# Patient Record
Sex: FEMALE | Race: BLACK OR AFRICAN AMERICAN | NOT HISPANIC OR LATINO | URBAN - METROPOLITAN AREA
[De-identification: names, ages, dates, MRNs, and addresses within clinical notes are randomized per-mention and may not be internally consistent; named-entity substitution may affect disease eponyms.]

---

## 2018-11-09 ENCOUNTER — INPATIENT (INPATIENT)
Facility: HOSPITAL | Age: 58
LOS: 3 days | Discharge: HOME | End: 2018-11-13
Attending: INTERNAL MEDICINE | Admitting: INTERNAL MEDICINE
Payer: COMMERCIAL

## 2018-11-09 VITALS
RESPIRATION RATE: 28 BRPM | DIASTOLIC BLOOD PRESSURE: 76 MMHG | OXYGEN SATURATION: 67 % | SYSTOLIC BLOOD PRESSURE: 191 MMHG | HEART RATE: 82 BPM

## 2018-11-09 DIAGNOSIS — E78.5 HYPERLIPIDEMIA, UNSPECIFIED: ICD-10-CM

## 2018-11-09 DIAGNOSIS — R06.00 DYSPNEA, UNSPECIFIED: ICD-10-CM

## 2018-11-09 DIAGNOSIS — I16.1 HYPERTENSIVE EMERGENCY: ICD-10-CM

## 2018-11-09 DIAGNOSIS — D64.9 ANEMIA, UNSPECIFIED: ICD-10-CM

## 2018-11-09 DIAGNOSIS — M86.171 OTHER ACUTE OSTEOMYELITIS, RIGHT ANKLE AND FOOT: Chronic | ICD-10-CM

## 2018-11-09 DIAGNOSIS — R65.10 SYSTEMIC INFLAMMATORY RESPONSE SYNDROME (SIRS) OF NON-INFECTIOUS ORIGIN WITHOUT ACUTE ORGAN DYSFUNCTION: ICD-10-CM

## 2018-11-09 DIAGNOSIS — I50.9 HEART FAILURE, UNSPECIFIED: ICD-10-CM

## 2018-11-09 DIAGNOSIS — J81.0 ACUTE PULMONARY EDEMA: ICD-10-CM

## 2018-11-09 DIAGNOSIS — Z29.9 ENCOUNTER FOR PROPHYLACTIC MEASURES, UNSPECIFIED: ICD-10-CM

## 2018-11-09 DIAGNOSIS — E11.9 TYPE 2 DIABETES MELLITUS WITHOUT COMPLICATIONS: ICD-10-CM

## 2018-11-09 DIAGNOSIS — H40.9 UNSPECIFIED GLAUCOMA: ICD-10-CM

## 2018-11-09 DIAGNOSIS — N18.4 CHRONIC KIDNEY DISEASE, STAGE 4 (SEVERE): ICD-10-CM

## 2018-11-09 LAB
ALBUMIN SERPL ELPH-MCNC: 3.5 G/DL — SIGNIFICANT CHANGE UP (ref 3.3–5)
ALP SERPL-CCNC: 66 U/L — SIGNIFICANT CHANGE UP (ref 40–120)
ALT FLD-CCNC: 62 U/L — HIGH (ref 4–33)
ANISOCYTOSIS BLD QL: SLIGHT — SIGNIFICANT CHANGE UP
APPEARANCE UR: CLEAR — SIGNIFICANT CHANGE UP
APTT BLD: 31.4 SEC — SIGNIFICANT CHANGE UP (ref 27.5–36.3)
AST SERPL-CCNC: 26 U/L — SIGNIFICANT CHANGE UP (ref 4–32)
BACTERIA # UR AUTO: SIGNIFICANT CHANGE UP
BASE EXCESS BLDV CALC-SCNC: -2.8 MMOL/L — SIGNIFICANT CHANGE UP
BASOPHILS # BLD AUTO: 0.06 K/UL — SIGNIFICANT CHANGE UP (ref 0–0.2)
BASOPHILS NFR BLD AUTO: 0.4 % — SIGNIFICANT CHANGE UP (ref 0–2)
BASOPHILS NFR SPEC: 0.9 % — SIGNIFICANT CHANGE UP (ref 0–2)
BILIRUB SERPL-MCNC: 0.3 MG/DL — SIGNIFICANT CHANGE UP (ref 0.2–1.2)
BILIRUB UR-MCNC: NEGATIVE — SIGNIFICANT CHANGE UP
BLASTS # FLD: 0 % — SIGNIFICANT CHANGE UP (ref 0–0)
BLOOD GAS VENOUS - CREATININE: 2.7 MG/DL — HIGH (ref 0.5–1.3)
BLOOD UR QL VISUAL: SIGNIFICANT CHANGE UP
BUN SERPL-MCNC: 58 MG/DL — HIGH (ref 7–23)
BUN SERPL-MCNC: 58 MG/DL — HIGH (ref 7–23)
CALCIUM SERPL-MCNC: 8.6 MG/DL — SIGNIFICANT CHANGE UP (ref 8.4–10.5)
CALCIUM SERPL-MCNC: 8.9 MG/DL — SIGNIFICANT CHANGE UP (ref 8.4–10.5)
CHLORIDE BLDV-SCNC: 113 MMOL/L — HIGH (ref 96–108)
CHLORIDE SERPL-SCNC: 106 MMOL/L — SIGNIFICANT CHANGE UP (ref 98–107)
CHLORIDE SERPL-SCNC: 108 MMOL/L — HIGH (ref 98–107)
CK MB BLD-MCNC: 3.67 NG/ML — SIGNIFICANT CHANGE UP (ref 1–4.7)
CK MB BLD-MCNC: 4.29 NG/ML — SIGNIFICANT CHANGE UP (ref 1–4.7)
CK MB BLD-MCNC: SIGNIFICANT CHANGE UP (ref 0–2.5)
CK SERPL-CCNC: 125 U/L — SIGNIFICANT CHANGE UP (ref 25–170)
CK SERPL-CCNC: 128 U/L — SIGNIFICANT CHANGE UP (ref 25–170)
CO2 SERPL-SCNC: 21 MMOL/L — LOW (ref 22–31)
CO2 SERPL-SCNC: 22 MMOL/L — SIGNIFICANT CHANGE UP (ref 22–31)
COLOR SPEC: YELLOW — SIGNIFICANT CHANGE UP
CREAT SERPL-MCNC: 2.69 MG/DL — HIGH (ref 0.5–1.3)
CREAT SERPL-MCNC: 2.76 MG/DL — HIGH (ref 0.5–1.3)
EOSINOPHIL # BLD AUTO: 0.16 K/UL — SIGNIFICANT CHANGE UP (ref 0–0.5)
EOSINOPHIL NFR BLD AUTO: 1.1 % — SIGNIFICANT CHANGE UP (ref 0–6)
EOSINOPHIL NFR FLD: 0 % — SIGNIFICANT CHANGE UP (ref 0–6)
EPI CELLS # UR: SIGNIFICANT CHANGE UP
GAS PNL BLDV: 141 MMOL/L — SIGNIFICANT CHANGE UP (ref 136–146)
GIANT PLATELETS BLD QL SMEAR: PRESENT — SIGNIFICANT CHANGE UP
GLUCOSE BLDC GLUCOMTR-MCNC: 172 MG/DL — HIGH (ref 70–99)
GLUCOSE BLDV-MCNC: 218 — HIGH (ref 70–99)
GLUCOSE SERPL-MCNC: 153 MG/DL — HIGH (ref 70–99)
GLUCOSE SERPL-MCNC: 223 MG/DL — HIGH (ref 70–99)
GLUCOSE UR-MCNC: NEGATIVE — SIGNIFICANT CHANGE UP
HCO3 BLDV-SCNC: 21 MMOL/L — SIGNIFICANT CHANGE UP (ref 20–27)
HCT VFR BLD CALC: 30.1 % — LOW (ref 34.5–45)
HCT VFR BLDV CALC: 29.1 % — LOW (ref 34.5–45)
HGB BLD-MCNC: 9.1 G/DL — LOW (ref 11.5–15.5)
HGB BLDV-MCNC: 9.4 G/DL — LOW (ref 11.5–15.5)
HYPOCHROMIA BLD QL: SLIGHT — SIGNIFICANT CHANGE UP
IMM GRANULOCYTES # BLD AUTO: 0.6 # — SIGNIFICANT CHANGE UP
IMM GRANULOCYTES NFR BLD AUTO: 4.3 % — HIGH (ref 0–1.5)
INR BLD: 1.06 — SIGNIFICANT CHANGE UP (ref 0.88–1.17)
KETONES UR-MCNC: NEGATIVE — SIGNIFICANT CHANGE UP
LACTATE BLDV-MCNC: 0.9 MMOL/L — SIGNIFICANT CHANGE UP (ref 0.5–2)
LEUKOCYTE ESTERASE UR-ACNC: NEGATIVE — SIGNIFICANT CHANGE UP
LYMPHOCYTES # BLD AUTO: 1.09 K/UL — SIGNIFICANT CHANGE UP (ref 1–3.3)
LYMPHOCYTES # BLD AUTO: 7.8 % — LOW (ref 13–44)
LYMPHOCYTES NFR SPEC AUTO: 5.2 % — LOW (ref 13–44)
MAGNESIUM SERPL-MCNC: 2.7 MG/DL — HIGH (ref 1.6–2.6)
MCHC RBC-ENTMCNC: 24.7 PG — LOW (ref 27–34)
MCHC RBC-ENTMCNC: 30.2 % — LOW (ref 32–36)
MCV RBC AUTO: 81.8 FL — SIGNIFICANT CHANGE UP (ref 80–100)
METAMYELOCYTES # FLD: 0.9 % — SIGNIFICANT CHANGE UP (ref 0–1)
MICROCYTES BLD QL: SLIGHT — SIGNIFICANT CHANGE UP
MONOCYTES # BLD AUTO: 0.8 K/UL — SIGNIFICANT CHANGE UP (ref 0–0.9)
MONOCYTES NFR BLD AUTO: 5.7 % — SIGNIFICANT CHANGE UP (ref 2–14)
MONOCYTES NFR BLD: 4.3 % — SIGNIFICANT CHANGE UP (ref 2–9)
MYELOCYTES NFR BLD: 0.9 % — HIGH (ref 0–0)
NEUTROPHIL AB SER-ACNC: 86.1 % — HIGH (ref 43–77)
NEUTROPHILS # BLD AUTO: 11.28 K/UL — HIGH (ref 1.8–7.4)
NEUTROPHILS NFR BLD AUTO: 80.7 % — HIGH (ref 43–77)
NEUTS BAND # BLD: 1.7 % — SIGNIFICANT CHANGE UP (ref 0–6)
NITRITE UR-MCNC: NEGATIVE — SIGNIFICANT CHANGE UP
NRBC # FLD: 0 — SIGNIFICANT CHANGE UP
NT-PROBNP SERPL-SCNC: SIGNIFICANT CHANGE UP PG/ML
OTHER - HEMATOLOGY %: 0 — SIGNIFICANT CHANGE UP
PCO2 BLDV: 46 MMHG — SIGNIFICANT CHANGE UP (ref 41–51)
PH BLDV: 7.31 PH — LOW (ref 7.32–7.43)
PH UR: 5.5 — SIGNIFICANT CHANGE UP (ref 5–8)
PHOSPHATE SERPL-MCNC: 5.1 MG/DL — HIGH (ref 2.5–4.5)
PLATELET # BLD AUTO: 390 K/UL — SIGNIFICANT CHANGE UP (ref 150–400)
PLATELET COUNT - ESTIMATE: NORMAL — SIGNIFICANT CHANGE UP
PMV BLD: 10.2 FL — SIGNIFICANT CHANGE UP (ref 7–13)
PO2 BLDV: 30 MMHG — LOW (ref 35–40)
POIKILOCYTOSIS BLD QL AUTO: SLIGHT — SIGNIFICANT CHANGE UP
POLYCHROMASIA BLD QL SMEAR: SLIGHT — SIGNIFICANT CHANGE UP
POTASSIUM BLDV-SCNC: 4.1 MMOL/L — SIGNIFICANT CHANGE UP (ref 3.4–4.5)
POTASSIUM SERPL-MCNC: 4.3 MMOL/L — SIGNIFICANT CHANGE UP (ref 3.5–5.3)
POTASSIUM SERPL-MCNC: 4.4 MMOL/L — SIGNIFICANT CHANGE UP (ref 3.5–5.3)
POTASSIUM SERPL-SCNC: 4.3 MMOL/L — SIGNIFICANT CHANGE UP (ref 3.5–5.3)
POTASSIUM SERPL-SCNC: 4.4 MMOL/L — SIGNIFICANT CHANGE UP (ref 3.5–5.3)
PROMYELOCYTES # FLD: 0 % — SIGNIFICANT CHANGE UP (ref 0–0)
PROT SERPL-MCNC: 7.3 G/DL — SIGNIFICANT CHANGE UP (ref 6–8.3)
PROT UR-MCNC: 100 — HIGH
PROTHROM AB SERPL-ACNC: 11.8 SEC — SIGNIFICANT CHANGE UP (ref 9.8–13.1)
RBC # BLD: 3.68 M/UL — LOW (ref 3.8–5.2)
RBC # FLD: 15.5 % — HIGH (ref 10.3–14.5)
RBC CASTS # UR COMP ASSIST: >50 — HIGH (ref 0–?)
REVIEW TO FOLLOW: YES — SIGNIFICANT CHANGE UP
SAO2 % BLDV: 44 % — LOW (ref 60–85)
SODIUM SERPL-SCNC: 142 MMOL/L — SIGNIFICANT CHANGE UP (ref 135–145)
SODIUM SERPL-SCNC: 143 MMOL/L — SIGNIFICANT CHANGE UP (ref 135–145)
SP GR SPEC: 1.02 — SIGNIFICANT CHANGE UP (ref 1–1.04)
TROPONIN T, HIGH SENSITIVITY: 142 NG/L — CRITICAL HIGH (ref ?–14)
TROPONIN T, HIGH SENSITIVITY: 153 NG/L — CRITICAL HIGH (ref ?–14)
TROPONIN T, HIGH SENSITIVITY: 94 NG/L — CRITICAL HIGH (ref ?–14)
UROBILINOGEN FLD QL: 0.2 — SIGNIFICANT CHANGE UP
VARIANT LYMPHS # BLD: 0 % — SIGNIFICANT CHANGE UP
WBC # BLD: 13.99 K/UL — HIGH (ref 3.8–10.5)
WBC # FLD AUTO: 13.99 K/UL — HIGH (ref 3.8–10.5)
WBC UR QL: SIGNIFICANT CHANGE UP (ref 0–?)

## 2018-11-09 PROCEDURE — 99223 1ST HOSP IP/OBS HIGH 75: CPT | Mod: GC

## 2018-11-09 PROCEDURE — 71045 X-RAY EXAM CHEST 1 VIEW: CPT | Mod: 26

## 2018-11-09 RX ORDER — HYDRALAZINE HCL 50 MG
50 TABLET ORAL EVERY 8 HOURS
Qty: 0 | Refills: 0 | Status: DISCONTINUED | OUTPATIENT
Start: 2018-11-09 | End: 2018-11-13

## 2018-11-09 RX ORDER — DEXTROSE 50 % IN WATER 50 %
15 SYRINGE (ML) INTRAVENOUS ONCE
Qty: 0 | Refills: 0 | Status: DISCONTINUED | OUTPATIENT
Start: 2018-11-09 | End: 2018-11-13

## 2018-11-09 RX ORDER — DEXTROSE 50 % IN WATER 50 %
25 SYRINGE (ML) INTRAVENOUS ONCE
Qty: 0 | Refills: 0 | Status: DISCONTINUED | OUTPATIENT
Start: 2018-11-09 | End: 2018-11-13

## 2018-11-09 RX ORDER — AMLODIPINE BESYLATE 2.5 MG/1
10 TABLET ORAL DAILY
Qty: 0 | Refills: 0 | Status: DISCONTINUED | OUTPATIENT
Start: 2018-11-10 | End: 2018-11-13

## 2018-11-09 RX ORDER — GLUCAGON INJECTION, SOLUTION 0.5 MG/.1ML
1 INJECTION, SOLUTION SUBCUTANEOUS ONCE
Qty: 0 | Refills: 0 | Status: DISCONTINUED | OUTPATIENT
Start: 2018-11-09 | End: 2018-11-13

## 2018-11-09 RX ORDER — NITROGLYCERIN 6.5 MG
100 CAPSULE, EXTENDED RELEASE ORAL
Qty: 50 | Refills: 0 | Status: DISCONTINUED | OUTPATIENT
Start: 2018-11-09 | End: 2018-11-09

## 2018-11-09 RX ORDER — NITROGLYCERIN 6.5 MG
0.4 CAPSULE, EXTENDED RELEASE ORAL ONCE
Qty: 0 | Refills: 0 | Status: COMPLETED | OUTPATIENT
Start: 2018-11-09 | End: 2018-11-09

## 2018-11-09 RX ORDER — INSULIN LISPRO 100/ML
VIAL (ML) SUBCUTANEOUS
Qty: 0 | Refills: 0 | Status: DISCONTINUED | OUTPATIENT
Start: 2018-11-09 | End: 2018-11-13

## 2018-11-09 RX ORDER — FUROSEMIDE 40 MG
40 TABLET ORAL ONCE
Qty: 0 | Refills: 0 | Status: COMPLETED | OUTPATIENT
Start: 2018-11-09 | End: 2018-11-09

## 2018-11-09 RX ORDER — FUROSEMIDE 40 MG
60 TABLET ORAL EVERY 12 HOURS
Qty: 0 | Refills: 0 | Status: DISCONTINUED | OUTPATIENT
Start: 2018-11-09 | End: 2018-11-11

## 2018-11-09 RX ORDER — FERROUS SULFATE 325(65) MG
325 TABLET ORAL DAILY
Qty: 0 | Refills: 0 | Status: DISCONTINUED | OUTPATIENT
Start: 2018-11-09 | End: 2018-11-13

## 2018-11-09 RX ORDER — FUROSEMIDE 40 MG
60 TABLET ORAL EVERY 12 HOURS
Qty: 0 | Refills: 0 | Status: DISCONTINUED | OUTPATIENT
Start: 2018-11-09 | End: 2018-11-09

## 2018-11-09 RX ORDER — AMLODIPINE BESYLATE 2.5 MG/1
10 TABLET ORAL ONCE
Qty: 0 | Refills: 0 | Status: COMPLETED | OUTPATIENT
Start: 2018-11-09 | End: 2018-11-09

## 2018-11-09 RX ORDER — ISOSORBIDE DINITRATE 5 MG/1
5 TABLET ORAL THREE TIMES A DAY
Qty: 0 | Refills: 0 | Status: DISCONTINUED | OUTPATIENT
Start: 2018-11-09 | End: 2018-11-13

## 2018-11-09 RX ORDER — HYDRALAZINE HCL 50 MG
50 TABLET ORAL EVERY 8 HOURS
Qty: 0 | Refills: 0 | Status: DISCONTINUED | OUTPATIENT
Start: 2018-11-09 | End: 2018-11-09

## 2018-11-09 RX ORDER — CARVEDILOL PHOSPHATE 80 MG/1
25 CAPSULE, EXTENDED RELEASE ORAL EVERY 12 HOURS
Qty: 0 | Refills: 0 | Status: DISCONTINUED | OUTPATIENT
Start: 2018-11-09 | End: 2018-11-13

## 2018-11-09 RX ORDER — ISOSORBIDE DINITRATE 5 MG/1
5 TABLET ORAL THREE TIMES A DAY
Qty: 0 | Refills: 0 | Status: DISCONTINUED | OUTPATIENT
Start: 2018-11-09 | End: 2018-11-09

## 2018-11-09 RX ORDER — NITROGLYCERIN 6.5 MG
5 CAPSULE, EXTENDED RELEASE ORAL
Qty: 50 | Refills: 0 | Status: DISCONTINUED | OUTPATIENT
Start: 2018-11-09 | End: 2018-11-10

## 2018-11-09 RX ORDER — DEXTROSE 50 % IN WATER 50 %
12.5 SYRINGE (ML) INTRAVENOUS ONCE
Qty: 0 | Refills: 0 | Status: DISCONTINUED | OUTPATIENT
Start: 2018-11-09 | End: 2018-11-13

## 2018-11-09 RX ORDER — INSULIN LISPRO 100/ML
VIAL (ML) SUBCUTANEOUS AT BEDTIME
Qty: 0 | Refills: 0 | Status: DISCONTINUED | OUTPATIENT
Start: 2018-11-09 | End: 2018-11-13

## 2018-11-09 RX ORDER — HEPARIN SODIUM 5000 [USP'U]/ML
5000 INJECTION INTRAVENOUS; SUBCUTANEOUS EVERY 8 HOURS
Qty: 0 | Refills: 0 | Status: DISCONTINUED | OUTPATIENT
Start: 2018-11-09 | End: 2018-11-13

## 2018-11-09 RX ORDER — SODIUM CHLORIDE 9 MG/ML
1000 INJECTION, SOLUTION INTRAVENOUS
Qty: 0 | Refills: 0 | Status: DISCONTINUED | OUTPATIENT
Start: 2018-11-09 | End: 2018-11-13

## 2018-11-09 RX ORDER — ATORVASTATIN CALCIUM 80 MG/1
20 TABLET, FILM COATED ORAL AT BEDTIME
Qty: 0 | Refills: 0 | Status: DISCONTINUED | OUTPATIENT
Start: 2018-11-09 | End: 2018-11-13

## 2018-11-09 RX ADMIN — HEPARIN SODIUM 5000 UNIT(S): 5000 INJECTION INTRAVENOUS; SUBCUTANEOUS at 23:03

## 2018-11-09 RX ADMIN — Medication 60 MILLIGRAM(S): at 18:04

## 2018-11-09 RX ADMIN — Medication 40 MILLIGRAM(S): at 14:11

## 2018-11-09 RX ADMIN — ISOSORBIDE DINITRATE 5 MILLIGRAM(S): 5 TABLET ORAL at 18:05

## 2018-11-09 RX ADMIN — ISOSORBIDE DINITRATE 5 MILLIGRAM(S): 5 TABLET ORAL at 23:02

## 2018-11-09 RX ADMIN — Medication 50 MILLIGRAM(S): at 23:02

## 2018-11-09 RX ADMIN — Medication 50 MILLIGRAM(S): at 18:03

## 2018-11-09 RX ADMIN — Medication 100 MICROGRAM(S)/MIN: at 12:45

## 2018-11-09 RX ADMIN — Medication 0.4 MILLIGRAM(S): at 13:31

## 2018-11-09 RX ADMIN — CARVEDILOL PHOSPHATE 25 MILLIGRAM(S): 80 CAPSULE, EXTENDED RELEASE ORAL at 18:04

## 2018-11-09 RX ADMIN — ATORVASTATIN CALCIUM 20 MILLIGRAM(S): 80 TABLET, FILM COATED ORAL at 23:02

## 2018-11-09 RX ADMIN — Medication 30 MICROGRAM(S)/MIN: at 10:20

## 2018-11-09 RX ADMIN — AMLODIPINE BESYLATE 10 MILLIGRAM(S): 2.5 TABLET ORAL at 13:30

## 2018-11-09 RX ADMIN — Medication 325 MILLIGRAM(S): at 23:02

## 2018-11-09 RX ADMIN — Medication 30 MICROGRAM(S)/MIN: at 19:00

## 2018-11-09 NOTE — H&P ADULT - PROBLEM SELECTOR PLAN 1
In setting of hypertensive emergency, will manage BP as below as well as heart failure  -c/w bilevel ventilation for now, breaks for meals   -IV Lasix 40mg IV BID  -supplemental O2 when off bilevel, titrate to maintain SPO2 >92% In setting of hypertensive emergency, will manage BP as below as well as heart failure  -c/w bilevel ventilation for now, breaks for meals   -IV Lasix 60mg IV BID  -supplemental O2 when off bilevel, titrate to maintain SPO2 >92% In setting of hypertensive emergency, will manage BP as below as well as heart failure  -c/w bilevel ventilation for now, breaks for meals   -IV Lasix 60mg IV BID  -supplemental O2 when off bilevel, titrate to maintain SPO2 >92%  -Strict i/os

## 2018-11-09 NOTE — H&P ADULT - PROBLEM SELECTOR PLAN 5
GFR indication stable stage IV dz, likely in setting of HTN, DM, Heart failure   -check FeUREA   -monitor Cr daily  avoid nephrotoxins, renally dose all medications GFR indication stable stage IV dz, likely in setting of HTN, DM, Heart failure   -check FeUREA   -monitor Cr daily  avoid nephrotoxins, renally dose all medications  -recheck BMP and lytes now, replete as needed GFR indication stable stage IV dz, likely in setting of HTN, DM, Heart failure   -Baseline unknown. Call PCP in AM to obtain baseline renal function.   -monitor Cr daily  -avoid nephrotoxins, renally dose all medications  -recheck BMP and lytes now, replete as needed

## 2018-11-09 NOTE — ED PROVIDER NOTE - NS ED ROS FT
GENERAL: No fever or chills, EYES: no change in vision, HEENT: no trouble swallowing or speaking, CARDIAC: no chest pain, PULMONARY: no cough or SOB, GI: no abdominal pain, no nausea, no vomiting, no diarrhea or constipation, : No changes in urination, SKIN: no rashes, NEURO: no headache,  MSK: No joint pain ~Papito Bustillos M.D. Resident GENERAL: No fever or chills, EYES: no change in vision, HEENT: no trouble swallowing or speaking, CARDIAC: no chest pain, +palpitations, PULMONARY: no cough, +SOB, GI: no abdominal pain, no nausea, no vomiting, no diarrhea or constipation, : No changes in urination, SKIN: no rashes, NEURO: no headache,  MSK: No joint pain ~Papito Bustillos M.D. Resident

## 2018-11-09 NOTE — H&P ADULT - PROBLEM SELECTOR PLAN 3
tacycardic + WBC+ tachypenia  likely response to hypertensive urgency, patient w/ recent completion of abx at previous hospital, will continue to monitor off abx at this time.  -f/u blood cultures, check UA, urine culture -Likely diastolic decompensated heart failure due to hypertensive urgency  - Previous hospitalization discharge summary w/ Grade 2 diastolic dysfunction, dilated RV cavity size w/ reduced RV function, mitral annular calcification and small pericardial effusion w/ preserved LVEF.   -c/w hydralazine 50mg q8hrs, isosorbide dinitrate 5mg q8, carvedilol 25mg q12   -will start IV Lasix 60mg q12 hrs for now, reassess volume status in AM and transition to PO   -daily weights, strict i/o's   -obtain TTE  -CCU consulted

## 2018-11-09 NOTE — ED PROVIDER NOTE - ATTENDING CONTRIBUTION TO CARE
Seen and examined, mod distress, tachypneic 30s and hypoxic RA at triage, pt. much worse than yest., was feeling better at time of DC 2d PTA. No further fever, devendra. po abx but had hx of fluid shifts, stopping and starting Lasix and bicarb in MICU. This a.m. called EMS, rec'd NRB facemask with some improvement, O2 sat 96% on 100% NRB, still labored. MMM, crackles bibasilar, rapid and shallow resp., heart reg, abd soft, NT to palp, no CVAT, ext. NT calves, moves all.

## 2018-11-09 NOTE — ED PROVIDER NOTE - CARE PLAN
Principal Discharge DX:	Dyspnea  Secondary Diagnosis:	Acute pulmonary edema  Secondary Diagnosis:	Pneumonia

## 2018-11-09 NOTE — H&P ADULT - PROBLEM SELECTOR PLAN 10
IMPROVE score: 0   DVT ppx: Heparin sq given patient likely to be in bed   Diet: DASH consistent carb w/ evening snack   PT consult placed   Dr.Benziger Alcala   Internal Medicine   PGY-2   [phone] (long range pager)  [phone] (short range)

## 2018-11-09 NOTE — H&P ADULT - NSHPPHYSICALEXAM_GEN_ALL_CORE
T(C): 36.7 (11-09-18 @ 13:45), Max: 36.7 (11-09-18 @ 09:58)  HR: 86 (11-09-18 @ 16:00) (75 - 86)  BP: 186/89 (11-09-18 @ 16:00) (186/78 - 244/103)  RR: 23 (11-09-18 @ 16:00) (22 - 30)  SpO2: 93% (11-09-18 @ 16:00) (67% - 100%)  Wt(kg): --    Gen: awake, alert, obese   HENT: neck soft / supple  Lymph: no LAD noted in neck  Eye: PERRL, sclerae anicteric  CV: normal rate, regular rhythm, +JVD  Pulm: crackles b/l to mid lung  Abd: +BS, soft, NT, ND  Skin: warm, dry  Ext: right leg pitting edema +2  Neuro: answering questions appropriately, following commands appropriately, recent and remote memory intact  Psych: normal mood / affect

## 2018-11-09 NOTE — H&P ADULT - PROBLEM SELECTOR PLAN 7
As per patient not on oral medications as A1c <7%   -check A1c in AM   -start HISS moderate, FSG premeal and bedtime

## 2018-11-09 NOTE — ED PROVIDER NOTE - PHYSICAL EXAMINATION
Gen: AAOx3, non-toxic  Head: NCAT  HEENT: EOMI, oral mucosa moist, normal conjunctiva  Lung: scattered crackles, +respiratory distress, increased work of breathing  CV: RRR, no murmurs, rubs or gallops, trace pedal edema  Abd: soft, NTND, no guarding  MSK: no visible deformities  Neuro: No focal sensory or motor deficits  Skin: Warm, well perfused, no rash  Psych: normal affect.   ~Papito Bustillos M.D. Resident

## 2018-11-09 NOTE — H&P ADULT - PMH
Blind  Right Eye  Cataract    CKD (chronic kidney disease), stage IV    DM (diabetes mellitus)    Glaucoma    Heart failure    HLD (hyperlipidemia)    HTN (hypertension)

## 2018-11-09 NOTE — H&P ADULT - ASSESSMENT
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 Presenting to the ER w/ acute SOB and hypertensive emergency c/b flash pulmonary edema. 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 Presenting to the ER w/ acute SOB and hypertensive emergency c/b flash pulmonary edema.       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 Presenting to the ER w/ acute SOB and hypertensive emergency c/b flash pulmonary edema.       #elevated troponin   -EKG: NSR 83, , QTc 477 V2 t-wave inversion  -up trending troponin, likely in setting of CKD and demand ischemia   -repeat troponin now   -patient will need ischemia work up, likely out patient   -check TTE  -Cardiology consulted

## 2018-11-09 NOTE — ED ADULT NURSE NOTE - OBJECTIVE STATEMENT
vick rn: pt received to room #17 with c/o SOB. pt placed on 100% NRB. pt aox4, ambulatory, skin w/d/i. EKG obtained. IV placed, labs drawn and sent. cultures obtained x 2 and sent to lab. rectal noted. on HM, Sinus tach on HM. respiratory called for Bipap, report given to primary Rn.

## 2018-11-09 NOTE — H&P ADULT - PROBLEM SELECTOR PLAN 4
Previous hospitalization discharge summary w/ Grade 2 diastolic dysfunction, dilated RV cavity size w/ reduced RV function, mitral annular calcification and small pericardial effusion w/ preserved LVEF.   c/w hydralazine 50mg q8hrs, isosorbide dinitrate 5mg q8, carvedilol 25mg q12   -will start IV Lasix 40mg q12 hrs for now, reassess volume status in AM and transition to PO   -daily weights, strict i/o's   -obtain TTE Previous hospitalization discharge summary w/ Grade 2 diastolic dysfunction, dilated RV cavity size w/ reduced RV function, mitral annular calcification and small pericardial effusion w/ preserved LVEF.   c/w hydralazine 50mg q8hrs, isosorbide dinitrate 5mg q8, carvedilol 25mg q12   -will start IV Lasix 60mg q12 hrs for now, reassess volume status in AM and transition to PO   -daily weights, strict i/o's   -obtain TTE  -CCU consulted tacycardic + WBC+ tachypenia  likely response to hypertensive urgency, patient w/ recent completion of abx at previous hospital, will continue to monitor off abx at this time.  -f/u blood cultures, check UA, urine culture

## 2018-11-09 NOTE — ED ADULT NURSE NOTE - NSIMPLEMENTINTERV_GEN_ALL_ED
Implemented All Fall with Harm Risk Interventions:  [place] to call system. Call bell, personal items and telephone within reach. Instruct patient to call for assistance. Room bathroom lighting operational. Non-slip footwear when patient is off stretcher. Physically safe environment: no spills, clutter or unnecessary equipment. Stretcher in lowest position, wheels locked, appropriate side rails in place. Provide visual cue, wrist band, yellow gown, etc. Monitor gait and stability. Monitor for mental status changes and reorient to person, place, and time. Review medications for side effects contributing to fall risk. Reinforce activity limits and safety measures with patient and family. Provide visual clues: red socks.

## 2018-11-09 NOTE — H&P ADULT - NSHPSOCIALHISTORY_GEN_ALL_CORE
, lives w/ , no kids, works as airline , denies tobacco/etoh/recreational drug use, not sexually active.

## 2018-11-09 NOTE — H&P ADULT - PROBLEM SELECTOR PLAN 6
Normocytic anemia, likely in setting of CKD and chronic disease, no signs of acute blood loss  -check iron studies  -c/w home iron supplementation   monitor hgb daily Normocytic anemia, likely in setting of CKD and chronic disease, no signs of acute blood loss  -check iron studies  -c/w home iron supplementation   -monitor hgb daily

## 2018-11-09 NOTE — ED PROVIDER NOTE - OBJECTIVE STATEMENT
58 yo F PMHx DM2, HTN, CHF, discharged yesterday from [facility]. Pt was in MICU at [facility], intubated for acute respiratory failure 2/2 enterovirus PNA. She was discharged yesterday on PO antibiotics (Levofloxacin). She became SOB again this morning. She denies chest pain but feels palpitations. Denies fevers/chills, N/V, abd pain.

## 2018-11-09 NOTE — ED PROVIDER NOTE - MEDICAL DECISION MAKING DETAILS
56 yo F PMHx DM2, HTN, CHF, discharged yesterday from [facility] treated for PNA, p/w SOB, elevated BPs, scattered B lines on POCUS, likely APE, other DDx: ACS, CHF exacerbation, will obtain basic labs + trop, BNP, EKG, CXR, nitro gtt, admit

## 2018-11-09 NOTE — H&P ADULT - PROBLEM SELECTOR PLAN 2
s/p nitro ggt, lasix 40mg IV, no chest or back pain low suspicion for dissection, tinnitus now resolved   -will resume home medications; amlodipine 10mg, hydralazine 50mg q8hrs, isosorbide dinitrate 5mg q8 s/p nitro ggt, lasix 40mg IV, no chest or back pain low suspicion for dissection, tinnitus now resolved   -will resume home medications; amlodipine 10mg, hydralazine 50mg q8hrs, isosorbide dinitrate 5mg q8  -vitals q4 s/p nitro ggt, lasix 40mg IV, no chest or back pain low suspicion for dissection, tinnitus now resolved   -will resume home medications; amlodipine 10mg, hydralazine 50mg q8hrs, isosorbide dinitrate 5mg q8  -vitals q4  -Uptitrate BP meds as needed. May need gtt for better BP control. CCU evaluation called. s/p nitro ggt, lasix 40mg IV, no chest or back pain low suspicion for dissection, tinnitus now resolved   -will resume home medications; amlodipine 10mg, hydralazine 50mg q8hrs, isosorbide dinitrate 5mg q8  -vitals q4  -Uptitrate BP meds as needed. May need gtt for better BP control. CCU evaluation called.  -Elevated troponins likely due to demand ischemia due to hypertension  -EKG without any acute st or t wave changes , no active chest pain, low suspicion for ACS at this time.

## 2018-11-09 NOTE — H&P ADULT - HISTORY OF PRESENT ILLNESS
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 Presenting to the ER w/ acute SOB and hypertensive emergency.    Patient was at home when she developed SOB on ambulation w/ palpations and tinnitus. She denies any chest pain, nausea/vomiting, fevers, chills or night sweats. She was feeling well prior to discharge from the hospital. Patient picked up her Rx'd medications and only took her hydralazine and isosorbide dinitrate today. She states her systolic blood pressures usually run less than 150s and had been stable prior recent discharge from [facility]   Patient was recent discharged from [facility] for enterovirus infection w/ possible bacterial pneumonia requiring intubation and MICU stay presenting as AMS. She was treated w/ azithromycin, ceftriaxone and vancomycin subsequently transition to levofloxacin as there were b/l opacities seen on CXR. TTE was preformed and she was found to have Grade 2 diastolic dysfunction, dilated RV cavity size w/ reduced RV function, mitral annular calcification and small pericardial effusion w/ preserved LVEF. Dopplers of the LE were also preformed and reported as normal. Troponin negative, EKG "neg for acute findings", Her platelets dropped >50%, HIT panel was sent however negative and Heparin Sq was restarted for dvt ppx. MARLEN (-), CCP <8, RF 10,

## 2018-11-09 NOTE — H&P ADULT - NSHPLABSRESULTS_GEN_ALL_CORE
.  Labs reviewed personally.                          9.1    13.99 )-----------( 390      ( 09 Nov 2018 09:45 )             30.1     Hgb Trend: 9.1<--  11-09    142  |  106  |  58<H>  ----------------------------<  223<H>  4.3   |  21<L>  |  2.76<H>    Ca    8.6      09 Nov 2018 09:45    TPro  7.3  /  Alb  3.5  /  TBili  0.3  /  DBili  x   /  AST  26  /  ALT  62<H>  /  AlkPhos  66  11-09    Creatinine Trend: 2.76<--  PT/INR - ( 09 Nov 2018 09:45 )   PT: 11.8 SEC;   INR: 1.06       PTT - ( 09 Nov 2018 09:45 )  PTT:31.4 SEC    Troponin 94 -->153; BNP [id number]   Imaging reviewed personally.  < from: Xray Chest 1 View- PORTABLE-Urgent (11.09.18 @ 12:07) >  Small bilateral pleural effusions and moderate pulmonary edema. Dense left basilar/retrocardiac opacification could be due to combination of pleural effusion and underlying airspace consolidation including possible infiltrate/pneumonia in the proper clinical context. No pneumothorax. Heart size and mediastinal width inaccurately assessed on this projection. Trachea midline. Generalized osteopenia and spine and bilateral shoulder degenerative changes.  < end of copied text > EKG: NSR 83, , QTc 477 V2 t-wave inversion    Labs reviewed personally.                        9.1    13.99 )-----------( 390      ( 09 Nov 2018 09:45 )             30.1     Hgb Trend: 9.1<--  11-09    142  |  106  |  58<H>  ----------------------------<  223<H>  4.3   |  21<L>  |  2.76<H>    Ca    8.6      09 Nov 2018 09:45    TPro  7.3  /  Alb  3.5  /  TBili  0.3  /  DBili  x   /  AST  26  /  ALT  62<H>  /  AlkPhos  66  11-09    Creatinine Trend: 2.76<--  PT/INR - ( 09 Nov 2018 09:45 )   PT: 11.8 SEC;   INR: 1.06       PTT - ( 09 Nov 2018 09:45 )  PTT:31.4 SEC    Troponin 94 -->153; BNP [id number]   Imaging reviewed personally.  < from: Xray Chest 1 View- PORTABLE-Urgent (11.09.18 @ 12:07) >  Small bilateral pleural effusions and moderate pulmonary edema. Dense left basilar/retrocardiac opacification could be due to combination of pleural effusion and underlying airspace consolidation including possible infiltrate/pneumonia in the proper clinical context. No pneumothorax. Heart size and mediastinal width inaccurately assessed on this projection. Trachea midline. Generalized osteopenia and spine and bilateral shoulder degenerative changes.  < end of copied text >

## 2018-11-09 NOTE — ED ADULT TRIAGE NOTE - CHIEF COMPLAINT QUOTE
Pt c/o difficulty breathing. Pt unable to complete sentences. Pt o2 sat on RA 67%. Non rebreather placed. Pt was discharged from [facility] yesterday where she was treated for CHF and PNA in ICU. Pt was also intubated. Pt has paper work.

## 2018-11-09 NOTE — CONSULT NOTE ADULT - SUBJECTIVE AND OBJECTIVE BOX
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 Patient was at home when she developed SOB on ambulation w/ palpations and tinnitus. She denies any chest pain, nausea/vomiting, fevers, chills or night sweats.     She was recently hospitalized at [facility] for enterovirus infection w/ possible bacterial pneumonia requiring intubation and MICU stay presenting as AMS. She was treated w/ azithromycin, ceftriaxone and vancomycin subsequently transition to levofloxacin as there were b/l opacities seen on CXR. TTE was preformed and she was found to have Grade 2 diastolic dysfunction, dilated RV cavity size w/ reduced RV function, mitral annular calcification and small pericardial effusion w/ preserved LVEF. Dopplers of the LE were also preformed and reported as normal. Troponin negative, EKG "neg for acute findings", Her platelets dropped >50%, HIT panel was sent however negative and Heparin Sq was restarted for dvt ppx. MARLEN (-), CCP <8, RF 10, (09 Nov 2018 16:08). Patient picked up her Rx'd medications and only took her hydralazine and isosorbide dinitrate today. She states her systolic blood pressures usually run less than 150s and had been stable prior recent discharge from [facility]     In the ED, T 36.7, HR 75-86, -244/, RR 23, Spo2 93% on NC.  EKG w/o acute ST-T wave changes.. CXR with small bilateral pleural effusions and moderate pulmonary edema, dense left basilar/retrocardiac opacification could be due to combination   of pleural effusion and underlying airspace consolidation including possible infiltrate/pneumonia in the proper clinical context. Labs significant fo WBC 13.99 (80% neutrophils), Hgb 9.1, Plt 390 , HsTrop 94->153. . CKMB 3.67, BUN/Cr 58/2.76, BNP 21488. VBG 7.31/46/30/21. Lactate 0.9.     PMH:   CKD (chronic kidney disease), stage IV  Cataract  Glaucoma  HLD (hyperlipidemia)  Heart failure    PSH:   Acute osteomyelitis of toe of right foot    Medications:   atorvastatin 20 milliGRAM(s) Oral at bedtime  carvedilol 25 milliGRAM(s) Oral every 12 hours  ferrous    sulfate 325 milliGRAM(s) Oral daily  furosemide   Injectable 60 milliGRAM(s) IV Push every 12 hours  heparin  Injectable 5000 Unit(s) SubCutaneous every 8 hours  hydrALAZINE 50 milliGRAM(s) Oral every 8 hours  isosorbide   dinitrate Tablet (ISORDIL) 5 milliGRAM(s) Oral three times a day      Allergies:  No Known Allergies      FAMILY HISTORY:  No pertinent family history in first degree relatives      Social History:  Smoking History:  Alcohol Use:  Drug Use:    Review of Systems:  REVIEW OF SYSTEMS:  CONSTITUTIONAL: No weakness, fevers or chills  EYES/ENT: No visual changes;  No dysphagia  NECK: No pain or stiffness  RESPIRATORY: No cough, wheezing, hemoptysis; No shortness of breath  CARDIOVASCULAR: No chest pain or palpitations; No lower extremity edema  GASTROINTESTINAL: No abdominal or epigastric pain. No nausea, vomiting, or hematemesis; No diarrhea or constipation. No melena or hematochezia.  BACK: No back pain  GENITOURINARY: No dysuria, frequency or hematuria  NEUROLOGICAL: No numbness or weakness  SKIN: No itching, burning, rashes, or lesions   All other review of systems is negative unless indicated above.    Physical Exam:  T(F): 98 (11-09), Max: 98 (11-09)  HR: 84 (11-09) (75 - 86)  BP: 186/89 (11-09) (186/78 - 244/103)  RR: 23 (11-09)  SpO2: 93% (11-09)  Gen: awake, alert, obese   HEENT: neck soft / supple  Lymph: no LAD noted in neck  Eye: PERRL, sclerae anicteric  CV: normal rate, regular rhythm, +JVD  Pulm: crackles b/l to mid lung  Abd: +BS, soft, NT, ND  Skin: warm, dry  Ext: right leg pitting edema +2  Neuro: answering questions appropriately, following  commands appropriately, recent and remote memory  intact  Psych: normal mood / affect  Cardiovascular Diagnostic Testing:    ECG: Personally reviewed    Echo:    Stress Testing:    Cath:    Interpretation of Telemetry:    Imaging:    Labs: Personally reviewed                        9.1    13.99 )-----------( 390      ( 09 Nov 2018 09:45 )             30.1     11-09    142  |  106  |  58<H>  ----------------------------<  223<H>  4.3   |  21<L>  |  2.76<H>    Ca    8.6      09 Nov 2018 09:45    TPro  7.3  /  Alb  3.5  /  TBili  0.3  /  DBili  x   /  AST  26  /  ALT  62<H>  /  AlkPhos  66  11-09    PT/INR - ( 09 Nov 2018 09:45 )   PT: 11.8 SEC;   INR: 1.06          PTT - ( 09 Nov 2018 09:45 )  PTT:31.4 SEC  CARDIAC MARKERS ( 09 Nov 2018 12:30 )  x     / x     / 125 u/L / 3.67 ng/mL / x          Serum Pro-Brain Natriuretic Peptide: 53036 pg/mL (11-09 @ 09:45) Patient seen and evaluated at bedside    Chief Complaint: SOB    HPI: 57F hx DM c/b osteo right 5th toe amputation, HTN, HLD, HFpEF (diastolic grade 2 dysfunction), pericardial effusion, RICHARD, cataract w/ right eye blindness presenting to the ER w/ acute SOB. Patient was at home when she developed SOB on ambulation w/ palpations and tinnitus. She denies any chest pain, nausea/vomiting, fevers, chills or night sweats.     She was recently hospitalized at [facility] for enterovirus infection w/ possible bacterial pneumonia requiring intubation and MICU stay presenting as AMS. She was treated w/ azithromycin, ceftriaxone and vancomycin subsequently transition to levofloxacin as there were b/l opacities seen on CXR. TTE was preformed and she was found to have Grade 2 diastolic dysfunction, dilated RV cavity size w/ reduced RV function, mitral annular calcification and small pericardial effusion w/ preserved LVEF. Dopplers of the LE were also preformed and reported as normal. Troponin negative, EKG "neg for acute findings", Her platelets dropped >50%, HIT panel was sent however negative and Heparin Sq was restarted for dvt ppx. MARLEN (-), CCP <8, RF 10, (09 Nov 2018 16:08). Patient picked up her Rx'd medications and only took her hydralazine and isosorbide dinitrate today. She states her systolic blood pressures usually run less than 150s and had been stable prior recent discharge from [facility]     In the ED, T 36.7, HR 75-86, -244/, RR 23, Spo2 93% on NC.  EKG w/o acute ST-T wave changes.. CXR with small bilateral pleural effusions and moderate pulmonary edema, dense left basilar/retrocardiac opacification could be due to combination of pleural effusion and underlying airspace consolidation including possible infiltrate/pneumonia in the proper clinical context. Labs significant fo WBC 13.99 (80% neutrophils), Hgb 9.1, Plt 390 , HsTrop 94->153. . CKMB 3.67, BUN/Cr 58/2.76, BNP 65519. VBG 7.31/46/30/21. Lactate 0.9.     PMH:   CKD (chronic kidney disease), stage IV  Cataract  Glaucoma  HLD (hyperlipidemia)  Heart failure    PSH:   Acute osteomyelitis of toe of right foot    Medications:   atorvastatin 20 milliGRAM(s) Oral at bedtime  carvedilol 25 milliGRAM(s) Oral every 12 hours  ferrous    sulfate 325 milliGRAM(s) Oral daily  furosemide   Injectable 60 milliGRAM(s) IV Push every 12 hours  heparin  Injectable 5000 Unit(s) SubCutaneous every 8 hours  hydrALAZINE 50 milliGRAM(s) Oral every 8 hours  isosorbide   dinitrate Tablet (ISORDIL) 5 milliGRAM(s) Oral three times a day      Allergies:  No Known Allergies      FAMILY HISTORY:  No pertinent family history in first degree relatives      Social History:  Smoking History:  Alcohol Use:  Drug Use:    Review of Systems:  REVIEW OF SYSTEMS:  CONSTITUTIONAL: No weakness, fevers or chills  EYES/ENT: No visual changes;  No dysphagia  NECK: No pain or stiffness  RESPIRATORY: No cough, wheezing, hemoptysis; No shortness of breath  CARDIOVASCULAR: No chest pain or palpitations; No lower extremity edema  GASTROINTESTINAL: No abdominal or epigastric pain. No nausea, vomiting, or hematemesis; No diarrhea or constipation. No melena or hematochezia.  BACK: No back pain  GENITOURINARY: No dysuria, frequency or hematuria  NEUROLOGICAL: No numbness or weakness  SKIN: No itching, burning, rashes, or lesions   All other review of systems is negative unless indicated above.    Physical Exam:  T(F): 98 (11-09), Max: 98 (11-09)  HR: 84 (11-09) (75 - 86)  BP: 186/89 (11-09) (186/78 - 244/103)  RR: 23 (11-09)  SpO2: 93% (11-09)  Gen: awake, alert, obese   HEENT: neck soft / supple  Lymph: no LAD noted in neck  Eye: PERRL, sclerae anicteric  CV: normal rate, regular rhythm, +JVD  Pulm: crackles b/l to mid lung  Abd: +BS, soft, NT, ND  Skin: warm, dry  Ext: right leg pitting edema +2  Neuro: answering questions appropriately, following  commands appropriately, recent and remote memory  intact  Psych: normal mood / affect  Cardiovascular Diagnostic Testing:    ECG: Personally reviewed    Echo:    Stress Testing:    Cath:    Interpretation of Telemetry:    Imaging:    Labs: Personally reviewed                        9.1    13.99 )-----------( 390      ( 09 Nov 2018 09:45 )             30.1     11-09    142  |  106  |  58<H>  ----------------------------<  223<H>  4.3   |  21<L>  |  2.76<H>    Ca    8.6      09 Nov 2018 09:45    TPro  7.3  /  Alb  3.5  /  TBili  0.3  /  DBili  x   /  AST  26  /  ALT  62<H>  /  AlkPhos  66  11-09    PT/INR - ( 09 Nov 2018 09:45 )   PT: 11.8 SEC;   INR: 1.06          PTT - ( 09 Nov 2018 09:45 )  PTT:31.4 SEC  CARDIAC MARKERS ( 09 Nov 2018 12:30 )  x     / x     / 125 u/L / 3.67 ng/mL / x          Serum Pro-Brain Natriuretic Peptide: 15912 pg/mL (11-09 @ 09:45)

## 2018-11-09 NOTE — CONSULT NOTE ADULT - ASSESSMENT
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 Presenting to the ER w/ acute SOB and hypertensive emergency c/b flash pulmonary edema.     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 Presenting to the ER w/ acute SOB and hypertensive emergency c/b flash pulmonary edema.     #HTN emergency/flash pulmonary edema/acute decompensated HFrEF. Trop likely elevated in the setting of demand ischemia. No chest pain or ischemic EKG changes.   -Resume home BP meds   -Will need CCU admission for nitro gtt  -Lasix 60 IV BID for now, daily weights, I/Os  -BiPAP as needed- downtitrate as tolerated  -TTE in the AM  -Trend troponin, EKG     #Elevated Cr  -Monitor lytes, cr   -Avoid nephrotoxins, renally dose all med

## 2018-11-09 NOTE — ED PROVIDER NOTE - PROGRESS NOTE DETAILS
Papito Bustillos M.D. Resident: Pt feeling better, WOB improved, /72, tolerating NC at 3L - SpO2 95%, will admit to medicine on tele

## 2018-11-10 ENCOUNTER — TRANSCRIPTION ENCOUNTER (OUTPATIENT)
Age: 58
End: 2018-11-10

## 2018-11-10 DIAGNOSIS — D72.829 ELEVATED WHITE BLOOD CELL COUNT, UNSPECIFIED: ICD-10-CM

## 2018-11-10 LAB
ALBUMIN SERPL ELPH-MCNC: 3.1 G/DL — LOW (ref 3.3–5)
ALBUMIN SERPL ELPH-MCNC: 3.1 G/DL — LOW (ref 3.3–5)
ALP SERPL-CCNC: 55 U/L — SIGNIFICANT CHANGE UP (ref 40–120)
ALP SERPL-CCNC: 55 U/L — SIGNIFICANT CHANGE UP (ref 40–120)
ALT FLD-CCNC: 44 U/L — HIGH (ref 4–33)
ALT FLD-CCNC: 44 U/L — HIGH (ref 4–33)
AST SERPL-CCNC: 23 U/L — SIGNIFICANT CHANGE UP (ref 4–32)
AST SERPL-CCNC: 23 U/L — SIGNIFICANT CHANGE UP (ref 4–32)
BASOPHILS # BLD AUTO: 0.03 K/UL — SIGNIFICANT CHANGE UP (ref 0–0.2)
BASOPHILS NFR BLD AUTO: 0.2 % — SIGNIFICANT CHANGE UP (ref 0–2)
BILIRUB SERPL-MCNC: 0.3 MG/DL — SIGNIFICANT CHANGE UP (ref 0.2–1.2)
BILIRUB SERPL-MCNC: 0.3 MG/DL — SIGNIFICANT CHANGE UP (ref 0.2–1.2)
BUN SERPL-MCNC: 58 MG/DL — HIGH (ref 7–23)
BUN SERPL-MCNC: 58 MG/DL — HIGH (ref 7–23)
CALCIUM SERPL-MCNC: 8.4 MG/DL — SIGNIFICANT CHANGE UP (ref 8.4–10.5)
CALCIUM SERPL-MCNC: 8.4 MG/DL — SIGNIFICANT CHANGE UP (ref 8.4–10.5)
CHLORIDE SERPL-SCNC: 108 MMOL/L — HIGH (ref 98–107)
CHLORIDE SERPL-SCNC: 108 MMOL/L — HIGH (ref 98–107)
CHLORIDE UR-SCNC: 45 MMOL/L — SIGNIFICANT CHANGE UP
CK MB BLD-MCNC: 3.04 NG/ML — SIGNIFICANT CHANGE UP (ref 1–4.7)
CK SERPL-CCNC: 95 U/L — SIGNIFICANT CHANGE UP (ref 25–170)
CO2 SERPL-SCNC: 20 MMOL/L — LOW (ref 22–31)
CO2 SERPL-SCNC: 20 MMOL/L — LOW (ref 22–31)
CREAT ?TM UR-MCNC: 80.5 MG/DL — SIGNIFICANT CHANGE UP
CREAT SERPL-MCNC: 2.62 MG/DL — HIGH (ref 0.5–1.3)
CREAT SERPL-MCNC: 2.62 MG/DL — HIGH (ref 0.5–1.3)
EOSINOPHIL # BLD AUTO: 0.06 K/UL — SIGNIFICANT CHANGE UP (ref 0–0.5)
EOSINOPHIL NFR BLD AUTO: 0.5 % — SIGNIFICANT CHANGE UP (ref 0–6)
FERRITIN SERPL-MCNC: 610.1 NG/ML — HIGH (ref 15–150)
GLUCOSE BLDC GLUCOMTR-MCNC: 132 MG/DL — HIGH (ref 70–99)
GLUCOSE BLDC GLUCOMTR-MCNC: 136 MG/DL — HIGH (ref 70–99)
GLUCOSE BLDC GLUCOMTR-MCNC: 144 MG/DL — HIGH (ref 70–99)
GLUCOSE BLDC GLUCOMTR-MCNC: 165 MG/DL — HIGH (ref 70–99)
GLUCOSE BLDC GLUCOMTR-MCNC: 238 MG/DL — HIGH (ref 70–99)
GLUCOSE SERPL-MCNC: 132 MG/DL — HIGH (ref 70–99)
GLUCOSE SERPL-MCNC: 132 MG/DL — HIGH (ref 70–99)
HBA1C BLD-MCNC: 6.7 % — HIGH (ref 4–5.6)
HCT VFR BLD CALC: 24.6 % — LOW (ref 34.5–45)
HCT VFR BLD CALC: 24.6 % — LOW (ref 34.5–45)
HGB BLD-MCNC: 7.5 G/DL — LOW (ref 11.5–15.5)
HGB BLD-MCNC: 7.5 G/DL — LOW (ref 11.5–15.5)
IMM GRANULOCYTES # BLD AUTO: 0.18 # — SIGNIFICANT CHANGE UP
IMM GRANULOCYTES NFR BLD AUTO: 1.4 % — SIGNIFICANT CHANGE UP (ref 0–1.5)
IRON SATN MFR SERPL: 171 UG/DL — SIGNIFICANT CHANGE UP (ref 140–530)
IRON SATN MFR SERPL: 44 UG/DL — SIGNIFICANT CHANGE UP (ref 30–160)
LYMPHOCYTES # BLD AUTO: 0.96 K/UL — LOW (ref 1–3.3)
LYMPHOCYTES # BLD AUTO: 7.3 % — LOW (ref 13–44)
MAGNESIUM SERPL-MCNC: 2.5 MG/DL — SIGNIFICANT CHANGE UP (ref 1.6–2.6)
MCHC RBC-ENTMCNC: 25.2 PG — LOW (ref 27–34)
MCHC RBC-ENTMCNC: 25.2 PG — LOW (ref 27–34)
MCHC RBC-ENTMCNC: 30.5 % — LOW (ref 32–36)
MCHC RBC-ENTMCNC: 30.5 % — LOW (ref 32–36)
MCV RBC AUTO: 82.6 FL — SIGNIFICANT CHANGE UP (ref 80–100)
MCV RBC AUTO: 82.6 FL — SIGNIFICANT CHANGE UP (ref 80–100)
MONOCYTES # BLD AUTO: 0.79 K/UL — SIGNIFICANT CHANGE UP (ref 0–0.9)
MONOCYTES NFR BLD AUTO: 6 % — SIGNIFICANT CHANGE UP (ref 2–14)
NEUTROPHILS # BLD AUTO: 11.21 K/UL — HIGH (ref 1.8–7.4)
NEUTROPHILS NFR BLD AUTO: 84.6 % — HIGH (ref 43–77)
NRBC # FLD: 0 — SIGNIFICANT CHANGE UP
NRBC # FLD: 0 — SIGNIFICANT CHANGE UP
OSMOLALITY UR: 409 MOSMO/KG — SIGNIFICANT CHANGE UP (ref 50–1200)
PHOSPHATE SERPL-MCNC: 5 MG/DL — HIGH (ref 2.5–4.5)
PLATELET # BLD AUTO: 353 K/UL — SIGNIFICANT CHANGE UP (ref 150–400)
PLATELET # BLD AUTO: 353 K/UL — SIGNIFICANT CHANGE UP (ref 150–400)
PMV BLD: 10.2 FL — SIGNIFICANT CHANGE UP (ref 7–13)
PMV BLD: 10.2 FL — SIGNIFICANT CHANGE UP (ref 7–13)
POTASSIUM SERPL-MCNC: 4.3 MMOL/L — SIGNIFICANT CHANGE UP (ref 3.5–5.3)
POTASSIUM SERPL-MCNC: 4.3 MMOL/L — SIGNIFICANT CHANGE UP (ref 3.5–5.3)
POTASSIUM SERPL-SCNC: 4.3 MMOL/L — SIGNIFICANT CHANGE UP (ref 3.5–5.3)
POTASSIUM SERPL-SCNC: 4.3 MMOL/L — SIGNIFICANT CHANGE UP (ref 3.5–5.3)
POTASSIUM UR-SCNC: 20.9 MMOL/L — SIGNIFICANT CHANGE UP
PROT SERPL-MCNC: 6.1 G/DL — SIGNIFICANT CHANGE UP (ref 6–8.3)
PROT SERPL-MCNC: 6.1 G/DL — SIGNIFICANT CHANGE UP (ref 6–8.3)
PROT UR-MCNC: 46.3 MG/DL — SIGNIFICANT CHANGE UP
RBC # BLD: 2.98 M/UL — LOW (ref 3.8–5.2)
RBC # BLD: 2.98 M/UL — LOW (ref 3.8–5.2)
RBC # FLD: 15.3 % — HIGH (ref 10.3–14.5)
RBC # FLD: 15.3 % — HIGH (ref 10.3–14.5)
SODIUM SERPL-SCNC: 144 MMOL/L — SIGNIFICANT CHANGE UP (ref 135–145)
SODIUM SERPL-SCNC: 144 MMOL/L — SIGNIFICANT CHANGE UP (ref 135–145)
SODIUM UR-SCNC: 56 MMOL/L — SIGNIFICANT CHANGE UP
SPECIMEN SOURCE: SIGNIFICANT CHANGE UP
SPECIMEN SOURCE: SIGNIFICANT CHANGE UP
TROPONIN T, HIGH SENSITIVITY: 116 NG/L — CRITICAL HIGH (ref ?–14)
UIBC SERPL-MCNC: 126.8 UG/DL — SIGNIFICANT CHANGE UP (ref 110–370)
WBC # BLD: 13.23 K/UL — HIGH (ref 3.8–10.5)
WBC # BLD: 13.23 K/UL — HIGH (ref 3.8–10.5)
WBC # FLD AUTO: 13.23 K/UL — HIGH (ref 3.8–10.5)
WBC # FLD AUTO: 13.23 K/UL — HIGH (ref 3.8–10.5)

## 2018-11-10 PROCEDURE — 93306 TTE W/DOPPLER COMPLETE: CPT | Mod: 26

## 2018-11-10 PROCEDURE — 99233 SBSQ HOSP IP/OBS HIGH 50: CPT | Mod: GC

## 2018-11-10 RX ORDER — NITROGLYCERIN 6.5 MG
5 CAPSULE, EXTENDED RELEASE ORAL
Qty: 50 | Refills: 0 | Status: DISCONTINUED | OUTPATIENT
Start: 2018-11-10 | End: 2018-11-10

## 2018-11-10 RX ORDER — CHLORHEXIDINE GLUCONATE 213 G/1000ML
1 SOLUTION TOPICAL
Qty: 0 | Refills: 0 | Status: DISCONTINUED | OUTPATIENT
Start: 2018-11-10 | End: 2018-11-13

## 2018-11-10 RX ORDER — INFLUENZA VIRUS VACCINE 15; 15; 15; 15 UG/.5ML; UG/.5ML; UG/.5ML; UG/.5ML
0.5 SUSPENSION INTRAMUSCULAR ONCE
Qty: 0 | Refills: 0 | Status: DISCONTINUED | OUTPATIENT
Start: 2018-11-10 | End: 2018-11-13

## 2018-11-10 RX ADMIN — Medication 50 MILLIGRAM(S): at 22:25

## 2018-11-10 RX ADMIN — Medication 2: at 12:35

## 2018-11-10 RX ADMIN — Medication 50 MILLIGRAM(S): at 05:32

## 2018-11-10 RX ADMIN — HEPARIN SODIUM 5000 UNIT(S): 5000 INJECTION INTRAVENOUS; SUBCUTANEOUS at 14:44

## 2018-11-10 RX ADMIN — Medication 60 MILLIGRAM(S): at 18:29

## 2018-11-10 RX ADMIN — CARVEDILOL PHOSPHATE 25 MILLIGRAM(S): 80 CAPSULE, EXTENDED RELEASE ORAL at 05:32

## 2018-11-10 RX ADMIN — ISOSORBIDE DINITRATE 5 MILLIGRAM(S): 5 TABLET ORAL at 05:33

## 2018-11-10 RX ADMIN — Medication 325 MILLIGRAM(S): at 12:32

## 2018-11-10 RX ADMIN — Medication 50 MILLIGRAM(S): at 14:44

## 2018-11-10 RX ADMIN — ISOSORBIDE DINITRATE 5 MILLIGRAM(S): 5 TABLET ORAL at 22:25

## 2018-11-10 RX ADMIN — ATORVASTATIN CALCIUM 20 MILLIGRAM(S): 80 TABLET, FILM COATED ORAL at 22:25

## 2018-11-10 RX ADMIN — HEPARIN SODIUM 5000 UNIT(S): 5000 INJECTION INTRAVENOUS; SUBCUTANEOUS at 05:31

## 2018-11-10 RX ADMIN — Medication 60 MILLIGRAM(S): at 05:31

## 2018-11-10 RX ADMIN — HEPARIN SODIUM 5000 UNIT(S): 5000 INJECTION INTRAVENOUS; SUBCUTANEOUS at 22:25

## 2018-11-10 RX ADMIN — AMLODIPINE BESYLATE 10 MILLIGRAM(S): 2.5 TABLET ORAL at 05:32

## 2018-11-10 RX ADMIN — CARVEDILOL PHOSPHATE 25 MILLIGRAM(S): 80 CAPSULE, EXTENDED RELEASE ORAL at 18:29

## 2018-11-10 RX ADMIN — CHLORHEXIDINE GLUCONATE 1 APPLICATION(S): 213 SOLUTION TOPICAL at 12:38

## 2018-11-10 NOTE — PROGRESS NOTE ADULT - ATTENDING COMMENTS
Madelaine Boogie is a 57 year old woman with history of DM, HTN, HFrEF, RICHARD, and CKD IV. She presented with acute SOB and flash pulmonary edema in setting of HTN emergency, with SBP ~220 mm Hg. She clinically improved with BP control and diuresis. BiPap was discontinued. Labs noted elevation of serum creatinine, 2.62 mg/dL  on 11/10/18. Given above findings, would assess for renal artery stenosis with renal Doppler ultrasound. Echocardiogram done 11/10/18 showed mitral annular calcification and calcified mitral leaflets with normal diastolic opening and mild-moderate mitral regurgitation. The aortic root was normal. The aortic valve was calcified trileaflet, with normal opening. The left atrium was normal in size, with LA volume index = 39 cc/m2. The left ventricle was normal in size and wall thickness. LV systolic function was normal, with EF = 67%. There were no segmental wall motion abnormalities. The right atrium was normal.  The right ventricle was grossly normal in systolic function. The tricuspid valve appeared normal, with mild-moderate regurgitation. The pulmonic valve appeared normal with mild regurgitation. The pericardium appeared normal, with small pericardial effusion posterior to the left ventricle. There was a left pleural effusion. Estimated right ventricular systolic pressure was 49 mm Hg, consistent with mild pulmonary hypertension. Standing regimen includes: amlodipine (Norvasc) 10 mg daily, atorvastatin (Lipitor) 20 mg daily at bedtime, carvedilol 25 mg every 12 hours, ferrous sulfate 325 mg daily, furosemide  (Lasix) 60 mg  IV push every 12 hours, heparin 5000 Units subcutaneously every 8 hours, hydralazine 50 mg by mouth every 8 hours, insulin sliding scale and isosorbide  dinitrate (Isordil) 5 mg by mouth three times daily.

## 2018-11-10 NOTE — DISCHARGE NOTE ADULT - OTHER SIGNIFICANT FINDINGS
< from: Transthoracic Echocardiogram (11.10.18 @ 11:21) >  CONCLUSIONS:  1. Mitral annular calcification and calcified mitral  leaflets with normal diastolic opening. Mild-moderate  mitral regurgitation.  2. Mildly dilated left atrium.  LA volume index = 39 cc/m2.  3. Normal left ventricular internal dimensions and wall  thicknesses.  4. Normal left ventricular systolic function. No segmental  wall motion abnormalities.  5. The right ventricle is not well visualized; grossly  normal right ventricular systolic function.  6. Estimated right ventricular systolic pressure equals 49  mm Hg, assuming right atrial pressure equals 10 mm Hg,  consistent with mild pulmonary hypertension.  7. Small pericardial effusion posterior to the left  ventricle.  8. Left pleural effusion.    < end of copied text >

## 2018-11-10 NOTE — PHYSICAL THERAPY INITIAL EVALUATION ADULT - PERTINENT HX OF CURRENT PROBLEM, REHAB EVAL
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 Presenting to the ER w/ acute SOB and hypertensive emergency.  Patient recent discharged from [facility] for enterovirus infection w/ possible bacterial pneumonia requiring intubation and MICU stay presenting as AMS.

## 2018-11-10 NOTE — DISCHARGE NOTE ADULT - CARE PLAN
Principal Discharge DX:	Acute pulmonary edema  Goal:	Continued management of hypertension.  Assessment and plan of treatment:	You were admitted for fluid in your lungs which happened because your blood pressures were too high. When blood pressures are too high, the heart cannot pump against the pressure and fluid from inside your heart can back up into your lungs, causing it very difficult for you to breathe and oxygenate your blood.  Secondary Diagnosis:	Hypertensive emergency  Assessment and plan of treatment:	You were found to have very high blood pressures when you came into the hospital. One of the reasons this happened maybe because you were not taking your medications as per schedule. If you need help taking your medications, you can get a tablet organizer and set alarms on your phone to take them on time.   Goal:	Control your blood pressure levels  - If you experience headache, blurry vision, chest pain, numbness/tingling in your extremities, call your primary care physician or go to the ED immediately  - Check your blood pressure at home and keep a log  - Low sodium diet  - Take medication as prescribed  - Follow up with primary care physician Principal Discharge DX:	Acute pulmonary edema  Goal:	Continued management of hypertension.  Assessment and plan of treatment:	You were admitted for fluid in your lungs which happened because your blood pressures were too high. When blood pressures are too high, the heart cannot pump against the pressure and fluid from inside your heart can back up into your lungs, causing it very difficult for you to breathe and oxygenate your blood.  Secondary Diagnosis:	Hypertensive emergency  Assessment and plan of treatment:	You were found to have very high blood pressures when you came into the hospital. One of the reasons this happened maybe because you were off of your blood pressure medications recently.  Goal:	Control your blood pressure levels  - If you experience headache, blurry vision, chest pain, numbness/tingling in your extremities, call your primary care physician or go to the ED immediately  - Check your blood pressure at home and keep a log  - Low sodium diet  - Take medication as prescribed  - Follow up with primary care physician Principal Discharge DX:	Acute pulmonary edema  Goal:	Continued management of hypertension.  Assessment and plan of treatment:	You were admitted for fluid in your lungs which happened because your blood pressures were too high. When blood pressures are too high, the heart cannot pump against the pressure and fluid from inside your heart can back up into your lungs, causing it very difficult for you to breathe and oxygenate your blood.  Secondary Diagnosis:	Hypertensive emergency  Goal:	Followup with PMD and take all medications prescribed.  Assessment and plan of treatment:	You were found to have very high blood pressures when you came into the hospital. One of the reasons this happened maybe because you were off of your blood pressure medications recently.  Goal:	Control your blood pressure levels  - If you experience headache, blurry vision, chest pain, numbness/tingling in your extremities, call your primary care physician or go to the ED immediately  - Check your blood pressure at home and keep a log  - Low sodium diet  - Take medication as prescribed  - Follow up with primary care physician

## 2018-11-10 NOTE — DISCHARGE NOTE ADULT - HOSPITAL COURSE
57F with DM, osteomyelitis, HTN, HLD, CKD, and HFpEF (diastolic) presents with acute SOB with flash pulmonary edema in setting of HTN emergency, possibly R/T medication noncompliance. Patient was recently discharged from OSH where she was admitted with with entero/rhino virus with possible PNA requiring intubation. At discharge her meds were to be restarted gradually but she did not adnere to that plan. In [facility] ER patient was treated with BiPaP, IV lasix, and IV NTG gtt with good. effect. BP is now better controlled. Also with MARY JO on CKD, Scr is trending down. + anemia, trending H & H at present. 57F with DM, osteomyelitis, HTN, HLD, CKD, and HFpEF (diastolic) presents with acute SOB with flash pulmonary edema in setting of HTN emergency, possibly R/T medication noncompliance. Patient was recently discharged from OSH where she was admitted with entero/rhino virus with possible PNA requiring intubation. At discharge her meds were to be restarted gradually but she did not adhere to that plan. In [facility] ER patient was treated with BiPaP, IV lasix, and IV NTG gtt with good. effect. BP is now better controlled. Also with MARY JO on CKD, Scr is trending down. + anemia, trending H & H at present. 57F with DM, osteomyelitis, HTN, HLD, CKD, and HFpEF (diastolic) presents with acute SOB with flash pulmonary edema in setting of HTN emergency, possibly R/T medication noncompliance. Patient was recently discharged from OSH where she was admitted with entero/rhino virus with possible PNA requiring intubation. At discharge her meds were to be restarted gradually but she did not adhere to that plan. In [facility] ER patient was treated with BiPaP, IV lasix, and IV NTG gtt with good. effect. BP is now better controlled. Also with MARY JO on CKD, Scr is trending down. + anemia, trending H & H which were stable. Blood pressure was controlled and stable on Isodril, hydralazine, amlodipine and carvedilol. Patient was counseled on medication compliance, given return precautions and discharged home. 57F with DM, osteomyelitis, HTN, HLD, CKD, and HFpEF (diastolic) presents with acute SOB with flash pulmonary edema in setting of HTN emergency, possibly R/T medication noncompliance. Patient was recently discharged from OSH where she was admitted with entero/rhino virus with possible PNA requiring intubation. At discharge her meds were to be restarted gradually but she did not adhere to that plan. In [facility] ER patient was treated with BiPaP, IV lasix, and IV NTG gtt with good. effect. BP is now better controlled. Also with MARY JO on CKD, Scr is trending down. + anemia, trending H & H which were stable.   Blood pressure was controlled and stable on Isodril, hydralazine, amlodipine and carvedilol.   A renal ultrasound was done to rule out obstructive causes of MARY JO which showed...  Patient's home benazepril was restarted....?  Patient was counseled on medication compliance, given return precautions and discharged home. 57F with DM, osteomyelitis, HTN, HLD, CKD, and HFpEF (diastolic) presents with acute SOB with flash pulmonary edema in setting of HTN emergency, possibly R/T medication noncompliance. Patient was recently discharged from OSH where she was admitted with entero/rhino virus with possible PNA requiring intubation. At discharge her meds were to be restarted gradually but she did not adhere to that plan. In [facility] ER patient was treated with BiPaP, IV lasix, and IV NTG gtt with good. effect. BP is now better controlled. Also with MARY JO on CKD, Scr is trending down. + anemia, trending H & H which were stable. Blood pressure was controlled and stable on Isodril, hydralazine, amlodipine and carvedilol.   A renal ultrasound was done to rule out obstructive causes of MARY JO which showed.  Mild right hydronephrosis.  Bilateral pleural effusion.ACE in hold in the setting of MARY JO   Patient was counseled on medication compliance, given return precautions and discharged home. 57F with DM, osteomyelitis, HTN, HLD, CKD, and HFpEF (diastolic) presents with acute SOB with flash pulmonary edema in setting of HTN emergency, possibly R/T medication noncompliance. Patient was recently discharged from OSH where she was admitted with entero/rhino virus with possible PNA requiring intubation. At discharge her meds were to be restarted gradually but she did not adhere to that plan. In [facility] ER patient was treated with BiPaP, IV lasix, and IV NTG gtt with good. effect. BP is now better controlled. Also with MARY JO on CKD, Scr is trending down. + anemia, trending H & H which were stable. Blood pressure was controlled and stable on Isodril, hydralazine, amlodipine and carvedilol.   A renal ultrasound was done to rule out obstructive causes of MARY JO which showed.  Mild right hydronephrosis.  Bilateral pleural effusion.ACE in hold in the setting of MARY JO   Patient was counseled on medication compliance, given return precautions and discharged home.   Hypertensive emergency.  Plan: BP remains controlled on present regimen. Continue amlodipine, Coreg, hydralazine, and isordil.    Acute pulmonary edema.  Plan: Appears euvolemic today. ECHO with nl LV function. Continue lasix every other day.   CKD (chronic kidney disease), stage IV.  Plan: -Creatinine is 2.92 today, slightly uptrending from 2.8  -ultrasound of kidneys revealed a mild hydronephrosis of the right kidney. Patient is voiding and net negative 1.5 liters in the last 2 hours  -would monitor intake and output  -urine lytes, osmolality, urea, nitrogen, sent.   Anemia.  Plan: Hemoglobin is 7.6 today, stable throughout this admission, liekly.   Leukocytosis, unspecified type.  Plan: Resolved, remains afebrile.   Discharge planning issues. Plan: OOB as tolerated. PT recs: home, no skilled PT needs.   -d/c planning.

## 2018-11-10 NOTE — PHYSICAL THERAPY INITIAL EVALUATION ADULT - PLANNED THERAPY INTERVENTIONS, PT EVAL
stair negotiation; patient left sitting in chair in NAD; call desai in reach; LADONNA Quesada aware; all lines intact/transfer training/gait training/strengthening

## 2018-11-10 NOTE — PROGRESS NOTE ADULT - ASSESSMENT
57F with DM, HTN, HFrEF, RICHARD, and CKD IV presents with acute SOB with flash pulmonary edema in setting of HTN emergency now improved with BP control and diuresis.

## 2018-11-10 NOTE — PROGRESS NOTE ADULT - SUBJECTIVE AND OBJECTIVE BOX
Subjective/Objective: Patient resting comfortably at present.    MEDICATIONS  (STANDING):  amLODIPine   Tablet 10 milliGRAM(s) Oral daily  atorvastatin 20 milliGRAM(s) Oral at bedtime  carvedilol 25 milliGRAM(s) Oral every 12 hours  chlorhexidine 4% Liquid 1 Application(s) Topical <User Schedule>  dextrose 5%. 1000 milliLiter(s) (50 mL/Hr) IV Continuous <Continuous>  dextrose 50% Injectable 12.5 Gram(s) IV Push once  dextrose 50% Injectable 25 Gram(s) IV Push once  dextrose 50% Injectable 25 Gram(s) IV Push once  ferrous    sulfate 325 milliGRAM(s) Oral daily  furosemide   Injectable 60 milliGRAM(s) IV Push every 12 hours  heparin  Injectable 5000 Unit(s) SubCutaneous every 8 hours  hydrALAZINE 50 milliGRAM(s) Oral every 8 hours  influenza   Vaccine 0.5 milliLiter(s) IntraMuscular once  insulin lispro (HumaLOG) corrective regimen sliding scale   SubCutaneous three times a day before meals  insulin lispro (HumaLOG) corrective regimen sliding scale   SubCutaneous at bedtime  isosorbide   dinitrate Tablet (ISORDIL) 5 milliGRAM(s) Oral three times a day    MEDICATIONS  (PRN):  dextrose 40% Gel 15 Gram(s) Oral once PRN Blood Glucose LESS THAN 70 milliGRAM(s)/deciliter  glucagon  Injectable 1 milliGRAM(s) IntraMuscular once PRN Glucose LESS THAN 70 milligrams/deciliter          Vital Signs Last 24 Hrs  T(C): 36.8 (10 Nov 2018 04:00), Max: 36.8 (10 Nov 2018 04:00)  T(F): 98.2 (10 Nov 2018 04:00), Max: 98.2 (10 Nov 2018 04:00)  HR: 73 (10 Nov 2018 07:00) (72 - 86)  BP: 123/53 (10 Nov 2018 07:00) (123/44 - 244/103)  BP(mean): 71 (10 Nov 2018 07:00) (60 - 120)  RR: 25 (10 Nov 2018 07:00) (13 - 30)  SpO2: 97% (10 Nov 2018 07:00) (67% - 100%)  I&O's Detail    09 Nov 2018 07:01  -  10 Nov 2018 07:00  --------------------------------------------------------  IN:  Total IN: 0 mL    OUT:    Voided: 1050 mL  Total OUT: 1050 mL    Total NET: -1050 mL    PHYSICAL EXAM  GEN:  RESP:  CV:  GI:  EXT:  NEURO:  PSYCH:        EKG/ TELEM:  NSR    LABS:                          7.5    13.23 )-----------( 353      ( 10 Nov 2018 06:10 )             24.6     PT/INR - ( 09 Nov 2018 09:45 )   PT: 11.8 SEC;   INR: 1.06          PTT - ( 09 Nov 2018 09:45 )  PTT:31.4 SEC  10 Nov 2018 06:10    144    |  108<H>  |  58<H>  ----------------------------<  132<H>  4.3     |  20<L>  |  2.62<H>    09 Nov 2018 18:15    143    |  108<H>  |  58<H>  ----------------------------<  153<H>  4.4     |  22     |  2.69<H>    Ca    8.4        10 Nov 2018 06:10  Ca    8.9        09 Nov 2018 18:15  Phos  5.0<H>     10 Nov 2018 06:10  Phos  5.1<H>     09 Nov 2018 18:15  Mg     2.5       10 Nov 2018 06:10  Mg     2.7<H>     09 Nov 2018 18:15    TPro  6.1    /  Alb  3.1<L>  /  TBili  0.3    /  DBili  x      /  AST  23     /  ALT  44<H>  /  AlkPhos  55     10 Nov 2018 06:10  TPro  7.3    /  Alb  3.5    /  TBili  0.3    /  DBili  x      /  AST  26     /  ALT  62<H>  /  AlkPhos  66     09 Nov 2018 09:45    CARDIAC MARKERS ( 10 Nov 2018 06:10 )  x     / x     / 95 u/L / 3.04 ng/mL / x      CARDIAC MARKERS ( 09 Nov 2018 18:15 )  x     / x     / 128 u/L / 4.29 ng/mL / x      CARDIAC MARKERS ( 09 Nov 2018 12:30 )  x     / x     / 125 u/L / 3.67 ng/mL / x          Troponin T, High Sensitivity: 116 ng/L (11-10-18 @ 06:10)  Troponin T, High Sensitivity: 142 ng/L (11-09-18 @ 18:15)  Troponin T, High Sensitivity: 153 ng/L (11-09-18 @ 12:30)    Creatine Kinase, Serum: 95 u/L (11-10-18 @ 06:10)  Creatine Kinase, Serum: 128 u/L (11-09-18 @ 18:15)  Creatine Kinase, Serum: 125 u/L (11-09-18 @ 12:30)    CKMB: 3.04 ng/mL (11-10-18 @ 06:10)  CKMB: 4.29 ng/mL (11-09-18 @ 18:15)  CKMB: 3.67 ng/mL (11-09-18 @ 12:30)      Hemoglobin A1C, Whole Blood: 6.7 % (11-10-18 @ 06:10) Subjective/Objective: Patient resting comfortably at present.    MEDICATIONS  (STANDING):  amLODIPine   Tablet 10 milliGRAM(s) Oral daily  atorvastatin 20 milliGRAM(s) Oral at bedtime  carvedilol 25 milliGRAM(s) Oral every 12 hours  chlorhexidine 4% Liquid 1 Application(s) Topical <User Schedule>  dextrose 5%. 1000 milliLiter(s) (50 mL/Hr) IV Continuous <Continuous>  dextrose 50% Injectable 12.5 Gram(s) IV Push once  dextrose 50% Injectable 25 Gram(s) IV Push once  dextrose 50% Injectable 25 Gram(s) IV Push once  ferrous    sulfate 325 milliGRAM(s) Oral daily  furosemide   Injectable 60 milliGRAM(s) IV Push every 12 hours  heparin  Injectable 5000 Unit(s) SubCutaneous every 8 hours  hydrALAZINE 50 milliGRAM(s) Oral every 8 hours  influenza   Vaccine 0.5 milliLiter(s) IntraMuscular once  insulin lispro (HumaLOG) corrective regimen sliding scale   SubCutaneous three times a day before meals  insulin lispro (HumaLOG) corrective regimen sliding scale   SubCutaneous at bedtime  isosorbide   dinitrate Tablet (ISORDIL) 5 milliGRAM(s) Oral three times a day    MEDICATIONS  (PRN):  dextrose 40% Gel 15 Gram(s) Oral once PRN Blood Glucose LESS THAN 70 milliGRAM(s)/deciliter  glucagon  Injectable 1 milliGRAM(s) IntraMuscular once PRN Glucose LESS THAN 70 milligrams/deciliter          Vital Signs Last 24 Hrs  T(C): 36.8 (10 Nov 2018 04:00), Max: 36.8 (10 Nov 2018 04:00)  T(F): 98.2 (10 Nov 2018 04:00), Max: 98.2 (10 Nov 2018 04:00)  HR: 73 (10 Nov 2018 07:00) (72 - 86)  BP: 123/53 (10 Nov 2018 07:00) (123/44 - 244/103)  BP(mean): 71 (10 Nov 2018 07:00) (60 - 120)  RR: 25 (10 Nov 2018 07:00) (13 - 30)  SpO2: 97% (10 Nov 2018 07:00) (67% - 100%)  I&O's Detail    09 Nov 2018 07:01  -  10 Nov 2018 07:00  --------------------------------------------------------  IN:  Total IN: 0 mL    OUT:    Voided: 1050 mL  Total OUT: 1050 mL    Total NET: -1050 mL    PHYSICAL EXAM  GEN: NAD, skin W & D  RESP: minimal scattered crackles  CV: nl S1S2  GI: soft, NT/ND  EXT: no C/C/E  NEURO: A & O X 3      EKG/ TELEM:  NSR    LABS:                          7.5    13.23 )-----------( 353      ( 10 Nov 2018 06:10 )             24.6     PT/INR - ( 09 Nov 2018 09:45 )   PT: 11.8 SEC;   INR: 1.06          PTT - ( 09 Nov 2018 09:45 )  PTT:31.4 SEC  10 Nov 2018 06:10    144    |  108<H>  |  58<H>  ----------------------------<  132<H>  4.3     |  20<L>  |  2.62<H>    09 Nov 2018 18:15    143    |  108<H>  |  58<H>  ----------------------------<  153<H>  4.4     |  22     |  2.69<H>    Ca    8.4        10 Nov 2018 06:10  Ca    8.9        09 Nov 2018 18:15  Phos  5.0<H>     10 Nov 2018 06:10  Phos  5.1<H>     09 Nov 2018 18:15  Mg     2.5       10 Nov 2018 06:10  Mg     2.7<H>     09 Nov 2018 18:15    TPro  6.1    /  Alb  3.1<L>  /  TBili  0.3    /  DBili  x      /  AST  23     /  ALT  44<H>  /  AlkPhos  55     10 Nov 2018 06:10  TPro  7.3    /  Alb  3.5    /  TBili  0.3    /  DBili  x      /  AST  26     /  ALT  62<H>  /  AlkPhos  66     09 Nov 2018 09:45    CARDIAC MARKERS ( 10 Nov 2018 06:10 )  x     / x     / 95 u/L / 3.04 ng/mL / x      CARDIAC MARKERS ( 09 Nov 2018 18:15 )  x     / x     / 128 u/L / 4.29 ng/mL / x      CARDIAC MARKERS ( 09 Nov 2018 12:30 )  x     / x     / 125 u/L / 3.67 ng/mL / x          Troponin T, High Sensitivity: 116 ng/L (11-10-18 @ 06:10)  Troponin T, High Sensitivity: 142 ng/L (11-09-18 @ 18:15)  Troponin T, High Sensitivity: 153 ng/L (11-09-18 @ 12:30)    Creatine Kinase, Serum: 95 u/L (11-10-18 @ 06:10)  Creatine Kinase, Serum: 128 u/L (11-09-18 @ 18:15)  Creatine Kinase, Serum: 125 u/L (11-09-18 @ 12:30)    CKMB: 3.04 ng/mL (11-10-18 @ 06:10)  CKMB: 4.29 ng/mL (11-09-18 @ 18:15)  CKMB: 3.67 ng/mL (11-09-18 @ 12:30)      Hemoglobin A1C, Whole Blood: 6.7 % (11-10-18 @ 06:10)

## 2018-11-10 NOTE — PROGRESS NOTE ADULT - PROBLEM SELECTOR PLAN 6
WBC mildly elevated but no fever or other signs of infection. Patient is s/p recent hospitalization with intubation for enterovirus and possible PNA. Will monitor at present.

## 2018-11-10 NOTE — DISCHARGE NOTE ADULT - MEDICATION SUMMARY - MEDICATIONS TO TAKE
I will START or STAY ON the medications listed below when I get home from the hospital:    isosorbide dinitrate 5 mg oral tablet  -- 1 tab(s) by mouth every 8 hours  -- Indication: For Chest pain    atorvastatin 20 mg oral tablet  -- 1 tab(s) by mouth once a day  -- Indication: For HLD (hyperlipidemia)    carvedilol 25 mg oral tablet  -- 1 tab(s) by mouth 2 times a day  -- Indication: For HTN    amLODIPine 10 mg oral tablet  -- 1 tab(s) by mouth once a day  -- Indication: For HTN    furosemide 40 mg oral tablet  -- 1 tab(s) by mouth   -- Indication: For HTN    ferrous sulfate 325 mg (65 mg elemental iron) oral tablet  -- 1 tab(s) by mouth once a day  -- Indication: For Iron supplement    hydrALAZINE 50 mg oral tablet  -- 1 tab(s) by mouth every 8 hours  -- Indication: For HTN

## 2018-11-10 NOTE — DISCHARGE NOTE ADULT - PLAN OF CARE
Continued management of hypertension. You were admitted for fluid in your lungs which happened because your blood pressures were too high. When blood pressures are too high, the heart cannot pump against the pressure and fluid from inside your heart can back up into your lungs, causing it very difficult for you to breathe and oxygenate your blood. You were found to have very high blood pressures when you came into the hospital. One of the reasons this happened maybe because you were not taking your medications as per schedule. If you need help taking your medications, you can get a tablet organizer and set alarms on your phone to take them on time.   Goal:	Control your blood pressure levels  - If you experience headache, blurry vision, chest pain, numbness/tingling in your extremities, call your primary care physician or go to the ED immediately  - Check your blood pressure at home and keep a log  - Low sodium diet  - Take medication as prescribed  - Follow up with primary care physician You were found to have very high blood pressures when you came into the hospital. One of the reasons this happened maybe because you were off of your blood pressure medications recently.  Goal:	Control your blood pressure levels  - If you experience headache, blurry vision, chest pain, numbness/tingling in your extremities, call your primary care physician or go to the ED immediately  - Check your blood pressure at home and keep a log  - Low sodium diet  - Take medication as prescribed  - Follow up with primary care physician Followup with PMD and take all medications prescribed.

## 2018-11-10 NOTE — DISCHARGE NOTE ADULT - MEDICATION SUMMARY - MEDICATIONS TO STOP TAKING
I will STOP taking the medications listed below when I get home from the hospital:    benazepril 5 mg oral tablet  -- 1 tab(s) by mouth once a day    levoFLOXacin 250 mg oral tablet  -- 1 tab(s) by mouth every 24 hours for 1 dose  (Prescribed, but patient hasn't taken this yet)

## 2018-11-10 NOTE — DISCHARGE NOTE ADULT - ADDITIONAL INSTRUCTIONS
FOLLOW UP WITH YOUR PRIMARY CARE DOCTOR WITH 7-10 DAYS OF DISCHARGE Follow up with Cardiologist and PMD within one week of discharge. Call for appointment. Return to ED for any concerning symptoms. Continue medications as prescribed. Low salt, low fat, low cholesterol diet.   FOLLOW UP WITH YOUR PRIMARY CARE DOCTOR WITH 7-10 DAYS OF DISCHARGE Follow up with Cardiologist and PMD within one week of discharge. Call for appointment. Return to ED for any concerning symptoms. Continue medications as prescribed. Low salt, low fat, low cholesterol diet.   FOLLOW UP WITH YOUR PRIMARY CARE DOCTOR WITH 7-10 DAYS OF DISCHARGE  Followup with Nephrologist as outpatient.

## 2018-11-10 NOTE — PROGRESS NOTE ADULT - PROBLEM SELECTOR PLAN 1
BP is better controlled, off NTG gtt at present. Continue coreg, hydralazine, isordil, and amlodipine.

## 2018-11-11 DIAGNOSIS — Z02.9 ENCOUNTER FOR ADMINISTRATIVE EXAMINATIONS, UNSPECIFIED: ICD-10-CM

## 2018-11-11 LAB
BUN SERPL-MCNC: 63 MG/DL — HIGH (ref 7–23)
CALCIUM SERPL-MCNC: 8.2 MG/DL — LOW (ref 8.4–10.5)
CHLORIDE SERPL-SCNC: 106 MMOL/L — SIGNIFICANT CHANGE UP (ref 98–107)
CO2 SERPL-SCNC: 22 MMOL/L — SIGNIFICANT CHANGE UP (ref 22–31)
CREAT SERPL-MCNC: 2.92 MG/DL — HIGH (ref 0.5–1.3)
GLUCOSE BLDC GLUCOMTR-MCNC: 116 MG/DL — HIGH (ref 70–99)
GLUCOSE BLDC GLUCOMTR-MCNC: 170 MG/DL — HIGH (ref 70–99)
GLUCOSE BLDC GLUCOMTR-MCNC: 177 MG/DL — HIGH (ref 70–99)
GLUCOSE BLDC GLUCOMTR-MCNC: 185 MG/DL — HIGH (ref 70–99)
GLUCOSE BLDC GLUCOMTR-MCNC: 244 MG/DL — HIGH (ref 70–99)
GLUCOSE SERPL-MCNC: 108 MG/DL — HIGH (ref 70–99)
HCT VFR BLD CALC: 24.4 % — LOW (ref 34.5–45)
HGB BLD-MCNC: 7.6 G/DL — LOW (ref 11.5–15.5)
MAGNESIUM SERPL-MCNC: 2.4 MG/DL — SIGNIFICANT CHANGE UP (ref 1.6–2.6)
MCHC RBC-ENTMCNC: 25.9 PG — LOW (ref 27–34)
MCHC RBC-ENTMCNC: 31.1 % — LOW (ref 32–36)
MCV RBC AUTO: 83 FL — SIGNIFICANT CHANGE UP (ref 80–100)
NRBC # FLD: 0 — SIGNIFICANT CHANGE UP
PHOSPHATE SERPL-MCNC: 5 MG/DL — HIGH (ref 2.5–4.5)
PLATELET # BLD AUTO: 331 K/UL — SIGNIFICANT CHANGE UP (ref 150–400)
PMV BLD: 10.4 FL — SIGNIFICANT CHANGE UP (ref 7–13)
POTASSIUM SERPL-MCNC: 4.3 MMOL/L — SIGNIFICANT CHANGE UP (ref 3.5–5.3)
POTASSIUM SERPL-SCNC: 4.3 MMOL/L — SIGNIFICANT CHANGE UP (ref 3.5–5.3)
RBC # BLD: 2.94 M/UL — LOW (ref 3.8–5.2)
RBC # FLD: 15.6 % — HIGH (ref 10.3–14.5)
SODIUM SERPL-SCNC: 143 MMOL/L — SIGNIFICANT CHANGE UP (ref 135–145)
WBC # BLD: 9.4 K/UL — SIGNIFICANT CHANGE UP (ref 3.8–10.5)
WBC # FLD AUTO: 9.4 K/UL — SIGNIFICANT CHANGE UP (ref 3.8–10.5)

## 2018-11-11 PROCEDURE — 99233 SBSQ HOSP IP/OBS HIGH 50: CPT | Mod: GC

## 2018-11-11 RX ADMIN — Medication 50 MILLIGRAM(S): at 06:29

## 2018-11-11 RX ADMIN — CHLORHEXIDINE GLUCONATE 1 APPLICATION(S): 213 SOLUTION TOPICAL at 13:10

## 2018-11-11 RX ADMIN — CARVEDILOL PHOSPHATE 25 MILLIGRAM(S): 80 CAPSULE, EXTENDED RELEASE ORAL at 17:25

## 2018-11-11 RX ADMIN — ISOSORBIDE DINITRATE 5 MILLIGRAM(S): 5 TABLET ORAL at 13:09

## 2018-11-11 RX ADMIN — Medication 1: at 17:25

## 2018-11-11 RX ADMIN — CARVEDILOL PHOSPHATE 25 MILLIGRAM(S): 80 CAPSULE, EXTENDED RELEASE ORAL at 06:35

## 2018-11-11 RX ADMIN — ISOSORBIDE DINITRATE 5 MILLIGRAM(S): 5 TABLET ORAL at 06:28

## 2018-11-11 RX ADMIN — Medication 50 MILLIGRAM(S): at 22:13

## 2018-11-11 RX ADMIN — Medication 325 MILLIGRAM(S): at 12:34

## 2018-11-11 RX ADMIN — HEPARIN SODIUM 5000 UNIT(S): 5000 INJECTION INTRAVENOUS; SUBCUTANEOUS at 22:13

## 2018-11-11 RX ADMIN — HEPARIN SODIUM 5000 UNIT(S): 5000 INJECTION INTRAVENOUS; SUBCUTANEOUS at 13:09

## 2018-11-11 RX ADMIN — AMLODIPINE BESYLATE 10 MILLIGRAM(S): 2.5 TABLET ORAL at 06:35

## 2018-11-11 RX ADMIN — Medication 1: at 12:34

## 2018-11-11 RX ADMIN — ATORVASTATIN CALCIUM 20 MILLIGRAM(S): 80 TABLET, FILM COATED ORAL at 22:12

## 2018-11-11 RX ADMIN — HEPARIN SODIUM 5000 UNIT(S): 5000 INJECTION INTRAVENOUS; SUBCUTANEOUS at 06:35

## 2018-11-11 RX ADMIN — Medication 60 MILLIGRAM(S): at 06:28

## 2018-11-11 RX ADMIN — Medication 50 MILLIGRAM(S): at 13:09

## 2018-11-11 NOTE — PROGRESS NOTE ADULT - ATTENDING COMMENTS
Madelaine Boogie is a 57 year old woman with history of DM, HTN, HFrEF, RICHARD, and CKD IV. She presented with acute SOB and flash pulmonary edema in setting of HTN emergency, with SBP ~220 mm Hg. She clinically improved with BP control and diuresis. BiPap was discontinued. Labs noted elevation of serum creatinine, 2.92 mg/dL on 11/11/18; 2.62 mg/dL  on 11/10/18. Given above findings, would assess for renal artery stenosis with renal Doppler ultrasound. Echocardiogram done 11/10/18 showed mitral annular calcification and calcified mitral leaflets with normal diastolic opening and mild-moderate mitral regurgitation. The aortic root was normal. The aortic valve was calcified trileaflet, with normal opening. The left atrium was normal in size, with LA volume index = 39 cc/m2. The left ventricle was normal in size and wall thickness. LV systolic function was normal, with EF = 67%. There were no segmental wall motion abnormalities. The right atrium was normal.  The right ventricle was grossly normal in systolic function. The tricuspid valve appeared normal, with mild-moderate regurgitation. The pulmonic valve appeared normal with mild regurgitation. The pericardium appeared normal, with small pericardial effusion posterior to the left ventricle. There was a left pleural effusion. Estimated right ventricular systolic pressure was 49 mm Hg, consistent with mild pulmonary hypertension. Standing regimen includes: amlodipine (Norvasc) 10 mg daily, atorvastatin (Lipitor) 20 mg daily at bedtime, carvedilol 25 mg every 12 hours, ferrous sulfate 325 mg daily,  heparin 5000 Units subcutaneously every 8 hours, hydralazine 50 mg by mouth every 8 hours, insulin sliding scale and isosorbide  dinitrate (Isordil) 5 mg by mouth three times daily. Furosemide  (Lasix) 60 mg  IV push every 12 hours was stopped on 11/11/18 due to elevation in serum creatinine

## 2018-11-11 NOTE — PROGRESS NOTE ADULT - PROBLEM SELECTOR PLAN 2
Appears euvolemic today. ECHO with nl LV function. Scr slightly up today will hold lasix and re-evaluate in am (today's dose already given).

## 2018-11-11 NOTE — PROGRESS NOTE ADULT - PROBLEM SELECTOR PLAN 3
Scr slightly elevated today though stable. As patient appears euvolemic today will hold lasix and observe.

## 2018-11-11 NOTE — PROGRESS NOTE ADULT - PROBLEM SELECTOR PLAN 6
OOB as tolerated. PT recs: home, no skilled PT needs. If creatinine stable and po diuretic regimen determined possible D/C planning Monday or Tuesday.

## 2018-11-11 NOTE — PROGRESS NOTE ADULT - SUBJECTIVE AND OBJECTIVE BOX
Subjective/Objective: Patient feels well, denies SOB.     MEDICATIONS  (STANDING):  amLODIPine   Tablet 10 milliGRAM(s) Oral daily  atorvastatin 20 milliGRAM(s) Oral at bedtime  carvedilol 25 milliGRAM(s) Oral every 12 hours  chlorhexidine 4% Liquid 1 Application(s) Topical <User Schedule>  dextrose 5%. 1000 milliLiter(s) (50 mL/Hr) IV Continuous <Continuous>  dextrose 50% Injectable 12.5 Gram(s) IV Push once  dextrose 50% Injectable 25 Gram(s) IV Push once  dextrose 50% Injectable 25 Gram(s) IV Push once  ferrous    sulfate 325 milliGRAM(s) Oral daily  furosemide   Injectable 60 milliGRAM(s) IV Push every 12 hours  heparin  Injectable 5000 Unit(s) SubCutaneous every 8 hours  hydrALAZINE 50 milliGRAM(s) Oral every 8 hours  influenza   Vaccine 0.5 milliLiter(s) IntraMuscular once  insulin lispro (HumaLOG) corrective regimen sliding scale   SubCutaneous three times a day before meals  insulin lispro (HumaLOG) corrective regimen sliding scale   SubCutaneous at bedtime  isosorbide   dinitrate Tablet (ISORDIL) 5 milliGRAM(s) Oral three times a day    MEDICATIONS  (PRN):  dextrose 40% Gel 15 Gram(s) Oral once PRN Blood Glucose LESS THAN 70 milliGRAM(s)/deciliter  glucagon  Injectable 1 milliGRAM(s) IntraMuscular once PRN Glucose LESS THAN 70 milligrams/deciliter          Vital Signs Last 24 Hrs  T(C): 37.1 (11 Nov 2018 04:00), Max: 37.1 (11 Nov 2018 04:00)  T(F): 98.7 (11 Nov 2018 04:00), Max: 98.7 (11 Nov 2018 04:00)  HR: 72 (11 Nov 2018 06:00) (67 - 78)  BP: 132/50 (11 Nov 2018 06:00) (109/44 - 155/136)  BP(mean): 70 (11 Nov 2018 06:00) (55 - 140)  RR: 20 (11 Nov 2018 06:00) (19 - 28)  SpO2: 98% (11 Nov 2018 06:00) (92% - 100%)  I&O's Detail    10 Nov 2018 07:01  -  11 Nov 2018 07:00  --------------------------------------------------------  IN:    Oral Fluid: 950 mL  Total IN: 950 mL    OUT:    Voided: 1250 mL  Total OUT: 1250 mL    Total NET: -300 mL      PHYSICAL EXAM  GEN: NAD, skin W & D  RESP: CTA ant  CV: nl S1S2  GI: soft, NT/ND, BS +  EXT: no C/C/E  NEURO: A & O X 3      EKG/ TELEM: NSR    LABS:                          7.6    9.40  )-----------( 331      ( 11 Nov 2018 04:15 )             24.4     PT/INR - ( 09 Nov 2018 09:45 )   PT: 11.8 SEC;   INR: 1.06          PTT - ( 09 Nov 2018 09:45 )  PTT:31.4 SEC  11 Nov 2018 04:15    143    |  106    |  63<H>  ----------------------------<  108<H>  4.3     |  22     |  2.92<H>    10 Nov 2018 06:10    144    |  108<H>  |  58<H>  ----------------------------<  132<H>  4.3     |  20<L>  |  2.62<H>    Ca    8.2<L>      11 Nov 2018 04:15  Ca    8.4        10 Nov 2018 06:10  Phos  5.0<H>     11 Nov 2018 04:15  Phos  5.0<H>     10 Nov 2018 06:10  Mg     2.4       11 Nov 2018 04:15  Mg     2.5       10 Nov 2018 06:10    TPro  6.1    /  Alb  3.1<L>  /  TBili  0.3    /  DBili  x      /  AST  23     /  ALT  44<H>  /  AlkPhos  55     10 Nov 2018 06:10  TPro  7.3    /  Alb  3.5    /  TBili  0.3    /  DBili  x      /  AST  26     /  ALT  62<H>  /  AlkPhos  66     09 Nov 2018 09:45    CARDIAC MARKERS ( 10 Nov 2018 06:10 )  x     / x     / 95 u/L / 3.04 ng/mL / x      CARDIAC MARKERS ( 09 Nov 2018 18:15 )  x     / x     / 128 u/L / 4.29 ng/mL / x      CARDIAC MARKERS ( 09 Nov 2018 12:30 )  x     / x     / 125 u/L / 3.67 ng/mL / x          Troponin T, High Sensitivity: 116 ng/L (11-10-18 @ 06:10)  Troponin T, High Sensitivity: 142 ng/L (11-09-18 @ 18:15)  Troponin T, High Sensitivity: 153 ng/L (11-09-18 @ 12:30)    Creatine Kinase, Serum: 95 u/L (11-10-18 @ 06:10)  Creatine Kinase, Serum: 128 u/L (11-09-18 @ 18:15)  Creatine Kinase, Serum: 125 u/L (11-09-18 @ 12:30)    CKMB: 3.04 ng/mL (11-10-18 @ 06:10)  CKMB: 4.29 ng/mL (11-09-18 @ 18:15)  CKMB: 3.67 ng/mL (11-09-18 @ 12:30)      Hemoglobin A1C, Whole Blood: 6.7 % (11-10-18 @ 06:10)        Diagnostic testing:    Patient name: JAYCEE WALTERS  YOB: 1960   Age: 57 (F)   MR#: [id number]  Study Date: 11/10/2018  [facility] Quinn [address] Sonographer: Tamela Marcus  Study quality: Technically Difficult  Referring Physician: Jacki Darden MD  Blood Pressure: 155/36 mmHg  Height: 155 cm  Weight: 72 kg  BSA: 1.7 m2  ------------------------------------------------------------------------  PROCEDURE: Transthoracic echocardiogram with 2-D, M-Mode  and complete spectral and color flow Doppler.  INDICATION: Essential (primary) hypertension (I10)  ------------------------------------------------------------------------  DIMENSIONS:  Dimensions:     Normal Values:  LA:     3.8 cm    2.0 - 4.0 cm  Ao:     3.1 cm2.0 - 3.8 cm  SEPTUM: 0.7 cm    0.6 - 1.2 cm  PWT:    0.7 cm    0.6 - 1.1 cm  LVIDd:  5.1 cm    3.0 - 5.6 cm  LVIDs:  3.2 cm    1.8 - 4.0 cm  Derived Variables:  LVMI: 69 g/m2  RWT: 0.27  Fractional short: 37 %  Ejection Fraction (Teicholtz): 67 %  ------------------------------------------------------------------------  OBSERVATIONS:  Mitral Valve: Mitral annular calcification and calcified  mitral leaflets with normal diastolic opening.  Mild-moderate mitral regurgitation.  Aortic Root: Normal aortic root.  Aortic Valve: Calcified trileaflet aortic valve with normal  opening.  Left Atrium: Mildly dilated left atrium.  LA volume index =  39 cc/m2.  Left Ventricle: Normal left ventricular systolic function.  No segmental wall motion abnormalities. Normal left  ventricular internal dimensions and wall thicknesses.  Right Heart: Normal right atrium. The right ventricle is  not well visualized; grossly normal right ventricular  systolic function. Normal tricuspid valve.  Mild-moderate  tricuspid regurgitation. Normal pulmonic valve. Mild  pulmonic regurgitation.  Pericardium/PleuraSmall pericardial effusion posterior to  the left ventricle. Left pleural effusion.  Hemodynamic: Estimated right ventricular systolic pressure  equals 49 mm Hg, assuming right atrial pressure equals 10  mm Hg, consistent with mild pulmonary hypertension.  ------------------------------------------------------------------------  CONCLUSIONS:  1. Mitral annular calcification and calcified mitral  leaflets with normal diastolic opening. Mild-moderate  mitral regurgitation.  2. Mildly dilated left atrium.  LA volume index = 39 cc/m2.  3. Normal left ventricular internal dimensions and wall  thicknesses.  4. Normal left ventricular systolic function. No segmental  wall motion abnormalities.  5. The right ventricle is not well visualized; grossly  normal right ventricular systolic function.  6. Estimated right ventricular systolic pressure equals 49  mm Hg, assuming right atrial pressure equals 10 mm Hg,  consistent with mild pulmonary hypertension.  7. Small pericardial effusion posterior to the left  ventricle.  8. Left pleural effusion.  ------------------------------------------------------------------------  Confirmed on  11/10/2018 - 12:25:40 by Rodo Montoya M.D.  ------------------------------------------------------------------------

## 2018-11-12 LAB
BUN SERPL-MCNC: 67 MG/DL — HIGH (ref 7–23)
CALCIUM SERPL-MCNC: 8.2 MG/DL — LOW (ref 8.4–10.5)
CHLORIDE SERPL-SCNC: 108 MMOL/L — HIGH (ref 98–107)
CO2 SERPL-SCNC: 21 MMOL/L — LOW (ref 22–31)
CREAT SERPL-MCNC: 2.82 MG/DL — HIGH (ref 0.5–1.3)
GLUCOSE BLDC GLUCOMTR-MCNC: 106 MG/DL — HIGH (ref 70–99)
GLUCOSE BLDC GLUCOMTR-MCNC: 135 MG/DL — HIGH (ref 70–99)
GLUCOSE BLDC GLUCOMTR-MCNC: 154 MG/DL — HIGH (ref 70–99)
GLUCOSE BLDC GLUCOMTR-MCNC: 267 MG/DL — HIGH (ref 70–99)
GLUCOSE SERPL-MCNC: 177 MG/DL — HIGH (ref 70–99)
HCT VFR BLD CALC: 24.8 % — LOW (ref 34.5–45)
HGB BLD-MCNC: 7.6 G/DL — LOW (ref 11.5–15.5)
MAGNESIUM SERPL-MCNC: 2.3 MG/DL — SIGNIFICANT CHANGE UP (ref 1.6–2.6)
MCHC RBC-ENTMCNC: 25.4 PG — LOW (ref 27–34)
MCHC RBC-ENTMCNC: 30.6 % — LOW (ref 32–36)
MCV RBC AUTO: 82.9 FL — SIGNIFICANT CHANGE UP (ref 80–100)
NRBC # FLD: 0 — SIGNIFICANT CHANGE UP
PHOSPHATE SERPL-MCNC: 5 MG/DL — HIGH (ref 2.5–4.5)
PLATELET # BLD AUTO: 318 K/UL — SIGNIFICANT CHANGE UP (ref 150–400)
PMV BLD: 10.7 FL — SIGNIFICANT CHANGE UP (ref 7–13)
POTASSIUM SERPL-MCNC: 4.7 MMOL/L — SIGNIFICANT CHANGE UP (ref 3.5–5.3)
POTASSIUM SERPL-SCNC: 4.7 MMOL/L — SIGNIFICANT CHANGE UP (ref 3.5–5.3)
RBC # BLD: 2.99 M/UL — LOW (ref 3.8–5.2)
RBC # FLD: 15.5 % — HIGH (ref 10.3–14.5)
SODIUM SERPL-SCNC: 143 MMOL/L — SIGNIFICANT CHANGE UP (ref 135–145)
WBC # BLD: 8.53 K/UL — SIGNIFICANT CHANGE UP (ref 3.8–10.5)
WBC # FLD AUTO: 8.53 K/UL — SIGNIFICANT CHANGE UP (ref 3.8–10.5)

## 2018-11-12 PROCEDURE — 76770 US EXAM ABDO BACK WALL COMP: CPT | Mod: 26

## 2018-11-12 PROCEDURE — 99233 SBSQ HOSP IP/OBS HIGH 50: CPT

## 2018-11-12 RX ORDER — FUROSEMIDE 40 MG
40 TABLET ORAL
Qty: 0 | Refills: 0 | Status: DISCONTINUED | OUTPATIENT
Start: 2018-11-12 | End: 2018-11-13

## 2018-11-12 RX ADMIN — ATORVASTATIN CALCIUM 20 MILLIGRAM(S): 80 TABLET, FILM COATED ORAL at 21:59

## 2018-11-12 RX ADMIN — Medication 50 MILLIGRAM(S): at 15:29

## 2018-11-12 RX ADMIN — AMLODIPINE BESYLATE 10 MILLIGRAM(S): 2.5 TABLET ORAL at 06:26

## 2018-11-12 RX ADMIN — ISOSORBIDE DINITRATE 5 MILLIGRAM(S): 5 TABLET ORAL at 21:59

## 2018-11-12 RX ADMIN — Medication 50 MILLIGRAM(S): at 06:26

## 2018-11-12 RX ADMIN — CARVEDILOL PHOSPHATE 25 MILLIGRAM(S): 80 CAPSULE, EXTENDED RELEASE ORAL at 06:26

## 2018-11-12 RX ADMIN — CHLORHEXIDINE GLUCONATE 1 APPLICATION(S): 213 SOLUTION TOPICAL at 12:58

## 2018-11-12 RX ADMIN — Medication 50 MILLIGRAM(S): at 21:59

## 2018-11-12 RX ADMIN — Medication 325 MILLIGRAM(S): at 12:57

## 2018-11-12 RX ADMIN — ISOSORBIDE DINITRATE 5 MILLIGRAM(S): 5 TABLET ORAL at 15:29

## 2018-11-12 RX ADMIN — HEPARIN SODIUM 5000 UNIT(S): 5000 INJECTION INTRAVENOUS; SUBCUTANEOUS at 15:28

## 2018-11-12 RX ADMIN — HEPARIN SODIUM 5000 UNIT(S): 5000 INJECTION INTRAVENOUS; SUBCUTANEOUS at 21:59

## 2018-11-12 RX ADMIN — Medication 40 MILLIGRAM(S): at 12:57

## 2018-11-12 RX ADMIN — HEPARIN SODIUM 5000 UNIT(S): 5000 INJECTION INTRAVENOUS; SUBCUTANEOUS at 06:26

## 2018-11-12 RX ADMIN — Medication 3: at 12:57

## 2018-11-12 RX ADMIN — CARVEDILOL PHOSPHATE 25 MILLIGRAM(S): 80 CAPSULE, EXTENDED RELEASE ORAL at 18:07

## 2018-11-12 NOTE — PROGRESS NOTE ADULT - ATTENDING COMMENTS
Patient seen and examined  Agree with above assessment and plan  Patient with HTN emergency requiring tridil gtt  Cont current plan

## 2018-11-12 NOTE — PROGRESS NOTE ADULT - SUBJECTIVE AND OBJECTIVE BOX
INTERVAL HPI/OVERNIGHT EVENTS:    SUBJECTIVE: Patient seen and examined at bedside.     CONSTITUTIONAL: No weakness, fevers or chills  EYES/ENT: No visual changes;  No vertigo or throat pain   NECK: No pain or stiffness  RESPIRATORY: No cough, wheezing, hemoptysis; No shortness of breath  CARDIOVASCULAR: No chest pain or palpitations  GASTROINTESTINAL: No abdominal or epigastric pain. No nausea, vomiting, or hematemesis; No diarrhea or constipation. No melena or hematochezia.  GENITOURINARY: No dysuria, frequency or hematuria  NEUROLOGICAL: No numbness or weakness  SKIN: No itching, rashes    OBJECTIVE:    VITAL SIGNS:  ICU Vital Signs Last 24 Hrs  T(C): 36.8 (12 Nov 2018 04:00), Max: 37.1 (11 Nov 2018 12:00)  T(F): 98.3 (12 Nov 2018 04:00), Max: 98.7 (11 Nov 2018 12:00)  HR: 69 (12 Nov 2018 07:00) (65 - 81)  BP: 131/48 (12 Nov 2018 07:00) (105/72 - 141/53)  BP(mean): 68 (12 Nov 2018 07:00) (57 - 95)  ABP: --  ABP(mean): --  RR: 22 (12 Nov 2018 07:00) (16 - 22)  SpO2: 94% (12 Nov 2018 07:00) (91% - 100%)        11-11 @ 07:01  -  11-12 @ 07:00  --------------------------------------------------------  IN: 680 mL / OUT: 600 mL / NET: 80 mL      CAPILLARY BLOOD GLUCOSE      POCT Blood Glucose.: 244 mg/dL (11 Nov 2018 22:11)      PHYSICAL EXAM:    General: NAD  HEENT: NC/AT; PERRL, clear conjunctiva  Neck: supple  Respiratory: CTA b/l  Cardiovascular: +S1/S2; RRR  Abdomen: soft, NT/ND; +BS x4  Extremities: WWP, 2+ peripheral pulses b/l; no LE edema  Skin: normal color and turgor; no rash  Neurological:    MEDICATIONS:  MEDICATIONS  (STANDING):  amLODIPine   Tablet 10 milliGRAM(s) Oral daily  atorvastatin 20 milliGRAM(s) Oral at bedtime  carvedilol 25 milliGRAM(s) Oral every 12 hours  chlorhexidine 4% Liquid 1 Application(s) Topical <User Schedule>  dextrose 5%. 1000 milliLiter(s) (50 mL/Hr) IV Continuous <Continuous>  dextrose 50% Injectable 12.5 Gram(s) IV Push once  dextrose 50% Injectable 25 Gram(s) IV Push once  dextrose 50% Injectable 25 Gram(s) IV Push once  ferrous    sulfate 325 milliGRAM(s) Oral daily  heparin  Injectable 5000 Unit(s) SubCutaneous every 8 hours  hydrALAZINE 50 milliGRAM(s) Oral every 8 hours  influenza   Vaccine 0.5 milliLiter(s) IntraMuscular once  insulin lispro (HumaLOG) corrective regimen sliding scale   SubCutaneous three times a day before meals  insulin lispro (HumaLOG) corrective regimen sliding scale   SubCutaneous at bedtime  isosorbide   dinitrate Tablet (ISORDIL) 5 milliGRAM(s) Oral three times a day    MEDICATIONS  (PRN):  dextrose 40% Gel 15 Gram(s) Oral once PRN Blood Glucose LESS THAN 70 milliGRAM(s)/deciliter  glucagon  Injectable 1 milliGRAM(s) IntraMuscular once PRN Glucose LESS THAN 70 milligrams/deciliter      ALLERGIES:  Allergies    No Known Allergies    Intolerances        LABS:                        7.6    8.53  )-----------( 318      ( 12 Nov 2018 02:35 )             24.8     11-12    143  |  108<H>  |  67<H>  ----------------------------<  177<H>  4.7   |  21<L>  |  2.82<H>    Ca    8.2<L>      12 Nov 2018 02:35  Phos  5.0     11-12  Mg     2.3     11-12            RADIOLOGY & ADDITIONAL TESTS: Reviewed. INTERVAL HPI/OVERNIGHT EVENTS: No events overnight, patient was stable on monitor and asymptomatic    SUBJECTIVE: Patient seen and examined at bedside.     CONSTITUTIONAL: No weakness, fevers or chills  EYES/ENT: No visual changes;  No vertigo or throat pain   NECK: No pain or stiffness  RESPIRATORY: No cough, wheezing, hemoptysis; No shortness of breath  CARDIOVASCULAR: No chest pain or palpitations  GASTROINTESTINAL: No abdominal or epigastric pain. No nausea, vomiting, or hematemesis; No diarrhea or constipation. No melena or hematochezia.  GENITOURINARY: No dysuria, frequency or hematuria  NEUROLOGICAL: No numbness or weakness  SKIN: No itching, rashes    OBJECTIVE:    VITAL SIGNS:  ICU Vital Signs Last 24 Hrs  T(C): 36.8 (12 Nov 2018 04:00), Max: 37.1 (11 Nov 2018 12:00)  T(F): 98.3 (12 Nov 2018 04:00), Max: 98.7 (11 Nov 2018 12:00)  HR: 69 (12 Nov 2018 07:00) (65 - 81)  BP: 131/48 (12 Nov 2018 07:00) (105/72 - 141/53)  BP(mean): 68 (12 Nov 2018 07:00) (57 - 95)  ABP: --  ABP(mean): --  RR: 22 (12 Nov 2018 07:00) (16 - 22)  SpO2: 94% (12 Nov 2018 07:00) (91% - 100%)        11-11 @ 07:01  -  11-12 @ 07:00  --------------------------------------------------------  IN: 680 mL / OUT: 600 mL / NET: 80 mL      CAPILLARY BLOOD GLUCOSE      POCT Blood Glucose.: 244 mg/dL (11 Nov 2018 22:11)      PHYSICAL EXAM:    General: NAD  HEENT: NC/AT; PERRL, clear conjunctiva  Neck: supple  Respiratory: CTA b/l  Cardiovascular: +S1/S2; RRR, systolic murmur unchanged from prior  Abdomen: soft, NT/ND; +BS x4  Extremities: WWP, 2+ peripheral pulses b/l; no LE edema  Skin: normal color and turgor; no rash  Neurological: Non focal, AAO x3    MEDICATIONS:  MEDICATIONS  (STANDING):  amLODIPine   Tablet 10 milliGRAM(s) Oral daily  atorvastatin 20 milliGRAM(s) Oral at bedtime  carvedilol 25 milliGRAM(s) Oral every 12 hours  chlorhexidine 4% Liquid 1 Application(s) Topical <User Schedule>  dextrose 5%. 1000 milliLiter(s) (50 mL/Hr) IV Continuous <Continuous>  dextrose 50% Injectable 12.5 Gram(s) IV Push once  dextrose 50% Injectable 25 Gram(s) IV Push once  dextrose 50% Injectable 25 Gram(s) IV Push once  ferrous    sulfate 325 milliGRAM(s) Oral daily  heparin  Injectable 5000 Unit(s) SubCutaneous every 8 hours  hydrALAZINE 50 milliGRAM(s) Oral every 8 hours  influenza   Vaccine 0.5 milliLiter(s) IntraMuscular once  insulin lispro (HumaLOG) corrective regimen sliding scale   SubCutaneous three times a day before meals  insulin lispro (HumaLOG) corrective regimen sliding scale   SubCutaneous at bedtime  isosorbide   dinitrate Tablet (ISORDIL) 5 milliGRAM(s) Oral three times a day    MEDICATIONS  (PRN):  dextrose 40% Gel 15 Gram(s) Oral once PRN Blood Glucose LESS THAN 70 milliGRAM(s)/deciliter  glucagon  Injectable 1 milliGRAM(s) IntraMuscular once PRN Glucose LESS THAN 70 milligrams/deciliter      ALLERGIES:  Allergies    No Known Allergies    Intolerances        LABS:                        7.6    8.53  )-----------( 318      ( 12 Nov 2018 02:35 )             24.8     11-12    143  |  108<H>  |  67<H>  ----------------------------<  177<H>  4.7   |  21<L>  |  2.82<H>    Ca    8.2<L>      12 Nov 2018 02:35  Phos  5.0     11-12  Mg     2.3     11-12            RADIOLOGY & ADDITIONAL TESTS: Reviewed.

## 2018-11-12 NOTE — PROGRESS NOTE ADULT - PROBLEM SELECTOR PLAN 3
Scr downtrending after slight increase on 11/11.   As patient appears euvolemic today will hold lasix and observe Scr downtrending after slight increase on 11/11  - f/u Kidney ultrasound  - Urine lytes from earlier non-conclusive as patient was on diuretic  - As patient appears euvolemic will hold lasix and observe Scr downtrending after slight increase on 11/11  - f/u Kidney ultrasound  - Urine lytes from earlier non-conclusive as patient was on diuretic  - As patient appears euvolemic, will continue home regimen of lasix 40 once every two days.

## 2018-11-13 VITALS — WEIGHT: 161.6 LBS

## 2018-11-13 LAB
ALBUMIN SERPL ELPH-MCNC: 3.3 G/DL — SIGNIFICANT CHANGE UP (ref 3.3–5)
ALP SERPL-CCNC: 54 U/L — SIGNIFICANT CHANGE UP (ref 40–120)
ALT FLD-CCNC: 30 U/L — SIGNIFICANT CHANGE UP (ref 4–33)
APPEARANCE UR: CLEAR — SIGNIFICANT CHANGE UP
AST SERPL-CCNC: 19 U/L — SIGNIFICANT CHANGE UP (ref 4–32)
BACTERIA # UR AUTO: NEGATIVE — SIGNIFICANT CHANGE UP
BILIRUB SERPL-MCNC: 0.2 MG/DL — SIGNIFICANT CHANGE UP (ref 0.2–1.2)
BILIRUB UR-MCNC: NEGATIVE — SIGNIFICANT CHANGE UP
BLOOD UR QL VISUAL: NEGATIVE — SIGNIFICANT CHANGE UP
BUN SERPL-MCNC: 64 MG/DL — HIGH (ref 7–23)
CALCIUM SERPL-MCNC: 8.8 MG/DL — SIGNIFICANT CHANGE UP (ref 8.4–10.5)
CHLORIDE SERPL-SCNC: 109 MMOL/L — HIGH (ref 98–107)
CHLORIDE UR-SCNC: SIGNIFICANT CHANGE UP MMOL/L
CO2 SERPL-SCNC: 20 MMOL/L — LOW (ref 22–31)
COLOR SPEC: SIGNIFICANT CHANGE UP
CREAT ?TM UR-MCNC: 89.6 MG/DL — SIGNIFICANT CHANGE UP
CREAT SERPL-MCNC: 2.92 MG/DL — HIGH (ref 0.5–1.3)
GLUCOSE BLDC GLUCOMTR-MCNC: 147 MG/DL — HIGH (ref 70–99)
GLUCOSE BLDC GLUCOMTR-MCNC: 167 MG/DL — HIGH (ref 70–99)
GLUCOSE BLDC GLUCOMTR-MCNC: 178 MG/DL — HIGH (ref 70–99)
GLUCOSE SERPL-MCNC: 109 MG/DL — HIGH (ref 70–99)
GLUCOSE UR-MCNC: NEGATIVE — SIGNIFICANT CHANGE UP
HCT VFR BLD CALC: 25.2 % — LOW (ref 34.5–45)
HGB BLD-MCNC: 7.6 G/DL — LOW (ref 11.5–15.5)
HYALINE CASTS # UR AUTO: NEGATIVE — SIGNIFICANT CHANGE UP
KETONES UR-MCNC: NEGATIVE — SIGNIFICANT CHANGE UP
LEUKOCYTE ESTERASE UR-ACNC: NEGATIVE — SIGNIFICANT CHANGE UP
MAGNESIUM SERPL-MCNC: 2.4 MG/DL — SIGNIFICANT CHANGE UP (ref 1.6–2.6)
MCHC RBC-ENTMCNC: 25.3 PG — LOW (ref 27–34)
MCHC RBC-ENTMCNC: 30.2 % — LOW (ref 32–36)
MCV RBC AUTO: 84 FL — SIGNIFICANT CHANGE UP (ref 80–100)
NITRITE UR-MCNC: NEGATIVE — SIGNIFICANT CHANGE UP
NRBC # FLD: 0 — SIGNIFICANT CHANGE UP
OSMOLALITY UR: 438 MOSMO/KG — SIGNIFICANT CHANGE UP (ref 50–1200)
PH UR: 5.5 — SIGNIFICANT CHANGE UP (ref 5–8)
PHOSPHATE SERPL-MCNC: 4.5 MG/DL — SIGNIFICANT CHANGE UP (ref 2.5–4.5)
PLATELET # BLD AUTO: 329 K/UL — SIGNIFICANT CHANGE UP (ref 150–400)
PMV BLD: 10.8 FL — SIGNIFICANT CHANGE UP (ref 7–13)
POTASSIUM SERPL-MCNC: 4.6 MMOL/L — SIGNIFICANT CHANGE UP (ref 3.5–5.3)
POTASSIUM SERPL-SCNC: 4.6 MMOL/L — SIGNIFICANT CHANGE UP (ref 3.5–5.3)
POTASSIUM UR-SCNC: 24.9 MMOL/L — SIGNIFICANT CHANGE UP
PROT SERPL-MCNC: 6.5 G/DL — SIGNIFICANT CHANGE UP (ref 6–8.3)
PROT UR-MCNC: 43.5 MG/DL — SIGNIFICANT CHANGE UP
PROT UR-MCNC: 50 — SIGNIFICANT CHANGE UP
RBC # BLD: 3 M/UL — LOW (ref 3.8–5.2)
RBC # FLD: 15.5 % — HIGH (ref 10.3–14.5)
RBC CASTS # UR COMP ASSIST: HIGH (ref 0–?)
SODIUM SERPL-SCNC: 142 MMOL/L — SIGNIFICANT CHANGE UP (ref 135–145)
SODIUM UR-SCNC: 39 MMOL/L — SIGNIFICANT CHANGE UP
SP GR SPEC: 1.02 — SIGNIFICANT CHANGE UP (ref 1–1.04)
SQUAMOUS # UR AUTO: SIGNIFICANT CHANGE UP
UROBILINOGEN FLD QL: NORMAL — SIGNIFICANT CHANGE UP
UUN UR-MCNC: 785.3 MG/DL — SIGNIFICANT CHANGE UP
WBC # BLD: 8.38 K/UL — SIGNIFICANT CHANGE UP (ref 3.8–10.5)
WBC # FLD AUTO: 8.38 K/UL — SIGNIFICANT CHANGE UP (ref 3.8–10.5)
WBC UR QL: SIGNIFICANT CHANGE UP (ref 0–?)

## 2018-11-13 PROCEDURE — 99233 SBSQ HOSP IP/OBS HIGH 50: CPT

## 2018-11-13 RX ORDER — FUROSEMIDE 40 MG
1 TABLET ORAL
Qty: 0 | Refills: 0 | COMMUNITY
Start: 2018-11-13

## 2018-11-13 RX ORDER — BENAZEPRIL HYDROCHLORIDE 40 MG/1
1 TABLET ORAL
Qty: 0 | Refills: 0 | COMMUNITY

## 2018-11-13 RX ORDER — FUROSEMIDE 40 MG
1 TABLET ORAL
Qty: 0 | Refills: 0 | DISCHARGE
Start: 2018-11-13

## 2018-11-13 RX ORDER — FUROSEMIDE 40 MG
1 TABLET ORAL
Qty: 0 | Refills: 0 | COMMUNITY

## 2018-11-13 RX ADMIN — AMLODIPINE BESYLATE 10 MILLIGRAM(S): 2.5 TABLET ORAL at 05:59

## 2018-11-13 RX ADMIN — Medication 50 MILLIGRAM(S): at 05:59

## 2018-11-13 RX ADMIN — Medication 325 MILLIGRAM(S): at 14:20

## 2018-11-13 RX ADMIN — Medication 50 MILLIGRAM(S): at 14:20

## 2018-11-13 RX ADMIN — HEPARIN SODIUM 5000 UNIT(S): 5000 INJECTION INTRAVENOUS; SUBCUTANEOUS at 06:00

## 2018-11-13 RX ADMIN — CARVEDILOL PHOSPHATE 25 MILLIGRAM(S): 80 CAPSULE, EXTENDED RELEASE ORAL at 18:16

## 2018-11-13 RX ADMIN — ISOSORBIDE DINITRATE 5 MILLIGRAM(S): 5 TABLET ORAL at 05:59

## 2018-11-13 RX ADMIN — Medication 1: at 13:30

## 2018-11-13 RX ADMIN — CHLORHEXIDINE GLUCONATE 1 APPLICATION(S): 213 SOLUTION TOPICAL at 14:21

## 2018-11-13 RX ADMIN — CARVEDILOL PHOSPHATE 25 MILLIGRAM(S): 80 CAPSULE, EXTENDED RELEASE ORAL at 05:59

## 2018-11-13 NOTE — PROGRESS NOTE ADULT - SUBJECTIVE AND OBJECTIVE BOX
Date of Admission:  11/9/18  24 hour events:  No overnight events. S/p renal ultrasound  Vital Signs Last 24 Hrs  T(C): 36.6 (13 Nov 2018 05:52), Max: 37.1 (12 Nov 2018 19:40)  T(F): 97.9 (13 Nov 2018 05:52), Max: 98.7 (12 Nov 2018 19:40)  HR: 70 (13 Nov 2018 05:52) (66 - 76)  BP: 139/60 (13 Nov 2018 05:52) (96/10 - 169/57)  BP(mean): 73 (13 Nov 2018 03:00) (30 - 96)  RR: 15 (13 Nov 2018 05:52) (7 - 25)  SpO2: 96% (13 Nov 2018 05:52) (95% - 98%)  I&O's Summary    12 Nov 2018 07:01  -  13 Nov 2018 07:00  --------------------------------------------------------  IN: 700 mL / OUT: 900 mL / NET: -200 mL    MEDICATIONS:  amLODIPine   Tablet 10 milliGRAM(s) Oral daily  carvedilol 25 milliGRAM(s) Oral every 12 hours  furosemide    Tablet 40 milliGRAM(s) Oral <User Schedule>  heparin  Injectable 5000 Unit(s) SubCutaneous every 8 hours  hydrALAZINE 50 milliGRAM(s) Oral every 8 hours  isosorbide   dinitrate Tablet (ISORDIL) 5 milliGRAM(s) Oral three times a day  atorvastatin 20 milliGRAM(s) Oral at bedtime  dextrose 40% Gel 15 Gram(s) Oral once PRN  dextrose 50% Injectable 12.5 Gram(s) IV Push once  dextrose 50% Injectable 25 Gram(s) IV Push once  dextrose 50% Injectable 25 Gram(s) IV Push once  glucagon  Injectable 1 milliGRAM(s) IntraMuscular once PRN  insulin lispro (HumaLOG) corrective regimen sliding scale   SubCutaneous three times a day before meals  insulin lispro (HumaLOG) corrective regimen sliding scale   SubCutaneous at bedtime    chlorhexidine 4% Liquid 1 Application(s) Topical <User Schedule>  dextrose 5%. 1000 milliLiter(s) IV Continuous <Continuous>  ferrous    sulfate 325 milliGRAM(s) Oral daily  influenza   Vaccine 0.5 milliLiter(s) IntraMuscular once      REVIEW OF SYSTEMS:  Complete 10point ROS negative.    PHYSICAL EXAM:  General: NAD  Cardiovascular: Normal S1 S2, No JVD, No murmurs, No edema  Respiratory: Lungs clear to auscultation	  Gastrointestinal:  Soft, Non-tender, + BS	  Skin: warm and dry, No rashes, No ecchymoses, No cyanosis	  Extremities: Normal range of motion, No clubbing, cyanosis or edema  Vascular: Peripheral pulses palpable 2+ bilaterally    LABS:	                         7.6    8.38  )-----------( 329      ( 13 Nov 2018 05:30 )             25.2   13 Nov 2018 05:30    142    |  109<H>  |  64<H>  ----------------------------<  109<H>  4.6     |  20<L>  |  2.92<H>    Ca    8.8        13 Nov 2018 05:30  Phos  4.5       13 Nov 2018 05:30  Mg     2.4       13 Nov 2018 05:30    TPro  6.5    /  Alb  3.3    /  TBili  0.2    /  DBili  x      /  AST  19     /  ALT  30     /  AlkPhos  54     13 Nov 2018 05:30      TELEMETRY: 	    ECG:  	  RADIOLOGY:  ECHO:  OTHER: 	    PREVIOUS DIAGNOSTIC TESTING:    [ ] Echocardiogram:< from: Transthoracic Echocardiogram (11.10.18 @ 11:21) >  Ejection Fraction (Teicholtz): 67 %  ------------------------------------------------------------------------  OBSERVATIONS:  Mitral Valve: Mitral annular calcification and calcified  mitral leaflets with normal diastolic opening.  Mild-moderate mitral regurgitation.  Aortic Root: Normal aortic root.  Aortic Valve: Calcified trileaflet aortic valve with normal  opening.  Left Atrium: Mildly dilated left atrium.  LA volume index =  39 cc/m2.  Left Ventricle: Normal left ventricular systolic function.  No segmental wall motion abnormalities. Normal left  ventricular internal dimensions and wall thicknesses.  Right Heart: Normal right atrium. The right ventricle is  not well visualized; grossly normal right ventricular  systolic function. Normal tricuspid valve.  Mild-moderate  tricuspid regurgitation. Normal pulmonic valve. Mild  pulmonic regurgitation.  Pericardium/PleuraSmall pericardial effusion posterior to  the left ventricle. Left pleural effusion.  Hemodynamic: Estimated right ventricular systolic pressure  equals 49 mm Hg, assuming right atrial pressure equals 10  mm Hg, consistent with mild pulmonary hypertension.  ------------------------------------------------------------------------  CONCLUSIONS:  1. Mitral annular calcification and calcified mitral  leaflets with normal diastolic opening. Mild-moderate  mitral regurgitation.  2. Mildly dilated left atrium.  LA volume index = 39 cc/m2.  3. Normal left ventricular internal dimensions and wall  thicknesses.  4. Normal left ventricular systolic function. No segmental  wall motion abnormalities.  5. The right ventricle is not well visualized; grossly  normal right ventricular systolic function.  6. Estimated right ventricular systolic pressure equals 49  mm Hg, assuming right atrial pressure equals 10 mm Hg,  consistent with mild pulmonary hypertension.  7. Small pericardial effusion posterior to the left  ventricle.  8. Left pleural effusion.  ------------------------------------------------------------------------  Confirmed on  11/10/2018 - 12:25:40 by Rodo Montoya M.D.  ------------------------------------------------------------------------    < end of copied text >  	  < from: US Kidney and Bladder (11.12.18 @ 15:09) >  EXAM:  US KIDNEYS AND BLADDER        PROCEDURE DATE:  Nov 12 2018         INTERPRETATION:  CLINICAL INFORMATION: 57-year-old female with renal   failure. Hypertensive emergency.    COMPARISON: None available.    TECHNIQUE: Sonography of the kidneysand bladder.     FINDINGS:    Right kidney:  11.0 cm. mild hydronephrosis. No renal mass or calculus.    Left kidney:  11.9 cm. No renal mass, hydronephrosis or calculi.    Urinary bladder: Within normal limits.    Bilateral pleural effusion is present. The uterus is enlarged and   myomatous, measuring 15.4 x 10.4 x 13.7 cm    IMPRESSION:     *  Mild right hydronephrosis.  *  Bilateral pleural effusion.          < end of copied text >

## 2018-11-13 NOTE — PROGRESS NOTE ADULT - ASSESSMENT
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 Presenting to the ER w/ acute SOB and hypertensive emergency c/b flash pulmonary edema. Patient was optimized in the CCU and is now euvolemic with BP that is well-controlled with PO medication.

## 2018-11-13 NOTE — PROGRESS NOTE ADULT - PROBLEM SELECTOR PLAN 3
-Creatinine is 2.92 today, slightly uptrending from 2.8  -ultrasound of kidneys revealed a mild hydronephrosis of the right kidney. Patient is voiding and net negative 1.5 liters in the last 2 hours  -would monitor intake and output  -urine lytes, osmolality, urea, nitrogen, sent

## 2018-11-13 NOTE — PROGRESS NOTE ADULT - ATTENDING COMMENTS
Patient seen and examined  Doing well  BP stable Cr stable  Can dc home with outpatient followup Patient seen and examined  Agree with above assessment and plan  BP better  Cr stable  Can dc home with outpatient followup

## 2018-11-14 LAB
BACTERIA BLD CULT: SIGNIFICANT CHANGE UP
BACTERIA BLD CULT: SIGNIFICANT CHANGE UP

## 2018-11-16 PROBLEM — E78.5 HYPERLIPIDEMIA, UNSPECIFIED: Chronic | Status: ACTIVE | Noted: 2018-11-09

## 2018-11-16 PROBLEM — H40.9 UNSPECIFIED GLAUCOMA: Chronic | Status: ACTIVE | Noted: 2018-11-09

## 2018-11-16 PROBLEM — I50.9 HEART FAILURE, UNSPECIFIED: Chronic | Status: ACTIVE | Noted: 2018-11-09

## 2018-11-16 PROBLEM — Z00.00 ENCOUNTER FOR PREVENTIVE HEALTH EXAMINATION: Status: ACTIVE | Noted: 2018-11-16

## 2018-11-16 PROBLEM — H26.9 UNSPECIFIED CATARACT: Chronic | Status: ACTIVE | Noted: 2018-11-09

## 2018-11-26 ENCOUNTER — APPOINTMENT (OUTPATIENT)
Dept: CARDIOLOGY | Facility: CLINIC | Age: 58
End: 2018-11-26
Payer: COMMERCIAL

## 2018-11-26 ENCOUNTER — RECORD ABSTRACTING (OUTPATIENT)
Age: 58
End: 2018-11-26

## 2018-11-26 VITALS
SYSTOLIC BLOOD PRESSURE: 177 MMHG | OXYGEN SATURATION: 100 % | HEIGHT: 61 IN | HEART RATE: 73 BPM | WEIGHT: 152 LBS | BODY MASS INDEX: 28.7 KG/M2 | DIASTOLIC BLOOD PRESSURE: 69 MMHG

## 2018-11-26 DIAGNOSIS — I16.1 HYPERTENSIVE EMERGENCY: ICD-10-CM

## 2018-11-26 DIAGNOSIS — Z87.09 PERSONAL HISTORY OF OTHER DISEASES OF THE RESPIRATORY SYSTEM: ICD-10-CM

## 2018-11-26 DIAGNOSIS — N18.9 CHRONIC KIDNEY DISEASE, UNSPECIFIED: ICD-10-CM

## 2018-11-26 PROCEDURE — 99215 OFFICE O/P EST HI 40 MIN: CPT

## 2018-11-26 PROCEDURE — 93000 ELECTROCARDIOGRAM COMPLETE: CPT

## 2018-11-26 NOTE — REASON FOR VISIT
[Follow-Up - From Hospitalization] : follow-up of a recent hospitalization for [Discharge Date: ___] : Discharge Date: [unfilled] [FreeTextEntry2] : HTN, HFpEF

## 2018-11-26 NOTE — DISCUSSION/SUMMARY
[FreeTextEntry1] : 57 year old woman with HTN with acute flash pulm edema in setting of hypertensive emergency due to med noncompliace.\par \par 1. HFpEF- Echo done in the hospital showed normal EF and mild to moderate MR, on Coreg 25 mg BID, Isosorbide 5 mg TID, Norvasc 10 mg daily, hydralazine 50 mg TID and Lasix 40 mg daily. Does not appear fluid overloaded\par Discussed limiting salt and taking meds daily\par 2. HTN- BP is elevated but taking meds and states she was stressed, will continue meds for now\par 3. HLD- Condition is stable. Continue statin therapy\par 4. FU in 2 months

## 2018-11-26 NOTE — HISTORY OF PRESENT ILLNESS
[FreeTextEntry1] : 57 year old woman with history of HTN who was admitted to [facility] for flash pulmonary edema in setting of acute diastolic heart failure.\par 1. HFpEF- Echo done in the hospital showed normal EF and mild to moderate MR, on Coreg 25 mg BID, Isosorbide 5 mg TID, Norvasc 10 mg daily, hydralazine 50 mg TID and Lasix 40 mg daily. Does not appear fluid overloaded\par Discussed limiting salt and taking meds daily\par 2. HTN- BP is elevated but taking meds and states she was stressed, will continue meds for now\par 3. HLD- Condition is stable. Continue statin therapy

## 2018-11-26 NOTE — PHYSICAL EXAM
[General Appearance - Well Developed] : well developed [Normal Appearance] : normal appearance [Well Groomed] : well groomed [General Appearance - Well Nourished] : well nourished [No Deformities] : no deformities [General Appearance - In No Acute Distress] : no acute distress [Normal Conjunctiva] : the conjunctiva exhibited no abnormalities [Eyelids - No Xanthelasma] : the eyelids demonstrated no xanthelasmas [Normal Oral Mucosa] : normal oral mucosa [No Oral Pallor] : no oral pallor [No Oral Cyanosis] : no oral cyanosis [Normal Jugular Venous A Waves Present] : normal jugular venous A waves present [Normal Jugular Venous V Waves Present] : normal jugular venous V waves present [No Jugular Venous Rodriguez A Waves] : no jugular venous rodriguez A waves [Respiration, Rhythm And Depth] : normal respiratory rhythm and effort [Exaggerated Use Of Accessory Muscles For Inspiration] : no accessory muscle use [Auscultation Breath Sounds / Voice Sounds] : lungs were clear to auscultation bilaterally [Heart Rate And Rhythm] : heart rate and rhythm were normal [Heart Sounds] : normal S1 and S2 [Murmurs] : no murmurs present [Abdomen Soft] : soft [Abdomen Tenderness] : non-tender [Abdomen Mass (___ Cm)] : no abdominal mass palpated [Abnormal Walk] : normal gait [Gait - Sufficient For Exercise Testing] : the gait was sufficient for exercise testing [Skin Color & Pigmentation] : normal skin color and pigmentation [] : no rash [No Venous Stasis] : no venous stasis [Skin Lesions] : no skin lesions [No Skin Ulcers] : no skin ulcer [No Xanthoma] : no  xanthoma was observed [Oriented To Time, Place, And Person] : oriented to person, place, and time [Affect] : the affect was normal [Mood] : the mood was normal [No Anxiety] : not feeling anxious

## 2019-01-08 ENCOUNTER — APPOINTMENT (OUTPATIENT)
Dept: CARDIOLOGY | Facility: CLINIC | Age: 59
End: 2019-01-08
Payer: COMMERCIAL

## 2019-01-08 ENCOUNTER — NON-APPOINTMENT (OUTPATIENT)
Age: 59
End: 2019-01-08

## 2019-01-08 VITALS
DIASTOLIC BLOOD PRESSURE: 68 MMHG | WEIGHT: 158 LBS | BODY MASS INDEX: 29.83 KG/M2 | OXYGEN SATURATION: 99 % | SYSTOLIC BLOOD PRESSURE: 152 MMHG | HEART RATE: 65 BPM | HEIGHT: 61 IN

## 2019-01-08 DIAGNOSIS — I10 ESSENTIAL (PRIMARY) HYPERTENSION: ICD-10-CM

## 2019-01-08 DIAGNOSIS — E78.5 HYPERLIPIDEMIA, UNSPECIFIED: ICD-10-CM

## 2019-01-08 DIAGNOSIS — I50.30 UNSPECIFIED DIASTOLIC (CONGESTIVE) HEART FAILURE: ICD-10-CM

## 2019-01-08 PROCEDURE — 93000 ELECTROCARDIOGRAM COMPLETE: CPT

## 2019-01-08 PROCEDURE — 99214 OFFICE O/P EST MOD 30 MIN: CPT

## 2019-01-08 NOTE — DISCUSSION/SUMMARY
[FreeTextEntry1] : 58 year old woman with HTN and HFpEF HLD here for followup\par \par 1. HFpEF- TTE in November showed normal EF with mild to moderate MR. On Coreg 25 mg BID, Isosorbide 5 mg TID, Norvasc 10 mg daily, Hydralazine 50 mg TID and Lasix 40 mg daily\par Condition is stable. Continue present meds\par 2. HTN- BP better, condition is stable. Continue present meds\par Will increase hydralazine to 75 mg TID\par 3. HLD- Condition is stable. Continue present meds\par 4. FU in 2 months

## 2019-01-08 NOTE — HISTORY OF PRESENT ILLNESS
[FreeTextEntry1] : Here for followup. Doing well. No complaints of chest pain, dyspnea or edema. Tolerating medications\par 1. HFpEF- TTE in November showed normal EF with mild to moderate MR. On Coreg 25 mg BID, Isosorbide 5 mg TID, Norvasc 10 mg daily, Hydralazine 50 mg TID and Lasix 40 mg daily\par Condition is stable. Continue present meds\par 2. HTN- BP better, condition is stable. Continue present meds\par 3. HLD- Condition is stable. Continue present meds

## 2019-01-17 ENCOUNTER — INPATIENT (INPATIENT)
Facility: HOSPITAL | Age: 59
LOS: 0 days | Discharge: HOME | End: 2019-01-18
Attending: HOSPITALIST | Admitting: HOSPITALIST
Payer: COMMERCIAL

## 2019-01-17 VITALS
SYSTOLIC BLOOD PRESSURE: 154 MMHG | HEART RATE: 71 BPM | TEMPERATURE: 99 F | DIASTOLIC BLOOD PRESSURE: 72 MMHG | RESPIRATION RATE: 20 BRPM | OXYGEN SATURATION: 99 %

## 2019-01-17 DIAGNOSIS — N17.9 ACUTE KIDNEY FAILURE, UNSPECIFIED: ICD-10-CM

## 2019-01-17 DIAGNOSIS — M86.171 OTHER ACUTE OSTEOMYELITIS, RIGHT ANKLE AND FOOT: Chronic | ICD-10-CM

## 2019-01-17 LAB
ALBUMIN SERPL ELPH-MCNC: 4.8 G/DL — SIGNIFICANT CHANGE UP (ref 3.3–5)
ALP SERPL-CCNC: 55 U/L — SIGNIFICANT CHANGE UP (ref 40–120)
ALT FLD-CCNC: 13 U/L — SIGNIFICANT CHANGE UP (ref 4–33)
ANION GAP SERPL CALC-SCNC: 12 MMO/L — SIGNIFICANT CHANGE UP (ref 7–14)
APTT BLD: 38.9 SEC — HIGH (ref 27.5–36.3)
AST SERPL-CCNC: 18 U/L — SIGNIFICANT CHANGE UP (ref 4–32)
BASOPHILS # BLD AUTO: 0.02 K/UL — SIGNIFICANT CHANGE UP (ref 0–0.2)
BASOPHILS NFR BLD AUTO: 0.3 % — SIGNIFICANT CHANGE UP (ref 0–2)
BILIRUB SERPL-MCNC: 0.3 MG/DL — SIGNIFICANT CHANGE UP (ref 0.2–1.2)
BLD GP AB SCN SERPL QL: NEGATIVE — SIGNIFICANT CHANGE UP
BUN SERPL-MCNC: 68 MG/DL — HIGH (ref 7–23)
CALCIUM SERPL-MCNC: 9.3 MG/DL — SIGNIFICANT CHANGE UP (ref 8.4–10.5)
CHLORIDE SERPL-SCNC: 111 MMOL/L — HIGH (ref 98–107)
CO2 SERPL-SCNC: 23 MMOL/L — SIGNIFICANT CHANGE UP (ref 22–31)
CREAT SERPL-MCNC: 3.41 MG/DL — HIGH (ref 0.5–1.3)
EOSINOPHIL # BLD AUTO: 0.16 K/UL — SIGNIFICANT CHANGE UP (ref 0–0.5)
EOSINOPHIL NFR BLD AUTO: 2.5 % — SIGNIFICANT CHANGE UP (ref 0–6)
GLUCOSE SERPL-MCNC: 134 MG/DL — HIGH (ref 70–99)
HCT VFR BLD CALC: 27 % — LOW (ref 34.5–45)
HGB BLD-MCNC: 8.1 G/DL — LOW (ref 11.5–15.5)
IMM GRANULOCYTES NFR BLD AUTO: 0.3 % — SIGNIFICANT CHANGE UP (ref 0–1.5)
INR BLD: 1.14 — SIGNIFICANT CHANGE UP (ref 0.88–1.17)
LYMPHOCYTES # BLD AUTO: 1.27 K/UL — SIGNIFICANT CHANGE UP (ref 1–3.3)
LYMPHOCYTES # BLD AUTO: 20 % — SIGNIFICANT CHANGE UP (ref 13–44)
MCHC RBC-ENTMCNC: 25.2 PG — LOW (ref 27–34)
MCHC RBC-ENTMCNC: 30 % — LOW (ref 32–36)
MCV RBC AUTO: 84.1 FL — SIGNIFICANT CHANGE UP (ref 80–100)
MONOCYTES # BLD AUTO: 0.47 K/UL — SIGNIFICANT CHANGE UP (ref 0–0.9)
MONOCYTES NFR BLD AUTO: 7.4 % — SIGNIFICANT CHANGE UP (ref 2–14)
NEUTROPHILS # BLD AUTO: 4.4 K/UL — SIGNIFICANT CHANGE UP (ref 1.8–7.4)
NEUTROPHILS NFR BLD AUTO: 69.5 % — SIGNIFICANT CHANGE UP (ref 43–77)
NRBC # FLD: 0 K/UL — LOW (ref 25–125)
PLATELET # BLD AUTO: 170 K/UL — SIGNIFICANT CHANGE UP (ref 150–400)
PMV BLD: 10.7 FL — SIGNIFICANT CHANGE UP (ref 7–13)
POTASSIUM SERPL-MCNC: 5.4 MMOL/L — HIGH (ref 3.5–5.3)
POTASSIUM SERPL-SCNC: 5.4 MMOL/L — HIGH (ref 3.5–5.3)
PROT SERPL-MCNC: 7.6 G/DL — SIGNIFICANT CHANGE UP (ref 6–8.3)
PROTHROM AB SERPL-ACNC: 12.7 SEC — SIGNIFICANT CHANGE UP (ref 9.8–13.1)
RBC # BLD: 3.21 M/UL — LOW (ref 3.8–5.2)
RBC # FLD: 15.8 % — HIGH (ref 10.3–14.5)
RH IG SCN BLD-IMP: POSITIVE — SIGNIFICANT CHANGE UP
SODIUM SERPL-SCNC: 146 MMOL/L — HIGH (ref 135–145)
WBC # BLD: 6.34 K/UL — SIGNIFICANT CHANGE UP (ref 3.8–10.5)
WBC # FLD AUTO: 6.34 K/UL — SIGNIFICANT CHANGE UP (ref 3.8–10.5)

## 2019-01-17 RX ORDER — ISOSORBIDE DINITRATE 5 MG/1
1 TABLET ORAL
Qty: 0 | Refills: 0 | COMMUNITY

## 2019-01-17 RX ORDER — HYDRALAZINE HCL 50 MG
1 TABLET ORAL
Qty: 0 | Refills: 0 | COMMUNITY

## 2019-01-17 NOTE — ED ADULT NURSE NOTE - CAPILLARY REFILL
sent from the doctor office for low hemoglobin and possible need blood transfusion, denies dizziness or blurry vision

## 2019-01-17 NOTE — ED ADULT NURSE NOTE - NSIMPLEMENTINTERV_GEN_ALL_ED
Implemented All Fall with Harm Risk Interventions:  [place] to call system. Call bell, personal items and telephone within reach. Instruct patient to call for assistance. Room bathroom lighting operational. Non-slip footwear when patient is off stretcher. Physically safe environment: no spills, clutter or unnecessary equipment. Stretcher in lowest position, wheels locked, appropriate side rails in place. Provide visual cue, wrist band, yellow gown, etc. Monitor gait and stability. Monitor for mental status changes and reorient to person, place, and time. Review medications for side effects contributing to fall risk. Reinforce activity limits and safety measures with patient and family. Provide visual clues: red socks.

## 2019-01-17 NOTE — PATIENT PROFILE ADULT - VISION (WITH CORRECTIVE LENSES IF THE PATIENT USUALLY WEARS THEM):
Normal vision: sees adequately in most situations; can see medication labels, newsprint/blind in left eye

## 2019-01-17 NOTE — ED ADULT NURSE REASSESSMENT NOTE - NS ED NURSE REASSESS COMMENT FT1
Pt resting comfortably in bed, denies any pain/ symptoms at this time, pt stable, will continue to monitor.

## 2019-01-17 NOTE — ED PROVIDER NOTE - OBJECTIVE STATEMENT
59 yo F w/ pmhx of HFpEF, TIIDM, CKD IV, HTN presenting for anemia.   Patient states that she went to hematologist 2 days ago and had routine labs drawn. She was called by her hematologist today Dr. Maynard for anemia w/ Hb of 7.9 and told to come to ED for transfusion. Patient states that she has been in her usual health just has felt fatigued over the past to weeks, other wise denies fever, chills, chest pain, palpitations, abdominal pain, N/V, diarrhea, dysuria. There has been no change in BM, slightly darker since starting PO iron. She was prescribed an iron supplement but was unable to afford it due to cost by nephrologist. 59 yo F w/ pmhx of HFpEF, TIIDM, CKD IV, HTN presenting for anemia.   Patient states that she went to hematologist 2 days ago and had routine labs drawn. She was called by her hematologist today Dr. Maynard for anemia w/ Hb of 7.9 and told to come to ED for transfusion. Patient states that she has been in her usual health just has felt fatigued over the past two weeks, other wise denies fever, chills, chest pain, palpitations, abdominal pain, N/V, diarrhea, dysuria. There has been no change in BM, slightly darker since starting PO iron. She was prescribed procrit but was unable to afford it due to cost by nephrologist.

## 2019-01-17 NOTE — ED PROVIDER NOTE - ATTENDING CONTRIBUTION TO CARE
Shira: 57 yo female with a h/o CKD, anemia, DM, CHF sent in by heme/onc for low H/H. Pt endorsing some mild generalized fatigue but otherwise denies chest pain, SOB, and dizziness. Pt endorses chronically dark stools since starting iron supplementation. No abdominal pain, bloody BM or hematuria/vaginal bleeding. No fevers or chills. Pt has never required transfusions before. Exam: GENERAL: well appearing, NAD, HEENT: MMM, pink conjunctivitis, CARDIO: +S1/S2, no murmurs, rubs or gallops, LUNGS: CTA B/L, no wheezing, rales or rhonchi, ABD: soft, nontender, BSx4 quadrants, no guarding or rigidity.  NEURO: AxOx3,  SKIN: no rashes or lesions, well perfused A/P- 57 yo female with known anemia snet in for transfusion. will obtain labs and transfuse as necessary. will discuss with heme/onc.

## 2019-01-17 NOTE — ED PROVIDER NOTE - MEDICAL DECISION MAKING DETAILS
59 yo F w/ pmhx of HFpEF, TIIDM, CKD IV, HTN presenting for anemia found to have stable anemia likely due to CKD. Patint with worsening MARY JO likely in setting of overdiuresis w/ furosemide with mild hyperkalemia, no EKG changes.   -Will admit to medicine for monitoring of renal function with holding furosemide.  -no transfusion give Hb 8.1  -urine studies

## 2019-01-18 ENCOUNTER — TRANSCRIPTION ENCOUNTER (OUTPATIENT)
Age: 59
End: 2019-01-18

## 2019-01-18 VITALS
OXYGEN SATURATION: 100 % | SYSTOLIC BLOOD PRESSURE: 132 MMHG | HEART RATE: 68 BPM | RESPIRATION RATE: 16 BRPM | DIASTOLIC BLOOD PRESSURE: 54 MMHG

## 2019-01-18 DIAGNOSIS — Z29.9 ENCOUNTER FOR PROPHYLACTIC MEASURES, UNSPECIFIED: ICD-10-CM

## 2019-01-18 DIAGNOSIS — E87.5 HYPERKALEMIA: ICD-10-CM

## 2019-01-18 DIAGNOSIS — D64.9 ANEMIA, UNSPECIFIED: ICD-10-CM

## 2019-01-18 DIAGNOSIS — I10 ESSENTIAL (PRIMARY) HYPERTENSION: ICD-10-CM

## 2019-01-18 DIAGNOSIS — N18.4 CHRONIC KIDNEY DISEASE, STAGE 4 (SEVERE): ICD-10-CM

## 2019-01-18 DIAGNOSIS — N17.9 ACUTE KIDNEY FAILURE, UNSPECIFIED: ICD-10-CM

## 2019-01-18 DIAGNOSIS — E78.5 HYPERLIPIDEMIA, UNSPECIFIED: ICD-10-CM

## 2019-01-18 DIAGNOSIS — I50.32 CHRONIC DIASTOLIC (CONGESTIVE) HEART FAILURE: ICD-10-CM

## 2019-01-18 DIAGNOSIS — E11.22 TYPE 2 DIABETES MELLITUS WITH DIABETIC CHRONIC KIDNEY DISEASE: ICD-10-CM

## 2019-01-18 LAB
ANION GAP SERPL CALC-SCNC: 15 MMO/L — HIGH (ref 7–14)
APPEARANCE UR: CLEAR — SIGNIFICANT CHANGE UP
BACTERIA # UR AUTO: NEGATIVE — SIGNIFICANT CHANGE UP
BILIRUB UR-MCNC: NEGATIVE — SIGNIFICANT CHANGE UP
BLOOD UR QL VISUAL: NEGATIVE — SIGNIFICANT CHANGE UP
BUN SERPL-MCNC: 64 MG/DL — HIGH (ref 7–23)
CALCIUM SERPL-MCNC: 9.5 MG/DL — SIGNIFICANT CHANGE UP (ref 8.4–10.5)
CHLORIDE SERPL-SCNC: 112 MMOL/L — HIGH (ref 98–107)
CHOLEST SERPL-MCNC: 132 MG/DL — SIGNIFICANT CHANGE UP (ref 120–199)
CO2 SERPL-SCNC: 20 MMOL/L — LOW (ref 22–31)
COLOR SPEC: COLORLESS — SIGNIFICANT CHANGE UP
CREAT ?TM UR-MCNC: 48.9 MG/DL — SIGNIFICANT CHANGE UP
CREAT SERPL-MCNC: 2.99 MG/DL — HIGH (ref 0.5–1.3)
FERRITIN SERPL-MCNC: 379.1 NG/ML — HIGH (ref 15–150)
FOLATE SERPL-MCNC: 12 NG/ML — SIGNIFICANT CHANGE UP (ref 4.7–20)
GLUCOSE SERPL-MCNC: 80 MG/DL — SIGNIFICANT CHANGE UP (ref 70–99)
GLUCOSE UR-MCNC: NEGATIVE — SIGNIFICANT CHANGE UP
HBA1C BLD-MCNC: 6.1 % — HIGH (ref 4–5.6)
HCT VFR BLD CALC: 26.6 % — LOW (ref 34.5–45)
HDLC SERPL-MCNC: 45 MG/DL — SIGNIFICANT CHANGE UP (ref 45–65)
HGB BLD-MCNC: 8.1 G/DL — LOW (ref 11.5–15.5)
HYALINE CASTS # UR AUTO: NEGATIVE — SIGNIFICANT CHANGE UP
IRON SATN MFR SERPL: 194 UG/DL — SIGNIFICANT CHANGE UP (ref 140–530)
IRON SATN MFR SERPL: 35 UG/DL — SIGNIFICANT CHANGE UP (ref 30–160)
KETONES UR-MCNC: NEGATIVE — SIGNIFICANT CHANGE UP
LEUKOCYTE ESTERASE UR-ACNC: NEGATIVE — SIGNIFICANT CHANGE UP
LIPID PNL WITH DIRECT LDL SERPL: 74 MG/DL — SIGNIFICANT CHANGE UP
MAGNESIUM SERPL-MCNC: 2.2 MG/DL — SIGNIFICANT CHANGE UP (ref 1.6–2.6)
MCHC RBC-ENTMCNC: 25.6 PG — LOW (ref 27–34)
MCHC RBC-ENTMCNC: 30.5 % — LOW (ref 32–36)
MCV RBC AUTO: 83.9 FL — SIGNIFICANT CHANGE UP (ref 80–100)
NITRITE UR-MCNC: NEGATIVE — SIGNIFICANT CHANGE UP
NRBC # FLD: 0 K/UL — LOW (ref 25–125)
OSMOLALITY UR: 422 MOSMO/KG — SIGNIFICANT CHANGE UP (ref 50–1200)
PH UR: 6 — SIGNIFICANT CHANGE UP (ref 5–8)
PHOSPHATE SERPL-MCNC: 4.2 MG/DL — SIGNIFICANT CHANGE UP (ref 2.5–4.5)
PLATELET # BLD AUTO: 168 K/UL — SIGNIFICANT CHANGE UP (ref 150–400)
PMV BLD: 11.3 FL — SIGNIFICANT CHANGE UP (ref 7–13)
POTASSIUM SERPL-MCNC: 4.3 MMOL/L — SIGNIFICANT CHANGE UP (ref 3.5–5.3)
POTASSIUM SERPL-SCNC: 4.3 MMOL/L — SIGNIFICANT CHANGE UP (ref 3.5–5.3)
PROT UR-MCNC: 30 — SIGNIFICANT CHANGE UP
RBC # BLD: 3.17 M/UL — LOW (ref 3.8–5.2)
RBC # FLD: 15.9 % — HIGH (ref 10.3–14.5)
RBC CASTS # UR COMP ASSIST: SIGNIFICANT CHANGE UP (ref 0–?)
RETICS #: 37 K/UL — SIGNIFICANT CHANGE UP (ref 25–125)
RETICS/RBC NFR: 1.2 % — SIGNIFICANT CHANGE UP (ref 0.5–2.5)
SODIUM SERPL-SCNC: 147 MMOL/L — HIGH (ref 135–145)
SODIUM UR-SCNC: 120 MMOL/L — SIGNIFICANT CHANGE UP
SP GR SPEC: 1.01 — SIGNIFICANT CHANGE UP (ref 1–1.04)
SQUAMOUS # UR AUTO: SIGNIFICANT CHANGE UP
TRIGL SERPL-MCNC: 112 MG/DL — SIGNIFICANT CHANGE UP (ref 10–149)
TSH SERPL-MCNC: 1.47 UIU/ML — SIGNIFICANT CHANGE UP (ref 0.27–4.2)
UIBC SERPL-MCNC: 159.3 UG/DL — SIGNIFICANT CHANGE UP (ref 110–370)
UROBILINOGEN FLD QL: NORMAL — SIGNIFICANT CHANGE UP
UUN UR-MCNC: 436.4 MG/DL — SIGNIFICANT CHANGE UP
VIT B12 SERPL-MCNC: 478 PG/ML — SIGNIFICANT CHANGE UP (ref 200–900)
WBC # BLD: 6.1 K/UL — SIGNIFICANT CHANGE UP (ref 3.8–10.5)
WBC # FLD AUTO: 6.1 K/UL — SIGNIFICANT CHANGE UP (ref 3.8–10.5)
WBC UR QL: SIGNIFICANT CHANGE UP (ref 0–?)

## 2019-01-18 PROCEDURE — 76770 US EXAM ABDO BACK WALL COMP: CPT | Mod: 26

## 2019-01-18 PROCEDURE — 12345: CPT | Mod: NC,GC

## 2019-01-18 PROCEDURE — 93010 ELECTROCARDIOGRAM REPORT: CPT

## 2019-01-18 PROCEDURE — 99223 1ST HOSP IP/OBS HIGH 75: CPT | Mod: GC

## 2019-01-18 PROCEDURE — 71046 X-RAY EXAM CHEST 2 VIEWS: CPT | Mod: 26

## 2019-01-18 RX ORDER — HYDRALAZINE HCL 50 MG
25 TABLET ORAL THREE TIMES A DAY
Qty: 0 | Refills: 0 | Status: DISCONTINUED | OUTPATIENT
Start: 2019-01-18 | End: 2019-01-18

## 2019-01-18 RX ORDER — ASCORBIC ACID 60 MG
1 TABLET,CHEWABLE ORAL
Qty: 30 | Refills: 0
Start: 2019-01-18 | End: 2019-02-16

## 2019-01-18 RX ORDER — AMLODIPINE BESYLATE 2.5 MG/1
10 TABLET ORAL DAILY
Qty: 0 | Refills: 0 | Status: DISCONTINUED | OUTPATIENT
Start: 2019-01-18 | End: 2019-01-18

## 2019-01-18 RX ORDER — CARVEDILOL PHOSPHATE 80 MG/1
25 CAPSULE, EXTENDED RELEASE ORAL EVERY 12 HOURS
Qty: 0 | Refills: 0 | Status: DISCONTINUED | OUTPATIENT
Start: 2019-01-18 | End: 2019-01-18

## 2019-01-18 RX ORDER — FERROUS SULFATE 325(65) MG
325 TABLET ORAL DAILY
Qty: 0 | Refills: 0 | Status: DISCONTINUED | OUTPATIENT
Start: 2019-01-18 | End: 2019-01-18

## 2019-01-18 RX ORDER — ERYTHROPOIETIN 10000 [IU]/ML
20000 INJECTION, SOLUTION INTRAVENOUS; SUBCUTANEOUS ONCE
Qty: 0 | Refills: 0 | Status: COMPLETED | OUTPATIENT
Start: 2019-01-18 | End: 2019-01-18

## 2019-01-18 RX ORDER — FERROUS SULFATE 325(65) MG
1 TABLET ORAL
Qty: 0 | Refills: 0 | COMMUNITY

## 2019-01-18 RX ORDER — HEPARIN SODIUM 5000 [USP'U]/ML
5000 INJECTION INTRAVENOUS; SUBCUTANEOUS EVERY 8 HOURS
Qty: 0 | Refills: 0 | Status: DISCONTINUED | OUTPATIENT
Start: 2019-01-18 | End: 2019-01-18

## 2019-01-18 RX ORDER — ASCORBIC ACID 60 MG
500 TABLET,CHEWABLE ORAL DAILY
Qty: 0 | Refills: 0 | Status: DISCONTINUED | OUTPATIENT
Start: 2019-01-18 | End: 2019-01-18

## 2019-01-18 RX ORDER — IRON SUCROSE 20 MG/ML
200 INJECTION, SOLUTION INTRAVENOUS ONCE
Qty: 0 | Refills: 0 | Status: COMPLETED | OUTPATIENT
Start: 2019-01-18 | End: 2019-01-18

## 2019-01-18 RX ORDER — ATORVASTATIN CALCIUM 80 MG/1
20 TABLET, FILM COATED ORAL AT BEDTIME
Qty: 0 | Refills: 0 | Status: DISCONTINUED | OUTPATIENT
Start: 2019-01-18 | End: 2019-01-18

## 2019-01-18 RX ORDER — ISOSORBIDE DINITRATE 5 MG/1
10 TABLET ORAL THREE TIMES A DAY
Qty: 0 | Refills: 0 | Status: DISCONTINUED | OUTPATIENT
Start: 2019-01-18 | End: 2019-01-18

## 2019-01-18 RX ORDER — FERROUS SULFATE 325(65) MG
1 TABLET ORAL
Qty: 30 | Refills: 0
Start: 2019-01-18 | End: 2019-02-16

## 2019-01-18 RX ADMIN — HEPARIN SODIUM 5000 UNIT(S): 5000 INJECTION INTRAVENOUS; SUBCUTANEOUS at 05:46

## 2019-01-18 RX ADMIN — Medication 25 MILLIGRAM(S): at 05:45

## 2019-01-18 RX ADMIN — IRON SUCROSE 110 MILLIGRAM(S): 20 INJECTION, SOLUTION INTRAVENOUS at 14:22

## 2019-01-18 RX ADMIN — Medication 500 MILLIGRAM(S): at 13:29

## 2019-01-18 RX ADMIN — CARVEDILOL PHOSPHATE 25 MILLIGRAM(S): 80 CAPSULE, EXTENDED RELEASE ORAL at 05:45

## 2019-01-18 RX ADMIN — ERYTHROPOIETIN 20000 UNIT(S): 10000 INJECTION, SOLUTION INTRAVENOUS; SUBCUTANEOUS at 13:30

## 2019-01-18 RX ADMIN — HEPARIN SODIUM 5000 UNIT(S): 5000 INJECTION INTRAVENOUS; SUBCUTANEOUS at 13:29

## 2019-01-18 RX ADMIN — AMLODIPINE BESYLATE 10 MILLIGRAM(S): 2.5 TABLET ORAL at 05:45

## 2019-01-18 RX ADMIN — Medication 25 MILLIGRAM(S): at 14:22

## 2019-01-18 RX ADMIN — Medication 325 MILLIGRAM(S): at 13:29

## 2019-01-18 RX ADMIN — ISOSORBIDE DINITRATE 10 MILLIGRAM(S): 5 TABLET ORAL at 06:23

## 2019-01-18 RX ADMIN — ISOSORBIDE DINITRATE 10 MILLIGRAM(S): 5 TABLET ORAL at 14:22

## 2019-01-18 NOTE — H&P ADULT - PROBLEM SELECTOR PLAN 4
- follows with Dr. Salazar for cardiology- last seen 1/8, BP in office 152/68  - all meds continued, hydralazine increased for uncontrolled HTN  - c/w meds here - coreg, hydral, isosorbide, norvasc  - of note, admitted in 11/2018 for HTN emergency with BP to 244/103 causing flash pulmonary edema - last TTE in November with EF 67%, mildly dilated LA  - follows with Dr. Salazar for dHF - last seen 1/8  - all meds continued, hydralazine increased for uncontrolled HTN  - c/w meds here - coreg, hydral, isosorbide - last TTE in November with EF 67%, mildly dilated LA  - follows with Dr. Salazar for dHF - last seen 1/8  - all meds continued, hydralazine increased for uncontrolled HTN  - c/w meds here - coreg, hydral, isosorbide  - not on ACEI due to CKD

## 2019-01-18 NOTE — H&P ADULT - NSHPLABSRESULTS_GEN_ALL_CORE
Comprehensive Metabolic Panel (01.17.19 @ 16:29)    Sodium, Serum: 146 mmol/L    Potassium, Serum: 5.4: SPECIMEN NOT HEMOLYZED mmol/L    Chloride, Serum: 111 mmol/L    Carbon Dioxide, Serum: 23 mmol/L    Anion Gap, Serum: 12 mmo/L    Blood Urea Nitrogen, Serum: 68 mg/dL    Creatinine, Serum: 3.41 mg/dL    Glucose, Serum: 134 mg/dL    Calcium, Total Serum: 9.3 mg/dL    Protein Total, Serum: 7.6 g/dL    Albumin, Serum: 4.8 g/dL    Bilirubin Total, Serum: 0.3 mg/dL    Alkaline Phosphatase, Serum: 55 u/L    Aspartate Aminotransferase (AST/SGOT): 18 u/L    Alanine Aminotransferase (ALT/SGPT): 13 u/L    eGFR if Non : 14    eGFR if : 16 mL/min    Complete Blood Count + Automated Diff (01.17.19 @ 16:29)    Nucleated RBC #: 0 K/uL    WBC Count: 6.34 K/uL    RBC Count: 3.21 M/uL    Hemoglobin: 8.1 g/dL    Hematocrit: 27.0 %    Mean Cell Volume: 84.1 fL    Mean Cell Hemoglobin: 25.2 pg    Mean Cell Hemoglobin Conc: 30.0 %    Red Cell Distrib Width: 15.8 %    Platelet Count - Automated: 170 K/uL    MPV: 10.7 fl    Auto Neutrophil #: 4.40 K/uL    Auto Lymphocyte #: 1.27 K/uL    Auto Monocyte #: 0.47 K/uL    Auto Eosinophil #: 0.16 K/uL    Auto Basophil #: 0.02 K/uL    Auto Neutrophil %: 69.5 %    Auto Lymphocyte %: 20.0 %    Auto Monocyte %: 7.4 %    Auto Eosinophil %: 2.5 %    Auto Basophil %: 0.3 %    Auto Immature Granulocyte %: 0.3: (Includes meta, myelo and promyelocytes) % Comprehensive Metabolic Panel (01.17.19 @ 16:29)    Sodium, Serum: 146 mmol/L    Potassium, Serum: 5.4: SPECIMEN NOT HEMOLYZED mmol/L    Chloride, Serum: 111 mmol/L    Carbon Dioxide, Serum: 23 mmol/L    Anion Gap, Serum: 12 mmo/L    Blood Urea Nitrogen, Serum: 68 mg/dL    Creatinine, Serum: 3.41 mg/dL    Glucose, Serum: 134 mg/dL    Calcium, Total Serum: 9.3 mg/dL    Protein Total, Serum: 7.6 g/dL    Albumin, Serum: 4.8 g/dL    Bilirubin Total, Serum: 0.3 mg/dL    Alkaline Phosphatase, Serum: 55 u/L    Aspartate Aminotransferase (AST/SGOT): 18 u/L    Alanine Aminotransferase (ALT/SGPT): 13 u/L    eGFR if Non : 14    eGFR if : 16 mL/min    Complete Blood Count + Automated Diff (01.17.19 @ 16:29)    Nucleated RBC #: 0 K/uL    WBC Count: 6.34 K/uL    RBC Count: 3.21 M/uL    Hemoglobin: 8.1 g/dL    Hematocrit: 27.0 %    Mean Cell Volume: 84.1 fL    Mean Cell Hemoglobin: 25.2 pg    Mean Cell Hemoglobin Conc: 30.0 %    Red Cell Distrib Width: 15.8 %    Platelet Count - Automated: 170 K/uL    MPV: 10.7 fl    Auto Neutrophil #: 4.40 K/uL    Auto Lymphocyte #: 1.27 K/uL    Auto Monocyte #: 0.47 K/uL    Auto Eosinophil #: 0.16 K/uL    Auto Basophil #: 0.02 K/uL    Auto Neutrophil %: 69.5 %    Auto Lymphocyte %: 20.0 %    Auto Monocyte %: 7.4 %    Auto Eosinophil %: 2.5 %    Auto Basophil %: 0.3 %    Auto Immature Granulocyte %: 0.3: (Includes meta, myelo and promyelocytes) %    TTE 11/2018  OBSERVATIONS:  Mitral Valve: Mitral annular calcification and calcified  mitral leaflets with normal diastolic opening.  Mild-moderate mitral regurgitation.  Aortic Root: Normal aortic root.  Aortic Valve: Calcified trileaflet aortic valve with normal  opening.  Left Atrium: Mildly dilated left atrium.  LA volume index =  39 cc/m2.  Left Ventricle: Normal left ventricular systolic function.  No segmental wall motion abnormalities. Normal left  ventricular internal dimensions and wall thicknesses.  Right Heart: Normal right atrium. The right ventricle is  not well visualized; grossly normal right ventricular  systolic function. Normal tricuspid valve.  Mild-moderate  tricuspid regurgitation. Normal pulmonic valve. Mild  pulmonic regurgitation.  Pericardium/PleuraSmall pericardial effusion posterior to  the left ventricle. Left pleural effusion.  Hemodynamic: Estimated right ventricular systolic pressure  equals 49 mm Hg, assuming right atrial pressure equals 10  mm Hg, consistent with mild pulmonary hypertension.

## 2019-01-18 NOTE — PROGRESS NOTE ADULT - PROBLEM SELECTOR PLAN 1
- 2/2 overdiuresis vs progression of CKD given uncontrolled HTN  - f/u urine lytes for etiology  - discuss with Nephrologist in AM regarding any other recent blood work since November to differentiate acute vs progression  - patient continues to have increased UO on lasix outpatient  - will hold lasix for now as patient has zero edema and crackles and somewhat dry mucous membranes CKD given uncontrolled HTN vs 2/2 overdiuresis  - FEurea 41.7% suggestive of intrinsic  - sCr around baseline  - nephrology recs appreciated

## 2019-01-18 NOTE — PROGRESS NOTE ADULT - PROBLEM SELECTOR PLAN 4
- last TTE in November with EF 67%, mildly dilated LA  - follows with Dr. Salazar for dHF - last seen 1/8  - all meds continued, hydralazine increased for uncontrolled HTN  - c/w meds here - coreg, hydral, isosorbide  - not on ACEI due to CKD - follows with Dr. Salazar for cardiology- last seen 1/8, BP in office 152/68  - all meds continued, hydralazine increased for uncontrolled HTN  - c/w meds here - coreg, hydral, isosorbide, norvasc  - of note, admitted in 11/2018 for HTN emergency with BP to 244/103 causing flash pulmonary edema

## 2019-01-18 NOTE — H&P ADULT - PROBLEM SELECTOR PROBLEM 6
Type 2 diabetes mellitus with stage 4 chronic kidney disease, with long-term current use of insulin HLD (hyperlipidemia)

## 2019-01-18 NOTE — H&P ADULT - NSHPRISKHIVSCREEN_GEN_ALL_CORE
"Subjective   Trent Lemus is a 47 y.o. male.     History of Present Illness   The patient describes a couple of days of runny nose and congestion.  It seems to be not improving with home care.  The patient reports associated sinus drainage and scratchy throat.  His wife was sick with \"allergies\" last week.  There is no fever, chills or acute dyspnea.    Review of Systems   Constitutional: Negative for chills and fever.   HENT: Positive for congestion, postnasal drip and sore throat. Negative for ear pain and facial swelling.    Eyes: Negative for discharge.   Gastrointestinal: Negative for diarrhea, nausea and vomiting.   Musculoskeletal: Negative for myalgias.   Skin: Negative for rash.   Neurological: Negative for dizziness.     Objective   Physical Exam   Constitutional: He is oriented to person, place, and time. He appears well-developed and well-nourished.   HENT:   Head: Normocephalic and atraumatic.   Right Ear: Hearing, tympanic membrane, external ear and ear canal normal.   Left Ear: Hearing, tympanic membrane, external ear and ear canal normal.   Nose: Mucosal edema present.   Mouth/Throat: Uvula is midline. Posterior oropharyngeal erythema present.   Eyes: Conjunctivae are normal. Right eye exhibits no discharge. Left eye exhibits no discharge.   Neck: Neck supple.   Cardiovascular: Normal rate, regular rhythm and normal heart sounds.    Pulmonary/Chest: Effort normal and breath sounds normal. He has no wheezes.   Musculoskeletal: Normal range of motion.   Lymphadenopathy:     He has no cervical adenopathy.   Neurological: He is alert and oriented to person, place, and time.   Skin: Skin is warm. No rash noted.   Psychiatric: He has a normal mood and affect.   Vitals reviewed.      Assessment/Plan   Diagnoses and all orders for this visit:    Acute URI  -     azelastine (ASTELIN) 0.1 % nasal spray; Use two sprays per nostril once daily prn congestion/runny nose  -     Chlorcyclizine-Pseudoephed (STAHIST " AD) 25-60 MG tablet; Take 1/2 to 1 tab po every 12 hours PRN congestion  -     POC Rapid Strep A is NEGATIVE today  - Rest, fluids, avoid sick contacts and RTC if not improved.                  Offered and patient declined

## 2019-01-18 NOTE — DISCHARGE NOTE ADULT - ADDITIONAL INSTRUCTIONS
Please follow up with your nephrologist Dr. Jonathon Perkins for your follow up appointment on 2/7/19 for continued management of your CKD and related anemia. Follow up with your hematologist within 2 weeks of discharge to discuss your anemia and iron levels. Please continue regular follow up with your PCP and cardiologist.

## 2019-01-18 NOTE — CONSULT NOTE ADULT - PROBLEM SELECTOR RECOMMENDATION 2
Stable hg since admission, without evidence of GIB.   Anemia most likely related to CKD.   Can give epo 26659 units subq x1 dose.   Low iron stores noted (TSat and ferrtin), can also give venofer 200mg IVP x1 dose as well.   f/u heme outpt for repeat IV iron supplementation.

## 2019-01-18 NOTE — H&P ADULT - HISTORY OF PRESENT ILLNESS
58F w/ HFpEF (TTE 11/2018 with EF 67%), T2DM, CKD IV, HTN, HLD presenting from outpatient hematologist office after getting routine blood work and told to come to ED for hemoglobin of 7.9. 58F w/ HFpEF (TTE 11/2018 with EF 67%), T2DM, CKD IV, HTN, HLD presenting from outpatient hematologist office after getting routine blood work and told to come to ED for hemoglobin of 7.9. Patient states she went to a hematologist after being referred by PCP for anemia. She had blood work done on 1/15 and was called on 1/16 and told she had a hemoglobin of 7.9 and needed a blood transfusion. She was told to go to the ED. Patient reports SOB with 1 flight of stairs that has been chronic since her last admission in November - unchanged. She reports overall fatigue and palpitations with exertion which have also been chronic since last admission - unchanged. She reports she is always cold which has also been chronic. She denies HA, dizziness, lightheadedness, CP, chest pressure, abdominal pain, N/V, constipation, diarrhea, melena, hematochezia, hematuria. She reports since being on lasix she pees 10x per day and has lost ~3 pounds. She denies fevers, chills, orthopnea, cough.

## 2019-01-18 NOTE — DISCHARGE NOTE ADULT - PATIENT PORTAL LINK FT
You can access the [facility], offered by [facility], by registering with the following website: [email or web address]

## 2019-01-18 NOTE — DISCHARGE NOTE ADULT - CONDITIONS AT DISCHARGE
Mrs. Boogie is with vital signs stable and afebrile. Discharge instructions reviewed and copies provided.

## 2019-01-18 NOTE — PROGRESS NOTE ADULT - SUBJECTIVE AND OBJECTIVE BOX
Patrick Yang, PGY1  Pager [phone]    Patient is a 58y old  Female who presents with a chief complaint of sent in by hematologist for low hemoglobin (2019 12:45)      SUBJECTIVE/INTERVAL EVENTS: Patient seen and examined at bedside. LORY o/n. States feels pretty good. Denies any symptoms.    MEDICATIONS  (STANDING):  amLODIPine   Tablet 10 milliGRAM(s) Oral daily  ascorbic acid 500 milliGRAM(s) Oral daily  atorvastatin 20 milliGRAM(s) Oral at bedtime  carvedilol 25 milliGRAM(s) Oral every 12 hours  epoetin niesha Injectable 15270 Unit(s) SubCutaneous once  ferrous    sulfate 325 milliGRAM(s) Oral daily  heparin  Injectable 5000 Unit(s) SubCutaneous every 8 hours  hydrALAZINE 25 milliGRAM(s) Oral three times a day  isosorbide   dinitrate Tablet (ISORDIL) 10 milliGRAM(s) Oral three times a day    MEDICATIONS  (PRN):      VITAL SIGNS:  T(F): 98.2 (19 @ 05:36), Max: 99.1 (19 @ 22:39)  HR: 72 (19 @ 05:36) (70 - 75)  BP: 146/62 (19 @ 05:36) (142/72 - 154/72)  RR: 17 (19 @ 05:36) (16 - 20)  SpO2: 96% (19 @ 05:36) (96% - 100%)    I&O's Summary    2019 07:  -  2019 07:00  --------------------------------------------------------  IN: 0 mL / OUT: 400 mL / NET: -400 mL    2019 07:  -  2019 13:12  --------------------------------------------------------  IN: 0 mL / OUT: 200 mL / NET: -200 mL      Daily Height in cm: 154.94 (2019 22:39)    Daily Weight in k.7 (2019 12:45)    PHYSICAL EXAM:  Gen: Alert, well-developed, NAD  HEENT: NCAT, conjunctiva clear, sclera anicteric  Neck: Supple, no JVD  CV: RRR, S1S2, no m/r/g  Resp: CTAB, normal respiratory effort  Abd: Soft, nontender, nondistended, normal bowel sounds  Ext: + peripheral pulses, no edema, clubbing, or cyanosis  Neuro: AOx3, no focal deficits  SKIN: No rashes or lesions    LABS:                        8.1    6.10  )-----------( 168      ( 2019 05:35 )             26.6     01-18    147<H>  |  112<H>  |  64<H>  ----------------------------<  80  4.3   |  20<L>  |  2.99<H>    Ca    9.5      2019 05:35  Phos  4.2     01-18  Mg     2.2     -18    TPro  7.6  /  Alb  4.8  /  TBili  0.3  /  DBili  x   /  AST  18  /  ALT  13  /  AlkPhos  55  01-17    LIVER FUNCTIONS - ( 2019 16:29 )  Alb: 4.8 g/dL / Pro: 7.6 g/dL / ALK PHOS: 55 u/L / ALT: 13 u/L / AST: 18 u/L / GGT: x           PT/INR - ( 2019 16:29 )   PT: 12.7 SEC;   INR: 1.14          PTT - ( 2019 16:29 )  PTT:38.9 SEC      Urinalysis Basic - ( 2019 02:35 )    Color: COLORLESS / Appearance: CLEAR / S.012 / pH: 6.0  Gluc: NEGATIVE / Ketone: NEGATIVE  / Bili: NEGATIVE / Urobili: NORMAL   Blood: NEGATIVE / Protein: 30 / Nitrite: NEGATIVE   Leuk Esterase: NEGATIVE / RBC: 0-2 / WBC 0-2   Sq Epi: OCC / Non Sq Epi: x / Bacteria: NEGATIVE        CAPILLARY BLOOD GLUCOSE      POCT Blood Glucose.: 83 mg/dL (2019 22:50)      RADIOLOGY & ADDITIONAL TESTS: Reviewed    Imaging Personally Reviewed:    Consultant(s) Notes Reviewed:      Care Discussed with Consultants/Other Providers:

## 2019-01-18 NOTE — H&P ADULT - NSHPSOCIALHISTORY_GEN_ALL_CORE
denies ever smoking, drinking, other drug use. works as . ambulates without assistance devices. no recent travel.

## 2019-01-18 NOTE — CONSULT NOTE ADULT - PROBLEM SELECTOR RECOMMENDATION 9
Downtrended cr today.  Resolved MARY JO, likely ATN. Electrolytes and volume status acceptable.   [place] UA noted.  No need for further MARY JO w/u.   Pt has f/u appt with Dr Perkins in 2 weeks already.  Dispo planning as per primary team

## 2019-01-18 NOTE — DISCHARGE NOTE ADULT - CARE PLAN
Principal Discharge DX:	Anemia due to stage 4 chronic kidney disease  Goal:	To improve your hemoglobin count  Assessment and plan of treatment:	You were sent in for concern for anemia. Your hemoglobin was found to be at baseline. Per Nephrology you were able to get a dose of epogen to help increase your blood count. Continue to take your iron supplement with vitamin C to help increase your iron stores.     Pleas follow up with your Nephrologist Dr Adams  on February 7th, 2019 to continue Epogen.    Please follow up with your hematologist in 2 weeks for a post discharge follow up as well. Principal Discharge DX:	Anemia due to stage 4 chronic kidney disease  Goal:	To improve your hemoglobin count  Assessment and plan of treatment:	You were sent in for concern for anemia. Your hemoglobin was found to be at baseline. Per Nephrology you were able to get a dose of epogen to help increase your blood count. Continue to take your iron supplement with vitamin C to help increase your iron stores.     Pleas follow up with your Nephrologist Dr. Perkins  on February 7th, 2019 to continue Epogen.    Please follow up with your hematologist in 2 weeks for a post discharge follow up as well.

## 2019-01-18 NOTE — H&P ADULT - PROBLEM SELECTOR PLAN 2
- known anemia with H/H stable from admission in November, all of patients symptoms have been chronic and stable since November as well  - w/u done previously with normal Fe, TIBC and high ferritin consistent with AOCD  - likely 2/2 CKD, no s/s of bleeding  - c/w iron supp  - discuss with outpatient hematologist regarding recommendation for blood transfusion  - type and screen active - known anemia with H/H stable from admission in November, all of patients symptoms have been chronic and stable since November as well  - w/u done previously with normal Fe, TIBC and high ferritin consistent with AOCD  - will resend labs to ensure no new cause of anemia  - likely 2/2 CKD, no s/s of bleeding  - c/w iron supp  - discuss with outpatient hematologist regarding recommendation for blood transfusion  - type and screen active

## 2019-01-18 NOTE — PROGRESS NOTE ADULT - PROBLEM SELECTOR PLAN 7
- per patient, A1C has been around 7 for past year and has not been on any medications  - previously on insulin  - likely now controlled given progression of CKD  - will check A1C in AM - glucose on , FS 83  - will hold on ISS for now and continue with CC diet DVT ppx - HSQ  Diet DASH/TLC CC

## 2019-01-18 NOTE — H&P ADULT - PROBLEM SELECTOR PLAN 5
- c/w home dose atorvastatin - follows with Dr. Salazar for cardiology- last seen 1/8, BP in office 152/68  - all meds continued, hydralazine increased for uncontrolled HTN  - c/w meds here - coreg, hydral, isosorbide, norvasc  - of note, admitted in 11/2018 for HTN emergency with BP to 244/103 causing flash pulmonary edema

## 2019-01-18 NOTE — DISCHARGE NOTE ADULT - MEDICATION SUMMARY - MEDICATIONS TO TAKE
I will START or STAY ON the medications listed below when I get home from the hospital:    isosorbide dinitrate 10 mg oral tablet  -- 1 tab(s) by mouth every 8 hours  -- Indication: For Need for prophylactic measure    atorvastatin 20 mg oral tablet  -- 1 tab(s) by mouth once a day  -- Indication: For HLD (hyperlipidemia)    carvedilol 25 mg oral tablet  -- 1 tab(s) by mouth 2 times a day  -- Indication: For Chronic diastolic heart failure    amLODIPine 10 mg oral tablet  -- 1 tab(s) by mouth once a day  -- Indication: For Hypertension    furosemide 40 mg oral tablet  -- 1 tab(s) by mouth once a day  -- Indication: For Chronic diastolic heart failure    ferrous sulfate 325 mg (65 mg elemental iron) oral tablet  -- 1 tab(s) by mouth once a day   -- Indication: For Anemia    hydrALAZINE 25 mg oral tablet  -- 1 tab(s) by mouth 3 times a day  -- Indication: For Hypertension    ascorbic acid 500 mg oral tablet  -- 1 tab(s) by mouth once a day  -- Indication: For To be taken with iron

## 2019-01-18 NOTE — H&P ADULT - PROBLEM SELECTOR PLAN 6
- per patient, A1C has been around 7 for past year and has not been on any medications  - previously on insulin  - likely now controlled given progression of CKD  - will check A1C in AM - glucose on , FS 83  - will hold on ISS for now and continue with CC diet - c/w home dose atorvastatin

## 2019-01-18 NOTE — DISCHARGE NOTE ADULT - CARE PROVIDERS DIRECT ADDRESSES
[email or web address] CenterBlue [facility] Surgical Center [email or web address]

## 2019-01-18 NOTE — DISCHARGE NOTE ADULT - PROVIDER TOKENS
TOKEN:'[id number]' TOKEN:'[id number]:'[id number]:'[id number]',TOKEN:'[id number]'

## 2019-01-18 NOTE — H&P ADULT - PROBLEM SELECTOR PLAN 1
- 2/2 overdiuresis vs progression of CKD given uncontrolled HTN  - f/u urine lytes for etiology  - discuss with Nephrologist in AM regarding any other recent blood work since November to differentiate acute vs progression  - patient continues to have increased UO on lasix outpatient  - will hold lasix for now as patient has zero edema and crackles and somewhat dry mucous membranes

## 2019-01-18 NOTE — DISCHARGE NOTE ADULT - CARE PROVIDER_API CALL
Andrés Perkins), Internal Medicine  [address] [place] [address]  Phone: [phone]  Fax: [phone] Andrés Perkins), Internal Medicine  [address] [place] [address]  Phone: [phone]  Fax: [phone]    Rodo Maynard (), Internal Medicine; Oncology  [address] [place] [address]  Phone: [phone]  Fax: [phone]    Quiana Slaazar (MD), Cardiovascular Disease  [facility]  [address] [place] [address] [address] [place] [address]  Phone: [phone]  Fax: [phone]    Su Rodriguez), Internal Medicine  [address] [address] [place] [address]  Phone: [phone]  Fax: [phone]

## 2019-01-18 NOTE — PROGRESS NOTE ADULT - PROBLEM SELECTOR PLAN 5
- follows with Dr. Salazar for cardiology- last seen 1/8, BP in office 152/68  - all meds continued, hydralazine increased for uncontrolled HTN  - c/w meds here - coreg, hydral, isosorbide, norvasc  - of note, admitted in 11/2018 for HTN emergency with BP to 244/103 causing flash pulmonary edema - c/w home dose atorvastatin

## 2019-01-18 NOTE — H&P ADULT - PROBLEM SELECTOR PROBLEM 7
Need for prophylactic measure Type 2 diabetes mellitus with stage 4 chronic kidney disease, with long-term current use of insulin

## 2019-01-18 NOTE — H&P ADULT - NSHPPHYSICALEXAM_GEN_ALL_CORE
Vital Signs Last 24 Hrs  T(C): 37.3 (17 Jan 2019 22:39), Max: 37.3 (17 Jan 2019 22:39)  T(F): 99.1 (17 Jan 2019 22:39), Max: 99.1 (17 Jan 2019 22:39)  HR: 75 (17 Jan 2019 22:39) (70 - 75)  BP: 146/67 (17 Jan 2019 22:39) (142/72 - 154/72)  BP(mean): --  RR: 18 (17 Jan 2019 22:39) (16 - 20)  SpO2: 97% (17 Jan 2019 22:39) (97% - 100%) Vital Signs Last 24 Hrs  T(C): 37.3 (17 Jan 2019 22:39), Max: 37.3 (17 Jan 2019 22:39)  T(F): 99.1 (17 Jan 2019 22:39), Max: 99.1 (17 Jan 2019 22:39)  HR: 75 (17 Jan 2019 22:39) (70 - 75)  BP: 146/67 (17 Jan 2019 22:39) (142/72 - 154/72)  BP(mean): --  RR: 18 (17 Jan 2019 22:39) (16 - 20)  SpO2: 97% (17 Jan 2019 22:39) (97% - 100%)    GENERAL: NAD, well-developed  HEAD:  Atraumatic, Normocephalic  EYES: EOMI, PERRLA, conjunctiva and sclera clear  NECK: Supple  CHEST/LUNG: Clear to auscultation bilaterally; No wheeze  HEART: Regular rate and rhythm; No murmurs, rubs, or gallops  ABDOMEN: Soft, Nontender, Nondistended; Bowel sounds present  EXTREMITIES:  2+ Peripheral Pulses, No clubbing, cyanosis, or edema  PSYCH: AAOx3  NEUROLOGY: non-focal  SKIN: No rashes or lesions Vital Signs Last 24 Hrs  T(C): 37.3 (17 Jan 2019 22:39), Max: 37.3 (17 Jan 2019 22:39)  T(F): 99.1 (17 Jan 2019 22:39), Max: 99.1 (17 Jan 2019 22:39)  HR: 75 (17 Jan 2019 22:39) (70 - 75)  BP: 146/67 (17 Jan 2019 22:39) (142/72 - 154/72)  BP(mean): --  RR: 18 (17 Jan 2019 22:39) (16 - 20)  SpO2: 97% (17 Jan 2019 22:39) (97% - 100%)    GENERAL: NAD, well-developed  HEAD:  Atraumatic, Normocephalic  EYES: Rt eye Blind,   NECK: Supple  CHEST/LUNG: Clear to auscultation bilaterally; No wheeze  HEART: Regular rate and rhythm; No murmurs, rubs, or gallops  ABDOMEN: Soft, Nontender, Nondistended; Bowel sounds present  EXTREMITIES:  2+ Peripheral Pulses, No clubbing, cyanosis, or edema  PSYCH: AAOx3  NEUROLOGY: non-focal  SKIN: No rashes or lesions

## 2019-01-18 NOTE — DISCHARGE NOTE ADULT - PLAN OF CARE
To improve your hemoglobin count You were sent in for concern for anemia. Your hemoglobin was found to be at baseline. Per Nephrology you were able to get a dose of epogen to help increase your blood count. Continue to take your iron supplement with vitamin C to help increase your iron stores.     Pleas follow up with your Nephrologist Dr Adams  on February 7th, 2019 to continue Epogen.    Please follow up with your hematologist in 2 weeks for a post discharge follow up as well. You were sent in for concern for anemia. Your hemoglobin was found to be at baseline. Per Nephrology you were able to get a dose of epogen to help increase your blood count. Continue to take your iron supplement with vitamin C to help increase your iron stores.     Pleas follow up with your Nephrologist Dr. Perkins  on February 7th, 2019 to continue Epogen.    Please follow up with your hematologist in 2 weeks for a post discharge follow up as well.

## 2019-01-18 NOTE — H&P ADULT - ASSESSMENT
58F w/ HFpEF (TTE 11/2018 with EF 67%), T2DM, CKD IV, HTN, HLD presenting from outpatient hematologist office after getting routine blood work and told to come to ED for hemoglobin of 7.9 found to have MARY JO on CKD. 58F w/ HFpEF (TTE 11/2018 with EF 67%), T2DM, CKD IV, HTN, HLD presenting from outpatient hematologist office after getting routine blood work and told to come to ED for hemoglobin of 7.9 found to have MARY JO on CKD 2/2 overdiuresis vs progression of CKD 2/2 uncontrolled HTN.

## 2019-01-18 NOTE — DISCHARGE NOTE ADULT - HOSPITAL COURSE
HPI:  58F w/ HFpEF (TTE 11/2018 with EF 67%), T2DM, CKD IV, HTN, HLD presenting from outpatient hematologist office after getting routine blood work and told to come to ED for hemoglobin of 7.9. Patient states she went to a hematologist after being referred by PCP for anemia. She had blood work done on 1/15 and was called on 1/16 and told she had a hemoglobin of 7.9 and needed a blood transfusion. She was told to go to the ED. Patient reports SOB with 1 flight of stairs that has been chronic since her last admission in November - unchanged. She reports overall fatigue and palpitations with exertion which have also been chronic since last admission - unchanged. She reports she is always cold which has also been chronic. She denies HA, dizziness, lightheadedness, CP, chest pressure, abdominal pain, N/V, constipation, diarrhea, melena, hematochezia, hematuria. She reports since being on lasix she pees 10x per day and has lost ~3 pounds. She denies fevers, chills, orthopnea, cough. (18 Jan 2019 03:20)    Hospital Course:    The patient was admitted for chronic anemia. No blood transfusion was given while in the hospital given patient was at her baseline hemoglobin. Nephrology was consulted and recommend to give a one time dose of Epogen 20,000U. The patient was deemed stable for discharge with appropriate follow up. HPI:  58F w/ HFpEF (TTE 11/2018 with EF 67%), T2DM, CKD IV, HTN, HLD presenting from outpatient hematologist office after getting routine blood work and told to come to ED for hemoglobin of 7.9. Patient states she went to a hematologist after being referred by PCP for anemia. She had blood work done on 1/15 and was called on 1/16 and told she had a hemoglobin of 7.9 and needed a blood transfusion. She was told to go to the ED. Patient reports SOB with 1 flight of stairs that has been chronic since her last admission in November - unchanged. She reports overall fatigue and palpitations with exertion which have also been chronic since last admission - unchanged. She reports she is always cold which has also been chronic. She denies HA, dizziness, lightheadedness, CP, chest pressure, abdominal pain, N/V, constipation, diarrhea, melena, hematochezia, hematuria. She reports since being on lasix she pees 10x per day and has lost ~3 pounds. She denies fevers, chills, orthopnea, cough. (18 Jan 2019 03:20)    Hospital Course:    The patient was admitted for chronic anemia. No blood transfusion was given while in the hospital given patient was at her baseline hemoglobin and denying symptoms. Nephrology was consulted and recommended to give a one time dose of Epogen 20,000U SC which patient was given on 1/18/19. The patient was deemed stable for discharge with appropriate follow up. HPI:  58F w/ HFpEF (TTE 11/2018 with EF 67%), T2DM, CKD IV, HTN, HLD presenting from outpatient hematologist office after getting routine blood work and told to come to ED for hemoglobin of 7.9. Patient states she went to a hematologist after being referred by PCP for anemia. She had blood work done on 1/15 and was called on 1/16 and told she had a hemoglobin of 7.9 and needed a blood transfusion. She was told to go to the ED. Patient reports SOB with 1 flight of stairs that has been chronic since her last admission in November - unchanged. She reports overall fatigue and palpitations with exertion which have also been chronic since last admission - unchanged. She reports she is always cold which has also been chronic. She denies HA, dizziness, lightheadedness, CP, chest pressure, abdominal pain, N/V, constipation, diarrhea, melena, hematochezia, hematuria. She reports since being on lasix she pees 10x per day and has lost ~3 pounds. She denies fevers, chills, orthopnea, cough. (18 Jan 2019 03:20)    Hospital Course:    The patient was admitted for chronic anemia. No blood transfusion was given while in the hospital given patient was at her baseline hemoglobin and denying symptoms. Nephrology was consulted and recommended to give a one time dose of Epogen 20,000U SC and IV iron 200mg, both of which were administered to the patient on 1/18/19. The patient was deemed stable for discharge with appropriate follow up.

## 2019-01-18 NOTE — H&P ADULT - NSHPREVIEWOFSYSTEMS_GEN_ALL_CORE
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  SKIN:  No rash or itching.  CARDIOVASCULAR:  No chest pain, chest pressure or chest discomfort. No edema.  RESPIRATORY:  No cough or sputum.  GASTROINTESTINAL:  No anorexia, nausea, vomiting or diarrhea. No abdominal pain or blood.  GENITOURINARY:  No hematuria, dysuria.   NEUROLOGICAL:  No headache, dizziness, syncope, numbness or tingling in the extremities.   MUSCULOSKELETAL:  No muscle, back pain, joint pain or stiffness.  HEMATOLOGIC:  No bleeding or bruising.

## 2019-01-18 NOTE — H&P ADULT - ATTENDING COMMENTS
59 y/o female HX of Diastolic CHF, EF 67% in Nov. 2018, rt eye Blind, DM, CKD stage 4, not on HD, HTN, High Cholesterol, Anemia, pt was sent to ER for Hgb 7.9 ( Chronic ), Pt c/o chronic SOB, Chronic Fatigue, chronic palpitation, never had Colonoscopy/EGD, No fever, no cough, No N/V, No dysuria, no abdominal pain,     Vitals: Tem 98.2, HR 72, /62, RR 17, 96% RA,    Na 147, BUN 64, creatinine 2.99, K+ 4.3, Hgb 8.1, HgbA1c 6.1%,     Renal Consult, renal Sonogram, HX of Mild Rt Hydronephrosis, DVT prophylaxis: SQ Heparin, Chest X Ray, F.U CBC, CMP,  SPEP, UPEP, serum & Urine Immunofixation, Hep A,B,C  profile,     On Norvasc, Lipitor, hydralazine, HOLD Lasix, + Na 147, + Dehydration as per Lab noted, ECG, Ferrous Sulfate, Isordil,  Fall/aspiration precaution,     Case D/W pt, HS,    Pt was seen by me, Dr. BENJAMIN Palumbo on 1/18/19.

## 2019-01-18 NOTE — PROGRESS NOTE ADULT - PROBLEM SELECTOR PLAN 3
- 2/2 acute on chronic kidney failure  - no EKG changes, asymptomatic  - continue to monitor, if increases will give calcium and treat Last TTE in November with EF 67%, mildly dilated LA  - follows with Dr. Salazar for dHF - last seen 1/8  - all meds continued, hydralazine increased for uncontrolled HTN  - c/w meds here - coreg, hydral, isosorbide  - not on ACEI due to CKD

## 2019-01-18 NOTE — PROGRESS NOTE ADULT - PROBLEM SELECTOR PLAN 2
- known anemia with H/H stable from admission in November, all of patients symptoms have been chronic and stable since November as well  - w/u done previously with normal Fe, TIBC and high ferritin consistent with AOCD  - will resend labs to ensure no new cause of anemia  - likely 2/2 CKD, no s/s of bleeding  - c/w iron supp  - discuss with outpatient hematologist regarding recommendation for blood transfusion  - type and screen active Likely 2/2 CKD also AOCD. Was on epogen in the past.  - Hgb around baseline and pt asymptomatic, no signs of bleeding  - Epogen today  - Continue outpt f/u with nephrologist

## 2019-01-18 NOTE — PROGRESS NOTE ADULT - PROBLEM SELECTOR PROBLEM 6
HLD (hyperlipidemia) Type 2 diabetes mellitus with stage 4 chronic kidney disease, with long-term current use of insulin

## 2019-01-18 NOTE — H&P ADULT - PROBLEM SELECTOR PLAN 7
- DVT - hep sq  - diet - dash/tlc, cc - per patient, A1C has been around 7 for past year and has not been on any medications  - previously on insulin  - likely now controlled given progression of CKD  - will check A1C in AM - glucose on , FS 83  - will hold on ISS for now and continue with CC diet

## 2019-01-18 NOTE — PROGRESS NOTE ADULT - ASSESSMENT
58F w/ HFpEF (TTE 11/2018 with EF 67%), T2DM, CKD IV, HTN, HLD presenting from outpatient hematologist office after getting routine blood work and told to come to ED for hemoglobin of 7.9 found to have MARY JO on CKD 2/2 overdiuresis vs progression of CKD 2/2 uncontrolled HTN.

## 2019-01-18 NOTE — PROGRESS NOTE ADULT - PROBLEM SELECTOR PROBLEM 7
Type 2 diabetes mellitus with stage 4 chronic kidney disease, with long-term current use of insulin Need for prophylactic measure

## 2019-01-18 NOTE — H&P ADULT - NSHPOUTPATIENTPROVIDERS_GEN_ALL_CORE
Cardiologist - Dr Quiana Salazar Cardiologist - Dr Quiana Salazar  Hematologist - Dr Maynard  Nephrologist - Dr Jonathon Sequeira Cardiologist - Dr Quiana Salazar  Hematologist - Dr Maynard  Nephrologist - Dr Jonathon Perkins

## 2019-01-18 NOTE — PROGRESS NOTE ADULT - ATTENDING COMMENTS
Pt asymptomatic. Anemia at baseline. Will dose epo x 1 and DC with renal f/u. Time planning discharge 35 minutes.

## 2019-01-18 NOTE — H&P ADULT - PROBLEM SELECTOR PLAN 3
- last TTE in November with EF 67%, mildly dilated LA  - follows with Dr. Salazar for dHF - last seen 1/8  - all meds continued, hydralazine increased for uncontrolled HTN  - c/w meds here - coreg, hydral, isosorbide - 2/2 acute on chronic kidney failure  - no EKG changes, asymptomatic  - continue to monitor, if increases will give calcium and treat

## 2019-03-11 ENCOUNTER — APPOINTMENT (OUTPATIENT)
Dept: CARDIOLOGY | Facility: CLINIC | Age: 59
End: 2019-03-11

## 2019-03-25 ENCOUNTER — APPOINTMENT (OUTPATIENT)
Dept: SURGERY | Facility: CLINIC | Age: 59
End: 2019-03-25
Payer: COMMERCIAL

## 2019-03-25 VITALS
DIASTOLIC BLOOD PRESSURE: 67 MMHG | SYSTOLIC BLOOD PRESSURE: 124 MMHG | WEIGHT: 150 LBS | HEIGHT: 61 IN | BODY MASS INDEX: 28.32 KG/M2

## 2019-03-25 PROCEDURE — 99203 OFFICE O/P NEW LOW 30 MIN: CPT

## 2019-03-25 NOTE — DATA REVIEWED
[FreeTextEntry1] : \par Date of Exam: 03-\par  \par \par  \par  \par   \par   \par   \par \par \par \par  \par EXAM:  DIGITAL BILATERAL DIAGNOSTIC MAMMOGRAM WITH CAD AND BREAST ULTRASOUND\par \par HISTORY:  The patient is 58 years old and is seen for six-month follow-up. There is no personal history of breast cancer. Family history of breast cancer: None. The patient states that right breast lump on and off for multiple years.\par \par CLINICAL BREAST EXAMINATION:  The patient reports that her last clinical breast exam was within the past year.\par \par COMPARISON:  The present examination has been compared to prior breast imaging studies dating back to 3/6/2017\par \par MAMMOGRAM:\par \par TECHNIQUE:  Full-field digital mammography of the bilateral breasts was obtained. CC, mediolateral oblique views of bilateral breasts was obtained. Computer-assisted detection (CAD) was utilized. \par \par FINDINGS:\par BREAST COMPOSITION:  The breasts are heterogeneously dense, which may obscure small masses.\par \par Global asymmetry in the upper-outer right breast with a biopsy marker is grossly stable.    \par Microcalcifications in the left breast are grossly stable. There is mild skin thickening in bilateral breasts.\par \par ULTRASOUND:  \par \par TECHNIQUE:  A bilateral breast ultrasound was performed with complete evaluation of the four quadrants/retroareolar region and axilla.\par \par FINDINGS:  On the right, a hypoechoic ill-defined mass with posterior acoustic shadowing is seen at 10:00, 8 cm from the nipple measuring approximately 4 cm. The lesion is palpable. The lesion was biopsied on 5/1/2017. A similar-appearing nodule is seen at 9:00, 3 cm from the nipple measuring 1.2 x 0.4 x 1.4 cm which was seen on the prior study. An irregular shaped hypoechoic nodule is seen at 12:00, 8 cm from the nipple measuring approximately 3.5 x 0.7 cm. The lesion is palpable.\par \par On the left, a hypoechoic nodule is seen at 1:00, 6 cm from the nipple measuring 3 x 0.9 cm.\par \par IMPRESSION: Indeterminate palpable nodules in bilateral breasts on ultrasound exam. No corresponding mass identified on the mammogram. Breast MRI is recommended for further evaluation. Any palpable abnormality should be managed on a clinical basis.\par \par FOLLOW-UP:  Breast MRI.\par \par \par ASSESSMENT:  BI-RADS Category 0:  Incomplete. Need additional imaging evaluation.\par \par \par  \par  \par  \par

## 2019-03-25 NOTE — PHYSICAL EXAM
[Normal Breath Sounds] : Normal breath sounds [Normal Rate and Rhythm] : normal rate and rhythm [Alert] : alert [Oriented to Person] : oriented to person [Oriented to Place] : oriented to place [Oriented to Time] : oriented to time [Calm] : calm [de-identified] : A/Ox3; NAD. appears comfortable [de-identified] : supple; no JVD [de-identified] : soft, NT/ND [de-identified] : no joint swelling/deformity  [de-identified] : has a firm /large palpable mass to the R. outer breast; had R. breast biopsy done in 2017; no adenopathy

## 2019-03-25 NOTE — HISTORY OF PRESENT ILLNESS
[de-identified] : 58 y.o F presents for a consultation visit, she had a recent breast US and mammo 03/18/19; \par results showed R. breast, a hypoechoic ill-defined mass with posterior acoustic shadowing is seen at 10:00, 8 cm from the nipple measuring approximately 4 cm. The lesion is palpable. The lesion was biopsied on 5/1/2017.\par  A similar-appearing nodule is seen at 9:00, 3 cm from the nipple measuring 1.2 x 0.4 x 1.4 cm which was seen on the prior study. An irregular shaped hypoechoic nodule is seen at 12:00, 8 cm from the nipple measuring approximately 3.5 x 0.7 cm. The lesion is palpable.\par On the left breast , a hypoechoic nodule is seen at 1:00, 6 cm from the nipple measuring 3 x 0.9 cm; \par Indeterminate palpable nodules in bilateral breasts on ultrasound exam.\par \par She reports having a biopsy to the lesion on R. breast in 05/2017. She denies any personal or family hx of breast ca. \par Patient is scheduled for breast MRI, tomorrow, 03/26.

## 2019-03-25 NOTE — PLAN
[FreeTextEntry1] : Patient is scheduled for breast MRI, 03/26; she will follow up after the test results; \par Recommended FNA/biopsy of the R. breast mass next f/u visit

## 2019-03-25 NOTE — CONSULT LETTER
[Dear  ___] : Dear  [unfilled], [Consult Letter:] : I had the pleasure of evaluating your patient, [unfilled]. [Consult Closing:] : Thank you very much for allowing me to participate in the care of this patient.  If you have any questions, please do not hesitate to contact me. [Sincerely,] : Sincerely, [FreeTextEntry3] : Art Nguyen MD\par

## 2019-03-29 PROBLEM — N63.10 BREAST MASS, RIGHT: Status: ACTIVE | Noted: 2019-03-29

## 2019-04-01 ENCOUNTER — LABORATORY RESULT (OUTPATIENT)
Age: 59
End: 2019-04-01

## 2019-04-01 ENCOUNTER — APPOINTMENT (OUTPATIENT)
Dept: SURGERY | Facility: CLINIC | Age: 59
End: 2019-04-01
Payer: COMMERCIAL

## 2019-04-01 VITALS
BODY MASS INDEX: 28.32 KG/M2 | TEMPERATURE: 98 F | HEIGHT: 61 IN | DIASTOLIC BLOOD PRESSURE: 78 MMHG | SYSTOLIC BLOOD PRESSURE: 117 MMHG | WEIGHT: 150 LBS | HEART RATE: 67 BPM

## 2019-04-01 DIAGNOSIS — N63.10 UNSPECIFIED LUMP IN THE RIGHT BREAST, UNSPECIFIED QUADRANT: ICD-10-CM

## 2019-04-01 PROCEDURE — 99214 OFFICE O/P EST MOD 30 MIN: CPT | Mod: 25

## 2019-04-01 PROCEDURE — 19100 BX BREAST PERCUT W/O IMAGE: CPT

## 2019-04-01 NOTE — PLAN
[FreeTextEntry1] : Discussed the options with the pt and core biopsy done for the right upper  outer breast mass and also needle biopsy were done. Tolerated the procedure well. \par F/u next Thursday. Will f/u on the biopsy and the breast MRI\par

## 2019-04-01 NOTE — REVIEW OF SYSTEMS
[Arthralgias] : arthralgias [Fever] : no fever [Chills] : no chills [Chest Pain] : no chest pain [Shortness Of Breath] : no shortness of breath [Abdominal Pain] : no abdominal pain [Pelvic Pain] : no pelvic pain [Dizziness] : no dizziness [Fainting] : no fainting [Anxiety] : no anxiety [Muscle Weakness] : no muscle weakness [Swollen Glands] : no swollen glands [de-identified] : has a breast mass to the R. breast,  upper outer breast ; non tender, patient states she has had it there for a long time

## 2019-04-01 NOTE — HISTORY OF PRESENT ILLNESS
[de-identified] : 57 y/o F presents for follow up visit, she had a recent MRI of the breast 03/26; results are pending. She feels a right breast mass

## 2019-04-01 NOTE — CONSULT LETTER
[Dear  ___] : Dear  [unfilled], [Consult Closing:] : Thank you very much for allowing me to participate in the care of this patient.  If you have any questions, please do not hesitate to contact me. [Sincerely,] : Sincerely, [FreeTextEntry3] : Art Nguyen MD\par

## 2019-04-11 ENCOUNTER — APPOINTMENT (OUTPATIENT)
Dept: SURGERY | Facility: CLINIC | Age: 59
End: 2019-04-11
Payer: COMMERCIAL

## 2019-04-11 PROCEDURE — 99212 OFFICE O/P EST SF 10 MIN: CPT

## 2019-04-11 NOTE — DATA REVIEWED
[FreeTextEntry1] :  \par    \par Patient: JAYCEE WALTERS\par YOB: 1960\par Phone: [phone]\par MRN: [id number] Acc: [id number]\par Date of Exam: 03-\par  \par \par EXAM:  MRI BILATERAL BREASTS WITHOUT AND WITH CONTRAST\par \par HISTORY: Follow-up to prior imaging. Patient reports: No significant additional history at the current time. Date of last clinical breast exam: Not reported. Family history of breast cancer: None reported.\par \par COMPARISON: Breast imaging dating back to 2015. May 2017 right breast 10 o'clock ultrasound core biopsy showed benign results.\par \par TECHNIQUE: Prone position breast MRI was performed with T1-weighted, T2 and/or STIR imaging in axial and sagittal planes with and without intravenous contrast, and with axial dynamic subtraction imaging and CAD imaging. CAD imaging was reviewed on a dedicated workstation. Standard imaging protocol was utilized for comparison purposes if only a unilateral study was ordered. 14 ml from a 20 ml vial of Dotarem was injected.\par \par BACKGROUND BREAST APPEARANCE:  There is extremely dense fibroglandular tissue with mild background parenchymal enhancement.\par \par FINDINGS:  \par Lymph nodes: Unremarkable axillary, interpectoral and internal mammary lymph nodes bilaterally.\par \par Breast findings: No suspicious findings. Subcentimeter blood vessel associated enhancement right breast upper outer middle depth.\par \par Incidental findings: Bilateral mild to moderate diffuse, symmetric breast skin thickening. Small to moderate right pleural effusion. Borderline cardiac enlargement. Findings are suggestive of congestive heart failure.\par \par IMPRESSION:\par \par Congestive heart failure is suggested as described above. No evidence of malignancy.    \par \par FOLLOW-UP:  Annual screening.\par ASSESSMENT:  BI-RADS Category 2:  Benign.\par \par \par \par  \par  \par  \par

## 2019-04-11 NOTE — HISTORY OF PRESENT ILLNESS
[de-identified] : 58 y.o F presents for a follow up visit, she has a palpable R. breast mass. Patient s/p breast biopsy of R. upper outer breast in the office and FNA; results c/w \par mostly adipose tissue mixed with scant benign small epithelial tissue. FNA results were negative for malignant cells. \par Patient had an MRI of the breast 03/26/19; imaging results showed no evidence of malignancy, BI RADS cat 2.

## 2019-04-11 NOTE — PLAN
[FreeTextEntry1] : Had core biopsy which was negative for malignancy and also negative MRI\par The mass in the UQ of the right breast is very hard.\par Spoke to Dr. Maynard also\par Given the hard mass best management would be to remove it\par Spoke to the pt and recommended excision.\par She wants to d/w her  and would get back to us

## 2019-06-10 ENCOUNTER — RX CHANGE (OUTPATIENT)
Age: 59
End: 2019-06-10

## 2019-06-11 ENCOUNTER — RX CHANGE (OUTPATIENT)
Age: 59
End: 2019-06-11

## 2019-06-11 RX ORDER — ISOSORBIDE DINITRATE 10 MG/1
10 TABLET ORAL EVERY 8 HOURS
Qty: 90 | Refills: 3 | Status: DISCONTINUED | COMMUNITY
Start: 2019-06-11 | End: 2019-06-11

## 2019-11-29 ENCOUNTER — RX RENEWAL (OUTPATIENT)
Age: 59
End: 2019-11-29

## 2019-12-08 NOTE — PROGRESS NOTE ADULT - PROBLEM SELECTOR PLAN 4
H & H decreased, ferritin elevated. No evidence of bleeding at this time.
Hemoglobin is 7.6 today, stable throughout this admission, liekly
No evidence of bleeding, H & H stable at present.
No evidence of bleeding, H & H stable at present.
English

## 2020-02-12 NOTE — DISCHARGE NOTE ADULT - NS DC ANGIO PCI YN
Patient had an implant placed. The bandages are irratating him can he take them off. Please call Janel to discuss.   no

## 2020-04-01 NOTE — PHYSICAL THERAPY INITIAL EVALUATION ADULT - PREDICTED DURATION OF THERAPY (DAYS/WKS), PT EVAL
Airway  Performed by: Jose Carlos Stacy CRNA  Authorized by: Jose Carlos Stacy CRNA            during this hospital stay to attain all PT goals

## 2020-11-30 ENCOUNTER — INPATIENT (INPATIENT)
Facility: HOSPITAL | Age: 60
LOS: 2 days | Discharge: ANOTHER HOSPITAL | DRG: 305 | End: 2020-12-03
Attending: STUDENT IN AN ORGANIZED HEALTH CARE EDUCATION/TRAINING PROGRAM | Admitting: STUDENT IN AN ORGANIZED HEALTH CARE EDUCATION/TRAINING PROGRAM
Payer: MEDICARE

## 2020-11-30 VITALS
WEIGHT: 132.06 LBS | OXYGEN SATURATION: 99 % | SYSTOLIC BLOOD PRESSURE: 164 MMHG | HEIGHT: 61 IN | RESPIRATION RATE: 18 BRPM | HEART RATE: 89 BPM | TEMPERATURE: 97 F | DIASTOLIC BLOOD PRESSURE: 73 MMHG

## 2020-11-30 DIAGNOSIS — M86.171 OTHER ACUTE OSTEOMYELITIS, RIGHT ANKLE AND FOOT: Chronic | ICD-10-CM

## 2020-11-30 DIAGNOSIS — M79.675 PAIN IN LEFT TOE(S): ICD-10-CM

## 2020-11-30 LAB
ALBUMIN SERPL ELPH-MCNC: 4.3 G/DL — SIGNIFICANT CHANGE UP (ref 3.5–5)
ALP SERPL-CCNC: 79 U/L — SIGNIFICANT CHANGE UP (ref 40–120)
ALT FLD-CCNC: 14 U/L DA — SIGNIFICANT CHANGE UP (ref 10–60)
ANION GAP SERPL CALC-SCNC: 7 MMOL/L — SIGNIFICANT CHANGE UP (ref 5–17)
AST SERPL-CCNC: 12 U/L — SIGNIFICANT CHANGE UP (ref 10–40)
BASOPHILS # BLD AUTO: 0.03 K/UL — SIGNIFICANT CHANGE UP (ref 0–0.2)
BASOPHILS NFR BLD AUTO: 0.4 % — SIGNIFICANT CHANGE UP (ref 0–2)
BILIRUB SERPL-MCNC: 0.4 MG/DL — SIGNIFICANT CHANGE UP (ref 0.2–1.2)
BUN SERPL-MCNC: 37 MG/DL — HIGH (ref 7–18)
CALCIUM SERPL-MCNC: 10.2 MG/DL — SIGNIFICANT CHANGE UP (ref 8.4–10.5)
CHLORIDE SERPL-SCNC: 97 MMOL/L — SIGNIFICANT CHANGE UP (ref 96–108)
CO2 SERPL-SCNC: 31 MMOL/L — SIGNIFICANT CHANGE UP (ref 22–31)
CREAT SERPL-MCNC: 6.44 MG/DL — HIGH (ref 0.5–1.3)
EOSINOPHIL # BLD AUTO: 0.09 K/UL — SIGNIFICANT CHANGE UP (ref 0–0.5)
EOSINOPHIL NFR BLD AUTO: 1.2 % — SIGNIFICANT CHANGE UP (ref 0–6)
ERYTHROCYTE [SEDIMENTATION RATE] IN BLOOD: 59 MM/HR — HIGH (ref 0–20)
GLUCOSE SERPL-MCNC: 95 MG/DL — SIGNIFICANT CHANGE UP (ref 70–99)
HCT VFR BLD CALC: 40.6 % — SIGNIFICANT CHANGE UP (ref 34.5–45)
HGB BLD-MCNC: 12.6 G/DL — SIGNIFICANT CHANGE UP (ref 11.5–15.5)
IMM GRANULOCYTES NFR BLD AUTO: 0.3 % — SIGNIFICANT CHANGE UP (ref 0–1.5)
LYMPHOCYTES # BLD AUTO: 1.01 K/UL — SIGNIFICANT CHANGE UP (ref 1–3.3)
LYMPHOCYTES # BLD AUTO: 14 % — SIGNIFICANT CHANGE UP (ref 13–44)
MAGNESIUM SERPL-MCNC: 2.7 MG/DL — HIGH (ref 1.6–2.6)
MCHC RBC-ENTMCNC: 27.8 PG — SIGNIFICANT CHANGE UP (ref 27–34)
MCHC RBC-ENTMCNC: 31 GM/DL — LOW (ref 32–36)
MCV RBC AUTO: 89.6 FL — SIGNIFICANT CHANGE UP (ref 80–100)
MONOCYTES # BLD AUTO: 0.73 K/UL — SIGNIFICANT CHANGE UP (ref 0–0.9)
MONOCYTES NFR BLD AUTO: 10.1 % — SIGNIFICANT CHANGE UP (ref 2–14)
NEUTROPHILS # BLD AUTO: 5.36 K/UL — SIGNIFICANT CHANGE UP (ref 1.8–7.4)
NEUTROPHILS NFR BLD AUTO: 74 % — SIGNIFICANT CHANGE UP (ref 43–77)
NRBC # BLD: 0 /100 WBCS — SIGNIFICANT CHANGE UP (ref 0–0)
PLATELET # BLD AUTO: 177 K/UL — SIGNIFICANT CHANGE UP (ref 150–400)
POTASSIUM SERPL-MCNC: 4.7 MMOL/L — SIGNIFICANT CHANGE UP (ref 3.5–5.3)
POTASSIUM SERPL-SCNC: 4.7 MMOL/L — SIGNIFICANT CHANGE UP (ref 3.5–5.3)
PROT SERPL-MCNC: 9.1 G/DL — HIGH (ref 6–8.3)
RBC # BLD: 4.53 M/UL — SIGNIFICANT CHANGE UP (ref 3.8–5.2)
RBC # FLD: 14 % — SIGNIFICANT CHANGE UP (ref 10.3–14.5)
SODIUM SERPL-SCNC: 135 MMOL/L — SIGNIFICANT CHANGE UP (ref 135–145)
WBC # BLD: 7.24 K/UL — SIGNIFICANT CHANGE UP (ref 3.8–10.5)
WBC # FLD AUTO: 7.24 K/UL — SIGNIFICANT CHANGE UP (ref 3.8–10.5)

## 2020-11-30 PROCEDURE — 99285 EMERGENCY DEPT VISIT HI MDM: CPT

## 2020-11-30 PROCEDURE — 99222 1ST HOSP IP/OBS MODERATE 55: CPT

## 2020-11-30 PROCEDURE — 73620 X-RAY EXAM OF FOOT: CPT | Mod: 26,LT

## 2020-11-30 RX ORDER — CEFEPIME 1 G/1
1000 INJECTION, POWDER, FOR SOLUTION INTRAMUSCULAR; INTRAVENOUS ONCE
Refills: 0 | Status: COMPLETED | OUTPATIENT
Start: 2020-11-30 | End: 2020-11-30

## 2020-11-30 RX ORDER — ACETAMINOPHEN 500 MG
975 TABLET ORAL ONCE
Refills: 0 | Status: COMPLETED | OUTPATIENT
Start: 2020-11-30 | End: 2020-11-30

## 2020-11-30 RX ADMIN — Medication 975 MILLIGRAM(S): at 20:08

## 2020-11-30 NOTE — ED PROVIDER NOTE - PMH
Blind  Right Eye  Cataract    CKD (chronic kidney disease), stage IV    DM (diabetes mellitus)    ESRD (end stage renal disease) on dialysis    Glaucoma    Heart failure    HLD (hyperlipidemia)    HTN (hypertension)

## 2020-11-30 NOTE — ED PROVIDER NOTE - PROGRESS NOTE DETAILS
patient seen by podiatry. has pus coming from toe so MRI ordered and patient to be admitted. Yamil Lau

## 2020-11-30 NOTE — ED ADULT NURSE NOTE - ED STAT RN HANDOFF DETAILS
Pt is alert and oriented x3. No sign of acute distress noted. Safety precaution maintained. Pt is transferred to [facility] in stable condition.

## 2020-11-30 NOTE — ED ADULT NURSE NOTE - OBJECTIVE STATEMENT
Pt presents to ED with c/o left fifth toe pain since  Friday. Pt presents to ED with c/o left fifth toe pain since  Friday. Pt endorsed left fifth toe discoloration and swelling.

## 2020-11-30 NOTE — H&P ADULT - PROBLEM SELECTOR PLAN 1
Patient came in for Left 5th to pain,   purulent discharge noted on .  X ray of foot showed soft tissue swelling Digit with a small periarticular erosion involving distal end of fifth proximal phalanx.. No fracture.  Follow up MR of left foot.  Patient has elevated ESR.  Follow MARTA/ PVR.  got 1 dose of cefepime in ED. will give one dose of 1 gm Vanc.  Vasular and ID consulted Patient came in for Left 5th to pain,   purulent discharge noted on .  X ray of foot showed soft tissue swelling Digit with a small periarticular erosion involving distal end of fifth proximal phalanx.. No fracture.  Follow up MR of left foot.  Patient has elevated ESR.  Follow MARTA/ PVR.  got 1 dose of cefepime in ED. will give one dose of 1 gm Vanc.  cw with Zosyn q12  Vasular and ID consulted

## 2020-11-30 NOTE — CONSULT NOTE ADULT - ASSESSMENT
A:   Left foot 5th digit r/o OM    P:  Patient evaluated, chart reviewed  Reviewed left foot X rays, results as noted above.   Aseptic debridement down to the level of the skin using sterile #15 blade of the left 5th digit hyperkeratotic lesion  Ordered MR Left foot  Ordered ESR, CRP  Ordered culture swab of L foot purulent drainage  Ordered MARTA/PVR   Recommend vascular, ID consult  Answered questions to patient satisfaction  Dressed patient's left foot with betadine soaked gauze, yosef, ACE bandages  Discussed patient with attending Dr. Daily  Podiatry to follow while in house A:   Left foot 5th digit r/o OM    P:  Patient evaluated, chart reviewed  Reviewed left foot X rays, results as noted above.   Aseptic debridement down to the level of the skin using sterile #15 blade of the left 5th digit hyperkeratotic lesion  Ordered MR Left foot  Ordered ESR, CRP  Ordered culture swab of L foot purulent drainage  Ordered MARTA/PVR   Recommend vascular, ID consult  Recommend IV antibiotics per ID recommendations  Answered questions to patient satisfaction  Dressed patient's left foot with betadine soaked gauze, yosef, ACE bandages  Discussed patient with attending Dr. Daily  Podiatry to follow while in house

## 2020-11-30 NOTE — H&P ADULT - NSHPPHYSICALEXAM_GEN_ALL_CORE
Vital Signs Last 24 Hrs  T(C): 36.6 (01 Dec 2020 00:04), Max: 36.6 (01 Dec 2020 00:04)  T(F): 97.8 (01 Dec 2020 00:04), Max: 97.8 (01 Dec 2020 00:04)  HR: 77 (01 Dec 2020 00:04) (77 - 89)  BP: 145/70 (01 Dec 2020 00:04) (145/70 - 164/73)  BP(mean): --  RR: 16 (01 Dec 2020 00:04) (16 - 18)  SpO2: 100% (01 Dec 2020 00:04) (99% - 100%)    GENERAL: NAD, speaks in full sentences, no signs of respiratory distress  HEAD:  Atraumatic, Normocephalic  EYES: EOMI, PERRLA, conjunctiva and sclera clear  NECK: Supple, No JVD  CHEST/LUNG: Clear to auscultation bilaterally; No wheeze; No crackles; No accessory muscles used  HEART: Regular rate and rhythm; No murmurs;   ABDOMEN: Soft, Nontender, Nondistended; Bowel sounds present; No guarding  EXTREMITIES:  Right 5th digit s/p phalangeal amputation.Left 5th toe and foot wrapped in sterile dressing. malodorous.   PSYCH:  Normal Affect  NEUROLOGY: non-focal, AAO X 3. Strength is 5/5. no sensory loss.  SKIN: No rashes or lesions

## 2020-11-30 NOTE — ED PROVIDER NOTE - PHYSICAL EXAMINATION
General: well appearing female, no acute distress   HEENT: normocephalic, atraumatic   Respiratory: normal work of breathing, lungs clear to auscultation bilaterally   Cardiac: regular rate and rhythm   Abdomen: soft, non-tender, no guarding or rebound   MSK: LLE 5th digit dusky, tenderness to mild palpation, no open wound visualized    Skin: warm, dry   Neuro: A&Ox3  Psych: appropriate affect

## 2020-11-30 NOTE — ED PROVIDER NOTE - CLINICAL SUMMARY MEDICAL DECISION MAKING FREE TEXT BOX
60F presenting with left toe pain. atraumatic. tenderness to mild palpation, has h/o osteomyelitis. will get labs, xray, podiatry consult. will reassess.

## 2020-11-30 NOTE — CONSULT NOTE ADULT - SUBJECTIVE AND OBJECTIVE BOX
Podiatry HPI: 60 year old female patient with PMHx DM, HLD, ESRD On HD (MWF) presents to emergency room with complaints of left 5th digit pain that has been present for the last 5 days. Patient states that she does not recall any trauma to the area, and denies any wounds to the area. She notes that there is a callus overlying, which has been there for over a year. She typically is seen by a podiatrist to have it shaved down, but the last time it was done was 5 months ago. Patient expresses concern as she has had osteomyelitis of the right 5th digit in the past, and she states that the pain in her left foot is similar to the pain she has had in the past. Currently, denies any fevers, chills, nausea, or vomiting. 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  	SKIN:  No rash or itching.  	CARDIOVASCULAR:  No chest pain, chest pressure or chest discomfort. No edema.  	RESPIRATORY:  No cough or sputum.  	GASTROINTESTINAL:  No anorexia, nausea, vomiting or diarrhea. No abdominal pain or blood.  	GENITOURINARY:  No hematuria, dysuria.   	NEUROLOGICAL:  No headache, dizziness, syncope, numbness or tingling in the extremities.   	MUSCULOSKELETAL:  No muscle, back pain, joint pain or stiffness.  HEMATOLOGIC:  No bleeding or bruising.      Medications   FHNo pertinent family history in first degree relatives    ,   PMHCKD (chronic kidney disease), stage IV    Cataract    Glaucoma    HLD (hyperlipidemia)    Heart failure       PSHAcute osteomyelitis of toe of right foot        Labs                          12.6   7.24  )-----------( 177      ( 30 Nov 2020 20:32 )             40.6      11-30    135  |  97  |  37<H>  ----------------------------<  95  4.7   |  31  |  6.44<H>    Ca    10.2      30 Nov 2020 20:32  Mg     2.7     11-30    TPro  9.1<H>  /  Alb  4.3  /  TBili  0.4  /  DBili  x   /  AST  12  /  ALT  14  /  AlkPhos  79  11-30     Vital Signs Last 24 Hrs  T(C): 36.3 (30 Nov 2020 18:33), Max: 36.3 (30 Nov 2020 18:33)  T(F): 97.4 (30 Nov 2020 18:33), Max: 97.4 (30 Nov 2020 18:33)  HR: 89 (30 Nov 2020 18:33) (89 - 89)  BP: 164/73 (30 Nov 2020 18:33) (164/73 - 164/73)  BP(mean): --  RR: 18 (30 Nov 2020 18:33) (18 - 18)  SpO2: 99% (30 Nov 2020 18:33) (99% - 99%)  Sedimentation Rate, Erythrocyte: 59 mm/Hr (11-30-20 @ 20:32)          WBC Count: 7.24 K/uL (11-30-20 @ 20:32)      Physical exam:  Vascular: DP pulses weakly palpable bilaterally. PT pulses non palpable. Temperature gradient warm to cool bilaterally. CFT's intact x10  Derm: Left foot 5th digit with overlying callus on the dorsolateral aspect of the PIPJ. After debridement, small, pinpoint opening present, with seropurulent drainage. Malodor present.   Neuro: Protective sensation intact bilaterally  MSK: Able to move bilateral lower extremities. 5/5 strength. Right 5th digit s/p phalangeal amputation.     ad< from: Xray Foot AP + Lateral, Left (11.30.20 @ 19:56) >  EXAM:  FOOT 2VIEWS LT                            PROCEDURE DATE:  11/30/2020          INTERPRETATION:  LEFT foot    CLINICAL INFORMATION: LEFT fifth toe pain. Assess osteomyelitis.  Pain.    TECHNIQUE: AP,lateral and oblique views.    FINDINGS: There is a cortical lucency in the distal end of the LEFT fifth proximal phalanx, concerning for osteomyelitis.  There are diffuse vascular arterial calcifications.  The remaining bones and joint spaces are preserved.  There are no fractures or dislocations.  /  IMPRESSION:    Vascular calcifications. Soft tissue swelling. Digit with a small periarticular erosion involving distal end of fifth proximal phalanx.. No fracture        If osteomyelitis is considered, despite conservative therapy, and soft tissue / bone infection requires further assessment, follow-up MRI recommended.    < end of copied text >

## 2020-11-30 NOTE — H&P ADULT - ASSESSMENT
60 year old female patient with medical hx of ESRD On HD (MWF) presented to emergency room with complaints of left 5th digit pain. Patient is admitted for Management suspected Left foot osteomyelitis.          Primary team to confirm patient medications from pharmacy. [facility], [phone]

## 2020-11-30 NOTE — H&P ADULT - NSICDXPASTMEDICALHX_GEN_ALL_CORE_FT
PAST MEDICAL HISTORY:  Cataract     CKD (chronic kidney disease), stage IV     ESRD (end stage renal disease) on dialysis     Glaucoma     Heart failure     HLD (hyperlipidemia)

## 2020-11-30 NOTE — ED PROVIDER NOTE - OBJECTIVE STATEMENT
60F, pmh of chf, T2DM, ESRD (MWF), HTN, HLD, presenting with toe pain. patient reports three days of increasing left 5th toe pain. no known trauma. reports symptoms feel similar to previous episode of osteomyelitis of her right foot. no fever, chest pain, shortness of breath, abdominal pain, nausea, vomiting. completed dialysis today.

## 2020-11-30 NOTE — ED PROVIDER NOTE - CARE PLAN
Principal Discharge DX:	Toe pain, left  Secondary Diagnosis:	Toe infection  
Labs/Medications/Imaging Studies/EKG

## 2020-11-30 NOTE — H&P ADULT - HISTORY OF PRESENT ILLNESS
60 year old female patient with medical hx of ESRD On HD (MWF) presented to emergency room with complaints of left 5th digit pain that has been present since Friday . Patient states that she has pain since Friday , she does not recall any trauma. Patient is still able to walk. Had her dialysis . and patient came to ED for further reevaluation of her Left foot. Patient denies any bleeding from Left foot but complains of some purulent discharge when Podiatry saw the patient. Patient has history of R foot Osteomyelitis in past. Patient denies fever, difficulty breathing, chest pain, nausea, vomiting, bowel or urinary problems.  Patient also added that she used to take medications for hypertension but doesnot take it any more after dialysis her blood pressure is controlled.

## 2020-11-30 NOTE — H&P ADULT - ATTENDING COMMENTS
Pt seen and examined.  Case discussed with MAR.  60 year old woman with PMH of DM2, HLD, ESRD on HD presents to the ED with left 5th toe pain X 1 week.  There is no fever, no chills, no redness, no discharge. Pt seen by podiatry and on bedside debridement, there was some seropurulent discharge.    Vital Signs Last 24 Hrs  T(C): 36.3 (30 Nov 2020 18:33), Max: 36.3 (30 Nov 2020 18:33)  T(F): 97.4 (30 Nov 2020 18:33), Max: 97.4 (30 Nov 2020 18:33)  HR: 89 (30 Nov 2020 18:33) (89 - 89)  BP: 164/73 (30 Nov 2020 18:33) (164/73 - 164/73)  RR: 18 (30 Nov 2020 18:33) (18 - 18)  SpO2: 99% (30 Nov 2020 18:33) (99% - 99%)    Exams      Labs                        12.6   7.24  )-----------( 177      ( 30 Nov 2020 20:32 )             40.6     11-30    135  |  97  |  37<H>  ----------------------------<  95  4.7   |  31  |  6.44<H>    Ca    10.2      30 Nov 2020 20:32  Mg     2.7     11-30  TPro  9.1<H>  /  Alb  4.3  /  TBili  0.4  /  DBili  x   /  AST  12  /  ALT  14  /  AlkPhos  79  11-30    X ray left foot  Vascular calcifications. Soft tissue swelling. Digit with a small periarticular erosion involving distal end of fifth proximal phalanx.. No fracture    Impression  - Left 5th toe infection -Diabetic foot infection;  soft tissue +/- osteomyelitis  - DM 2 with hyperglycemia  - ESRD on HD  - HLD     Plan  - Admit to Medicine  - s/p bedside debridement by podiatrist  - Empiric antibiotics with vancomycin and zosyn renally dosed  -  MRI left foot  - Glucose control Pt seen and examined.  Case discussed with MAR.  60 year old woman with PMH of HLD, ESRD on HD presents to the ED with left 5th toe pain X 1 week.  There is no fever, no chills, no redness, no discharge. Pt seen by podiatry and on bedside debridement, there was some seropurulent discharge.    Vital Signs Last 24 Hrs  T(C): 36.3 (30 Nov 2020 18:33), Max: 36.3 (30 Nov 2020 18:33)  T(F): 97.4 (30 Nov 2020 18:33), Max: 97.4 (30 Nov 2020 18:33)  HR: 89 (30 Nov 2020 18:33) (89 - 89)  BP: 164/73 (30 Nov 2020 18:33) (164/73 - 164/73)  RR: 18 (30 Nov 2020 18:33) (18 - 18)  SpO2: 99% (30 Nov 2020 18:33) (99% - 99%)    Exams  Elderly woman, NAD AAO X 3  CTA B/L RRR S1S2 only  Soft NT ND BS +  Left foot wrapped in clean, dry and intact ace bandage post debridement by podiatry   Defer exam for now  Right foot - distal pulses weak; somewhat cool lower extremity  No focal deficits      Labs                        12.6   7.24  )-----------( 177      ( 30 Nov 2020 20:32 )             40.6     11-30    135  |  97  |  37<H>  ----------------------------<  95  4.7   |  31  |  6.44<H>    Ca    10.2      30 Nov 2020 20:32  Mg     2.7     11-30  TPro  9.1<H>  /  Alb  4.3  /  TBili  0.4  /  DBili  x   /  AST  12  /  ALT  14  /  AlkPhos  79  11-30    X ray left foot  Vascular calcifications. Soft tissue swelling. Digit with a small periarticular erosion involving distal end of fifth proximal phalanx.. No fracture    Impression  - Left 5th toe infection -Diabetic foot infection;  soft tissue +/- osteomyelitis  - ESRD on HD  - HLD     Plan  - Admit to Medicine  - s/p bedside debridement by podiatrist  - Empiric antibiotics with vancomycin and zosyn renally dosed  -  MRI left foot  -  check A1c  - Nephrology consult for HD

## 2020-11-30 NOTE — H&P ADULT - PROBLEM SELECTOR PLAN 3
RISK                                                          Points  [] Previous VTE                                           3  [] Thrombophilia                                        2  [] Lower limb paralysis                              2   [] Current Cancer                                       2   [x] Immobilization > 24 hrs                        1  [] ICU/CCU stay > 24 hours                       1  [x] Age > 60                                                   1

## 2020-12-01 DIAGNOSIS — E83.39 OTHER DISORDERS OF PHOSPHORUS METABOLISM: ICD-10-CM

## 2020-12-01 DIAGNOSIS — E11.9 TYPE 2 DIABETES MELLITUS WITHOUT COMPLICATIONS: ICD-10-CM

## 2020-12-01 DIAGNOSIS — Z29.9 ENCOUNTER FOR PROPHYLACTIC MEASURES, UNSPECIFIED: ICD-10-CM

## 2020-12-01 DIAGNOSIS — M86.9 OSTEOMYELITIS, UNSPECIFIED: ICD-10-CM

## 2020-12-01 DIAGNOSIS — N18.6 END STAGE RENAL DISEASE: ICD-10-CM

## 2020-12-01 DIAGNOSIS — I10 ESSENTIAL (PRIMARY) HYPERTENSION: ICD-10-CM

## 2020-12-01 DIAGNOSIS — E78.5 HYPERLIPIDEMIA, UNSPECIFIED: ICD-10-CM

## 2020-12-01 DIAGNOSIS — L08.9 LOCAL INFECTION OF THE SKIN AND SUBCUTANEOUS TISSUE, UNSPECIFIED: ICD-10-CM

## 2020-12-01 LAB
A1C WITH ESTIMATED AVERAGE GLUCOSE RESULT: 5.9 % — HIGH (ref 4–5.6)
ALBUMIN SERPL ELPH-MCNC: 3.8 G/DL — SIGNIFICANT CHANGE UP (ref 3.5–5)
ALP SERPL-CCNC: 69 U/L — SIGNIFICANT CHANGE UP (ref 40–120)
ALT FLD-CCNC: 11 U/L DA — SIGNIFICANT CHANGE UP (ref 10–60)
ANION GAP SERPL CALC-SCNC: 10 MMOL/L — SIGNIFICANT CHANGE UP (ref 5–17)
AST SERPL-CCNC: 8 U/L — LOW (ref 10–40)
BASOPHILS # BLD AUTO: 0.02 K/UL — SIGNIFICANT CHANGE UP (ref 0–0.2)
BASOPHILS NFR BLD AUTO: 0.3 % — SIGNIFICANT CHANGE UP (ref 0–2)
BILIRUB SERPL-MCNC: 0.5 MG/DL — SIGNIFICANT CHANGE UP (ref 0.2–1.2)
BUN SERPL-MCNC: 46 MG/DL — HIGH (ref 7–18)
CALCIUM SERPL-MCNC: 9.5 MG/DL — SIGNIFICANT CHANGE UP (ref 8.4–10.5)
CHLORIDE SERPL-SCNC: 96 MMOL/L — SIGNIFICANT CHANGE UP (ref 96–108)
CHOLEST SERPL-MCNC: 173 MG/DL — SIGNIFICANT CHANGE UP
CO2 SERPL-SCNC: 28 MMOL/L — SIGNIFICANT CHANGE UP (ref 22–31)
CREAT SERPL-MCNC: 7.62 MG/DL — HIGH (ref 0.5–1.3)
CRP SERPL-MCNC: 0.51 MG/DL — HIGH (ref 0–0.4)
EOSINOPHIL # BLD AUTO: 0.15 K/UL — SIGNIFICANT CHANGE UP (ref 0–0.5)
EOSINOPHIL NFR BLD AUTO: 2.2 % — SIGNIFICANT CHANGE UP (ref 0–6)
ESTIMATED AVERAGE GLUCOSE: 123 MG/DL — HIGH (ref 68–114)
GLUCOSE BLDC GLUCOMTR-MCNC: 158 MG/DL — HIGH (ref 70–99)
GLUCOSE BLDC GLUCOMTR-MCNC: 86 MG/DL — SIGNIFICANT CHANGE UP (ref 70–99)
GLUCOSE SERPL-MCNC: 86 MG/DL — SIGNIFICANT CHANGE UP (ref 70–99)
HCT VFR BLD CALC: 38.2 % — SIGNIFICANT CHANGE UP (ref 34.5–45)
HDLC SERPL-MCNC: 58 MG/DL — SIGNIFICANT CHANGE UP
HGB BLD-MCNC: 11.9 G/DL — SIGNIFICANT CHANGE UP (ref 11.5–15.5)
IMM GRANULOCYTES NFR BLD AUTO: 0.3 % — SIGNIFICANT CHANGE UP (ref 0–1.5)
LIPID PNL WITH DIRECT LDL SERPL: 96 MG/DL — SIGNIFICANT CHANGE UP
LYMPHOCYTES # BLD AUTO: 1.41 K/UL — SIGNIFICANT CHANGE UP (ref 1–3.3)
LYMPHOCYTES # BLD AUTO: 21.1 % — SIGNIFICANT CHANGE UP (ref 13–44)
MAGNESIUM SERPL-MCNC: 2.8 MG/DL — HIGH (ref 1.6–2.6)
MCHC RBC-ENTMCNC: 27.9 PG — SIGNIFICANT CHANGE UP (ref 27–34)
MCHC RBC-ENTMCNC: 31.2 GM/DL — LOW (ref 32–36)
MCV RBC AUTO: 89.5 FL — SIGNIFICANT CHANGE UP (ref 80–100)
MONOCYTES # BLD AUTO: 0.76 K/UL — SIGNIFICANT CHANGE UP (ref 0–0.9)
MONOCYTES NFR BLD AUTO: 11.4 % — SIGNIFICANT CHANGE UP (ref 2–14)
NEUTROPHILS # BLD AUTO: 4.32 K/UL — SIGNIFICANT CHANGE UP (ref 1.8–7.4)
NEUTROPHILS NFR BLD AUTO: 64.7 % — SIGNIFICANT CHANGE UP (ref 43–77)
NON HDL CHOLESTEROL: 115 MG/DL — SIGNIFICANT CHANGE UP
NRBC # BLD: 0 /100 WBCS — SIGNIFICANT CHANGE UP (ref 0–0)
PHOSPHATE SERPL-MCNC: 5 MG/DL — HIGH (ref 2.5–4.5)
PLATELET # BLD AUTO: 175 K/UL — SIGNIFICANT CHANGE UP (ref 150–400)
POTASSIUM SERPL-MCNC: 4.7 MMOL/L — SIGNIFICANT CHANGE UP (ref 3.5–5.3)
POTASSIUM SERPL-SCNC: 4.7 MMOL/L — SIGNIFICANT CHANGE UP (ref 3.5–5.3)
PROT SERPL-MCNC: 8.2 G/DL — SIGNIFICANT CHANGE UP (ref 6–8.3)
RBC # BLD: 4.27 M/UL — SIGNIFICANT CHANGE UP (ref 3.8–5.2)
RBC # FLD: 14 % — SIGNIFICANT CHANGE UP (ref 10.3–14.5)
SARS-COV-2 RNA SPEC QL NAA+PROBE: SIGNIFICANT CHANGE UP
SODIUM SERPL-SCNC: 134 MMOL/L — LOW (ref 135–145)
TRIGL SERPL-MCNC: 94 MG/DL — SIGNIFICANT CHANGE UP
TSH SERPL-MCNC: 1.35 UU/ML — SIGNIFICANT CHANGE UP (ref 0.34–4.82)
WBC # BLD: 6.68 K/UL — SIGNIFICANT CHANGE UP (ref 3.8–10.5)
WBC # FLD AUTO: 6.68 K/UL — SIGNIFICANT CHANGE UP (ref 3.8–10.5)

## 2020-12-01 PROCEDURE — 99223 1ST HOSP IP/OBS HIGH 75: CPT

## 2020-12-01 PROCEDURE — 99232 SBSQ HOSP IP/OBS MODERATE 35: CPT | Mod: GC

## 2020-12-01 PROCEDURE — 73718 MRI LOWER EXTREMITY W/O DYE: CPT | Mod: 26,LT

## 2020-12-01 PROCEDURE — 93923 UPR/LXTR ART STDY 3+ LVLS: CPT | Mod: 26

## 2020-12-01 RX ORDER — ACETAMINOPHEN 500 MG
650 TABLET ORAL ONCE
Refills: 0 | Status: DISCONTINUED | OUTPATIENT
Start: 2020-12-01 | End: 2020-12-03

## 2020-12-01 RX ORDER — AMLODIPINE BESYLATE 2.5 MG/1
1 TABLET ORAL
Qty: 0 | Refills: 0 | DISCHARGE

## 2020-12-01 RX ORDER — CARVEDILOL PHOSPHATE 80 MG/1
1 CAPSULE, EXTENDED RELEASE ORAL
Qty: 0 | Refills: 0 | DISCHARGE

## 2020-12-01 RX ORDER — PIPERACILLIN AND TAZOBACTAM 4; .5 G/20ML; G/20ML
3.38 INJECTION, POWDER, LYOPHILIZED, FOR SOLUTION INTRAVENOUS EVERY 12 HOURS
Refills: 0 | Status: DISCONTINUED | OUTPATIENT
Start: 2020-12-01 | End: 2020-12-03

## 2020-12-01 RX ORDER — ATORVASTATIN CALCIUM 80 MG/1
20 TABLET, FILM COATED ORAL AT BEDTIME
Refills: 0 | Status: DISCONTINUED | OUTPATIENT
Start: 2020-12-01 | End: 2020-12-03

## 2020-12-01 RX ORDER — ISOSORBIDE DINITRATE 5 MG/1
1 TABLET ORAL
Qty: 0 | Refills: 0 | DISCHARGE

## 2020-12-01 RX ORDER — HEPARIN SODIUM 5000 [USP'U]/ML
5000 INJECTION INTRAVENOUS; SUBCUTANEOUS EVERY 12 HOURS
Refills: 0 | Status: DISCONTINUED | OUTPATIENT
Start: 2020-12-01 | End: 2020-12-03

## 2020-12-01 RX ORDER — SEVELAMER CARBONATE 2400 MG/1
800 POWDER, FOR SUSPENSION ORAL
Refills: 0 | Status: DISCONTINUED | OUTPATIENT
Start: 2020-12-01 | End: 2020-12-03

## 2020-12-01 RX ORDER — HYDRALAZINE HCL 50 MG
1 TABLET ORAL
Qty: 0 | Refills: 0 | DISCHARGE

## 2020-12-01 RX ORDER — VANCOMYCIN HCL 1 G
1000 VIAL (EA) INTRAVENOUS ONCE
Refills: 0 | Status: COMPLETED | OUTPATIENT
Start: 2020-12-01 | End: 2020-12-01

## 2020-12-01 RX ADMIN — PIPERACILLIN AND TAZOBACTAM 25 GRAM(S): 4; .5 INJECTION, POWDER, LYOPHILIZED, FOR SOLUTION INTRAVENOUS at 18:16

## 2020-12-01 RX ADMIN — HEPARIN SODIUM 5000 UNIT(S): 5000 INJECTION INTRAVENOUS; SUBCUTANEOUS at 18:16

## 2020-12-01 RX ADMIN — CEFEPIME 100 MILLIGRAM(S): 1 INJECTION, POWDER, FOR SOLUTION INTRAMUSCULAR; INTRAVENOUS at 01:26

## 2020-12-01 RX ADMIN — HEPARIN SODIUM 5000 UNIT(S): 5000 INJECTION INTRAVENOUS; SUBCUTANEOUS at 07:05

## 2020-12-01 RX ADMIN — Medication 975 MILLIGRAM(S): at 04:20

## 2020-12-01 RX ADMIN — PIPERACILLIN AND TAZOBACTAM 25 GRAM(S): 4; .5 INJECTION, POWDER, LYOPHILIZED, FOR SOLUTION INTRAVENOUS at 07:05

## 2020-12-01 RX ADMIN — ATORVASTATIN CALCIUM 20 MILLIGRAM(S): 80 TABLET, FILM COATED ORAL at 22:42

## 2020-12-01 RX ADMIN — SEVELAMER CARBONATE 800 MILLIGRAM(S): 2400 POWDER, FOR SUSPENSION ORAL at 18:16

## 2020-12-01 RX ADMIN — Medication 250 MILLIGRAM(S): at 01:26

## 2020-12-01 NOTE — CONSULT NOTE ADULT - GASTROINTESTINAL DETAILS
no organomegaly/no guarding/bowel sounds normal/no distention/no masses palpable/nontender/soft/no rigidity

## 2020-12-01 NOTE — PROGRESS NOTE ADULT - SUBJECTIVE AND OBJECTIVE BOX
Podiatry HPI: 60 year old female patient with PMHx DM, HLD, ESRD On HD (MWF) presents to emergency room with complaints of left 5th digit pain that has been present for the last 5 days. Patient states that she does not recall any trauma to the area, and denies any wounds to the area. She notes that there is a callus overlying, which has been there for over a year. She typically is seen by a podiatrist to have it shaved down, but the last time it was done was 5 months ago. Patient expresses concern as she has had osteomyelitis of the right 5th digit in the past, and she states that the pain in her left foot is similar to the pain she has had in the past. Currently, denies any fevers, chills, nausea, or vomiting. Denies any pain anywhere other than her foot    Podiatry progress: Patient is seen AAOx3 resting in bed. She states she has pain to left 5th digit, does not like tight bandages and took hers off. Patient states no acute changes over night.    Patient admits to  (-) Fevers, (-) Chills, (-) Nausea, (-) Vomiting, (-) Shortness of Breath (-) calf pain (-) chest pain     Medications heparin   Injectable 5000 Unit(s) SubCutaneous every 12 hours  piperacillin/tazobactam IVPB.. 3.375 Gram(s) IV Intermittent every 12 hours    FHNo pertinent family history in first degree relatives    ,   PMHESRD (end stage renal disease) on dialysis    CKD (chronic kidney disease), stage IV    Cataract    Glaucoma    HLD (hyperlipidemia)    Heart failure       PSHAcute osteomyelitis of toe of right foot        Labs                          11.9   6.68  )-----------( 175      ( 01 Dec 2020 08:22 )             38.2      12-01    134<L>  |  96  |  46<H>  ----------------------------<  86  4.7   |  28  |  7.62<H>    Ca    9.5      01 Dec 2020 08:22  Phos  5.0     12-01  Mg     2.8     12-01    TPro  8.2  /  Alb  3.8  /  TBili  0.5  /  DBili  x   /  AST  8<L>  /  ALT  11  /  AlkPhos  69  12-01     Vital Signs Last 24 Hrs  T(C): 36.8 (01 Dec 2020 07:31), Max: 36.8 (01 Dec 2020 07:31)  T(F): 98.2 (01 Dec 2020 07:31), Max: 98.2 (01 Dec 2020 07:31)  HR: 71 (01 Dec 2020 07:31) (71 - 89)  BP: 116/48 (01 Dec 2020 07:31) (116/48 - 164/73)  BP(mean): --  RR: 16 (01 Dec 2020 07:31) (16 - 18)  SpO2: 100% (01 Dec 2020 07:31) (99% - 100%)  Sedimentation Rate, Erythrocyte: 59 mm/Hr (11-30-20 @ 20:32)         C-Reactive Protein, Serum: 0.51 mg/dL (12-01-20 @ 03:23)   WBC Count: 6.68 K/uL (12-01-20 @ 08:22)  WBC Count: 7.24 K/uL (11-30-20 @ 20:32)          Review of Systems:  Review of Systems: CONSTITUTIONAL:  see HPI  	HEENT:  Eyes:  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  	SKIN:  No rash or itching.  	CARDIOVASCULAR:  No chest pain, chest pressure or chest discomfort. No edema.  	RESPIRATORY:  No cough or sputum.  	GASTROINTESTINAL:  No anorexia, nausea, vomiting or diarrhea. No abdominal pain or blood.  	GENITOURINARY:  No hematuria, dysuria.   	NEUROLOGICAL:  No headache, dizziness, syncope, numbness or tingling in the extremities.   	MUSCULOSKELETAL:  No muscle, back pain, joint pain or stiffness.  HEMATOLOGIC:  No bleeding or bruising.        Physical exam:  Vascular: DP pulses weakly palpable bilaterally. PT pulses non palpable. Temperature gradient warm to cool bilaterally. CFT's intact x10  Derm: Left foot 5th digit with overlying callus on the dorsolateral aspect of the PIPJ. sanguinous drainage noted from pin point of yesterdays callous debridement on left 5th digit. No purulence noted.  Malodor present.   Neuro: Protective sensation intact bilaterally  MSK: Able to move bilateral lower extremities. 5/5 strength. Right 5th digit s/p phalangeal amputation.     ad< from: Xray Foot AP + Lateral, Left (11.30.20 @ 19:56) >  EXAM:  FOOT 2VIEWS LT                            PROCEDURE DATE:  11/30/2020          INTERPRETATION:  LEFT foot    CLINICAL INFORMATION: LEFT fifth toe pain. Assess osteomyelitis.  Pain.    TECHNIQUE: AP,lateral and oblique views.    FINDINGS: There is a cortical lucency in the distal end of the LEFT fifth proximal phalanx, concerning for osteomyelitis.  There are diffuse vascular arterial calcifications.  The remaining bones and joint spaces are preserved.  There are no fractures or dislocations.  /  IMPRESSION:    Vascular calcifications. Soft tissue swelling. Digit with a small periarticular erosion involving distal end of fifth proximal phalanx.. No fracture        If osteomyelitis is considered, despite conservative therapy, and soft tissue / bone infection requires further assessment, follow-up MRI recommended.    < end of copied text >

## 2020-12-01 NOTE — PROGRESS NOTE ADULT - PROBLEM SELECTOR PLAN 6
RISK                                                          Points  [] Previous VTE                                           3  [] Thrombophilia                                        2  [] Lower limb paralysis                              2   [] Current Cancer                                       2   [x] Immobilization > 24 hrs                        1  [] ICU/CCU stay > 24 hours                       1  [x] Age > 60                                                   1 - has h/o DM, but presently not on any medications   - FOllow A1c

## 2020-12-01 NOTE — CONSULT NOTE ADULT - SUBJECTIVE AND OBJECTIVE BOX
[facility] : Progress Note :: [phone], (office [phone]),   Dr Potts / Dr Perkins / Dr Madden / Dr Shea / Dr Denys POLANCO / Dr Vanegas / Dr Bazzi / Dr Quentin patricia  _____________________________________________________________________________________________  Patient is a 60y Female whom presented to the hospital with LT 5th toe pain for 5 days. An MRI jaramillo revealed LT 5th distal phallanx osteomyelitis and scheduled for OR tommorrow.  Has ESRD on HD MWF at [facility] dialysis [place] via a RT thigh AVG.  renal consulted for ESRD.    PAST MEDICAL & SURGICAL HISTORY:  ESRD (end stage renal disease) on dialysis    CKD (chronic kidney disease), stage IV    Cataract    Glaucoma    HLD (hyperlipidemia)    Heart failure    Acute osteomyelitis of toe of right foot  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      No Known Allergies    Home Medications Reviewed  Hospital Medications:   MEDICATIONS  (STANDING):  atorvastatin 20 milliGRAM(s) Oral at bedtime  heparin   Injectable 5000 Unit(s) SubCutaneous every 12 hours  piperacillin/tazobactam IVPB.. 3.375 Gram(s) IV Intermittent every 12 hours  sevelamer carbonate 800 milliGRAM(s) Oral three times a day with meals    SOCIAL HISTORY:  Denies ETOh,Smoking,   FAMILY HISTORY:        VITALS:  T(F): 97.4 (12-01-20 @ 15:58), Max: 98.2 (12-01-20 @ 07:31)  HR: 70 (12-01-20 @ 15:58)  BP: 108/47 (12-01-20 @ 15:58)  RR: 18 (12-01-20 @ 15:58)  SpO2: 100% (12-01-20 @ 15:58)  Wt(kg): --    Height (cm): 154.9 (12-01 @ 05:43)  Weight (kg): 65.9 (12-01 @ 05:43)  BMI (kg/m2): 27.5 (12-01 @ 05:43)  BSA (m2): 1.65 (12-01 @ 05:43)  PHYSICAL EXAM:  Constitutional: NAD  HEENT: anicteric sclera, oropharynx clear.  Neck: No JVD  Respiratory: CTAB, no wheezes, rales or rhonchi  Cardiovascular: S1, S2, RRR  Gastrointestinal: BS+, soft, NT/ND  Extremities: LT foot dressed. No peripheral edema  Neurological: A/O x 3, no focal deficits  Vascular Access: RT thigh AVG with thrill and bruit    LABS:  12-01    134<L>  |  96  |  46<H>  ----------------------------<  86  4.7   |  28  |  7.62<H>    Ca    9.5      01 Dec 2020 08:22  Phos  5.0     12-01  Mg     2.8     12-01    TPro  8.2  /  Alb  3.8  /  TBili  0.5  /  DBili      /  AST  8<L>  /  ALT  11  /  AlkPhos  69  12-01    Creatinine Trend: 7.62 <--, 6.44 <--                        11.9   6.68  )-----------( 175      ( 01 Dec 2020 08:22 )             38.2     Urine Studies:      RADIOLOGY & ADDITIONAL STUDIES:

## 2020-12-01 NOTE — CONSULT NOTE ADULT - ASSESSMENT
Left 5th toe Osteomyelitis with Denia erosion    Plan - Cont Zosyn 3.375 gms iv q12 hrs  going for amputation of left 5th toe tomorrow  will likely send home within 1-2 days of amputation on po abxs.

## 2020-12-01 NOTE — CONSULT NOTE ADULT - ASSESSMENT
61 y/o Female w/ LLE fifth digit ulceration w/ OM of the 5th proximal phalanx    -MARTA/PVRs results noted   -Recommend CTA Aorta w/ b/l LE runoff  -Pt may require Angio +/- intervention, inpatient vs outpatient setting   -Recommend asa 81 mg    -Debridement in OR as per Podiatry  -D/w Dr Gilliland and agrees

## 2020-12-01 NOTE — PROGRESS NOTE ADULT - PROBLEM SELECTOR PLAN 3
Patient ahs ESRD.   dialysis days ( M/W/F)  last dialysis monday.  Follows dr Perkins. - due to ESRD   - home med: Sevelamer : continued.   - monitor Phosphate

## 2020-12-01 NOTE — PROGRESS NOTE ADULT - ATTENDING COMMENTS
I have personally seen, examined, and participated in the care of this patient. I have reviewed all pertinent clinical information, including history, physical exam, plan and medical student/resident/PA/NP note, and agree, with my independent findings and conclusions as noted:     60 year old female patient with medical hx of ESRD On HD (MWF), DM not on medications, HTN not on medications, HLD presents to emergency room with complaints of left 5th digit pain, found to have osteomyelitis on MRI and xray.    Labs reviewed, imaging reviewed - mild hyponatremia, high creatinine, ESR 59, A1C 5.9    #L phalanx osteomyelitis  -s/p debridement from podiatry - to OR in AM for amputation  -NPO after midnight  -vascular also on board - requesting CTA aorta w/runoff - to be coordinated with HD tomorrow  -will start ASA81 per vascular recs after surgery tomorrow  -c/w zosyn for now - ID recs appreciated  -pain control    #DM: controlled, 5.9  -HISS for now - FSBG well-controlled    #HTN: not on meds  -monitor    #ESRD  -HD tomorrow  -c/w sevelamer for hyperphosphatemia  -avoid nephrotoxins    #DVT PPX  -SQH    Isadora Kwan MD  Division of Hospital Medicine    Plan d/w resident Dr. Rodriges I have personally seen, examined, and participated in the care of this patient. I have reviewed all pertinent clinical information, including history, physical exam, plan and medical student/resident/PA/NP note, and agree, with my independent findings and conclusions as noted:     60 year old female patient with medical hx of ESRD On HD (MWF), DM not on medications, HTN not on medications, HLD presents to emergency room with complaints of left 5th digit pain, found to have osteomyelitis on MRI and xray.    Labs reviewed, imaging reviewed - mild hyponatremia, high creatinine, ESR 59, A1C 5.9    #L phalanx osteomyelitis  -s/p debridement from podiatry - to OR in AM for amputation  -NPO after midnight  -vascular also on board - requesting CTA aorta w/runoff - to be coordinated with HD tomorrow  -will start ASA81 per vascular recs after surgery tomorrow  -c/w zosyn for now - ID recs appreciated  -pain control    #DM: controlled, 5.9  -HISS for now - FSBG well-controlled    #HTN: not on meds  -monitor    #ESRD  -HD tomorrow  -c/w sevelamer for hyperphosphatemia  -avoid nephrotoxins    #DVT PPX  -SQH    Isadora Kwan MD  Division of Hospital Medicine    Plan d/w resident Dr. Ferguson I have personally seen, examined, and participated in the care of this patient. I have reviewed all pertinent clinical information, including history, physical exam, plan and medical student/resident/PA/NP note, and agree, with my independent findings and conclusions as noted:     60 year old female patient with medical hx of ESRD On HD (MWF), DM not on medications, HTN not on medications, HLD presents to emergency room with complaints of left 5th digit pain, found to have osteomyelitis on MRI and xray.    Labs reviewed, imaging reviewed - mild hyponatremia, high creatinine, ESR 59, A1C 5.9  Consults notes vascular, podiatry, nephro reviewed    #L phalanx osteomyelitis  -s/p debridement from podiatry - to OR in AM for amputation  -NPO after midnight  -vascular also on board - requesting CTA aorta w/runoff - to be coordinated with HD tomorrow  -will start ASA81 per vascular recs after surgery tomorrow  -c/w zosyn for now - ID recs appreciated  -pain control    #DM: controlled, 5.9  -HISS for now - FSBG well-controlled    #HTN: not on meds  -monitor    #ESRD  -HD tomorrow  -c/w sevelamer for hyperphosphatemia  -avoid nephrotoxins    #DVT PPX  -SQH    Isadora Kwan MD  Division of Hospital Medicine    Plan d/w resident Dr. Ferguson

## 2020-12-01 NOTE — CONSULT NOTE ADULT - SUBJECTIVE AND OBJECTIVE BOX
HPI:   60 year old female patient with medical hx of ESRD On HD (MWF) presented to emergency room with complaints of left 5th digit pain that has been present since Friday . Patient states that she has pain since Friday , she does not recall any trauma. Patient is still able to walk. Had her dialysis . and patient came to ED for further reevaluation of her Left foot. Patient denies any bleeding from Left foot but complains of some purulent discharge when Podiatry saw the patient. Patient has history of R foot Osteomyelitis in past. Patient denies fever, difficulty breathing, chest pain, nausea, vomiting, bowel or urinary problems.  Patient also added that she used to take medications for hypertension but doesnot take it any more after dialysis her blood pressure is controlled. (30 Nov 2020 22:30)      History as above, Pt who presented from home with left foot pain and purulent drainage, she has been following with her podiatrist on a regular basis, she has no fevers , chills or other symptoms. She was found to have left 5th toe Osteomyelitis on both X- ray and MRI of foot. She is going to go to the OR for an amputation tomorrow.    PAST MEDICAL & SURGICAL HISTORY:  ESRD (end stage renal disease) on dialysis    CKD (chronic kidney disease), stage IV    Cataract    Glaucoma    HLD (hyperlipidemia)    Heart failure    Acute osteomyelitis of toe of right foot  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        No Known Allergies      Meds:  acetaminophen   Tablet .. 650 milliGRAM(s) Oral once PRN  atorvastatin 20 milliGRAM(s) Oral at bedtime  heparin   Injectable 5000 Unit(s) SubCutaneous every 12 hours  piperacillin/tazobactam IVPB.. 3.375 Gram(s) IV Intermittent every 12 hours  sevelamer carbonate 800 milliGRAM(s) Oral three times a day with meals      SOCIAL HISTORY:  Smoker:  no  ETOH use:  no      FAMILY HISTORY: not contributory      VITALS:  Vital Signs Last 24 Hrs  T(C): 36.3 (01 Dec 2020 15:58), Max: 36.8 (01 Dec 2020 07:31)  T(F): 97.4 (01 Dec 2020 15:58), Max: 98.2 (01 Dec 2020 07:31)  HR: 70 (01 Dec 2020 15:58) (70 - 89)  BP: 108/47 (01 Dec 2020 15:58) (108/47 - 164/73)  BP(mean): --  RR: 18 (01 Dec 2020 15:58) (16 - 18)  SpO2: 100% (01 Dec 2020 15:58) (99% - 100%)    LABS/DIAGNOSTIC TESTS:                          11.9   6.68  )-----------( 175      ( 01 Dec 2020 08:22 )             38.2     WBC Count: 6.68 K/uL (12-01 @ 08:22)  WBC Count: 7.24 K/uL (11-30 @ 20:32)      12-01    134<L>  |  96  |  46<H>  ----------------------------<  86  4.7   |  28  |  7.62<H>    Ca    9.5      01 Dec 2020 08:22  Phos  5.0     12-01  Mg     2.8     12-01    TPro  8.2  /  Alb  3.8  /  TBili  0.5  /  DBili  x   /  AST  8<L>  /  ALT  11  /  AlkPhos  69  12-01          LIVER FUNCTIONS - ( 01 Dec 2020 08:22 )  Alb: 3.8 g/dL / Pro: 8.2 g/dL / ALK PHOS: 69 U/L / ALT: 11 U/L DA / AST: 8 U/L / GGT: x                 LACTATE:    ABG -     CULTURES:       RADIOLOGY:< from: MR Foot No Cont, Left (12.01.20 @ 12:17) >  EXAM:  MR FOOT LT                            PROCEDURE DATE:  12/01/2020          INTERPRETATION:  Clinical Information: Left foot infection rule out osteomyelitis.    Comparison: Left foot radiographs from 11/30/2020.    Technique:  MRI of the left foot.  Intravenous Contrast: None.    Findings:    There is a skin ulceration with a lateral sinus tract at the level of the distal fifth proximal phalanx (8, 18). There is and hyperintense T2 marrow signal throughout the fifth proximal phalanx withhypointense T1 marrow signal at the distal aspect of the fifth proximal phalanx consistent with osteomyelitis.    There is mild first metatarsophalangeal and moderate first metatarsal sesamoid osteoarthrosis. The visualized flexor and extensor tendons are intact.    Evaluation for soft tissue infection is limited without intravenous contrast.    Impression:  Osteomyelitis at the distal aspect of the fifth proximal phalanx with an adjacent sinus tract.            CELESTE LOVE MD; Attending Radiologist  This document has been electronically signed. Dec  1 2020 12:47PM    -----------------------------------------------------------------------------------------------------------------------------------------------------------------  EXAM:  FOOT 2VIEWS                             PROCEDURE DATE:  11/30/2020          INTERPRETATION:  LEFT foot    CLINICAL INFORMATION: LEFT fifth toe pain. Assess osteomyelitis.  Pain.    TECHNIQUE: AP,lateral and oblique views.    FINDINGS: There is a cortical lucency in the distal end of the LEFT fifth proximal phalanx, concerning for osteomyelitis.  There are diffuse vascular arterial calcifications.  The remaining bones and joint spaces are preserved.  There are no fractures or dislocations.  /  IMPRESSION:    Vascular calcifications. Soft tissue swelling. Digit with a small periarticular erosion involving distal end of fifth proximal phalanx.. No fracture        If osteomyelitis is considered, despite conservative therapy, and soft tissue / bone infection requires further assessment, follow-up MRI recommended.              DAMI LANDIS MD; Attending Radiologist  This document has been electronically signed. Nov 30 2020  8:14PM    < end of copied text >        ROS  [  ] UNABLE TO ELICIT

## 2020-12-01 NOTE — CONSULT NOTE ADULT - RS GEN PE MLT RESP DETAILS PC
clear to auscultation bilaterally/no rhonchi/no wheezes/no rales/good air movement/breath sounds equal
respirations non-labored
breath sounds equal/good air movement/no rhonchi/no wheezes/clear to auscultation bilaterally

## 2020-12-01 NOTE — PROGRESS NOTE ADULT - ASSESSMENT
60 year old female patient with medical hx of ESRD On HD (MWF) presented to emergency room with complaints of left 5th digit pain. Patient is admitted for Management suspected Left foot osteomyelitis.         60 year old female patient with medical hx of ESRD On HD (MWF) presented to emergency room with complaints of left 5th digit pain. Patient is admitted for  osteomyelitis Osteomyelitis at the distal aspect of the left fifth proximal phalanx      *home Medications reconciled.

## 2020-12-01 NOTE — PROGRESS NOTE ADULT - SUBJECTIVE AND OBJECTIVE BOX
PGY 3 Note discussed with primary attending    Patient is a 60y old  Female who presents with a chief complaint of R (01 Dec 2020 12:10)      INTERVAL HPI/OVERNIGHT EVENTS: offers no new complaints; current symptoms resolving    MEDICATIONS  (STANDING):  atorvastatin 20 milliGRAM(s) Oral at bedtime  heparin   Injectable 5000 Unit(s) SubCutaneous every 12 hours  piperacillin/tazobactam IVPB.. 3.375 Gram(s) IV Intermittent every 12 hours    MEDICATIONS  (PRN):      __________________________________________________  REVIEW OF SYSTEMS:    CONSTITUTIONAL: No fever,   EYES: no acute visual disturbances  NECK: No pain or stiffness  RESPIRATORY: No cough; No shortness of breath  CARDIOVASCULAR: No chest pain, no palpitations  GASTROINTESTINAL: No pain. No nausea or vomiting; No diarrhea   NEUROLOGICAL: No headache or numbness, no tremors  MUSCULOSKELETAL: No joint pain, no muscle pain  GENITOURINARY: no dysuria, no frequency, no hesitancy  PSYCHIATRY: no depression , no anxiety  ALL OTHER  ROS negative        Vital Signs Last 24 Hrs  T(C): 36.8 (01 Dec 2020 07:31), Max: 36.8 (01 Dec 2020 07:31)  T(F): 98.2 (01 Dec 2020 07:31), Max: 98.2 (01 Dec 2020 07:31)  HR: 71 (01 Dec 2020 07:31) (71 - 89)  BP: 116/48 (01 Dec 2020 07:31) (116/48 - 164/73)  BP(mean): --  RR: 16 (01 Dec 2020 07:31) (16 - 18)  SpO2: 100% (01 Dec 2020 07:31) (99% - 100%)    ________________________________________________  PHYSICAL EXAM:  GENERAL: NAD  HEENT: Normocephalic;  conjunctivae and sclerae clear; moist mucous membranes;   NECK : supple  CHEST/LUNG: Clear to auscultation bilaterally with good air entry   HEART: S1 S2  regular; no murmurs, gallops or rubs  ABDOMEN: Soft, Nontender, Nondistended; Bowel sounds present  EXTREMITIES: no cyanosis; no edema; no calf tenderness  SKIN: warm and dry; no rash  NERVOUS SYSTEM:  Awake and alert; Oriented  to place, person and time ; no new deficits    _________________________________________________  LABS:                        11.9   6.68  )-----------( 175      ( 01 Dec 2020 08:22 )             38.2     12-01    134<L>  |  96  |  46<H>  ----------------------------<  86  4.7   |  28  |  7.62<H>    Ca    9.5      01 Dec 2020 08:22  Phos  5.0     12-01  Mg     2.8     12-01    TPro  8.2  /  Alb  3.8  /  TBili  0.5  /  DBili  x   /  AST  8<L>  /  ALT  11  /  AlkPhos  69  12-01        CAPILLARY BLOOD GLUCOSE      POCT Blood Glucose.: 158 mg/dL (01 Dec 2020 11:47)  POCT Blood Glucose.: 86 mg/dL (01 Dec 2020 07:14)        RADIOLOGY & ADDITIONAL TESTS:    Imaging Personally Reviewed:  YES/NO    Consultant(s) Notes Reviewed:   YES/ No    Care Discussed with Consultants :     Plan of care was discussed with patient and /or primary care giver; all questions and concerns were addressed and care was aligned with patient's wishes.     PGY 3 Note discussed with primary attending    Patient is a 60y old  Female who presents with a chief complaint of R (01 Dec 2020 12:10)      INTERVAL HPI/OVERNIGHT EVENTS:   Pt seen and examined at the bedside.   Pt reports that she has h/o DM and HTN in the past, and was on medications in the past for same, but presently is off any medication as her DM and HTN is controlled.   She reports taking Phosphate binders and Lipitor for ESRD and HLD respectively.   pt is s/p bedside debridement in ED. and plan is for amputation of fifth toe, left side on Thursday.   Reports no new complains.     MEDICATIONS  (STANDING):  atorvastatin 20 milliGRAM(s) Oral at bedtime  heparin   Injectable 5000 Unit(s) SubCutaneous every 12 hours  piperacillin/tazobactam IVPB.. 3.375 Gram(s) IV Intermittent every 12 hours    MEDICATIONS  (PRN):      __________________________________________________  REVIEW OF SYSTEMS:    CONSTITUTIONAL: No fever,   EYES: no acute visual disturbances  NECK: No pain or stiffness  RESPIRATORY: No cough; No shortness of breath  CARDIOVASCULAR: No chest pain, no palpitations  GASTROINTESTINAL: No pain. No nausea or vomiting; No diarrhea   NEUROLOGICAL: No headache or numbness, no tremors  MUSCULOSKELETAL: No joint pain, no muscle pain  minimal left toe pain   GENITOURINARY: no dysuria, no frequency, no hesitancy  PSYCHIATRY: no depression , no anxiety  ALL OTHER  ROS negative        Vital Signs Last 24 Hrs  T(C): 36.8 (01 Dec 2020 07:31), Max: 36.8 (01 Dec 2020 07:31)  T(F): 98.2 (01 Dec 2020 07:31), Max: 98.2 (01 Dec 2020 07:31)  HR: 71 (01 Dec 2020 07:31) (71 - 89)  BP: 116/48 (01 Dec 2020 07:31) (116/48 - 164/73)  BP(mean): --  RR: 16 (01 Dec 2020 07:31) (16 - 18)  SpO2: 100% (01 Dec 2020 07:31) (99% - 100%)    ________________________________________________  PHYSICAL EXAM:  GENERAL: female in bed, in no acute distress   HEENT: Normocephalic;  conjunctivae and sclerae clear; moist mucous membranes;   NECK : supple  CHEST/LUNG: Clear to auscultation bilaterally with good air entry   HEART: S1 S2  regular; no murmurs, gallops or rubs  ABDOMEN: Soft, Nontender, Nondistended; Bowel sounds present  EXTREMITIES: no cyanosis; no edema; no calf tenderness  Left foot: dressing present: clean and dry   Right thigh AV fistula present   SKIN: warm and dry; no rash  NERVOUS SYSTEM:  Awake and alert; Oriented  to place, person and time ; no new deficits    _________________________________________________  LABS:                        11.9   6.68  )-----------( 175      ( 01 Dec 2020 08:22 )             38.2     12-01    134<L>  |  96  |  46<H>  ----------------------------<  86  4.7   |  28  |  7.62<H>    Ca    9.5      01 Dec 2020 08:22  Phos  5.0     12-01  Mg     2.8     12-01    TPro  8.2  /  Alb  3.8  /  TBili  0.5  /  DBili  x   /  AST  8<L>  /  ALT  11  /  AlkPhos  69  12-01        CAPILLARY BLOOD GLUCOSE      POCT Blood Glucose.: 158 mg/dL (01 Dec 2020 11:47)  POCT Blood Glucose.: 86 mg/dL (01 Dec 2020 07:14)        RADIOLOGY & ADDITIONAL TESTS:    Imaging Personally Reviewed:  YES/    < from: MR Foot No Cont, Left (12.01.20 @ 12:17) >    EXAM:  MR FOOT LT                            PROCEDURE DATE:  12/01/2020          INTERPRETATION:  Clinical Information: Left foot infection rule out osteomyelitis.    Comparison: Left foot radiographs from 11/30/2020.    Technique:  MRI of the left foot.  Intravenous Contrast: None.    Findings:    There is a skin ulceration with a lateral sinus tract at the level of the distal fifth proximal phalanx (8, 18). There is and hyperintense T2 marrow signal throughout the fifth proximal phalanx withhypointense T1 marrow signal at the distal aspect of the fifth proximal phalanx consistent with osteomyelitis.    There is mild first metatarsophalangeal and moderate first metatarsal sesamoid osteoarthrosis. The visualized flexor and extensor tendons are intact.    Evaluation for soft tissue infection is limited without intravenous contrast.    Impression:  Osteomyelitis at the distal aspect of the fifth proximal phalanx with an adjacent sinus tract.            CELESTE LOVE MD; Attending Radiologist  This document has been electronically signed. Dec  1 2020 12:47PM    < end of copied text >    Consultant(s) Notes Reviewed:   YES    Care Discussed with Consultants :     Plan of care was discussed with patient and /or primary care giver; all questions and concerns were addressed and care was aligned with patient's wishes.

## 2020-12-01 NOTE — CHART NOTE - NSCHARTNOTEFT_GEN_A_CORE
Patient left 5th digit amputation rescheduled to Thursday December 3rd 12pm with Dr. Hayes *pending vascular consult*  NPO after midnight tonight no longer warranted, will order again tomorrow   [facility] PVR reviewed and Request vascular consult prior to surgery   Request ID consult   MRI reviewed

## 2020-12-01 NOTE — PATIENT PROFILE ADULT - VISION (WITH CORRECTIVE LENSES IF THE PATIENT USUALLY WEARS THEM):
blind in right eye/Partially impaired: cannot see medication labels or newsprint, but can see obstacles in path, and the surrounding layout; can count fingers at arm's length

## 2020-12-01 NOTE — CONSULT NOTE ADULT - GASTROINTESTINAL DETAILS
nontender/soft/no distention/bowel sounds normal no guarding/no rigidity/nontender/no distention/no organomegaly/soft/bowel sounds normal

## 2020-12-01 NOTE — CONSULT NOTE ADULT - CONSTITUTIONAL DETAILS
well-groomed/no distress/well-developed/well-nourished
no distress/well-developed/well-nourished/well-groomed
well-developed/no distress

## 2020-12-01 NOTE — CONSULT NOTE ADULT - SUBJECTIVE AND OBJECTIVE BOX
HPI:   60 year old female patient with medical hx of ESRD On HD (MWF) presented to emergency room with complaints of left 5th digit pain that has been present since Friday . Patient states that she has pain since Friday , she does not recall any trauma. Patient is still able to walk. Had her dialysis . and patient came to ED for further reevaluation of her Left foot. Patient denies any bleeding from Left foot but complains of some purulent discharge when Podiatry saw the patient. Patient has history of R foot Osteomyelitis in past. Patient denies fever, difficulty breathing, chest pain, nausea, vomiting, bowel or urinary problems.  Patient also added that she used to take medications for hypertension but doesnot take it any more after dialysis her blood pressure is controlled. (30 Nov 2020 22:30)    ID h/o:  Patient states she is usually seen by podiatrist regularly. She developed pain since friday. Had some purulent discharge from wound.       PAST MEDICAL & SURGICAL HISTORY:  ESRD (end stage renal disease) on dialysis    CKD (chronic kidney disease), stage IV    Cataract    Glaucoma    HLD (hyperlipidemia)    Heart failure    Acute osteomyelitis of toe of right foot  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        No Known Allergies      Meds:  heparin   Injectable 5000 Unit(s) SubCutaneous every 12 hours  piperacillin/tazobactam IVPB.. 3.375 Gram(s) IV Intermittent every 12 hours      SOCIAL HISTORY:   Patient lives with , denies use of tobacco ,alcohol or illict drug use.      VITALS:  Vital Signs Last 24 Hrs  T(C): 36.8 (01 Dec 2020 07:31), Max: 36.8 (01 Dec 2020 07:31)  T(F): 98.2 (01 Dec 2020 07:31), Max: 98.2 (01 Dec 2020 07:31)  HR: 71 (01 Dec 2020 07:31) (71 - 89)  BP: 116/48 (01 Dec 2020 07:31) (116/48 - 164/73)  RR: 16 (01 Dec 2020 07:31) (16 - 18)  SpO2: 100% (01 Dec 2020 07:31) (99% - 100%)    LABS/DIAGNOSTIC TESTS:                          11.9   6.68  )-----------( 175      ( 01 Dec 2020 08:22 )             38.2     WBC Count: 6.68 K/uL (12-01 @ 08:22)  WBC Count: 7.24 K/uL (11-30 @ 20:32)      12-01    134<L>  |  96  |  46<H>  ----------------------------<  86  4.7   |  28  |  7.62<H>    Ca    9.5      01 Dec 2020 08:22  Phos  5.0     12-01  Mg     2.8     12-01    TPro  8.2  /  Alb  3.8  /  TBili  0.5  /  DBili  x   /  AST  8<L>  /  ALT  11  /  AlkPhos  69  12-01          LIVER FUNCTIONS - ( 01 Dec 2020 08:22 )  Alb: 3.8 g/dL / Pro: 8.2 g/dL / ALK PHOS: 69 U/L / ALT: 11 U/L DA / AST: 8 U/L / GGT: x             CULTURES: pending      RADIOLOGY:< from: Xray Foot AP + Lateral, Left (11.30.20 @ 19:56) >  INTERPRETATION:  LEFT foot    CLINICAL INFORMATION: LEFT fifth toe pain. Assess osteomyelitis.  Pain.    TECHNIQUE: AP,lateral and oblique views.    FINDINGS: There is a cortical lucency in the distal end of the LEFT fifth proximal phalanx, concerning for osteomyelitis.  There are diffuse vascular arterial calcifications.  The remaining bones and joint spaces are preserved.  There are no fractures or dislocations.  /  IMPRESSION:    Vascular calcifications. Soft tissue swelling. Digit with a small periarticular erosion involving distal end of fifth proximal phalanx.. No fracture        If osteomyelitis is considered, despite conservative therapy, and soft tissue / bone infection requires further assessment, follow-up MRI recommended.        < from: Xray Chest 2 Views PA/Lat (01.18.19 @ 10:47) >  NTERPRETATION:  CLINICAL INFORMATION: Hypertension, anemia.    EXAM: PA and lateral radiographs of the chest.    COMPARISON: Chest radiograph from 1/9/2018    FINDINGS:  The lungs are clear. There are no pleural effusions or pneumothorax. The   cardiomediastinal silhouette is within normal limits. No acute osseous   findings.    IMPRESSION:   Clear lungs.  Since 11/9/2018 there has been resolution of the patient's pulmonary   edema.        < from: US Kidney and Bladder (01.18.19 @ 10:28) >  INTERPRETATION:  CLINICAL INFORMATION: Acute on chronic kidney disease.    COMPARISON: November 12, 2018.    TECHNIQUE: Sonography of the kidneys and bladder.     FINDINGS:    Right kidney:  10.1 x 3.5 x 5.1 cm. Mild hydronephrosis. Increased   cortical echogenicity.    Left kidney 10.6 x 4.9 x 4.8 cm. Increased cortical echogenicity. No   renal mass, hydronephrosis or calculi.    Urinary bladder: Within normal limits.    Other: Uterus is enlarged, measuring 15.9 x 7.9 x 12.8 cm. Uterine   leiomyoma, measuring 7.7 cm.    IMPRESSION:     Mild hydronephrosis of the right kidney. Bilateral increased renal   cortical echogenicity, consistent with renal parenchymal disease.                 HPI:   60 year old female patient with medical hx of ESRD On HD (MWF) presented to emergency room with complaints of left 5th digit pain that has been present since Friday . Patient states that she has pain since Friday , she does not recall any trauma. Patient is still able to walk. Had her dialysis . and patient came to ED for further reevaluation of her Left foot. Patient denies any bleeding from Left foot but complains of some purulent discharge when Podiatry saw the patient. Patient has history of R foot Osteomyelitis in past. Patient denies fever, difficulty breathing, chest pain, nausea, vomiting, bowel or urinary problems.  Patient also added that she used to take medications for hypertension but doesnot take it any more after dialysis her blood pressure is controlled. (30 Nov 2020 22:30)    ID h/o:  History as above, Patient states she is usually seen by podiatrist regularly. She developed pain since friday. Had some purulent discharge from wound. She was found to have sinus track with osteomyelitis of left fifth tow which was confirmed on MRI. Patient is planned for amputation in OR tomorrow.       PAST MEDICAL & SURGICAL HISTORY:  ESRD (end stage renal disease) on dialysis    CKD (chronic kidney disease), stage IV    Cataract    Glaucoma    HLD (hyperlipidemia)    Heart failure    Acute osteomyelitis of toe of right foot  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        No Known Allergies      Meds:  heparin   Injectable 5000 Unit(s) SubCutaneous every 12 hours  piperacillin/tazobactam IVPB.. 3.375 Gram(s) IV Intermittent every 12 hours      SOCIAL HISTORY:   Patient lives with , denies use of tobacco ,alcohol or illict drug use.      VITALS:  Vital Signs Last 24 Hrs  T(C): 36.8 (01 Dec 2020 07:31), Max: 36.8 (01 Dec 2020 07:31)  T(F): 98.2 (01 Dec 2020 07:31), Max: 98.2 (01 Dec 2020 07:31)  HR: 71 (01 Dec 2020 07:31) (71 - 89)  BP: 116/48 (01 Dec 2020 07:31) (116/48 - 164/73)  RR: 16 (01 Dec 2020 07:31) (16 - 18)  SpO2: 100% (01 Dec 2020 07:31) (99% - 100%)    LABS/DIAGNOSTIC TESTS:                          11.9   6.68  )-----------( 175      ( 01 Dec 2020 08:22 )             38.2     WBC Count: 6.68 K/uL (12-01 @ 08:22)  WBC Count: 7.24 K/uL (11-30 @ 20:32)      12-01    134<L>  |  96  |  46<H>  ----------------------------<  86  4.7   |  28  |  7.62<H>    Ca    9.5      01 Dec 2020 08:22  Phos  5.0     12-01  Mg     2.8     12-01    TPro  8.2  /  Alb  3.8  /  TBili  0.5  /  DBili  x   /  AST  8<L>  /  ALT  11  /  AlkPhos  69  12-01          LIVER FUNCTIONS - ( 01 Dec 2020 08:22 )  Alb: 3.8 g/dL / Pro: 8.2 g/dL / ALK PHOS: 69 U/L / ALT: 11 U/L DA / AST: 8 U/L / GGT: x             CULTURES: pending      RADIOLOGY:  < from: MR Foot No Cont, Left (12.01.20 @ 12:17) >  INTERPRETATION:  Clinical Information: Left foot infection rule out osteomyelitis.    Comparison: Left foot radiographs from 11/30/2020.    Technique:  MRI of the left foot.  Intravenous Contrast: None.    Findings:    There is a skin ulceration with a lateral sinus tract at the level of the distal fifth proximal phalanx (8, 18). There is and hyperintense T2 marrow signal throughout the fifth proximal phalanx withhypointense T1 marrow signal at the distal aspect of the fifth proximal phalanx consistent with osteomyelitis.    There is mild first metatarsophalangeal and moderate first metatarsal sesamoid osteoarthrosis. The visualized flexor and extensor tendons are intact.    Evaluation for soft tissue infection is limited without intravenous contrast.    Impression:  Osteomyelitis at the distal aspect of the fifth proximal phalanx with an adjacent sinus tract.        < from: Xray Foot AP + Lateral, Left (11.30.20 @ 19:56) >  INTERPRETATION:  LEFT foot    CLINICAL INFORMATION: LEFT fifth toe pain. Assess osteomyelitis.  Pain.    TECHNIQUE: AP,lateral and oblique views.    FINDINGS: There is a cortical lucency in the distal end of the LEFT fifth proximal phalanx, concerning for osteomyelitis.  There are diffuse vascular arterial calcifications.  The remaining bones and joint spaces are preserved.  There are no fractures or dislocations.  /  IMPRESSION:    Vascular calcifications. Soft tissue swelling. Digit with a small periarticular erosion involving distal end of fifth proximal phalanx.. No fracture        If osteomyelitis is considered, despite conservative therapy, and soft tissue / bone infection requires further assessment, follow-up MRI recommended.        < from: Xray Chest 2 Views PA/Lat (01.18.19 @ 10:47) >  NTERPRETATION:  CLINICAL INFORMATION: Hypertension, anemia.    EXAM: PA and lateral radiographs of the chest.    COMPARISON: Chest radiograph from 1/9/2018    FINDINGS:  The lungs are clear. There are no pleural effusions or pneumothorax. The   cardiomediastinal silhouette is within normal limits. No acute osseous   findings.    IMPRESSION:   Clear lungs.  Since 11/9/2018 there has been resolution of the patient's pulmonary   edema.        < from: US Kidney and Bladder (01.18.19 @ 10:28) >  INTERPRETATION:  CLINICAL INFORMATION: Acute on chronic kidney disease.    COMPARISON: November 12, 2018.    TECHNIQUE: Sonography of the kidneys and bladder.     FINDINGS:    Right kidney:  10.1 x 3.5 x 5.1 cm. Mild hydronephrosis. Increased   cortical echogenicity.    Left kidney 10.6 x 4.9 x 4.8 cm. Increased cortical echogenicity. No   renal mass, hydronephrosis or calculi.    Urinary bladder: Within normal limits.    Other: Uterus is enlarged, measuring 15.9 x 7.9 x 12.8 cm. Uterine   leiomyoma, measuring 7.7 cm.    IMPRESSION:     Mild hydronephrosis of the right kidney. Bilateral increased renal   cortical echogenicity, consistent with renal parenchymal disease.

## 2020-12-01 NOTE — CONSULT NOTE ADULT - ATTENDING COMMENTS
Patient seen/examined. P/w Lt 5th toe wound/OM in setting of PAD. Planned OR with podiatry on 12/2.  Recommend CTA aorta->runoff both lower extremities. Antibiotics. Will likely require transfer to [facility] for complex infra-geniculate intervention.

## 2020-12-01 NOTE — ED ADULT NURSE REASSESSMENT NOTE - NS ED NURSE REASSESS COMMENT FT1
received pt.from ever aguiar at 0230 pt.is alert and oriented x3.denies pain.  .shiley cath on rt.groin area intact. dialysis M-W-F. no active bleeding noted. no acute distress noted

## 2020-12-01 NOTE — PROGRESS NOTE ADULT - PROBLEM SELECTOR PLAN 2
Patient came in for Left 5th to pain,   purulent discharge noted on .  X ray of foot showed soft tissue swelling Digit with a small periarticular erosion involving distal end of fifth proximal phalanx.. No fracture.  Follow up MR of left foot.  Patient has elevated ESR.  Follow MARTA/ PVR.  got 1 dose of cefepime in ED. will give one dose of 1 gm Vanc.  cw with Zosyn q12  Vasular and ID consulted ESRD: due to DM as per Pt   dialysis days ( M/W/F) via right leg AV fistula   last dialysis Monday.  Dr Potts consulted

## 2020-12-01 NOTE — CONSULT NOTE ADULT - ASSESSMENT
# end stage renal disease . hemodialysis   in AM. timing for HD will be dependent on OR schedule  # LT 5th toe osteomyelitis. for amputation. cont ABX  #anemia of CKD at goal  # renal osteodystrophy. phos at goal    Many Thanks for the consult

## 2020-12-01 NOTE — CONSULT NOTE ADULT - SUBJECTIVE AND OBJECTIVE BOX
Attending: Dr Gilliland     HPI:  60 year old female patient with PMHx of ESRD On HD (MWF) admitted to medical services w/ left foot 5th digit ulceration. Vascular surgery called to evaluate pt, pt seen and examined at bedside, admits to left foot 5th toe ulceration, pt states she noticed ulceration last Friday, admits to pain, denies trauma; No fever, chills; Pt admits to hx of rt LE 5th digit OM; Pt states she ambulates independently.   Pt seen by Podiatry s/p  bedside debridement down to the level of the skin.         PAST MEDICAL & SURGICAL HISTORY:  ESRD (end stage renal disease) on dialysis    CKD (chronic kidney disease), stage IV    Cataract    Glaucoma    HLD (hyperlipidemia)    Heart failure    Acute osteomyelitis of toe of right foot  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        MEDICATIONS  (STANDING):  atorvastatin 20 milliGRAM(s) Oral at bedtime  heparin   Injectable 5000 Unit(s) SubCutaneous every 12 hours  piperacillin/tazobactam IVPB.. 3.375 Gram(s) IV Intermittent every 12 hours  sevelamer carbonate 800 milliGRAM(s) Oral three times a day with meals    MEDICATIONS  (PRN):  acetaminophen   Tablet .. 650 milliGRAM(s) Oral once PRN Mild Pain (1 - 3)      Allergies    No Known Allergies    Intolerances        SOCIAL HISTORY:  Unknown   FAMILY HISTORY:  Unknown     Vital Signs Last 24 Hrs  T(C): 36.3 (01 Dec 2020 15:58), Max: 36.8 (01 Dec 2020 07:31)  T(F): 97.4 (01 Dec 2020 15:58), Max: 98.2 (01 Dec 2020 07:31)  HR: 70 (01 Dec 2020 15:58) (70 - 89)  BP: 108/47 (01 Dec 2020 15:58) (108/47 - 164/73)  BP(mean): --  RR: 18 (01 Dec 2020 15:58) (16 - 18)  SpO2: 100% (01 Dec 2020 15:58) (99% - 100%)    I&O's Summary      LABS:                        11.9   6.68  )-----------( 175      ( 01 Dec 2020 08:22 )             38.2     12-01    134<L>  |  96  |  46<H>  ----------------------------<  86  4.7   |  28  |  7.62<H>    Ca    9.5      01 Dec 2020 08:22  Phos  5.0     12-01  Mg     2.8     12-01    TPro  8.2  /  Alb  3.8  /  TBili  0.5  /  DBili  x   /  AST  8<L>  /  ALT  11  /  AlkPhos  69  12-01        CAPILLARY BLOOD GLUCOSE      POCT Blood Glucose.: 158 mg/dL (01 Dec 2020 11:47)  POCT Blood Glucose.: 86 mg/dL (01 Dec 2020 07:14)    LIVER FUNCTIONS - ( 01 Dec 2020 08:22 )  Alb: 3.8 g/dL / Pro: 8.2 g/dL / ALK PHOS: 69 U/L / ALT: 11 U/L DA / AST: 8 U/L / GGT: x             RADIOLOGY & ADDITIONAL STUDIES:  < from: MR Foot No Cont, Left (12.01.20 @ 12:17) >  Findings:    There is a skin ulceration with a lateral sinus tract at the level of the distal fifth proximal phalanx (8, 18). There is and hyperintense T2 marrow signal throughout the fifth proximal phalanx withhypointense T1 marrow signal at the distal aspect of the fifth proximal phalanx consistent with osteomyelitis.    There is mild first metatarsophalangeal and moderate first metatarsal sesamoid osteoarthrosis. The visualized flexor and extensor tendons are intact.    Evaluation for soft tissue infection is limited without intravenous contrast.    Impression:  Osteomyelitis at the distal aspect of the fifth proximal phalanx with an adjacent sinus tract.    < end of copied text >      < from: Xray Foot AP + Lateral, Left (11.30.20 @ 19:56) >  IMPRESSION:    Vascular calcifications. Soft tissue swelling. Digit with a small periarticular erosion involving distal end of fifth proximal phalanx.. No fracture    If osteomyelitis is considered, despite conservative therapy, and soft tissue / bone infection requires further assessment, follow-up MRI recommended.    < end of copied text >

## 2020-12-01 NOTE — PROGRESS NOTE ADULT - ASSESSMENT
A:   Left foot 5th digit r/o OM    P:  Patient evaluated, chart reviewed  MRI Left foot pending   ESR reviewed  CRP Pending   culture swab of L foot pending  MARTA/PVR pending   Recommend ID consult  Answered questions to patient satisfaction  Patient to be taken to OR tomorrow for left 5th digit amputation.   NPO from midnight   Recommend vascular prior to OR   written consent will be obtained tomorrow morning   Request medical clearance prior to OR tomorrow   Dressed patient's left foot with betadine soaked gauze, yosef, ACE bandages  Discussed patient with attending Dr. Daily  Podiatry to follow while in house

## 2020-12-02 DIAGNOSIS — E87.5 HYPERKALEMIA: ICD-10-CM

## 2020-12-02 LAB
ALBUMIN SERPL ELPH-MCNC: 3.5 G/DL — SIGNIFICANT CHANGE UP (ref 3.5–5)
ALP SERPL-CCNC: 66 U/L — SIGNIFICANT CHANGE UP (ref 40–120)
ALT FLD-CCNC: 14 U/L DA — SIGNIFICANT CHANGE UP (ref 10–60)
ANION GAP SERPL CALC-SCNC: 11 MMOL/L — SIGNIFICANT CHANGE UP (ref 5–17)
ANION GAP SERPL CALC-SCNC: 7 MMOL/L — SIGNIFICANT CHANGE UP (ref 5–17)
APTT BLD: 34.2 SEC — SIGNIFICANT CHANGE UP (ref 27.5–35.5)
AST SERPL-CCNC: 10 U/L — SIGNIFICANT CHANGE UP (ref 10–40)
BASOPHILS # BLD AUTO: 0.04 K/UL — SIGNIFICANT CHANGE UP (ref 0–0.2)
BASOPHILS NFR BLD AUTO: 0.7 % — SIGNIFICANT CHANGE UP (ref 0–2)
BILIRUB SERPL-MCNC: 0.4 MG/DL — SIGNIFICANT CHANGE UP (ref 0.2–1.2)
BUN SERPL-MCNC: 31 MG/DL — HIGH (ref 7–18)
BUN SERPL-MCNC: 67 MG/DL — HIGH (ref 7–18)
CALCIUM SERPL-MCNC: 9.1 MG/DL — SIGNIFICANT CHANGE UP (ref 8.4–10.5)
CALCIUM SERPL-MCNC: 9.3 MG/DL — SIGNIFICANT CHANGE UP (ref 8.4–10.5)
CHLORIDE SERPL-SCNC: 95 MMOL/L — LOW (ref 96–108)
CHLORIDE SERPL-SCNC: 97 MMOL/L — SIGNIFICANT CHANGE UP (ref 96–108)
CO2 SERPL-SCNC: 28 MMOL/L — SIGNIFICANT CHANGE UP (ref 22–31)
CO2 SERPL-SCNC: 31 MMOL/L — SIGNIFICANT CHANGE UP (ref 22–31)
CREAT SERPL-MCNC: 10.5 MG/DL — HIGH (ref 0.5–1.3)
CREAT SERPL-MCNC: 5.7 MG/DL — HIGH (ref 0.5–1.3)
EOSINOPHIL # BLD AUTO: 0.19 K/UL — SIGNIFICANT CHANGE UP (ref 0–0.5)
EOSINOPHIL NFR BLD AUTO: 3.5 % — SIGNIFICANT CHANGE UP (ref 0–6)
GLUCOSE BLDC GLUCOMTR-MCNC: 96 MG/DL — SIGNIFICANT CHANGE UP (ref 70–99)
GLUCOSE SERPL-MCNC: 102 MG/DL — HIGH (ref 70–99)
GLUCOSE SERPL-MCNC: 142 MG/DL — HIGH (ref 70–99)
HBV SURFACE AB SER-ACNC: REACTIVE
HBV SURFACE AG SER-ACNC: SIGNIFICANT CHANGE UP
HCT VFR BLD CALC: 38.4 % — SIGNIFICANT CHANGE UP (ref 34.5–45)
HGB BLD-MCNC: 12 G/DL — SIGNIFICANT CHANGE UP (ref 11.5–15.5)
IMM GRANULOCYTES NFR BLD AUTO: 0.4 % — SIGNIFICANT CHANGE UP (ref 0–1.5)
INR BLD: 0.96 RATIO — SIGNIFICANT CHANGE UP (ref 0.88–1.16)
LYMPHOCYTES # BLD AUTO: 1.51 K/UL — SIGNIFICANT CHANGE UP (ref 1–3.3)
LYMPHOCYTES # BLD AUTO: 27.7 % — SIGNIFICANT CHANGE UP (ref 13–44)
MAGNESIUM SERPL-MCNC: 3.1 MG/DL — HIGH (ref 1.6–2.6)
MCHC RBC-ENTMCNC: 28.3 PG — SIGNIFICANT CHANGE UP (ref 27–34)
MCHC RBC-ENTMCNC: 31.3 GM/DL — LOW (ref 32–36)
MCV RBC AUTO: 90.6 FL — SIGNIFICANT CHANGE UP (ref 80–100)
MONOCYTES # BLD AUTO: 0.65 K/UL — SIGNIFICANT CHANGE UP (ref 0–0.9)
MONOCYTES NFR BLD AUTO: 11.9 % — SIGNIFICANT CHANGE UP (ref 2–14)
NEUTROPHILS # BLD AUTO: 3.04 K/UL — SIGNIFICANT CHANGE UP (ref 1.8–7.4)
NEUTROPHILS NFR BLD AUTO: 55.8 % — SIGNIFICANT CHANGE UP (ref 43–77)
NRBC # BLD: 0 /100 WBCS — SIGNIFICANT CHANGE UP (ref 0–0)
PHOSPHATE SERPL-MCNC: 7.3 MG/DL — HIGH (ref 2.5–4.5)
PLATELET # BLD AUTO: 172 K/UL — SIGNIFICANT CHANGE UP (ref 150–400)
POTASSIUM SERPL-MCNC: 4 MMOL/L — SIGNIFICANT CHANGE UP (ref 3.5–5.3)
POTASSIUM SERPL-MCNC: 5.7 MMOL/L — HIGH (ref 3.5–5.3)
POTASSIUM SERPL-SCNC: 4 MMOL/L — SIGNIFICANT CHANGE UP (ref 3.5–5.3)
POTASSIUM SERPL-SCNC: 5.7 MMOL/L — HIGH (ref 3.5–5.3)
PROT SERPL-MCNC: 8.2 G/DL — SIGNIFICANT CHANGE UP (ref 6–8.3)
PROTHROM AB SERPL-ACNC: 11.5 SEC — SIGNIFICANT CHANGE UP (ref 10.6–13.6)
RBC # BLD: 4.24 M/UL — SIGNIFICANT CHANGE UP (ref 3.8–5.2)
RBC # FLD: 13.9 % — SIGNIFICANT CHANGE UP (ref 10.3–14.5)
SODIUM SERPL-SCNC: 133 MMOL/L — LOW (ref 135–145)
SODIUM SERPL-SCNC: 136 MMOL/L — SIGNIFICANT CHANGE UP (ref 135–145)
WBC # BLD: 5.45 K/UL — SIGNIFICANT CHANGE UP (ref 3.8–10.5)
WBC # FLD AUTO: 5.45 K/UL — SIGNIFICANT CHANGE UP (ref 3.8–10.5)

## 2020-12-02 PROCEDURE — 99232 SBSQ HOSP IP/OBS MODERATE 35: CPT

## 2020-12-02 PROCEDURE — 75635 CT ANGIO ABDOMINAL ARTERIES: CPT | Mod: 26

## 2020-12-02 PROCEDURE — 99238 HOSP IP/OBS DSCHRG MGMT 30/<: CPT

## 2020-12-02 RX ORDER — ASPIRIN/CALCIUM CARB/MAGNESIUM 324 MG
1 TABLET ORAL
Qty: 0 | Refills: 0 | DISCHARGE
Start: 2020-12-02

## 2020-12-02 RX ORDER — SEVELAMER CARBONATE 2400 MG/1
2 POWDER, FOR SUSPENSION ORAL
Qty: 0 | Refills: 0 | DISCHARGE

## 2020-12-02 RX ORDER — SEVELAMER CARBONATE 2400 MG/1
1 POWDER, FOR SUSPENSION ORAL
Qty: 0 | Refills: 0 | DISCHARGE
Start: 2020-12-02

## 2020-12-02 RX ORDER — ASPIRIN/CALCIUM CARB/MAGNESIUM 324 MG
81 TABLET ORAL DAILY
Refills: 0 | Status: DISCONTINUED | OUTPATIENT
Start: 2020-12-02 | End: 2020-12-03

## 2020-12-02 RX ORDER — PIPERACILLIN AND TAZOBACTAM 4; .5 G/20ML; G/20ML
0 INJECTION, POWDER, LYOPHILIZED, FOR SOLUTION INTRAVENOUS
Qty: 0 | Refills: 0 | DISCHARGE
Start: 2020-12-02

## 2020-12-02 RX ORDER — ACETAMINOPHEN 500 MG
2 TABLET ORAL
Qty: 0 | Refills: 0 | DISCHARGE
Start: 2020-12-02

## 2020-12-02 RX ADMIN — PIPERACILLIN AND TAZOBACTAM 25 GRAM(S): 4; .5 INJECTION, POWDER, LYOPHILIZED, FOR SOLUTION INTRAVENOUS at 18:04

## 2020-12-02 RX ADMIN — SEVELAMER CARBONATE 800 MILLIGRAM(S): 2400 POWDER, FOR SUSPENSION ORAL at 08:03

## 2020-12-02 RX ADMIN — HEPARIN SODIUM 5000 UNIT(S): 5000 INJECTION INTRAVENOUS; SUBCUTANEOUS at 18:03

## 2020-12-02 RX ADMIN — ATORVASTATIN CALCIUM 20 MILLIGRAM(S): 80 TABLET, FILM COATED ORAL at 21:32

## 2020-12-02 RX ADMIN — PIPERACILLIN AND TAZOBACTAM 25 GRAM(S): 4; .5 INJECTION, POWDER, LYOPHILIZED, FOR SOLUTION INTRAVENOUS at 05:50

## 2020-12-02 RX ADMIN — HEPARIN SODIUM 5000 UNIT(S): 5000 INJECTION INTRAVENOUS; SUBCUTANEOUS at 05:49

## 2020-12-02 RX ADMIN — SEVELAMER CARBONATE 800 MILLIGRAM(S): 2400 POWDER, FOR SUSPENSION ORAL at 11:58

## 2020-12-02 RX ADMIN — SEVELAMER CARBONATE 800 MILLIGRAM(S): 2400 POWDER, FOR SUSPENSION ORAL at 18:03

## 2020-12-02 NOTE — PROGRESS NOTE ADULT - SUBJECTIVE AND OBJECTIVE BOX
Podiatry HPI: 60 year old female patient with PMHx DM, HLD, ESRD On HD (MWF) presents to emergency room with complaints of left 5th digit pain that has been present for the last 5 days. Patient states that she does not recall any trauma to the area, and denies any wounds to the area. She notes that there is a callus overlying, which has been there for over a year. She typically is seen by a podiatrist to have it shaved down, but the last time it was done was 5 months ago. Patient expresses concern as she has had osteomyelitis of the right 5th digit in the past, and she states that the pain in her left foot is similar to the pain she has had in the past. Currently, denies any fevers, chills, nausea, or vomiting. Denies any pain anywhere other than her foot    Podiatry progress: Patient is seen AAOx3 resting in bed. She states she has minimal pain to left 5th digit, Patient states no acute changes over night.    Patient admits to  (-) Fevers, (-) Chills, (-) Nausea, (-) Vomiting, (-) Shortness of Breath (-) calf pain (-) chest pain     Medications heparin   Injectable 5000 Unit(s) SubCutaneous every 12 hours  piperacillin/tazobactam IVPB.. 3.375 Gram(s) IV Intermittent every 12 hours    FHNo pertinent family history in first degree relatives    ,   PMHESRD (end stage renal disease) on dialysis    CKD (chronic kidney disease), stage IV    Cataract    Glaucoma    HLD (hyperlipidemia)    Heart failure       PSHAcute osteomyelitis of toe of right foot        Labs                          11.9   6.68  )-----------( 175      ( 01 Dec 2020 08:22 )             38.2      12-01    134<L>  |  96  |  46<H>  ----------------------------<  86  4.7   |  28  |  7.62<H>    Ca    9.5      01 Dec 2020 08:22  Phos  5.0     12-01  Mg     2.8     12-01    TPro  8.2  /  Alb  3.8  /  TBili  0.5  /  DBili  x   /  AST  8<L>  /  ALT  11  /  AlkPhos  69  12-01     ICU Vital Signs Last 24 Hrs  T(C): 36.6 (02 Dec 2020 00:32), Max: 36.8 (01 Dec 2020 07:31)  T(F): 97.8 (02 Dec 2020 00:32), Max: 98.2 (01 Dec 2020 07:31)  HR: 76 (02 Dec 2020 00:32) (70 - 76)  BP: 110/50 (02 Dec 2020 00:32) (108/47 - 116/48)  BP(mean): --  ABP: --  ABP(mean): --  RR: 18 (02 Dec 2020 00:32) (16 - 18)  SpO2: 100% (02 Dec 2020 00:32) (100% - 100%)             C-Reactive Protein, Serum: 0.51 mg/dL (12-01-20 @ 03:23)   WBC Count: 6.68 K/uL (12-01-20 @ 08:22)  WBC Count: 7.24 K/uL (11-30-20 @ 20:32)          Review of Systems:  Review of Systems: CONSTITUTIONAL:  see HPI  	HEENT:  Eyes:  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  	SKIN:  No rash or itching.  	CARDIOVASCULAR:  No chest pain, chest pressure or chest discomfort. No edema.  	RESPIRATORY:  No cough or sputum.  	GASTROINTESTINAL:  No anorexia, nausea, vomiting or diarrhea. No abdominal pain or blood.  	GENITOURINARY:  No hematuria, dysuria.   	NEUROLOGICAL:  No headache, dizziness, syncope, numbness or tingling in the extremities.   	MUSCULOSKELETAL:  No muscle, back pain, joint pain or stiffness.  HEMATOLOGIC:  No bleeding or bruising.        Physical exam:  Vascular: DP pulses weakly palpable bilaterally. PT pulses non palpable. Temperature gradient warm to cool bilaterally. CFT's intact x10  Derm: Left foot 5th digit with overlying callus on the dorsolateral aspect of the PIPJ. sanguinous drainage noted from pin point of yesterdays callous debridement on left 5th digit. No purulence noted.  Malodor present.   Neuro: Protective sensation intact bilaterally  MSK: Able to move bilateral lower extremities. 5/5 strength. Right 5th digit s/p phalangeal amputation.     ad< from: Xray Foot AP + Lateral, Left (11.30.20 @ 19:56) >  EXAM:  FOOT 2VIEWS LT                            PROCEDURE DATE:  11/30/2020          INTERPRETATION:  LEFT foot    CLINICAL INFORMATION: LEFT fifth toe pain. Assess osteomyelitis.  Pain.    TECHNIQUE: AP,lateral and oblique views.    FINDINGS: There is a cortical lucency in the distal end of the LEFT fifth proximal phalanx, concerning for osteomyelitis.  There are diffuse vascular arterial calcifications.  The remaining bones and joint spaces are preserved.  There are no fractures or dislocations.  /  IMPRESSION:    Vascular calcifications. Soft tissue swelling. Digit with a small periarticular erosion involving distal end of fifth proximal phalanx.. No fracture        If osteomyelitis is considered, despite conservative therapy, and soft tissue / bone infection requires further assessment, follow-up MRI recommended.    --------------------------------------------------------------    < from: MR Foot No Cont, Left (12.01.20 @ 12:17) >    EXAM:  MR FOOT LT                            PROCEDURE DATE:  12/01/2020          INTERPRETATION:  Clinical Information: Left foot infection rule out osteomyelitis.    Comparison: Left foot radiographs from 11/30/2020.    Technique:  MRI of the left foot.  Intravenous Contrast: None.    Findings:    There is a skin ulceration with a lateral sinus tract at the level of the distal fifth proximal phalanx (8, 18). There is and hyperintense T2 marrow signal throughout the fifth proximal phalanx withhypointense T1 marrow signal at the distal aspect of the fifth proximal phalanx consistent with osteomyelitis.    There is mild first metatarsophalangeal and moderate first metatarsal sesamoid osteoarthrosis. The visualized flexor and extensor tendons are intact.    Evaluation for soft tissue infection is limited without intravenous contrast.    Impression:  Osteomyelitis at the distal aspect of the fifth proximal phalanx with an adjacent sinus tract.            CELESTE LOVE MD; Attending Radiologist  This document has been electronically signed. Dec  1 2020 12:47PM    -----------------------------

## 2020-12-02 NOTE — PROGRESS NOTE ADULT - ATTENDING COMMENTS
Patient seen at bedside.  Discussed current condition.  Awaiting further vasc testing to determine healing potential of planned amputation.  Toe is stable at this time, cellulitis resolving. MRI with underlying OM.  Will plan for OR once cleared from vascular

## 2020-12-02 NOTE — PROGRESS NOTE ADULT - SUBJECTIVE AND OBJECTIVE BOX
[facility] : Progress Note :: [phone], (office [phone]),   Dr Potts / Dr Perkins / Dr Madden / Dr Shea / Dr Denys POLANCO / Dr Vanegas / Dr Bazzi / Dr Quentin patricia  _____________________________________________________________________________________________    Noted plans for transfer to [facility] in  for complex infra-geniculate intervention.    No Known Allergies    Hospital Medications:   MEDICATIONS  (STANDING):  aspirin  chewable 81 milliGRAM(s) Oral daily  atorvastatin 20 milliGRAM(s) Oral at bedtime  heparin   Injectable 5000 Unit(s) SubCutaneous every 12 hours  piperacillin/tazobactam IVPB.. 3.375 Gram(s) IV Intermittent every 12 hours  sevelamer carbonate 800 milliGRAM(s) Oral three times a day with meals        VITALS:  T(F): 98 (12-02-20 @ 17:24), Max: 98.4 (12-02-20 @ 13:29)  HR: 77 (12-02-20 @ 17:24)  BP: 126/50 (12-02-20 @ 17:24)  RR: 18 (12-02-20 @ 17:24)  SpO2: 100% (12-02-20 @ 17:24)  Wt(kg): --    12-01 @ 07:01  -  12-02 @ 07:00  --------------------------------------------------------  IN: 0 mL / OUT: 0 mL / NET: 0 mL        PHYSICAL EXAM:  Constitutional: NAD  HEENT: anicteric sclera, oropharynx clear.  Neck: No JVD  Respiratory: CTAB, no wheezes, rales or rhonchi  Cardiovascular: S1, S2, RRR  Gastrointestinal: BS+, soft, NT/ND  Extremities: . No peripheral edema  Neurological: A/O x 3, no focal deficits  Vascular Access: RT thigh AVG    LABS:  12-02    133<L>  |  95<L>  |  x   ----------------------------<  x   4.0   |  x   |  5.70<H>    Ca    9.3      02 Dec 2020 07:49  Phos  7.3     12-02  Mg     3.1     12-02    TPro  8.2  /  Alb  3.5  /  TBili  0.4  /  DBili      /  AST  10  /  ALT  14  /  AlkPhos  66  12-02    Creatinine Trend: 5.70 <--, 10.50 <--, 7.62 <--, 6.44 <--                        12.0   5.45  )-----------( 172      ( 02 Dec 2020 07:49 )             38.4     Urine Studies:      RADIOLOGY & ADDITIONAL STUDIES:

## 2020-12-02 NOTE — ACUTE INTERFACILITY TRANSFER NOTE - HOSPITAL COURSE
60 year old female patient with medical hx of ESRD On HD (MWF) presented to emergency room with complaints of left 5th digit pain. Patient is admitted for  osteomyelitis at the distal aspect of the left fifth proximal phalanx.   Xray of the left foot showed soft tissue swelling Digit with a small periarticular erosion involving distal end of fifth proximal phalanx. MR foot showed Osteomyelitis at the distal aspect of the fifth proximal phalanx with an adjacent sinus tract. On admission, in ED, pt got bedside debridement by Podiatry with one dose of cefepime and one dose of 1 gm Vanc in ED. Pt was started on Zosyn q12, and blood and wound cultures were sent(testing in progress) and pt was followed by LINDA Lubin. Pt was also seen vascular for concern of PAD, MARTA study showed----- and was recommended to start on baby aspirin and CT Angio with ruff off was recommended, which showed -------. Podiatry recommended vascular  testing and management to determine healing potential of planned amputation of fifth toe, left side. Vascular recommended transfer to [facility] for complex infra-geniculate intervention.     Pt also has ESRD on M/W/F schedule via right thigh AV fistula. Pt follows with Dr grey and was seen by Dr Potts while inpatient. Plan to dialyse the pt after CT angio today. Pt also on Sevelamer for hyperphosp, which was continued. Pt also takes Lipitor for HLD. Pt also has h/o DM and HTN for which she was on medications in the past, presently off any meds. A1c is 5.9 and BP was monitored and was normal.   This morning Pt K: 5.7, plan to dialyse today. Will repeat K post HD.     Pt is being transferred to [facility] for further vascular procedure and management and Podiatry to follow for amputation of fifth toe.     Case is discussed with the attending. Pt is medically stable for transfer.

## 2020-12-02 NOTE — PROGRESS NOTE ADULT - ASSESSMENT
# end stage renal disease . hemodialysis today.   # LT 5th toe osteomyelitis. for transfer to [facility] for complex infra-geniculate intervention. cont ABX  #anemia of CKD at goal  # renal osteodystrophy. phos at goal

## 2020-12-02 NOTE — PROGRESS NOTE ADULT - ASSESSMENT
60 year old female patient with medical hx of ESRD On HD (MWF) presented to emergency room with complaints of left 5th digit pain. Patient is admitted for  osteomyelitis Osteomyelitis at the distal aspect of the left fifth proximal phalanx      *home Medications reconciled.

## 2020-12-02 NOTE — PROGRESS NOTE ADULT - PROBLEM SELECTOR PLAN 3
K: 5.7, due to ESRD.   - Plan for HD today. Will hold off on medical management for elevated k for now.

## 2020-12-02 NOTE — PROGRESS NOTE ADULT - ATTENDING COMMENTS
I have personally seen, examined, and participated in the care of this patient. I have reviewed all pertinent clinical information, including history, physical exam, plan and medical student/resident/PA/NP note, and agree, with my independent findings and conclusions as noted:     60 year old female patient with medical hx of ESRD On HD (MWF), DM not on medications, HTN not on medications, HLD presents to emergency room with complaints of left 5th digit pain, found to have osteomyelitis on MRI and xray. Pt seen and examined. Feels well, pain improved. Denies F/C, CP, N/V.    Exam as above.    Labs reviewed, imaging reviewed: K+, creatinine, phos elevated  Consults notes vascular, podiatry, nephro reviewed    #L phalanx osteomyelitis  -s/p debridement from podiatry - to OR in AM for amputation  -on ASA81  -received CTA w/runoff  -c/w zosyn for now - ID recs appreciated  -pain control  -given complicated infra-geniculate intervention, vascular requesting to transfer pt to [facility] - will do that today 12/4 surgery    #DM: controlled, 5.9  -HISS for now - FSBG well-controlled    #HTN: not on meds  -monitor    #ESRD  -HD today  -c/w sevelamer for hyperphosphatemia  -avoid nephrotoxins    #DVT PPX  -SQH    Isadora Kwan MD  Division of Hospital Medicine    Plan d/w resident Dr. Ferguson, patient

## 2020-12-02 NOTE — CHART NOTE - NSCHARTNOTEFT_GEN_A_CORE
60F with pmhx PAD with LLE fifth digit ulceration w/ OM of the 5th proximal phalanx  As per podiatry, not intervening until vascular intervention completed.  CTA done, pending written report; Patient to be dialyzed post-CTA.  Patient to be transferred to [facility] under the vascular surgery service under care of Dr. Nina Gilliland for complex infra-geniculate intervention: booked for Friday 10/4/20  Plan discussed with medical resident Dr. Ferguson and vascular attending Dr. Gilliland 60F with pmhx PAD with LLE fifth digit ulceration w/ OM of the 5th proximal phalanx  As per podiatry, not intervening until vascular intervention completed.  CTA done, pending written report; Patient to be dialyzed post-CTA.  Patient to be transferred to [facility] under the vascular surgery service under care of Dr. Nina Gilliland for complex infra-geniculate intervention: booked for Friday 12/4/20  Plan discussed with medical resident Dr. Ferguson and vascular attending Dr. Gilliland

## 2020-12-02 NOTE — PROGRESS NOTE ADULT - SUBJECTIVE AND OBJECTIVE BOX
PGY 3 Note discussed with primary attending    Patient is a 60y old  Female who presents with a chief complaint of OM of fifth toe (02 Dec 2020 09:36)      INTERVAL HPI/OVERNIGHT EVENTS:   Female in bed, in no acute distress. No acute over night events. Denies any new complains.   Vascular recommended Ct angio with run off ordered, pending result.   MARTA result : pending   plan to transfer to [facility] for further vascular procedure before amputation of fifth toe.   K : 5.7 noted on Am labs, plan to continue with HD and follow post HD k       MEDICATIONS  (STANDING):  aspirin  chewable 81 milliGRAM(s) Oral daily  atorvastatin 20 milliGRAM(s) Oral at bedtime  heparin   Injectable 5000 Unit(s) SubCutaneous every 12 hours  piperacillin/tazobactam IVPB.. 3.375 Gram(s) IV Intermittent every 12 hours  sevelamer carbonate 800 milliGRAM(s) Oral three times a day with meals    MEDICATIONS  (PRN):  acetaminophen   Tablet .. 650 milliGRAM(s) Oral once PRN Mild Pain (1 - 3)      __________________________________________________  REVIEW OF SYSTEMS:    CONSTITUTIONAL: No fever,   EYES: no acute visual disturbances  NECK: No pain or stiffness  RESPIRATORY: No cough; No shortness of breath  CARDIOVASCULAR: No chest pain, no palpitations  GASTROINTESTINAL: No pain. No nausea or vomiting; No diarrhea   NEUROLOGICAL: No headache or numbness, no tremors  MUSCULOSKELETAL: No joint pain, no muscle pain  GENITOURINARY: no dysuria, no frequency, no hesitancy  PSYCHIATRY: no depression , no anxiety  ALL OTHER  ROS negative        Vital Signs Last 24 Hrs  T(C): 36.7 (02 Dec 2020 07:29), Max: 36.7 (02 Dec 2020 07:29)  T(F): 98.1 (02 Dec 2020 07:29), Max: 98.1 (02 Dec 2020 07:29)  HR: 70 (02 Dec 2020 07:29) (70 - 76)  BP: 118/51 (02 Dec 2020 07:29) (108/47 - 118/51)  BP(mean): --  RR: 18 (02 Dec 2020 07:29) (18 - 18)  SpO2: 99% (02 Dec 2020 07:29) (99% - 100%)    ________________________________________________  PHYSICAL EXAM:  GENERAL: female in bed in no acute distress   HEENT: Normocephalic;  conjunctivae and sclerae clear; moist mucous membranes;   NECK : supple  CHEST/LUNG: Clear to auscultation bilaterally with good air entry   HEART: S1 S2  regular; no murmurs, gallops or rubs  ABDOMEN: Soft, Nontender, Nondistended; Bowel sounds present  EXTREMITIES: no cyanosis; no edema; no calf tenderness  Left foot : dressing present; clean and dry   SKIN: warm and dry; no rash  NERVOUS SYSTEM:  Awake and alert; Oriented  to place, person and time ; no new deficits    _________________________________________________  LABS:                        12.0   5.45  )-----------( 172      ( 02 Dec 2020 07:49 )             38.4     12-02    136  |  97  |  67<H>  ----------------------------<  102<H>  5.7<H>   |  28  |  10.50<H>    Ca    9.3      02 Dec 2020 07:49  Phos  7.3     12-02  Mg     3.1     12-02    TPro  8.2  /  Alb  3.5  /  TBili  0.4  /  DBili  x   /  AST  10  /  ALT  14  /  AlkPhos  66  12-02    PT/INR - ( 02 Dec 2020 07:49 )   PT: 11.5 sec;   INR: 0.96 ratio         PTT - ( 02 Dec 2020 07:49 )  PTT:34.2 sec    CAPILLARY BLOOD GLUCOSE      POCT Blood Glucose.: 96 mg/dL (02 Dec 2020 07:11)  POCT Blood Glucose.: 158 mg/dL (01 Dec 2020 11:47)        RADIOLOGY & ADDITIONAL TESTS:    Imaging Personally Reviewed:  YES/    Consultant(s) Notes Reviewed:   YES/     Care Discussed with Consultants :     Plan of care was discussed with patient and /or primary care giver; all questions and concerns were addressed and care was aligned with patient's wishes.

## 2020-12-02 NOTE — PROGRESS NOTE ADULT - ASSESSMENT
A:   Left foot 5th digit r/o OM    P:  Patient evaluated, chart reviewed  MRI Left foot results as noted above  ESR reviewed  culture swab of L foot pending  MARTA/PVR results reviewed   Recommend ID consult  Answered questions to patient satisfaction  Discussed case with vascular - patient pending further vascular intervention prior to podiatry OR  No podiatric intervention until further vascular work up   Pt no longer to go to OR tomorrow 12/3   Dressed patient's left foot with betadine soaked gauze, yosef, ACE bandages  Patient seen with attending Dr. Daily  Podiatry to follow while in house

## 2020-12-02 NOTE — PROGRESS NOTE ADULT - PROBLEM SELECTOR PLAN 2
ESRD: due to DM as per Pt   dialysis days ( M/W/F) via right leg AV fistula   last dialysis Monday.  - Plan for HD today  Dr Potts consulted

## 2020-12-02 NOTE — ACUTE INTERFACILITY TRANSFER NOTE - PLAN OF CARE
vascular procedure and amputation Patient is admitted for  osteomyelitis at the distal aspect of the left fifth proximal phalanx.   Xray of the left foot showed soft tissue swelling Digit with a small periarticular erosion involving distal end of fifth proximal phalanx. MR foot showed Osteomyelitis at the distal aspect of the fifth proximal phalanx with an adjacent sinus tract. On admission, in ED, pt got bedside debridement by Podiatry with one dose of cefepime and one dose of 1 gm Vanc in ED. Pt was started on Zosyn q12, and blood and wound cultures were sent(testing in progress) and pt was followed by Dr Newby, ID. Pt was also seen vascular for concern of PAD, MARTA result showed ----- and was recommended to start on baby aspirin and CT Angio with ruff off was recommended, which showed -------. Podiatry recommended vascular  testing and management to determine healing potential of planned amputation of fifth toe, left side. Vascular recommended transfer to [facility] for complex infra-geniculate intervention. Pt also has ESRD on M/W/F schedule via right thigh AV fistula. Pt follows with Dr grey and was seen by Dr Potts while inpatient. Plan to dialyse the pt after CT angio today. Pt also on Sevelamer for hyperphosp. Pt takes Lipitor for HLD.

## 2020-12-03 ENCOUNTER — INPATIENT (INPATIENT)
Facility: HOSPITAL | Age: 60
LOS: 4 days | Discharge: HOME | End: 2020-12-08
Attending: SURGERY | Admitting: SURGERY
Payer: MEDICARE

## 2020-12-03 ENCOUNTER — TRANSCRIPTION ENCOUNTER (OUTPATIENT)
Age: 60
End: 2020-12-03

## 2020-12-03 VITALS
SYSTOLIC BLOOD PRESSURE: 108 MMHG | TEMPERATURE: 97 F | OXYGEN SATURATION: 100 % | RESPIRATION RATE: 16 BRPM | DIASTOLIC BLOOD PRESSURE: 62 MMHG | HEART RATE: 71 BPM

## 2020-12-03 VITALS
SYSTOLIC BLOOD PRESSURE: 130 MMHG | HEART RATE: 78 BPM | RESPIRATION RATE: 15 BRPM | WEIGHT: 151.68 LBS | HEIGHT: 61 IN | OXYGEN SATURATION: 100 % | DIASTOLIC BLOOD PRESSURE: 67 MMHG | TEMPERATURE: 99 F

## 2020-12-03 DIAGNOSIS — I70.209 UNSPECIFIED ATHEROSCLEROSIS OF NATIVE ARTERIES OF EXTREMITIES, UNSPECIFIED EXTREMITY: ICD-10-CM

## 2020-12-03 DIAGNOSIS — M86.171 OTHER ACUTE OSTEOMYELITIS, RIGHT ANKLE AND FOOT: Chronic | ICD-10-CM

## 2020-12-03 PROBLEM — N18.6 END STAGE RENAL DISEASE: Chronic | Status: ACTIVE | Noted: 2020-11-30

## 2020-12-03 LAB
-  AMIKACIN: SIGNIFICANT CHANGE UP
-  AMIKACIN: SIGNIFICANT CHANGE UP
-  AMOXICILLIN/CLAVULANIC ACID: SIGNIFICANT CHANGE UP
-  AMPICILLIN/SULBACTAM: SIGNIFICANT CHANGE UP
-  AMPICILLIN: SIGNIFICANT CHANGE UP
-  AZTREONAM: SIGNIFICANT CHANGE UP
-  AZTREONAM: SIGNIFICANT CHANGE UP
-  CEFAZOLIN: SIGNIFICANT CHANGE UP
-  CEFEPIME: SIGNIFICANT CHANGE UP
-  CEFEPIME: SIGNIFICANT CHANGE UP
-  CEFOXITIN: SIGNIFICANT CHANGE UP
-  CEFTAZIDIME: SIGNIFICANT CHANGE UP
-  CEFTRIAXONE: SIGNIFICANT CHANGE UP
-  CIPROFLOXACIN: SIGNIFICANT CHANGE UP
-  CIPROFLOXACIN: SIGNIFICANT CHANGE UP
-  ERTAPENEM: SIGNIFICANT CHANGE UP
-  GENTAMICIN: SIGNIFICANT CHANGE UP
-  GENTAMICIN: SIGNIFICANT CHANGE UP
-  IMIPENEM: SIGNIFICANT CHANGE UP
-  LEVOFLOXACIN: SIGNIFICANT CHANGE UP
-  LEVOFLOXACIN: SIGNIFICANT CHANGE UP
-  MEROPENEM: SIGNIFICANT CHANGE UP
-  MEROPENEM: SIGNIFICANT CHANGE UP
-  PIPERACILLIN/TAZOBACTAM: SIGNIFICANT CHANGE UP
-  PIPERACILLIN/TAZOBACTAM: SIGNIFICANT CHANGE UP
-  TOBRAMYCIN: SIGNIFICANT CHANGE UP
-  TOBRAMYCIN: SIGNIFICANT CHANGE UP
-  TRIMETHOPRIM/SULFAMETHOXAZOLE: SIGNIFICANT CHANGE UP
METHOD TYPE: SIGNIFICANT CHANGE UP
METHOD TYPE: SIGNIFICANT CHANGE UP

## 2020-12-03 PROCEDURE — 86140 C-REACTIVE PROTEIN: CPT

## 2020-12-03 PROCEDURE — 93923 UPR/LXTR ART STDY 3+ LVLS: CPT

## 2020-12-03 PROCEDURE — 73620 X-RAY EXAM OF FOOT: CPT

## 2020-12-03 PROCEDURE — 84100 ASSAY OF PHOSPHORUS: CPT

## 2020-12-03 PROCEDURE — 86706 HEP B SURFACE ANTIBODY: CPT

## 2020-12-03 PROCEDURE — 85652 RBC SED RATE AUTOMATED: CPT

## 2020-12-03 PROCEDURE — 85610 PROTHROMBIN TIME: CPT

## 2020-12-03 PROCEDURE — 99261: CPT

## 2020-12-03 PROCEDURE — 87340 HEPATITIS B SURFACE AG IA: CPT

## 2020-12-03 PROCEDURE — 80061 LIPID PANEL: CPT

## 2020-12-03 PROCEDURE — 85730 THROMBOPLASTIN TIME PARTIAL: CPT

## 2020-12-03 PROCEDURE — 85025 COMPLETE CBC W/AUTO DIFF WBC: CPT

## 2020-12-03 PROCEDURE — 36415 COLL VENOUS BLD VENIPUNCTURE: CPT

## 2020-12-03 PROCEDURE — 83036 HEMOGLOBIN GLYCOSYLATED A1C: CPT

## 2020-12-03 PROCEDURE — 82962 GLUCOSE BLOOD TEST: CPT

## 2020-12-03 PROCEDURE — 83735 ASSAY OF MAGNESIUM: CPT

## 2020-12-03 PROCEDURE — 93005 ELECTROCARDIOGRAM TRACING: CPT

## 2020-12-03 PROCEDURE — 87040 BLOOD CULTURE FOR BACTERIA: CPT

## 2020-12-03 PROCEDURE — 87070 CULTURE OTHR SPECIMN AEROBIC: CPT

## 2020-12-03 PROCEDURE — 73718 MRI LOWER EXTREMITY W/O DYE: CPT

## 2020-12-03 PROCEDURE — 80053 COMPREHEN METABOLIC PANEL: CPT

## 2020-12-03 PROCEDURE — 87186 SC STD MICRODIL/AGAR DIL: CPT

## 2020-12-03 PROCEDURE — 75635 CT ANGIO ABDOMINAL ARTERIES: CPT

## 2020-12-03 PROCEDURE — 99232 SBSQ HOSP IP/OBS MODERATE 35: CPT

## 2020-12-03 PROCEDURE — 99285 EMERGENCY DEPT VISIT HI MDM: CPT | Mod: 25

## 2020-12-03 PROCEDURE — 80048 BASIC METABOLIC PNL TOTAL CA: CPT

## 2020-12-03 PROCEDURE — 87635 SARS-COV-2 COVID-19 AMP PRB: CPT

## 2020-12-03 PROCEDURE — 84443 ASSAY THYROID STIM HORMONE: CPT

## 2020-12-03 RX ORDER — CHLORHEXIDINE GLUCONATE 213 G/1000ML
1 SOLUTION TOPICAL
Refills: 0 | Status: DISCONTINUED | OUTPATIENT
Start: 2020-12-03 | End: 2020-12-03

## 2020-12-03 RX ORDER — SEVELAMER CARBONATE 2400 MG/1
1600 POWDER, FOR SUSPENSION ORAL THREE TIMES A DAY
Refills: 0 | Status: DISCONTINUED | OUTPATIENT
Start: 2020-12-03 | End: 2020-12-03

## 2020-12-03 RX ORDER — SEVELAMER CARBONATE 2400 MG/1
2 POWDER, FOR SUSPENSION ORAL
Qty: 0 | Refills: 0 | DISCHARGE
Start: 2020-12-03

## 2020-12-03 RX ORDER — CHLORHEXIDINE GLUCONATE 213 G/1000ML
1 SOLUTION TOPICAL
Qty: 0 | Refills: 0 | DISCHARGE
Start: 2020-12-03

## 2020-12-03 RX ADMIN — HEPARIN SODIUM 5000 UNIT(S): 5000 INJECTION INTRAVENOUS; SUBCUTANEOUS at 18:30

## 2020-12-03 RX ADMIN — SEVELAMER CARBONATE 800 MILLIGRAM(S): 2400 POWDER, FOR SUSPENSION ORAL at 12:07

## 2020-12-03 RX ADMIN — HEPARIN SODIUM 5000 UNIT(S): 5000 INJECTION INTRAVENOUS; SUBCUTANEOUS at 05:02

## 2020-12-03 RX ADMIN — PIPERACILLIN AND TAZOBACTAM 25 GRAM(S): 4; .5 INJECTION, POWDER, LYOPHILIZED, FOR SOLUTION INTRAVENOUS at 05:02

## 2020-12-03 RX ADMIN — PIPERACILLIN AND TAZOBACTAM 25 GRAM(S): 4; .5 INJECTION, POWDER, LYOPHILIZED, FOR SOLUTION INTRAVENOUS at 18:30

## 2020-12-03 RX ADMIN — Medication 81 MILLIGRAM(S): at 12:07

## 2020-12-03 RX ADMIN — SEVELAMER CARBONATE 1600 MILLIGRAM(S): 2400 POWDER, FOR SUSPENSION ORAL at 18:32

## 2020-12-03 RX ADMIN — SEVELAMER CARBONATE 800 MILLIGRAM(S): 2400 POWDER, FOR SUSPENSION ORAL at 12:05

## 2020-12-03 NOTE — PROGRESS NOTE ADULT - SUBJECTIVE AND OBJECTIVE BOX
NP Note discussed with  Primary Attending    Patient is a 60y old  Female who presents with a chief complaint of R (03 Dec 2020 13:17)    HPI-  60 year old female patient with medical hx of ESRD On HD (MWF) presented to emergency room with complaints of left 5th digit pain. Patient is admitted for  osteomyelitis Osteomyelitis at the distal aspect of the left fifth proximal phalanx.   Plan to transfer to [facility] for vascular surgery. ID, nephrology, Vascular, podiatry following.           INTERVAL HPI/OVERNIGHT EVENTS: seen at bedside. left foot pain controlled with current meds. Pt aware of being transferred to [facility] for vascular surgery.     MEDICATIONS  (STANDING):  aspirin  chewable 81 milliGRAM(s) Oral daily  atorvastatin 20 milliGRAM(s) Oral at bedtime  chlorhexidine 2% Cloths 1 Application(s) Topical <User Schedule>  heparin   Injectable 5000 Unit(s) SubCutaneous every 12 hours  piperacillin/tazobactam IVPB.. 3.375 Gram(s) IV Intermittent every 12 hours  sevelamer carbonate 1600 milliGRAM(s) Oral three times a day    MEDICATIONS  (PRN):  acetaminophen   Tablet .. 650 milliGRAM(s) Oral once PRN Mild Pain (1 - 3)      __________________________________________________  REVIEW OF SYSTEMS:    CONSTITUTIONAL: No fever,   EYES: no acute visual disturbances  NECK: No pain or stiffness  RESPIRATORY: No cough; No shortness of breath  CARDIOVASCULAR: No chest pain, no palpitations  GASTROINTESTINAL: No pain. No nausea or vomiting; No diarrhea   NEUROLOGICAL: No headache or numbness, no tremors  MUSCULOSKELETAL: No joint pain, no muscle pain  GENITOURINARY: no dysuria, no frequency, no hesitancy  PSYCHIATRY: no depression , no anxiety  ALL OTHER  ROS negative        Vital Signs Last 24 Hrs  T(C): 36.2 (03 Dec 2020 16:12), Max: 36.7 (02 Dec 2020 23:24)  T(F): 97.1 (03 Dec 2020 16:12), Max: 98.1 (02 Dec 2020 23:24)  HR: 71 (03 Dec 2020 16:12) (71 - 74)  BP: 108/62 (03 Dec 2020 16:12) (105/53 - 109/51)  BP(mean): --  RR: 16 (03 Dec 2020 16:12) (16 - 17)  SpO2: 100% (03 Dec 2020 16:12) (100% - 100%)    ________________________________________________  PHYSICAL EXAM:  GENERAL: NAD, conversant, comprehensive  HEENT: Normocephalic;  conjunctivae and sclerae clear; moist mucous membranes;   NECK : supple  CHEST/LUNG: Clear to auscultation bilaterally with good air entry   HEART: S1 S2  regular; no murmurs, gallops or rubs  ABDOMEN: Soft, Nontender, Nondistended; Bowel sounds present  EXTREMITIES: no cyanosis; no edema; no calf tenderness  SKIN: warm and dry; no rash, Left foot dressing intact.   NERVOUS SYSTEM:  Awake and alert; Oriented  to place, person and time ; no new deficits    _________________________________________________  LABS:                        12.0   5.45  )-----------( 172      ( 02 Dec 2020 07:49 )             38.4     12-02    133<L>  |  95<L>  |  31<H>  ----------------------------<  142<H>  4.0   |  31  |  5.70<H>    Ca    9.1      02 Dec 2020 18:45  Phos  7.3     12-02  Mg     3.1     12-02    TPro  8.2  /  Alb  3.5  /  TBili  0.4  /  DBili  x   /  AST  10  /  ALT  14  /  AlkPhos  66  12-02    PT/INR - ( 02 Dec 2020 07:49 )   PT: 11.5 sec;   INR: 0.96 ratio         PTT - ( 02 Dec 2020 07:49 )  PTT:34.2 sec    CAPILLARY BLOOD GLUCOSE            RADIOLOGY & ADDITIONAL TESTS:    Imaging  Reviewed:  YES   < from: CT Angio Abd Aorta w/run-off w/ IV Cont (12.02.20 @ 09:22) >    EXAM:  CT ANGIO ABD AOR W RUN(W)AW IC                            PROCEDURE DATE:  12/02/2020          INTERPRETATION:  CT ANGIO ABDOMINAL AORTA RUNOFF WITHOUT AND OR WITH IV CONTRAST    CLINICAL INFORMATION:  5th digit osteomyelitis in for amputation    eval for vascular insufficiency    TECHNIQUE: CT angiography of the abdominal aorta and arterial runoff of the lower extremities is performed. The study is performed utilizing a rapid bolus of 90 cc Omnipaque 350 IV contrast. Volume rendered andMIP images for CT angiographic display are created from source data on an independent 3-D workstation and archived into PACS. This study was performed using automatic exposure control (radiation dose reduction software) to obtain a diagnostic image quality scan with patient dose as low as reasonably achievable.    COMPARISON: None    FINDINGS:    ABDOMINAL AORTA: Normal without aneurysm, dissection, or atherosclerosis.    Celiac: Normal  SMA: Normal.  CARRIE: Normal.  Renal Arteries: Single bilateral. Normal caliber patent.    RIGHT LEG:    Common Iliac artery: Normal.  External Iliac: Normal.  Internal Iliac: Normal.    Common femoral: Normal.  SFA: Diffuse multifocal disease, otherwise normal caliber patent. Right femoral AV fistula is patent.  Profunda: Diffusely diseased, but patent.    Popliteal: Normal caliber patent.  Anterior Tibial (AT): Diffusely calcified limiting evaluation.  Tibioperoneal trunk (TPT): Diffusely calcified limiting evaluation.  Posterior Tibial (PT): Diffusely calcified limiting evaluation.  Peroneal: Diffusely calcified limiting evaluation.  Dorsalis Pedis (DP): Diffusely calcified limiting evaluation.  Plantar: Diffusely calcified limiting evaluation.    LEFT LEG:    Common Iliac artery: Normal.  External Iliac: Normal.  Internal Iliac: Normal.    Common femoral: Normal.  SFA: Diffuse multifocal plaque with an area of focal stenosis in the adductor canal.  Profunda: Patent.    Popliteal: Normal caliber patent.  AT: Diffusely calcified limiting evaluation.  TPT: Diffusely calcified limiting evaluation.  PT: Diffusely calcified limiting evaluation.  Peroneal: Diffusely calcified limiting evaluation.  DP: Diffusely calcified limiting evaluation.  Plantar: Diffusely calcified limiting evaluation.      ADDITIONAL FINDINGS: The lung bases are clear. The liver, spleen, pancreas, gallbladder, and biliary tree are normal. The kidneys and remainder of the retroperitoneum are normal aside from bilateral renal atrophy. Large, fibroid uterus in the pelvis. Nobowel-related abnormality. Normal appendix. No free air or ascites.      3D image post processing was helpful in evaluating the vascular anatomy, supporting the findings stated above.      IMPRESSION:    AORTOILIAC INFLOW: No significant inflow disease.    RIGHT LEG: Multifocal femoral-popliteal disease without focal stenosis or occlusion. Right femoral AV fistula is patent. Limited evaluation of the infrapopliteal vessels secondary to diffuse calcification and small caliber.    LEFT LEG: Focal stenosis of the SFA suggesting the adductor canal. No evidence of femoral-popliteal occlusion. Infrapopliteal vessel evaluation is limited secondary to diffuse calcification and small caliber.            RADHA STAUFFER MD; Attending Radiologist  This document has been electronically signed. Dec  2 2020  5:36PM    < end of copied text >      < from: MR Foot No Cont, Left (12.01.20 @ 12:17) >    EXAM:  MR FOOT LT                            PROCEDURE DATE:  12/01/2020          INTERPRETATION:  Clinical Information: Left foot infection rule out osteomyelitis.    Comparison: Left foot radiographs from 11/30/2020.    Technique:  MRI of the left foot.  Intravenous Contrast: None.    Findings:    There is a skin ulceration with a lateral sinus tract at the level of the distal fifth proximal phalanx (8, 18). There is and hyperintense T2 marrow signal throughout the fifth proximal phalanx withhypointense T1 marrow signal at the distal aspect of the fifth proximal phalanx consistent with osteomyelitis.    There is mild first metatarsophalangeal and moderate first metatarsal sesamoid osteoarthrosis. The visualized flexor and extensor tendons are intact.    Evaluation for soft tissue infection is limited without intravenous contrast.    Impression:  Osteomyelitis at the distal aspect of the fifth proximal phalanx with an adjacent sinus tract.            CELESTE LOVE MD; Attending Radiologist  This document has been electronically signed. Dec  1 2020 12:47PM    < end of copied text >    < from: Xray Foot AP + Lateral, Left (11.30.20 @ 19:56) >    EXAM:  FOOT 2VIEWS LT                            PROCEDURE DATE:  11/30/2020          INTERPRETATION:  LEFT foot    CLINICAL INFORMATION: LEFT fifth toe pain. Assess osteomyelitis.  Pain.    TECHNIQUE: AP,lateral and oblique views.    FINDINGS: There is a cortical lucency in the distal end of the LEFT fifth proximal phalanx, concerning for osteomyelitis.  There are diffuse vascular arterial calcifications.  The remaining bones and joint spaces are preserved.  There are no fractures or dislocations.  /  IMPRESSION:    Vascular calcifications. Soft tissue swelling. Digit with a small periarticular erosion involving distal end of fifth proximal phalanx.. No fracture        If osteomyelitis is considered, despite conservative therapy, and soft tissue / bone infection requires further assessment, follow-up MRI recommended.              DAMI LANDIS MD; Attending Radiologist  This document has been electronically signed. Nov 30 2020  8:14PM    < end of copied text >    Consultant(s) Notes Reviewed:   YES  Podiatry and Vascular, Nephrology    Plan of care was discussed with patient and /or primary care giver; all questions and concerns were addressed

## 2020-12-03 NOTE — PATIENT PROFILE ADULT - VISION (WITH CORRECTIVE LENSES IF THE PATIENT USUALLY WEARS THEM):
Partially impaired: cannot see medication labels or newsprint, but can see obstacles in path, and the surrounding layout; can count fingers at arm's length/blind in right eye

## 2020-12-03 NOTE — PROGRESS NOTE ADULT - ATTENDING COMMENTS
I have personally seen, examined, and participated in the care of this patient this AM. I have reviewed all pertinent clinical information, including history, physical exam, plan and medical student/resident/PA/NP note, and agree, with my independent findings and conclusions as noted:     60 year old female patient with medical hx of ESRD On HD (MWF), DM not on medications, HTN not on medications, HLD presents to emergency room with complaints of left 5th digit pain, found to have osteomyelitis on MRI and xray. Pt seen and examined. Feels well, pain improved. Denies F/C, CP, N/V.    Exam as above.    Labs reviewed, imaging reviewed: creatinine elevated mild hyponatremia  Consults notes podiatry, nephro reviewed    #L phalanx osteomyelitis  -s/p debridement from podiatry - to OR Friday for infrageniculate procedure  -on ASA81  -received CTA w/runoff  -c/w zosyn for now - ID recs appreciated  -pain control    #DM: controlled, 5.9  -HISS for now - FSBG well-controlled    #HTN: not on meds  -monitor  -c/w ASA    #ESRD  -HD tomorrow  -c/w sevelamer for hyperphosphatemia  -avoid nephrotoxins    #DVT PPX  -SQH    Isadora Kwan MD  Division of Hospital Medicine    Plan d/w MELLISSA Gallagher

## 2020-12-03 NOTE — PROGRESS NOTE ADULT - PROBLEM SELECTOR PLAN 7
- has h/o DM, but presently not on any medications   - A1c; 5.9
- has h/o DM, but presently not on any medications   - A1c; 5.9
RISK                                                          Points  [] Previous VTE                                           3  [] Thrombophilia                                        2  [] Lower limb paralysis                              2   [] Current Cancer                                       2   [x] Immobilization > 24 hrs                        1  [] ICU/CCU stay > 24 hours                       1  [x] Age > 60                                                   1

## 2020-12-03 NOTE — PROGRESS NOTE ADULT - PROBLEM SELECTOR PLAN 8
RISK                                                          Points  [] Previous VTE                                           3  [] Thrombophilia                                        2  [] Lower limb paralysis                              2   [] Current Cancer                                       2   [x] Immobilization > 24 hrs                        1  [] ICU/CCU stay > 24 hours                       1  [x] Age > 60                                                   1
RISK                                                          Points  [] Previous VTE                                           3  [] Thrombophilia                                        2  [] Lower limb paralysis                              2   [] Current Cancer                                       2   [x] Immobilization > 24 hrs                        1  [] ICU/CCU stay > 24 hours                       1  [x] Age > 60                                                   1

## 2020-12-03 NOTE — PROGRESS NOTE ADULT - PROBLEM SELECTOR PLAN 9
-pt will  transfer to [facility] for complex infra-geniculate intervention.  Transfer center called, awaiting for bed availability.   Attending MD at [facility] dr. Rae Gilliland

## 2020-12-03 NOTE — PROGRESS NOTE ADULT - PROBLEM SELECTOR PLAN 2
-ESRD: due to DM as per Pt   -dialysis days ( M/W/F) via right leg AV fistula   -last dialysis 12/2  - Plan for HD tomorrow at [facility] if pt is transferred today.  -Dr Katlyn fish  -increased Revela today. 1600mg TID

## 2020-12-03 NOTE — PROGRESS NOTE ADULT - SUBJECTIVE AND OBJECTIVE BOX
[facility] : Progress Note :: [phone], (office [phone]),   Dr Potts / Dr Perkins / Dr Maddne / Dr Shea / Dr Denys POLANCO / Dr Vanegas / Dr Bazzi / Dr Quentin patricia  _____________________________________________________________________________________________    s/p HD yesterday.  Awaits transfer to [facility]     No Known Allergies    Hospital Medications:   MEDICATIONS  (STANDING):  aspirin  chewable 81 milliGRAM(s) Oral daily  atorvastatin 20 milliGRAM(s) Oral at bedtime  chlorhexidine 2% Cloths 1 Application(s) Topical <User Schedule>  heparin   Injectable 5000 Unit(s) SubCutaneous every 12 hours  piperacillin/tazobactam IVPB.. 3.375 Gram(s) IV Intermittent every 12 hours  sevelamer carbonate 800 milliGRAM(s) Oral three times a day with meals        VITALS:  T(F): 98.1 (12-03-20 @ 07:51), Max: 98.5 (12-02-20 @ 16:29)  HR: 72 (12-03-20 @ 07:51)  BP: 105/53 (12-03-20 @ 07:51)  RR: 16 (12-03-20 @ 07:51)  SpO2: 100% (12-03-20 @ 07:51)  Wt(kg): --      PHYSICAL EXAM:  Constitutional: NAD  HEENT: anicteric sclera, oropharynx clear.  Neck: No JVD  Respiratory: CTAB, no wheezes, rales or rhonchi  Cardiovascular: S1, S2, RRR  Gastrointestinal: BS+, soft, NT/ND  Extremities:  No peripheral edema  Neurological: A/O x 3, no focal deficits  : No CVA tenderness. No salcido.   Skin: No rashes  Vascular Access: RT AVG with thrill and bruit. LT foot dressed    LABS:  12-02    133<L>  |  95<L>  |  31<H>  ----------------------------<  142<H>  4.0   |  31  |  5.70<H>    Ca    9.1      02 Dec 2020 18:45  Phos  7.3     12-02  Mg     3.1     12-02    TPro  8.2  /  Alb  3.5  /  TBili  0.4  /  DBili      /  AST  10  /  ALT  14  /  AlkPhos  66  12-02    Creatinine Trend: 5.70 <--, 10.50 <--, 7.62 <--, 6.44 <--                        12.0   5.45  )-----------( 172      ( 02 Dec 2020 07:49 )             38.4     Urine Studies:      RADIOLOGY & ADDITIONAL STUDIES:

## 2020-12-03 NOTE — PROGRESS NOTE ADULT - SUBJECTIVE AND OBJECTIVE BOX
Podiatry HPI: 60 year old female patient with PMHx DM, HLD, ESRD On HD (MWF) presents to emergency room with complaints of left 5th digit pain that has been present for the last 5 days. Patient states that she does not recall any trauma to the area, and denies any wounds to the area. She notes that there is a callus overlying, which has been there for over a year. She typically is seen by a podiatrist to have it shaved down, but the last time it was done was 5 months ago. Patient expresses concern as she has had osteomyelitis of the right 5th digit in the past, and she states that the pain in her left foot is similar to the pain she has had in the past. Currently, denies any fevers, chills, nausea, or vomiting. Denies any pain anywhere other than her foot    Podiatry progress: Patient is seen AAOx3 resting in bed. She states she has minimal pain to left 5th digit, Patient states no acute changes over night. She asks about when she is being transferred.     Patient admits to  (-) Fevers, (-) Chills, (-) Nausea, (-) Vomiting, (-) Shortness of Breath (-) calf pain (-) chest pain     Medications acetaminophen   Tablet .. 650 milliGRAM(s) Oral once PRN  aspirin  chewable 81 milliGRAM(s) Oral daily  atorvastatin 20 milliGRAM(s) Oral at bedtime  heparin   Injectable 5000 Unit(s) SubCutaneous every 12 hours  piperacillin/tazobactam IVPB.. 3.375 Gram(s) IV Intermittent every 12 hours  sevelamer carbonate 800 milliGRAM(s) Oral three times a day with meals    FHNo pertinent family history in first degree relatives    ,   PMHESRD (end stage renal disease) on dialysis    CKD (chronic kidney disease), stage IV    Cataract    Glaucoma    HLD (hyperlipidemia)    Heart failure       PSHAcute osteomyelitis of toe of right foot        Labs                          12.0   5.45  )-----------( 172      ( 02 Dec 2020 07:49 )             38.4      12-02    133<L>  |  95<L>  |  31<H>  ----------------------------<  142<H>  4.0   |  31  |  5.70<H>    Ca    9.1      02 Dec 2020 18:45  Phos  7.3     12-02  Mg     3.1     12-02    TPro  8.2  /  Alb  3.5  /  TBili  0.4  /  DBili  x   /  AST  10  /  ALT  14  /  AlkPhos  66  12-02     Vital Signs Last 24 Hrs  T(C): 36.7 (03 Dec 2020 07:51), Max: 36.9 (02 Dec 2020 13:29)  T(F): 98.1 (03 Dec 2020 07:51), Max: 98.5 (02 Dec 2020 16:29)  HR: 72 (03 Dec 2020 07:51) (72 - 77)  BP: 105/53 (03 Dec 2020 07:51) (105/53 - 164/66)  BP(mean): --  RR: 16 (03 Dec 2020 07:51) (16 - 18)  SpO2: 100% (03 Dec 2020 07:51) (99% - 100%)  Sedimentation Rate, Erythrocyte: 59 mm/Hr (11-30-20 @ 20:32)         C-Reactive Protein, Serum: 0.51 mg/dL (12-01-20 @ 03:23)         Review of Systems:  Review of Systems: CONSTITUTIONAL:  see HPI  	HEENT:  Eyes:  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  	SKIN:  No rash or itching.  	CARDIOVASCULAR:  No chest pain, chest pressure or chest discomfort. No edema.  	RESPIRATORY:  No cough or sputum.  	GASTROINTESTINAL:  No anorexia, nausea, vomiting or diarrhea. No abdominal pain or blood.  	GENITOURINARY:  No hematuria, dysuria.   	NEUROLOGICAL:  No headache, dizziness, syncope, numbness or tingling in the extremities.   	MUSCULOSKELETAL:  No muscle, back pain, joint pain or stiffness.  HEMATOLOGIC:  No bleeding or bruising.        Physical exam:  Vascular: DP pulses weakly palpable bilaterally. PT pulses non palpable. Temperature gradient warm to cool bilaterally. CFT's intact x10  Derm: Left foot 5th digit with overlying callus on the dorsolateral aspect of the PIPJ. sanguinous and scant purulent drainage noted from pin point of callous debridement on left 5th digit. No purulence noted.  Malodor present.   Neuro: Protective sensation intact bilaterally  MSK: Able to move bilateral lower extremities. 5/5 strength. Right 5th digit s/p phalangeal amputation.     ad< from: Xray Foot AP + Lateral, Left (11.30.20 @ 19:56) >  EXAM:  FOOT 2VIEWS LT                            PROCEDURE DATE:  11/30/2020          INTERPRETATION:  LEFT foot    CLINICAL INFORMATION: LEFT fifth toe pain. Assess osteomyelitis.  Pain.    TECHNIQUE: AP,lateral and oblique views.    FINDINGS: There is a cortical lucency in the distal end of the LEFT fifth proximal phalanx, concerning for osteomyelitis.  There are diffuse vascular arterial calcifications.  The remaining bones and joint spaces are preserved.  There are no fractures or dislocations.  /  IMPRESSION:    Vascular calcifications. Soft tissue swelling. Digit with a small periarticular erosion involving distal end of fifth proximal phalanx.. No fracture        If osteomyelitis is considered, despite conservative therapy, and soft tissue / bone infection requires further assessment, follow-up MRI recommended.    --------------------------------------------------------------    < from: MR Foot No Cont, Left (12.01.20 @ 12:17) >    EXAM:  MR FOOT LT                            PROCEDURE DATE:  12/01/2020          INTERPRETATION:  Clinical Information: Left foot infection rule out osteomyelitis.    Comparison: Left foot radiographs from 11/30/2020.    Technique:  MRI of the left foot.  Intravenous Contrast: None.    Findings:    There is a skin ulceration with a lateral sinus tract at the level of the distal fifth proximal phalanx (8, 18). There is and hyperintense T2 marrow signal throughout the fifth proximal phalanx withhypointense T1 marrow signal at the distal aspect of the fifth proximal phalanx consistent with osteomyelitis.    There is mild first metatarsophalangeal and moderate first metatarsal sesamoid osteoarthrosis. The visualized flexor and extensor tendons are intact.    Evaluation for soft tissue infection is limited without intravenous contrast.    Impression:  Osteomyelitis at the distal aspect of the fifth proximal phalanx with an adjacent sinus tract.            CELESTE LOVE MD; Attending Radiologist  This document has been electronically signed. Dec  1 2020 12:47PM    -----------------------------

## 2020-12-03 NOTE — PROGRESS NOTE ADULT - ASSESSMENT
# end stage renal disease . s/p hemodialysis   # LT 5th toe osteomyelitis. for transfer to [facility] for complex infra-geniculate intervention. cont ABX  HD in [facility] tommorrow if transferred today  #anemia of CKD at goal  # renal osteodystrophy. increase renvela 800>>1600 three times per day AC

## 2020-12-03 NOTE — PROGRESS NOTE ADULT - ASSESSMENT
A:   Left foot 5th digit r/o OM    P:  Patient evaluated, chart reviewed  Recommend ID consult  Answered questions to patient satisfaction  Discussed case with vascular - patient pending further vascular intervention prior to podiatry OR  No podiatric intervention until further vascular work up   Patient to be transferred to [facility]   aseptic debridement of left 5th digit callous using 15 blade with patient verbal consent  Dressed patient's left foot with betadine soaked gauze, yosef, ACE bandages  Patient seen with attending Dr. Hayes  Discussed with attending Dr. Daily   Podiatry to follow while in house

## 2020-12-03 NOTE — PROGRESS NOTE ADULT - PROBLEM SELECTOR PLAN 4
- due to ESRD   - home med: Sevelamer : continued.   - monitor Phosphate
- due to ESRD   - home med: Sevelamer : continued.   - monitor Phosphate
c/w Lipitor 20 mg once a day

## 2020-12-03 NOTE — PROGRESS NOTE ADULT - PROBLEM SELECTOR PLAN 5
-c/w Lipitor 20 mg once a day
c/w Lipitor 20 mg once a day
has h/o HTN. presently off any medication  Monitor BP

## 2020-12-04 DIAGNOSIS — Z98.890 OTHER SPECIFIED POSTPROCEDURAL STATES: Chronic | ICD-10-CM

## 2020-12-04 DIAGNOSIS — Z99.2 DEPENDENCE ON RENAL DIALYSIS: Chronic | ICD-10-CM

## 2020-12-04 LAB
ANION GAP SERPL CALC-SCNC: 20 MMO/L — HIGH (ref 7–14)
APTT BLD: 31.4 SEC — SIGNIFICANT CHANGE UP (ref 27–36.3)
BLD GP AB SCN SERPL QL: NEGATIVE — SIGNIFICANT CHANGE UP
BUN SERPL-MCNC: 63 MG/DL — HIGH (ref 7–23)
CALCIUM SERPL-MCNC: 9.2 MG/DL — SIGNIFICANT CHANGE UP (ref 8.4–10.5)
CHLORIDE SERPL-SCNC: 91 MMOL/L — LOW (ref 98–107)
CO2 SERPL-SCNC: 26 MMOL/L — SIGNIFICANT CHANGE UP (ref 22–31)
CREAT SERPL-MCNC: 9.23 MG/DL — HIGH (ref 0.5–1.3)
GLUCOSE SERPL-MCNC: 104 MG/DL — HIGH (ref 70–99)
HCT VFR BLD CALC: 37.2 % — SIGNIFICANT CHANGE UP (ref 34.5–45)
HGB BLD-MCNC: 11.4 G/DL — LOW (ref 11.5–15.5)
INR BLD: 1.01 — SIGNIFICANT CHANGE UP (ref 0.88–1.16)
MAGNESIUM SERPL-MCNC: 2.7 MG/DL — HIGH (ref 1.6–2.6)
MCHC RBC-ENTMCNC: 27.5 PG — SIGNIFICANT CHANGE UP (ref 27–34)
MCHC RBC-ENTMCNC: 30.6 % — LOW (ref 32–36)
MCV RBC AUTO: 89.9 FL — SIGNIFICANT CHANGE UP (ref 80–100)
NRBC # FLD: 0 K/UL — SIGNIFICANT CHANGE UP (ref 0–0)
PHOSPHATE SERPL-MCNC: 8.2 MG/DL — HIGH (ref 2.5–4.5)
PLATELET # BLD AUTO: 181 K/UL — SIGNIFICANT CHANGE UP (ref 150–400)
PMV BLD: 11 FL — SIGNIFICANT CHANGE UP (ref 7–13)
POTASSIUM SERPL-MCNC: 5 MMOL/L — SIGNIFICANT CHANGE UP (ref 3.5–5.3)
POTASSIUM SERPL-SCNC: 5 MMOL/L — SIGNIFICANT CHANGE UP (ref 3.5–5.3)
PROTHROM AB SERPL-ACNC: 11.5 SEC — SIGNIFICANT CHANGE UP (ref 10.6–13.6)
RBC # BLD: 4.14 M/UL — SIGNIFICANT CHANGE UP (ref 3.8–5.2)
RBC # FLD: 13.5 % — SIGNIFICANT CHANGE UP (ref 10.3–14.5)
RH IG SCN BLD-IMP: POSITIVE — SIGNIFICANT CHANGE UP
RH IG SCN BLD-IMP: POSITIVE — SIGNIFICANT CHANGE UP
SODIUM SERPL-SCNC: 137 MMOL/L — SIGNIFICANT CHANGE UP (ref 135–145)
WBC # BLD: 6.26 K/UL — SIGNIFICANT CHANGE UP (ref 3.8–10.5)
WBC # FLD AUTO: 6.26 K/UL — SIGNIFICANT CHANGE UP (ref 3.8–10.5)

## 2020-12-04 PROCEDURE — 37224: CPT | Mod: LT

## 2020-12-04 PROCEDURE — 37228: CPT | Mod: LT

## 2020-12-04 PROCEDURE — 37232: CPT | Mod: LT

## 2020-12-04 PROCEDURE — 76937 US GUIDE VASCULAR ACCESS: CPT | Mod: 26

## 2020-12-04 PROCEDURE — 75710 ARTERY X-RAYS ARM/LEG: CPT | Mod: 26,59

## 2020-12-04 RX ORDER — SEVELAMER CARBONATE 2400 MG/1
2400 POWDER, FOR SUSPENSION ORAL
Refills: 0 | Status: DISCONTINUED | OUTPATIENT
Start: 2020-12-04 | End: 2020-12-08

## 2020-12-04 RX ORDER — PIPERACILLIN AND TAZOBACTAM 4; .5 G/20ML; G/20ML
3.38 INJECTION, POWDER, LYOPHILIZED, FOR SOLUTION INTRAVENOUS EVERY 12 HOURS
Refills: 0 | Status: DISCONTINUED | OUTPATIENT
Start: 2020-12-04 | End: 2020-12-08

## 2020-12-04 RX ORDER — HEPARIN SODIUM 5000 [USP'U]/ML
5000 INJECTION INTRAVENOUS; SUBCUTANEOUS EVERY 12 HOURS
Refills: 0 | Status: DISCONTINUED | OUTPATIENT
Start: 2020-12-04 | End: 2020-12-08

## 2020-12-04 RX ORDER — ATORVASTATIN CALCIUM 80 MG/1
20 TABLET, FILM COATED ORAL AT BEDTIME
Refills: 0 | Status: DISCONTINUED | OUTPATIENT
Start: 2020-12-04 | End: 2020-12-08

## 2020-12-04 RX ORDER — CHLORHEXIDINE GLUCONATE 213 G/1000ML
1 SOLUTION TOPICAL ONCE
Refills: 0 | Status: COMPLETED | OUTPATIENT
Start: 2020-12-04 | End: 2020-12-08

## 2020-12-04 RX ORDER — SODIUM CHLORIDE 9 MG/ML
1000 INJECTION, SOLUTION INTRAVENOUS
Refills: 0 | Status: DISCONTINUED | OUTPATIENT
Start: 2020-12-04 | End: 2020-12-08

## 2020-12-04 RX ORDER — ONDANSETRON 8 MG/1
4 TABLET, FILM COATED ORAL ONCE
Refills: 0 | Status: DISCONTINUED | OUTPATIENT
Start: 2020-12-04 | End: 2020-12-04

## 2020-12-04 RX ORDER — INSULIN LISPRO 100/ML
VIAL (ML) SUBCUTANEOUS EVERY 6 HOURS
Refills: 0 | Status: DISCONTINUED | OUTPATIENT
Start: 2020-12-04 | End: 2020-12-05

## 2020-12-04 RX ORDER — FENTANYL CITRATE 50 UG/ML
25 INJECTION INTRAVENOUS
Refills: 0 | Status: DISCONTINUED | OUTPATIENT
Start: 2020-12-04 | End: 2020-12-04

## 2020-12-04 RX ORDER — GLUCAGON INJECTION, SOLUTION 0.5 MG/.1ML
1 INJECTION, SOLUTION SUBCUTANEOUS ONCE
Refills: 0 | Status: DISCONTINUED | OUTPATIENT
Start: 2020-12-04 | End: 2020-12-08

## 2020-12-04 RX ORDER — ACETAMINOPHEN 500 MG
1000 TABLET ORAL ONCE
Refills: 0 | Status: DISCONTINUED | OUTPATIENT
Start: 2020-12-04 | End: 2020-12-04

## 2020-12-04 RX ORDER — CLOPIDOGREL BISULFATE 75 MG/1
300 TABLET, FILM COATED ORAL ONCE
Refills: 0 | Status: COMPLETED | OUTPATIENT
Start: 2020-12-04 | End: 2020-12-04

## 2020-12-04 RX ORDER — DEXTROSE 50 % IN WATER 50 %
15 SYRINGE (ML) INTRAVENOUS ONCE
Refills: 0 | Status: DISCONTINUED | OUTPATIENT
Start: 2020-12-04 | End: 2020-12-08

## 2020-12-04 RX ORDER — DEXTROSE 50 % IN WATER 50 %
25 SYRINGE (ML) INTRAVENOUS ONCE
Refills: 0 | Status: DISCONTINUED | OUTPATIENT
Start: 2020-12-04 | End: 2020-12-08

## 2020-12-04 RX ORDER — ASPIRIN/CALCIUM CARB/MAGNESIUM 324 MG
81 TABLET ORAL DAILY
Refills: 0 | Status: DISCONTINUED | OUTPATIENT
Start: 2020-12-04 | End: 2020-12-04

## 2020-12-04 RX ORDER — PIPERACILLIN AND TAZOBACTAM 4; .5 G/20ML; G/20ML
3.38 INJECTION, POWDER, LYOPHILIZED, FOR SOLUTION INTRAVENOUS ONCE
Refills: 0 | Status: COMPLETED | OUTPATIENT
Start: 2020-12-04 | End: 2020-12-04

## 2020-12-04 RX ORDER — CLOPIDOGREL BISULFATE 75 MG/1
75 TABLET, FILM COATED ORAL DAILY
Refills: 0 | Status: DISCONTINUED | OUTPATIENT
Start: 2020-12-05 | End: 2020-12-08

## 2020-12-04 RX ORDER — SEVELAMER CARBONATE 2400 MG/1
800 POWDER, FOR SUSPENSION ORAL
Refills: 0 | Status: DISCONTINUED | OUTPATIENT
Start: 2020-12-04 | End: 2020-12-04

## 2020-12-04 RX ORDER — DEXTROSE 50 % IN WATER 50 %
12.5 SYRINGE (ML) INTRAVENOUS ONCE
Refills: 0 | Status: DISCONTINUED | OUTPATIENT
Start: 2020-12-04 | End: 2020-12-08

## 2020-12-04 RX ORDER — ASPIRIN/CALCIUM CARB/MAGNESIUM 324 MG
81 TABLET ORAL DAILY
Refills: 0 | Status: DISCONTINUED | OUTPATIENT
Start: 2020-12-04 | End: 2020-12-08

## 2020-12-04 RX ADMIN — ATORVASTATIN CALCIUM 20 MILLIGRAM(S): 80 TABLET, FILM COATED ORAL at 21:40

## 2020-12-04 RX ADMIN — PIPERACILLIN AND TAZOBACTAM 200 GRAM(S): 4; .5 INJECTION, POWDER, LYOPHILIZED, FOR SOLUTION INTRAVENOUS at 04:50

## 2020-12-04 RX ADMIN — Medication 1 TABLET(S): at 21:40

## 2020-12-04 RX ADMIN — HEPARIN SODIUM 5000 UNIT(S): 5000 INJECTION INTRAVENOUS; SUBCUTANEOUS at 05:31

## 2020-12-04 RX ADMIN — PIPERACILLIN AND TAZOBACTAM 25 GRAM(S): 4; .5 INJECTION, POWDER, LYOPHILIZED, FOR SOLUTION INTRAVENOUS at 16:40

## 2020-12-04 RX ADMIN — CLOPIDOGREL BISULFATE 300 MILLIGRAM(S): 75 TABLET, FILM COATED ORAL at 18:08

## 2020-12-04 RX ADMIN — Medication 81 MILLIGRAM(S): at 21:40

## 2020-12-04 RX ADMIN — SEVELAMER CARBONATE 800 MILLIGRAM(S): 2400 POWDER, FOR SUSPENSION ORAL at 07:45

## 2020-12-04 RX ADMIN — SEVELAMER CARBONATE 2400 MILLIGRAM(S): 2400 POWDER, FOR SUSPENSION ORAL at 21:40

## 2020-12-04 NOTE — H&P ADULT - HISTORY OF PRESENT ILLNESS
60 F w/ DM2, HTN, hypercholesterolemia, , CKD-4 presents as transfer from [facility]; patient states that 1 weeks ago, she began to experience left 5th toe pain after HD, worsening over the course of several days. 4 days go, she had the L 5th toe debrided by podiatry at [place]. She was admitted at that time. 2 days ago, MRI of the foot revealed OM of the toe with sinus formation. There was a discussion of possible amputation at that time. CTA A/P with runoff demonstrated focal stenosis of the L SFA and diffuse fem-pop infrainguinal occlusive disease on the right. MARTA/PVR demonstrated the following: Ankle brachial index measured 1.71 on the right and 1.65 on the left; PVR waveforms suggest flow-limiting disease in the infrapopliteal circulation bilaterally. Today the patient was transferred to [facility]. The patient denies any symptoms of claudication; however, she states that she has pain in her legs at rest only alleviated by dangling her legs over the edge of the bed. She denies fever, chills, CP, SOB, N, or V. She has some mild pain, but she is able to ambulate. She is dialyzed M/W/F through her RLE fem-fem AVG.

## 2020-12-04 NOTE — CONSULT NOTE ADULT - ATTENDING COMMENTS
[facility]  Office [phone]  Ans Serv [phone]  [facility] - [phone]

## 2020-12-04 NOTE — BRIEF OPERATIVE NOTE - NSICDXBRIEFPROCEDURE_GEN_ALL_CORE_FT
PROCEDURES:  Angioplasty, artery, posterior tibial 04-Dec-2020 15:51:02  Cortes Florian  Angioplasty, artery, anterior tibial 04-Dec-2020 15:50:51  Cortes Florian  Angioplasty, artery, femoral 04-Dec-2020 15:50:38  Cortes Florian  Angiography, extremity, left 04-Dec-2020 15:50:21  Cortes Florian

## 2020-12-04 NOTE — CONSULT NOTE ADULT - SUBJECTIVE AND OBJECTIVE BOX
Podiatry pager #: [phone] ([place])/ [phone] ([facility])    Patient is a 60y old  Female who presents with a chief complaint of Peripheral arterial disease (04 Dec 2020 02:51)      HPI:  60 F w/ DM2, HTN, hypercholesterolemia, , CKD-4 presents as transfer from [facility]; patient states that 1 weeks ago, she began to experience left 5th toe pain after HD, worsening over the course of several days. 4 days go, she had the L 5th toe debrided by podiatry at [place]. She was admitted at that time. 2 days ago, MRI of the foot revealed OM of the toe with sinus formation. There was a discussion of possible amputation at that time. CTA A/P with runoff demonstrated focal stenosis of the L SFA and diffuse fem-pop infrainguinal occlusive disease on the right. MARTA/PVR demonstrated the following: Ankle brachial index measured 1.71 on the right and 1.65 on the left; PVR waveforms suggest flow-limiting disease in the infrapopliteal circulation bilaterally. Today the patient was transferred to [facility]. The patient denies any symptoms of claudication; however, she states that she has pain in her legs at rest only alleviated by dangling her legs over the edge of the bed. She denies fever, chills, CP, SOB, N, or V. She has some mild pain, but she is able to ambulate. She is dialyzed M/W/F through her RLE fem-fem AVG.  (04 Dec 2020 02:51)      PAST MEDICAL & SURGICAL HISTORY:  ESRD (end stage renal disease) on dialysis    CKD (chronic kidney disease), stage IV    Cataract    Glaucoma    HLD (hyperlipidemia)    Heart failure    Hemodialysis access, AV graft    S/P arteriovenous (AV) fistula creation    Acute osteomyelitis of toe of right foot  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        MEDICATIONS  (STANDING):  aspirin  chewable 81 milliGRAM(s) Oral daily  atorvastatin 20 milliGRAM(s) Oral at bedtime  heparin   Injectable 5000 Unit(s) SubCutaneous every 12 hours  multivitamin 1 Tablet(s) Oral daily  piperacillin/tazobactam IVPB.. 3.375 Gram(s) IV Intermittent every 12 hours  sevelamer carbonate 800 milliGRAM(s) Oral three times a day with meals    MEDICATIONS  (PRN):      Allergies    No Known Allergies    Intolerances        VITALS:    Vital Signs Last 24 Hrs  T(C): 36.8 (04 Dec 2020 05:00), Max: 37.1 (03 Dec 2020 20:07)  T(F): 98.3 (04 Dec 2020 05:00), Max: 98.7 (03 Dec 2020 20:07)  HR: 71 (04 Dec 2020 05:00) (71 - 78)  BP: 101/71 (04 Dec 2020 05:00) (101/71 - 130/67)  BP(mean): --  RR: 16 (04 Dec 2020 05:00) (15 - 16)  SpO2: 97% (04 Dec 2020 05:00) (96% - 100%)    LABS:                          11.4   6.26  )-----------( 181      ( 04 Dec 2020 05:35 )             37.2       12-04    137  |  91<L>  |  63<H>  ----------------------------<  104<H>  5.0   |  26  |  9.23<H>    Ca    9.2      04 Dec 2020 05:35  Phos  8.2     12-04  Mg     2.7     12-04        CAPILLARY BLOOD GLUCOSE          PT/INR - ( 04 Dec 2020 05:35 )   PT: 11.5 SEC;   INR: 1.01          PTT - ( 04 Dec 2020 05:35 )  PTT:31.4 SEC    LOWER EXTREMITY PHYSICAL EXAM:    Vascular: DP/PT 0/4 B/L, CFT >3seconds B/L, Temperature gradient cool to cool B/L  Neuro: Epicritic sensation diminished to the level of digits B/L  Musculoskeletal/Ortho: unremarkable  Skin: Left foot 5th PIPJ wound to bone, no purulence, no fluctuance, no crepitus, no drainage, no acute signs of infection.     RADIOLOGY & ADDITIONAL STUDIES:  < from: Xray Foot AP + Lateral, Left (11.30.20 @ 19:56) >      < end of copied text >    < from: MR Foot No Cont, Left (12.01.20 @ 12:17) >    EXAM:  MR FOOT LT                            PROCEDURE DATE:  12/01/2020          INTERPRETATION:  Clinical Information: Left foot infection rule out osteomyelitis.    Comparison: Left foot radiographs from 11/30/2020.    Technique:  MRI of the left foot.  Intravenous Contrast: None.    Findings:    There is a skin ulceration with a lateral sinus tract at the level of the distal fifth proximal phalanx (8, 18). There is and hyperintense T2 marrow signal throughout the fifth proximal phalanx withhypointense T1 marrow signal at the distal aspect of the fifth proximal phalanx consistent with osteomyelitis.    There is mild first metatarsophalangeal and moderate first metatarsal sesamoid osteoarthrosis. The visualized flexor and extensor tendons are intact.    Evaluation for soft tissue infection is limited without intravenous contrast.    Impression:  Osteomyelitis at the distal aspect of the fifth proximal phalanx with an adjacent sinus tract.            CELESTE LOVE MD; Attending Radiologist  This document has been electronically signed. Dec  1 2020 12:47PM    < end of copied text >

## 2020-12-04 NOTE — H&P ADULT - NSICDXPASTSURGICALHX_GEN_ALL_CORE_FT
PAST SURGICAL HISTORY:  Acute osteomyelitis of toe of right foot 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    Hemodialysis access, AV graft     S/P arteriovenous (AV) fistula creation

## 2020-12-04 NOTE — CONSULT NOTE ADULT - SUBJECTIVE AND OBJECTIVE BOX
[facility] - S Jluis / Shabbir S /D Romina/ SHERRI Mcfarlane/ SHERRI Madden/ Andrés Perkins / SUNI Potts/ O Gladis  service [phone], office [phone]  ---------------------------------------------------------------------------------------------------------------  Patient seen and examined in PACU        PAST MEDICAL & SURGICAL HISTORY:  ESRD (end stage renal disease) on dialysis    CKD (chronic kidney disease), stage IV    Cataract    Glaucoma    HLD (hyperlipidemia)    Heart failure    Hemodialysis access, AV graft    S/P arteriovenous (AV) fistula creation    Acute osteomyelitis of toe of right foot  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        Allergies: No Known Allergies    Home Medications Reviewed  Hospital Medications:   MEDICATIONS  (STANDING):  aspirin  chewable 81 milliGRAM(s) Oral daily  atorvastatin 20 milliGRAM(s) Oral at bedtime  clopidogrel Tablet 300 milliGRAM(s) Oral once  dextrose 40% Gel 15 Gram(s) Oral once  dextrose 5%. 1000 milliLiter(s) (50 mL/Hr) IV Continuous <Continuous>  dextrose 5%. 1000 milliLiter(s) (100 mL/Hr) IV Continuous <Continuous>  dextrose 50% Injectable 25 Gram(s) IV Push once  dextrose 50% Injectable 12.5 Gram(s) IV Push once  dextrose 50% Injectable 25 Gram(s) IV Push once  glucagon  Injectable 1 milliGRAM(s) IntraMuscular once  heparin   Injectable 5000 Unit(s) SubCutaneous every 12 hours  insulin lispro (ADMELOG) corrective regimen sliding scale   SubCutaneous every 6 hours  multivitamin 1 Tablet(s) Oral daily  piperacillin/tazobactam IVPB.. 3.375 Gram(s) IV Intermittent every 12 hours  sevelamer carbonate 800 milliGRAM(s) Oral three times a day with meals    SOCIAL HISTORY:  Denies ETOh,Smoking, illicit drug use  FAMILY HISTORY:      REVIEW OF SYSTEMS:  CONSTITUTIONAL: No weakness, fevers or chills  EYES/ENT: No visual changes  NECK: No pain or stiffness  RESPIRATORY: No cough, wheezing, hemoptysis; No shortness of breath  CARDIOVASCULAR: No chest pain or palpitations.  GASTROINTESTINAL: No abdominal or epigastric pain. No nausea, vomiting, or hematemesis; No diarrhea or constipation. No melena or hematochezia.  NEUROLOGICAL: No numbness or weakness  SKIN: No itching, burning, rashes, or lesions   VASCULAR: No bilateral lower extremity edema.   All other review of systems is negative unless indicated above.    VITALS:  T(F): 98.1 (12-04-20 @ 15:35), Max: 98.7 (12-03-20 @ 20:07)  HR: 77 (12-04-20 @ 16:15)  BP: 127/53 (12-04-20 @ 16:15)  RR: 13 (12-04-20 @ 16:15)  SpO2: 100% (12-04-20 @ 16:15)  Wt(kg): --    12-03 @ 07:01  -  12-04 @ 07:00  --------------------------------------------------------  IN: 100 mL / OUT: 0 mL / NET: 100 mL      Height (cm): 154.9 (12-04 @ 10:08)  Weight (kg): 68.8 (12-04 @ 10:08)  BMI (kg/m2): 28.7 (12-04 @ 10:08)  BSA (m2): 1.68 (12-04 @ 10:08)    PHYSICAL EXAM:  Constitutional: NAD  HEENT: anicteric sclera  Neck: No JVD  Respiratory: CTAB, no wheezes, rales or rhonchi  Cardiovascular: S1, S2, RRR  Gastrointestinal: BS+, soft, NT/ND  Extremities: No peripheral edema  Neurological: A/O x 3  Psychiatric: Normal mood, normal affect  : No salcido.   Vascular Access: rt thigh AVG +    LABS:  12-04    137  |  91<L>  |  63<H>  ----------------------------<  104<H>  5.0   |  26  |  9.23<H>    Ca    9.2      04 Dec 2020 05:35  Phos  8.2     12-04  Mg     2.7     12-04      Creatinine Trend: 9.23 <--, 5.70 <--, 10.50 <--, 7.62 <--, 6.44 <--                        11.4   6.26  )-----------( 181      ( 04 Dec 2020 05:35 )             37.2     Urine Studies:        RADIOLOGY & ADDITIONAL STUDIES:                 [facility] - S Jluis / Shabbir S /D Romina/ S Denys/ S Chano/ Andrés Perkins / M Katlyn/ O Gladis  service [phone], office [phone]  ---------------------------------------------------------------------------------------------------------------  Patient seen and examined in PACU    60 F w/End Stage Renal Disease on Dialysis on HD MWF, well known to me from [facility], DM2, HTN, hypercholesterolemia, presented as transfer from [facility]; patient states that 1 weeks ago, she began to experience left 5th toe pain after HD, worsening over the course of several days. 4 days go, she had the L 5th toe debrided by podiatry at [place]. She was admitted at that time. 2 days ago, MRI of the foot revealed OM of the toe with sinus formation. There was a discussion of possible amputation at that time. CTA A/P with runoff demonstrated focal stenosis of the L SFA and diffuse fem-pop infrainguinal occlusive disease on the right.  Renal consulted for ESRD Mx. pt had last HD 12/2 at [facility].     PAST MEDICAL & SURGICAL HISTORY:  ESRD (end stage renal disease) on dialysis    CKD (chronic kidney disease), stage IV    Cataract    Glaucoma    HLD (hyperlipidemia)    Heart failure    Hemodialysis access, AV graft    S/P arteriovenous (AV) fistula creation    Acute osteomyelitis of toe of right foot  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        Allergies: No Known Allergies    Home Medications Reviewed  Hospital Medications:   MEDICATIONS  (STANDING):  aspirin  chewable 81 milliGRAM(s) Oral daily  atorvastatin 20 milliGRAM(s) Oral at bedtime  clopidogrel Tablet 300 milliGRAM(s) Oral once  dextrose 40% Gel 15 Gram(s) Oral once  dextrose 5%. 1000 milliLiter(s) (50 mL/Hr) IV Continuous <Continuous>  dextrose 5%. 1000 milliLiter(s) (100 mL/Hr) IV Continuous <Continuous>  dextrose 50% Injectable 25 Gram(s) IV Push once  dextrose 50% Injectable 12.5 Gram(s) IV Push once  dextrose 50% Injectable 25 Gram(s) IV Push once  glucagon  Injectable 1 milliGRAM(s) IntraMuscular once  heparin   Injectable 5000 Unit(s) SubCutaneous every 12 hours  insulin lispro (ADMELOG) corrective regimen sliding scale   SubCutaneous every 6 hours  multivitamin 1 Tablet(s) Oral daily  piperacillin/tazobactam IVPB.. 3.375 Gram(s) IV Intermittent every 12 hours  sevelamer carbonate 800 milliGRAM(s) Oral three times a day with meals    SOCIAL HISTORY:  Denies ETOh,Smoking, illicit drug use  FAMILY HISTORY:      REVIEW OF SYSTEMS:  CONSTITUTIONAL: No weakness, fevers or chills  EYES/ENT: No visual changes  NECK: No pain or stiffness  RESPIRATORY: No cough, wheezing, hemoptysis; No shortness of breath  CARDIOVASCULAR: No chest pain or palpitations.  GASTROINTESTINAL: No abdominal or epigastric pain. No nausea, vomiting, or hematemesis; No diarrhea or constipation. No melena or hematochezia.  NEUROLOGICAL: No numbness or weakness  VASCULAR: No bilateral lower extremity edema.   All other review of systems is negative unless indicated above.    VITALS:  T(F): 98.1 (12-04-20 @ 15:35), Max: 98.7 (12-03-20 @ 20:07)  HR: 77 (12-04-20 @ 16:15)  BP: 127/53 (12-04-20 @ 16:15)  RR: 13 (12-04-20 @ 16:15)  SpO2: 100% (12-04-20 @ 16:15)  Wt(kg): --    12-03 @ 07:01  -  12-04 @ 07:00  --------------------------------------------------------  IN: 100 mL / OUT: 0 mL / NET: 100 mL      Height (cm): 154.9 (12-04 @ 10:08)  Weight (kg): 68.8 (12-04 @ 10:08)  BMI (kg/m2): 28.7 (12-04 @ 10:08)  BSA (m2): 1.68 (12-04 @ 10:08)    PHYSICAL EXAM:  Constitutional: NAD  HEENT: anicteric sclera  Neck: No JVD  Respiratory: CTAB, no wheezes, rales or rhonchi  Cardiovascular: S1, S2, RRR  Gastrointestinal: BS+, soft, NT/ND  Extremities: No peripheral edema  Neurological: A/O x 3  Psychiatric: Normal mood, normal affect  : No salcido.   Vascular Access: rt thigh AVG +    LABS:  12-04    137  |  91<L>  |  63<H>  ----------------------------<  104<H>  5.0   |  26  |  9.23<H>    Ca    9.2      04 Dec 2020 05:35  Phos  8.2     12-04  Mg     2.7     12-04      Creatinine Trend: 9.23 <--, 5.70 <--, 10.50 <--, 7.62 <--, 6.44 <--                        11.4   6.26  )-----------( 181      ( 04 Dec 2020 05:35 )             37.2     Urine Studies:        RADIOLOGY & ADDITIONAL STUDIES:

## 2020-12-04 NOTE — CHART NOTE - NSCHARTNOTEFT_GEN_A_CORE
SURGERY PROGRESS NOTE    SUBJECTIVE / 24H EVENTS:  Patient seen and examined 4 hours s/p angioplasty left SFA. LORY since surgery, has remained laying flat in PACU bed.  Tolerating sips of water, denies any surgical site pain, no chest pain, SOB.      OBJECTIVE:  VITAL SIGNS:  T(C): 36.6 (12-04-20 @ 19:00), Max: 37.1 (12-03-20 @ 20:07)  HR: 79 (12-04-20 @ 19:30) (71 - 80)  BP: 113/51 (12-04-20 @ 19:30) (101/71 - 131/52)  RR: 13 (12-04-20 @ 19:30) (10 - 24)  SpO2: 99% (12-04-20 @ 19:30) (96% - 100%)  Daily Height in cm: 154.94 (04 Dec 2020 10:08)    Daily   POCT Blood Glucose.: 77 mg/dL (12-04-20 @ 17:57)  POCT Blood Glucose.: 91 mg/dL (12-04-20 @ 10:07)      PHYSICAL EXAM:  Gen: NAD  LS: Respirations unlabored   GI: Soft. Nontender. Nondistended.  Ext: Warm, well perfused, left LLE, light touch sensation intact, AT pulse (+)doppler, (5+) strength toes,  groin site dressing intact, no strike through, no edema.    12-03-20 @ 07:01  -  12-04-20 @ 07:00  --------------------------------------------------------  IN:    IV PiggyBack: 100 mL  Total IN: 100 mL    OUT:    Oral Fluid: 0 mL    Voided (mL): 0 mL  Total OUT: 0 mL    Total NET: 100 mL          LAB VALUES:  12-04    137  |  91<L>  |  63<H>  ----------------------------<  104<H>  5.0   |  26  |  9.23<H>    Ca    9.2      04 Dec 2020 05:35  Phos  8.2     12-04  Mg     2.7     12-04 11.4   6.26  )-----------( 181      ( 04 Dec 2020 05:35 )             37.2       PT/INR - ( 04 Dec 2020 05:35 )   PT: 11.5 SEC;   INR: 1.01          PTT - ( 04 Dec 2020 05:35 )  PTT:31.4 SEC          MEDICATIONS  (STANDING):  aspirin  chewable 81 milliGRAM(s) Oral daily  atorvastatin 20 milliGRAM(s) Oral at bedtime  dextrose 40% Gel 15 Gram(s) Oral once  dextrose 5%. 1000 milliLiter(s) (50 mL/Hr) IV Continuous <Continuous>  dextrose 5%. 1000 milliLiter(s) (100 mL/Hr) IV Continuous <Continuous>  dextrose 50% Injectable 25 Gram(s) IV Push once  dextrose 50% Injectable 12.5 Gram(s) IV Push once  dextrose 50% Injectable 25 Gram(s) IV Push once  glucagon  Injectable 1 milliGRAM(s) IntraMuscular once  heparin   Injectable 5000 Unit(s) SubCutaneous every 12 hours  insulin lispro (ADMELOG) corrective regimen sliding scale   SubCutaneous every 6 hours  multivitamin 1 Tablet(s) Oral daily  piperacillin/tazobactam IVPB.. 3.375 Gram(s) IV Intermittent every 12 hours  sevelamer carbonate 2400 milliGRAM(s) Oral three times a day with meals    MEDICATIONS  (PRN):  acetaminophen  IVPB .. 1000 milliGRAM(s) IV Intermittent once PRN Mild Pain (1 - 3)  fentaNYL    Injectable 25 MICROGram(s) IV Push every 5 minutes PRN Moderate Pain (4 - 6)  ondansetron Injectable 4 milliGRAM(s) IV Push once PRN Nausea and/or Vomiting       60 F w/ CKD4 and diffuse infrainguinal arterial occlusive disease now with critical limb ischemia of the LLE, 4 hours s/p angioplasty left SFA, recovering well in PACU  - Is/ Os  - pain control  - Diet: renal restriction  - am labs  - dialysis tonight  - Plan for debridement with podiatry on Monday    C Surgery team

## 2020-12-04 NOTE — H&P ADULT - NSHPPHYSICALEXAM_GEN_ALL_CORE
Vital Signs Last 24 Hrs  T(C): 36.7 (04 Dec 2020 02:00), Max: 37.1 (03 Dec 2020 20:07)  T(F): 98.1 (04 Dec 2020 02:00), Max: 98.7 (03 Dec 2020 20:07)  HR: 74 (04 Dec 2020 02:00) (71 - 78)  BP: 108/63 (04 Dec 2020 02:00) (105/53 - 130/67)  BP(mean): --  RR: 16 (04 Dec 2020 02:00) (15 - 16)  SpO2: 96% (04 Dec 2020 02:00) (96% - 100%)    General: NAD, laying in bed  Neuro: AOx3, no focal deficits  Chest: rrr, nonlabored breathing  Abdomen: s/nt/nd  Ext: NANCY has several scars from previous attempted AVF HD access surgeries; RLE palpable thrill over anterior thigh; DP/PT signals present in BLE. There is tissue loss over the lateral aspect of the left 5th toe.

## 2020-12-04 NOTE — H&P ADULT - ASSESSMENT
A/P: 60 F w/ CKD4 and diffuse infrainguinal arterial occlusive disease now with critical limb ischemia of the LLE. Stable.    - Preop and consent for angio of BLE  - NPO  - SQH, ASA, statin  - f/u AM labs  - Podiatry consult for foot wound  - Nephro consult for HD M/W/F    Vascular Surgery [phone]

## 2020-12-04 NOTE — CHART NOTE - NSCHARTNOTEFT_GEN_A_CORE
Vascular Surgery Preop Note - Angiogram    Patient is a 60y old  Female who presents with a chief complaint of Peripheral arterial disease (04 Dec 2020 02:51)    Diagnosis: Critical limb ischemia  Procedure: BLE Angiogram  Surgeon: Talat Rosado4   6.26  )-----------( 181      ( 04 Dec 2020 05:35 )             37.2     12-04    137  |  91<L>  |  63<H>  ----------------------------<  104<H>  5.0   |  26  |  9.23<H>    Ca    9.2      04 Dec 2020 05:35  Phos  8.2     12-04  Mg     2.7     12-04      PT/INR - ( 04 Dec 2020 05:35 )   PT: 11.5 SEC;   INR: 1.01          PTT - ( 04 Dec 2020 05:35 )  PTT:31.4 SEC  ABO Interpretation: O (12-04 @ 02:50)      [x] NPO after midnight  [x] T&S -1 in system, 1 ordered  [x] Pulse Exam - RLE palpable thrill over anterior thigh; DP/PT signals present in BLE  [x] COVID negative  [x] Consent        MEDICATIONS  (STANDING):  aspirin  chewable 81 milliGRAM(s) Oral daily  atorvastatin 20 milliGRAM(s) Oral at bedtime  heparin   Injectable 5000 Unit(s) SubCutaneous every 12 hours  multivitamin 1 Tablet(s) Oral daily  piperacillin/tazobactam IVPB.. 3.375 Gram(s) IV Intermittent every 12 hours  sevelamer carbonate 800 milliGRAM(s) Oral three times a day with meals    MEDICATIONS  (PRN):    Assessment & Plan:  60y Female with critical limb ischemia, angiogram today   - NPO after midnight  - IV Abx  - Pain Control  - DVT prophylaxis    Vascular Surgery  [id number]

## 2020-12-04 NOTE — H&P ADULT - NSHPLABSRESULTS_GEN_ALL_CORE
I&O's Detail  MEDICATIONS  (STANDING):  aspirin  chewable 81 milliGRAM(s) Oral daily  atorvastatin 20 milliGRAM(s) Oral at bedtime  heparin   Injectable 5000 Unit(s) SubCutaneous every 12 hours  multivitamin 1 Tablet(s) Oral daily  piperacillin/tazobactam IVPB. 3.375 Gram(s) IV Intermittent once  piperacillin/tazobactam IVPB.. 3.375 Gram(s) IV Intermittent every 12 hours  sevelamer carbonate 800 milliGRAM(s) Oral three times a day with meals    MEDICATIONS  (PRN):      LABS:                        12.0   5.45  )-----------( 172      ( 02 Dec 2020 07:49 )             38.4     12-02    133<L>  |  95<L>  |  31<H>  ----------------------------<  142<H>  4.0   |  31  |  5.70<H>    Ca    9.1      02 Dec 2020 18:45  Phos  7.3     12-02  Mg     3.1     12-02    TPro  8.2  /  Alb  3.5  /  TBili  0.4  /  DBili  x   /  AST  10  /  ALT  14  /  AlkPhos  66  12-02    PT/INR - ( 02 Dec 2020 07:49 )   PT: 11.5 sec;   INR: 0.96 ratio         PTT - ( 02 Dec 2020 07:49 )  PTT:34.2 sec  LIVER FUNCTIONS - ( 02 Dec 2020 07:49 )  Alb: 3.5 g/dL / Pro: 8.2 g/dL / ALK PHOS: 66 U/L / ALT: 14 U/L DA / AST: 10 U/L / GGT: x    CTA Abdominal Aorta w/ Runoff:  IMPRESSION:  AORTOILIAC INFLOW: No significant inflow disease.  RIGHT LEG: Multifocal femoral-popliteal disease without focal stenosis or occlusion. Right femoral AV fistula is patent. Limited evaluation of the infrapopliteal vessels secondary to diffuse calcification and small caliber.  LEFT LEG: Focal stenosis of the SFA suggesting the adductor canal. No evidence of femoral-popliteal occlusion. Infrapopliteal vessel evaluation is limited secondary to diffuse calcification and small caliber.    MRI foot:   Impression:  Osteomyelitis at the distal aspect of the fifth proximal phalanx with an adjacent sinus tract.    MARTA/PVR  IMPRESSION: Limited study.  PVR waveforms suggest flow-limiting disease in the infrapopliteal circulation bilaterally.

## 2020-12-04 NOTE — CHART NOTE - NSCHARTNOTEFT_GEN_A_CORE
CAPRINI SCORE    AGE RELATED RISK FACTORS                                                             [ ] Age 41-60 years                                            (1 Point)  [x] Age: 61-74 years                                           (2 Points)                 [ ] Age= 75 years                                                (3 Points)             DISEASE RELATED RISK FACTORS                                                       [ ] Edema in the lower extremities                 (1 Point)                     [ ] Varicose veins                                               (1 Point)                                 [x] BMI > 25 Kg/m2                                            (1 Point)                                  [ ] Serious infection (ie PNA)                            (1 Point)                     [ ] Lung disease ( COPD, Emphysema)            (1 Point)                                                                          [ ] Acute myocardial infarction                         (1 Point)                  [ ] Congestive heart failure (in the previous month)  (1 Point)         [ ] Inflammatory bowel disease                            (1 Point)                  [ ] Central venous access, PICC or Port               (2 points)       (within the last month)                                                                [ ] Stroke (in the previous month)                        (5 Points)    [ ] Previous or present malignancy                       (2 points)                                                                                                                                                         HEMATOLOGY RELATED FACTORS                                                         [ ] Prior episodes of VTE                                     (3 Points)                     [ ] Positive family history for VTE                      (3 Points)                  [ ] Prothrombin 03860 A                                     (3 Points)                     [ ] Factor V Leiden                                                (3 Points)                        [ ] Lupus anticoagulants                                      (3 Points)                                                           [ ] Anticardiolipin antibodies                              (3 Points)                                                       [ ] High homocysteine in the blood                   (3 Points)                                             [ ] Other congenital or acquired thrombophilia      (3 Points)                                                [ ] Heparin induced thrombocytopenia                  (3 Points)                                        MOBILITY RELATED FACTORS  [ ] Bed rest                                                         (1 Point)  [ ] Plaster cast                                                    (2 points)  [ ] Bed bound for more than 72 hours           (2 Points)    GENDER SPECIFIC FACTORS  [ ] Pregnancy or had a baby within the last month   (1 Point)  [ ] Post-partum < 6 weeks                                   (1 Point)  [ ] Hormonal therapy  or oral contraception   (1 Point)  [ ] History of pregnancy complications              (1 point)  [ ] Unexplained or recurrent              (1 Point)    OTHER RISK FACTORS                                           (1 Point)  BMI >40, smoking, diabetes requiring insulin, chemotherapy  blood transfusions and length of surgery over 2 hours    SURGERY RELATED RISK FACTORS  [ ]  Section within the last month     (1 Point)  [ ] Minor surgery                                                  (1 Point)  [ ] Arthroscopic surgery                                       (2 Points)  [x] Planned major surgery lasting more            (2 Points)      than 45 minutes     [ ] Elective hip or knee joint replacement       (5 points)       surgery                                                TRAUMA RELATED RISK FACTORS  [ ] Fracture of the hip, pelvis, or leg                       (5 Points)  [ ] Spinal cord injury resulting in paralysis             (5 points)       (in the previous month)    [ ] Paralysis  (less than 1 month)                             (5 Points)  [ ] Multiple Trauma within 1 month                        (5 Points)    Total Score [   5  ]    Caprini Score 0-2: Low Risk, NO VTE prophylaxis required for most patients, encourage ambulation  Caprini Score 3-6: Moderate Risk , pharmacologic VTE prophylaxis is indicated for most patients (in the absence of contraindications)  Caprini Score Greater than or =7: High risk, pharmacologic VTE prophylaxis indicated for most patients (in the absence of contraindications)

## 2020-12-05 DIAGNOSIS — E11.9 TYPE 2 DIABETES MELLITUS WITHOUT COMPLICATIONS: ICD-10-CM

## 2020-12-05 DIAGNOSIS — N18.6 END STAGE RENAL DISEASE: ICD-10-CM

## 2020-12-05 DIAGNOSIS — E78.5 HYPERLIPIDEMIA, UNSPECIFIED: ICD-10-CM

## 2020-12-05 DIAGNOSIS — M86.9 OSTEOMYELITIS, UNSPECIFIED: ICD-10-CM

## 2020-12-05 LAB
HCT VFR BLD CALC: 36.1 % — SIGNIFICANT CHANGE UP (ref 34.5–45)
HGB BLD-MCNC: 10.9 G/DL — LOW (ref 11.5–15.5)
MCHC RBC-ENTMCNC: 27.7 PG — SIGNIFICANT CHANGE UP (ref 27–34)
MCHC RBC-ENTMCNC: 30.2 GM/DL — LOW (ref 32–36)
MCV RBC AUTO: 91.6 FL — SIGNIFICANT CHANGE UP (ref 80–100)
MRSA PCR RESULT.: SIGNIFICANT CHANGE UP
NRBC # BLD: 0 /100 WBCS — SIGNIFICANT CHANGE UP
NRBC # FLD: 0 K/UL — SIGNIFICANT CHANGE UP
PLATELET # BLD AUTO: 174 K/UL — SIGNIFICANT CHANGE UP (ref 150–400)
RBC # BLD: 3.94 M/UL — SIGNIFICANT CHANGE UP (ref 3.8–5.2)
RBC # FLD: 13.3 % — SIGNIFICANT CHANGE UP (ref 10.3–14.5)
S AUREUS DNA NOSE QL NAA+PROBE: NOT DETECTED — SIGNIFICANT CHANGE UP
WBC # BLD: 5.65 K/UL — SIGNIFICANT CHANGE UP (ref 3.8–10.5)
WBC # FLD AUTO: 5.65 K/UL — SIGNIFICANT CHANGE UP (ref 3.8–10.5)

## 2020-12-05 PROCEDURE — 99223 1ST HOSP IP/OBS HIGH 75: CPT

## 2020-12-05 RX ORDER — INSULIN LISPRO 100/ML
VIAL (ML) SUBCUTANEOUS
Refills: 0 | Status: DISCONTINUED | OUTPATIENT
Start: 2020-12-05 | End: 2020-12-06

## 2020-12-05 RX ORDER — INSULIN LISPRO 100/ML
VIAL (ML) SUBCUTANEOUS AT BEDTIME
Refills: 0 | Status: DISCONTINUED | OUTPATIENT
Start: 2020-12-05 | End: 2020-12-06

## 2020-12-05 RX ORDER — SODIUM CHLORIDE 9 MG/ML
1000 INJECTION, SOLUTION INTRAVENOUS
Refills: 0 | Status: DISCONTINUED | OUTPATIENT
Start: 2020-12-05 | End: 2020-12-08

## 2020-12-05 RX ORDER — GLUCAGON INJECTION, SOLUTION 0.5 MG/.1ML
1 INJECTION, SOLUTION SUBCUTANEOUS ONCE
Refills: 0 | Status: DISCONTINUED | OUTPATIENT
Start: 2020-12-05 | End: 2020-12-08

## 2020-12-05 RX ADMIN — Medication 81 MILLIGRAM(S): at 11:21

## 2020-12-05 RX ADMIN — CLOPIDOGREL BISULFATE 75 MILLIGRAM(S): 75 TABLET, FILM COATED ORAL at 11:21

## 2020-12-05 RX ADMIN — HEPARIN SODIUM 5000 UNIT(S): 5000 INJECTION INTRAVENOUS; SUBCUTANEOUS at 23:04

## 2020-12-05 RX ADMIN — SEVELAMER CARBONATE 2400 MILLIGRAM(S): 2400 POWDER, FOR SUSPENSION ORAL at 13:11

## 2020-12-05 RX ADMIN — HEPARIN SODIUM 5000 UNIT(S): 5000 INJECTION INTRAVENOUS; SUBCUTANEOUS at 11:21

## 2020-12-05 RX ADMIN — SEVELAMER CARBONATE 2400 MILLIGRAM(S): 2400 POWDER, FOR SUSPENSION ORAL at 08:59

## 2020-12-05 RX ADMIN — Medication 1 TABLET(S): at 13:11

## 2020-12-05 RX ADMIN — PIPERACILLIN AND TAZOBACTAM 25 GRAM(S): 4; .5 INJECTION, POWDER, LYOPHILIZED, FOR SOLUTION INTRAVENOUS at 03:58

## 2020-12-05 RX ADMIN — ATORVASTATIN CALCIUM 20 MILLIGRAM(S): 80 TABLET, FILM COATED ORAL at 21:38

## 2020-12-05 RX ADMIN — SEVELAMER CARBONATE 2400 MILLIGRAM(S): 2400 POWDER, FOR SUSPENSION ORAL at 17:22

## 2020-12-05 RX ADMIN — PIPERACILLIN AND TAZOBACTAM 25 GRAM(S): 4; .5 INJECTION, POWDER, LYOPHILIZED, FOR SOLUTION INTRAVENOUS at 17:15

## 2020-12-05 NOTE — PROGRESS NOTE ADULT - SUBJECTIVE AND OBJECTIVE BOX
Podiatry pager #: [phone] ([place])/ [phone] ([facility])    Patient is a 60y old  Female who presents with a chief complaint of Peripheral arterial disease (05 Dec 2020 10:41)       INTERVAL HPI/OVERNIGHT EVENTS:  Patient seen and evaluated at bedside.  Pt is resting comfortable in NAD. Denies N/V/F/C.     Allergies    No Known Allergies    Intolerances        Vital Signs Last 24 Hrs  T(C): 36.6 (05 Dec 2020 14:22), Max: 36.9 (05 Dec 2020 09:20)  T(F): 97.8 (05 Dec 2020 14:22), Max: 98.5 (05 Dec 2020 09:20)  HR: 80 (05 Dec 2020 14:22) (64 - 80)  BP: 108/61 (05 Dec 2020 14:22) (100/57 - 132/59)  BP(mean): 67 (04 Dec 2020 19:30) (67 - 71)  RR: 18 (05 Dec 2020 14:22) (12 - 23)  SpO2: 99% (05 Dec 2020 14:22) (95% - 100%)    LABS:                        11.4   6.26  )-----------( 181      ( 04 Dec 2020 05:35 )             37.2     12-04    137  |  91<L>  |  63<H>  ----------------------------<  104<H>  5.0   |  26  |  9.23<H>    Ca    9.2      04 Dec 2020 05:35  Phos  8.2     12-04  Mg     2.7     12-04      PT/INR - ( 04 Dec 2020 05:35 )   PT: 11.5 SEC;   INR: 1.01          PTT - ( 04 Dec 2020 05:35 )  PTT:31.4 SEC    CAPILLARY BLOOD GLUCOSE      POCT Blood Glucose.: 101 mg/dL (05 Dec 2020 12:57)  POCT Blood Glucose.: 87 mg/dL (05 Dec 2020 08:43)  POCT Blood Glucose.: 107 mg/dL (05 Dec 2020 00:47)  POCT Blood Glucose.: 77 mg/dL (04 Dec 2020 17:57)      Vascular: DP/PT 0/4 B/L, CFT >3seconds B/L, Temperature gradient cool to cool B/L  Neuro: Epicritic sensation diminished to the level of digits B/L  Musculoskeletal/Ortho: unremarkable  Skin: Left foot 5th PIPJ wound to bone, no purulence, no fluctuance, no crepitus, no drainage, no acute signs of infection.

## 2020-12-05 NOTE — PROGRESS NOTE ADULT - ASSESSMENT
60 F w/ ESRD on HD, w/ diffuse infrainguinal arterial occlusive disease now with critical limb ischemia of the LLE. Renal following for ESRD Mx.     End Stage Renal Disease on Dialysis   otpt HD MWF.   K, vol acceptable. no s/o CHF  HTN, controlled  Anemia in CKD- Hg at goal, no indication for DION at this time  Hyperphosphatemia 2/2 ESRD  PAD s/p LLE angio 12/4 followed by HD  OM of the toe    s/p HD yesterday, Rx sheet reviewed, net UF 1kg, tolerated well. uneventful.  plan for next routine HD 12/7 Monday  renal restrictions in diet  renvela was increased 1>3tidac 12/4  dose all meds for ESRD    no new labs,   chart reviewed

## 2020-12-05 NOTE — CONSULT NOTE ADULT - PROBLEM SELECTOR RECOMMENDATION 9
planned for OR on Monday  pt with hx of PVD and pre-dm, off meds  she has good functional capacity and exercise tolerance  no hx of CAD/MI/CHF/arrythmia or significant valvular dz  planned for light sedation with local anesthesia   RCRI score 0, class 1 risk, 3.9 % 30-day risk of death, MI, or cardiac arrest  Prior TTE with mild-mod MR, normal cardiac fx, no AS  medically optimized for planned procedure wo further w/u  HD per renal  pt on Plavix for PAD - defer to vascular, podiatry

## 2020-12-05 NOTE — PROGRESS NOTE ADULT - ASSESSMENT
59yo F with left foot 5th digit wound to bone   -pt seen and evaluated   -Afebrile, no leukocytosis  -Left foot 5th digit wound to bone, no purulence, no fluctuance, no tracking, no erythema, no acute signs of infection   -DSD applied to left foot   - MRI 12/1 shows OM of left foot 5th digit proximal phalanx   -Surgical plan discussed w/ patient, she is agreeable  -Vasc resc apprecited  - Pod Plan for left foot partial 5th ray resection booked for Mon 12/7 at 4:30pm w/ Dr. Daily  -Please document medical/cardiac optimization for light sedation w/ local anesthesia  - Seen w/ attending

## 2020-12-05 NOTE — CONSULT NOTE ADULT - ASSESSMENT
61yo F with left foot 5th digit wound to bone   -pt seen and evaluated   -Afebrile, no leukocytosis  -Left foot 5th digit wound to bone, no purulence, no fluctuance, no tracking, no erythema, no acute signs of infection   -DSD applied to left foot   - MRI 12/1 shows OM of left foot 5th digit proximal phalanx   -Surgical plan discussed w/ patient, she is agreeable  -Vasc angio today, recommendations appreciated   - Pod Plan for left foot partial 5th ray resection tentatively booked for Mon 12/7 at 4:30pm w/ Dr. Daily  -Please document medical/cardiac optimization for light sedation w/ local anesthesia  - Discussed w/ attending   
60 F w/ ESRD on HD, w/ diffuse infrainguinal arterial occlusive disease now with critical limb ischemia of the LLE. Renal consulted for ESRD Mx.     End Stage Renal Disease on Dialysis   otpt HD MWF.   s/p last HD 12/2  K, vol acceptable. no s/o CHF  HTN, controlled  Anemia in CKD- Hg at goal, no indication for DION at this time  Hyperphosphatemia 2/2 ESRD  PAD s/p LLE angio today  OM of the toe    Informed consent for hd obtained from pt  Plan for routine HD today, arranged, w/2k bath, UF goal 1-1.5kg as tolearted  renal restrictions in diet  inc renvela 1>3tidac- changed  dose all meds for ESRD    labs, chart reviewed    
60 F w/ DM2 off meds, HTN, hypercholesterolemia, , ESRD found with L foot OM, planned for L foot partial 5th ray resection

## 2020-12-05 NOTE — PROGRESS NOTE ADULT - SUBJECTIVE AND OBJECTIVE BOX
VASCULAR SURGERY PROGRESS NOTE    SUBJECTIVE / 24H EVENTS:  Patient seen and examined on morning rounds. S/p LLE angiogram yesterday with SFA, AT, and PT angioplasty and received HD post-op. No acute events overnight. Reports feeling well this AM, pain well controlled.       OBJECTIVE:  VITAL SIGNS:  T(C): 36.8 (20 @ 05:18), Max: 36.8 (20 @ 22:40)  HR: 80 (20 @ 05:18) (64 - 80)  BP: 112/55 (20 @ 05:18) (100/57 - 132/59)  RR: 18 (20 05:18) (10 - 24)  SpO2: 100% (20 @ 05:18) (95% - 100%)  Daily Height in cm: 154.94 (04 Dec 2020 10:08)    Daily Weight in k (05 Dec 2020 02:30)  POCT Blood Glucose.: 87 mg/dL (20 @ 08:43)  POCT Blood Glucose.: 107 mg/dL (20 @ 00:47)  POCT Blood Glucose.: 77 mg/dL (20 @ 17:57)      PHYSICAL EXAM:  Gen: NAD  LS: Respirations unlabored   GI: Soft. Nontender. Nondistended  Ext: RLE with dopplerable DP and PT signals. L foot dressing taken down at bedside and replaced. Left foot 5th PIPJ wound to bone, no purulence, no fluctuance, no crepitus, no drainage, no acute signs of infection      20 @ 07:01  -  20 @ 07:00  --------------------------------------------------------  IN:    Oral Fluid: 120 mL    Other (mL): 400 mL  Total IN: 520 mL    OUT:    IV PiggyBack: 0 mL    Other (mL): 1400 mL    Voided (mL): 0 mL  Total OUT: 1400 mL    Total NET: -880 mL          LAB VALUES:      137  |  91<L>  |  63<H>  ----------------------------<  104<H>  5.0   |  26  |  9.23<H>    Ca    9.2      04 Dec 2020 05:35  Phos  8.2     12-04  Mg     2.7     12-04                                 11.4   6.26  )-----------( 181      ( 04 Dec 2020 05:35 )             37.2       PT/INR - ( 04 Dec 2020 05:35 )   PT: 11.5 SEC;   INR: 1.01          PTT - ( 04 Dec 2020 05:35 )  PTT:31.4 SEC            MICROBIOLOGY:      RADIOLOGY:  < from: MR Foot No Cont, Left (20 @ 12:17) >    Impression:  Osteomyelitis at the distal aspect of the fifth proximal phalanx with an adjacent sinus tract.    < end of copied text >        MEDICATIONS  (STANDING):  aspirin  chewable 81 milliGRAM(s) Oral daily  atorvastatin 20 milliGRAM(s) Oral at bedtime  chlorhexidine 4% Liquid 1 Application(s) Topical once  clopidogrel Tablet 75 milliGRAM(s) Oral daily  dextrose 40% Gel 15 Gram(s) Oral once  dextrose 5%. 1000 milliLiter(s) (50 mL/Hr) IV Continuous <Continuous>  dextrose 5%. 1000 milliLiter(s) (100 mL/Hr) IV Continuous <Continuous>  dextrose 5%. 1000 milliLiter(s) (100 mL/Hr) IV Continuous <Continuous>  dextrose 50% Injectable 25 Gram(s) IV Push once  dextrose 50% Injectable 12.5 Gram(s) IV Push once  dextrose 50% Injectable 25 Gram(s) IV Push once  glucagon  Injectable 1 milliGRAM(s) IntraMuscular once  glucagon  Injectable 1 milliGRAM(s) IntraMuscular once  heparin   Injectable 5000 Unit(s) SubCutaneous every 12 hours  insulin lispro (ADMELOG) corrective regimen sliding scale   SubCutaneous three times a day before meals  insulin lispro (ADMELOG) corrective regimen sliding scale   SubCutaneous at bedtime  multivitamin 1 Tablet(s) Oral daily  piperacillin/tazobactam IVPB.. 3.375 Gram(s) IV Intermittent every 12 hours  sevelamer carbonate 2400 milliGRAM(s) Oral three times a day with meals    MEDICATIONS  (PRN):

## 2020-12-05 NOTE — PROGRESS NOTE ADULT - SUBJECTIVE AND OBJECTIVE BOX
[facility] - S Jluis / Shabbir S /D Romina/ S Denys/ SHERRI Madden/ Andrés Perkins / SUNI Lubinu/ O Gladis  service -[phone], office [phone]  ---------------------------------------------------------------------------------------------------------------    Patient seen and examined bedside    Subjective and Objective: No overnight events, denied sob. No complaints today.  tolerated hd well last night    Allergies: No Known Allergies      Hospital Medications:   MEDICATIONS  (STANDING):  aspirin  chewable 81 milliGRAM(s) Oral daily  atorvastatin 20 milliGRAM(s) Oral at bedtime  chlorhexidine 4% Liquid 1 Application(s) Topical once  clopidogrel Tablet 75 milliGRAM(s) Oral daily  dextrose 40% Gel 15 Gram(s) Oral once  dextrose 5%. 1000 milliLiter(s) (50 mL/Hr) IV Continuous <Continuous>  dextrose 5%. 1000 milliLiter(s) (100 mL/Hr) IV Continuous <Continuous>  dextrose 5%. 1000 milliLiter(s) (100 mL/Hr) IV Continuous <Continuous>  dextrose 50% Injectable 25 Gram(s) IV Push once  dextrose 50% Injectable 12.5 Gram(s) IV Push once  dextrose 50% Injectable 25 Gram(s) IV Push once  glucagon  Injectable 1 milliGRAM(s) IntraMuscular once  glucagon  Injectable 1 milliGRAM(s) IntraMuscular once  heparin   Injectable 5000 Unit(s) SubCutaneous every 12 hours  insulin lispro (ADMELOG) corrective regimen sliding scale   SubCutaneous three times a day before meals  insulin lispro (ADMELOG) corrective regimen sliding scale   SubCutaneous at bedtime  multivitamin 1 Tablet(s) Oral daily  piperacillin/tazobactam IVPB.. 3.375 Gram(s) IV Intermittent every 12 hours  sevelamer carbonate 2400 milliGRAM(s) Oral three times a day with meals      VITALS:  T(F): 98.1 (12-05-20 @ 17:48), Max: 98.5 (12-05-20 @ 09:20)  HR: 76 (12-05-20 @ 17:48)  BP: 111/54 (12-05-20 @ 17:48)  RR: 16 (12-05-20 @ 17:48)  SpO2: 100% (12-05-20 @ 17:48)  Wt(kg): --    12-04 @ 07:01  -  12-05 @ 07:00  --------------------------------------------------------  IN: 520 mL / OUT: 1400 mL / NET: -880 mL    PHYSICAL EXAM:  Constitutional: NAD  HEENT: anicteric sclera  Neck: No JVD  Respiratory: CTAB, no wheezes, rales or rhonchi  Cardiovascular: S1, S2, RRR  Gastrointestinal: BS+, soft, NT/ND  Extremities: No peripheral edema  Neurological: A/O x 3  Psychiatric: Normal mood, normal affect  : No salcido.   Vascular Access: rt thigh AVG +    LABS:  12-04    137  |  91<L>  |  63<H>  ----------------------------<  104<H>  5.0   |  26  |  9.23<H>    Ca    9.2      04 Dec 2020 05:35  Phos  8.2     12-04  Mg     2.7     12-04      Creatinine Trend: 9.23 <--, 5.70 <--, 10.50 <--, 7.62 <--, 6.44 <--                        10.9   5.65  )-----------( 174      ( 05 Dec 2020 17:23 )             36.1     Urine Studies:        RADIOLOGY & ADDITIONAL STUDIES:

## 2020-12-05 NOTE — PROGRESS NOTE ADULT - ASSESSMENT
A/P: 60 F w/ CKD4 and diffuse infrainguinal arterial occlusive disease now with critical limb ischemia of the LLE s/p LLE angiogram with SFA, AT, and PT angioplasty on 12/4    - Regular diet  - SQH, ASA, statin  - Medicine consult and Cardiac consult  - Appreciate Podiatry recs:  Pod Plan for left foot partial 5th ray resection tentatively booked for Mon 12/7 at 4:30pm w/ Dr. Daily,. Please document medical/cardiac optimization for light sedation w/ local anesthesia      Vascular Surgery   [id number]

## 2020-12-05 NOTE — CONSULT NOTE ADULT - SUBJECTIVE AND OBJECTIVE BOX
60 F w/ DM2 off meds, HTN, hypercholesterolemia, , ESRD, presents as transfer from [facility]; patient states that about 1 weeks ago, she began to experience left 5th toe pain after HD, worsening over the course of several days. Pt was admitted to [facility] and she had the L 5th toe debrided by podiatry, MRI of the foot revealed OM of the toe with sinus formation. There was a discussion of possible amputation at that time. CTA with runoff demonstrated focal stenosis of the L SFA and diffuse fem-pop infrainguinal occlusive disease on the right. MARTA/PVR demonstrated the following: Ankle brachial index measured 1.71 on the right and 1.65 on the left; PVR waveforms suggest flow-limiting disease in the infrapopliteal circulation bilaterally. Pt was subsequently transferred to [facility] and is now planned for L foot partial 5th ray resection  She denies fever, chills, CP, SOB, Palpitations Syncope, Edema.. She has some mild pain, but she is able to ambulate. She is dialyzed M/W/F through her RLE fem-fem AVG. prior hospitalization records reviewed    Allergies: NKDA    PMHX: DM2, HTN, ESRD, HLD, PVD  Social hx: denies  FHX: NC        MEDICATIONS  (STANDING):  aspirin  chewable 81 milliGRAM(s) Oral daily  atorvastatin 20 milliGRAM(s) Oral at bedtime  chlorhexidine 4% Liquid 1 Application(s) Topical once  clopidogrel Tablet 75 milliGRAM(s) Oral daily  dextrose 40% Gel 15 Gram(s) Oral once  dextrose 5%. 1000 milliLiter(s) (50 mL/Hr) IV Continuous <Continuous>  dextrose 5%. 1000 milliLiter(s) (100 mL/Hr) IV Continuous <Continuous>  dextrose 5%. 1000 milliLiter(s) (100 mL/Hr) IV Continuous <Continuous>  dextrose 50% Injectable 25 Gram(s) IV Push once  dextrose 50% Injectable 12.5 Gram(s) IV Push once  dextrose 50% Injectable 25 Gram(s) IV Push once  glucagon  Injectable 1 milliGRAM(s) IntraMuscular once  glucagon  Injectable 1 milliGRAM(s) IntraMuscular once  heparin   Injectable 5000 Unit(s) SubCutaneous every 12 hours  insulin lispro (ADMELOG) corrective regimen sliding scale   SubCutaneous three times a day before meals  insulin lispro (ADMELOG) corrective regimen sliding scale   SubCutaneous at bedtime  multivitamin 1 Tablet(s) Oral daily  piperacillin/tazobactam IVPB.. 3.375 Gram(s) IV Intermittent every 12 hours  sevelamer carbonate 2400 milliGRAM(s) Oral three times a day with meals    MEDICATIONS  (PRN):      T(C): 36.6 (12-05-20 @ 14:22)  HR: 80 (12-05-20 @ 14:22)  BP: 108/61 (12-05-20 @ 14:22)  RR: 18 (12-05-20 @ 14:22)  SpO2: 99% (12-05-20 @ 14:22)  CAPILLARY BLOOD GLUCOSE      POCT Blood Glucose.: 101 mg/dL (05 Dec 2020 12:57)  POCT Blood Glucose.: 87 mg/dL (05 Dec 2020 08:43)  POCT Blood Glucose.: 107 mg/dL (05 Dec 2020 00:47)  POCT Blood Glucose.: 77 mg/dL (04 Dec 2020 17:57)    I&O's Summary    04 Dec 2020 07:01  -  05 Dec 2020 07:00  --------------------------------------------------------  IN: 520 mL / OUT: 1400 mL / NET: -880 mL            LABS:                        11.4   6.26  )-----------( 181      ( 04 Dec 2020 05:35 )             37.2     12-04    137  |  91<L>  |  63<H>  ----------------------------<  104<H>  5.0   |  26  |  9.23<H>    Ca    9.2      04 Dec 2020 05:35  Phos  8.2     12-04  Mg     2.7     12-04      PT/INR - ( 04 Dec 2020 05:35 )   PT: 11.5 SEC;   INR: 1.01          PTT - ( 04 Dec 2020 05:35 )  PTT:31.4 SEC              RADIOLOGY & ADDITIONAL TESTS: EKG personally reviewed - NSR, no acute ischemic ST-T segment changes      Imaging Personally Reviewed:    Consultant(s) Notes Reviewed:      Care Discussed with Consultants/Other Providers:

## 2020-12-05 NOTE — PROGRESS NOTE ADULT - SUBJECTIVE AND OBJECTIVE BOX
ANESTHESIA POSTOP CHECK    60y Female POSTOP DAY 1     No COMPLAINTS    NO APPARENT ANESTHESIA COMPLICATIONS

## 2020-12-06 ENCOUNTER — TRANSCRIPTION ENCOUNTER (OUTPATIENT)
Age: 60
End: 2020-12-06

## 2020-12-06 DIAGNOSIS — M86.072 ACUTE HEMATOGENOUS OSTEOMYELITIS, LEFT ANKLE AND FOOT: ICD-10-CM

## 2020-12-06 DIAGNOSIS — E78.5 HYPERLIPIDEMIA, UNSPECIFIED: ICD-10-CM

## 2020-12-06 LAB
ANION GAP SERPL CALC-SCNC: 15 MMOL/L — HIGH (ref 7–14)
BUN SERPL-MCNC: 33 MG/DL — HIGH (ref 7–23)
CALCIUM SERPL-MCNC: 9.2 MG/DL — SIGNIFICANT CHANGE UP (ref 8.4–10.5)
CHLORIDE SERPL-SCNC: 95 MMOL/L — LOW (ref 98–107)
CO2 SERPL-SCNC: 28 MMOL/L — SIGNIFICANT CHANGE UP (ref 22–31)
CREAT SERPL-MCNC: 7.27 MG/DL — HIGH (ref 0.5–1.3)
CULTURE RESULTS: SIGNIFICANT CHANGE UP
GLUCOSE SERPL-MCNC: 79 MG/DL — SIGNIFICANT CHANGE UP (ref 70–99)
HCT VFR BLD CALC: 33.1 % — LOW (ref 34.5–45)
HCV AB S/CO SERPL IA: 0.1 S/CO — SIGNIFICANT CHANGE UP (ref 0–0.99)
HCV AB SERPL-IMP: SIGNIFICANT CHANGE UP
HGB BLD-MCNC: 10.2 G/DL — LOW (ref 11.5–15.5)
MAGNESIUM SERPL-MCNC: 2.7 MG/DL — HIGH (ref 1.6–2.6)
MCHC RBC-ENTMCNC: 28 PG — SIGNIFICANT CHANGE UP (ref 27–34)
MCHC RBC-ENTMCNC: 30.8 GM/DL — LOW (ref 32–36)
MCV RBC AUTO: 90.9 FL — SIGNIFICANT CHANGE UP (ref 80–100)
NRBC # BLD: 0 /100 WBCS — SIGNIFICANT CHANGE UP
NRBC # FLD: 0 K/UL — SIGNIFICANT CHANGE UP
ORGANISM # SPEC MICROSCOPIC CNT: SIGNIFICANT CHANGE UP
PHOSPHATE SERPL-MCNC: 5 MG/DL — HIGH (ref 2.5–4.5)
PLATELET # BLD AUTO: 166 K/UL — SIGNIFICANT CHANGE UP (ref 150–400)
POTASSIUM SERPL-MCNC: 4.6 MMOL/L — SIGNIFICANT CHANGE UP (ref 3.5–5.3)
POTASSIUM SERPL-SCNC: 4.6 MMOL/L — SIGNIFICANT CHANGE UP (ref 3.5–5.3)
RBC # BLD: 3.64 M/UL — LOW (ref 3.8–5.2)
RBC # FLD: 13.4 % — SIGNIFICANT CHANGE UP (ref 10.3–14.5)
SODIUM SERPL-SCNC: 138 MMOL/L — SIGNIFICANT CHANGE UP (ref 135–145)
SPECIMEN SOURCE: SIGNIFICANT CHANGE UP
WBC # BLD: 5.55 K/UL — SIGNIFICANT CHANGE UP (ref 3.8–10.5)
WBC # FLD AUTO: 5.55 K/UL — SIGNIFICANT CHANGE UP (ref 3.8–10.5)

## 2020-12-06 PROCEDURE — 71045 X-RAY EXAM CHEST 1 VIEW: CPT | Mod: 26

## 2020-12-06 PROCEDURE — 99233 SBSQ HOSP IP/OBS HIGH 50: CPT

## 2020-12-06 RX ORDER — ERYTHROPOIETIN 10000 [IU]/ML
8000 INJECTION, SOLUTION INTRAVENOUS; SUBCUTANEOUS
Refills: 0 | Status: DISCONTINUED | OUTPATIENT
Start: 2020-12-06 | End: 2020-12-08

## 2020-12-06 RX ORDER — INSULIN LISPRO 100/ML
VIAL (ML) SUBCUTANEOUS EVERY 6 HOURS
Refills: 0 | Status: DISCONTINUED | OUTPATIENT
Start: 2020-12-06 | End: 2020-12-08

## 2020-12-06 RX ADMIN — PIPERACILLIN AND TAZOBACTAM 25 GRAM(S): 4; .5 INJECTION, POWDER, LYOPHILIZED, FOR SOLUTION INTRAVENOUS at 17:16

## 2020-12-06 RX ADMIN — HEPARIN SODIUM 5000 UNIT(S): 5000 INJECTION INTRAVENOUS; SUBCUTANEOUS at 23:16

## 2020-12-06 RX ADMIN — SEVELAMER CARBONATE 2400 MILLIGRAM(S): 2400 POWDER, FOR SUSPENSION ORAL at 11:52

## 2020-12-06 RX ADMIN — ATORVASTATIN CALCIUM 20 MILLIGRAM(S): 80 TABLET, FILM COATED ORAL at 21:29

## 2020-12-06 RX ADMIN — PIPERACILLIN AND TAZOBACTAM 25 GRAM(S): 4; .5 INJECTION, POWDER, LYOPHILIZED, FOR SOLUTION INTRAVENOUS at 03:48

## 2020-12-06 RX ADMIN — HEPARIN SODIUM 5000 UNIT(S): 5000 INJECTION INTRAVENOUS; SUBCUTANEOUS at 11:52

## 2020-12-06 RX ADMIN — SEVELAMER CARBONATE 2400 MILLIGRAM(S): 2400 POWDER, FOR SUSPENSION ORAL at 09:13

## 2020-12-06 RX ADMIN — Medication 1 TABLET(S): at 11:52

## 2020-12-06 RX ADMIN — Medication 81 MILLIGRAM(S): at 11:52

## 2020-12-06 RX ADMIN — SEVELAMER CARBONATE 2400 MILLIGRAM(S): 2400 POWDER, FOR SUSPENSION ORAL at 17:16

## 2020-12-06 RX ADMIN — CLOPIDOGREL BISULFATE 75 MILLIGRAM(S): 75 TABLET, FILM COATED ORAL at 11:52

## 2020-12-06 NOTE — PROGRESS NOTE ADULT - SUBJECTIVE AND OBJECTIVE BOX
Patrick Tejeda MD  [employer] Hospitalist  Pager [phone]  Email: [email or web address]          PROGRESS NOTE:     Patient is a 60y old  Female who presents with a chief complaint of Peripheral arterial disease (05 Dec 2020 18:17)      SUBJECTIVE / OVERNIGHT EVENTS:  Patient seen and examined this morning. No overnight events. Denies any chest pain, shortness of breath, fevers, chills, nausea or vomiting.    MEDICATIONS  (STANDING):  aspirin  chewable 81 milliGRAM(s) Oral daily  atorvastatin 20 milliGRAM(s) Oral at bedtime  chlorhexidine 4% Liquid 1 Application(s) Topical once  clopidogrel Tablet 75 milliGRAM(s) Oral daily  dextrose 40% Gel 15 Gram(s) Oral once  dextrose 5%. 1000 milliLiter(s) (50 mL/Hr) IV Continuous <Continuous>  dextrose 5%. 1000 milliLiter(s) (100 mL/Hr) IV Continuous <Continuous>  dextrose 5%. 1000 milliLiter(s) (100 mL/Hr) IV Continuous <Continuous>  dextrose 50% Injectable 25 Gram(s) IV Push once  dextrose 50% Injectable 12.5 Gram(s) IV Push once  dextrose 50% Injectable 25 Gram(s) IV Push once  glucagon  Injectable 1 milliGRAM(s) IntraMuscular once  glucagon  Injectable 1 milliGRAM(s) IntraMuscular once  heparin   Injectable 5000 Unit(s) SubCutaneous every 12 hours  insulin lispro (ADMELOG) corrective regimen sliding scale   SubCutaneous three times a day before meals  insulin lispro (ADMELOG) corrective regimen sliding scale   SubCutaneous at bedtime  multivitamin 1 Tablet(s) Oral daily  piperacillin/tazobactam IVPB.. 3.375 Gram(s) IV Intermittent every 12 hours  sevelamer carbonate 2400 milliGRAM(s) Oral three times a day with meals    MEDICATIONS  (PRN):      CAPILLARY BLOOD GLUCOSE      POCT Blood Glucose.: 84 mg/dL (06 Dec 2020 08:28)  POCT Blood Glucose.: 116 mg/dL (05 Dec 2020 22:06)  POCT Blood Glucose.: 85 mg/dL (05 Dec 2020 17:30)  POCT Blood Glucose.: 101 mg/dL (05 Dec 2020 12:57)    I&O's Summary    05 Dec 2020 07:01  -  06 Dec 2020 07:00  --------------------------------------------------------  IN: 340 mL / OUT: 0 mL / NET: 340 mL        PHYSICAL EXAM:  Vital Signs Last 24 Hrs  T(C): 36.9 (06 Dec 2020 09:44), Max: 37.4 (06 Dec 2020 05:08)  T(F): 98.4 (06 Dec 2020 09:44), Max: 99.3 (06 Dec 2020 05:08)  HR: 78 (06 Dec 2020 09:44) (76 - 80)  BP: 99/55 (06 Dec 2020 09:44) (98/45 - 117/59)  BP(mean): --  RR: 18 (06 Dec 2020 09:44) (16 - 18)  SpO2: 100% (06 Dec 2020 09:44) (99% - 100%)    CONSTITUTIONAL: NAD, well-developed  RESPIRATORY: Normal respiratory effort; lungs are clear to auscultation bilaterally  CARDIOVASCULAR: Regular rate and rhythm, normal S1 and S2, +murmur, no rub/gallop; No lower extremity edema; Peripheral pulses are 2+ bilaterally  ABDOMEN: Nontender to palpation, normoactive bowel sounds, no rebound/guarding; No hepatosplenomegaly  MUSCLOSKELETAL: Right thigh avg, no peripheral edema, left foot wound wrapped  PSYCH: A+O to person, place, and time; affect appropriate    LABS:                        10.2   5.55  )-----------( 166      ( 06 Dec 2020 08:42 )             33.1     12-06    138  |  95<L>  |  33<H>  ----------------------------<  79  4.6   |  28  |  7.27<H>    Ca    9.2      06 Dec 2020 08:42  Phos  5.0     12-06  Mg     2.7     12-06                  RADIOLOGY & ADDITIONAL TESTS:  Results Reviewed:   Imaging Personally Reviewed:  Electrocardiogram Personally Reviewed:    COORDINATION OF CARE:  Care Discussed with Consultants/Other Providers [Y/N]:  Prior or Outpatient Records Reviewed [Y/N]:

## 2020-12-06 NOTE — PRE-OP CHECKLIST - SELECT TESTS ORDERED
Type and Screen/COVID/PT/PTT/BMP/INR PT/PTT/INR/CXR/COVID/Type and Screen/BMP COVID/INR/Type and Screen/PT/PTT/CXR/BMP/EKG

## 2020-12-06 NOTE — PROGRESS NOTE ADULT - SUBJECTIVE AND OBJECTIVE BOX
[facility] - S Jluis / Shabbir S /D Romina/ S Denys/ S Chano/ Andrés Perkins / SUNI Potts/ O Gladis  service -[phone], office [phone]  ---------------------------------------------------------------------------------------------------------------    Patient seen and examined bedside    Subjective and Objective: No overnight events, denied sob. No complaints today.    Allergies: No Known Allergies      Hospital Medications:   MEDICATIONS  (STANDING):  aspirin  chewable 81 milliGRAM(s) Oral daily  atorvastatin 20 milliGRAM(s) Oral at bedtime  chlorhexidine 4% Liquid 1 Application(s) Topical once  clopidogrel Tablet 75 milliGRAM(s) Oral daily  dextrose 40% Gel 15 Gram(s) Oral once  dextrose 5%. 1000 milliLiter(s) (50 mL/Hr) IV Continuous <Continuous>  dextrose 5%. 1000 milliLiter(s) (100 mL/Hr) IV Continuous <Continuous>  dextrose 5%. 1000 milliLiter(s) (100 mL/Hr) IV Continuous <Continuous>  dextrose 50% Injectable 25 Gram(s) IV Push once  dextrose 50% Injectable 12.5 Gram(s) IV Push once  dextrose 50% Injectable 25 Gram(s) IV Push once  glucagon  Injectable 1 milliGRAM(s) IntraMuscular once  glucagon  Injectable 1 milliGRAM(s) IntraMuscular once  heparin   Injectable 5000 Unit(s) SubCutaneous every 12 hours  insulin lispro (ADMELOG) corrective regimen sliding scale   SubCutaneous three times a day before meals  insulin lispro (ADMELOG) corrective regimen sliding scale   SubCutaneous at bedtime  multivitamin 1 Tablet(s) Oral daily  piperacillin/tazobactam IVPB.. 3.375 Gram(s) IV Intermittent every 12 hours  sevelamer carbonate 2400 milliGRAM(s) Oral three times a day with meals      VITALS:  T(F): 98.2 (12-06-20 @ 14:03), Max: 99.3 (12-06-20 @ 05:08)  HR: 77 (12-06-20 @ 14:03)  BP: 103/60 (12-06-20 @ 14:03)  RR: 16 (12-06-20 @ 14:03)  SpO2: 100% (12-06-20 @ 14:03)  Wt(kg): --    12-05 @ 07:01  -  12-06 @ 07:00  --------------------------------------------------------  IN: 340 mL / OUT: 0 mL / NET: 340 mL      Height (cm): 154.9 (12-06 @ 14:09)  Weight (kg): 68.8 (12-06 @ 14:09)  BMI (kg/m2): 28.7 (12-06 @ 14:09)  BSA (m2): 1.68 (12-06 @ 14:09)    PHYSICAL EXAM:  Constitutional: NAD  HEENT: anicteric sclera  Neck: No JVD  Respiratory: CTAB, no wheezes, rales or rhonchi  Cardiovascular: S1, S2, RRR  Gastrointestinal: BS+, soft, NT/ND  Extremities: No peripheral edema  Neurological: A/O x 3  Psychiatric: Normal mood, normal affect  : No salcido.   Vascular Access: rt thigh AVG +    LABS:  12-06    138  |  95<L>  |  33<H>  ----------------------------<  79  4.6   |  28  |  7.27<H>    Ca    9.2      06 Dec 2020 08:42  Phos  5.0     12-06  Mg     2.7     12-06      Creatinine Trend: 7.27 <--, 9.23 <--, 5.70 <--, 10.50 <--, 7.62 <--, 6.44 <--                        10.2   5.55  )-----------( 166      ( 06 Dec 2020 08:42 )             33.1     Urine Studies:        RADIOLOGY & ADDITIONAL STUDIES:

## 2020-12-06 NOTE — PROGRESS NOTE ADULT - ASSESSMENT
60 F w/ DM2 off meds, HTN, hypercholesterolemia, , ESRD found with L foot OM, planned for L foot partial 5th ray resection

## 2020-12-06 NOTE — PROGRESS NOTE ADULT - SUBJECTIVE AND OBJECTIVE BOX
SUBJECTIVE / 24H EVENTS:  NAEON. Reports feeling well this AM, pain well controlled. No CP/SOB      OBJECTIVE:  PHYSICAL EXAM:  Gen: NAD  LS: Respirations unlabored   GI: Soft. Nontender. Nondistended  Ext: RLE with dopplerable DP and PT signals.     Vital Signs Last 24 Hrs  T(C): 36.9 (06 Dec 2020 09:44), Max: 37.4 (06 Dec 2020 05:08)  T(F): 98.4 (06 Dec 2020 09:44), Max: 99.3 (06 Dec 2020 05:08)  HR: 78 (06 Dec 2020 09:44) (76 - 80)  BP: 99/55 (06 Dec 2020 09:44) (98/45 - 117/59)  BP(mean): --  RR: 18 (06 Dec 2020 09:44) (16 - 18)  SpO2: 100% (06 Dec 2020 09:44) (99% - 100%)    I&O's Detail    05 Dec 2020 07:01  -  06 Dec 2020 07:00  --------------------------------------------------------  IN:    IV PiggyBack: 100 mL    Oral Fluid: 240 mL  Total IN: 340 mL    OUT:    Voided (mL): 0 mL  Total OUT: 0 mL    Total NET: 340 mL      MEDICATIONS  (STANDING):  aspirin  chewable 81 milliGRAM(s) Oral daily  atorvastatin 20 milliGRAM(s) Oral at bedtime  chlorhexidine 4% Liquid 1 Application(s) Topical once  clopidogrel Tablet 75 milliGRAM(s) Oral daily  dextrose 40% Gel 15 Gram(s) Oral once  dextrose 5%. 1000 milliLiter(s) (50 mL/Hr) IV Continuous <Continuous>  dextrose 5%. 1000 milliLiter(s) (100 mL/Hr) IV Continuous <Continuous>  dextrose 5%. 1000 milliLiter(s) (100 mL/Hr) IV Continuous <Continuous>  dextrose 50% Injectable 25 Gram(s) IV Push once  dextrose 50% Injectable 12.5 Gram(s) IV Push once  dextrose 50% Injectable 25 Gram(s) IV Push once  glucagon  Injectable 1 milliGRAM(s) IntraMuscular once  glucagon  Injectable 1 milliGRAM(s) IntraMuscular once  heparin   Injectable 5000 Unit(s) SubCutaneous every 12 hours  insulin lispro (ADMELOG) corrective regimen sliding scale   SubCutaneous three times a day before meals  insulin lispro (ADMELOG) corrective regimen sliding scale   SubCutaneous at bedtime  multivitamin 1 Tablet(s) Oral daily  piperacillin/tazobactam IVPB.. 3.375 Gram(s) IV Intermittent every 12 hours  sevelamer carbonate 2400 milliGRAM(s) Oral three times a day with meals    MEDICATIONS  (PRN):      LABS:                        10.2   5.55  )-----------( 166      ( 06 Dec 2020 08:42 )             33.1     12-06    138  |  95<L>  |  33<H>  ----------------------------<  79  4.6   |  28  |  7.27<H>    Ca    9.2      06 Dec 2020 08:42  Phos  5.0     12-06  Mg     2.7     12-06

## 2020-12-06 NOTE — PROGRESS NOTE ADULT - PROBLEM SELECTOR PLAN 1
Planned for OR on Monday  pt with hx of PVD and pre-dm, off meds  she has good functional capacity and exercise tolerance  no hx of CAD/MI/CHF/arrythmia or significant valvular dz  planned for light sedation with local anesthesia   RCRI score 0, class 1 risk, 3.9 % 30-day risk of death, MI, or cardiac arrest  Prior TTE with mild-mod MR, normal cardiac fx, no AS  medically optimized for planned procedure wo need for further w/u  HD per renal  pt on Plavix for PAD - defer to vascular, podiatry.

## 2020-12-06 NOTE — PROVIDER CONTACT NOTE (OTHER) - BACKGROUND
Transfer from  [place] s/p debridement of left 5th toe (diabetic ulcer)   S/P angioplasty and angiogram 12/4. Dialysis pt

## 2020-12-06 NOTE — PROGRESS NOTE ADULT - ASSESSMENT
60 F w/ ESRD on HD, w/ diffuse infrainguinal arterial occlusive disease now with critical limb ischemia of the LLE. Renal following for ESRD Mx.     End Stage Renal Disease on Dialysis   otpt HD MWF.   K, vol acceptable. no s/o CHF  s/p HD 12/4   HTN, controlled  Anemia in CKD- Hg at goal, but trending down, will add DION w/hd  Hyperphosphatemia 2/2 ESRD- phos better, at goal now  PAD s/p LLE angio 12/4 followed by HD  OM of the toe    plan for next routine HD 12/7 Monday AM w/2k bath, uf 1.5kg as tolerated  renal restrictions in diet  c/w renvela 3tidac   dose all meds for ESRD  plan for toe amputation tomorrow after HD    labs, chart reviewed

## 2020-12-06 NOTE — PROGRESS NOTE ADULT - ASSESSMENT
A/P: 60 F w/ CKD4 and diffuse infrainguinal arterial occlusive disease now with critical limb ischemia of the LLE s/p LLE angiogram with SFA, AT, and PT angioplasty on 12/4    - Regular diet  - SQH, ASA, statin  - Medicine consult and Cardiac consult: need clearance for Pods OR tomorrow  - Appreciate Podiatry recs:  Pod Plan for left foot partial 5th ray resection tentatively booked for Mon 12/7 at 2pm w/ Dr. Daily,. Please document medical/cardiac optimization for light sedation w/ local anesthesia  - HD tomorrow after OR    Vascular Surgery   [id number]

## 2020-12-07 ENCOUNTER — RESULT REVIEW (OUTPATIENT)
Age: 60
End: 2020-12-07

## 2020-12-07 ENCOUNTER — TRANSCRIPTION ENCOUNTER (OUTPATIENT)
Age: 60
End: 2020-12-07

## 2020-12-07 LAB
ANION GAP SERPL CALC-SCNC: 12 MMOL/L — SIGNIFICANT CHANGE UP (ref 7–14)
ANION GAP SERPL CALC-SCNC: 13 MMOL/L — SIGNIFICANT CHANGE UP (ref 7–14)
ANION GAP SERPL CALC-SCNC: 17 MMOL/L — HIGH (ref 7–14)
APTT BLD: 35.8 SEC — SIGNIFICANT CHANGE UP (ref 27–36.3)
BLD GP AB SCN SERPL QL: NEGATIVE — SIGNIFICANT CHANGE UP
BUN SERPL-MCNC: 14 MG/DL — SIGNIFICANT CHANGE UP (ref 7–23)
BUN SERPL-MCNC: 16 MG/DL — SIGNIFICANT CHANGE UP (ref 7–23)
BUN SERPL-MCNC: 47 MG/DL — HIGH (ref 7–23)
CALCIUM SERPL-MCNC: 9 MG/DL — SIGNIFICANT CHANGE UP (ref 8.4–10.5)
CALCIUM SERPL-MCNC: 9.3 MG/DL — SIGNIFICANT CHANGE UP (ref 8.4–10.5)
CALCIUM SERPL-MCNC: 9.8 MG/DL — SIGNIFICANT CHANGE UP (ref 8.4–10.5)
CHLORIDE SERPL-SCNC: 92 MMOL/L — LOW (ref 98–107)
CHLORIDE SERPL-SCNC: 94 MMOL/L — LOW (ref 98–107)
CHLORIDE SERPL-SCNC: 95 MMOL/L — LOW (ref 98–107)
CO2 SERPL-SCNC: 27 MMOL/L — SIGNIFICANT CHANGE UP (ref 22–31)
CO2 SERPL-SCNC: 29 MMOL/L — SIGNIFICANT CHANGE UP (ref 22–31)
CO2 SERPL-SCNC: 32 MMOL/L — HIGH (ref 22–31)
CREAT SERPL-MCNC: 3.42 MG/DL — HIGH (ref 0.5–1.3)
CREAT SERPL-MCNC: 4.43 MG/DL — HIGH (ref 0.5–1.3)
CREAT SERPL-MCNC: 9.02 MG/DL — HIGH (ref 0.5–1.3)
GLUCOSE SERPL-MCNC: 119 MG/DL — HIGH (ref 70–99)
GLUCOSE SERPL-MCNC: 66 MG/DL — LOW (ref 70–99)
GLUCOSE SERPL-MCNC: 76 MG/DL — SIGNIFICANT CHANGE UP (ref 70–99)
HCT VFR BLD CALC: 31.6 % — LOW (ref 34.5–45)
HCT VFR BLD CALC: 33.1 % — LOW (ref 34.5–45)
HCT VFR BLD CALC: 37.1 % — SIGNIFICANT CHANGE UP (ref 34.5–45)
HGB BLD-MCNC: 10 G/DL — LOW (ref 11.5–15.5)
HGB BLD-MCNC: 10.3 G/DL — LOW (ref 11.5–15.5)
HGB BLD-MCNC: 11.5 G/DL — SIGNIFICANT CHANGE UP (ref 11.5–15.5)
INR BLD: 1 RATIO — SIGNIFICANT CHANGE UP (ref 0.88–1.17)
MAGNESIUM SERPL-MCNC: 2.3 MG/DL — SIGNIFICANT CHANGE UP (ref 1.6–2.6)
MCHC RBC-ENTMCNC: 27.5 PG — SIGNIFICANT CHANGE UP (ref 27–34)
MCHC RBC-ENTMCNC: 27.9 PG — SIGNIFICANT CHANGE UP (ref 27–34)
MCHC RBC-ENTMCNC: 28.3 PG — SIGNIFICANT CHANGE UP (ref 27–34)
MCHC RBC-ENTMCNC: 30.2 GM/DL — LOW (ref 32–36)
MCHC RBC-ENTMCNC: 31 GM/DL — LOW (ref 32–36)
MCHC RBC-ENTMCNC: 32.6 GM/DL — SIGNIFICANT CHANGE UP (ref 32–36)
MCV RBC AUTO: 86.8 FL — SIGNIFICANT CHANGE UP (ref 80–100)
MCV RBC AUTO: 88.8 FL — SIGNIFICANT CHANGE UP (ref 80–100)
MCV RBC AUTO: 92.5 FL — SIGNIFICANT CHANGE UP (ref 80–100)
NRBC # BLD: 0 /100 WBCS — SIGNIFICANT CHANGE UP
NRBC # FLD: 0 K/UL — SIGNIFICANT CHANGE UP
PHOSPHATE SERPL-MCNC: 2.8 MG/DL — SIGNIFICANT CHANGE UP (ref 2.5–4.5)
PLATELET # BLD AUTO: 166 K/UL — SIGNIFICANT CHANGE UP (ref 150–400)
PLATELET # BLD AUTO: 181 K/UL — SIGNIFICANT CHANGE UP (ref 150–400)
PLATELET # BLD AUTO: 213 K/UL — SIGNIFICANT CHANGE UP (ref 150–400)
POTASSIUM SERPL-MCNC: 3.3 MMOL/L — LOW (ref 3.5–5.3)
POTASSIUM SERPL-MCNC: 3.7 MMOL/L — SIGNIFICANT CHANGE UP (ref 3.5–5.3)
POTASSIUM SERPL-MCNC: 5.1 MMOL/L — SIGNIFICANT CHANGE UP (ref 3.5–5.3)
POTASSIUM SERPL-SCNC: 3.3 MMOL/L — LOW (ref 3.5–5.3)
POTASSIUM SERPL-SCNC: 3.7 MMOL/L — SIGNIFICANT CHANGE UP (ref 3.5–5.3)
POTASSIUM SERPL-SCNC: 5.1 MMOL/L — SIGNIFICANT CHANGE UP (ref 3.5–5.3)
PROTHROM AB SERPL-ACNC: 11.4 SEC — SIGNIFICANT CHANGE UP (ref 9.8–13.1)
RBC # BLD: 3.58 M/UL — LOW (ref 3.8–5.2)
RBC # BLD: 3.64 M/UL — LOW (ref 3.8–5.2)
RBC # BLD: 4.18 M/UL — SIGNIFICANT CHANGE UP (ref 3.8–5.2)
RBC # FLD: 13.1 % — SIGNIFICANT CHANGE UP (ref 10.3–14.5)
RBC # FLD: 13.2 % — SIGNIFICANT CHANGE UP (ref 10.3–14.5)
RBC # FLD: 13.2 % — SIGNIFICANT CHANGE UP (ref 10.3–14.5)
RH IG SCN BLD-IMP: POSITIVE — SIGNIFICANT CHANGE UP
SODIUM SERPL-SCNC: 136 MMOL/L — SIGNIFICANT CHANGE UP (ref 135–145)
SODIUM SERPL-SCNC: 137 MMOL/L — SIGNIFICANT CHANGE UP (ref 135–145)
SODIUM SERPL-SCNC: 138 MMOL/L — SIGNIFICANT CHANGE UP (ref 135–145)
WBC # BLD: 4.71 K/UL — SIGNIFICANT CHANGE UP (ref 3.8–10.5)
WBC # BLD: 5.56 K/UL — SIGNIFICANT CHANGE UP (ref 3.8–10.5)
WBC # BLD: 5.99 K/UL — SIGNIFICANT CHANGE UP (ref 3.8–10.5)
WBC # FLD AUTO: 4.71 K/UL — SIGNIFICANT CHANGE UP (ref 3.8–10.5)
WBC # FLD AUTO: 5.56 K/UL — SIGNIFICANT CHANGE UP (ref 3.8–10.5)
WBC # FLD AUTO: 5.99 K/UL — SIGNIFICANT CHANGE UP (ref 3.8–10.5)

## 2020-12-07 PROCEDURE — 99233 SBSQ HOSP IP/OBS HIGH 50: CPT

## 2020-12-07 PROCEDURE — 88311 DECALCIFY TISSUE: CPT | Mod: 26

## 2020-12-07 PROCEDURE — 88305 TISSUE EXAM BY PATHOLOGIST: CPT | Mod: 26

## 2020-12-07 PROCEDURE — 93010 ELECTROCARDIOGRAM REPORT: CPT

## 2020-12-07 PROCEDURE — 73630 X-RAY EXAM OF FOOT: CPT | Mod: 26,LT

## 2020-12-07 RX ORDER — DEXTROSE 50 % IN WATER 50 %
50 SYRINGE (ML) INTRAVENOUS ONCE
Refills: 0 | Status: COMPLETED | OUTPATIENT
Start: 2020-12-07 | End: 2020-12-07

## 2020-12-07 RX ORDER — HYDROMORPHONE HYDROCHLORIDE 2 MG/ML
0.5 INJECTION INTRAMUSCULAR; INTRAVENOUS; SUBCUTANEOUS
Refills: 0 | Status: DISCONTINUED | OUTPATIENT
Start: 2020-12-07 | End: 2020-12-08

## 2020-12-07 RX ORDER — OXYCODONE AND ACETAMINOPHEN 5; 325 MG/1; MG/1
1 TABLET ORAL EVERY 4 HOURS
Refills: 0 | Status: DISCONTINUED | OUTPATIENT
Start: 2020-12-07 | End: 2020-12-08

## 2020-12-07 RX ORDER — ACETAMINOPHEN 500 MG
650 TABLET ORAL EVERY 6 HOURS
Refills: 0 | Status: DISCONTINUED | OUTPATIENT
Start: 2020-12-07 | End: 2020-12-08

## 2020-12-07 RX ADMIN — ATORVASTATIN CALCIUM 20 MILLIGRAM(S): 80 TABLET, FILM COATED ORAL at 23:47

## 2020-12-07 RX ADMIN — Medication 50 MILLILITER(S): at 14:44

## 2020-12-07 RX ADMIN — PIPERACILLIN AND TAZOBACTAM 25 GRAM(S): 4; .5 INJECTION, POWDER, LYOPHILIZED, FOR SOLUTION INTRAVENOUS at 17:44

## 2020-12-07 RX ADMIN — PIPERACILLIN AND TAZOBACTAM 25 GRAM(S): 4; .5 INJECTION, POWDER, LYOPHILIZED, FOR SOLUTION INTRAVENOUS at 03:10

## 2020-12-07 RX ADMIN — HEPARIN SODIUM 5000 UNIT(S): 5000 INJECTION INTRAVENOUS; SUBCUTANEOUS at 23:47

## 2020-12-07 RX ADMIN — ERYTHROPOIETIN 8000 UNIT(S): 10000 INJECTION, SOLUTION INTRAVENOUS; SUBCUTANEOUS at 12:47

## 2020-12-07 NOTE — DISCHARGE NOTE PROVIDER - NSDCMRMEDTOKEN_GEN_ALL_CORE_FT
acetaminophen 325 mg oral tablet: 2 tab(s) orally once, As needed, Mild Pain (1 - 3)  aspirin 81 mg oral tablet, chewable: 1 tab(s) orally once a day  atorvastatin 20 mg oral tablet: 1 tab(s) orally once a day  chlorhexidine 2% topical pad: 1 application topically   piperacillin-tazobactam:   Denice-Nia oral tablet: 1 tab(s) orally once a day  sevelamer carbonate 800 mg oral tablet: 1 tab(s) orally 3 times a day (with meals)  sevelamer carbonate 800 mg oral tablet: 2 tab(s) orally 3 times a day  Vitamin D3 50,000 intl units (1250 mcg) oral capsule: 1 tab(s) orally once a week   acetaminophen 325 mg oral tablet: 2 tab(s) orally once, As needed, Mild Pain (1 - 3)  aspirin 81 mg oral tablet, chewable: 1 tab(s) orally once a day  atorvastatin 20 mg oral tablet: 1 tab(s) orally once a day  ciprofloxacin 500 mg oral tablet: 1 tab(s) orally once a day   clopidogrel 75 mg oral tablet: 1 tab(s) orally once a day  oxycodone-acetaminophen 5 mg-325 mg oral tablet: 1 tab(s) orally every 6 hours, As Needed -Moderate Pain (4 - 6) MDD:6   Denice-Nia oral tablet: 1 tab(s) orally once a day  sevelamer carbonate 800 mg oral tablet: 1 tab(s) orally 3 times a day (with meals)  sevelamer carbonate 800 mg oral tablet: 2 tab(s) orally 3 times a day  Vitamin D3 50,000 intl units (1250 mcg) oral capsule: 1 tab(s) orally once a week   acetaminophen 325 mg oral tablet: 2 tab(s) orally once, As needed, Mild Pain (1 - 3)  aspirin 81 mg oral tablet, chewable: 1 tab(s) orally once a day  atorvastatin 20 mg oral tablet: 1 tab(s) orally once a day  ciprofloxacin 500 mg oral tablet: 1 tab(s) orally once a day   clopidogrel 75 mg oral tablet: 1 tab(s) orally once a day  oxycodone-acetaminophen 5 mg-325 mg oral tablet: 1 tab(s) orally every 6 hours, As Needed -Moderate Pain (4 - 6) MDD:6   Denice-Nia oral tablet: 1 tab(s) orally once a day  rolling walker: rolling walker  sevelamer carbonate 800 mg oral tablet: 1 tab(s) orally 3 times a day (with meals)  sevelamer carbonate 800 mg oral tablet: 2 tab(s) orally 3 times a day  Vitamin D3 50,000 intl units (1250 mcg) oral capsule: 1 tab(s) orally once a week

## 2020-12-07 NOTE — DISCHARGE NOTE PROVIDER - NSDCFUADDINST_GEN_ALL_CORE_FT
Podiatry Discharge Instructions:  Follow up: Please follow up with Dr. Daily within 1 week of discharge from the hospital, please call [phone] for appointment and discuss that you recently were seen in the hospital.  Wound Care: Please leave your dressing clean dry intact until your follow up appointment   Weight bearing: Please weight bear as tolerated in a surgical shoe.  Antibiotics: Please continue as instructed. Podiatry Discharge Instructions:  Follow up: Please follow up with Dr. Daily within 1 week of discharge from the hospital, please call [phone] for appointment and discuss that you recently were seen in the hospital.  Wound Care: Please leave your dressing clean dry intact until your follow up appointment   Weight bearing: Please weight bear as tolerated to the heel in a surgical shoe.  Antibiotics: Please continue as instructed.

## 2020-12-07 NOTE — BRIEF OPERATIVE NOTE - SPECIMENS
Left fifth toe to pathology, left foot deep wound culture, Clean bone margin from 5th MT to microbiology and pathology
NA

## 2020-12-07 NOTE — DISCHARGE NOTE PROVIDER - PROVIDER TOKENS
PROVIDER:[TOKEN:[[id number]],FOLLOWUP:[1 week]],PROVIDER:[TOKEN:[[id number]],FOLLOWUP:[1 week]]

## 2020-12-07 NOTE — PROGRESS NOTE ADULT - ASSESSMENT
A/P: 60 F w/ CKD4 and diffuse infrainguinal arterial occlusive disease now with critical limb ischemia of the LLE s/p LLE angiogram with SFA, AT, and PT angioplasty on 12/4    Plan:  - NPO for OR    - SQH, ASA, statin  - monitor vitals/I&Os/electrolytes  - HD today, plan for OR in afternoon    Vascular Surgery   [id number]

## 2020-12-07 NOTE — PROGRESS NOTE ADULT - SUBJECTIVE AND OBJECTIVE BOX
Patient is a 60y old  Female who presents with a chief complaint of Peripheral arterial disease (06 Dec 2020 17:00)      INTERVAL HPI/OVERNIGHT EVENTS:   Pt is scheduled for Left foot partial 5th ray resection with Dr. Mayo Daily at 2PM. Patient is aware of procedure and is NPO since midnight.    MEDICATIONS  (STANDING):  aspirin  chewable 81 milliGRAM(s) Oral daily  atorvastatin 20 milliGRAM(s) Oral at bedtime  chlorhexidine 4% Liquid 1 Application(s) Topical once  clopidogrel Tablet 75 milliGRAM(s) Oral daily  dextrose 40% Gel 15 Gram(s) Oral once  dextrose 5%. 1000 milliLiter(s) (50 mL/Hr) IV Continuous <Continuous>  dextrose 5%. 1000 milliLiter(s) (100 mL/Hr) IV Continuous <Continuous>  dextrose 5%. 1000 milliLiter(s) (100 mL/Hr) IV Continuous <Continuous>  dextrose 50% Injectable 25 Gram(s) IV Push once  dextrose 50% Injectable 12.5 Gram(s) IV Push once  dextrose 50% Injectable 25 Gram(s) IV Push once  epoetin niesha-epbx (RETACRIT) Injectable 8000 Unit(s) IV Push <User Schedule>  glucagon  Injectable 1 milliGRAM(s) IntraMuscular once  glucagon  Injectable 1 milliGRAM(s) IntraMuscular once  heparin   Injectable 5000 Unit(s) SubCutaneous every 12 hours  insulin lispro (ADMELOG) corrective regimen sliding scale   SubCutaneous every 6 hours  multivitamin 1 Tablet(s) Oral daily  piperacillin/tazobactam IVPB.. 3.375 Gram(s) IV Intermittent every 12 hours  sevelamer carbonate 2400 milliGRAM(s) Oral three times a day with meals    MEDICATIONS  (PRN):      Allergies    No Known Allergies    Intolerances        Vital Signs Last 24 Hrs  T(C): 36.8 (07 Dec 2020 05:39), Max: 36.9 (06 Dec 2020 09:44)  T(F): 98.2 (07 Dec 2020 05:39), Max: 98.4 (06 Dec 2020 09:44)  HR: 78 (07 Dec 2020 05:39) (73 - 78)  BP: 116/61 (07 Dec 2020 05:39) (99/55 - 123/66)  BP(mean): --  RR: 15 (07 Dec 2020 05:39) (15 - 18)  SpO2: 100% (07 Dec 2020 05:39) (98% - 100%)    LABS:                        10.2   5.55  )-----------( 166      ( 06 Dec 2020 08:42 )             33.1     12-06    138  |  95<L>  |  33<H>  ----------------------------<  79  4.6   |  28  |  7.27<H>    Ca    9.2      06 Dec 2020 08:42  Phos  5.0     12-06  Mg     2.7     12-06          CAPILLARY BLOOD GLUCOSE      POCT Blood Glucose.: 85 mg/dL (07 Dec 2020 05:48)  POCT Blood Glucose.: 84 mg/dL (07 Dec 2020 00:28)  POCT Blood Glucose.: 106 mg/dL (06 Dec 2020 22:36)  POCT Blood Glucose.: 112 mg/dL (06 Dec 2020 17:38)  POCT Blood Glucose.: 101 mg/dL (06 Dec 2020 12:19)  POCT Blood Glucose.: 84 mg/dL (06 Dec 2020 08:28)      RADIOLOGY & ADDITIONAL TESTS:    Plan:   Pt is scheduled for Left foot partial 5th ray resection with Dr. Mayo Daily at 2PM. Patient is aware of procedure and is NPO since midnight.  CXR on sunrise.  EKG on sunrise.  Medical/Cardiac clearance since 12/5 and documented in chart.  Consent signed and in chart.  Procedure was explained to patient in detail. All alternatives, risks and complications were discussed. All questions answered.

## 2020-12-07 NOTE — BRIEF OPERATIVE NOTE - COMMENTS
Low concern for residual bone infection, low concern for viability. Recommend d/c on PO abx if clean bone margin is no growth for two days.
AT, PT signals

## 2020-12-07 NOTE — BRIEF OPERATIVE NOTE - OPERATION/FINDINGS
there was no abscess noted intraoperatively, tissue was healthy and granular at level of resection. Bone was hard and bleeding at level of resection. Low concern for residual soft tissue infection, low concern for viability.
Severe long segment stenosis of AT and moderate stenosis of PT

## 2020-12-07 NOTE — DISCHARGE NOTE PROVIDER - NSDCCPTREATMENT_GEN_ALL_CORE_FT
PRINCIPAL PROCEDURE  Procedure: Angioplasty, artery, posterior tibial  Findings and Treatment:       SECONDARY PROCEDURE  Procedure: Detachment at left foot, partial 5th ray, open approach  Findings and Treatment:

## 2020-12-07 NOTE — DISCHARGE NOTE PROVIDER - HOSPITAL COURSE
60 F w/ DM2, HTN, hypercholesterolemia, , CKD-4 presents as transfer from [facility]; patient states that 1 weeks ago, she began to experience left 5th toe pain after HD, worsening over the course of several days. 4 days go, she had the L 5th toe debrided by podiatry at [facility]. She was admitted at that time. 2 days ago, MRI of the foot revealed OM of the toe with sinus formation. There was a discussion of possible amputation at that time. CTA A/P with runoff demonstrated focal stenosis of the L SFA and diffuse fem-pop infrainguinal occlusive disease on the right. MARTA/PVR demonstrated the following: Ankle brachial index measured 1.71 on the right and 1.65 on the left; PVR waveforms suggest flow-limiting disease in the infrapopliteal circulation bilaterally. Today the patient was transferred to [facility] [place]. The patient denies any symptoms of claudication; however, she states that she has pain in her legs at rest only alleviated by dangling her legs over the edge of the bed. She denies fever, chills, CP, SOB, N, or V. She has some mild pain, but she is able to ambulate. She is dialyzed M/W/F through her RLE fem-fem AVG.   Patient went to the OR: Severe long segment stenosis of AT and moderate stenosis of PT s/p angioplasty  Renal following patient for his HD during hospitalization. Medicine following patient to medically optimize for podiatry to take patient to the OR.  Podiatry took patient to the OR for a 5th left partial ray resection.  Post operatively patient hemodynamically stable, tolerating a diet, and transferred to the floor. Pain well controlled and patient stable for discharge home to follow up as an outpatient, patient to continue 5 days of renally dosed ciprofloxacin.

## 2020-12-07 NOTE — DISCHARGE NOTE PROVIDER - CARE PROVIDER_API CALL
Nina Gilliland  SURGERY  [address], [address]  [place] [address]  Phone: [phone]  Fax: [phone]  Follow Up Time: 1 week    Mayo Daily  SURGERY  [address] [place] [address]  Phone: [phone]  Fax: [phone]  Follow Up Time: 1 week

## 2020-12-07 NOTE — DISCHARGE NOTE PROVIDER - NSDCCPCAREPLAN_GEN_ALL_CORE_FT
PRINCIPAL DISCHARGE DIAGNOSIS  Diagnosis: Acute hematogenous osteomyelitis of left foot  Assessment and Plan of Treatment: Acute hematogenous osteomyelitis of left foot

## 2020-12-07 NOTE — PROGRESS NOTE ADULT - SUBJECTIVE AND OBJECTIVE BOX
[facility] Division of Hospital Medicine  Jaclyn Carlton MD  Pager [phone]    Patient is a 60y old  Female who presents with a chief complaint of Peripheral arterial disease     SUBJECTIVE / OVERNIGHT EVENTS: no complaints; planned for OR this afternoon for partial amp of L 5th ray      MEDICATIONS  (STANDING):  aspirin  chewable 81 milliGRAM(s) Oral daily  atorvastatin 20 milliGRAM(s) Oral at bedtime  chlorhexidine 4% Liquid 1 Application(s) Topical once  clopidogrel Tablet 75 milliGRAM(s) Oral daily  dextrose 40% Gel 15 Gram(s) Oral once  dextrose 5%. 1000 milliLiter(s) (50 mL/Hr) IV Continuous <Continuous>  dextrose 5%. 1000 milliLiter(s) (100 mL/Hr) IV Continuous <Continuous>  dextrose 5%. 1000 milliLiter(s) (100 mL/Hr) IV Continuous <Continuous>  dextrose 50% Injectable 25 Gram(s) IV Push once  dextrose 50% Injectable 12.5 Gram(s) IV Push once  dextrose 50% Injectable 25 Gram(s) IV Push once  epoetin niesha-epbx (RETACRIT) Injectable 8000 Unit(s) IV Push <User Schedule>  glucagon  Injectable 1 milliGRAM(s) IntraMuscular once  glucagon  Injectable 1 milliGRAM(s) IntraMuscular once  heparin   Injectable 5000 Unit(s) SubCutaneous every 12 hours  insulin lispro (ADMELOG) corrective regimen sliding scale   SubCutaneous every 6 hours  multivitamin 1 Tablet(s) Oral daily  piperacillin/tazobactam IVPB.. 3.375 Gram(s) IV Intermittent every 12 hours  sevelamer carbonate 2400 milliGRAM(s) Oral three times a day with meals    MEDICATIONS  (PRN):      CAPILLARY BLOOD GLUCOSE  POCT Blood Glucose.: 85 mg/dL (07 Dec 2020 05:48)  POCT Blood Glucose.: 84 mg/dL (07 Dec 2020 00:28)  POCT Blood Glucose.: 106 mg/dL (06 Dec 2020 22:36)  POCT Blood Glucose.: 112 mg/dL (06 Dec 2020 17:38)  POCT Blood Glucose.: 101 mg/dL (06 Dec 2020 12:19)          PHYSICAL EXAM:  Vital Signs Last 24 Hrs  T(F): 98.2 (07 Dec 2020 05:39), Max: 98.4 (06 Dec 2020 17:29)  HR: 78 (07 Dec 2020 05:39) (73 - 78)  BP: 116/61 (07 Dec 2020 05:39) (103/60 - 123/66)  RR: 15 (07 Dec 2020 05:39) (15 - 18)  SpO2: 100% (07 Dec 2020 05:39) (98% - 100%)    CONSTITUTIONAL: NAD  RESPIRATORY: Normal respiratory effort; lungs are clear to auscultation ant/lat  CARDIOVASCULAR: Regular rate and rhythm,No lower extremity edema;   ABDOMEN: Nontender to palpation, normoactive bowel sounds  MUSCULOSKELETAL:   no joint swelling or tenderness to palpation  PSYCH: affect appropriate  NEUROLOGY: no gross sensory deficits   SKIN: L foot with dressing c/d/i    LABS:                        10.0   5.99  )-----------( 166      ( 07 Dec 2020 07:24 )             33.1     12-07    138  |  94<L>  |  47<H>  ----------------------------<  76  5.1   |  27  |  9.02<H>    Ca    9.3      07 Dec 2020 07:24    PT/INR - ( 07 Dec 2020 07:24 )   PT: 11.4 sec;   INR: 1.00 ratio         PTT - ( 07 Dec 2020 07:24 )  PTT:35.8 sec

## 2020-12-07 NOTE — CHART NOTE - NSCHARTNOTEFT_GEN_A_CORE
POST-OPERATIVE NOTE    Subjective:  Patient is s/p partial 5th ray resection. Denies n/v/f/c. Denies any chest pain, shortness of breath or difficulty breathing. Recovering appropriately.     Vital Signs Last 24 Hrs  T(C): 36.6 (07 Dec 2020 21:55), Max: 37 (07 Dec 2020 19:30)  T(F): 97.8 (07 Dec 2020 21:55), Max: 98.6 (07 Dec 2020 19:30)  HR: 74 (07 Dec 2020 23:00) (69 - 78)  BP: 104/50 (07 Dec 2020 23:00) (104/50 - 128/43)  BP(mean): 63 (07 Dec 2020 23:00) (62 - 65)  RR: 13 (07 Dec 2020 23:00) (1 - 17)  SpO2: 100% (07 Dec 2020 23:15) (95% - 100%)  I&O's Detail    06 Dec 2020 07:01  -  07 Dec 2020 07:00  --------------------------------------------------------  IN:    IV PiggyBack: 100 mL    Oral Fluid: 50 mL  Total IN: 150 mL    OUT:    Voided (mL): 0 mL  Total OUT: 0 mL    Total NET: 150 mL      07 Dec 2020 07:01  -  08 Dec 2020 00:20  --------------------------------------------------------  IN:    IV PiggyBack: 100 mL    Other (mL): 400 mL  Total IN: 500 mL    OUT:    Other (mL): 1400 mL    Voided (mL): 0 mL  Total OUT: 1400 mL    Total NET: -900 mL        piperacillin/tazobactam IVPB.. 3.375  aspirin  chewable 81  clopidogrel Tablet 75  heparin   Injectable 5000  piperacillin/tazobactam IVPB.. 3.375    PAST MEDICAL & SURGICAL HISTORY:  ESRD (end stage renal disease) on dialysis    CKD (chronic kidney disease), stage IV    Cataract    Glaucoma    HLD (hyperlipidemia)    Heart failure    Hemodialysis access, AV graft    S/P arteriovenous (AV) fistula creation    Acute osteomyelitis of toe of right foot  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          Physical Exam:  General: NAD, resting comfortably in bed  Pulmonary: Nonlabored breathing, no respiratory distress  Cardiovascular: NSR  Abdominal: soft, NT/ND  Extremities: L foot wrapped with no strikethrough bleeding, b/l LE strength intact and full ROM      LABS:                        11.5   5.56  )-----------( 213      ( 07 Dec 2020 18:29 )             37.1     12-07    137  |  92<L>  |  16  ----------------------------<  119<H>  3.7   |  32<H>  |  4.43<H>    Ca    9.8      07 Dec 2020 17:11  Phos  2.8     12-07  Mg     2.3     12-07      PT/INR - ( 07 Dec 2020 07:24 )   PT: 11.4 sec;   INR: 1.00 ratio         PTT - ( 07 Dec 2020 07:24 )  PTT:35.8 sec  CAPILLARY BLOOD GLUCOSE      POCT Blood Glucose.: 71 mg/dL (07 Dec 2020 23:42)  POCT Blood Glucose.: 93 mg/dL (07 Dec 2020 17:48)  POCT Blood Glucose.: 139 mg/dL (07 Dec 2020 15:45)  POCT Blood Glucose.: 78 mg/dL (07 Dec 2020 12:43)  POCT Blood Glucose.: 85 mg/dL (07 Dec 2020 05:48)  POCT Blood Glucose.: 84 mg/dL (07 Dec 2020 00:28)      Radiology and Additional Studies:    Assessment:  The patient is a 60y Female who is now several hours post-op from a partial 5th ray resection. Recovering appropriately.     Plan:  - Pain control as needed  - DVT ppx  - OOB and ambulating as tolerated  - F/u AM labs  - Appreciate Podiatry recs    [id number] C Team Surgery

## 2020-12-07 NOTE — PROGRESS NOTE ADULT - SUBJECTIVE AND OBJECTIVE BOX
[facility] - S Jluis / Shabbir S /D Romina/ S Denys/ SHERRI Madden/ Andrés Perkins / SUNI Lubinu/ O Gladis  service -[phone], office [phone]  ---------------------------------------------------------------------------------------------------------------    Patient seen and examined bedside    Subjective and Objective: No overnight events, denied sob. No complaints today.  tolerated hd well    Allergies: No Known Allergies      Hospital Medications:   MEDICATIONS  (STANDING):  aspirin  chewable 81 milliGRAM(s) Oral daily  atorvastatin 20 milliGRAM(s) Oral at bedtime  chlorhexidine 4% Liquid 1 Application(s) Topical once  clopidogrel Tablet 75 milliGRAM(s) Oral daily  dextrose 40% Gel 15 Gram(s) Oral once  dextrose 5%. 1000 milliLiter(s) (50 mL/Hr) IV Continuous <Continuous>  dextrose 5%. 1000 milliLiter(s) (100 mL/Hr) IV Continuous <Continuous>  dextrose 5%. 1000 milliLiter(s) (100 mL/Hr) IV Continuous <Continuous>  dextrose 50% Injectable 25 Gram(s) IV Push once  dextrose 50% Injectable 12.5 Gram(s) IV Push once  dextrose 50% Injectable 25 Gram(s) IV Push once  epoetin niesha-epbx (RETACRIT) Injectable 8000 Unit(s) IV Push <User Schedule>  glucagon  Injectable 1 milliGRAM(s) IntraMuscular once  glucagon  Injectable 1 milliGRAM(s) IntraMuscular once  heparin   Injectable 5000 Unit(s) SubCutaneous every 12 hours  insulin lispro (ADMELOG) corrective regimen sliding scale   SubCutaneous every 6 hours  multivitamin 1 Tablet(s) Oral daily  piperacillin/tazobactam IVPB.. 3.375 Gram(s) IV Intermittent every 12 hours  sevelamer carbonate 2400 milliGRAM(s) Oral three times a day with meals    VITALS:  T(F): 97.9 (12-07-20 @ 14:39), Max: 98.4 (12-06-20 @ 17:29)  HR: 76 (12-07-20 @ 14:39)  BP: 120/62 (12-07-20 @ 14:39)  RR: 16 (12-07-20 @ 14:39)  SpO2: 100% (12-07-20 @ 14:39)  Wt(kg): --    12-06 @ 07:01  -  12-07 @ 07:00  --------------------------------------------------------  IN: 150 mL / OUT: 0 mL / NET: 150 mL    12-07 @ 07:01  -  12-07 @ 15:17  --------------------------------------------------------  IN: 400 mL / OUT: 1400 mL / NET: -1000 mL      PHYSICAL EXAM:  Constitutional: NAD  HEENT: anicteric sclera  Neck: No JVD  Respiratory: CTAB, no wheezes, rales or rhonchi  Cardiovascular: S1, S2, RRR  Gastrointestinal: BS+, soft, NT/ND  Extremities: No peripheral edema  Neurological: A/O x 3  Psychiatric: Normal mood, normal affect  : No salcido.   Vascular Access: rt thigh AVG +    LABS:  12-07    138  |  94<L>  |  47<H>  ----------------------------<  76  5.1   |  27  |  9.02<H>    Ca    9.3      07 Dec 2020 07:24  Phos  5.0     12-06  Mg     2.7     12-06      Creatinine Trend: 9.02 <--, 7.27 <--, 9.23 <--, 5.70 <--, 10.50 <--, 7.62 <--, 6.44 <--                        10.0   5.99  )-----------( 166      ( 07 Dec 2020 07:24 )             33.1     Urine Studies:        RADIOLOGY & ADDITIONAL STUDIES:

## 2020-12-07 NOTE — BRIEF OPERATIVE NOTE - NSICDXBRIEFPREOP_GEN_ALL_CORE_FT
PRE-OP DIAGNOSIS:  Foot osteomyelitis, left 07-Dec-2020 21:14:41  Adriano Cassidy  
PRE-OP DIAGNOSIS:  Ischemic ulcer diabetic foot 04-Dec-2020 15:51:34  Cortes Florian

## 2020-12-07 NOTE — PROGRESS NOTE ADULT - ASSESSMENT
60 F w/ ESRD on HD, w/ diffuse infrainguinal arterial occlusive disease now with critical limb ischemia of the LLE. Renal following for ESRD Mx.     End Stage Renal Disease on Dialysis   otpt HD MWF.   K, vol acceptable. no s/o CHF  HTN, controlled  Anemia in CKD- Hg at goal, but trending down, added Epo 8k w/hd tiw  Hyperphosphatemia 2/2 ESRD- phos better, at goal now  PAD s/p LLE angio 12/4 followed by HD  OM of the toe    s/p HD earlier today, Rx sheet reviewed, net UF 1kg, tolerated well. uneventful.  plan for next routine HD 12/7 Monday AM w/2k bath, uf 1.5kg as tolerated  renal restrictions in diet  c/w renvela 3tidac   dose all meds for ESRD  plan for toe amputation today. optimized renal stand point for OR.     labs, chart reviewed

## 2020-12-07 NOTE — PROGRESS NOTE ADULT - SUBJECTIVE AND OBJECTIVE BOX
Subjective:  No acute overnight events. Patient for OR today with podiatry for left foot partial 5th ray amputation. Patient offers no new complaints.     PHYSICAL EXAM:  Gen: NAD  LS: Respirations unlabored   GI: Soft. Nontender. Nondistended  Ext: RLE with dopplerable DP and PT signals.     T(C): 36.8 (12-07-20 @ 05:39), Max: 36.9 (12-06-20 @ 09:44)  HR: 78 (12-07-20 @ 05:39) (73 - 78)  BP: 116/61 (12-07-20 @ 05:39) (99/55 - 123/66)  RR: 15 (12-07-20 @ 05:39) (15 - 18)  SpO2: 100% (12-07-20 @ 05:39) (98% - 100%)      12-06-20 @ 07:01  -  12-07-20 @ 07:00  --------------------------------------------------------  IN: 150 mL / OUT: 0 mL / NET: 150 mL        LABS:  cret                        10.0   5.99  )-----------( 166      ( 07 Dec 2020 07:24 )             33.1     12-07    138  |  94<L>  |  47<H>  ----------------------------<  76  5.1   |  27  |  9.02<H>    Ca    9.3      07 Dec 2020 07:24  Phos  5.0     12-06  Mg     2.7     12-06      PT/INR - ( 07 Dec 2020 07:24 )   PT: 11.4 sec;   INR: 1.00 ratio         PTT - ( 07 Dec 2020 07:24 )  PTT:35.8 sec    aspirin  chewable 81 milliGRAM(s) Oral daily  atorvastatin 20 milliGRAM(s) Oral at bedtime  chlorhexidine 4% Liquid 1 Application(s) Topical once  clopidogrel Tablet 75 milliGRAM(s) Oral daily  dextrose 40% Gel 15 Gram(s) Oral once  dextrose 5%. 1000 milliLiter(s) IV Continuous <Continuous>  dextrose 5%. 1000 milliLiter(s) IV Continuous <Continuous>  dextrose 5%. 1000 milliLiter(s) IV Continuous <Continuous>  dextrose 50% Injectable 25 Gram(s) IV Push once  dextrose 50% Injectable 12.5 Gram(s) IV Push once  dextrose 50% Injectable 25 Gram(s) IV Push once  epoetin niesha-epbx (RETACRIT) Injectable 8000 Unit(s) IV Push <User Schedule>  glucagon  Injectable 1 milliGRAM(s) IntraMuscular once  glucagon  Injectable 1 milliGRAM(s) IntraMuscular once  heparin   Injectable 5000 Unit(s) SubCutaneous every 12 hours  insulin lispro (ADMELOG) corrective regimen sliding scale   SubCutaneous every 6 hours  multivitamin 1 Tablet(s) Oral daily  piperacillin/tazobactam IVPB.. 3.375 Gram(s) IV Intermittent every 12 hours  sevelamer carbonate 2400 milliGRAM(s) Oral three times a day with meals      Imaging:

## 2020-12-07 NOTE — DISCHARGE NOTE PROVIDER - NSDCFUADDAPPT_GEN_ALL_CORE_FT
Follow up with your vascular surgeon, Dr. taylor, call for a follow up appointment. Follow up with Podiatry Dr. Daily.

## 2020-12-07 NOTE — BRIEF OPERATIVE NOTE - NSICDXBRIEFPROCEDURE_GEN_ALL_CORE_FT
PROCEDURES:  Detachment at left foot, partial 5th ray, open approach 07-Dec-2020 21:14:23  Adriano Cassidy

## 2020-12-08 ENCOUNTER — TRANSCRIPTION ENCOUNTER (OUTPATIENT)
Age: 60
End: 2020-12-08

## 2020-12-08 VITALS
SYSTOLIC BLOOD PRESSURE: 130 MMHG | HEART RATE: 81 BPM | OXYGEN SATURATION: 100 % | RESPIRATION RATE: 17 BRPM | TEMPERATURE: 98 F | DIASTOLIC BLOOD PRESSURE: 59 MMHG

## 2020-12-08 LAB
ANION GAP SERPL CALC-SCNC: 11 MMOL/L — SIGNIFICANT CHANGE UP (ref 7–14)
BUN SERPL-MCNC: 21 MG/DL — SIGNIFICANT CHANGE UP (ref 7–23)
CALCIUM SERPL-MCNC: 9.3 MG/DL — SIGNIFICANT CHANGE UP (ref 8.4–10.5)
CHLORIDE SERPL-SCNC: 93 MMOL/L — LOW (ref 98–107)
CO2 SERPL-SCNC: 31 MMOL/L — SIGNIFICANT CHANGE UP (ref 22–31)
CREAT SERPL-MCNC: 5.74 MG/DL — HIGH (ref 0.5–1.3)
GLUCOSE SERPL-MCNC: 75 MG/DL — SIGNIFICANT CHANGE UP (ref 70–99)
HBV SURFACE AB SER-ACNC: >1000 MIU/ML — SIGNIFICANT CHANGE UP
HCT VFR BLD CALC: 31.8 % — LOW (ref 34.5–45)
HGB BLD-MCNC: 9.7 G/DL — LOW (ref 11.5–15.5)
MAGNESIUM SERPL-MCNC: 2.2 MG/DL — SIGNIFICANT CHANGE UP (ref 1.6–2.6)
MCHC RBC-ENTMCNC: 27.6 PG — SIGNIFICANT CHANGE UP (ref 27–34)
MCHC RBC-ENTMCNC: 30.5 GM/DL — LOW (ref 32–36)
MCV RBC AUTO: 90.3 FL — SIGNIFICANT CHANGE UP (ref 80–100)
NRBC # BLD: 0 /100 WBCS — SIGNIFICANT CHANGE UP
NRBC # FLD: 0 K/UL — SIGNIFICANT CHANGE UP
PHOSPHATE SERPL-MCNC: 4.6 MG/DL — HIGH (ref 2.5–4.5)
PLATELET # BLD AUTO: 182 K/UL — SIGNIFICANT CHANGE UP (ref 150–400)
POTASSIUM SERPL-MCNC: 4.3 MMOL/L — SIGNIFICANT CHANGE UP (ref 3.5–5.3)
POTASSIUM SERPL-SCNC: 4.3 MMOL/L — SIGNIFICANT CHANGE UP (ref 3.5–5.3)
RBC # BLD: 3.52 M/UL — LOW (ref 3.8–5.2)
RBC # FLD: 13.2 % — SIGNIFICANT CHANGE UP (ref 10.3–14.5)
SARS-COV-2 RNA SPEC QL NAA+PROBE: SIGNIFICANT CHANGE UP
SODIUM SERPL-SCNC: 135 MMOL/L — SIGNIFICANT CHANGE UP (ref 135–145)
WBC # BLD: 5.01 K/UL — SIGNIFICANT CHANGE UP (ref 3.8–10.5)
WBC # FLD AUTO: 5.01 K/UL — SIGNIFICANT CHANGE UP (ref 3.8–10.5)

## 2020-12-08 PROCEDURE — 99232 SBSQ HOSP IP/OBS MODERATE 35: CPT

## 2020-12-08 RX ORDER — INSULIN LISPRO 100/ML
VIAL (ML) SUBCUTANEOUS AT BEDTIME
Refills: 0 | Status: DISCONTINUED | OUTPATIENT
Start: 2020-12-08 | End: 2020-12-08

## 2020-12-08 RX ORDER — INSULIN LISPRO 100/ML
VIAL (ML) SUBCUTANEOUS
Refills: 0 | Status: DISCONTINUED | OUTPATIENT
Start: 2020-12-08 | End: 2020-12-08

## 2020-12-08 RX ORDER — CIPROFLOXACIN LACTATE 400MG/40ML
1 VIAL (ML) INTRAVENOUS
Qty: 5 | Refills: 0
Start: 2020-12-08 | End: 2020-12-12

## 2020-12-08 RX ORDER — CLOPIDOGREL BISULFATE 75 MG/1
1 TABLET, FILM COATED ORAL
Qty: 30 | Refills: 2
Start: 2020-12-08 | End: 2021-03-07

## 2020-12-08 RX ADMIN — Medication 650 MILLIGRAM(S): at 12:37

## 2020-12-08 RX ADMIN — HYDROMORPHONE HYDROCHLORIDE 0.5 MILLIGRAM(S): 2 INJECTION INTRAMUSCULAR; INTRAVENOUS; SUBCUTANEOUS at 14:14

## 2020-12-08 RX ADMIN — HEPARIN SODIUM 5000 UNIT(S): 5000 INJECTION INTRAVENOUS; SUBCUTANEOUS at 12:06

## 2020-12-08 RX ADMIN — SEVELAMER CARBONATE 2400 MILLIGRAM(S): 2400 POWDER, FOR SUSPENSION ORAL at 08:45

## 2020-12-08 RX ADMIN — HYDROMORPHONE HYDROCHLORIDE 0.5 MILLIGRAM(S): 2 INJECTION INTRAMUSCULAR; INTRAVENOUS; SUBCUTANEOUS at 14:04

## 2020-12-08 RX ADMIN — Medication 650 MILLIGRAM(S): at 12:07

## 2020-12-08 RX ADMIN — Medication 81 MILLIGRAM(S): at 12:06

## 2020-12-08 RX ADMIN — PIPERACILLIN AND TAZOBACTAM 25 GRAM(S): 4; .5 INJECTION, POWDER, LYOPHILIZED, FOR SOLUTION INTRAVENOUS at 05:05

## 2020-12-08 RX ADMIN — CHLORHEXIDINE GLUCONATE 1 APPLICATION(S): 213 SOLUTION TOPICAL at 18:05

## 2020-12-08 RX ADMIN — CLOPIDOGREL BISULFATE 75 MILLIGRAM(S): 75 TABLET, FILM COATED ORAL at 12:06

## 2020-12-08 RX ADMIN — SEVELAMER CARBONATE 2400 MILLIGRAM(S): 2400 POWDER, FOR SUSPENSION ORAL at 12:06

## 2020-12-08 RX ADMIN — Medication 1 TABLET(S): at 12:06

## 2020-12-08 NOTE — PROGRESS NOTE ADULT - SUBJECTIVE AND OBJECTIVE BOX
Podiatry pager #: [phone] ([place])/ [phone] ([facility])    Patient is a 60y old  Female who presents with a chief complaint of Peripheral arterial disease (07 Dec 2020 21:58)       INTERVAL HPI/OVERNIGHT EVENTS:  Patient seen and evaluated at bedside.  Pt is resting comfortable in NAD. Denies N/V/F/C.     Allergies    No Known Allergies    Intolerances        Vital Signs Last 24 Hrs  T(C): 36.7 (08 Dec 2020 05:30), Max: 37 (07 Dec 2020 19:30)  T(F): 98.1 (08 Dec 2020 05:30), Max: 98.6 (07 Dec 2020 19:30)  HR: 75 (08 Dec 2020 05:30) (69 - 78)  BP: 106/63 (08 Dec 2020 05:30) (97/53 - 128/43)  BP(mean): 63 (07 Dec 2020 23:00) (62 - 65)  RR: 16 (08 Dec 2020 05:30) (1 - 17)  SpO2: 100% (08 Dec 2020 05:30) (95% - 100%)    LABS:                        9.7    5.01  )-----------( 182      ( 08 Dec 2020 07:30 )             31.8     12-07    137  |  92<L>  |  16  ----------------------------<  119<H>  3.7   |  32<H>  |  4.43<H>    Ca    9.8      07 Dec 2020 17:11  Phos  2.8     12-07  Mg     2.3     12-07      PT/INR - ( 07 Dec 2020 07:24 )   PT: 11.4 sec;   INR: 1.00 ratio         PTT - ( 07 Dec 2020 07:24 )  PTT:35.8 sec    CAPILLARY BLOOD GLUCOSE      POCT Blood Glucose.: 71 mg/dL (07 Dec 2020 23:42)  POCT Blood Glucose.: 93 mg/dL (07 Dec 2020 17:48)  POCT Blood Glucose.: 139 mg/dL (07 Dec 2020 15:45)  POCT Blood Glucose.: 78 mg/dL (07 Dec 2020 12:43)      Lower Extremity Physical Exam:  Vascular: DP/PT 0/4 B/L, CFT >3seconds B/L, Temperature gradient cool to cool B/L  Neuro: Epicritic sensation diminished to the level of digits B/L  Musculoskeletal/Ortho: unremarkable  Skin: s/p left foot partial 5th ray resection (DOS 12/7/20), surgical site well coapted, sutures intact, viable flaps, no dehiscence, no hematoma, no signs of infection ( Left foot 5th PIPJ wound to bone, no purulence, no fluctuance, no crepitus, no drainage, no acute signs of infection).

## 2020-12-08 NOTE — DISCHARGE NOTE NURSING/CASE MANAGEMENT/SOCIAL WORK - PATIENT PORTAL LINK FT
You can access the FollowMyHealth Patient Portal offered by [facility] by registering at the following website: [email or web address]. By joining [facility]’s FollowMyHealth portal, you will also be able to view your health information using other applications (apps) compatible with our system.

## 2020-12-08 NOTE — PROGRESS NOTE ADULT - SUBJECTIVE AND OBJECTIVE BOX
[facility] - S Jluis / Shabbir S /D Romina/ S Denys/ SHERRI Madden/ Andrés Perkins / SUNI Lubinu/ O Gladis  service -[phone], office [phone]  ---------------------------------------------------------------------------------------------------------------    Patient seen and examined bedside    Subjective and Objective: No overnight events, sob resolved. No complaints today. feeling better    Allergies: No Known Allergies      Hospital Medications:   MEDICATIONS  (STANDING):  aspirin  chewable 81 milliGRAM(s) Oral daily  atorvastatin 20 milliGRAM(s) Oral at bedtime  chlorhexidine 4% Liquid 1 Application(s) Topical once  clopidogrel Tablet 75 milliGRAM(s) Oral daily  dextrose 40% Gel 15 Gram(s) Oral once  dextrose 5%. 1000 milliLiter(s) (50 mL/Hr) IV Continuous <Continuous>  dextrose 5%. 1000 milliLiter(s) (100 mL/Hr) IV Continuous <Continuous>  dextrose 5%. 1000 milliLiter(s) (100 mL/Hr) IV Continuous <Continuous>  dextrose 50% Injectable 25 Gram(s) IV Push once  dextrose 50% Injectable 12.5 Gram(s) IV Push once  dextrose 50% Injectable 25 Gram(s) IV Push once  epoetin niesha-epbx (RETACRIT) Injectable 8000 Unit(s) IV Push <User Schedule>  glucagon  Injectable 1 milliGRAM(s) IntraMuscular once  glucagon  Injectable 1 milliGRAM(s) IntraMuscular once  heparin   Injectable 5000 Unit(s) SubCutaneous every 12 hours  insulin lispro (ADMELOG) corrective regimen sliding scale   SubCutaneous three times a day before meals  insulin lispro (ADMELOG) corrective regimen sliding scale   SubCutaneous at bedtime  multivitamin 1 Tablet(s) Oral daily  piperacillin/tazobactam IVPB.. 3.375 Gram(s) IV Intermittent every 12 hours  sevelamer carbonate 2400 milliGRAM(s) Oral three times a day with meals      VITALS:  T(F): 98.2 (12-08-20 @ 17:10), Max: 98.6 (12-07-20 @ 19:30)  HR: 81 (12-08-20 @ 17:10)  BP: 130/59 (12-08-20 @ 17:10)  RR: 17 (12-08-20 @ 17:10)  SpO2: 100% (12-08-20 @ 17:10)  Wt(kg): --    12-07 @ 07:01  -  12-08 @ 07:00  --------------------------------------------------------  IN: 900 mL / OUT: 1400 mL / NET: -500 mL    12-08 @ 07:01  -  12-08 @ 17:28  --------------------------------------------------------  IN: 200 mL / OUT: 0 mL / NET: 200 mL      PHYSICAL EXAM:  Constitutional: NAD  HEENT: anicteric sclera, oropharynx clear  Neck: No JVD  Respiratory: CTAB, no wheezes, rales or rhonchi  Cardiovascular: S1, S2, RRR  Gastrointestinal: BS+, soft, NT/ND  Extremities: No cyanosis or clubbing. No peripheral edema  Neurological: A/O x 3, no focal deficits  Psychiatric: Normal mood, normal affect  : No CVA tenderness. No salcido.   Skin: No rashes  Vascular Access:    LABS:  12-08    135  |  93<L>  |  21  ----------------------------<  75  4.3   |  31  |  5.74<H>    Ca    9.3      08 Dec 2020 07:30  Phos  4.6     12-08  Mg     2.2     12-08      Creatinine Trend: 5.74 <--, 4.43 <--, 3.42 <--, 9.02 <--, 7.27 <--, 9.23 <--, 5.70 <--, 10.50 <--                        9.7    5.01  )-----------( 182      ( 08 Dec 2020 07:30 )             31.8     Urine Studies:        RADIOLOGY & ADDITIONAL STUDIES:   [facility] - S Jluis / Shabbir S /D Romina/ S Denys/ S Chano/ Andrés Perkins / SUNI Lubinu/ O Gladis  service -[phone], office [phone]  ---------------------------------------------------------------------------------------------------------------    Patient seen and examined bedside    Subjective and Objective: No overnight events, s/p sob. No complaints today.   s/p left 5th toe amp. pain controlled    Allergies: No Known Allergies      Hospital Medications:   MEDICATIONS  (STANDING):  aspirin  chewable 81 milliGRAM(s) Oral daily  atorvastatin 20 milliGRAM(s) Oral at bedtime  chlorhexidine 4% Liquid 1 Application(s) Topical once  clopidogrel Tablet 75 milliGRAM(s) Oral daily  dextrose 40% Gel 15 Gram(s) Oral once  dextrose 5%. 1000 milliLiter(s) (50 mL/Hr) IV Continuous <Continuous>  dextrose 5%. 1000 milliLiter(s) (100 mL/Hr) IV Continuous <Continuous>  dextrose 5%. 1000 milliLiter(s) (100 mL/Hr) IV Continuous <Continuous>  dextrose 50% Injectable 25 Gram(s) IV Push once  dextrose 50% Injectable 12.5 Gram(s) IV Push once  dextrose 50% Injectable 25 Gram(s) IV Push once  epoetin niesha-epbx (RETACRIT) Injectable 8000 Unit(s) IV Push <User Schedule>  glucagon  Injectable 1 milliGRAM(s) IntraMuscular once  glucagon  Injectable 1 milliGRAM(s) IntraMuscular once  heparin   Injectable 5000 Unit(s) SubCutaneous every 12 hours  insulin lispro (ADMELOG) corrective regimen sliding scale   SubCutaneous three times a day before meals  insulin lispro (ADMELOG) corrective regimen sliding scale   SubCutaneous at bedtime  multivitamin 1 Tablet(s) Oral daily  piperacillin/tazobactam IVPB.. 3.375 Gram(s) IV Intermittent every 12 hours  sevelamer carbonate 2400 milliGRAM(s) Oral three times a day with meals      VITALS:  T(F): 98.2 (12-08-20 @ 17:10), Max: 98.6 (12-07-20 @ 19:30)  HR: 81 (12-08-20 @ 17:10)  BP: 130/59 (12-08-20 @ 17:10)  RR: 17 (12-08-20 @ 17:10)  SpO2: 100% (12-08-20 @ 17:10)  Wt(kg): --    12-07 @ 07:01  -  12-08 @ 07:00  --------------------------------------------------------  IN: 900 mL / OUT: 1400 mL / NET: -500 mL    12-08 @ 07:01  -  12-08 @ 17:28  --------------------------------------------------------  IN: 200 mL / OUT: 0 mL / NET: 200 mL      PHYSICAL EXAM:  Constitutional: NAD  HEENT: anicteric sclera  Neck: No JVD  Respiratory: CTAB, no wheezes, rales or rhonchi  Cardiovascular: S1, S2, RRR  Gastrointestinal: BS+, soft, NT/ND  Extremities: No peripheral edema  Neurological: A/O x 3  Psychiatric: Normal mood, normal affect  : No salcido.   Vascular Access: rt thigh AVG +    LABS:  12-08    135  |  93<L>  |  21  ----------------------------<  75  4.3   |  31  |  5.74<H>    Ca    9.3      08 Dec 2020 07:30  Phos  4.6     12-08  Mg     2.2     12-08      Creatinine Trend: 5.74 <--, 4.43 <--, 3.42 <--, 9.02 <--, 7.27 <--, 9.23 <--, 5.70 <--, 10.50 <--                        9.7    5.01  )-----------( 182      ( 08 Dec 2020 07:30 )             31.8     Urine Studies:        RADIOLOGY & ADDITIONAL STUDIES:

## 2020-12-08 NOTE — PHYSICAL THERAPY INITIAL EVALUATION ADULT - PERTINENT HX OF CURRENT PROBLEM, REHAB EVAL
Patient is a 60 year old female admitted for PAD, now s/p Left 5th ray partial resection. PMh listed below

## 2020-12-08 NOTE — PROGRESS NOTE ADULT - ASSESSMENT
60 F w/ ESRD on HD, w/ diffuse infrainguinal arterial occlusive disease now with critical limb ischemia of the LLE. Renal following for ESRD Mx.     End Stage Renal Disease on Dialysis   otpt HD MWF.   K, vol acceptable. no s/o CHF  HTN, controlled  Anemia in CKD- Hg at goal, c/w Epo 8k w/hd tiw  Hyperphosphatemia 2/2 ESRD- phos better, at goal now  PAD s/p LLE angio   OM of the toe    s/p HD 12/7, Rx sheet reviewed, net UF 1kg, tolerated well. uneventful.  plan for next routine HD 12/7 Monday AM w/2k bath, uf 1.5kg as tolerated  renal restrictions in diet  c/w renvela 3tidac   dose all meds for ESRD  s/p toe amputation today, uneventful    labs, chart reviewed  stable for d/c renal stand point

## 2020-12-08 NOTE — PROGRESS NOTE ADULT - SUBJECTIVE AND OBJECTIVE BOX
[facility] Division of Hospital Medicine  Jaclyn Carlton MD  Pager [phone]    Patient is a 60y old  Female who presents with a chief complaint of Peripheral arterial disease       SUBJECTIVE / OVERNIGHT EVENTS: pt without complaints; surg went well      MEDICATIONS  (STANDING):  aspirin  chewable 81 milliGRAM(s) Oral daily  atorvastatin 20 milliGRAM(s) Oral at bedtime  chlorhexidine 4% Liquid 1 Application(s) Topical once  clopidogrel Tablet 75 milliGRAM(s) Oral daily  dextrose 40% Gel 15 Gram(s) Oral once  dextrose 5%. 1000 milliLiter(s) (50 mL/Hr) IV Continuous <Continuous>  dextrose 5%. 1000 milliLiter(s) (100 mL/Hr) IV Continuous <Continuous>  dextrose 5%. 1000 milliLiter(s) (100 mL/Hr) IV Continuous <Continuous>  dextrose 50% Injectable 25 Gram(s) IV Push once  dextrose 50% Injectable 12.5 Gram(s) IV Push once  dextrose 50% Injectable 25 Gram(s) IV Push once  epoetin niesha-epbx (RETACRIT) Injectable 8000 Unit(s) IV Push <User Schedule>  glucagon  Injectable 1 milliGRAM(s) IntraMuscular once  glucagon  Injectable 1 milliGRAM(s) IntraMuscular once  heparin   Injectable 5000 Unit(s) SubCutaneous every 12 hours  insulin lispro (ADMELOG) corrective regimen sliding scale   SubCutaneous three times a day before meals  insulin lispro (ADMELOG) corrective regimen sliding scale   SubCutaneous at bedtime  multivitamin 1 Tablet(s) Oral daily  piperacillin/tazobactam IVPB.. 3.375 Gram(s) IV Intermittent every 12 hours  sevelamer carbonate 2400 milliGRAM(s) Oral three times a day with meals    MEDICATIONS  (PRN):  acetaminophen   Tablet .. 650 milliGRAM(s) Oral every 6 hours PRN Mild Pain (1 - 3)  HYDROmorphone  Injectable 0.5 milliGRAM(s) IV Push every 2 hours PRN Severe Pain (7 - 10)  oxycodone    5 mG/acetaminophen 325 mG 1 Tablet(s) Oral every 4 hours PRN Moderate Pain (4 - 6)      CAPILLARY BLOOD GLUCOSE  POCT Blood Glucose.: 95 mg/dL (08 Dec 2020 08:39)  POCT Blood Glucose.: 71 mg/dL (07 Dec 2020 23:42)  POCT Blood Glucose.: 93 mg/dL (07 Dec 2020 17:48)  POCT Blood Glucose.: 139 mg/dL (07 Dec 2020 15:45)  POCT Blood Glucose.: 78 mg/dL (07 Dec 2020 12:43)        PHYSICAL EXAM:  Vital Signs Last 24 Hrs  T(F): 98.1 (08 Dec 2020 05:30), Max: 98.6 (07 Dec 2020 19:30)  HR: 75 (08 Dec 2020 05:30) (69 - 78)  BP: 106/63 (08 Dec 2020 05:30) (97/53 - 128/43)  RR: 16 (08 Dec 2020 05:30) (1 - 17)  SpO2: 100% (08 Dec 2020 05:30) (95% - 100%)    CONSTITUTIONAL: NAD  RESPIRATORY: Normal respiratory effort; lungs are clear to auscultation bilaterally  CARDIOVASCULAR: Regular rate and rhythm; No lower extremity edema;   ABDOMEN: Nontender to palpation, normoactive bowel sounds  MUSCULOSKELETAL:  no joint swelling or tenderness to palpation  PSYCH: affect appropriate  NEUROLOGY: no gross sensory deficits   SKIN: L foot with dressing c/d/i    LABS:                        9.7    5.01  )-----------( 182      ( 08 Dec 2020 07:30 )             31.8     12-08    135  |  93<L>  |  21  ----------------------------<  75  4.3   |  31  |  5.74<H>    Ca    9.3      08 Dec 2020 07:30  Phos  4.6     12-08  Mg     2.2     12-08      PT/INR - ( 07 Dec 2020 07:24 )   PT: 11.4 sec;   INR: 1.00 ratio         PTT - ( 07 Dec 2020 07:24 )  PTT:35.8 sec

## 2020-12-08 NOTE — PROGRESS NOTE ADULT - PROBLEM SELECTOR PLAN 1
s/p partial L 5th ray resection   surg notes report healthy tissue and bone prox to resection  HD per renal  pt on Plavix for PAD - defer to vascular, podiatry.

## 2020-12-08 NOTE — DISCHARGE NOTE NURSING/CASE MANAGEMENT/SOCIAL WORK - NSDCPNINST_GEN_ALL_CORE
Watch for signs of infection; redness, swelling, fever, chills or heat, report such symptoms to the MD. No driving while taking pain medication, it causes drowsiness & constipation. Drink 6-8 glasses of fluids daily to promote hydration. No heavy lifting, pulling or pushing heavy objects. Follow up with the MD as per discharge orders

## 2020-12-08 NOTE — PROGRESS NOTE ADULT - REASON FOR ADMISSION
Peripheral arterial disease

## 2020-12-08 NOTE — PROGRESS NOTE ADULT - ASSESSMENT
A/P: 60 F w/ CKD4 and diffuse infrainguinal arterial occlusive disease now with critical limb ischemia of the LLE s/p LLE angiogram with SFA, AT, and PT angioplasty on 12/4. Now S/P partial 5th ray resection.    Plan:  - regular diet  - SQH, ASA, statin  - monitor vitals/I&Os/electrolytes  - follow up podiatry plan     Vascular Surgery   [id number]

## 2020-12-08 NOTE — PROGRESS NOTE ADULT - ASSESSMENT
59yo F with left foot 5th digit wound to bone, s/p left foot partial 5th ray resection     - pt seen and evaluated   - Afebrile, no leukocytosis  - s/p left foot partial 5th ray resection (DOS 12/7/20), surgical site well coapted, sutures intact, viable flaps, no dehiscence, no hematoma, no signs of infection   - low concern for residual infection/viability.  - Pod stable for discharge on PO cipro x 5 days, pt to follow up within 1 week w/ Dr. Daily info in discharge paperwork  - discussed w/ attending 59yo F with left foot 5th digit wound to bone, s/p left foot partial 5th ray resection     - pt seen and evaluated   - Afebrile, no leukocytosis  - s/p left foot partial 5th ray resection (DOS 12/7/20), surgical site well coapted, sutures intact, viable flaps, no dehiscence, no hematoma, no signs of infection   - low concern for residual infection/viability.  - pt to keep dressing clean, dry and intact till follow up, pt to heel WB in a surgical shoe   - Pod stable for discharge on PO cipro x 5 days, pt to follow up within 1 week w/ Dr. Daily info in discharge paperwork  - discussed w/ attending

## 2020-12-08 NOTE — PROGRESS NOTE ADULT - ATTENDING COMMENTS
[facility]  Office [phone]  Ans Serv [phone]  [facility] - [phone]
[facility]  Office [phone]  Ans Serv [phone]  [facility] - [phone]
[facility]  Office [phone]  Ans Serv [phone]  [facility] - [phone] .
[facility]  Office [phone]  Ans Serv [phone]  [facility] - [phone]
dc planning per vasc/podiatry  if plan to dc tomorrow would try to get HD first shift prior to dc
remains optimized for OR

## 2020-12-08 NOTE — PROGRESS NOTE ADULT - SUBJECTIVE AND OBJECTIVE BOX
Subjective:  No acute overnight events. Patient for OR today with podiatry for left foot partial 5th ray amputation. Patient offers no new complaints.     Physical Exam:  General: NAD, resting comfortably in bed  Pulmonary: Nonlabored breathing, no respiratory distress  Abdominal: soft, NT/ND  Extremities: L foot wrapped with no strikethrough bleeding, b/l LE strength intact and full ROM. dopplerable DP and PT signals.     T(C): 36.7 (12-08-20 @ 05:30), Max: 37 (12-07-20 @ 19:30)  HR: 75 (12-08-20 @ 05:30) (69 - 78)  BP: 106/63 (12-08-20 @ 05:30) (97/53 - 128/43)  RR: 16 (12-08-20 @ 05:30) (1 - 17)  SpO2: 100% (12-08-20 @ 05:30) (95% - 100%)      12-07-20 @ 07:01  -  12-08-20 @ 07:00  --------------------------------------------------------  IN: 900 mL / OUT: 1400 mL / NET: -500 mL        LABS:  cret                        9.7    5.01  )-----------( 182      ( 08 Dec 2020 07:30 )             31.8     12-07    137  |  92<L>  |  16  ----------------------------<  119<H>  3.7   |  32<H>  |  4.43<H>    Ca    9.8      07 Dec 2020 17:11  Phos  2.8     12-07  Mg     2.3     12-07      PT/INR - ( 07 Dec 2020 07:24 )   PT: 11.4 sec;   INR: 1.00 ratio         PTT - ( 07 Dec 2020 07:24 )  PTT:35.8 sec      acetaminophen   Tablet .. 650 milliGRAM(s) Oral every 6 hours PRN  aspirin  chewable 81 milliGRAM(s) Oral daily  atorvastatin 20 milliGRAM(s) Oral at bedtime  chlorhexidine 4% Liquid 1 Application(s) Topical once  clopidogrel Tablet 75 milliGRAM(s) Oral daily  dextrose 40% Gel 15 Gram(s) Oral once  dextrose 5%. 1000 milliLiter(s) IV Continuous <Continuous>  dextrose 5%. 1000 milliLiter(s) IV Continuous <Continuous>  dextrose 5%. 1000 milliLiter(s) IV Continuous <Continuous>  dextrose 50% Injectable 25 Gram(s) IV Push once  dextrose 50% Injectable 12.5 Gram(s) IV Push once  dextrose 50% Injectable 25 Gram(s) IV Push once  epoetin niesha-epbx (RETACRIT) Injectable 8000 Unit(s) IV Push <User Schedule>  glucagon  Injectable 1 milliGRAM(s) IntraMuscular once  glucagon  Injectable 1 milliGRAM(s) IntraMuscular once  heparin   Injectable 5000 Unit(s) SubCutaneous every 12 hours  HYDROmorphone  Injectable 0.5 milliGRAM(s) IV Push every 2 hours PRN  insulin lispro (ADMELOG) corrective regimen sliding scale   SubCutaneous three times a day before meals  insulin lispro (ADMELOG) corrective regimen sliding scale   SubCutaneous at bedtime  multivitamin 1 Tablet(s) Oral daily  oxycodone    5 mG/acetaminophen 325 mG 1 Tablet(s) Oral every 4 hours PRN  piperacillin/tazobactam IVPB.. 3.375 Gram(s) IV Intermittent every 12 hours  sevelamer carbonate 2400 milliGRAM(s) Oral three times a day with meals      Imaging:

## 2020-12-08 NOTE — PHYSICAL THERAPY INITIAL EVALUATION ADULT - CRITERIA FOR SKILLED THERAPEUTIC INTERVENTIONS
rehab potential/functional limitations in following categories/anticipated equipment needs at discharge/anticipated discharge recommendation/risk reduction/prevention/predicted duration of therapy intervention/impairments found/therapy frequency

## 2020-12-10 DIAGNOSIS — Z02.9 ENCOUNTER FOR ADMINISTRATIVE EXAMINATIONS, UNSPECIFIED: ICD-10-CM

## 2020-12-21 ENCOUNTER — APPOINTMENT (OUTPATIENT)
Dept: VASCULAR SURGERY | Facility: CLINIC | Age: 60
End: 2020-12-21
Payer: MEDICARE

## 2020-12-21 VITALS
BODY MASS INDEX: 25.68 KG/M2 | WEIGHT: 136 LBS | HEART RATE: 75 BPM | HEIGHT: 61 IN | SYSTOLIC BLOOD PRESSURE: 152 MMHG | DIASTOLIC BLOOD PRESSURE: 66 MMHG | TEMPERATURE: 97.3 F

## 2020-12-21 PROCEDURE — 93922 UPR/L XTREMITY ART 2 LEVELS: CPT

## 2020-12-21 PROCEDURE — 99213 OFFICE O/P EST LOW 20 MIN: CPT

## 2020-12-21 NOTE — PHYSICAL EXAM
[Normal Breath Sounds] : Normal breath sounds [Normal Heart Sounds] : normal heart sounds [2+] : left 2+ [de-identified] : NAD [de-identified] : WNL [FreeTextEntry1] : Left groin access site- no hematoma. Puncture site well healed.\par Right groin- AVF\par \par Left 5th toe amputation site with Nylon sutures in place. No signs of infection.

## 2020-12-21 NOTE — ASSESSMENT
[FreeTextEntry1] : Plavix and Lipitor were renewed with Pharmacy. She should continue Aspirin as well.\par \par Follow up with podiatry for wound care.\par \par Follow up in six months with repeat MARTA/PVR.

## 2020-12-21 NOTE — HISTORY OF PRESENT ILLNESS
[FreeTextEntry1] : Mrs. Boogie presents in follow up s/p left lower extremity angiogram, DCB PTA of the SFA at the adductor canal, and PTA of the AT and PT for PAD with tissue loss. Surgery was performed on 12/4/2020. She subsequently underwent left 5th toe amputation by podiatry. Her wound is healing well. She is scheduled to follow up with podiatry next week for suture removal. She is taking Aspirin and Plavix, but has stopped her Atorvastatin. She is ambulating with a can and orthotic shoe.

## 2021-06-15 NOTE — PHYSICAL THERAPY INITIAL EVALUATION ADULT - BALANCE DISTURBANCE, IDENTIFIED IMPAIRMENT CONTRIBUTE, REHAB EVAL
[facility]  [address] [place] [address]  Phone: [phone]  Fax: [phone]  Primary Provider: Simran Barreto  Pre-op Performing Provider: JORGE L CALLAHAN      PREOPERATIVE EVALUATION:  Today's date: 6/15/2021    Magnolia Gonzáles is a 48 year old female who presents for a preoperative evaluation.    Surgical Information:  Surgery/Procedure: Left hand 3rd digit  Surgery Location: [facility]    Surgeon:   Surgery Date: ?  Time of Surgery: unknown  Where patient plans to recover: At home with family  Fax number for surgical facility: [phone]  Karyna: [phone] for questions    Type of Anesthesia Anticipated: to be determined    Assessment & Plan     The proposed surgical procedure is considered INTERMEDIATE risk.    Preop general physical exam    - EKG 12-lead complete w/read - Clinics    Subacute osteomyelitis of left hand (H)  Failed therapy     Acquired hypothyroidism  Adjust meds as indicated by above labs.   - TSH with free T4 reflex             Risks and Recommendations:  The patient has the following additional risks and recommendations for perioperative complications:   - No identified additional risk factors other than previously addressed    Medication Instructions:  Patient is to take all scheduled medications on the day of surgery EXCEPT for modifications listed below:  Naprosyn she last dose was today     RECOMMENDATION:  APPROVAL GIVEN to proceed with proposed procedure, without further diagnostic evaluation.    Review of external notes as documented above                   Subjective     HPI related to upcoming procedure: finger injury with exacto knife that is not healing chronic infection joint and bone      Preop Questions 6/15/2021   1. Have you ever had a heart attack or stroke? No   2. Have you ever had surgery on your heart or blood vessels, such as a stent placement, a coronary artery bypass, or surgery on an  artery in your head, neck, heart, or legs? No   3. Do you have chest pain with activity? No   4. Do you have a history of  heart failure? No   5. Do you currently have a cold, bronchitis or symptoms of other infection? No   6. Do you have a cough, shortness of breath, or wheezing? No   7. Do you or anyone in your family have previous history of blood clots? No   8. Do you or does anyone in your family have a serious bleeding problem such as prolonged bleeding following surgeries or cuts? No   9. Have you ever had problems with anemia or been told to take iron pills? No   10. Have you had any abnormal blood loss such as black, tarry or bloody stools, or abnormal vaginal bleeding? No   11. Have you ever had a blood transfusion? No   12. Are you willing to have a blood transfusion if it is medically needed before, during, or after your surgery? Yes   13. Have you or any of your relatives ever had problems with anesthesia? No   14. Do you have sleep apnea, excessive snoring or daytime drowsiness? YES -    14a. Do you have a CPAP machine? No   15. Do you have any artifical heart valves or other implanted medical devices like a pacemaker, defibrillator, or continuous glucose monitor? No   16. Do you have artificial joints? No   17. Are you allergic to latex? No   18. Is there any chance that you may be pregnant? No     Health Care Directive:  Patient does not have a Health Care Directive or Living Will: None    Preoperative Review of :   reviewed - no record of controlled substances prescribed.      Status of Chronic Conditions:  HYPOTHYROIDISM - Patient has a longstanding history of chronic Hypothyroidism. Patient has been doing well, noting no tremor, insomnia, hair loss or changes in skin texture. Continues to take medications as directed, without adverse reactions or side effects. Last TSH   Lab Results   Component Value Date    TSH 2.73 11/13/2019   .      Anxiety   On multiple medications    Review of  Systems  CONSTITUTIONAL: NEGATIVE for fever, chills, change in weight  INTEGUMENTARY/SKIN: NEGATIVE for worrisome rashes, moles or lesions  EYES: NEGATIVE for vision changes or irritation  ENT/MOUTH: NEGATIVE for ear, mouth and throat problems  RESP: NEGATIVE for significant cough or SOB  BREAST: NEGATIVE for masses, tenderness or discharge  CV: NEGATIVE for chest pain, palpitations or peripheral edema  GI: NEGATIVE for nausea, abdominal pain, heartburn, or change in bowel habits  : NEGATIVE for frequency, dysuria, or hematuria  MUSCULOSKELETAL: NEGATIVE for significant arthralgias or myalgia  NEURO: NEGATIVE for weakness, dizziness or paresthesias  ENDOCRINE: NEGATIVE for temperature intolerance, skin/hair changes  HEME: NEGATIVE for bleeding problems  PSYCHIATRIC: NEGATIVE for changes in mood or affect    Patient Active Problem List    Diagnosis Date Noted     Generalized anxiety disorder 07/29/2020     Priority: Medium     Major depressive disorder, recurrent episode, mild (H) 07/19/2017     Priority: Medium     PTSD (post-traumatic stress disorder) 07/19/2017     Priority: Medium     Bipolar affective disorder, manic, moderate (H) 09/23/2016     Priority: Medium     Patient refutes this. 10/5/2020       Overdose of Valium, intentional self-harm, subsequent encounter 05/29/2016     Priority: Medium     Carpal tunnel syndrome of left wrist 01/13/2016     Priority: Medium     Adjustment disorder with mixed anxiety and depressed mood 01/06/2016     Priority: Medium     Acquired hypothyroidism 01/06/2016     Priority: Medium     Bilateral carpal tunnel syndrome 01/06/2016     Priority: Medium     Social phobia 12/26/2014     Priority: Medium     Panic disorder without agoraphobia 12/26/2014     Priority: Medium     History of alcohol dependence (H) 07/22/2014     Priority: Medium     Overview:   Binge drinking disorder. Sober since 5/2016       Nondependent alcohol abuse, episodic drinking behavior 11/17/2005      "Priority: Medium      Past Medical History:   Diagnosis Date     Anxiety      Depressive disorder      Hypothyroid      Suicidal ideation 7/19/2015     Past Surgical History:   Procedure Laterality Date     ORTHOPEDIC SURGERY       Current Outpatient Medications   Medication Sig Dispense Refill     buPROPion (WELLBUTRIN XL) 150 MG 24 hr tablet Take 1 tablet (150 mg) by mouth every morning 90 tablet 1     clonazePAM (KLONOPIN) 0.5 MG tablet Take 1 tablet (0.5 mg) by mouth 2 times daily as needed for anxiety 6 tablet 0     gabapentin (NEURONTIN) 400 MG capsule TAKE 3 CAPSULES IN MORNING, TAKE 2 CAPSULES IN THE AFTERNOON, AND TAKE 3 CAPSULES IN THE EVENING 240 capsule 0     lamoTRIgine (LAMICTAL) 200 MG tablet Take 1 tablet (200 mg) by mouth daily 90 tablet 0     levothyroxine (SYNTHROID/LEVOTHROID) 100 MCG tablet TAKE ONE TABLET BY MOUTH ONCE DAILY 30 tablet 0     methocarbamol (ROBAXIN) 500 MG tablet Take 1 tablet (500 mg) by mouth 3 times daily 90 tablet 1     multivitamin (CENTRUM SILVER) tablet Take 1 tablet by mouth daily 90 tablet 1     naproxen (NAPROSYN) 500 MG tablet TAKE ONE TABLET BY MOUTH TWO TIMES A DAY WITH MEALS 180 tablet 2     ondansetron (ZOFRAN) 4 MG tablet Take 1 tablet (4 mg) by mouth every 8 hours as needed for nausea 30 tablet 0     triamcinolone (KENALOG) 0.1 % external ointment Apply topically 2 times daily 15 g 0     betamethasone valerate (VALISONE) 0.1 % external ointment Apply topically 2 times daily 30 g 0     hydrocortisone (CORTAID) 1 % external ointment Apply topically 2 times daily Use on face and around the eyes - DO NOT get in the eye 30 g 0       Allergies   Allergen Reactions     Zoloft [Sertraline] Rash        Social History     Tobacco Use     Smoking status: Never Smoker     Smokeless tobacco: Never Used   Substance Use Topics     Alcohol use: Yes     Alcohol/week: 0.0 standard drinks     Comment: \"once every three months\"       History   Drug Use No         Objective     BP " "110/70   Pulse 96   Temp 98.9  F (37.2  C) (Tympanic)   Resp 16   Ht 1.626 m (5' 4\")   Wt 69.3 kg (152 lb 12.8 oz)   LMP 02/15/2021   BMI 26.23 kg/m      Physical Exam    GENERAL APPEARANCE: healthy, alert and no distress     EYES: EOMI, PERRL     HENT: ear canals and TM's normal and nose and mouth without ulcers or lesions     NECK: no adenopathy, no asymmetry, masses, or scars and thyroid normal to palpation     RESP: lungs clear to auscultation - no rales, rhonchi or wheezes     CV: regular rates and rhythm, normal S1 S2, no S3 or S4 and no murmur, click or rub     ABDOMEN:  soft, nontender, no HSM or masses and bowel sounds normal     MS: extremities normal- no gross deformities noted, no evidence of inflammation in joints, FROM in all extremities.     SKIN: no suspicious lesions or rashes     NEURO: Normal strength and tone, sensory exam grossly normal, mentation intact and speech normal     PSYCH: mentation appears normal. and affect normal/bright     LYMPHATICS: No cervical adenopathy      Diagnostics:  Labs pending at this time.  Results will be reviewed when available.   EKG: appears normal, NSR, normal axis, normal intervals, no acute ST/T changes c/w ischemia, no LVH by voltage criteria, unchanged from previous tracings    Revised Cardiac Risk Index (RCRI):  The patient has the following serious cardiovascular risks for perioperative complications:   - No serious cardiac risks = 0 points     RCRI Interpretation: 0 points: Class I (very low risk - 0.4% complication rate)           Signed Electronically by: Jeniffer Carroll MD  Copy of this evaluation report is provided to requesting physician.      " impaired motor control/pain/decreased strength/decreased ROM

## 2021-06-28 ENCOUNTER — APPOINTMENT (OUTPATIENT)
Dept: VASCULAR SURGERY | Facility: CLINIC | Age: 61
End: 2021-06-28
Payer: MEDICARE

## 2021-06-28 VITALS
BODY MASS INDEX: 24.92 KG/M2 | TEMPERATURE: 97.3 F | WEIGHT: 132 LBS | DIASTOLIC BLOOD PRESSURE: 72 MMHG | HEART RATE: 76 BPM | HEIGHT: 61 IN | SYSTOLIC BLOOD PRESSURE: 175 MMHG

## 2021-06-28 PROCEDURE — 99213 OFFICE O/P EST LOW 20 MIN: CPT

## 2021-06-28 PROCEDURE — 93923 UPR/LXTR ART STDY 3+ LVLS: CPT

## 2021-06-28 NOTE — ASSESSMENT
[FreeTextEntry1] : In summary, Mrs. Boogie p/w bilateral lower extremity atherosclerosis s/p LLE endovascular revascularization.\par -MARTA today were left 0.92, right 0.83\par \par -She should continue DAPT and Lipitor\par -If right 1st toe fails to heal, I have instructed her to contact me, otherwise she will follow up in six months with repeat PVR.

## 2021-06-28 NOTE — PHYSICAL EXAM
[Normal Breath Sounds] : Normal breath sounds [Normal Heart Sounds] : normal heart sounds [2+] : left 2+ [de-identified] : NAD [de-identified] : WNL [FreeTextEntry1] : Right groin- AVF\par \par Left 5th toe amputation site completely healed.\par \par Right 1st toe distal small ulcer.

## 2021-10-06 PROBLEM — I10 ESSENTIAL HYPERTENSION: Status: ACTIVE | Noted: 2018-11-26

## 2021-11-01 ENCOUNTER — APPOINTMENT (OUTPATIENT)
Dept: VASCULAR SURGERY | Facility: CLINIC | Age: 61
End: 2021-11-01
Payer: MEDICARE

## 2021-11-01 VITALS
HEART RATE: 74 BPM | DIASTOLIC BLOOD PRESSURE: 66 MMHG | SYSTOLIC BLOOD PRESSURE: 159 MMHG | HEIGHT: 61 IN | BODY MASS INDEX: 25.11 KG/M2 | TEMPERATURE: 97.8 F | WEIGHT: 133 LBS

## 2021-11-01 DIAGNOSIS — Z01.818 ENCOUNTER FOR OTHER PREPROCEDURAL EXAMINATION: ICD-10-CM

## 2021-11-01 PROCEDURE — 93923 UPR/LXTR ART STDY 3+ LVLS: CPT

## 2021-11-01 PROCEDURE — 99213 OFFICE O/P EST LOW 20 MIN: CPT

## 2021-11-01 NOTE — PHYSICAL EXAM
[Normal Breath Sounds] : Normal breath sounds [Normal Heart Sounds] : normal heart sounds [2+] : left 2+ [de-identified] : NAD [de-identified] : WNL [FreeTextEntry1] : Right groin- AVF. Rist 1st toe 1.5cm distal ulcer.\par \par Left 5th toe amputation site completely healed.\par \par Right 1st toe distal small ulcer.

## 2021-11-01 NOTE — ASSESSMENT
[FreeTextEntry1] : In summary, Mrs. Boogie p/w RLE PAD with tissue loss. MARTA showed non-compressible vessels with blunting of waveforms at ankle level. She should continue DAPT and statin therapy. We will schedule her for RLE angiography. Pending results, the right groin AVF may need to be sacrificed.

## 2021-11-01 NOTE — HISTORY OF PRESENT ILLNESS
[FreeTextEntry1] : Mrs. Boogie presents in follow up s/p left lower extremity angiogram, DCB PTA of the SFA at the adductor canal, and PTA of the AT and PT for PAD with tissue loss. Surgery was performed on 12/4/2020. She subsequently underwent left 5th toe amputation by podiatry. The amputation site has healed completely.\par \par Since her last visit, Mrs. Boogie continues to take Lipitor, Aspirin and Plavix. She had an ingrown toe nail which was removed by Dr. Daily and there was some debridement performed on the distal aspect of the right 1st toe. This has failed to heal and the wound is growing in size. Of note, she is being dialyzed via right groin AVF.

## 2021-11-02 ENCOUNTER — APPOINTMENT (OUTPATIENT)
Dept: DISASTER EMERGENCY | Facility: CLINIC | Age: 61
End: 2021-11-02

## 2021-11-03 LAB — SARS-COV-2 N GENE NPH QL NAA+PROBE: NOT DETECTED

## 2021-11-05 ENCOUNTER — APPOINTMENT (OUTPATIENT)
Dept: VASCULAR SURGERY | Facility: HOSPITAL | Age: 61
End: 2021-11-05

## 2021-11-05 ENCOUNTER — OUTPATIENT (OUTPATIENT)
Dept: OUTPATIENT SERVICES | Facility: HOSPITAL | Age: 61
LOS: 1 days | Discharge: HOME | End: 2021-11-05
Payer: MEDICARE

## 2021-11-05 DIAGNOSIS — Z99.2 DEPENDENCE ON RENAL DIALYSIS: Chronic | ICD-10-CM

## 2021-11-05 DIAGNOSIS — Z98.890 OTHER SPECIFIED POSTPROCEDURAL STATES: Chronic | ICD-10-CM

## 2021-11-05 DIAGNOSIS — I70.209 UNSPECIFIED ATHEROSCLEROSIS OF NATIVE ARTERIES OF EXTREMITIES, UNSPECIFIED EXTREMITY: ICD-10-CM

## 2021-11-05 DIAGNOSIS — M86.171 OTHER ACUTE OSTEOMYELITIS, RIGHT ANKLE AND FOOT: Chronic | ICD-10-CM

## 2021-11-05 LAB
ANION GAP SERPL CALC-SCNC: 16 MMOL/L — HIGH (ref 7–14)
BUN SERPL-MCNC: 47 MG/DL — HIGH (ref 7–23)
CALCIUM SERPL-MCNC: 9.7 MG/DL — SIGNIFICANT CHANGE UP (ref 8.4–10.5)
CHLORIDE SERPL-SCNC: 101 MMOL/L — SIGNIFICANT CHANGE UP (ref 98–107)
CO2 SERPL-SCNC: 27 MMOL/L — SIGNIFICANT CHANGE UP (ref 22–31)
CREAT SERPL-MCNC: 8.93 MG/DL — HIGH (ref 0.5–1.3)
GLUCOSE SERPL-MCNC: 111 MG/DL — HIGH (ref 70–99)
HCT VFR BLD CALC: 34.6 % — SIGNIFICANT CHANGE UP (ref 34.5–45)
HGB BLD-MCNC: 10.4 G/DL — LOW (ref 11.5–15.5)
MCHC RBC-ENTMCNC: 29.1 PG — SIGNIFICANT CHANGE UP (ref 27–34)
MCHC RBC-ENTMCNC: 30.1 GM/DL — LOW (ref 32–36)
MCV RBC AUTO: 96.9 FL — SIGNIFICANT CHANGE UP (ref 80–100)
NRBC # BLD: 0 /100 WBCS — SIGNIFICANT CHANGE UP
NRBC # FLD: 0.02 K/UL — HIGH
PLATELET # BLD AUTO: 173 K/UL — SIGNIFICANT CHANGE UP (ref 150–400)
POTASSIUM SERPL-MCNC: 4.8 MMOL/L — SIGNIFICANT CHANGE UP (ref 3.5–5.3)
POTASSIUM SERPL-SCNC: 4.8 MMOL/L — SIGNIFICANT CHANGE UP (ref 3.5–5.3)
RBC # BLD: 3.57 M/UL — LOW (ref 3.8–5.2)
RBC # FLD: 14 % — SIGNIFICANT CHANGE UP (ref 10.3–14.5)
SODIUM SERPL-SCNC: 144 MMOL/L — SIGNIFICANT CHANGE UP (ref 135–145)
WBC # BLD: 6.27 K/UL — SIGNIFICANT CHANGE UP (ref 3.8–10.5)
WBC # FLD AUTO: 6.27 K/UL — SIGNIFICANT CHANGE UP (ref 3.8–10.5)

## 2021-11-05 PROCEDURE — 75710 ARTERY X-RAYS ARM/LEG: CPT | Mod: 26,59

## 2021-11-05 PROCEDURE — 37228: CPT | Mod: RT

## 2021-11-05 PROCEDURE — 99152 MOD SED SAME PHYS/QHP 5/>YRS: CPT

## 2021-11-05 PROCEDURE — 75716 ARTERY X-RAYS ARMS/LEGS: CPT | Mod: 26

## 2021-11-05 PROCEDURE — 76937 US GUIDE VASCULAR ACCESS: CPT | Mod: 26

## 2021-11-05 RX ORDER — CHOLECALCIFEROL (VITAMIN D3) 125 MCG
1 CAPSULE ORAL
Qty: 0 | Refills: 0 | DISCHARGE

## 2021-11-05 RX ORDER — ATORVASTATIN CALCIUM 80 MG/1
1 TABLET, FILM COATED ORAL
Qty: 0 | Refills: 0 | DISCHARGE

## 2021-11-05 RX ORDER — SODIUM CHLORIDE 9 MG/ML
3 INJECTION INTRAMUSCULAR; INTRAVENOUS; SUBCUTANEOUS EVERY 8 HOURS
Refills: 0 | Status: DISCONTINUED | OUTPATIENT
Start: 2021-11-05 | End: 2021-11-19

## 2021-11-05 NOTE — H&P CARDIOLOGY - HISTORY OF PRESENT ILLNESS
60 year old female with ESRD on HD who presented to his doctor complaining of   In light of patients risk factors, symptoms and abnormal noninvasive test findings there is high suspicion for CAD. Patient is now referred to [facility] for a peripheral angiogram with possible PTA/stent.     Denies fever, cough, chills, headache, flu like symptoms, sick contact or recent travel  COVID PCR not detected on 11/3/21 60 year old female with ESRD on HD (Tues/Thurs/Fri) via right groin AVF (last session Thurs 11/4) who presented to her doctor with right great toe non-healing ulcer s/p removal of ingrown toenail. Abnormal MARTA's  In light of patients risk factors, symptoms and abnormal noninvasive test findings there is high suspicion for PAD. Patient is now referred to [facility] for a peripheral angiogram with possible PTA/stent.     Denies fever, cough, chills, headache, flu like symptoms, sick contact or recent travel  COVID PCR not detected on 11/3/21    please see hard copy H&P in paper chart 11/1/21  PATIENT SEEN AND EXAMINED AND NO NEW CLINICAL CHANGES SINCE office visit   60 year old female with ESRD on HD (Mon/Wed/Fri) via right groin AVF (last session Wed 11/3) who presented to her doctor with right great toe non-healing ulcer s/p removal of ingrown toenail. Abnormal MARTA's  In light of patients risk factors, symptoms and abnormal noninvasive test findings there is high suspicion for PAD. Patient is now referred to [facility] for a peripheral angiogram with possible PTA/stent.   *OF NOTE: patients outpatient HD center has her scheduled to return to dialysis Saturday morning 11/6 due to procedure so she does not miss a session    Denies fever, cough, chills, headache, flu like symptoms, sick contact or recent travel  COVID PCR not detected on 11/3/21    please see hard copy H&P in paper chart 11/1/21  PATIENT SEEN AND EXAMINED AND NO NEW CLINICAL CHANGES SINCE office visit

## 2021-11-05 NOTE — BRIEF OPERATIVE NOTE - OPERATION/FINDINGS
PTA Tibioperoneal Artery and Branches:  Intervention on right posterior tibial: percutaneous intervention  Intervention on right tibio-peroneal: balloon angioplasty  Mynx closure over L groin access, no hematoma at end of case

## 2021-11-22 ENCOUNTER — APPOINTMENT (OUTPATIENT)
Dept: VASCULAR SURGERY | Facility: CLINIC | Age: 61
End: 2021-11-22

## 2021-11-29 ENCOUNTER — APPOINTMENT (OUTPATIENT)
Dept: VASCULAR SURGERY | Facility: CLINIC | Age: 61
End: 2021-11-29
Payer: MEDICARE

## 2021-11-29 VITALS
BODY MASS INDEX: 25.68 KG/M2 | WEIGHT: 136 LBS | HEART RATE: 76 BPM | TEMPERATURE: 97.7 F | SYSTOLIC BLOOD PRESSURE: 146 MMHG | DIASTOLIC BLOOD PRESSURE: 73 MMHG | HEIGHT: 61 IN

## 2021-11-29 PROCEDURE — 99213 OFFICE O/P EST LOW 20 MIN: CPT

## 2021-11-29 PROCEDURE — 93922 UPR/L XTREMITY ART 2 LEVELS: CPT

## 2021-11-29 NOTE — HISTORY OF PRESENT ILLNESS
[FreeTextEntry1] : Mrs. Boogie presents in follow up s/p right lower extremity angiography and angioplasty of TPT and single vessel PT runoff, performed on 11/52/21 for tissue loss. The AT and peroneal are occluded and there is small vessel disease in the foot. She now has dry gangrene of the distal aspect of the right 1st toe. She also previously underwent left lower extremity angiography and DCB PTA of the SFA at the adductor canal, and PTA of the AT and PT for PAD with tissue loss. Surgery was performed on 12/4/2020. She subsequently underwent left 5th toe amputation by podiatry. The amputation site has healed completely. She now presents with a two week history of ulceration of the left 3rd toe after she travelled wearing uncomfortable shoes. She is to see podiatry tomorrow.\par \par Mrs. Boogie continues to take Lipitor, Aspirin and Plavix. She is being dialyzed via right groin AVF without issues.

## 2021-11-29 NOTE — PHYSICAL EXAM
[Normal Breath Sounds] : Normal breath sounds [Normal Heart Sounds] : normal heart sounds [2+] : left 2+ [de-identified] : NAD [de-identified] : WNL [FreeTextEntry1] : Right groin- AVF. Rist 1st toe 1.5cm distal dry gangrene\par \par Left 5th toe amputation site completely healed. New left 3rd toe dorsal ulcer 2x3mm.\par \par Right 1st toe distal small ulcer.

## 2021-11-29 NOTE — ASSESSMENT
[FreeTextEntry1] : In summary, Mrs. Boogie presents with BLE PAD with tissue loss. Toe pressures on the right were increased today to 79mmHg following intervention (previously 41). Left toe pressure is 38 (decreased from 55) and she has a new left 3rd toe wound. If wound fails to heal within 6 weeks, we will repeat LLE angiography. She will follow up at the end of January for reassessment. She should continue DAPT and statin therapy.

## 2022-01-10 ENCOUNTER — APPOINTMENT (OUTPATIENT)
Dept: VASCULAR SURGERY | Facility: CLINIC | Age: 62
End: 2022-01-10
Payer: MEDICARE

## 2022-01-10 ENCOUNTER — APPOINTMENT (OUTPATIENT)
Dept: VASCULAR SURGERY | Facility: CLINIC | Age: 62
End: 2022-01-10

## 2022-01-10 VITALS
DIASTOLIC BLOOD PRESSURE: 75 MMHG | HEIGHT: 61 IN | SYSTOLIC BLOOD PRESSURE: 144 MMHG | HEART RATE: 80 BPM | TEMPERATURE: 97.6 F | WEIGHT: 136 LBS | BODY MASS INDEX: 25.68 KG/M2

## 2022-01-10 PROCEDURE — 99213 OFFICE O/P EST LOW 20 MIN: CPT

## 2022-01-10 NOTE — ASSESSMENT
[FreeTextEntry1] : In summary, Mrs. Boogie presents with left third toe gangrene. We will schedule her for angiography on 1/13/22. She should continue DAPT and statin.

## 2022-01-10 NOTE — PHYSICAL EXAM
[Normal Breath Sounds] : Normal breath sounds [Normal Heart Sounds] : normal heart sounds [2+] : left 2+ [de-identified] : NAD [de-identified] : WNL [FreeTextEntry1] : Right groin- AVF. Rist 1st toe 1.5cm distal dry gangrene\par \par Left 5th toe amputation site completely healed. Dry gangrene of left 3rd toe.\par \par Right 1st toe distal small ulcer.

## 2022-01-10 NOTE — HISTORY OF PRESENT ILLNESS
[FreeTextEntry1] : Mrs. Boogie presents in follow up s/p right lower extremity angiography and angioplasty of TPT and single vessel PT runoff, performed on 11/5/21 for tissue loss. The AT and peroneal are occluded and there is small vessel disease in the foot. She has dry gangrene of the distal aspect of the right 1st toe, which is stable. She also previously underwent left lower extremity angiography and DCB PTA of the SFA at the adductor canal, and PTA of the AT and PT for PAD with tissue loss. Surgery was performed on 12/4/2020. She subsequently underwent left 5th toe amputation by podiatry. The amputation site has healed completely. During her last visit, she presented with a two week history of ulceration of the left 3rd toe after she travelled wearing uncomfortable shoes. This has progressed to dry gangrene and has failed to heal. She presents to schedule angiography as discussed at her last visit.\par \par Mrs. Boogie continues to take Lipitor, Aspirin and Plavix. She is being dialyzed (M/W/F) via right groin AVF without issues.

## 2022-01-11 LAB — SARS-COV-2 N GENE NPH QL NAA+PROBE: NOT DETECTED

## 2022-01-13 ENCOUNTER — OUTPATIENT (OUTPATIENT)
Dept: OUTPATIENT SERVICES | Facility: HOSPITAL | Age: 62
LOS: 1 days | Discharge: HOME | End: 2022-01-13
Payer: MEDICARE

## 2022-01-13 ENCOUNTER — APPOINTMENT (OUTPATIENT)
Dept: VASCULAR SURGERY | Facility: HOSPITAL | Age: 62
End: 2022-01-13

## 2022-01-13 DIAGNOSIS — I70.209 UNSPECIFIED ATHEROSCLEROSIS OF NATIVE ARTERIES OF EXTREMITIES, UNSPECIFIED EXTREMITY: ICD-10-CM

## 2022-01-13 DIAGNOSIS — Z98.890 OTHER SPECIFIED POSTPROCEDURAL STATES: Chronic | ICD-10-CM

## 2022-01-13 DIAGNOSIS — M86.171 OTHER ACUTE OSTEOMYELITIS, RIGHT ANKLE AND FOOT: Chronic | ICD-10-CM

## 2022-01-13 DIAGNOSIS — Z99.2 DEPENDENCE ON RENAL DIALYSIS: Chronic | ICD-10-CM

## 2022-01-13 LAB
ANION GAP SERPL CALC-SCNC: 18 MMOL/L — HIGH (ref 7–14)
BUN SERPL-MCNC: 24 MG/DL — HIGH (ref 7–23)
CALCIUM SERPL-MCNC: 9.9 MG/DL — SIGNIFICANT CHANGE UP (ref 8.4–10.5)
CHLORIDE SERPL-SCNC: 100 MMOL/L — SIGNIFICANT CHANGE UP (ref 98–107)
CO2 SERPL-SCNC: 28 MMOL/L — SIGNIFICANT CHANGE UP (ref 22–31)
CREAT SERPL-MCNC: 5.88 MG/DL — HIGH (ref 0.5–1.3)
GLUCOSE SERPL-MCNC: 113 MG/DL — HIGH (ref 70–99)
HCT VFR BLD CALC: 38.7 % — SIGNIFICANT CHANGE UP (ref 34.5–45)
HGB BLD-MCNC: 11.6 G/DL — SIGNIFICANT CHANGE UP (ref 11.5–15.5)
MAGNESIUM SERPL-MCNC: 2.2 MG/DL — SIGNIFICANT CHANGE UP (ref 1.6–2.6)
MCHC RBC-ENTMCNC: 26.6 PG — LOW (ref 27–34)
MCHC RBC-ENTMCNC: 30 GM/DL — LOW (ref 32–36)
MCV RBC AUTO: 88.8 FL — SIGNIFICANT CHANGE UP (ref 80–100)
NRBC # BLD: 0 /100 WBCS — SIGNIFICANT CHANGE UP
NRBC # FLD: 0 K/UL — SIGNIFICANT CHANGE UP
PHOSPHATE SERPL-MCNC: 4.7 MG/DL — HIGH (ref 2.5–4.5)
PLATELET # BLD AUTO: 233 K/UL — SIGNIFICANT CHANGE UP (ref 150–400)
POTASSIUM SERPL-MCNC: 3.8 MMOL/L — SIGNIFICANT CHANGE UP (ref 3.5–5.3)
POTASSIUM SERPL-SCNC: 3.8 MMOL/L — SIGNIFICANT CHANGE UP (ref 3.5–5.3)
RBC # BLD: 4.36 M/UL — SIGNIFICANT CHANGE UP (ref 3.8–5.2)
RBC # FLD: 16.1 % — HIGH (ref 10.3–14.5)
SODIUM SERPL-SCNC: 146 MMOL/L — HIGH (ref 135–145)
WBC # BLD: 7.98 K/UL — SIGNIFICANT CHANGE UP (ref 3.8–10.5)
WBC # FLD AUTO: 7.98 K/UL — SIGNIFICANT CHANGE UP (ref 3.8–10.5)

## 2022-01-13 PROCEDURE — 93922 UPR/L XTREMITY ART 2 LEVELS: CPT | Mod: 26,RT

## 2022-01-13 PROCEDURE — 75710 ARTERY X-RAYS ARM/LEG: CPT | Mod: 26,59

## 2022-01-13 PROCEDURE — 36247 INS CATH ABD/L-EXT ART 3RD: CPT | Mod: 59

## 2022-01-13 PROCEDURE — 76937 US GUIDE VASCULAR ACCESS: CPT | Mod: 26

## 2022-01-13 PROCEDURE — 37224: CPT

## 2022-01-13 RX ORDER — ASPIRIN/CALCIUM CARB/MAGNESIUM 324 MG
81 TABLET ORAL ONCE
Refills: 0 | Status: COMPLETED | OUTPATIENT
Start: 2022-01-13 | End: 2022-01-13

## 2022-01-13 RX ORDER — SODIUM CHLORIDE 9 MG/ML
3 INJECTION INTRAMUSCULAR; INTRAVENOUS; SUBCUTANEOUS EVERY 8 HOURS
Refills: 0 | Status: DISCONTINUED | OUTPATIENT
Start: 2022-01-13 | End: 2022-01-27

## 2022-01-13 RX ORDER — CLOPIDOGREL BISULFATE 75 MG/1
75 TABLET, FILM COATED ORAL ONCE
Refills: 0 | Status: COMPLETED | OUTPATIENT
Start: 2022-01-13 | End: 2022-01-13

## 2022-01-13 RX ADMIN — CLOPIDOGREL BISULFATE 75 MILLIGRAM(S): 75 TABLET, FILM COATED ORAL at 11:01

## 2022-01-13 RX ADMIN — Medication 81 MILLIGRAM(S): at 11:00

## 2022-01-13 NOTE — H&P CARDIOLOGY - NSICDXPASTMEDICALHX_GEN_ALL_CORE_FT
PAST MEDICAL HISTORY:  Cataract     CKD (chronic kidney disease), stage IV     ESRD (end stage renal disease) on dialysis     Glaucoma     Heart failure     HLD (hyperlipidemia)     PAD (peripheral artery disease)

## 2022-01-14 PROBLEM — I73.9 PERIPHERAL VASCULAR DISEASE, UNSPECIFIED: Chronic | Status: ACTIVE | Noted: 2022-01-13

## 2022-01-20 NOTE — PRE-OP CHECKLIST - LATEX ALLERGY
I have personally seen and examined this patient on 1/20/ I have fully participated in the care of this patient.  I have reviewed all pertinent clinical information, including history, physical exam, plan and note. Recommend CSF study under general anesthesia due to frequent movements and positioning.  I have reviewed all pertinent clinical information and reviewed all relevant imaging and diagnostic studies personally.  Recommendations as above.  Agree with above assessment except as noted.
no
no

## 2022-01-31 ENCOUNTER — APPOINTMENT (OUTPATIENT)
Dept: VASCULAR SURGERY | Facility: CLINIC | Age: 62
End: 2022-01-31
Payer: MEDICARE

## 2022-01-31 VITALS
TEMPERATURE: 96.26 F | HEART RATE: 79 BPM | SYSTOLIC BLOOD PRESSURE: 170 MMHG | BODY MASS INDEX: 25.68 KG/M2 | HEIGHT: 61 IN | DIASTOLIC BLOOD PRESSURE: 70 MMHG | WEIGHT: 136 LBS

## 2022-01-31 PROCEDURE — 93923 UPR/LXTR ART STDY 3+ LVLS: CPT

## 2022-01-31 PROCEDURE — 99213 OFFICE O/P EST LOW 20 MIN: CPT

## 2022-01-31 RX ORDER — CLOPIDOGREL BISULFATE 75 MG/1
75 TABLET, FILM COATED ORAL
Qty: 90 | Refills: 3 | Status: ACTIVE | COMMUNITY
Start: 2022-01-31 | End: 1900-01-01

## 2022-01-31 NOTE — ASSESSMENT
[FreeTextEntry1] : In summary, Mrs. Boogie p/w BLE PAD with small vessel disease in the foot and dry gangrene of right 1st toe and left 3rd toe. MARTA/PVR showed some improvement in the metatarsal waveforms on the right. Toe pressures are still low 27 (right) and 47 (left). She is to follow up with podiatry (Dr. Daliy) to initiate hyperbaric oxygen therapy. She should continue DAPT (rx renewed) and statin therapy. She will follow up with her PCP about starting Lyrica or Neurontin for neuropathy. She will return for bilateral upper extremity vein mapping to see if RLE fistula can be sacrificed to improve distal flow.

## 2022-01-31 NOTE — PHYSICAL EXAM
[Normal Breath Sounds] : Normal breath sounds [Normal Heart Sounds] : normal heart sounds [2+] : left 2+ [de-identified] : NAD [de-identified] : WNL [FreeTextEntry1] : Right groin- AVF. Right 1st toe distal dry gangrene\par \par Left 5th toe amputation site completely healed. Dry gangrene of left 3rd toe. \par

## 2022-01-31 NOTE — HISTORY OF PRESENT ILLNESS
[FreeTextEntry1] : Mrs. Boogie presents in follow up s/p LLE angiography via antegrade access with DCB PTA of SFA. She has AT runoff with PT and peroneal occlusions and small vessel disease in the foot. Surgery was performed on 1/13/22 for dry gangrene of the left 3rd toe. She also has dry gangrene of the distal aspect of the right 1st toe, which is stable. \par \par Mrs. Boogie continues to take Lipitor, Aspirin and Plavix (ran out one week ago). She is being dialyzed (M/W/F) via right groin AVF without issues.\par \par PMH:  \par 1) Right lower extremity angiography and angioplasty of TPT and single vessel PT runoff, performed on 11/5/21 for tissue loss (right 1st toe gangrene) (the AT and peroneal are occluded and there is small vessel disease in the foot)\par 2) Left lower extremity angiography and DCB PTA of the SFA at the adductor canal, and PTA of the AT and PT for PAD with tissue loss, performed on 12/4/2020\par 3) Left 5th toe amputation by podiatry

## 2022-02-11 ENCOUNTER — APPOINTMENT (OUTPATIENT)
Dept: WOUND CARE | Facility: CLINIC | Age: 62
End: 2022-02-11
Payer: MEDICARE

## 2022-02-11 VITALS — HEIGHT: 61 IN | WEIGHT: 136 LBS | TEMPERATURE: 97.2 F | BODY MASS INDEX: 25.68 KG/M2

## 2022-02-11 PROCEDURE — 99204 OFFICE O/P NEW MOD 45 MIN: CPT

## 2022-02-11 PROCEDURE — 99203 OFFICE O/P NEW LOW 30 MIN: CPT

## 2022-02-11 NOTE — HISTORY OF PRESENT ILLNESS
[FreeTextEntry1] : 61 years old dm on dialysis preents to wound center refrred by Dr. King, podiatrist\par patietn sees Dr. Mann for vascular disease\par Dr. king informed me that patient is not able to be revascularized since she has no blood flow detected on angiogram\par previous 5th toe amputation left foot\par Patient got a blister last month and toe started to get worse\par necrotic tissue with osteomyelitis 3rd toe left foot\par necrotic tissue noted distal right hallux with hammered hallux right foot\par DP and and PT non-palpable both feet\par Decreased epicritic sensation noted both feet\par

## 2022-02-11 NOTE — PLAN
[FreeTextEntry1] : Patient was told that 4th toe left foot is sead and gangrene will most likely progress resulting in need for BK amputaiton left leg\par necrotic tissue distal right hallux-no debridement recommended since it will lead to increased risk of progression of gangrene\par Dr. king relates patient can not have podiatric surgery due to severe PVD\par Patient going for Dialysis\par patient wearing surgical shoes both feet\par Rx santyl collagenase for right hallux\par Rx betadine solution for gangrenous 3rd toe left foot with daily dressing changes\par patient told she is very high risk for BK amp left foot\par Rx HBO treatment in an attempt to slow the progression of gangrene left foot\par Recommend patient follow up with Dr. Mann for vascular\par Dr. King is also following patient

## 2022-02-14 ENCOUNTER — APPOINTMENT (OUTPATIENT)
Dept: VASCULAR SURGERY | Facility: CLINIC | Age: 62
End: 2022-02-14
Payer: MEDICARE

## 2022-02-14 PROCEDURE — 93985 DUP-SCAN HEMO COMPL BI STD: CPT

## 2022-02-21 NOTE — PHYSICAL EXAM
[Normal Thyroid] : the thyroid was normal [Normal Breath Sounds] : Normal breath sounds [Normal Heart Sounds] : normal heart sounds [de-identified] : NAD [de-identified] : WNL [de-identified] : DONTEL

## 2022-02-21 NOTE — HISTORY OF PRESENT ILLNESS
[FreeTextEntry1] : 61 years old dm on dialysis presents to wound center refrred by Dr. King, podiatrist\par patietn sees Dr. Mann for vascular disease\par Dr. king informed me that patient is not able to be revascularized since she has no blood flow detected on angiogram\par previous 5th toe amputation left foot\par Patient got a blister last month and toe started to get worse\par necrotic tissue with osteomyelitis 3rd toe left foot\par necrotic tissue noted distal right hallux with hammered hallux right foot\par DP and and PT non-palpable both feet\par Decreased epicritic sensation noted both feet\par

## 2022-02-21 NOTE — ASSESSMENT
[FreeTextEntry1] : Patient was told that 4th toe left foot is sead and gangrene will most likely progress resulting in need for BK amputaiton left leg\par necrotic tissue distal right hallux-no debridement recommended since it will lead to increased risk of progression of gangrene\par Dr. medina relates patient can not have podiatric surgery due to severe PVD\par Patient going for Dialysis\par patient wearing surgical shoes both feet\par Rx santyl collagenase for right hallux\par Rx betadine solution for gangrenous 3rd toe left foot with daily dressing changes\par patient told she is very high risk for BK amp left foot\par \par Rx HBO treatment in an attempt to slow the progression of gangrene left foot\par \par Patient was given thorough education and orientation on HBO therapy\par Patient in agreement, signed all the consents\par Chest xray script given to the patient\par Await for auth

## 2022-02-25 ENCOUNTER — APPOINTMENT (OUTPATIENT)
Dept: WOUND CARE | Facility: CLINIC | Age: 62
End: 2022-02-25
Payer: MEDICARE

## 2022-02-25 DIAGNOSIS — I96 GANGRENE, NOT ELSEWHERE CLASSIFIED: ICD-10-CM

## 2022-02-25 DIAGNOSIS — M86.9 OSTEOMYELITIS, UNSPECIFIED: ICD-10-CM

## 2022-02-25 DIAGNOSIS — E11.9 TYPE 2 DIABETES MELLITUS W/OUT COMPLICATIONS: ICD-10-CM

## 2022-02-25 DIAGNOSIS — I73.9 PERIPHERAL VASCULAR DISEASE, UNSPECIFIED: ICD-10-CM

## 2022-02-25 PROCEDURE — 99213 OFFICE O/P EST LOW 20 MIN: CPT

## 2022-02-25 NOTE — PLAN
[FreeTextEntry1] : Patient was told that 4th toe left foot is dead and gangrene will most likely progress resulting in need for BK amputation left leg\par necrotic tissue distal right hallux-no debridement recommended since it will lead to increased risk of progression of gangrene\par minimal debridement of exfoliating gangrenous/hyperkeratotic tissue 3rd toe left foot\par Dr. king relates patient can not have podiatric surgery due to severe PVD\par Patient going for Dialysis\par patient wearing surgical shoes both feet\par Rx santyl collagenase for right hallux\par Rx betadine solution for gangrenous 3rd toe left foot with daily dressing changes\par patient told she is very high risk for BK amp left foot\par Rx HBO treatment in an attempt to slow the progression of gangrene left foot\par Recommend patient follow up with Dr. Mann for vascular\par patient going for a second opinion for vascular since she was told she doesn't have any other treatment options\par Dr. King is also following patient

## 2022-02-25 NOTE — HISTORY OF PRESENT ILLNESS
[FreeTextEntry1] : 61 years old dm on dialysis preents to wound center refrred by Dr. King, podiatrist\par patietn sees Dr. Mann for vascular disease\par Dr. king informed me that patient is not able to be revascularized since she has no blood flow detected on angiogram\par previous 5th toe amputation left foot\par Patient got a blister last month and toe started to get worse\par necrotic tissue with osteomyelitis 3rd toe left foot\par necrotic tissue noted distal right hallux with hammered hallux right foot\par DP and and PT non-palpable both feet\par Decreased epicritic sensation noted both feet\par 1+ edema left foot with progression of gangrene 3rd toe left foot\par stable necrotic tissue distal right hallux\par

## 2022-02-28 ENCOUNTER — INPATIENT (INPATIENT)
Facility: HOSPITAL | Age: 62
LOS: 13 days | Discharge: NURSING, REHAB OR LONG-TERM CARE | End: 2022-03-14
Attending: INTERNAL MEDICINE | Admitting: INTERNAL MEDICINE
Payer: MEDICARE

## 2022-02-28 VITALS
RESPIRATION RATE: 16 BRPM | DIASTOLIC BLOOD PRESSURE: 69 MMHG | HEIGHT: 61 IN | HEART RATE: 94 BPM | TEMPERATURE: 100 F | SYSTOLIC BLOOD PRESSURE: 154 MMHG | OXYGEN SATURATION: 100 %

## 2022-02-28 DIAGNOSIS — M86.171 OTHER ACUTE OSTEOMYELITIS, RIGHT ANKLE AND FOOT: Chronic | ICD-10-CM

## 2022-02-28 DIAGNOSIS — L08.9 LOCAL INFECTION OF THE SKIN AND SUBCUTANEOUS TISSUE, UNSPECIFIED: ICD-10-CM

## 2022-02-28 DIAGNOSIS — Z99.2 DEPENDENCE ON RENAL DIALYSIS: Chronic | ICD-10-CM

## 2022-02-28 DIAGNOSIS — Z98.890 OTHER SPECIFIED POSTPROCEDURAL STATES: Chronic | ICD-10-CM

## 2022-02-28 LAB
ALBUMIN SERPL ELPH-MCNC: 4.4 G/DL — SIGNIFICANT CHANGE UP (ref 3.3–5)
ALP SERPL-CCNC: 66 U/L — SIGNIFICANT CHANGE UP (ref 40–120)
ALT FLD-CCNC: <5 U/L — LOW (ref 4–33)
ANION GAP SERPL CALC-SCNC: 22 MMOL/L — HIGH (ref 7–14)
APTT BLD: 31 SEC — SIGNIFICANT CHANGE UP (ref 27–36.3)
AST SERPL-CCNC: 7 U/L — SIGNIFICANT CHANGE UP (ref 4–32)
BASE EXCESS BLDV CALC-SCNC: 0.5 MMOL/L — SIGNIFICANT CHANGE UP (ref -2–3)
BASOPHILS # BLD AUTO: 0.04 K/UL — SIGNIFICANT CHANGE UP (ref 0–0.2)
BASOPHILS NFR BLD AUTO: 0.3 % — SIGNIFICANT CHANGE UP (ref 0–2)
BILIRUB SERPL-MCNC: 0.2 MG/DL — SIGNIFICANT CHANGE UP (ref 0.2–1.2)
BLOOD GAS VENOUS COMPREHENSIVE RESULT: SIGNIFICANT CHANGE UP
BUN SERPL-MCNC: 54 MG/DL — HIGH (ref 7–23)
CALCIUM SERPL-MCNC: 10 MG/DL — SIGNIFICANT CHANGE UP (ref 8.4–10.5)
CHLORIDE BLDV-SCNC: 99 MMOL/L — SIGNIFICANT CHANGE UP (ref 96–108)
CHLORIDE SERPL-SCNC: 97 MMOL/L — LOW (ref 98–107)
CO2 BLDV-SCNC: 28 MMOL/L — HIGH (ref 22–26)
CO2 SERPL-SCNC: 25 MMOL/L — SIGNIFICANT CHANGE UP (ref 22–31)
CREAT SERPL-MCNC: 9.43 MG/DL — HIGH (ref 0.5–1.3)
EGFR: 4 ML/MIN/1.73M2 — LOW
EOSINOPHIL # BLD AUTO: 0.03 K/UL — SIGNIFICANT CHANGE UP (ref 0–0.5)
EOSINOPHIL NFR BLD AUTO: 0.3 % — SIGNIFICANT CHANGE UP (ref 0–6)
GAS PNL BLDV: 139 MMOL/L — SIGNIFICANT CHANGE UP (ref 136–145)
GLUCOSE BLDV-MCNC: 82 MG/DL — SIGNIFICANT CHANGE UP (ref 70–99)
GLUCOSE SERPL-MCNC: 87 MG/DL — SIGNIFICANT CHANGE UP (ref 70–99)
HCO3 BLDV-SCNC: 26 MMOL/L — SIGNIFICANT CHANGE UP (ref 22–29)
HCT VFR BLD CALC: 33.4 % — LOW (ref 34.5–45)
HCT VFR BLDA CALC: 30 % — LOW (ref 34.5–46.5)
HGB BLD CALC-MCNC: 10.1 G/DL — LOW (ref 11.5–15.5)
HGB BLD-MCNC: 10.2 G/DL — LOW (ref 11.5–15.5)
IANC: 9.55 K/UL — HIGH (ref 1.5–8.5)
IMM GRANULOCYTES NFR BLD AUTO: 0.6 % — SIGNIFICANT CHANGE UP (ref 0–1.5)
INR BLD: 1.03 RATIO — SIGNIFICANT CHANGE UP (ref 0.88–1.16)
LACTATE BLDV-MCNC: 1.9 MMOL/L — SIGNIFICANT CHANGE UP (ref 0.5–2)
LYMPHOCYTES # BLD AUTO: 1.1 K/UL — SIGNIFICANT CHANGE UP (ref 1–3.3)
LYMPHOCYTES # BLD AUTO: 9.4 % — LOW (ref 13–44)
MAGNESIUM SERPL-MCNC: 2.6 MG/DL — SIGNIFICANT CHANGE UP (ref 1.6–2.6)
MCHC RBC-ENTMCNC: 26.8 PG — LOW (ref 27–34)
MCHC RBC-ENTMCNC: 30.5 GM/DL — LOW (ref 32–36)
MCV RBC AUTO: 87.7 FL — SIGNIFICANT CHANGE UP (ref 80–100)
MONOCYTES # BLD AUTO: 0.93 K/UL — HIGH (ref 0–0.9)
MONOCYTES NFR BLD AUTO: 7.9 % — SIGNIFICANT CHANGE UP (ref 2–14)
NEUTROPHILS # BLD AUTO: 9.55 K/UL — HIGH (ref 1.8–7.4)
NEUTROPHILS NFR BLD AUTO: 81.5 % — HIGH (ref 43–77)
NRBC # BLD: 0 /100 WBCS — SIGNIFICANT CHANGE UP
NRBC # FLD: 0 K/UL — SIGNIFICANT CHANGE UP
PCO2 BLDV: 48 MMHG — HIGH (ref 39–42)
PH BLDV: 7.35 — SIGNIFICANT CHANGE UP (ref 7.32–7.43)
PLATELET # BLD AUTO: 258 K/UL — SIGNIFICANT CHANGE UP (ref 150–400)
PO2 BLDV: 25 MMHG — SIGNIFICANT CHANGE UP
POTASSIUM BLDV-SCNC: 5 MMOL/L — SIGNIFICANT CHANGE UP (ref 3.5–5.1)
POTASSIUM SERPL-MCNC: 4.9 MMOL/L — SIGNIFICANT CHANGE UP (ref 3.5–5.3)
POTASSIUM SERPL-SCNC: 4.9 MMOL/L — SIGNIFICANT CHANGE UP (ref 3.5–5.3)
PROT SERPL-MCNC: 8.2 G/DL — SIGNIFICANT CHANGE UP (ref 6–8.3)
PROTHROM AB SERPL-ACNC: 12 SEC — SIGNIFICANT CHANGE UP (ref 10.5–13.4)
RBC # BLD: 3.81 M/UL — SIGNIFICANT CHANGE UP (ref 3.8–5.2)
RBC # FLD: 17 % — HIGH (ref 10.3–14.5)
SAO2 % BLDV: 40.4 % — SIGNIFICANT CHANGE UP
SARS-COV-2 RNA SPEC QL NAA+PROBE: SIGNIFICANT CHANGE UP
SODIUM SERPL-SCNC: 144 MMOL/L — SIGNIFICANT CHANGE UP (ref 135–145)
WBC # BLD: 11.72 K/UL — HIGH (ref 3.8–10.5)
WBC # FLD AUTO: 11.72 K/UL — HIGH (ref 3.8–10.5)

## 2022-02-28 PROCEDURE — 71045 X-RAY EXAM CHEST 1 VIEW: CPT | Mod: 26

## 2022-02-28 PROCEDURE — 73630 X-RAY EXAM OF FOOT: CPT | Mod: 26,LT

## 2022-02-28 PROCEDURE — 99285 EMERGENCY DEPT VISIT HI MDM: CPT

## 2022-02-28 RX ORDER — PIPERACILLIN AND TAZOBACTAM 4; .5 G/20ML; G/20ML
3.38 INJECTION, POWDER, LYOPHILIZED, FOR SOLUTION INTRAVENOUS ONCE
Refills: 0 | Status: COMPLETED | OUTPATIENT
Start: 2022-02-28 | End: 2022-02-28

## 2022-02-28 RX ORDER — CHLORHEXIDINE GLUCONATE 213 G/1000ML
1 SOLUTION TOPICAL DAILY
Refills: 0 | Status: DISCONTINUED | OUTPATIENT
Start: 2022-02-28 | End: 2022-03-14

## 2022-02-28 RX ORDER — VANCOMYCIN HCL 1 G
1000 VIAL (EA) INTRAVENOUS ONCE
Refills: 0 | Status: COMPLETED | OUTPATIENT
Start: 2022-02-28 | End: 2022-02-28

## 2022-02-28 RX ORDER — MORPHINE SULFATE 50 MG/1
2 CAPSULE, EXTENDED RELEASE ORAL ONCE
Refills: 0 | Status: DISCONTINUED | OUTPATIENT
Start: 2022-02-28 | End: 2022-02-28

## 2022-02-28 RX ORDER — ERYTHROPOIETIN 10000 [IU]/ML
8000 INJECTION, SOLUTION INTRAVENOUS; SUBCUTANEOUS
Refills: 0 | Status: DISCONTINUED | OUTPATIENT
Start: 2022-02-28 | End: 2022-03-10

## 2022-02-28 RX ORDER — ACETAMINOPHEN 500 MG
650 TABLET ORAL EVERY 6 HOURS
Refills: 0 | Status: DISCONTINUED | OUTPATIENT
Start: 2022-02-28 | End: 2022-03-14

## 2022-02-28 RX ORDER — ACETAMINOPHEN 500 MG
650 TABLET ORAL ONCE
Refills: 0 | Status: COMPLETED | OUTPATIENT
Start: 2022-02-28 | End: 2022-02-28

## 2022-02-28 RX ADMIN — MORPHINE SULFATE 2 MILLIGRAM(S): 50 CAPSULE, EXTENDED RELEASE ORAL at 16:05

## 2022-02-28 RX ADMIN — Medication 650 MILLIGRAM(S): at 17:35

## 2022-02-28 RX ADMIN — Medication 650 MILLIGRAM(S): at 23:21

## 2022-02-28 RX ADMIN — ERYTHROPOIETIN 8000 UNIT(S): 10000 INJECTION, SOLUTION INTRAVENOUS; SUBCUTANEOUS at 22:25

## 2022-02-28 RX ADMIN — PIPERACILLIN AND TAZOBACTAM 200 GRAM(S): 4; .5 INJECTION, POWDER, LYOPHILIZED, FOR SOLUTION INTRAVENOUS at 17:35

## 2022-02-28 RX ADMIN — Medication 250 MILLIGRAM(S): at 19:04

## 2022-02-28 RX ADMIN — MORPHINE SULFATE 2 MILLIGRAM(S): 50 CAPSULE, EXTENDED RELEASE ORAL at 19:02

## 2022-02-28 NOTE — CONSULT NOTE ADULT - SUBJECTIVE AND OBJECTIVE BOX
Podiatry pager #: [phone] ([place])/ [phone] ([facility])    Patient is a 61y old  Female who presents with a chief complaint of left foot wound    HPI:61 Yr old female DM ESRD (Mn/Wed/Fr.) - Right groin AVF, DM, PVD, now with severe left sided foot pain.   	Per detail, patient having severe left foot pain since Nov 2021, F/U with Dr. Mayo Daily is due for F/U in coming days.  	Pain has become very severe in past 2 weeks, also noticed black discoloration involving 2/3/4 digit of left foot.   Denies fever, SOB, CP, Palpitations, Abdominal pain.      PAST MEDICAL & SURGICAL HISTORY:  Heart failure    HLD (hyperlipidemia)    Glaucoma    Cataract    CKD (chronic kidney disease), stage IV    ESRD (end stage renal disease) on dialysis    PAD (peripheral artery disease)    Acute osteomyelitis of toe of right foot  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    S/P arteriovenous (AV) fistula creation    Hemodialysis access, AV graft        MEDICATIONS  (STANDING):  acetaminophen     Tablet .. 650 milliGRAM(s) Oral once  piperacillin/tazobactam IVPB... 3.375 Gram(s) IV Intermittent once  vancomycin  IVPB. 1000 milliGRAM(s) IV Intermittent once    MEDICATIONS  (PRN):      Allergies    No Known Allergies    Intolerances        VITALS:    Vital Signs Last 24 Hrs  T(C): 37.8 (28 Feb 2022 15:34), Max: 37.8 (28 Feb 2022 15:34)  T(F): 100.1 (28 Feb 2022 15:34), Max: 100.1 (28 Feb 2022 15:34)  HR: 85 (28 Feb 2022 16:19) (85 - 94)  BP: 148/56 (28 Feb 2022 16:19) (148/56 - 177/69)  BP(mean): --  RR: 18 (28 Feb 2022 16:19) (16 - 18)  SpO2: 100% (28 Feb 2022 16:19) (100% - 100%)    LABS:                CAPILLARY BLOOD GLUCOSE      POCT Blood Glucose.: 91 mg/dL (28 Feb 2022 13:13)          LOWER EXTREMITY PHYSICAL EXAM:    Vascular: DP/PT 1/4 R,  DP/PT 0/4 L, CFT <3 seconds B/L except left 3rd digit absent CFT, Temperature gradient warm to warm L, warm to cool R, L foot edema  Neuro: Epicritic sensation diminished to the level of forefoot B/L  Musculoskeletal/Ortho: left partial 5th ray resection healed, tenderness to palpation to left 3rd digit  Skin: right distal hallux dry eschar, left foot 3rd digit gangrene to PIPJ, erythema to midfoot, fluctuance, 5 cc sanguinous draiange, mild malodor, etiology 2/2 ischemia       RADIOLOGY & ADDITIONAL STUDIES:

## 2022-02-28 NOTE — ED PROVIDER NOTE - OBJECTIVE STATEMENT
61 Yr old female DM ESRD (Mn/Wed/Fr.) - Right groin AVF, DM, PVD, now with severe left sided foot pain.   Per detail, patient having severe left foot pain since Nov 2021, F/U with Dr. Mayo Daily is due for F/U in coming days.  Pain has become very severe in past 2 weeks, also noticed black discoloration involving 2/3/4 digit of left foot.   Denies fever, SOB, CP, Palpitations, Abdominal pain.

## 2022-02-28 NOTE — ED PROVIDER NOTE - ATTENDING CONTRIBUTION TO CARE
Attending note:   After face to face evaluation of this patient, I concur with above noted hx, pe, and care plan for this patient.  Esteves: 61 yof with ESRD on HD M/W/F complaining of worsening left foot pain unbearable today. Pt had hx of left 5th toe amputation and follows with podiatry. Pt is in no distress, clear lungs, RRR, abd soft, LE exam shows necrosis of right distal 1st toe, left leg with edema and erythema from toes to mid calf with some tn to palpation. +warmth. +dry gangrene of 3rd toe and edema of 2-4th toes with missing 5th toe.   Cellulitis imposed on gangrene - IV antibxs, pain meds, xrays, podiatry, admit.

## 2022-02-28 NOTE — ED PROVIDER NOTE - NS ED ROS FT
CONSTITUTIONAL: No weakness, fevers or chills  EYES/ENT: No visual changes;  No vertigo or throat pain   NECK: No pain   RESPIRATORY: No cough, wheezing, shortness of breath  CARDIOVASCULAR: No chest pain or palpitations  GASTROINTESTINAL: No abdominal pain, nausea, vomiting, diarrhea or constipation.   GENITOURINARY: Is oliguric.   NEUROLOGICAL: No numbness or weakness  SKIN: No rashes, or lesions     All other review of systems is negative unless indicated above.

## 2022-02-28 NOTE — CONSULT NOTE ADULT - ASSESSMENT
61 Yr old female ESRD (Mn/Wed/Fr.) well known to me from [facility], Right groin AVF, DM, PVD, now with severe left foot pain. Renal consulted for ESRD Mx    End Stage Renal Disease on Dialysis MWF  HD unit [facility]  K ok  not in chf  Plan     Informed consent for HD obtained from pt  routine HD arranged for tonight w/2k bath, uf 1.5kg as tolerated  renal restrictions in diet.   dose all meds for ESRD  monitor BMP  Anemia of CKD- Hb at goal. added epo 8k tiw w/hd  HTN,, controlled  Left foot infection, gangrene- Mx per Podiatry. s/p vanco and zosyn. dose abxs for ESRD. monitor vanco level    poc d/w pt, ER RN, HD RN, ER Attending  Thanks for consulting.  will closely follow up.   labs, rad, chart reviewed  For any question, pl call:  Nephrology  Cell -[phone]  Office [phone]  Ans Serv [phone]  [email or web address] ( [facility] coverage for [facility])

## 2022-02-28 NOTE — ED ADULT NURSE REASSESSMENT NOTE - NS ED NURSE REASSESS COMMENT FT1
Report received from Day RN: Pt A&Ox4, No NAD or complaints, Vanco being infused at this moment, safety precautions implemented as per protocol, awaiting for further Md orders, will continue to monitor.

## 2022-02-28 NOTE — ED PROVIDER NOTE - PHYSICAL EXAMINATION
PHYSICAL EXAM:    GENERAL: Lying in bed comfortably  HEAD:  Normocephalic  EYES: EOMI, PERRLA, conjunctiva and sclera clear  ENT: No erythema/pallor/petechiae/lesions  NECK: Supple   LUNG: CTA b/l; no r/r/w  HEART: RRR, +S1/S2; No m/r/g  ABDOMEN: soft, NT/ND; BS audible   EXTREMITIES:  Left foot swelling to dorsal/ventral surface. Has black discolored 3rd digit, surface blackish discoloration invovling the 2nd and 4 th digit as well. Pulses are difficult to palpate manually. There is no discharging/bleeding wound.   NERVOUS SYSTEM:  AAOx3, speech clear. No sensory/motor deficits   SKIN: No rashes or lesions

## 2022-02-28 NOTE — CONSULT NOTE ADULT - SUBJECTIVE AND OBJECTIVE BOX
[facility] - S Jluis / Shabbir S /D Romina/ SHERRI Mcfarlane/ SHERRI Madden/ Andrés Perkins / M Tristianu/ O Gladis  service -[phone], office [phone]  ---------------------------------------------------------------------------------------------------------------    Patient is a 61y Female whom presented to the hospital with   Denied recent NSAID use/Abx use/iv contrast studies.    Patient seen and examined    PAST MEDICAL & SURGICAL HISTORY:  Heart failure    HLD (hyperlipidemia)    Glaucoma    Cataract    CKD (chronic kidney disease), stage IV    ESRD (end stage renal disease) on dialysis    PAD (peripheral artery disease)    Acute osteomyelitis of toe of right foot  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    S/P arteriovenous (AV) fistula creation    Hemodialysis access, AV graft        Allergies: No Known Allergies    Home Medications Reviewed  Hospital Medications:   MEDICATIONS  (STANDING):  vancomycin  IVPB. 1000 milliGRAM(s) IV Intermittent once    SOCIAL HISTORY:  Denies ETOh,Smoking, illicit drug use  FAMILY HISTORY:      REVIEW OF SYSTEMS:  CONSTITUTIONAL: No weakness, fevers or chills  EYES/ENT: No visual changes;  No vertigo or throat pain   NECK: No pain or stiffness  RESPIRATORY: No cough, wheezing, hemoptysis; No shortness of breath  CARDIOVASCULAR: No chest pain or palpitations.  GASTROINTESTINAL: No abdominal or epigastric pain. No nausea, vomiting, or hematemesis; No diarrhea or constipation. No melena or hematochezia.  GENITOURINARY: No dysuria, frequency, foamy urine, urinary urgency, incontinence or hematuria  NEUROLOGICAL: No numbness or weakness  SKIN: No itching, burning, rashes, or lesions   VASCULAR: No bilateral lower extremity edema.   All other review of systems is negative unless indicated above.    VITALS:  T(F): 100.1 (02-28-22 @ 15:34), Max: 100.1 (02-28-22 @ 15:34)  HR: 85 (02-28-22 @ 16:19)  BP: 148/56 (02-28-22 @ 16:19)  RR: 18 (02-28-22 @ 16:19)  SpO2: 100% (02-28-22 @ 16:19)  Wt(kg): --    Height (cm): 154.9 (02-28 @ 13:10)    PHYSICAL EXAM:  Constitutional: NAD  HEENT: anicteric sclera, oropharynx clear, MMM  Neck: No JVD  Respiratory: CTAB, no wheezes, rales or rhonchi  Cardiovascular: S1, S2, RRR  Gastrointestinal: BS+, soft, NT/ND  Extremities: No cyanosis or clubbing. No peripheral edema  Neurological: A/O x 3, no focal deficits  Psychiatric: Normal mood, normal affect  : No CVA tenderness. No salcido.   Skin: No rashes  Vascular Access:    LABS:  02-28    144  |  97<L>  |  54<H>  ----------------------------<  87  4.9   |  25  |  9.43<H>    Ca    10.0      28 Feb 2022 16:55  Mg     2.60     02-28    TPro  8.2  /  Alb  4.4  /  TBili  0.2  /  DBili      /  AST  7   /  ALT  <5<L>  /  AlkPhos  66  02-28    Creatinine Trend: 9.43 <--                        10.2   11.72 )-----------( 258      ( 28 Feb 2022 16:55 )             33.4     Urine Studies:        RADIOLOGY & ADDITIONAL STUDIES:                 [facility] - S Jluis / Shabbir S /D Romina/ S Denys/ SHERRI Madden/ Andrés Perkins / M Tristianu/ O Gladis  service -[phone], office [phone]  ---------------------------------------------------------------------------------------------------------------  Patient seen and examined bedside     61 Yr old female ESRD (Mn/Wed/Fr.) well known to me from [place], Right groin AVF, DM, PVD, now with severe left foot pain.   patient having severe left foot pain since Nov 2021, F/U with Dr. Mayo Daily. Pain has become very severe in past 2 weeks, also noticed black discoloration involving 2/3/4 digit of left foot.   Renal consulted for ESRD Mx. Pt was seen by Podiatrist in ER, had debridement done w/dressing, now bleeding at time of exam -ER staff informed  pt denied fevers, SOB, CP, Palpitations. + chills today. last hd was Friday 2/25    PAST MEDICAL & SURGICAL HISTORY:  Heart failure    HLD (hyperlipidemia)    Glaucoma    Cataract    CKD (chronic kidney disease), stage IV    ESRD (end stage renal disease) on dialysis    PAD (peripheral artery disease)    Acute osteomyelitis of toe of right foot  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    S/P arteriovenous (AV) fistula creation    Hemodialysis access, AV graft    Allergies: No Known Allergies    Home Medications Reviewed  Hospital Medications:   MEDICATIONS  (STANDING):  vancomycin  IVPB. 1000 milliGRAM(s) IV Intermittent once    SOCIAL HISTORY:  Denies ETOh,Smoking, illicit drug use  FAMILY HISTORY:      REVIEW OF SYSTEMS:  CONSTITUTIONAL: No weakness, fevers or chills  EYES/ENT: No visual changes;  No vertigo or throat pain   NECK: No pain or stiffness  RESPIRATORY: No cough, wheezing, hemoptysis; No shortness of breath  CARDIOVASCULAR: No chest pain or palpitations.  GASTROINTESTINAL: No abdominal or epigastric pain. No nausea, vomiting, or hematemesis; No diarrhea or constipation. No melena or hematochezia.  NEUROLOGICAL: No numbness or weakness  SKIN: as above  VASCULAR: No bilateral lower extremity edema.   All other review of systems is negative unless indicated above.    VITALS:  T(F): 100.1 (02-28-22 @ 15:34), Max: 100.1 (02-28-22 @ 15:34)  HR: 85 (02-28-22 @ 16:19)  BP: 148/56 (02-28-22 @ 16:19)  RR: 18 (02-28-22 @ 16:19)  SpO2: 100% (02-28-22 @ 16:19)  Wt(kg): --    Height (cm): 154.9 (02-28 @ 13:10)    PHYSICAL EXAM:  Constitutional: NAD  HEENT: anicteric sclera  Neck: No JVD  Respiratory: CTAB, no wheezes, rales or rhonchi  Cardiovascular: S1, S2, RRR  Gastrointestinal: BS+, soft, NT/ND  Extremities: + peripheral edema, b/l feet dressing. left foot dressing w/bleeding  Neurological: A/O x 3  Psychiatric: Normal mood, normal affect  : no salcido.   Vascular Access: right thigh AVG    LABS:  02-28    144  |  97<L>  |  54<H>  ----------------------------<  87  4.9   |  25  |  9.43<H>    Ca    10.0      28 Feb 2022 16:55  Mg     2.60     02-28    TPro  8.2  /  Alb  4.4  /  TBili  0.2  /  DBili      /  AST  7   /  ALT  <5<L>  /  AlkPhos  66  02-28    Creatinine Trend: 9.43 <--                        10.2   11.72 )-----------( 258      ( 28 Feb 2022 16:55 )             33.4     Urine Studies:        RADIOLOGY & ADDITIONAL STUDIES:    < from: Xray Chest 1 View AP/PA (02.28.22 @ 16:39) >    Impression:  No focal consolidation.    Right lower lung linear atelectasis.    < end of copied text >

## 2022-02-28 NOTE — ED ADULT NURSE NOTE - OBJECTIVE STATEMENT
pt coming with left foot 3rd toe necrotic /pain, started in Nov. now pain increase and was not able to tolerated, Podiatry I&D drained and dressing applied, labs sent IV to right Ac20g angio cath place, COVID 19 swab done, pt has right thigh Av shunt for dialysis on M/W/F.   medicated for pain, pending dispo.  Judith Chappell RN

## 2022-02-28 NOTE — CONSULT NOTE ADULT - ASSESSMENT
60 yo f with left foot cellulitis  -pt seen and evaluated  -T 100.1, WBC/ESR/CRP   -LFXR pending  -left partial 5th ray resection healed, tenderness to palpation to left 3rd digit, right distal hallux dry eschar, left foot 3rd digit gangrene to PIPJ, erythema to midfoot, fluctuance, 5 cc sanguinous drainage, mild malodor, etiology 2/2 ischemia   -sharp incision and drainage to the level of and not beyond subQ using #15 blade and suture removal kit, 5 cc sanguinous drainage  -Took left foot wound culture  -Rec admit to medicine  -Rec vanco, zosyn  -Rec vasc consult  -ordered MARTA/PVR  -pod plan left foot partial 3rd ray resection pending MARTA/vasc recs  -please document medical optimization for surgery under sedation with local anaesthesia  -discussed with attending 60 yo f with left foot cellulitis  -pt seen and evaluated  -T 100.1, WBC 11.7, ESR/CRP pending  -LFXR pending  -left partial 5th ray resection healed, tenderness to palpation to left 3rd digit, right distal hallux dry eschar, left foot 3rd digit gangrene to PIPJ, erythema to midfoot, fluctuance, 5 cc sanguinous drainage, mild malodor, etiology 2/2 ischemia   -sharp incision and drainage to the level of and not beyond subQ using #15 blade and suture removal kit, 5 cc sanguinous drainage  -Took left foot wound culture  -Rec admit to medicine  -Rec vanco, zosyn  -Rec vasc consult  -ordered MARTA/PVR  -pod plan left foot partial 3rd ray resection pending MARTA/vasc recs  -please document medical optimization for surgery under sedation with local anaesthesia  -discussed with attending

## 2022-02-28 NOTE — ED ADULT NURSE NOTE - NSIMPLEMENTINTERV_GEN_ALL_ED
Implemented All Universal Safety Interventions:  [place] to call system. Call bell, personal items and telephone within reach. Instruct patient to call for assistance. Room bathroom lighting operational. Non-slip footwear when patient is off stretcher. Physically safe environment: no spills, clutter or unnecessary equipment. Stretcher in lowest position, wheels locked, appropriate side rails in place.

## 2022-02-28 NOTE — ED PROVIDER NOTE - CLINICAL SUMMARY MEDICAL DECISION MAKING FREE TEXT BOX
61 Yr old female DM ESRD (Mn/Wed/Fr.) - Right groin AVF, DM, PVD, now with severe left sided foot pain. Vs wnl. PE Left foot swelling to dorsal/ventral surface with black discolored 2/3/4 digits. Plan for labs, X-rays, podiatry, Observe.

## 2022-02-28 NOTE — ED ADULT NURSE NOTE - NS PRO AD NO ADVANCE DIRECTIVE
No Benign Hypertension Herniated cervical intervertebral disc    HTN (hypertension)    Obesity (BMI 35.0-39.9 without comorbidity)    TRACI on CPAP  diagnosed 2015

## 2022-02-28 NOTE — ED ADULT TRIAGE NOTE - CHIEF COMPLAINT QUOTE
Pt. c/o necrosis to 1st, 2nd and 3rd digit of left foot x months. States pain is unbearable today and unable to place any weight on it. pmxh: DM

## 2022-03-01 DIAGNOSIS — E11.9 TYPE 2 DIABETES MELLITUS WITHOUT COMPLICATIONS: ICD-10-CM

## 2022-03-01 DIAGNOSIS — R60.0 LOCALIZED EDEMA: ICD-10-CM

## 2022-03-01 DIAGNOSIS — R73.03 PREDIABETES: ICD-10-CM

## 2022-03-01 DIAGNOSIS — Z29.9 ENCOUNTER FOR PROPHYLACTIC MEASURES, UNSPECIFIED: ICD-10-CM

## 2022-03-01 DIAGNOSIS — M86.9 OSTEOMYELITIS, UNSPECIFIED: ICD-10-CM

## 2022-03-01 DIAGNOSIS — I73.9 PERIPHERAL VASCULAR DISEASE, UNSPECIFIED: ICD-10-CM

## 2022-03-01 DIAGNOSIS — N18.6 END STAGE RENAL DISEASE: ICD-10-CM

## 2022-03-01 LAB
A1C WITH ESTIMATED AVERAGE GLUCOSE RESULT: 5.6 % — SIGNIFICANT CHANGE UP (ref 4–5.6)
ALBUMIN SERPL ELPH-MCNC: 3.8 G/DL — SIGNIFICANT CHANGE UP (ref 3.3–5)
ALP SERPL-CCNC: 60 U/L — SIGNIFICANT CHANGE UP (ref 40–120)
ALT FLD-CCNC: 5 U/L — SIGNIFICANT CHANGE UP (ref 4–33)
ANION GAP SERPL CALC-SCNC: 17 MMOL/L — HIGH (ref 7–14)
ANION GAP SERPL CALC-SCNC: 19 MMOL/L — HIGH (ref 7–14)
AST SERPL-CCNC: 9 U/L — SIGNIFICANT CHANGE UP (ref 4–32)
BILIRUB SERPL-MCNC: 0.2 MG/DL — SIGNIFICANT CHANGE UP (ref 0.2–1.2)
BLD GP AB SCN SERPL QL: NEGATIVE — SIGNIFICANT CHANGE UP
BLOOD GAS VENOUS COMPREHENSIVE RESULT: SIGNIFICANT CHANGE UP
BUN SERPL-MCNC: 26 MG/DL — HIGH (ref 7–23)
BUN SERPL-MCNC: 27 MG/DL — HIGH (ref 7–23)
CALCIUM SERPL-MCNC: 9.2 MG/DL — SIGNIFICANT CHANGE UP (ref 8.4–10.5)
CALCIUM SERPL-MCNC: 9.3 MG/DL — SIGNIFICANT CHANGE UP (ref 8.4–10.5)
CHLORIDE SERPL-SCNC: 96 MMOL/L — LOW (ref 98–107)
CHLORIDE SERPL-SCNC: 96 MMOL/L — LOW (ref 98–107)
CK SERPL-CCNC: 52 U/L — SIGNIFICANT CHANGE UP (ref 25–170)
CO2 SERPL-SCNC: 25 MMOL/L — SIGNIFICANT CHANGE UP (ref 22–31)
CO2 SERPL-SCNC: 26 MMOL/L — SIGNIFICANT CHANGE UP (ref 22–31)
CREAT SERPL-MCNC: 5.89 MG/DL — HIGH (ref 0.5–1.3)
CREAT SERPL-MCNC: 5.91 MG/DL — HIGH (ref 0.5–1.3)
CRP SERPL-MCNC: 95.1 MG/L — HIGH
EGFR: 8 ML/MIN/1.73M2 — LOW
EGFR: 8 ML/MIN/1.73M2 — LOW
ERYTHROCYTE [SEDIMENTATION RATE] IN BLOOD: 90 MM/HR — HIGH (ref 4–25)
ESTIMATED AVERAGE GLUCOSE: 114 — SIGNIFICANT CHANGE UP
GLUCOSE BLDC GLUCOMTR-MCNC: 87 MG/DL — SIGNIFICANT CHANGE UP (ref 70–99)
GLUCOSE BLDC GLUCOMTR-MCNC: 94 MG/DL — SIGNIFICANT CHANGE UP (ref 70–99)
GLUCOSE SERPL-MCNC: 73 MG/DL — SIGNIFICANT CHANGE UP (ref 70–99)
GLUCOSE SERPL-MCNC: 73 MG/DL — SIGNIFICANT CHANGE UP (ref 70–99)
HBV CORE AB SER-ACNC: SIGNIFICANT CHANGE UP
HBV SURFACE AB SER-ACNC: 451.3 MIU/ML — SIGNIFICANT CHANGE UP
HBV SURFACE AG SER-ACNC: SIGNIFICANT CHANGE UP
HCT VFR BLD CALC: 31.2 % — LOW (ref 34.5–45)
HCV AB S/CO SERPL IA: 0.1 S/CO — SIGNIFICANT CHANGE UP (ref 0–0.99)
HCV AB SERPL-IMP: SIGNIFICANT CHANGE UP
HGB BLD-MCNC: 9.5 G/DL — LOW (ref 11.5–15.5)
MAGNESIUM SERPL-MCNC: 2.1 MG/DL — SIGNIFICANT CHANGE UP (ref 1.6–2.6)
MAGNESIUM SERPL-MCNC: 2.2 MG/DL — SIGNIFICANT CHANGE UP (ref 1.6–2.6)
MCHC RBC-ENTMCNC: 26.4 PG — LOW (ref 27–34)
MCHC RBC-ENTMCNC: 30.4 GM/DL — LOW (ref 32–36)
MCV RBC AUTO: 86.7 FL — SIGNIFICANT CHANGE UP (ref 80–100)
MRSA PCR RESULT.: SIGNIFICANT CHANGE UP
NRBC # BLD: 0 /100 WBCS — SIGNIFICANT CHANGE UP
NRBC # FLD: 0 K/UL — SIGNIFICANT CHANGE UP
PHOSPHATE SERPL-MCNC: 3.2 MG/DL — SIGNIFICANT CHANGE UP (ref 2.5–4.5)
PHOSPHATE SERPL-MCNC: 3.3 MG/DL — SIGNIFICANT CHANGE UP (ref 2.5–4.5)
PLATELET # BLD AUTO: 237 K/UL — SIGNIFICANT CHANGE UP (ref 150–400)
POTASSIUM SERPL-MCNC: 4.1 MMOL/L — SIGNIFICANT CHANGE UP (ref 3.5–5.3)
POTASSIUM SERPL-MCNC: 4.2 MMOL/L — SIGNIFICANT CHANGE UP (ref 3.5–5.3)
POTASSIUM SERPL-SCNC: 4.1 MMOL/L — SIGNIFICANT CHANGE UP (ref 3.5–5.3)
POTASSIUM SERPL-SCNC: 4.2 MMOL/L — SIGNIFICANT CHANGE UP (ref 3.5–5.3)
PROT SERPL-MCNC: 7 G/DL — SIGNIFICANT CHANGE UP (ref 6–8.3)
RBC # BLD: 3.6 M/UL — LOW (ref 3.8–5.2)
RBC # FLD: 17.1 % — HIGH (ref 10.3–14.5)
RH IG SCN BLD-IMP: POSITIVE — SIGNIFICANT CHANGE UP
S AUREUS DNA NOSE QL NAA+PROBE: SIGNIFICANT CHANGE UP
SODIUM SERPL-SCNC: 138 MMOL/L — SIGNIFICANT CHANGE UP (ref 135–145)
SODIUM SERPL-SCNC: 141 MMOL/L — SIGNIFICANT CHANGE UP (ref 135–145)
VANCOMYCIN FLD-MCNC: 12.5 UG/ML — SIGNIFICANT CHANGE UP
WBC # BLD: 11.01 K/UL — HIGH (ref 3.8–10.5)
WBC # FLD AUTO: 11.01 K/UL — HIGH (ref 3.8–10.5)

## 2022-03-01 PROCEDURE — 93970 EXTREMITY STUDY: CPT | Mod: 26

## 2022-03-01 PROCEDURE — 99223 1ST HOSP IP/OBS HIGH 75: CPT

## 2022-03-01 PROCEDURE — 93923 UPR/LXTR ART STDY 3+ LVLS: CPT | Mod: 26

## 2022-03-01 RX ORDER — ATORVASTATIN CALCIUM 80 MG/1
40 TABLET, FILM COATED ORAL AT BEDTIME
Refills: 0 | Status: DISCONTINUED | OUTPATIENT
Start: 2022-03-01 | End: 2022-03-14

## 2022-03-01 RX ORDER — ASPIRIN/CALCIUM CARB/MAGNESIUM 324 MG
81 TABLET ORAL DAILY
Refills: 0 | Status: DISCONTINUED | OUTPATIENT
Start: 2022-03-01 | End: 2022-03-14

## 2022-03-01 RX ORDER — CLOPIDOGREL BISULFATE 75 MG/1
75 TABLET, FILM COATED ORAL DAILY
Refills: 0 | Status: DISCONTINUED | OUTPATIENT
Start: 2022-03-01 | End: 2022-03-14

## 2022-03-01 RX ORDER — HYDROMORPHONE HYDROCHLORIDE 2 MG/ML
0.25 INJECTION INTRAMUSCULAR; INTRAVENOUS; SUBCUTANEOUS EVERY 6 HOURS
Refills: 0 | Status: DISCONTINUED | OUTPATIENT
Start: 2022-03-01 | End: 2022-03-06

## 2022-03-01 RX ORDER — CADEXOMER IODINE 0.9 %
1 PADS, MEDICATED (EA) TOPICAL DAILY
Refills: 0 | Status: DISCONTINUED | OUTPATIENT
Start: 2022-03-01 | End: 2022-03-14

## 2022-03-01 RX ORDER — PIPERACILLIN AND TAZOBACTAM 4; .5 G/20ML; G/20ML
3.38 INJECTION, POWDER, LYOPHILIZED, FOR SOLUTION INTRAVENOUS EVERY 12 HOURS
Refills: 0 | Status: DISCONTINUED | OUTPATIENT
Start: 2022-03-01 | End: 2022-03-07

## 2022-03-01 RX ADMIN — Medication 81 MILLIGRAM(S): at 11:38

## 2022-03-01 RX ADMIN — Medication 650 MILLIGRAM(S): at 00:02

## 2022-03-01 RX ADMIN — PIPERACILLIN AND TAZOBACTAM 25 GRAM(S): 4; .5 INJECTION, POWDER, LYOPHILIZED, FOR SOLUTION INTRAVENOUS at 05:01

## 2022-03-01 RX ADMIN — Medication 1 APPLICATION(S): at 21:21

## 2022-03-01 RX ADMIN — Medication 1 TABLET(S): at 17:48

## 2022-03-01 RX ADMIN — ATORVASTATIN CALCIUM 40 MILLIGRAM(S): 80 TABLET, FILM COATED ORAL at 21:23

## 2022-03-01 RX ADMIN — Medication 650 MILLIGRAM(S): at 09:22

## 2022-03-01 RX ADMIN — HYDROMORPHONE HYDROCHLORIDE 0.25 MILLIGRAM(S): 2 INJECTION INTRAMUSCULAR; INTRAVENOUS; SUBCUTANEOUS at 11:38

## 2022-03-01 RX ADMIN — CHLORHEXIDINE GLUCONATE 1 APPLICATION(S): 213 SOLUTION TOPICAL at 11:42

## 2022-03-01 RX ADMIN — Medication 650 MILLIGRAM(S): at 10:11

## 2022-03-01 RX ADMIN — HYDROMORPHONE HYDROCHLORIDE 0.25 MILLIGRAM(S): 2 INJECTION INTRAMUSCULAR; INTRAVENOUS; SUBCUTANEOUS at 12:01

## 2022-03-01 RX ADMIN — CLOPIDOGREL BISULFATE 75 MILLIGRAM(S): 75 TABLET, FILM COATED ORAL at 11:38

## 2022-03-01 RX ADMIN — PIPERACILLIN AND TAZOBACTAM 25 GRAM(S): 4; .5 INJECTION, POWDER, LYOPHILIZED, FOR SOLUTION INTRAVENOUS at 17:48

## 2022-03-01 NOTE — H&P ADULT - NSHPPHYSICALEXAM_GEN_ALL_CORE
Vital Signs Last 24 Hrs  T(C): 37.1 (01 Mar 2022 01:02), Max: 37.8 (28 Feb 2022 15:34)  T(F): 98.8 (01 Mar 2022 01:02), Max: 100.1 (28 Feb 2022 15:34)  HR: 78 (01 Mar 2022 01:02) (78 - 94)  BP: 127/49 (01 Mar 2022 01:02) (127/49 - 177/69)  BP(mean): --  RR: 17 (01 Mar 2022 01:02) (16 - 18)  SpO2: 100% (28 Feb 2022 16:19) (100% - 100%)    PHYSICAL EXAM:  GENERAL: NAD, well-developed, well-nourished  HEAD:  Atraumatic, Normocephalic  EYES: R pupil irregularly shaped, non-reactive (blind in R eye); L pupil RRL, conjunctiva and sclera clear  NECK: Supple, No LAD  CHEST/LUNG: Clear to auscultation bilaterally; No wheezes, rales or rhonchi; normal work of breathing, speaking in full sentences  HEART: Regular rate and rhythm; No murmurs, rubs, or gallops, (+)S1, S2; RLE AVF (+)bruit  ABDOMEN: Soft, Nontender, Nondistended; Normal Bowel sounds   EXTREMITIES:  2+ Peripheral Pulses, No clubbing, cyanosis; b/l LE pitting edema L>R  PSYCH: normal mood and affect, A&Ox3  NEUROLOGY: no focal neuro deficits  SKIN: R foot distal hallux dry eschar; L foot exam deferred as in HD with no supplies for dressing change and with serosanguinous drainage on dressing Vital Signs Last 24 Hrs  T(C): 37.1 (01 Mar 2022 01:02), Max: 37.8 (28 Feb 2022 15:34)  T(F): 98.8 (01 Mar 2022 01:02), Max: 100.1 (28 Feb 2022 15:34)  HR: 78 (01 Mar 2022 01:02) (78 - 94)  BP: 127/49 (01 Mar 2022 01:02) (127/49 - 177/69)  BP(mean): --  RR: 17 (01 Mar 2022 01:02) (16 - 18)  SpO2: 100% (28 Feb 2022 16:19) (100% - 100%)    PHYSICAL EXAM:  GENERAL: NAD, well-developed, well-nourished  HEAD:  Atraumatic, Normocephalic  EYES: R pupil irregularly shaped, non-reactive (blind in R eye); L pupil RRL, conjunctiva and sclera clear  NECK: Supple, No LAD  CHEST/LUNG: Clear to auscultation bilaterally; No wheezes, rales or rhonchi; normal work of breathing, speaking in full sentences  HEART: Regular rate and rhythm; No murmurs, rubs, or gallops, (+)S1, S2; RLE AVF (+)bruit  ABDOMEN: Soft, Nontender, Nondistended; Normal Bowel sounds   EXTREMITIES:  (+)doppler-able pulses DP/PT b/l; No clubbing, cyanosis; b/l LE pitting edema L>R  PSYCH: normal mood and affect, A&Ox3  NEUROLOGY: no focal neuro deficits  SKIN: R foot distal hallux dry eschar; L foot exam deferred as in HD with no supplies for dressing change and with serosanguinous drainage on dressing  Addendum - L foot examined in ED, serosanguinous dried discharge on 4x4 changed and foot rewrapped, s/p I&D over 4th metacarpal with wick in place, (missing 5th metacarpal s/p amputation), with ?bisected dorsal 4th phalange; unable to palpate b/l pulses (+)PT/DP dopplers b/l

## 2022-03-01 NOTE — ED ADULT NURSE REASSESSMENT NOTE - NS ED NURSE REASSESS COMMENT FT1
Pt is resting comfortably, No NAD or complaints, Report was given to Floor RN, safety precautions implemented as per protocol, will continue to monitor.

## 2022-03-01 NOTE — CONSULT NOTE ADULT - ASSESSMENT
ASSESSMENT:  61 Yr old female with history of DM, ESRD (Mn/Wed/Fr.) Right groin AVF (Dr. Gilliland , DM, PVD, now with severe left sided foot pain. Vascular surgery consulted for PVD.    PLAN:    - MARTA/PVR from 1/13/2021, would repeat  - Appreciate podiatry recs  - Care per medicine  - Will follow  - Discussed with Vascular Fellow, Dr. Ortiz    [id number] ASSESSMENT:  61 Yr old female with history of DM, ESRD (Mn/Wed/Fr.) Right groin AVF (Dr. Gilliland , DM, PVD, now with severe left sided foot pain. Vascular surgery consulted for PVD.    PLAN:    - MARTA/PVR from 1/13/2021, would repeat  - Appreciate podiatry recs  - Care per medicine  - Will follow  - To be discussed with Vascular Fellow, Dr. Ortiz    [id number]

## 2022-03-01 NOTE — H&P ADULT - PROBLEM SELECTOR PLAN 3
per history however not on medications   glucose well controlled this admission  check A1c with AM labs  cc diet/FS/ISS pending A1c c/w DAPT and statin  f/u VSx recs  MARTA/PVR pending

## 2022-03-01 NOTE — PROGRESS NOTE ADULT - SUBJECTIVE AND OBJECTIVE BOX
[facility] - S Jluis / Shabbir S /D Romina/ S Denys/ S Chano/ Andrés Perkins / M Tristianu/ O Gladis  service -[phone], office [phone]  ---------------------------------------------------------------------------------------------------------------    Patient seen and examined bedside    Subjective and Objective: No overnight events, denied sob. No complaints today. feeling better. foot pain controlled    Allergies: No Known Allergies      Hospital Medications:   MEDICATIONS  (STANDING):  aspirin  chewable 81 milliGRAM(s) Oral daily  atorvastatin 40 milliGRAM(s) Oral at bedtime  cadexomer iodine 0.9% Gel 1 Application(s) Topical daily  chlorhexidine 2% Cloths 1 Application(s) Topical daily  clopidogrel Tablet 75 milliGRAM(s) Oral daily  epoetin niesha-epbx (RETACRIT) Injectable 8000 Unit(s) IV Push <User Schedule>  Nephro-george 1 Tablet(s) Oral daily  piperacillin/tazobactam IVPB.. 3.375 Gram(s) IV Intermittent every 12 hours  Velphoro (sucroferric oxyhydroxide) 3 Tablet(s)   Oral three times a day    VITALS:  T(F): 98.9 (03-01-22 @ 11:45), Max: 100.1 (02-28-22 @ 21:52)  HR: 79 (03-01-22 @ 11:45)  BP: 131/62 (03-01-22 @ 11:45)  RR: 16 (03-01-22 @ 11:45)  SpO2: 98% (03-01-22 @ 11:45)  Wt(kg): --    02-28 @ 07:01  -  03-01 @ 07:00  --------------------------------------------------------  IN: 400 mL / OUT: 1311 mL / NET: -911 mL        Weight (kg): 60.4 (03-01 @ 04:41)    PHYSICAL EXAM:  Constitutional: NAD  HEENT: anicteric sclera  Neck: No JVD  Respiratory: CTAB, no wheezes, rales or rhonchi  Cardiovascular: S1, S2, RRR  Gastrointestinal: BS+, soft, NT/ND  Extremities: + peripheral edema, b/l feet dressing. left foot dressing w/bleeding  Neurological: A/O x 3  Psychiatric: Normal mood, normal affect  : no salcido.   Vascular Access: right thigh AVG    LABS:  03-01    138  |  96<L>  |  27<H>  ----------------------------<  73  4.1   |  25  |  5.91<H>    Ca    9.2      01 Mar 2022 07:59  Phos  3.3     03-01  Mg     2.20     03-01    TPro  7.0  /  Alb  3.8  /  TBili  0.2  /  DBili      /  AST  9   /  ALT  5   /  AlkPhos  60  03-01    Creatinine Trend: 5.91 <--, 9.43 <--                        9.5    11.01 )-----------( 237      ( 01 Mar 2022 07:59 )             31.2     Urine Studies:        RADIOLOGY & ADDITIONAL STUDIES:

## 2022-03-01 NOTE — PROGRESS NOTE ADULT - SUBJECTIVE AND OBJECTIVE BOX
Date of Service  : 03-01-22 @ 12:10    INTERVAL HPI/OVERNIGHT EVENTS: Pain in foot so took medicine 20 mts ago.   Vital Signs Last 24 Hrs  T(C): 37.2 (01 Mar 2022 11:45), Max: 37.8 (28 Feb 2022 15:34)  T(F): 98.9 (01 Mar 2022 11:45), Max: 100.1 (28 Feb 2022 15:34)  HR: 79 (01 Mar 2022 11:45) (78 - 94)  BP: 131/62 (01 Mar 2022 11:45) (127/49 - 177/69)  BP(mean): --  RR: 16 (01 Mar 2022 11:45) (16 - 18)  SpO2: 98% (01 Mar 2022 11:45) (98% - 100%)  I&O's Summary    28 Feb 2022 07:01  -  01 Mar 2022 07:00  --------------------------------------------------------  IN: 400 mL / OUT: 1311 mL / NET: -911 mL      MEDICATIONS  (STANDING):  aspirin  chewable 81 milliGRAM(s) Oral daily  atorvastatin 40 milliGRAM(s) Oral at bedtime  chlorhexidine 2% Cloths 1 Application(s) Topical daily  clopidogrel Tablet 75 milliGRAM(s) Oral daily  epoetin niesha-epbx (RETACRIT) Injectable 8000 Unit(s) IV Push <User Schedule>  Nephro-george 1 Tablet(s) Oral daily  piperacillin/tazobactam IVPB.. 3.375 Gram(s) IV Intermittent every 12 hours  Velphoro (sucroferric oxyhydroxide) 3 Tablet(s)   Oral three times a day    MEDICATIONS  (PRN):  acetaminophen     Tablet .. 650 milliGRAM(s) Oral every 6 hours PRN Mild Pain (1 - 3), Moderate Pain (4 - 6)  HYDROmorphone  Injectable 0.25 milliGRAM(s) IV Push every 6 hours PRN Severe Pain (7 - 10)    LABS:                        9.5    11.01 )-----------( 237      ( 01 Mar 2022 07:59 )             31.2     03-01    138  |  96<L>  |  27<H>  ----------------------------<  73  4.1   |  25  |  5.91<H>    Ca    9.2      01 Mar 2022 07:59  Phos  3.3     03-01  Mg     2.20     03-01    TPro  7.0  /  Alb  3.8  /  TBili  0.2  /  DBili  x   /  AST  9   /  ALT  5   /  AlkPhos  60  03-01    PT/INR - ( 28 Feb 2022 16:55 )   PT: 12.0 sec;   INR: 1.03 ratio         PTT - ( 28 Feb 2022 16:55 )  PTT:31.0 sec    CAPILLARY BLOOD GLUCOSE      POCT Blood Glucose.: 94 mg/dL (01 Mar 2022 00:54)  POCT Blood Glucose.: 87 mg/dL (01 Mar 2022 00:41)  POCT Blood Glucose.: 91 mg/dL (28 Feb 2022 13:13)          REVIEW OF SYSTEMS:  CONSTITUTIONAL: No fever, weight loss, or fatigue  EYES: No eye pain, visual disturbances, or discharge  ENMT:  No difficulty hearing, tinnitus, vertigo; No sinus or throat pain  NECK: No pain or stiffness  RESPIRATORY: No cough, wheezing, chills or hemoptysis; No shortness of breath  CARDIOVASCULAR: No chest pain, palpitations, dizziness, or leg swelling  GASTROINTESTINAL: No abdominal or epigastric pain. No nausea, vomiting, or hematemesis; No diarrhea or constipation. No melena or hematochezia.  GENITOURINARY: No dysuria, frequency, hematuria, or incontinence  NEUROLOGICAL: No headaches, memory loss, loss of strength, numbness, or tremors      Consultant(s) Notes Reviewed:  [x ] YES  [ ] NO    PHYSICAL EXAM:  GENERAL: NAD, well-groomed, well-developed,not in any distress ,  HEAD:  Atraumatic, Normocephalic  NECK: Supple, No JVD, Normal thyroid  NERVOUS SYSTEM:  Alert & Oriented X3, No focal deficit   CHEST/LUNG: Good air entry bilateral with no  rales, rhonchi, wheezing, or rubs  HEART: Regular rate and rhythm; No murmurs, rubs, or gallops  ABDOMEN: Soft, Nontender, Nondistended; Bowel sounds present  EXTREMITIES:  feet dressed       Care Discussed with Consultants/Other Providers [ x] YES  [ ] NO

## 2022-03-01 NOTE — H&P ADULT - NSICDXPASTMEDICALHX_GEN_ALL_CORE_FT
PAST MEDICAL HISTORY:  Cataract     CKD (chronic kidney disease), stage IV     ESRD (end stage renal disease) on dialysis     Glaucoma     Heart failure     HLD (hyperlipidemia)     PAD (peripheral artery disease)      PAST MEDICAL HISTORY:  Blind right eye     Cataract     CKD (chronic kidney disease), stage IV     ESRD (end stage renal disease) on dialysis     Glaucoma     Heart failure     HLD (hyperlipidemia)     PAD (peripheral artery disease)

## 2022-03-01 NOTE — PROGRESS NOTE ADULT - ASSESSMENT
62 yo f with left foot cellulitis  -pt seen and evaluated  -T 99.3, WBC down to 11, ESR pending, CRP 9.5  -LFXR pending  -right distal hallux dry eschar, s/p bedside L foot I&D 2/28/22: left foot 3rd digit gangrene to PIPJ, resolving erythema to midfoot, no fluctuance, no drainage, no malodor, etiology 2/2 ischemia   -left foot wound culture pending   -continue asia carrasco  -MARTA/PVR pending  -pod plan left foot partial 3rd ray resection pending MARTA/vasc recs  -please document medical optimization for surgery under sedation with local anaesthesia  -seen with attending 62 yo f with left foot cellulitis  -pt seen and evaluated  -T 99.3, WBC down to 11, ESR pending, CRP 9.5  -LFXR: Soft tissue edema throughout the plantar and dorsal left foot with lucencies within the subcutaneous dorsal left foot superficial to the MTP joints suspicious for soft tissue gas, pt is /p L foot I&D  -right distal hallux dry eschar, s/p bedside L foot I&D 2/28/22: left foot 3rd digit gangrene to PIPJ, resolving erythema to midfoot, no fluctuance, no drainage, no malodor, etiology 2/2 ischemia   -left foot wound culture pending   -continue jodie carrascosyn  -MARTA/PVR pending  -pod plan left foot partial 3rd ray resection pending MARTA/vasc recs  -please document medical optimization for surgery under sedation with local anaesthesia  -seen with attending

## 2022-03-01 NOTE — PROGRESS NOTE ADULT - SUBJECTIVE AND OBJECTIVE BOX
Podiatry pager #: [phone] ([place])/ [phone] ([facility])    Patient is a 61y old  Female who presents with a chief complaint of L foot pain/discoloration (01 Mar 2022 07:51)       INTERVAL HPI/OVERNIGHT EVENTS:  Patient seen and evaluated at bedside.  Pt is resting comfortable in NAD. Denies N/V/F/C.     Allergies    No Known Allergies    Intolerances        Vital Signs Last 24 Hrs  T(C): 37.4 (01 Mar 2022 04:38), Max: 37.8 (28 Feb 2022 15:34)  T(F): 99.3 (01 Mar 2022 04:38), Max: 100.1 (28 Feb 2022 15:34)  HR: 80 (01 Mar 2022 04:38) (78 - 94)  BP: 139/56 (01 Mar 2022 04:38) (127/49 - 177/69)  BP(mean): --  RR: 18 (01 Mar 2022 04:38) (16 - 18)  SpO2: 99% (01 Mar 2022 04:38) (99% - 100%)    LABS:                        9.5    11.01 )-----------( 237      ( 01 Mar 2022 07:59 )             31.2     02-28    144  |  97<L>  |  54<H>  ----------------------------<  87  4.9   |  25  |  9.43<H>    Ca    10.0      28 Feb 2022 16:55  Mg     2.60     02-28    TPro  8.2  /  Alb  4.4  /  TBili  0.2  /  DBili  x   /  AST  7   /  ALT  <5<L>  /  AlkPhos  66  02-28    PT/INR - ( 28 Feb 2022 16:55 )   PT: 12.0 sec;   INR: 1.03 ratio         PTT - ( 28 Feb 2022 16:55 )  PTT:31.0 sec    CAPILLARY BLOOD GLUCOSE      POCT Blood Glucose.: 94 mg/dL (01 Mar 2022 00:54)  POCT Blood Glucose.: 87 mg/dL (01 Mar 2022 00:41)  POCT Blood Glucose.: 91 mg/dL (28 Feb 2022 13:13)      Lower Extremity Physical Exam:  Vascular: DP/PT 1/4 R,  DP/PT 0/4 L, CFT <3 seconds B/L except left 3rd digit absent CFT, Temperature gradient warm to warm L, warm to cool R, resolving L foot edema  Neuro: Epicritic sensation diminished to the level of forefoot B/L  Musculoskeletal/Ortho: left partial 5th ray resection healed, tenderness to palpation to left 3rd   Skin: right distal hallux dry eschar, s/p bedside L foot I&D 2/28/22: left foot 3rd digit gangrene to PIPJ, resolving erythema to midfoot, no fluctuance, no drainage, no malodor, etiology 2/2 ischemia     RADIOLOGY & ADDITIONAL TESTS:  < from: Xray Foot AP + Lateral + Oblique, Left (02.28.22 @ 16:39) >    ******PRELIMINARY REPORT******      ******PRELIMINARY REPORT******       ACC: [id number] EXAM:  XR FOOT COMP MIN 3 VIEWS LT                          PROCEDURE DATE:  02/28/2022    ******PRELIMINARY REPORT******      ******PRELIMINARY REPORT******           INTERPRETATION:  Soft tissue edema throughout the plantar and dorsal left   foot with lucencies within the subcutaneous dorsal left foot superficial   to the MTP joints suspicious for soft tissue gas. Lucency within the   distal aspect of the left 4th metatarsal possibly secondary to   osteomyelitis. Further evaluation can be obtained with MR with IV   contrast if clinically indicated.    Status post left 5th digit ray resection at the distal metatarsal.    Extensive vascular calcifications.        ******PRELIMINARY REPORT******      ******PRELIMINARY REPORT******        IRMA YOST MD; Resident Radiology    < end of copied text >

## 2022-03-01 NOTE — H&P ADULT - NSHPREVIEWOFSYSTEMS_GEN_ALL_CORE
REVIEW OF SYSTEMS:    CONSTITUTIONAL: No weakness, fevers or chills  EYES/ENT: No visual changes, blind in R eye; No dysphagia; No sore throat; No rhinorrhea; No sinus pain/pressure  NECK: No pain or stiffness  RESPIRATORY: No cough, wheezing, hemoptysis; No shortness of breath  CARDIOVASCULAR: No chest pain or palpitations; No change in chronic lower extremity edema  GASTROINTESTINAL: No abdominal or epigastric pain. No nausea, vomiting, or hematemesis; No diarrhea or constipation. No melena or hematochezia.  GENITOURINARY: No dysuria, frequency or hematuria  NEUROLOGICAL: No numbness or weakness  MSK: ambulates without aid, no falls or trauma  SKIN: No itching, burning, rashes; L foot discoloration   All other review of systems is negative unless indicated above.

## 2022-03-01 NOTE — H&P ADULT - PROBLEM SELECTOR PLAN 5
pharmacologic DVT ppx pending VSx recs/AM podiatry recs (?plan for OR) b/l LE edema  check LE dopplers to r/o VTE pharmacologic DVT ppx pending VSx recs/AM podiatry recs (?plan for OR)    #?hx DM2   check A1c with AM labs  cc diet/FS/ISS pending A1c pharmacologic DVT ppx pending VSx recs/AM podiatry recs (?plan for OR)    #?hx DM2   check A1c with AM labs  cc diet/FS/ISS pending A1c    #dysphagia  -to pills per patient  -dysphagia diet, aspiration precautions   -S&S eval

## 2022-03-01 NOTE — SWALLOW BEDSIDE ASSESSMENT ADULT - SWALLOW EVAL: DIAGNOSIS
1. Functional oral phase for thin liquids, pureed, minced and moist and regular solids marked by adequate oral aperture and bolus formation with timely A-P transport. 2. Functional pharyngeal phase for thin liquids and pureed marked by initiation of the pharyngeal swallow with hyolaryngeal elevation and excursion upon digital palpation without evidence of airway invasion, denied globus sensation for thin and puree trials. 4. Suspected pharyngeal dysphagia for minced and moist and hard solids marked by initiation of the pharyngeal swallow with hyolaryngeal elevation and excursion upon digital palpation without evidence of laryngeal penetration/aspiration. Patient complained of globus sensation during solid trials, multiple swallows observed which may be indicative of pharyngeal stasis.

## 2022-03-01 NOTE — H&P ADULT - PROBLEM SELECTOR PLAN 1
c/w broad spectrum abx, monitor vanc level  appreciate podiatry recs (spoke with on call podiatry resident re xray findings, NTD at present  MARTA/PVR pending  vascular surgery consulted  s/p I&D by podiatry in ED, plan for left foot partial 3rd ray resection pending MARTA/vasc recs   rectal temp pending  elevated temp earlier, s/p acetaminophen, check rectal temp  ESR/CK as above  trend lactate c/w broad spectrum abx, random vanc level with AM labs given vanc given pre-HD, weight still pending  appreciate podiatry recs (spoke with on call podiatry resident re xray findings, NTD at present  MARTA/PVR pending  vascular surgery consulted  s/p I&D by podiatry in ED, plan for left foot partial 3rd ray resection pending MARTA/vasc recs   rectal temp pending  elevated temp earlier, s/p acetaminophen, check rectal temp  ESR/CK as above  trend lactate c/w broad spectrum abx, random vanc level with AM labs given vanc given pre-HD, weight still pending  appreciate podiatry recs (spoke with on call podiatry resident re xray findings, NTD at present  MARTA/PVR pending  vascular surgery consulted  s/p I&D by podiatry in ED, plan for left foot partial 3rd ray resection pending MARTA/vasc recs   rectal temp pending  elevated temp earlier, s/p acetaminophen, check rectal temp  ESR/CK as above  trend lactate  EKG pending, f/u for pre-op eval

## 2022-03-01 NOTE — H&P ADULT - ASSESSMENT
61-year-old female with ESRD on HD MWF via right groin AVF, HLD and PAD presenting with L sided foot pain, found to have likely OM

## 2022-03-01 NOTE — SWALLOW BEDSIDE ASSESSMENT ADULT - COMMENTS
As per H&P 61-year-old female with ESRD on HD MWF via right groin AVF, HLD and PAD presenting with L sided foot pain, ongoing since Nov 2021, becoming more severe in the past 2 weeks. Pain is stabbing in quality, 10/10 and waxing/waning in severity, non-radiating, worse with touch/ambulation and laying down. No associated erythema, numbness, paresthesias, fevers or chills. Patient had some black discoloration over the foot."     CXR 3/1/22 "No focal consolidation. Right lower lung linear atelectasis."     orders received. chart contents noted. Patient attempted to be seen for bedside swallow assessment, however patient off unit. Will re-attempt as schedule permits.
As per H&P 61-year-old female with ESRD on HD MWF via right groin AVF, HLD and PAD presenting with L sided foot pain, ongoing since Nov 2021, becoming more severe in the past 2 weeks. Pain is stabbing in quality, 10/10 and waxing/waning in severity, non-radiating, worse with touch/ambulation and laying down. No associated erythema, numbness, paresthesias, fevers or chills. Patient had some black discoloration over the foot."     CXR 3/1/22 "No focal consolidation. Right lower lung linear atelectasis."    Patient received upright seated at edge of bed, reported pain in foot, RN administered pain medication prior to session. Patient agreeable to proceed with evaluation. Patient reported globus sensation to solids and when taking pills at home, reports weight loss however not able to specify amount.

## 2022-03-01 NOTE — H&P ADULT - PROBLEM SELECTOR PLAN 2
appreciate renal recs  dose meds for intermittent HD  renal restricted diet  c/w phosphate binders    #AOCKD  -epo per renal  -monitor H/H

## 2022-03-01 NOTE — H&P ADULT - HISTORY OF PRESENT ILLNESS
61-year-old female with ESRD on HD MWF via right groin AVF, HLD and PAD presenting with L sided foot pain, ongoing since Nov 2021, becoming more severe in the past 2 weeks.    61-year-old female with ESRD on HD MWF via right groin AVF, HLD and PAD presenting with L sided foot pain, ongoing since Nov 2021, becoming more severe in the past 2 weeks. Pain is stabbing in quality, 10/10 and waxing/waning in severity, worse with touch/ambulation and laying down. No associated erythema, numbness, paresthesias, fevers or chills. Patient had some black discoloration over the foot.       61-year-old female with ESRD on HD MWF via right groin AVF, HLD and PAD presenting with L sided foot pain, ongoing since Nov 2021, becoming more severe in the past 2 weeks. Pain is stabbing in quality, 10/10 and waxing/waning in severity, non-radiating, worse with touch/ambulation and laying down. No associated erythema, numbness, paresthesias, fevers or chills. Patient had some black discoloration over the foot.

## 2022-03-01 NOTE — H&P ADULT - NSHPLABSRESULTS_GEN_ALL_CORE
10.2   11.72 )-----------( 258      ( 28 Feb 2022 16:55 )             33.4     02-28    144  |  97<L>  |  54<H>  ----------------------------<  87  4.9   |  25  |  9.43<H>    Ca    10.0      28 Feb 2022 16:55  Mg     2.60     02-28    TPro  8.2  /  Alb  4.4  /  TBili  0.2  /  DBili  x   /  AST  7   /  ALT  <5<L>  /  AlkPhos  66  02-28    PT/INR - ( 28 Feb 2022 16:55 )   PT: 12.0 sec;   INR: 1.03 ratio    PTT - ( 28 Feb 2022 16:55 )  PTT:31.0 sec    16:55 - VBG - pH: 7.35  | pCO2: 48    | pO2: 25    | Lactate: 1.9      COVID-19 PCR: NotDetec (02.28.22 @ 16:57)    < from: Xray Chest 1 View AP/PA (02.28.22 @ 16:39) >  Heart/Vascular: Heart size is normal.  Pulmonary: No focal consolidation. Right lower lung linear atelectasis.   No pneumothorax or pleural effusion.  Bones: No acute bony pathology.  Impression: No focal consolidation. Right lower lung linear atelectasis.  < end of copied text >    < from: Xray Foot AP + Lateral + Oblique, Left (02.28.22 @ 16:39) >  ******PRELIMINARY REPORT******   INTERPRETATION:  Soft tissue edema throughout the plantar and dorsal left foot with lucencies within the subcutaneous dorsal left foot superficial   to the MTP joints suspicious for soft tissue gas. Lucency within the distal aspect of the left 4th metatarsal possibly secondary to osteomyelitis. Further evaluation can be obtained with MR with IV contrast if clinically indicated. Status post left 5th digit ray resection at the distal metatarsal. Extensive vascular calcifications.  < end of copied text > 10.2   11.72 )-----------( 258      ( 28 Feb 2022 16:55 )             33.4     02-28    144  |  97<L>  |  54<H>  ----------------------------<  87  4.9   |  25  |  9.43<H>    Ca    10.0      28 Feb 2022 16:55  Mg     2.60     02-28    TPro  8.2  /  Alb  4.4  /  TBili  0.2  /  DBili  x   /  AST  7   /  ALT  <5<L>  /  AlkPhos  66  02-28    PT/INR - ( 28 Feb 2022 16:55 )   PT: 12.0 sec;   INR: 1.03 ratio    PTT - ( 28 Feb 2022 16:55 )  PTT:31.0 sec    16:55 - VBG - pH: 7.35  | pCO2: 48    | pO2: 25    | Lactate: 1.9      C-Reactive Protein, Serum: 95.1 mg/L (02.28.22 @ 16:56)    Creatine Kinase, Serum: 52 U/L (02.28.22 @ 16:56)    COVID-19 PCR: NotDetec (02.28.22 @ 16:57)    < from: Xray Chest 1 View AP/PA (02.28.22 @ 16:39) >  Heart/Vascular: Heart size is normal.  Pulmonary: No focal consolidation. Right lower lung linear atelectasis.   No pneumothorax or pleural effusion.  Bones: No acute bony pathology.  Impression: No focal consolidation. Right lower lung linear atelectasis.  < end of copied text >    < from: Xray Foot AP + Lateral + Oblique, Left (02.28.22 @ 16:39) >  ******PRELIMINARY REPORT******   INTERPRETATION:  Soft tissue edema throughout the plantar and dorsal left foot with lucencies within the subcutaneous dorsal left foot superficial   to the MTP joints suspicious for soft tissue gas. Lucency within the distal aspect of the left 4th metatarsal possibly secondary to osteomyelitis. Further evaluation can be obtained with MR with IV contrast if clinically indicated. Status post left 5th digit ray resection at the distal metatarsal. Extensive vascular calcifications.  < end of copied text >    No EKG in chart, ordered/pending

## 2022-03-01 NOTE — PROGRESS NOTE ADULT - ASSESSMENT
61-year-old female with ESRD on HD MWF via right groin AVF, HLD and PAD presenting with L sided foot pain, found to have likely OM     Problem/Plan - 1:  ·  Problem: Foot osteomyelitis.   ·  Plan: c/w broad spectrum abx, random vanc level .  appreciate podiatry recs (spoke with on call podiatry resident re xray findings, NTD at present  MARTA/PVR noted.   vascular surgery help appreciated.   s/p I&D by podiatry in ED, plan for left foot partial 3rd ray resection pending MARTA/vasc recs   rectal temp pending  elevated temp earlier, s/p acetaminophen, check rectal temp  ESR/CK as above  trend lactate  EKG pending, f/u for pre-op eval.     Problem/Plan - 2:  ·  Problem: ESRD on dialysis.   ·  Plan: appreciate renal recs  dose meds for intermittent HD  renal restricted diet  c/w phosphate binders     Problem/Plan - 3:  ·  Problem: PAD (peripheral artery disease).   ·  Plan: c/w DAPT and statin  f/u VSx recs  MARTA/PVR noted.      Problem/Plan - 4:  ·  Problem: Lower extremity edema.   ·  Plan: b/l LE edema   LE dopplers negative for  VTE.     Problem/Plan - 5:  ·  Problem: Need for prophylactic measure.   ·  Plan: pharmacologic DVT ppx pending VSx recs/AM podiatry recs (?plan for OR)

## 2022-03-01 NOTE — PROGRESS NOTE ADULT - ASSESSMENT
Ms. Boogie is a 61 year old woman with history of DM, ESRD (Mn/Wed/Fr.) Right groin AVF (Dr. Gilliland , DM, PVD, now with severe left sided foot pain. Vascular surgery consulted for PVD.    PLAN:  - MARTA/PVR from 1/13/2021, would repeat  - Appreciate podiatry recs  - Care per medicine  - Will follow    C Team Surgery  [id number]

## 2022-03-01 NOTE — PATIENT PROFILE ADULT - FALL HARM RISK - HARM RISK INTERVENTIONS

## 2022-03-01 NOTE — PROGRESS NOTE ADULT - SUBJECTIVE AND OBJECTIVE BOX
Surgery Progress Note    SUBJECTIVE: Pt seen and examined at bedside. Patient comfortable and in no-apparent distress. S/p bedside debridement of LLE by podiatry. Patient feels better this AM. Denies pain in proximal foot, leg of LLE. Afebrile overnight.    Vital Signs Last 24 Hrs  T(C): 37.4 (01 Mar 2022 04:38), Max: 37.8 (28 Feb 2022 15:34)  T(F): 99.3 (01 Mar 2022 04:38), Max: 100.1 (28 Feb 2022 15:34)  HR: 80 (01 Mar 2022 04:38) (78 - 94)  BP: 139/56 (01 Mar 2022 04:38) (127/49 - 177/69)  BP(mean): --  RR: 18 (01 Mar 2022 04:38) (16 - 18)  SpO2: 99% (01 Mar 2022 04:38) (99% - 100%)    Physical Exam:  General Appearance: Appears well, NAD  Respiratory: No labored breathing  CV: Pulse regularly present  LLE dressing in place, c/d/i    LABS:                        10.2   11.72 )-----------( 258      ( 28 Feb 2022 16:55 )             33.4     02-28    144  |  97<L>  |  54<H>  ----------------------------<  87  4.9   |  25  |  9.43<H>    Ca    10.0      28 Feb 2022 16:55  Mg     2.60     02-28    TPro  8.2  /  Alb  4.4  /  TBili  0.2  /  DBili  x   /  AST  7   /  ALT  <5<L>  /  AlkPhos  66  02-28    PT/INR - ( 28 Feb 2022 16:55 )   PT: 12.0 sec;   INR: 1.03 ratio         PTT - ( 28 Feb 2022 16:55 )  PTT:31.0 sec      INs and OUTs:    02-28-22 @ 07:01  -  03-01-22 @ 07:00  --------------------------------------------------------  IN: 400 mL / OUT: 1311 mL / NET: -911 mL

## 2022-03-01 NOTE — CONSULT NOTE ADULT - SUBJECTIVE AND OBJECTIVE BOX
General Surgery Consult Note  Attending: Dr. Gilliland  Service: C Team Surgery    HPI:  61 Yr old female with history of DM, ESRD (Mn/Wed/Fr.) Right groin AVF (Dr. Gilliland , DM, PVD, now with severe left sided foot pain. She has been having severe left foot pain since Nov 2021, and she was supposed to follow up with Dr. Mayo Daily this week. The pain has become very severe in past 2 weeks and she also noticed black discoloration involving 2/3/4 digit of left foot. She denies fever, SOB, CP, Palpitations, Abdominal pain. Vascular surgery consulted for PVD.      PAST MEDICAL & SURGICAL HISTORY:  Heart failure  HLD (hyperlipidemia)  Glaucoma  Cataract  ESRD (end stage renal disease) on dialysis  PAD (peripheral artery disease)  Acute osteomyelitis of toe of right foot 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  S/P arteriovenous (AV) fistula creation  Hemodialysis access, AV graft      ALLERGIES:  Allergies    No Known Allergies    Intolerances        SOCIAL HISTORY:      FAMILY HISTORY:      PHYSICAL EXAM:  General: NAD, resting comfortably  HEENT: NC/AT, normal hearing  Pulmonary: normal resp effort  Cardiovascular: RRR  Extremities: WWP, normal strength, tenderness to palpation to left 3rd digit, right distal hallux dry eschar, left foot 3rd digit gangrene, erythema to midfoot  Neuro: A/O x 3, normal sensation  Pulses:   LLE:  RLE:     VITAL SIGNS:  Vital Signs Last 24 Hrs  T(C): 37.1 (01 Mar 2022 01:02), Max: 37.8 (28 Feb 2022 15:34)  T(F): 98.8 (01 Mar 2022 01:02), Max: 100.1 (28 Feb 2022 15:34)  HR: 78 (01 Mar 2022 01:02) (78 - 94)  BP: 127/49 (01 Mar 2022 01:02) (127/49 - 177/69)  BP(mean): --  RR: 17 (01 Mar 2022 01:02) (16 - 18)  SpO2: 100% (28 Feb 2022 16:19) (100% - 100%)    I&O's Summary    28 Feb 2022 07:01  -  01 Mar 2022 01:48  --------------------------------------------------------  IN: 400 mL / OUT: 1311 mL / NET: -911 mL        LABS:                        10.2   11.72 )-----------( 258      ( 28 Feb 2022 16:55 )             33.4     02-28    144  |  97<L>  |  54<H>  ----------------------------<  87  4.9   |  25  |  9.43<H>    Ca    10.0      28 Feb 2022 16:55  Mg     2.60     02-28    TPro  8.2  /  Alb  4.4  /  TBili  0.2  /  DBili  x   /  AST  7   /  ALT  <5<L>  /  AlkPhos  66  02-28    PT/INR - ( 28 Feb 2022 16:55 )   PT: 12.0 sec;   INR: 1.03 ratio         PTT - ( 28 Feb 2022 16:55 )  PTT:31.0 sec    CAPILLARY BLOOD GLUCOSE      POCT Blood Glucose.: 94 mg/dL (01 Mar 2022 00:54)  POCT Blood Glucose.: 87 mg/dL (01 Mar 2022 00:41)  POCT Blood Glucose.: 91 mg/dL (28 Feb 2022 13:13)    LIVER FUNCTIONS - ( 28 Feb 2022 16:55 )  Alb: 4.4 g/dL / Pro: 8.2 g/dL / ALK PHOS: 66 U/L / ALT: <5 U/L / AST: 7 U/L / GGT: x             CULTURES:      RADIOLOGY & ADDITIONAL STUDIES:      < from: VA Duplex Physiol Ext Low 3+ Level, RT. (01.13.22 @ 14:58) >  Right Findings: The ankle-brachial index on the right is  moderately decreased (0.77) at rest.  The toe-brachial index (TBI) on the right is moderately  reduced (0.46) at rest. The toe pressure is 63 mmHg.  Pulse volume recording (PVR) waveforms appear diminished  in amplitude at the right metatarsal and digit segments at  rest. Diminished PPG waveforms noted in all five right toe  digits at rest.  The ankle-brachial index (MARTA) on the right cannot be  accurately assessed because of non-compressible  (calcified) vessels with compression of the arteriovenous  fistula.  The toe-brachial index (TBI) on the right is mildly  reduced (0.46) with compression of thearteriovenous  fistula. The toe pressure is 74 mmHg.  Pulse volume recording (PVR) waveforms appear diminished  in amplitude at the right metatarsal and digit segments  with compression of the arteriovenous fistula.  Mildly improved PPG waveforms noted in all five right toe  digits with compression of the arteriovenous fistula.  ------------------------------------------------------------------------    < end of copied text >      < from: Xray Foot AP + Lateral + Oblique, Left (02.28.22 @ 16:39) >  INTERPRETATION:  Soft tissue edema throughout the plantar and dorsal left   foot with lucencies within the subcutaneous dorsal left foot superficial   to the MTP joints suspicious for soft tissue gas. Lucency within the   distal aspect of the left 4th metatarsal possibly secondary to   osteomyelitis. Further evaluation can be obtained with MR with IV   contrast if clinically indicated.    Status post left 5th digit ray resection at the distal metatarsal.    Extensive vascular calcifications.    < end of copied text >     General Surgery Consult Note  Attending: Dr. Gilliland  Service: C Team Surgery    HPI:  61 Yr old female with history of DM, ESRD (Mn/Wed/Fr.) Right groin AVF (Dr. Gilliland , DM, PVD, now with severe left sided foot pain. She has been having severe left foot pain since Nov 2021, and she was supposed to follow up with Dr. Mayo Daily this week. The pain has become very severe in past 2 weeks and she also noticed black discoloration involving 2/3/4 digit of left foot. She denies fever, SOB, CP, Palpitations, Abdominal pain. Vascular surgery consulted for PVD.      PAST MEDICAL & SURGICAL HISTORY:  Heart failure  HLD (hyperlipidemia)  Glaucoma  Cataract  ESRD (end stage renal disease) on dialysis  PAD (peripheral artery disease)  Acute osteomyelitis of toe of right foot 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  S/P arteriovenous (AV) fistula creation  Hemodialysis access, AV graft      ALLERGIES:  Allergies    No Known Allergies    Intolerances        SOCIAL HISTORY:      FAMILY HISTORY:      PHYSICAL EXAM:  General: NAD, resting comfortably  HEENT: NC/AT, normal hearing  Pulmonary: normal resp effort  Cardiovascular: RRR  Extremities: WWP, normal strength, tenderness to palpation to left 3rd digit, right distal hallux dry eschar, left foot 3rd digit gangrene, erythema to midfoot  Neuro: A/O x 3, normal sensation  Pulses: Palpable femoral bilaterally, dopplerable pop/PT/DP bilaterally      VITAL SIGNS:  Vital Signs Last 24 Hrs  T(C): 37.1 (01 Mar 2022 01:02), Max: 37.8 (28 Feb 2022 15:34)  T(F): 98.8 (01 Mar 2022 01:02), Max: 100.1 (28 Feb 2022 15:34)  HR: 78 (01 Mar 2022 01:02) (78 - 94)  BP: 127/49 (01 Mar 2022 01:02) (127/49 - 177/69)  BP(mean): --  RR: 17 (01 Mar 2022 01:02) (16 - 18)  SpO2: 100% (28 Feb 2022 16:19) (100% - 100%)    I&O's Summary    28 Feb 2022 07:01  -  01 Mar 2022 01:48  --------------------------------------------------------  IN: 400 mL / OUT: 1311 mL / NET: -911 mL        LABS:                        10.2   11.72 )-----------( 258      ( 28 Feb 2022 16:55 )             33.4     02-28    144  |  97<L>  |  54<H>  ----------------------------<  87  4.9   |  25  |  9.43<H>    Ca    10.0      28 Feb 2022 16:55  Mg     2.60     02-28    TPro  8.2  /  Alb  4.4  /  TBili  0.2  /  DBili  x   /  AST  7   /  ALT  <5<L>  /  AlkPhos  66  02-28    PT/INR - ( 28 Feb 2022 16:55 )   PT: 12.0 sec;   INR: 1.03 ratio         PTT - ( 28 Feb 2022 16:55 )  PTT:31.0 sec    CAPILLARY BLOOD GLUCOSE      POCT Blood Glucose.: 94 mg/dL (01 Mar 2022 00:54)  POCT Blood Glucose.: 87 mg/dL (01 Mar 2022 00:41)  POCT Blood Glucose.: 91 mg/dL (28 Feb 2022 13:13)    LIVER FUNCTIONS - ( 28 Feb 2022 16:55 )  Alb: 4.4 g/dL / Pro: 8.2 g/dL / ALK PHOS: 66 U/L / ALT: <5 U/L / AST: 7 U/L / GGT: x             CULTURES:      RADIOLOGY & ADDITIONAL STUDIES:      < from: VA Duplex Physiol Ext Low 3+ Level, RT. (01.13.22 @ 14:58) >  Right Findings: The ankle-brachial index on the right is  moderately decreased (0.77) at rest.  The toe-brachial index (TBI) on the right is moderately  reduced (0.46) at rest. The toe pressure is 63 mmHg.  Pulse volume recording (PVR) waveforms appear diminished  in amplitude at the right metatarsal and digit segments at  rest. Diminished PPG waveforms noted in all five right toe  digits at rest.  The ankle-brachial index (MARTA) on the right cannot be  accurately assessed because of non-compressible  (calcified) vessels with compression of the arteriovenous  fistula.  The toe-brachial index (TBI) on the right is mildly  reduced (0.46) with compression of thearteriovenous  fistula. The toe pressure is 74 mmHg.  Pulse volume recording (PVR) waveforms appear diminished  in amplitude at the right metatarsal and digit segments  with compression of the arteriovenous fistula.  Mildly improved PPG waveforms noted in all five right toe  digits with compression of the arteriovenous fistula.  ------------------------------------------------------------------------    < end of copied text >      < from: Xray Foot AP + Lateral + Oblique, Left (02.28.22 @ 16:39) >  INTERPRETATION:  Soft tissue edema throughout the plantar and dorsal left   foot with lucencies within the subcutaneous dorsal left foot superficial   to the MTP joints suspicious for soft tissue gas. Lucency within the   distal aspect of the left 4th metatarsal possibly secondary to   osteomyelitis. Further evaluation can be obtained with MR with IV   contrast if clinically indicated.    Status post left 5th digit ray resection at the distal metatarsal.    Extensive vascular calcifications.    < end of copied text >

## 2022-03-01 NOTE — PROGRESS NOTE ADULT - ASSESSMENT
61 Yr old female ESRD (Mn/Wed/Fr.) well known to me from [facility], Right groin AVF, DM, PVD, now with severe left foot pain. Renal following for ESRD Mx    End Stage Renal Disease on Dialysis MWF  HD unit [facility]  K, vol- ok  not in chf    s/p HD last night, Rx sheet reviewed, net UF 4.1kg, tolerated well. uneventful.  next routine HD thursday 3/3 for preOR renal optimization   renal restrictions in diet.   dose all meds for ESRD  monitor BMP  Anemia of CKD- Hb < goal. c/w epo 8k tiw w/hd  HTN,, controlled  Left foot infection, gangrene- Mx per Podiatry. s/p vanco , on zosyn. dose abxs for ESRD. monitor vanco level   s/p bedside L foot I&D 2/28/22  -left foot wound culture pending   -MARTA/PVR pending  -pod plan left foot partial 3rd ray resection pending MARTA/vasc recs -as per Podiatry  plan for OR Friday    poc d/w pt  will closely follow up.   labs, chart reviewed  For any question, pl call:  Nephrology  Cell -[phone]  Office [phone]  Ans Serv [phone]  [email or web address] ( wkend coverage for Hospital)

## 2022-03-02 LAB
-  AMIKACIN: SIGNIFICANT CHANGE UP
-  AMOXICILLIN/CLAVULANIC ACID: SIGNIFICANT CHANGE UP
-  AMPICILLIN/SULBACTAM: SIGNIFICANT CHANGE UP
-  AMPICILLIN/SULBACTAM: SIGNIFICANT CHANGE UP
-  AMPICILLIN: SIGNIFICANT CHANGE UP
-  AZTREONAM: SIGNIFICANT CHANGE UP
-  CEFAZOLIN: SIGNIFICANT CHANGE UP
-  CEFAZOLIN: SIGNIFICANT CHANGE UP
-  CEFEPIME: SIGNIFICANT CHANGE UP
-  CEFOXITIN: SIGNIFICANT CHANGE UP
-  CEFTRIAXONE: SIGNIFICANT CHANGE UP
-  CIPROFLOXACIN: SIGNIFICANT CHANGE UP
-  CLINDAMYCIN: SIGNIFICANT CHANGE UP
-  ERTAPENEM: SIGNIFICANT CHANGE UP
-  ERYTHROMYCIN: SIGNIFICANT CHANGE UP
-  GENTAMICIN: SIGNIFICANT CHANGE UP
-  GENTAMICIN: SIGNIFICANT CHANGE UP
-  IMIPENEM: SIGNIFICANT CHANGE UP
-  LEVOFLOXACIN: SIGNIFICANT CHANGE UP
-  MEROPENEM: SIGNIFICANT CHANGE UP
-  OXACILLIN: SIGNIFICANT CHANGE UP
-  PENICILLIN: SIGNIFICANT CHANGE UP
-  PIPERACILLIN/TAZOBACTAM: SIGNIFICANT CHANGE UP
-  RIFAMPIN: SIGNIFICANT CHANGE UP
-  TETRACYCLINE: SIGNIFICANT CHANGE UP
-  TOBRAMYCIN: SIGNIFICANT CHANGE UP
-  TRIMETHOPRIM/SULFAMETHOXAZOLE: SIGNIFICANT CHANGE UP
-  TRIMETHOPRIM/SULFAMETHOXAZOLE: SIGNIFICANT CHANGE UP
-  VANCOMYCIN: SIGNIFICANT CHANGE UP
ALBUMIN SERPL ELPH-MCNC: 3.7 G/DL — SIGNIFICANT CHANGE UP (ref 3.3–5)
ALP SERPL-CCNC: 55 U/L — SIGNIFICANT CHANGE UP (ref 40–120)
ALT FLD-CCNC: 5 U/L — SIGNIFICANT CHANGE UP (ref 4–33)
ANION GAP SERPL CALC-SCNC: 17 MMOL/L — HIGH (ref 7–14)
AST SERPL-CCNC: 13 U/L — SIGNIFICANT CHANGE UP (ref 4–32)
BILIRUB SERPL-MCNC: 0.2 MG/DL — SIGNIFICANT CHANGE UP (ref 0.2–1.2)
BUN SERPL-MCNC: 38 MG/DL — HIGH (ref 7–23)
CALCIUM SERPL-MCNC: 9.3 MG/DL — SIGNIFICANT CHANGE UP (ref 8.4–10.5)
CHLORIDE SERPL-SCNC: 95 MMOL/L — LOW (ref 98–107)
CO2 SERPL-SCNC: 26 MMOL/L — SIGNIFICANT CHANGE UP (ref 22–31)
CREAT SERPL-MCNC: 7.97 MG/DL — HIGH (ref 0.5–1.3)
EGFR: 5 ML/MIN/1.73M2 — LOW
GLUCOSE SERPL-MCNC: 86 MG/DL — SIGNIFICANT CHANGE UP (ref 70–99)
HCT VFR BLD CALC: 33.2 % — LOW (ref 34.5–45)
HGB BLD-MCNC: 10.4 G/DL — LOW (ref 11.5–15.5)
MAGNESIUM SERPL-MCNC: 2.3 MG/DL — SIGNIFICANT CHANGE UP (ref 1.6–2.6)
MCHC RBC-ENTMCNC: 27.2 PG — SIGNIFICANT CHANGE UP (ref 27–34)
MCHC RBC-ENTMCNC: 31.3 GM/DL — LOW (ref 32–36)
MCV RBC AUTO: 86.7 FL — SIGNIFICANT CHANGE UP (ref 80–100)
METHOD TYPE: SIGNIFICANT CHANGE UP
METHOD TYPE: SIGNIFICANT CHANGE UP
NRBC # BLD: 0 /100 WBCS — SIGNIFICANT CHANGE UP
NRBC # FLD: 0 K/UL — SIGNIFICANT CHANGE UP
PHOSPHATE SERPL-MCNC: 4.1 MG/DL — SIGNIFICANT CHANGE UP (ref 2.5–4.5)
PLATELET # BLD AUTO: 229 K/UL — SIGNIFICANT CHANGE UP (ref 150–400)
POTASSIUM SERPL-MCNC: 4.6 MMOL/L — SIGNIFICANT CHANGE UP (ref 3.5–5.3)
POTASSIUM SERPL-SCNC: 4.6 MMOL/L — SIGNIFICANT CHANGE UP (ref 3.5–5.3)
PROT SERPL-MCNC: 7.3 G/DL — SIGNIFICANT CHANGE UP (ref 6–8.3)
RBC # BLD: 3.83 M/UL — SIGNIFICANT CHANGE UP (ref 3.8–5.2)
RBC # FLD: 17 % — HIGH (ref 10.3–14.5)
SODIUM SERPL-SCNC: 138 MMOL/L — SIGNIFICANT CHANGE UP (ref 135–145)
WBC # BLD: 11.94 K/UL — HIGH (ref 3.8–10.5)
WBC # FLD AUTO: 11.94 K/UL — HIGH (ref 3.8–10.5)

## 2022-03-02 PROCEDURE — 99222 1ST HOSP IP/OBS MODERATE 55: CPT

## 2022-03-02 PROCEDURE — 74230 X-RAY XM SWLNG FUNCJ C+: CPT | Mod: 26

## 2022-03-02 RX ORDER — VANCOMYCIN HCL 1 G
1000 VIAL (EA) INTRAVENOUS ONCE
Refills: 0 | Status: DISCONTINUED | OUTPATIENT
Start: 2022-03-02 | End: 2022-03-02

## 2022-03-02 RX ORDER — VANCOMYCIN HCL 1 G
500 VIAL (EA) INTRAVENOUS
Refills: 0 | Status: DISCONTINUED | OUTPATIENT
Start: 2022-03-02 | End: 2022-03-02

## 2022-03-02 RX ADMIN — CHLORHEXIDINE GLUCONATE 1 APPLICATION(S): 213 SOLUTION TOPICAL at 12:46

## 2022-03-02 RX ADMIN — HYDROMORPHONE HYDROCHLORIDE 0.25 MILLIGRAM(S): 2 INJECTION INTRAMUSCULAR; INTRAVENOUS; SUBCUTANEOUS at 23:30

## 2022-03-02 RX ADMIN — Medication 1 TABLET(S): at 12:17

## 2022-03-02 RX ADMIN — HYDROMORPHONE HYDROCHLORIDE 0.25 MILLIGRAM(S): 2 INJECTION INTRAMUSCULAR; INTRAVENOUS; SUBCUTANEOUS at 10:53

## 2022-03-02 RX ADMIN — ATORVASTATIN CALCIUM 40 MILLIGRAM(S): 80 TABLET, FILM COATED ORAL at 21:18

## 2022-03-02 RX ADMIN — HYDROMORPHONE HYDROCHLORIDE 0.25 MILLIGRAM(S): 2 INJECTION INTRAMUSCULAR; INTRAVENOUS; SUBCUTANEOUS at 23:08

## 2022-03-02 RX ADMIN — Medication 1 APPLICATION(S): at 12:17

## 2022-03-02 RX ADMIN — Medication 81 MILLIGRAM(S): at 12:17

## 2022-03-02 RX ADMIN — HYDROMORPHONE HYDROCHLORIDE 0.25 MILLIGRAM(S): 2 INJECTION INTRAMUSCULAR; INTRAVENOUS; SUBCUTANEOUS at 11:08

## 2022-03-02 RX ADMIN — HYDROMORPHONE HYDROCHLORIDE 0.25 MILLIGRAM(S): 2 INJECTION INTRAMUSCULAR; INTRAVENOUS; SUBCUTANEOUS at 01:00

## 2022-03-02 RX ADMIN — CLOPIDOGREL BISULFATE 75 MILLIGRAM(S): 75 TABLET, FILM COATED ORAL at 12:16

## 2022-03-02 RX ADMIN — PIPERACILLIN AND TAZOBACTAM 25 GRAM(S): 4; .5 INJECTION, POWDER, LYOPHILIZED, FOR SOLUTION INTRAVENOUS at 05:39

## 2022-03-02 RX ADMIN — HYDROMORPHONE HYDROCHLORIDE 0.25 MILLIGRAM(S): 2 INJECTION INTRAMUSCULAR; INTRAVENOUS; SUBCUTANEOUS at 00:27

## 2022-03-02 RX ADMIN — PIPERACILLIN AND TAZOBACTAM 25 GRAM(S): 4; .5 INJECTION, POWDER, LYOPHILIZED, FOR SOLUTION INTRAVENOUS at 17:59

## 2022-03-02 NOTE — PROGRESS NOTE ADULT - SUBJECTIVE AND OBJECTIVE BOX
Patient is a 61y old  Female who presents with a chief complaint of L foot pain/discoloration (02 Mar 2022 10:26)       INTERVAL HPI/OVERNIGHT EVENTS:  Patient seen and evaluated at bedside.  Pt is resting comfortable in NAD. Denies N/V/F/C.  Pain rated at 5/10    Allergies    No Known Allergies    Intolerances        Vital Signs Last 24 Hrs  T(C): 37.3 (02 Mar 2022 05:30), Max: 37.3 (02 Mar 2022 05:30)  T(F): 99.2 (02 Mar 2022 05:30), Max: 99.2 (02 Mar 2022 05:30)  HR: 70 (02 Mar 2022 05:30) (70 - 82)  BP: 133/63 (02 Mar 2022 05:30) (131/62 - 134/62)  BP(mean): --  RR: 15 (02 Mar 2022 05:30) (15 - 18)  SpO2: 100% (02 Mar 2022 05:30) (98% - 100%)    LABS:                        10.4   11.94 )-----------( 229      ( 02 Mar 2022 07:11 )             33.2     03-02    138  |  95<L>  |  38<H>  ----------------------------<  86  4.6   |  26  |  7.97<H>    Ca    9.3      02 Mar 2022 07:11  Phos  4.1     03-02  Mg     2.30     03-02    TPro  7.3  /  Alb  3.7  /  TBili  0.2  /  DBili  x   /  AST  13  /  ALT  5   /  AlkPhos  55  03-02    PT/INR - ( 28 Feb 2022 16:55 )   PT: 12.0 sec;   INR: 1.03 ratio         PTT - ( 28 Feb 2022 16:55 )  PTT:31.0 sec    CAPILLARY BLOOD GLUCOSE          Lower Extremity Physical Exam:    Vascular: DP/PT 1/4 R,  DP/PT 0/4 L, CFT <3 seconds B/L except left 3rd digit absent CFT, Temperature gradient warm to warm L, warm to cool R, resolving L foot edema  Neuro: Epicritic sensation diminished to the level of forefoot B/L  Musculoskeletal/Ortho: left partial 5th ray resection healed, tenderness to palpation to left 3rd   Skin: right distal hallux eschar, today with boggyness to the medial aspect, and scant purulence, no malodor, no erythema, no tracking  s/p bedside L foot I&D 2/28/22: left foot 3rd digit gangrene to PIPJ, resolvingerythema to midfoot, persistent edema to forefoot, no fluctuance, no drainage, no malodor, etiology 2/2 ischemia         RADIOLOGY & ADDITIONAL TESTS:      Patient name: Madelaine Boogie  Date of test: 3/1/2022  MR#: [id number]  [facility] #: [id number]    Location: [facility] Physician(s): , Sid Cutler MD  Interpreted by: Reji Zelaya MD, [facility], [facility], [facility]  Tech: Stephanie Galeana ESEQUIEL  Type of Test: LE Arterial:MARTA/Segm.  ------------------------------------------------------------------------  Procedure: Segmental Pressure/Waveform - complete  noninvasive physiologic studies of the bilateral lower  extremity arteries.  Indications: Atherosclerosis of native arteries of  extremities with intermittent claudication, bilateral legs  (I70.213)  ------------------------------------------------------------------------  PRESSURE (mm Hg):  ------------------------------------------------------------------------  RIGHT (BP):  Brachial: 139  High Thigh:  Low Thigh:  Calf:  Ankle-PT:  Ankle-DP:  Greate Toe:  MARTA:  ------------------------------------------------------------------------  LEFT (BP):  Brachial: 129  High Thigh:  Low Thigh:  Calf:  Ankle-PT:  Ankle-DP:  Greate Toe:  MARTA:  ------------------------------------------------------------------------  WAVEFORM:  ------------------------------------------------------------------------  RIGHT:  CFA:  Popliteal:  Ankle-PT:  Ankle-DP:  ------------------------------------------------------------------------  LEFT:  CFA:  Popliteal:  Ankle-PT:  Ankle-DP:  ------------------------------------------------------------------------  PSA/AVF:  ------------------------------------------------------------------------  RIGHT:  Pseudoaneurysm:  A-V fistula:  TBI:  ------------------------------------------------------------------------  LEFT:  Pseudoaneurysm:  A-V fistula:  TBI:  ------------------------------------------------------------------------  Right Findings: The ankle-brachial index (MARTA) on the  right was not accurately assessed because of  non-compressible (calcific) vessels.  Pulse volume recording (PVR) waveforms are triphasic with  normal amplitudes at the right thigh, calf, and ankle  segments.  Waveforms are severely diminished in amplitude  at the metatarsal segment, and flat at the right digits.  Segmental pressures were not accurately assessed  throughout the right lower extremity due to  non-compressible (calcific) vessels.  Left Findings: The ankle-brachial index (MARTA) on the left  was not accurately assessed because of non-compressible  (calcific) vessels.  Pulse volume recording (PVR) waveforms are triphasic with  normal amplitudes at the left thigh, calf, and ankle  segments.  Waveforms are moderately diminished in  amplitude at the metatarsal segment, and flat at the left  digits.  Segmental pressures were not accurately assessed  throughout the left lower extremity due to  non-compressible (calcific) vessels.  ------------------------------------------------------------------------  Summary/Impressions:  1.  Right AMRTA was not accurately assessed because of  non-compressible (calcific) vessels.  Nearly flat  metatarsal and flat right digit waveforms suggest the  presence of severe small vessel arterial disease in the  right foot.  No change in baseline waveforms in the right lower  extremity when compared to the prior study performed on  01/13/22.  2.  Left MARTA was not accurately assessed because of  non-compressible (calcific) vessels.  Flat left digit  waveforms suggest the presence of severe small vessel  arterial dsiease in the left foot.  ------------------------------------------------------------------------  Confirmed on  3/1/2022 - 11:56 AM by Reji Zelaya MD, [facility],  [facility], [facility]  By signing this report, the attending physician certifies  that he or she has personally supervised and interpreted  the vascular study and has reviewed and or edited and  agrees with the written comments contained within the  report.

## 2022-03-02 NOTE — CONSULT NOTE ADULT - ASSESSMENT
61-year-old female with ESRD on HD MWF via right groin AVF, HLD and PAD presenting with L sided foot pain, ongoing since Nov 2021, becoming more severe in the past 2 weeks.     Overall diabetic foot ulcer, gangrene, cellulitis and abscess, leucocytosis, positive cx finding, concern for underlying OM. Elevated sed rate.     PLAN:   c/w zosyn  MRSA PCR negative, stopped vanco  follow up final cx and sensitivity from abscess  trend cbc   vascular eval pending  c/w wound care   podiatry on board, plan for amputation based on vascular input.     Plan discussed with Medicine Attending and podiatry     Devin Alex  Please contact through MS Teams   If no response or past 5 pm/weekend call [phone].

## 2022-03-02 NOTE — PROGRESS NOTE ADULT - SUBJECTIVE AND OBJECTIVE BOX
Date of Service  : 03-02-22     INTERVAL HPI/OVERNIGHT EVENTS: I feel fine.   Vital Signs Last 24 Hrs  T(C): 36.9 (02 Mar 2022 23:05), Max: 37.3 (02 Mar 2022 05:30)  T(F): 98.4 (02 Mar 2022 23:05), Max: 99.2 (02 Mar 2022 05:30)  HR: 76 (02 Mar 2022 23:05) (68 - 79)  BP: 132/52 (02 Mar 2022 23:05) (126/53 - 140/54)  BP(mean): --  RR: 18 (02 Mar 2022 23:05) (15 - 18)  SpO2: 100% (02 Mar 2022 23:05) (99% - 100%)  I&O's Summary    MEDICATIONS  (STANDING):  aspirin  chewable 81 milliGRAM(s) Oral daily  atorvastatin 40 milliGRAM(s) Oral at bedtime  cadexomer iodine 0.9% Gel 1 Application(s) Topical daily  chlorhexidine 2% Cloths 1 Application(s) Topical daily  clopidogrel Tablet 75 milliGRAM(s) Oral daily  epoetin niesha-epbx (RETACRIT) Injectable 8000 Unit(s) IV Push <User Schedule>  Nephro-george 1 Tablet(s) Oral daily  piperacillin/tazobactam IVPB.. 3.375 Gram(s) IV Intermittent every 12 hours  Velphoro (sucroferric oxyhydroxide) 3 Tablet(s)   Oral three times a day    MEDICATIONS  (PRN):  acetaminophen     Tablet .. 650 milliGRAM(s) Oral every 6 hours PRN Mild Pain (1 - 3), Moderate Pain (4 - 6)  HYDROmorphone  Injectable 0.25 milliGRAM(s) IV Push every 6 hours PRN Severe Pain (7 - 10)    LABS:                        10.4   11.94 )-----------( 229      ( 02 Mar 2022 07:11 )             33.2     03-02    138  |  95<L>  |  38<H>  ----------------------------<  86  4.6   |  26  |  7.97<H>    Ca    9.3      02 Mar 2022 07:11  Phos  4.1     03-02  Mg     2.30     03-02    TPro  7.3  /  Alb  3.7  /  TBili  0.2  /  DBili  x   /  AST  13  /  ALT  5   /  AlkPhos  55  03-02        CAPILLARY BLOOD GLUCOSE              REVIEW OF SYSTEMS:  CONSTITUTIONAL: No fever, weight loss, or fatigue  EYES: No eye pain, visual disturbances, or discharge  ENMT:  No difficulty hearing, tinnitus, vertigo; No sinus or throat pain  NECK: No pain or stiffness  RESPIRATORY: No cough, wheezing, chills or hemoptysis; No shortness of breath  CARDIOVASCULAR: No chest pain, palpitations, dizziness, or leg swelling  GASTROINTESTINAL: No abdominal or epigastric pain. No nausea, vomiting, or hematemesis; No diarrhea or constipation. No melena or hematochezia.  GENITOURINARY: No dysuria, frequency, hematuria, or incontinence  NEUROLOGICAL: No headaches, memory loss, loss of strength, numbness, or tremors    Consultant(s) Notes Reviewed:  [x ] YES  [ ] NO    PHYSICAL EXAM:  GENERAL: NAD, well-groomed, well-developed, not in any distress ,  HEAD:  Atraumatic, Normocephalic  EYES: EOMI, PERRLA, conjunctiva and sclera clear  ENMT: No tonsillar erythema, exudates, or enlargement; Moist mucous membranes, Good dentition, No lesions  NECK: Supple, No JVD, Normal thyroid  NERVOUS SYSTEM:  Alert & Oriented X3, No focal deficit   CHEST/LUNG: Good air entry bilateral with no  rales, rhonchi, wheezing, or rubs  HEART: Regular rate and rhythm; No murmurs, rubs, or gallops  ABDOMEN: Soft, Nontender, Nondistended; Bowel sounds present  EXTREMITIES:  Feet dressed .    Care Discussed with Consultants/Other Providers [ x] YES  [ ] NO

## 2022-03-02 NOTE — SWALLOW VFSS/MBS ASSESSMENT ADULT - DIAGNOSTIC IMPRESSIONS
1) Functional oral stage of swallow for regular solids, puree, moderately thick liquids, mildly thick liquids, and thin liquids marked by adequate oral containment, adequate mastication and manipulation, adequate bolus control, adequate tongue to palate seal, and timely posterior transit/transfer. Adequate oral clearance noted.   2) Functional pharyngeal phase of swallow for regular solids, puree, moderately thick liquids, and mildly thick liquids marked by a timely pharyngeal swallow trigger, adequate tongue base propulsion, hyolaryngeal excursion, epiglottic deflection, and pharyngeal constriction. Patient with adequate pharyngeal clearance following trials. No penetration or aspiration viewed for these consistencies.   3) Moderate pharyngeal dysphagia for thin liquids marked by delayed initiation of the pharyngeal swallow, reduced hyolaryngeal elevation, reduced tongue base propulsion, reduced epiglottic deflection, and adequate pharyngeal constriction. Patient with incomplete laryngeal vestibular closure resulting in trace laryngeal penetration during the swallow for thin liquids via large cup sips (self-administered), with full retrieval. When presented with single cup sip, trace penetration with full retrieval continues to be noted with adequate airway protection maintained. No aspiration noted across all consistencies.   4) Patient presented with barium tablet at which time the tablet was noted to course through the hyolaryngeal segment without hold up. Due to patient positioning, unable to present esophageal phase. Please note, this not a formal assessment of the esophagus.

## 2022-03-02 NOTE — PROGRESS NOTE ADULT - ASSESSMENT
61 Yr old female ESRD (Mn/Wed/Fr.) well known to me from [facility], Right groin AVF, DM, PVD, now with severe left foot pain. Renal following for ESRD Mx    End Stage Renal Disease on Dialysis MWF  HD unit [facility]  K, vol- ok  not in chf    Due for HD tomorrow for preOR (for 3/4/22) renal optimization   renal restrictions in diet.   dose all meds for ESRD  monitor BMP  Anemia of CKD- Hb < goal. c/w epo 8k tiw w/hd  HTN,, controlled  Left foot infection, gangrene- Mx per Podiatry. s/p vanco , on zosyn. dose abxs for ESRD. monitor vanco level   s/p bedside L foot I&D 2/28/22  -left foot wound culture pending   -MARTA/PVR pending  -pod plan left foot partial 3rd ray resection pending MARTA/vasc recs -as per Podiatry  plan for OR Friday      For any question, call:  Cell # [phone]  Pager # [phone]  Callback # [phone]

## 2022-03-02 NOTE — CONSULT NOTE ADULT - SUBJECTIVE AND OBJECTIVE BOX
Patient is a 61y old  Female who presents with a chief complaint of L foot pain/discoloration (02 Mar 2022 11:04)      HPI:  61-year-old female with ESRD on HD MWF via right groin AVF, HLD and PAD presenting with L sided foot pain, ongoing since Nov 2021, becoming more severe in the past 2 weeks. Pain is stabbing in quality, 10/10 and waxing/waning in severity, non-radiating, worse with touch/ambulation and laying down. No associated erythema, numbness, paresthesias, fevers or chills. Patient had some black discoloration over the foot.  Above reviewed:   pt denies chills, vomited once on arrival but besides the pain in the foot denies any additional symptoms.       PAST MEDICAL & SURGICAL HISTORY:  Heart failure    HLD (hyperlipidemia)    Glaucoma    Cataract    CKD (chronic kidney disease), stage IV    ESRD (end stage renal disease) on dialysis    PAD (peripheral artery disease)    Blind right eye    Acute osteomyelitis of toe of right foot  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    S/P arteriovenous (AV) fistula creation    Hemodialysis access, AV graft        REVIEW OF SYSTEMS    General: Denies any chills.     Skin: No rash  	  Ophthalmologic: Denies any discharge, redness.  	  ENT: No nasal congestion or throat pain.     Respiratory and Thorax: No cough, sputum. Denies shortness of breath.  	  Cardiovascular: No chest pain,    Gastrointestinal: No nausea, abdominal pain or diarrhea.    Genitourinary: No flank pain.    Musculoskeletal: No joint swelling    Neurological: No extremity weakness.    Psychiatric: No hallucinations	    Extremity: per hpi     Endocrine: No polyuria or polydipsia     Allergic/Immunologic:	No hives      Social history:  lives with spouse, no smoking.       FAMILY HISTORY:  No pertinent family history of DM in first degree relatives        Allergies  No Known Allergies      Antimicrobials:  piperacillin/tazobactam IVPB.. 3.375 Gram(s) IV Intermittent every 12 hours      Vital Signs Last 24 Hrs  T(C): 37 (02 Mar 2022 10:38), Max: 37.3 (02 Mar 2022 05:30)  T(F): 98.6 (02 Mar 2022 10:38), Max: 99.2 (02 Mar 2022 05:30)  HR: 78 (02 Mar 2022 10:38) (70 - 78)  BP: 126/53 (02 Mar 2022 10:38) (126/53 - 134/62)  BP(mean): --  RR: 18 (02 Mar 2022 10:38) (15 - 18)  SpO2: 100% (02 Mar 2022 10:38) (100% - 100%)      PHYSICAL EXAM: Patient in no acute distress.    Constitutional: Comfortable. Awake and alert    Eyes: No discharge or conjunctival injection    ENT: No thrush. No pharyngeal erythema.    Neck: Supple,     Respiratory: + air entry bilaterally.    Cardiovascular: S1 S2 wnl,     Gastrointestinal: Soft BS(+) no tenderness, non distended.    Genitourinary: No salcido     Extremities: No edema.    Vascular: rt groin av graft     Neurological: No grossly focal deficits.    Skin: No rash, right distal toe eschar, small area of open wound, left foot 3rd digit gangrene with incision in the center, + erythema to midfoot, + edema.    Musculoskeletal: No joint swelling.    Psychiatric: Affect normal.                              10.4   11.94 )-----------( 229      ( 02 Mar 2022 07:11 )             33.2       03-02    138  |  95<L>  |  38<H>  ----------------------------<  86  4.6   |  26  |  7.97<H>    Ca    9.3      02 Mar 2022 07:11  Phos  4.1     03-02  Mg     2.30     03-02    TPro  7.3  /  Alb  3.7  /  TBili  0.2  /  DBili  x   /  AST  13  /  ALT  5   /  AlkPhos  55  03-02          Culture - Abscess with Gram Stain (collected 28 Feb 2022 18:42)  Source: .Abscess left foot wound culture  Preliminary Report (01 Mar 2022 18:21):    Moderate Escherichia coli    Few Staphylococcus aureus        Radiology: Imaging reviewed and visualized personally except doppler. [ x]    < from: Xray Foot AP + Lateral + Oblique, Left (02.28.22 @ 16:39) >  IMPRESSION:  Soft tissue edema throughout the plantar and dorsal left foot with   lucencies within the subcutaneous dorsal left foot superficial to the MTP   joints suspicious for soft tissue gas, limited assessment due to   overlying bandage material.    Lucency within the distal aspect of the left fourth metatarsal possibly   secondary to osteomyelitis. Further evaluation can be obtained with MR   with IV contrast if clinically indicated.      < from: Xray Chest 1 View AP/PA (02.28.22 @ 16:39) >  Impression:  No focal consolidation.    Right lower lung linear atelectasis.      < from: US Duplex Venous Lower Ext Complete, Bilateral (03.01.22 @ 08:57) >  IMPRESSION:  No evidence of deep venous thrombosis in either lower extremity.

## 2022-03-02 NOTE — PROGRESS NOTE ADULT - ASSESSMENT
61-year-old female with ESRD on HD MWF via right groin AVF, HLD and PAD presenting with L sided foot pain, found to have likely OM     Problem/Plan - 1:  ·  Problem: Foot osteomyelitis.   ·  Plan: ID help appreciated and now on Zosyn.   appreciate podiatry recs and planning surgery pending vascular input.   MARTA/PVR noted. Severe Small vessel disease.   vascular surgery help appreciated.  s/p I&D by podiatry in ED, plan for left foot partial 3rd ray resection pending MARTA/vasc recs   rectal temp pending  elevated temp earlier, s/p acetaminophen, check rectal temp  ESR/CK as above     Problem/Plan - 2:  ·  Problem: ESRD on dialysis.   ·  Plan: appreciate renal recs  dose meds for intermittent HD  renal restricted diet  c/w phosphate binders     Problem/Plan - 3:  ·  Problem: PAD (peripheral artery disease).   ·  Plan: c/w DAPT and statin  f/u VSx recs  MARTA/PVR noted.      Problem/Plan - 4:  ·  Problem: Lower extremity edema.   ·  Plan: b/l LE edema   LE dopplers negative for  VTE.     Problem/Plan - 5:  ·  Problem: Need for prophylactic measure.   ·  Plan: pharmacologic DVT ppx pending VSx recs/AM podiatry recs (?plan for OR)      Medically optimized for surgery .

## 2022-03-02 NOTE — PROGRESS NOTE ADULT - ASSESSMENT
62 yo f with left foot cellulitis  -pt seen and evaluated  -T 99.2, WBC 11.94, ESR 90, CRP 9.5  -LFXR: Soft tissue edema throughout the plantar and dorsal left foot with lucencies within the subcutaneous dorsal left foot superficial to the MTP joints suspicious for soft tissue gas, pt is /p L foot I&D  - s/p bedside L foot I&D 2/28/22: left foot 3rd digit gangrene to PIPJ, resolving erythema to midfoot, persistent edema to forefoot, no fluctuance, no drainage, no malodor, etiology 2/2 ischemia   - right distal hallux eschar, today with boggyness to the medial aspect, and scant purulence, no malodor, no erythema, no tracking  - Verbal consent obtained for superficial wound debridement   - Using sterile suture removal kit medial aspect of R foot hallux eschar removed to the level of subq and not beyond, no further purulence expressed, no probe to bone, no malodor. Lateral aspect of eschar is dry and well adhered.   - Pt tolerated procedure well   -left foot wound culture growing E coli and few Staph Aureus prelim  -continue vanco, zosyn  -MARTA/PVR non compressible vessels with flat waveforms bilateral  -pod plan left foot partial 3rd ray resection pending vasc recs  - Pt tentatively booked for OR on Friday 3/4 @1PM  -please document medical optimization for surgery under sedation with local anaesthesia  -discussed with attending   60 yo f with left foot cellulitis  -pt seen and evaluated  -T 99.2, WBC 11.94, ESR 90, CRP 9.5  -LFXR: Soft tissue edema throughout the plantar and dorsal left foot with lucencies within the subcutaneous dorsal left foot superficial to the MTP joints suspicious for soft tissue gas, pt is /p L foot I&D  - s/p bedside L foot I&D 2/28/22: left foot 3rd digit gangrene to PIPJ, resolving erythema to midfoot, persistent edema to forefoot, no fluctuance, no drainage, no malodor, etiology 2/2 ischemia   - right distal hallux eschar, today with boggyness to the medial aspect, and scant purulence, no malodor, no erythema, no tracking  - Verbal consent obtained for superficial wound debridement   - Using sterile suture removal kit medial aspect of R foot hallux eschar removed to the level of subq and not beyond, no further purulence expressed, no probe to bone, no malodor. Lateral aspect of eschar is dry and well adhered.   - Pt tolerated procedure well   -left foot wound culture growing E coli and few Staph Aureus prelim  -continue vanco, zosyn  -MARTA/PVR non compressible vessels with flat waveforms bilateral  - R foot MRI w/o contrast ordered to r/o abscess   -pod plan left foot partial [facility]  - Pt tentatively booked for OR on Friday 3/4 @1PM  -please document medical optimization for surgery under sedation with local anaesthesia  -discussed with attending   62 yo f with left foot cellulitis  -pt seen and evaluated  -T 99.2, WBC 11.94, ESR 90, CRP 9.5  -LFXR: Soft tissue edema throughout the plantar and dorsal left foot with lucencies within the subcutaneous dorsal left foot superficial to the MTP joints suspicious for soft tissue gas, pt is /p L foot I&D  - s/p bedside L foot I&D 2/28/22: left foot 3rd digit gangrene to PIPJ, resolving erythema to midfoot, persistent edema to forefoot, no fluctuance, no drainage, no malodor, etiology 2/2 ischemia   - right distal hallux eschar, today with boggyness to the medial aspect, and scant purulence, no malodor, no erythema, no tracking  - Verbal consent obtained for superficial wound debridement   - Using sterile suture removal kit medial aspect of R foot hallux eschar removed to the level of subq and not beyond, no further purulence expressed, no probe to bone, no malodor. Lateral aspect of eschar is dry and well adhered.   - Pt tolerated procedure well   -left foot wound culture growing E coli and few Staph Aureus prelim  -continue vanco, zosyn  -MARTA/PVR non compressible vessels with flat waveforms bilateral  - L foot MRI w/o contrast ordered to r/o abscess   -pod plan left foot partial 3rd ray resection pending [facility] recs + MRI  - Pt tentatively booked for OR on Friday 3/4 @1PM  -please document medical optimization for surgery under sedation with local anaesthesia  -discussed with attending   60 yo f with left foot cellulitis  -pt seen and evaluated  -T 99.2, WBC 11.94, ESR 90, CRP 9.5  -LFXR: Soft tissue edema throughout the plantar and dorsal left foot with lucencies within the subcutaneous dorsal left foot superficial to the MTP joints suspicious for soft tissue gas, pt is /p L foot I&D  - s/p bedside L foot I&D 2/28/22: left foot 3rd digit gangrene to PIPJ, resolving erythema to midfoot, persistent edema to forefoot, no fluctuance, no drainage, no malodor, etiology 2/2 ischemia   - right distal hallux eschar, today with boggyness to the medial aspect, and scant purulence, no malodor, no erythema, no tracking  - Verbal consent obtained for superficial wound debridement   - Using sterile suture removal kit medial aspect of R foot hallux eschar removed to the level of subq and not beyond, no further purulence expressed, no probe to bone, no malodor. Lateral aspect of eschar is dry and well adhered.   - Pt tolerated procedure well   -left foot wound culture growing E coli and few Staph Aureus prelim  -continue vanco, zosyn  -MARTA/PVR non compressible vessels with flat waveforms bilateral  - L foot MRI w/o contrast ordered to r/o abscess   -pod plan left foot partial 3rd ray resection pending [facility] recs + MRI  - Pt tentatively booked for OR on Friday 3/4 @1PM  -please document medical/cards optimization for surgery under sedation with local anaesthesia  -discussed with attending   60 yo f with left foot cellulitis  -pt seen and evaluated  -T 99.2, WBC 11.94, ESR 90, CRP 9.5  -LFXR: Soft tissue edema throughout the plantar and dorsal left foot with lucencies within the subcutaneous dorsal left foot superficial to the MTP joints suspicious for soft tissue gas, pt is /p L foot I&D  - s/p bedside L foot I&D 2/28/22: left foot 3rd digit gangrene to PIPJ, resolving erythema to midfoot, persistent edema to forefoot, no fluctuance, no drainage, no malodor, etiology 2/2 ischemia   - right distal hallux eschar, today with boggyness to the medial aspect, and scant purulence, no malodor, no erythema, no tracking  - Verbal consent obtained for superficial wound debridement   - Using sterile suture removal kit medial aspect of R foot hallux eschar removed to the level of subq and not beyond, no further purulence expressed, no probe to bone, no malodor. Lateral aspect of eschar is dry and well adhered.   - Pt tolerated procedure well   -left foot wound culture growing E coli and few Staph Aureus prelim  -continue vanco, zosyn  -MARTA/PVR non compressible vessels with flat waveforms bilateral  - L foot MRI w/o contrast ordered to r/o abscess   -pod plan left foot partial 3rd ray resection  and R foot [facility]  - Pt tentatively booked for OR on Friday 3/4 @1PM  -please document medical/cards optimization for surgery under sedation with local anaesthesia  -discussed with attending

## 2022-03-02 NOTE — SWALLOW VFSS/MBS ASSESSMENT ADULT - ADDITIONAL RECOMMENDATIONS
Patient will benefit from a course of swallow therapy pending discharge plans (i.e., homecare vs rehab vs Outpatient at [facility] [phone]).

## 2022-03-02 NOTE — SWALLOW VFSS/MBS ASSESSMENT ADULT - COMMENTS
As per HPI, pt is a "61-year-old female with ESRD on HD MWF via right groin AVF, HLD and PAD presenting with L sided foot pain, ongoing since Nov 2021, becoming more severe in the past 2 weeks. Pain is stabbing in quality, 10/10 and waxing/waning in severity, non-radiating, worse with touch/ambulation and laying down. No associated erythema, numbness, paresthesias, fevers or chills. Patient had some black discoloration over the foot." Per Nephrology note, pt with Left foot infection, gangrene, End Stage Renal Disease on Dialysis, and HTN".     WBC is elevated. CXR 2/28/22 revealed "No focal consolidation. Right lower lung linear atelectasis."    Patient seen seated upright at [facility] Radiology suite for Cinesophagram this AM. Patient able to follow directions and answer questions appropriately. Patient was cooperative and accepted all PO trials. Patient reported globus sensation when taking pills and eating solids. Patient known to this department as patient was seen on 3/1/22 for bedside swallow assessment, at which time puree with thin liquids was recommended with Cinesophagram (See report for details).

## 2022-03-02 NOTE — PROGRESS NOTE ADULT - SUBJECTIVE AND OBJECTIVE BOX
[facility] - TIFFANY Bazzi / TIFFANY Shea / APARNA Vanegas/ TIFFANY Mcfarlane/ TIFFANY Madden/ MASSIEL Perkins / RAINER Potts / ESTEPHANIE Griffith  ---------------------------------------------------------------------------------------------------------------  seen and examined today for ESRD  Interval : NAD  VITALS:  T(F): 99.2 (03-02-22 @ 05:30), Max: 99.2 (03-02-22 @ 05:30)  HR: 70 (03-02-22 @ 05:30)  BP: 133/63 (03-02-22 @ 05:30)  RR: 15 (03-02-22 @ 05:30)  SpO2: 100% (03-02-22 @ 05:30)  Wt(kg): --    Physical Exam :-  Constitutional: NAD  Neck: Supple.  Respiratory: Bilateral equal breath sounds,  Cardiovascular: S1, S2 normal,  Gastrointestinal: Bowel Sounds present, soft, non tender.  Extremities: Clean dressings B/L feet  Neurological: Alert and Oriented x 3, no focal deficits  Psychiatric: Normal mood, normal affect  Data:-  Allergies :   No Known Allergies    Hospital Medications:   MEDICATIONS  (STANDING):  aspirin  chewable 81 milliGRAM(s) Oral daily  atorvastatin 40 milliGRAM(s) Oral at bedtime  cadexomer iodine 0.9% Gel 1 Application(s) Topical daily  chlorhexidine 2% Cloths 1 Application(s) Topical daily  clopidogrel Tablet 75 milliGRAM(s) Oral daily  epoetin niesha-epbx (RETACRIT) Injectable 8000 Unit(s) IV Push <User Schedule>  Nephro-george 1 Tablet(s) Oral daily  piperacillin/tazobactam IVPB.. 3.375 Gram(s) IV Intermittent every 12 hours  Velphoro (sucroferric oxyhydroxide) 3 Tablet(s)   Oral three times a day    03-02    138  |  95<L>  |  38<H>  ----------------------------<  86  4.6   |  26  |  7.97<H>    Ca    9.3      02 Mar 2022 07:11  Phos  4.1     03-02  Mg     2.30     03-02    TPro  7.3  /  Alb  3.7  /  TBili  0.2  /  DBili      /  AST  13  /  ALT  5   /  AlkPhos  55  03-02    Creatinine Trend: 7.97 <--, 5.91 <--, 9.43 <--                        10.4   11.94 )-----------( 229      ( 02 Mar 2022 07:11 )             33.2

## 2022-03-02 NOTE — CONSULT NOTE ADULT - SUBJECTIVE AND OBJECTIVE BOX
Cardiovascular Disease Initial Evaluation    CHIEF COMPLAINT: L foot pain    HISTORY OF PRESENT ILLNESS: Ms. Boogie is a 61-year-old female with ESRD on HD MWF via right groin AVF, HLD and PAD with previous intervention presenting with L sided foot pain, ongoing since Nov 2021, becoming more severe in the past 2 weeks. Pain is stabbing in quality, 10/10 and waxing/waning in severity, non-radiating, worse with touch/ambulation and laying down. No associated erythema, numbness, paresthesias, fevers or chills. Patient had some black discoloration over the foot. Pt evaluated by podiatry and vascular who recommend surgical intervention. Cardio consulted for risk stratification. Ms. Boogie has no known history of CAD. Her activity is limited by her foot pain. She denies TEMPLETON, chest pain, palpitations, syncope or presyncope.       Allergies    No Known Allergies    Intolerances    	    MEDICATIONS:  aspirin  chewable 81 milliGRAM(s) Oral daily  clopidogrel Tablet 75 milliGRAM(s) Oral daily    piperacillin/tazobactam IVPB.. 3.375 Gram(s) IV Intermittent every 12 hours      acetaminophen     Tablet .. 650 milliGRAM(s) Oral every 6 hours PRN  HYDROmorphone  Injectable 0.25 milliGRAM(s) IV Push every 6 hours PRN      atorvastatin 40 milliGRAM(s) Oral at bedtime    cadexomer iodine 0.9% Gel 1 Application(s) Topical daily  chlorhexidine 2% Cloths 1 Application(s) Topical daily  epoetin niesha-epbx (RETACRIT) Injectable 8000 Unit(s) IV Push <User Schedule>  Nephro-george 1 Tablet(s) Oral daily      PAST MEDICAL & SURGICAL HISTORY:  Heart failure    HLD (hyperlipidemia)    Glaucoma    Cataract    CKD (chronic kidney disease), stage IV    ESRD (end stage renal disease) on dialysis    PAD (peripheral artery disease)    Blind right eye    Acute osteomyelitis of toe of right foot  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    S/P arteriovenous (AV) fistula creation    Hemodialysis access, AV graft        FAMILY HISTORY:  No pertinent family history in first degree relatives        SOCIAL HISTORY:    The patient is a nonsmoker       REVIEW OF SYSTEMS:  See HPI, otherwise complete 14 point review of systems negative    [x ] All others negative	  [ ] Unable to obtain    PHYSICAL EXAM:  T(C): 37 (03-02-22 @ 10:38), Max: 37.3 (03-02-22 @ 05:30)  HR: 78 (03-02-22 @ 10:38) (70 - 82)  BP: 126/53 (03-02-22 @ 10:38) (126/53 - 134/62)  RR: 18 (03-02-22 @ 10:38) (15 - 18)  SpO2: 100% (03-02-22 @ 10:38) (98% - 100%)  Wt(kg): --  I&O's Summary      Appearance: No Acute Distress; resting comfortably  HEENT:  Normal oral mucosa, PERRL, EOMI	  Cardiovascular: Normal S1 S2, No JVD, No murmurs/rubs/gallops  Respiratory: Normal respiratory effort; Lungs clear to auscultation bilaterally  Gastrointestinal:  Soft, Non-tender, + BS	  Skin: No rashes, No ecchymoses, No cyanosis	  Neurologic: Non-focal; no weakness  Extremities: No clubbing, cyanosis or edema  Vascular: Peripheral pulses palpable 2+ bilaterally  Psychiatry: A & O x 3, Mood & affect appropriate    Laboratory Data:	 	    CBC Full  -  ( 02 Mar 2022 07:11 )  WBC Count : 11.94 K/uL  Hemoglobin : 10.4 g/dL  Hematocrit : 33.2 %  Platelet Count - Automated : 229 K/uL  Mean Cell Volume : 86.7 fL  Mean Cell Hemoglobin : 27.2 pg  Mean Cell Hemoglobin Concentration : 31.3 gm/dL  Auto Neutrophil # : x  Auto Lymphocyte # : x  Auto Monocyte # : x  Auto Eosinophil # : x  Auto Basophil # : x  Auto Neutrophil % : x  Auto Lymphocyte % : x  Auto Monocyte % : x  Auto Eosinophil % : x  Auto Basophil % : x    03-02    138  |  95<L>  |  38<H>  ----------------------------<  86  4.6   |  26  |  7.97<H>  03-01    138  |  96<L>  |  27<H>  ----------------------------<  73  4.1   |  25  |  5.91<H>    Ca    9.3      02 Mar 2022 07:11  Ca    9.2      01 Mar 2022 07:59  Phos  4.1     03-02  Phos  3.3     03-01  Mg     2.30     03-02  Mg     2.20     03-01    TPro  7.3  /  Alb  3.7  /  TBili  0.2  /  DBili  x   /  AST  13  /  ALT  5   /  AlkPhos  55  03-02  TPro  7.0  /  Alb  3.8  /  TBili  0.2  /  DBili  x   /  AST  9   /  ALT  5   /  AlkPhos  60  03-01      proBNP:   Lipid Profile:   HgA1c:   TSH:       CARDIAC MARKERS:            Interpretation of Telemetry: 	    ECG:  	  RADIOLOGY:  OTHER: 	    PREVIOUS DIAGNOSTIC TESTING:    [x ] Echocardiogram: 2018: LVEF 67% with mild to moderate MR and MAC, dilated LA and mild pulm htn  [ ] Catheterization:  [ ] Stress Test:  	    Assessment: 61-year-old female with ESRD on HD MWF via right groin AVF, HLD and PAD with previous intervention presenting with L sided foot pain, ongoing since Nov 2021, becoming more severe in the past 2 weeks.    Plan of Care:    # L foot gangrene  - MARTA non-diagnostic secondary to heavy calcification  - Pods and vascular following  - Tentative plan for 3rd ray resection with podiatry  - Pt displays no signs of pre-operative coronary ischemia  - EKG shows sinus rhythm with no acute ischemic changes  - RCRI 1: Ms. Boogie is relatively low risk for moderate risk procedure  - Will further assess with TTE    #ESRD  - HD as per nephro    #PAD  - Continue ASA and plavix    #HLD  - Statin as ordered    #ACP (advance care planning)-  Advanced care planning was addressed. At this time will further assess patient with TTE prior to surgery. Risks, benefits and alternatives of medical treatment and procedures were discussed in detail and all questions were answered. 30 minutes spent addressing advance care plans.        72 minutes spent on total encounter; more than 50% of the visit was spent counseling and/or coordinating care by the attending physician.   	  Wisam Adams D.O.   Cardiovascular Diseases  [phone]     Cardiovascular Disease Initial Evaluation    CHIEF COMPLAINT: L foot pain    HISTORY OF PRESENT ILLNESS: Ms. Boogie is a 61-year-old female with ESRD on HD MWF via right groin AVF, HLD and PAD with previous intervention presenting with L sided foot pain, ongoing since Nov 2021, becoming more severe in the past 2 weeks. Pain is stabbing in quality, 10/10 and waxing/waning in severity, non-radiating, worse with touch/ambulation and laying down. No associated erythema, numbness, paresthesias, fevers or chills. Patient had some black discoloration over the foot. Pt evaluated by podiatry and vascular who recommend surgical intervention. Cardio consulted for risk stratification. Ms. Boogie has no known history of CAD. Her activity is limited by her foot pain. She denies TEMPLETON, chest pain, palpitations, syncope or presyncope.       Allergies    No Known Allergies    Intolerances    	    MEDICATIONS:  aspirin  chewable 81 milliGRAM(s) Oral daily  clopidogrel Tablet 75 milliGRAM(s) Oral daily    piperacillin/tazobactam IVPB.. 3.375 Gram(s) IV Intermittent every 12 hours      acetaminophen     Tablet .. 650 milliGRAM(s) Oral every 6 hours PRN  HYDROmorphone  Injectable 0.25 milliGRAM(s) IV Push every 6 hours PRN      atorvastatin 40 milliGRAM(s) Oral at bedtime    cadexomer iodine 0.9% Gel 1 Application(s) Topical daily  chlorhexidine 2% Cloths 1 Application(s) Topical daily  epoetin niesha-epbx (RETACRIT) Injectable 8000 Unit(s) IV Push <User Schedule>  Nephro-george 1 Tablet(s) Oral daily      PAST MEDICAL & SURGICAL HISTORY:  Heart failure    HLD (hyperlipidemia)    Glaucoma    Cataract    CKD (chronic kidney disease), stage IV    ESRD (end stage renal disease) on dialysis    PAD (peripheral artery disease)    Blind right eye    Acute osteomyelitis of toe of right foot  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    S/P arteriovenous (AV) fistula creation    Hemodialysis access, AV graft        FAMILY HISTORY:  No pertinent family history in first degree relatives        SOCIAL HISTORY:    The patient is a nonsmoker       REVIEW OF SYSTEMS:  See HPI, otherwise complete 14 point review of systems negative    [x ] All others negative	  [ ] Unable to obtain    PHYSICAL EXAM:  T(C): 37 (03-02-22 @ 10:38), Max: 37.3 (03-02-22 @ 05:30)  HR: 78 (03-02-22 @ 10:38) (70 - 82)  BP: 126/53 (03-02-22 @ 10:38) (126/53 - 134/62)  RR: 18 (03-02-22 @ 10:38) (15 - 18)  SpO2: 100% (03-02-22 @ 10:38) (98% - 100%)  Wt(kg): --  I&O's Summary      Appearance: No Acute Distress; resting comfortably  HEENT:  Normal oral mucosa, PERRL, EOMI	  Cardiovascular: Normal S1 S2, No JVD, No murmurs/rubs/gallops  Respiratory: Normal respiratory effort; Lungs clear to auscultation bilaterally  Gastrointestinal:  Soft, Non-tender, + BS	  Skin: No rashes, No ecchymoses, No cyanosis	  Neurologic: Non-focal; no weakness  Extremities: No clubbing, cyanosis or edema  Vascular: Peripheral pulses palpable 2+ bilaterally  Psychiatry: A & O x 3, Mood & affect appropriate    Laboratory Data:	 	    CBC Full  -  ( 02 Mar 2022 07:11 )  WBC Count : 11.94 K/uL  Hemoglobin : 10.4 g/dL  Hematocrit : 33.2 %  Platelet Count - Automated : 229 K/uL  Mean Cell Volume : 86.7 fL  Mean Cell Hemoglobin : 27.2 pg  Mean Cell Hemoglobin Concentration : 31.3 gm/dL  Auto Neutrophil # : x  Auto Lymphocyte # : x  Auto Monocyte # : x  Auto Eosinophil # : x  Auto Basophil # : x  Auto Neutrophil % : x  Auto Lymphocyte % : x  Auto Monocyte % : x  Auto Eosinophil % : x  Auto Basophil % : x    03-02    138  |  95<L>  |  38<H>  ----------------------------<  86  4.6   |  26  |  7.97<H>  03-01    138  |  96<L>  |  27<H>  ----------------------------<  73  4.1   |  25  |  5.91<H>    Ca    9.3      02 Mar 2022 07:11  Ca    9.2      01 Mar 2022 07:59  Phos  4.1     03-02  Phos  3.3     03-01  Mg     2.30     03-02  Mg     2.20     03-01    TPro  7.3  /  Alb  3.7  /  TBili  0.2  /  DBili  x   /  AST  13  /  ALT  5   /  AlkPhos  55  03-02  TPro  7.0  /  Alb  3.8  /  TBili  0.2  /  DBili  x   /  AST  9   /  ALT  5   /  AlkPhos  60  03-01      proBNP:   Lipid Profile:   HgA1c:   TSH:       CARDIAC MARKERS:            Interpretation of Telemetry: N/a    ECG:  	N/a  RADIOLOGY:  OTHER: 	    PREVIOUS DIAGNOSTIC TESTING:    [x ] Echocardiogram: 2018: LVEF 67% with mild to moderate MR and MAC, dilated LA and mild pulm htn  [ ] Catheterization:  [ ] Stress Test:  	    Assessment: 61-year-old female with ESRD on HD MWF via right groin AVF, HLD and PAD with previous intervention presenting with L sided foot pain, ongoing since Nov 2021, becoming more severe in the past 2 weeks.    Plan of Care:    # L foot gangrene  - MARTA non-diagnostic secondary to heavy calcification  - Pods and vascular following  - Tentative plan for 3rd ray resection with podiatry  - Pt displays no signs of pre-operative coronary ischemia  - Please obtain EKG  - RCRI 1: Ms. Boogie is relatively low risk for moderate risk procedure  - Will further assess with TTE    #ESRD  - HD as per nephro    #PAD  - Continue ASA and plavix    #HLD  - Statin as ordered    #ACP (advance care planning)-  Advanced care planning was addressed. At this time will further assess patient with TTE prior to surgery. Risks, benefits and alternatives of medical treatment and procedures were discussed in detail and all questions were answered. 30 minutes spent addressing advance care plans.        72 minutes spent on total encounter; more than 50% of the visit was spent counseling and/or coordinating care by the attending physician.   	  Wisam Adams D.O.   Cardiovascular Diseases  [phone]

## 2022-03-03 ENCOUNTER — TRANSCRIPTION ENCOUNTER (OUTPATIENT)
Age: 62
End: 2022-03-03

## 2022-03-03 LAB
ALBUMIN SERPL ELPH-MCNC: 3.8 G/DL — SIGNIFICANT CHANGE UP (ref 3.3–5)
ALP SERPL-CCNC: 58 U/L — SIGNIFICANT CHANGE UP (ref 40–120)
ALT FLD-CCNC: 13 U/L — SIGNIFICANT CHANGE UP (ref 4–33)
ANION GAP SERPL CALC-SCNC: 19 MMOL/L — HIGH (ref 7–14)
AST SERPL-CCNC: 17 U/L — SIGNIFICANT CHANGE UP (ref 4–32)
BILIRUB SERPL-MCNC: 0.3 MG/DL — SIGNIFICANT CHANGE UP (ref 0.2–1.2)
BUN SERPL-MCNC: 48 MG/DL — HIGH (ref 7–23)
CALCIUM SERPL-MCNC: 9.1 MG/DL — SIGNIFICANT CHANGE UP (ref 8.4–10.5)
CHLORIDE SERPL-SCNC: 92 MMOL/L — LOW (ref 98–107)
CO2 SERPL-SCNC: 24 MMOL/L — SIGNIFICANT CHANGE UP (ref 22–31)
CREAT SERPL-MCNC: 9.57 MG/DL — HIGH (ref 0.5–1.3)
EGFR: 4 ML/MIN/1.73M2 — LOW
GLUCOSE SERPL-MCNC: 85 MG/DL — SIGNIFICANT CHANGE UP (ref 70–99)
HCT VFR BLD CALC: 31.1 % — LOW (ref 34.5–45)
HGB BLD-MCNC: 9.4 G/DL — LOW (ref 11.5–15.5)
MAGNESIUM SERPL-MCNC: 2.5 MG/DL — SIGNIFICANT CHANGE UP (ref 1.6–2.6)
MCHC RBC-ENTMCNC: 26.3 PG — LOW (ref 27–34)
MCHC RBC-ENTMCNC: 30.2 GM/DL — LOW (ref 32–36)
MCV RBC AUTO: 86.9 FL — SIGNIFICANT CHANGE UP (ref 80–100)
NRBC # BLD: 0 /100 WBCS — SIGNIFICANT CHANGE UP
NRBC # FLD: 0 K/UL — SIGNIFICANT CHANGE UP
PHOSPHATE SERPL-MCNC: 4.9 MG/DL — HIGH (ref 2.5–4.5)
PLATELET # BLD AUTO: 234 K/UL — SIGNIFICANT CHANGE UP (ref 150–400)
POTASSIUM SERPL-MCNC: 4.6 MMOL/L — SIGNIFICANT CHANGE UP (ref 3.5–5.3)
POTASSIUM SERPL-SCNC: 4.6 MMOL/L — SIGNIFICANT CHANGE UP (ref 3.5–5.3)
PROT SERPL-MCNC: 7.4 G/DL — SIGNIFICANT CHANGE UP (ref 6–8.3)
RBC # BLD: 3.58 M/UL — LOW (ref 3.8–5.2)
RBC # FLD: 16.9 % — HIGH (ref 10.3–14.5)
SODIUM SERPL-SCNC: 135 MMOL/L — SIGNIFICANT CHANGE UP (ref 135–145)
VANCOMYCIN FLD-MCNC: 11.3 UG/ML — SIGNIFICANT CHANGE UP
WBC # BLD: 12.48 K/UL — HIGH (ref 3.8–10.5)
WBC # FLD AUTO: 12.48 K/UL — HIGH (ref 3.8–10.5)

## 2022-03-03 PROCEDURE — 73720 MRI LWR EXTREMITY W/O&W/DYE: CPT | Mod: 26,LT

## 2022-03-03 PROCEDURE — 93306 TTE W/DOPPLER COMPLETE: CPT | Mod: 26

## 2022-03-03 PROCEDURE — 93010 ELECTROCARDIOGRAM REPORT: CPT

## 2022-03-03 RX ORDER — LIDOCAINE HCL 20 MG/ML
20 VIAL (ML) INJECTION ONCE
Refills: 0 | Status: COMPLETED | OUTPATIENT
Start: 2022-03-03 | End: 2022-03-03

## 2022-03-03 RX ORDER — HYDROMORPHONE HYDROCHLORIDE 2 MG/ML
0.25 INJECTION INTRAMUSCULAR; INTRAVENOUS; SUBCUTANEOUS ONCE
Refills: 0 | Status: DISCONTINUED | OUTPATIENT
Start: 2022-03-03 | End: 2022-03-03

## 2022-03-03 RX ADMIN — CLOPIDOGREL BISULFATE 75 MILLIGRAM(S): 75 TABLET, FILM COATED ORAL at 12:33

## 2022-03-03 RX ADMIN — HYDROMORPHONE HYDROCHLORIDE 0.25 MILLIGRAM(S): 2 INJECTION INTRAMUSCULAR; INTRAVENOUS; SUBCUTANEOUS at 19:16

## 2022-03-03 RX ADMIN — PIPERACILLIN AND TAZOBACTAM 25 GRAM(S): 4; .5 INJECTION, POWDER, LYOPHILIZED, FOR SOLUTION INTRAVENOUS at 18:06

## 2022-03-03 RX ADMIN — Medication 1 TABLET(S): at 12:33

## 2022-03-03 RX ADMIN — CHLORHEXIDINE GLUCONATE 1 APPLICATION(S): 213 SOLUTION TOPICAL at 12:34

## 2022-03-03 RX ADMIN — Medication 81 MILLIGRAM(S): at 12:33

## 2022-03-03 RX ADMIN — Medication 1 APPLICATION(S): at 12:34

## 2022-03-03 RX ADMIN — ATORVASTATIN CALCIUM 40 MILLIGRAM(S): 80 TABLET, FILM COATED ORAL at 23:55

## 2022-03-03 RX ADMIN — PIPERACILLIN AND TAZOBACTAM 25 GRAM(S): 4; .5 INJECTION, POWDER, LYOPHILIZED, FOR SOLUTION INTRAVENOUS at 05:37

## 2022-03-03 RX ADMIN — Medication 20 MILLILITER(S): at 10:26

## 2022-03-03 RX ADMIN — Medication 650 MILLIGRAM(S): at 23:54

## 2022-03-03 RX ADMIN — ERYTHROPOIETIN 8000 UNIT(S): 10000 INJECTION, SOLUTION INTRAVENOUS; SUBCUTANEOUS at 22:28

## 2022-03-03 RX ADMIN — HYDROMORPHONE HYDROCHLORIDE 0.25 MILLIGRAM(S): 2 INJECTION INTRAMUSCULAR; INTRAVENOUS; SUBCUTANEOUS at 10:24

## 2022-03-03 RX ADMIN — HYDROMORPHONE HYDROCHLORIDE 0.25 MILLIGRAM(S): 2 INJECTION INTRAMUSCULAR; INTRAVENOUS; SUBCUTANEOUS at 13:45

## 2022-03-03 RX ADMIN — HYDROMORPHONE HYDROCHLORIDE 0.25 MILLIGRAM(S): 2 INJECTION INTRAMUSCULAR; INTRAVENOUS; SUBCUTANEOUS at 10:09

## 2022-03-03 RX ADMIN — HYDROMORPHONE HYDROCHLORIDE 0.25 MILLIGRAM(S): 2 INJECTION INTRAMUSCULAR; INTRAVENOUS; SUBCUTANEOUS at 14:00

## 2022-03-03 RX ADMIN — HYDROMORPHONE HYDROCHLORIDE 0.25 MILLIGRAM(S): 2 INJECTION INTRAMUSCULAR; INTRAVENOUS; SUBCUTANEOUS at 19:30

## 2022-03-03 NOTE — DIETITIAN INITIAL EVALUATION ADULT. - PERTINENT MEDS FT
MEDICATIONS  (STANDING):  aspirin  chewable 81 milliGRAM(s) Oral daily  atorvastatin 40 milliGRAM(s) Oral at bedtime  cadexomer iodine 0.9% Gel 1 Application(s) Topical daily  chlorhexidine 2% Cloths 1 Application(s) Topical daily  clopidogrel Tablet 75 milliGRAM(s) Oral daily  epoetin niesha-epbx (RETACRIT) Injectable 8000 Unit(s) IV Push <User Schedule>  lidocaine 1% Injectable 20 milliLiter(s) Local Injection once  Nephro-george 1 Tablet(s) Oral daily  piperacillin/tazobactam IVPB.. 3.375 Gram(s) IV Intermittent every 12 hours  Velphoro (sucroferric oxyhydroxide) 3 Tablet(s) 3 Tablet(s) Oral three times a day    MEDICATIONS  (PRN):  acetaminophen     Tablet .. 650 milliGRAM(s) Oral every 6 hours PRN Mild Pain (1 - 3), Moderate Pain (4 - 6)  HYDROmorphone  Injectable 0.25 milliGRAM(s) IV Push every 6 hours PRN Severe Pain (7 - 10)

## 2022-03-03 NOTE — DIETITIAN INITIAL EVALUATION ADULT. - PERTINENT LABORATORY DATA
03-03 Na135 mmol/L Glu 85 mg/dL K+ 4.6 mmol/L Cr  9.57 mg/dL<H> BUN 48 mg/dL<H> 03-03 Phos 4.9 mg/dL<H> 03-03 Alb 3.8 g/dL

## 2022-03-03 NOTE — PROGRESS NOTE ADULT - ASSESSMENT
60 yo f with left foot cellulitis  -pt seen and evaluated  -T 98, WBC 12.48, ESR 90, CRP 9.5  -LFXR: Soft tissue edema throughout the plantar and dorsal left foot with lucencies within the subcutaneous dorsal left foot superficial to the MTP joints suspicious for soft tissue gas, pt is /p L foot I&D  -s/p bedside L foot I&D 2/28/22: left foot 3rd digit gangrene to PIPJ, resolvingerythema to midfoot, increased edema and pain to forefoot, no fluctuance, no drainage, no malodor, etiology 2/2 ischemia   - right distal hallux eschar, today with boggyness to the medial aspect, and scant purulence, no malodor, no erythema, no tracking  - Consented for bedside L foot incision and drainage   - L foot prepped in sterile manner, then using 1% lidocaine plain, 20cc's used to administer ankle block to L   - Using sterile #15 blade incision made to plantar aspect of 2nd metatarsal and extended proximally about 5cm, expressed 4cc of malodorous purulence, wound did not track further  - Wound flushed with copious amounts of NS+betadine, pt tolerated procedure well at this time   -left foot wound culture growing E coli prelim  -continue vanco, zosyn  -MARTA/PVR non compressible vessels with flat waveforms bilateral  - L foot MRI w contrast ordered to r/o abscess (Nephrologist Dr. Dr Griffith aware), MRI expedited  - L foot surgery is being performed for source control, patient is aware    -pod plan left foot partial 3rd ray resection  and R foot partial hallux resection pending vasc recs + MRI  - Pt tentatively booked for OR on Friday 3/4 @1PM  -please document medical/cards optimization for surgery under sedation with local anaesthesia  -discussed with attending   60 yo f with left foot cellulitis  -pt seen and evaluated  -T 98, WBC 12.48, ESR 90, CRP 9.5  -LFXR: Soft tissue edema throughout the plantar and dorsal left foot with lucencies within the subcutaneous dorsal left foot superficial to the MTP joints suspicious for soft tissue gas, pt is /p L foot I&D  -s/p bedside L foot I&D 2/28/22: left foot 3rd digit gangrene to PIPJ, resolving erythema to midfoot, increased edema and pain to forefoot, no fluctuance, no drainage, no malodor, etiology 2/2 ischemia   - right distal hallux eschar, no purulence no malodor, no erythema, no tracking  - Consented for bedside L foot incision and drainage   - L foot prepped in sterile manner, then using 1% lidocaine plain, 20cc's used to administer ankle block to L   - Using sterile #15 blade incision made to plantar aspect of 2nd metatarsal and extended proximally about 5cm, expressed 4cc of malodorous purulence, wound did not track further  - Wound flushed with copious amounts of NS+betadine, pt tolerated procedure well at this time   -left foot wound culture growing E coli prelim  -continue vanco, zosyn  -MARTA/PVR non compressible vessels with flat waveforms bilateral  - L foot MRI w contrast ordered to r/o abscess (Nephrologist Dr. Dr Griffith aware), MRI expedited  - L foot surgery is being performed for source control, patient is aware    -pod plan left foot partial 3rd ray resection  and R foot partial hallux resection pending vasc recs + MRI  - Pt tentatively booked for OR on Friday 3/4 @1PM  -please document medical/cards optimization for surgery under sedation with local anaesthesia  -discussed with attending   62 yo f with left foot cellulitis  -pt seen and evaluated  -T 98, WBC 12.48, ESR 90, CRP 9.5  -LFXR: Soft tissue edema throughout the plantar and dorsal left foot with lucencies within the subcutaneous dorsal left foot superficial to the MTP joints suspicious for soft tissue gas, pt is /p L foot I&D  -s/p bedside L foot I&D 2/28/22: left foot 3rd digit gangrene to PIPJ, resolving erythema to midfoot, increased edema and pain to forefoot, no fluctuance, no drainage, no malodor, etiology 2/2 ischemia   - right distal hallux eschar, no purulence no malodor, no erythema, no tracking  - Consented for bedside L foot incision and drainage   - L foot prepped in sterile manner, then using 1% lidocaine plain, 20cc's used to administer ankle block to L   - Using sterile #15 blade incision made to plantar aspect of 2nd metatarsal and extended proximally about 5cm, expressed 4cc of malodorous purulence, wound did not track further  - Wound flushed with copious amounts of NS+betadine, pt tolerated procedure well at this time   -left foot wound culture growing E coli prelim  -continue vanco, zosyn  -MARTA/PVR non compressible vessels with flat waveforms bilateral  - L foot MRI w contrast ordered to r/o abscess (Nephrologist Dr. Dr Griffith aware), MRI expedited  - L foot surgery is being performed for source control, patient is aware that this is for source control and that due to her vascular status healing is compromised   -pod plan left foot partial 3rd ray resection  and R foot partial hallux resection pending vasc recs + MRI  - Pt tentatively booked for OR on Friday 3/4 @1PM  -please document medical/cards optimization for surgery under sedation with local anaesthesia  -discussed with attending   60 yo f with left foot cellulitis  -pt seen and evaluated  -T 98, WBC 12.48, ESR 90, CRP 9.5  -LFXR: Soft tissue edema throughout the plantar and dorsal left foot with lucencies within the subcutaneous dorsal left foot superficial to the MTP joints suspicious for soft tissue gas, pt is /p L foot I&D  -s/p bedside L foot I&D 2/28/22: left foot 3rd digit gangrene to PIPJ, resolving erythema to midfoot, increased edema and pain to forefoot, no fluctuance, no drainage, no malodor, etiology 2/2 ischemia   - right distal hallux eschar, no purulence no malodor, no erythema, no tracking  - Consented for bedside L foot incision and drainage   - L foot prepped in sterile manner, then using 1% lidocaine plain, 20cc's used to administer ankle block to L   - Using sterile #15 blade incision made to plantar aspect of 2nd metatarsal and extended proximally about 5cm, expressed 4cc of malodorous purulence, wound did not track further  - Wound flushed with copious amounts of NS+betadine, pt tolerated procedure well at this time   -left foot wound culture growing E coli prelim  -continue vanco, zosyn  -MARTA/PVR non compressible vessels with flat waveforms bilateral  - L foot MRI w contrast ordered to r/o abscess (Nephrologist Dr. Dr Griffith aware), MRI expedited  - L foot surgery is being performed for source control, patient is aware that this is for source control and that due to her vascular status healing is compromised   -pod plan left foot partial 3rd ray resection and R foot partial hallux resection pending MRI  - Pt tentatively booked for OR on Friday 3/4 @1PM  -please document medical/cards optimization for surgery under sedation with local anaesthesia  -discussed with attending   62 yo f with left foot cellulitis  -pt seen and evaluated  -T 98, WBC 12.48, ESR 90, CRP 9.5  -LFXR: Soft tissue edema throughout the plantar and dorsal left foot with lucencies within the subcutaneous dorsal left foot superficial to the MTP joints suspicious for soft tissue gas, pt is /p L foot I&D  -s/p bedside L foot I&D 2/28/22: left foot 3rd digit gangrene to PIPJ, resolving erythema to midfoot, increased edema and pain to forefoot, no fluctuance, no drainage, no malodor, etiology 2/2 ischemia   - right distal hallux eschar, no purulence no malodor, no erythema, no tracking  - Consented for bedside L foot incision and drainage   - L foot prepped in sterile manner, then using 1% lidocaine plain, 20cc's used to administer ankle block to L   - Using sterile #15 blade incision made to plantar aspect of 2nd metatarsal and extended proximally about 5cm, expressed 4cc of malodorous purulence, wound did not track further  - Wound flushed with copious amounts of NS+betadine, pt tolerated procedure well at this time   -left foot wound culture growing E coli prelim  -continue vanco, zosyn  -MARTA/PVR non compressible vessels with flat waveforms bilateral  - L foot MRI w contrast ordered to r/o abscess (Nephrologist Dr. Dr Griffith aware), MRI expedited  - L foot surgery is being performed for source control, patient is aware that this is for source control and that due to her vascular status healing is compromised (okay to proceed from Dr. Gilliland- appreciated)   -pod plan left foot partial 3rd ray resection and R foot partial hallux resection pending MRI  - Pt tentatively booked for OR on Friday 3/4 @1PM  -please document medical/cards optimization for surgery under sedation with local anaesthesia  -discussed with attending

## 2022-03-03 NOTE — PROGRESS NOTE ADULT - SUBJECTIVE AND OBJECTIVE BOX
Patient is a 61y old  Female who presents with a chief complaint of L foot pain/discoloration (03 Mar 2022 10:26)       INTERVAL HPI/OVERNIGHT EVENTS:  Patient seen and evaluated at bedside.  Pt is resting comfortable in NAD. Denies N/V/F/C.  Pain rated at 6/10    Allergies    No Known Allergies    Intolerances        Vital Signs Last 24 Hrs  T(C): 36.7 (03 Mar 2022 09:50), Max: 36.9 (02 Mar 2022 23:05)  T(F): 98 (03 Mar 2022 09:50), Max: 98.4 (02 Mar 2022 23:05)  HR: 79 (03 Mar 2022 09:50) (68 - 79)  BP: 142/60 (03 Mar 2022 09:50) (124/48 - 142/60)  BP(mean): --  RR: 19 (03 Mar 2022 09:50) (18 - 19)  SpO2: 100% (03 Mar 2022 09:50) (98% - 100%)    LABS:                        9.4    12.48 )-----------( 234      ( 03 Mar 2022 06:35 )             31.1     03-03    135  |  92<L>  |  48<H>  ----------------------------<  85  4.6   |  24  |  9.57<H>    Ca    9.1      03 Mar 2022 06:35  Phos  4.9     03-03  Mg     2.50     03-03    TPro  7.4  /  Alb  3.8  /  TBili  0.3  /  DBili  x   /  AST  17  /  ALT  13  /  AlkPhos  58  03-03        CAPILLARY BLOOD GLUCOSE          Lower Extremity Physical Exam:      Vascular: DP/PT 1/4 R,  DP/PT 0/4 L, CFT <3 seconds B/L except left 3rd digit absent CFT, Temperature gradient warm to warm L, warm to cool R, resolving L foot edema  Neuro: Epicritic sensation diminished to the level of forefoot B/L  Musculoskeletal/Ortho: left partial 5th ray resection healed, tenderness to palpation to left 3rd   Skin: right distal hallux eschar, today with boggyness to the medial aspect, and scant purulence, no malodor, no erythema, no tracking  s/p bedside L foot I&D 2/28/22: left foot 3rd digit gangrene to PIPJ, resolvingerythema to midfoot, increased edema and pain to forefoot, no fluctuance, no drainage, no malodor, etiology 2/2 ischemia       RADIOLOGY & ADDITIONAL TESTS:   Patient is a 61y old  Female who presents with a chief complaint of L foot pain/discoloration (03 Mar 2022 10:26)       INTERVAL HPI/OVERNIGHT EVENTS:  Patient seen and evaluated at bedside.  Pt is resting comfortable in NAD. Denies N/V/F/C.  Pain rated at 6/10    Allergies    No Known Allergies    Intolerances        Vital Signs Last 24 Hrs  T(C): 36.7 (03 Mar 2022 09:50), Max: 36.9 (02 Mar 2022 23:05)  T(F): 98 (03 Mar 2022 09:50), Max: 98.4 (02 Mar 2022 23:05)  HR: 79 (03 Mar 2022 09:50) (68 - 79)  BP: 142/60 (03 Mar 2022 09:50) (124/48 - 142/60)  BP(mean): --  RR: 19 (03 Mar 2022 09:50) (18 - 19)  SpO2: 100% (03 Mar 2022 09:50) (98% - 100%)    LABS:                        9.4    12.48 )-----------( 234      ( 03 Mar 2022 06:35 )             31.1     03-03    135  |  92<L>  |  48<H>  ----------------------------<  85  4.6   |  24  |  9.57<H>    Ca    9.1      03 Mar 2022 06:35  Phos  4.9     03-03  Mg     2.50     03-03    TPro  7.4  /  Alb  3.8  /  TBili  0.3  /  DBili  x   /  AST  17  /  ALT  13  /  AlkPhos  58  03-03        CAPILLARY BLOOD GLUCOSE          Lower Extremity Physical Exam:      Vascular: DP/PT 1/4 R,  DP/PT 0/4 L, CFT <3 seconds B/L except left 3rd digit absent CFT, Temperature gradient warm to warm L, warm to cool R, resolving L foot edema  Neuro: Epicritic sensation diminished to the level of forefoot B/L  Musculoskeletal/Ortho: left partial 5th ray resection healed, tenderness to palpation to left 3rd   Skin: right distal hallux eschar, no purulence no malodor, no erythema, no tracking  s/p bedside L foot I&D 2/28/22: left foot 3rd digit gangrene to PIPJ, resolving erythema to midfoot, increased edema and pain to forefoot, no fluctuance, no drainage, no malodor, etiology 2/2 ischemia       RADIOLOGY & ADDITIONAL TESTS:

## 2022-03-03 NOTE — PROGRESS NOTE ADULT - SUBJECTIVE AND OBJECTIVE BOX
Date of Service  : 03-03-22     INTERVAL HPI/OVERNIGHT EVENTS: Seen and examined earlier today . Had MRI and spoke to  in room.   Vital Signs Last 24 Hrs  T(C): 37 (03 Mar 2022 20:04), Max: 37 (03 Mar 2022 20:04)  T(F): 98.6 (03 Mar 2022 20:04), Max: 98.6 (03 Mar 2022 20:04)  HR: 84 (03 Mar 2022 20:04) (74 - 88)  BP: 155/64 (03 Mar 2022 20:04) (124/48 - 155/64)  BP(mean): --  RR: 18 (03 Mar 2022 20:04) (18 - 19)  SpO2: 100% (03 Mar 2022 17:45) (98% - 100%)  I&O's Summary    MEDICATIONS  (STANDING):  aspirin  chewable 81 milliGRAM(s) Oral daily  atorvastatin 40 milliGRAM(s) Oral at bedtime  cadexomer iodine 0.9% Gel 1 Application(s) Topical daily  chlorhexidine 2% Cloths 1 Application(s) Topical daily  clopidogrel Tablet 75 milliGRAM(s) Oral daily  epoetin niesha-epbx (RETACRIT) Injectable 8000 Unit(s) IV Push <User Schedule>  Nephro-george 1 Tablet(s) Oral daily  piperacillin/tazobactam IVPB.. 3.375 Gram(s) IV Intermittent every 12 hours  Velphoro (sucroferric oxyhydroxide) 3 Tablet(s) 3 Tablet(s) Oral three times a day    MEDICATIONS  (PRN):  acetaminophen     Tablet .. 650 milliGRAM(s) Oral every 6 hours PRN Mild Pain (1 - 3), Moderate Pain (4 - 6)  HYDROmorphone  Injectable 0.25 milliGRAM(s) IV Push every 6 hours PRN Severe Pain (7 - 10)    LABS:                        9.4    12.48 )-----------( 234      ( 03 Mar 2022 06:35 )             31.1     03-03    135  |  92<L>  |  48<H>  ----------------------------<  85  4.6   |  24  |  9.57<H>    Ca    9.1      03 Mar 2022 06:35  Phos  4.9     03-03  Mg     2.50     03-03    TPro  7.4  /  Alb  3.8  /  TBili  0.3  /  DBili  x   /  AST  17  /  ALT  13  /  AlkPhos  58  03-03        CAPILLARY BLOOD GLUCOSE              REVIEW OF SYSTEMS:  CONSTITUTIONAL: No fever, weight loss, or fatigue  EYES: No eye pain, visual disturbances, or discharge  ENMT:  No difficulty hearing, tinnitus, vertigo; No sinus or throat pain  NECK: No pain or stiffness  RESPIRATORY: No cough, wheezing, chills or hemoptysis; No shortness of breath  CARDIOVASCULAR: No chest pain, palpitations, dizziness, or leg swelling  GASTROINTESTINAL: No abdominal or epigastric pain. No nausea, vomiting, or hematemesis; No diarrhea or constipation. No melena or hematochezia.  GENITOURINARY: No dysuria, frequency, hematuria, or incontinence  NEUROLOGICAL: No headaches, memory loss, loss of strength, numbness, or tremors      Consultant(s) Notes Reviewed:  [x ] YES  [ ] NO    PHYSICAL EXAM:  GENERAL: NAD, well-groomed, well-developed,not in any distress ,  HEAD:  Atraumatic, Normocephalic  NECK: Supple, No JVD, Normal thyroid  NERVOUS SYSTEM:  Alert & Oriented X3, No focal deficit   CHEST/LUNG: Good air entry bilateral with no  rales, rhonchi, wheezing, or rubs  HEART: Regular rate and rhythm; No murmurs, rubs, or gallops  ABDOMEN: Soft, Nontender, Nondistended; Bowel sounds present  EXTREMITIES:  Feet dressed     Care Discussed with Consultants/Other Providers [ x] YES  [ ] NO

## 2022-03-03 NOTE — DIETITIAN INITIAL EVALUATION ADULT. - PROBLEM SELECTOR PLAN 5
pharmacologic DVT ppx pending VSx recs/AM podiatry recs (?plan for OR)    #?hx DM2   check A1c with AM labs  cc diet/FS/ISS pending A1c    #dysphagia  -to pills per patient  -dysphagia diet, aspiration precautions   -S&S eval

## 2022-03-03 NOTE — DIETITIAN INITIAL EVALUATION ADULT. - OTHER INFO
Nutrition consult placed for pressure injury stage II or greater.  Patient noted with unstageable wound to Rt great toe per nursing flowsheets.      Patient assessed by SLP and a regular consistency diet with regular liquids was recommended.      No GI distress reported i.e. nausea, vomiting, diarrhea.  No food allergies noted or reported. Nutrition consult placed for pressure injury stage II or greater.  Patient noted with unstageable wound to Rt great toe per nursing flowsheets.      Patient assessed by SLP and a regular consistency diet with regular liquids was recommended.      No GI distress reported i.e. nausea, vomiting, diarrhea.  No food allergies noted or reported.    Patient not in room at time of visit (@MRI) but  was present. Per , patient eating very well, completing meals. Reported that patient follows with outpatient dietitian at dialysis regularly and knows her diet restrictions.

## 2022-03-03 NOTE — PROGRESS NOTE ADULT - SUBJECTIVE AND OBJECTIVE BOX
Cardiovascular Disease Progress Note    Overnight events: No acute events overnight.  Pt denies chest pain or SOB.   Otherwise review of systems negative    Objective Findings:  T(C): 36.8 (03-03-22 @ 05:33), Max: 37 (03-02-22 @ 10:38)  HR: 74 (03-03-22 @ 05:33) (68 - 79)  BP: 124/48 (03-03-22 @ 05:33) (124/48 - 140/54)  RR: 18 (03-03-22 @ 05:33) (18 - 18)  SpO2: 98% (03-03-22 @ 05:33) (98% - 100%)  Wt(kg): --  Daily     Daily       Physical Exam:  Gen: NAD; Patient resting comfortably  HEENT: EOMI, Normocephalic/ atraumatic  CV: RRR, normal S1 + S2, no m/r/g  Lungs:  Normal respiratory effort; clear to auscultation bilaterally  Abd: soft, non-tender; bowel sounds present  Ext: LE wrapped and bandaged. C/D/I.     Telemetry: N/a    Laboratory Data:                        9.4    12.48 )-----------( 234      ( 03 Mar 2022 06:35 )             31.1     03-03    135  |  92<L>  |  48<H>  ----------------------------<  85  4.6   |  24  |  9.57<H>    Ca    9.1      03 Mar 2022 06:35  Phos  4.9     03-03  Mg     2.50     03-03    TPro  7.4  /  Alb  3.8  /  TBili  0.3  /  DBili  x   /  AST  17  /  ALT  13  /  AlkPhos  58  03-03              Inpatient Medications:  MEDICATIONS  (STANDING):  aspirin  chewable 81 milliGRAM(s) Oral daily  atorvastatin 40 milliGRAM(s) Oral at bedtime  cadexomer iodine 0.9% Gel 1 Application(s) Topical daily  chlorhexidine 2% Cloths 1 Application(s) Topical daily  clopidogrel Tablet 75 milliGRAM(s) Oral daily  epoetin niesha-epbx (RETACRIT) Injectable 8000 Unit(s) IV Push <User Schedule>  Nephro-george 1 Tablet(s) Oral daily  piperacillin/tazobactam IVPB.. 3.375 Gram(s) IV Intermittent every 12 hours  Velphoro (sucroferric oxyhydroxide) 3 Tablet(s) 3 Tablet(s) Oral three times a day      Assessment:  61-year-old female with ESRD on HD MWF via right groin AVF, HLD and PAD with previous intervention presenting with L sided foot pain, ongoing since Nov 2021, becoming more severe in the past 2 weeks.    Plan of Care:    # L foot gangrene  - MARTA non-diagnostic secondary to heavy calcification  - Pods and vascular following  - Tentative plan for 3rd ray resection with podiatry  - Pt displays no signs of pre-operative coronary ischemia  - Please obtain EKG  - RCRI 1: Ms. Boogie is relatively low risk for moderate risk procedure  - Will further assess with TTE    #ESRD  - HD as per nephro    #PAD  - Continue ASA and plavix    #HLD  - Statin as ordered          Over 25 minutes spent on total encounter; more than 50% of the visit was spent counseling and/or coordinating care by the attending physician.      Wisam Adams D.O.   Cardiovascular Disease  [phone]

## 2022-03-03 NOTE — DIETITIAN INITIAL EVALUATION ADULT. - CHIEF COMPLAINT
61-year-old female with ESRD on HD MWF via right groin AVF, HLD and PAD presenting with L sided foot pain, found to have likely OM, pending lt foot partial 3rd ray resection.

## 2022-03-03 NOTE — PROGRESS NOTE ADULT - ASSESSMENT
61-year-old female with ESRD on HD MWF via right groin AVF, HLD and PAD presenting with L sided foot pain, found to have likely OM     Problem/Plan - 1:  ·  Problem: Foot osteomyelitis.   ·  Plan: ID help appreciated and now on Zosyn.   appreciate podiatry recs and planning surgery pending vascular input.   MARTA/PVR noted. Severe Small vessel disease.   vascular surgery help appreciated.  s/p I&D by podiatry in ED, plan for left foot partial 3rd ray resection pending MARTA/vasc recs     < from: MR Foot w/wo IV Cont, Left (03.03.22 @ 15:01) >  IMPRESSION:    Faint edema within the head of the third metatarsal and third phalanges   with mild periosteal enhancement of the third metatarsal head. Findings   may represent sequela of early osteomyelitis. Questionable soft tissue   defect of the third digit.  Lack of enhancement of the soft tissues plantarly and about the third and   fourth digits, favored to represent sequela of ischemia.    < end of copied text >     Problem/Plan - 2:  ·  Problem: ESRD on dialysis.   ·  Plan: appreciate renal recs  dose meds for intermittent HD  renal restricted diet  c/w phosphate binders     Problem/Plan - 3:  ·  Problem: PAD (peripheral artery disease).   ·  Plan: c/w DAPT and statin  f/u VSx recs  MARTA/PVR noted.      Problem/Plan - 4:  ·  Problem: Lower extremity edema.   ·  Plan: b/l LE edema   LE dopplers negative for  VTE.     Problem/Plan - 5:  ·  Problem: Need for prophylactic measure.   ·  Plan: pharmacologic DVT ppx pending VSx recs/AM podiatry recs (?plan for OR)      Medically optimized for surgery .

## 2022-03-03 NOTE — PROGRESS NOTE ADULT - SUBJECTIVE AND OBJECTIVE BOX
[facility] - TIFFANY Bazzi / TIFFANY Shea / APARNA Vanegas/ TIFFANY Mcfarlane/ TIFFANY Madden/ MASSIEL Perkins / RAINER Potts / ESTEPHANIE Griffith  ---------------------------------------------------------------------------------------------------------------  seen and examined today for ESRD  Interval : NAD  VITALS:  T(F): 98.3 (03-03-22 @ 05:33), Max: 98.6 (03-02-22 @ 10:38)  HR: 74 (03-03-22 @ 05:33)  BP: 124/48 (03-03-22 @ 05:33)  RR: 18 (03-03-22 @ 05:33)  SpO2: 98% (03-03-22 @ 05:33)  Wt(kg): --    Physical Exam :-  Constitutional: NAD  Neck: Supple.  Respiratory: Bilateral equal breath sounds,  Cardiovascular: S1, S2 normal,  Gastrointestinal: Bowel Sounds present, soft, non tender.  Extremities: swelling of left foot and lesion under 3rd toe, dry gangrene over right big toe  Neurological: Alert and Oriented x 3, no focal deficits  Psychiatric: Normal mood, normal affect  Data:-  Allergies :   No Known Allergies    Hospital Medications:   MEDICATIONS  (STANDING):  aspirin  chewable 81 milliGRAM(s) Oral daily  atorvastatin 40 milliGRAM(s) Oral at bedtime  cadexomer iodine 0.9% Gel 1 Application(s) Topical daily  chlorhexidine 2% Cloths 1 Application(s) Topical daily  clopidogrel Tablet 75 milliGRAM(s) Oral daily  epoetin niesha-epbx (RETACRIT) Injectable 8000 Unit(s) IV Push <User Schedule>  lidocaine 1% Injectable 20 milliLiter(s) Local Injection once  Nephro-george 1 Tablet(s) Oral daily  piperacillin/tazobactam IVPB.. 3.375 Gram(s) IV Intermittent every 12 hours  Velphoro (sucroferric oxyhydroxide) 3 Tablet(s) 3 Tablet(s) Oral three times a day    03-03    135  |  92<L>  |  48<H>  ----------------------------<  85  4.6   |  24  |  9.57<H>    Ca    9.1      03 Mar 2022 06:35  Phos  4.9     03-03  Mg     2.50     03-03    TPro  7.4  /  Alb  3.8  /  TBili  0.3  /  DBili      /  AST  17  /  ALT  13  /  AlkPhos  58  03-03    Creatinine Trend: 9.57 <--, 7.97 <--, 5.91 <--, 9.43 <--                        9.4    12.48 )-----------( 234      ( 03 Mar 2022 06:35 )             31.1

## 2022-03-03 NOTE — PROGRESS NOTE ADULT - ASSESSMENT
61 Yr old female ESRD (Mn/Wed/Fr.) well known to me from [facility] [place], Right groin AVF, DM, PVD, now with severe left foot pain. Renal following for ESRD Mx    End Stage Renal Disease on Dialysis MWF  HD unit [facility] [place]  K, vol- ok  not in chf    Due for HD today for preOR (for 3/4/22) renal optimization   renal restrictions in diet.   dose all meds for ESRD  monitor BMP  Anemia of CKD- Hb < goal. c/w epo 8k tiw w/hd  HTN,, controlled  Left foot infection, gangrene- Mx per Podiatry. s/p vanco , on zosyn. dose abxs for ESRD. monitor vanco level   s/p bedside L foot I&D 2/28/22  -left foot wound culture pending   -MARTA/PVR pending  -pod plan left foot partial 3rd ray resection pending MARTA/vasc recs -as per Podiatry  plan for OR Friday      For any question, call:  Cell # [phone]  Pager # [phone]  Callback # [phone]

## 2022-03-03 NOTE — DIETITIAN INITIAL EVALUATION ADULT. - PROBLEM SELECTOR PLAN 1
c/w broad spectrum abx, random vanc level with AM labs given vanc given pre-HD, weight still pending  appreciate podiatry recs (spoke with on call podiatry resident re xray findings, NTD at present  MARTA/PVR pending  vascular surgery consulted  s/p I&D by podiatry in ED, plan for left foot partial 3rd ray resection pending MARTA/vasc recs   rectal temp pending  elevated temp earlier, s/p acetaminophen, check rectal temp  ESR/CK as above  trend lactate  EKG pending, f/u for pre-op eval

## 2022-03-04 ENCOUNTER — RESULT REVIEW (OUTPATIENT)
Age: 62
End: 2022-03-04

## 2022-03-04 ENCOUNTER — TRANSCRIPTION ENCOUNTER (OUTPATIENT)
Age: 62
End: 2022-03-04

## 2022-03-04 LAB
ALBUMIN SERPL ELPH-MCNC: 3.7 G/DL — SIGNIFICANT CHANGE UP (ref 3.3–5)
ALP SERPL-CCNC: 55 U/L — SIGNIFICANT CHANGE UP (ref 40–120)
ALT FLD-CCNC: 15 U/L — SIGNIFICANT CHANGE UP (ref 4–33)
ANION GAP SERPL CALC-SCNC: 16 MMOL/L — HIGH (ref 7–14)
APTT BLD: 27.4 SEC — SIGNIFICANT CHANGE UP (ref 27–36.3)
AST SERPL-CCNC: 17 U/L — SIGNIFICANT CHANGE UP (ref 4–32)
BILIRUB SERPL-MCNC: 0.3 MG/DL — SIGNIFICANT CHANGE UP (ref 0.2–1.2)
BLD GP AB SCN SERPL QL: NEGATIVE — SIGNIFICANT CHANGE UP
BUN SERPL-MCNC: 23 MG/DL — SIGNIFICANT CHANGE UP (ref 7–23)
CALCIUM SERPL-MCNC: 8.8 MG/DL — SIGNIFICANT CHANGE UP (ref 8.4–10.5)
CHLORIDE SERPL-SCNC: 93 MMOL/L — LOW (ref 98–107)
CO2 SERPL-SCNC: 27 MMOL/L — SIGNIFICANT CHANGE UP (ref 22–31)
CREAT SERPL-MCNC: 5.8 MG/DL — HIGH (ref 0.5–1.3)
EGFR: 8 ML/MIN/1.73M2 — LOW
GLUCOSE SERPL-MCNC: 91 MG/DL — SIGNIFICANT CHANGE UP (ref 70–99)
GRAM STN FLD: SIGNIFICANT CHANGE UP
GRAM STN FLD: SIGNIFICANT CHANGE UP
HCG SERPL-ACNC: <5 MIU/ML — SIGNIFICANT CHANGE UP
HCT VFR BLD CALC: 31 % — LOW (ref 34.5–45)
HGB BLD-MCNC: 9.8 G/DL — LOW (ref 11.5–15.5)
INR BLD: 1.2 RATIO — HIGH (ref 0.88–1.16)
MAGNESIUM SERPL-MCNC: 2.1 MG/DL — SIGNIFICANT CHANGE UP (ref 1.6–2.6)
MCHC RBC-ENTMCNC: 27.1 PG — SIGNIFICANT CHANGE UP (ref 27–34)
MCHC RBC-ENTMCNC: 31.6 GM/DL — LOW (ref 32–36)
MCV RBC AUTO: 85.6 FL — SIGNIFICANT CHANGE UP (ref 80–100)
NRBC # BLD: 0 /100 WBCS — SIGNIFICANT CHANGE UP
NRBC # FLD: 0.02 K/UL — HIGH
PHOSPHATE SERPL-MCNC: 3.6 MG/DL — SIGNIFICANT CHANGE UP (ref 2.5–4.5)
PLATELET # BLD AUTO: 258 K/UL — SIGNIFICANT CHANGE UP (ref 150–400)
POTASSIUM SERPL-MCNC: 3.7 MMOL/L — SIGNIFICANT CHANGE UP (ref 3.5–5.3)
POTASSIUM SERPL-SCNC: 3.7 MMOL/L — SIGNIFICANT CHANGE UP (ref 3.5–5.3)
PROT SERPL-MCNC: 7.5 G/DL — SIGNIFICANT CHANGE UP (ref 6–8.3)
PROTHROM AB SERPL-ACNC: 14 SEC — HIGH (ref 10.5–13.4)
RBC # BLD: 3.62 M/UL — LOW (ref 3.8–5.2)
RBC # FLD: 16.7 % — HIGH (ref 10.3–14.5)
RH IG SCN BLD-IMP: POSITIVE — SIGNIFICANT CHANGE UP
SARS-COV-2 RNA SPEC QL NAA+PROBE: SIGNIFICANT CHANGE UP
SODIUM SERPL-SCNC: 136 MMOL/L — SIGNIFICANT CHANGE UP (ref 135–145)
SPECIMEN SOURCE: SIGNIFICANT CHANGE UP
SPECIMEN SOURCE: SIGNIFICANT CHANGE UP
WBC # BLD: 16.01 K/UL — HIGH (ref 3.8–10.5)
WBC # FLD AUTO: 16.01 K/UL — HIGH (ref 3.8–10.5)

## 2022-03-04 PROCEDURE — 88311 DECALCIFY TISSUE: CPT | Mod: 26

## 2022-03-04 PROCEDURE — 73630 X-RAY EXAM OF FOOT: CPT | Mod: 26,50

## 2022-03-04 PROCEDURE — 88305 TISSUE EXAM BY PATHOLOGIST: CPT | Mod: 26

## 2022-03-04 RX ADMIN — HYDROMORPHONE HYDROCHLORIDE 0.25 MILLIGRAM(S): 2 INJECTION INTRAMUSCULAR; INTRAVENOUS; SUBCUTANEOUS at 04:15

## 2022-03-04 RX ADMIN — CHLORHEXIDINE GLUCONATE 1 APPLICATION(S): 213 SOLUTION TOPICAL at 11:37

## 2022-03-04 RX ADMIN — HYDROMORPHONE HYDROCHLORIDE 0.25 MILLIGRAM(S): 2 INJECTION INTRAMUSCULAR; INTRAVENOUS; SUBCUTANEOUS at 03:59

## 2022-03-04 RX ADMIN — PIPERACILLIN AND TAZOBACTAM 25 GRAM(S): 4; .5 INJECTION, POWDER, LYOPHILIZED, FOR SOLUTION INTRAVENOUS at 21:25

## 2022-03-04 RX ADMIN — ATORVASTATIN CALCIUM 40 MILLIGRAM(S): 80 TABLET, FILM COATED ORAL at 21:26

## 2022-03-04 RX ADMIN — Medication 650 MILLIGRAM(S): at 00:24

## 2022-03-04 RX ADMIN — PIPERACILLIN AND TAZOBACTAM 25 GRAM(S): 4; .5 INJECTION, POWDER, LYOPHILIZED, FOR SOLUTION INTRAVENOUS at 05:28

## 2022-03-04 NOTE — DISCHARGE NOTE PROVIDER - PROVIDER TOKENS
PROVIDER:[TOKEN:[[id number]],SCHEDULEDAPPT:[03/25/2022],SCHEDULEDAPPTTIME:[09:15 PM]],PROVIDER:[TOKEN:[[id number]],SCHEDULEDAPPT:[04/18/2022],SCHEDULEDAPPTTIME:[09:30 AM]],PROVIDER:[TOKEN:[[id number]],FOLLOWUP:[1 week]]

## 2022-03-04 NOTE — PROGRESS NOTE ADULT - ASSESSMENT
61-year-old female with ESRD on HD MWF via right groin AVF, HLD and PAD presenting with L sided foot pain, found to have likely OM     Problem/Plan - 1:  ·  Problem: Foot osteomyelitis.   ·  Plan: ID help appreciated and now on Zosyn.   appreciate podiatry recs and planning surgery today   MARTA/PVR noted. Severe Small vessel disease.   vascular surgery help appreciated.  s/p I&D by podiatry in ED, plan for left foot partial 3rd ray resection pending MARTA/vasc recs     < from: MR Foot w/wo IV Cont, Left (03.03.22 @ 15:01) >  IMPRESSION:    Faint edema within the head of the third metatarsal and third phalanges   with mild periosteal enhancement of the third metatarsal head. Findings   may represent sequela of early osteomyelitis. Questionable soft tissue   defect of the third digit.  Lack of enhancement of the soft tissues plantarly and about the third and   fourth digits, favored to represent sequela of ischemia.    < end of copied text >     Problem/Plan - 2:  ·  Problem: ESRD on dialysis.   ·  Plan: appreciate renal recs  dose meds for intermittent HD  renal restricted diet  c/w phosphate binders     Problem/Plan - 3:  ·  Problem: PAD (peripheral artery disease).   ·  Plan: c/w DAPT and statin  f/u VSx recs  MARTA/PVR noted.      Problem/Plan - 4:  ·  Problem: Lower extremity edema.   ·  Plan: b/l LE edema   LE dopplers negative for  VTE.     Problem/Plan - 5:  ·  Problem: Need for prophylactic measure.   ·  Plan: pharmacologic DVT ppx pending VSx recs/AM podiatry recs (?plan for OR)      Medically optimized for surgery .

## 2022-03-04 NOTE — BRIEF OPERATIVE NOTE - OPERATION/FINDINGS
s/p left foot partial 3rd ray amputation- open and right foot partial hallux amputation- partially open, poor intraop bleeding, good bone quality at the level of resection, low concern for bone infection, high concern for viability/soft tissue infection

## 2022-03-04 NOTE — DISCHARGE NOTE PROVIDER - HOSPITAL COURSE
61y F PMHx ESRD on HD MWF via right groin AVF, HLD and PAD p/w L sided foot pain, found to have likely OM.    L Foot osteomyelitis/ gangrene  S/p I&D by podiatry. Wound culture - E.coli / s. aureus   MRI L foot- Faint edema within the head of the third metatarsal and third phalanges with mild periosteal enhancement of the third metatarsal head. Findings may represent sequela of early osteomyelitis. Questionable soft tissue defect of the third digit. Lack of enhancement of the soft tissues plantarly and about the third and fourth digits, favored to represent sequela of ischemia.  ID Following- s/p Zosyn, plan for Cefepime 2g TIW (post-HD) though 4/15/22  Pod- s/p left foot partial 3rd ray amputation, open and right foot partial hallux amputation w/ podiatry 3/4- low concern for bone infection, high concern for viability/soft tissue infection  Wound vac placement 3/7    PAD  MARTA/PVR- severe arterial disease b/l  Vascular Sx follow up outpatient in 1 month, Dr. Gilliland    b/l LE edema  LE dopplers - no evidence of DVT   61y F PMHx ESRD on HD MWF via right groin AVF, HLD and PAD p/w L sided foot pain, found to have likely OM.    L Foot osteomyelitis/ gangrene  S/p I&D by podiatry. Wound culture - E.coli / s. aureus   MRI L foot- Faint edema within the head of the third metatarsal and third phalanges with mild periosteal enhancement of the third metatarsal head. Findings may represent sequela of early osteomyelitis. Questionable soft tissue defect of the third digit. Lack of enhancement of the soft tissues plantarly and about the third and fourth digits, favored to represent sequela of ischemia.  ID Following- s/p Zosyn, plan for Cefepime 2g TIW (post-HD) though 4/15/22 [facility] culture showed Ecoli sens to cefipime   Pod- s/p left foot partial 3rd ray amputation, open and right foot partial hallux amputation w/ podiatry 3/4- low concern for bone infection, high concern for viability/soft tissue infection  Wound vac placement 3/7    PAD  MARTA/PVR- severe arterial disease b/l  Vascular Sx follow up outpatient in 1 month, Dr. Gilliland    b/l LE edema  LE dopplers - no evidence of DVT    Pt discussed with Dr. Cutler on March 14, 2022 and cleared for discharge \  Dispo: [facility] with HD    61y F PMHx ESRD on HD MWF via right groin AVF, HLD and PAD p/w L sided foot pain, found to have likely OM.    L Foot osteomyelitis/ gangrene  S/p I&D by podiatry. Wound culture - E.coli / s. aureus   MRI L foot- Faint edema within the head of the third metatarsal and third phalanges with mild periosteal enhancement of the third metatarsal head. Findings may represent sequela of early osteomyelitis. Questionable soft tissue defect of the third digit. Lack of enhancement of the soft tissues plantarly and about the third and fourth digits, favored to represent sequela of ischemia.  ID Following- s/p Zosyn, plan for Cefepime 2g TIW (post-HD) though 4/15/22 [facility] culture showed Ecoli sens to cefipime   Pod- s/p left foot partial 3rd ray amputation, open and right foot partial hallux amputation w/ podiatry 3/4- low concern for bone infection, high concern for viability/soft tissue infection  Wound vac placement 3/7    PAD  MARTA/PVR- severe arterial disease b/l  Vascular Sx follow up outpatient in 1 month, Dr. Gilliland    b/l LE edema  LE dopplers - no evidence of DVT    Pt discussed with Dr. Cutler on March 14, 2022 and cleared for discharge \  Dispo: [facility] with HD .

## 2022-03-04 NOTE — PROGRESS NOTE ADULT - SUBJECTIVE AND OBJECTIVE BOX
Date of Service  : 03-04-22     INTERVAL HPI/OVERNIGHT EVENTS: Going to OR soon.   Vital Signs Last 24 Hrs  T(C): 36.3 (04 Mar 2022 13:02), Max: 37 (03 Mar 2022 20:04)  T(F): 97.4 (04 Mar 2022 13:02), Max: 98.6 (03 Mar 2022 20:04)  HR: 85 (04 Mar 2022 13:02) (63 - 88)  BP: 157/55 (04 Mar 2022 13:02) (121/50 - 157/55)  BP(mean): --  RR: 17 (04 Mar 2022 13:02) (17 - 18)  SpO2: 100% (04 Mar 2022 13:02) (98% - 100%)  I&O's Summary    03 Mar 2022 07:01  -  04 Mar 2022 07:00  --------------------------------------------------------  IN: 400 mL / OUT: 1900 mL / NET: -1500 mL      MEDICATIONS  (STANDING):  aspirin  chewable 81 milliGRAM(s) Oral daily  atorvastatin 40 milliGRAM(s) Oral at bedtime  cadexomer iodine 0.9% Gel 1 Application(s) Topical daily  chlorhexidine 2% Cloths 1 Application(s) Topical daily  clopidogrel Tablet 75 milliGRAM(s) Oral daily  epoetin niesha-epbx (RETACRIT) Injectable 8000 Unit(s) IV Push <User Schedule>  Nephro-george 1 Tablet(s) Oral daily  piperacillin/tazobactam IVPB.. 3.375 Gram(s) IV Intermittent every 12 hours  Velphoro (sucroferric oxyhydroxide) 3 Tablet(s) 3 Tablet(s) Oral three times a day    MEDICATIONS  (PRN):  acetaminophen     Tablet .. 650 milliGRAM(s) Oral every 6 hours PRN Mild Pain (1 - 3), Moderate Pain (4 - 6)  HYDROmorphone  Injectable 0.25 milliGRAM(s) IV Push every 6 hours PRN Severe Pain (7 - 10)    LABS:                        9.8    16.01 )-----------( 258      ( 04 Mar 2022 03:55 )             31.0     03-04    136  |  93<L>  |  23  ----------------------------<  91  3.7   |  27  |  5.80<H>    Ca    8.8      04 Mar 2022 04:04  Phos  3.6     03-04  Mg     2.10     03-04    TPro  7.5  /  Alb  3.7  /  TBili  0.3  /  DBili  x   /  AST  17  /  ALT  15  /  AlkPhos  55  03-04    PT/INR - ( 04 Mar 2022 03:55 )   PT: 14.0 sec;   INR: 1.20 ratio         PTT - ( 04 Mar 2022 03:55 )  PTT:27.4 sec    CAPILLARY BLOOD GLUCOSE              REVIEW OF SYSTEMS:  CONSTITUTIONAL: No fever, weight loss, or fatigue  EYES: No eye pain, visual disturbances, or discharge  ENMT:  No difficulty hearing, tinnitus, vertigo; No sinus or throat pain  NECK: No pain or stiffness  RESPIRATORY: No cough, wheezing, chills or hemoptysis; No shortness of breath  CARDIOVASCULAR: No chest pain, palpitations, dizziness, or leg swelling  GASTROINTESTINAL: No abdominal or epigastric pain. No nausea, vomiting, or hematemesis; No diarrhea or constipation. No melena or hematochezia.  GENITOURINARY: No dysuria, frequency, hematuria, or incontinence  NEUROLOGICAL: No headaches, memory loss, loss of strength, numbness, or tremors      Consultant(s) Notes Reviewed:  [x ] YES  [ ] NO    PHYSICAL EXAM:  GENERAL: NAD, well-groomed, well-developed,not in any distress ,  HEAD:  Atraumatic, Normocephalic  EYES: EOMI, PERRLA, conjunctiva and sclera clear  ENMT: No tonsillar erythema, exudates, or enlargement; Moist mucous membranes, Good dentition, No lesions  NECK: Supple, No JVD, Normal thyroid  NERVOUS SYSTEM:  Alert & Oriented X3, No focal deficit   CHEST/LUNG: Good air entry bilateral with no  rales, rhonchi, wheezing, or rubs  HEART: Regular rate and rhythm; No murmurs, rubs, or gallops  ABDOMEN: Soft, Nontender, Nondistended; Bowel sounds present  EXTREMITIES:  Fewet dressed     Care Discussed with Consultants/Other Providers [ x] YES  [ ] NO

## 2022-03-04 NOTE — DISCHARGE NOTE PROVIDER - NSDCFUADDINST_GEN_ALL_CORE_FT
Podiatry Discharge Instructions:  Follow up: Please follow up with Dr. Daily within 1 week of discharge from the hospital, please call [phone] for appointment and discuss that you recently were seen in the hospital.  Wound Care: Please leave your dressing clean dry intact until your follow up appointment.  Weight bearing: Please weight bear as tolerated to bilateral heel in a surgical shoe.  Antibiotics: Please continue as instructed. Podiatry Discharge Instructions:  Follow up: Please follow up with Dr. Daily within 1 week of discharge from the hospital, please call [phone] for appointment and discuss that you recently were seen in the hospital.  Wound Care: Please manage wound VAC to L foot wound with black granufoam at 75mmhg continuous, three times per week, R foot great toe surgical site: please apply dry sterile 4x4 gauze, and kerlex wrap, every other day  Weight bearing: Please weight bear as tolerated to bilateral heel in a surgical shoe.  Antibiotics: Please continue as instructed. Podiatry Discharge Instructions:  Follow up: Please follow up with Dr. Daily within 1 week of discharge from the hospital, please call [phone] for appointment and discuss that you recently were seen in the hospital.  Wound Care: Please manage wound VAC to L foot wound with Santyl followed by black granufoam at 75mmhg continuous, three times per week, R foot great toe surgical site: please apply dry sterile 4x4 gauze, and kerlex wrap, every other day  Weight bearing: Please weight bear as tolerated to bilateral heel in a surgical shoe.  Antibiotics: Please continue as instructed.

## 2022-03-04 NOTE — BRIEF OPERATIVE NOTE - SPECIMENS
Micro 1 - left foot deep wound culture, Micro 2- left foot 3rd met clean bone margin, Micro 3- right foot deep wound culture, Path 1- left foot dirty bone soft tissue, Path 2- left foot 3rd met clean bone margin, Path 3- right foot dirty bone and soft tissue hallux

## 2022-03-04 NOTE — PROGRESS NOTE ADULT - ASSESSMENT
Plan:   To OR today at 1:30 with Dr. pineda for left foot P3RR & R foot P1RR.   CXR on sunrise.  EKG on sunrise.  Medical/Cardiac clearance since 3/3 and documented in chart.  Consent signed and in chart.  Procedure was explained to patient in detail. All alternatives, risks and complications were discussed. All questions answered.

## 2022-03-04 NOTE — PROGRESS NOTE ADULT - SUBJECTIVE AND OBJECTIVE BOX
[facility] - TIFFANY Bazzi / TIFFANY Shea / APARNA Vanegas/ TIFFANY Mcfarlane/ TIFFANY Madden/ MASSIEL Perkins / RAINER Potts / ESTEPHANIE Griffith  ---------------------------------------------------------------------------------------------------------------  seen and examined today for ESRD  Interval : NAD  VITALS:  T(F): 98.1 (03-04-22 @ 03:59), Max: 98.6 (03-03-22 @ 20:04)  HR: 79 (03-04-22 @ 03:59)  BP: 123/55 (03-04-22 @ 03:59)  RR: 17 (03-04-22 @ 03:59)  SpO2: 98% (03-04-22 @ 03:59)  Wt(kg): --    03-03 @ 07:01  -  03-04 @ 07:00  --------------------------------------------------------  IN: 400 mL / OUT: 1900 mL / NET: -1500 mL      Physical Exam :-  Constitutional: NAD  Neck: Supple.  Respiratory: Bilateral equal breath sounds,  Cardiovascular: S1, S2 normal,  Gastrointestinal: Bowel Sounds present, soft, non tender.  Extremities: clean dressing on B/L LE  Neurological: Alert and Oriented x 3, no focal deficits  Psychiatric: Normal mood, normal affect  Data:-  Allergies :   No Known Allergies    Hospital Medications:   MEDICATIONS  (STANDING):  aspirin  chewable 81 milliGRAM(s) Oral daily  atorvastatin 40 milliGRAM(s) Oral at bedtime  cadexomer iodine 0.9% Gel 1 Application(s) Topical daily  chlorhexidine 2% Cloths 1 Application(s) Topical daily  clopidogrel Tablet 75 milliGRAM(s) Oral daily  epoetin niesha-epbx (RETACRIT) Injectable 8000 Unit(s) IV Push <User Schedule>  Nephro-george 1 Tablet(s) Oral daily  piperacillin/tazobactam IVPB.. 3.375 Gram(s) IV Intermittent every 12 hours  Velphoro (sucroferric oxyhydroxide) 3 Tablet(s) 3 Tablet(s) Oral three times a day    03-04    136  |  93<L>  |  23  ----------------------------<  91  3.7   |  27  |  5.80<H>    Ca    8.8      04 Mar 2022 04:04  Phos  3.6     03-04  Mg     2.10     03-04    TPro  7.5  /  Alb  3.7  /  TBili  0.3  /  DBili      /  AST  17  /  ALT  15  /  AlkPhos  55  03-04    Creatinine Trend: 5.80 <--, 9.57 <--, 7.97 <--, 5.91 <--, 9.43 <--                        9.8    16.01 )-----------( 258      ( 04 Mar 2022 03:55 )             31.0

## 2022-03-04 NOTE — DISCHARGE NOTE PROVIDER - NSDCMRMEDTOKEN_GEN_ALL_CORE_FT
aspirin 81 mg oral tablet, chewable: 1 tab(s) orally once a day  atorvastatin 40 mg oral tablet: 1 tab(s) orally once a day  Plavix 75 mg oral tablet: 1 tab(s) orally once a day  Denice-Nia oral tablet: 1 tab(s) orally once a day  Velphoro 500 mg oral tablet, chewable: 3 tab(s) orally 3 times a day (with meals)   acetaminophen 325 mg oral tablet: 2 tab(s) orally every 6 hours, As needed, Mild Pain (1 - 3), Moderate Pain (4 - 6)  aspirin 81 mg oral tablet, chewable: 1 tab(s) orally once a day  atorvastatin 40 mg oral tablet: 1 tab(s) orally once a day  heparin: 5000 unit(s) subcutaneous every 12 hours  Plavix 75 mg oral tablet: 1 tab(s) orally once a day  Denice-Nia oral tablet: 1 tab(s) orally once a day  Velphoro 500 mg oral tablet, chewable: 3 tab(s) orally 3 times a day (with meals)   acetaminophen 325 mg oral tablet: 2 tab(s) orally every 6 hours, As needed, Mild Pain (1 - 3), Moderate Pain (4 - 6)  aspirin 81 mg oral tablet, chewable: 1 tab(s) orally once a day  atorvastatin 40 mg oral tablet: 1 tab(s) orally once a day  cadexomer iodine 0.9% topical gel: 1 application topically once a day  cefepime 1 g intravenous injection: 1 gram(s) intravenous every 24 hours  collagenase 250 units/g topical ointment: 1 application topically once a day  epoetin niesha: 69156 unit(s) intravenously 3 times a week with HD   heparin: 5000 unit(s) subcutaneous every 12 hours  Plavix 75 mg oral tablet: 1 tab(s) orally once a day  Denice-Nia oral tablet: 1 tab(s) orally once a day  Velphoro 500 mg oral tablet, chewable: 3 tab(s) orally 3 times a day (with meals)   acetaminophen 325 mg oral tablet: 2 tab(s) orally every 6 hours, As needed, Mild Pain (1 - 3), Moderate Pain (4 - 6)  aspirin 81 mg oral tablet, chewable: 1 tab(s) orally once a day  atorvastatin 40 mg oral tablet: 1 tab(s) orally once a day  cadexomer iodine 0.9% topical gel: 1 application topically once a day  cefepime 1 g intravenous injection: 2 gram(s) intravenous Monday, Wednesday, and Friday AFTER hd UNTIL 4/14/22  collagenase 250 units/g topical ointment: 1 application topically once a day  epoetin niesha: 19078 unit(s) intravenously 3 times a week with HD   heparin: 5000 unit(s) subcutaneous every 12 hours  Plavix 75 mg oral tablet: 1 tab(s) orally once a day  Denice-Nia oral tablet: 1 tab(s) orally once a day  Velphoro 500 mg oral tablet, chewable: 3 tab(s) orally 3 times a day (with meals)

## 2022-03-04 NOTE — PROGRESS NOTE ADULT - ATTENDING COMMENTS
.
Patient to OR today for source control of active infection left foot.  Discussed planned procedure patient. understand risk of poor vascular status.  Patient remains at risk for transmetatarsal amputation or below-knee amputation possibly. plan on for today's procedure is to rid the patient of any active source of infection left and right hallux.  Please continue to medically optimize.
Seen at bedside.  Discussed current condition and treatment options.  Plan of care discussed with vascular.  Patient needs at minimum right partial hallux and left partial ray resection.  patient with known PVD with small vessel disease.  At high risk for BKA.  Patient does not want more proximal amputation at this time.  understands risks.  tentatively booked for friday at 1 as more of a source control procedure.  please continue to medically optimize.

## 2022-03-04 NOTE — PROGRESS NOTE ADULT - ASSESSMENT
61 Yr old female ESRD (Mn/Wed/Fr.) well known to me from [facility], Right groin AVF, DM, PVD, now with severe left foot pain. Renal following for ESRD Mx    End Stage Renal Disease on Dialysis MWF  HD unit [facility]  K, vol- ok  not in chf    Due for OR for Left  foot TMA today,   HD scheduled for tomorrow -> Monday  renal restrictions in diet.   dose all meds for ESRD  monitor BMP  Anemia of CKD- Hb < goal. c/w epo 8k tiw w/hd  HTN,, controlled  Left foot infection, gangrene- Mx per Podiatry. s/p vanco , on zosyn. dose abxs for ESRD. monitor vanco level      For any question, call:  Cell # [phone]  Pager # [phone]  Callback # [phone]

## 2022-03-04 NOTE — PRE-OP CHECKLIST - BLOOD AVAILABLE
Patient: Grace Dominguez    Procedure(s):  Cystoscopy, Right Ureteroscopy, Laser Lithotripsy, Stent Placement - Wound Class: II-Clean Contaminated    Diagnosis:Renal Stone  Diagnosis Additional Information: No value filed.    Anesthesia Type:  General, LMA    Note:  Anesthesia Post Evaluation    Patient location during evaluation: PACU  Patient participation: Able to fully participate in evaluation  Level of consciousness: awake  Pain management: adequate  Airway patency: patent  Cardiovascular status: acceptable  Respiratory status: acceptable  Hydration status: acceptable  PONV: none             Last vitals:  Vitals:    02/01/18 1200 02/01/18 1210 02/01/18 1245   BP: 102/80 110/75 115/80   Pulse:  76 61   Resp: 12 12 16   Temp: 36.4  C (97.6  F) 36.4  C (97.6  F) 36.4  C (97.6  F)   SpO2: 100% 100% 100%         Electronically Signed By: Atul Dan MD  February 1, 2018  3:06 PM   n/a

## 2022-03-04 NOTE — PROGRESS NOTE ADULT - SUBJECTIVE AND OBJECTIVE BOX
Patient is a 61y old  Female who presents with a chief complaint of L foot pain/discoloration (04 Mar 2022 09:34)      INTERVAL HPI/OVERNIGHT EVENTS:   Pt is scheduled for Left foot P3RR & R foot P1RR with Dr. Daily at 1:30. Patient is aware of procedure and is NPO since midnight.    MEDICATIONS  (STANDING):  aspirin  chewable 81 milliGRAM(s) Oral daily  atorvastatin 40 milliGRAM(s) Oral at bedtime  cadexomer iodine 0.9% Gel 1 Application(s) Topical daily  chlorhexidine 2% Cloths 1 Application(s) Topical daily  clopidogrel Tablet 75 milliGRAM(s) Oral daily  epoetin niesha-epbx (RETACRIT) Injectable 8000 Unit(s) IV Push <User Schedule>  Nephro-george 1 Tablet(s) Oral daily  piperacillin/tazobactam IVPB.. 3.375 Gram(s) IV Intermittent every 12 hours  Velphoro (sucroferric oxyhydroxide) 3 Tablet(s) 3 Tablet(s) Oral three times a day    MEDICATIONS  (PRN):  acetaminophen     Tablet .. 650 milliGRAM(s) Oral every 6 hours PRN Mild Pain (1 - 3), Moderate Pain (4 - 6)  HYDROmorphone  Injectable 0.25 milliGRAM(s) IV Push every 6 hours PRN Severe Pain (7 - 10)      Allergies    No Known Allergies    Intolerances        Vital Signs Last 24 Hrs  T(C): 36.7 (04 Mar 2022 03:59), Max: 37 (03 Mar 2022 20:04)  T(F): 98.1 (04 Mar 2022 03:59), Max: 98.6 (03 Mar 2022 20:04)  HR: 79 (04 Mar 2022 03:59) (63 - 88)  BP: 123/55 (04 Mar 2022 03:59) (121/50 - 155/64)  BP(mean): --  RR: 17 (04 Mar 2022 03:59) (17 - 18)  SpO2: 98% (04 Mar 2022 03:59) (98% - 100%)    LABS:                        9.8    16.01 )-----------( 258      ( 04 Mar 2022 03:55 )             31.0     03-04    136  |  93<L>  |  23  ----------------------------<  91  3.7   |  27  |  5.80<H>    Ca    8.8      04 Mar 2022 04:04  Phos  3.6     03-04  Mg     2.10     03-04    TPro  7.5  /  Alb  3.7  /  TBili  0.3  /  DBili  x   /  AST  17  /  ALT  15  /  AlkPhos  55  03-04    PT/INR - ( 04 Mar 2022 03:55 )   PT: 14.0 sec;   INR: 1.20 ratio         PTT - ( 04 Mar 2022 03:55 )  PTT:27.4 sec    CAPILLARY BLOOD GLUCOSE          RADIOLOGY & ADDITIONAL TESTS:    Plan:   To OR today at 1:30 with Dr. pineda for left foot P3RR & R foot P1RR.   CXR on sunrise.  EKG on sunrise.  Medical/Cardiac clearance since 3/3 and documented in chart.  Consent signed and in chart.  Procedure was explained to patient in detail. All alternatives, risks and complications were discussed. All questions answered. Patient is a 61y old  Female who presents with a chief complaint of L foot pain/discoloration (04 Mar 2022 09:34)      INTERVAL HPI/OVERNIGHT EVENTS:   Pt is scheduled for Left foot P3RR & R foot P1RR with Dr. Daily at 1:30. Patient is aware of procedure and is NPO since midnight.    MEDICATIONS  (STANDING):  aspirin  chewable 81 milliGRAM(s) Oral daily  atorvastatin 40 milliGRAM(s) Oral at bedtime  cadexomer iodine 0.9% Gel 1 Application(s) Topical daily  chlorhexidine 2% Cloths 1 Application(s) Topical daily  clopidogrel Tablet 75 milliGRAM(s) Oral daily  epoetin niesha-epbx (RETACRIT) Injectable 8000 Unit(s) IV Push <User Schedule>  Nephro-george 1 Tablet(s) Oral daily  piperacillin/tazobactam IVPB.. 3.375 Gram(s) IV Intermittent every 12 hours  Velphoro (sucroferric oxyhydroxide) 3 Tablet(s) 3 Tablet(s) Oral three times a day    MEDICATIONS  (PRN):  acetaminophen     Tablet .. 650 milliGRAM(s) Oral every 6 hours PRN Mild Pain (1 - 3), Moderate Pain (4 - 6)  HYDROmorphone  Injectable 0.25 milliGRAM(s) IV Push every 6 hours PRN Severe Pain (7 - 10)      Allergies    No Known Allergies    Intolerances        Vital Signs Last 24 Hrs  T(C): 36.7 (04 Mar 2022 03:59), Max: 37 (03 Mar 2022 20:04)  T(F): 98.1 (04 Mar 2022 03:59), Max: 98.6 (03 Mar 2022 20:04)  HR: 79 (04 Mar 2022 03:59) (63 - 88)  BP: 123/55 (04 Mar 2022 03:59) (121/50 - 155/64)  BP(mean): --  RR: 17 (04 Mar 2022 03:59) (17 - 18)  SpO2: 98% (04 Mar 2022 03:59) (98% - 100%)    LABS:                        9.8    16.01 )-----------( 258      ( 04 Mar 2022 03:55 )             31.0     03-04    136  |  93<L>  |  23  ----------------------------<  91  3.7   |  27  |  5.80<H>    Ca    8.8      04 Mar 2022 04:04  Phos  3.6     03-04  Mg     2.10     03-04    TPro  7.5  /  Alb  3.7  /  TBili  0.3  /  DBili  x   /  AST  17  /  ALT  15  /  AlkPhos  55  03-04    PT/INR - ( 04 Mar 2022 03:55 )   PT: 14.0 sec;   INR: 1.20 ratio         PTT - ( 04 Mar 2022 03:55 )  PTT:27.4 sec    CAPILLARY BLOOD GLUCOSE          RADIOLOGY & ADDITIONAL TESTS:

## 2022-03-04 NOTE — BRIEF OPERATIVE NOTE - NSICDXBRIEFPROCEDURE_GEN_ALL_CORE_FT
PROCEDURES:  Partial amputation of third ray of left foot by open approach 04-Mar-2022 16:13:17  Hawa Rocha  Partial amputation of toe of right foot 04-Mar-2022 16:14:01  Hawa Rocha

## 2022-03-04 NOTE — DISCHARGE NOTE PROVIDER - CARE PROVIDER_API CALL
Zach Vasques (FRANCISCO)  Podiatric Medicine and Surgery  [address], [place] [address]  Phone: [phone]  Fax: [phone]  Scheduled Appointment: 03/25/2022 09:15 PM    Nina Gilliland)  Surgery  [address] [place] [address], [address]  [place] [address]  Phone: [phone]  Fax: [phone]  Scheduled Appointment: 04/18/2022 09:30 AM    Mayo Daily)  Surgery  [facility] - Dept of Surgery, [address] [place] [address]  Phone: [phone]  Fax: [phone]  Follow Up Time: 1 week

## 2022-03-04 NOTE — PROGRESS NOTE ADULT - SUBJECTIVE AND OBJECTIVE BOX
Cardiovascular Disease Progress Note    Overnight events: No acute events overnight.  Ms. Boogie denies chest pain or SOB.   Otherwise review of systems negative    Objective Findings:  T(C): 36.7 (03-04-22 @ 03:59), Max: 37 (03-03-22 @ 20:04)  HR: 79 (03-04-22 @ 03:59) (63 - 88)  BP: 123/55 (03-04-22 @ 03:59) (121/50 - 155/64)  RR: 17 (03-04-22 @ 03:59) (17 - 19)  SpO2: 98% (03-04-22 @ 03:59) (98% - 100%)  Wt(kg): --  Daily     Daily       Physical Exam:  Gen: NAD; Patient resting comfortably  HEENT: EOMI, Normocephalic/ atraumatic  CV: RRR, normal S1 + S2, no m/r/g  Lungs:  Normal respiratory effort; clear to auscultation bilaterally  Abd: soft, non-tender; bowel sounds present  Ext: B/l LE bandaged/ c/d/i. Weak pulses bilaterally    Telemetry: N/a    Laboratory Data:                        9.8    16.01 )-----------( 258      ( 04 Mar 2022 03:55 )             31.0     03-04    136  |  93<L>  |  23  ----------------------------<  91  3.7   |  27  |  5.80<H>    Ca    8.8      04 Mar 2022 04:04  Phos  3.6     03-04  Mg     2.10     03-04    TPro  7.5  /  Alb  3.7  /  TBili  0.3  /  DBili  x   /  AST  17  /  ALT  15  /  AlkPhos  55  03-04    PT/INR - ( 04 Mar 2022 03:55 )   PT: 14.0 sec;   INR: 1.20 ratio         PTT - ( 04 Mar 2022 03:55 )  PTT:27.4 sec          Inpatient Medications:  MEDICATIONS  (STANDING):  aspirin  chewable 81 milliGRAM(s) Oral daily  atorvastatin 40 milliGRAM(s) Oral at bedtime  cadexomer iodine 0.9% Gel 1 Application(s) Topical daily  chlorhexidine 2% Cloths 1 Application(s) Topical daily  clopidogrel Tablet 75 milliGRAM(s) Oral daily  epoetin niesha-epbx (RETACRIT) Injectable 8000 Unit(s) IV Push <User Schedule>  Nephro-george 1 Tablet(s) Oral daily  piperacillin/tazobactam IVPB.. 3.375 Gram(s) IV Intermittent every 12 hours  Velphoro (sucroferric oxyhydroxide) 3 Tablet(s) 3 Tablet(s) Oral three times a day      Assessment: 61-year-old female with ESRD on HD MWF via right groin AVF, HLD and PAD with previous intervention presenting with L sided foot pain, ongoing since Nov 2021, becoming more severe in the past 2 weeks.    Plan of Care:    # L foot gangrene  - MARTA non-diagnostic secondary to heavy calcification  - Pods and vascular following  - Plan for 3rd ray resection with podiatry  - Pt displays no signs of pre-operative coronary ischemia  - RCRI 1: Ms. Boogie is relatively low risk for moderate risk procedure  - TTE shows preserved LVEF with mild AI and no significant structural disease.   - From a cardiac standpoint, Ms. Boogie is optimized to undergo podiatric procedure at the discretion of the surgical team.     #ESRD  - HD as per nephro    #PAD  - Continue ASA and plavix    #HLD  - Statin as ordered      Plan of Care:          Over 25 minutes spent on total encounter; more than 50% of the visit was spent counseling and/or coordinating care by the attending physician.      Wisam Adams D.O.   Cardiovascular Disease  [phone]

## 2022-03-04 NOTE — BRIEF OPERATIVE NOTE - COMMENTS
s/p left foot partial 3rd ray amputation- open and right foot partial hallux amputation- partially open, poor intraop bleeding, good bone quality at the level of resection, low concern for bone infection, high concern for viability/soft tissue infection, discharge pending possible VAC application/ Clean bone no growth x 2 days/viability Infection control procedures. s/p left foot partial 3rd ray amputation- open and right foot partial hallux amputation- partially open, poor intraop bleeding, good bone quality at the level of resection, low concern for bone infection, high concern for viability/soft tissue infection, discharge pending possible VAC application/ Clean bone no growth x 2 days/viability

## 2022-03-04 NOTE — DISCHARGE NOTE PROVIDER - CARE PROVIDERS DIRECT ADDRESSES
[email or web address]

## 2022-03-04 NOTE — DISCHARGE NOTE PROVIDER - NSDCCPCAREPLAN_GEN_ALL_CORE_FT
PRINCIPAL DISCHARGE DIAGNOSIS  Diagnosis: Left foot infection  Assessment and Plan of Treatment: Wound vac to L foot as directed - black foam Pressure 75  Please santyl to the left foot wound followed by VAC   Please manage wound VAC to L foot wound with Santyl followed by black granufoam at 75mmhg continuous, three times per week, R foot great toe surgical site: please apply dry sterile 4x4 gauze, and kerlex wrap, every other day  Weight bearing: Please weight bear as tolerated to bilateral heel in a surgical shoe.        SECONDARY DISCHARGE DIAGNOSES  Diagnosis: ESRD on dialysis  Assessment and Plan of Treatment: HD on M-W-F   continue meds as directed   Epogen as needed per nephrology    Diagnosis: PAD (peripheral artery disease)  Assessment and Plan of Treatment: continue aspirin /plavix     PRINCIPAL DISCHARGE DIAGNOSIS  Diagnosis: Left foot infection  Assessment and Plan of Treatment: Wound vac to L foot as directed - black foam Pressure 75  Please santyl to the left foot wound followed by VAC   Please manage wound VAC to L foot wound with Santyl followed by black granufoam at 75mmhg continuous, three times per week, R foot great toe surgical site: please apply dry sterile 4x4 gauze, and kerlex wrap, every other day  Weight bearing: Please weight bear as tolerated to bilateral heel in a surgical shoe.  Continue iv abx 3x week 2mg post HD until 4/14         SECONDARY DISCHARGE DIAGNOSES  Diagnosis: ESRD on dialysis  Assessment and Plan of Treatment: HD on M-W-F   continue meds as directed   Epogen as needed per nephrology    Diagnosis: PAD (peripheral artery disease)  Assessment and Plan of Treatment: continue aspirin /plavix

## 2022-03-04 NOTE — DISCHARGE NOTE PROVIDER - NSDCFUSCHEDAPPT_GEN_ALL_CORE_FT
JAYCEE WALTERS ; 03/25/2022 ; [facility] Surg Wound [address] ; 04/18/2022 ; [facility] Surg Vasc [address] ; 04/18/2022 ; NPP Surg Vasc [address]

## 2022-03-05 LAB
ALBUMIN SERPL ELPH-MCNC: 3.5 G/DL — SIGNIFICANT CHANGE UP (ref 3.3–5)
ALP SERPL-CCNC: 58 U/L — SIGNIFICANT CHANGE UP (ref 40–120)
ALT FLD-CCNC: 13 U/L — SIGNIFICANT CHANGE UP (ref 4–33)
ANION GAP SERPL CALC-SCNC: 21 MMOL/L — HIGH (ref 7–14)
AST SERPL-CCNC: 14 U/L — SIGNIFICANT CHANGE UP (ref 4–32)
BILIRUB SERPL-MCNC: 0.2 MG/DL — SIGNIFICANT CHANGE UP (ref 0.2–1.2)
BUN SERPL-MCNC: 45 MG/DL — HIGH (ref 7–23)
CALCIUM SERPL-MCNC: 8.6 MG/DL — SIGNIFICANT CHANGE UP (ref 8.4–10.5)
CHLORIDE SERPL-SCNC: 93 MMOL/L — LOW (ref 98–107)
CO2 SERPL-SCNC: 24 MMOL/L — SIGNIFICANT CHANGE UP (ref 22–31)
CREAT SERPL-MCNC: 8.04 MG/DL — HIGH (ref 0.5–1.3)
CULTURE RESULTS: SIGNIFICANT CHANGE UP
EGFR: 5 ML/MIN/1.73M2 — LOW
GLUCOSE BLDC GLUCOMTR-MCNC: 95 MG/DL — SIGNIFICANT CHANGE UP (ref 70–99)
GLUCOSE SERPL-MCNC: 102 MG/DL — HIGH (ref 70–99)
HCT VFR BLD CALC: 30.3 % — LOW (ref 34.5–45)
HGB BLD-MCNC: 9.3 G/DL — LOW (ref 11.5–15.5)
MAGNESIUM SERPL-MCNC: 2.5 MG/DL — SIGNIFICANT CHANGE UP (ref 1.6–2.6)
MCHC RBC-ENTMCNC: 26.6 PG — LOW (ref 27–34)
MCHC RBC-ENTMCNC: 30.7 GM/DL — LOW (ref 32–36)
MCV RBC AUTO: 86.6 FL — SIGNIFICANT CHANGE UP (ref 80–100)
NRBC # BLD: 0 /100 WBCS — SIGNIFICANT CHANGE UP
NRBC # FLD: 0 K/UL — SIGNIFICANT CHANGE UP
ORGANISM # SPEC MICROSCOPIC CNT: SIGNIFICANT CHANGE UP
PHOSPHATE SERPL-MCNC: 7.3 MG/DL — HIGH (ref 2.5–4.5)
PLATELET # BLD AUTO: 262 K/UL — SIGNIFICANT CHANGE UP (ref 150–400)
POTASSIUM SERPL-MCNC: 4.3 MMOL/L — SIGNIFICANT CHANGE UP (ref 3.5–5.3)
POTASSIUM SERPL-SCNC: 4.3 MMOL/L — SIGNIFICANT CHANGE UP (ref 3.5–5.3)
PROT SERPL-MCNC: 7.1 G/DL — SIGNIFICANT CHANGE UP (ref 6–8.3)
RBC # BLD: 3.5 M/UL — LOW (ref 3.8–5.2)
RBC # FLD: 16.8 % — HIGH (ref 10.3–14.5)
SODIUM SERPL-SCNC: 138 MMOL/L — SIGNIFICANT CHANGE UP (ref 135–145)
SPECIMEN SOURCE: SIGNIFICANT CHANGE UP
WBC # BLD: 17.51 K/UL — HIGH (ref 3.8–10.5)
WBC # FLD AUTO: 17.51 K/UL — HIGH (ref 3.8–10.5)

## 2022-03-05 RX ADMIN — Medication 1 TABLET(S): at 11:02

## 2022-03-05 RX ADMIN — Medication 81 MILLIGRAM(S): at 11:02

## 2022-03-05 RX ADMIN — PIPERACILLIN AND TAZOBACTAM 25 GRAM(S): 4; .5 INJECTION, POWDER, LYOPHILIZED, FOR SOLUTION INTRAVENOUS at 10:49

## 2022-03-05 RX ADMIN — ATORVASTATIN CALCIUM 40 MILLIGRAM(S): 80 TABLET, FILM COATED ORAL at 21:37

## 2022-03-05 RX ADMIN — CLOPIDOGREL BISULFATE 75 MILLIGRAM(S): 75 TABLET, FILM COATED ORAL at 11:02

## 2022-03-05 RX ADMIN — ERYTHROPOIETIN 8000 UNIT(S): 10000 INJECTION, SOLUTION INTRAVENOUS; SUBCUTANEOUS at 08:27

## 2022-03-05 RX ADMIN — PIPERACILLIN AND TAZOBACTAM 25 GRAM(S): 4; .5 INJECTION, POWDER, LYOPHILIZED, FOR SOLUTION INTRAVENOUS at 21:36

## 2022-03-05 RX ADMIN — CHLORHEXIDINE GLUCONATE 1 APPLICATION(S): 213 SOLUTION TOPICAL at 11:03

## 2022-03-05 RX ADMIN — Medication 650 MILLIGRAM(S): at 08:17

## 2022-03-05 RX ADMIN — Medication 1 APPLICATION(S): at 11:03

## 2022-03-05 RX ADMIN — Medication 650 MILLIGRAM(S): at 08:55

## 2022-03-05 NOTE — PHYSICAL THERAPY INITIAL EVALUATION ADULT - PERTINENT HX OF CURRENT PROBLEM, REHAB EVAL
Pt is a 61-year-old female with ESRD on HD MWF via right groin AVF, presenting to [facility] with L sided foot pain, ongoing since Nov 2021, becoming more severe in the past 2 weeks. PMHx: HLD and PAD. X-ray bilateral foot: Currently status post partial left 3rd ray amputation through distal metatarsal shaft and partial right hallux amputation through proximal phalanx shaft with sharpen and smooth osseous stump margins and with post surgical changes in the overlying soft tissues. Pt is a 61-year-old female with Left sided foot pain, ongoing since Nov 2021, becoming more severe in the past 2 weeks. PMHx: HLD and PAD, ESRD on HD MWF via right groin AVF. X-ray bilateral foot: Currently status post partial left 3rd ray amputation through distal metatarsal shaft and partial right hallux amputation through proximal phalanx shaft with sharpen and smooth osseous stump margins and with post surgical changes in the overlying soft tissues.

## 2022-03-05 NOTE — PHYSICAL THERAPY INITIAL EVALUATION ADULT - MANUAL MUSCLE TESTING RESULTS, REHAB EVAL
Bilateral UEs and LEs grossly 5/5. Billateral ankles grossly 3/5, resistance not applied secondary to dressings. Bilateral UEs and LEs grossly 5/5. Billateral ankles grossly 3/5, resistance not applied secondary to recent surgery.

## 2022-03-05 NOTE — PROGRESS NOTE ADULT - SUBJECTIVE AND OBJECTIVE BOX
Date of Service  : 03-05-22     INTERVAL HPI/OVERNIGHT EVENTS: S/P Surgery .   Vital Signs Last 24 Hrs  T(C): 36.8 (05 Mar 2022 13:45), Max: 36.9 (05 Mar 2022 06:40)  T(F): 98.3 (05 Mar 2022 13:45), Max: 98.5 (05 Mar 2022 10:00)  HR: 69 (05 Mar 2022 13:45) (68 - 89)  BP: 120/86 (05 Mar 2022 13:45) (117/57 - 129/75)  BP(mean): 65 (04 Mar 2022 17:30) (65 - 65)  RR: 18 (05 Mar 2022 13:45) (17 - 20)  SpO2: 100% (05 Mar 2022 13:45) (97% - 100%)  I&O's Summary    04 Mar 2022 07:01  -  05 Mar 2022 07:00  --------------------------------------------------------  IN: 420 mL / OUT: 0 mL / NET: 420 mL    05 Mar 2022 07:01  -  05 Mar 2022 17:24  --------------------------------------------------------  IN: 1200 mL / OUT: 1900 mL / NET: -700 mL      MEDICATIONS  (STANDING):  aspirin  chewable 81 milliGRAM(s) Oral daily  atorvastatin 40 milliGRAM(s) Oral at bedtime  cadexomer iodine 0.9% Gel 1 Application(s) Topical daily  chlorhexidine 2% Cloths 1 Application(s) Topical daily  clopidogrel Tablet 75 milliGRAM(s) Oral daily  epoetin niesha-epbx (RETACRIT) Injectable 8000 Unit(s) IV Push <User Schedule>  Nephro-george 1 Tablet(s) Oral daily  piperacillin/tazobactam IVPB.. 3.375 Gram(s) IV Intermittent every 12 hours  Velphoro (sucroferric oxyhydroxide) 3 Tablet(s) 3 Tablet(s) Oral three times a day    MEDICATIONS  (PRN):  acetaminophen     Tablet .. 650 milliGRAM(s) Oral every 6 hours PRN Mild Pain (1 - 3), Moderate Pain (4 - 6)  HYDROmorphone  Injectable 0.25 milliGRAM(s) IV Push every 6 hours PRN Severe Pain (7 - 10)    LABS:                        9.3    17.51 )-----------( 262      ( 05 Mar 2022 07:43 )             30.3     03-05    138  |  93<L>  |  45<H>  ----------------------------<  102<H>  4.3   |  24  |  8.04<H>    Ca    8.6      05 Mar 2022 07:43  Phos  7.3     03-05  Mg     2.50     03-05    TPro  7.1  /  Alb  3.5  /  TBili  0.2  /  DBili  x   /  AST  14  /  ALT  13  /  AlkPhos  58  03-05    PT/INR - ( 04 Mar 2022 03:55 )   PT: 14.0 sec;   INR: 1.20 ratio         PTT - ( 04 Mar 2022 03:55 )  PTT:27.4 sec    CAPILLARY BLOOD GLUCOSE      POCT Blood Glucose.: 95 mg/dL (05 Mar 2022 05:46)          REVIEW OF SYSTEMS:  CONSTITUTIONAL: No fever, weight loss, or fatigue  EYES: No eye pain, visual disturbances, or discharge  ENMT:  No difficulty hearing, tinnitus, vertigo; No sinus or throat pain  NECK: No pain or stiffness  RESPIRATORY: No cough, wheezing, chills or hemoptysis; No shortness of breath  CARDIOVASCULAR: No chest pain, palpitations, dizziness, or leg swelling  GASTROINTESTINAL: No abdominal or epigastric pain. No nausea, vomiting, or hematemesis; No diarrhea or constipation. No melena or hematochezia.  GENITOURINARY: No dysuria, frequency, hematuria, or incontinence  NEUROLOGICAL: No headaches, memory loss, loss of strength, numbness, or tremors      Consultant(s) Notes Reviewed:  [x ] YES  [ ] NO    PHYSICAL EXAM:  GENERAL: NAD, well-groomed, well-developed,not in any distress ,  HEAD:  Atraumatic, Normocephalic  NECK: Supple, No JVD, Normal thyroid  NERVOUS SYSTEM:  Alert & Oriented X3, No focal deficit   CHEST/LUNG: Good air entry bilateral with no  rales, rhonchi, wheezing, or rubs  HEART: Regular rate and rhythm; No murmurs, rubs, or gallops  ABDOMEN: Soft, Nontender, Nondistended; Bowel sounds present  EXTREMITIES: Feet dressed .     Care Discussed with Consultants/Other Providers [ x] YES  [ ] NO

## 2022-03-05 NOTE — PROGRESS NOTE ADULT - ASSESSMENT
60 yo f with left foot cellulitis  -pt seen and evaluated  -T 98.4, WBC 17.51, ESR 90, CRP 9.5, VSS  - Xrays consistent with surgery   - 3/4 s/p L foot partial 3rd ray resection open: fibrous wound bed, no malodor, serous drainage, no purulence, flaps cool to touch, no additional tracking   - 3/4 s/p R foot partial hallux partially open: fibrogranular wound bed with serousanguinous drainage, no tracking, no purulence, periwound maceration, flaps cool to touch   - B/L surgical sites appear viable at this time, leukocytosis is most likely post surgical   - Podiatry plan for L foot wound vac application pending drainage and appearance and R foot delayed primary closure pending drainage   - discussed with attending   62 yo f with left foot cellulitis  -pt seen and evaluated  -T 98.4, WBC 17.51, ESR 90, CRP 9.5, VSS  - Xrays consistent with surgery   - 3/4 s/p L foot partial 3rd ray resection open: fibrous wound bed, no malodor, serous drainage, no purulence, flaps cool to touch, no additional tracking   - 3/4 s/p R foot partial hallux partially open: fibrogranular wound bed with serousanguinous drainage, no tracking, no purulence, periwound maceration, flaps cool to touch   - B/L surgical sites with high concern for viability and residual soft tissue infection per intraoperative findings   - Pending OR data   - Podiatry plan for L foot wound vac application pending drainage and appearance and R foot delayed primary closure pending drainage   - discussed with attending   62 yo f with left foot cellulitis  -pt seen and evaluated  -T 98.4, WBC 17.51, ESR 90, CRP 9.5, VSS  - Xrays consistent with surgery   - 3/4 s/p L foot partial 3rd ray resection open: fibrous wound bed, no malodor, serous drainage, no purulence, flaps cool to touch, no additional tracking   - 3/4 s/p R foot partial hallux partially open: fibrogranular wound bed with serousanguinous drainage, no tracking, no purulence, periwound maceration, flaps cool to touch   - B/L surgical sites with high concern for viability and residual soft tissue infection per intraoperative findings   - Pending OR data   - Podiatry plan for L foot wound vac application pending drainage and appearance and R foot delayed primary closure pending drainage   - Will be stable to d/c after two days of no growth from OR data   - discussed with attending

## 2022-03-05 NOTE — PHYSICAL THERAPY INITIAL EVALUATION ADULT - DIAGNOSIS, PT EVAL
Presenting with impaired balance, impaired postural control resulting in difficulty with functional mobility.

## 2022-03-05 NOTE — PROGRESS NOTE ADULT - SUBJECTIVE AND OBJECTIVE BOX
Patient is a 61y old  Female who presents with a chief complaint of L foot pain/discoloration (05 Mar 2022 09:33)       INTERVAL HPI/OVERNIGHT EVENTS:  Patient seen and evaluated at bedside.  Pt is resting comfortable in NAD. Denies N/V/F/C.  Pain rated at 2/10    Allergies    No Known Allergies    Intolerances        Vital Signs Last 24 Hrs  T(C): 36.9 (05 Mar 2022 06:40), Max: 36.9 (05 Mar 2022 06:40)  T(F): 98.4 (05 Mar 2022 06:40), Max: 98.4 (05 Mar 2022 06:40)  HR: 80 (05 Mar 2022 06:40) (67 - 89)  BP: 124/54 (05 Mar 2022 06:40) (110/38 - 157/55)  BP(mean): 65 (04 Mar 2022 17:30) (57 - 69)  RR: 18 (05 Mar 2022 06:40) (11 - 26)  SpO2: 100% (05 Mar 2022 05:20) (95% - 100%)    LABS:                        9.3    17.51 )-----------( 262      ( 05 Mar 2022 07:43 )             30.3     03-05    138  |  93<L>  |  45<H>  ----------------------------<  102<H>  4.3   |  24  |  8.04<H>    Ca    8.6      05 Mar 2022 07:43  Phos  7.3     03-05  Mg     2.50     03-05    TPro  7.1  /  Alb  3.5  /  TBili  0.2  /  DBili  x   /  AST  14  /  ALT  13  /  AlkPhos  58  03-05    PT/INR - ( 04 Mar 2022 03:55 )   PT: 14.0 sec;   INR: 1.20 ratio         PTT - ( 04 Mar 2022 03:55 )  PTT:27.4 sec    CAPILLARY BLOOD GLUCOSE      POCT Blood Glucose.: 95 mg/dL (05 Mar 2022 05:46)      Lower Extremity Physical Exam:    Vascular: DP/PT 1/4 R,  DP/PT 0/4 L, CFT <3 seconds B/L except left 3rd digit absent CFT, Temperature gradient warm to warm L, warm to cool R, resolving L foot edema  Neuro: Epicritic sensation diminished to the level of forefoot B/L  Musculoskeletal/Ortho: left partial 5th ray resection healed, tenderness to palpation to left 3rd   Skin: right distal hallux eschar, no purulence no malodor, no erythema, no tracking  s/p bedside L foot I&D 2/28/22: left foot 3rd digit gangrene to PIPJ, resolving erythema to midfoot, increased edema and pain to forefoot, no fluctuance, no drainage, no malodor, etiology 2/2 ischemia     3/4 s/p L foot partial 3rd ray resection open: fibrous wound bed, no malodor, serous drainage, no purulence, flaps cool to touch, no additional tracking   3/4 s/p R foot partial hallux partially open: fibrogranular wound bed with serousanguinous drainage, no tracking, no purulence, periwound maceration, flaps cool to touch     RADIOLOGY & ADDITIONAL TESTS:      ACC: [id number] EXAM:  MR FOOT WAW IC LT                          PROCEDURE DATE:  03/03/2022          INTERPRETATION:  LEFT FOOT MRI    CLINICAL INFORMATION: Left foot abscess. Evaluate for osteomyelitis  TECHNIQUE: Multiplanar, multisequence MRI was obtained of the left foot   with and without the administration of intravenous contrast. 6 cc of   Gadavist were administered intravenously. 1.5 cc were discarded.      FINDINGS:    There is preservation of the T1 marrow signal. There is faint edema  within the third phalanges and faint periosteal edema and enhancement   within the third metatarsal head. Remaining marrow signal is preserved.   Status post partial indentation of the fifth ray at the level of the mid   shaft of the metatarsal. Soft tissue defect is present along the dorsal   aspect of the third digit along the plantar aspect of the foot lateral to   the third metatarsal head and phalanx. There is lack of postcontrast   enhancement within the plantar subcutaneous tissues and about the third   and fourth phalanges, favored to represent ischemia. There is question of   soft tissue defect over the third distal phalanx. There is diffuse   increased muscle signal, most consistent with denervation. No   well-circumscribed fluid collection is present.    IMPRESSION:    Faint edema within the head of the third metatarsal and third phalanges   with mild periosteal enhancement of the third metatarsal head. Findings   may represent sequela of early osteomyelitis. Questionable soft tissue   defect of the third digit.  Lack of enhancement of the soft tissues plantarly and about the third and   fourth digits, favored to represent sequela of ischemia.    --- End of Report ---            JAYCEE JOHNSON MD; Attending Radiologist  This document has been electronically signed. Mar  3 2022  3:14PM

## 2022-03-05 NOTE — PROGRESS NOTE ADULT - SUBJECTIVE AND OBJECTIVE BOX
Patient seen and examined  no complaints    No Known Allergies    Hospital Medications:   MEDICATIONS  (STANDING):  aspirin  chewable 81 milliGRAM(s) Oral daily  atorvastatin 40 milliGRAM(s) Oral at bedtime  cadexomer iodine 0.9% Gel 1 Application(s) Topical daily  chlorhexidine 2% Cloths 1 Application(s) Topical daily  clopidogrel Tablet 75 milliGRAM(s) Oral daily  epoetin niesha-epbx (RETACRIT) Injectable 8000 Unit(s) IV Push <User Schedule>  Nephro-george 1 Tablet(s) Oral daily  piperacillin/tazobactam IVPB.. 3.375 Gram(s) IV Intermittent every 12 hours  Velphoro (sucroferric oxyhydroxide) 3 Tablet(s) 3 Tablet(s) Oral three times a day        VITALS:  T(F): 97.8 (03-05-22 @ 10:45), Max: 98.4 (03-05-22 @ 06:40)  HR: 85 (03-05-22 @ 10:45)  BP: 119/61 (03-05-22 @ 10:45)  RR: 18 (03-05-22 @ 10:45)  SpO2: 100% (03-05-22 @ 10:45)  Wt(kg): --    03-04 @ 07:01  -  03-05 @ 07:00  --------------------------------------------------------  IN: 420 mL / OUT: 0 mL / NET: 420 mL          Physical Exam :-  Constitutional: NAD  Neck: Supple.  Respiratory: Bilateral equal breath sounds,  Cardiovascular: S1, S2 normal,  Gastrointestinal: Bowel Sounds present, soft, non tender.  Extremities: clean dressing on B/L LE  Neurological: Alert and Oriented x 3, no focal deficits  Psychiatric: Normal mood, normal affect  Vascular Access: thigh avg    LABS:  03-05    138  |  93<L>  |  45<H>  ----------------------------<  102<H>  4.3   |  24  |  8.04<H>    Ca    8.6      05 Mar 2022 07:43  Phos  7.3     03-05  Mg     2.50     03-05    TPro  7.1  /  Alb  3.5  /  TBili  0.2  /  DBili      /  AST  14  /  ALT  13  /  AlkPhos  58  03-05    Creatinine Trend: 8.04 <--, 5.80 <--, 9.57 <--, 7.97 <--, 5.91 <--, 9.43 <--                        9.3    17.51 )-----------( 262      ( 05 Mar 2022 07:43 )             30.3     Urine Studies:      RADIOLOGY & ADDITIONAL STUDIES:

## 2022-03-05 NOTE — PHYSICAL THERAPY INITIAL EVALUATION ADULT - WEIGHT-BEARING RESTRICTIONS: STAND/SIT, REHAB EVAL
right and left LE WBAT, bilateral heel walking/weight-bearing as tolerated right and left LE WBAT, bilateral heel walking in surgical shoes/weight-bearing as tolerated

## 2022-03-05 NOTE — PHYSICAL THERAPY INITIAL EVALUATION ADULT - ADDITIONAL COMMENTS
Pt lives in house with 4 steps to enter and one flight within living with her . Independent with ADLs and ambulation, no use of assistive device.     Pt left sitting in chair, in NAD, lines and tubes intact, call bell within reach, RN aware. Pt lives in house with 4 steps to enter and one flight within living with her . Independent with ADLs and ambulation, no use of assistive device.     Pt left sitting in chair, PCA at bedside, lines and tubes intact, call bell within reach, RN aware.

## 2022-03-05 NOTE — PROGRESS NOTE ADULT - SUBJECTIVE AND OBJECTIVE BOX
Cardiovascular Disease Progress Note    Overnight events: No acute events overnight.  Pt denies chest pain or SOB.   Otherwise review of systems negative    Objective Findings:  T(C): 36.9 (22 @ 06:40), Max: 36.9 (22 @ 06:40)  HR: 80 (22 @ 06:40) (67 - 89)  BP: 124/54 (22 @ 06:40) (110/38 - 157/55)  RR: 18 (22 @ 06:40) (11 - 26)  SpO2: 100% (22 @ 05:20) (95% - 100%)  Wt(kg): --  Daily     Daily Weight in k.1 (05 Mar 2022 06:40)      Physical Exam:  Gen: NAD; Patient resting comfortably  HEENT: EOMI, Normocephalic/ atraumatic  CV: RRR, normal S1 + S2, no m/r/g  Lungs:  Normal respiratory effort; clear to auscultation bilaterally  Abd: soft, non-tender; bowel sounds present  Ext: No edema; warm and well perfused. LE bandaged. C/D/I    Telemetry: N/a    Laboratory Data:                        9.3    17.51 )-----------( 262      ( 05 Mar 2022 07:43 )             30.3     03-05    138  |  93<L>  |  45<H>  ----------------------------<  102<H>  4.3   |  24  |  8.04<H>    Ca    8.6      05 Mar 2022 07:43  Phos  7.3     03-05  Mg     2.50     03-05    TPro  7.1  /  Alb  3.5  /  TBili  0.2  /  DBili  x   /  AST  14  /  ALT  13  /  AlkPhos  58  03-05    PT/INR - ( 04 Mar 2022 03:55 )   PT: 14.0 sec;   INR: 1.20 ratio         PTT - ( 04 Mar 2022 03:55 )  PTT:27.4 sec          Inpatient Medications:  MEDICATIONS  (STANDING):  aspirin  chewable 81 milliGRAM(s) Oral daily  atorvastatin 40 milliGRAM(s) Oral at bedtime  cadexomer iodine 0.9% Gel 1 Application(s) Topical daily  chlorhexidine 2% Cloths 1 Application(s) Topical daily  clopidogrel Tablet 75 milliGRAM(s) Oral daily  epoetin niesha-epbx (RETACRIT) Injectable 8000 Unit(s) IV Push <User Schedule>  Nephro-george 1 Tablet(s) Oral daily  piperacillin/tazobactam IVPB.. 3.375 Gram(s) IV Intermittent every 12 hours  Velphoro (sucroferric oxyhydroxide) 3 Tablet(s) 3 Tablet(s) Oral three times a day      Assessment:  61-year-old female with ESRD on HD MWF via right groin AVF, HLD and PAD with previous intervention presenting with L sided foot pain, ongoing since 2021, becoming more severe in the past 2 weeks.    Plan of Care:    # L foot gangrene  - S/p L foot partial 3rd ray amputation and R hallux amputation 3/4.   - Tolerated procedure well.   - No evidence of post-operative coronary ischemia.   - Pods and vascular following  - TTE shows preserved LVEF with mild AI and no significant structural disease.       #ESRD  - HD as per nephro    #PAD  - Continue ASA and plavix    #HLD  - Statin as ordered    #ACP (advance care planning)-  Advanced care planning was discussed with the patient.  At this time, Ms. Boogie is doing well from a cardiac standpoint. Pt may be discharged and is instructed to follow up outpatient for further cardiac management.   Risks, benefits and alternatives of medical treatment and procedures were discussed in detail and all questions were answered. 30 minutes spent addressing advance care plans.          Plan of Care:          Over 25 minutes spent on total encounter; more than 50% of the visit was spent counseling and/or coordinating care by the attending physician.      Wisam Adams D.O.   Cardiovascular Disease  [phone]

## 2022-03-05 NOTE — PHYSICAL THERAPY INITIAL EVALUATION ADULT - DID THE PATIENT HAVE SURGERY?
Partial amputation of third ray of left foot by open approach. Partial amputation of hallux of right foot./yes

## 2022-03-05 NOTE — PROGRESS NOTE ADULT - ASSESSMENT
61 Yr old female ESRD (Mn/Wed/Fr.) well known to me from [facility], Right groin AVF, DM, PVD, now with severe left foot pain. Renal following for ESRD Mx    End Stage Renal Disease on Dialysis MWF  HD unit [facility]  K, vol- ok  not in chf  s/p HD today- tolerated well-  Plan for next HD Monday  renal restrictions in diet.   dose all meds for ESRD  monitor BMP    Anemia of CKD- Hb < goal. c/w epo 8k tiw w/hd    HTN,, controlled; monitor BP    Left foot infection, gangrene- Mx per Podiatry. s/p vanco , on zosyn. dose abxs for ESRD. monitor vanco level      For any question, call:  Cell # [phone]  Dr Mcfarlane

## 2022-03-06 LAB
-  AMIKACIN: SIGNIFICANT CHANGE UP
-  AMOXICILLIN/CLAVULANIC ACID: SIGNIFICANT CHANGE UP
-  AMPICILLIN/SULBACTAM: SIGNIFICANT CHANGE UP
-  AMPICILLIN: SIGNIFICANT CHANGE UP
-  AZTREONAM: SIGNIFICANT CHANGE UP
-  CEFAZOLIN: SIGNIFICANT CHANGE UP
-  CEFEPIME: SIGNIFICANT CHANGE UP
-  CEFOXITIN: SIGNIFICANT CHANGE UP
-  CEFTRIAXONE: SIGNIFICANT CHANGE UP
-  CIPROFLOXACIN: SIGNIFICANT CHANGE UP
-  CLINDAMYCIN: SIGNIFICANT CHANGE UP
-  ERTAPENEM: SIGNIFICANT CHANGE UP
-  ERYTHROMYCIN: SIGNIFICANT CHANGE UP
-  GENTAMICIN: SIGNIFICANT CHANGE UP
-  IMIPENEM: SIGNIFICANT CHANGE UP
-  LEVOFLOXACIN: SIGNIFICANT CHANGE UP
-  MEROPENEM: SIGNIFICANT CHANGE UP
-  OXACILLIN: SIGNIFICANT CHANGE UP
-  PENICILLIN: SIGNIFICANT CHANGE UP
-  PIPERACILLIN/TAZOBACTAM: SIGNIFICANT CHANGE UP
-  RIFAMPIN: SIGNIFICANT CHANGE UP
-  TETRACYCLINE: SIGNIFICANT CHANGE UP
-  TOBRAMYCIN: SIGNIFICANT CHANGE UP
-  TRIMETHOPRIM/SULFAMETHOXAZOLE: SIGNIFICANT CHANGE UP
-  VANCOMYCIN: SIGNIFICANT CHANGE UP
ALBUMIN SERPL ELPH-MCNC: 3.6 G/DL — SIGNIFICANT CHANGE UP (ref 3.3–5)
ALP SERPL-CCNC: 64 U/L — SIGNIFICANT CHANGE UP (ref 40–120)
ALT FLD-CCNC: 25 U/L — SIGNIFICANT CHANGE UP (ref 4–33)
ANION GAP SERPL CALC-SCNC: 14 MMOL/L — SIGNIFICANT CHANGE UP (ref 7–14)
AST SERPL-CCNC: 26 U/L — SIGNIFICANT CHANGE UP (ref 4–32)
BILIRUB SERPL-MCNC: <0.2 MG/DL — SIGNIFICANT CHANGE UP (ref 0.2–1.2)
BUN SERPL-MCNC: 38 MG/DL — HIGH (ref 7–23)
CALCIUM SERPL-MCNC: 8.7 MG/DL — SIGNIFICANT CHANGE UP (ref 8.4–10.5)
CHLORIDE SERPL-SCNC: 93 MMOL/L — LOW (ref 98–107)
CO2 SERPL-SCNC: 30 MMOL/L — SIGNIFICANT CHANGE UP (ref 22–31)
CREAT SERPL-MCNC: 6.43 MG/DL — HIGH (ref 0.5–1.3)
EGFR: 7 ML/MIN/1.73M2 — LOW
GLUCOSE BLDC GLUCOMTR-MCNC: 126 MG/DL — HIGH (ref 70–99)
GLUCOSE BLDC GLUCOMTR-MCNC: 147 MG/DL — HIGH (ref 70–99)
GLUCOSE BLDC GLUCOMTR-MCNC: 170 MG/DL — HIGH (ref 70–99)
GLUCOSE BLDC GLUCOMTR-MCNC: 208 MG/DL — HIGH (ref 70–99)
GLUCOSE SERPL-MCNC: 160 MG/DL — HIGH (ref 70–99)
HCT VFR BLD CALC: 29.6 % — LOW (ref 34.5–45)
HGB BLD-MCNC: 8.8 G/DL — LOW (ref 11.5–15.5)
MAGNESIUM SERPL-MCNC: 2.3 MG/DL — SIGNIFICANT CHANGE UP (ref 1.6–2.6)
MCHC RBC-ENTMCNC: 26.8 PG — LOW (ref 27–34)
MCHC RBC-ENTMCNC: 29.7 GM/DL — LOW (ref 32–36)
MCV RBC AUTO: 90.2 FL — SIGNIFICANT CHANGE UP (ref 80–100)
METHOD TYPE: SIGNIFICANT CHANGE UP
NRBC # BLD: 0 /100 WBCS — SIGNIFICANT CHANGE UP
NRBC # FLD: 0.09 K/UL — HIGH
PHOSPHATE SERPL-MCNC: 2.2 MG/DL — LOW (ref 2.5–4.5)
PLATELET # BLD AUTO: 265 K/UL — SIGNIFICANT CHANGE UP (ref 150–400)
POTASSIUM SERPL-MCNC: 4.2 MMOL/L — SIGNIFICANT CHANGE UP (ref 3.5–5.3)
POTASSIUM SERPL-SCNC: 4.2 MMOL/L — SIGNIFICANT CHANGE UP (ref 3.5–5.3)
PROT SERPL-MCNC: 7.1 G/DL — SIGNIFICANT CHANGE UP (ref 6–8.3)
RBC # BLD: 3.28 M/UL — LOW (ref 3.8–5.2)
RBC # FLD: 16.6 % — HIGH (ref 10.3–14.5)
SODIUM SERPL-SCNC: 137 MMOL/L — SIGNIFICANT CHANGE UP (ref 135–145)
WBC # BLD: 14.54 K/UL — HIGH (ref 3.8–10.5)
WBC # FLD AUTO: 14.54 K/UL — HIGH (ref 3.8–10.5)

## 2022-03-06 RX ORDER — HEPARIN SODIUM 5000 [USP'U]/ML
5000 INJECTION INTRAVENOUS; SUBCUTANEOUS EVERY 12 HOURS
Refills: 0 | Status: DISCONTINUED | OUTPATIENT
Start: 2022-03-06 | End: 2022-03-14

## 2022-03-06 RX ORDER — HYDROMORPHONE HYDROCHLORIDE 2 MG/ML
0.25 INJECTION INTRAMUSCULAR; INTRAVENOUS; SUBCUTANEOUS EVERY 6 HOURS
Refills: 0 | Status: DISCONTINUED | OUTPATIENT
Start: 2022-03-06 | End: 2022-03-13

## 2022-03-06 RX ADMIN — CHLORHEXIDINE GLUCONATE 1 APPLICATION(S): 213 SOLUTION TOPICAL at 12:25

## 2022-03-06 RX ADMIN — PIPERACILLIN AND TAZOBACTAM 25 GRAM(S): 4; .5 INJECTION, POWDER, LYOPHILIZED, FOR SOLUTION INTRAVENOUS at 09:04

## 2022-03-06 RX ADMIN — Medication 650 MILLIGRAM(S): at 03:58

## 2022-03-06 RX ADMIN — Medication 1 TABLET(S): at 12:45

## 2022-03-06 RX ADMIN — Medication 81 MILLIGRAM(S): at 12:45

## 2022-03-06 RX ADMIN — CLOPIDOGREL BISULFATE 75 MILLIGRAM(S): 75 TABLET, FILM COATED ORAL at 12:45

## 2022-03-06 RX ADMIN — ATORVASTATIN CALCIUM 40 MILLIGRAM(S): 80 TABLET, FILM COATED ORAL at 21:51

## 2022-03-06 RX ADMIN — Medication 650 MILLIGRAM(S): at 02:58

## 2022-03-06 RX ADMIN — HEPARIN SODIUM 5000 UNIT(S): 5000 INJECTION INTRAVENOUS; SUBCUTANEOUS at 17:21

## 2022-03-06 RX ADMIN — HYDROMORPHONE HYDROCHLORIDE 0.25 MILLIGRAM(S): 2 INJECTION INTRAMUSCULAR; INTRAVENOUS; SUBCUTANEOUS at 22:15

## 2022-03-06 RX ADMIN — HYDROMORPHONE HYDROCHLORIDE 0.25 MILLIGRAM(S): 2 INJECTION INTRAMUSCULAR; INTRAVENOUS; SUBCUTANEOUS at 22:02

## 2022-03-06 RX ADMIN — PIPERACILLIN AND TAZOBACTAM 25 GRAM(S): 4; .5 INJECTION, POWDER, LYOPHILIZED, FOR SOLUTION INTRAVENOUS at 21:49

## 2022-03-06 RX ADMIN — Medication 650 MILLIGRAM(S): at 17:21

## 2022-03-06 NOTE — PROGRESS NOTE ADULT - ASSESSMENT
62 yo f with left foot cellulitis  -pt seen and evaluated  - Afebrile, WBC 14  - 3/4 s/p L foot partial 3rd ray resection open: fibrous wound bed, no malodor, serous drainage, no purulence, flaps cool, no additional tracking   - 3/4 s/p R foot partial hallux partially open: fibrogranular wound bed with serosanguinous drainage, no tracking, no purulence, periwound maceration improving, flaps cool  - B/L surgical sites with high concern for viability and residual soft tissue infection per intraoperative findings   - B/L  OR Swab & L foot clean bone culture: growing E coli prelim  - Recommend ID reevaluation, pt will likely need long term IV abx upon d/c given preliminary culture results  - Pod plan for wound VAC to left foot beginning tomorrow 3/7 & local wound care to R foot surgical site  - Stability for d/c from pod pending viability of surgical sites & final ID recs  - discussed with attending

## 2022-03-06 NOTE — PROGRESS NOTE ADULT - ASSESSMENT
61-year-old female with ESRD on HD MWF via right groin AVF, HLD and PAD presenting with L sided foot pain, found to have likely OM     Problem/Plan - 1:  ·  Problem: Foot osteomyelitis.   ·  Plan: S/P  left foot partial 3rd ray amputation- open and right foot partial hallux amputation- partially open, poor intraop bleeding, good bone quality at the level of resection, low concern for bone infection, high concern for viability/soft tissue infection   ID help appreciated and now on Zosyn.   appreciate podiatry recs .  MARTA/PVR noted. Severe Small vessel disease.   vascular surgery help appreciated.  s/p I&D by podiatry in ED, plan for left foot partial 3rd ray resection pending MARTA/vasc recs     < from: MR Foot w/wo IV Cont, Left (03.03.22 @ 15:01) >  IMPRESSION:    Faint edema within the head of the third metatarsal and third phalanges   with mild periosteal enhancement of the third metatarsal head. Findings   may represent sequela of early osteomyelitis. Questionable soft tissue   defect of the third digit.  Lack of enhancement of the soft tissues plantarly and about the third and   fourth digits, favored to represent sequela of ischemia.    < end of copied text >     Problem/Plan - 2:  ·  Problem: ESRD on dialysis.   ·  Plan: appreciate renal recs  dose meds for intermittent HD  renal restricted diet  c/w phosphate binders     Problem/Plan - 3:  ·  Problem: PAD (peripheral artery disease).   ·  Plan: c/w DAPT and statin  f/u VSx recs  MARTA/PVR noted.      Problem/Plan - 4:  ·  Problem: Lower extremity edema.   ·  Plan: b/l LE edema   LE dopplers negative for  VTE.     Problem/Plan - 5:  ·  Problem: Need for prophylactic measure.   ·  Plan: pharmacologic DVT ppx pending VSx recs/AM podiatry recs (?plan for OR)      Dispo : DC planning to [facility] once stable.

## 2022-03-06 NOTE — PROGRESS NOTE ADULT - SUBJECTIVE AND OBJECTIVE BOX
Date of Service  : 03-06-22 @ 15:10    INTERVAL HPI/OVERNIGHT EVENTS: I feel fine.   Vital Signs Last 24 Hrs  T(C): 36.6 (06 Mar 2022 12:46), Max: 36.7 (05 Mar 2022 21:35)  T(F): 97.8 (06 Mar 2022 12:46), Max: 98 (05 Mar 2022 21:35)  HR: 70 (06 Mar 2022 12:46) (66 - 70)  BP: 113/41 (06 Mar 2022 12:46) (100/54 - 122/67)  BP(mean): --  RR: 18 (06 Mar 2022 12:46) (18 - 18)  SpO2: 97% (06 Mar 2022 12:46) (97% - 100%)  I&O's Summary    05 Mar 2022 07:01  -  06 Mar 2022 07:00  --------------------------------------------------------  IN: 2000 mL / OUT: 1900 mL / NET: 100 mL      MEDICATIONS  (STANDING):  aspirin  chewable 81 milliGRAM(s) Oral daily  atorvastatin 40 milliGRAM(s) Oral at bedtime  cadexomer iodine 0.9% Gel 1 Application(s) Topical daily  chlorhexidine 2% Cloths 1 Application(s) Topical daily  clopidogrel Tablet 75 milliGRAM(s) Oral daily  epoetin niesha-epbx (RETACRIT) Injectable 8000 Unit(s) IV Push <User Schedule>  Nephro-george 1 Tablet(s) Oral daily  piperacillin/tazobactam IVPB.. 3.375 Gram(s) IV Intermittent every 12 hours  Velphoro (sucroferric oxyhydroxide) 3 Tablet(s) 3 Tablet(s) Oral three times a day    MEDICATIONS  (PRN):  acetaminophen     Tablet .. 650 milliGRAM(s) Oral every 6 hours PRN Mild Pain (1 - 3), Moderate Pain (4 - 6)  HYDROmorphone  Injectable 0.25 milliGRAM(s) IV Push every 6 hours PRN Severe Pain (7 - 10)    LABS:                        8.8    14.54 )-----------( 265      ( 06 Mar 2022 07:20 )             29.6     03-06    137  |  93<L>  |  38<H>  ----------------------------<  160<H>  4.2   |  30  |  6.43<H>    Ca    8.7      06 Mar 2022 07:20  Phos  2.2     03-06  Mg     2.30     03-06    TPro  7.1  /  Alb  3.6  /  TBili  <0.2  /  DBili  x   /  AST  26  /  ALT  25  /  AlkPhos  64  03-06        CAPILLARY BLOOD GLUCOSE      POCT Blood Glucose.: 147 mg/dL (06 Mar 2022 13:32)  POCT Blood Glucose.: 126 mg/dL (06 Mar 2022 09:17)          REVIEW OF SYSTEMS:  CONSTITUTIONAL: No fever, weight loss, or fatigue  EYES: No eye pain, visual disturbances, or discharge  ENMT:  No difficulty hearing, tinnitus, vertigo; No sinus or throat pain  NECK: No pain or stiffness  RESPIRATORY: No cough, wheezing, chills or hemoptysis; No shortness of breath  CARDIOVASCULAR: No chest pain, palpitations, dizziness, or leg swelling  GASTROINTESTINAL: No abdominal or epigastric pain. No nausea, vomiting, or hematemesis; No diarrhea or constipation. No melena or hematochezia.  GENITOURINARY: No dysuria, frequency, hematuria, or incontinence  NEUROLOGICAL: No headaches, memory loss, loss of strength, numbness, or tremors      Consultant(s) Notes Reviewed:  [x ] YES  [ ] NO    PHYSICAL EXAM:  GENERAL: NAD, well-groomed, well-developed,not in any distress ,  HEAD:  Atraumatic, Normocephalic  NECK: Supple, No JVD, Normal thyroid  NERVOUS SYSTEM:  Alert & Oriented X3, No focal deficit   CHEST/LUNG: Good air entry bilateral with no  rales, rhonchi, wheezing, or rubs  HEART: Regular rate and rhythm; No murmurs, rubs, or gallops  ABDOMEN: Soft, Nontender, Nondistended; Bowel sounds present  EXTREMITIES:  Feet dressed     Care Discussed with Consultants/Other Providers [ x] YES  [ ] NO

## 2022-03-06 NOTE — PROGRESS NOTE ADULT - SUBJECTIVE AND OBJECTIVE BOX
Patient is a 61y old  Female who presents with a chief complaint of L foot pain/discoloration (05 Mar 2022 17:24)       INTERVAL HPI/OVERNIGHT EVENTS:  Patient seen and evaluated at bedside.  Pt is resting comfortable in NAD. Denies N/V/F/C.     Allergies    No Known Allergies    Intolerances        Vital Signs Last 24 Hrs  T(C): 36.6 (06 Mar 2022 05:00), Max: 36.9 (05 Mar 2022 10:00)  T(F): 97.8 (06 Mar 2022 05:00), Max: 98.5 (05 Mar 2022 10:00)  HR: 67 (06 Mar 2022 05:00) (66 - 85)  BP: 100/54 (06 Mar 2022 05:00) (100/54 - 128/56)  BP(mean): --  RR: 18 (06 Mar 2022 05:00) (18 - 18)  SpO2: 98% (06 Mar 2022 05:00) (98% - 100%)    LABS:                        8.8    14.54 )-----------( 265      ( 06 Mar 2022 07:20 )             29.6     03-06    137  |  93<L>  |  38<H>  ----------------------------<  160<H>  4.2   |  30  |  6.43<H>    Ca    8.7      06 Mar 2022 07:20  Phos  2.2     03-06  Mg     2.30     03-06    TPro  7.1  /  Alb  3.6  /  TBili  <0.2  /  DBili  x   /  AST  26  /  ALT  25  /  AlkPhos  64  03-06        CAPILLARY BLOOD GLUCOSE      POCT Blood Glucose.: 126 mg/dL (06 Mar 2022 09:17)      Lower Extremity Physical Exam:  Vascular: DP/PT 1/4 R,  DP/PT 0/4 L, CFT <3 seconds B/L except left 3rd digit absent CFT, Temperature gradient warm to warm L, warm to cool R, resolving L foot edema  Neuro: Epicritic sensation diminished to the level of forefoot B/L  Musculoskeletal/Ortho: left partial 5th ray resection healed, tenderness to palpation to left 3rd   Skin:   3/4 s/p L foot partial 3rd ray resection open: fibrous wound bed, no malodor, serous drainage, no purulence, flaps cool, no additional tracking   3/4 s/p R foot partial hallux partially open: fibrogranular wound bed with serousanguinous drainage, no tracking, no purulence, periwound maceration improving, flaps cool    RADIOLOGY & ADDITIONAL TESTS:

## 2022-03-06 NOTE — PROGRESS NOTE ADULT - SUBJECTIVE AND OBJECTIVE BOX
Cardiovascular Disease Progress Note    Overnight events: No acute events overnight.  Ms. Boogie denies chest pain or SOB.   Otherwise review of systems negative    Objective Findings:  T(C): 36.6 (22 @ 05:00), Max: 36.9 (22 @ 10:00)  HR: 67 (22 @ 05:00) (66 - 85)  BP: 100/54 (22 @ 05:00) (100/54 - 128/56)  RR: 18 (22 @ 05:00) (18 - 18)  SpO2: 98% (22 @ 05:00) (98% - 100%)  Wt(kg): --  Daily     Daily Weight in k.6 (05 Mar 2022 10:00)      Physical Exam:  Gen: NAD; Patient resting comfortably  HEENT: EOMI, Normocephalic/ atraumatic  CV: RRR, normal S1 + S2, no m/r/g  Lungs:  Normal respiratory effort; clear to auscultation bilaterally  Abd: soft, non-tender; bowel sounds present  Ext: Bilateral LE bandaged warm to touch C/D/I    Telemetry: N/a    Laboratory Data:                        8.8    14.54 )-----------( 265      ( 06 Mar 2022 07:20 )             29.6     -    137  |  93<L>  |  38<H>  ----------------------------<  160<H>  4.2   |  30  |  6.43<H>    Ca    8.7      06 Mar 2022 07:20  Phos  2.2     03-  Mg     2.30     -    TPro  7.1  /  Alb  3.6  /  TBili  <0.2  /  DBili  x   /  AST  26  /  ALT  25  /  AlkPhos  64  -              Inpatient Medications:  MEDICATIONS  (STANDING):  aspirin  chewable 81 milliGRAM(s) Oral daily  atorvastatin 40 milliGRAM(s) Oral at bedtime  cadexomer iodine 0.9% Gel 1 Application(s) Topical daily  chlorhexidine 2% Cloths 1 Application(s) Topical daily  clopidogrel Tablet 75 milliGRAM(s) Oral daily  epoetin niesha-epbx (RETACRIT) Injectable 8000 Unit(s) IV Push <User Schedule>  Nephro-george 1 Tablet(s) Oral daily  piperacillin/tazobactam IVPB.. 3.375 Gram(s) IV Intermittent every 12 hours  Velphoro (sucroferric oxyhydroxide) 3 Tablet(s) 3 Tablet(s) Oral three times a day      Assessment: 61-year-old female with ESRD on HD MWF via right groin AVF, HLD and PAD with previous intervention presenting with L sided foot pain, ongoing since 2021, becoming more severe in the past 2 weeks.    Plan of Care:    # L foot gangrene  - S/p L foot partial 3rd ray amputation and R hallux amputation 3/4.   - Tolerated procedure well.   - No evidence of post-operative coronary ischemia.   - Pods and vascular following  - Concern for poor healing given history of PAD and poor perfusion.  - TTE shows preserved LVEF with mild AI and no significant structural disease.       #ESRD  - HD as per nephro    #PAD  - Continue ASA and plavix    #HLD  - Statin as ordered          Over 25 minutes spent on total encounter; more than 50% of the visit was spent counseling and/or coordinating care by the attending physician.      Wisam Adams D.O.   Cardiovascular Disease  [phone] Cardiovascular Disease Progress Note    Overnight events: No acute events overnight.  Ms. Boogie denies chest pain or SOB.   Otherwise review of systems negative    Objective Findings:  T(C): 36.6 (22 @ 05:00), Max: 36.9 (22 @ 10:00)  HR: 67 (22 @ 05:00) (66 - 85)  BP: 100/54 (22 @ 05:00) (100/54 - 128/56)  RR: 18 (22 @ 05:00) (18 - 18)  SpO2: 98% (22 @ 05:00) (98% - 100%)  Wt(kg): --  Daily     Daily Weight in k.6 (05 Mar 2022 10:00)      Physical Exam:  Gen: NAD; Patient resting comfortably  HEENT: EOMI, Normocephalic/ atraumatic  CV: RRR, normal S1 + S2, no m/r/g  Lungs:  Normal respiratory effort; clear to auscultation bilaterally  Abd: soft, non-tender; bowel sounds present  Ext: Bilateral LE bandaged warm to touch C/D/I    Telemetry: N/a    Laboratory Data:                        8.8    14.54 )-----------( 265      ( 06 Mar 2022 07:20 )             29.6     -    137  |  93<L>  |  38<H>  ----------------------------<  160<H>  4.2   |  30  |  6.43<H>    Ca    8.7      06 Mar 2022 07:20  Phos  2.2     03-  Mg     2.30     -    TPro  7.1  /  Alb  3.6  /  TBili  <0.2  /  DBili  x   /  AST  26  /  ALT  25  /  AlkPhos  64  -              Inpatient Medications:  MEDICATIONS  (STANDING):  aspirin  chewable 81 milliGRAM(s) Oral daily  atorvastatin 40 milliGRAM(s) Oral at bedtime  cadexomer iodine 0.9% Gel 1 Application(s) Topical daily  chlorhexidine 2% Cloths 1 Application(s) Topical daily  clopidogrel Tablet 75 milliGRAM(s) Oral daily  epoetin niesha-epbx (RETACRIT) Injectable 8000 Unit(s) IV Push <User Schedule>  Nephro-george 1 Tablet(s) Oral daily  piperacillin/tazobactam IVPB.. 3.375 Gram(s) IV Intermittent every 12 hours  Velphoro (sucroferric oxyhydroxide) 3 Tablet(s) 3 Tablet(s) Oral three times a day      Assessment: 61-year-old female with ESRD on HD MWF via right groin AVF, HLD and PAD with previous intervention presenting with L sided foot pain, ongoing since 2021, becoming more severe in the past 2 weeks.    Plan of Care:    # L foot gangrene  - S/p L foot partial 3rd ray amputation and R hallux amputation 3/4.   - Tolerated procedure well.   - No evidence of post-operative coronary ischemia.   - Pods and vascular following  - Concern for poor healing given history of PAD and poor perfusion.  - TTE shows preserved LVEF with mild AI and no significant structural disease.   - Pt is optimized from a cardiac perspective.   - Ms. Boogie is scheduled for outpatient follow up with Dr. Wisam Adams on 3/24 at 12:30pm      #ESRD  - HD as per nephro    #PAD  - Continue ASA and plavix    #HLD  - Statin as ordered          Over 25 minutes spent on total encounter; more than 50% of the visit was spent counseling and/or coordinating care by the attending physician.      Wisam Adams D.O.   Cardiovascular Disease  [phone]

## 2022-03-07 LAB
ALBUMIN SERPL ELPH-MCNC: 3.4 G/DL — SIGNIFICANT CHANGE UP (ref 3.3–5)
ALP SERPL-CCNC: 76 U/L — SIGNIFICANT CHANGE UP (ref 40–120)
ALT FLD-CCNC: 28 U/L — SIGNIFICANT CHANGE UP (ref 4–33)
ANION GAP SERPL CALC-SCNC: 18 MMOL/L — HIGH (ref 7–14)
AST SERPL-CCNC: 28 U/L — SIGNIFICANT CHANGE UP (ref 4–32)
BILIRUB SERPL-MCNC: <0.2 MG/DL — SIGNIFICANT CHANGE UP (ref 0.2–1.2)
BUN SERPL-MCNC: 57 MG/DL — HIGH (ref 7–23)
CALCIUM SERPL-MCNC: 8.5 MG/DL — SIGNIFICANT CHANGE UP (ref 8.4–10.5)
CHLORIDE SERPL-SCNC: 93 MMOL/L — LOW (ref 98–107)
CO2 SERPL-SCNC: 25 MMOL/L — SIGNIFICANT CHANGE UP (ref 22–31)
CREAT SERPL-MCNC: 8.2 MG/DL — HIGH (ref 0.5–1.3)
EGFR: 5 ML/MIN/1.73M2 — LOW
GLUCOSE SERPL-MCNC: 140 MG/DL — HIGH (ref 70–99)
HCT VFR BLD CALC: 28.2 % — LOW (ref 34.5–45)
HGB BLD-MCNC: 8.6 G/DL — LOW (ref 11.5–15.5)
MAGNESIUM SERPL-MCNC: 2.3 MG/DL — SIGNIFICANT CHANGE UP (ref 1.6–2.6)
MCHC RBC-ENTMCNC: 27 PG — SIGNIFICANT CHANGE UP (ref 27–34)
MCHC RBC-ENTMCNC: 30.5 GM/DL — LOW (ref 32–36)
MCV RBC AUTO: 88.4 FL — SIGNIFICANT CHANGE UP (ref 80–100)
NRBC # BLD: 0 /100 WBCS — SIGNIFICANT CHANGE UP
NRBC # FLD: 0.07 K/UL — HIGH
PHOSPHATE SERPL-MCNC: 2.7 MG/DL — SIGNIFICANT CHANGE UP (ref 2.5–4.5)
PLATELET # BLD AUTO: 291 K/UL — SIGNIFICANT CHANGE UP (ref 150–400)
POTASSIUM SERPL-MCNC: 4.2 MMOL/L — SIGNIFICANT CHANGE UP (ref 3.5–5.3)
POTASSIUM SERPL-SCNC: 4.2 MMOL/L — SIGNIFICANT CHANGE UP (ref 3.5–5.3)
PROT SERPL-MCNC: 6.6 G/DL — SIGNIFICANT CHANGE UP (ref 6–8.3)
RBC # BLD: 3.19 M/UL — LOW (ref 3.8–5.2)
RBC # FLD: 16.6 % — HIGH (ref 10.3–14.5)
SARS-COV-2 RNA SPEC QL NAA+PROBE: SIGNIFICANT CHANGE UP
SODIUM SERPL-SCNC: 136 MMOL/L — SIGNIFICANT CHANGE UP (ref 135–145)
WBC # BLD: 15.47 K/UL — HIGH (ref 3.8–10.5)
WBC # FLD AUTO: 15.47 K/UL — HIGH (ref 3.8–10.5)

## 2022-03-07 PROCEDURE — 99232 SBSQ HOSP IP/OBS MODERATE 35: CPT

## 2022-03-07 RX ORDER — HYDROMORPHONE HYDROCHLORIDE 2 MG/ML
0.5 INJECTION INTRAMUSCULAR; INTRAVENOUS; SUBCUTANEOUS ONCE
Refills: 0 | Status: DISCONTINUED | OUTPATIENT
Start: 2022-03-07 | End: 2022-03-07

## 2022-03-07 RX ORDER — CEFEPIME 1 G/1
1000 INJECTION, POWDER, FOR SOLUTION INTRAMUSCULAR; INTRAVENOUS EVERY 24 HOURS
Refills: 0 | Status: DISCONTINUED | OUTPATIENT
Start: 2022-03-07 | End: 2022-03-14

## 2022-03-07 RX ADMIN — HYDROMORPHONE HYDROCHLORIDE 0.25 MILLIGRAM(S): 2 INJECTION INTRAMUSCULAR; INTRAVENOUS; SUBCUTANEOUS at 16:38

## 2022-03-07 RX ADMIN — CLOPIDOGREL BISULFATE 75 MILLIGRAM(S): 75 TABLET, FILM COATED ORAL at 12:30

## 2022-03-07 RX ADMIN — HYDROMORPHONE HYDROCHLORIDE 0.25 MILLIGRAM(S): 2 INJECTION INTRAMUSCULAR; INTRAVENOUS; SUBCUTANEOUS at 06:47

## 2022-03-07 RX ADMIN — CHLORHEXIDINE GLUCONATE 1 APPLICATION(S): 213 SOLUTION TOPICAL at 12:29

## 2022-03-07 RX ADMIN — HEPARIN SODIUM 5000 UNIT(S): 5000 INJECTION INTRAVENOUS; SUBCUTANEOUS at 17:17

## 2022-03-07 RX ADMIN — HEPARIN SODIUM 5000 UNIT(S): 5000 INJECTION INTRAVENOUS; SUBCUTANEOUS at 05:51

## 2022-03-07 RX ADMIN — Medication 1 TABLET(S): at 12:29

## 2022-03-07 RX ADMIN — HYDROMORPHONE HYDROCHLORIDE 0.5 MILLIGRAM(S): 2 INJECTION INTRAMUSCULAR; INTRAVENOUS; SUBCUTANEOUS at 08:40

## 2022-03-07 RX ADMIN — HYDROMORPHONE HYDROCHLORIDE 0.25 MILLIGRAM(S): 2 INJECTION INTRAMUSCULAR; INTRAVENOUS; SUBCUTANEOUS at 23:46

## 2022-03-07 RX ADMIN — ERYTHROPOIETIN 8000 UNIT(S): 10000 INJECTION, SOLUTION INTRAVENOUS; SUBCUTANEOUS at 08:44

## 2022-03-07 RX ADMIN — Medication 650 MILLIGRAM(S): at 07:44

## 2022-03-07 RX ADMIN — ATORVASTATIN CALCIUM 40 MILLIGRAM(S): 80 TABLET, FILM COATED ORAL at 21:03

## 2022-03-07 RX ADMIN — Medication 81 MILLIGRAM(S): at 12:30

## 2022-03-07 RX ADMIN — HYDROMORPHONE HYDROCHLORIDE 0.25 MILLIGRAM(S): 2 INJECTION INTRAMUSCULAR; INTRAVENOUS; SUBCUTANEOUS at 16:23

## 2022-03-07 RX ADMIN — Medication 650 MILLIGRAM(S): at 21:03

## 2022-03-07 RX ADMIN — Medication 650 MILLIGRAM(S): at 22:03

## 2022-03-07 RX ADMIN — CEFEPIME 100 MILLIGRAM(S): 1 INJECTION, POWDER, FOR SOLUTION INTRAMUSCULAR; INTRAVENOUS at 17:15

## 2022-03-07 RX ADMIN — Medication 650 MILLIGRAM(S): at 08:20

## 2022-03-07 RX ADMIN — HYDROMORPHONE HYDROCHLORIDE 0.25 MILLIGRAM(S): 2 INJECTION INTRAMUSCULAR; INTRAVENOUS; SUBCUTANEOUS at 07:10

## 2022-03-07 NOTE — PROGRESS NOTE ADULT - SUBJECTIVE AND OBJECTIVE BOX
[facility] - TIFFANY Bazzi / TIFFANY Shea / APARNA Vanegas/ TIFFANY Mcfarlane/ TIFFANY Madden/ MASSIEL Perkins / RAINER Potts / ESTEPHANIE Griffith  ---------------------------------------------------------------------------------------------------------------  seen and examined today for ESRD  Interval : NAD  VITALS:  T(F): 98.3 (03-07-22 @ 09:39), Max: 98.5 (03-07-22 @ 06:22)  HR: 70 (03-07-22 @ 09:39)  BP: 136/67 (03-07-22 @ 09:39)  RR: 18 (03-07-22 @ 09:39)  SpO2: 100% (03-07-22 @ 05:30)  Wt(kg): --    03-07 @ 07:01  -  03-07 @ 10:13  --------------------------------------------------------  IN: 400 mL / OUT: 2000 mL / NET: -1600 mL      Physical Exam :-  Constitutional: NAD  Neck: Supple.  Respiratory: Bilateral equal breath sounds,  Cardiovascular: S1, S2 normal,  Gastrointestinal: Bowel Sounds present, soft, non tender.  Extremities: S/P LLE TMA and right toe partial amputation  Neurological: Alert and Oriented x 3, no focal deficits  Psychiatric: Normal mood, normal affect  Data:-  Allergies :   No Known Allergies    Hospital Medications:   MEDICATIONS  (STANDING):  aspirin  chewable 81 milliGRAM(s) Oral daily  atorvastatin 40 milliGRAM(s) Oral at bedtime  cadexomer iodine 0.9% Gel 1 Application(s) Topical daily  cefepime   IVPB 1000 milliGRAM(s) IV Intermittent every 24 hours  chlorhexidine 2% Cloths 1 Application(s) Topical daily  clopidogrel Tablet 75 milliGRAM(s) Oral daily  epoetin niesha-epbx (RETACRIT) Injectable 8000 Unit(s) IV Push <User Schedule>  heparin   Injectable 5000 Unit(s) SubCutaneous every 12 hours  Nephro-george 1 Tablet(s) Oral daily  Velphoro (sucroferric oxyhydroxide) 3 Tablet(s) 3 Tablet(s) Oral three times a day    03-07    136  |  93<L>  |  57<H>  ----------------------------<  140<H>  4.2   |  25  |  8.20<H>    Ca    8.5      07 Mar 2022 07:07  Phos  2.7     03-07  Mg     2.30     03-07    TPro  6.6  /  Alb  3.4  /  TBili  <0.2  /  DBili      /  AST  28  /  ALT  28  /  AlkPhos  76  03-07    Creatinine Trend: 8.20 <--, 6.43 <--, 8.04 <--, 5.80 <--, 9.57 <--, 7.97 <--, 5.91 <--, 9.43 <--                        8.6    15.47 )-----------( 291      ( 07 Mar 2022 07:07 )             28.2

## 2022-03-07 NOTE — PROGRESS NOTE ADULT - SUBJECTIVE AND OBJECTIVE BOX
Patient is a 61y old  Female who presents with a chief complaint of L foot pain/discoloration (07 Mar 2022 09:17)       INTERVAL HPI/OVERNIGHT EVENTS:  Patient seen and evaluated at bedside.  Pt is resting comfortable in NAD. Denies N/V/F/C.  Pain rated at X/10    Allergies    No Known Allergies    Intolerances        Vital Signs Last 24 Hrs  T(C): 36.8 (07 Mar 2022 09:39), Max: 36.9 (07 Mar 2022 06:22)  T(F): 98.3 (07 Mar 2022 09:39), Max: 98.5 (07 Mar 2022 06:22)  HR: 70 (07 Mar 2022 09:39) (70 - 73)  BP: 136/67 (07 Mar 2022 09:39) (110/56 - 136/67)  BP(mean): --  RR: 18 (07 Mar 2022 09:39) (16 - 18)  SpO2: 100% (07 Mar 2022 05:30) (97% - 100%)    LABS:                        8.6    15.47 )-----------( 291      ( 07 Mar 2022 07:07 )             28.2     03-07    136  |  93<L>  |  57<H>  ----------------------------<  140<H>  4.2   |  25  |  8.20<H>    Ca    8.5      07 Mar 2022 07:07  Phos  2.7     03-07  Mg     2.30     03-07    TPro  6.6  /  Alb  3.4  /  TBili  <0.2  /  DBili  x   /  AST  28  /  ALT  28  /  AlkPhos  76  03-07        CAPILLARY BLOOD GLUCOSE      POCT Blood Glucose.: 208 mg/dL (06 Mar 2022 21:31)  POCT Blood Glucose.: 170 mg/dL (06 Mar 2022 17:49)  POCT Blood Glucose.: 147 mg/dL (06 Mar 2022 13:32)      Lower Extremity Physical Exam:  Vascular: DP/PT 1/4 R,  DP/PT 0/4 L, CFT <3 seconds B/L except left 3rd digit absent CFT, Temperature gradient warm to warm L, warm to cool R, resolving L foot edema  Neuro: Epicritic sensation diminished to the level of forefoot B/L  Musculoskeletal/Ortho: left partial 5th ray resection healed, tenderness to palpation to left 3rd   Skin:   3/4 s/p L foot partial 3rd ray resection open: fibrous wound bed, no malodor, no drainage, no purulence, flaps cool, no additional tracking   3/4 s/p R foot partial hallux partially open: fibrogranular wound bed with no drainage, no tracking, no purulence, periwound maceration resolved, flaps cool    RADIOLOGY & ADDITIONAL TESTS:

## 2022-03-07 NOTE — PROGRESS NOTE ADULT - ASSESSMENT
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 (DOS 3/4/2022)  -pt seen and evaluated  - Afebrile, WBC 15.47  - 3/4 s/p L foot partial 3rd ray resection open: fibrous wound bed, no malodor, no drainage, no purulence, flaps cool, no additional tracking   - 3/4 s/p R foot partial hallux partially open: fibrogranular wound bed with no drainage, no tracking, no purulence, periwound maceration resolved, flaps cool  - B/L surgical sites with high concern for viability and residual soft tissue infection per intraoperative findings   - B/L  OR Swab & L foot clean bone culture: growing E coli prelim  - Recommend ID reevaluation, pt will likely need long term IV abx upon d/c given preliminary culture results  - Wound VAC to be applied today   - Pod plan for wound VAC to left foot & local wound care to R foot surgical site pending viability and final ID recs  - discussed with attending

## 2022-03-07 NOTE — PROGRESS NOTE ADULT - SUBJECTIVE AND OBJECTIVE BOX
Date of Service  : 03-07-22 @ 09:50    INTERVAL HPI/OVERNIGHT EVENTS: I have lot of pain dari Right foot.   Vital Signs Last 24 Hrs  T(C): 36.8 (07 Mar 2022 09:39), Max: 36.9 (07 Mar 2022 06:22)  T(F): 98.3 (07 Mar 2022 09:39), Max: 98.5 (07 Mar 2022 06:22)  HR: 70 (07 Mar 2022 09:39) (70 - 73)  BP: 136/67 (07 Mar 2022 09:39) (110/56 - 136/67)  BP(mean): --  RR: 18 (07 Mar 2022 09:39) (16 - 18)  SpO2: 100% (07 Mar 2022 05:30) (97% - 100%)  I&O's Summary    07 Mar 2022 07:01  -  07 Mar 2022 09:50  --------------------------------------------------------  IN: 400 mL / OUT: 2000 mL / NET: -1600 mL      MEDICATIONS  (STANDING):  aspirin  chewable 81 milliGRAM(s) Oral daily  atorvastatin 40 milliGRAM(s) Oral at bedtime  cadexomer iodine 0.9% Gel 1 Application(s) Topical daily  chlorhexidine 2% Cloths 1 Application(s) Topical daily  clopidogrel Tablet 75 milliGRAM(s) Oral daily  epoetin niesha-epbx (RETACRIT) Injectable 8000 Unit(s) IV Push <User Schedule>  heparin   Injectable 5000 Unit(s) SubCutaneous every 12 hours  Nephro-george 1 Tablet(s) Oral daily  piperacillin/tazobactam IVPB.. 3.375 Gram(s) IV Intermittent every 12 hours  Velphoro (sucroferric oxyhydroxide) 3 Tablet(s) 3 Tablet(s) Oral three times a day    MEDICATIONS  (PRN):  acetaminophen     Tablet .. 650 milliGRAM(s) Oral every 6 hours PRN Mild Pain (1 - 3), Moderate Pain (4 - 6)  HYDROmorphone  Injectable 0.25 milliGRAM(s) IV Push every 6 hours PRN Severe Pain (7 - 10)    LABS:                        8.6    15.47 )-----------( 291      ( 07 Mar 2022 07:07 )             28.2     03-07    136  |  93<L>  |  57<H>  ----------------------------<  140<H>  4.2   |  25  |  8.20<H>    Ca    8.5      07 Mar 2022 07:07  Phos  2.7     03-07  Mg     2.30     03-07    TPro  6.6  /  Alb  3.4  /  TBili  <0.2  /  DBili  x   /  AST  28  /  ALT  28  /  AlkPhos  76  03-07        CAPILLARY BLOOD GLUCOSE      POCT Blood Glucose.: 208 mg/dL (06 Mar 2022 21:31)  POCT Blood Glucose.: 170 mg/dL (06 Mar 2022 17:49)  POCT Blood Glucose.: 147 mg/dL (06 Mar 2022 13:32)          REVIEW OF SYSTEMS:  CONSTITUTIONAL: No fever, weight loss, or fatigue  EYES: No eye pain, visual disturbances, or discharge  ENMT:  No difficulty hearing, tinnitus, vertigo; No sinus or throat pain  NECK: No pain or stiffness  RESPIRATORY: No cough, wheezing, chills or hemoptysis; No shortness of breath  CARDIOVASCULAR: No chest pain, palpitations, dizziness, or leg swelling  GASTROINTESTINAL: No abdominal or epigastric pain. No nausea, vomiting, or hematemesis; No diarrhea or constipation. No melena or hematochezia.  GENITOURINARY: No dysuria, frequency, hematuria, or incontinence  NEUROLOGICAL: No headaches, memory loss, loss of strength, numbness, or tremors      Consultant(s) Notes Reviewed:  [x ] YES  [ ] NO    PHYSICAL EXAM:  GENERAL: NAD, well-groomed, well-developed ,not in any distress ,  HEAD:  Atraumatic, Normocephalic  NECK: Supple, No JVD, Normal thyroid  NERVOUS SYSTEM:  Alert & Oriented X3, No focal deficit   CHEST/LUNG: Good air entry bilateral with no  rales, rhonchi, wheezing, or rubs  HEART: Regular rate and rhythm; No murmurs, rubs, or gallops  ABDOMEN: Soft, Nontender, Nondistended; Bowel sounds present  EXTREMITIES:  2+ Peripheral Pulses, No clubbing, cyanosis, or edema      Care Discussed with Consultants/Other Providers [ x] YES  [ ] NO Date of Service  : 03-07-22 @ 09:50    INTERVAL HPI/OVERNIGHT EVENTS: I have lot of pain dari Right foot.   Vital Signs Last 24 Hrs  T(C): 36.8 (07 Mar 2022 09:39), Max: 36.9 (07 Mar 2022 06:22)  T(F): 98.3 (07 Mar 2022 09:39), Max: 98.5 (07 Mar 2022 06:22)  HR: 70 (07 Mar 2022 09:39) (70 - 73)  BP: 136/67 (07 Mar 2022 09:39) (110/56 - 136/67)  BP(mean): --  RR: 18 (07 Mar 2022 09:39) (16 - 18)  SpO2: 100% (07 Mar 2022 05:30) (97% - 100%)  I&O's Summary    07 Mar 2022 07:01  -  07 Mar 2022 09:50  --------------------------------------------------------  IN: 400 mL / OUT: 2000 mL / NET: -1600 mL      MEDICATIONS  (STANDING):  aspirin  chewable 81 milliGRAM(s) Oral daily  atorvastatin 40 milliGRAM(s) Oral at bedtime  cadexomer iodine 0.9% Gel 1 Application(s) Topical daily  chlorhexidine 2% Cloths 1 Application(s) Topical daily  clopidogrel Tablet 75 milliGRAM(s) Oral daily  epoetin niesha-epbx (RETACRIT) Injectable 8000 Unit(s) IV Push <User Schedule>  heparin   Injectable 5000 Unit(s) SubCutaneous every 12 hours  Nephro-george 1 Tablet(s) Oral daily  piperacillin/tazobactam IVPB.. 3.375 Gram(s) IV Intermittent every 12 hours  Velphoro (sucroferric oxyhydroxide) 3 Tablet(s) 3 Tablet(s) Oral three times a day    MEDICATIONS  (PRN):  acetaminophen     Tablet .. 650 milliGRAM(s) Oral every 6 hours PRN Mild Pain (1 - 3), Moderate Pain (4 - 6)  HYDROmorphone  Injectable 0.25 milliGRAM(s) IV Push every 6 hours PRN Severe Pain (7 - 10)    LABS:                        8.6    15.47 )-----------( 291      ( 07 Mar 2022 07:07 )             28.2     03-07    136  |  93<L>  |  57<H>  ----------------------------<  140<H>  4.2   |  25  |  8.20<H>    Ca    8.5      07 Mar 2022 07:07  Phos  2.7     03-07  Mg     2.30     03-07    TPro  6.6  /  Alb  3.4  /  TBili  <0.2  /  DBili  x   /  AST  28  /  ALT  28  /  AlkPhos  76  03-07        CAPILLARY BLOOD GLUCOSE      POCT Blood Glucose.: 208 mg/dL (06 Mar 2022 21:31)  POCT Blood Glucose.: 170 mg/dL (06 Mar 2022 17:49)  POCT Blood Glucose.: 147 mg/dL (06 Mar 2022 13:32)          REVIEW OF SYSTEMS:  CONSTITUTIONAL: No fever, weight loss, or fatigue  EYES: No eye pain, visual disturbances, or discharge  ENMT:  No difficulty hearing, tinnitus, vertigo; No sinus or throat pain  NECK: No pain or stiffness  RESPIRATORY: No cough, wheezing, chills or hemoptysis; No shortness of breath  CARDIOVASCULAR: No chest pain, palpitations, dizziness, or leg swelling  GASTROINTESTINAL: No abdominal or epigastric pain. No nausea, vomiting, or hematemesis; No diarrhea or constipation. No melena or hematochezia.  GENITOURINARY: No dysuria, frequency, hematuria, or incontinence  NEUROLOGICAL: No headaches, memory loss, loss of strength, numbness, or tremors      Consultant(s) Notes Reviewed:  [x ] YES  [ ] NO    PHYSICAL EXAM:  GENERAL: NAD, well-groomed, well-developed ,not in any distress ,  HEAD:  Atraumatic, Normocephalic  NECK: Supple, No JVD, Normal thyroid  NERVOUS SYSTEM:  Alert & Oriented X3, No focal deficit   CHEST/LUNG: Good air entry bilateral with no  rales, rhonchi, wheezing, or rubs  HEART: Regular rate and rhythm; No murmurs, rubs, or gallops  ABDOMEN: Soft, Nontender, Nondistended; Bowel sounds present  EXTREMITIES:  Feet dressed.       Care Discussed with Consultants/Other Providers [ x] YES  [ ] NO

## 2022-03-07 NOTE — PROGRESS NOTE ADULT - SUBJECTIVE AND OBJECTIVE BOX
Cardiovascular Disease Progress Note    Overnight events: No acute events overnight.  Ms. Boogie denies chest pain or SOB.   Otherwise review of systems negative    Objective Findings:  T(C): 36.9 (22 @ 06:22), Max: 36.9 (22 @ 06:22)  HR: 73 (22 @ 06:22) (70 - 73)  BP: 120/54 (22 @ 06:22) (110/56 - 124/55)  RR: 16 (22 @ 06:22) (16 - 18)  SpO2: 100% (22 @ 05:30) (97% - 100%)  Wt(kg): --  Daily     Daily Weight in k (07 Mar 2022 06:22)      Physical Exam:  Gen: NAD; Patient resting comfortably  HEENT: EOMI, Normocephalic/ atraumatic  CV: RRR, normal S1 + S2, no m/r/g  Lungs:  Normal respiratory effort; clear to auscultation bilaterally  Abd: soft, non-tender; bowel sounds present  Ext: No edema;Bandages c/d/i    Telemetry: n/a    Laboratory Data:                        8.6    15.47 )-----------( 291      ( 07 Mar 2022 07:07 )             28.2     03-07    136  |  93<L>  |  57<H>  ----------------------------<  140<H>  4.2   |  25  |  8.20<H>    Ca    8.5      07 Mar 2022 07:07  Phos  2.7     03-07  Mg     2.30     03-07    TPro  6.6  /  Alb  3.4  /  TBili  <0.2  /  DBili  x   /  AST  28  /  ALT  28  /  AlkPhos  76  03-07              Inpatient Medications:  MEDICATIONS  (STANDING):  aspirin  chewable 81 milliGRAM(s) Oral daily  atorvastatin 40 milliGRAM(s) Oral at bedtime  cadexomer iodine 0.9% Gel 1 Application(s) Topical daily  chlorhexidine 2% Cloths 1 Application(s) Topical daily  clopidogrel Tablet 75 milliGRAM(s) Oral daily  epoetin niesha-epbx (RETACRIT) Injectable 8000 Unit(s) IV Push <User Schedule>  heparin   Injectable 5000 Unit(s) SubCutaneous every 12 hours  Nephro-george 1 Tablet(s) Oral daily  piperacillin/tazobactam IVPB.. 3.375 Gram(s) IV Intermittent every 12 hours  Velphoro (sucroferric oxyhydroxide) 3 Tablet(s) 3 Tablet(s) Oral three times a day      Assessment: 61-year-old female with ESRD on HD MWF via right groin AVF, HLD and PAD with previous intervention presenting with L sided foot pain, ongoing since 2021, becoming more severe in the past 2 weeks.    Plan of Care:    # L foot gangrene  - S/p L foot partial 3rd ray amputation and R hallux amputation 3/4.   - Tolerated procedure well.   - No evidence of post-operative coronary ischemia.   - Pods and vascular following  - Concern for poor healing given history of PAD and poor perfusion.  - TTE shows preserved LVEF with mild AI and no significant structural disease.   - Pt is optimized from a cardiac perspective.   - Ms. Boogie is scheduled for outpatient follow up with Dr. Wisam Adams on 3/24 at 12:30pm      #ESRD  - HD as per nephro    #PAD  - Continue ASA and plavix    #HLD  - Statin as ordered      Plan of Care:          Over 25 minutes spent on total encounter; more than 50% of the visit was spent counseling and/or coordinating care by the attending physician.      Wisam Adams D.O.   Cardiovascular Disease  [phone]

## 2022-03-07 NOTE — PROGRESS NOTE ADULT - SUBJECTIVE AND OBJECTIVE BOX
61y old  Female who presents with a chief complaint of L foot pain/discoloration (07 Mar 2022 10:13)      Interval history:  Afebrile, s/p sx.       Allergies:   No Known Allergies      Antimicrobials:  cefepime   IVPB 1000 milliGRAM(s) IV Intermittent every 24 hours      REVIEW OF SYSTEMS:  No chest pain   No SOB  No abdominal pain  No rash.       Vital Signs Last 24 Hrs  T(C): 36.7 (03-07-22 @ 12:30), Max: 36.9 (03-07-22 @ 06:22)  T(F): 98 (03-07-22 @ 12:30), Max: 98.5 (03-07-22 @ 06:22)  HR: 80 (03-07-22 @ 12:30) (70 - 80)  BP: 127/59 (03-07-22 @ 12:30) (110/56 - 136/67)  BP(mean): --  RR: 18 (03-07-22 @ 12:30) (16 - 18)  SpO2: 99% (03-07-22 @ 12:30) (99% - 100%)      PHYSICAL EXAM:  Patient in no acute distress. AAOX3.  Cardiovascular: S1S2 normal.  Lungs: + air entry B/L lung fields.  Gastrointestinal: soft, nontender, nondistended.  Extremities: b/l feet dressing.   IV sites not inflamed.   rt groin av graft                           8.6    15.47 )-----------( 291      ( 07 Mar 2022 07:07 )             28.2   03-07    136  |  93<L>  |  57<H>  ----------------------------<  140<H>  4.2   |  25  |  8.20<H>    Ca    8.5      07 Mar 2022 07:07  Phos  2.7     03-07  Mg     2.30     03-07    TPro  6.6  /  Alb  3.4  /  TBili  <0.2  /  DBili  x   /  AST  28  /  ALT  28  /  AlkPhos  76  03-07      LIVER FUNCTIONS - ( 07 Mar 2022 07:07 )  Alb: 3.4 g/dL / Pro: 6.6 g/dL / ALK PHOS: 76 U/L / ALT: 28 U/L / AST: 28 U/L / GGT: x               Culture - Surgical Swab (collected 04 Mar 2022 18:25)  Source: .Surgical Swab DEEP WOUND CULTURE LEFT FOOT  Preliminary Report (05 Mar 2022 15:45):    Moderate Escherichia coli  Organism: Escherichia coli (06 Mar 2022 17:28)  Organism: Escherichia coli (06 Mar 2022 17:28)    Culture - Surgical Swab (collected 04 Mar 2022 18:23)  Source: .Surgical Swab deep wound culture right foot  Preliminary Report (06 Mar 2022 17:26):    Few Escherichia coli    Rare Staphylococcus aureus  Organism: Escherichia coli  Staphylococcus aureus (06 Mar 2022 17:25)  Organism: Staphylococcus aureus (06 Mar 2022 17:25)  Organism: Escherichia coli (06 Mar 2022 17:25)    Culture - Tissue with Gram Stain (collected 04 Mar 2022 18:12)  Source: .Tissue CLEAR MARGIN 3RD METATARSAL LEFT FOOT  Gram Stain (04 Mar 2022 22:10):    No polymorphonuclear cells seen per low power field    No organisms seen per oil power field  Preliminary Report (05 Mar 2022 16:19):    Few Escherichia coli  Organism: Escherichia coli (06 Mar 2022 17:12)  Organism: Escherichia coli (06 Mar 2022 17:12)        Radiology:  < from: Xray Foot AP + Lateral + Oblique, Bilat (03.04.22 @ 17:32) >  IMPRESSION:  Currently status post partial left 3rd ray amputation through distal   metatarsal shaft and partial right hallux amputation through proximal   phalanx shaft with sharpen and smooth osseous stump margins and with post   surgical changes in the overlying soft tissues.    Stable previously seen partial 5th ray and right 5th proximal phalanx   amputation defects.    No proximally tracking gas collections beyond the amputation margins and   no focal areas of osteomyelitis.    Generalized osteopenia, bilateral plantar calcaneal enthesophytes, and   vascular calcifications noted.

## 2022-03-07 NOTE — PROGRESS NOTE ADULT - ASSESSMENT
61-year-old female with ESRD on HD MWF via right groin AVF, HLD and PAD presenting with L sided foot pain, ongoing since Nov 2021, becoming more severe in the past 2 weeks.     Overall diabetic foot ulcer, gangrene, cellulitis and abscess, leucocytosis, positive cx finding, concern for underlying OM. Elevated sed rate.   pt s/p sx, high concern for residual infection, positive OR cx   persistent leucocytosis     PLAN:   switched zosyn to cefepime  can switch to post HD cefepime on discharge, no need for PICC. 2gm/2gm/2gm on HD days,   trend cbc for leucocytosis   c/w wound care   podiatry on board,     Plan discussed with Medicine MARY Alex  Please contact through MS Teams   If no response or past 5 pm/weekend call [phone].

## 2022-03-07 NOTE — PROGRESS NOTE ADULT - ASSESSMENT
61 Yr old female ESRD (Mn/Wed/Fr.) well known to me from [facility] [place], Right groin AVF, DM, PVD, now with severe left foot pain. Renal following for ESRD Mx    End Stage Renal Disease on Dialysis MWF  HD unit [facility]  K, vol- ok  not in congestive heart failure  continue with HD MWF  tolerated HD well today  renal restrictions in diet.   dose all meds for ESRD  monitor BMP    Anemia of CKD- Hb < goal. c/w epo 8k tiw w/hd    HTN,, controlled; monitor BP    Left foot infection, gangrene- Mx per Podiatry. s/p vanco , on zosyn. dose abxs for ESRD. monitor vanco level      For any question, call:  Cell # [phone]  Pager # [phone]  Callback # [phone]

## 2022-03-07 NOTE — PROGRESS NOTE ADULT - ASSESSMENT
61-year-old female with ESRD on HD MWF via right groin AVF, HLD and PAD presenting with L sided foot pain, found to have likely OM     Problem/Plan - 1:  ·  Problem: Foot osteomyelitis.   ·  Plan: S/P  left foot partial 3rd ray amputation- open and right foot partial hallux amputation- partially open, poor intraop bleeding, good bone quality at the level of resection, low concern for bone infection, high concern for viability/soft tissue infection   ID help appreciated and now on Zosyn.   appreciate podiatry recs .  MARTA/PVR noted. Severe Small vessel disease.   vascular surgery help appreciated.  s/p I&D by podiatry in ED, plan for left foot partial 3rd ray resection pending MARTA/vasc recs     < from: MR Foot w/wo IV Cont, Left (03.03.22 @ 15:01) >  IMPRESSION:    Faint edema within the head of the third metatarsal and third phalanges   with mild periosteal enhancement of the third metatarsal head. Findings   may represent sequela of early osteomyelitis. Questionable soft tissue   defect of the third digit.  Lack of enhancement of the soft tissues plantarly and about the third and   fourth digits, favored to represent sequela of ischemia.    < end of copied text >  Pain control .    Problem/Plan - 2:  ·  Problem: ESRD on dialysis.   ·  Plan: appreciate renal recs  dose meds for intermittent HD  renal restricted diet  c/w phosphate binders     Problem/Plan - 3:  ·  Problem: PAD (peripheral artery disease).   ·  Plan: c/w DAPT and statin  f/u VSx recs  MARTA/PVR noted.      Problem/Plan - 4:  ·  Problem: Lower extremity edema.   ·  Plan: b/l LE edema   LE dopplers negative for  VTE.     Problem/Plan - 5:  ·  Problem: Need for prophylactic measure.   ·  Plan: pharmacologic DVT ppx pending VSx recs/AM podiatry recs (?plan for OR)      Dispo : DC planning to [facility] once stable.

## 2022-03-08 LAB
ANION GAP SERPL CALC-SCNC: 18 MMOL/L — HIGH (ref 7–14)
BUN SERPL-MCNC: 34 MG/DL — HIGH (ref 7–23)
CALCIUM SERPL-MCNC: 9.1 MG/DL — SIGNIFICANT CHANGE UP (ref 8.4–10.5)
CHLORIDE SERPL-SCNC: 91 MMOL/L — LOW (ref 98–107)
CO2 SERPL-SCNC: 27 MMOL/L — SIGNIFICANT CHANGE UP (ref 22–31)
CREAT SERPL-MCNC: 5.96 MG/DL — HIGH (ref 0.5–1.3)
EGFR: 7 ML/MIN/1.73M2 — LOW
GLUCOSE SERPL-MCNC: 109 MG/DL — HIGH (ref 70–99)
HBV SURFACE AB SER-ACNC: 549.5 MIU/ML — SIGNIFICANT CHANGE UP
HBV SURFACE AB SER-ACNC: REACTIVE
HCT VFR BLD CALC: 31.3 % — LOW (ref 34.5–45)
HGB BLD-MCNC: 9.5 G/DL — LOW (ref 11.5–15.5)
MAGNESIUM SERPL-MCNC: 2.2 MG/DL — SIGNIFICANT CHANGE UP (ref 1.6–2.6)
MCHC RBC-ENTMCNC: 26.9 PG — LOW (ref 27–34)
MCHC RBC-ENTMCNC: 30.4 GM/DL — LOW (ref 32–36)
MCV RBC AUTO: 88.7 FL — SIGNIFICANT CHANGE UP (ref 80–100)
NRBC # BLD: 0 /100 WBCS — SIGNIFICANT CHANGE UP
NRBC # FLD: 0.04 K/UL — HIGH
PHOSPHATE SERPL-MCNC: 3.1 MG/DL — SIGNIFICANT CHANGE UP (ref 2.5–4.5)
PLATELET # BLD AUTO: 346 K/UL — SIGNIFICANT CHANGE UP (ref 150–400)
POTASSIUM SERPL-MCNC: 3.9 MMOL/L — SIGNIFICANT CHANGE UP (ref 3.5–5.3)
POTASSIUM SERPL-SCNC: 3.9 MMOL/L — SIGNIFICANT CHANGE UP (ref 3.5–5.3)
RBC # BLD: 3.53 M/UL — LOW (ref 3.8–5.2)
RBC # FLD: 16.7 % — HIGH (ref 10.3–14.5)
SODIUM SERPL-SCNC: 136 MMOL/L — SIGNIFICANT CHANGE UP (ref 135–145)
WBC # BLD: 14.75 K/UL — HIGH (ref 3.8–10.5)
WBC # FLD AUTO: 14.75 K/UL — HIGH (ref 3.8–10.5)

## 2022-03-08 RX ADMIN — HEPARIN SODIUM 5000 UNIT(S): 5000 INJECTION INTRAVENOUS; SUBCUTANEOUS at 17:49

## 2022-03-08 RX ADMIN — HEPARIN SODIUM 5000 UNIT(S): 5000 INJECTION INTRAVENOUS; SUBCUTANEOUS at 05:13

## 2022-03-08 RX ADMIN — Medication 81 MILLIGRAM(S): at 11:29

## 2022-03-08 RX ADMIN — CEFEPIME 100 MILLIGRAM(S): 1 INJECTION, POWDER, FOR SOLUTION INTRAMUSCULAR; INTRAVENOUS at 17:49

## 2022-03-08 RX ADMIN — HYDROMORPHONE HYDROCHLORIDE 0.25 MILLIGRAM(S): 2 INJECTION INTRAMUSCULAR; INTRAVENOUS; SUBCUTANEOUS at 00:05

## 2022-03-08 RX ADMIN — ATORVASTATIN CALCIUM 40 MILLIGRAM(S): 80 TABLET, FILM COATED ORAL at 21:36

## 2022-03-08 RX ADMIN — CHLORHEXIDINE GLUCONATE 1 APPLICATION(S): 213 SOLUTION TOPICAL at 11:27

## 2022-03-08 RX ADMIN — CLOPIDOGREL BISULFATE 75 MILLIGRAM(S): 75 TABLET, FILM COATED ORAL at 11:30

## 2022-03-08 RX ADMIN — Medication 1 APPLICATION(S): at 11:26

## 2022-03-08 RX ADMIN — Medication 1 TABLET(S): at 11:27

## 2022-03-08 NOTE — PROGRESS NOTE ADULT - SUBJECTIVE AND OBJECTIVE BOX
Date of Service  : 03-08-22     INTERVAL HPI/OVERNIGHT EVENTS: I feel much better.   Vital Signs Last 24 Hrs  T(C): 37 (08 Mar 2022 21:33), Max: 37 (08 Mar 2022 21:33)  T(F): 98.6 (08 Mar 2022 21:33), Max: 98.6 (08 Mar 2022 21:33)  HR: 79 (08 Mar 2022 21:33) (71 - 79)  BP: 142/56 (08 Mar 2022 21:33) (110/58 - 142/56)  BP(mean): --  RR: 18 (08 Mar 2022 21:33) (16 - 18)  SpO2: 100% (08 Mar 2022 21:33) (98% - 100%)  I&O's Summary    07 Mar 2022 07:01  -  08 Mar 2022 07:00  --------------------------------------------------------  IN: 400 mL / OUT: 2000 mL / NET: -1600 mL      MEDICATIONS  (STANDING):  aspirin  chewable 81 milliGRAM(s) Oral daily  atorvastatin 40 milliGRAM(s) Oral at bedtime  cadexomer iodine 0.9% Gel 1 Application(s) Topical daily  cefepime   IVPB 1000 milliGRAM(s) IV Intermittent every 24 hours  chlorhexidine 2% Cloths 1 Application(s) Topical daily  clopidogrel Tablet 75 milliGRAM(s) Oral daily  epoetin niesha-epbx (RETACRIT) Injectable 8000 Unit(s) IV Push <User Schedule>  heparin   Injectable 5000 Unit(s) SubCutaneous every 12 hours  Nephro-george 1 Tablet(s) Oral daily  Velphoro (sucroferric oxyhydroxide) 3 Tablet(s) 3 Tablet(s) Oral three times a day    MEDICATIONS  (PRN):  acetaminophen     Tablet .. 650 milliGRAM(s) Oral every 6 hours PRN Mild Pain (1 - 3), Moderate Pain (4 - 6)  HYDROmorphone  Injectable 0.25 milliGRAM(s) IV Push every 6 hours PRN Severe Pain (7 - 10)    LABS:                        9.5    14.75 )-----------( 346      ( 08 Mar 2022 07:18 )             31.3     03-08    136  |  91<L>  |  34<H>  ----------------------------<  109<H>  3.9   |  27  |  5.96<H>    Ca    9.1      08 Mar 2022 07:18  Phos  3.1     03-08  Mg     2.20     03-08    TPro  6.6  /  Alb  3.4  /  TBili  <0.2  /  DBili  x   /  AST  28  /  ALT  28  /  AlkPhos  76  03-07        CAPILLARY BLOOD GLUCOSE              REVIEW OF SYSTEMS:  CONSTITUTIONAL: No fever, weight loss, or fatigue  EYES: No eye pain, visual disturbances, or discharge  ENMT:  No difficulty hearing, tinnitus, vertigo; No sinus or throat pain  NECK: No pain or stiffness  RESPIRATORY: No cough, wheezing, chills or hemoptysis; No shortness of breath  CARDIOVASCULAR: No chest pain, palpitations, dizziness, or leg swelling  GASTROINTESTINAL: No abdominal or epigastric pain. No nausea, vomiting, or hematemesis; No diarrhea or constipation. No melena or hematochezia.  GENITOURINARY: No dysuria, frequency, hematuria, or incontinence  NEUROLOGICAL: No headaches, memory loss, loss of strength, numbness, or tremors      Consultant(s) Notes Reviewed:  [x ] YES  [ ] NO    PHYSICAL EXAM:  GENERAL: NAD, well-groomed, well-developed ,not in any distress ,  HEAD:  Atraumatic, Normocephalic  NECK: Supple, No JVD, Normal thyroid  NERVOUS SYSTEM:  Alert & Oriented X3, No focal deficit   CHEST/LUNG: Good air entry bilateral with no  rales, rhonchi, wheezing, or rubs  HEART: Regular rate and rhythm; No murmurs, rubs, or gallops  ABDOMEN: Soft, Nontender, Nondistended; Bowel sounds present  EXTREMITIES:  2+ Peripheral Pulses, No clubbing, cyanosis, or edema    Care Discussed with Consultants/Other Providers [ x] YES  [ ] NO

## 2022-03-08 NOTE — PROGRESS NOTE ADULT - ASSESSMENT
61-year-old female with ESRD on HD MWF via right groin AVF, HLD and PAD presenting with L sided foot pain, found to have likely OM     Problem/Plan - 1:  ·  Problem: Foot osteomyelitis.   ·  Plan: S/P  left foot partial 3rd ray amputation- open and right foot partial hallux amputation- partially open, poor intraop bleeding, good bone quality at the level of resection, low concern for bone infection, high concern for viability/soft tissue infection   ID help appreciated and now on Cefepime with HD now.    appreciate podiatry recs .  MARTA/PVR noted. Severe Small vessel disease.   vascular surgery help appreciated.  s/p I&D by podiatry in ED, plan for left foot partial 3rd ray resection pending MARTA/vasc recs     < from: MR Foot w/wo IV Cont, Left (03.03.22 @ 15:01) >  IMPRESSION:    Faint edema within the head of the third metatarsal and third phalanges   with mild periosteal enhancement of the third metatarsal head. Findings   may represent sequela of early osteomyelitis. Questionable soft tissue   defect of the third digit.  Lack of enhancement of the soft tissues plantarly and about the third and   fourth digits, favored to represent sequela of ischemia.    < end of copied text >  Pain control .    Problem/Plan - 2:  ·  Problem: ESRD on dialysis.   ·  Plan: appreciate renal recs  dose meds for intermittent HD  renal restricted diet  c/w phosphate binders     Problem/Plan - 3:  ·  Problem: PAD (peripheral artery disease).   ·  Plan: c/w DAPT and statin  f/u VSx recs  MARTA/PVR noted.      Problem/Plan - 4:  ·  Problem: Lower extremity edema.   ·  Plan: b/l LE edema   LE dopplers negative for  VTE.     Problem/Plan - 5:  ·  Problem: Need for prophylactic measure.   ·  Plan: pharmacologic DVT ppx pending VSx recs/AM podiatry recs (?plan for OR)      Dispo : DC planning to [facility] pending placement .

## 2022-03-08 NOTE — PROGRESS NOTE ADULT - SUBJECTIVE AND OBJECTIVE BOX
[facility] - S Jluis / Shabbir S /D Romina/ SHERRI Mcfarlane/ SHERRI Madden/ Andrés Perkins / M Tristianu/ O Gladis  service -[phone], office [phone]  ---------------------------------------------------------------------------------------------------------------    Patient seen and examined bedside    Subjective and Objective: No overnight events, sob resolved. No complaints today. feeling better    Allergies: No Known Allergies      Hospital Medications:   MEDICATIONS  (STANDING):  aspirin  chewable 81 milliGRAM(s) Oral daily  atorvastatin 40 milliGRAM(s) Oral at bedtime  cadexomer iodine 0.9% Gel 1 Application(s) Topical daily  cefepime   IVPB 1000 milliGRAM(s) IV Intermittent every 24 hours  chlorhexidine 2% Cloths 1 Application(s) Topical daily  clopidogrel Tablet 75 milliGRAM(s) Oral daily  epoetin niesha-epbx (RETACRIT) Injectable 8000 Unit(s) IV Push <User Schedule>  heparin   Injectable 5000 Unit(s) SubCutaneous every 12 hours  Nephro-george 1 Tablet(s) Oral daily  Velphoro (sucroferric oxyhydroxide) 3 Tablet(s) 3 Tablet(s) Oral three times a day      REVIEW OF SYSTEMS:  CONSTITUTIONAL: No weakness, fevers or chills  EYES/ENT: No visual changes;  No vertigo or throat pain   NECK: No pain or stiffness  RESPIRATORY: No cough, wheezing, hemoptysis; No shortness of breath  CARDIOVASCULAR: No chest pain or palpitations.  GASTROINTESTINAL: No abdominal or epigastric pain. No nausea, vomiting, or hematemesis; No diarrhea or constipation. No melena or hematochezia.  GENITOURINARY: No dysuria, frequency, foamy urine, urinary urgency, incontinence or hematuria  NEUROLOGICAL: No numbness or weakness  SKIN: No itching, burning, rashes, or lesions   VASCULAR: No bilateral lower extremity edema.   All other review of systems is negative unless indicated above.    VITALS:  T(F): 98.2 (03-08-22 @ 05:00), Max: 98.4 (03-07-22 @ 21:00)  HR: 72 (03-08-22 @ 05:00)  BP: 110/58 (03-08-22 @ 05:00)  RR: 18 (03-08-22 @ 05:00)  SpO2: 100% (03-08-22 @ 05:00)  Wt(kg): --    03-07 @ 07:01  -  03-08 @ 07:00  --------------------------------------------------------  IN: 400 mL / OUT: 2000 mL / NET: -1600 mL          PHYSICAL EXAM:  Constitutional: NAD  HEENT: anicteric sclera, oropharynx clear  Neck: No JVD  Respiratory: CTAB, no wheezes, rales or rhonchi  Cardiovascular: S1, S2, RRR  Gastrointestinal: BS+, soft, NT/ND  Extremities: No cyanosis or clubbing. No peripheral edema  Neurological: A/O x 3, no focal deficits  Psychiatric: Normal mood, normal affect  : No CVA tenderness. No salcido.   Skin: No rashes  Vascular Access:    LABS:  03-08    136  |  91<L>  |  34<H>  ----------------------------<  109<H>  3.9   |  27  |  5.96<H>    Ca    9.1      08 Mar 2022 07:18  Phos  3.1     03-08  Mg     2.20     03-08    TPro  6.6  /  Alb  3.4  /  TBili  <0.2  /  DBili      /  AST  28  /  ALT  28  /  AlkPhos  76  03-07    Creatinine Trend: 5.96 <--, 8.20 <--, 6.43 <--, 8.04 <--, 5.80 <--, 9.57 <--, 7.97 <--                        9.5    14.75 )-----------( 346      ( 08 Mar 2022 07:18 )             31.3     Urine Studies:        RADIOLOGY & ADDITIONAL STUDIES:   [facility] - S Jluis / Shabbir S /D Romina/ S Denys/ S Chano/ Andrés Perkins / M Katlyn/ O Gladis  service [phone], office [phone]  ---------------------------------------------------------------------------------------------------------------    Patient seen and examined bedside    Subjective and Objective: No overnight events, denied sob. No complaints today. pain controlled    Allergies: No Known Allergies      Hospital Medications:   MEDICATIONS  (STANDING):  aspirin  chewable 81 milliGRAM(s) Oral daily  atorvastatin 40 milliGRAM(s) Oral at bedtime  cadexomer iodine 0.9% Gel 1 Application(s) Topical daily  cefepime   IVPB 1000 milliGRAM(s) IV Intermittent every 24 hours  chlorhexidine 2% Cloths 1 Application(s) Topical daily  clopidogrel Tablet 75 milliGRAM(s) Oral daily  epoetin niesha-epbx (RETACRIT) Injectable 8000 Unit(s) IV Push <User Schedule>  heparin   Injectable 5000 Unit(s) SubCutaneous every 12 hours  Nephro-george 1 Tablet(s) Oral daily  Velphoro (sucroferric oxyhydroxide) 3 Tablet(s) 3 Tablet(s) Oral three times a day      VITALS:  T(F): 98.2 (03-08-22 @ 05:00), Max: 98.4 (03-07-22 @ 21:00)  HR: 72 (03-08-22 @ 05:00)  BP: 110/58 (03-08-22 @ 05:00)  RR: 18 (03-08-22 @ 05:00)  SpO2: 100% (03-08-22 @ 05:00)  Wt(kg): --    03-07 @ 07:01  -  03-08 @ 07:00  --------------------------------------------------------  IN: 400 mL / OUT: 2000 mL / NET: -1600 mL      PHYSICAL EXAM:    Constitutional: NAD  HEENT: anicteric sclera  Neck: No JVD  Respiratory: CTAB, no wheezes, rales or rhonchi  Cardiovascular: S1, S2, RRR  Gastrointestinal: BS+, soft, NT/ND  Extremities: No peripheral edema  Neurological: A/O x 3  Psychiatric: Normal mood, normal affect  : No salcido.   Vascular Access: femoral avg+     LABS:  03-08    136  |  91<L>  |  34<H>  ----------------------------<  109<H>  3.9   |  27  |  5.96<H>    Ca    9.1      08 Mar 2022 07:18  Phos  3.1     03-08  Mg     2.20     03-08    TPro  6.6  /  Alb  3.4  /  TBili  <0.2  /  DBili      /  AST  28  /  ALT  28  /  AlkPhos  76  03-07    Creatinine Trend: 5.96 <--, 8.20 <--, 6.43 <--, 8.04 <--, 5.80 <--, 9.57 <--, 7.97 <--                        9.5    14.75 )-----------( 346      ( 08 Mar 2022 07:18 )             31.3     Urine Studies:        RADIOLOGY & ADDITIONAL STUDIES:

## 2022-03-08 NOTE — PROGRESS NOTE ADULT - SUBJECTIVE AND OBJECTIVE BOX
Cardiovascular Disease Progress Note    Overnight events: No acute events overnight. Ms. Boogie denies chest pain or SOB.    Otherwise review of systems negative    Objective Findings:  T(C): 36.8 (22 @ 05:00), Max: 36.9 (22 @ 21:00)  HR: 72 (22 @ 05:00) (70 - 80)  BP: 110/58 (22 @ 05:00) (110/58 - 139/59)  RR: 18 (22 @ 05:00) (17 - 18)  SpO2: 100% (22 @ 05:00) (98% - 100%)  Wt(kg): --  Daily     Daily Weight in k.5 (07 Mar 2022 09:39)      Physical Exam:  Gen: NAD; Patient resting comfortably  HEENT: EOMI, Normocephalic/ atraumatic  CV: RRR, normal S1 + S2, no m/r/g  Lungs:  Normal respiratory effort; clear to auscultation bilaterally  Abd: soft, non-tender; bowel sounds present  Ext: No edema; warm and well perfused    Telemetry: Sinus    Laboratory Data:                        8.6    15.47 )-----------( 291      ( 07 Mar 2022 07:07 )             28.2     03-07    136  |  93<L>  |  57<H>  ----------------------------<  140<H>  4.2   |  25  |  8.20<H>    Ca    8.5      07 Mar 2022 07:07  Phos  2.7     03-07  Mg     2.30     03-07    TPro  6.6  /  Alb  3.4  /  TBili  <0.2  /  DBili  x   /  AST  28  /  ALT  28  /  AlkPhos  76  03-07              Inpatient Medications:  MEDICATIONS  (STANDING):  aspirin  chewable 81 milliGRAM(s) Oral daily  atorvastatin 40 milliGRAM(s) Oral at bedtime  cadexomer iodine 0.9% Gel 1 Application(s) Topical daily  cefepime   IVPB 1000 milliGRAM(s) IV Intermittent every 24 hours  chlorhexidine 2% Cloths 1 Application(s) Topical daily  clopidogrel Tablet 75 milliGRAM(s) Oral daily  epoetin niesha-epbx (RETACRIT) Injectable 8000 Unit(s) IV Push <User Schedule>  heparin   Injectable 5000 Unit(s) SubCutaneous every 12 hours  Nephro-george 1 Tablet(s) Oral daily  Velphoro (sucroferric oxyhydroxide) 3 Tablet(s) 3 Tablet(s) Oral three times a day      Assessment: 61-year-old female with ESRD on HD MWF via right groin AVF, HLD and PAD with previous intervention presenting with L sided foot pain, ongoing since 2021, becoming more severe in the past 2 weeks.    Plan of Care:    # L foot gangrene  - S/p L foot partial 3rd ray amputation and R hallux amputation 3/4.   - Tolerated procedure well.   - No evidence of post-operative coronary ischemia.   - TTE shows preserved LVEF with mild AI and no significant structural disease.   -Continue current cardiac management.       #ESRD-  - HD as per nephro    #PAD  - Continue ASA and plavix    #HLD  - Statin as ordered    Outpatient cardiac f/u with Dr. Wisam Adams on 3/24/2022 at 12:30 PM.     Over 25 minutes spent on total encounter; more than 50% of the visit was spent counseling and/or coordinating care by the attending physician.      Cristino Adams MD [facility]  Cardiovascular Disease  [phone]

## 2022-03-08 NOTE — PROGRESS NOTE ADULT - ASSESSMENT
61 Yr old female ESRD (Mn/Wed/Fr.) well known to me from [facility] [place], Right groin AVF, DM, PVD, now with severe left foot pain. Renal following for ESRD Mx    End Stage Renal Disease on Dialysis MWF  HD unit [facility] [place]  K, vol- ok    continue with HD MWF  s/p HD yesterday, Rx sheet reviewed, net UF 1.6kg, tolerated well. uneventful.   plan to rpt HD tomorrow  renal restrictions in diet.   dose all meds for ESRD  monitor BMP    Anemia of CKD- Hb < goal. c/w epo 8k tiw w/hd  HTN,, controlled; monitor BP  Left foot infection, gangrene- s/p OR. Mx per Podiatry. dose abxs for ESRD. on Cefepime 1gm qd- need to change to 2/2/3gm post hd for d/c    awaiting rehab placement  labs, chart reviewed  poc d/w pt  For any question, call:  Cell # [phone]  Pager # [phone]  office # [phone]

## 2022-03-09 LAB
ALBUMIN SERPL ELPH-MCNC: 3.6 G/DL — SIGNIFICANT CHANGE UP (ref 3.3–5)
ALP SERPL-CCNC: 62 U/L — SIGNIFICANT CHANGE UP (ref 40–120)
ALT FLD-CCNC: 25 U/L — SIGNIFICANT CHANGE UP (ref 4–33)
ANION GAP SERPL CALC-SCNC: 17 MMOL/L — HIGH (ref 7–14)
AST SERPL-CCNC: 22 U/L — SIGNIFICANT CHANGE UP (ref 4–32)
BASOPHILS # BLD AUTO: 0 K/UL — SIGNIFICANT CHANGE UP (ref 0–0.2)
BASOPHILS NFR BLD AUTO: 0 % — SIGNIFICANT CHANGE UP (ref 0–2)
BILIRUB SERPL-MCNC: <0.2 MG/DL — SIGNIFICANT CHANGE UP (ref 0.2–1.2)
BUN SERPL-MCNC: 53 MG/DL — HIGH (ref 7–23)
CALCIUM SERPL-MCNC: 9.2 MG/DL — SIGNIFICANT CHANGE UP (ref 8.4–10.5)
CHLORIDE SERPL-SCNC: 92 MMOL/L — LOW (ref 98–107)
CO2 SERPL-SCNC: 26 MMOL/L — SIGNIFICANT CHANGE UP (ref 22–31)
CREAT SERPL-MCNC: 8.25 MG/DL — HIGH (ref 0.5–1.3)
CULTURE RESULTS: SIGNIFICANT CHANGE UP
CULTURE RESULTS: SIGNIFICANT CHANGE UP
EGFR: 5 ML/MIN/1.73M2 — LOW
EOSINOPHIL # BLD AUTO: 0.44 K/UL — SIGNIFICANT CHANGE UP (ref 0–0.5)
EOSINOPHIL NFR BLD AUTO: 3.3 % — SIGNIFICANT CHANGE UP (ref 0–6)
GLUCOSE SERPL-MCNC: 111 MG/DL — HIGH (ref 70–99)
HCT VFR BLD CALC: 28.5 % — LOW (ref 34.5–45)
HGB BLD-MCNC: 8.6 G/DL — LOW (ref 11.5–15.5)
IANC: 9.34 K/UL — HIGH (ref 1.5–8.5)
LYMPHOCYTES # BLD AUTO: 1.56 K/UL — SIGNIFICANT CHANGE UP (ref 1–3.3)
LYMPHOCYTES # BLD AUTO: 11.6 % — LOW (ref 13–44)
MAGNESIUM SERPL-MCNC: 2.4 MG/DL — SIGNIFICANT CHANGE UP (ref 1.6–2.6)
MCHC RBC-ENTMCNC: 26.7 PG — LOW (ref 27–34)
MCHC RBC-ENTMCNC: 30.2 GM/DL — LOW (ref 32–36)
MCV RBC AUTO: 88.5 FL — SIGNIFICANT CHANGE UP (ref 80–100)
MONOCYTES # BLD AUTO: 1.9 K/UL — HIGH (ref 0–0.9)
MONOCYTES NFR BLD AUTO: 14.1 % — HIGH (ref 2–14)
NEUTROPHILS # BLD AUTO: 9.11 K/UL — HIGH (ref 1.8–7.4)
NEUTROPHILS NFR BLD AUTO: 66.9 % — SIGNIFICANT CHANGE UP (ref 43–77)
ORGANISM # SPEC MICROSCOPIC CNT: SIGNIFICANT CHANGE UP
PHOSPHATE SERPL-MCNC: 4 MG/DL — SIGNIFICANT CHANGE UP (ref 2.5–4.5)
PLATELET # BLD AUTO: 319 K/UL — SIGNIFICANT CHANGE UP (ref 150–400)
POTASSIUM SERPL-MCNC: 3.8 MMOL/L — SIGNIFICANT CHANGE UP (ref 3.5–5.3)
POTASSIUM SERPL-SCNC: 3.8 MMOL/L — SIGNIFICANT CHANGE UP (ref 3.5–5.3)
PROT SERPL-MCNC: 7.2 G/DL — SIGNIFICANT CHANGE UP (ref 6–8.3)
RBC # BLD: 3.22 M/UL — LOW (ref 3.8–5.2)
RBC # FLD: 16.8 % — HIGH (ref 10.3–14.5)
SODIUM SERPL-SCNC: 135 MMOL/L — SIGNIFICANT CHANGE UP (ref 135–145)
SPECIMEN SOURCE: SIGNIFICANT CHANGE UP
SPECIMEN SOURCE: SIGNIFICANT CHANGE UP
WBC # BLD: 13.46 K/UL — HIGH (ref 3.8–10.5)
WBC # FLD AUTO: 13.46 K/UL — HIGH (ref 3.8–10.5)

## 2022-03-09 PROCEDURE — 99232 SBSQ HOSP IP/OBS MODERATE 35: CPT

## 2022-03-09 RX ADMIN — ATORVASTATIN CALCIUM 40 MILLIGRAM(S): 80 TABLET, FILM COATED ORAL at 21:51

## 2022-03-09 RX ADMIN — CEFEPIME 100 MILLIGRAM(S): 1 INJECTION, POWDER, FOR SOLUTION INTRAMUSCULAR; INTRAVENOUS at 17:29

## 2022-03-09 RX ADMIN — HYDROMORPHONE HYDROCHLORIDE 0.25 MILLIGRAM(S): 2 INJECTION INTRAMUSCULAR; INTRAVENOUS; SUBCUTANEOUS at 08:01

## 2022-03-09 RX ADMIN — HYDROMORPHONE HYDROCHLORIDE 0.25 MILLIGRAM(S): 2 INJECTION INTRAMUSCULAR; INTRAVENOUS; SUBCUTANEOUS at 17:08

## 2022-03-09 RX ADMIN — HYDROMORPHONE HYDROCHLORIDE 0.25 MILLIGRAM(S): 2 INJECTION INTRAMUSCULAR; INTRAVENOUS; SUBCUTANEOUS at 16:38

## 2022-03-09 RX ADMIN — CLOPIDOGREL BISULFATE 75 MILLIGRAM(S): 75 TABLET, FILM COATED ORAL at 12:54

## 2022-03-09 RX ADMIN — CHLORHEXIDINE GLUCONATE 1 APPLICATION(S): 213 SOLUTION TOPICAL at 12:59

## 2022-03-09 RX ADMIN — HEPARIN SODIUM 5000 UNIT(S): 5000 INJECTION INTRAVENOUS; SUBCUTANEOUS at 05:40

## 2022-03-09 RX ADMIN — Medication 650 MILLIGRAM(S): at 05:40

## 2022-03-09 RX ADMIN — Medication 650 MILLIGRAM(S): at 06:35

## 2022-03-09 RX ADMIN — HEPARIN SODIUM 5000 UNIT(S): 5000 INJECTION INTRAVENOUS; SUBCUTANEOUS at 17:29

## 2022-03-09 RX ADMIN — ERYTHROPOIETIN 8000 UNIT(S): 10000 INJECTION, SOLUTION INTRAVENOUS; SUBCUTANEOUS at 08:49

## 2022-03-09 RX ADMIN — Medication 1 TABLET(S): at 12:54

## 2022-03-09 RX ADMIN — HYDROMORPHONE HYDROCHLORIDE 0.25 MILLIGRAM(S): 2 INJECTION INTRAMUSCULAR; INTRAVENOUS; SUBCUTANEOUS at 07:25

## 2022-03-09 RX ADMIN — Medication 81 MILLIGRAM(S): at 12:54

## 2022-03-09 NOTE — PROGRESS NOTE ADULT - SUBJECTIVE AND OBJECTIVE BOX
[facility] - TIFFANY Bazzi / TIFFANY Shea / APARNA Vanegas/ TIFFANY cMfarlane/ TIFFANY Madden/ MASSIEL Perkins / RAINER Potts / ESTEPHANIE Griffith  ---------------------------------------------------------------------------------------------------------------  seen and examined today for ESRD  Interval : tolerating HD well today with lower UF  VITALS:  T(F): 97.9 (03-09-22 @ 06:35), Max: 98.8 (03-09-22 @ 05:35)  HR: 74 (03-09-22 @ 06:35)  BP: 138/61 (03-09-22 @ 06:35)  RR: 16 (03-09-22 @ 06:35)  SpO2: 100% (03-09-22 @ 05:35)  Wt(kg): --    Physical Exam :-  Constitutional: NAD  Neck: Supple.  Respiratory: Bilateral equal breath sounds,  Cardiovascular: S1, S2 normal,  Gastrointestinal: Bowel Sounds present, soft, non tender.  Extremities: clean dressing on both feet  Neurological: Alert and Oriented x 3, no focal deficits  Psychiatric: Normal mood, normal affect  Data:-  Allergies :   No Known Allergies    Hospital Medications:   MEDICATIONS  (STANDING):  aspirin  chewable 81 milliGRAM(s) Oral daily  atorvastatin 40 milliGRAM(s) Oral at bedtime  cadexomer iodine 0.9% Gel 1 Application(s) Topical daily  cefepime   IVPB 1000 milliGRAM(s) IV Intermittent every 24 hours  chlorhexidine 2% Cloths 1 Application(s) Topical daily  clopidogrel Tablet 75 milliGRAM(s) Oral daily  epoetin niesha-epbx (RETACRIT) Injectable 8000 Unit(s) IV Push <User Schedule>  heparin   Injectable 5000 Unit(s) SubCutaneous every 12 hours  Nephro-george 1 Tablet(s) Oral daily  Velphoro (sucroferric oxyhydroxide) 3 Tablet(s) 3 Tablet(s) Oral three times a day    03-09    135  |  92<L>  |  53<H>  ----------------------------<  111<H>  3.8   |  26  |  8.25<H>    Ca    9.2      09 Mar 2022 07:24  Phos  4.0     03-09  Mg     2.40     03-09    TPro  7.2  /  Alb  3.6  /  TBili  <0.2  /  DBili      /  AST  22  /  ALT  25  /  AlkPhos  62  03-09    Creatinine Trend: 8.25 <--, 5.96 <--, 8.20 <--, 6.43 <--, 8.04 <--, 5.80 <--, 9.57 <--                        8.6    13.46 )-----------( 319      ( 09 Mar 2022 07:24 )             28.5

## 2022-03-09 NOTE — PROGRESS NOTE ADULT - ASSESSMENT
60 yo f s/p right foot partial hallux amputation and left foot partial 4rd ray amputation (DOS 3/4/2022)  -pt seen and evaluated  - Afebrile, WBC 14.5  - 3/4 s/p L foot partial 3rd ray resection open: fibrous wound bed, no malodor, no drainage, no purulence, flaps cool, distal skin necrosis no additional tracking   - 3/4 s/p R foot partial hallux partially open: fibrogranular wound bed with no drainage, no tracking, no purulence, periwound maceration resolved, flaps cool  - B/L surgical sites with high concern for viability and residual soft tissue infection per intraoperative findings   - B/L  OR Swab & L foot clean bone culture: growing E coli prelim  - ID recs cefepime w/ HD upon d/c, appreciated  - Continue wound vac  - Pt is stable for d/c from podiatry, follow up and wound care instructions listed in discharge note provider  - discussed with attending

## 2022-03-09 NOTE — PROGRESS NOTE ADULT - SUBJECTIVE AND OBJECTIVE BOX
Patient is a 61y old  Female who presents with a chief complaint of L foot pain/discoloration (08 Mar 2022 14:27)       INTERVAL HPI/OVERNIGHT EVENTS:  Patient seen and evaluated at bedside.  Pt is resting comfortable in NAD. Denies N/V/F/C.      Allergies    No Known Allergies    Intolerances        Vital Signs Last 24 Hrs  T(C): 36.6 (09 Mar 2022 06:35), Max: 37.1 (09 Mar 2022 05:35)  T(F): 97.9 (09 Mar 2022 06:35), Max: 98.8 (09 Mar 2022 05:35)  HR: 74 (09 Mar 2022 06:35) (71 - 79)  BP: 138/61 (09 Mar 2022 06:35) (117/60 - 142/56)  BP(mean): --  RR: 16 (09 Mar 2022 06:35) (16 - 18)  SpO2: 100% (09 Mar 2022 05:35) (100% - 100%)    LABS:                        8.6    13.46 )-----------( 319      ( 09 Mar 2022 07:24 )             28.5     03-09    135  |  92<L>  |  53<H>  ----------------------------<  111<H>  3.8   |  26  |  8.25<H>    Ca    9.2      09 Mar 2022 07:24  Phos  4.0     03-09  Mg     2.40     03-09    TPro  7.2  /  Alb  3.6  /  TBili  <0.2  /  DBili  x   /  AST  22  /  ALT  25  /  AlkPhos  62  03-09        CAPILLARY BLOOD GLUCOSE          Lower Extremity Physical Exam:  Vascular: DP/PT 1/4 R,  DP/PT 0/4 L, CFT <3 seconds B/L except left 3rd digit absent CFT, Temperature gradient warm to warm L, warm to cool R, resolving L foot edema  Neuro: Epicritic sensation diminished to the level of forefoot B/L  Musculoskeletal/Ortho: left partial 5th ray resection healed, tenderness to palpation to left 3rd   Skin:   3/4 s/p L foot partial 3rd ray resection open: fibrous wound bed, no malodor, no drainage, no purulence, flaps cool, distal skin necrosis, no additional tracking   3/4 s/p R foot partial hallux partially open: fibrogranular wound bed with no drainage, no tracking, no purulence, periwound maceration resolved, flaps cool    RADIOLOGY & ADDITIONAL TESTS:

## 2022-03-09 NOTE — PROGRESS NOTE ADULT - SUBJECTIVE AND OBJECTIVE BOX
61y old  Female who presents with a chief complaint of L foot pain/discoloration (09 Mar 2022 10:55)      Interval history:  Afebrile, wondering when her wound is going to be closed.       Allergies:   No Known Allergies      Antimicrobials:  cefepime   IVPB 1000 milliGRAM(s) IV Intermittent every 24 hours      REVIEW OF SYSTEMS:  No chest pain  No SOB  No abdominal pain      Vital Signs Last 24 Hrs  T(C): 36.6 (03-09-22 @ 16:38), Max: 37.1 (03-09-22 @ 05:35)  T(F): 97.8 (03-09-22 @ 16:38), Max: 98.8 (03-09-22 @ 05:35)  HR: 70 (03-09-22 @ 16:38) (70 - 80)  BP: 111/74 (03-09-22 @ 16:38) (106/66 - 142/56)  BP(mean): --  RR: 18 (03-09-22 @ 16:38) (16 - 18)  SpO2: 100% (03-09-22 @ 16:38) (98% - 100%)      PHYSICAL EXAM:  Patient in no acute distress. Alert, awake.   Cardiovascular: S1S2 normal.  Lungs: + air entry B/L lung fields.  Gastrointestinal: soft, nontender, nondistended.  Extremities: b/l feet dressing.   IV sites not inflamed.   rt groin av graft                             8.6    13.46 )-----------( 319      ( 09 Mar 2022 07:24 )             28.5   03-09    135  |  92<L>  |  53<H>  ----------------------------<  111<H>  3.8   |  26  |  8.25<H>    Ca    9.2      09 Mar 2022 07:24  Phos  4.0     03-09  Mg     2.40     03-09    TPro  7.2  /  Alb  3.6  /  TBili  <0.2  /  DBili  x   /  AST  22  /  ALT  25  /  AlkPhos  62  03-09      LIVER FUNCTIONS - ( 09 Mar 2022 07:24 )  Alb: 3.6 g/dL / Pro: 7.2 g/dL / ALK PHOS: 62 U/L / ALT: 25 U/L / AST: 22 U/L / GGT: x

## 2022-03-09 NOTE — PROGRESS NOTE ADULT - SUBJECTIVE AND OBJECTIVE BOX
Date of Service  : 03-09-22     INTERVAL HPI/OVERNIGHT EVENTS: I feel fine.   Vital Signs Last 24 Hrs  T(C): 36.6 (09 Mar 2022 16:38), Max: 37.1 (09 Mar 2022 05:35)  T(F): 97.8 (09 Mar 2022 16:38), Max: 98.8 (09 Mar 2022 05:35)  HR: 70 (09 Mar 2022 16:38) (70 - 80)  BP: 111/74 (09 Mar 2022 16:38) (106/66 - 138/61)  BP(mean): --  RR: 18 (09 Mar 2022 16:38) (16 - 18)  SpO2: 100% (09 Mar 2022 16:38) (98% - 100%)  I&O's Summary    09 Mar 2022 07:01  -  09 Mar 2022 22:49  --------------------------------------------------------  IN: 800 mL / OUT: 1600 mL / NET: -800 mL      MEDICATIONS  (STANDING):  aspirin  chewable 81 milliGRAM(s) Oral daily  atorvastatin 40 milliGRAM(s) Oral at bedtime  cadexomer iodine 0.9% Gel 1 Application(s) Topical daily  cefepime   IVPB 1000 milliGRAM(s) IV Intermittent every 24 hours  chlorhexidine 2% Cloths 1 Application(s) Topical daily  clopidogrel Tablet 75 milliGRAM(s) Oral daily  epoetin niesha-epbx (RETACRIT) Injectable 8000 Unit(s) IV Push <User Schedule>  heparin   Injectable 5000 Unit(s) SubCutaneous every 12 hours  Nephro-george 1 Tablet(s) Oral daily  Velphoro (sucroferric oxyhydroxide) 3 Tablet(s) 3 Tablet(s) Oral three times a day    MEDICATIONS  (PRN):  acetaminophen     Tablet .. 650 milliGRAM(s) Oral every 6 hours PRN Mild Pain (1 - 3), Moderate Pain (4 - 6)  HYDROmorphone  Injectable 0.25 milliGRAM(s) IV Push every 6 hours PRN Severe Pain (7 - 10)    LABS:                        8.6    13.46 )-----------( 319      ( 09 Mar 2022 07:24 )             28.5     03-09    135  |  92<L>  |  53<H>  ----------------------------<  111<H>  3.8   |  26  |  8.25<H>    Ca    9.2      09 Mar 2022 07:24  Phos  4.0     03-09  Mg     2.40     03-09    TPro  7.2  /  Alb  3.6  /  TBili  <0.2  /  DBili  x   /  AST  22  /  ALT  25  /  AlkPhos  62  03-09        CAPILLARY BLOOD GLUCOSE              REVIEW OF SYSTEMS:  CONSTITUTIONAL: No fever, weight loss, or fatigue  EYES: No eye pain, visual disturbances, or discharge  ENMT:  No difficulty hearing, tinnitus, vertigo; No sinus or throat pain  NECK: No pain or stiffness  RESPIRATORY: No cough, wheezing, chills or hemoptysis; No shortness of breath  CARDIOVASCULAR: No chest pain, palpitations, dizziness, or leg swelling  GASTROINTESTINAL: No abdominal or epigastric pain. No nausea, vomiting, or hematemesis; No diarrhea or constipation. No melena or hematochezia.  GENITOURINARY: No dysuria, frequency, hematuria, or incontinence  NEUROLOGICAL: No headaches, memory loss, loss of strength, numbness, or tremors      Consultant(s) Notes Reviewed:  [x ] YES  [ ] NO    PHYSICAL EXAM:  GENERAL: NAD, well-groomed, well-developed,not in any distress ,  HEAD:  Atraumatic, Normocephalic  EYES: EOMI, PERRLA, conjunctiva and sclera clear  ENMT: No tonsillar erythema, exudates, or enlargement; Moist mucous membranes, Good dentition, No lesions  NECK: Supple, No JVD, Normal thyroid  NERVOUS SYSTEM:  Alert & Oriented X3, No focal deficit   CHEST/LUNG: Good air entry bilateral with no  rales, rhonchi, wheezing, or rubs  HEART: Regular rate and rhythm; No murmurs, rubs, or gallops  ABDOMEN: Soft, Nontender, Nondistended; Bowel sounds present  EXTREMITIES:  Feet dressed     Care Discussed with Consultants/Other Providers [ x] YES  [ ] NO

## 2022-03-09 NOTE — PROGRESS NOTE ADULT - ASSESSMENT
61-year-old female with ESRD on HD MWF via right groin AVF, HLD and PAD presenting with L sided foot pain, found to have likely OM     Problem/Plan - 1:  ·  Problem: Foot osteomyelitis.   ·  Plan: S/P  left foot partial 3rd ray amputation- open and right foot partial hallux amputation- partially open, poor intraop bleeding, good bone quality at the level of resection, low concern for bone infection, high concern for viability/soft tissue infection   ID help appreciated and now on Cefepime with HD for 6 weeks from day of surgery .   appreciate podiatry recs .  MARTA/PVR noted. Severe Small vessel disease.   vascular surgery help appreciated.  s/p I&D by podiatry in ED, plan for left foot partial 3rd ray resection pending MARTA/vasc recs     < from: MR Foot w/wo IV Cont, Left (03.03.22 @ 15:01) >  IMPRESSION:    Faint edema within the head of the third metatarsal and third phalanges   with mild periosteal enhancement of the third metatarsal head. Findings   may represent sequela of early osteomyelitis. Questionable soft tissue   defect of the third digit.  Lack of enhancement of the soft tissues plantarly and about the third and   fourth digits, favored to represent sequela of ischemia.    < end of copied text >  Pain control .      Problem/Plan - 2:  ·  Problem: ESRD on dialysis.   ·  Plan: appreciate renal recs  dose meds for intermittent HD  renal restricted diet  c/w phosphate binders     Problem/Plan - 3:  ·  Problem: PAD (peripheral artery disease).   ·  Plan: c/w DAPT and statin  f/u VSx recs  MARTA/PVR noted.      Problem/Plan - 4:  ·  Problem: Lower extremity edema.   ·  Plan: b/l LE edema   LE dopplers negative for  VTE.     Problem/Plan - 5:  ·  Problem: Need for prophylactic measure.   ·  Plan: pharmacologic DVT ppx       Dispo : DC planning to [facility] pending placement .

## 2022-03-09 NOTE — PROGRESS NOTE ADULT - SUBJECTIVE AND OBJECTIVE BOX
Cardiovascular Disease Progress Note    Overnight events: No acute events overnight.  Ms. Boogie denies chest pain or SOB.   Otherwise review of systems negative    Objective Findings:  T(C): 36.6 (22 @ 06:35), Max: 37.1 (22 @ 05:35)  HR: 74 (22 @ 06:35) (71 - 79)  BP: 138/61 (22 @ 06:35) (117/60 - 142/56)  RR: 16 (22 @ 06:35) (16 - 18)  SpO2: 100% (22 @ 05:35) (100% - 100%)  Wt(kg): --  Daily     Daily Weight in k.5 (09 Mar 2022 06:35)      Physical Exam:  Gen: NAD; Patient resting comfortably  HEENT: EOMI, Normocephalic/ atraumatic  CV: RRR, normal S1 + S2, no m/r/g  Lungs:  Normal respiratory effort; clear to auscultation bilaterally  Abd: soft, non-tender; bowel sounds present  Ext: LE bandage c/d/i    Telemetry: n/a    Laboratory Data:                        8.6    13.46 )-----------( 319      ( 09 Mar 2022 07:24 )             28.5         135  |  92<L>  |  53<H>  ----------------------------<  111<H>  3.8   |  26  |  8.25<H>    Ca    9.2      09 Mar 2022 07:24  Phos  4.0       Mg     2.40         TPro  7.2  /  Alb  3.6  /  TBili  <0.2  /  DBili  x   /  AST  22  /  ALT  25  /  AlkPhos  62  -              Inpatient Medications:  MEDICATIONS  (STANDING):  aspirin  chewable 81 milliGRAM(s) Oral daily  atorvastatin 40 milliGRAM(s) Oral at bedtime  cadexomer iodine 0.9% Gel 1 Application(s) Topical daily  cefepime   IVPB 1000 milliGRAM(s) IV Intermittent every 24 hours  chlorhexidine 2% Cloths 1 Application(s) Topical daily  clopidogrel Tablet 75 milliGRAM(s) Oral daily  epoetin niesha-epbx (RETACRIT) Injectable 8000 Unit(s) IV Push <User Schedule>  heparin   Injectable 5000 Unit(s) SubCutaneous every 12 hours  Nephro-george 1 Tablet(s) Oral daily  Velphoro (sucroferric oxyhydroxide) 3 Tablet(s) 3 Tablet(s) Oral three times a day      Assessment:  61-year-old female with ESRD on HD MWF via right groin AVF, HLD and PAD with previous intervention presenting with L sided foot pain, ongoing since 2021, becoming more severe in the past 2 weeks.    Plan of Care:    # L foot gangrene  - S/p L foot partial 3rd ray amputation and R hallux amputation 3/4.   - Tolerated procedure well.   - No evidence of post-operative coronary ischemia.   - TTE shows preserved LVEF with mild AI and no significant structural disease.   - Continue current cardiac management.       #ESRD-  - HD as per nephro    #PAD  - Continue ASA and plavix    #HLD  - Statin as ordered    Outpatient cardiac f/u with Dr. Wisam Adams on 3/24/2022 at 12:30 PM.       #ACP (advance care planning)-  Advanced care planning was addressed. Will continue current cardiac management.  Risks, benefits and alternatives of medical treatment and procedures were discussed in detail and all questions were answered. 30 minutes spent addressing advance care plans.          Over 25 minutes spent on total encounter; more than 50% of the visit was spent counseling and/or coordinating care by the attending physician.      Wisam Adams D.O.   Cardiovascular Disease  [phone]

## 2022-03-09 NOTE — PROGRESS NOTE ADULT - ASSESSMENT
61-year-old female with ESRD on HD MWF via right groin AVF, HLD and PAD presenting with L sided foot pain, ongoing since Nov 2021, becoming more severe in the past 2 weeks.     Overall diabetic foot ulcer, gangrene, cellulitis and abscess, leucocytosis, positive cx finding, concern for underlying OM. Elevated sed rate.   pt s/p sx, high concern for residual infection, positive OR cx   persistent leucocytosis     PLAN:   c/w cefepime  can switch to post HD cefepime on discharge, no need for PICC. 2gm/2gm/2gm on HD days,   plan for 6 weeks of abx until 4/14/22  cbc, cmp once a week while on abx, labs to be performed by HD and followed by physician at HD center.   trend cbc for leucocytosis   c/w wound care   podiatry on board,       Plan discussed with Medicine Attending.     Devin Alex  Please contact through MS Teams   If no response or past 5 pm/weekend call [phone].     Will sign off, please call with questions.

## 2022-03-09 NOTE — PROGRESS NOTE ADULT - ASSESSMENT
61 Yr old female [place] (Mn/Wed/Fr.) [place] [facility] [place], DM, PVD, now with severe left foot pain. Renal following for ESRD Mx    End Stage Renal Disease on Dialysis MWF  [place] [facility] [place] vol- ok    continue with HD MWF  tolerated HD today with UF of 500cc only after fluid bolus due to hypotension  renal restrictions in diet.   dose all meds for ESRD  monitor BMP    Anemia of CKD- Hb < goal. c/w epo 8k tiw w/hd  HTN,, controlled; monitor BP  Left foot infection, gangrene- s/p OR. Mx per Podiatry. dose abxs for ESRD. on Cefepime 1gm qd- need to change to 2/2/3gm post hd for d/c      For any question, call:  Cell # [phone]  Pager # [phone]  Callback # [phone]

## 2022-03-10 LAB
ANION GAP SERPL CALC-SCNC: 15 MMOL/L — HIGH (ref 7–14)
BUN SERPL-MCNC: 33 MG/DL — HIGH (ref 7–23)
CALCIUM SERPL-MCNC: 9.5 MG/DL — SIGNIFICANT CHANGE UP (ref 8.4–10.5)
CHLORIDE SERPL-SCNC: 91 MMOL/L — LOW (ref 98–107)
CO2 SERPL-SCNC: 26 MMOL/L — SIGNIFICANT CHANGE UP (ref 22–31)
CREAT SERPL-MCNC: 6.06 MG/DL — HIGH (ref 0.5–1.3)
EGFR: 7 ML/MIN/1.73M2 — LOW
GLUCOSE BLDC GLUCOMTR-MCNC: 171 MG/DL — HIGH (ref 70–99)
GLUCOSE BLDC GLUCOMTR-MCNC: 177 MG/DL — HIGH (ref 70–99)
GLUCOSE SERPL-MCNC: 107 MG/DL — HIGH (ref 70–99)
HCT VFR BLD CALC: 29.4 % — LOW (ref 34.5–45)
HGB BLD-MCNC: 8.8 G/DL — LOW (ref 11.5–15.5)
MAGNESIUM SERPL-MCNC: 2.2 MG/DL — SIGNIFICANT CHANGE UP (ref 1.6–2.6)
MCHC RBC-ENTMCNC: 27.1 PG — SIGNIFICANT CHANGE UP (ref 27–34)
MCHC RBC-ENTMCNC: 29.9 GM/DL — LOW (ref 32–36)
MCV RBC AUTO: 90.5 FL — SIGNIFICANT CHANGE UP (ref 80–100)
NRBC # BLD: 0 /100 WBCS — SIGNIFICANT CHANGE UP
NRBC # FLD: 0.1 K/UL — HIGH
PHOSPHATE SERPL-MCNC: 3.7 MG/DL — SIGNIFICANT CHANGE UP (ref 2.5–4.5)
PLATELET # BLD AUTO: 316 K/UL — SIGNIFICANT CHANGE UP (ref 150–400)
POTASSIUM SERPL-MCNC: 3.7 MMOL/L — SIGNIFICANT CHANGE UP (ref 3.5–5.3)
POTASSIUM SERPL-SCNC: 3.7 MMOL/L — SIGNIFICANT CHANGE UP (ref 3.5–5.3)
RBC # BLD: 3.25 M/UL — LOW (ref 3.8–5.2)
RBC # FLD: 17.3 % — HIGH (ref 10.3–14.5)
SODIUM SERPL-SCNC: 132 MMOL/L — LOW (ref 135–145)
WBC # BLD: 15.43 K/UL — HIGH (ref 3.8–10.5)
WBC # FLD AUTO: 15.43 K/UL — HIGH (ref 3.8–10.5)

## 2022-03-10 RX ORDER — LANOLIN ALCOHOL/MO/W.PET/CERES
3 CREAM (GRAM) TOPICAL ONCE
Refills: 0 | Status: COMPLETED | OUTPATIENT
Start: 2022-03-10 | End: 2022-03-10

## 2022-03-10 RX ORDER — LANOLIN ALCOHOL/MO/W.PET/CERES
5 CREAM (GRAM) TOPICAL ONCE
Refills: 0 | Status: DISCONTINUED | OUTPATIENT
Start: 2022-03-10 | End: 2022-03-10

## 2022-03-10 RX ORDER — ERYTHROPOIETIN 10000 [IU]/ML
12000 INJECTION, SOLUTION INTRAVENOUS; SUBCUTANEOUS
Refills: 0 | Status: DISCONTINUED | OUTPATIENT
Start: 2022-03-10 | End: 2022-03-14

## 2022-03-10 RX ADMIN — HYDROMORPHONE HYDROCHLORIDE 0.25 MILLIGRAM(S): 2 INJECTION INTRAMUSCULAR; INTRAVENOUS; SUBCUTANEOUS at 04:52

## 2022-03-10 RX ADMIN — CEFEPIME 100 MILLIGRAM(S): 1 INJECTION, POWDER, FOR SOLUTION INTRAMUSCULAR; INTRAVENOUS at 17:01

## 2022-03-10 RX ADMIN — Medication 81 MILLIGRAM(S): at 11:52

## 2022-03-10 RX ADMIN — ATORVASTATIN CALCIUM 40 MILLIGRAM(S): 80 TABLET, FILM COATED ORAL at 22:01

## 2022-03-10 RX ADMIN — CLOPIDOGREL BISULFATE 75 MILLIGRAM(S): 75 TABLET, FILM COATED ORAL at 11:52

## 2022-03-10 RX ADMIN — HYDROMORPHONE HYDROCHLORIDE 0.25 MILLIGRAM(S): 2 INJECTION INTRAMUSCULAR; INTRAVENOUS; SUBCUTANEOUS at 04:37

## 2022-03-10 RX ADMIN — HEPARIN SODIUM 5000 UNIT(S): 5000 INJECTION INTRAVENOUS; SUBCUTANEOUS at 05:14

## 2022-03-10 RX ADMIN — CHLORHEXIDINE GLUCONATE 1 APPLICATION(S): 213 SOLUTION TOPICAL at 11:56

## 2022-03-10 RX ADMIN — Medication 1 TABLET(S): at 11:52

## 2022-03-10 RX ADMIN — HEPARIN SODIUM 5000 UNIT(S): 5000 INJECTION INTRAVENOUS; SUBCUTANEOUS at 17:01

## 2022-03-10 RX ADMIN — Medication 3 MILLIGRAM(S): at 00:42

## 2022-03-10 NOTE — PROGRESS NOTE ADULT - SUBJECTIVE AND OBJECTIVE BOX
Cardiovascular Disease Progress Note    Overnight events: No acute events overnight.  Pt denies chest pain or SOB.   Otherwise review of systems negative    Objective Findings:  T(C): 36.6 (03-10-22 @ 04:42), Max: 37 (03-09-22 @ 21:51)  HR: 72 (03-10-22 @ 04:42) (70 - 80)  BP: 109/59 (03-10-22 @ 04:42) (106/66 - 117/88)  RR: 16 (03-10-22 @ 04:42) (16 - 18)  SpO2: 100% (03-10-22 @ 04:42) (98% - 100%)  Wt(kg): --  Daily     Daily       Physical Exam:  Gen: NAD; Patient resting comfortably  HEENT: EOMI, Normocephalic/ atraumatic  CV: RRR, normal S1 + S2, no m/r/g  Lungs:  Normal respiratory effort; clear to auscultation bilaterally  Abd: soft, non-tender; bowel sounds present  Ext: No edema; warm and well perfused. Bandage C/D/I    Telemetry: N/a    Laboratory Data:                        8.8    15.43 )-----------( 316      ( 10 Mar 2022 07:24 )             29.4     03-10    132<L>  |  91<L>  |  33<H>  ----------------------------<  107<H>  3.7   |  26  |  6.06<H>    Ca    9.5      10 Mar 2022 07:24  Phos  3.7     03-10  Mg     2.20     03-10    TPro  7.2  /  Alb  3.6  /  TBili  <0.2  /  DBili  x   /  AST  22  /  ALT  25  /  AlkPhos  62  03-09              Inpatient Medications:  MEDICATIONS  (STANDING):  aspirin  chewable 81 milliGRAM(s) Oral daily  atorvastatin 40 milliGRAM(s) Oral at bedtime  cadexomer iodine 0.9% Gel 1 Application(s) Topical daily  cefepime   IVPB 1000 milliGRAM(s) IV Intermittent every 24 hours  chlorhexidine 2% Cloths 1 Application(s) Topical daily  clopidogrel Tablet 75 milliGRAM(s) Oral daily  epoetin niesha-epbx (RETACRIT) Injectable 8000 Unit(s) IV Push <User Schedule>  heparin   Injectable 5000 Unit(s) SubCutaneous every 12 hours  Nephro-george 1 Tablet(s) Oral daily  Velphoro (sucroferric oxyhydroxide) 3 Tablet(s) 3 Tablet(s) Oral three times a day      Assessment: 61-year-old female with ESRD on HD MWF via right groin AVF, HLD and PAD with previous intervention presenting with L sided foot pain, ongoing since Nov 2021, becoming more severe in the past 2 weeks.    Plan of Care:    # L foot gangrene  - S/p L foot partial 3rd ray amputation and R hallux amputation 3/4.   - Tolerated procedure well.   - No evidence of post-operative coronary ischemia.   - TTE shows preserved LVEF with mild AI and no significant structural disease.   - Continue current cardiac management.       #ESRD-  - HD as per nephro    #PAD  - Continue ASA and plavix    #HLD  - Statin as ordered    Outpatient cardiac f/u with Dr. Wisam Adams on 3/24/2022 at 12:30 PM.         Plan of Care:          Over 25 minutes spent on total encounter; more than 50% of the visit was spent counseling and/or coordinating care by the attending physician.      Wisam Adams D.O.   Cardiovascular Disease  [phone]

## 2022-03-10 NOTE — PROGRESS NOTE ADULT - ASSESSMENT
61 Yr old female ESRD (Mn/Wed/Fr.) well known to [place] [facility] [place]in AVF, DM, PVD, now with severe left foot pain. Renal following for ESRD Mx    End Stage Renal Disease on Dialysis MWF  [place] unit [facility] [place]  K, vol- ok    continue with HD MWF  s/p HD yesterday, Rx sheet reviewed, net UF 0.8kg, tolerated well. uneventful.   plan to rpt HD tomorrow w/2k bath, net uf 0.5kg   renal restrictions in diet.   dose all meds for ESRD  monitor BMP    Anemia of CKD- Hb < goal. inc epo 8>12k tiw w/hd  HTN,, controlled; monitor BP. not on meds  Left foot infection, gangrene- s/p OR. Mx per Podiatry. dose abxs for ESRD. on Cefepime 1gm qd- need to change to 2/2/3gm post hd for d/c    awaiting rehab placement  labs, chart reviewed  poc d/w pt  For any question, call:  Cell # [phone]  Pager # [phone]  office # [phone]

## 2022-03-10 NOTE — PROGRESS NOTE ADULT - SUBJECTIVE AND OBJECTIVE BOX
Date of Service  : 03-10-22     INTERVAL HPI/OVERNIGHT EVENTS: No new concerns.   Vital Signs Last 24 Hrs  T(C): 36.6 (10 Mar 2022 12:20), Max: 37 (09 Mar 2022 21:51)  T(F): 97.8 (10 Mar 2022 12:20), Max: 98.6 (09 Mar 2022 21:51)  HR: 70 (10 Mar 2022 12:20) (70 - 79)  BP: 106/60 (10 Mar 2022 12:20) (106/60 - 117/88)  BP(mean): --  RR: 18 (10 Mar 2022 12:20) (16 - 18)  SpO2: 100% (10 Mar 2022 12:20) (100% - 100%)  I&O's Summary    09 Mar 2022 07:01  -  10 Mar 2022 07:00  --------------------------------------------------------  IN: 800 mL / OUT: 1600 mL / NET: -800 mL      MEDICATIONS  (STANDING):  aspirin  chewable 81 milliGRAM(s) Oral daily  atorvastatin 40 milliGRAM(s) Oral at bedtime  cadexomer iodine 0.9% Gel 1 Application(s) Topical daily  cefepime   IVPB 1000 milliGRAM(s) IV Intermittent every 24 hours  chlorhexidine 2% Cloths 1 Application(s) Topical daily  clopidogrel Tablet 75 milliGRAM(s) Oral daily  epoetin niesha-epbx (RETACRIT) Injectable 8000 Unit(s) IV Push <User Schedule>  heparin   Injectable 5000 Unit(s) SubCutaneous every 12 hours  Nephro-george 1 Tablet(s) Oral daily  Velphoro (sucroferric oxyhydroxide) 3 Tablet(s) 3 Tablet(s) Oral three times a day    MEDICATIONS  (PRN):  acetaminophen     Tablet .. 650 milliGRAM(s) Oral every 6 hours PRN Mild Pain (1 - 3), Moderate Pain (4 - 6)  HYDROmorphone  Injectable 0.25 milliGRAM(s) IV Push every 6 hours PRN Severe Pain (7 - 10)    LABS:                        8.8    15.43 )-----------( 316      ( 10 Mar 2022 07:24 )             29.4     03-10    132<L>  |  91<L>  |  33<H>  ----------------------------<  107<H>  3.7   |  26  |  6.06<H>    Ca    9.5      10 Mar 2022 07:24  Phos  3.7     03-10  Mg     2.20     03-10    TPro  7.2  /  Alb  3.6  /  TBili  <0.2  /  DBili  x   /  AST  22  /  ALT  25  /  AlkPhos  62  03-09        CAPILLARY BLOOD GLUCOSE              REVIEW OF SYSTEMS:  CONSTITUTIONAL: No fever, weight loss, or fatigue  EYES: No eye pain, visual disturbances, or discharge  ENMT:  No difficulty hearing, tinnitus, vertigo; No sinus or throat pain  NECK: No pain or stiffness  RESPIRATORY: No cough, wheezing, chills or hemoptysis; No shortness of breath  CARDIOVASCULAR: No chest pain, palpitations, dizziness, or leg swelling  GASTROINTESTINAL: No abdominal or epigastric pain. No nausea, vomiting, or hematemesis; No diarrhea or constipation. No melena or hematochezia.  GENITOURINARY: No dysuria, frequency, hematuria, or incontinence  NEUROLOGICAL: No headaches, memory loss, loss of strength, numbness, or tremors      Consultant(s) Notes Reviewed:  [x ] YES  [ ] NO    PHYSICAL EXAM:  GENERAL: NAD, well-groomed, well-developed,not in any distress ,  HEAD:  Atraumatic, Normocephalic  EYES: EOMI, PERRLA, conjunctiva and sclera clear  ENMT: No tonsillar erythema, exudates, or enlargement; Moist mucous membranes, Good dentition, No lesions  NECK: Supple, No JVD, Normal thyroid  NERVOUS SYSTEM:  Alert & Oriented X3, No focal deficit   CHEST/LUNG: Good air entry bilateral with no  rales, rhonchi, wheezing, or rubs  HEART: Regular rate and rhythm; No murmurs, rubs, or gallops  ABDOMEN: Soft, Nontender, Nondistended; Bowel sounds present  EXTREMITIES:  feet dressed     Care Discussed with Consultants/Other Providers [ x] YES  [ ] NO

## 2022-03-10 NOTE — PROGRESS NOTE ADULT - ASSESSMENT
61-year-old female with ESRD on HD MWF via right groin AVF, HLD and PAD presenting with L sided foot pain, found to have likely OM     Problem/Plan - 1:  ·  Problem: Foot osteomyelitis.   ·  Plan: S/P  left foot partial 3rd ray amputation- open and right foot partial hallux amputation- partially open, poor intraop bleeding, good bone quality at the level of resection, low concern for bone infection, high concern for viability/soft tissue infection   ID help appreciated and now on Cefepime with HD for 6 weeks from day of surgery .   appreciate podiatry recs .  MARTA/PVR noted. Severe Small vessel disease.   vascular surgery help appreciated.  s/p I&D by podiatry in ED, plan for left foot partial 3rd ray resection pending MARTA/vasc recs     < from: MR Foot w/wo IV Cont, Left (03.03.22 @ 15:01) >  IMPRESSION:    Faint edema within the head of the third metatarsal and third phalanges   with mild periosteal enhancement of the third metatarsal head. Findings   may represent sequela of early osteomyelitis. Questionable soft tissue   defect of the third digit.  Lack of enhancement of the soft tissues plantarly and about the third and   fourth digits, favored to represent sequela of ischemia.    < end of copied text >  Pain control .      Problem/Plan - 2:  ·  Problem: ESRD on dialysis.   ·  Plan: appreciate renal recs  dose meds for intermittent HD  renal restricted diet  c/w phosphate binders     Problem/Plan - 3:  ·  Problem: PAD (peripheral artery disease).   ·  Plan: c/w DAPT and statin  f/u VSx recs  MARTA/PVR noted.      Problem/Plan - 4:  ·  Problem: Lower extremity edema.   ·  Plan: b/l LE edema   LE dopplers negative for  VTE.     Problem/Plan - 5:  ·  Problem: Need for prophylactic measure.   ·  Plan: pharmacologic DVT ppx       Dispo : DC planning to [facility] pending placement .

## 2022-03-10 NOTE — PROGRESS NOTE ADULT - SUBJECTIVE AND OBJECTIVE BOX
[facility] - S Jluis / Shabbir S /D Romina/ SHERRI Mcfarlane/ SHERRI Madden/ Andrés Perkins / M Tristianu/ O Gladis  service -[phone], office [phone]  ---------------------------------------------------------------------------------------------------------------    Patient seen and examined bedside    Subjective and Objective: No overnight events, sob resolved. No complaints today. feeling better    Allergies: No Known Allergies      Hospital Medications:   MEDICATIONS  (STANDING):  aspirin  chewable 81 milliGRAM(s) Oral daily  atorvastatin 40 milliGRAM(s) Oral at bedtime  cadexomer iodine 0.9% Gel 1 Application(s) Topical daily  cefepime   IVPB 1000 milliGRAM(s) IV Intermittent every 24 hours  chlorhexidine 2% Cloths 1 Application(s) Topical daily  clopidogrel Tablet 75 milliGRAM(s) Oral daily  epoetin niesha-epbx (RETACRIT) Injectable 8000 Unit(s) IV Push <User Schedule>  heparin   Injectable 5000 Unit(s) SubCutaneous every 12 hours  Nephro-george 1 Tablet(s) Oral daily  Velphoro (sucroferric oxyhydroxide) 3 Tablet(s) 3 Tablet(s) Oral three times a day      REVIEW OF SYSTEMS:  CONSTITUTIONAL: No weakness, fevers or chills  EYES/ENT: No visual changes;  No vertigo or throat pain   NECK: No pain or stiffness  RESPIRATORY: No cough, wheezing, hemoptysis; No shortness of breath  CARDIOVASCULAR: No chest pain or palpitations.  GASTROINTESTINAL: No abdominal or epigastric pain. No nausea, vomiting, or hematemesis; No diarrhea or constipation. No melena or hematochezia.  GENITOURINARY: No dysuria, frequency, foamy urine, urinary urgency, incontinence or hematuria  NEUROLOGICAL: No numbness or weakness  SKIN: No itching, burning, rashes, or lesions   VASCULAR: No bilateral lower extremity edema.   All other review of systems is negative unless indicated above.    VITALS:  T(F): 97.8 (03-10-22 @ 12:20), Max: 98.6 (03-09-22 @ 21:51)  HR: 70 (03-10-22 @ 12:20)  BP: 106/60 (03-10-22 @ 12:20)  RR: 18 (03-10-22 @ 12:20)  SpO2: 100% (03-10-22 @ 12:20)  Wt(kg): --    03-09 @ 07:01  -  03-10 @ 07:00  --------------------------------------------------------  IN: 800 mL / OUT: 1600 mL / NET: -800 mL          PHYSICAL EXAM:  Constitutional: NAD  HEENT: anicteric sclera, oropharynx clear  Neck: No JVD  Respiratory: CTAB, no wheezes, rales or rhonchi  Cardiovascular: S1, S2, RRR  Gastrointestinal: BS+, soft, NT/ND  Extremities: No cyanosis or clubbing. No peripheral edema  Neurological: A/O x 3, no focal deficits  Psychiatric: Normal mood, normal affect  : No CVA tenderness. No salcido.   Skin: No rashes  Vascular Access:    LABS:  03-10    132<L>  |  91<L>  |  33<H>  ----------------------------<  107<H>  3.7   |  26  |  6.06<H>    Ca    9.5      10 Mar 2022 07:24  Phos  3.7     03-10  Mg     2.20     03-10    TPro  7.2  /  Alb  3.6  /  TBili  <0.2  /  DBili      /  AST  22  /  ALT  25  /  AlkPhos  62  03-09    Creatinine Trend: 6.06 <--, 8.25 <--, 5.96 <--, 8.20 <--, 6.43 <--, 8.04 <--, 5.80 <--                        8.8    15.43 )-----------( 316      ( 10 Mar 2022 07:24 )             29.4     Urine Studies:        RADIOLOGY & ADDITIONAL STUDIES:   [facility] - S Jluis / Shabbir S /D Romina/ S Denys/ S Chano/ Andrés Perkisn / M Katlyn/ O Gladis  service [phone], office [phone]  ---------------------------------------------------------------------------------------------------------------    Patient seen and examined bedside    Subjective and Objective: No overnight events, denied sob. No complaints today.     Allergies: No Known Allergies      Hospital Medications:   MEDICATIONS  (STANDING):  aspirin  chewable 81 milliGRAM(s) Oral daily  atorvastatin 40 milliGRAM(s) Oral at bedtime  cadexomer iodine 0.9% Gel 1 Application(s) Topical daily  cefepime   IVPB 1000 milliGRAM(s) IV Intermittent every 24 hours  chlorhexidine 2% Cloths 1 Application(s) Topical daily  clopidogrel Tablet 75 milliGRAM(s) Oral daily  epoetin niesha-epbx (RETACRIT) Injectable 8000 Unit(s) IV Push <User Schedule>  heparin   Injectable 5000 Unit(s) SubCutaneous every 12 hours  Nephro-george 1 Tablet(s) Oral daily  Velphoro (sucroferric oxyhydroxide) 3 Tablet(s) 3 Tablet(s) Oral three times a day    VITALS:  T(F): 97.8 (03-10-22 @ 12:20), Max: 98.6 (03-09-22 @ 21:51)  HR: 70 (03-10-22 @ 12:20)  BP: 106/60 (03-10-22 @ 12:20)  RR: 18 (03-10-22 @ 12:20)  SpO2: 100% (03-10-22 @ 12:20)  Wt(kg): --    03-09 @ 07:01  -  03-10 @ 07:00  --------------------------------------------------------  IN: 800 mL / OUT: 1600 mL / NET: -800 mL      PHYSICAL EXAM:  HEENT: anicteric sclera  Neck: No JVD  Respiratory: CTAB, no wheezes, rales or rhonchi  Cardiovascular: S1, S2, RRR  Gastrointestinal: BS+, soft, NT/ND  Extremities: No peripheral edema  Neurological: A/O x 3  Psychiatric: Normal mood, normal affect  : No salcido.   Vascular Access: femoral avg+     LABS:  03-10    132<L>  |  91<L>  |  33<H>  ----------------------------<  107<H>  3.7   |  26  |  6.06<H>    Ca    9.5      10 Mar 2022 07:24  Phos  3.7     03-10  Mg     2.20     03-10    TPro  7.2  /  Alb  3.6  /  TBili  <0.2  /  DBili      /  AST  22  /  ALT  25  /  AlkPhos  62  03-09    Creatinine Trend: 6.06 <--, 8.25 <--, 5.96 <--, 8.20 <--, 6.43 <--, 8.04 <--, 5.80 <--                        8.8    15.43 )-----------( 316      ( 10 Mar 2022 07:24 )             29.4     Urine Studies:        RADIOLOGY & ADDITIONAL STUDIES:

## 2022-03-11 LAB
ANION GAP SERPL CALC-SCNC: 16 MMOL/L — HIGH (ref 7–14)
BUN SERPL-MCNC: 50 MG/DL — HIGH (ref 7–23)
CALCIUM SERPL-MCNC: 9.7 MG/DL — SIGNIFICANT CHANGE UP (ref 8.4–10.5)
CHLORIDE SERPL-SCNC: 90 MMOL/L — LOW (ref 98–107)
CO2 SERPL-SCNC: 25 MMOL/L — SIGNIFICANT CHANGE UP (ref 22–31)
CREAT SERPL-MCNC: 8.27 MG/DL — HIGH (ref 0.5–1.3)
CULTURE RESULTS: SIGNIFICANT CHANGE UP
EGFR: 5 ML/MIN/1.73M2 — LOW
GLUCOSE SERPL-MCNC: 100 MG/DL — HIGH (ref 70–99)
HCT VFR BLD CALC: 31 % — LOW (ref 34.5–45)
HGB BLD-MCNC: 9.3 G/DL — LOW (ref 11.5–15.5)
MAGNESIUM SERPL-MCNC: 2.3 MG/DL — SIGNIFICANT CHANGE UP (ref 1.6–2.6)
MCHC RBC-ENTMCNC: 27 PG — SIGNIFICANT CHANGE UP (ref 27–34)
MCHC RBC-ENTMCNC: 30 GM/DL — LOW (ref 32–36)
MCV RBC AUTO: 90.1 FL — SIGNIFICANT CHANGE UP (ref 80–100)
NRBC # BLD: 0 /100 WBCS — SIGNIFICANT CHANGE UP
NRBC # FLD: 0.04 K/UL — HIGH
ORGANISM # SPEC MICROSCOPIC CNT: SIGNIFICANT CHANGE UP
ORGANISM # SPEC MICROSCOPIC CNT: SIGNIFICANT CHANGE UP
PHOSPHATE SERPL-MCNC: 4.7 MG/DL — HIGH (ref 2.5–4.5)
PLATELET # BLD AUTO: 341 K/UL — SIGNIFICANT CHANGE UP (ref 150–400)
POTASSIUM SERPL-MCNC: 4.4 MMOL/L — SIGNIFICANT CHANGE UP (ref 3.5–5.3)
POTASSIUM SERPL-SCNC: 4.4 MMOL/L — SIGNIFICANT CHANGE UP (ref 3.5–5.3)
RBC # BLD: 3.44 M/UL — LOW (ref 3.8–5.2)
RBC # FLD: 17.7 % — HIGH (ref 10.3–14.5)
SARS-COV-2 RNA SPEC QL NAA+PROBE: SIGNIFICANT CHANGE UP
SODIUM SERPL-SCNC: 131 MMOL/L — LOW (ref 135–145)
SPECIMEN SOURCE: SIGNIFICANT CHANGE UP
WBC # BLD: 14.77 K/UL — HIGH (ref 3.8–10.5)
WBC # FLD AUTO: 14.77 K/UL — HIGH (ref 3.8–10.5)

## 2022-03-11 RX ADMIN — CHLORHEXIDINE GLUCONATE 1 APPLICATION(S): 213 SOLUTION TOPICAL at 11:24

## 2022-03-11 RX ADMIN — HYDROMORPHONE HYDROCHLORIDE 0.25 MILLIGRAM(S): 2 INJECTION INTRAMUSCULAR; INTRAVENOUS; SUBCUTANEOUS at 10:37

## 2022-03-11 RX ADMIN — CLOPIDOGREL BISULFATE 75 MILLIGRAM(S): 75 TABLET, FILM COATED ORAL at 11:20

## 2022-03-11 RX ADMIN — CEFEPIME 100 MILLIGRAM(S): 1 INJECTION, POWDER, FOR SOLUTION INTRAMUSCULAR; INTRAVENOUS at 22:40

## 2022-03-11 RX ADMIN — HEPARIN SODIUM 5000 UNIT(S): 5000 INJECTION INTRAVENOUS; SUBCUTANEOUS at 05:33

## 2022-03-11 RX ADMIN — ATORVASTATIN CALCIUM 40 MILLIGRAM(S): 80 TABLET, FILM COATED ORAL at 21:40

## 2022-03-11 RX ADMIN — Medication 1 TABLET(S): at 11:21

## 2022-03-11 RX ADMIN — Medication 650 MILLIGRAM(S): at 06:30

## 2022-03-11 RX ADMIN — Medication 81 MILLIGRAM(S): at 11:20

## 2022-03-11 RX ADMIN — HEPARIN SODIUM 5000 UNIT(S): 5000 INJECTION INTRAVENOUS; SUBCUTANEOUS at 21:40

## 2022-03-11 RX ADMIN — ERYTHROPOIETIN 12000 UNIT(S): 10000 INJECTION, SOLUTION INTRAVENOUS; SUBCUTANEOUS at 17:05

## 2022-03-11 RX ADMIN — Medication 1 APPLICATION(S): at 11:21

## 2022-03-11 RX ADMIN — Medication 650 MILLIGRAM(S): at 17:05

## 2022-03-11 RX ADMIN — HYDROMORPHONE HYDROCHLORIDE 0.25 MILLIGRAM(S): 2 INJECTION INTRAMUSCULAR; INTRAVENOUS; SUBCUTANEOUS at 21:41

## 2022-03-11 RX ADMIN — HYDROMORPHONE HYDROCHLORIDE 0.25 MILLIGRAM(S): 2 INJECTION INTRAMUSCULAR; INTRAVENOUS; SUBCUTANEOUS at 22:41

## 2022-03-11 RX ADMIN — Medication 650 MILLIGRAM(S): at 05:30

## 2022-03-11 NOTE — PROGRESS NOTE ADULT - SUBJECTIVE AND OBJECTIVE BOX
Cardiovascular Disease Progress Note    Overnight events: No acute events overnight.  Ms. Boogie denies chest pain or SOB.   Otherwise review of systems negative    Objective Findings:  T(C): 36.7 (03-10-22 @ 22:00), Max: 36.7 (03-10-22 @ 22:00)  HR: 73 (03-10-22 @ 22:00) (70 - 73)  BP: 118/59 (03-10-22 @ 22:00) (106/60 - 118/59)  RR: 18 (03-10-22 @ 22:00) (18 - 18)  SpO2: 100% (03-10-22 @ 22:00) (100% - 100%)  Wt(kg): --  Daily     Daily       Physical Exam:  Gen: NAD; Patient resting comfortably  HEENT: EOMI, Normocephalic/ atraumatic  CV: RRR, normal S1 + S2, no m/r/g  Lungs:  Normal respiratory effort; clear to auscultation bilaterally  Abd: soft, non-tender; bowel sounds present  Ext: No edema; Bandage c/d/i    Telemetry: N/a    Laboratory Data:                        9.3    14.77 )-----------( 341      ( 11 Mar 2022 07:25 )             31.0     03-10    132<L>  |  91<L>  |  33<H>  ----------------------------<  107<H>  3.7   |  26  |  6.06<H>    Ca    9.5      10 Mar 2022 07:24  Phos  3.7     03-10  Mg     2.20     03-10                Inpatient Medications:  MEDICATIONS  (STANDING):  aspirin  chewable 81 milliGRAM(s) Oral daily  atorvastatin 40 milliGRAM(s) Oral at bedtime  cadexomer iodine 0.9% Gel 1 Application(s) Topical daily  cefepime   IVPB 1000 milliGRAM(s) IV Intermittent every 24 hours  chlorhexidine 2% Cloths 1 Application(s) Topical daily  clopidogrel Tablet 75 milliGRAM(s) Oral daily  epoetin niesha-epbx (RETACRIT) Injectable 59409 Unit(s) IV Push <User Schedule>  heparin   Injectable 5000 Unit(s) SubCutaneous every 12 hours  Nephro-george 1 Tablet(s) Oral daily  Velphoro (sucroferric oxyhydroxide) 3 Tablet(s) 3 Tablet(s) Oral three times a day      Assessment: 61-year-old female with ESRD on HD MWF via right groin AVF, HLD and PAD with previous intervention presenting with L sided foot pain, ongoing since Nov 2021, becoming more severe in the past 2 weeks.    Plan of Care:    # L foot gangrene  - S/p L foot partial 3rd ray amputation and R hallux amputation 3/4.   - Tolerated procedure well.   - No evidence of post-operative coronary ischemia.   - TTE shows preserved LVEF with mild AI and no significant structural disease.   - Continue current cardiac management.   Outpatient cardiac f/u with Dr. Wisam Adams on 3/24/2022 at 12:30 PM.       #ESRD-  - HD as per nephro    #PAD  - Continue ASA and plavix    #HLD  - Statin as ordered    Plan of Care:          Over 25 minutes spent on total encounter; more than 50% of the visit was spent counseling and/or coordinating care by the attending physician.      Wisam Adams D.O.   Cardiovascular Disease  [phone]

## 2022-03-11 NOTE — PROGRESS NOTE ADULT - SUBJECTIVE AND OBJECTIVE BOX
Date of Service  : 03-11-22     INTERVAL HPI/OVERNIGHT EVENTS: no new concerns   Vital Signs Last 24 Hrs  T(C): 36.7 (11 Mar 2022 15:44), Max: 36.7 (10 Mar 2022 22:00)  T(F): 98.1 (11 Mar 2022 15:44), Max: 98.1 (11 Mar 2022 15:44)  HR: 73 (11 Mar 2022 15:44) (72 - 74)  BP: 135/59 (11 Mar 2022 15:44) (112/63 - 135/59)  BP(mean): --  RR: 18 (11 Mar 2022 15:44) (18 - 18)  SpO2: 99% (11 Mar 2022 10:50) (98% - 100%)  I&O's Summary    MEDICATIONS  (STANDING):  aspirin  chewable 81 milliGRAM(s) Oral daily  atorvastatin 40 milliGRAM(s) Oral at bedtime  cadexomer iodine 0.9% Gel 1 Application(s) Topical daily  cefepime   IVPB 1000 milliGRAM(s) IV Intermittent every 24 hours  chlorhexidine 2% Cloths 1 Application(s) Topical daily  clopidogrel Tablet 75 milliGRAM(s) Oral daily  epoetin niesha-epbx (RETACRIT) Injectable 34054 Unit(s) IV Push <User Schedule>  heparin   Injectable 5000 Unit(s) SubCutaneous every 12 hours  Nephro-george 1 Tablet(s) Oral daily  Velphoro (sucroferric oxyhydroxide) 3 Tablet(s) 3 Tablet(s) Oral three times a day    MEDICATIONS  (PRN):  acetaminophen     Tablet .. 650 milliGRAM(s) Oral every 6 hours PRN Mild Pain (1 - 3), Moderate Pain (4 - 6)  HYDROmorphone  Injectable 0.25 milliGRAM(s) IV Push every 6 hours PRN Severe Pain (7 - 10)    LABS:                        9.3    14.77 )-----------( 341      ( 11 Mar 2022 07:25 )             31.0     03-11    131<L>  |  90<L>  |  50<H>  ----------------------------<  100<H>  4.4   |  25  |  8.27<H>    Ca    9.7      11 Mar 2022 07:25  Phos  4.7     03-11  Mg     2.30     03-11          CAPILLARY BLOOD GLUCOSE      POCT Blood Glucose.: 171 mg/dL (10 Mar 2022 21:07)          REVIEW OF SYSTEMS:  CONSTITUTIONAL: No fever, weight loss, or fatigue  EYES: No eye pain, visual disturbances, or discharge  ENMT:  No difficulty hearing, tinnitus, vertigo; No sinus or throat pain  NECK: No pain or stiffness  RESPIRATORY: No cough, wheezing, chills or hemoptysis; No shortness of breath  CARDIOVASCULAR: No chest pain, palpitations, dizziness, or leg swelling  GASTROINTESTINAL: No abdominal or epigastric pain. No nausea, vomiting, or hematemesis; No diarrhea or constipation. No melena or hematochezia.  GENITOURINARY: No dysuria, frequency, hematuria, or incontinence  NEUROLOGICAL: No headaches, memory loss, loss of strength, numbness, or tremors      Consultant(s) Notes Reviewed:  [x ] YES  [ ] NO    PHYSICAL EXAM:  GENERAL: NAD, well-groomed, well-developed, not in any distress ,  HEAD:  Atraumatic, Normocephalic  NECK: Supple, No JVD, Normal thyroid  NERVOUS SYSTEM:  Alert & Oriented X3, No focal deficit   CHEST/LUNG: Good air entry bilateral with no  rales, rhonchi, wheezing, or rubs  HEART: Regular rate and rhythm; No murmurs, rubs, or gallops  ABDOMEN: Soft, Nontender, Nondistended; Bowel sounds present  EXTREMITIES:  Feet dressed     Care Discussed with Consultants/Other Providers [ x] YES  [ ] NO

## 2022-03-11 NOTE — PROGRESS NOTE ADULT - SUBJECTIVE AND OBJECTIVE BOX
Patient is a 61y old  Female who presents with a chief complaint of L foot pain/discoloration (11 Mar 2022 07:47)       INTERVAL HPI/OVERNIGHT EVENTS:  Patient seen and evaluated at bedside.  Pt is resting comfortable in NAD. Denies N/V/F/C.  Pain rated at X/10    Allergies    No Known Allergies    Intolerances        Vital Signs Last 24 Hrs  T(C): 36.6 (11 Mar 2022 05:30), Max: 36.7 (10 Mar 2022 22:00)  T(F): 97.8 (11 Mar 2022 05:30), Max: 98 (10 Mar 2022 22:00)  HR: 73 (11 Mar 2022 05:30) (70 - 73)  BP: 112/63 (11 Mar 2022 05:30) (106/60 - 118/59)  BP(mean): --  RR: 18 (11 Mar 2022 05:30) (18 - 18)  SpO2: 99% (11 Mar 2022 05:30) (99% - 100%)    LABS:                        9.3    14.77 )-----------( 341      ( 11 Mar 2022 07:25 )             31.0     03-11    131<L>  |  90<L>  |  50<H>  ----------------------------<  100<H>  4.4   |  25  |  8.27<H>    Ca    9.7      11 Mar 2022 07:25  Phos  4.7     03-11  Mg     2.30     03-11          CAPILLARY BLOOD GLUCOSE      POCT Blood Glucose.: 171 mg/dL (10 Mar 2022 21:07)  POCT Blood Glucose.: 177 mg/dL (10 Mar 2022 17:34)      Lower Extremity Physical Exam:  Vascular: DP/PT 1/4 R,  DP/PT 0/4 L, CFT <3 seconds B/L except left 3rd digit absent CFT, Temperature gradient warm to warm L, warm to cool R, resolving L foot edema  Neuro: Epicritic sensation diminished to the level of forefoot B/L  Musculoskeletal/Ortho: left partial 5th ray resection healed  Skin:   3/4 s/p L foot partial 3rd ray resection open: fibrous wound bed, no malodor, no drainage, no purulence, flaps cool, distal skin necrosis, no additional tracking   3/4 s/p R foot partial hallux: sutures intact, no drainage, no tracking, no purulence, periwound maceration resolved, flaps cool, no dehiscence     RADIOLOGY & ADDITIONAL TESTS:

## 2022-03-11 NOTE — PROGRESS NOTE ADULT - ASSESSMENT
61 Yr old female ESRD (Mn/Wed/Fr.) well known to me from [facility], Right groin AVF, DM, PVD, now with severe left foot pain. Renal following for ESRD Mx    End Stage Renal Disease on Dialysis MWF  HD unit [facility]  K, vol- ok    plan for HD today  renal restrictions in diet.   dose all meds for ESRD  monitor BMP    Anemia of CKD- Hb < goal. inc epo 8>12k tiw w/hd  HTN,, controlled; monitor BP. not on meds  Left foot infection, gangrene- s/p OR. Mx per Podiatry. dose abxs for ESRD. on Cefepime 1gm qd- need to change to 2/2/3gm post hd for d/c      awaiting rehab placement  For any question, call:  Cell # [phone]  Pager # [phone]  Callback # [phone]

## 2022-03-11 NOTE — PROGRESS NOTE ADULT - ASSESSMENT
60 yo f s/p right foot partial hallux amputation and left foot partial 4rd ray amputation (DOS 3/4/2022)  -pt seen and evaluated  - Afebrile, WBC 14.77  - 3/4 s/p L foot partial 3rd ray resection open: fibrous wound bed, no malodor, no drainage, no purulence, flaps cool, distal skin necrosis, no additional tracking   - 3/4 s/p R foot partial hallux: sutures intact, no drainage, no tracking, no purulence, periwound maceration resolved, flaps cool, no dehiscence   - B/L surgical sites with high concern for viability and residual soft tissue infection per intraoperative findings   - B/L  OR Swab & L foot clean bone culture: growing E coli prelim  - ID recs cefepime w/ HD upon d/c, appreciated  - Continue wound vac  - Pt is stable for d/c from podiatry, follow up and wound care instructions listed in discharge note provider  - discussed with attending

## 2022-03-11 NOTE — PROGRESS NOTE ADULT - ASSESSMENT
61-year-old female with ESRD on HD MWF via right groin AVF, HLD and PAD presenting with L sided foot pain, found to have likely OM     Problem/Plan - 1:  ·  Problem: Foot osteomyelitis.   ·  Plan: S/P  left foot partial 3rd ray amputation- open and right foot partial hallux amputation- partially open, poor intraop bleeding, good bone quality at the level of resection, low concern for bone infection, high concern for viability/soft tissue infection   ID help appreciated and now on Cefepime with HD for 6 weeks from day of surgery .   appreciate podiatry recs .  MARTA/PVR noted. Severe Small vessel disease.   vascular surgery help appreciated.  s/p I&D by podiatry in ED, plan for left foot partial 3rd ray resection pending MARTA/vasc recs     < from: MR Foot w/wo IV Cont, Left (03.03.22 @ 15:01) >  IMPRESSION:    Faint edema within the head of the third metatarsal and third phalanges   with mild periosteal enhancement of the third metatarsal head. Findings   may represent sequela of early osteomyelitis. Questionable soft tissue   defect of the third digit.  Lack of enhancement of the soft tissues plantarly and about the third and   fourth digits, favored to represent sequela of ischemia.    < end of copied text >  Pain under control .      Problem/Plan - 2:  ·  Problem: ESRD on dialysis.   ·  Plan: appreciate renal recs  dose meds for intermittent HD  renal restricted diet  c/w phosphate binders     Problem/Plan - 3:  ·  Problem: PAD (peripheral artery disease).   ·  Plan: c/w DAPT and statin  f/u VSx recs  MARTA/PVR noted.      Problem/Plan - 4:  ·  Problem: Lower extremity edema.   ·  Plan: b/l LE edema   LE dopplers negative for  VTE.     Problem/Plan - 5:  ·  Problem: Need for prophylactic measure.   ·  Plan: pharmacologic DVT ppx       Dispo : DC planning to [facility] pending placement .

## 2022-03-11 NOTE — PROGRESS NOTE ADULT - SUBJECTIVE AND OBJECTIVE BOX
[facility] - TIFFANY Bazzi / TIFFANY Shea / APARNA Vanegas/ TIFFANY Mcfarlane/ TIFFANY Madden/ MASSIEL Perkins / RAINER Potts / ESTEPHANIE Griffith  ---------------------------------------------------------------------------------------------------------------  seen and examined today for ESRD  Interval : NAD  VITALS:  T(F): 97.8 (03-11-22 @ 05:30), Max: 98 (03-10-22 @ 22:00)  HR: 73 (03-11-22 @ 05:30)  BP: 112/63 (03-11-22 @ 05:30)  RR: 18 (03-11-22 @ 05:30)  SpO2: 99% (03-11-22 @ 05:30)  Wt(kg): --    Physical Exam :-  Constitutional: NAD  Neck: Supple.  Respiratory: Bilateral equal breath sounds,  Cardiovascular: S1, S2 normal,  Gastrointestinal: Bowel Sounds present, soft, non tender.  Extremities: S/L TMA  Neurological: Alert and Oriented x 3, no focal deficits  Psychiatric: Normal mood, normal affect  Data:-  Allergies :   No Known Allergies    Hospital Medications:   MEDICATIONS  (STANDING):  aspirin  chewable 81 milliGRAM(s) Oral daily  atorvastatin 40 milliGRAM(s) Oral at bedtime  cadexomer iodine 0.9% Gel 1 Application(s) Topical daily  cefepime   IVPB 1000 milliGRAM(s) IV Intermittent every 24 hours  chlorhexidine 2% Cloths 1 Application(s) Topical daily  clopidogrel Tablet 75 milliGRAM(s) Oral daily  epoetin niesha-epbx (RETACRIT) Injectable 34409 Unit(s) IV Push <User Schedule>  heparin   Injectable 5000 Unit(s) SubCutaneous every 12 hours  Nephro-george 1 Tablet(s) Oral daily  Velphoro (sucroferric oxyhydroxide) 3 Tablet(s) 3 Tablet(s) Oral three times a day    03-11    131<L>  |  90<L>  |  50<H>  ----------------------------<  100<H>  4.4   |  25  |  8.27<H>    Ca    9.7      11 Mar 2022 07:25  Phos  4.7     03-11  Mg     2.30     03-11      Creatinine Trend: 8.27 <--, 6.06 <--, 8.25 <--, 5.96 <--, 8.20 <--, 6.43 <--, 8.04 <--                        9.3    14.77 )-----------( 341      ( 11 Mar 2022 07:25 )             31.0

## 2022-03-12 LAB
ANION GAP SERPL CALC-SCNC: 17 MMOL/L — HIGH (ref 7–14)
BUN SERPL-MCNC: 29 MG/DL — HIGH (ref 7–23)
CALCIUM SERPL-MCNC: 9.5 MG/DL — SIGNIFICANT CHANGE UP (ref 8.4–10.5)
CHLORIDE SERPL-SCNC: 96 MMOL/L — LOW (ref 98–107)
CO2 SERPL-SCNC: 27 MMOL/L — SIGNIFICANT CHANGE UP (ref 22–31)
CREAT SERPL-MCNC: 5.43 MG/DL — HIGH (ref 0.5–1.3)
EGFR: 8 ML/MIN/1.73M2 — LOW
GLUCOSE SERPL-MCNC: 225 MG/DL — HIGH (ref 70–99)
HCT VFR BLD CALC: 30.2 % — LOW (ref 34.5–45)
HGB BLD-MCNC: 9 G/DL — LOW (ref 11.5–15.5)
MAGNESIUM SERPL-MCNC: 2.2 MG/DL — SIGNIFICANT CHANGE UP (ref 1.6–2.6)
MCHC RBC-ENTMCNC: 26.8 PG — LOW (ref 27–34)
MCHC RBC-ENTMCNC: 29.8 GM/DL — LOW (ref 32–36)
MCV RBC AUTO: 89.9 FL — SIGNIFICANT CHANGE UP (ref 80–100)
NRBC # BLD: 0 /100 WBCS — SIGNIFICANT CHANGE UP
NRBC # FLD: 0.06 K/UL — HIGH
PHOSPHATE SERPL-MCNC: 3.1 MG/DL — SIGNIFICANT CHANGE UP (ref 2.5–4.5)
PLATELET # BLD AUTO: 321 K/UL — SIGNIFICANT CHANGE UP (ref 150–400)
POTASSIUM SERPL-MCNC: 4.1 MMOL/L — SIGNIFICANT CHANGE UP (ref 3.5–5.3)
POTASSIUM SERPL-SCNC: 4.1 MMOL/L — SIGNIFICANT CHANGE UP (ref 3.5–5.3)
RBC # BLD: 3.36 M/UL — LOW (ref 3.8–5.2)
RBC # FLD: 18.3 % — HIGH (ref 10.3–14.5)
SODIUM SERPL-SCNC: 140 MMOL/L — SIGNIFICANT CHANGE UP (ref 135–145)
WBC # BLD: 12.59 K/UL — HIGH (ref 3.8–10.5)
WBC # FLD AUTO: 12.59 K/UL — HIGH (ref 3.8–10.5)

## 2022-03-12 RX ADMIN — HEPARIN SODIUM 5000 UNIT(S): 5000 INJECTION INTRAVENOUS; SUBCUTANEOUS at 17:06

## 2022-03-12 RX ADMIN — ATORVASTATIN CALCIUM 40 MILLIGRAM(S): 80 TABLET, FILM COATED ORAL at 21:55

## 2022-03-12 RX ADMIN — CHLORHEXIDINE GLUCONATE 1 APPLICATION(S): 213 SOLUTION TOPICAL at 12:20

## 2022-03-12 RX ADMIN — Medication 81 MILLIGRAM(S): at 12:19

## 2022-03-12 RX ADMIN — Medication 1 TABLET(S): at 12:20

## 2022-03-12 RX ADMIN — CEFEPIME 100 MILLIGRAM(S): 1 INJECTION, POWDER, FOR SOLUTION INTRAMUSCULAR; INTRAVENOUS at 17:06

## 2022-03-12 RX ADMIN — HEPARIN SODIUM 5000 UNIT(S): 5000 INJECTION INTRAVENOUS; SUBCUTANEOUS at 05:37

## 2022-03-12 RX ADMIN — CLOPIDOGREL BISULFATE 75 MILLIGRAM(S): 75 TABLET, FILM COATED ORAL at 12:20

## 2022-03-12 RX ADMIN — Medication 1 APPLICATION(S): at 12:20

## 2022-03-12 RX ADMIN — Medication 650 MILLIGRAM(S): at 17:11

## 2022-03-12 NOTE — PROGRESS NOTE ADULT - SUBJECTIVE AND OBJECTIVE BOX
[facility] - S Jluis / Shabbir S /D Romina/ S Denys/ S Chano/ Andrés Perkins / M Katlyn/ O Gladis  service -[phone], office [phone]  ---------------------------------------------------------------------------------------------------------------    Patient seen and examined bedside    Subjective and Objective: No overnight events, denied sob. No complaints today. feeling better. foot pain controlled    Allergies: No Known Allergies      Hospital Medications:   MEDICATIONS  (STANDING):  aspirin  chewable 81 milliGRAM(s) Oral daily  atorvastatin 40 milliGRAM(s) Oral at bedtime  cadexomer iodine 0.9% Gel 1 Application(s) Topical daily  cefepime   IVPB 1000 milliGRAM(s) IV Intermittent every 24 hours  chlorhexidine 2% Cloths 1 Application(s) Topical daily  clopidogrel Tablet 75 milliGRAM(s) Oral daily  epoetin niesha-epbx (RETACRIT) Injectable 50478 Unit(s) IV Push <User Schedule>  heparin   Injectable 5000 Unit(s) SubCutaneous every 12 hours  Nephro-george 1 Tablet(s) Oral daily  Velphoro (sucroferric oxyhydroxide) 3 Tablet(s) 3 Tablet(s) Oral three times a day    VITALS:  T(F): 98.4 (03-12-22 @ 05:35), Max: 98.4 (03-12-22 @ 05:35)  HR: 78 (03-12-22 @ 05:35)  BP: 109/58 (03-12-22 @ 05:35)  RR: 18 (03-12-22 @ 05:35)  SpO2: 97% (03-12-22 @ 05:35)  Wt(kg): --    03-11 @ 07:01  -  03-12 @ 07:00  --------------------------------------------------------  IN: 550 mL / OUT: 900 mL / NET: -350 mL      PHYSICAL EXAM:  HEENT: anicteric sclera  Neck: No JVD  Respiratory: CTAB, no wheezes, rales or rhonchi  Cardiovascular: S1, S2, RRR  Gastrointestinal: BS+, soft, NT/ND  Extremities: No peripheral edema  Neurological: A/O x 3  Psychiatric: Normal mood, normal affect  : No salcido.   Vascular Access: femoral avg+     LABS:  03-12    140  |  96<L>  |  29<H>  ----------------------------<  225<H>  4.1   |  27  |  5.43<H>    Ca    9.5      12 Mar 2022 06:33  Phos  3.1     03-12  Mg     2.20     03-12      Creatinine Trend: 5.43 <--, 8.27 <--, 6.06 <--, 8.25 <--, 5.96 <--, 8.20 <--, 6.43 <--                        9.0    12.59 )-----------( 321      ( 12 Mar 2022 06:33 )             30.2     Urine Studies:        RADIOLOGY & ADDITIONAL STUDIES:

## 2022-03-12 NOTE — PROGRESS NOTE ADULT - ASSESSMENT
61 Yr old female ESRD (Mn/Wed/Fr.) well known to me from [facility], Right groin AVF, DM, PVD, now with severe left foot pain. Renal following for ESRD Mx    End Stage Renal Disease on Dialysis MWF  HD unit [facility]  K, vol- ok    continue with HD MWF  s/p HD yesterday, Rx sheet reviewed, net UF 0.5kg, tolerated well. uneventful.   plan to rpt HD Monday w/2k bath, net uf 1kg as tolerated   renal restrictions in diet.   dose all meds for ESRD  monitor BMP    Anemia of CKD- Hb < goal. inc epo 8>12k tiw w/hd  HTN,, controlled; monitor BP. not on meds  Left foot infection, gangrene- s/p OR. Mx per Podiatry. dose abxs for ESRD. on Cefepime 1gm qd- need to change to 2/2/3gm post hd for d/c plan -rec to give 2gm monday after HD. f/u w/ID    awaiting rehab placement at [facility], chart reviewed  poc d/w pt, pts RN  For any question, call:  Cell # [phone]  Pager # [phone]  office # [phone]

## 2022-03-12 NOTE — PROGRESS NOTE ADULT - SUBJECTIVE AND OBJECTIVE BOX
Date of Service  : 03-12-22 @ 13:41    INTERVAL HPI/OVERNIGHT EVENTS: I feel fine.   Vital Signs Last 24 Hrs  T(C): 36.9 (12 Mar 2022 05:35), Max: 36.9 (12 Mar 2022 05:35)  T(F): 98.4 (12 Mar 2022 05:35), Max: 98.4 (12 Mar 2022 05:35)  HR: 78 (12 Mar 2022 05:35) (73 - 82)  BP: 109/58 (12 Mar 2022 05:35) (109/58 - 139/86)  BP(mean): --  RR: 18 (12 Mar 2022 05:35) (17 - 18)  SpO2: 97% (12 Mar 2022 05:35) (97% - 100%)  I&O's Summary    11 Mar 2022 07:01  -  12 Mar 2022 07:00  --------------------------------------------------------  IN: 550 mL / OUT: 900 mL / NET: -350 mL      MEDICATIONS  (STANDING):  aspirin  chewable 81 milliGRAM(s) Oral daily  atorvastatin 40 milliGRAM(s) Oral at bedtime  cadexomer iodine 0.9% Gel 1 Application(s) Topical daily  cefepime   IVPB 1000 milliGRAM(s) IV Intermittent every 24 hours  chlorhexidine 2% Cloths 1 Application(s) Topical daily  clopidogrel Tablet 75 milliGRAM(s) Oral daily  epoetin niesha-epbx (RETACRIT) Injectable 77469 Unit(s) IV Push <User Schedule>  heparin   Injectable 5000 Unit(s) SubCutaneous every 12 hours  Nephro-george 1 Tablet(s) Oral daily  Velphoro (sucroferric oxyhydroxide) 3 Tablet(s) 3 Tablet(s) Oral three times a day    MEDICATIONS  (PRN):  acetaminophen     Tablet .. 650 milliGRAM(s) Oral every 6 hours PRN Mild Pain (1 - 3), Moderate Pain (4 - 6)  HYDROmorphone  Injectable 0.25 milliGRAM(s) IV Push every 6 hours PRN Severe Pain (7 - 10)    LABS:                        9.0    12.59 )-----------( 321      ( 12 Mar 2022 06:33 )             30.2     03-12    140  |  96<L>  |  29<H>  ----------------------------<  225<H>  4.1   |  27  |  5.43<H>    Ca    9.5      12 Mar 2022 06:33  Phos  3.1     03-12  Mg     2.20     03-12          CAPILLARY BLOOD GLUCOSE              REVIEW OF SYSTEMS:  CONSTITUTIONAL: No fever, weight loss, or fatigue  EYES: No eye pain, visual disturbances, or discharge  ENMT:  No difficulty hearing, tinnitus, vertigo; No sinus or throat pain  RESPIRATORY: No cough, wheezing, chills or hemoptysis; No shortness of breath  CARDIOVASCULAR: No chest pain, palpitations, dizziness, or leg swelling  GASTROINTESTINAL: No abdominal or epigastric pain. No nausea, vomiting, or hematemesis; No diarrhea or constipation. No melena or hematochezia.  GENITOURINARY: No dysuria, frequency, hematuria, or incontinence  NEUROLOGICAL: No headaches, memory loss, loss of strength, numbness, or tremors      Consultant(s) Notes Reviewed:  [x ] YES  [ ] NO    PHYSICAL EXAM:  GENERAL: NAD, well-groomed, well-developed, not in any distress ,  HEAD:  Atraumatic, Normocephalic  EYES: EOMI, PERRLA, conjunctiva and sclera clear  ENMT: No tonsillar erythema, exudates, or enlargement; Moist mucous membranes, Good dentition, No lesions  NECK: Supple, No JVD, Normal thyroid  NERVOUS SYSTEM:  Alert & Oriented X3, No focal deficit   CHEST/LUNG: Good air entry bilateral with no  rales, rhonchi, wheezing, or rubs  HEART: Regular rate and rhythm; No murmurs, rubs, or gallops  ABDOMEN: Soft, Nontender, Nondistended; Bowel sounds present  EXTREMITIES: Feet dressed .     Care Discussed with Consultants/Other Providers [ x] YES  [ ] NO

## 2022-03-12 NOTE — PROGRESS NOTE ADULT - SUBJECTIVE AND OBJECTIVE BOX
Cardiovascular Disease Progress Note    Overnight events: No acute events overnight.  PT denies chest pain or sob.   Otherwise review of systems negative    Objective Findings:  T(C): 36.9 (22 @ 05:35), Max: 36.9 (22 @ 05:35)  HR: 78 (22 @ 05:35) (73 - 82)  BP: 109/58 (22 @ 05:35) (109/58 - 139/86)  RR: 18 (22 @ 05:35) (17 - 18)  SpO2: 97% (22 @ 05:35) (97% - 100%)  Wt(kg): --  Daily     Daily Weight in k (11 Mar 2022 20:05)      Physical Exam:  Gen: NAD; Patient resting comfortably  HEENT: EOMI, Normocephalic/ atraumatic  CV: RRR, normal S1 + S2, no m/r/g  Lungs:  Normal respiratory effort; clear to auscultation bilaterally  Abd: soft, non-tender; bowel sounds present  Ext: No edema; warm and well perfused    Telemetry: N/a    Laboratory Data:                        9.0    12.59 )-----------( 321      ( 12 Mar 2022 06:33 )             30.2     03-12    140  |  96<L>  |  29<H>  ----------------------------<  225<H>  4.1   |  27  |  5.43<H>    Ca    9.5      12 Mar 2022 06:33  Phos  3.1     -12  Mg     2.20     03-12                Inpatient Medications:  MEDICATIONS  (STANDING):  aspirin  chewable 81 milliGRAM(s) Oral daily  atorvastatin 40 milliGRAM(s) Oral at bedtime  cadexomer iodine 0.9% Gel 1 Application(s) Topical daily  cefepime   IVPB 1000 milliGRAM(s) IV Intermittent every 24 hours  chlorhexidine 2% Cloths 1 Application(s) Topical daily  clopidogrel Tablet 75 milliGRAM(s) Oral daily  epoetin niesha-epbx (RETACRIT) Injectable 59223 Unit(s) IV Push <User Schedule>  heparin   Injectable 5000 Unit(s) SubCutaneous every 12 hours  Nephro-george 1 Tablet(s) Oral daily  Velphoro (sucroferric oxyhydroxide) 3 Tablet(s) 3 Tablet(s) Oral three times a day      Assessment:  61-year-old female with ESRD on HD MWF via right groin AVF, HLD and PAD with previous intervention presenting with L sided foot pain, ongoing since 2021, becoming more severe in the past 2 weeks.    Plan of Care:    # L foot gangrene  - S/p L foot partial 3rd ray amputation and R hallux amputation 3/4.   - Tolerated procedure well.   - No evidence of post-operative coronary ischemia.   - TTE shows preserved LVEF with mild AI and no significant structural disease.   - Continue current cardiac management.   Outpatient cardiac f/u with Dr. Wisam Adams on 3/24/2022 at 12:30 PM.       #ESRD-  - HD as per nephro    #PAD  - Continue ASA and plavix    #HLD  - Statin as ordered      Plan of Care:          Over 25 minutes spent on total encounter; more than 50% of the visit was spent counseling and/or coordinating care by the attending physician.      Wisam Adams D.O.   Cardiovascular Disease  [phone]

## 2022-03-12 NOTE — PROGRESS NOTE ADULT - ASSESSMENT
61-year-old female with ESRD on HD MWF via right groin AVF, HLD and PAD presenting with L sided foot pain, found to have likely OM     Problem/Plan - 1:  ·  Problem: Foot osteomyelitis.   ·  Plan: S/P  left foot partial 3rd ray amputation- open and right foot partial hallux amputation- partially open, poor intraop bleeding, good bone quality at the level of resection, low concern for bone infection, high concern for viability/soft tissue infection   ID help appreciated and now on Cefepime with HD for 6 weeks from day of surgery .   appreciate podiatry recs .  MARTA/PVR noted. Severe Small vessel disease.   vascular surgery help appreciated.  s/p I&D by podiatry in ED, plan for left foot partial 3rd ray resection pending MARTA/vasc recs     < from: MR Foot w/wo IV Cont, Left (03.03.22 @ 15:01) >  IMPRESSION:    Faint edema within the head of the third metatarsal and third phalanges   with mild periosteal enhancement of the third metatarsal head. Findings   may represent sequela of early osteomyelitis. Questionable soft tissue   defect of the third digit.  Lack of enhancement of the soft tissues plantarly and about the third and   fourth digits, favored to represent sequela of ischemia.    < end of copied text >  Pain under control .      Problem/Plan - 2:  ·  Problem: ESRD on dialysis.   ·  Plan: appreciate renal recs  dose meds for intermittent HD  renal restricted diet  c/w phosphate binders     Problem/Plan - 3:  ·  Problem: PAD (peripheral artery disease).   ·  Plan: c/w DAPT and statin  f/u VSx recs  MARTA/PVR noted.      Problem/Plan - 4:  ·  Problem: Lower extremity edema.   ·  Plan: b/l LE edema   LE dopplers negative for  VTE.     Problem/Plan - 5:  ·  Problem: Need for prophylactic measure.   ·  Plan: pharmacologic DVT ppx       Dispo : DC planning to [facility] pending placement .

## 2022-03-13 LAB
ANION GAP SERPL CALC-SCNC: 18 MMOL/L — HIGH (ref 7–14)
BUN SERPL-MCNC: 42 MG/DL — HIGH (ref 7–23)
CALCIUM SERPL-MCNC: 9.4 MG/DL — SIGNIFICANT CHANGE UP (ref 8.4–10.5)
CHLORIDE SERPL-SCNC: 95 MMOL/L — LOW (ref 98–107)
CO2 SERPL-SCNC: 25 MMOL/L — SIGNIFICANT CHANGE UP (ref 22–31)
CREAT SERPL-MCNC: 6.97 MG/DL — HIGH (ref 0.5–1.3)
EGFR: 6 ML/MIN/1.73M2 — LOW
GLUCOSE SERPL-MCNC: 105 MG/DL — HIGH (ref 70–99)
HCT VFR BLD CALC: 28.7 % — LOW (ref 34.5–45)
HGB BLD-MCNC: 8.7 G/DL — LOW (ref 11.5–15.5)
MAGNESIUM SERPL-MCNC: 2.3 MG/DL — SIGNIFICANT CHANGE UP (ref 1.6–2.6)
MCHC RBC-ENTMCNC: 27.2 PG — SIGNIFICANT CHANGE UP (ref 27–34)
MCHC RBC-ENTMCNC: 30.3 GM/DL — LOW (ref 32–36)
MCV RBC AUTO: 89.7 FL — SIGNIFICANT CHANGE UP (ref 80–100)
NRBC # BLD: 0 /100 WBCS — SIGNIFICANT CHANGE UP
NRBC # FLD: 0.03 K/UL — HIGH
PHOSPHATE SERPL-MCNC: 4.3 MG/DL — SIGNIFICANT CHANGE UP (ref 2.5–4.5)
PLATELET # BLD AUTO: 330 K/UL — SIGNIFICANT CHANGE UP (ref 150–400)
POTASSIUM SERPL-MCNC: 4.1 MMOL/L — SIGNIFICANT CHANGE UP (ref 3.5–5.3)
POTASSIUM SERPL-SCNC: 4.1 MMOL/L — SIGNIFICANT CHANGE UP (ref 3.5–5.3)
RBC # BLD: 3.2 M/UL — LOW (ref 3.8–5.2)
RBC # FLD: 18.6 % — HIGH (ref 10.3–14.5)
SARS-COV-2 RNA SPEC QL NAA+PROBE: SIGNIFICANT CHANGE UP
SODIUM SERPL-SCNC: 138 MMOL/L — SIGNIFICANT CHANGE UP (ref 135–145)
WBC # BLD: 12.18 K/UL — HIGH (ref 3.8–10.5)
WBC # FLD AUTO: 12.18 K/UL — HIGH (ref 3.8–10.5)

## 2022-03-13 RX ADMIN — Medication 650 MILLIGRAM(S): at 13:00

## 2022-03-13 RX ADMIN — HEPARIN SODIUM 5000 UNIT(S): 5000 INJECTION INTRAVENOUS; SUBCUTANEOUS at 05:19

## 2022-03-13 RX ADMIN — CLOPIDOGREL BISULFATE 75 MILLIGRAM(S): 75 TABLET, FILM COATED ORAL at 12:09

## 2022-03-13 RX ADMIN — HYDROMORPHONE HYDROCHLORIDE 0.25 MILLIGRAM(S): 2 INJECTION INTRAMUSCULAR; INTRAVENOUS; SUBCUTANEOUS at 16:48

## 2022-03-13 RX ADMIN — HYDROMORPHONE HYDROCHLORIDE 0.25 MILLIGRAM(S): 2 INJECTION INTRAMUSCULAR; INTRAVENOUS; SUBCUTANEOUS at 16:06

## 2022-03-13 RX ADMIN — Medication 81 MILLIGRAM(S): at 12:10

## 2022-03-13 RX ADMIN — Medication 650 MILLIGRAM(S): at 12:09

## 2022-03-13 RX ADMIN — ATORVASTATIN CALCIUM 40 MILLIGRAM(S): 80 TABLET, FILM COATED ORAL at 21:59

## 2022-03-13 RX ADMIN — HEPARIN SODIUM 5000 UNIT(S): 5000 INJECTION INTRAVENOUS; SUBCUTANEOUS at 17:34

## 2022-03-13 RX ADMIN — Medication 1 TABLET(S): at 12:09

## 2022-03-13 RX ADMIN — Medication 1 APPLICATION(S): at 12:10

## 2022-03-13 RX ADMIN — CEFEPIME 100 MILLIGRAM(S): 1 INJECTION, POWDER, FOR SOLUTION INTRAMUSCULAR; INTRAVENOUS at 17:34

## 2022-03-13 RX ADMIN — CHLORHEXIDINE GLUCONATE 1 APPLICATION(S): 213 SOLUTION TOPICAL at 12:11

## 2022-03-13 NOTE — PROGRESS NOTE ADULT - SUBJECTIVE AND OBJECTIVE BOX
Cardiovascular Disease Progress Note    Overnight events: No acute events overnight.  Pt denies chest pain or SOB.   Otherwise review of systems negative    Objective Findings:  T(C): 36.7 (03-13-22 @ 05:02), Max: 36.7 (03-13-22 @ 05:02)  HR: 76 (03-13-22 @ 05:02) (76 - 81)  BP: 125/59 (03-13-22 @ 05:02) (119/55 - 142/56)  RR: 18 (03-13-22 @ 05:02) (18 - 18)  SpO2: 100% (03-13-22 @ 05:02) (96% - 100%)  Wt(kg): --  Daily     Daily       Physical Exam:  Gen: NAD; Patient resting comfortably  HEENT: EOMI, Normocephalic/ atraumatic  CV: RRR, normal S1 + S2, no m/r/g  Lungs:  Normal respiratory effort; clear to auscultation bilaterally  Abd: soft, non-tender; bowel sounds present  Ext: No edema; Wound Vac in place    Telemetry: N/a    Laboratory Data:                        8.7    12.18 )-----------( 330      ( 13 Mar 2022 06:24 )             28.7     03-13    138  |  95<L>  |  42<H>  ----------------------------<  105<H>  4.1   |  25  |  6.97<H>    Ca    9.4      13 Mar 2022 06:24  Phos  4.3     03-13  Mg     2.30     03-13                Inpatient Medications:  MEDICATIONS  (STANDING):  aspirin  chewable 81 milliGRAM(s) Oral daily  atorvastatin 40 milliGRAM(s) Oral at bedtime  cadexomer iodine 0.9% Gel 1 Application(s) Topical daily  cefepime   IVPB 1000 milliGRAM(s) IV Intermittent every 24 hours  chlorhexidine 2% Cloths 1 Application(s) Topical daily  clopidogrel Tablet 75 milliGRAM(s) Oral daily  epoetin niesha-epbx (RETACRIT) Injectable 22031 Unit(s) IV Push <User Schedule>  heparin   Injectable 5000 Unit(s) SubCutaneous every 12 hours  Nephro-george 1 Tablet(s) Oral daily  Velphoro (sucroferric oxyhydroxide) 3 Tablet(s) 3 Tablet(s) Oral three times a day      Assessment:  61-year-old female with ESRD on HD MWF via right groin AVF, HLD and PAD with previous intervention presenting with L sided foot pain, ongoing since Nov 2021, becoming more severe in the past 2 weeks.    Plan of Care:    # L foot gangrene  - S/p L foot partial 3rd ray amputation and R hallux amputation 3/4.   - Tolerated procedure well.   - No evidence of post-operative coronary ischemia.   - TTE shows preserved LVEF with mild AI and no significant structural disease.   - Continue current cardiac management.   Outpatient cardiac f/u with Dr. Wisam Adams on 3/24/2022 at 12:30 PM.       #ESRD-  - HD as per nephro    #PAD  - Continue ASA and plavix    #HLD  - Statin as ordered            Over 25 minutes spent on total encounter; more than 50% of the visit was spent counseling and/or coordinating care by the attending physician.      Wisam Adams D.O.   Cardiovascular Disease  [phone]

## 2022-03-13 NOTE — PROGRESS NOTE ADULT - ASSESSMENT
61-year-old female with ESRD on HD MWF via right groin AVF, HLD and PAD presenting with L sided foot pain, found to have likely OM     Problem/Plan - 1:  ·  Problem: Foot osteomyelitis.   ·  Plan: S/P  left foot partial 3rd ray amputation- open and right foot partial hallux amputation- partially open, poor intraop bleeding, good bone quality at the level of resection, low concern for bone infection, high concern for viability/soft tissue infection   ID help appreciated and now on Cefepime with HD for 6 weeks from day of surgery .   appreciate podiatry recs .  MARTA/PVR noted. Severe Small vessel disease.   vascular surgery help appreciated.  s/p I&D by podiatry in ED, plan for left foot partial 3rd ray resection pending MARTA/vasc recs     < from: MR Foot w/wo IV Cont, Left (03.03.22 @ 15:01) >  IMPRESSION:    Faint edema within the head of the third metatarsal and third phalanges   with mild periosteal enhancement of the third metatarsal head. Findings   may represent sequela of early osteomyelitis. Questionable soft tissue   defect of the third digit.  Lack of enhancement of the soft tissues plantarly and about the third and   fourth digits, favored to represent sequela of ischemia.    < end of copied text >  Pain under control .      Problem/Plan - 2:  ·  Problem: ESRD on dialysis.   ·  Plan: appreciate renal recs  dose meds for intermittent HD  renal restricted diet  c/w phosphate binders     Problem/Plan - 3:  ·  Problem: PAD (peripheral artery disease).   ·  Plan: c/w DAPT and statin  f/u VSx recs  MARTA/PVR noted.      Problem/Plan - 4:  ·  Problem: Lower extremity edema.   ·  Plan: b/l LE edema   LE dopplers negative for  VTE.     Problem/Plan - 5:  ·  Problem: Need for prophylactic measure.   ·  Plan: pharmacologic DVT ppx       Dispo : DC planning to [facility] pending placement .

## 2022-03-14 ENCOUNTER — TRANSCRIPTION ENCOUNTER (OUTPATIENT)
Age: 62
End: 2022-03-14

## 2022-03-14 VITALS
TEMPERATURE: 98 F | OXYGEN SATURATION: 100 % | HEART RATE: 80 BPM | SYSTOLIC BLOOD PRESSURE: 116 MMHG | DIASTOLIC BLOOD PRESSURE: 56 MMHG | RESPIRATION RATE: 16 BRPM

## 2022-03-14 LAB
ANION GAP SERPL CALC-SCNC: 20 MMOL/L — HIGH (ref 7–14)
BUN SERPL-MCNC: 58 MG/DL — HIGH (ref 7–23)
CALCIUM SERPL-MCNC: 9.5 MG/DL — SIGNIFICANT CHANGE UP (ref 8.4–10.5)
CHLORIDE SERPL-SCNC: 93 MMOL/L — LOW (ref 98–107)
CO2 SERPL-SCNC: 23 MMOL/L — SIGNIFICANT CHANGE UP (ref 22–31)
CREAT SERPL-MCNC: 8.88 MG/DL — HIGH (ref 0.5–1.3)
EGFR: 5 ML/MIN/1.73M2 — LOW
GLUCOSE SERPL-MCNC: 103 MG/DL — HIGH (ref 70–99)
HCT VFR BLD CALC: 28.5 % — LOW (ref 34.5–45)
HGB BLD-MCNC: 8.4 G/DL — LOW (ref 11.5–15.5)
MAGNESIUM SERPL-MCNC: 2.4 MG/DL — SIGNIFICANT CHANGE UP (ref 1.6–2.6)
MCHC RBC-ENTMCNC: 26.8 PG — LOW (ref 27–34)
MCHC RBC-ENTMCNC: 29.5 GM/DL — LOW (ref 32–36)
MCV RBC AUTO: 91.1 FL — SIGNIFICANT CHANGE UP (ref 80–100)
NRBC # BLD: 0 /100 WBCS — SIGNIFICANT CHANGE UP
NRBC # FLD: 0 K/UL — SIGNIFICANT CHANGE UP
PHOSPHATE SERPL-MCNC: 4.1 MG/DL — SIGNIFICANT CHANGE UP (ref 2.5–4.5)
PLATELET # BLD AUTO: 305 K/UL — SIGNIFICANT CHANGE UP (ref 150–400)
POTASSIUM SERPL-MCNC: 4.5 MMOL/L — SIGNIFICANT CHANGE UP (ref 3.5–5.3)
POTASSIUM SERPL-SCNC: 4.5 MMOL/L — SIGNIFICANT CHANGE UP (ref 3.5–5.3)
RBC # BLD: 3.13 M/UL — LOW (ref 3.8–5.2)
RBC # FLD: 18.7 % — HIGH (ref 10.3–14.5)
SODIUM SERPL-SCNC: 136 MMOL/L — SIGNIFICANT CHANGE UP (ref 135–145)
WBC # BLD: 12.99 K/UL — HIGH (ref 3.8–10.5)
WBC # FLD AUTO: 12.99 K/UL — HIGH (ref 3.8–10.5)

## 2022-03-14 PROCEDURE — 99232 SBSQ HOSP IP/OBS MODERATE 35: CPT

## 2022-03-14 RX ORDER — COLLAGENASE CLOSTRIDIUM HIST. 250 UNIT/G
1 OINTMENT (GRAM) TOPICAL DAILY
Refills: 0 | Status: DISCONTINUED | OUTPATIENT
Start: 2022-03-14 | End: 2022-03-14

## 2022-03-14 RX ORDER — HYDROMORPHONE HYDROCHLORIDE 2 MG/ML
0.25 INJECTION INTRAMUSCULAR; INTRAVENOUS; SUBCUTANEOUS ONCE
Refills: 0 | Status: DISCONTINUED | OUTPATIENT
Start: 2022-03-14 | End: 2022-03-14

## 2022-03-14 RX ORDER — ACETAMINOPHEN 500 MG
2 TABLET ORAL
Qty: 0 | Refills: 0 | DISCHARGE
Start: 2022-03-14

## 2022-03-14 RX ORDER — HYDROMORPHONE HYDROCHLORIDE 2 MG/ML
0.5 INJECTION INTRAMUSCULAR; INTRAVENOUS; SUBCUTANEOUS ONCE
Refills: 0 | Status: DISCONTINUED | OUTPATIENT
Start: 2022-03-14 | End: 2022-03-14

## 2022-03-14 RX ORDER — INFLUENZA VIRUS VACCINE 15; 15; 15; 15 UG/.5ML; UG/.5ML; UG/.5ML; UG/.5ML
0.5 SUSPENSION INTRAMUSCULAR ONCE
Refills: 0 | Status: DISCONTINUED | OUTPATIENT
Start: 2022-03-14 | End: 2022-03-14

## 2022-03-14 RX ORDER — ERYTHROPOIETIN 10000 [IU]/ML
12000 INJECTION, SOLUTION INTRAVENOUS; SUBCUTANEOUS
Qty: 0 | Refills: 0 | DISCHARGE
Start: 2022-03-14

## 2022-03-14 RX ORDER — CEFEPIME 1 G/1
1 INJECTION, POWDER, FOR SOLUTION INTRAMUSCULAR; INTRAVENOUS
Qty: 0 | Refills: 0 | DISCHARGE
Start: 2022-03-14

## 2022-03-14 RX ORDER — COLLAGENASE CLOSTRIDIUM HIST. 250 UNIT/G
1 OINTMENT (GRAM) TOPICAL
Qty: 0 | Refills: 0 | DISCHARGE
Start: 2022-03-14

## 2022-03-14 RX ORDER — HEPARIN SODIUM 5000 [USP'U]/ML
5000 INJECTION INTRAVENOUS; SUBCUTANEOUS
Qty: 0 | Refills: 0 | DISCHARGE
Start: 2022-03-14

## 2022-03-14 RX ORDER — CEFEPIME 1 G/1
2 INJECTION, POWDER, FOR SOLUTION INTRAMUSCULAR; INTRAVENOUS
Qty: 0 | Refills: 0 | DISCHARGE
Start: 2022-03-14

## 2022-03-14 RX ORDER — CADEXOMER IODINE 0.9 %
1 PADS, MEDICATED (EA) TOPICAL
Qty: 0 | Refills: 0 | DISCHARGE
Start: 2022-03-14

## 2022-03-14 RX ADMIN — Medication 81 MILLIGRAM(S): at 12:10

## 2022-03-14 RX ADMIN — Medication 650 MILLIGRAM(S): at 04:23

## 2022-03-14 RX ADMIN — Medication 1 APPLICATION(S): at 12:10

## 2022-03-14 RX ADMIN — ERYTHROPOIETIN 12000 UNIT(S): 10000 INJECTION, SOLUTION INTRAVENOUS; SUBCUTANEOUS at 07:52

## 2022-03-14 RX ADMIN — CHLORHEXIDINE GLUCONATE 1 APPLICATION(S): 213 SOLUTION TOPICAL at 12:09

## 2022-03-14 RX ADMIN — HYDROMORPHONE HYDROCHLORIDE 0.5 MILLIGRAM(S): 2 INJECTION INTRAMUSCULAR; INTRAVENOUS; SUBCUTANEOUS at 08:18

## 2022-03-14 RX ADMIN — HYDROMORPHONE HYDROCHLORIDE 0.5 MILLIGRAM(S): 2 INJECTION INTRAMUSCULAR; INTRAVENOUS; SUBCUTANEOUS at 07:51

## 2022-03-14 RX ADMIN — CLOPIDOGREL BISULFATE 75 MILLIGRAM(S): 75 TABLET, FILM COATED ORAL at 12:10

## 2022-03-14 RX ADMIN — HYDROMORPHONE HYDROCHLORIDE 0.25 MILLIGRAM(S): 2 INJECTION INTRAMUSCULAR; INTRAVENOUS; SUBCUTANEOUS at 14:10

## 2022-03-14 RX ADMIN — HEPARIN SODIUM 5000 UNIT(S): 5000 INJECTION INTRAVENOUS; SUBCUTANEOUS at 06:15

## 2022-03-14 RX ADMIN — Medication 1 TABLET(S): at 12:10

## 2022-03-14 RX ADMIN — Medication 650 MILLIGRAM(S): at 05:23

## 2022-03-14 NOTE — DISCHARGE NOTE NURSING/CASE MANAGEMENT/SOCIAL WORK - PATIENT PORTAL LINK FT
You can access the FollowMyHealth Patient Portal offered by [facility] by registering at the following website: [email or web address]. By joining [facility]’s FollowMyHealth portal, you will also be able to view your health information using other applications (apps) compatible with our system.

## 2022-03-14 NOTE — PROGRESS NOTE ADULT - REASON FOR ADMISSION
L foot pain/discoloration

## 2022-03-14 NOTE — PROGRESS NOTE ADULT - SUBJECTIVE AND OBJECTIVE BOX
Patient is a 61y old  Female who presents with a chief complaint of L foot pain/discoloration (14 Mar 2022 07:24)       INTERVAL HPI/OVERNIGHT EVENTS:  Patient seen and evaluated at bedside.  Pt is resting comfortable in NAD. Denies N/V/F/C.  Pain rated at X/10    Allergies    No Known Allergies    Intolerances        Vital Signs Last 24 Hrs  T(C): 36.6 (14 Mar 2022 06:10), Max: 36.8 (13 Mar 2022 13:46)  T(F): 97.8 (14 Mar 2022 06:10), Max: 98.2 (13 Mar 2022 13:46)  HR: 72 (14 Mar 2022 06:10) (66 - 74)  BP: 119/58 (14 Mar 2022 06:10) (118/60 - 134/55)  BP(mean): --  RR: 18 (14 Mar 2022 06:10) (18 - 18)  SpO2: 100% (14 Mar 2022 06:10) (96% - 100%)    LABS:                        8.4    12.99 )-----------( 305      ( 14 Mar 2022 06:47 )             28.5     03-14    136  |  93<L>  |  58<H>  ----------------------------<  103<H>  4.5   |  23  |  8.88<H>    Ca    9.5      14 Mar 2022 06:47  Phos  4.1     03-14  Mg     2.40     03-14          CAPILLARY BLOOD GLUCOSE          Lower Extremity Physical Exam:  Vascular: DP/PT 1/4 R,  DP/PT 0/4 L, CFT <3 seconds B/L except left 3rd digit absent CFT, Temperature gradient warm to warm L, warm to cool R, resolving L foot edema  Neuro: Epicritic sensation diminished to the level of forefoot B/L  Musculoskeletal/Ortho: left partial 5th ray resection healed  Skin:   3/4 s/p L foot partial 3rd ray resection open: fibrous wound bed, no malodor, no drainage, no purulence, flaps cool, distal skin necrosis, plantar 2nd digit necrosis noted, no additional tracking   3/4 s/p R foot partial hallux: sutures intact, no drainage, no tracking, no purulence, periwound maceration resolved, flaps cool, no dehiscence      RADIOLOGY & ADDITIONAL TESTS:

## 2022-03-14 NOTE — PROGRESS NOTE ADULT - ASSESSMENT
61 Yr old female ESRD (Mn/Wed/Fr.) well known to me from [facility], Right groin AVF, DM, PVD, now with severe left foot pain. Renal following for ESRD Mx    End Stage Renal Disease on Dialysis MWF  HD unit [facility]  K, vol- ok    continue with HD MWF  s/p HD in am, Rx sheet reviewed, net UF 1kg, tolerated well. uneventful.   renal restrictions in diet.   dose all meds for ESRD  monitor BMP    Anemia of CKD- Hb < goal. increased epo 8>12k tiw w/hd  HTN,, controlled; monitor BP. not on meds  Left foot infection, gangrene- s/p OR. Mx per Podiatry. dose abxs for ESRD. on Cefepime 1gm qd- need to change to 2/2/3gm post hd for d/c plan -rec to give 2gm monday after HD. f/u w/ID    stablew for d/c to rehab/[facility]  labs, chart reviewed  poc d/w pt  For any question, call:  Cell # [phone]  Pager # [phone]  office # [phone]

## 2022-03-14 NOTE — PROGRESS NOTE ADULT - ASSESSMENT
61-year-old female with ESRD on HD MWF via right groin AVF, HLD and PAD presenting with L sided foot pain, found to have likely OM     Problem/Plan - 1:  ·  Problem: Foot osteomyelitis.   ·  Plan: S/P  left foot partial 3rd ray amputation- open and right foot partial hallux amputation- partially open, poor intraop bleeding, good bone quality at the level of resection, low concern for bone infection, high concern for viability/soft tissue infection   ID help appreciated and now on Cefepime with HD for 6 weeks from day of surgery .   Has Leucocytosis but no fever etc.   appreciate podiatry recs .  MARTA/PVR noted. Severe Small vessel disease.   vascular surgery help appreciated.  s/p I&D by podiatry in ED, plan for left foot partial 3rd ray resection pending MARTA/vasc recs     < from: MR Foot w/wo IV Cont, Left (03.03.22 @ 15:01) >  IMPRESSION:    Faint edema within the head of the third metatarsal and third phalanges   with mild periosteal enhancement of the third metatarsal head. Findings   may represent sequela of early osteomyelitis. Questionable soft tissue   defect of the third digit.  Lack of enhancement of the soft tissues plantarly and about the third and   fourth digits, favored to represent sequela of ischemia.    < end of copied text >  Pain under control .      Problem/Plan - 2:  ·  Problem: ESRD on dialysis.   ·  Plan: appreciate renal recs  dose meds for intermittent HD  renal restricted diet  c/w phosphate binders     Problem/Plan - 3:  ·  Problem: PAD (peripheral artery disease).   ·  Plan: c/w DAPT and statin  f/u VSx recs  MARTA/PVR noted.      Problem/Plan - 4:  ·  Problem: Lower extremity edema.   ·  Plan: b/l LE edema   LE dopplers negative for  VTE.     Problem/Plan - 5:  ·  Problem: Need for prophylactic measure.   ·  Plan: pharmacologic DVT ppx       Dispo : DC planning to [facility] today .

## 2022-03-14 NOTE — PROGRESS NOTE ADULT - PROVIDER SPECIALTY LIST ADULT
Cardiology
Infectious Disease
Infectious Disease
Internal Medicine
Nephrology
Podiatry
Cardiology
Internal Medicine
Nephrology
Podiatry
Podiatry
Cardiology
Internal Medicine
Nephrology
Podiatry
Infectious Disease
Internal Medicine
Nephrology
Nephrology
Vascular Surgery

## 2022-03-14 NOTE — PROGRESS NOTE ADULT - ASSESSMENT
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 (DOS 3/4/2022)  -pt seen and evaluated  - Afebrile, WBC 12.99  - 3/4 s/p L foot partial 3rd ray resection open: fibrous wound bed, no malodor, no drainage, no purulence, flaps cool, distal skin necrosis, plantar 2nd digit necrosis noted, no additional tracking   - 3/4 s/p R foot partial hallux: sutures intact, no drainage, no tracking, no purulence, periwound maceration resolved, flaps cool, no dehiscence   - B/L surgical sites with high concern for viability and residual soft tissue infection per intraoperative findings   - B/L  OR Swab & L foot clean bone culture: growing E coli prelim  - ID recs cefepime w/ HD upon d/c, appreciated  - Continue wound vac with Santyl   - Pt is stable for d/c from podiatry, follow up and wound care instructions listed in discharge note provider  - discussed with attending

## 2022-03-14 NOTE — PROGRESS NOTE ADULT - SUBJECTIVE AND OBJECTIVE BOX
61y old  Female who presents with a chief complaint of L foot pain/discoloration (14 Mar 2022 15:48)      Interval history:  Afebrile, feeling well, denies no new complains.       Allergies:   No Known Allergies      Antimicrobials:  cefepime   IVPB 1000 milliGRAM(s) IV Intermittent every 24 hours      REVIEW OF SYSTEMS:  No chest pain   No SOB  No abdominal pain  No rash.       Vital Signs Last 24 Hrs  T(C): 36.7 (03-14-22 @ 14:00), Max: 36.8 (03-14-22 @ 09:40)  T(F): 98.1 (03-14-22 @ 14:00), Max: 98.2 (03-14-22 @ 09:40)  HR: 80 (03-14-22 @ 14:00) (70 - 80)  BP: 116/56 (03-14-22 @ 14:00) (116/56 - 122/60)  BP(mean): --  RR: 16 (03-14-22 @ 14:00) (16 - 18)  SpO2: 100% (03-14-22 @ 14:00) (100% - 100%)      PHYSICAL EXAM:  Patient in no acute distress. Alert, awake.   Cardiovascular: S1S2 normal.  Lungs: + air entry B/L lung fields.  Gastrointestinal: soft, nontender, nondistended.  Extremities: b/l feet dressing.   IV sites not inflamed.   rt groin av graft                             8.4    12.99 )-----------( 305      ( 14 Mar 2022 06:47 )             28.5   03-14    136  |  93<L>  |  58<H>  ----------------------------<  103<H>  4.5   |  23  |  8.88<H>    Ca    9.5      14 Mar 2022 06:47  Phos  4.1     03-14  Mg     2.40     03-14

## 2022-03-14 NOTE — PROGRESS NOTE ADULT - SUBJECTIVE AND OBJECTIVE BOX
Date of Service  : 03-14-22 @ 13:51    INTERVAL HPI/OVERNIGHT EVENTS: I feel fine.   Vital Signs Last 24 Hrs  T(C): 36.8 (14 Mar 2022 09:40), Max: 36.8 (14 Mar 2022 09:40)  T(F): 98.2 (14 Mar 2022 09:40), Max: 98.2 (14 Mar 2022 09:40)  HR: 70 (14 Mar 2022 09:40) (66 - 74)  BP: 122/60 (14 Mar 2022 09:40) (118/60 - 134/55)  BP(mean): --  RR: 17 (14 Mar 2022 09:40) (17 - 18)  SpO2: 100% (14 Mar 2022 06:10) (96% - 100%)  I&O's Summary    14 Mar 2022 07:01  -  14 Mar 2022 13:51  --------------------------------------------------------  IN: 400 mL / OUT: 1400 mL / NET: -1000 mL      MEDICATIONS  (STANDING):  aspirin  chewable 81 milliGRAM(s) Oral daily  atorvastatin 40 milliGRAM(s) Oral at bedtime  cadexomer iodine 0.9% Gel 1 Application(s) Topical daily  cefepime   IVPB 1000 milliGRAM(s) IV Intermittent every 24 hours  chlorhexidine 2% Cloths 1 Application(s) Topical daily  clopidogrel Tablet 75 milliGRAM(s) Oral daily  collagenase Ointment 1 Application(s) Topical daily  epoetin niesha-epbx (RETACRIT) Injectable 06897 Unit(s) IV Push <User Schedule>  heparin   Injectable 5000 Unit(s) SubCutaneous every 12 hours  Nephro-george 1 Tablet(s) Oral daily  Velphoro (sucroferric oxyhydroxide) 3 Tablet(s) 3 Tablet(s) Oral three times a day    MEDICATIONS  (PRN):  acetaminophen     Tablet .. 650 milliGRAM(s) Oral every 6 hours PRN Mild Pain (1 - 3), Moderate Pain (4 - 6)    LABS:                        8.4    12.99 )-----------( 305      ( 14 Mar 2022 06:47 )             28.5     03-14    136  |  93<L>  |  58<H>  ----------------------------<  103<H>  4.5   |  23  |  8.88<H>    Ca    9.5      14 Mar 2022 06:47  Phos  4.1     03-14  Mg     2.40     03-14          CAPILLARY BLOOD GLUCOSE              REVIEW OF SYSTEMS:  CONSTITUTIONAL: No fever, weight loss, or fatigue  EYES: No eye pain, visual disturbances, or discharge  ENMT:  No difficulty hearing, tinnitus, vertigo; No sinus or throat pain  NECK: No pain or stiffness  RESPIRATORY: No cough, wheezing, chills or hemoptysis; No shortness of breath  CARDIOVASCULAR: No chest pain, palpitations, dizziness, or leg swelling  GASTROINTESTINAL: No abdominal or epigastric pain. No nausea, vomiting, or hematemesis; No diarrhea or constipation. No melena or hematochezia.  GENITOURINARY: No dysuria, frequency, hematuria, or incontinence  NEUROLOGICAL: No headaches, memory loss, loss of strength, numbness, or tremors      Consultant(s) Notes Reviewed:  [x ] YES  [ ] NO    PHYSICAL EXAM:  GENERAL: NAD, well-groomed, well-developed, not in any distress ,  HEAD:  Atraumatic, Normocephalic  NECK: Supple, No JVD, Normal thyroid  NERVOUS SYSTEM:  Alert & Oriented X3, No focal deficit   CHEST/LUNG: Good air entry bilateral with no  rales, rhonchi, wheezing, or rubs  HEART: Regular rate and rhythm; No murmurs, rubs, or gallops  ABDOMEN: Soft, Nontender, Nondistended; Bowel sounds present  EXTREMITIES:  Feet dressed.     Care Discussed with Consultants/Other Providers [ x] YES  [ ] NO

## 2022-03-14 NOTE — PROGRESS NOTE ADULT - ASSESSMENT
61-year-old female with ESRD on HD MWF via right groin AVF, HLD and PAD presenting with L sided foot pain, ongoing since Nov 2021, becoming more severe in the past 2 weeks.     Overall diabetic foot ulcer, gangrene, cellulitis and abscess, leucocytosis, positive cx finding, concern for underlying OM. Elevated sed rate.   pt s/p sx, high concern for residual infection, positive OR cx   persistent leucocytosis     PLAN:   c/w cefepime  can switch to post HD cefepime on discharge, no need for PICC. 2gm/2gm/2gm on HD days,   plan for 6 weeks of abx until 4/14/22  cbc, cmp once a week while on abx, labs to be performed by HD and followed by physician at HD center.   trend cbc for leucocytosis   c/w wound care   podiatry on board, discussed with podiatry leucocytosis could be due to ischemic limb.       Devin Alex  Please contact through MS Teams   If no response or past 5 pm/weekend call [phone].     Will sign off, please call with questions.

## 2022-03-14 NOTE — PROGRESS NOTE ADULT - SUBJECTIVE AND OBJECTIVE BOX
Cardiovascular Disease Progress Note    Overnight events: No acute events overnight.  Ms. Delong is in no distress. Denies chest pain or SOB.   Otherwise review of systems negative    Objective Findings:  T(C): 36.6 (03-14-22 @ 06:10), Max: 36.8 (03-13-22 @ 13:46)  HR: 72 (03-14-22 @ 06:10) (66 - 74)  BP: 119/58 (03-14-22 @ 06:10) (118/60 - 134/55)  RR: 18 (03-14-22 @ 06:10) (18 - 18)  SpO2: 100% (03-14-22 @ 06:10) (96% - 100%)  Wt(kg): --  Daily     Daily       Physical Exam:  Gen: NAD; Patient resting comfortably  HEENT: EOMI, Normocephalic/ atraumatic  CV: RRR, normal S1 + S2, no m/r/g  Lungs:  Normal respiratory effort; clear to auscultation bilaterally  Abd: soft, non-tender; bowel sounds present  Ext: No edema; warm and well perfused. Bandage c/d/i    Telemetry: N/a    Laboratory Data:                        8.4    12.99 )-----------( 305      ( 14 Mar 2022 06:47 )             28.5     03-13    138  |  95<L>  |  42<H>  ----------------------------<  105<H>  4.1   |  25  |  6.97<H>    Ca    9.4      13 Mar 2022 06:24  Phos  4.3     03-13  Mg     2.30     03-13                Inpatient Medications:  MEDICATIONS  (STANDING):  aspirin  chewable 81 milliGRAM(s) Oral daily  atorvastatin 40 milliGRAM(s) Oral at bedtime  cadexomer iodine 0.9% Gel 1 Application(s) Topical daily  cefepime   IVPB 1000 milliGRAM(s) IV Intermittent every 24 hours  chlorhexidine 2% Cloths 1 Application(s) Topical daily  clopidogrel Tablet 75 milliGRAM(s) Oral daily  epoetin niesha-epbx (RETACRIT) Injectable 41855 Unit(s) IV Push <User Schedule>  heparin   Injectable 5000 Unit(s) SubCutaneous every 12 hours  Nephro-george 1 Tablet(s) Oral daily  Velphoro (sucroferric oxyhydroxide) 3 Tablet(s) 3 Tablet(s) Oral three times a day      Assessment:  61-year-old female with ESRD on HD MWF via right groin AVF, HLD and PAD with previous intervention presenting with L sided foot pain, ongoing since Nov 2021, becoming more severe in the past 2 weeks.    Plan of Care:    # L foot gangrene  - S/p L foot partial 3rd ray amputation and R hallux amputation 3/4.   - Tolerated procedure well.   - No evidence of post-operative coronary ischemia.   - TTE shows preserved LVEF with mild AI and no significant structural disease.   - Continue current cardiac management.     #ESRD-  - HD as per nephro    #PAD  - Continue ASA and plavix    #HLD  - Statin as ordered        Over 25 minutes spent on total encounter; more than 50% of the visit was spent counseling and/or coordinating care by the attending physician.      Wisam Adams D.O.   Cardiovascular Disease  [phone]

## 2022-03-14 NOTE — PROGRESS NOTE ADULT - SUBJECTIVE AND OBJECTIVE BOX
[facility] - S Jluis / Shabbir S /D Romina/ S Denys/ S Chano/ Andrés Perkins / M Tristianu/ O Gladis  service -[phone], office [phone]  ---------------------------------------------------------------------------------------------------------------    Patient seen and examined bedside    Subjective and Objective: No overnight events, sob resolved. No complaints today. feeling better    Allergies: No Known Allergies      Hospital Medications:   MEDICATIONS  (STANDING):  aspirin  chewable 81 milliGRAM(s) Oral daily  atorvastatin 40 milliGRAM(s) Oral at bedtime  cadexomer iodine 0.9% Gel 1 Application(s) Topical daily  cefepime   IVPB 1000 milliGRAM(s) IV Intermittent every 24 hours  chlorhexidine 2% Cloths 1 Application(s) Topical daily  clopidogrel Tablet 75 milliGRAM(s) Oral daily  collagenase Ointment 1 Application(s) Topical daily  epoetin niesha-epbx (RETACRIT) Injectable 04565 Unit(s) IV Push <User Schedule>  heparin   Injectable 5000 Unit(s) SubCutaneous every 12 hours  influenza   Vaccine 0.5 milliLiter(s) IntraMuscular once  Nephro-george 1 Tablet(s) Oral daily  Velphoro (sucroferric oxyhydroxide) 3 Tablet(s) 3 Tablet(s) Oral three times a day      REVIEW OF SYSTEMS:  CONSTITUTIONAL: No weakness, fevers or chills  EYES/ENT: No visual changes;  No vertigo or throat pain   NECK: No pain or stiffness  RESPIRATORY: No cough, wheezing, hemoptysis; No shortness of breath  CARDIOVASCULAR: No chest pain or palpitations.  GASTROINTESTINAL: No abdominal or epigastric pain. No nausea, vomiting, or hematemesis; No diarrhea or constipation. No melena or hematochezia.  GENITOURINARY: No dysuria, frequency, foamy urine, urinary urgency, incontinence or hematuria  NEUROLOGICAL: No numbness or weakness  SKIN: No itching, burning, rashes, or lesions   VASCULAR: No bilateral lower extremity edema.   All other review of systems is negative unless indicated above.    VITALS:  T(F): 98.1 (03-14-22 @ 14:00), Max: 98.2 (03-14-22 @ 09:40)  HR: 80 (03-14-22 @ 14:00)  BP: 116/56 (03-14-22 @ 14:00)  RR: 16 (03-14-22 @ 14:00)  SpO2: 100% (03-14-22 @ 14:00)  Wt(kg): --    03-14 @ 07:01  -  03-14 @ 15:48  --------------------------------------------------------  IN: 400 mL / OUT: 1400 mL / NET: -1000 mL          PHYSICAL EXAM:  Constitutional: NAD  HEENT: anicteric sclera, oropharynx clear  Neck: No JVD  Respiratory: CTAB, no wheezes, rales or rhonchi  Cardiovascular: S1, S2, RRR  Gastrointestinal: BS+, soft, NT/ND  Extremities: No cyanosis or clubbing. No peripheral edema  Neurological: A/O x 3, no focal deficits  Psychiatric: Normal mood, normal affect  : No CVA tenderness. No salcido.   Skin: No rashes  Vascular Access:    LABS:  03-14    136  |  93<L>  |  58<H>  ----------------------------<  103<H>  4.5   |  23  |  8.88<H>    Ca    9.5      14 Mar 2022 06:47  Phos  4.1     03-14  Mg     2.40     03-14      Creatinine Trend: 8.88 <--, 6.97 <--, 5.43 <--, 8.27 <--, 6.06 <--, 8.25 <--, 5.96 <--                        8.4    12.99 )-----------( 305      ( 14 Mar 2022 06:47 )             28.5     Urine Studies:        RADIOLOGY & ADDITIONAL STUDIES:   [facility] - S Jluis / Shabbir S /D Romina/ S Denys/ S Chano/ Andrés Perkins / M Tristianu/ O Gladis  service -[phone], office [phone]  ---------------------------------------------------------------------------------------------------------------    Patient seen and examined bedside    Subjective and Objective: No overnight events, denied sob. No complaints today. feeling better    Allergies: No Known Allergies      Hospital Medications:   MEDICATIONS  (STANDING):  aspirin  chewable 81 milliGRAM(s) Oral daily  atorvastatin 40 milliGRAM(s) Oral at bedtime  cadexomer iodine 0.9% Gel 1 Application(s) Topical daily  cefepime   IVPB 1000 milliGRAM(s) IV Intermittent every 24 hours  chlorhexidine 2% Cloths 1 Application(s) Topical daily  clopidogrel Tablet 75 milliGRAM(s) Oral daily  collagenase Ointment 1 Application(s) Topical daily  epoetin niesha-epbx (RETACRIT) Injectable 46652 Unit(s) IV Push <User Schedule>  heparin   Injectable 5000 Unit(s) SubCutaneous every 12 hours  influenza   Vaccine 0.5 milliLiter(s) IntraMuscular once  Nephro-george 1 Tablet(s) Oral daily  Velphoro (sucroferric oxyhydroxide) 3 Tablet(s) 3 Tablet(s) Oral three times a day      VITALS:  T(F): 98.1 (03-14-22 @ 14:00), Max: 98.2 (03-14-22 @ 09:40)  HR: 80 (03-14-22 @ 14:00)  BP: 116/56 (03-14-22 @ 14:00)  RR: 16 (03-14-22 @ 14:00)  SpO2: 100% (03-14-22 @ 14:00)  Wt(kg): --    03-14 @ 07:01  -  03-14 @ 15:48  --------------------------------------------------------  IN: 400 mL / OUT: 1400 mL / NET: -1000 mL      PHYSICAL EXAM:  HEENT: anicteric sclera  Neck: No JVD  Respiratory: CTAB, no wheezes, rales or rhonchi  Cardiovascular: S1, S2, RRR  Gastrointestinal: BS+, soft, NT/ND  Extremities: No peripheral edema  Neurological: A/O x 3  Psychiatric: Normal mood, normal affect  : No salcido.   Vascular Access: femoral avg+     LABS:  03-14    136  |  93<L>  |  58<H>  ----------------------------<  103<H>  4.5   |  23  |  8.88<H>    Ca    9.5      14 Mar 2022 06:47  Phos  4.1     03-14  Mg     2.40     03-14      Creatinine Trend: 8.88 <--, 6.97 <--, 5.43 <--, 8.27 <--, 6.06 <--, 8.25 <--, 5.96 <--                        8.4    12.99 )-----------( 305      ( 14 Mar 2022 06:47 )             28.5     Urine Studies:        RADIOLOGY & ADDITIONAL STUDIES:

## 2022-03-15 LAB — SURGICAL PATHOLOGY STUDY: SIGNIFICANT CHANGE UP

## 2022-03-25 ENCOUNTER — APPOINTMENT (OUTPATIENT)
Dept: WOUND CARE | Facility: CLINIC | Age: 62
End: 2022-03-25

## 2022-04-18 ENCOUNTER — APPOINTMENT (OUTPATIENT)
Dept: VASCULAR SURGERY | Facility: CLINIC | Age: 62
End: 2022-04-18

## 2022-05-03 ENCOUNTER — INPATIENT (INPATIENT)
Facility: HOSPITAL | Age: 62
LOS: 9 days | Discharge: HOME | End: 2022-05-13
Attending: INTERNAL MEDICINE | Admitting: INTERNAL MEDICINE
Payer: MEDICARE

## 2022-05-03 VITALS
OXYGEN SATURATION: 90 % | HEIGHT: 61 IN | TEMPERATURE: 97 F | RESPIRATION RATE: 24 BRPM | HEART RATE: 112 BPM | DIASTOLIC BLOOD PRESSURE: 94 MMHG | SYSTOLIC BLOOD PRESSURE: 154 MMHG

## 2022-05-03 DIAGNOSIS — Z98.890 OTHER SPECIFIED POSTPROCEDURAL STATES: Chronic | ICD-10-CM

## 2022-05-03 DIAGNOSIS — N18.6 END STAGE RENAL DISEASE: ICD-10-CM

## 2022-05-03 DIAGNOSIS — J81.1 CHRONIC PULMONARY EDEMA: ICD-10-CM

## 2022-05-03 DIAGNOSIS — J96.01 ACUTE RESPIRATORY FAILURE WITH HYPOXIA: ICD-10-CM

## 2022-05-03 DIAGNOSIS — I16.0 HYPERTENSIVE URGENCY: ICD-10-CM

## 2022-05-03 DIAGNOSIS — Z99.2 DEPENDENCE ON RENAL DIALYSIS: Chronic | ICD-10-CM

## 2022-05-03 DIAGNOSIS — M86.171 OTHER ACUTE OSTEOMYELITIS, RIGHT ANKLE AND FOOT: Chronic | ICD-10-CM

## 2022-05-03 DIAGNOSIS — J81.0 ACUTE PULMONARY EDEMA: ICD-10-CM

## 2022-05-03 PROBLEM — H54.40 BLINDNESS, ONE EYE, UNSPECIFIED EYE: Chronic | Status: ACTIVE | Noted: 2022-03-01

## 2022-05-03 LAB
ALBUMIN SERPL ELPH-MCNC: 4.3 G/DL — SIGNIFICANT CHANGE UP (ref 3.3–5)
ALP SERPL-CCNC: 101 U/L — SIGNIFICANT CHANGE UP (ref 40–120)
ALT FLD-CCNC: 8 U/L — SIGNIFICANT CHANGE UP (ref 4–33)
ANION GAP SERPL CALC-SCNC: 23 MMOL/L — HIGH (ref 7–14)
AST SERPL-CCNC: 37 U/L — HIGH (ref 4–32)
B PERT DNA SPEC QL NAA+PROBE: SIGNIFICANT CHANGE UP
B PERT+PARAPERT DNA PNL SPEC NAA+PROBE: SIGNIFICANT CHANGE UP
BASE EXCESS BLDV CALC-SCNC: 1.4 MMOL/L — SIGNIFICANT CHANGE UP (ref -2–3)
BASOPHILS # BLD AUTO: 0.05 K/UL — SIGNIFICANT CHANGE UP (ref 0–0.2)
BASOPHILS NFR BLD AUTO: 0.3 % — SIGNIFICANT CHANGE UP (ref 0–2)
BILIRUB SERPL-MCNC: 0.3 MG/DL — SIGNIFICANT CHANGE UP (ref 0.2–1.2)
BLOOD GAS VENOUS COMPREHENSIVE RESULT: SIGNIFICANT CHANGE UP
BORDETELLA PARAPERTUSSIS (RAPRVP): SIGNIFICANT CHANGE UP
BUN SERPL-MCNC: 55 MG/DL — HIGH (ref 7–23)
C PNEUM DNA SPEC QL NAA+PROBE: SIGNIFICANT CHANGE UP
CALCIUM SERPL-MCNC: 9.9 MG/DL — SIGNIFICANT CHANGE UP (ref 8.4–10.5)
CHLORIDE BLDV-SCNC: 105 MMOL/L — SIGNIFICANT CHANGE UP (ref 96–108)
CHLORIDE SERPL-SCNC: 101 MMOL/L — SIGNIFICANT CHANGE UP (ref 98–107)
CO2 BLDV-SCNC: 30.4 MMOL/L — HIGH (ref 22–26)
CO2 SERPL-SCNC: 21 MMOL/L — LOW (ref 22–31)
CREAT SERPL-MCNC: 7.97 MG/DL — HIGH (ref 0.5–1.3)
DIALYSIS INSTRUMENT RESULT - HEPATITIS B SURFACE ANTIGEN: NEGATIVE — SIGNIFICANT CHANGE UP
EGFR: 5 ML/MIN/1.73M2 — LOW
EOSINOPHIL # BLD AUTO: 0.32 K/UL — SIGNIFICANT CHANGE UP (ref 0–0.5)
EOSINOPHIL NFR BLD AUTO: 2.1 % — SIGNIFICANT CHANGE UP (ref 0–6)
FLUAV SUBTYP SPEC NAA+PROBE: SIGNIFICANT CHANGE UP
FLUBV RNA SPEC QL NAA+PROBE: SIGNIFICANT CHANGE UP
GAS PNL BLDV: 141 MMOL/L — SIGNIFICANT CHANGE UP (ref 136–145)
GLUCOSE BLDC GLUCOMTR-MCNC: 105 MG/DL — HIGH (ref 70–99)
GLUCOSE BLDV-MCNC: 155 MG/DL — HIGH (ref 70–99)
GLUCOSE SERPL-MCNC: 153 MG/DL — HIGH (ref 70–99)
HADV DNA SPEC QL NAA+PROBE: SIGNIFICANT CHANGE UP
HCO3 BLDV-SCNC: 29 MMOL/L — SIGNIFICANT CHANGE UP (ref 22–29)
HCOV 229E RNA SPEC QL NAA+PROBE: SIGNIFICANT CHANGE UP
HCOV HKU1 RNA SPEC QL NAA+PROBE: SIGNIFICANT CHANGE UP
HCOV NL63 RNA SPEC QL NAA+PROBE: SIGNIFICANT CHANGE UP
HCOV OC43 RNA SPEC QL NAA+PROBE: SIGNIFICANT CHANGE UP
HCT VFR BLD CALC: 39.9 % — SIGNIFICANT CHANGE UP (ref 34.5–45)
HCT VFR BLDA CALC: 36 % — SIGNIFICANT CHANGE UP (ref 34.5–46.5)
HGB BLD CALC-MCNC: 11.9 G/DL — SIGNIFICANT CHANGE UP (ref 11.5–15.5)
HGB BLD-MCNC: 11.7 G/DL — SIGNIFICANT CHANGE UP (ref 11.5–15.5)
HMPV RNA SPEC QL NAA+PROBE: SIGNIFICANT CHANGE UP
HPIV1 RNA SPEC QL NAA+PROBE: SIGNIFICANT CHANGE UP
HPIV2 RNA SPEC QL NAA+PROBE: SIGNIFICANT CHANGE UP
HPIV3 RNA SPEC QL NAA+PROBE: SIGNIFICANT CHANGE UP
HPIV4 RNA SPEC QL NAA+PROBE: SIGNIFICANT CHANGE UP
IANC: 11.82 K/UL — HIGH (ref 1.8–7.4)
IMM GRANULOCYTES NFR BLD AUTO: 0.6 % — SIGNIFICANT CHANGE UP (ref 0–1.5)
LACTATE BLDV-MCNC: 2.1 MMOL/L — HIGH (ref 0.5–2)
LYMPHOCYTES # BLD AUTO: 13.8 % — SIGNIFICANT CHANGE UP (ref 13–44)
LYMPHOCYTES # BLD AUTO: 2.1 K/UL — SIGNIFICANT CHANGE UP (ref 1–3.3)
M PNEUMO DNA SPEC QL NAA+PROBE: SIGNIFICANT CHANGE UP
MCHC RBC-ENTMCNC: 27.5 PG — SIGNIFICANT CHANGE UP (ref 27–34)
MCHC RBC-ENTMCNC: 29.3 GM/DL — LOW (ref 32–36)
MCV RBC AUTO: 93.9 FL — SIGNIFICANT CHANGE UP (ref 80–100)
MONOCYTES # BLD AUTO: 0.84 K/UL — SIGNIFICANT CHANGE UP (ref 0–0.9)
MONOCYTES NFR BLD AUTO: 5.5 % — SIGNIFICANT CHANGE UP (ref 2–14)
MRSA PCR RESULT.: SIGNIFICANT CHANGE UP
NEUTROPHILS # BLD AUTO: 11.82 K/UL — HIGH (ref 1.8–7.4)
NEUTROPHILS NFR BLD AUTO: 77.7 % — HIGH (ref 43–77)
NRBC # BLD: 0 /100 WBCS — SIGNIFICANT CHANGE UP
NRBC # FLD: 0 K/UL — SIGNIFICANT CHANGE UP
NT-PROBNP SERPL-SCNC: 3650 PG/ML — HIGH
PCO2 BLDV: 57 MMHG — HIGH (ref 39–42)
PH BLDV: 7.31 — LOW (ref 7.32–7.43)
PLATELET # BLD AUTO: 258 K/UL — SIGNIFICANT CHANGE UP (ref 150–400)
PO2 BLDV: 24 MMHG — SIGNIFICANT CHANGE UP
POTASSIUM BLDV-SCNC: 3.9 MMOL/L — SIGNIFICANT CHANGE UP (ref 3.5–5.1)
POTASSIUM SERPL-MCNC: 4.9 MMOL/L — SIGNIFICANT CHANGE UP (ref 3.5–5.3)
POTASSIUM SERPL-SCNC: 4.9 MMOL/L — SIGNIFICANT CHANGE UP (ref 3.5–5.3)
PROT SERPL-MCNC: 8.4 G/DL — HIGH (ref 6–8.3)
RAPID RVP RESULT: SIGNIFICANT CHANGE UP
RBC # BLD: 4.25 M/UL — SIGNIFICANT CHANGE UP (ref 3.8–5.2)
RBC # FLD: 16.2 % — HIGH (ref 10.3–14.5)
RSV RNA SPEC QL NAA+PROBE: SIGNIFICANT CHANGE UP
RV+EV RNA SPEC QL NAA+PROBE: SIGNIFICANT CHANGE UP
S AUREUS DNA NOSE QL NAA+PROBE: SIGNIFICANT CHANGE UP
SAO2 % BLDV: 31.2 % — SIGNIFICANT CHANGE UP
SARS-COV-2 RNA SPEC QL NAA+PROBE: SIGNIFICANT CHANGE UP
SODIUM SERPL-SCNC: 145 MMOL/L — SIGNIFICANT CHANGE UP (ref 135–145)
TROPONIN T, HIGH SENSITIVITY RESULT: 90 NG/L — CRITICAL HIGH
WBC # BLD: 15.22 K/UL — HIGH (ref 3.8–10.5)
WBC # FLD AUTO: 15.22 K/UL — HIGH (ref 3.8–10.5)

## 2022-05-03 PROCEDURE — 99053 MED SERV 10PM-8AM 24 HR FAC: CPT

## 2022-05-03 PROCEDURE — 71045 X-RAY EXAM CHEST 1 VIEW: CPT | Mod: 26

## 2022-05-03 PROCEDURE — 99291 CRITICAL CARE FIRST HOUR: CPT

## 2022-05-03 RX ORDER — NITROGLYCERIN 6.5 MG
80 CAPSULE, EXTENDED RELEASE ORAL
Qty: 50 | Refills: 0 | Status: DISCONTINUED | OUTPATIENT
Start: 2022-05-03 | End: 2022-05-03

## 2022-05-03 RX ORDER — CLOPIDOGREL BISULFATE 75 MG/1
75 TABLET, FILM COATED ORAL DAILY
Refills: 0 | Status: DISCONTINUED | OUTPATIENT
Start: 2022-05-03 | End: 2022-05-13

## 2022-05-03 RX ORDER — NITROGLYCERIN 6.5 MG
0.4 CAPSULE, EXTENDED RELEASE ORAL
Refills: 0 | Status: DISCONTINUED | OUTPATIENT
Start: 2022-05-03 | End: 2022-05-13

## 2022-05-03 RX ORDER — CEFEPIME 1 G/1
2000 INJECTION, POWDER, FOR SOLUTION INTRAMUSCULAR; INTRAVENOUS ONCE
Refills: 0 | Status: COMPLETED | OUTPATIENT
Start: 2022-05-03 | End: 2022-05-03

## 2022-05-03 RX ORDER — VANCOMYCIN HCL 1 G
1000 VIAL (EA) INTRAVENOUS ONCE
Refills: 0 | Status: COMPLETED | OUTPATIENT
Start: 2022-05-03 | End: 2022-05-03

## 2022-05-03 RX ORDER — CHLORHEXIDINE GLUCONATE 213 G/1000ML
1 SOLUTION TOPICAL DAILY
Refills: 0 | Status: DISCONTINUED | OUTPATIENT
Start: 2022-05-03 | End: 2022-05-13

## 2022-05-03 RX ORDER — SEVELAMER CARBONATE 2400 MG/1
800 POWDER, FOR SUSPENSION ORAL
Refills: 0 | Status: DISCONTINUED | OUTPATIENT
Start: 2022-05-03 | End: 2022-05-13

## 2022-05-03 RX ORDER — ASPIRIN/CALCIUM CARB/MAGNESIUM 324 MG
81 TABLET ORAL DAILY
Refills: 0 | Status: DISCONTINUED | OUTPATIENT
Start: 2022-05-03 | End: 2022-05-13

## 2022-05-03 RX ORDER — ACETAMINOPHEN 500 MG
650 TABLET ORAL EVERY 6 HOURS
Refills: 0 | Status: DISCONTINUED | OUTPATIENT
Start: 2022-05-03 | End: 2022-05-13

## 2022-05-03 RX ORDER — ATORVASTATIN CALCIUM 80 MG/1
40 TABLET, FILM COATED ORAL AT BEDTIME
Refills: 0 | Status: DISCONTINUED | OUTPATIENT
Start: 2022-05-03 | End: 2022-05-13

## 2022-05-03 RX ORDER — ERYTHROPOIETIN 10000 [IU]/ML
12000 INJECTION, SOLUTION INTRAVENOUS; SUBCUTANEOUS
Refills: 0 | Status: DISCONTINUED | OUTPATIENT
Start: 2022-05-03 | End: 2022-05-13

## 2022-05-03 RX ORDER — HEPARIN SODIUM 5000 [USP'U]/ML
5000 INJECTION INTRAVENOUS; SUBCUTANEOUS EVERY 12 HOURS
Refills: 0 | Status: DISCONTINUED | OUTPATIENT
Start: 2022-05-03 | End: 2022-05-13

## 2022-05-03 RX ADMIN — SEVELAMER CARBONATE 800 MILLIGRAM(S): 2400 POWDER, FOR SUSPENSION ORAL at 18:10

## 2022-05-03 RX ADMIN — Medication 250 MILLIGRAM(S): at 08:52

## 2022-05-03 RX ADMIN — Medication 0.4 MILLIGRAM(S): at 06:45

## 2022-05-03 RX ADMIN — HEPARIN SODIUM 5000 UNIT(S): 5000 INJECTION INTRAVENOUS; SUBCUTANEOUS at 18:13

## 2022-05-03 RX ADMIN — CEFEPIME 100 MILLIGRAM(S): 1 INJECTION, POWDER, FOR SOLUTION INTRAMUSCULAR; INTRAVENOUS at 07:38

## 2022-05-03 RX ADMIN — Medication 24 MICROGRAM(S)/MIN: at 07:17

## 2022-05-03 NOTE — CONSULT NOTE ADULT - SUBJECTIVE AND OBJECTIVE BOX
05-03-22 @ 13:18    Patient is a 61y old  Female who presents with a chief complaint of SOB and wheezing (03 May 2022 12:37)      HPI:  60 yo F with pmh of ESRD on dialysis (Tu,Thurs,Sat), s/p L foot partial 3rd ray amputation and R hallux amputation 3/4 presenting from [place] for resp distress. [place] well yesterday however woke up at 5 AM this morning and could not breathe. Associated with subjective fevers and sweats. No chest pain. Is COVID vaccinated. Mild bl LE swelling. Has not missed dialysis. (03 May 2022 11:58)    she says she was wheezing a lot when she came in to hospital  getting HD at this time:  denies any underlying lung disease:  no hx of asthma: does not use any inhalers at home:   currently she is on bipap   she said she ate lot of doritos before and ence became sob:  she never missed her HD           ?FOLLOWING PRESENT  [x ] Hx of PE/DVT, [ x] Hx COPD, [x ] Hx of Asthma, [y ] Hx of Hospitalization, x[ ]  Hx of BiPAP/CPAP use, [x ] Hx of RICHARD    Allergies    No Known Allergies    Intolerances        PAST MEDICAL & SURGICAL HISTORY:  Heart failure    HLD (hyperlipidemia)    Glaucoma    Cataract    CKD (chronic kidney disease), stage IV    ESRD (end stage renal disease) on dialysis    PAD (peripheral artery disease)    Blind right eye    Acute osteomyelitis of toe of right foot  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    S/P arteriovenous (AV) fistula creation    Hemodialysis access, AV graft        FAMILY HISTORY:  No pertinent family history in first degree relatives        Social History: [ x ] TOBACCO                  [ x ] ETOH                                 [ x ] IVDA/DRUGS    REVIEW OF SYSTEMS      General:	x    Skin/Breast:x  	  Ophthalmologic:x  	  ENMT:	x    Respiratory and Thorax: sob, wheezing  	  Cardiovascular:	x    Gastrointestinal:	x    Genitourinary:	x    Musculoskeletal:	x    Neurological:	x    Psychiatric:	x    Hematology/Lymphatics:	x    Endocrine:	x    Allergic/Immunologic:	  x  MEDICATIONS  (STANDING):  aspirin  chewable 81 milliGRAM(s) Oral daily  atorvastatin 40 milliGRAM(s) Oral at bedtime  chlorhexidine 2% Cloths 1 Application(s) Topical daily  clopidogrel Tablet 75 milliGRAM(s) Oral daily  epoetin niesha-epbx (RETACRIT) Injectable 22714 Unit(s) IV Push <User Schedule>  heparin   Injectable 5000 Unit(s) SubCutaneous every 12 hours  multivitamin 1 Tablet(s) Oral daily  sevelamer carbonate 800 milliGRAM(s) Oral three times a day with meals    MEDICATIONS  (PRN):  acetaminophen     Tablet .. 650 milliGRAM(s) Oral every 6 hours PRN Temp greater or equal to 38.5C (101.3F), Moderate Pain (4 - 6)  nitroglycerin     SubLingual 0.4 milliGRAM(s) SubLingual every 5 minutes PRN Pulmonary Edema       Vital Signs Last 24 Hrs  T(C): 36.4 (03 May 2022 11:58), Max: 36.4 (03 May 2022 10:12)  T(F): 97.5 (03 May 2022 11:58), Max: 97.5 (03 May 2022 10:12)  HR: 89 (03 May 2022 11:58) (89 - 117)  BP: 157/77 (03 May 2022 11:58) (135/68 - 192/72)  BP(mean): 103 (03 May 2022 11:58) (88 - 108)  RR: 22 (03 May 2022 10:12) (16 - 32)  SpO2: 100% (03 May 2022 10:12) (90% - 100%)Orthostatic VS          I&O's Summary      Physical Exam:   GENERAL: NAD, well-groomed, well-developed  HEENT: MICAELA/   Atraumatic, Normocephalic  ENMT: No tonsillar erythema, exudates, or enlargement; Moist mucous membranes, Good dentition, No lesions  NECK: Supple, No JVD, Normal thyroid  CHEST/LUNG: Clear to auscultation bilaterally; No rales, rhonchi, wheezing, or rubs  CVS: Regular rate and rhythm; No murmurs, rubs, or gallops  GI: : Soft, Nontender, Nondistended; Bowel sounds present  NERVOUS SYSTEM:  Alert & Oriented X3  EXTREMITIES:  2+ Peripheral Pulses, No clubbing, cyanosis, or edema  LYMPH: No lymphadenopathy noted  SKIN: No rashes or lesions  ENDOCRINOLOGY: No Thyromegaly  PSYCH: Appropriate    Labs:  1.4<57<4>>24<<7.315>>1.4<<3><<4><<5<<249>>SARS-CoV-2: NotDetec (03 May 2022 06:54)  COVID-19 PCR: NotDetec (13 Mar 2022 13:25)  COVID-19 PCR: NotDetec (10 Mar 2022 20:29)  COVID-19 PCR: NotDetec (07 Mar 2022 11:06)  COVID-19 PCR: NotDetec (04 Mar 2022 00:46)  COVID-19 PCR: NotDetec (28 Feb 2022 16:57)                              11.7   15.22 )-----------( 258      ( 03 May 2022 06:52 )             39.9     05-03    145  |  101  |  55<H>  ----------------------------<  153<H>  4.9   |  21<L>  |  7.97<H>    Ca    9.9      03 May 2022 06:52    TPro  8.4<H>  /  Alb  4.3  /  TBili  0.3  /  DBili  x   /  AST  37<H>  /  ALT  8   /  AlkPhos  101  05-03    CAPILLARY BLOOD GLUCOSE      POCT Blood Glucose.: 156 mg/dL (03 May 2022 06:37)    LIVER FUNCTIONS - ( 03 May 2022 06:52 )  Alb: 4.3 g/dL / Pro: 8.4 g/dL / ALK PHOS: 101 U/L / ALT: 8 U/L / AST: 37 U/L / GGT: x               D DImer  Serum Pro-Brain Natriuretic Peptide: 3650 pg/mL (05-03 @ 06:52)      Studies  Chest X-RAY  CT SCAN Chest   CT Abdomen  Venous Dopplers: LE:   Others      rad< from: Xray Chest 1 View-PORTABLE IMMEDIATE (05.03.22 @ 06:52) >    ACC: [id number] EXAM:  XR CHEST PORTABLE IMMED 1V                          PROCEDURE DATE:  05/03/2022          INTERPRETATION:  INDICATION: Shortness of breath    COMPARISON: Chest radiograph 2/28/2022    FINDINGS:  Heart/Vascular: The cardiac silhouette is normal in size.  Pulmonary: Bilateral diffuse airspace opacities greater on the right than   the left side consistent with pulmonary edema.    Bones: Mild osteoarthrosis of the glenohumeral joints and spine.    Impression: Pulmonary edema.        --- End of Report ---          ALYSSA BAEZA DO; Resident Radiologist  This document has been electronically signed.  EDWARD WIND MD; Attending Radiologist  This document has been electronically signed. May  3 2022  7:55AM    < end of copied text >  < from: US Duplex Venous Lower Ext Complete, Bilateral (03.01.22 @ 08:57) >    RIGHT:  Normal compressibility of the RIGHT common femoral, femoral and popliteal   veins.  Turbulent flow is identified in the right commonfemoral vein, secondary   to known AV fistula.  No RIGHT calf vein thrombosis is detected.    LEFT:  Normal compressibility of the LEFT common femoral, femoral and popliteal   veins.  Doppler examination shows normal spontaneous and phasic flow.  No LEFT calf vein thrombosis is detected.    IMPRESSION:  No evidence of deep venous thrombosis in either lower extremity.          --- End of Report ---            LUIS KAY MD; Attending Radiologist  This document has been electronically signed.Mar  1 2022  9:30AM    < end of copied text >

## 2022-05-03 NOTE — ED ADULT NURSE NOTE - OBJECTIVE STATEMENT
61 yof presents A&Ox4 BIBEMS from [facility], pt woke up around 5 a.m c/o sob. PMHx ESRD due for dialysis today, goes to \[facility]""is Tuesday, Thursday and Saturday. Pt in respiratory distress, labored breathing, diaphoretic, sating 90% 15 L o2 via NRB. Pt placed on BIPAP and breathing improved, now sating 100%. Pt denies any chest pain, dizziness, blurry vision N/V/D, recent falls, fever or trauma. 20g IV placed in R AC, 18g IV placed in R wrist. Labs sent per order. Medication given per eMAR. Pt stable and breathing better after placed on BIPAP. NAD noted upon leaving room. bed in lowest position, side rails up, call bell in hand, safety maintained. awaiting further orders. will continue to monitor.

## 2022-05-03 NOTE — ED ADULT NURSE NOTE - CHIEF COMPLAINT QUOTE
Pt  from [facility] for res distress, as per EMS  pt has been feeling SOB since 5am, staff placed pt on NRB. Pt is ERSD due for dialysis today. Denies fever, chills, chest pain. PMHx: DM2, ESRD, HTN. Charge RN notified, pt brought directly to Trauma A.

## 2022-05-03 NOTE — CONSULT NOTE ADULT - ASSESSMENT
urgent HD arranged   End Stage Renal Disease on Dialysis  hemodialysis TTS at [facility]  s/p last HD SAT  K wnl  hypervolemic  hb level at goal    urgent HD arranged w/2k bath, net UF 2.5-3kg as tolerated  Informed consent obtained from pt, in chart  renal diet, fluid restriction  dose all meds for ESRD    poc d/w pt, charge HD RN, ER Attending, admitting NP  will closely follow up.   labs, rad, chart reviewed  Nephrology   [facility]  cell [phone]  office [phone]  ans [facility] [phone]   62 yo F with pmh of ESRD on dialysis (Tu,Thurs,Sat), s/p L foot partial 3rd ray amputation and R hallux amputation 3/4 presenting from [place] for resp distress, admitted for acute resp failure, flash pulm edema, for urgent HD. on BiPAP. Renal consulted for urgent HD.     End Stage Renal Disease on Dialysis  hemodialysis TTS at [place]  s/p last HD SAT  K wnl  hypervolemic/Flash pulmonary edema.   hb level at goal    urgent HD arranged w/2k bath, net UF 2.5-3kg as tolerated  Informed consent obtained from pt, in chart  renal diet, fluid restriction 1L/day when off BiPAP-reinforced w/pt  dose all meds for ESRD  Hb >goal-no DION    Acute respiratory failure with hypoxia.   On BIPAP and likely will be weaned off after HD     Hypertensive urgency.   BP readings better off nitro ggt at time of exam  resume NH bp meds    poc d/w pt, charge HD RN, ER Attending, admitting NP  will closely follow up.   labs, rad, chart reviewed  Nephrology   [facility]  cell [phone]  office [phone]  ans serv- [phone]

## 2022-05-03 NOTE — H&P ADULT - NSICDXPASTMEDICALHX_GEN_ALL_CORE_FT
PAST MEDICAL HISTORY:  Blind right eye     Cataract     CKD (chronic kidney disease), stage IV     ESRD (end stage renal disease) on dialysis     Glaucoma     Heart failure     HLD (hyperlipidemia)     PAD (peripheral artery disease)

## 2022-05-03 NOTE — PATIENT PROFILE ADULT - FALL HARM RISK - HARM RISK INTERVENTIONS

## 2022-05-03 NOTE — H&P ADULT - NECK DETAILS
Pt's family reported to night nurse that she did not have a bath on yesterday. Nurse reported issue to TYREE Bansal. Nurse offered to bathe pt, also nsg staff offered to bathe pt immediately. The pt declined the bath and stated that she wanted her daughter at bedside to do it. The daughter at bedside mentioned to receiving nurse Loyda that the pt is going to need a bath today. Information regarding bathing has been passed to day staff .   normal/supple

## 2022-05-03 NOTE — CONSULT NOTE ADULT - PROBLEM SELECTOR RECOMMENDATION 9
likely secondary to flash pulm edema secondary to dietary indiscretion: mildly hypercarbic acidotic too; on bipap at this time: after HD, can try to take it off and see how she does:

## 2022-05-03 NOTE — ED PROVIDER NOTE - PROGRESS NOTE DETAILS
Joseph Frankel PGY3: Patient on bipap and improved. Spoke with patient's nephrologist SHERRI Bazzi who will expedite dialysis as long as she is clinically stable for dialysis. Will continue to reassess closely. Michael, PGY3: pt feeling better, nitro gtt d/c, bp in low 150s. dialysis confirmed for 10a. cxr showing possible R sided pna, covered for HAP but more likely pulmonary edema. signed out to private hospitalist.

## 2022-05-03 NOTE — ED PROVIDER NOTE - OBJECTIVE STATEMENT
62 yo F with pmh of ESRD on dialysis (Tu,Thurs,Sat), s/p L foot partial 3rd ray amputation and R hallux amputation 3/4 presenting from [place] for resp distress. [place] well yesterday however woke up at 5 AM this morning and could not breathe. Associated with subjective fevers and sweats. No chest pain. Is COVID vaccinated. Mild bl LE swelling. Has not missed dialysis.

## 2022-05-03 NOTE — H&P ADULT - ASSESSMENT
62 yo F with pmh of ESRD on dialysis (Tu,Thurs,Sat), s/p L foot partial 3rd ray amputation and R hallux amputation 3/4 presenting from [place] for resp distress. [place] well yesterday however woke up at 5 AM this morning and could not breathe. Associated with subjective fevers and sweats. No chest pain. Is COVID vaccinated. Mild bl LE swelling. Has not missed dialysis.

## 2022-05-03 NOTE — ED ADULT TRIAGE NOTE - CHIEF COMPLAINT QUOTE
Pt  from [facility] for res distress, as per EMS  pt has been feeling SOB since 5am, staff placed pt on NRB. Pt is ERSD due for dialysis today. Denies fever, chills, chest pain. PMHx: DM2, ESRD, HTN. Charge RN notified, pt brought directly to Trauma A.

## 2022-05-03 NOTE — CONSULT NOTE ADULT - ASSESSMENT
60 yo F with pmh of ESRD on dialysis (Tu,Thurs,Sat), s/p L foot partial 3rd ray amputation and R hallux amputation 3/4 presenting from [place] for resp distress. [place] well yesterday however woke up at 5 AM this morning and could not breathe. Associated with subjective fevers and sweats. No chest pain. Is COVID vaccinated. Mild bl LE swelling. Has not missed dialysis.

## 2022-05-03 NOTE — ED ADULT NURSE NOTE - PAIN: PRESENCE, MLM
S:  Admission  B:  Bertha is a  @ 39w6d gestation, GBS negative, Hep. B negative, pregnancy uncomplicated. EFW 7.5# per MD  A:  Patient admitted to room 333 for induction of labor.  R: Pt having ctns on arrival. Painless, q4-5 minutes.  Category 1 tracing.     denies pain/discomfort

## 2022-05-03 NOTE — H&P ADULT - HISTORY OF PRESENT ILLNESS
60 yo F with pmh of ESRD on dialysis (Tu,Thurs,Sat), s/p L foot partial 3rd ray amputation and R hallux amputation 3/4 presenting from [place] for resp distress. [place] well yesterday however woke up at 5 AM this morning and could not breathe. Associated with subjective fevers and sweats. No chest pain. Is COVID vaccinated. Mild bl LE swelling. Has not missed dialysis.

## 2022-05-03 NOTE — ED ADULT NURSE NOTE - NSIMPLEMENTINTERV_GEN_ALL_ED
Implemented All Fall Risk Interventions:  [place] to call system. Call bell, personal items and telephone within reach. Instruct patient to call for assistance. Room bathroom lighting operational. Non-slip footwear when patient is off stretcher. Physically safe environment: no spills, clutter or unnecessary equipment. Stretcher in lowest position, wheels locked, appropriate side rails in place. Provide visual cue, wrist band, yellow gown, etc. Monitor gait and stability. Monitor for mental status changes and reorient to person, place, and time. Review medications for side effects contributing to fall risk. Reinforce activity limits and safety measures with patient and family.

## 2022-05-03 NOTE — ED PROVIDER NOTE - PHYSICAL EXAMINATION
Gen: Alert and oriented. Appears in resp distress. Answering questions appropriately  HEENT: extra occular movements intact, no nasal discharge, mucous membranes moist  CV: Tachycardic. Regular rhythm, +S1/S2, no murmurs/rubs/gallops,   Resp: Tachypneic, Crackles throughout.   GI: Abdomen soft non-distended, non tender to palpation, no masses  MSK: No open wounds, no bruising, 2+ LE edema bl, Homans sign negative bl  Neuro: A&Ox4, following commands, moving all four extremities spontaneously  Psych: appropriate mood

## 2022-05-03 NOTE — ED PROVIDER NOTE - ATTENDING CONTRIBUTION TO CARE
I performed a face-to-face evaluation of the patient and performed a history and physical examination along with the resident or ACP, and/or medical student above.  I agree with the history and physical examination as documented by the resident or ACP, and/or medical student above.  Aston:   62yo F w/ pmh T2DM (on insulin) and ESRD on dialysis TTS (last dialyzed 3 days ago) bibems from [facility] for acute resp failure after found sob at approx 5am today, w/ unreadable O2 sat until put on 10L NC  - resulting O2 sat was low 80s, switched to NRB by EMS and O2 sat increased to low 90s, arrived tachypneic to 50s, BP 190s/100s, immediately taken to Trauma [facility], SLN given and bipap started followed by nitro gtt, with good response

## 2022-05-03 NOTE — ED PROVIDER NOTE - CLINICAL SUMMARY MEDICAL DECISION MAKING FREE TEXT BOX
Joseph Frankel PGY3: PAtient with ESRD with acute resp failure. Tachcyardic, tachypnic, requiring O2. BP stable. VSS. Patient appears in resp distress. PE as above.  POCUS with blines. Concerning for acute pulm edema. Likely in relation to ESRD. However must also consider acute cardiac etiology vs infections. Will get labs, imaging, place on BIpap, give nitro, and reassess closely.

## 2022-05-03 NOTE — PATIENT PROFILE ADULT - FUNCTIONAL ASSESSMENT - DAILY ACTIVITY 5.
Nsaids Counseling: NSAID Counseling: I discussed with the patient that NSAIDs should be taken with food. Prolonged use of NSAIDs can result in the development of stomach ulcers.  Patient advised to stop taking NSAIDs if abdominal pain occurs.  The patient verbalized understanding of the proper use and possible adverse effects of NSAIDs.  All of the patient's questions and concerns were addressed. 3 = A little assistance Enbrel Counseling:  I discussed with the patient the risks of etanercept including but not limited to myelosuppression, immunosuppression, autoimmune hepatitis, demyelinating diseases, lymphoma, and infections.  The patient understands that monitoring is required including a PPD at baseline and must alert us or the primary physician if symptoms of infection or other concerning signs are noted. Solaraze Counseling:  I discussed with the patient the risks of Solaraze including but not limited to erythema, scaling, itching, weeping, crusting, and pain. Nsaids Pregnancy And Lactation Text: These medications are considered safe up to 30 weeks gestation. It is excreted in breast milk. Prednisone Pregnancy And Lactation Text: This medication is Pregnancy Category C and it isn't know if it is safe during pregnancy. This medication is excreted in breast milk. Oxybutynin Counseling:  I discussed with the patient the risks of oxybutynin including but not limited to skin rash, drowsiness, dry mouth, difficulty urinating, and blurred vision. Cosentyx Counseling:  I discussed with the patient the risks of Cosentyx including but not limited to worsening of Crohn's disease, immunosuppression, allergic reactions and infections.  The patient understands that monitoring is required including a PPD at baseline and must alert us or the primary physician if symptoms of infection or other concerning signs are noted. Topical Sulfur Applications Pregnancy And Lactation Text: This medication is Pregnancy Category C and has an unknown safety profile during pregnancy. It is unknown if this topical medication is excreted in breast milk. Gabapentin Pregnancy And Lactation Text: This medication is Pregnancy Category C and isn't considered safe during pregnancy. It is excreted in breast milk. High Dose Vitamin A Counseling: Side effects reviewed, pt to contact office should one occur. Drysol Pregnancy And Lactation Text: This medication is considered safe during pregnancy and breast feeding. Azathioprine Counseling:  I discussed with the patient the risks of azathioprine including but not limited to myelosuppression, immunosuppression, hepatotoxicity, lymphoma, and infections.  The patient understands that monitoring is required including baseline LFTs, Creatinine, possible TPMP genotyping and weekly CBCs for the first month and then every 2 weeks thereafter.  The patient verbalized understanding of the proper use and possible adverse effects of azathioprine.  All of the patient's questions and concerns were addressed. Simponi Counseling:  I discussed with the patient the risks of golimumab including but not limited to myelosuppression, immunosuppression, autoimmune hepatitis, demyelinating diseases, lymphoma, and serious infections.  The patient understands that monitoring is required including a PPD at baseline and must alert us or the primary physician if symptoms of infection or other concerning signs are noted. Tremfya Counseling: I discussed with the patient the risks of guselkumab including but not limited to immunosuppression, serious infections, worsening of inflammatory bowel disease and drug reactions.  The patient understands that monitoring is required including a PPD at baseline and must alert us or the primary physician if symptoms of infection or other concerning signs are noted. Eucrisa Counseling: Patient may experience a mild burning sensation during topical application. Eucrisa is not approved in children less than 2 years of age. Rituxan Pregnancy And Lactation Text: This medication is Pregnancy Category C and it isn't know if it is safe during pregnancy. It is unknown if this medication is excreted in breast milk but similar antibodies are known to be excreted. Erythromycin Counseling:  I discussed with the patient the risks of erythromycin including but not limited to GI upset, allergic reaction, drug rash, diarrhea, increase in liver enzymes, and yeast infections. Zyclara Pregnancy And Lactation Text: This medication is Pregnancy Category C. It is unknown if this medication is excreted in breast milk. Taltz Pregnancy And Lactation Text: The risk during pregnancy and breastfeeding is uncertain with this medication. Spironolactone Pregnancy And Lactation Text: This medication can cause feminization of the male fetus and should be avoided during pregnancy. The active metabolite is also found in breast milk. Siliq Counseling:  I discussed with the patient the risks of Siliq including but not limited to new or worsening depression, suicidal thoughts and behavior, immunosuppression, malignancy, posterior leukoencephalopathy syndrome, and serious infections.  The patient understands that monitoring is required including a PPD at baseline and must alert us or the primary physician if symptoms of infection or other concerning signs are noted. There is also a special program designed to monitor depression which is required with Siliq. Bactrim Pregnancy And Lactation Text: This medication is Pregnancy Category D and is known to cause fetal risk.  It is also excreted in breast milk. Metronidazole Pregnancy And Lactation Text: This medication is Pregnancy Category B and considered safe during pregnancy.  It is also excreted in breast milk. Clindamycin Counseling: I counseled the patient regarding use of clindamycin as an antibiotic for prophylactic and/or therapeutic purposes. Clindamycin is active against numerous classes of bacteria, including skin bacteria. Side effects may include nausea, diarrhea, gastrointestinal upset, rash, hives, yeast infections, and in rare cases, colitis. Griseofulvin Pregnancy And Lactation Text: This medication is Pregnancy Category X and is known to cause serious birth defects. It is unknown if this medication is excreted in breast milk but breast feeding should be avoided. Albendazole Counseling:  I discussed with the patient the risks of albendazole including but not limited to cytopenia, kidney damage, nausea/vomiting and severe allergy.  The patient understands that this medication is being used in an off-label manner. Thalidomide Counseling: I discussed with the patient the risks of thalidomide including but not limited to birth defects, anxiety, weakness, chest pain, dizziness, cough and severe allergy. Carac Pregnancy And Lactation Text: This medication is Pregnancy Category X and contraindicated in pregnancy and in women who may become pregnant. It is unknown if this medication is excreted in breast milk. Sski Pregnancy And Lactation Text: This medication is Pregnancy Category D and isn't considered safe during pregnancy. It is excreted in breast milk. Erivedge Counseling- I discussed with the patient the risks of Erivedge including but not limited to nausea, vomiting, diarrhea, constipation, weight loss, changes in the sense of taste, decreased appetite, muscle spasms, and hair loss.  The patient verbalized understanding of the proper use and possible adverse effects of Erivedge.  All of the patient's questions and concerns were addressed. Methotrexate Counseling:  Patient counseled regarding adverse effects of methotrexate including but not limited to nausea, vomiting, abnormalities in liver function tests. Patients may develop mouth sores, rash, diarrhea, and abnormalities in blood counts. The patient understands that monitoring is required including LFT's and blood counts.  There is a rare possibility of scarring of the liver and lung problems that can occur when taking methotrexate. Persistent nausea, loss of appetite, pale stools, dark urine, cough, and shortness of breath should be reported immediately. Patient advised to discontinue methotrexate treatment at least three months before attempting to become pregnant.  I discussed the need for folate supplements while taking methotrexate.  These supplements can decrease side effects during methotrexate treatment. The patient verbalized understanding of the proper use and possible adverse effects of methotrexate.  All of the patient's questions and concerns were addressed. Metronidazole Counseling:  I discussed with the patient the risks of metronidazole including but not limited to seizures, nausea/vomiting, a metallic taste in the mouth, nausea/vomiting and severe allergy. Ketoconazole Counseling:   Patient counseled regarding improving absorption with orange juice.  Adverse effects include but are not limited to breast enlargement, headache, diarrhea, nausea, upset stomach, liver function test abnormalities, taste disturbance, and stomach pain.  There is a rare possibility of liver failure that can occur when taking ketoconazole. The patient understands that monitoring of LFTs may be required, especially at baseline. The patient verbalized understanding of the proper use and possible adverse effects of ketoconazole.  All of the patient's questions and concerns were addressed. Arava Counseling:  Patient counseled regarding adverse effects of Arava including but not limited to nausea, vomiting, abnormalities in liver function tests. Patients may develop mouth sores, rash, diarrhea, and abnormalities in blood counts. The patient understands that monitoring is required including LFTs and blood counts.  There is a rare possibility of scarring of the liver and lung problems that can occur when taking methotrexate. Persistent nausea, loss of appetite, pale stools, dark urine, cough, and shortness of breath should be reported immediately. Patient advised to discontinue Arava treatment and consult with a physician prior to attempting conception. The patient will have to undergo a treatment to eliminate Arava from the body prior to conception. Topical Sulfur Applications Counseling: Topical Sulfur Counseling: Patient counseled that this medication may cause skin irritation or allergic reactions.  In the event of skin irritation, the patient was advised to reduce the amount of the drug applied or use it less frequently.   The patient verbalized understanding of the proper use and possible adverse effects of topical sulfur application.  All of the patient's questions and concerns were addressed. Drysol Counseling:  I discussed with the patient the risks of drysol/aluminum chloride including but not limited to skin rash, itching, irritation, burning. Topical Retinoid counseling:  Patient advised to apply a pea-sized amount only at bedtime and wait 30 minutes after washing their face before applying.  If too drying, patient may add a non-comedogenic moisturizer. The patient verbalized understanding of the proper use and possible adverse effects of retinoids.  All of the patient's questions and concerns were addressed. Griseofulvin Counseling:  I discussed with the patient the risks of griseofulvin including but not limited to photosensitivity, cytopenia, liver damage, nausea/vomiting and severe allergy.  The patient understands that this medication is best absorbed when taken with a fatty meal (e.g., ice cream or french fries). Isotretinoin Counseling: Patient should get monthly blood tests, not donate blood, not drive at night if vision affected, not share medication, and not undergo elective surgery for 6 months after tx completed. Side effects reviewed, pt to contact office should one occur. Protopic Pregnancy And Lactation Text: This medication is Pregnancy Category C. It is unknown if this medication is excreted in breast milk when applied topically. Azathioprine Pregnancy And Lactation Text: This medication is Pregnancy Category D and isn't considered safe during pregnancy. It is unknown if this medication is excreted in breast milk. Dapsone Counseling: I discussed with the patient the risks of dapsone including but not limited to hemolytic anemia, agranulocytosis, rashes, methemoglobinemia, kidney failure, peripheral neuropathy, headaches, GI upset, and liver toxicity.  Patients who start dapsone require monitoring including baseline LFTs and weekly CBCs for the first month, then every month thereafter.  The patient verbalized understanding of the proper use and possible adverse effects of dapsone.  All of the patient's questions and concerns were addressed. Minoxidil Counseling: Minoxidil is a topical medication which can increase blood flow where it is applied. It is uncertain how this medication increases hair growth. Side effects are uncommon and include stinging and allergic reactions. Valtrex Pregnancy And Lactation Text: this medication is Pregnancy Category B and is considered safe during pregnancy. This medication is not directly found in breast milk but it's metabolite acyclovir is present. Doxycycline Pregnancy And Lactation Text: This medication is Pregnancy Category D and not consider safe during pregnancy. It is also excreted in breast milk but is considered safe for shorter treatment courses. Itraconazole Counseling:  I discussed with the patient the risks of itraconazole including but not limited to liver damage, nausea/vomiting, neuropathy, and severe allergy.  The patient understands that this medication is best absorbed when taken with acidic beverages such as non-diet cola or ginger ale.  The patient understands that monitoring is required including baseline LFTs and repeat LFTs at intervals.  The patient understands that they are to contact us or the primary physician if concerning signs are noted. Arava Pregnancy And Lactation Text: This medication is Pregnancy Category X and is absolutely contraindicated during pregnancy. It is unknown if it is excreted in breast milk. Hydroquinone Pregnancy And Lactation Text: This medication has not been assigned a Pregnancy Risk Category but animal studies failed to show danger with the topical medication. It is unknown if the medication is excreted in breast milk. Detail Level: Detailed Hydroxychloroquine Counseling:  I discussed with the patient that a baseline ophthalmologic exam is needed at the start of therapy and every year thereafter while on therapy. A CBC may also be warranted for monitoring.  The side effects of this medication were discussed with the patient, including but not limited to agranulocytosis, aplastic anemia, seizures, rashes, retinopathy, and liver toxicity. Patient instructed to call the office should any adverse effect occur.  The patient verbalized understanding of the proper use and possible adverse effects of Plaquenil.  All the patient's questions and concerns were addressed. Cellcept Counseling:  I discussed with the patient the risks of mycophenolate mofetil including but not limited to infection/immunosuppression, GI upset, hypokalemia, hypercholesterolemia, bone marrow suppression, lymphoproliferative disorders, malignancy, GI ulceration/bleed/perforation, colitis, interstitial lung disease, kidney failure, progressive multifocal leukoencephalopathy, and birth defects.  The patient understands that monitoring is required including a baseline creatinine and regular CBC testing. In addition, patient must alert us immediately if symptoms of infection or other concerning signs are noted. Zyclara Counseling:  I discussed with the patient the risks of imiquimod including but not limited to erythema, scaling, itching, weeping, crusting, and pain.  Patient understands that the inflammatory response to imiquimod is variable from person to person and was educated regarded proper titration schedule.  If flu-like symptoms develop, patient knows to discontinue the medication and contact us. Hydroxyzine Counseling: Patient advised that the medication is sedating and not to drive a car after taking this medication.  Patient informed of potential adverse effects including but not limited to dry mouth, urinary retention, and blurry vision.  The patient verbalized understanding of the proper use and possible adverse effects of hydroxyzine.  All of the patient's questions and concerns were addressed. Benzoyl Peroxide Counseling: Patient counseled that medicine may cause skin irritation and bleach clothing.  In the event of skin irritation, the patient was advised to reduce the amount of the drug applied or use it less frequently.   The patient verbalized understanding of the proper use and possible adverse effects of benzoyl peroxide.  All of the patient's questions and concerns were addressed. Benzoyl Peroxide Pregnancy And Lactation Text: This medication is Pregnancy Category C. It is unknown if benzoyl peroxide is excreted in breast milk. Itraconazole Pregnancy And Lactation Text: This medication is Pregnancy Category C and it isn't know if it is safe during pregnancy. It is also excreted in breast milk. Bexarotene Counseling:  I discussed with the patient the risks of bexarotene including but not limited to hair loss, dry lips/skin/eyes, liver abnormalities, hyperlipidemia, pancreatitis, depression/suicidal ideation, photosensitivity, drug rash/allergic reactions, hypothyroidism, anemia, leukopenia, infection, cataracts, and teratogenicity.  Patient understands that they will need regular blood tests to check lipid profile, liver function tests, white blood cell count, thyroid function tests and pregnancy test if applicable. Gabapentin Counseling: I discussed with the patient the risks of gabapentin including but not limited to dizziness, somnolence, fatigue and ataxia. Bactrim Counseling:  I discussed with the patient the risks of sulfa antibiotics including but not limited to GI upset, allergic reaction, drug rash, diarrhea, dizziness, photosensitivity, and yeast infections.  Rarely, more serious reactions can occur including but not limited to aplastic anemia, agranulocytosis, methemoglobinemia, blood dyscrasias, liver or kidney failure, lung infiltrates or desquamative/blistering drug rashes. Hydroxyzine Pregnancy And Lactation Text: This medication is not safe during pregnancy and should not be taken. It is also excreted in breast milk and breast feeding isn't recommended. Carac Counseling:  I discussed with the patient the risks of Carac including but not limited to erythema, scaling, itching, weeping, crusting, and pain. Birth Control Pills Counseling: Birth Control Pill Counseling: I discussed with the patient the potential side effects of OCPs including but not limited to increased risk of stroke, heart attack, thrombophlebitis, deep venous thrombosis, hepatic adenomas, breast changes, GI upset, headaches, and depression.  The patient verbalized understanding of the proper use and possible adverse effects of OCPs. All of the patient's questions and concerns were addressed. Xeljanz Counseling: I discussed with the patient the risks of Xeljanz therapy including increased risk of infection, liver issues, headache, diarrhea, or cold symptoms. Live vaccines should be avoided. They were instructed to call if they have any problems. Infliximab Counseling:  I discussed with the patient the risks of infliximab including but not limited to myelosuppression, immunosuppression, autoimmune hepatitis, demyelinating diseases, lymphoma, and serious infections.  The patient understands that monitoring is required including a PPD at baseline and must alert us or the primary physician if symptoms of infection or other concerning signs are noted. Enbrel Pregnancy And Lactation Text: This medication is Pregnancy Category B and is considered safe during pregnancy. It is unknown if this medication is excreted in breast milk. High Dose Vitamin A Pregnancy And Lactation Text: High dose vitamin A therapy is contraindicated during pregnancy and breast feeding. Dupixent Pregnancy And Lactation Text: This medication likely crosses the placenta but the risk for the fetus is uncertain. This medication is excreted in breast milk. Include Pregnancy/Lactation Warning?: No Minocycline Pregnancy And Lactation Text: This medication is Pregnancy Category D and not consider safe during pregnancy. It is also excreted in breast milk. Colchicine Counseling:  Patient counseled regarding adverse effects including but not limited to stomach upset (nausea, vomiting, stomach pain, or diarrhea).  Patient instructed to limit alcohol consumption while taking this medication.  Colchicine may reduce blood counts especially with prolonged use.  The patient understands that monitoring of kidney function and blood counts may be required, especially at baseline. The patient verbalized understanding of the proper use and possible adverse effects of colchicine.  All of the patient's questions and concerns were addressed. Clofazimine Counseling:  I discussed with the patient the risks of clofazimine including but not limited to skin and eye pigmentation, liver damage, nausea/vomiting, gastrointestinal bleeding and allergy. Xelrubaz Pregnancy And Lactation Text: This medication is Pregnancy Category D and is not considered safe during pregnancy.  The risk during breast feeding is also uncertain. Tetracycline Counseling: Patient counseled regarding possible photosensitivity and increased risk for sunburn.  Patient instructed to avoid sunlight, if possible.  When exposed to sunlight, patients should wear protective clothing, sunglasses, and sunscreen.  The patient was instructed to call the office immediately if the following severe adverse effects occur:  hearing changes, easy bruising/bleeding, severe headache, or vision changes.  The patient verbalized understanding of the proper use and possible adverse effects of tetracycline.  All of the patient's questions and concerns were addressed. Patient understands to avoid pregnancy while on therapy due to potential birth defects. Cimetidine Pregnancy And Lactation Text: This medication is Pregnancy Category B and is considered safe during pregnancy. It is also excreted in breast milk and breast feeding isn't recommended. Rifampin Pregnancy And Lactation Text: This medication is Pregnancy Category C and it isn't know if it is safe during pregnancy. It is also excreted in breast milk and should not be used if you are breast feeding. Topical Clindamycin Counseling: Patient counseled that this medication may cause skin irritation or allergic reactions.  In the event of skin irritation, the patient was advised to reduce the amount of the drug applied or use it less frequently.   The patient verbalized understanding of the proper use and possible adverse effects of clindamycin.  All of the patient's questions and concerns were addressed. Clindamycin Pregnancy And Lactation Text: This medication can be used in pregnancy if certain situations. Clindamycin is also present in breast milk. Doxepin Counseling:  Patient advised that the medication is sedating and not to drive a car after taking this medication. Patient informed of potential adverse effects including but not limited to dry mouth, urinary retention, and blurry vision.  The patient verbalized understanding of the proper use and possible adverse effects of doxepin.  All of the patient's questions and concerns were addressed. 5-Fu Counseling: 5-Fluorouracil Counseling:  I discussed with the patient the risks of 5-fluorouracil including but not limited to erythema, scaling, itching, weeping, crusting, and pain. Dapsone Pregnancy And Lactation Text: This medication is Pregnancy Category C and is not considered safe during pregnancy or breast feeding. Rituxan Counseling:  I discussed with the patient the risks of Rituxan infusions. Side effects can include infusion reactions, severe drug rashes including mucocutaneous reactions, reactivation of latent hepatitis and other infections and rarely progressive multifocal leukoencephalopathy.  All of the patient's questions and concerns were addressed. Acitretin Counseling:  I discussed with the patient the risks of acitretin including but not limited to hair loss, dry lips/skin/eyes, liver damage, hyperlipidemia, depression/suicidal ideation, photosensitivity.  Serious rare side effects can include but are not limited to pancreatitis, pseudotumor cerebri, bony changes, clot formation/stroke/heart attack.  Patient understands that alcohol is contraindicated since it can result in liver toxicity and significantly prolong the elimination of the drug by many years. Tazorac Counseling:  Patient advised that medication is irritating and drying.  Patient may need to apply sparingly and wash off after an hour before eventually leaving it on overnight.  The patient verbalized understanding of the proper use and possible adverse effects of tazorac.  All of the patient's questions and concerns were addressed. Picato Counseling:  I discussed with the patient the risks of Picato including but not limited to erythema, scaling, itching, weeping, crusting, and pain. SSKI Counseling:  I discussed with the patient the risks of SSKI including but not limited to thyroid abnormalities, metallic taste, GI upset, fever, headache, acne, arthralgias, paraesthesias, lymphadenopathy, easy bleeding, arrhythmias, and allergic reaction. Cimzia Counseling:  I discussed with the patient the risks of Cimzia including but not limited to immunosuppression, allergic reactions and infections.  The patient understands that monitoring is required including a PPD at baseline and must alert us or the primary physician if symptoms of infection or other concerning signs are noted. Ivermectin Pregnancy And Lactation Text: This medication is Pregnancy Category C and it isn't known if it is safe during pregnancy. It is also excreted in breast milk. Birth Control Pills Pregnancy And Lactation Text: This medication should be avoided if pregnant and for the first 30 days post-partum. Azithromycin Counseling:  I discussed with the patient the risks of azithromycin including but not limited to GI upset, allergic reaction, drug rash, diarrhea, and yeast infections. Cimetidine Counseling:  I discussed with the patient the risks of Cimetidine including but not limited to gynecomastia, headache, diarrhea, nausea, drowsiness, arrhythmias, pancreatitis, skin rashes, psychosis, bone marrow suppression and kidney toxicity. Cyclophosphamide Pregnancy And Lactation Text: This medication is Pregnancy Category D and it isn't considered safe during pregnancy. This medication is excreted in breast milk. Cyclosporine Counseling:  I discussed with the patient the risks of cyclosporine including but not limited to hypertension, gingival hyperplasia,myelosuppression, immunosuppression, liver damage, kidney damage, neurotoxicity, lymphoma, and serious infections. The patient understands that monitoring is required including baseline blood pressure, CBC, CMP, lipid panel and uric acid, and then 1-2 times monthly CMP and blood pressure. Terbinafine Counseling: Patient counseling regarding adverse effects of terbinafine including but not limited to headache, diarrhea, rash, upset stomach, liver function test abnormalities, itching, taste/smell disturbance, nausea, abdominal pain, and flatulence.  There is a rare possibility of liver failure that can occur when taking terbinafine.  The patient understands that a baseline LFT and kidney function test may be required. The patient verbalized understanding of the proper use and possible adverse effects of terbinafine.  All of the patient's questions and concerns were addressed. Erythromycin Pregnancy And Lactation Text: This medication is Pregnancy Category B and is considered safe during pregnancy. It is also excreted in breast milk. Solaraze Pregnancy And Lactation Text: This medication is Pregnancy Category B and is considered safe. There is some data to suggest avoiding during the third trimester. It is unknown if this medication is excreted in breast milk. Humira Counseling:  I discussed with the patient the risks of adalimumab including but not limited to myelosuppression, immunosuppression, autoimmune hepatitis, demyelinating diseases, lymphoma, and serious infections.  The patient understands that monitoring is required including a PPD at baseline and must alert us or the primary physician if symptoms of infection or other concerning signs are noted. Xolair Counseling:  Patient informed of potential adverse effects including but not limited to fever, muscle aches, rash and allergic reactions.  The patient verbalized understanding of the proper use and possible adverse effects of Xolair.  All of the patient's questions and concerns were addressed. Valtrex Counseling: I discussed with the patient the risks of valacyclovir including but not limited to kidney damage, nausea, vomiting and severe allergy.  The patient understands that if the infection seems to be worsening or is not improving, they are to call. Ivermectin Counseling:  Patient instructed to take medication on an empty stomach with a full glass of water.  Patient informed of potential adverse effects including but not limited to nausea, diarrhea, dizziness, itching, and swelling of the extremities or lymph nodes.  The patient verbalized understanding of the proper use and possible adverse effects of ivermectin.  All of the patient's questions and concerns were addressed. Cephalexin Counseling: I counseled the patient regarding use of cephalexin as an antibiotic for prophylactic and/or therapeutic purposes. Cephalexin (commonly prescribed under brand name Keflex) is a cephalosporin antibiotic which is active against numerous classes of bacteria, including most skin bacteria. Side effects may include nausea, diarrhea, gastrointestinal upset, rash, hives, yeast infections, and in rare cases, hepatitis, kidney disease, seizures, fever, confusion, neurologic symptoms, and others. Patients with severe allergies to penicillin medications are cautioned that there is about a 10% incidence of cross-reactivity with cephalosporins. When possible, patients with penicillin allergies should use alternatives to cephalosporins for antibiotic therapy. Taltz Counseling: I discussed with the patient the risks of ixekizumab including but not limited to immunosuppression, serious infections, worsening of inflammatory bowel disease and drug reactions.  The patient understands that monitoring is required including a PPD at baseline and must alert us or the primary physician if symptoms of infection or other concerning signs are noted. Fluconazole Counseling:  Patient counseled regarding adverse effects of fluconazole including but not limited to headache, diarrhea, nausea, upset stomach, liver function test abnormalities, taste disturbance, and stomach pain.  There is a rare possibility of liver failure that can occur when taking fluconazole.  The patient understands that monitoring of LFTs and kidney function test may be required, especially at baseline. The patient verbalized understanding of the proper use and possible adverse effects of fluconazole.  All of the patient's questions and concerns were addressed. Minocycline Counseling: Patient advised regarding possible photosensitivity and discoloration of the teeth, skin, lips, tongue and gums.  Patient instructed to avoid sunlight, if possible.  When exposed to sunlight, patients should wear protective clothing, sunglasses, and sunscreen.  The patient was instructed to call the office immediately if the following severe adverse effects occur:  hearing changes, easy bruising/bleeding, severe headache, or vision changes.  The patient verbalized understanding of the proper use and possible adverse effects of minocycline.  All of the patient's questions and concerns were addressed. Glycopyrrolate Pregnancy And Lactation Text: This medication is Pregnancy Category B and is considered safe during pregnancy. It is unknown if it is excreted breast milk. Azithromycin Pregnancy And Lactation Text: This medication is considered safe during pregnancy and is also secreted in breast milk. Otezla Counseling: The side effects of Otezla were discussed with the patient, including but not limited to worsening or new depression, weight loss, diarrhea, nausea, upper respiratory tract infection, and headache. Patient instructed to call the office should any adverse effect occur.  The patient verbalized understanding of the proper use and possible adverse effects of Otezla.  All the patient's questions and concerns were addressed. Glycopyrrolate Counseling:  I discussed with the patient the risks of glycopyrrolate including but not limited to skin rash, drowsiness, dry mouth, difficulty urinating, and blurred vision. Quinolones Counseling:  I discussed with the patient the risks of fluoroquinolones including but not limited to GI upset, allergic reaction, drug rash, diarrhea, dizziness, photosensitivity, yeast infections, liver function test abnormalities, tendonitis/tendon rupture. Rifampin Counseling: I discussed with the patient the risks of rifampin including but not limited to liver damage, kidney damage, red-orange body fluids, nausea/vomiting and severe allergy. Xolair Pregnancy And Lactation Text: This medication is Pregnancy Category B and is considered safe during pregnancy. This medication is excreted in breast milk. Prednisone Counseling:  I discussed with the patient the risks of prolonged use of prednisone including but not limited to weight gain, insomnia, osteoporosis, mood changes, diabetes, susceptibility to infection, glaucoma and high blood pressure.  In cases where prednisone use is prolonged, patients should be monitored with blood pressure checks, serum glucose levels and an eye exam.  Additionally, the patient may need to be placed on GI prophylaxis, PCP prophylaxis, and calcium and vitamin D supplementation and/or a bisphosphonate.  The patient verbalized understanding of the proper use and the possible adverse effects of prednisone.  All of the patient's questions and concerns were addressed. Isotretinoin Pregnancy And Lactation Text: This medication is Pregnancy Category X and is considered extremely dangerous during pregnancy. It is unknown if it is excreted in breast milk. Tazorac Pregnancy And Lactation Text: This medication is not safe during pregnancy. It is unknown if this medication is excreted in breast milk. Doxycycline Counseling:  Patient counseled regarding possible photosensitivity and increased risk for sunburn.  Patient instructed to avoid sunlight, if possible.  When exposed to sunlight, patients should wear protective clothing, sunglasses, and sunscreen.  The patient was instructed to call the office immediately if the following severe adverse effects occur:  hearing changes, easy bruising/bleeding, severe headache, or vision changes.  The patient verbalized understanding of the proper use and possible adverse effects of doxycycline.  All of the patient's questions and concerns were addressed. Ketoconazole Pregnancy And Lactation Text: This medication is Pregnancy Category C and it isn't know if it is safe during pregnancy. It is also excreted in breast milk and breast feeding isn't recommended. Spironolactone Counseling: Patient advised regarding risks of diarrhea, abdominal pain, hyperkalemia, birth defects (for female patients), liver toxicity and renal toxicity. The patient may need blood work to monitor liver and kidney function and potassium levels while on therapy. The patient verbalized understanding of the proper use and possible adverse effects of spironolactone.  All of the patient's questions and concerns were addressed. Oxybutynin Pregnancy And Lactation Text: This medication is Pregnancy Category B and is considered safe during pregnancy. It is unknown if it is excreted in breast milk. Stelara Counseling:  I discussed with the patient the risks of ustekinumab including but not limited to immunosuppression, malignancy, posterior leukoencephalopathy syndrome, and serious infections.  The patient understands that monitoring is required including a PPD at baseline and must alert us or the primary physician if symptoms of infection or other concerning signs are noted. Methotrexate Pregnancy And Lactation Text: This medication is Pregnancy Category X and is known to cause fetal harm. This medication is excreted in breast milk. Protopic Counseling: Patient may experience a mild burning sensation during topical application. Protopic is not approved in children less than 2 years of age. There have been case reports of hematologic and skin malignancies in patients using topical calcineurin inhibitors although causality is questionable. Imiquimod Counseling:  I discussed with the patient the risks of imiquimod including but not limited to erythema, scaling, itching, weeping, crusting, and pain.  Patient understands that the inflammatory response to imiquimod is variable from person to person and was educated regarded proper titration schedule.  If flu-like symptoms develop, patient knows to discontinue the medication and contact us. Otezla Pregnancy And Lactation Text: This medication is Pregnancy Category C and it isn't known if it is safe during pregnancy. It is unknown if it is excreted in breast milk. Hydroquinone Counseling:  Patient advised that medication may result in skin irritation, lightening (hypopigmentation), dryness, and burning.  In the event of skin irritation, the patient was advised to reduce the amount of the drug applied or use it less frequently.  Rarely, spots that are treated with hydroquinone can become darker (pseudoochronosis).  Should this occur, patient instructed to stop medication and call the office. The patient verbalized understanding of the proper use and possible adverse effects of hydroquinone.  All of the patient's questions and concerns were addressed. Dupixent Counseling: I discussed with the patient the risks of dupilumab including but not limited to eye infection and irritation, cold sores, injection site reactions, worsening of asthma, allergic reactions and increased risk of parasitic infection.  Live vaccines should be avoided while taking dupilumab. Dupilumab will also interact with certain medications such as warfarin and cyclosporine. The patient understands that monitoring is required and they must alert us or the primary physician if symptoms of infection or other concerning signs are noted. Doxepin Pregnancy And Lactation Text: This medication is Pregnancy Category C and it isn't known if it is safe during pregnancy. It is also excreted in breast milk and breast feeding isn't recommended. Cephalexin Pregnancy And Lactation Text: This medication is Pregnancy Category B and considered safe during pregnancy.  It is also excreted in breast milk but can be used safely for shorter doses. Bexarotene Pregnancy And Lactation Text: This medication is Pregnancy Category X and should not be given to women who are pregnant or may become pregnant. This medication should not be used if you are breast feeding. Elidel Counseling: Patient may experience a mild burning sensation during topical application. Elidel is not approved in children less than 2 years of age. There have been case reports of hematologic and skin malignancies in patients using topical calcineurin inhibitors although causality is questionable. Cyclophosphamide Counseling:  I discussed with the patient the risks of cyclophosphamide including but not limited to hair loss, hormonal abnormalities, decreased fertility, abdominal pain, diarrhea, nausea and vomiting, bone marrow suppression and infection. The patient understands that monitoring is required while taking this medication. Odomzo Counseling- I discussed with the patient the risks of Odomzo including but not limited to nausea, vomiting, diarrhea, constipation, weight loss, changes in the sense of taste, decreased appetite, muscle spasms, and hair loss.  The patient verbalized understanding of the proper use and possible adverse effects of Odomzo.  All of the patient's questions and concerns were addressed. Hydroxychloroquine Pregnancy And Lactation Text: This medication has been shown to cause fetal harm but it isn't assigned a Pregnancy Risk Category. There are small amounts excreted in breast milk. Acitretin Pregnancy And Lactation Text: This medication is Pregnancy Category X and should not be given to women who are pregnant or may become pregnant in the future. This medication is excreted in breast milk. Cimzia Pregnancy And Lactation Text: This medication crosses the placenta but can be considered safe in certain situations. Cimzia may be excreted in breast milk.

## 2022-05-03 NOTE — ED PROVIDER NOTE - NS ED ROS FT
Gen: Endorses subjective fever, chills, generalized weakness  CV: Denies chest pain, palpitations  HEENT: Denies neck pain, headache, vision changes  Skin: Denies rash, erythema, color changes  Resp: Endorses SOB, cough  Endo: Denies sensitivity to heat, cold, increased urination  GI: Denies constipation, nausea, vomiting, diarrhea  Msk: Denies back pain, LE swelling, extremity pain  : Denies dysuria, increased frequency  Neuro: Denies LOC, focal weakness, numbness, tingling  Psych: Denies hx of psych, SI, HI

## 2022-05-03 NOTE — CONSULT NOTE ADULT - SUBJECTIVE AND OBJECTIVE BOX
[facility] - S Jluis / Shabbir S /D Romina/ S Denys/ SHERRI Madden/ Andrés Perkins / SUNI Lubinu/ O Gladis  service -[phone], office [phone]  ---------------------------------------------------------------------------------------------------------------    Patient is a 61y Female whom presented to the hospital with   Denied recent NSAID use/Abx use/iv contrast studies.    Patient seen and examined    PAST MEDICAL & SURGICAL HISTORY:  Heart failure    HLD (hyperlipidemia)    Glaucoma    Cataract    CKD (chronic kidney disease), stage IV    ESRD (end stage renal disease) on dialysis    PAD (peripheral artery disease)    Blind right eye    Acute osteomyelitis of toe of right foot  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    S/P arteriovenous (AV) fistula creation    Hemodialysis access, AV graft        Allergies: No Known Allergies    Home Medications Reviewed  Hospital Medications:   MEDICATIONS  (STANDING):  chlorhexidine 2% Cloths 1 Application(s) Topical daily    SOCIAL HISTORY:  Denies ETOh,Smoking, illicit drug use  FAMILY HISTORY:  No pertinent family history in first degree relatives        REVIEW OF SYSTEMS:  CONSTITUTIONAL: No weakness, fevers or chills  EYES/ENT: No visual changes;  No vertigo or throat pain   NECK: No pain or stiffness  RESPIRATORY: No cough, wheezing, hemoptysis; No shortness of breath  CARDIOVASCULAR: No chest pain or palpitations.  GASTROINTESTINAL: No abdominal or epigastric pain. No nausea, vomiting, or hematemesis; No diarrhea or constipation. No melena or hematochezia.  GENITOURINARY: No dysuria, frequency, foamy urine, urinary urgency, incontinence or hematuria  NEUROLOGICAL: No numbness or weakness  SKIN: No itching, burning, rashes, or lesions   VASCULAR: No bilateral lower extremity edema.   All other review of systems is negative unless indicated above.    VITALS:  T(F): 97 (05-03-22 @ 06:28), Max: 97 (05-03-22 @ 06:28)  HR: 94 (05-03-22 @ 08:20)  BP: 157/70 (05-03-22 @ 08:20)  RR: 19 (05-03-22 @ 08:20)  SpO2: 100% (05-03-22 @ 08:20)  Wt(kg): --    Height (cm): 154.9 (05-03 @ 06:28)    PHYSICAL EXAM:  Constitutional: NAD  HEENT: anicteric sclera, oropharynx clear, MMM  Neck: No JVD  Respiratory: CTAB, no wheezes, rales or rhonchi  Cardiovascular: S1, S2, RRR  Gastrointestinal: BS+, soft, NT/ND  Extremities: No cyanosis or clubbing. No peripheral edema  Neurological: A/O x 3, no focal deficits  Psychiatric: Normal mood, normal affect  : No CVA tenderness. No salcido.   Skin: No rashes  Vascular Access:    LABS:  05-03    145  |  101  |  55<H>  ----------------------------<  153<H>  4.9   |  21<L>  |  7.97<H>    Ca    9.9      03 May 2022 06:52    TPro  8.4<H>  /  Alb  4.3  /  TBili  0.3  /  DBili      /  AST  37<H>  /  ALT  8   /  AlkPhos  101  05-03    Creatinine Trend: 7.97 <--                        11.7   15.22 )-----------( 258      ( 03 May 2022 06:52 )             39.9     Urine Studies:        RADIOLOGY & ADDITIONAL STUDIES:                 [facility] - S Jluis / Shabbir S /D Romina/ SHERRI Mcfarlane/ SHERRI Madden/ Andrés Perkins / M Katlyn/ O Gladis  service -[phone], office [phone]  ---------------------------------------------------------------------------------------------------------------  Patient seen and examined bedside in ER    PAST MEDICAL & SURGICAL HISTORY:  Heart failure    HLD (hyperlipidemia)    Glaucoma    Cataract    CKD (chronic kidney disease), stage IV    ESRD (end stage renal disease) on dialysis    PAD (peripheral artery disease)    Blind right eye    Acute osteomyelitis of toe of right foot  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    S/P arteriovenous (AV) fistula creation    Hemodialysis access, AV graft    Allergies: No Known Allergies    Home Medications Reviewed  Hospital Medications:   MEDICATIONS  (STANDING):  chlorhexidine 2% Cloths 1 Application(s) Topical daily    SOCIAL HISTORY:  Denies ETOh,Smoking, illicit drug use  FAMILY HISTORY:  No pertinent family history in first degree relatives    REVIEW OF SYSTEMS:  CONSTITUTIONAL: No weakness, fevers or chills  EYES/ENT: No visual changes;  No vertigo or throat pain   NECK: No pain or stiffness  RESPIRATORY: No cough, + wheezing + shortness of breath  CARDIOVASCULAR: No chest pain or palpitations.  GASTROINTESTINAL: No abdominal or epigastric pain. No nausea, vomiting, or hematemesis; No diarrhea or constipation. No melena or hematochezia.  GENITOURINARY: anuric  NEUROLOGICAL: No numbness or weakness  SKIN: No itching, burning, rashes, or lesions   VASCULAR: No bilateral lower extremity edema.   All other review of systems is negative unless indicated above.    VITALS:  T(F): 97 (05-03-22 @ 06:28), Max: 97 (05-03-22 @ 06:28)  HR: 94 (05-03-22 @ 08:20)  BP: 157/70 (05-03-22 @ 08:20)  RR: 19 (05-03-22 @ 08:20)  SpO2: 100% (05-03-22 @ 08:20)  Wt(kg): --    Height (cm): 154.9 (05-03 @ 06:28)    PHYSICAL EXAM:  Constitutional: NAD  HEENT: anicteric sclera  Neck: No JVD  Respiratory: no wheezes, b/l rhonchi  Cardiovascular: S1, S2, RRR  Gastrointestinal: BS+, soft, NT/ND  Extremities: 1+ peripheral edema b/l   Neurological: A/O x 3  Psychiatric: Normal mood, normal affect  : No salcido.   Vascular Access: rt femoral AVG+    LABS:  05-03    145  |  101  |  55<H>  ----------------------------<  153<H>  4.9   |  21<L>  |  7.97<H>    Ca    9.9      03 May 2022 06:52    TPro  8.4<H>  /  Alb  4.3  /  TBili  0.3  /  DBili      /  AST  37<H>  /  ALT  8   /  AlkPhos  101  05-03    Creatinine Trend: 7.97 <--                        11.7   15.22 )-----------( 258      ( 03 May 2022 06:52 )             39.9     Urine Studies:        RADIOLOGY & ADDITIONAL STUDIES:                 [facility] - S Jluis / Shabbir S /D Romina/ S Denys/ S Chano/ Andrés Perkins / M Tristianu/ O Gladis  service [phone], office [phone]  ---------------------------------------------------------------------------------------------------------------  Patient seen and examined bedside in ER    62 yo F with pmh of ESRD on dialysis (Tu,Thurs,Sat), s/p L foot partial 3rd ray amputation and R hallux amputation 3/4 presenting from [facility] for resp distress. [place] well yesterday however woke up at 5 AM this morning and could not breathe. Associated with subjective fevers and sweats. No chest pain. Is COVID vaccinated. Mild bl LE swelling.  admitted for acute resp failure, flash pulm edema, for urgent HD. on BiPAP. Renal consulted for urgent HD. Pt has not missed dialysis. last hd was SAT, due for HD today. feeling better on BiPAP.     PAST MEDICAL & SURGICAL HISTORY:  Heart failure    HLD (hyperlipidemia)    Glaucoma    Cataract    CKD (chronic kidney disease), stage IV    ESRD (end stage renal disease) on dialysis    PAD (peripheral artery disease)    Blind right eye    Acute osteomyelitis of toe of right foot  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    S/P arteriovenous (AV) fistula creation    Hemodialysis access, AV graft    Allergies: No Known Allergies    Home Medications Reviewed  Hospital Medications:   MEDICATIONS  (STANDING):  chlorhexidine 2% Cloths 1 Application(s) Topical daily    SOCIAL HISTORY:  Denies ETOh,Smoking, illicit drug use  FAMILY HISTORY:  No pertinent family history in first degree relatives    REVIEW OF SYSTEMS:  CONSTITUTIONAL: No weakness, fevers or chills  EYES/ENT: No visual changes;  No vertigo or throat pain   NECK: No pain or stiffness  RESPIRATORY: No cough, + wheezing + shortness of breath  CARDIOVASCULAR: No chest pain or palpitations.  GASTROINTESTINAL: No abdominal or epigastric pain. No nausea, vomiting, or hematemesis; No diarrhea or constipation. No melena or hematochezia.  GENITOURINARY: anuric  NEUROLOGICAL: No numbness or weakness  SKIN: No itching, burning, rashes, or lesions   VASCULAR: No bilateral lower extremity edema.   All other review of systems is negative unless indicated above.    VITALS:  T(F): 97 (05-03-22 @ 06:28), Max: 97 (05-03-22 @ 06:28)  HR: 94 (05-03-22 @ 08:20)  BP: 157/70 (05-03-22 @ 08:20)  RR: 19 (05-03-22 @ 08:20)  SpO2: 100% (05-03-22 @ 08:20)  Wt(kg): --    Height (cm): 154.9 (05-03 @ 06:28)    PHYSICAL EXAM:  Constitutional: NAD  HEENT: anicteric sclera  Neck: No JVD  Respiratory: no wheezes, b/l rhonchi  Cardiovascular: S1, S2, RRR  Gastrointestinal: BS+, soft, NT/ND  Extremities: 1+ peripheral edema b/l   Neurological: A/O x 3  Psychiatric: Normal mood, normal affect  : No salcido.   Vascular Access: rt femoral AVG+    LABS:  05-03    145  |  101  |  55<H>  ----------------------------<  153<H>  4.9   |  21<L>  |  7.97<H>    Ca    9.9      03 May 2022 06:52    TPro  8.4<H>  /  Alb  4.3  /  TBili  0.3  /  DBili      /  AST  37<H>  /  ALT  8   /  AlkPhos  101  05-03    Creatinine Trend: 7.97 <--                        11.7   15.22 )-----------( 258      ( 03 May 2022 06:52 )             39.9     Urine Studies:    RADIOLOGY & ADDITIONAL STUDIES:    < from: Xray Chest 1 View-PORTABLE IMMEDIATE (05.03.22 @ 06:52) >    Impression: Pulmonary edema.        --- End of Report ---    < end of copied text >

## 2022-05-03 NOTE — CONSULT NOTE ADULT - SUBJECTIVE AND OBJECTIVE BOX
Cardiovascular Disease Initial Evaluation    CHIEF COMPLAINT: SOB    HISTORY OF PRESENT ILLNESS:    This is a 61-year-old female with ESRD on HD, HLD and peripheral vascular disease who presented to [facility] on 5/3/2022 from [facility] with acute onset SOB. Ms. Boogie was doing well yesterday, but awoke this morning with SOB. She did not miss any HD.  In the ED she noted to be in respiratory distress and started on BiPaP.   Of note, she was admitted in 3/2022 for toe resection in the setting of gangrene.   Currently she is breathing comfortably on BiPaP.     Allergies  No Known Allergies    MEDICATIONS:  aspirin  chewable 81 milliGRAM(s) Oral daily  clopidogrel Tablet 75 milliGRAM(s) Oral daily  heparin   Injectable 5000 Unit(s) SubCutaneous every 12 hours  nitroglycerin     SubLingual 0.4 milliGRAM(s) SubLingual every 5 minutes PRN        acetaminophen     Tablet .. 650 milliGRAM(s) Oral every 6 hours PRN      atorvastatin 40 milliGRAM(s) Oral at bedtime    chlorhexidine 2% Cloths 1 Application(s) Topical daily  epoetin niesha-epbx (RETACRIT) Injectable 71805 Unit(s) IV Push <User Schedule>  multivitamin 1 Tablet(s) Oral daily      PAST MEDICAL & SURGICAL HISTORY:  Heart failure    HLD (hyperlipidemia)    Glaucoma    Cataract    CKD (chronic kidney disease), stage IV    ESRD (end stage renal disease) on dialysis    PAD (peripheral artery disease)    Blind right eye    Acute osteomyelitis of toe of right foot  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    S/P arteriovenous (AV) fistula creation    Hemodialysis access, AV graft        FAMILY HISTORY:  No pertinent family history in first degree relatives        SOCIAL HISTORY:    The patient is a nonsmoker       REVIEW OF SYSTEMS:  See HPI, otherwise complete 14 point review of systems negative      PHYSICAL EXAM:  T(C): 36.4 (05-03-22 @ 11:58), Max: 36.4 (05-03-22 @ 10:12)  HR: 89 (05-03-22 @ 11:58) (89 - 117)  BP: 157/77 (05-03-22 @ 11:58) (135/68 - 192/72)  RR: 22 (05-03-22 @ 10:12) (16 - 32)  SpO2: 100% (05-03-22 @ 10:12) (90% - 100%)  Wt(kg): --  I&O's Summary      Appearance: No Acute Distress; resting comfortably  HEENT:  Normal oral mucosa, PERRL, EOMI	  Cardiovascular: Normal S1 S2, No JVD, No murmurs/rubs/gallops  Respiratory: Normal respiratory effort; Lungs with crackles to auscultation bilaterally  Gastrointestinal:  Soft, Non-tender, + BS	  Skin: No rashes, No ecchymoses, No cyanosis	  Neurologic: Non-focal; no weakness  Extremities: 1+ edema  Vascular: Peripheral pulses palpable 2+ bilaterally  Psychiatry: A & O x 3, Mood & affect appropriate    Laboratory Data:	 	    CBC Full  -  ( 03 May 2022 06:52 )  WBC Count : 15.22 K/uL  Hemoglobin : 11.7 g/dL  Hematocrit : 39.9 %  Platelet Count - Automated : 258 K/uL  Mean Cell Volume : 93.9 fL  Mean Cell Hemoglobin : 27.5 pg  Mean Cell Hemoglobin Concentration : 29.3 gm/dL  Auto Neutrophil # : 11.82 K/uL  Auto Lymphocyte # : 2.10 K/uL  Auto Monocyte # : 0.84 K/uL  Auto Eosinophil # : 0.32 K/uL  Auto Basophil # : 0.05 K/uL  Auto Neutrophil % : 77.7 %  Auto Lymphocyte % : 13.8 %  Auto Monocyte % : 5.5 %  Auto Eosinophil % : 2.1 %  Auto Basophil % : 0.3 %    05-03    145  |  101  |  55<H>  ----------------------------<  153<H>  4.9   |  21<L>  |  7.97<H>    Ca    9.9      03 May 2022 06:52    TPro  8.4<H>  /  Alb  4.3  /  TBili  0.3  /  DBili  x   /  AST  37<H>  /  ALT  8   /  AlkPhos  101  05-03      proBNP: Serum Pro-Brain Natriuretic Peptide: 3650 pg/mL (05-03 @ 06:52)    Interpretation of Telemetry: Sinus	    ECG:  	Sinus; low voltage    Assessment: 61-year-old female with ESRD on HD, HLD and peripheral vascular disease s/p lower extremity resection presents with respiratory distress.     Plan of Care:    #Respiratory Distress-  Ms. Boogie is grossly fluid overloaded on exam.  Congestion noted on CXR.  Renal input noted- plan for urgent HD today.  Wean BiPaP post HD.    #Elevated troponins-  Patient denies chest pain and EKG is non ischemic.  Likely due to reduced creatinine clearance.   Recent echo noted- normal LV systolic function.  No indication for cath at this time.  Continue DAPT given PVD.       #Peripheral vascular disease.  - S/p L foot partial 3rd ray amputation and R hallux amputation 3/2022.   - Continue medical therapy with DAPT and statin    #ESRD-  - HD as per renal, as stated above.         52 minutes spent on total encounter; more than 50% of the visit was spent counseling and/or coordinating care by the attending physician.   	  Cristino Adams MD [facility]  Cardiovascular Diseases  [phone]

## 2022-05-04 LAB
A1C WITH ESTIMATED AVERAGE GLUCOSE RESULT: 4.7 % — SIGNIFICANT CHANGE UP (ref 4–5.6)
ANION GAP SERPL CALC-SCNC: 14 MMOL/L — SIGNIFICANT CHANGE UP (ref 7–14)
BUN SERPL-MCNC: 39 MG/DL — HIGH (ref 7–23)
CALCIUM SERPL-MCNC: 9.2 MG/DL — SIGNIFICANT CHANGE UP (ref 8.4–10.5)
CHLORIDE SERPL-SCNC: 97 MMOL/L — LOW (ref 98–107)
CO2 SERPL-SCNC: 27 MMOL/L — SIGNIFICANT CHANGE UP (ref 22–31)
CREAT SERPL-MCNC: 6.08 MG/DL — HIGH (ref 0.5–1.3)
EGFR: 7 ML/MIN/1.73M2 — LOW
ESTIMATED AVERAGE GLUCOSE: 88 — SIGNIFICANT CHANGE UP
GLUCOSE BLDC GLUCOMTR-MCNC: 106 MG/DL — HIGH (ref 70–99)
GLUCOSE BLDC GLUCOMTR-MCNC: 120 MG/DL — HIGH (ref 70–99)
GLUCOSE BLDC GLUCOMTR-MCNC: 93 MG/DL — SIGNIFICANT CHANGE UP (ref 70–99)
GLUCOSE BLDC GLUCOMTR-MCNC: 97 MG/DL — SIGNIFICANT CHANGE UP (ref 70–99)
GLUCOSE SERPL-MCNC: 105 MG/DL — HIGH (ref 70–99)
HCT VFR BLD CALC: 31.1 % — LOW (ref 34.5–45)
HGB BLD-MCNC: 9.5 G/DL — LOW (ref 11.5–15.5)
MAGNESIUM SERPL-MCNC: 2.1 MG/DL — SIGNIFICANT CHANGE UP (ref 1.6–2.6)
MCHC RBC-ENTMCNC: 27.8 PG — SIGNIFICANT CHANGE UP (ref 27–34)
MCHC RBC-ENTMCNC: 30.5 GM/DL — LOW (ref 32–36)
MCV RBC AUTO: 90.9 FL — SIGNIFICANT CHANGE UP (ref 80–100)
NRBC # BLD: 0 /100 WBCS — SIGNIFICANT CHANGE UP
NRBC # FLD: 0 K/UL — SIGNIFICANT CHANGE UP
PHOSPHATE SERPL-MCNC: 4.3 MG/DL — SIGNIFICANT CHANGE UP (ref 2.5–4.5)
PLATELET # BLD AUTO: 211 K/UL — SIGNIFICANT CHANGE UP (ref 150–400)
POTASSIUM SERPL-MCNC: 4.5 MMOL/L — SIGNIFICANT CHANGE UP (ref 3.5–5.3)
POTASSIUM SERPL-SCNC: 4.5 MMOL/L — SIGNIFICANT CHANGE UP (ref 3.5–5.3)
RBC # BLD: 3.42 M/UL — LOW (ref 3.8–5.2)
RBC # FLD: 15.9 % — HIGH (ref 10.3–14.5)
SODIUM SERPL-SCNC: 138 MMOL/L — SIGNIFICANT CHANGE UP (ref 135–145)
WBC # BLD: 10.98 K/UL — HIGH (ref 3.8–10.5)
WBC # FLD AUTO: 10.98 K/UL — HIGH (ref 3.8–10.5)

## 2022-05-04 PROCEDURE — 99222 1ST HOSP IP/OBS MODERATE 55: CPT

## 2022-05-04 PROCEDURE — 73630 X-RAY EXAM OF FOOT: CPT | Mod: 26,LT

## 2022-05-04 RX ORDER — SODIUM HYPOCHLORITE 0.125 %
1 SOLUTION, NON-ORAL MISCELLANEOUS DAILY
Refills: 0 | Status: DISCONTINUED | OUTPATIENT
Start: 2022-05-04 | End: 2022-05-13

## 2022-05-04 RX ORDER — CADEXOMER IODINE 0.9 %
1 PADS, MEDICATED (EA) TOPICAL DAILY
Refills: 0 | Status: DISCONTINUED | OUTPATIENT
Start: 2022-05-04 | End: 2022-05-13

## 2022-05-04 RX ADMIN — ATORVASTATIN CALCIUM 40 MILLIGRAM(S): 80 TABLET, FILM COATED ORAL at 21:53

## 2022-05-04 RX ADMIN — SEVELAMER CARBONATE 800 MILLIGRAM(S): 2400 POWDER, FOR SUSPENSION ORAL at 09:25

## 2022-05-04 RX ADMIN — ERYTHROPOIETIN 12000 UNIT(S): 10000 INJECTION, SOLUTION INTRAVENOUS; SUBCUTANEOUS at 13:56

## 2022-05-04 RX ADMIN — CHLORHEXIDINE GLUCONATE 1 APPLICATION(S): 213 SOLUTION TOPICAL at 15:15

## 2022-05-04 RX ADMIN — Medication 1 APPLICATION(S): at 15:14

## 2022-05-04 RX ADMIN — Medication 650 MILLIGRAM(S): at 21:52

## 2022-05-04 RX ADMIN — CLOPIDOGREL BISULFATE 75 MILLIGRAM(S): 75 TABLET, FILM COATED ORAL at 11:08

## 2022-05-04 RX ADMIN — HEPARIN SODIUM 5000 UNIT(S): 5000 INJECTION INTRAVENOUS; SUBCUTANEOUS at 18:04

## 2022-05-04 RX ADMIN — Medication 650 MILLIGRAM(S): at 22:50

## 2022-05-04 RX ADMIN — SEVELAMER CARBONATE 800 MILLIGRAM(S): 2400 POWDER, FOR SUSPENSION ORAL at 18:04

## 2022-05-04 RX ADMIN — Medication 650 MILLIGRAM(S): at 07:50

## 2022-05-04 RX ADMIN — Medication 81 MILLIGRAM(S): at 11:09

## 2022-05-04 RX ADMIN — Medication 1 TABLET(S): at 11:08

## 2022-05-04 RX ADMIN — Medication 650 MILLIGRAM(S): at 06:50

## 2022-05-04 RX ADMIN — HEPARIN SODIUM 5000 UNIT(S): 5000 INJECTION INTRAVENOUS; SUBCUTANEOUS at 05:15

## 2022-05-04 NOTE — PROGRESS NOTE ADULT - ASSESSMENT
60 yo F with pmh of ESRD on dialysis (Tu,Thurs,Sat), s/p L foot partial 3rd ray amputation and R hallux amputation 3/4 presenting from [place] for resp distress. [place] well yesterday however woke up at 5 AM this morning and could not breathe. Associated with subjective fevers and sweats. No chest pain. Is COVID vaccinated. Mild bl LE swelling. Has not missed dialysis.     Problem/Plan - 1:  ·  Problem: Acute respiratory failure with hypoxia.   ·  Plan: Off BIPAP and NC Oxygen.      Problem/Plan - 2:  ·  Problem: Flash pulmonary edema.   ·  Plan: Sec to Fluid Overload from ESRD .  HD per renal.   Recent TTE noted.     Problem/Plan - 3:  ·  Problem: ESRD on hemodialysis.   ·  Plan: HD per renal.     Problem/Plan - 4:  ·  Problem: Hypertensive urgency.   ·  Plan: BP readings better as off nitro ggt.     Problem/Plan - 5:  ·  Problem: Fever with foot wound. .   ·  Plan: Podiatry consult noted.   ID consulted.

## 2022-05-04 NOTE — PROGRESS NOTE ADULT - SUBJECTIVE AND OBJECTIVE BOX
Date of Service: 05-04-22 @ 11:54    Patient is a 61y old  Female who presents with a chief complaint of SOB and wheezing (04 May 2022 10:18)      Any change in ROS: pt is doing much better:   off bipap: on 6 L       MEDICATIONS  (STANDING):  aspirin  chewable 81 milliGRAM(s) Oral daily  atorvastatin 40 milliGRAM(s) Oral at bedtime  cadexomer iodine 0.9% Gel 1 Application(s) Topical daily  chlorhexidine 2% Cloths 1 Application(s) Topical daily  clopidogrel Tablet 75 milliGRAM(s) Oral daily  Dakins Solution - 1/2 Strength 1 Application(s) Topical daily  epoetin niesha-epbx (RETACRIT) Injectable 64549 Unit(s) IV Push <User Schedule>  heparin   Injectable 5000 Unit(s) SubCutaneous every 12 hours  multivitamin 1 Tablet(s) Oral daily  sevelamer carbonate 800 milliGRAM(s) Oral three times a day with meals    MEDICATIONS  (PRN):  acetaminophen     Tablet .. 650 milliGRAM(s) Oral every 6 hours PRN Temp greater or equal to 38.5C (101.3F), Moderate Pain (4 - 6)  nitroglycerin     SubLingual 0.4 milliGRAM(s) SubLingual every 5 minutes PRN Pulmonary Edema    Vital Signs Last 24 Hrs  T(C): 38.2 (04 May 2022 06:28), Max: 38.2 (04 May 2022 06:28)  T(F): 100.8 (04 May 2022 06:28), Max: 100.8 (04 May 2022 06:28)  HR: 79 (04 May 2022 06:29) (74 - 93)  BP: 113/61 (04 May 2022 05:15) (113/61 - 157/77)  BP(mean): 103 (03 May 2022 11:58) (103 - 103)  RR: 16 (04 May 2022 05:15) (16 - 20)  SpO2: 98% (04 May 2022 06:29) (87% - 100%)    I&O's Summary    03 May 2022 07:01  -  04 May 2022 07:00  --------------------------------------------------------  IN: 500 mL / OUT: 2800 mL / NET: -2300 mL          Physical Exam:   GENERAL: NAD, well-groomed, well-developed  HEENT: MICAELA/   Atraumatic, Normocephalic  ENMT: No tonsillar erythema, exudates, or enlargement; Moist mucous membranes, Good dentition, No lesions  NECK: Supple, No JVD, Normal thyroid  CHEST/LUNG: Scattered crackles  CVS: Regular rate and rhythm; No murmurs, rubs, or gallops  GI: : Soft, Nontender, Nondistended; Bowel sounds present  NERVOUS SYSTEM:  Alert & Oriented X3  EXTREMITIES: mild  edema  LYMPH: No lymphadenopathy noted  SKIN: No rashes or lesions  ENDOCRINOLOGY: No Thyromegaly  PSYCH: Appropriate    Labs:  29                            9.5    10.98 )-----------( 211      ( 04 May 2022 06:42 )             31.1                         11.7   15.22 )-----------( 258      ( 03 May 2022 06:52 )             39.9     05-04    138  |  97<L>  |  39<H>  ----------------------------<  105<H>  4.5   |  27  |  6.08<H>  05-03    145  |  101  |  55<H>  ----------------------------<  153<H>  4.9   |  21<L>  |  7.97<H>    Ca    9.2      04 May 2022 06:42  Ca    9.9      03 May 2022 06:52  Phos  4.3     05-04  Mg     2.10     05-04    TPro  8.4<H>  /  Alb  4.3  /  TBili  0.3  /  DBili  x   /  AST  37<H>  /  ALT  8   /  AlkPhos  101  05-03    CAPILLARY BLOOD GLUCOSE      POCT Blood Glucose.: 97 mg/dL (04 May 2022 08:35)  POCT Blood Glucose.: 105 mg/dL (03 May 2022 18:07)      LIVER FUNCTIONS - ( 03 May 2022 06:52 )  Alb: 4.3 g/dL / Pro: 8.4 g/dL / ALK PHOS: 101 U/L / ALT: 8 U/L / AST: 37 U/L / GGT: x               Serum Pro-Brain Natriuretic Peptide: 3650 pg/mL (05-03 @ 06:52)        RECENT CULTURES:  05-03 @ 10:41 .Blood Blood-Peripheral         rad< from: Xray Chest 1 View-PORTABLE IMMEDIATE (05.03.22 @ 06:52) >  ACC: [id number] EXAM:  XR CHEST PORTABLE IMMED 1V                          PROCEDURE DATE:  05/03/2022          INTERPRETATION:  INDICATION: Shortness of breath    COMPARISON: Chest radiograph 2/28/2022    FINDINGS:  Heart/Vascular: The cardiac silhouette is normal in size.  Pulmonary: Bilateral diffuse airspace opacities greater on the right than   the left side consistent with pulmonary edema.    Bones: Mild osteoarthrosis of the glenohumeral joints and spine.    Impression: Pulmonary edema.        --- End of Report ---          ALYSSA BAEZA DO; Resident Radiologist  This document has been electronically signed.  SAM GLASER MD; Attending Radiologist  This document has been electronically signed. May  3 2022  7:55AM    < end of copied text >         No growth to date.          RESPIRATORY CULTURES:          Studies  Chest X-RAY  CT SCAN Chest   Venous Dopplers: LE:   CT Abdomen  Others

## 2022-05-04 NOTE — CONSULT NOTE ADULT - CONSULT REASON
CHF
fever
b/l pedal surgical sites, r/o infection
ESRD Mx, fluid overload, urgent HD
non-healing foot wounds
sob, wheezing

## 2022-05-04 NOTE — CONSULT NOTE ADULT - SUBJECTIVE AND OBJECTIVE BOX
HPI:  60 yo F with pmh of ESRD on dialysis (Tu,Thurs,Sat), s/p L foot 3rd ray amputation and R hallux partial amputation 3/4/2022 treated with 6 weeks iv antibiotics post HD through 4/14/2022 who presented from [place] for resp distress.   Awoke at 5 AM with SOB- "couldn't breathe"  Feels well now.   No pain feet.   No fever, chills.      PAST MEDICAL & SURGICAL HISTORY:  Heart failure    HLD (hyperlipidemia)    Glaucoma    Cataract    CKD (chronic kidney disease), stage IV    ESRD (end stage renal disease) on dialysis    PAD (peripheral artery disease)    Blind right eye    Acute osteomyelitis of toe of right foot  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    S/P arteriovenous (AV) fistula creation    Hemodialysis access, AV graft        Allergies    No Known Allergies    Intolerances        ANTIMICROBIALS:      OTHER MEDS:  acetaminophen     Tablet .. 650 milliGRAM(s) Oral every 6 hours PRN  aspirin  chewable 81 milliGRAM(s) Oral daily  atorvastatin 40 milliGRAM(s) Oral at bedtime  cadexomer iodine 0.9% Gel 1 Application(s) Topical daily  chlorhexidine 2% Cloths 1 Application(s) Topical daily  clopidogrel Tablet 75 milliGRAM(s) Oral daily  Dakins Solution - 1/2 Strength 1 Application(s) Topical daily  epoetin niesha-epbx (RETACRIT) Injectable 60469 Unit(s) IV Push <User Schedule>  heparin   Injectable 5000 Unit(s) SubCutaneous every 12 hours  multivitamin 1 Tablet(s) Oral daily  nitroglycerin     SubLingual 0.4 milliGRAM(s) SubLingual every 5 minutes PRN  sevelamer carbonate 800 milliGRAM(s) Oral three times a day with meals      SOCIAL HISTORY: [facility]    FAMILY HISTORY:  No pertinent family history in first degree relatives        REVIEW OF SYSTEMS  [  ] ROS unobtainable because:    [x  ] All other systems negative except as noted below:	    Constitutional:  [ ] fever [ ] chills  [ ] weight loss  [ ] weakness  Skin:  [ ] rash [ ] phlebitis	  Eyes: [ ] icterus [ ] pain  [ ] discharge	  ENMT: [ ] sore throat  [ ] thrush [ ] ulcers [ ] exudates  Respiratory: [ ] dyspnea [ ] hemoptysis [ ] cough [ ] sputum	  Cardiovascular:  [ ] chest pain [ ] palpitations [ ] edema	  Gastrointestinal:  [ ] nausea [ ] vomiting [ ] diarrhea [ ] constipation [ ] pain	  Genitourinary:  [ ] dysuria [ ] frequency [ ] hematuria [ ] discharge [ ] flank pain  [ ] incontinence  Musculoskeletal:  [ ] myalgias [ ] arthralgias [ ] arthritis  [ ] back pain  Neurological:  [ ] headache [ ] seizures  [ ] confusion/altered mental status  Psychiatric:  [ ] anxiety [ ] depression	  Hematology/Lymphatics:  [ ] lymphadenopathy  Endocrine:  [ ] adrenal [ ] thyroid  Allergic/Immunologic:	 [ ] transplant [ ] seasonal    PHYSICAL EXAM:  Constitutional: Not in acute distress  Eyes: right eye cloudy sclera  Oral cavity: mask  Neck: Supple  RS: Chest clear   CVS: S1, S2   Abdomen: Soft. No guarding/rigidity/tenderness.  : no salcido  Extremities: left toe #3 amputated, skin maceration, no drainage or increased warmth. right foot hallux partial amputation well healed wound  Skin: few scattered areas of hyperpigmentation  Vascular: right arm iv  Neuro: Alert, oriented to time/place/person  Cranial nerves 2-12 grossly normal. No focal abnormalities    Drug Dosing Weight  Height (cm): 154.9 (03 May 2022 06:28)  Weight (kg): 60.4 (01 Mar 2022 04:41)  BMI (kg/m2): 25.2 (03 May 2022 06:28)  BSA (m2): 1.59 (03 May 2022 06:28)    Vital Signs Last 24 Hrs  T(F): 98.2 (05-04-22 @ 14:55), Max: 100.8 (05-04-22 @ 06:28)    Vital Signs Last 24 Hrs  HR: 76 (05-04-22 @ 14:55) (73 - 91)  BP: 132/63 (05-04-22 @ 14:55) (113/61 - 151/65)  RR: 18 (05-04-22 @ 14:55)  SpO2: 99% (05-04-22 @ 11:08) (87% - 100%)  Wt(kg): --                          9.5    10.98 )-----------( 211      ( 04 May 2022 06:42 )             31.1       05-04    138  |  97<L>  |  39<H>  ----------------------------<  105<H>  4.5   |  27  |  6.08<H>    Ca    9.2      04 May 2022 06:42  Phos  4.3     05-04  Mg     2.10     05-04    TPro  8.4<H>  /  Alb  4.3  /  TBili  0.3  /  DBili  x   /  AST  37<H>  /  ALT  8   /  AlkPhos  101  05-03          MICROBIOLOGY:  .Blood Blood-Peripheral  05-03-22   No growth to date.  --  --        Rapid RVP Result: NotDetec (05-03 @ 06:54)          RADIOLOGY:  rd< from: Xray Chest 1 View-PORTABLE IMMEDIATE (05.03.22 @ 06:52) >    ACC: [id number] EXAM:  XR CHEST PORTABLE IMMED 1V                          PROCEDURE DATE:  05/03/2022          INTERPRETATION:  INDICATION: Shortness of breath    COMPARISON: Chest radiograph 2/28/2022    FINDINGS:  Heart/Vascular: The cardiac silhouette is normal in size.  Pulmonary: Bilateral diffuse airspace opacities greater on the right than   the left side consistent with pulmonary edema.    Bones: Mild osteoarthrosis of the glenohumeral joints and spine.    Impression: Pulmonary edema.        --- End of Report ---    < end of copied text >  < from: Xray Foot AP + Lateral + Oblique, Left (05.04.22 @ 10:38) >    ACC: [id number] EXAM:  XR FOOT COMP MIN 3 VIEWS LT                          PROCEDURE DATE:  05/04/2022          INTERPRETATION:  CLINICAL INDICATION: left foot infection; status post   partial ray resections    EXAM:  Frontal, oblique, and lateral left foot from 5/4/2022 at 1038. Compared   to prior study from 3/4/2022.    IMPRESSION:  Redemonstrated partial 3rd and 5th ray amputation defects with smooth   stump margins.    No proximally tracking gas collections beyond the amputation margins and  no focal areas of osteomyelitis.    Remainder of foot unchanged.    --- End of Report ---      < end of copied text >

## 2022-05-04 NOTE — PROGRESS NOTE ADULT - ASSESSMENT
62 yo F with pmh of ESRD on dialysis (Tu,Thurs,Sat), s/p L foot partial 3rd ray amputation and R hallux amputation 3/4 presenting from [place] for resp distress. [place] well yesterday however woke up at 5 AM this morning and could not breathe. Associated with subjective fevers and sweats. No chest pain. Is COVID vaccinated. Mild bl LE swelling. Has not missed dialysis.

## 2022-05-04 NOTE — PROGRESS NOTE ADULT - SUBJECTIVE AND OBJECTIVE BOX
Cardiovascular Disease Progress Note    Overnight events: No acute events overnight.  Ms. Boogie is in no distress on BiPaP.   Otherwise review of systems negative    Objective Findings:  T(C): 36.9 (05-04-22 @ 05:15), Max: 37.2 (05-03-22 @ 17:46)  HR: 78 (05-04-22 @ 05:15) (74 - 117)  BP: 113/61 (05-04-22 @ 05:15) (113/61 - 192/72)  RR: 16 (05-04-22 @ 05:15) (16 - 32)  SpO2: 100% (05-04-22 @ 05:15) (87% - 100%)  Wt(kg): --  Daily Height in cm: 154.94 (03 May 2022 06:28)    Daily       Physical Exam:  Gen: NAD; Patient resting comfortably  HEENT: EOMI, Normocephalic/ atraumatic  CV: RRR, normal S1 + S2, no m/r/g  Lungs:  Normal respiratory effort; crackles to auscultation bilaterally  Abd: soft, non-tender; bowel sounds present  Ext: 1+ edema; warm and well perfused    Telemetry: Sinus    Laboratory Data:                        11.7   15.22 )-----------( 258      ( 03 May 2022 06:52 )             39.9     05-03    145  |  101  |  55<H>  ----------------------------<  153<H>  4.9   |  21<L>  |  7.97<H>    Ca    9.9      03 May 2022 06:52    TPro  8.4<H>  /  Alb  4.3  /  TBili  0.3  /  DBili  x   /  AST  37<H>  /  ALT  8   /  AlkPhos  101  05-03              Inpatient Medications:  MEDICATIONS  (STANDING):  aspirin  chewable 81 milliGRAM(s) Oral daily  atorvastatin 40 milliGRAM(s) Oral at bedtime  chlorhexidine 2% Cloths 1 Application(s) Topical daily  clopidogrel Tablet 75 milliGRAM(s) Oral daily  epoetin niesha-epbx (RETACRIT) Injectable 73636 Unit(s) IV Push <User Schedule>  heparin   Injectable 5000 Unit(s) SubCutaneous every 12 hours  multivitamin 1 Tablet(s) Oral daily  sevelamer carbonate 800 milliGRAM(s) Oral three times a day with meals      Assessment: 61-year-old female with ESRD on HD, HLD and peripheral vascular disease s/p lower extremity resection presents with respiratory distress.     Plan of Care:    #Respiratory Distress-  Secondary to fluid overloaded in the setting of dietary indiscretion.  Volume status is improved post urgent HD yesterday.  Wean O2 as tolerated.  Fluid removal with HD as per the renal team.    #Elevated troponins-  Patient denies chest pain and EKG is non ischemic.  Likely due to reduced creatinine clearance.   Recent echo noted- normal LV systolic function.  No indication for cath at this time.  Continue DAPT given PVD.       #Peripheral vascular disease.  - S/p L foot partial 3rd ray amputation and R hallux amputation 3/2022.   - Continue medical therapy with DAPT and statin    #ESRD-  - HD as per renal, as stated above.     Over 25 minutes spent on total encounter; more than 50% of the visit was spent counseling and/or coordinating care by the attending physician.      Cristino Adams MD [facility]  Cardiovascular Disease  [phone]

## 2022-05-04 NOTE — PROGRESS NOTE ADULT - ASSESSMENT
60 yo F with pmh of ESRD on dialysis (Tu,Thurs,Sat), s/p L foot partial 3rd ray amputation and R hallux amputation 3/4 presenting from [place] for resp distress, admitted for acute resp failure, flash pulm edema, for urgent HD. on BiPAP. Renal consulted for urgent HD.     End Stage Renal Disease on Dialysis  hemodialysis TTS at [place]  s/p last HD SAT  K wnl  hypervolemic/Flash pulmonary edema.   hb level at goal    Patient appears volume overloaded, plan for UF today and HD tomorrow  Informed consent obtained from pt, in chart  renal diet, fluid restriction 1L/day when off BiPAP-reinforced w/pt  dose all meds for ESRD  Hb >goal-no DION    Acute respiratory failure with hypoxia.   On BIPAP and likely will be weaned off after HD     Hypertensive urgency.   BP readings better off nitro ggt at time of exam  resume NH bp meds      For any question, call:  Cell # [phone]  Pager # [phone]  Callback # [phone]

## 2022-05-04 NOTE — CHART NOTE - NSCHARTNOTEFT_GEN_A_CORE
Notified by primary RN that patient has Left foot third toe wound which is possibly infected. Patient is 61F with pmh of ESRD on dialysis (Tu,Thurs,Sat), s/p L foot partial 3rd ray amputation and R hallux amputation 3/4 . admitted with respiratory distress /fluid overload , pulmonary edema associated with subjective fevers and sweats. Seen and assessed the patient at the bedside. Patient denies any pain, numbness  around the site. The surgical wound on the left foot, post 3rd ray amputation on 3/4, currently appeared  open, non healing, with scant ?purulent drainage from the wound. Patient is currently afebrile .  Recommended to do wet to dry dressing for now and will consult podiatry in the morning.

## 2022-05-04 NOTE — CHART NOTE - NSCHARTNOTEFT_GEN_A_CORE
Notified primary RN that patient is febrile to 100.8. Recommended to give prn tylenol. Blood c/s sent 5/3. Will call podiatry in am to evaluate L foot surgical non healing wound which is looks infected. Notified primary RN that patient is febrile to 100.8. Recommended to give prn tylenol. Blood c/s sent on 5/3. Patient received cefepime and vancomycin x1 doses in ED. Will call podiatry in am to evaluate and for further management of the L foot surgical non healing wound which is appears infected. Notified primary RN that patient is febrile to 100.8. this morning. Recommended to give prn tylenol. Blood c/s sent on 5/3. Patient received cefepime and vancomycin x1 doses in ED. Will call podiatry in am to evaluate and for further management of the L foot surgical non healing wound which is appears infected.

## 2022-05-04 NOTE — CONSULT NOTE ADULT - SUBJECTIVE AND OBJECTIVE BOX
GENERAL SURGERY CONSULT NOTE  Consulting surgical team: C Team Surgery  Consulting attending: Dr. Gilliland     HPI:  62yo F with Hx ESRD (on HD TRS) s/p L foot partial 3rd ray amputation and R hallux amputation on 3/4 who p/w respiratory distress. Vascular Surgery consulted for evaluation of PAD iso non-healing LE foot wounds.     Patient's vascular surgical history as follows:  - 12/2000: LLE angiogram with SFA/PT/AT angioplasty   - 11/2021: RLE angiogram with TP trunk angioplasty       PAST MEDICAL HISTORY:  Heart failure  HLD (hyperlipidemia)  Glaucoma  Cataract  CKD (chronic kidney disease), stage IV  ESRD (end stage renal disease) on dialysis  PAD (peripheral artery disease)  Blind right eye      PAST SURGICAL HISTORY:  Acute osteomyelitis of toe of right foot  S/P arteriovenous (AV) fistula creation  Hemodialysis access, AV graft    MEDICATIONS:  acetaminophen     Tablet .. 650 milliGRAM(s) Oral every 6 hours PRN  aspirin  chewable 81 milliGRAM(s) Oral daily  atorvastatin 40 milliGRAM(s) Oral at bedtime  cadexomer iodine 0.9% Gel 1 Application(s) Topical daily  chlorhexidine 2% Cloths 1 Application(s) Topical daily  clopidogrel Tablet 75 milliGRAM(s) Oral daily  Dakins Solution - 1/2 Strength 1 Application(s) Topical daily  epoetin niesha-epbx (RETACRIT) Injectable 58785 Unit(s) IV Push <User Schedule>  heparin   Injectable 5000 Unit(s) SubCutaneous every 12 hours  multivitamin 1 Tablet(s) Oral daily  nitroglycerin     SubLingual 0.4 milliGRAM(s) SubLingual every 5 minutes PRN  sevelamer carbonate 800 milliGRAM(s) Oral three times a day with meals    ALLERGIES:  No Known Allergies    VITALS & I/Os:  Vital Signs Last 24 Hrs  T(C): 36.9 (04 May 2022 18:04), Max: 38.2 (04 May 2022 06:28)  T(F): 98.5 (04 May 2022 18:04), Max: 100.8 (04 May 2022 06:28)  HR: 81 (04 May 2022 18:04) (73 - 91)  BP: 118/61 (04 May 2022 18:04) (113/61 - 151/65)  RR: 18 (04 May 2022 18:04) (16 - 20)  SpO2: 99% (04 May 2022 18:04) (87% - 100%)    I&O's Summary    03 May 2022 07:01  -  04 May 2022 07:00  --------------------------------------------------------  IN: 500 mL / OUT: 2800 mL / NET: -2300 mL    04 May 2022 07:01  -  04 May 2022 19:09  --------------------------------------------------------  IN: 400 mL / OUT: 2900 mL / NET: -2500 mL        PHYSICAL EXAM:  GEN: resting in bed comfortably in NAD  NEURO: awake, alert  CV: warm, well-perfused  RESP: no increased WOB  ABD: soft, non-distended  EXTR:   - RLE: 1st toe amp site c/d/i, palpable DP pulse, Dopplerable PT signal, sensation + motor intact  - LLE: wound between 2nd and third digits, open, no purulence or malodor, 5th toe amp site c/d/i, palpable DP pulse, Dopplerable PT signal, sensation + motor intact      LABS:             9.5    10.98 )-----------( 211      ( 04 May 2022 06:42 )             31.1     138  |  97<L>  |  39<H>  ----------------------------<  105<H>  4.5   |  27  |  6.08<H>    Ca    9.2      04 May 2022 06:42  Phos  4.3     05-04  Mg     2.10     05-04    TPro  8.4<H>  /  Alb  4.3  /  TBili  0.3  /  DBili  x   /  AST  37<H>  /  ALT  8   /  AlkPhos  101  05-03 GENERAL SURGERY CONSULT NOTE  Consulting surgical team: C Team Surgery  Consulting attending: Dr. Gilliland     HPI:  62yo F with Hx ESRD (on HD TRS) s/p L foot partial 3rd ray amputation and R hallux amputation on 3/4 who p/w respiratory distress. Vascular Surgery consulted for evaluation of PAD iso non-healing LE foot wounds.     Patient's vascular surgical history as follows:  - 12/2020: LLE angiogram with SFA/PT/AT angioplasty   - 11/2021: RLE angiogram with TP trunk angioplasty       PAST MEDICAL HISTORY:  Heart failure  HLD (hyperlipidemia)  Glaucoma  Cataract  CKD (chronic kidney disease), stage IV  ESRD (end stage renal disease) on dialysis  PAD (peripheral artery disease)  Blind right eye      PAST SURGICAL HISTORY:  Acute osteomyelitis of toe of right foot  S/P arteriovenous (AV) fistula creation  Hemodialysis access, AV graft    MEDICATIONS:  acetaminophen     Tablet .. 650 milliGRAM(s) Oral every 6 hours PRN  aspirin  chewable 81 milliGRAM(s) Oral daily  atorvastatin 40 milliGRAM(s) Oral at bedtime  cadexomer iodine 0.9% Gel 1 Application(s) Topical daily  chlorhexidine 2% Cloths 1 Application(s) Topical daily  clopidogrel Tablet 75 milliGRAM(s) Oral daily  Dakins Solution - 1/2 Strength 1 Application(s) Topical daily  epoetin niesha-epbx (RETACRIT) Injectable 15036 Unit(s) IV Push <User Schedule>  heparin   Injectable 5000 Unit(s) SubCutaneous every 12 hours  multivitamin 1 Tablet(s) Oral daily  nitroglycerin     SubLingual 0.4 milliGRAM(s) SubLingual every 5 minutes PRN  sevelamer carbonate 800 milliGRAM(s) Oral three times a day with meals    ALLERGIES:  No Known Allergies    VITALS & I/Os:  Vital Signs Last 24 Hrs  T(C): 36.9 (04 May 2022 18:04), Max: 38.2 (04 May 2022 06:28)  T(F): 98.5 (04 May 2022 18:04), Max: 100.8 (04 May 2022 06:28)  HR: 81 (04 May 2022 18:04) (73 - 91)  BP: 118/61 (04 May 2022 18:04) (113/61 - 151/65)  RR: 18 (04 May 2022 18:04) (16 - 20)  SpO2: 99% (04 May 2022 18:04) (87% - 100%)    I&O's Summary    03 May 2022 07:01  -  04 May 2022 07:00  --------------------------------------------------------  IN: 500 mL / OUT: 2800 mL / NET: -2300 mL    04 May 2022 07:01  -  04 May 2022 19:09  --------------------------------------------------------  IN: 400 mL / OUT: 2900 mL / NET: -2500 mL        PHYSICAL EXAM:  GEN: resting in bed comfortably in NAD  NEURO: awake, alert  CV: warm, well-perfused  RESP: no increased WOB  ABD: soft, non-distended  EXTR:   - RLE: 1st toe amp site c/d/i, palpable DP pulse, Dopplerable PT signal, sensation + motor intact  - LLE: wound between 2nd and third digits, open, no purulence or malodor, 5th toe amp site c/d/i, palpable DP pulse, Dopplerable PT signal, sensation + motor intact      LABS:             9.5    10.98 )-----------( 211      ( 04 May 2022 06:42 )             31.1     138  |  97<L>  |  39<H>  ----------------------------<  105<H>  4.5   |  27  |  6.08<H>    Ca    9.2      04 May 2022 06:42  Phos  4.3     05-04  Mg     2.10     05-04    TPro  8.4<H>  /  Alb  4.3  /  TBili  0.3  /  DBili  x   /  AST  37<H>  /  ALT  8   /  AlkPhos  101  05-03 GENERAL SURGERY CONSULT NOTE  Consulting surgical team: C Team Surgery  Consulting attending: Dr. Gilliland     HPI:  60yo F with Hx ESRD (on HD TRS) s/p L foot partial 3rd ray amputation and R hallux amputation on 3/4 who p/w respiratory distress. Vascular Surgery consulted for evaluation of PAD iso non-healing LE foot wounds.     Patient's vascular surgical history as follows:  - 12/2020: LLE angiogram with SFA/PT/AT angioplasty   - 11/2021: RLE angiogram with TP trunk angioplasty   - 1/2022: LLE angio with SFA angioplasty     Post-procedure MARTA/PVRs in March 2022 demonstrated calcific vessels only.       PAST MEDICAL HISTORY:  Heart failure  HLD (hyperlipidemia)  Glaucoma  Cataract  CKD (chronic kidney disease), stage IV  ESRD (end stage renal disease) on dialysis  PAD (peripheral artery disease)  Blind right eye      PAST SURGICAL HISTORY:  Acute osteomyelitis of toe of right foot  S/P arteriovenous (AV) fistula creation  Hemodialysis access, AV graft    MEDICATIONS:  acetaminophen     Tablet .. 650 milliGRAM(s) Oral every 6 hours PRN  aspirin  chewable 81 milliGRAM(s) Oral daily  atorvastatin 40 milliGRAM(s) Oral at bedtime  cadexomer iodine 0.9% Gel 1 Application(s) Topical daily  chlorhexidine 2% Cloths 1 Application(s) Topical daily  clopidogrel Tablet 75 milliGRAM(s) Oral daily  Dakins Solution - 1/2 Strength 1 Application(s) Topical daily  epoetin niesha-epbx (RETACRIT) Injectable 35250 Unit(s) IV Push <User Schedule>  heparin   Injectable 5000 Unit(s) SubCutaneous every 12 hours  multivitamin 1 Tablet(s) Oral daily  nitroglycerin     SubLingual 0.4 milliGRAM(s) SubLingual every 5 minutes PRN  sevelamer carbonate 800 milliGRAM(s) Oral three times a day with meals    ALLERGIES:  No Known Allergies    VITALS & I/Os:  Vital Signs Last 24 Hrs  T(C): 36.9 (04 May 2022 18:04), Max: 38.2 (04 May 2022 06:28)  T(F): 98.5 (04 May 2022 18:04), Max: 100.8 (04 May 2022 06:28)  HR: 81 (04 May 2022 18:04) (73 - 91)  BP: 118/61 (04 May 2022 18:04) (113/61 - 151/65)  RR: 18 (04 May 2022 18:04) (16 - 20)  SpO2: 99% (04 May 2022 18:04) (87% - 100%)    I&O's Summary    03 May 2022 07:01  -  04 May 2022 07:00  --------------------------------------------------------  IN: 500 mL / OUT: 2800 mL / NET: -2300 mL    04 May 2022 07:01  -  04 May 2022 19:09  --------------------------------------------------------  IN: 400 mL / OUT: 2900 mL / NET: -2500 mL        PHYSICAL EXAM:  GEN: resting in bed comfortably in NAD  NEURO: awake, alert  CV: warm, well-perfused  RESP: no increased WOB  ABD: soft, non-distended  EXTR:   - RLE: 1st toe amp site c/d/i, palpable DP pulse, Dopplerable PT signal, sensation + motor intact  - LLE: wound between 2nd and third digits, open, no purulence or malodor, 5th toe amp site c/d/i, palpable DP pulse, Dopplerable PT signal, sensation + motor intact      LABS:             9.5    10.98 )-----------( 211      ( 04 May 2022 06:42 )             31.1     138  |  97<L>  |  39<H>  ----------------------------<  105<H>  4.5   |  27  |  6.08<H>    Ca    9.2      04 May 2022 06:42  Phos  4.3     05-04  Mg     2.10     05-04    TPro  8.4<H>  /  Alb  4.3  /  TBili  0.3  /  DBili  x   /  AST  37<H>  /  ALT  8   /  AlkPhos  101  05-03

## 2022-05-04 NOTE — PROGRESS NOTE ADULT - SUBJECTIVE AND OBJECTIVE BOX
Date of Service  : 05-04-22     INTERVAL HPI/OVERNIGHT EVENTS: I feel much better.   Vital Signs Last 24 Hrs  T(C): 36.9 (04 May 2022 12:30), Max: 38.2 (04 May 2022 06:28)  T(F): 98.4 (04 May 2022 12:30), Max: 100.8 (04 May 2022 06:28)  HR: 81 (04 May 2022 12:30) (73 - 93)  BP: 140/67 (04 May 2022 12:30) (113/61 - 151/65)  BP(mean): --  RR: 18 (04 May 2022 12:30) (16 - 20)  SpO2: 99% (04 May 2022 11:08) (87% - 100%)  I&O's Summary    03 May 2022 07:01  -  04 May 2022 07:00  --------------------------------------------------------  IN: 500 mL / OUT: 2800 mL / NET: -2300 mL      MEDICATIONS  (STANDING):  aspirin  chewable 81 milliGRAM(s) Oral daily  atorvastatin 40 milliGRAM(s) Oral at bedtime  cadexomer iodine 0.9% Gel 1 Application(s) Topical daily  chlorhexidine 2% Cloths 1 Application(s) Topical daily  clopidogrel Tablet 75 milliGRAM(s) Oral daily  Dakins Solution - 1/2 Strength 1 Application(s) Topical daily  epoetin niesha-epbx (RETACRIT) Injectable 11389 Unit(s) IV Push <User Schedule>  heparin   Injectable 5000 Unit(s) SubCutaneous every 12 hours  multivitamin 1 Tablet(s) Oral daily  sevelamer carbonate 800 milliGRAM(s) Oral three times a day with meals    MEDICATIONS  (PRN):  acetaminophen     Tablet .. 650 milliGRAM(s) Oral every 6 hours PRN Temp greater or equal to 38.5C (101.3F), Moderate Pain (4 - 6)  nitroglycerin     SubLingual 0.4 milliGRAM(s) SubLingual every 5 minutes PRN Pulmonary Edema    LABS:                        9.5    10.98 )-----------( 211      ( 04 May 2022 06:42 )             31.1     05-04    138  |  97<L>  |  39<H>  ----------------------------<  105<H>  4.5   |  27  |  6.08<H>    Ca    9.2      04 May 2022 06:42  Phos  4.3     05-04  Mg     2.10     05-04    TPro  8.4<H>  /  Alb  4.3  /  TBili  0.3  /  DBili  x   /  AST  37<H>  /  ALT  8   /  AlkPhos  101  05-03        CAPILLARY BLOOD GLUCOSE      POCT Blood Glucose.: 106 mg/dL (04 May 2022 13:26)  POCT Blood Glucose.: 97 mg/dL (04 May 2022 08:35)  POCT Blood Glucose.: 105 mg/dL (03 May 2022 18:07)          REVIEW OF SYSTEMS:  CONSTITUTIONAL: No fever, weight loss, or fatigue  EYES: No eye pain, visual disturbances, or discharge  ENMT:  No difficulty hearing, tinnitus, vertigo; No sinus or throat pain  NECK: No pain or stiffness  RESPIRATORY: No cough, wheezing, chills or hemoptysis; No shortness of breath  CARDIOVASCULAR: No chest pain, palpitations, dizziness, or leg swelling  GASTROINTESTINAL: No abdominal or epigastric pain. No nausea, vomiting, or hematemesis; No diarrhea or constipation. No melena or hematochezia.  GENITOURINARY: No dysuria, frequency, hematuria, or incontinence      Consultant(s) Notes Reviewed:  [x ] YES  [ ] NO    PHYSICAL EXAM:  GENERAL: NAD,NC oxygen   HEAD:  Atraumatic, Normocephalic  NECK: Supple, No JVD, Normal thyroid  NERVOUS SYSTEM:  Alert & Oriented X3, No focal deficit   CHEST/LUNG: Good air entry bilateral   HEART: Regular rate and rhythm; No murmurs, rubs, or gallops  ABDOMEN: Soft, Nontender, Nondistended; Bowel sounds present  EXTREMITIES:  Foot dressed       Care Discussed with Consultants/Other Providers [ x] YES  [ ] NO

## 2022-05-04 NOTE — CONSULT NOTE ADULT - ASSESSMENT
62 yo woman with ESRD on HD, blind right eye, h/o left toe #3 gangrene March 2022 and infection right hallux s/p left #3 toe amputation and right partial amputation hallux  treated with IV cefepime x 6 weeks thru 4/4/2022 presenting with pulmonary edema  Feet do not appear infected  Low grade fever 100.8 on admission; afebrile since.  Looks well.  Blood cultures x 2 preliminary negative.  received vanco and cefepime x 1    Suggest;  follow blood cultures  observe off antibiotics    will follow

## 2022-05-04 NOTE — CONSULT NOTE ADULT - ATTENDING COMMENTS
Seen at bedside for initial consult.  Right hallux wound healing with small distal eschar, site stable.  Left amp site with fibrogranular base.  no purulence.  Xrays with sharp margins at metatarsals. Nonhealing wound secondary to poor blood flow and no revasc options.  Site will likely not heal as is and patient is currently against BKA.  Doubtful TMA or more proximal foot amputation would heal either at this time.  Continue local wound care for now.  No acute intervention planned at this time as long as site remains stable.

## 2022-05-04 NOTE — CONSULT NOTE ADULT - ASSESSMENT
60yo F with Hx ESRD (on HD TRS) s/p L foot partial 3rd ray amputation and R hallux amputation on 3/4 who p/w respiratory distress. Vascular Surgery consulted for evaluation of PAD iso non-healing LE foot wounds.     PLAN:   - No acute vascular Sx intervention at this time  - Please obtain MARTA/PVRs (ordered)   - LWC per Podiatry      To be discussed with Vascular Surgery fellow on behalf of Dr. Talat Peace, PGY-2  C Team Surgery  #[id number]  62yo F with Hx ESRD (on HD TRS) s/p L foot partial 3rd ray amputation and R hallux amputation on 3/4 who p/w respiratory distress. Vascular Surgery consulted for evaluation of PAD iso non-healing LE foot wounds.     PLAN:   - No acute vascular Sx intervention at this time  - Please obtain MARTA/PVRs (ordered)   - LWC per Podiatry    Discussed with Vascular Surgery fellow on behalf of Dr. Talat Peace, PGY-2  C Team Surgery  #[id number]

## 2022-05-04 NOTE — PROGRESS NOTE ADULT - SUBJECTIVE AND OBJECTIVE BOX
[facility] - TIFFANY Bazzi / TIFFANY Shea / APARNA Vanegas/ TIFFANY Mcfarlane/ TIFFANY Madden/ MASSIEL Perkins / RAINER Potts / ESTEPHANIE Griffith  ---------------------------------------------------------------------------------------------------------------  seen and examined today for ESRD  Interval : NAD  VITALS:  T(F): 100.8 (05-04-22 @ 06:28), Max: 100.8 (05-04-22 @ 06:28)  HR: 79 (05-04-22 @ 06:29)  BP: 113/61 (05-04-22 @ 05:15)  RR: 16 (05-04-22 @ 05:15)  SpO2: 98% (05-04-22 @ 06:29)  Wt(kg): --    05-03 @ 07:01  -  05-04 @ 07:00  --------------------------------------------------------  IN: 500 mL / OUT: 2800 mL / NET: -2300 mL      Physical Exam :-  Constitutional: NAD  Neck: Supple.  Respiratory: Bilateral equal breath sounds, coarse crackles B/L bases  Cardiovascular: S1, S2 normal,  Gastrointestinal: Bowel Sounds present, soft, non tender.  Extremities: No edema  Neurological: Alert and Oriented x 3, no focal deficits  Psychiatric: Normal mood, normal affect  Data:-  Allergies :   No Known Allergies    Hospital Medications:   MEDICATIONS  (STANDING):  aspirin  chewable 81 milliGRAM(s) Oral daily  atorvastatin 40 milliGRAM(s) Oral at bedtime  cadexomer iodine 0.9% Gel 1 Application(s) Topical daily  chlorhexidine 2% Cloths 1 Application(s) Topical daily  clopidogrel Tablet 75 milliGRAM(s) Oral daily  Dakins Solution - 1/2 Strength 1 Application(s) Topical daily  epoetin niesha-epbx (RETACRIT) Injectable 85405 Unit(s) IV Push <User Schedule>  heparin   Injectable 5000 Unit(s) SubCutaneous every 12 hours  multivitamin 1 Tablet(s) Oral daily  sevelamer carbonate 800 milliGRAM(s) Oral three times a day with meals    05-04    138  |  97<L>  |  39<H>  ----------------------------<  105<H>  4.5   |  27  |  6.08<H>    Ca    9.2      04 May 2022 06:42  Phos  4.3     05-04  Mg     2.10     05-04    TPro  8.4<H>  /  Alb  4.3  /  TBili  0.3  /  DBili      /  AST  37<H>  /  ALT  8   /  AlkPhos  101  05-03    Creatinine Trend: 6.08 <--, 7.97 <--                        9.5    10.98 )-----------( 211      ( 04 May 2022 06:42 )             31.1

## 2022-05-04 NOTE — CONSULT NOTE ADULT - SUBJECTIVE AND OBJECTIVE BOX
Podiatry pager #: [phone]/ [phone]    Patient is a 61y old  Female who presents with a chief complaint of SOB and wheezing (04 May 2022 06:08)      HPI:  60 yo F with pmh of ESRD on dialysis (Tu,Thurs,Sat), s/p L foot partial 3rd ray amputation and R hallux amputation 3/4 presenting from [place] for resp distress. [place] well yesterday however woke up at 5 AM this morning and could not breathe. Associated with subjective fevers and sweats. No chest pain. Is COVID vaccinated. Mild bl LE swelling. Has not missed dialysis. (03 May 2022 11:58)      PAST MEDICAL & SURGICAL HISTORY:  Heart failure    HLD (hyperlipidemia)    Glaucoma    Cataract    CKD (chronic kidney disease), stage IV    ESRD (end stage renal disease) on dialysis    PAD (peripheral artery disease)    Blind right eye    Acute osteomyelitis of toe of right foot  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    S/P arteriovenous (AV) fistula creation    Hemodialysis access, AV graft        MEDICATIONS  (STANDING):  aspirin  chewable 81 milliGRAM(s) Oral daily  atorvastatin 40 milliGRAM(s) Oral at bedtime  chlorhexidine 2% Cloths 1 Application(s) Topical daily  clopidogrel Tablet 75 milliGRAM(s) Oral daily  epoetin niesha-epbx (RETACRIT) Injectable 28635 Unit(s) IV Push <User Schedule>  heparin   Injectable 5000 Unit(s) SubCutaneous every 12 hours  multivitamin 1 Tablet(s) Oral daily  sevelamer carbonate 800 milliGRAM(s) Oral three times a day with meals    MEDICATIONS  (PRN):  acetaminophen     Tablet .. 650 milliGRAM(s) Oral every 6 hours PRN Temp greater or equal to 38.5C (101.3F), Moderate Pain (4 - 6)  nitroglycerin     SubLingual 0.4 milliGRAM(s) SubLingual every 5 minutes PRN Pulmonary Edema      Allergies    No Known Allergies    Intolerances        VITALS:    Vital Signs Last 24 Hrs  T(C): 38.2 (04 May 2022 06:28), Max: 38.2 (04 May 2022 06:28)  T(F): 100.8 (04 May 2022 06:28), Max: 100.8 (04 May 2022 06:28)  HR: 79 (04 May 2022 06:29) (74 - 110)  BP: 113/61 (04 May 2022 05:15) (113/61 - 157/77)  BP(mean): 103 (03 May 2022 11:58) (103 - 103)  RR: 16 (04 May 2022 05:15) (16 - 22)  SpO2: 98% (04 May 2022 06:29) (87% - 100%)    LABS:                          9.5    10.98 )-----------( 211      ( 04 May 2022 06:42 )             31.1       05-04    138  |  97<L>  |  39<H>  ----------------------------<  105<H>  4.5   |  27  |  6.08<H>    Ca    9.2      04 May 2022 06:42  Phos  4.3     05-04  Mg     2.10     05-04    TPro  8.4<H>  /  Alb  4.3  /  TBili  0.3  /  DBili  x   /  AST  37<H>  /  ALT  8   /  AlkPhos  101  05-03      CAPILLARY BLOOD GLUCOSE      POCT Blood Glucose.: 97 mg/dL (04 May 2022 08:35)  POCT Blood Glucose.: 105 mg/dL (03 May 2022 18:07)          LOWER EXTREMITY PHYSICAL EXAM:    Vascular: DP/PT 1/4 R,  DP/PT 0/4 L, CFT <3 seconds, Temperature gradient warm to cooo B/L, TG is warm to cool b/l  Neuro: Epicritic sensation diminished to the level of forefoot B/L  Musculoskeletal/Ortho: left partial 5th ray resection healed, LF partial 4th ray resection open   Skin:   3/4 s/p L foot partial 3rd ray resection open: fibrous wound bed, mild malodor, no drainage, no purulence, flaps cool, distal skin necrosis, no additional tracking, no purulence, no erythema    3/4 s/p R foot partial hallux: No drainage, no tracking, no purulence, no dehiscence, no clinical signs of infection, distal dry stable eschar         RADIOLOGY & ADDITIONAL STUDIES:

## 2022-05-04 NOTE — CONSULT NOTE ADULT - ASSESSMENT
60 yo f s/p right foot partial hallux amputation and left foot partial 4rd ray amputation (DOS 3/4/2022)  -pt seen and evaluated  - Febrile to 100.8, WBC 10.98  - Pending L foot xrays   - 3/4 s/p L foot partial 3rd ray resection open: fibrous wound bed, no malodor, no drainage, no purulence, flaps cool, distal skin necrosis, plantar 2nd digit necrosis noted, no additional tracking   - 3/4 s/p R foot partial hallux: sutures intact, no drainage, no tracking, no purulence, periwound maceration resolved, flaps cool, no dehiscence   - At this time L foot surgical wound does not appear acutely infected   - B/L  OR Swab & L foot clean bone culture: E coli + SMSSA  - Agree with vancomycin and cefepime  - L foot wound culture obtained    - Recommend vascular consult   - Ordered MARTA/PVR's   - Ordered Dakins for L foot wound and ordered iodosorb for R foot partial hallux  - Podiatry plan is local wound care pending cultures + MARTA/PVR's vasc recs   - discussed with attending

## 2022-05-05 LAB
ANION GAP SERPL CALC-SCNC: 20 MMOL/L — HIGH (ref 7–14)
BASOPHILS # BLD AUTO: 0.03 K/UL — SIGNIFICANT CHANGE UP (ref 0–0.2)
BASOPHILS NFR BLD AUTO: 0.4 % — SIGNIFICANT CHANGE UP (ref 0–2)
BUN SERPL-MCNC: 54 MG/DL — HIGH (ref 7–23)
CALCIUM SERPL-MCNC: 9.4 MG/DL — SIGNIFICANT CHANGE UP (ref 8.4–10.5)
CHLORIDE SERPL-SCNC: 95 MMOL/L — LOW (ref 98–107)
CO2 SERPL-SCNC: 24 MMOL/L — SIGNIFICANT CHANGE UP (ref 22–31)
CREAT SERPL-MCNC: 8.08 MG/DL — HIGH (ref 0.5–1.3)
EGFR: 5 ML/MIN/1.73M2 — LOW
EOSINOPHIL # BLD AUTO: 0.26 K/UL — SIGNIFICANT CHANGE UP (ref 0–0.5)
EOSINOPHIL NFR BLD AUTO: 3.7 % — SIGNIFICANT CHANGE UP (ref 0–6)
GLUCOSE SERPL-MCNC: 74 MG/DL — SIGNIFICANT CHANGE UP (ref 70–99)
HCT VFR BLD CALC: 33 % — LOW (ref 34.5–45)
HGB BLD-MCNC: 9.9 G/DL — LOW (ref 11.5–15.5)
IANC: 4.86 K/UL — SIGNIFICANT CHANGE UP (ref 1.8–7.4)
IMM GRANULOCYTES NFR BLD AUTO: 0.3 % — SIGNIFICANT CHANGE UP (ref 0–1.5)
LYMPHOCYTES # BLD AUTO: 1.01 K/UL — SIGNIFICANT CHANGE UP (ref 1–3.3)
LYMPHOCYTES # BLD AUTO: 14.5 % — SIGNIFICANT CHANGE UP (ref 13–44)
MAGNESIUM SERPL-MCNC: 2.4 MG/DL — SIGNIFICANT CHANGE UP (ref 1.6–2.6)
MCHC RBC-ENTMCNC: 27.1 PG — SIGNIFICANT CHANGE UP (ref 27–34)
MCHC RBC-ENTMCNC: 30 GM/DL — LOW (ref 32–36)
MCV RBC AUTO: 90.4 FL — SIGNIFICANT CHANGE UP (ref 80–100)
MONOCYTES # BLD AUTO: 0.8 K/UL — SIGNIFICANT CHANGE UP (ref 0–0.9)
MONOCYTES NFR BLD AUTO: 11.5 % — SIGNIFICANT CHANGE UP (ref 2–14)
NEUTROPHILS # BLD AUTO: 4.86 K/UL — SIGNIFICANT CHANGE UP (ref 1.8–7.4)
NEUTROPHILS NFR BLD AUTO: 69.6 % — SIGNIFICANT CHANGE UP (ref 43–77)
NRBC # BLD: 0 /100 WBCS — SIGNIFICANT CHANGE UP
NRBC # FLD: 0 K/UL — SIGNIFICANT CHANGE UP
PHOSPHATE SERPL-MCNC: 5.6 MG/DL — HIGH (ref 2.5–4.5)
PLATELET # BLD AUTO: 200 K/UL — SIGNIFICANT CHANGE UP (ref 150–400)
POTASSIUM SERPL-MCNC: 4.4 MMOL/L — SIGNIFICANT CHANGE UP (ref 3.5–5.3)
POTASSIUM SERPL-SCNC: 4.4 MMOL/L — SIGNIFICANT CHANGE UP (ref 3.5–5.3)
RBC # BLD: 3.65 M/UL — LOW (ref 3.8–5.2)
RBC # FLD: 15.5 % — HIGH (ref 10.3–14.5)
SODIUM SERPL-SCNC: 139 MMOL/L — SIGNIFICANT CHANGE UP (ref 135–145)
WBC # BLD: 6.98 K/UL — SIGNIFICANT CHANGE UP (ref 3.8–10.5)
WBC # FLD AUTO: 6.98 K/UL — SIGNIFICANT CHANGE UP (ref 3.8–10.5)

## 2022-05-05 PROCEDURE — 71045 X-RAY EXAM CHEST 1 VIEW: CPT | Mod: 26

## 2022-05-05 RX ADMIN — SEVELAMER CARBONATE 800 MILLIGRAM(S): 2400 POWDER, FOR SUSPENSION ORAL at 16:54

## 2022-05-05 RX ADMIN — Medication 81 MILLIGRAM(S): at 14:32

## 2022-05-05 RX ADMIN — HEPARIN SODIUM 5000 UNIT(S): 5000 INJECTION INTRAVENOUS; SUBCUTANEOUS at 17:16

## 2022-05-05 RX ADMIN — HEPARIN SODIUM 5000 UNIT(S): 5000 INJECTION INTRAVENOUS; SUBCUTANEOUS at 06:09

## 2022-05-05 RX ADMIN — Medication 1 APPLICATION(S): at 17:01

## 2022-05-05 RX ADMIN — SEVELAMER CARBONATE 800 MILLIGRAM(S): 2400 POWDER, FOR SUSPENSION ORAL at 09:39

## 2022-05-05 RX ADMIN — ATORVASTATIN CALCIUM 40 MILLIGRAM(S): 80 TABLET, FILM COATED ORAL at 21:49

## 2022-05-05 RX ADMIN — Medication 1 TABLET(S): at 14:48

## 2022-05-05 RX ADMIN — SEVELAMER CARBONATE 800 MILLIGRAM(S): 2400 POWDER, FOR SUSPENSION ORAL at 14:48

## 2022-05-05 RX ADMIN — Medication 1 APPLICATION(S): at 16:39

## 2022-05-05 RX ADMIN — CLOPIDOGREL BISULFATE 75 MILLIGRAM(S): 75 TABLET, FILM COATED ORAL at 14:32

## 2022-05-05 NOTE — PROGRESS NOTE ADULT - ASSESSMENT
60 yo F with pmh of ESRD on dialysis (Tu,Thurs,Sat), s/p L foot partial 3rd ray amputation and R hallux amputation 3/4 presenting from [place] for resp distress. [place] well yesterday however woke up at 5 AM this morning and could not breathe. Associated with subjective fevers and sweats. No chest pain. Is COVID vaccinated. Mild bl LE swelling. Has not missed dialysis.

## 2022-05-05 NOTE — PROGRESS NOTE ADULT - ASSESSMENT
60 yo F with pmh of ESRD on dialysis (Tu,Thurs,Sat), s/p L foot partial 3rd ray amputation and R hallux amputation 3/4 presenting from [place] for resp distress, admitted for acute resp failure, flash pulm edema, for urgent HD. on BiPAP. Renal consulted for urgent HD.     End Stage Renal Disease on Dialysis  hemodialysis TTS at [place]  Tolerated UF yesterday, due for HD today  K wnl  hypervolemic/Flash pulmonary edema.   hb level at goal    HD today, still ahs crackles at bases, reeval for UF tomorrow  Informed consent obtained from pt, in chart  renal diet, fluid restriction 1L/day when off BiPAP-reinforced w/pt  dose all meds for ESRD  Hb >goal-no DION    Acute respiratory failure with hypoxia.   On BIPAP and likely will be weaned off after HD     Hypertensive urgency.   BP readings better off nitro ggt at time of exam  resume NH bp meds      For any question, call:  Cell # [phone]  Pager # [phone]  Callback # [phone]

## 2022-05-05 NOTE — PROGRESS NOTE ADULT - SUBJECTIVE AND OBJECTIVE BOX
Cardiovascular Disease Progress Note    Overnight events: No acute events overnight. Ms. Boogie denies chest pain or SOB.    Otherwise review of systems negative    Objective Findings:  T(C): 36.9 (22 @ 21:45), Max: 36.9 (22 @ 12:30)  HR: 76 (22 @ 04:54) (73 - 84)  BP: 129/66 (22 @ 21:45) (118/61 - 140/67)  RR: 17 (22 @ 21:45) (17 - 18)  SpO2: 99% (22 @ 04:54) (97% - 100%)  Wt(kg): --  Daily     Daily Weight in k (04 May 2022 14:55)      Physical Exam:  Gen: NAD; Patient resting comfortably  HEENT: EOMI, Normocephalic/ atraumatic  CV: RRR, normal S1 + S2, no m/r/g  Lungs:  Normal respiratory effort; clear to auscultation bilaterally  Abd: soft, non-tender; bowel sounds present  Ext: No edema;     Telemetry: Sinus    Laboratory Data:                        9.5    10.98 )-----------( 211      ( 04 May 2022 06:42 )             31.1     -    138  |  97<L>  |  39<H>  ----------------------------<  105<H>  4.5   |  27  |  6.08<H>    Ca    9.2      04 May 2022 06:42  Phos  4.3     -  Mg     2.10         TPro  8.4<H>  /  Alb  4.3  /  TBili  0.3  /  DBili  x   /  AST  37<H>  /  ALT  8   /  AlkPhos  101  05-03              Inpatient Medications:  MEDICATIONS  (STANDING):  aspirin  chewable 81 milliGRAM(s) Oral daily  atorvastatin 40 milliGRAM(s) Oral at bedtime  cadexomer iodine 0.9% Gel 1 Application(s) Topical daily  chlorhexidine 2% Cloths 1 Application(s) Topical daily  clopidogrel Tablet 75 milliGRAM(s) Oral daily  Dakins Solution - 1/2 Strength 1 Application(s) Topical daily  epoetin niesha-epbx (RETACRIT) Injectable 23602 Unit(s) IV Push <User Schedule>  heparin   Injectable 5000 Unit(s) SubCutaneous every 12 hours  multivitamin 1 Tablet(s) Oral daily  sevelamer carbonate 800 milliGRAM(s) Oral three times a day with meals      Assessment: 61-year-old female with ESRD on HD, HLD and peripheral vascular disease s/p lower extremity resection presents with respiratory distress.     Plan of Care:    #Respiratory Distress-  Secondary to fluid overloaded.   Volume status is improved post HD.  Wean O2 as tolerated.  Fluid removal with HD as per the renal team.    #Elevated troponins-  Patient denies chest pain and EKG is non ischemic.  Likely due to reduced creatinine clearance.   Recent echo noted- normal LV systolic function.  No indication for cath at this time.  Continue DAPT given PVD.       #Peripheral vascular disease.  - S/p L foot partial 3rd ray amputation and R hallux amputation 3/2022.   - Vascular input noted.   - Continue medical therapy with DAPT and statin    #ESRD-  - HD as per renal, as stated above.       Over 25 minutes spent on total encounter; more than 50% of the visit was spent counseling and/or coordinating care by the attending physician.      Cristino Adams MD [facility]  Cardiovascular Disease  [phone]

## 2022-05-05 NOTE — PROGRESS NOTE ADULT - ASSESSMENT
62yo F with Hx ESRD (on HD TRS) s/p L foot partial 3rd ray amputation and R hallux amputation on 3/4 who p/w respiratory distress. Vascular Surgery consulted for evaluation of PAD iso non-healing LE foot wounds.     PLAN:   - No acute vascular Sx intervention at this time  - Please obtain MARTA/PVRs (ordered)   - LWC per Podiatry

## 2022-05-05 NOTE — PROGRESS NOTE ADULT - SUBJECTIVE AND OBJECTIVE BOX
Date of Service: 05-05-22 @ 14:29    Patient is a 61y old  Female who presents with a chief complaint of SOB and wheezing (05 May 2022 11:56)      Any change in ROS: seems to be doing ok : no SOB: no cough :   on 2 L of oxgen   off bipap :    MEDICATIONS  (STANDING):  aspirin  chewable 81 milliGRAM(s) Oral daily  atorvastatin 40 milliGRAM(s) Oral at bedtime  cadexomer iodine 0.9% Gel 1 Application(s) Topical daily  chlorhexidine 2% Cloths 1 Application(s) Topical daily  clopidogrel Tablet 75 milliGRAM(s) Oral daily  Dakins Solution - 1/2 Strength 1 Application(s) Topical daily  epoetin niesha-epbx (RETACRIT) Injectable 91699 Unit(s) IV Push <User Schedule>  heparin   Injectable 5000 Unit(s) SubCutaneous every 12 hours  multivitamin 1 Tablet(s) Oral daily  sevelamer carbonate 800 milliGRAM(s) Oral three times a day with meals    MEDICATIONS  (PRN):  acetaminophen     Tablet .. 650 milliGRAM(s) Oral every 6 hours PRN Temp greater or equal to 38.5C (101.3F), Moderate Pain (4 - 6)  nitroglycerin     SubLingual 0.4 milliGRAM(s) SubLingual every 5 minutes PRN Pulmonary Edema    Vital Signs Last 24 Hrs  T(C): 36.6 (05 May 2022 13:45), Max: 36.9 (04 May 2022 18:04)  T(F): 97.9 (05 May 2022 13:45), Max: 98.5 (04 May 2022 18:04)  HR: 70 (05 May 2022 13:45) (70 - 84)  BP: 130/78 (05 May 2022 13:45) (118/61 - 132/63)  BP(mean): --  RR: 16 (05 May 2022 13:45) (16 - 18)  SpO2: 99% (05 May 2022 13:45) (96% - 100%)    I&O's Summary    04 May 2022 07:01  -  05 May 2022 07:00  --------------------------------------------------------  IN: 400 mL / OUT: 2900 mL / NET: -2500 mL    05 May 2022 07:01  -  05 May 2022 14:29  --------------------------------------------------------  IN: 500 mL / OUT: 2300 mL / NET: -1800 mL          Physical Exam:   GENERAL: NAD, well-groomed, well-developed  HEENT: MICAELA/   Atraumatic, Normocephalic  ENMT: No tonsillar erythema, exudates, or enlargement; Moist mucous membranes, Good dentition, No lesions  NECK: Supple, No JVD, Normal thyroid  CHEST/LUNG: Scattered cracekls+  CVS: Regular rate and rhythm; No murmurs, rubs, or gallops  GI: : Soft, Nontender, Nondistended; Bowel sounds present  NERVOUS SYSTEM:  Alert & Oriented X3  EXTREMITIES: - edema  LYMPH: No lymphadenopathy noted  SKIN: No rashes or lesions  ENDOCRINOLOGY: No Thyromegaly  PSYCH: Appropriate    Labs:  29                            9.9    6.98  )-----------( 200      ( 05 May 2022 08:25 )             33.0                         9.5    10.98 )-----------( 211      ( 04 May 2022 06:42 )             31.1                         11.7   15.22 )-----------( 258      ( 03 May 2022 06:52 )             39.9     05-05    139  |  95<L>  |  54<H>  ----------------------------<  74  4.4   |  24  |  8.08<H>  05-04    138  |  97<L>  |  39<H>  ----------------------------<  105<H>  4.5   |  27  |  6.08<H>  05-03    145  |  101  |  55<H>  ----------------------------<  153<H>  4.9   |  21<L>  |  7.97<H>    Ca    9.4      05 May 2022 08:25  Ca    9.2      04 May 2022 06:42  Phos  5.6     05-05  Phos  4.3     05-04  Mg     2.40     05-05  Mg     2.10     05-04    TPro  8.4<H>  /  Alb  4.3  /  TBili  0.3  /  DBili  x   /  AST  37<H>  /  ALT  8   /  AlkPhos  101  05-03    CAPILLARY BLOOD GLUCOSE      POCT Blood Glucose.: 93 mg/dL (04 May 2022 22:07)  POCT Blood Glucose.: 120 mg/dL (04 May 2022 17:50)            Serum Pro-Brain Natriuretic Peptide: 3650 pg/mL (05-03 @ 06:52)        RECENT CULTURES:  05-04 @ 13:27 .Abscess LF wounbd            radr< from: Xray Chest 1 View-PORTABLE IMMEDIATE (05.03.22 @ 06:52) >  E:  05/03/2022          INTERPRETATION:  INDICATION: Shortness of breath    COMPARISON: Chest radiograph 2/28/2022    FINDINGS:  Heart/Vascular: The cardiac silhouette is normal in size.  Pulmonary: Bilateral diffuse airspace opacities greater on the right than   the left side consistent with pulmonary edema.    Bones: Mild osteoarthrosis of the glenohumeral joints and spine.    Impression: Pulmonary edema.        --- End of Report ---          ALYSSA BAEZA DO; Resident Radiologist  This document has been electronically signed.  SAM GLASER MD; Attending Radiologist  This document has been electronically signed. May  3 2022  7:55AM    < end of copied text >      Numerous Gram Negative Rods  Moderate Gram Negative Rods #2    05-03 @ 10:41 .Blood Blood-Peripheral                No growth to date.          RESPIRATORY CULTURES:          Studies  Chest X-RAY  CT SCAN Chest   Venous Dopplers: LE:   CT Abdomen  Others

## 2022-05-05 NOTE — PROGRESS NOTE ADULT - ASSESSMENT
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 (DOS 3/4/2022)  -pt seen and evaluated  - Afebrile, no leukocytosis  - L foot XR negative for OM or tracking soft tissue air  - 3/4 s/p L foot partial 3rd ray resection open: fibrous wound bed, no malodor, no drainage, no purulence, flaps cool, distal skin necrosis, plantar 2nd digit necrosis noted, no additional tracking   - 3/4 s/p R foot partial hallux: no drainage, no tracking, no purulence, periwound maceration resolved, flaps cool, no dehiscence   - L foot wound culture growing gram negative rods prelim  - MARTA/PVR pending, no acute intervention per vasc, appreciated  - ID recs monitor off ABx, appreciated  - Podiatry plan continue LWC, will f/u MARTA and vasc recs  - discussed with attending 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 (DOS 3/4/2022)  -pt seen and evaluated  - Afebrile, no leukocytosis  - L foot XR negative for OM or tracking soft tissue air  - 3/4 s/p L foot partial 3rd ray resection open: fibrous wound bed, no malodor, no drainage, no purulence, flaps cool, distal skin necrosis, plantar 2nd digit necrosis noted, no additional tracking   - 3/4 s/p R foot partial hallux: no drainage, no tracking, no purulence, periwound maceration resolved, flaps cool, no dehiscence   - L foot wound culture growing gram negative rods prelim  - MARTA/PVR pending, no acute intervention per vasc, appreciated  - Explained to patient that she is at high risk for recurrent infection and that her L foot wound has a very low likelihood of healing, pt understands risks but at this time does not want to consider any proximal amputations  - ID recs monitor off ABx, appreciated  - Podiatry plan continue LWC, will f/u MARTA and vasc recs  - discussed with attending

## 2022-05-05 NOTE — PROGRESS NOTE ADULT - SUBJECTIVE AND OBJECTIVE BOX
[facility] - TIFFANY Bazzi / TIFFANY Shea / APARNA Vanegas/ TIFFANY Mcfarlane/ TIFFANY Madden/ MASSIEL Perkins / RAINER Potts / ESTEPHANIE Griffith  ---------------------------------------------------------------------------------------------------------------  seen and examined today for ESRD  Interval : NAD  VITALS:  T(F): 97.8 (05-05-22 @ 05:45), Max: 98.5 (05-04-22 @ 18:04)  HR: 83 (05-05-22 @ 05:45)  BP: 127/64 (05-05-22 @ 05:45)  RR: 16 (05-05-22 @ 05:45)  SpO2: 96% (05-05-22 @ 05:45)  Wt(kg): --    05-04 @ 07:01  -  05-05 @ 07:00  --------------------------------------------------------  IN: 400 mL / OUT: 2900 mL / NET: -2500 mL      Physical Exam :-  Constitutional: NAD  Neck: Supple.  Respiratory: Bilateral equal breath sounds,  Cardiovascular: S1, S2 normal,  Gastrointestinal: Bowel Sounds present, soft, non tender.  Extremities: No edema  Neurological: Alert and Oriented x 3, no focal deficits  Psychiatric: Normal mood, normal affect  Data:-  Allergies :   No Known Allergies    Hospital Medications:   MEDICATIONS  (STANDING):  aspirin  chewable 81 milliGRAM(s) Oral daily  atorvastatin 40 milliGRAM(s) Oral at bedtime  cadexomer iodine 0.9% Gel 1 Application(s) Topical daily  chlorhexidine 2% Cloths 1 Application(s) Topical daily  clopidogrel Tablet 75 milliGRAM(s) Oral daily  Dakins Solution - 1/2 Strength 1 Application(s) Topical daily  epoetin niesha-epbx (RETACRIT) Injectable 22818 Unit(s) IV Push <User Schedule>  heparin   Injectable 5000 Unit(s) SubCutaneous every 12 hours  multivitamin 1 Tablet(s) Oral daily  sevelamer carbonate 800 milliGRAM(s) Oral three times a day with meals    05-05    139  |  95<L>  |  54<H>  ----------------------------<  74  4.4   |  24  |  8.08<H>    Ca    9.4      05 May 2022 08:25  Phos  5.6     05-05  Mg     2.40     05-05      Creatinine Trend: 8.08 <--, 6.08 <--, 7.97 <--                        9.9    6.98  )-----------( 200      ( 05 May 2022 08:25 )             33.0

## 2022-05-05 NOTE — PROGRESS NOTE ADULT - ASSESSMENT
62 yo F with pmh of ESRD on dialysis (Tu,Thurs,Sat), s/p L foot partial 3rd ray amputation and R hallux amputation 3/4 presenting from [place] for resp distress. [place] well yesterday however woke up at 5 AM this morning and could not breathe. Associated with subjective fevers and sweats. No chest pain. Is COVID vaccinated. Mild bl LE swelling. Has not missed dialysis.     Problem/Plan - 1:  ·  Problem: Acute respiratory failure with hypoxia.   ·  Plan: Off BIPAP and NC Oxygen.      Problem/Plan - 2:  ·  Problem: Flash pulmonary edema.   ·  Plan: Sec to Fluid Overload from ESRD .  HD per renal.   Recent TTE noted.     Problem/Plan - 3:  ·  Problem: ESRD on hemodialysis.   ·  Plan: HD per renal.     Problem/Plan - 4:  ·  Problem: Hypertensive urgency.   ·  Plan: BP readings better as off nitro ggt.     Problem/Plan - 5:  ·  Problem: Fever with S/P surgery feet  wound. .   ·  Plan: Podiatry consult noted.   ID consult noted . Observing Off Abxs.  Vascular following and MARTA pending.

## 2022-05-05 NOTE — PROGRESS NOTE ADULT - SUBJECTIVE AND OBJECTIVE BOX
SURGERY PROGRESS NOTE  Hospital Day #05-03-22 (2d)    Overnight, no acute events. Pt seen and examined at bedside.   No complaints.  Pain controlled.  Denies N/V.  Tolerating diet.  Passing flatus and BM.      Vital Signs   T(C): 36.6 (05 May 2022 05:45), Max: 36.9 (04 May 2022 12:30)  T(F): 97.8 (05 May 2022 05:45), Max: 98.5 (04 May 2022 18:04)  HR: 83 (05 May 2022 05:45) (73 - 84)  BP: 127/64 (05 May 2022 05:45) (118/61 - 140/67)  RR: 16 (05 May 2022 05:45) (16 - 18)  SpO2: 96% (05 May 2022 05:45) (96% - 100%)    PHYSICAL EXAM   GEN: resting in bed comfortably in NAD  NEURO: awake, alert  CV: warm, well-perfused  RESP: no increased WOB  ABD: soft, non-distended  EXTR:   - RLE: 1st toe amp site c/d/i, palpable DP pulse, Dopplerable PT signal, sensation + motor intact  - LLE: wound between 2nd and third digits, open, no purulence or malodor, 5th toe amp site c/d/i, palpable DP pulse, Dopplerable PT signal, sensation + motor intact    MEDICATIONS:   DVT PROPHYLAXIS: heparin   Injectable 5000 Unit(s)     LAB/STUDIES:                      9.5    10.98 )-----------( 211      ( 04 May 2022 06:42 )             31.1    05-04    138  |  97<L>  |  39<H>  ----------------------------<  105<H>  4.5   |  27  |  6.08<H>    Ca    9.2      04 May 2022 06:42  Phos  4.3     05-04  Mg     2.10     05-04    Culture - Blood (collected 03 May 2022 10:41)  Source: .Blood Blood-Peripheral  Preliminary Report (04 May 2022 11:02):    No growth to date.    Culture - Blood (collected 03 May 2022 10:41)  Source: .Blood Blood-Peripheral  Preliminary Report (04 May 2022 11:01):    No growth to date.

## 2022-05-05 NOTE — PROGRESS NOTE ADULT - SUBJECTIVE AND OBJECTIVE BOX
Patient is a 61y old  Female who presents with a chief complaint of SOB and wheezing (05 May 2022 11:38)       INTERVAL HPI/OVERNIGHT EVENTS:  Patient seen and evaluated at bedside.  Pt is resting comfortable in NAD. Denies N/V/F/C.     Allergies    No Known Allergies    Intolerances        Vital Signs Last 24 Hrs  T(C): 36.6 (05 May 2022 10:20), Max: 36.9 (04 May 2022 12:30)  T(F): 97.9 (05 May 2022 10:20), Max: 98.5 (04 May 2022 18:04)  HR: 76 (05 May 2022 10:20) (76 - 84)  BP: 129/55 (05 May 2022 10:20) (118/61 - 140/67)  BP(mean): --  RR: 16 (05 May 2022 10:20) (16 - 18)  SpO2: 98% (05 May 2022 10:20) (96% - 100%)    LABS:                        9.9    6.98  )-----------( 200      ( 05 May 2022 08:25 )             33.0     05-05    139  |  95<L>  |  54<H>  ----------------------------<  74  4.4   |  24  |  8.08<H>    Ca    9.4      05 May 2022 08:25  Phos  5.6     05-05  Mg     2.40     05-05          CAPILLARY BLOOD GLUCOSE      POCT Blood Glucose.: 93 mg/dL (04 May 2022 22:07)  POCT Blood Glucose.: 120 mg/dL (04 May 2022 17:50)  POCT Blood Glucose.: 106 mg/dL (04 May 2022 13:26)      Lower Extremity Physical Exam:  Vascular: DP/PT 1/4 R,  DP/PT 0/4 L, CFT <3 seconds, Temperature gradient warm to cooo B/L, TG is warm to cool b/l  Neuro: Epicritic sensation diminished to the level of forefoot B/L  Musculoskeletal/Ortho: left partial 5th ray resection healed, LF partial 4th ray resection open   Skin:   3/4 s/p L foot partial 3rd ray resection open: fibrous wound bed, mild malodor, no drainage, no purulence, flaps cool, distal skin necrosis, no additional tracking, no purulence, no erythema    3/4 s/p R foot partial hallux: No drainage, no tracking, no purulence, no dehiscence, no clinical signs of infection, distal dry stable eschar       RADIOLOGY & ADDITIONAL TESTS:

## 2022-05-05 NOTE — PROGRESS NOTE ADULT - SUBJECTIVE AND OBJECTIVE BOX
Date of Service  : 05-05-22 @ 11:38    INTERVAL HPI/OVERNIGHT EVENTS: Getting HD . I feel better.   Vital Signs Last 24 Hrs  T(C): 36.6 (05 May 2022 10:20), Max: 36.9 (04 May 2022 12:30)  T(F): 97.9 (05 May 2022 10:20), Max: 98.5 (04 May 2022 18:04)  HR: 76 (05 May 2022 10:20) (76 - 84)  BP: 129/55 (05 May 2022 10:20) (118/61 - 140/67)  BP(mean): --  RR: 16 (05 May 2022 10:20) (16 - 18)  SpO2: 98% (05 May 2022 10:20) (96% - 100%)  I&O's Summary    04 May 2022 07:01  -  05 May 2022 07:00  --------------------------------------------------------  IN: 400 mL / OUT: 2900 mL / NET: -2500 mL      MEDICATIONS  (STANDING):  aspirin  chewable 81 milliGRAM(s) Oral daily  atorvastatin 40 milliGRAM(s) Oral at bedtime  cadexomer iodine 0.9% Gel 1 Application(s) Topical daily  chlorhexidine 2% Cloths 1 Application(s) Topical daily  clopidogrel Tablet 75 milliGRAM(s) Oral daily  Dakins Solution - 1/2 Strength 1 Application(s) Topical daily  epoetin niesha-epbx (RETACRIT) Injectable 57878 Unit(s) IV Push <User Schedule>  heparin   Injectable 5000 Unit(s) SubCutaneous every 12 hours  multivitamin 1 Tablet(s) Oral daily  sevelamer carbonate 800 milliGRAM(s) Oral three times a day with meals    MEDICATIONS  (PRN):  acetaminophen     Tablet .. 650 milliGRAM(s) Oral every 6 hours PRN Temp greater or equal to 38.5C (101.3F), Moderate Pain (4 - 6)  nitroglycerin     SubLingual 0.4 milliGRAM(s) SubLingual every 5 minutes PRN Pulmonary Edema    LABS:                        9.9    6.98  )-----------( 200      ( 05 May 2022 08:25 )             33.0     05-05    139  |  95<L>  |  54<H>  ----------------------------<  74  4.4   |  24  |  8.08<H>    Ca    9.4      05 May 2022 08:25  Phos  5.6     05-05  Mg     2.40     05-05          CAPILLARY BLOOD GLUCOSE      POCT Blood Glucose.: 93 mg/dL (04 May 2022 22:07)  POCT Blood Glucose.: 120 mg/dL (04 May 2022 17:50)  POCT Blood Glucose.: 106 mg/dL (04 May 2022 13:26)          REVIEW OF SYSTEMS:  CONSTITUTIONAL: No fever, weight loss, or fatigue  EYES: No eye pain, visual disturbances, or discharge  ENMT:  No difficulty hearing, tinnitus, vertigo; No sinus or throat pain  NECK: No pain or stiffness  RESPIRATORY: No cough, wheezing, chills or hemoptysis; No shortness of breath  CARDIOVASCULAR: No chest pain, palpitations, dizziness, or leg swelling  GASTROINTESTINAL: No abdominal or epigastric pain. No nausea, vomiting, or hematemesis; No diarrhea or constipation. No melena or hematochezia.  GENITOURINARY: No dysuria, frequency, hematuria, or incontinence  NEUROLOGICAL: No headaches, memory loss, loss of strength, numbness, or tremors      Consultant(s) Notes Reviewed:  [x ] YES  [ ] NO    PHYSICAL EXAM:  GENERAL: NAD, well-groomed, well-developed,not in any distress ,  HEAD:  Atraumatic, Normocephalic  NECK: Supple, No JVD, Normal thyroid  NERVOUS SYSTEM:  Alert & Oriented X3, No focal deficit   CHEST/LUNG: Good air entry bilateral with no  rales, rhonchi, wheezing, or rubs  HEART: Regular rate and rhythm; No murmurs, rubs, or gallops  ABDOMEN: Soft, Nontender, Nondistended; Bowel sounds present  EXTREMITIES:  feet dressed.     Care Discussed with Consultants/Other Providers [ x] YES  [ ] NO

## 2022-05-06 ENCOUNTER — TRANSCRIPTION ENCOUNTER (OUTPATIENT)
Age: 62
End: 2022-05-06

## 2022-05-06 LAB
-  AMIKACIN: SIGNIFICANT CHANGE UP
-  AMOXICILLIN/CLAVULANIC ACID: SIGNIFICANT CHANGE UP
-  AMOXICILLIN/CLAVULANIC ACID: SIGNIFICANT CHANGE UP
-  AMPICILLIN/SULBACTAM: SIGNIFICANT CHANGE UP
-  AMPICILLIN/SULBACTAM: SIGNIFICANT CHANGE UP
-  AMPICILLIN: SIGNIFICANT CHANGE UP
-  AMPICILLIN: SIGNIFICANT CHANGE UP
-  AZTREONAM: SIGNIFICANT CHANGE UP
-  CEFAZOLIN: SIGNIFICANT CHANGE UP
-  CEFAZOLIN: SIGNIFICANT CHANGE UP
-  CEFEPIME: SIGNIFICANT CHANGE UP
-  CEFOXITIN: SIGNIFICANT CHANGE UP
-  CEFTAZIDIME: SIGNIFICANT CHANGE UP
-  CEFTRIAXONE: SIGNIFICANT CHANGE UP
-  CEFTRIAXONE: SIGNIFICANT CHANGE UP
-  CIPROFLOXACIN: SIGNIFICANT CHANGE UP
-  ERTAPENEM: SIGNIFICANT CHANGE UP
-  ERTAPENEM: SIGNIFICANT CHANGE UP
-  GENTAMICIN: SIGNIFICANT CHANGE UP
-  IMIPENEM: SIGNIFICANT CHANGE UP
-  LEVOFLOXACIN: SIGNIFICANT CHANGE UP
-  MEROPENEM: SIGNIFICANT CHANGE UP
-  PIPERACILLIN/TAZOBACTAM: SIGNIFICANT CHANGE UP
-  TOBRAMYCIN: SIGNIFICANT CHANGE UP
-  TRIMETHOPRIM/SULFAMETHOXAZOLE: SIGNIFICANT CHANGE UP
-  TRIMETHOPRIM/SULFAMETHOXAZOLE: SIGNIFICANT CHANGE UP
ANION GAP SERPL CALC-SCNC: 18 MMOL/L — HIGH (ref 7–14)
BUN SERPL-MCNC: 39 MG/DL — HIGH (ref 7–23)
CALCIUM SERPL-MCNC: 9.8 MG/DL — SIGNIFICANT CHANGE UP (ref 8.4–10.5)
CHLORIDE SERPL-SCNC: 93 MMOL/L — LOW (ref 98–107)
CO2 SERPL-SCNC: 27 MMOL/L — SIGNIFICANT CHANGE UP (ref 22–31)
CREAT SERPL-MCNC: 6.01 MG/DL — HIGH (ref 0.5–1.3)
EGFR: 7 ML/MIN/1.73M2 — LOW
GLUCOSE SERPL-MCNC: 88 MG/DL — SIGNIFICANT CHANGE UP (ref 70–99)
HCT VFR BLD CALC: 33.1 % — LOW (ref 34.5–45)
HGB BLD-MCNC: 10 G/DL — LOW (ref 11.5–15.5)
MAGNESIUM SERPL-MCNC: 2.4 MG/DL — SIGNIFICANT CHANGE UP (ref 1.6–2.6)
MCHC RBC-ENTMCNC: 27.5 PG — SIGNIFICANT CHANGE UP (ref 27–34)
MCHC RBC-ENTMCNC: 30.2 GM/DL — LOW (ref 32–36)
MCV RBC AUTO: 90.9 FL — SIGNIFICANT CHANGE UP (ref 80–100)
METHOD TYPE: SIGNIFICANT CHANGE UP
NRBC # BLD: 0 /100 WBCS — SIGNIFICANT CHANGE UP
NRBC # FLD: 0 K/UL — SIGNIFICANT CHANGE UP
PHOSPHATE SERPL-MCNC: 4.2 MG/DL — SIGNIFICANT CHANGE UP (ref 2.5–4.5)
PLATELET # BLD AUTO: 252 K/UL — SIGNIFICANT CHANGE UP (ref 150–400)
POTASSIUM SERPL-MCNC: 3.6 MMOL/L — SIGNIFICANT CHANGE UP (ref 3.5–5.3)
POTASSIUM SERPL-SCNC: 3.6 MMOL/L — SIGNIFICANT CHANGE UP (ref 3.5–5.3)
RBC # BLD: 3.64 M/UL — LOW (ref 3.8–5.2)
RBC # FLD: 15.2 % — HIGH (ref 10.3–14.5)
SODIUM SERPL-SCNC: 138 MMOL/L — SIGNIFICANT CHANGE UP (ref 135–145)
WBC # BLD: 7.9 K/UL — SIGNIFICANT CHANGE UP (ref 3.8–10.5)
WBC # FLD AUTO: 7.9 K/UL — SIGNIFICANT CHANGE UP (ref 3.8–10.5)

## 2022-05-06 PROCEDURE — 93924 LWR XTR VASC STDY BILAT: CPT | Mod: 26

## 2022-05-06 PROCEDURE — 99232 SBSQ HOSP IP/OBS MODERATE 35: CPT

## 2022-05-06 RX ADMIN — Medication 1 APPLICATION(S): at 12:10

## 2022-05-06 RX ADMIN — SEVELAMER CARBONATE 800 MILLIGRAM(S): 2400 POWDER, FOR SUSPENSION ORAL at 08:52

## 2022-05-06 RX ADMIN — ATORVASTATIN CALCIUM 40 MILLIGRAM(S): 80 TABLET, FILM COATED ORAL at 21:03

## 2022-05-06 RX ADMIN — CHLORHEXIDINE GLUCONATE 1 APPLICATION(S): 213 SOLUTION TOPICAL at 12:17

## 2022-05-06 RX ADMIN — Medication 1 APPLICATION(S): at 12:16

## 2022-05-06 RX ADMIN — Medication 81 MILLIGRAM(S): at 12:10

## 2022-05-06 RX ADMIN — HEPARIN SODIUM 5000 UNIT(S): 5000 INJECTION INTRAVENOUS; SUBCUTANEOUS at 05:24

## 2022-05-06 RX ADMIN — Medication 1 TABLET(S): at 12:10

## 2022-05-06 RX ADMIN — CLOPIDOGREL BISULFATE 75 MILLIGRAM(S): 75 TABLET, FILM COATED ORAL at 12:10

## 2022-05-06 RX ADMIN — HEPARIN SODIUM 5000 UNIT(S): 5000 INJECTION INTRAVENOUS; SUBCUTANEOUS at 17:44

## 2022-05-06 RX ADMIN — SEVELAMER CARBONATE 800 MILLIGRAM(S): 2400 POWDER, FOR SUSPENSION ORAL at 17:44

## 2022-05-06 RX ADMIN — SEVELAMER CARBONATE 800 MILLIGRAM(S): 2400 POWDER, FOR SUSPENSION ORAL at 12:10

## 2022-05-06 NOTE — PROGRESS NOTE ADULT - SUBJECTIVE AND OBJECTIVE BOX
Date of Service: 05-06-22 @ 15:12    Patient is a 61y old  Female who presents with a chief complaint of SOB and wheezing (06 May 2022 13:31)      Any change in ROS:  doing much better:   on 2 L of oxygen    MEDICATIONS  (STANDING):  aspirin  chewable 81 milliGRAM(s) Oral daily  atorvastatin 40 milliGRAM(s) Oral at bedtime  cadexomer iodine 0.9% Gel 1 Application(s) Topical daily  chlorhexidine 2% Cloths 1 Application(s) Topical daily  clopidogrel Tablet 75 milliGRAM(s) Oral daily  Dakins Solution - 1/2 Strength 1 Application(s) Topical daily  epoetin niesha-epbx (RETACRIT) Injectable 12182 Unit(s) IV Push <User Schedule>  heparin   Injectable 5000 Unit(s) SubCutaneous every 12 hours  multivitamin 1 Tablet(s) Oral daily  sevelamer carbonate 800 milliGRAM(s) Oral three times a day with meals    MEDICATIONS  (PRN):  acetaminophen     Tablet .. 650 milliGRAM(s) Oral every 6 hours PRN Temp greater or equal to 38.5C (101.3F), Moderate Pain (4 - 6)  nitroglycerin     SubLingual 0.4 milliGRAM(s) SubLingual every 5 minutes PRN Pulmonary Edema    Vital Signs Last 24 Hrs  T(C): 36.7 (06 May 2022 13:00), Max: 36.7 (06 May 2022 01:38)  T(F): 98.1 (06 May 2022 13:00), Max: 98.1 (06 May 2022 13:00)  HR: 63 (06 May 2022 13:00) (63 - 76)  BP: 112/62 (06 May 2022 13:00) (112/62 - 114/64)  BP(mean): --  RR: 17 (06 May 2022 14:28) (17 - 18)  SpO2: 99% (06 May 2022 14:28) (96% - 99%)    I&O's Summary    05 May 2022 07:01  -  06 May 2022 07:00  --------------------------------------------------------  IN: 500 mL / OUT: 2300 mL / NET: -1800 mL          Physical Exam:   GENERAL: NAD, well-groomed, well-developed  HEENT: MICAELA/   Atraumatic, Normocephalic  ENMT: No tonsillar erythema, exudates, or enlargement; Moist mucous membranes, Good dentition, No lesions  NECK: Supple, No JVD, Normal thyroid  CHEST/LUNG: Crackles bilateral bases:   CVS: Regular rate and rhythm; No murmurs, rubs, or gallops  GI: : Soft, Nontender, Nondistended; Bowel sounds present  NERVOUS SYSTEM:  Alert & Oriented X3  EXTREMITIES:  2+ Peripheral Pulses, No clubbing, cyanosis, or edema  LYMPH: No lymphadenopathy noted  SKIN: No rashes or lesions  ENDOCRINOLOGY: No Thyromegaly  PSYCH: Appropriate    Labs:  29                            10.0   7.90  )-----------( 252      ( 06 May 2022 07:45 )             33.1                         9.9    6.98  )-----------( 200      ( 05 May 2022 08:25 )             33.0                         9.5    10.98 )-----------( 211      ( 04 May 2022 06:42 )             31.1                         11.7   15.22 )-----------( 258      ( 03 May 2022 06:52 )             39.9     05-06    138  |  93<L>  |  39<H>  ----------------------------<  88  3.6   |  27  |  6.01<H>  05-05    139  |  95<L>  |  54<H>  ----------------------------<  74  4.4   |  24  |  8.08<H>  05-04    138  |  97<L>  |  39<H>  ----------------------------<  105<H>  4.5   |  27  |  6.08<H>  05-03    145  |  101  |  55<H>  ----------------------------<  153<H>  4.9   |  21<L>  |  7.97<H>    Ca    9.8      06 May 2022 07:45  Ca    9.4      05 May 2022 08:25  Phos  4.2     05-06  Phos  5.6     05-05  Mg     2.40     05-06  Mg     2.40     05-05    TPro  8.4<H>  /  Alb  4.3  /  TBili  0.3  /  DBili  x   /  AST  37<H>  /  ALT  8   /  AlkPhos  101  05-03    CAPILLARY BLOOD GLUCOSE            rad< from: Xray Chest 1 View- PORTABLE-Urgent (Xray Chest 1 View- PORTABLE-Urgent .) (05.05.22 @ 14:44) >    ACC: [id number] EXAM:  XR CHEST PORTABLE URGENT 1V                          PROCEDURE DATE:  05/05/2022          INTERPRETATION:  INDICATION: Followup of pulmonary edema.    COMPARISON: 2/28/22    FINDINGS:  Heart/Vascular: Cardiomediastinal silhouette is within normal limits.  Pulmonary: Redemonstration of bilateral diffuse airspace opacities   greater on the right than the left likely due to mild pulmonary edema. No   pleural effusion or pneumothorax.  Bones: Degenerative changes of the bony structures.    Impression:  Stable mild pulmonary edema.        --- End of Report ---            SHARAD TODD MD; Attending Radiologist  This document has been electronically signed. May  6 2022  9:47AM    < end of copied text >            RECENT CULTURES:  05-04 @ 13:27 .Abscess LF wounbd   KINGSLEY      Pseudomonas aeruginosa  Klebsiella pneumoniae ESBL  Escherichia coli  Pseudomonas aeruginosa     Numerous Pseudomonas aeruginosa  Moderate Klebsiella pneumoniae ESBL  Moderate Escherichia coli    05-03 @ 10:41 .Blood Blood-Peripheral                No growth to date.          RESPIRATORY CULTURES:          Studies  Chest X-RAY  CT SCAN Chest   Venous Dopplers: LE:   CT Abdomen  Others

## 2022-05-06 NOTE — DISCHARGE NOTE PROVIDER - CARE PROVIDER_API CALL
Mayo Daily (DPM)  Surgery  [address] [place] [address]  Phone: [phone]  Fax: [phone]  Follow Up Time: 1 week    Quentin Griffith)  Internal Medicine; Nephrology  [phone] [place] [address]  Phone: [phone]  Fax: [phone]  Follow Up Time: 1 week

## 2022-05-06 NOTE — DISCHARGE NOTE PROVIDER - HOSPITAL COURSE
61F with pmh of ESRD on dialysis (Tu,Thurs,Sat), s/p L foot partial 3rd ray amputation and R hallux amputation 3/4 presenting from [place] for resp distress s/p BIPAP and HD on admission. Associated with subjective fevers and sweats.    Acute respiratory failure with hypoxia-  - COVID/RVP negative  - CXR w/ pulmonary edema  - Pulmonology consulted  - s/p BiPAP ATC --> c/w supplemental O2 & BiPAP qHS  - Renal consulted for urgent HD with improvement    ESRD on hemodialysis --> Renal following for HD management  - fistula on R upper thigh    Hypertensive urgency --> s/p Nitro gtt; Cardiology consulted; Improved    PVD --> s/p partial L 3rd ray resection; c/w ASA/Plavix/Statin  5/11 peripheral angiogram: PTA/shockwave to left popliteal, PTA anterior tibial, PTA distal posterior tibial; RFA mynx   ASA/Plavix   62 y/o with pmhx of ESRD on dialysis (MWF), s/p L foot partial 3rd ray amputation and R hallux amputation 3/4 presenting from [place] for resp distress s/p BIPAP and HD on admission. Associated with subjective fevers and sweats.    Acute respiratory failure with hypoxia --> COVID/RVP negative. CXR revealed pulmonary edema. Followed by pulmonology. S/P BIPAP ATC, now on supplemental O2 and BIPAP QHS. Nephrology consulted for urgent hemodialysis with improvement.      ESRD on hemodialysis --> Followed by renal. AV Fistula on R upper thigh. Outpatient dialysis at [facility] scheduled MWF.     Hypertensive urgency --> s/p Nitro gtt. Followed by cardiology. BPs improved.     PVD --> s/p partial L 3rd ray resection. Followed by vascular surgery 5/11 peripheral angiogram: revealed PTA/shockwave to left popliteal, PTA anterior tibial, PTA distal posterior tibial; RFA mynx   ASA/Plavix   60 y/o with pmhx of ESRD on dialysis (T, Thurs, Sat at [facility]), s/p L foot partial 3rd ray amputation and R hallux amputation 3/4 presenting from [place] for resp distress s/p BIPAP and HD on admission. Associated with subjective fevers and sweats.    Acute respiratory failure with hypoxia 2/2 Fluid Overload --> COVID/RVP negative. CXR revealed pulmonary edema. Followed by pulmonology. S/P BIPAP ATC, now on supplemental O2 and BIPAP QHS. Nephrology consulted for urgent hemodialysis with improvement.      Elevated Troponins --> likely due to reduced creatinine clearance. Recent echo revealed normal LV systolic function. No indication for cath.     ESRD on hemodialysis --> Followed by renal. AV Fistula on R upper thigh. Outpatient dialysis at [facility] scheduled MWF.     Hypertensive urgency --> s/p Nitro gtt. Followed by cardiology. BPs improved.     PVD --> s/p partial L 3rd ray resection and R hallux amputation. Followed by vascular surgery for evaluation of non-healing LE wounds. Pt s/p LLE angio with popliteal/AT/PT balloon angioplasty and lithotripsy 5/11. Patient scheduled for RLE angiogram Wednesday 5/18. Insurance approved for outpatient procedure. Patient to be discharged today and return on Wednesday 5/18 as outpatient procedure.     Dispo - home with outpatient PT services. Wound care can be done 3x a week at home as per podiatry. Plan to return 5/18 for outpatient RLE Angio. Outpatient HD MWF with [facility]. Patient is medically optimized for discharge. Case discussed with Dr. Cutler 5/13/22.    62 y/o with pmhx of ESRD on dialysis (T, Thurs, Sat at [place]), s/p L foot partial 3rd ray amputation and R hallux amputation 3/4 presenting from [place] for resp distress s/p BIPAP and HD on admission. Associated with subjective fevers and sweats.    Acute respiratory failure with hypoxia 2/2 Fluid Overload --> COVID/RVP negative. CXR revealed pulmonary edema. Followed by pulmonology. S/P BIPAP ATC, now on supplemental O2 and BIPAP QHS. Nephrology consulted for urgent hemodialysis with improvement.      Elevated Troponins --> likely due to reduced creatinine clearance. Recent echo revealed normal LV systolic function. No indication for cath.     ESRD on hemodialysis --> Followed by renal. AV Fistula on R upper thigh. Outpatient dialysis at [facility] scheduled MWF.     Hypertensive urgency --> s/p Nitro gtt. Followed by cardiology. BPs improved.     PVD --> s/p partial L 3rd ray resection and R hallux amputation. Followed by vascular surgery for evaluation of non-healing LE wounds. Pt s/p LLE angio with popliteal/AT/PT balloon angioplasty and lithotripsy 5/11. Patient scheduled for RLE angiogram Wednesday 5/18. Insurance approved for outpatient procedure. Patient to be discharged today and return on Wednesday 5/18 as outpatient procedure.     Dispo - home with outpatient PT services. Wound care can be done 3x a week at home as per podiatry. Plan to return 5/18 for outpatient RLE Angio. Outpatient HD MWF with [facility]. Patient is medically optimized for discharge. Case discussed with Dr. Cutler 5/13/22.

## 2022-05-06 NOTE — CHART NOTE - NSCHARTNOTEFT_GEN_A_CORE
Pt is stable for discharge from podiatry standpoint  - Continue regular dressing changes to b/l foot wounds  - No surgical intervention planned, to be managed out patient  - ABx per ID  - F/u and wound care instructions listed in discharge note provider Pt is stable for discharge from podiatry standpoint  - Continue regular dressing changes to b/l foot wounds  - No surgical intervention planned, to be managed out patient  - ABx per ID  - Please reconsult as needed  - F/u and wound care instructions listed in discharge note provider

## 2022-05-06 NOTE — PROGRESS NOTE ADULT - SUBJECTIVE AND OBJECTIVE BOX
Date of Service  : 05-06-22     INTERVAL HPI/OVERNIGHT EVENTS: I feel fine.   Vital Signs Last 24 Hrs  T(C): 36.7 (06 May 2022 01:38), Max: 36.7 (06 May 2022 01:38)  T(F): 98 (06 May 2022 01:38), Max: 98 (06 May 2022 01:38)  HR: 76 (06 May 2022 01:38) (70 - 76)  BP: 114/64 (06 May 2022 01:38) (114/64 - 130/78)  BP(mean): --  RR: 18 (06 May 2022 01:38) (16 - 18)  SpO2: 99% (06 May 2022 01:38) (96% - 99%)  I&O's Summary    05 May 2022 07:01  -  06 May 2022 07:00  --------------------------------------------------------  IN: 500 mL / OUT: 2300 mL / NET: -1800 mL      MEDICATIONS  (STANDING):  aspirin  chewable 81 milliGRAM(s) Oral daily  atorvastatin 40 milliGRAM(s) Oral at bedtime  cadexomer iodine 0.9% Gel 1 Application(s) Topical daily  chlorhexidine 2% Cloths 1 Application(s) Topical daily  clopidogrel Tablet 75 milliGRAM(s) Oral daily  Dakins Solution - 1/2 Strength 1 Application(s) Topical daily  epoetin niesha-epbx (RETACRIT) Injectable 03786 Unit(s) IV Push <User Schedule>  heparin   Injectable 5000 Unit(s) SubCutaneous every 12 hours  multivitamin 1 Tablet(s) Oral daily  sevelamer carbonate 800 milliGRAM(s) Oral three times a day with meals    MEDICATIONS  (PRN):  acetaminophen     Tablet .. 650 milliGRAM(s) Oral every 6 hours PRN Temp greater or equal to 38.5C (101.3F), Moderate Pain (4 - 6)  nitroglycerin     SubLingual 0.4 milliGRAM(s) SubLingual every 5 minutes PRN Pulmonary Edema    LABS:                        10.0   7.90  )-----------( 252      ( 06 May 2022 07:45 )             33.1     05-06    138  |  93<L>  |  39<H>  ----------------------------<  88  3.6   |  27  |  6.01<H>    Ca    9.8      06 May 2022 07:45  Phos  4.2     05-06  Mg     2.40     05-06          CAPILLARY BLOOD GLUCOSE              REVIEW OF SYSTEMS:  CONSTITUTIONAL: No fever, weight loss, or fatigue  EYES: No eye pain, visual disturbances, or discharge  ENMT:  No difficulty hearing, tinnitus, vertigo; No sinus or throat pain  NECK: No pain or stiffness  RESPIRATORY: No cough, wheezing, chills or hemoptysis; No shortness of breath  CARDIOVASCULAR: No chest pain, palpitations, dizziness, or leg swelling  GASTROINTESTINAL: No abdominal or epigastric pain. No nausea, vomiting, or hematemesis; No diarrhea or constipation. No melena or hematochezia.  GENITOURINARY: No dysuria, frequency, hematuria, or incontinence      Consultant(s) Notes Reviewed:  [x ] YES  [ ] NO    PHYSICAL EXAM:  GENERAL: NAD, NC Oxygen   HEAD:  Atraumatic, Normocephalic  NECK: Supple, No JVD, Normal thyroid  NERVOUS SYSTEM:  Alert & Oriented X3, No focal deficit   CHEST/LUNG: Good air entry bilateral with no  rales, rhonchi, wheezing, or rubs  HEART: Regular rate and rhythm; No murmurs, rubs, or gallops  ABDOMEN: Soft, Nontender, Nondistended; Bowel sounds present  EXTREMITIES:  feet dressed     Care Discussed with Consultants/Other Providers [ x] YES  [ ] NO

## 2022-05-06 NOTE — PROGRESS NOTE ADULT - SUBJECTIVE AND OBJECTIVE BOX
Follow Up:  pulmonary edema,   foot wound    Interval History/ROS:    Allergies  No Known Allergies        ANTIMICROBIALS:      OTHER MEDS:  acetaminophen     Tablet .. 650 milliGRAM(s) Oral every 6 hours PRN  aspirin  chewable 81 milliGRAM(s) Oral daily  atorvastatin 40 milliGRAM(s) Oral at bedtime  cadexomer iodine 0.9% Gel 1 Application(s) Topical daily  chlorhexidine 2% Cloths 1 Application(s) Topical daily  clopidogrel Tablet 75 milliGRAM(s) Oral daily  Dakins Solution - 1/2 Strength 1 Application(s) Topical daily  epoetin niesha-epbx (RETACRIT) Injectable 12700 Unit(s) IV Push <User Schedule>  heparin   Injectable 5000 Unit(s) SubCutaneous every 12 hours  multivitamin 1 Tablet(s) Oral daily  nitroglycerin     SubLingual 0.4 milliGRAM(s) SubLingual every 5 minutes PRN  sevelamer carbonate 800 milliGRAM(s) Oral three times a day with meals      Vital Signs Last 24 Hrs  T(C): 36.7 (06 May 2022 13:00), Max: 36.7 (06 May 2022 01:38)  T(F): 98.1 (06 May 2022 13:00), Max: 98.1 (06 May 2022 13:00)  HR: 63 (06 May 2022 13:00) (63 - 76)  BP: 112/62 (06 May 2022 13:00) (112/62 - 114/64)  BP(mean): --  RR: 17 (06 May 2022 14:28) (17 - 18)  SpO2: 99% (06 May 2022 14:28) (99% - 99%)    PHYSICAL EXAM:  Constitutional: Not in acute distress  comfortable lying flat  RS: nasal O2  CVS: S1, S2   Abdomen: Soft. No guarding/rigidity/tenderness.  Extremities: left foot toe #3 s/p amp dry wound no drainage, right foot partial amp hallux wound healed  Skin: scattered post inflammatory skin changes  Neuro: Alert, oriented to time/place/person  Cranial nerves 2-12 grossly normal. No focal abnormalities                          10.0   7.90  )-----------( 252      ( 06 May 2022 07:45 )             33.1       05-06    138  |  93<L>  |  39<H>  ----------------------------<  88  3.6   |  27  |  6.01<H>    Ca    9.8      06 May 2022 07:45  Phos  4.2     05-06  Mg     2.40     05-06            MICROBIOLOGY:  v  .Abscess LF wounbd  05-04-22   Numerous Pseudomonas aeruginosa  Moderate Klebsiella pneumoniae ESBL  Moderate Escherichia coli  --  Pseudomonas aeruginosa  Klebsiella pneumoniae ESBL  Escherichia coli      .Blood Blood-Peripheral  05-03-22   No growth to date.  --  --          Rapid RVP Result: NotDetec (05-03 @ 06:54)        RADIOLOGY:    ad< from: Xray Foot AP + Lateral + Oblique, Left (05.04.22 @ 10:38) >    ACC: [id number] EXAM:  XR FOOT COMP MIN 3 VIEWS LT                          PROCEDURE DATE:  05/04/2022          INTERPRETATION:  CLINICAL INDICATION: left foot infection; status post   partial ray resections    EXAM:  Frontal, oblique, and lateral left foot from 5/4/2022 at 1038. Compared   to prior study from 3/4/2022.    IMPRESSION:  Redemonstrated partial 3rd and 5th ray amputation defects with smooth   stump margins.    No proximally tracking gas collections beyond the amputation margins and  no focal areas of osteomyelitis.    Remainder of foot unchanged.    --- End of Report ---    < end of copied text >  < from: Xray Chest 1 View- PORTABLE-Urgent (Xray Chest 1 View- PORTABLE-Urgent .) (05.05.22 @ 14:44) >    ACC: [id number] EXAM:  XR CHEST PORTABLE URGENT 1V                          PROCEDURE DATE:  05/05/2022          INTERPRETATION:  INDICATION: Followup of pulmonary edema.    COMPARISON: 2/28/22    FINDINGS:  Heart/Vascular: Cardiomediastinal silhouette is within normal limits.  Pulmonary: Redemonstration of bilateral diffuse airspace opacities   greater on the right than the left likely due to mild pulmonary edema. No   pleural effusion or pneumothorax.  Bones: Degenerative changes of the bony structures.    Impression:  Stable mild pulmonary edema.    < end of copied text >

## 2022-05-06 NOTE — PROGRESS NOTE ADULT - ASSESSMENT
60 yo F with pmh of ESRD on dialysis (Tu,Thurs,Sat), s/p L foot partial 3rd ray amputation and R hallux amputation 3/4 presenting from [place] for resp distress, admitted for acute resp failure, flash pulm edema, for urgent HD. on BiPAP. Renal consulted for urgent HD.     End Stage Renal Disease on Dialysis  hemodialysis TTS at [place]  K wnl  hypervolemia better. s/p Flash pulmonary edema on admission   hb level at goal    s/p HD yesterday, Rx sheet reviewed, net UF 1.8kg, tolerated well. uneventful.  plan for next HD tomorrow w/2k bath, inc uf 2.5kg as tolerated  no indication for extra hd today  renal diet, fluid restriction 1L/day   dose all meds for ESRD  c/w epo 12k tiw w/hd    Acute respiratory failure with hypoxia.   Off BIPAP     s/p Hypertensive urgency.   BP controlled well. not on bp meds. watch closely    labs, chart reviewed  will closely follow up.   poc d/w pt  For any question, pl call:  [facility]  Cell -[phone]  Pager # [phone]  office # [phone]

## 2022-05-06 NOTE — DISCHARGE NOTE PROVIDER - NSDCMRMEDTOKEN_GEN_ALL_CORE_FT
acetaminophen 325 mg oral tablet: 2 tab(s) orally every 6 hours, As needed, Mild Pain (1 - 3), Moderate Pain (4 - 6)  aspirin 81 mg oral tablet, chewable: 1 tab(s) orally once a day  atorvastatin 40 mg oral tablet: 1 tab(s) orally once a day  cadexomer iodine 0.9% topical gel: 1 application topically once a day  cefepime 1 g intravenous injection: 2 gram(s) intravenous Monday, Wednesday, and Friday AFTER hd UNTIL 4/14/22  collagenase 250 units/g topical ointment: 1 application topically once a day  epoetin niesha: 73718 unit(s) intravenously 3 times a week with HD   heparin: 5000 unit(s) subcutaneous every 12 hours  Plavix 75 mg oral tablet: 1 tab(s) orally once a day  Denice-Nia oral tablet: 1 tab(s) orally once a day  sevelamer hydrochloride 800 mg oral tablet: 1 tab(s) orally 3 times a day  Velphoro 500 mg oral tablet, chewable: 3 tab(s) orally 3 times a day (with meals)   acetaminophen 325 mg oral tablet: 2 tab(s) orally every 6 hours, As needed, Mild Pain (1 - 3), Moderate Pain (4 - 6)  aspirin 81 mg oral tablet, chewable: 1 tab(s) orally once a day  atorvastatin 40 mg oral tablet: 1 tab(s) orally once a day  cadexomer iodine 0.9% topical gel: 1 application topically once a day  collagenase 250 units/g topical ointment: 1 application topically once a day  epoetin niesha: 21982 unit(s) intravenously 3 times a week with HD   Plavix 75 mg oral tablet: 1 tab(s) orally once a day  Denice-Nia oral tablet: 1 tab(s) orally once a day  sevelamer hydrochloride 800 mg oral tablet: 1 tab(s) orally 3 times a day  Velphoro 500 mg oral tablet, chewable: 3 tab(s) orally 3 times a day (with meals)   acetaminophen 325 mg oral tablet: 2 tab(s) orally every 6 hours, As needed, Mild Pain (1 - 3), Moderate Pain (4 - 6)  aspirin 81 mg oral tablet, chewable: 1 tab(s) orally once a day  atorvastatin 40 mg oral tablet: 1 tab(s) orally once a day  cadexomer iodine 0.9% topical gel: 1 application topically once a day  collagenase 250 units/g topical ointment: 1 application topically once a day  epoetin niesha: 15919 unit(s) intravenously 3 times a week with    Outpatient physical therapy 2-3x a week for balance training; strengthening; bed mobility training; gait training; transfer training:   Plavix 75 mg oral tablet: 1 tab(s) orally once a day  Denice-Nia oral tablet: 1 tab(s) orally once a day  sevelamer hydrochloride 800 mg oral tablet: 1 tab(s) orally 3 times a day  Velphoro 500 mg oral tablet, chewable: 3 tab(s) orally 3 times a day (with meals)   acetaminophen 325 mg oral tablet: 2 tab(s) orally every 6 hours, As needed, Mild Pain (1 - 3), Moderate Pain (4 - 6)  aspirin 81 mg oral tablet, chewable: 1 tab(s) orally once a day  atorvastatin 40 mg oral tablet: 1 tab(s) orally once a day  cadexomer iodine 0.9% topical gel: 1 application topically once a day  collagenase 250 units/g topical ointment: 1 application topically once a day  epoetin niesha: 50054 unit(s) intravenously 3 times a week with    Outpatient physical therapy 2-3x a week for balance training; strengthening; bed mobility training; gait training; transfer training:   Plavix 75 mg oral tablet: 1 tab(s) orally once a day  Denice-Nia oral tablet: 1 tab(s) orally once a day  sevelamer hydrochloride 800 mg oral tablet: 1 tab(s) orally 3 times a day  sodium hypochlorite 0.25% topical solution: 1 application topically once a day  Velphoro 500 mg oral tablet, chewable: 3 tab(s) orally 3 times a day (with meals)

## 2022-05-06 NOTE — DISCHARGE NOTE PROVIDER - PROVIDER TOKENS
PROVIDER:[TOKEN:[[id number]],FOLLOWUP:[1 week]],PROVIDER:[TOKEN:[[id number]],FOLLOWUP:[1 week]]

## 2022-05-06 NOTE — DISCHARGE NOTE PROVIDER - NSDCFUADDAPPT_GEN_ALL_CORE_FT
Podiatry Discharge Instructions:  Follow up: Please follow up with Dr. Daily within 1 week of discharge from the hospital, please call [phone] for appointment and discuss that you recently were seen in the hospital.  Wound Care: please apply Iodosorb to right foot big toe wound, dress with 4x4 gauze and kerlix daily.   please clean left foot wound with dakins then apply 4x4 gauze and kelrix daily.  Weight bearing: Please weight bear as tolerated in a surgical shoe.  Antibiotics: Please continue as instructed.

## 2022-05-06 NOTE — PROGRESS NOTE ADULT - ASSESSMENT
60 yo F with pmh of ESRD on dialysis (Tu,Thurs,Sat), s/p L foot partial 3rd ray amputation and R hallux amputation 3/4 presenting from [place] for resp distress. [place] well yesterday however woke up at 5 AM this morning and could not breathe. Associated with subjective fevers and sweats. No chest pain. Is COVID vaccinated. Mild bl LE swelling. Has not missed dialysis.

## 2022-05-06 NOTE — PROGRESS NOTE ADULT - ASSESSMENT
60 yo f s/p right foot partial hallux amputation and left foot partial 4rd ray amputation (DOS 3/4/2022)  -pt seen and evaluated  - Afebrile, no leukocytosis  - L foot XR negative for OM or tracking soft tissue air  - 3/4/22 s/p L foot partial 3rd ray resection open: fibrogranular wound to subQ, mild malodor, no drainage, no purulence, flaps cool, distal skin necrosis, no additional tracking, no purulence, no erythema    3/4/22 s/p R foot partial hallux: distal dry stable eschar, no drainage, no tracking, no purulence, no dehiscence, no clinical signs of infection,   - L foot wound culture growing gram negative rods prelim  - MARTA/PVR pending, no acute intervention per vasc, appreciated  - Explained to patient that she is at high risk for recurrent infection and that her L foot wound has a very low likelihood of healing, pt understands risks but at this time does not want to consider any proximal amputations  - ID recs monitor off Abx, appreciated  - Podiatry plan continue Local Wound Care, will f/u MARTA and vasc recs  - discussed with attending

## 2022-05-06 NOTE — PHYSICAL THERAPY INITIAL EVALUATION ADULT - RANGE OF MOTION EXAMINATION, REHAB EVAL
ankle DF to at least neutral b/l/bilateral upper extremity ROM was WFL (within functional limits)/bilateral lower extremity ROM was WFL (within functional limits)

## 2022-05-06 NOTE — PHYSICAL THERAPY INITIAL EVALUATION ADULT - LIVES WITH, PROFILE
pt admitted from rehab facility ([facility]) has been at [facility] since March.  Pt states prior to this has been living with Brother/Sister in private home with spouse, 3 steps in and bedroom on 2nd level

## 2022-05-06 NOTE — PROGRESS NOTE ADULT - SUBJECTIVE AND OBJECTIVE BOX
Podiatry pager #: [phone] ([facility])/ [phone] ([facility])    Patient is a 61y old  Female who presents with a chief complaint of SOB and wheezing (06 May 2022 07:36)       INTERVAL HPI/OVERNIGHT EVENTS:  Patient seen and evaluated at bedside.  Pt is resting comfortable in NAD. Denies N/V/F/C.     Allergies    No Known Allergies    Intolerances        Vital Signs Last 24 Hrs  T(C): 36.7 (06 May 2022 01:38), Max: 36.7 (06 May 2022 01:38)  T(F): 98 (06 May 2022 01:38), Max: 98 (06 May 2022 01:38)  HR: 76 (06 May 2022 01:38) (70 - 76)  BP: 114/64 (06 May 2022 01:38) (114/64 - 130/78)  BP(mean): --  RR: 18 (06 May 2022 01:38) (16 - 18)  SpO2: 99% (06 May 2022 01:38) (96% - 99%)    LABS:                        10.0   7.90  )-----------( 252      ( 06 May 2022 07:45 )             33.1     05-06    138  |  93<L>  |  39<H>  ----------------------------<  88  3.6   |  27  |  6.01<H>    Ca    9.8      06 May 2022 07:45  Phos  4.2     05-06  Mg     2.40     05-06          CAPILLARY BLOOD GLUCOSE          Lower Extremity Physical Exam:  Vascular: DP/PT 1/4 R,  DP/PT 0/4 L, CFT <3 seconds, Temperature gradient warm to cooo B/L, TG is warm to cool b/l  Neuro: Epicritic sensation diminished to the level of forefoot B/L  Musculoskeletal/Ortho: left partial 5th ray resection healed, LF partial 4th ray resection open   Skin:   3/4/22 s/p L foot partial 3rd ray resection open: fibrogranular wound to subQ, mild malodor, no drainage, no purulence, flaps cool, distal skin necrosis, no additional tracking, no purulence, no erythema    3/4/22 s/p R foot partial hallux: distal dry stable eschar, no drainage, no tracking, no purulence, no dehiscence, no clinical signs of infection,     RADIOLOGY & ADDITIONAL TESTS:

## 2022-05-06 NOTE — PROGRESS NOTE ADULT - SUBJECTIVE AND OBJECTIVE BOX
Cardiovascular Disease Progress Note    Overnight events: No acute events overnight.  Ms. Boogie says her breathing is better.   No chest pain.  Otherwise review of systems negative    Objective Findings:  T(C): 36.7 (22 @ 01:38), Max: 36.7 (22 @ 01:38)  HR: 76 (22 @ 01:38) (70 - 76)  BP: 114/64 (22 @ 01:38) (114/64 - 130/78)  RR: 18 (22 @ 01:38) (16 - 18)  SpO2: 99% (22 @ 01:38) (96% - 99%)  Wt(kg): --  Daily     Daily Weight in k.7 (05 May 2022 13:45)      Physical Exam:  Gen: NAD; Patient resting comfortably  HEENT: EOMI, Normocephalic/ atraumatic  CV: RRR, normal S1 + S2, no m/r/g  Lungs:  Normal respiratory effort; mild crackles to auscultation bilaterally  Abd: soft, non-tender; bowel sounds present  Ext: No edema; warm and well perfused    Telemetry: Sinus    Laboratory Data:                        9.9    6.98  )-----------( 200      ( 05 May 2022 08:25 )             33.0     05-    139  |  95<L>  |  54<H>  ----------------------------<  74  4.4   |  24  |  8.08<H>    Ca    9.4      05 May 2022 08:25  Phos  5.6     05-05  Mg     2.40     05-05                Inpatient Medications:  MEDICATIONS  (STANDING):  aspirin  chewable 81 milliGRAM(s) Oral daily  atorvastatin 40 milliGRAM(s) Oral at bedtime  cadexomer iodine 0.9% Gel 1 Application(s) Topical daily  chlorhexidine 2% Cloths 1 Application(s) Topical daily  clopidogrel Tablet 75 milliGRAM(s) Oral daily  Dakins Solution - 1/2 Strength 1 Application(s) Topical daily  epoetin niesha-epbx (RETACRIT) Injectable 31757 Unit(s) IV Push <User Schedule>  heparin   Injectable 5000 Unit(s) SubCutaneous every 12 hours  multivitamin 1 Tablet(s) Oral daily  sevelamer carbonate 800 milliGRAM(s) Oral three times a day with meals      Assessment: 61-year-old female with ESRD on HD, HLD and peripheral vascular disease s/p lower extremity resection presents with respiratory distress.     Plan of Care:    #Respiratory Distress-  Secondary to fluid overloaded.   Volume status is improved post HD.  O2 requirements are improving.   Fluid removal with HD as per the renal team.    #Elevated troponins-  Patient denies chest pain and EKG is non ischemic.  Likely due to reduced creatinine clearance.   Recent echo noted- normal LV systolic function.  No indication for cath at this time.  Continue DAPT given PVD.       #Peripheral vascular disease.  - S/p L foot partial 3rd ray amputation and R hallux amputation 3/2022.   - Vascular input noted.   - Continue medical therapy with DAPT and statin    #ESRD-  - HD as per renal, as stated above.       Over 25 minutes spent on total encounter; more than 50% of the visit was spent counseling and/or coordinating care by the attending physician.      Cristino Adams MD [facility]  Cardiovascular Disease  [phone]

## 2022-05-06 NOTE — DISCHARGE NOTE PROVIDER - NSDCCPCAREPLAN_GEN_ALL_CORE_FT
PRINCIPAL DISCHARGE DIAGNOSIS  Diagnosis: Acute respiratory failure with hypoxia  Assessment and Plan of Treatment:       SECONDARY DISCHARGE DIAGNOSES  Diagnosis: ESRD on hemodialysis  Assessment and Plan of Treatment:     Diagnosis: Hypertensive urgency  Assessment and Plan of Treatment:     Diagnosis: Flash pulmonary edema  Assessment and Plan of Treatment:     Diagnosis: PAD (peripheral artery disease)  Assessment and Plan of Treatment:     Diagnosis: History of partial ray amputation of third toe of left foot  Assessment and Plan of Treatment:      PRINCIPAL DISCHARGE DIAGNOSIS  Diagnosis: Acute respiratory failure with hypoxia  Assessment and Plan of Treatment: Your shortness of breath upon admission was secondary to pulmonary edema (fluid in your lungs), as shown by your chest x-ray. You were admitted for urgent hemodialysis for fluid removal and your shortness of breath has improved. Please continue to follow with your dialysis schedule.      SECONDARY DISCHARGE DIAGNOSES  Diagnosis: ESRD on hemodialysis  Assessment and Plan of Treatment: Please continue to follow your dialysis schedule and refer to your primary provider/nephrologist for further care and monitoring of kidney function and electrolytes. Continue a renal restricted diet (Avoiding foods high in potassium and phosphorus), your prescribed medications, and supplementations as directed.    Diagnosis: Hypertensive urgency  Assessment and Plan of Treatment: During your admission you had elevated blood pressures, which has now improved. Continue blood pressure medication regimen as directed. Monitor for any visual changes, headaches or dizziness.  Monitor blood pressure regularly.  Follow up with your primary care provider for further management for high blood pressure.    Diagnosis: PAD (peripheral artery disease)  Assessment and Plan of Treatment: You had a left lower extremity angiogram during your admission with vascular surgery. Please return on Wednesday 5/18 for your right lower extremity aniogram. Wound care will be done 3x a week at your house for your lower extremity wounds.

## 2022-05-06 NOTE — PROGRESS NOTE ADULT - ASSESSMENT
60 yo F with pmh of ESRD on dialysis (Tu,Thurs,Sat), s/p L foot partial 3rd ray amputation and R hallux amputation 3/4 presenting from [place] for resp distress. [place] well yesterday however woke up at 5 AM this morning and could not breathe. Associated with subjective fevers and sweats. No chest pain. Is COVID vaccinated. Mild bl LE swelling. Has not missed dialysis.     Problem/Plan - 1:  ·  Problem: Acute respiratory failure with hypoxia.   ·  Plan: Off BIPAP and NC Oxygen.      Problem/Plan - 2:  ·  Problem: Flash pulmonary edema.   ·  Plan: Sec to Fluid Overload from ESRD .  HD per renal.   Recent TTE noted.     Problem/Plan - 3:  ·  Problem: ESRD on hemodialysis.   ·  Plan: HD per renal.     Problem/Plan - 4:  ·  Problem: Hypertensive urgency.   ·  Plan: BP readings better .     Problem/Plan - 5:  ·  Problem: Fever with foot wound. .   ·  Plan: Podiatry consult noted.   ID following . No Abxs for now.

## 2022-05-06 NOTE — PROGRESS NOTE ADULT - SUBJECTIVE AND OBJECTIVE BOX
[facility] - S Jluis / Shabbir S /D Romina/ S Denys/ S Chano/ Andrés Perkins / M Katlyn/ O Gladis  service -[phone], office [phone]  ---------------------------------------------------------------------------------------------------------------    Patient seen and examined bedside    Subjective and Objective: No overnight events, denied sob. No complaints today. feeling better    Allergies: No Known Allergies      Hospital Medications:   MEDICATIONS  (STANDING):  aspirin  chewable 81 milliGRAM(s) Oral daily  atorvastatin 40 milliGRAM(s) Oral at bedtime  cadexomer iodine 0.9% Gel 1 Application(s) Topical daily  chlorhexidine 2% Cloths 1 Application(s) Topical daily  clopidogrel Tablet 75 milliGRAM(s) Oral daily  Dakins Solution - 1/2 Strength 1 Application(s) Topical daily  epoetin niesha-epbx (RETACRIT) Injectable 12810 Unit(s) IV Push <User Schedule>  heparin   Injectable 5000 Unit(s) SubCutaneous every 12 hours  multivitamin 1 Tablet(s) Oral daily  sevelamer carbonate 800 milliGRAM(s) Oral three times a day with meals    VITALS:  T(F): 98.1 (05-06-22 @ 13:00), Max: 98.1 (05-06-22 @ 13:00)  HR: 63 (05-06-22 @ 13:00)  BP: 112/62 (05-06-22 @ 13:00)  RR: 17 (05-06-22 @ 14:28)  SpO2: 99% (05-06-22 @ 14:28)  Wt(kg): --    05-05 @ 07:01  -  05-06 @ 07:00  --------------------------------------------------------  IN: 500 mL / OUT: 2300 mL / NET: -1800 mL      PHYSICAL EXAM:  Constitutional: NAD  HEENT: anicteric sclera  Neck: No JVD  Respiratory: CTA b/l, no wheezes  Cardiovascular: S1, S2, RRR  Gastrointestinal: BS+, soft, NT  Extremities: No peripheral edema  Neurological: A/O x 3  Psychiatric: Normal mood, normal affect  : No salcido.   Vascular Access: thigh AVG+    LABS:  05-06    138  |  93<L>  |  39<H>  ----------------------------<  88  3.6   |  27  |  6.01<H>    Ca    9.8      06 May 2022 07:45  Phos  4.2     05-06  Mg     2.40     05-06      Creatinine Trend: 6.01 <--, 8.08 <--, 6.08 <--, 7.97 <--                        10.0   7.90  )-----------( 252      ( 06 May 2022 07:45 )             33.1     Urine Studies:        RADIOLOGY & ADDITIONAL STUDIES:

## 2022-05-06 NOTE — PHYSICAL THERAPY INITIAL EVALUATION ADULT - GENERAL OBSERVATIONS, REHAB EVAL
Pt seen lying in bed, + monitor, +O2 n/c 1 L - per LADONNA duarte to assess ambulation on room air will monitor O2 Saturation

## 2022-05-06 NOTE — PHYSICAL THERAPY INITIAL EVALUATION ADULT - PERTINENT HX OF CURRENT PROBLEM, REHAB EVAL
61F with pmh of ESRD on dialysis (Tu,Thurs,Sat), s/p L foot partial 3rd ray amputation and R hallux amputation 3/4 presenting from [place] for resp distress s/p BIPAP and HD on admission. Associated with subjective fevers and sweats.

## 2022-05-06 NOTE — PROGRESS NOTE ADULT - ASSESSMENT
60 yo woman with ESRD on HD, blind right eye, h/o left toe #3 gangrene March 2022 and infection right hallux s/p left #3 toe amputation and right partial amputation hallux  treated with IV cefepime x 6 weeks thru 4/4/2022 presenting with pulmonary edema  Feet do not appear acutely infected   Left foot wound colonized with pseud, kleb, e coli.    Low grade fever 100.8 on admission; afebrile since.  Looks well.  Blood cultures x 2 negative.  received vanco and cefepime x 1    Pulmonary edema  Non healing wound left toe s/p amputation.  colonized with multiple organisms.   x ray no om, gas. already treated with 6 weeks of IV antibiotics; unlikely additional antibiotics of benefit.   Local wound care.   If febrile or develops signs of acute infection start meropenem

## 2022-05-07 LAB
ANION GAP SERPL CALC-SCNC: 22 MMOL/L — HIGH (ref 7–14)
BUN SERPL-MCNC: 68 MG/DL — HIGH (ref 7–23)
CALCIUM SERPL-MCNC: 9.6 MG/DL — SIGNIFICANT CHANGE UP (ref 8.4–10.5)
CHLORIDE SERPL-SCNC: 94 MMOL/L — LOW (ref 98–107)
CO2 SERPL-SCNC: 23 MMOL/L — SIGNIFICANT CHANGE UP (ref 22–31)
CREAT SERPL-MCNC: 8.22 MG/DL — HIGH (ref 0.5–1.3)
EGFR: 5 ML/MIN/1.73M2 — LOW
GLUCOSE SERPL-MCNC: 99 MG/DL — SIGNIFICANT CHANGE UP (ref 70–99)
HCT VFR BLD CALC: 32.7 % — LOW (ref 34.5–45)
HGB BLD-MCNC: 10.1 G/DL — LOW (ref 11.5–15.5)
MAGNESIUM SERPL-MCNC: 2.7 MG/DL — HIGH (ref 1.6–2.6)
MCHC RBC-ENTMCNC: 27.8 PG — SIGNIFICANT CHANGE UP (ref 27–34)
MCHC RBC-ENTMCNC: 30.9 GM/DL — LOW (ref 32–36)
MCV RBC AUTO: 90.1 FL — SIGNIFICANT CHANGE UP (ref 80–100)
NRBC # BLD: 0 /100 WBCS — SIGNIFICANT CHANGE UP
NRBC # FLD: 0 K/UL — SIGNIFICANT CHANGE UP
PHOSPHATE SERPL-MCNC: 5.3 MG/DL — HIGH (ref 2.5–4.5)
PLATELET # BLD AUTO: 275 K/UL — SIGNIFICANT CHANGE UP (ref 150–400)
POTASSIUM SERPL-MCNC: 4.1 MMOL/L — SIGNIFICANT CHANGE UP (ref 3.5–5.3)
POTASSIUM SERPL-SCNC: 4.1 MMOL/L — SIGNIFICANT CHANGE UP (ref 3.5–5.3)
RBC # BLD: 3.63 M/UL — LOW (ref 3.8–5.2)
RBC # FLD: 15 % — HIGH (ref 10.3–14.5)
SODIUM SERPL-SCNC: 139 MMOL/L — SIGNIFICANT CHANGE UP (ref 135–145)
WBC # BLD: 7.64 K/UL — SIGNIFICANT CHANGE UP (ref 3.8–10.5)
WBC # FLD AUTO: 7.64 K/UL — SIGNIFICANT CHANGE UP (ref 3.8–10.5)

## 2022-05-07 RX ADMIN — SEVELAMER CARBONATE 800 MILLIGRAM(S): 2400 POWDER, FOR SUSPENSION ORAL at 12:52

## 2022-05-07 RX ADMIN — Medication 1 APPLICATION(S): at 12:08

## 2022-05-07 RX ADMIN — Medication 81 MILLIGRAM(S): at 11:26

## 2022-05-07 RX ADMIN — HEPARIN SODIUM 5000 UNIT(S): 5000 INJECTION INTRAVENOUS; SUBCUTANEOUS at 17:08

## 2022-05-07 RX ADMIN — Medication 1 APPLICATION(S): at 12:52

## 2022-05-07 RX ADMIN — SEVELAMER CARBONATE 800 MILLIGRAM(S): 2400 POWDER, FOR SUSPENSION ORAL at 10:38

## 2022-05-07 RX ADMIN — CLOPIDOGREL BISULFATE 75 MILLIGRAM(S): 75 TABLET, FILM COATED ORAL at 11:26

## 2022-05-07 RX ADMIN — Medication 1 TABLET(S): at 11:26

## 2022-05-07 RX ADMIN — ERYTHROPOIETIN 12000 UNIT(S): 10000 INJECTION, SOLUTION INTRAVENOUS; SUBCUTANEOUS at 09:26

## 2022-05-07 RX ADMIN — HEPARIN SODIUM 5000 UNIT(S): 5000 INJECTION INTRAVENOUS; SUBCUTANEOUS at 05:19

## 2022-05-07 RX ADMIN — ATORVASTATIN CALCIUM 40 MILLIGRAM(S): 80 TABLET, FILM COATED ORAL at 21:23

## 2022-05-07 RX ADMIN — SEVELAMER CARBONATE 800 MILLIGRAM(S): 2400 POWDER, FOR SUSPENSION ORAL at 17:08

## 2022-05-07 RX ADMIN — CHLORHEXIDINE GLUCONATE 1 APPLICATION(S): 213 SOLUTION TOPICAL at 12:52

## 2022-05-07 NOTE — PROGRESS NOTE ADULT - ASSESSMENT
62 yo F with pmh of ESRD on dialysis (Tu,Thurs,Sat), s/p L foot partial 3rd ray amputation and R hallux amputation 3/4 presenting from [place] for resp distress. [place] well yesterday however woke up at 5 AM this morning and could not breathe. Associated with subjective fevers and sweats. No chest pain. Is COVID vaccinated. Mild bl LE swelling. Has not missed dialysis.

## 2022-05-07 NOTE — PROGRESS NOTE ADULT - SUBJECTIVE AND OBJECTIVE BOX
Date of Service: 05-07-22 @ 13:39    Patient is a 61y old  Female who presents with a chief complaint of SOB and wheezing (07 May 2022 10:14)      Any change in ROS:  doing ok: no SOB:       MEDICATIONS  (STANDING):  aspirin  chewable 81 milliGRAM(s) Oral daily  atorvastatin 40 milliGRAM(s) Oral at bedtime  cadexomer iodine 0.9% Gel 1 Application(s) Topical daily  chlorhexidine 2% Cloths 1 Application(s) Topical daily  clopidogrel Tablet 75 milliGRAM(s) Oral daily  Dakins Solution - 1/2 Strength 1 Application(s) Topical daily  epoetin niesha-epbx (RETACRIT) Injectable 44150 Unit(s) IV Push <User Schedule>  heparin   Injectable 5000 Unit(s) SubCutaneous every 12 hours  multivitamin 1 Tablet(s) Oral daily  sevelamer carbonate 800 milliGRAM(s) Oral three times a day with meals    MEDICATIONS  (PRN):  acetaminophen     Tablet .. 650 milliGRAM(s) Oral every 6 hours PRN Temp greater or equal to 38.5C (101.3F), Moderate Pain (4 - 6)  nitroglycerin     SubLingual 0.4 milliGRAM(s) SubLingual every 5 minutes PRN Pulmonary Edema    Vital Signs Last 24 Hrs  T(C): 36.7 (07 May 2022 10:56), Max: 36.9 (06 May 2022 21:00)  T(F): 98 (07 May 2022 10:56), Max: 98.4 (06 May 2022 21:00)  HR: 70 (07 May 2022 10:56) (68 - 77)  BP: 131/65 (07 May 2022 10:56) (122/63 - 149/61)  BP(mean): --  RR: 18 (07 May 2022 10:56) (16 - 18)  SpO2: 99% (07 May 2022 05:00) (98% - 99%)    I&O's Summary    07 May 2022 07:01  -  07 May 2022 13:39  --------------------------------------------------------  IN: 600 mL / OUT: 1800 mL / NET: -1200 mL          Physical Exam:   GENERAL: NAD, well-groomed, well-developed  HEENT: MICAELA/   Atraumatic, Normocephalic  ENMT: No tonsillar erythema, exudates, or enlargement; Moist mucous membranes, Good dentition, No lesions  NECK: Supple, No JVD, Normal thyroid  CHEST/LUNG: bibasilar cracekls+  CVS: Regular rate and rhythm; No murmurs, rubs, or gallops  GI: : Soft, Nontender, Nondistended; Bowel sounds present  NERVOUS SYSTEM:  Alert & Oriented X3  EXTREMITIES: -edema  LYMPH: No lymphadenopathy noted  SKIN: No rashes or lesions  ENDOCRINOLOGY: No Thyromegaly  PSYCH: Appropriate    Labs:  29                            10.1   7.64  )-----------( 275      ( 07 May 2022 09:21 )             32.7                         10.0   7.90  )-----------( 252      ( 06 May 2022 07:45 )             33.1                         9.9    6.98  )-----------( 200      ( 05 May 2022 08:25 )             33.0                         9.5    10.98 )-----------( 211      ( 04 May 2022 06:42 )             31.1     05-07    139  |  94<L>  |  68<H>  ----------------------------<  99  4.1   |  23  |  8.22<H>  05-06    138  |  93<L>  |  39<H>  ----------------------------<  88  3.6   |  27  |  6.01<H>  05-05    139  |  95<L>  |  54<H>  ----------------------------<  74  4.4   |  24  |  8.08<H>  05-04    138  |  97<L>  |  39<H>  ----------------------------<  105<H>  4.5   |  27  |  6.08<H>    Ca    9.6      07 May 2022 07:59  Ca    9.8      06 May 2022 07:45  Phos  5.3     05-07  Phos  4.2     05-06  Mg     2.70     05-07  Mg     2.40     05-06      CAPILLARY BLOOD GLUCOSE            rad< from: Xray Chest 1 View- PORTABLE-Urgent (Xray Chest 1 View- PORTABLE-Urgent .) (05.05.22 @ 14:44) >  ACC: [id number] EXAM:  XR CHEST PORTABLE URGENT 1V                          PROCEDURE DATE:  05/05/2022          INTERPRETATION:  INDICATION: Followup of pulmonary edema.    COMPARISON: 2/28/22    FINDINGS:  Heart/Vascular: Cardiomediastinal silhouette is within normal limits.  Pulmonary: Redemonstration of bilateral diffuse airspace opacities   greater on the right than the left likely due to mild pulmonary edema. No   pleural effusion or pneumothorax.  Bones: Degenerative changes of the bony structures.    Impression:  Stable mild pulmonary edema.        --- End of Report ---            SHARAD TODD MD; Attending Radiologist  This document has been electronically signed. May  6 2022  9:47AM    < end of copied text >            RECENT CULTURES:  05-04 @ 13:27 .Abscess LF wounbd   KINGSLEY      Pseudomonas aeruginosa  Klebsiella pneumoniae ESBL  Escherichia coli  Pseudomonas aeruginosa     Numerous Pseudomonas aeruginosa  Moderate Klebsiella pneumoniae ESBL  Moderate Escherichia coli    05-03 @ 10:41 .Blood Blood-Peripheral                No growth to date.          RESPIRATORY CULTURES:          Studies  Chest X-RAY  CT SCAN Chest   Venous Dopplers: LE:   CT Abdomen  Others

## 2022-05-07 NOTE — PROGRESS NOTE ADULT - SUBJECTIVE AND OBJECTIVE BOX
Date of Service  : 05-07-22 @ 18:22    INTERVAL HPI/OVERNIGHT EVENTS: I feel better so off oxygen   Vital Signs Last 24 Hrs  T(C): 36.8 (07 May 2022 13:00), Max: 36.9 (06 May 2022 21:00)  T(F): 98.2 (07 May 2022 13:00), Max: 98.4 (06 May 2022 21:00)  HR: 72 (07 May 2022 13:00) (68 - 77)  BP: 122/60 (07 May 2022 13:00) (122/60 - 149/61)  BP(mean): --  RR: 18 (07 May 2022 13:00) (16 - 18)  SpO2: 98% (07 May 2022 13:00) (98% - 99%)  I&O's Summary    07 May 2022 07:01  -  07 May 2022 18:22  --------------------------------------------------------  IN: 600 mL / OUT: 1800 mL / NET: -1200 mL      MEDICATIONS  (STANDING):  aspirin  chewable 81 milliGRAM(s) Oral daily  atorvastatin 40 milliGRAM(s) Oral at bedtime  cadexomer iodine 0.9% Gel 1 Application(s) Topical daily  chlorhexidine 2% Cloths 1 Application(s) Topical daily  clopidogrel Tablet 75 milliGRAM(s) Oral daily  Dakins Solution - 1/2 Strength 1 Application(s) Topical daily  epoetin niesha-epbx (RETACRIT) Injectable 38866 Unit(s) IV Push <User Schedule>  heparin   Injectable 5000 Unit(s) SubCutaneous every 12 hours  multivitamin 1 Tablet(s) Oral daily  sevelamer carbonate 800 milliGRAM(s) Oral three times a day with meals    MEDICATIONS  (PRN):  acetaminophen     Tablet .. 650 milliGRAM(s) Oral every 6 hours PRN Temp greater or equal to 38.5C (101.3F), Moderate Pain (4 - 6)  nitroglycerin     SubLingual 0.4 milliGRAM(s) SubLingual every 5 minutes PRN Pulmonary Edema    LABS:                        10.1   7.64  )-----------( 275      ( 07 May 2022 09:21 )             32.7     05-07    139  |  94<L>  |  68<H>  ----------------------------<  99  4.1   |  23  |  8.22<H>    Ca    9.6      07 May 2022 07:59  Phos  5.3     05-07  Mg     2.70     05-07              Consultant(s) Notes Reviewed:  [x ] YES  [ ] NO    PHYSICAL EXAM:  GENERAL: NAD, well-groomed, well-developed ,not in any distress ,  HEAD:  Atraumatic, Normocephalic  NECK: Supple, No JVD, Normal thyroid  NERVOUS SYSTEM:  Alert & Oriented X3, No focal deficit   CHEST/LUNG: Good air entry bilateral with no  rales, rhonchi, wheezing, or rubs  HEART: Regular rate and rhythm; No murmurs, rubs, or gallops  ABDOMEN: Soft, Nontender, Nondistended; Bowel sounds present  EXTREMITIES:  feet dressed     Care Discussed with Consultants/Other Providers [ x] YES  [ ] NO

## 2022-05-07 NOTE — PROGRESS NOTE ADULT - ASSESSMENT
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 [place] well yesterday however woke up at 5 AM this morning and could not breathe. Associated with subjective fevers and sweats. No chest pain. Is COVID vaccinated. Mild bl LE swelling. Has not missed dialysis.     Problem/Plan - 1:  ·  Problem: Acute respiratory failure with hypoxia.   ·  Plan: Off BIPAP and NC Oxygen.      Problem/Plan - 2:  ·  Problem: Flash pulmonary edema.   ·  Plan: Sec to Fluid Overload from ESRD .  HD per renal.   Recent TTE noted.     Problem/Plan - 3:  ·  Problem: ESRD on hemodialysis.   ·  Plan: HD per renal.     Problem/Plan - 4:  ·  Problem: Hypertensive urgency.   ·  Plan: BP readings better .     Problem/Plan - 5:  ·  Problem: Fever with foot wound. .   ·  Plan: Podiatry consult noted.   ID following . No Abxs for now.     Dispo : OPAL planning to [facility].

## 2022-05-07 NOTE — PROGRESS NOTE ADULT - SUBJECTIVE AND OBJECTIVE BOX
[facility] - TIFFANY Bazzi / TIFFANY Shea / APARNA Vanegas/ TIFFANY Mcfarlane/ TIFFANY Madden/ MASSIEL Perkins / RAINER Potts / ESTEPHANIE Griffith  ---------------------------------------------------------------------------------------------------------------  seen and examined today for ESRD  Interval : NAD, tolerated HD  VITALS:  T(F): 98 (05-07-22 @ 06:50), Max: 98.4 (05-06-22 @ 21:00)  HR: 74 (05-07-22 @ 06:50)  BP: 149/61 (05-07-22 @ 06:50)  RR: 17 (05-07-22 @ 06:50)  SpO2: 99% (05-07-22 @ 05:00)  Wt(kg): --    Physical Exam :-  Constitutional: NAD  Neck: Supple.  Respiratory: Bilateral equal breath sounds,  Cardiovascular: S1, S2 normal,  Gastrointestinal: Bowel Sounds present, soft, non tender.  Extremities: No edema  Neurological: Alert and Oriented x 3, no focal deficits  Psychiatric: Normal mood, normal affect  Data:-  Allergies :   No Known Allergies    Hospital Medications:   MEDICATIONS  (STANDING):  aspirin  chewable 81 milliGRAM(s) Oral daily  atorvastatin 40 milliGRAM(s) Oral at bedtime  cadexomer iodine 0.9% Gel 1 Application(s) Topical daily  chlorhexidine 2% Cloths 1 Application(s) Topical daily  clopidogrel Tablet 75 milliGRAM(s) Oral daily  Dakins Solution - 1/2 Strength 1 Application(s) Topical daily  epoetin niesha-epbx (RETACRIT) Injectable 28406 Unit(s) IV Push <User Schedule>  heparin   Injectable 5000 Unit(s) SubCutaneous every 12 hours  multivitamin 1 Tablet(s) Oral daily  sevelamer carbonate 800 milliGRAM(s) Oral three times a day with meals    05-07    139  |  94<L>  |  68<H>  ----------------------------<  99  4.1   |  23  |  8.22<H>    Ca    9.6      07 May 2022 07:59  Phos  5.3     05-07  Mg     2.70     05-07      Creatinine Trend: 8.22 <--, 6.01 <--, 8.08 <--, 6.08 <--, 7.97 <--                        10.1   7.64  )-----------( 275      ( 07 May 2022 09:21 )             32.7

## 2022-05-07 NOTE — PROGRESS NOTE ADULT - ASSESSMENT
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 on BiPAP. Renal consulted for urgent HD.     End Stage Renal Disease on Dialysis  hemodialysis TTS at [place]  K wnl  hypervolemia better. s/p Flash pulmonary edema on admission   hb level at goal    Toelrated HD today with UF of 1.8k only, patient appears euvolemic (lungs clear no edema, OK BP)  no indication for extra hd today  renal diet, fluid restriction 1L/day   dose all meds for ESRD  c/w epo 12k tiw w/hd    Acute respiratory failure with hypoxia.   Off BIPAP     s/p Hypertensive urgency.   BP controlled well. not on bp meds. watch closely      For any question, call:  Cell # [phone]  Pager # [phone]  Callback # [phone]

## 2022-05-08 LAB
ANION GAP SERPL CALC-SCNC: 18 MMOL/L — HIGH (ref 7–14)
BUN SERPL-MCNC: 46 MG/DL — HIGH (ref 7–23)
CALCIUM SERPL-MCNC: 10.1 MG/DL — SIGNIFICANT CHANGE UP (ref 8.4–10.5)
CHLORIDE SERPL-SCNC: 94 MMOL/L — LOW (ref 98–107)
CO2 SERPL-SCNC: 25 MMOL/L — SIGNIFICANT CHANGE UP (ref 22–31)
CREAT SERPL-MCNC: 6.14 MG/DL — HIGH (ref 0.5–1.3)
CULTURE RESULTS: SIGNIFICANT CHANGE UP
CULTURE RESULTS: SIGNIFICANT CHANGE UP
EGFR: 7 ML/MIN/1.73M2 — LOW
GLUCOSE SERPL-MCNC: 117 MG/DL — HIGH (ref 70–99)
HCT VFR BLD CALC: 36.5 % — SIGNIFICANT CHANGE UP (ref 34.5–45)
HGB BLD-MCNC: 11.1 G/DL — LOW (ref 11.5–15.5)
MAGNESIUM SERPL-MCNC: 2.4 MG/DL — SIGNIFICANT CHANGE UP (ref 1.6–2.6)
MCHC RBC-ENTMCNC: 27.5 PG — SIGNIFICANT CHANGE UP (ref 27–34)
MCHC RBC-ENTMCNC: 30.4 GM/DL — LOW (ref 32–36)
MCV RBC AUTO: 90.6 FL — SIGNIFICANT CHANGE UP (ref 80–100)
NRBC # BLD: 0 /100 WBCS — SIGNIFICANT CHANGE UP
NRBC # FLD: 0.03 K/UL — HIGH
PHOSPHATE SERPL-MCNC: 4.5 MG/DL — SIGNIFICANT CHANGE UP (ref 2.5–4.5)
PLATELET # BLD AUTO: 314 K/UL — SIGNIFICANT CHANGE UP (ref 150–400)
POTASSIUM SERPL-MCNC: 4.3 MMOL/L — SIGNIFICANT CHANGE UP (ref 3.5–5.3)
POTASSIUM SERPL-SCNC: 4.3 MMOL/L — SIGNIFICANT CHANGE UP (ref 3.5–5.3)
RBC # BLD: 4.03 M/UL — SIGNIFICANT CHANGE UP (ref 3.8–5.2)
RBC # FLD: 15.3 % — HIGH (ref 10.3–14.5)
SODIUM SERPL-SCNC: 137 MMOL/L — SIGNIFICANT CHANGE UP (ref 135–145)
SPECIMEN SOURCE: SIGNIFICANT CHANGE UP
SPECIMEN SOURCE: SIGNIFICANT CHANGE UP
WBC # BLD: 8.52 K/UL — SIGNIFICANT CHANGE UP (ref 3.8–10.5)
WBC # FLD AUTO: 8.52 K/UL — SIGNIFICANT CHANGE UP (ref 3.8–10.5)

## 2022-05-08 RX ADMIN — Medication 1 APPLICATION(S): at 12:20

## 2022-05-08 RX ADMIN — SEVELAMER CARBONATE 800 MILLIGRAM(S): 2400 POWDER, FOR SUSPENSION ORAL at 17:26

## 2022-05-08 RX ADMIN — SEVELAMER CARBONATE 800 MILLIGRAM(S): 2400 POWDER, FOR SUSPENSION ORAL at 08:47

## 2022-05-08 RX ADMIN — Medication 81 MILLIGRAM(S): at 12:07

## 2022-05-08 RX ADMIN — Medication 1 APPLICATION(S): at 12:08

## 2022-05-08 RX ADMIN — HEPARIN SODIUM 5000 UNIT(S): 5000 INJECTION INTRAVENOUS; SUBCUTANEOUS at 17:27

## 2022-05-08 RX ADMIN — CLOPIDOGREL BISULFATE 75 MILLIGRAM(S): 75 TABLET, FILM COATED ORAL at 12:07

## 2022-05-08 RX ADMIN — Medication 1 TABLET(S): at 12:08

## 2022-05-08 RX ADMIN — HEPARIN SODIUM 5000 UNIT(S): 5000 INJECTION INTRAVENOUS; SUBCUTANEOUS at 05:35

## 2022-05-08 RX ADMIN — CHLORHEXIDINE GLUCONATE 1 APPLICATION(S): 213 SOLUTION TOPICAL at 12:08

## 2022-05-08 RX ADMIN — ATORVASTATIN CALCIUM 40 MILLIGRAM(S): 80 TABLET, FILM COATED ORAL at 21:07

## 2022-05-08 RX ADMIN — SEVELAMER CARBONATE 800 MILLIGRAM(S): 2400 POWDER, FOR SUSPENSION ORAL at 12:07

## 2022-05-08 NOTE — PROGRESS NOTE ADULT - ASSESSMENT
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 [place] well yesterday however woke up at 5 AM this morning and could not breathe. Associated with subjective fevers and sweats. No chest pain. Is COVID vaccinated. Mild bl LE swelling. Has not missed dialysis.     Problem/Plan - 1:  ·  Problem: Acute respiratory failure with hypoxia.   ·  Plan: Off BIPAP and NC Oxygen.      Problem/Plan - 2:  ·  Problem: Flash pulmonary edema.   ·  Plan: Sec to Fluid Overload from ESRD .  HD per renal.   Recent TTE noted.     Problem/Plan - 3:  ·  Problem: ESRD on hemodialysis.   ·  Plan: HD per renal.     Problem/Plan - 4:  ·  Problem: Hypertensive urgency.   ·  Plan: BP readings better .     Problem/Plan - 5:  ·  Problem: Fever with foot wound. .   ·  Plan: Podiatry consult noted.   ID following . No Abxs for now.     Dispo : OPAL planning to [facility].

## 2022-05-08 NOTE — PROGRESS NOTE ADULT - SUBJECTIVE AND OBJECTIVE BOX
Date of Service  : 05-08-22     INTERVAL HPI/OVERNIGHT EVENTS: I feel fine.   Vital Signs Last 24 Hrs  T(C): 36.7 (08 May 2022 13:00), Max: 36.7 (07 May 2022 21:20)  T(F): 98 (08 May 2022 13:00), Max: 98.1 (08 May 2022 05:20)  HR: 70 (08 May 2022 13:00) (69 - 72)  BP: 123/60 (08 May 2022 13:00) (114/62 - 123/60)  BP(mean): --  RR: 18 (08 May 2022 13:00) (16 - 18)  SpO2: 97% (08 May 2022 13:00) (97% - 97%)  I&O's Summary    07 May 2022 07:01  -  08 May 2022 07:00  --------------------------------------------------------  IN: 600 mL / OUT: 1800 mL / NET: -1200 mL      MEDICATIONS  (STANDING):  aspirin  chewable 81 milliGRAM(s) Oral daily  atorvastatin 40 milliGRAM(s) Oral at bedtime  cadexomer iodine 0.9% Gel 1 Application(s) Topical daily  chlorhexidine 2% Cloths 1 Application(s) Topical daily  clopidogrel Tablet 75 milliGRAM(s) Oral daily  Dakins Solution - 1/2 Strength 1 Application(s) Topical daily  epoetin niesha-epbx (RETACRIT) Injectable 15071 Unit(s) IV Push <User Schedule>  heparin   Injectable 5000 Unit(s) SubCutaneous every 12 hours  multivitamin 1 Tablet(s) Oral daily  sevelamer carbonate 800 milliGRAM(s) Oral three times a day with meals    MEDICATIONS  (PRN):  acetaminophen     Tablet .. 650 milliGRAM(s) Oral every 6 hours PRN Temp greater or equal to 38.5C (101.3F), Moderate Pain (4 - 6)  nitroglycerin     SubLingual 0.4 milliGRAM(s) SubLingual every 5 minutes PRN Pulmonary Edema    LABS:                        11.1   8.52  )-----------( 314      ( 08 May 2022 06:25 )             36.5     05-08    137  |  94<L>  |  46<H>  ----------------------------<  117<H>  4.3   |  25  |  6.14<H>    Ca    10.1      08 May 2022 06:25  Phos  4.5     05-08  Mg     2.40     05-08          CAPILLARY BLOOD GLUCOSE              REVIEW OF SYSTEMS:  CONSTITUTIONAL: No fever, weight loss, or fatigue  EYES: No eye pain, visual disturbances, or discharge  ENMT:  No difficulty hearing, tinnitus, vertigo; No sinus or throat pain  NECK: No pain or stiffness  RESPIRATORY: No cough, wheezing, chills or hemoptysis; No shortness of breath  CARDIOVASCULAR: No chest pain, palpitations, dizziness, or leg swelling      Consultant(s) Notes Reviewed:  [x ] YES  [ ] NO    PHYSICAL EXAM:  GENERAL: NAD, well-groomed, well-developed,not in any distress ,  HEAD:  Atraumatic, Normocephalic  NECK: Supple, No JVD, Normal thyroid  NERVOUS SYSTEM:  Alert & Oriented X3, No focal deficit   CHEST/LUNG: Good air entry bilateral with no  rales, rhonchi, wheezing, or rubs  HEART: Regular rate and rhythm; No murmurs, rubs, or gallops  ABDOMEN: Soft, Nontender, Nondistended; Bowel sounds present  EXTREMITIES:  Feet dressed.     Care Discussed with Consultants/Other Providers [ x] YES  [ ] NO

## 2022-05-08 NOTE — PROGRESS NOTE ADULT - SUBJECTIVE AND OBJECTIVE BOX
Date of Service: 05-08-22 @ 14:13    Patient is a 61y old  Female who presents with a chief complaint of SOB and wheezing (08 May 2022 07:56)      Any change in ROS: seen and examined:  doingok: not sob:]on room air:       MEDICATIONS  (STANDING):  aspirin  chewable 81 milliGRAM(s) Oral daily  atorvastatin 40 milliGRAM(s) Oral at bedtime  cadexomer iodine 0.9% Gel 1 Application(s) Topical daily  chlorhexidine 2% Cloths 1 Application(s) Topical daily  clopidogrel Tablet 75 milliGRAM(s) Oral daily  Dakins Solution - 1/2 Strength 1 Application(s) Topical daily  epoetin niesha-epbx (RETACRIT) Injectable 89166 Unit(s) IV Push <User Schedule>  heparin   Injectable 5000 Unit(s) SubCutaneous every 12 hours  multivitamin 1 Tablet(s) Oral daily  sevelamer carbonate 800 milliGRAM(s) Oral three times a day with meals    MEDICATIONS  (PRN):  acetaminophen     Tablet .. 650 milliGRAM(s) Oral every 6 hours PRN Temp greater or equal to 38.5C (101.3F), Moderate Pain (4 - 6)  nitroglycerin     SubLingual 0.4 milliGRAM(s) SubLingual every 5 minutes PRN Pulmonary Edema    Vital Signs Last 24 Hrs  T(C): 36.7 (08 May 2022 05:20), Max: 36.7 (07 May 2022 21:20)  T(F): 98.1 (08 May 2022 05:20), Max: 98.1 (08 May 2022 05:20)  HR: 69 (08 May 2022 05:20) (69 - 72)  BP: 116/60 (08 May 2022 05:20) (114/62 - 116/60)  BP(mean): --  RR: 16 (08 May 2022 05:20) (16 - 17)  SpO2: 97% (08 May 2022 05:20) (97% - 97%)    I&O's Summary    07 May 2022 07:01  -  08 May 2022 07:00  --------------------------------------------------------  IN: 600 mL / OUT: 1800 mL / NET: -1200 mL          Physical Exam:   GENERAL: NAD, well-groomed, well-developed  HEENT: MICAELA/   Atraumatic, Normocephalic  ENMT: No tonsillar erythema, exudates, or enlargement; Moist mucous membranes, Good dentition, No lesions  NECK: Supple, No JVD, Normal thyroid  CHEST/LUNG: Clear to auscultaion  CVS: Regular rate and rhythm; No murmurs, rubs, or gallops  GI: : Soft, Nontender, Nondistended; Bowel sounds present  NERVOUS SYSTEM:  Alert & Oriented X3  EXTREMITIES:  - edema  LYMPH: No lymphadenopathy noted  SKIN: No rashes or lesions  ENDOCRINOLOGY: No Thyromegaly  PSYCH: Appropriate    Labs:  29                            11.1   8.52  )-----------( 314      ( 08 May 2022 06:25 )             36.5                         10.1   7.64  )-----------( 275      ( 07 May 2022 09:21 )             32.7                         10.0   7.90  )-----------( 252      ( 06 May 2022 07:45 )             33.1                         9.9    6.98  )-----------( 200      ( 05 May 2022 08:25 )             33.0     05-08    137  |  94<L>  |  46<H>  ----------------------------<  117<H>  4.3   |  25  |  6.14<H>  05-07    139  |  94<L>  |  68<H>  ----------------------------<  99  4.1   |  23  |  8.22<H>  05-06    138  |  93<L>  |  39<H>  ----------------------------<  88  3.6   |  27  |  6.01<H>  05-05    139  |  95<L>  |  54<H>  ----------------------------<  74  4.4   |  24  |  8.08<H>    Ca    10.1      08 May 2022 06:25  Ca    9.6      07 May 2022 07:59  Phos  4.5     05-08  Phos  5.3     05-07  Mg     2.40     05-08  Mg     2.70     05-07      CAPILLARY BLOOD GLUCOSE            rad< from: Xray Chest 1 View- PORTABLE-Urgent (Xray Chest 1 View- PORTABLE-Urgent .) (05.05.22 @ 14:44) >    PROCEDURE DATE:  05/05/2022          INTERPRETATION:  INDICATION: Followup of pulmonary edema.    COMPARISON: 2/28/22    FINDINGS:  Heart/Vascular: Cardiomediastinal silhouette is within normal limits.  Pulmonary: Redemonstration of bilateral diffuse airspace opacities   greater on the right than the left likely due to mild pulmonary edema. No   pleural effusion or pneumothorax.  Bones: Degenerative changes of the bony structures.    Impression:  Stable mild pulmonary edema.        --- End of Report ---            SHARAD TODD MD; Attending Radiologist  This document has been electronically signed. May  6 2022  9:47AM    < end of copied text >            RECENT CULTURES:  05-04 @ 13:27 .Abscess LF wounbd   KINGSLEY      Pseudomonas aeruginosa  Klebsiella pneumoniae ESBL  Escherichia coli  Pseudomonas aeruginosa     Numerous Pseudomonas aeruginosa  Moderate Klebsiella pneumoniae ESBL  Moderate Escherichia coli    05-03 @ 07:05 .Blood Blood-Peripheral                No Growth Final    05-03 @ 06:49 .Blood Blood-Peripheral                No Growth Final          RESPIRATORY CULTURES:          Studies  Chest X-RAY  CT SCAN Chest   Venous Dopplers: LE:   CT Abdomen  Others

## 2022-05-08 NOTE — PROGRESS NOTE ADULT - SUBJECTIVE AND OBJECTIVE BOX
Cardiovascular Disease Progress Note    Overnight events: No acute events overnight. Ms. Boogie denies chest pain or SOB.     Otherwise review of systems negative    Objective Findings:  T(C): 36.7 (22 @ 05:20), Max: 36.8 (22 @ 13:00)  HR: 69 (22 @ 05:20) (69 - 72)  BP: 116/60 (22 @ 05:20) (114/62 - 131/65)  RR: 16 (22 @ 05:20) (16 - 18)  SpO2: 97% (22 @ 05:20) (97% - 98%)  Wt(kg): --  Daily     Daily Weight in k.5 (07 May 2022 10:56)      Physical Exam:  Gen: NAD; Patient resting comfortably  HEENT: EOMI, Normocephalic/ atraumatic  CV: RRR, normal S1 + S2, no m/r/g  Lungs:  Normal respiratory effort; clear to auscultation bilaterally  Abd: soft, non-tender; bowel sounds present  Ext: No edema; warm and well perfused    Telemetry: Sinus    Laboratory Data:                        11.1   8.52  )-----------( 314      ( 08 May 2022 06:25 )             36.5     05-08    137  |  94<L>  |  46<H>  ----------------------------<  117<H>  4.3   |  25  |  6.14<H>    Ca    10.1      08 May 2022 06:25  Phos  4.5     05-08  Mg     2.40     05-08                Inpatient Medications:  MEDICATIONS  (STANDING):  aspirin  chewable 81 milliGRAM(s) Oral daily  atorvastatin 40 milliGRAM(s) Oral at bedtime  cadexomer iodine 0.9% Gel 1 Application(s) Topical daily  chlorhexidine 2% Cloths 1 Application(s) Topical daily  clopidogrel Tablet 75 milliGRAM(s) Oral daily  Dakins Solution - 1/2 Strength 1 Application(s) Topical daily  epoetin niesha-epbx (RETACRIT) Injectable 75249 Unit(s) IV Push <User Schedule>  heparin   Injectable 5000 Unit(s) SubCutaneous every 12 hours  multivitamin 1 Tablet(s) Oral daily  sevelamer carbonate 800 milliGRAM(s) Oral three times a day with meals      Assessment: 61-year-old female with ESRD on HD, HLD and peripheral vascular disease s/p lower extremity resection presents with respiratory distress.     Plan of Care:    #Respiratory Distress-  Secondary to fluid overloaded.   Volume status is improved post HD.  O2 requirements are improving.   Fluid removal with HD as per the renal team.    #Elevated troponins-  Patient denies chest pain and EKG is non ischemic.  Likely due to reduced creatinine clearance.   Recent echo noted- normal LV systolic function.  No indication for cath at this time.  Continue DAPT given PVD.       #Peripheral vascular disease.  - S/p L foot partial 3rd ray amputation and R hallux amputation 3/2022.   - Vascular input and ABIs noted.   - Continue medical therapy with DAPT and statin    #ESRD-  - HD as per renal, as stated above.     Over 25 minutes spent on total encounter; more than 50% of the visit was spent counseling and/or coordinating care by the attending physician.      Cristino Adams MD [facility]  Cardiovascular Disease  [phone]

## 2022-05-08 NOTE — PROGRESS NOTE ADULT - ASSESSMENT
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 [place] well yesterday however woke up at 5 AM this morning and could not breathe. Associated with subjective fevers and sweats. No chest pain. Is COVID vaccinated. Mild bl LE swelling. Has not missed dialysis.

## 2022-05-09 LAB
ANION GAP SERPL CALC-SCNC: 20 MMOL/L — HIGH (ref 7–14)
BUN SERPL-MCNC: 65 MG/DL — HIGH (ref 7–23)
CALCIUM SERPL-MCNC: 10 MG/DL — SIGNIFICANT CHANGE UP (ref 8.4–10.5)
CHLORIDE SERPL-SCNC: 93 MMOL/L — LOW (ref 98–107)
CO2 SERPL-SCNC: 24 MMOL/L — SIGNIFICANT CHANGE UP (ref 22–31)
CREAT SERPL-MCNC: 8.3 MG/DL — HIGH (ref 0.5–1.3)
CULTURE RESULTS: SIGNIFICANT CHANGE UP
EGFR: 5 ML/MIN/1.73M2 — LOW
GLUCOSE SERPL-MCNC: 95 MG/DL — SIGNIFICANT CHANGE UP (ref 70–99)
HCT VFR BLD CALC: 36.2 % — SIGNIFICANT CHANGE UP (ref 34.5–45)
HGB BLD-MCNC: 11 G/DL — LOW (ref 11.5–15.5)
MAGNESIUM SERPL-MCNC: 2.6 MG/DL — SIGNIFICANT CHANGE UP (ref 1.6–2.6)
MCHC RBC-ENTMCNC: 27.7 PG — SIGNIFICANT CHANGE UP (ref 27–34)
MCHC RBC-ENTMCNC: 30.4 GM/DL — LOW (ref 32–36)
MCV RBC AUTO: 91.2 FL — SIGNIFICANT CHANGE UP (ref 80–100)
NRBC # BLD: 0 /100 WBCS — SIGNIFICANT CHANGE UP
NRBC # FLD: 0.02 K/UL — HIGH
ORGANISM # SPEC MICROSCOPIC CNT: SIGNIFICANT CHANGE UP
PHOSPHATE SERPL-MCNC: 6.3 MG/DL — HIGH (ref 2.5–4.5)
PLATELET # BLD AUTO: 320 K/UL — SIGNIFICANT CHANGE UP (ref 150–400)
POTASSIUM SERPL-MCNC: 4.4 MMOL/L — SIGNIFICANT CHANGE UP (ref 3.5–5.3)
POTASSIUM SERPL-SCNC: 4.4 MMOL/L — SIGNIFICANT CHANGE UP (ref 3.5–5.3)
RBC # BLD: 3.97 M/UL — SIGNIFICANT CHANGE UP (ref 3.8–5.2)
RBC # FLD: 15.5 % — HIGH (ref 10.3–14.5)
SARS-COV-2 RNA SPEC QL NAA+PROBE: SIGNIFICANT CHANGE UP
SODIUM SERPL-SCNC: 137 MMOL/L — SIGNIFICANT CHANGE UP (ref 135–145)
SPECIMEN SOURCE: SIGNIFICANT CHANGE UP
WBC # BLD: 8.26 K/UL — SIGNIFICANT CHANGE UP (ref 3.8–10.5)
WBC # FLD AUTO: 8.26 K/UL — SIGNIFICANT CHANGE UP (ref 3.8–10.5)

## 2022-05-09 RX ADMIN — ATORVASTATIN CALCIUM 40 MILLIGRAM(S): 80 TABLET, FILM COATED ORAL at 21:13

## 2022-05-09 RX ADMIN — Medication 1 APPLICATION(S): at 11:44

## 2022-05-09 RX ADMIN — Medication 81 MILLIGRAM(S): at 11:44

## 2022-05-09 RX ADMIN — HEPARIN SODIUM 5000 UNIT(S): 5000 INJECTION INTRAVENOUS; SUBCUTANEOUS at 17:04

## 2022-05-09 RX ADMIN — CHLORHEXIDINE GLUCONATE 1 APPLICATION(S): 213 SOLUTION TOPICAL at 11:42

## 2022-05-09 RX ADMIN — Medication 1 TABLET(S): at 11:43

## 2022-05-09 RX ADMIN — SEVELAMER CARBONATE 800 MILLIGRAM(S): 2400 POWDER, FOR SUSPENSION ORAL at 17:04

## 2022-05-09 RX ADMIN — HEPARIN SODIUM 5000 UNIT(S): 5000 INJECTION INTRAVENOUS; SUBCUTANEOUS at 05:26

## 2022-05-09 RX ADMIN — SEVELAMER CARBONATE 800 MILLIGRAM(S): 2400 POWDER, FOR SUSPENSION ORAL at 11:44

## 2022-05-09 RX ADMIN — CLOPIDOGREL BISULFATE 75 MILLIGRAM(S): 75 TABLET, FILM COATED ORAL at 11:43

## 2022-05-09 RX ADMIN — SEVELAMER CARBONATE 800 MILLIGRAM(S): 2400 POWDER, FOR SUSPENSION ORAL at 09:01

## 2022-05-09 NOTE — PROGRESS NOTE ADULT - SUBJECTIVE AND OBJECTIVE BOX
Date of Service  : 05-09-22 @ 16:12    INTERVAL HPI/OVERNIGHT EVENTS: I feel fine.   Vital Signs Last 24 Hrs  T(C): 36.9 (09 May 2022 05:03), Max: 36.9 (09 May 2022 05:03)  T(F): 98.4 (09 May 2022 05:03), Max: 98.4 (09 May 2022 05:03)  HR: 69 (09 May 2022 05:03) (69 - 75)  BP: 136/52 (09 May 2022 05:03) (122/60 - 136/52)  BP(mean): --  RR: 17 (09 May 2022 05:03) (17 - 17)  SpO2: 99% (09 May 2022 05:03) (98% - 99%)  I&O's Summary    MEDICATIONS  (STANDING):  aspirin  chewable 81 milliGRAM(s) Oral daily  atorvastatin 40 milliGRAM(s) Oral at bedtime  cadexomer iodine 0.9% Gel 1 Application(s) Topical daily  chlorhexidine 2% Cloths 1 Application(s) Topical daily  clopidogrel Tablet 75 milliGRAM(s) Oral daily  Dakins Solution - 1/2 Strength 1 Application(s) Topical daily  epoetin niesha-epbx (RETACRIT) Injectable 44917 Unit(s) IV Push <User Schedule>  heparin   Injectable 5000 Unit(s) SubCutaneous every 12 hours  multivitamin 1 Tablet(s) Oral daily  sevelamer carbonate 800 milliGRAM(s) Oral three times a day with meals    MEDICATIONS  (PRN):  acetaminophen     Tablet .. 650 milliGRAM(s) Oral every 6 hours PRN Temp greater or equal to 38.5C (101.3F), Moderate Pain (4 - 6)  nitroglycerin     SubLingual 0.4 milliGRAM(s) SubLingual every 5 minutes PRN Pulmonary Edema    LABS:                        11.0   8.26  )-----------( 320      ( 09 May 2022 05:53 )             36.2     05-09    137  |  93<L>  |  65<H>  ----------------------------<  95  4.4   |  24  |  8.30<H>    Ca    10.0      09 May 2022 05:51  Phos  6.3     05-09  Mg     2.60     05-09          CAPILLARY BLOOD GLUCOSE              REVIEW OF SYSTEMS:  CONSTITUTIONAL: No fever, weight loss, or fatigue  EYES: No eye pain, visual disturbances, or discharge  ENMT:  No difficulty hearing, tinnitus, vertigo; No sinus or throat pain  NECK: No pain or stiffness  RESPIRATORY: No cough, wheezing, chills or hemoptysis; No shortness of breath  CARDIOVASCULAR: No chest pain, palpitations, dizziness, or leg swelling  GASTROINTESTINAL: No abdominal or epigastric pain. No nausea, vomiting, or hematemesis; No diarrhea or constipation. No melena or hematochezia.  GENITOURINARY: No dysuria, frequency, hematuria, or incontinence  NEUROLOGICAL: No headaches, memory loss, loss of strength, numbness, or tremors      Consultant(s) Notes Reviewed:  [x ] YES  [ ] NO    PHYSICAL EXAM:  GENERAL: NAD, well-groomed, well-developed,not in any distress ,  HEAD:  Atraumatic, Normocephalic  NECK: Supple, No JVD, Normal thyroid  NERVOUS SYSTEM:  Alert & Oriented X3, No focal deficit   CHEST/LUNG: Good air entry bilateral with no  rales, rhonchi, wheezing, or rubs  HEART: Regular rate and rhythm; No murmurs, rubs, or gallops  ABDOMEN: Soft, Nontender, Nondistended; Bowel sounds present  EXTREMITIES:  foot dressed     Care Discussed with Consultants/Other Providers [ x] YES  [ ] NO

## 2022-05-09 NOTE — PROGRESS NOTE ADULT - SUBJECTIVE AND OBJECTIVE BOX
Cardiovascular Disease Progress Note    Overnight events: No acute events overnight. Ms. Boogie denies chest pain or SOB.     Otherwise review of systems negative    Objective Findings:  T(C): 36.9 (05-09-22 @ 05:03), Max: 36.9 (05-09-22 @ 05:03)  HR: 69 (05-09-22 @ 05:03) (69 - 75)  BP: 136/52 (05-09-22 @ 05:03) (122/60 - 136/52)  RR: 17 (05-09-22 @ 05:03) (17 - 18)  SpO2: 99% (05-09-22 @ 05:03) (97% - 99%)  Wt(kg): --  Daily     Daily       Physical Exam:  Gen: NAD; Patient resting comfortably  HEENT: EOMI, Normocephalic/ atraumatic  CV: RRR, normal S1 + S2, no m/r/g  Lungs:  Normal respiratory effort; decreased air entry to auscultation bilaterally  Abd: soft, non-tender; bowel sounds present  Ext: No edema; warm and well perfused    Telemetry: Sinus; no ectopy    Laboratory Data:                        11.0   8.26  )-----------( 320      ( 09 May 2022 05:53 )             36.2     05-09    137  |  93<L>  |  65<H>  ----------------------------<  95  4.4   |  24  |  8.30<H>    Ca    10.0      09 May 2022 05:51  Phos  6.3     05-09  Mg     2.60     05-09                Inpatient Medications:  MEDICATIONS  (STANDING):  aspirin  chewable 81 milliGRAM(s) Oral daily  atorvastatin 40 milliGRAM(s) Oral at bedtime  cadexomer iodine 0.9% Gel 1 Application(s) Topical daily  chlorhexidine 2% Cloths 1 Application(s) Topical daily  clopidogrel Tablet 75 milliGRAM(s) Oral daily  Dakins Solution - 1/2 Strength 1 Application(s) Topical daily  epoetin niesha-epbx (RETACRIT) Injectable 57063 Unit(s) IV Push <User Schedule>  heparin   Injectable 5000 Unit(s) SubCutaneous every 12 hours  multivitamin 1 Tablet(s) Oral daily  sevelamer carbonate 800 milliGRAM(s) Oral three times a day with meals      Assessment: 61-year-old female with ESRD on HD, HLD and peripheral vascular disease s/p lower extremity resection presents with respiratory distress.     Plan of Care:    #Respiratory Distress-  Secondary to fluid overloaded.   Volume status is improved post HD.  O2 requirements are improving.   Fluid removal with HD as per the renal team.    #Elevated troponins-  Patient denies chest pain and EKG is non ischemic.  Likely due to reduced creatinine clearance.   Recent echo noted- normal LV systolic function.  No indication for cath at this time.  Continue DAPT given PVD.       #Peripheral vascular disease.  - S/p L foot partial 3rd ray amputation and R hallux amputation 3/2022.   - Vascular input and ABIs noted.   - Continue medical therapy with DAPT and statin    #ESRD-  - HD as per renal, as stated above.     #ACP (advance care planning)-  CPT 29981  Advanced care planning was discussed with the patient.   Cardiac findings were discussed in detail and all questions were answered.   30 minutes spent addressing advance care plans.    Over 55 minutes spent on total encounter; more than 50% of the visit was spent counseling and/or coordinating care by the attending physician.      Cristino Adams MD [facility]  Cardiovascular Disease  [phone]

## 2022-05-09 NOTE — PROGRESS NOTE ADULT - SUBJECTIVE AND OBJECTIVE BOX
Date of Service: 05-09-22 @ 13:26    Patient is a 61y old  Female who presents with a chief complaint of SOB and wheezing (09 May 2022 07:30)      Any change in ROS: Doing pretty good;  no sob:  no cough:   no wheezing:       MEDICATIONS  (STANDING):  aspirin  chewable 81 milliGRAM(s) Oral daily  atorvastatin 40 milliGRAM(s) Oral at bedtime  cadexomer iodine 0.9% Gel 1 Application(s) Topical daily  chlorhexidine 2% Cloths 1 Application(s) Topical daily  clopidogrel Tablet 75 milliGRAM(s) Oral daily  Dakins Solution - 1/2 Strength 1 Application(s) Topical daily  epoetin niesha-epbx (RETACRIT) Injectable 34959 Unit(s) IV Push <User Schedule>  heparin   Injectable 5000 Unit(s) SubCutaneous every 12 hours  multivitamin 1 Tablet(s) Oral daily  sevelamer carbonate 800 milliGRAM(s) Oral three times a day with meals    MEDICATIONS  (PRN):  acetaminophen     Tablet .. 650 milliGRAM(s) Oral every 6 hours PRN Temp greater or equal to 38.5C (101.3F), Moderate Pain (4 - 6)  nitroglycerin     SubLingual 0.4 milliGRAM(s) SubLingual every 5 minutes PRN Pulmonary Edema    Vital Signs Last 24 Hrs  T(C): 36.9 (09 May 2022 05:03), Max: 36.9 (09 May 2022 05:03)  T(F): 98.4 (09 May 2022 05:03), Max: 98.4 (09 May 2022 05:03)  HR: 69 (09 May 2022 05:03) (69 - 75)  BP: 136/52 (09 May 2022 05:03) (122/60 - 136/52)  BP(mean): --  RR: 17 (09 May 2022 05:03) (17 - 17)  SpO2: 99% (09 May 2022 05:03) (98% - 99%)    I&O's Summary        Physical Exam:   GENERAL: NAD, well-groomed, well-developed  HEENT: MICAELA/   Atraumatic, Normocephalic  ENMT: No tonsillar erythema, exudates, or enlargement; Moist mucous membranes, Good dentition, No lesions  NECK: Supple, No JVD, Normal thyroid  CHEST/LUNG: Clear to auscultaion  CVS: Regular rate and rhythm; No murmurs, rubs, or gallops  GI: : Soft, Nontender, Nondistended; Bowel sounds present  NERVOUS SYSTEM:  Alert & Oriented X3  EXTREMITIES:  - edema  LYMPH: No lymphadenopathy noted  SKIN: No rashes or lesions  ENDOCRINOLOGY: No Thyromegaly  PSYCH: Appropriate    Labs:  29                            11.0   8.26  )-----------( 320      ( 09 May 2022 05:53 )             36.2                         11.1   8.52  )-----------( 314      ( 08 May 2022 06:25 )             36.5                         10.1   7.64  )-----------( 275      ( 07 May 2022 09:21 )             32.7                         10.0   7.90  )-----------( 252      ( 06 May 2022 07:45 )             33.1     05-09    137  |  93<L>  |  65<H>  ----------------------------<  95  4.4   |  24  |  8.30<H>  05-08    137  |  94<L>  |  46<H>  ----------------------------<  117<H>  4.3   |  25  |  6.14<H>  05-07    139  |  94<L>  |  68<H>  ----------------------------<  99  4.1   |  23  |  8.22<H>  05-06    138  |  93<L>  |  39<H>  ----------------------------<  88  3.6   |  27  |  6.01<H>    Ca    10.0      09 May 2022 05:51  Ca    10.1      08 May 2022 06:25  Phos  6.3     05-09  Phos  4.5     05-08  Mg     2.60     05-09  Mg     2.40     05-08      CAPILLARY BLOOD GLUCOSE                ra< from: Xray Chest 1 View- PORTABLE-Urgent (Xray Chest 1 View- PORTABLE-Urgent .) (05.05.22 @ 14:44) >    ACC: [id number] EXAM:  XR CHEST PORTABLE URGENT 1V                          PROCEDURE DATE:  05/05/2022          INTERPRETATION:  INDICATION: Followup of pulmonary edema.    COMPARISON: 2/28/22    FINDINGS:  Heart/Vascular: Cardiomediastinal silhouette is within normal limits.  Pulmonary: Redemonstration of bilateral diffuse airspace opacities   greater on the right than the left likely due to mild pulmonary edema. No   pleural effusion or pneumothorax.  Bones: Degenerative changes of the bony structures.    Impression:  Stable mild pulmonary edema.        --- End of Report ---            SHARAD TODD MD; Attending Radiologist  This document has been electronically signed. May  6 2022  9:47AM    < end of copied text >        RECENT CULTURES:  05-04 @ 09:53 .Abscess LF wounbd   KINGSLEY      Pseudomonas aeruginosa  Klebsiella pneumoniae ESBL  Escherichia coli  Pseudomonas aeruginosa     Numerous Pseudomonas aeruginosa  Moderate Klebsiella pneumoniae ESBL  Moderate Escherichia coli    05-03 @ 07:05 .Blood Blood-Peripheral                No Growth Final    05-03 @ 06:49 .Blood Blood-Peripheral                No Growth Final          RESPIRATORY CULTURES:          Studies  Chest X-RAY  CT SCAN Chest   Venous Dopplers: LE:   CT Abdomen  Others

## 2022-05-09 NOTE — PROGRESS NOTE ADULT - ASSESSMENT
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 on BiPAP. Renal consulted for urgent HD.     End Stage Renal Disease on Dialysis  hemodialysis TTS at [place]  K wnl  hypervolemia better. s/p Flash pulmonary edema on admission   hb level at goal    s/p HD SAT  plan for next HD tomorrow AM w/2k bath, uf 2.5kg as tolerated  no indication for extra hd today  renal diet, fluid restriction 1L/day   dose all meds for ESRD  hold epo 12k tiw w/hd    s/p Acute respiratory failure with hypoxia.   Off BIPAP     s/p Hypertensive urgency.   BP controlled well. not on bp meds. watch closely    d/c plan per team  labs, chart reviewed  will closely follow up.   poc d/w pt  For any question, pl call:  [facility]  Cell -[phone]  Pager # [phone]  office # [phone]

## 2022-05-09 NOTE — PROGRESS NOTE ADULT - ASSESSMENT
62 yo F with pmh of ESRD on dialysis (Tu,Thurs,Sat), s/p L foot partial 3rd ray amputation and R hallux amputation 3/4 presenting from [place] for resp distress. [place] well yesterday however woke up at 5 AM this morning and could not breathe. Associated with subjective fevers and sweats. No chest pain. Is COVID vaccinated. Mild bl LE swelling. Has not missed dialysis.     Problem/Plan - 1:  ·  Problem: Acute respiratory failure with hypoxia.   ·  Plan: Off BIPAP and off NC Oxygen. Resolved.      Problem/Plan - 2:  ·  Problem: Flash pulmonary edema.   ·  Plan: Sec to Fluid Overload from ESRD .  HD per renal.   Recent TTE noted.     Problem/Plan - 3:  ·  Problem: ESRD on hemodialysis.   ·  Plan: HD per renal.     Problem/Plan - 4:  ·  Problem: Hypertensive urgency.   ·  Plan: BP readings better .     Problem/Plan - 5:  ·  Problem: Fever with foot wound. .   ·  Plan: Podiatry consult noted.   ID following . No Abxs for now.     Dispo : OPAL planning to [facility].

## 2022-05-09 NOTE — PROGRESS NOTE ADULT - ASSESSMENT
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 [place] well yesterday however woke up at 5 AM this morning and could not breathe. Associated with subjective fevers and sweats. No chest pain. Is COVID vaccinated. Mild bl LE swelling. Has not missed dialysis.

## 2022-05-10 LAB
ANION GAP SERPL CALC-SCNC: 23 MMOL/L — HIGH (ref 7–14)
BUN SERPL-MCNC: 86 MG/DL — HIGH (ref 7–23)
CALCIUM SERPL-MCNC: 9.8 MG/DL — SIGNIFICANT CHANGE UP (ref 8.4–10.5)
CHLORIDE SERPL-SCNC: 92 MMOL/L — LOW (ref 98–107)
CO2 SERPL-SCNC: 23 MMOL/L — SIGNIFICANT CHANGE UP (ref 22–31)
CREAT SERPL-MCNC: 10.32 MG/DL — HIGH (ref 0.5–1.3)
EGFR: 4 ML/MIN/1.73M2 — LOW
GLUCOSE BLDC GLUCOMTR-MCNC: 93 MG/DL — SIGNIFICANT CHANGE UP (ref 70–99)
GLUCOSE SERPL-MCNC: 92 MG/DL — SIGNIFICANT CHANGE UP (ref 70–99)
HCT VFR BLD CALC: 33.7 % — LOW (ref 34.5–45)
HGB BLD-MCNC: 10.3 G/DL — LOW (ref 11.5–15.5)
MAGNESIUM SERPL-MCNC: 2.8 MG/DL — HIGH (ref 1.6–2.6)
MCHC RBC-ENTMCNC: 26.9 PG — LOW (ref 27–34)
MCHC RBC-ENTMCNC: 30.6 GM/DL — LOW (ref 32–36)
MCV RBC AUTO: 88 FL — SIGNIFICANT CHANGE UP (ref 80–100)
NRBC # BLD: 0 /100 WBCS — SIGNIFICANT CHANGE UP
NRBC # FLD: 0 K/UL — SIGNIFICANT CHANGE UP
PHOSPHATE SERPL-MCNC: 7.7 MG/DL — HIGH (ref 2.5–4.5)
PLATELET # BLD AUTO: 357 K/UL — SIGNIFICANT CHANGE UP (ref 150–400)
POTASSIUM SERPL-MCNC: 4.7 MMOL/L — SIGNIFICANT CHANGE UP (ref 3.5–5.3)
POTASSIUM SERPL-SCNC: 4.7 MMOL/L — SIGNIFICANT CHANGE UP (ref 3.5–5.3)
RBC # BLD: 3.83 M/UL — SIGNIFICANT CHANGE UP (ref 3.8–5.2)
RBC # FLD: 15.9 % — HIGH (ref 10.3–14.5)
SARS-COV-2 RNA SPEC QL NAA+PROBE: SIGNIFICANT CHANGE UP
SODIUM SERPL-SCNC: 138 MMOL/L — SIGNIFICANT CHANGE UP (ref 135–145)
WBC # BLD: 8.72 K/UL — SIGNIFICANT CHANGE UP (ref 3.8–10.5)
WBC # FLD AUTO: 8.72 K/UL — SIGNIFICANT CHANGE UP (ref 3.8–10.5)

## 2022-05-10 RX ADMIN — ATORVASTATIN CALCIUM 40 MILLIGRAM(S): 80 TABLET, FILM COATED ORAL at 21:25

## 2022-05-10 RX ADMIN — Medication 1 APPLICATION(S): at 12:35

## 2022-05-10 RX ADMIN — Medication 1 TABLET(S): at 12:36

## 2022-05-10 RX ADMIN — CHLORHEXIDINE GLUCONATE 1 APPLICATION(S): 213 SOLUTION TOPICAL at 12:54

## 2022-05-10 RX ADMIN — SEVELAMER CARBONATE 800 MILLIGRAM(S): 2400 POWDER, FOR SUSPENSION ORAL at 10:49

## 2022-05-10 RX ADMIN — Medication 81 MILLIGRAM(S): at 12:53

## 2022-05-10 RX ADMIN — HEPARIN SODIUM 5000 UNIT(S): 5000 INJECTION INTRAVENOUS; SUBCUTANEOUS at 17:55

## 2022-05-10 RX ADMIN — CLOPIDOGREL BISULFATE 75 MILLIGRAM(S): 75 TABLET, FILM COATED ORAL at 12:36

## 2022-05-10 RX ADMIN — Medication 1 APPLICATION(S): at 12:34

## 2022-05-10 RX ADMIN — ERYTHROPOIETIN 12000 UNIT(S): 10000 INJECTION, SOLUTION INTRAVENOUS; SUBCUTANEOUS at 08:34

## 2022-05-10 RX ADMIN — SEVELAMER CARBONATE 800 MILLIGRAM(S): 2400 POWDER, FOR SUSPENSION ORAL at 12:35

## 2022-05-10 RX ADMIN — HEPARIN SODIUM 5000 UNIT(S): 5000 INJECTION INTRAVENOUS; SUBCUTANEOUS at 05:13

## 2022-05-10 RX ADMIN — SEVELAMER CARBONATE 800 MILLIGRAM(S): 2400 POWDER, FOR SUSPENSION ORAL at 17:54

## 2022-05-10 NOTE — PROGRESS NOTE ADULT - ASSESSMENT
60yo F with Hx ESRD (on HD TRS) s/p L foot partial 3rd ray amputation and R hallux amputation on 3/4 who p/w respiratory distress. Vascular Surgery consulted for evaluation of PAD iso non-healing LE foot wounds. OR plan tomorrow for LLE angiogram.     PLAN:   - Plan for OR plan for LLE angiogram  - Please optimize patient from MEDICAL perspective for LLE angiogram and document clearance/optimization when appropriate   - HD today pre-op  - Please pre-op patient as follows:      - NPO past midnight for OR, IVF/DM management per discretion of primary team     - AM labs tomorrow: CBC, BMP, PT/PTT/INR     - Maintain an active T+S     - COVID result within 72h       Rosie Peace, PGY-2  C Team Surgery  #[id number]  60yo F with Hx ESRD (on HD TRS) s/p L foot partial 3rd ray amputation and R hallux amputation on 3/4 who p/w respiratory distress. Vascular Surgery consulted for evaluation of PAD iso non-healing LE foot wounds. OR plan tomorrow for LLE angiogram.     PLAN:   - Plan for OR plan for LLE angiogram  - Please optimize patient from MEDICAL perspective for LLE angiogram and document clearance/optimization when appropriate   - Please optimize patient from CARDIAC perspective for LLE angiogram and document clearance/optimization when appropriate   - HD today pre-op  - Please pre-op patient as follows:      - NPO past midnight for OR, IVF/DM management per discretion of primary team     - AM labs tomorrow: CBC, BMP, PT/PTT/INR     - Maintain an active T+S     - COVID result within 72h       Rosie Peace, PGY-2  C Team Surgery  #[id number]  62yo F with Hx ESRD (on HD TRS) s/p L foot partial 3rd ray amputation and R hallux amputation on 3/4 who p/w respiratory distress. Vascular Surgery consulted for evaluation of PAD iso non-healing LE foot wounds. OR plan tomorrow for LLE angiogram.     PLAN:   - Plan for OR plan for LLE angiogram tomorrow 5/11  - HD today pre-op  - Please pre-op patient as follows:      - NPO past midnight for OR, IVF/DM management per discretion of primary team     - AM labs tomorrow: CBC, BMP, PT/PTT/INR     - Maintain an active T+S     - COVID result within 72h       Rosie Peace, PGY-2  C Team Surgery  #[id number]

## 2022-05-10 NOTE — PROGRESS NOTE ADULT - SUBJECTIVE AND OBJECTIVE BOX
Date of Service: 05-10-22 @ 13:00    Patient is a 61y old  Female who presents with a chief complaint of SOB and wheezing (10 May 2022 07:33)      Any change in ROS: doing ok:  no sob:  ]on room air:       MEDICATIONS  (STANDING):  aspirin  chewable 81 milliGRAM(s) Oral daily  atorvastatin 40 milliGRAM(s) Oral at bedtime  cadexomer iodine 0.9% Gel 1 Application(s) Topical daily  chlorhexidine 2% Cloths 1 Application(s) Topical daily  clopidogrel Tablet 75 milliGRAM(s) Oral daily  Dakins Solution - 1/2 Strength 1 Application(s) Topical daily  epoetin niesha-epbx (RETACRIT) Injectable 75826 Unit(s) IV Push <User Schedule>  heparin   Injectable 5000 Unit(s) SubCutaneous every 12 hours  multivitamin 1 Tablet(s) Oral daily  sevelamer carbonate 800 milliGRAM(s) Oral three times a day with meals    MEDICATIONS  (PRN):  acetaminophen     Tablet .. 650 milliGRAM(s) Oral every 6 hours PRN Temp greater or equal to 38.5C (101.3F), Moderate Pain (4 - 6)  nitroglycerin     SubLingual 0.4 milliGRAM(s) SubLingual every 5 minutes PRN Pulmonary Edema    Vital Signs Last 24 Hrs  T(C): 36.3 (10 May 2022 12:10), Max: 36.9 (09 May 2022 21:10)  T(F): 97.3 (10 May 2022 12:10), Max: 98.4 (09 May 2022 21:10)  HR: 81 (10 May 2022 12:10) (70 - 81)  BP: 120/49 (10 May 2022 12:10) (113/57 - 151/58)  BP(mean): --  RR: 18 (10 May 2022 12:10) (17 - 18)  SpO2: 100% (10 May 2022 12:10) (95% - 100%)    I&O's Summary    10 May 2022 07:01  -  10 May 2022 13:00  --------------------------------------------------------  IN: 700 mL / OUT: 1900 mL / NET: -1200 mL          Physical Exam:   GENERAL: NAD, well-groomed, well-developed  HEENT: MICAELA/   Atraumatic, Normocephalic  ENMT: No tonsillar erythema, exudates, or enlargement; Moist mucous membranes, Good dentition, No lesions  NECK: Supple, No JVD, Normal thyroid  CHEST/LUNG: Scattered cracekls+  CVS: Regular rate and rhythm; No murmurs, rubs, or gallops  GI: : Soft, Nontender, Nondistended; Bowel sounds present  NERVOUS SYSTEM:  Alert & Oriented X3  EXTREMITIES: - edema  LYMPH: No lymphadenopathy noted  SKIN: No rashes or lesions  ENDOCRINOLOGY: No Thyromegaly  PSYCH: Appropriate    Labs:                              10.3   8.72  )-----------( 357      ( 10 May 2022 06:40 )             33.7                         11.0   8.26  )-----------( 320      ( 09 May 2022 05:53 )             36.2                         11.1   8.52  )-----------( 314      ( 08 May 2022 06:25 )             36.5                         10.1   7.64  )-----------( 275      ( 07 May 2022 09:21 )             32.7     05-10    138  |  92<L>  |  86<H>  ----------------------------<  92  4.7   |  23  |  10.32<H>  05-09    137  |  93<L>  |  65<H>  ----------------------------<  95  4.4   |  24  |  8.30<H>  05-08    137  |  94<L>  |  46<H>  ----------------------------<  117<H>  4.3   |  25  |  6.14<H>  05-07    139  |  94<L>  |  68<H>  ----------------------------<  99  4.1   |  23  |  8.22<H>    Ca    9.8      10 May 2022 06:40  Ca    10.0      09 May 2022 05:51  Phos  7.7     05-10  Phos  6.3     05-09  Mg     2.80     05-10  Mg     2.60     05-09      CAPILLARY BLOOD GLUCOSE      POCT Blood Glucose.: 93 mg/dL (10 May 2022 08:39)          rad< from: Xray Chest 1 View- PORTABLE-Urgent (Xray Chest 1 View- PORTABLE-Urgent .) (05.05.22 @ 14:44) >        INTERPRETATION:  INDICATION: Followup of pulmonary edema.    COMPARISON: 2/28/22    FINDINGS:  Heart/Vascular: Cardiomediastinal silhouette is within normal limits.  Pulmonary: Redemonstration of bilateral diffuse airspace opacities   greater on the right than the left likely due to mild pulmonary edema. No   pleural effusion or pneumothorax.  Bones: Degenerative changes of the bony structures.    Impression:  Stable mild pulmonary edema.        --- End of Report ---            SHARAD TODD MD; Attending Radiologist  This document has been electronically signed. May  6 2022  9:47AM    < end of copied text >          RECENT CULTURES:  05-04 @ 13:27 .Abscess LF wounbd   KINGSLEY      Pseudomonas aeruginosa  Klebsiella pneumoniae ESBL  Escherichia coli  Pseudomonas aeruginosa     Numerous Pseudomonas aeruginosa  Moderate Klebsiella pneumoniae ESBL  Moderate Escherichia coli          RESPIRATORY CULTURES:          Studies  Chest X-RAY  CT SCAN Chest   Venous Dopplers: LE:   CT Abdomen  Others

## 2022-05-10 NOTE — DIETITIAN INITIAL EVALUATION ADULT - PERTINENT LABORATORY DATA
05-10    138  |  92<L>  |  86<H>  ----------------------------<  92  4.7   |  23  |  10.32<H>    Ca    9.8      10 May 2022 06:40  Phos  7.7     05-10  Mg     2.80     05-10    POCT Blood Glucose.: 93 mg/dL (05-10-22 @ 08:39)  A1C with Estimated Average Glucose Result: 4.7 % (05-04-22 @ 06:42)  A1C with Estimated Average Glucose Result: 5.6 % (03-01-22 @ 07:59)

## 2022-05-10 NOTE — PROVIDER CONTACT NOTE (OTHER) - RECOMMENDATIONS
PA made aware, pending further orders.
MARY Garcia made aware
As per PA, continue to monitor at this time

## 2022-05-10 NOTE — DIETITIAN INITIAL EVALUATION ADULT - PERTINENT MEDS FT
MEDICATIONS  (STANDING):  aspirin  chewable 81 milliGRAM(s) Oral daily  atorvastatin 40 milliGRAM(s) Oral at bedtime  cadexomer iodine 0.9% Gel 1 Application(s) Topical daily  chlorhexidine 2% Cloths 1 Application(s) Topical daily  clopidogrel Tablet 75 milliGRAM(s) Oral daily  Dakins Solution - 1/2 Strength 1 Application(s) Topical daily  epoetin niesha-epbx (RETACRIT) Injectable 46727 Unit(s) IV Push <User Schedule>  heparin   Injectable 5000 Unit(s) SubCutaneous every 12 hours  multivitamin 1 Tablet(s) Oral daily  sevelamer carbonate 800 milliGRAM(s) Oral three times a day with meals    MEDICATIONS  (PRN):  acetaminophen     Tablet .. 650 milliGRAM(s) Oral every 6 hours PRN Temp greater or equal to 38.5C (101.3F), Moderate Pain (4 - 6)  nitroglycerin     SubLingual 0.4 milliGRAM(s) SubLingual every 5 minutes PRN Pulmonary Edema

## 2022-05-10 NOTE — PROGRESS NOTE ADULT - ASSESSMENT
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 on BiPAP. Renal consulted for urgent HD, now following for ESRD Mx.     End Stage Renal Disease on Dialysis  hemodialysis TTS at [place]  K wnl  hypervolemia resolved. s/p Flash pulmonary edema on admission   hb level at goal    s/p HD am today, Rx sheet reviewed, net UF dec to 1.2kg due to hypotension during hd  renal diet, fluid restriction 1L/day   dose all meds for ESRD  will dec epo 12>8k k tiw w/next hd    s/p Acute respiratory failure with hypoxia.   Off BIPAP     s/p Hypertensive urgency.   BP controlled well. not on bp meds. watch closely    d/c plan per team back to [place]  labs, chart reviewed  will closely follow up.   poc d/w pt  For any question, pl call:  [facility]  Cell -[phone]  Pager # [phone]  office # [phone]

## 2022-05-10 NOTE — PROGRESS NOTE ADULT - SUBJECTIVE AND OBJECTIVE BOX
[facility] - S Jluis / Shabbir S /D Romina/ S Denys/ S Chano/ Andrés Perkins / M Katlyn/ O Gladis  service -[phone], office [phone]  ---------------------------------------------------------------------------------------------------------------    Patient seen and examined bedside    Subjective and Objective: No overnight events, sob resolved. No complaints today. feeling better    Allergies: No Known Allergies      Hospital Medications:   MEDICATIONS  (STANDING):  aspirin  chewable 81 milliGRAM(s) Oral daily  atorvastatin 40 milliGRAM(s) Oral at bedtime  cadexomer iodine 0.9% Gel 1 Application(s) Topical daily  chlorhexidine 2% Cloths 1 Application(s) Topical daily  clopidogrel Tablet 75 milliGRAM(s) Oral daily  Dakins Solution - 1/2 Strength 1 Application(s) Topical daily  epoetin niesha-epbx (RETACRIT) Injectable 59839 Unit(s) IV Push <User Schedule>  heparin   Injectable 5000 Unit(s) SubCutaneous every 12 hours  multivitamin 1 Tablet(s) Oral daily  sevelamer carbonate 800 milliGRAM(s) Oral three times a day with meals    VITALS:  T(F): 97.3 (05-10-22 @ 12:10), Max: 98.4 (05-09-22 @ 21:10)  HR: 81 (05-10-22 @ 12:10)  BP: 120/49 (05-10-22 @ 12:10)  RR: 18 (05-10-22 @ 12:10)  SpO2: 100% (05-10-22 @ 12:10)  Wt(kg): --    05-10 @ 07:01  -  05-10 @ 17:01  --------------------------------------------------------  IN: 700 mL / OUT: 1900 mL / NET: -1200 mL      PHYSICAL EXAM:  Constitutional: NAD  HEENT: anicteric sclera  Neck: No JVD  Respiratory: CTA b/l, no wheezes  Cardiovascular: S1, S2, RRR  Gastrointestinal: BS+, soft, NT  Extremities: No peripheral edema  Neurological: A/O x 3  Psychiatric: Normal mood, normal affect  : No salcido.   Vascular Access: thigh AVG+    LABS:  05-10    138  |  92<L>  |  86<H>  ----------------------------<  92  4.7   |  23  |  10.32<H>    Ca    9.8      10 May 2022 06:40  Phos  7.7     05-10  Mg     2.80     05-10      Creatinine Trend: 10.32 <--, 8.30 <--, 6.14 <--, 8.22 <--, 6.01 <--, 8.08 <--, 6.08 <--                        10.3   8.72  )-----------( 357      ( 10 May 2022 06:40 )             33.7     Urine Studies:        RADIOLOGY & ADDITIONAL STUDIES:

## 2022-05-10 NOTE — PROGRESS NOTE ADULT - SUBJECTIVE AND OBJECTIVE BOX
Subjective:  Patient seen at bedside this AM. Reports feeling well, without complaints. Denies chest pain, SOB.      24h Events:   - Overnight, no acute events    Objective:  Vital Signs  T(C): 36.7 (05-10 @ 06:50), Max: 36.9 (05-09 @ 21:10)  HR: 73 (05-10 @ 06:50) (71 - 73)  BP: 139/61 (05-10 @ 06:50) (113/57 - 151/58)  RR: 17 (05-10 @ 06:50) (17 - 18)  SpO2: 98% (05-10 @ 05:05) (95% - 100%)    Physical Exam:  GEN: resting in bed comfortably in NAD  NEURO: awake, alert  RESP: no increased WOB  EXTR:   - RLE: 1st toe amp site c/d/i, palpable DP pulse, Dopplerable PT signal, sensation + motor intact  - LLE: wound between 2nd and third digits, open, no purulence or malodor, 5th toe amp site c/d/i, palpable DP pulse, Dopplerable PT signal, sensation + motor intact    Labs:             11.0   8.26  )-----------( 320      ( 09 May 2022 05:53 )             36.2     137  |  93<L>  |  65<H>  ----------------------------<  95  4.4   |  24  |  8.30<H>    Ca    10.0      09 May 2022 05:51  Phos  6.3     05-09  Mg     2.60     05-09      Medications:   MEDICATIONS  (STANDING):  aspirin  chewable 81 milliGRAM(s) Oral daily  atorvastatin 40 milliGRAM(s) Oral at bedtime  cadexomer iodine 0.9% Gel 1 Application(s) Topical daily  chlorhexidine 2% Cloths 1 Application(s) Topical daily  clopidogrel Tablet 75 milliGRAM(s) Oral daily  Dakins Solution - 1/2 Strength 1 Application(s) Topical daily  epoetin niesha-epbx (RETACRIT) Injectable 49276 Unit(s) IV Push <User Schedule>  heparin   Injectable 5000 Unit(s) SubCutaneous every 12 hours  multivitamin 1 Tablet(s) Oral daily  sevelamer carbonate 800 milliGRAM(s) Oral three times a day with meals    MEDICATIONS  (PRN):  acetaminophen     Tablet .. 650 milliGRAM(s) Oral every 6 hours PRN Temp greater or equal to 38.5C (101.3F), Moderate Pain (4 - 6)  nitroglycerin     SubLingual 0.4 milliGRAM(s) SubLingual every 5 minutes PRN Pulmonary Edema

## 2022-05-10 NOTE — PROGRESS NOTE ADULT - SUBJECTIVE AND OBJECTIVE BOX
Cardiovascular Disease Progress Note    Overnight events: No acute events overnight. Ms. Boogie denies chest pain or SOB.    Otherwise review of systems negative    Objective Findings:  T(C): 36.7 (05-10-22 @ 06:50), Max: 36.9 (22 @ 21:10)  HR: 73 (05-10-22 @ 06:50) (71 - 73)  BP: 139/61 (05-10-22 @ 06:50) (113/57 - 151/58)  RR: 17 (05-10-22 @ 06:50) (17 - 18)  SpO2: 98% (05-10-22 @ 05:05) (95% - 100%)  Wt(kg): --  Daily     Daily Weight in k.8 (10 May 2022 06:50)      Physical Exam:  Gen: NAD; Patient resting comfortably  HEENT: EOMI, Normocephalic/ atraumatic  CV: RRR, normal S1 + S2, no m/r/g  Lungs:  Normal respiratory effort; clear to auscultation bilaterally  Abd: soft, non-tender; bowel sounds present  Ext: No edema; warm and well perfused    Telemetry: Sinus; no ectopy    Laboratory Data:                        11.0   8.26  )-----------( 320      ( 09 May 2022 05:53 )             36.2         137  |  93<L>  |  65<H>  ----------------------------<  95  4.4   |  24  |  8.30<H>    Ca    10.0      09 May 2022 05:51  Phos  6.3       Mg     2.60                     Inpatient Medications:  MEDICATIONS  (STANDING):  aspirin  chewable 81 milliGRAM(s) Oral daily  atorvastatin 40 milliGRAM(s) Oral at bedtime  cadexomer iodine 0.9% Gel 1 Application(s) Topical daily  chlorhexidine 2% Cloths 1 Application(s) Topical daily  clopidogrel Tablet 75 milliGRAM(s) Oral daily  Dakins Solution - 1/2 Strength 1 Application(s) Topical daily  epoetin niesha-epbx (RETACRIT) Injectable 25631 Unit(s) IV Push <User Schedule>  heparin   Injectable 5000 Unit(s) SubCutaneous every 12 hours  multivitamin 1 Tablet(s) Oral daily  sevelamer carbonate 800 milliGRAM(s) Oral three times a day with meals      Assessment: 61-year-old female with ESRD on HD, HLD and peripheral vascular disease s/p lower extremity resection presents with respiratory distress.     Plan of Care:    #Respiratory Distress-  Secondary to fluid overloaded.   Volume status is improved post HD.  O2 requirements are improving.   Fluid removal with HD as per the renal team.    #Elevated troponins-  Patient denies chest pain and EKG is non ischemic.  Likely due to reduced creatinine clearance.   Recent echo noted- normal LV systolic function.  No indication for cath at this time.  Continue DAPT given PVD.       #Peripheral vascular disease.  - S/p L foot partial 3rd ray amputation and R hallux amputation 3/2022.   - Vascular input and ABIs noted.   - Continue medical therapy with DAPT and statin    #ESRD-  - HD as per renal, as stated above.       Over 25 minutes spent on total encounter; more than 50% of the visit was spent counseling and/or coordinating care by the attending physician.      Cristino Adams MD [facility]  Cardiovascular Disease  [phone] Cardiovascular Disease Progress Note    Overnight events: No acute events overnight. Ms. Boogie denies chest pain or SOB.    Otherwise review of systems negative    Objective Findings:  T(C): 36.7 (05-10-22 @ 06:50), Max: 36.9 (22 @ 21:10)  HR: 73 (05-10-22 @ 06:50) (71 - 73)  BP: 139/61 (05-10-22 @ 06:50) (113/57 - 151/58)  RR: 17 (05-10-22 @ 06:50) (17 - 18)  SpO2: 98% (05-10-22 @ 05:05) (95% - 100%)  Wt(kg): --  Daily     Daily Weight in k.8 (10 May 2022 06:50)      Physical Exam:  Gen: NAD; Patient resting comfortably  HEENT: EOMI, Normocephalic/ atraumatic  CV: RRR, normal S1 + S2, no m/r/g  Lungs:  Normal respiratory effort; clear to auscultation bilaterally  Abd: soft, non-tender; bowel sounds present  Ext: No edema; warm and well perfused    Telemetry: Sinus; no ectopy    Laboratory Data:                        11.0   8.26  )-----------( 320      ( 09 May 2022 05:53 )             36.2         137  |  93<L>  |  65<H>  ----------------------------<  95  4.4   |  24  |  8.30<H>    Ca    10.0      09 May 2022 05:51  Phos  6.3       Mg     2.60                     Inpatient Medications:  MEDICATIONS  (STANDING):  aspirin  chewable 81 milliGRAM(s) Oral daily  atorvastatin 40 milliGRAM(s) Oral at bedtime  cadexomer iodine 0.9% Gel 1 Application(s) Topical daily  chlorhexidine 2% Cloths 1 Application(s) Topical daily  clopidogrel Tablet 75 milliGRAM(s) Oral daily  Dakins Solution - 1/2 Strength 1 Application(s) Topical daily  epoetin niesha-epbx (RETACRIT) Injectable 62629 Unit(s) IV Push <User Schedule>  heparin   Injectable 5000 Unit(s) SubCutaneous every 12 hours  multivitamin 1 Tablet(s) Oral daily  sevelamer carbonate 800 milliGRAM(s) Oral three times a day with meals      Assessment: 61-year-old female with ESRD on HD, HLD and peripheral vascular disease s/p lower extremity resection presents with respiratory distress.     Plan of Care:    #Respiratory Distress-  Secondary to fluid overloaded.   Volume status is improved post HD.  O2 requirements are improving.   Ms. Boogie is optimized from a cardiac standpoint to proceed with low risk lower extremity angiogram.     #Elevated troponins-  Patient denies chest pain and EKG is non ischemic.  Likely due to reduced creatinine clearance.   Recent echo noted- normal LV systolic function.  No indication for cath at this time.  Continue DAPT given PVD.       #Peripheral vascular disease.  - S/p L foot partial 3rd ray amputation and R hallux amputation 3/2022.   - Vascular input and ABIs noted.   - Continue medical therapy with DAPT and statin    #ESRD-  - HD as per renal, as stated above.       Over 25 minutes spent on total encounter; more than 50% of the visit was spent counseling and/or coordinating care by the attending physician.      Cristino Adams MD [facility]  Cardiovascular Disease  [phone]

## 2022-05-10 NOTE — PROGRESS NOTE ADULT - SUBJECTIVE AND OBJECTIVE BOX
Date of Service  : 05-10-22     INTERVAL HPI/OVERNIGHT EVENTS: I am getting angiogram tomorrow.   Vital Signs Last 24 Hrs  T(C): 37.1 (10 May 2022 21:20), Max: 37.1 (10 May 2022 21:20)  T(F): 98.8 (10 May 2022 21:20), Max: 98.8 (10 May 2022 21:20)  HR: 72 (10 May 2022 21:20) (70 - 81)  BP: 106/50 (10 May 2022 21:20) (106/50 - 139/61)  BP(mean): --  RR: 17 (10 May 2022 21:20) (17 - 18)  SpO2: 98% (10 May 2022 21:20) (98% - 100%)  I&O's Summary    10 May 2022 07:01  -  10 May 2022 22:41  --------------------------------------------------------  IN: 700 mL / OUT: 1900 mL / NET: -1200 mL      MEDICATIONS  (STANDING):  aspirin  chewable 81 milliGRAM(s) Oral daily  atorvastatin 40 milliGRAM(s) Oral at bedtime  cadexomer iodine 0.9% Gel 1 Application(s) Topical daily  chlorhexidine 2% Cloths 1 Application(s) Topical daily  clopidogrel Tablet 75 milliGRAM(s) Oral daily  Dakins Solution - 1/2 Strength 1 Application(s) Topical daily  epoetin niesha-epbx (RETACRIT) Injectable 04844 Unit(s) IV Push <User Schedule>  heparin   Injectable 5000 Unit(s) SubCutaneous every 12 hours  multivitamin 1 Tablet(s) Oral daily  sevelamer carbonate 800 milliGRAM(s) Oral three times a day with meals    MEDICATIONS  (PRN):  acetaminophen     Tablet .. 650 milliGRAM(s) Oral every 6 hours PRN Temp greater or equal to 38.5C (101.3F), Moderate Pain (4 - 6)  nitroglycerin     SubLingual 0.4 milliGRAM(s) SubLingual every 5 minutes PRN Pulmonary Edema    LABS:                        10.3   8.72  )-----------( 357      ( 10 May 2022 06:40 )             33.7     05-10    138  |  92<L>  |  86<H>  ----------------------------<  92  4.7   |  23  |  10.32<H>    Ca    9.8      10 May 2022 06:40  Phos  7.7     05-10  Mg     2.80     05-10          CAPILLARY BLOOD GLUCOSE      POCT Blood Glucose.: 93 mg/dL (10 May 2022 08:39)          REVIEW OF SYSTEMS:  CONSTITUTIONAL: No fever, weight loss, or fatigue  EYES: No eye pain, visual disturbances, or discharge  ENMT:  No difficulty hearing, tinnitus, vertigo; No sinus or throat pain  NECK: No pain or stiffness  RESPIRATORY: No cough, wheezing, chills or hemoptysis; No shortness of breath  CARDIOVASCULAR: No chest pain, palpitations, dizziness, or leg swelling  GASTROINTESTINAL: No abdominal or epigastric pain. No nausea, vomiting, or hematemesis; No diarrhea or constipation. No melena or hematochezia.  GENITOURINARY: No dysuria, frequency, hematuria, or incontinence      Consultant(s) Notes Reviewed:  [x ] YES  [ ] NO    PHYSICAL EXAM:  GENERAL: NAD, well-groomed, well-developed,not in any distress ,  HEAD:  Atraumatic, Normocephalic  NECK: Supple, No JVD, Normal thyroid  NERVOUS SYSTEM:  Alert & Oriented X3, No focal deficit   CHEST/LUNG: Good air entry bilateral with no  rales, rhonchi, wheezing, or rubs  HEART: Regular rate and rhythm; No murmurs, rubs, or gallops  ABDOMEN: Soft, Nontender, Nondistended; Bowel sounds present  EXTREMITIES:  Feet dressed     Care Discussed with Consultants/Other Providers [ x] YES  [ ] NO

## 2022-05-10 NOTE — PROGRESS NOTE ADULT - ASSESSMENT
62 yo F with pmh of ESRD on dialysis (Tu,Thurs,Sat), s/p L foot partial 3rd ray amputation and R hallux amputation 3/4 presenting from [place] for resp distress. [place] well yesterday however woke up at 5 AM this morning and could not breathe. Associated with subjective fevers and sweats. No chest pain. Is COVID vaccinated. Mild bl LE swelling. Has not missed dialysis.     Problem/Plan - 1:  ·  Problem: Acute respiratory failure with hypoxia.   ·  Plan: Off BIPAP and off NC Oxygen. Resolved.      Problem/Plan - 2:  ·  Problem: Flash pulmonary edema.   ·  Plan: Sec to Fluid Overload from ESRD .  HD per renal.   Recent TTE noted.     Problem/Plan - 3:  ·  Problem: ESRD on hemodialysis.   ·  Plan: HD per renal.     Problem/Plan - 4:  ·  Problem: Hypertensive urgency.   ·  Plan: BP readings better .     Problem/Plan - 5:  ·  Problem: Fever with foot wound with PVD  .   ·  Plan: Podiatry consult noted.   ID following . No Abxs for now.   Awaiting LLE Angiogram .,     Dispo : OPAL planning to [facility] pending Angio.    Medically optimized for LLE Angiogram. .

## 2022-05-10 NOTE — DIETITIAN INITIAL EVALUATION ADULT - NS FNS DIET ORDER
Diet, NPO after Midnight:      NPO Start Date: 10-May-2022,   NPO Start Time: 23:59  Except Medications (05-10-22 @ 08:40)

## 2022-05-10 NOTE — DIETITIAN INITIAL EVALUATION ADULT - OTHER INFO
RD visited with patient for length of stay nutrition assessment.      62 yo F with pmh of ESRD on dialysis (Tu,Thurs,Sat), s/p L foot partial 3rd ray amputation and R hallux amputation 3/4 presenting from [place] for resp distress.   Patient noted with acute respiratory failure with hypoxia (Resolved), flash pulmonary edema secondary to fluid OL from ESRD; dispo planning back to [facility].    Patient endorses a good appetite; No chewing or swallowing difficulties reported. No GI distress reported i.e. nausea, vomiting, diarrhea. Reviewed renal diet with patient at bedside; was previously educated on renal diet, but accepted diet materials and review of renal diet parameters.  Unsure of dry weight PTA. Stated she has been at [place] for rehab.

## 2022-05-10 NOTE — PROGRESS NOTE ADULT - ASSESSMENT
60 yo F with pmh of ESRD on dialysis (Tu,Thurs,Sat), s/p L foot partial 3rd ray amputation and R hallux amputation 3/4 presenting from [place] for resp distress. [place] well yesterday however woke up at 5 AM this morning and could not breathe. Associated with subjective fevers and sweats. No chest pain. Is COVID vaccinated. Mild bl LE swelling. Has not missed dialysis.

## 2022-05-11 LAB
ANION GAP SERPL CALC-SCNC: 18 MMOL/L — HIGH (ref 7–14)
APTT BLD: 30.7 SEC — SIGNIFICANT CHANGE UP (ref 27–36.3)
BLD GP AB SCN SERPL QL: NEGATIVE — SIGNIFICANT CHANGE UP
BUN SERPL-MCNC: 50 MG/DL — HIGH (ref 7–23)
CALCIUM SERPL-MCNC: 9.7 MG/DL — SIGNIFICANT CHANGE UP (ref 8.4–10.5)
CHLORIDE SERPL-SCNC: 93 MMOL/L — LOW (ref 98–107)
CO2 SERPL-SCNC: 26 MMOL/L — SIGNIFICANT CHANGE UP (ref 22–31)
CREAT SERPL-MCNC: 7.12 MG/DL — HIGH (ref 0.5–1.3)
EGFR: 6 ML/MIN/1.73M2 — LOW
GLUCOSE BLDC GLUCOMTR-MCNC: 104 MG/DL — HIGH (ref 70–99)
GLUCOSE SERPL-MCNC: 112 MG/DL — HIGH (ref 70–99)
HCT VFR BLD CALC: 35.8 % — SIGNIFICANT CHANGE UP (ref 34.5–45)
HGB BLD-MCNC: 11.2 G/DL — LOW (ref 11.5–15.5)
INR BLD: 0.98 RATIO — SIGNIFICANT CHANGE UP (ref 0.88–1.16)
MAGNESIUM SERPL-MCNC: 2.5 MG/DL — SIGNIFICANT CHANGE UP (ref 1.6–2.6)
MCHC RBC-ENTMCNC: 28.1 PG — SIGNIFICANT CHANGE UP (ref 27–34)
MCHC RBC-ENTMCNC: 31.3 GM/DL — LOW (ref 32–36)
MCV RBC AUTO: 89.7 FL — SIGNIFICANT CHANGE UP (ref 80–100)
NRBC # BLD: 0 /100 WBCS — SIGNIFICANT CHANGE UP
NRBC # FLD: 0 K/UL — SIGNIFICANT CHANGE UP
PHOSPHATE SERPL-MCNC: 5.6 MG/DL — HIGH (ref 2.5–4.5)
PLATELET # BLD AUTO: 360 K/UL — SIGNIFICANT CHANGE UP (ref 150–400)
POTASSIUM SERPL-MCNC: 4.1 MMOL/L — SIGNIFICANT CHANGE UP (ref 3.5–5.3)
POTASSIUM SERPL-SCNC: 4.1 MMOL/L — SIGNIFICANT CHANGE UP (ref 3.5–5.3)
PROTHROM AB SERPL-ACNC: 11.4 SEC — SIGNIFICANT CHANGE UP (ref 10.5–13.4)
RBC # BLD: 3.99 M/UL — SIGNIFICANT CHANGE UP (ref 3.8–5.2)
RBC # FLD: 15.9 % — HIGH (ref 10.3–14.5)
RH IG SCN BLD-IMP: POSITIVE — SIGNIFICANT CHANGE UP
SODIUM SERPL-SCNC: 137 MMOL/L — SIGNIFICANT CHANGE UP (ref 135–145)
WBC # BLD: 8.71 K/UL — SIGNIFICANT CHANGE UP (ref 3.8–10.5)
WBC # FLD AUTO: 8.71 K/UL — SIGNIFICANT CHANGE UP (ref 3.8–10.5)

## 2022-05-11 PROCEDURE — 76937 US GUIDE VASCULAR ACCESS: CPT | Mod: 26

## 2022-05-11 PROCEDURE — 37232: CPT

## 2022-05-11 PROCEDURE — 75625 CONTRAST EXAM ABDOMINL AORTA: CPT | Mod: 26

## 2022-05-11 PROCEDURE — 75710 ARTERY X-RAYS ARM/LEG: CPT | Mod: 26,59

## 2022-05-11 PROCEDURE — 37228: CPT | Mod: LT

## 2022-05-11 PROCEDURE — 99152 MOD SED SAME PHYS/QHP 5/>YRS: CPT

## 2022-05-11 RX ADMIN — CHLORHEXIDINE GLUCONATE 1 APPLICATION(S): 213 SOLUTION TOPICAL at 12:50

## 2022-05-11 RX ADMIN — CLOPIDOGREL BISULFATE 75 MILLIGRAM(S): 75 TABLET, FILM COATED ORAL at 10:51

## 2022-05-11 RX ADMIN — Medication 81 MILLIGRAM(S): at 10:50

## 2022-05-11 RX ADMIN — Medication 1 APPLICATION(S): at 14:21

## 2022-05-11 RX ADMIN — HEPARIN SODIUM 5000 UNIT(S): 5000 INJECTION INTRAVENOUS; SUBCUTANEOUS at 17:13

## 2022-05-11 RX ADMIN — SEVELAMER CARBONATE 800 MILLIGRAM(S): 2400 POWDER, FOR SUSPENSION ORAL at 12:52

## 2022-05-11 RX ADMIN — Medication 1 APPLICATION(S): at 12:50

## 2022-05-11 RX ADMIN — SEVELAMER CARBONATE 800 MILLIGRAM(S): 2400 POWDER, FOR SUSPENSION ORAL at 17:13

## 2022-05-11 RX ADMIN — ATORVASTATIN CALCIUM 40 MILLIGRAM(S): 80 TABLET, FILM COATED ORAL at 21:07

## 2022-05-11 RX ADMIN — HEPARIN SODIUM 5000 UNIT(S): 5000 INJECTION INTRAVENOUS; SUBCUTANEOUS at 05:42

## 2022-05-11 RX ADMIN — Medication 1 TABLET(S): at 12:51

## 2022-05-11 NOTE — PHYSICAL THERAPY INITIAL EVALUATION ADULT - LEVEL OF CONSCIOUSNESS, REHAB EVAL
Critical Care Interval Progress Note - Post-Op Check     Rolando Mcduffie 52 y o  male MRN: [id number]  Unit/Bed#: [facility] 518-01 Encounter: [id number]    Subjective and Objective: Intubated and sedated unable to provide information  Physical Exam  Constitutional:       General: He is not in acute distress  Appearance: He is well-developed  HENT:      Head: Normocephalic  Eyes:      Pupils: Pupils are equal, round, and reactive to light  Neck:      Vascular: No JVD  Comments: +CVC  Cardiovascular:      Rate and Rhythm: Normal rate and regular rhythm  Pulmonary:      Effort: No respiratory distress  Breath sounds: No wheezing or rales  Abdominal:      General: There is no distension  Palpations: Abdomen is soft  Musculoskeletal:      Right lower leg: Edema present  Left lower leg: Edema present  Skin:     General: Skin is warm and dry  Neurological:      Comments: Intubated and sedated          Assessment and Plan:  Principal Problem:    Obstructive pyelonephritis  Active Problems:    Lactic acidosis    ADRIANNE (acute kidney injury) ([facility] )    Sepsis (HCC)    Chronic kidney disease, stage 3 (HCC)    HTN (hypertension)    Morbid obesity with BMI of 50 0-59 9, adult ([facility] )  Resolved Problems:    * No resolved hospital problems  *      Patient s/p perc nephrostomy tube drain placement  Kept intubated by anesthesia due to metabolic derangements and multiple attempts required for PCN placement with concern for possible hematoma and additional procedures  Intraop was given 750cc albumin and multiple mery boluses along with 3 amps of bicarb  Patient presents on no infusions with blood tinged urine in bag  Plan to recheck STAT labs and CXR for line placement   Follow UOP and K for the need for possible dialysis   VAP bundle ordered   Andrew Lynn PA-C      Portions of the record may have been created with voice recognition software    Occasional wrong word or "sound a like" substitutions may have occurred due to the inherent limitations of voice recognition software    Read the chart carefully and recognize, using context, where substitutions have occurred alert

## 2022-05-11 NOTE — PROGRESS NOTE ADULT - SUBJECTIVE AND OBJECTIVE BOX
Subjective:  Patient seen at bedside this AM. Reports feeling well, without complaints. Denies chest pain, SOB.      24h Events:   - Overnight, no acute events    Objective:  Vital Signs  T(C): 36.9 (05-11 @ 05:35), Max: 37.1 (05-10 @ 21:20)  HR: 74 (05-11 @ 05:35) (70 - 81)  BP: 124/58 (05-11 @ 05:35) (106/50 - 124/69)  RR: 18 (05-11 @ 05:35) (17 - 18)  SpO2: 98% (05-11 @ 05:35) (98% - 100%)      Physical Exam:  GEN: resting in bed comfortably in NAD  NEURO: awake, alert  RESP: no increased WOB  EXTR:   - RLE: 1st toe amp site c/d/i, palpable DP pulse, Dopplerable PT signal, sensation + motor intact  - LLE: wound between 2nd and third digits, open, no purulence or malodor, 5th toe amp site c/d/i, palpable DP pulse, Dopplerable PT signal, sensation + motor intact      Labs:             11.2   8.71  )-----------( 360      ( 11 May 2022 04:00 )             35.8     137  |  93<L>  |  50<H>  ----------------------------<  112<H>  4.1   |  26  |  7.12<H>    Ca    9.7      11 May 2022 04:00  Phos  5.6     05-11  Mg     2.50     05-11    POCT Blood Glucose.: 104 mg/dL (11 May 2022 07:04)  POCT Blood Glucose.: 93 mg/dL (10 May 2022 08:39)      Medications:   MEDICATIONS  (STANDING):  aspirin  chewable 81 milliGRAM(s) Oral daily  atorvastatin 40 milliGRAM(s) Oral at bedtime  cadexomer iodine 0.9% Gel 1 Application(s) Topical daily  chlorhexidine 2% Cloths 1 Application(s) Topical daily  clopidogrel Tablet 75 milliGRAM(s) Oral daily  Dakins Solution - 1/2 Strength 1 Application(s) Topical daily  epoetin niseha-epbx (RETACRIT) Injectable 51752 Unit(s) IV Push <User Schedule>  heparin   Injectable 5000 Unit(s) SubCutaneous every 12 hours  multivitamin 1 Tablet(s) Oral daily  sevelamer carbonate 800 milliGRAM(s) Oral three times a day with meals    MEDICATIONS  (PRN):  acetaminophen     Tablet .. 650 milliGRAM(s) Oral every 6 hours PRN Temp greater or equal to 38.5C (101.3F), Moderate Pain (4 - 6)  nitroglycerin     SubLingual 0.4 milliGRAM(s) SubLingual every 5 minutes PRN Pulmonary Edema

## 2022-05-11 NOTE — PROGRESS NOTE ADULT - SUBJECTIVE AND OBJECTIVE BOX
Cardiovascular Disease Progress Note    Overnight events: No acute events overnight.  Ms. Boogie denies chest pain or SOB.   Otherwise review of systems negative    Objective Findings:  T(C): 36.9 (22 @ 05:35), Max: 37.1 (05-10-22 @ 21:20)  HR: 74 (22 @ 05:35) (70 - 81)  BP: 124/58 (22 @ 05:35) (106/50 - 124/69)  RR: 18 (22 @ 05:35) (17 - 18)  SpO2: 98% (22 @ 05:35) (98% - 100%)  Wt(kg): --  Daily     Daily Weight in k.4 (10 May 2022 10:20)      Physical Exam:  Gen: NAD; Patient resting comfortably  HEENT: EOMI, Normocephalic/ atraumatic  CV: RRR, normal S1 + S2, no m/r/g  Lungs:  Normal respiratory effort; clear to auscultation bilaterally  Abd: soft, non-tender; bowel sounds present  Ext: No edema; warm and well perfused    Telemetry: Sinus; no ectopy    Laboratory Data:                        11.2   8.71  )-----------( 360      ( 11 May 2022 04:00 )             35.8     05-    137  |  93<L>  |  50<H>  ----------------------------<  112<H>  4.1   |  26  |  7.12<H>    Ca    9.7      11 May 2022 04:00  Phos  5.6       Mg     2.50     05-11      PT/INR - ( 11 May 2022 04:00 )   PT: 11.4 sec;   INR: 0.98 ratio         PTT - ( 11 May 2022 04:00 )  PTT:30.7 sec          Inpatient Medications:  MEDICATIONS  (STANDING):  aspirin  chewable 81 milliGRAM(s) Oral daily  atorvastatin 40 milliGRAM(s) Oral at bedtime  cadexomer iodine 0.9% Gel 1 Application(s) Topical daily  chlorhexidine 2% Cloths 1 Application(s) Topical daily  clopidogrel Tablet 75 milliGRAM(s) Oral daily  Dakins Solution - 1/2 Strength 1 Application(s) Topical daily  epoetin niesha-epbx (RETACRIT) Injectable 01407 Unit(s) IV Push <User Schedule>  heparin   Injectable 5000 Unit(s) SubCutaneous every 12 hours  multivitamin 1 Tablet(s) Oral daily  sevelamer carbonate 800 milliGRAM(s) Oral three times a day with meals      Assessment: 61-year-old female with ESRD on HD, HLD and peripheral vascular disease s/p lower extremity resection presents with respiratory distress.     Plan of Care:    #Respiratory Distress-  Secondary to fluid overloaded.   Volume status is improved post HD.  O2 requirements are improving.   Ms. Boogie is optimized from a cardiac standpoint to proceed with lower extremity angiogram.     #Elevated troponins-  Patient denies chest pain and EKG is non ischemic.  Likely due to reduced creatinine clearance.   Recent echo noted- normal LV systolic function.  No indication for cath at this time.  Continue DAPT given PVD.       #Peripheral vascular disease.  - S/p L foot partial 3rd ray amputation and R hallux amputation 3/2022.   - Vascular input and ABIs noted.   - Continue medical therapy with DAPT and statin    #ESRD-  - HD as per renal.      Over 25 minutes spent on total encounter; more than 50% of the visit was spent counseling and/or coordinating care by the attending physician.      Cristino Adams MD [facility]  Cardiovascular Disease  [phone]

## 2022-05-11 NOTE — PROGRESS NOTE ADULT - ASSESSMENT
60 yo F with pmh of ESRD on dialysis (Tu,Thurs,Sat), s/p L foot partial 3rd ray amputation and R hallux amputation 3/4 presenting from [place] for resp distress. [place] well yesterday however woke up at 5 AM this morning and could not breathe. Associated with subjective fevers and sweats. No chest pain. Is COVID vaccinated. Mild bl LE swelling. Has not missed dialysis.

## 2022-05-11 NOTE — PROGRESS NOTE ADULT - ASSESSMENT
60 yo F with pmh of ESRD on dialysis (Tu,Thurs,Sat), s/p L foot partial 3rd ray amputation and R hallux amputation 3/4 presenting from [place] for resp distress. [place] well yesterday however woke up at 5 AM this morning and could not breathe. Associated with subjective fevers and sweats. No chest pain. Is COVID vaccinated. Mild bl LE swelling. Has not missed dialysis.     Problem/Plan - 1:  ·  Problem: Acute respiratory failure with hypoxia.   ·  Plan: Off BIPAP and off NC Oxygen.   Resolved.      Problem/Plan - 2:  ·  Problem: Flash pulmonary edema.   ·  Plan: Sec to Fluid Overload from ESRD .  HD per renal.   Recent TTE noted.     Problem/Plan - 3:  ·  Problem: ESRD on hemodialysis.   ·  Plan: HD per renal.     Problem/Plan - 4:  ·  Problem: Hypertensive urgency.   ·  Plan: BP readings better .     Problem/Plan - 5:  ·  Problem: Fever with foot wound with PVD  .   ·  Plan: Podiatry consult noted.   ID following . No Abxs for now.   Awaiting LLE Angiogram .,     Dispo : OPAL planning to [facility] once cleared by vascular.     Medically optimized for LLE Angiogram. .

## 2022-05-11 NOTE — CHART NOTE - NSCHARTNOTEFT_GEN_A_CORE
Post Operative Note    Procedure: S/P LLE angiogram through R femoral, balloon dilation and lithotripsy of popliteal, balloon dilation of AT and PT    Subjective:   Patient seen and examined at bedside.   Denies pain or discomfort at groin site. Offers no complaints.    Objective:  Vitals: T(F): 97.8 (05-11-22 @ 13:49), Max: 98.8 (05-10-22 @ 21:20)  HR: 70 (05-11-22 @ 13:49)  BP: 126/60 (05-11-22 @ 13:49) (106/50 - 126/60)  RR: 18 (05-11-22 @ 13:49)  SpO2: 98% (05-11-22 @ 13:49)  Vent Settings:     In:   05-10-22 @ 07:01  -  05-11-22 @ 07:00  --------------------------------------------------------  IN: 700 mL      IV Fluids: multivitamin 1 Tablet(s) Oral daily      Out:   05-10-22 @ 07:01  -  05-11-22 @ 07:00  --------------------------------------------------------  OUT: 1900 mL      EBL:     Voided Urine:   05-10-22 @ 07:01  -  05-11-22 @ 07:00  --------------------------------------------------------  OUT: 1900 mL        Physical Examination:  General Appearance: NAD, alert and cooperative  HEENT: NCAT,    Lungs: nonlabored respirations  MSK/Extremities: R groin incision C/D/I, RLE 1st toe amp site C/D/I, LLE wound between 2nd and third digits, open, no purulence or malodor, 5th toe amp site C/D/I, moving spontaneously without limitations, no edema noted. B/L palpable DP pulses     Medications: [Standing]  acetaminophen     Tablet .. 650 milliGRAM(s) Oral every 6 hours PRN  aspirin  chewable 81 milliGRAM(s) Oral daily  atorvastatin 40 milliGRAM(s) Oral at bedtime  cadexomer iodine 0.9% Gel 1 Application(s) Topical daily  chlorhexidine 2% Cloths 1 Application(s) Topical daily  clopidogrel Tablet 75 milliGRAM(s) Oral daily  Dakins Solution - 1/2 Strength 1 Application(s) Topical daily  epoetin niesha-epbx (RETACRIT) Injectable 28500 Unit(s) IV Push <User Schedule>  heparin   Injectable 5000 Unit(s) SubCutaneous every 12 hours  multivitamin 1 Tablet(s) Oral daily  nitroglycerin     SubLingual 0.4 milliGRAM(s) SubLingual every 5 minutes PRN  sevelamer carbonate 800 milliGRAM(s) Oral three times a day with meals    Medications: [PRN]  acetaminophen     Tablet .. 650 milliGRAM(s) Oral every 6 hours PRN  aspirin  chewable 81 milliGRAM(s) Oral daily  atorvastatin 40 milliGRAM(s) Oral at bedtime  cadexomer iodine 0.9% Gel 1 Application(s) Topical daily  chlorhexidine 2% Cloths 1 Application(s) Topical daily  clopidogrel Tablet 75 milliGRAM(s) Oral daily  Dakins Solution - 1/2 Strength 1 Application(s) Topical daily  epoetin niesha-epbx (RETACRIT) Injectable 35143 Unit(s) IV Push <User Schedule>  heparin   Injectable 5000 Unit(s) SubCutaneous every 12 hours  multivitamin 1 Tablet(s) Oral daily  nitroglycerin     SubLingual 0.4 milliGRAM(s) SubLingual every 5 minutes PRN  sevelamer carbonate 800 milliGRAM(s) Oral three times a day with meals    Labs:                        11.2   8.71  )-----------( 360      ( 11 May 2022 04:00 )             35.8     05-11    137  |  93<L>  |  50<H>  ----------------------------<  112<H>  4.1   |  26  |  7.12<H>    Ca    9.7      11 May 2022 04:00  Phos  5.6     05-11  Mg     2.50     05-11      PT/INR - ( 11 May 2022 04:00 )   PT: 11.4 sec;   INR: 0.98 ratio         PTT - ( 11 May 2022 04:00 )  PTT:30.7 sec        60yo F with Hx ESRD (on HD TRS) s/p L foot partial 3rd ray amputation and R hallux amputation on 3/4 who p/w respiratory distress. Vascular Surgery consulted for evaluation of PAD iso non-healing LE foot wounds. S/P LLE angiogram 5/11, recovering well    PLAN:   - plan per primary team     C Team Surgery  #[id number]     omari:  d/c?: Post Operative Note    Procedure: S/P LLE angiogram through R femoral, balloon dilation and lithotripsy of popliteal, balloon dilation of AT and PT    Subjective:   Patient seen and examined at bedside.   Denies pain or discomfort at groin site. Offers no complaints.    Objective:  Vitals: T(F): 97.8 (05-11-22 @ 13:49), Max: 98.8 (05-10-22 @ 21:20)  HR: 70 (05-11-22 @ 13:49)  BP: 126/60 (05-11-22 @ 13:49) (106/50 - 126/60)  RR: 18 (05-11-22 @ 13:49)  SpO2: 98% (05-11-22 @ 13:49)  Vent Settings:     In:   05-10-22 @ 07:01  -  05-11-22 @ 07:00  --------------------------------------------------------  IN: 700 mL      IV Fluids: multivitamin 1 Tablet(s) Oral daily      Out:   05-10-22 @ 07:01  -  05-11-22 @ 07:00  --------------------------------------------------------  OUT: 1900 mL      EBL:     Voided Urine:   05-10-22 @ 07:01  -  05-11-22 @ 07:00  --------------------------------------------------------  OUT: 1900 mL        Physical Examination:  General Appearance: NAD, alert and cooperative  HEENT: NCAT,    Lungs: nonlabored respirations  MSK/Extremities: R groin incision C/D/I, RLE 1st toe amp site C/D/I, LLE wound between 2nd and third digits, open, no purulence or malodor, 5th toe amp site C/D/I, moving spontaneously without limitations, no edema noted. B/L palpable DP pulses     Medications: [Standing]  acetaminophen     Tablet .. 650 milliGRAM(s) Oral every 6 hours PRN  aspirin  chewable 81 milliGRAM(s) Oral daily  atorvastatin 40 milliGRAM(s) Oral at bedtime  cadexomer iodine 0.9% Gel 1 Application(s) Topical daily  chlorhexidine 2% Cloths 1 Application(s) Topical daily  clopidogrel Tablet 75 milliGRAM(s) Oral daily  Dakins Solution - 1/2 Strength 1 Application(s) Topical daily  epoetin niesha-epbx (RETACRIT) Injectable 14773 Unit(s) IV Push <User Schedule>  heparin   Injectable 5000 Unit(s) SubCutaneous every 12 hours  multivitamin 1 Tablet(s) Oral daily  nitroglycerin     SubLingual 0.4 milliGRAM(s) SubLingual every 5 minutes PRN  sevelamer carbonate 800 milliGRAM(s) Oral three times a day with meals    Medications: [PRN]  acetaminophen     Tablet .. 650 milliGRAM(s) Oral every 6 hours PRN  aspirin  chewable 81 milliGRAM(s) Oral daily  atorvastatin 40 milliGRAM(s) Oral at bedtime  cadexomer iodine 0.9% Gel 1 Application(s) Topical daily  chlorhexidine 2% Cloths 1 Application(s) Topical daily  clopidogrel Tablet 75 milliGRAM(s) Oral daily  Dakins Solution - 1/2 Strength 1 Application(s) Topical daily  epoetin niesha-epbx (RETACRIT) Injectable 59821 Unit(s) IV Push <User Schedule>  heparin   Injectable 5000 Unit(s) SubCutaneous every 12 hours  multivitamin 1 Tablet(s) Oral daily  nitroglycerin     SubLingual 0.4 milliGRAM(s) SubLingual every 5 minutes PRN  sevelamer carbonate 800 milliGRAM(s) Oral three times a day with meals    Labs:                        11.2   8.71  )-----------( 360      ( 11 May 2022 04:00 )             35.8     05-11    137  |  93<L>  |  50<H>  ----------------------------<  112<H>  4.1   |  26  |  7.12<H>    Ca    9.7      11 May 2022 04:00  Phos  5.6     05-11  Mg     2.50     05-11      PT/INR - ( 11 May 2022 04:00 )   PT: 11.4 sec;   INR: 0.98 ratio         PTT - ( 11 May 2022 04:00 )  PTT:30.7 sec        60yo F with Hx ESRD (on HD TRS) s/p L foot partial 3rd ray amputation and R hallux amputation on 3/4 who p/w respiratory distress. Vascular Surgery consulted for evaluation of PAD iso non-healing LE foot wounds. S/P LLE angiogram 5/11, recovering well    PLAN:   - plan per primary team     C Team Surgery  #[id number]

## 2022-05-11 NOTE — PROGRESS NOTE ADULT - ASSESSMENT
62yo F with Hx ESRD (on HD TRS) s/p L foot partial 3rd ray amputation and R hallux amputation on 3/4 who p/w respiratory distress. Vascular Surgery consulted for evaluation of PAD iso non-healing LE foot wounds. OR plan today for LLE angiogram.     PLAN:   - OR today for LLE angiogram  - NPO for OR      Rosie Peace, PGY-2  C Team Surgery  #[id number]

## 2022-05-11 NOTE — PROGRESS NOTE ADULT - SUBJECTIVE AND OBJECTIVE BOX
Date of Service  : 05-11-22     INTERVAL HPI/OVERNIGHT EVENTS: I feel fine.   Vital Signs Last 24 Hrs  T(C): 36.7 (11 May 2022 17:15), Max: 37.1 (10 May 2022 21:20)  T(F): 98 (11 May 2022 17:15), Max: 98.8 (10 May 2022 21:20)  HR: 78 (11 May 2022 17:15) (70 - 78)  BP: 121/63 (11 May 2022 17:15) (106/50 - 126/60)  BP(mean): --  RR: 18 (11 May 2022 17:15) (17 - 18)  SpO2: 100% (11 May 2022 17:15) (98% - 100%)  I&O's Summary    10 May 2022 07:01  -  11 May 2022 07:00  --------------------------------------------------------  IN: 700 mL / OUT: 1900 mL / NET: -1200 mL      MEDICATIONS  (STANDING):  aspirin  chewable 81 milliGRAM(s) Oral daily  atorvastatin 40 milliGRAM(s) Oral at bedtime  cadexomer iodine 0.9% Gel 1 Application(s) Topical daily  chlorhexidine 2% Cloths 1 Application(s) Topical daily  clopidogrel Tablet 75 milliGRAM(s) Oral daily  Dakins Solution - 1/2 Strength 1 Application(s) Topical daily  epoetin niesha-epbx (RETACRIT) Injectable 52585 Unit(s) IV Push <User Schedule>  heparin   Injectable 5000 Unit(s) SubCutaneous every 12 hours  multivitamin 1 Tablet(s) Oral daily  sevelamer carbonate 800 milliGRAM(s) Oral three times a day with meals    MEDICATIONS  (PRN):  acetaminophen     Tablet .. 650 milliGRAM(s) Oral every 6 hours PRN Temp greater or equal to 38.5C (101.3F), Moderate Pain (4 - 6)  nitroglycerin     SubLingual 0.4 milliGRAM(s) SubLingual every 5 minutes PRN Pulmonary Edema    LABS:                        11.2   8.71  )-----------( 360      ( 11 May 2022 04:00 )             35.8     05-11    137  |  93<L>  |  50<H>  ----------------------------<  112<H>  4.1   |  26  |  7.12<H>    Ca    9.7      11 May 2022 04:00  Phos  5.6     05-11  Mg     2.50     05-11      PT/INR - ( 11 May 2022 04:00 )   PT: 11.4 sec;   INR: 0.98 ratio         PTT - ( 11 May 2022 04:00 )  PTT:30.7 sec    CAPILLARY BLOOD GLUCOSE      POCT Blood Glucose.: 104 mg/dL (11 May 2022 07:04)          REVIEW OF SYSTEMS:  CONSTITUTIONAL: No fever, weight loss, or fatigue  EYES: No eye pain, visual disturbances, or discharge  ENMT:  No difficulty hearing, tinnitus, vertigo; No sinus or throat pain  NECK: No pain or stiffness  RESPIRATORY: No cough, wheezing, chills or hemoptysis; No shortness of breath  CARDIOVASCULAR: No chest pain, palpitations, dizziness, or leg swelling  GASTROINTESTINAL: No abdominal or epigastric pain. No nausea, vomiting, or hematemesis; No diarrhea or constipation. No melena or hematochezia.  GENITOURINARY: No dysuria, frequency, hematuria, or incontinence  NEUROLOGICAL: No headaches, memory loss, loss of strength, numbness, or tremors      Consultant(s) Notes Reviewed:  [x ] YES  [ ] NO    PHYSICAL EXAM:  GENERAL: NAD, well-groomed, well-developed,not in any distress ,  HEAD:  Atraumatic, Normocephalic  EYES: EOMI, PERRLA, conjunctiva and sclera clear  ENMT: No tonsillar erythema, exudates, or enlargement; Moist mucous membranes, Good dentition, No lesions  NECK: Supple, No JVD, Normal thyroid  NERVOUS SYSTEM:  Alert & Oriented X3, No focal deficit   CHEST/LUNG: Good air entry bilateral with no  rales, rhonchi, wheezing, or rubs  HEART: Regular rate and rhythm; No murmurs, rubs, or gallops  ABDOMEN: Soft, Nontender, Nondistended; Bowel sounds present  EXTREMITIES: Feet dressed.     Care Discussed with Consultants/Other Providers [ x] YES  [ ] NO

## 2022-05-11 NOTE — PROGRESS NOTE ADULT - SUBJECTIVE AND OBJECTIVE BOX
Date of Service: 05-11-22 @ 12:16    Patient is a 61y old  Female who presents with a chief complaint of SOB and wheezing (11 May 2022 08:12)      Any change in ROS: DOing ok : no SOB: s/p angiopgram    MEDICATIONS  (STANDING):  aspirin  chewable 81 milliGRAM(s) Oral daily  atorvastatin 40 milliGRAM(s) Oral at bedtime  cadexomer iodine 0.9% Gel 1 Application(s) Topical daily  chlorhexidine 2% Cloths 1 Application(s) Topical daily  clopidogrel Tablet 75 milliGRAM(s) Oral daily  Dakins Solution - 1/2 Strength 1 Application(s) Topical daily  epoetin niesha-epbx (RETACRIT) Injectable 63699 Unit(s) IV Push <User Schedule>  heparin   Injectable 5000 Unit(s) SubCutaneous every 12 hours  multivitamin 1 Tablet(s) Oral daily  sevelamer carbonate 800 milliGRAM(s) Oral three times a day with meals    MEDICATIONS  (PRN):  acetaminophen     Tablet .. 650 milliGRAM(s) Oral every 6 hours PRN Temp greater or equal to 38.5C (101.3F), Moderate Pain (4 - 6)  nitroglycerin     SubLingual 0.4 milliGRAM(s) SubLingual every 5 minutes PRN Pulmonary Edema    Vital Signs Last 24 Hrs  T(C): 36.9 (11 May 2022 05:35), Max: 37.1 (10 May 2022 21:20)  T(F): 98.4 (11 May 2022 05:35), Max: 98.8 (10 May 2022 21:20)  HR: 74 (11 May 2022 05:35) (72 - 74)  BP: 124/58 (11 May 2022 05:35) (106/50 - 124/58)  BP(mean): --  RR: 18 (11 May 2022 05:35) (17 - 18)  SpO2: 98% (11 May 2022 05:35) (98% - 98%)    I&O's Summary    10 May 2022 07:01  -  11 May 2022 07:00  --------------------------------------------------------  IN: 700 mL / OUT: 1900 mL / NET: -1200 mL          Physical Exam:   GENERAL: NAD, well-groomed, well-developed  HEENT: MICAELA/   Atraumatic, Normocephalic  ENMT: No tonsillar erythema, exudates, or enlargement; Moist mucous membranes, Good dentition, No lesions  NECK: Supple, No JVD, Normal thyroid  CHEST/LUNG: Clear to auscultaion  CVS: Regular rate and rhythm; No murmurs, rubs, or gallops  GI: : Soft, Nontender, Nondistended; Bowel sounds present  NERVOUS SYSTEM:  Alert & Oriented X3  EXTREMITIES: - edema  LYMPH: No lymphadenopathy noted  SKIN: No rashes or lesions  ENDOCRINOLOGY: No Thyromegaly  PSYCH: Appropriate    Labs:                              11.2   8.71  )-----------( 360      ( 11 May 2022 04:00 )             35.8                         10.3   8.72  )-----------( 357      ( 10 May 2022 06:40 )             33.7                         11.0   8.26  )-----------( 320      ( 09 May 2022 05:53 )             36.2                         11.1   8.52  )-----------( 314      ( 08 May 2022 06:25 )             36.5     05-11    137  |  93<L>  |  50<H>  ----------------------------<  112<H>  4.1   |  26  |  7.12<H>  05-10    138  |  92<L>  |  86<H>  ----------------------------<  92  4.7   |  23  |  10.32<H>  05-09    137  |  93<L>  |  65<H>  ----------------------------<  95  4.4   |  24  |  8.30<H>  05-08    137  |  94<L>  |  46<H>  ----------------------------<  117<H>  4.3   |  25  |  6.14<H>    Ca    9.7      11 May 2022 04:00  Ca    9.8      10 May 2022 06:40  Phos  5.6     05-11  Phos  7.7     05-10  Mg     2.50     05-11  Mg     2.80     05-10      CAPILLARY BLOOD GLUCOSE      POCT Blood Glucose.: 104 mg/dL (11 May 2022 07:04)        PT/INR - ( 11 May 2022 04:00 )   PT: 11.4 sec;   INR: 0.98 ratio         PTT - ( 11 May 2022 04:00 )  PTT:30.7 sec          RECENT CULTURES:  05-04 @ 13:27 .Abscess LF wounbd   KINGSLEY      Pseudomonas aeruginosa  Klebsiella pneumoniae ESBL  Escherichia coli  Pseudomonas aeruginosa     Numerous Pseudomonas aeruginosa  Moderate Klebsiella pneumoniae ESBL  Moderate Escherichia coli          RESPIRATORY CULTURES:        rad< from: Xray Chest 1 View- PORTABLE-Urgent (Xray Chest 1 View- PORTABLE-Urgent .) (05.05.22 @ 14:44) >  ACC: [id number] EXAM:  XR CHEST PORTABLE URGENT 1V                          PROCEDURE DATE:  05/05/2022          INTERPRETATION:  INDICATION: Followup of pulmonary edema.    COMPARISON: 2/28/22    FINDINGS:  Heart/Vascular: Cardiomediastinal silhouette is within normal limits.  Pulmonary: Redemonstration of bilateral diffuse airspace opacities   greater on the right than the left likely due to mild pulmonary edema. No   pleural effusion or pneumothorax.  Bones: Degenerative changes of the bony structures.    Impression:  Stable mild pulmonary edema.        --- End of Report ---            SHARAD TODD MD; Attending Radiologist  This document has been electronically signed. May  6 2022  9:47AM    < end of copied text >    Studies  Chest X-RAY  CT SCAN Chest   Venous Dopplers: LE:   CT Abdomen  Others

## 2022-05-12 ENCOUNTER — TRANSCRIPTION ENCOUNTER (OUTPATIENT)
Age: 62
End: 2022-05-12

## 2022-05-12 LAB
ANION GAP SERPL CALC-SCNC: 21 MMOL/L — HIGH (ref 7–14)
BUN SERPL-MCNC: 70 MG/DL — HIGH (ref 7–23)
CALCIUM SERPL-MCNC: 9.6 MG/DL — SIGNIFICANT CHANGE UP (ref 8.4–10.5)
CHLORIDE SERPL-SCNC: 93 MMOL/L — LOW (ref 98–107)
CO2 SERPL-SCNC: 24 MMOL/L — SIGNIFICANT CHANGE UP (ref 22–31)
CREAT SERPL-MCNC: 9.49 MG/DL — HIGH (ref 0.5–1.3)
EGFR: 4 ML/MIN/1.73M2 — LOW
GLUCOSE SERPL-MCNC: 99 MG/DL — SIGNIFICANT CHANGE UP (ref 70–99)
HAV IGM SER-ACNC: SIGNIFICANT CHANGE UP
HBV CORE IGM SER-ACNC: SIGNIFICANT CHANGE UP
HBV SURFACE AG SER-ACNC: SIGNIFICANT CHANGE UP
HCT VFR BLD CALC: 33.8 % — LOW (ref 34.5–45)
HCV AB S/CO SERPL IA: 0.14 S/CO — SIGNIFICANT CHANGE UP (ref 0–0.99)
HCV AB SERPL-IMP: SIGNIFICANT CHANGE UP
HGB BLD-MCNC: 10.3 G/DL — LOW (ref 11.5–15.5)
MAGNESIUM SERPL-MCNC: 2.5 MG/DL — SIGNIFICANT CHANGE UP (ref 1.6–2.6)
MCHC RBC-ENTMCNC: 27 PG — SIGNIFICANT CHANGE UP (ref 27–34)
MCHC RBC-ENTMCNC: 30.5 GM/DL — LOW (ref 32–36)
MCV RBC AUTO: 88.5 FL — SIGNIFICANT CHANGE UP (ref 80–100)
NRBC # BLD: 0 /100 WBCS — SIGNIFICANT CHANGE UP
NRBC # FLD: 0 K/UL — SIGNIFICANT CHANGE UP
PHOSPHATE SERPL-MCNC: 7.5 MG/DL — HIGH (ref 2.5–4.5)
PLATELET # BLD AUTO: 354 K/UL — SIGNIFICANT CHANGE UP (ref 150–400)
POTASSIUM SERPL-MCNC: 4.5 MMOL/L — SIGNIFICANT CHANGE UP (ref 3.5–5.3)
POTASSIUM SERPL-SCNC: 4.5 MMOL/L — SIGNIFICANT CHANGE UP (ref 3.5–5.3)
RBC # BLD: 3.82 M/UL — SIGNIFICANT CHANGE UP (ref 3.8–5.2)
RBC # FLD: 16.2 % — HIGH (ref 10.3–14.5)
SODIUM SERPL-SCNC: 138 MMOL/L — SIGNIFICANT CHANGE UP (ref 135–145)
WBC # BLD: 9.1 K/UL — SIGNIFICANT CHANGE UP (ref 3.8–10.5)
WBC # FLD AUTO: 9.1 K/UL — SIGNIFICANT CHANGE UP (ref 3.8–10.5)

## 2022-05-12 RX ADMIN — ERYTHROPOIETIN 12000 UNIT(S): 10000 INJECTION, SOLUTION INTRAVENOUS; SUBCUTANEOUS at 09:17

## 2022-05-12 RX ADMIN — Medication 81 MILLIGRAM(S): at 11:51

## 2022-05-12 RX ADMIN — Medication 1 APPLICATION(S): at 11:49

## 2022-05-12 RX ADMIN — SEVELAMER CARBONATE 800 MILLIGRAM(S): 2400 POWDER, FOR SUSPENSION ORAL at 17:00

## 2022-05-12 RX ADMIN — Medication 650 MILLIGRAM(S): at 15:44

## 2022-05-12 RX ADMIN — CLOPIDOGREL BISULFATE 75 MILLIGRAM(S): 75 TABLET, FILM COATED ORAL at 11:52

## 2022-05-12 RX ADMIN — Medication 1 APPLICATION(S): at 11:50

## 2022-05-12 RX ADMIN — Medication 650 MILLIGRAM(S): at 16:12

## 2022-05-12 RX ADMIN — SEVELAMER CARBONATE 800 MILLIGRAM(S): 2400 POWDER, FOR SUSPENSION ORAL at 11:50

## 2022-05-12 RX ADMIN — HEPARIN SODIUM 5000 UNIT(S): 5000 INJECTION INTRAVENOUS; SUBCUTANEOUS at 06:01

## 2022-05-12 RX ADMIN — ATORVASTATIN CALCIUM 40 MILLIGRAM(S): 80 TABLET, FILM COATED ORAL at 21:25

## 2022-05-12 RX ADMIN — CHLORHEXIDINE GLUCONATE 1 APPLICATION(S): 213 SOLUTION TOPICAL at 11:49

## 2022-05-12 RX ADMIN — Medication 1 TABLET(S): at 11:52

## 2022-05-12 RX ADMIN — HEPARIN SODIUM 5000 UNIT(S): 5000 INJECTION INTRAVENOUS; SUBCUTANEOUS at 16:57

## 2022-05-12 NOTE — DISCHARGE NOTE NURSING/CASE MANAGEMENT/SOCIAL WORK - NSDCFUADDAPPT_GEN_ALL_CORE_FT
Podiatry Discharge Instructions:  Follow up: Please follow up with Dr. Daily within 1 week of discharge from the hospital, please call [phone] for appointment and discuss that you recently were seen in the hospital.  Wound Care: please apply Iodosorb to right foot big toe wound, dress with 4x4 gauze and kerlix daily.   please clean left foot wound with dakins then apply 4x4 gauze and kelrix daily.  Weight bearing: Please weight bear as tolerated in a surgical shoe.  Antibiotics: Please continue as instructed.

## 2022-05-12 NOTE — PROGRESS NOTE ADULT - SUBJECTIVE AND OBJECTIVE BOX
Subjective:  Patient seen at bedside this AM. Reports feeling well, without complaints. Denies chest pain, SOB.      24h Events:   - OR yesterday for LLE angiogram with popliteal/AT/PT balloon angioplasty & lithotripsy    Objective:  Vital Signs  T(C): 36.7 (05-12 @ 05:45), Max: 36.7 (05-11 @ 17:15)  HR: 74 (05-12 @ 05:45) (70 - 78)  BP: 115/50 (05-12 @ 05:45) (111/60 - 126/60)  RR: 18 (05-12 @ 05:45) (17 - 18)  SpO2: 100% (05-12 @ 05:45) (98% - 100%)    Physical Exam:  GEN: resting in bed comfortably in NAD  NEURO: awake, alert  RESP: no increased WOB   RLE: 1st toe amp site c/d/i, palpable DP pulse, Dopplerable PT signal, sensation + motor intact  - LLE: wound between 2nd and third digits, open, no purulence or malodor, 5th toe amp site c/d/i, Dopplerable DP/PT signal, sensation + motor intact    Labs:             11.2   8.71  )-----------( 360      ( 11 May 2022 04:00 )             35.8     137  |  93<L>  |  50<H>  ----------------------------<  112<H>  4.1   |  26  |  7.12<H>    Ca    9.7      11 May 2022 04:00  Phos  5.6     05-11  Mg     2.50     05-11    Medications:   MEDICATIONS  (STANDING):  aspirin  chewable 81 milliGRAM(s) Oral daily  atorvastatin 40 milliGRAM(s) Oral at bedtime  cadexomer iodine 0.9% Gel 1 Application(s) Topical daily  chlorhexidine 2% Cloths 1 Application(s) Topical daily  clopidogrel Tablet 75 milliGRAM(s) Oral daily  Dakins Solution - 1/2 Strength 1 Application(s) Topical daily  epoetin niesha-epbx (RETACRIT) Injectable 07829 Unit(s) IV Push <User Schedule>  heparin   Injectable 5000 Unit(s) SubCutaneous every 12 hours  multivitamin 1 Tablet(s) Oral daily  sevelamer carbonate 800 milliGRAM(s) Oral three times a day with meals    MEDICATIONS  (PRN):  acetaminophen     Tablet .. 650 milliGRAM(s) Oral every 6 hours PRN Temp greater or equal to 38.5C (101.3F), Moderate Pain (4 - 6)  nitroglycerin     SubLingual 0.4 milliGRAM(s) SubLingual every 5 minutes PRN Pulmonary Edema

## 2022-05-12 NOTE — PROGRESS NOTE ADULT - ASSESSMENT
60yo F with Hx ESRD (on HD TRS) s/p L foot partial 3rd ray amputation and R hallux amputation on 3/4 who p/w respiratory distress. Vascular Surgery consulted for evaluation of PAD iso non-healing LE foot wounds. Patient is s/p LLE angiogram with popliteal/AT/PT balloon angioplasty & lithotripsy 5/11. OR planning for RLE angiogram on Wednesday 5/18.     PLAN:   - OR planning for RLE angiogram on Wednesday 5/18  - Please pre-op patient as follows:      - NPO past midnight for OR on TUESDAY 5/17, IVF/DM management per discretion of primary team     - AM labs WEDNESDAY 5/18: CBC, BMP, PT/PTT/INR     - Maintain an active T+S     - COVID result within 72h       Rosie Peace, PGY-2  C Team Surgery  #[id number]

## 2022-05-12 NOTE — PROGRESS NOTE ADULT - ASSESSMENT
62 yo F with pmh of ESRD on dialysis (Tu,Thurs,Sat), s/p L foot partial 3rd ray amputation and R hallux amputation 3/4 presenting from [place] for resp distress. [place] well yesterday however woke up at 5 AM this morning and could not breathe. Associated with subjective fevers and sweats. No chest pain. Is COVID vaccinated. Mild bl LE swelling. Has not missed dialysis.     Problem/Plan - 1:  ·  Problem: Acute respiratory failure with hypoxia.   ·  Plan: Off BIPAP and off NC Oxygen.   Resolved.      Problem/Plan - 2:  ·  Problem: Flash pulmonary edema.   ·  Plan: Sec to Fluid Overload from ESRD .  HD per renal.   Recent TTE noted.     Problem/Plan - 3:  ·  Problem: ESRD on hemodialysis.   ·  Plan: HD per renal.     Problem/Plan - 4:  ·  Problem: Hypertensive urgency.   ·  Plan: BP readings better .     Problem/Plan - 5:  ·  Problem: Fever with foot wound with PVD  .   ·  Plan: Podiatry consult noted.   ID following . No Abxs for now.   Awaiting RLE Angiogram . S/P LLE Angiogram .     Dispo : DC planning to [facility] once cleared by vascular.     Medically optimized for RLE Angiogram. .

## 2022-05-12 NOTE — DISCHARGE NOTE NURSING/CASE MANAGEMENT/SOCIAL WORK - NSDCPEFALRISK_GEN_ALL_CORE
For information on Fall & Injury Prevention, visit: [email or web address] OR  [email or web address] OR  https://www.cdc.gov/steadi/patient.html

## 2022-05-12 NOTE — PROGRESS NOTE ADULT - SUBJECTIVE AND OBJECTIVE BOX
Date of Service  : 05-12-22     INTERVAL HPI/OVERNIGHT EVENTS: I feel fine.   Vital Signs Last 24 Hrs  T(C): 36.7 (12 May 2022 13:30), Max: 36.9 (12 May 2022 06:50)  T(F): 98 (12 May 2022 13:30), Max: 98.4 (12 May 2022 06:50)  HR: 72 (12 May 2022 13:30) (71 - 76)  BP: 110/60 (12 May 2022 13:30) (110/60 - 130/56)  BP(mean): --  RR: 18 (12 May 2022 13:30) (16 - 18)  SpO2: 97% (12 May 2022 13:30) (97% - 100%)  I&O's Summary    12 May 2022 07:01  -  12 May 2022 22:47  --------------------------------------------------------  IN: 600 mL / OUT: 1600 mL / NET: -1000 mL      MEDICATIONS  (STANDING):  aspirin  chewable 81 milliGRAM(s) Oral daily  atorvastatin 40 milliGRAM(s) Oral at bedtime  cadexomer iodine 0.9% Gel 1 Application(s) Topical daily  chlorhexidine 2% Cloths 1 Application(s) Topical daily  clopidogrel Tablet 75 milliGRAM(s) Oral daily  Dakins Solution - 1/2 Strength 1 Application(s) Topical daily  epoetin niesha-epbx (RETACRIT) Injectable 94363 Unit(s) IV Push <User Schedule>  heparin   Injectable 5000 Unit(s) SubCutaneous every 12 hours  multivitamin 1 Tablet(s) Oral daily  sevelamer carbonate 800 milliGRAM(s) Oral three times a day with meals    MEDICATIONS  (PRN):  acetaminophen     Tablet .. 650 milliGRAM(s) Oral every 6 hours PRN Temp greater or equal to 38.5C (101.3F), Moderate Pain (4 - 6)  nitroglycerin     SubLingual 0.4 milliGRAM(s) SubLingual every 5 minutes PRN Pulmonary Edema    LABS:                        10.3   9.10  )-----------( 354      ( 12 May 2022 07:15 )             33.8     05-12    138  |  93<L>  |  70<H>  ----------------------------<  99  4.5   |  24  |  9.49<H>    Ca    9.6      12 May 2022 07:15  Phos  7.5     05-12  Mg     2.50     05-12      PT/INR - ( 11 May 2022 04:00 )   PT: 11.4 sec;   INR: 0.98 ratio         PTT - ( 11 May 2022 04:00 )  PTT:30.7 sec    CAPILLARY BLOOD GLUCOSE              REVIEW OF SYSTEMS:  CONSTITUTIONAL: No fever, weight loss, or fatigue  EYES: No eye pain, visual disturbances, or discharge  ENMT:  No difficulty hearing, tinnitus, vertigo; No sinus or throat pain  NECK: No pain or stiffness  RESPIRATORY: No cough, wheezing, chills or hemoptysis; No shortness of breath  CARDIOVASCULAR: No chest pain, palpitations, dizziness, or leg swelling  GASTROINTESTINAL: No abdominal or epigastric pain. No nausea, vomiting, or hematemesis; No diarrhea or constipation. No melena or hematochezia.  GENITOURINARY: No dysuria, frequency, hematuria, or incontinence  NEUROLOGICAL: No headaches, memory loss, loss of strength, numbness, or tremors        RADIOLOGY & ADDITIONAL TESTS:    Consultant(s) Notes Reviewed:  [x ] YES  [ ] NO    PHYSICAL EXAM:  GENERAL: NAD, well-groomed, well-developed,not in any distress ,  HEAD:  Atraumatic, Normocephalic  NECK: Supple, No JVD, Normal thyroid  NERVOUS SYSTEM:  Alert & Oriented X3, No focal deficit   CHEST/LUNG: Good air entry bilateral with no  rales, rhonchi, wheezing, or rubs  HEART: Regular rate and rhythm; No murmurs, rubs, or gallops  ABDOMEN: Soft, Nontender, Nondistended; Bowel sounds present  EXTREMITIES:  Feet dressed .   Care Discussed with Consultants/Other Providers [ x] YES  [ ] NO

## 2022-05-12 NOTE — PROGRESS NOTE ADULT - SUBJECTIVE AND OBJECTIVE BOX
Cardiovascular Disease Progress Note    Overnight events: No acute events overnight. Ms. Boogie denies chest pain or SOB.     Otherwise review of systems negative    Objective Findings:  T(C): 36.7 (05-12-22 @ 05:45), Max: 36.7 (05-11-22 @ 17:15)  HR: 74 (05-12-22 @ 05:45) (70 - 78)  BP: 115/50 (05-12-22 @ 05:45) (111/60 - 126/60)  RR: 18 (05-12-22 @ 05:45) (17 - 18)  SpO2: 100% (05-12-22 @ 05:45) (98% - 100%)  Wt(kg): --  Daily     Daily       Physical Exam:  Gen: NAD; Patient resting comfortably  HEENT: EOMI, Normocephalic/ atraumatic  CV: RRR, normal S1 + S2, no m/r/g  Lungs:  Normal respiratory effort; clear to auscultation bilaterally  Abd: soft, non-tender; bowel sounds present  Ext: No edema; warm and well perfused    Telemetry: Sinus    Laboratory Data:                        11.2   8.71  )-----------( 360      ( 11 May 2022 04:00 )             35.8     05-11    137  |  93<L>  |  50<H>  ----------------------------<  112<H>  4.1   |  26  |  7.12<H>    Ca    9.7      11 May 2022 04:00  Phos  5.6     05-11  Mg     2.50     05-11      PT/INR - ( 11 May 2022 04:00 )   PT: 11.4 sec;   INR: 0.98 ratio         PTT - ( 11 May 2022 04:00 )  PTT:30.7 sec          Inpatient Medications:  MEDICATIONS  (STANDING):  aspirin  chewable 81 milliGRAM(s) Oral daily  atorvastatin 40 milliGRAM(s) Oral at bedtime  cadexomer iodine 0.9% Gel 1 Application(s) Topical daily  chlorhexidine 2% Cloths 1 Application(s) Topical daily  clopidogrel Tablet 75 milliGRAM(s) Oral daily  Dakins Solution - 1/2 Strength 1 Application(s) Topical daily  epoetin niesha-epbx (RETACRIT) Injectable 22724 Unit(s) IV Push <User Schedule>  heparin   Injectable 5000 Unit(s) SubCutaneous every 12 hours  multivitamin 1 Tablet(s) Oral daily  sevelamer carbonate 800 milliGRAM(s) Oral three times a day with meals      Assessment: 61-year-old female with ESRD on HD, HLD and peripheral vascular disease s/p lower extremity resection presents with respiratory distress.     Plan of Care:    #Respiratory Distress-  Secondary to fluid overloaded.   Volume status is improved post HD.  Patient now on room air.   Fluid removal with HD as per the renal team.     #Elevated troponins-  Patient denies chest pain and EKG is non ischemic.  Likely due to reduced creatinine clearance.   Recent echo noted- normal LV systolic function.  No indication for cath at this time.  Continue DAPT given PVD.       #Peripheral vascular disease.  - S/p L foot partial 3rd ray amputation and R hallux amputation 3/2022.   - S/p lower extremity angioplasty 5/11/2022  - Vascular input noted.   - Continue medical therapy with DAPT and statin    #ESRD-  - HD as per renal.        Over 25 minutes spent on total encounter; more than 50% of the visit was spent counseling and/or coordinating care by the attending physician.      Cristino Adams MD [facility]  Cardiovascular Disease  [phone]

## 2022-05-12 NOTE — PROGRESS NOTE ADULT - ASSESSMENT
62 yo F with pmh of ESRD on dialysis (Tu,Thurs,Sat), s/p L foot partial 3rd ray amputation and R hallux amputation 3/4 presenting from [place] for resp distress. [place] well yesterday however woke up at 5 AM this morning and could not breathe. Associated with subjective fevers and sweats. No chest pain. Is COVID vaccinated. Mild bl LE swelling. Has not missed dialysis.

## 2022-05-12 NOTE — PROGRESS NOTE ADULT - ASSESSMENT
62 yo F with pmh of ESRD on dialysis (Tu,Thurs,Sat), s/p L foot partial 3rd ray amputation and R hallux amputation 3/4 presenting from [place] for resp distress, admitted for acute resp failure, flash pulm edema, for urgent HD. on BiPAP. Renal consulted for urgent HD, now following for ESRD Mx.     End Stage Renal Disease on Dialysis  hemodialysis TTS at [place]  K wnl  hypervolemia resolved. s/p Flash pulmonary edema on admission   hb level at goal    Tolerating HD today but only 1L UF achieved due to low BP  renal diet, fluid restriction 1L/day   dose all meds for ESRD  will dec epo 12>8k k tiw w/next hd    s/p Acute respiratory failure with hypoxia.   Off BIPAP     s/p Hypertensive urgency.   BP controlled well. not on bp meds. watch closely      d/c plan per team home  For any question, call:  Cell # [phone]  Pager # [phone]  Callback # [phone]

## 2022-05-12 NOTE — PROGRESS NOTE ADULT - SUBJECTIVE AND OBJECTIVE BOX
Date of Service: 05-12-22 @ 13:55    Patient is a 61y old  Female who presents with a chief complaint of SOB and wheezing (12 May 2022 10:45)      Any change in ROS: Doing pretty good:  no SOB:   off oxygen    MEDICATIONS  (STANDING):  aspirin  chewable 81 milliGRAM(s) Oral daily  atorvastatin 40 milliGRAM(s) Oral at bedtime  cadexomer iodine 0.9% Gel 1 Application(s) Topical daily  chlorhexidine 2% Cloths 1 Application(s) Topical daily  clopidogrel Tablet 75 milliGRAM(s) Oral daily  Dakins Solution - 1/2 Strength 1 Application(s) Topical daily  epoetin niesha-epbx (RETACRIT) Injectable 21932 Unit(s) IV Push <User Schedule>  heparin   Injectable 5000 Unit(s) SubCutaneous every 12 hours  multivitamin 1 Tablet(s) Oral daily  sevelamer carbonate 800 milliGRAM(s) Oral three times a day with meals    MEDICATIONS  (PRN):  acetaminophen     Tablet .. 650 milliGRAM(s) Oral every 6 hours PRN Temp greater or equal to 38.5C (101.3F), Moderate Pain (4 - 6)  nitroglycerin     SubLingual 0.4 milliGRAM(s) SubLingual every 5 minutes PRN Pulmonary Edema    Vital Signs Last 24 Hrs  T(C): 36.6 (12 May 2022 11:56), Max: 36.9 (12 May 2022 06:50)  T(F): 97.8 (12 May 2022 11:56), Max: 98.4 (12 May 2022 06:50)  HR: 76 (12 May 2022 11:56) (71 - 78)  BP: 115/53 (12 May 2022 11:56) (111/60 - 130/56)  BP(mean): --  RR: 18 (12 May 2022 11:56) (16 - 18)  SpO2: 98% (12 May 2022 11:56) (98% - 100%)    I&O's Summary    12 May 2022 07:01  -  12 May 2022 13:55  --------------------------------------------------------  IN: 600 mL / OUT: 1600 mL / NET: -1000 mL          Physical Exam:   GENERAL: NAD, well-groomed, well-developed  HEENT: MICAELA/   Atraumatic, Normocephalic  ENMT: No tonsillar erythema, exudates, or enlargement; Moist mucous membranes, Good dentition, No lesions  NECK: Supple, No JVD, Normal thyroid  CHEST/LUNG: Clear to auscultaion-scattered crackels+  CVS: Regular rate and rhythm; No murmurs, rubs, or gallops  GI: : Soft, Nontender, Nondistended; Bowel sounds present  NERVOUS SYSTEM:  Alert & Oriented X3  EXTREMITIES:  2+ Peripheral Pulses, No clubbing, cyanosis, or edema  LYMPH: No lymphadenopathy noted  SKIN: No rashes or lesions  ENDOCRINOLOGY: No Thyromegaly  PSYCH: Appropriate    Labs:                              10.3   9.10  )-----------( 354      ( 12 May 2022 07:15 )             33.8                         11.2   8.71  )-----------( 360      ( 11 May 2022 04:00 )             35.8                         10.3   8.72  )-----------( 357      ( 10 May 2022 06:40 )             33.7                         11.0   8.26  )-----------( 320      ( 09 May 2022 05:53 )             36.2     05-12    138  |  93<L>  |  70<H>  ----------------------------<  99  4.5   |  24  |  9.49<H>  05-11    137  |  93<L>  |  50<H>  ----------------------------<  112<H>  4.1   |  26  |  7.12<H>  05-10    138  |  92<L>  |  86<H>  ----------------------------<  92  4.7   |  23  |  10.32<H>  05-09    137  |  93<L>  |  65<H>  ----------------------------<  95  4.4   |  24  |  8.30<H>    Ca    9.6      12 May 2022 07:15  Ca    9.7      11 May 2022 04:00  Phos  7.5     05-12  Phos  5.6     05-11  Mg     2.50     05-12  Mg     2.50     05-11      CAPILLARY BLOOD GLUCOSE            PT/INR - ( 11 May 2022 04:00 )   PT: 11.4 sec;   INR: 0.98 ratio         PTT - ( 11 May 2022 04:00 )  PTT:30.7 sec          RECENT CULTURES:    < from: Xray Chest 1 View- PORTABLE-Urgent (Xray Chest 1 View- PORTABLE-Urgent .) (05.05.22 @ 14:44) >  : 2/28/22    FINDINGS:  Heart/Vascular: Cardiomediastinal silhouette is within normal limits.  Pulmonary: Redemonstration of bilateral diffuse airspace opacities   greater on the right than the left likely due to mild pulmonary edema. No   pleural effusion or pneumothorax.  Bones: Degenerative changes of the bony structures.    Impression:  Stable mild pulmonary edema.        --- End of Report ---            SHARAD TODD MD; Attending Radiologist  This document has been electronically signed. May  6 2022  9:47AM    < end of copied text >      RESPIRATORY CULTURES:          Studies  Chest X-RAY  CT SCAN Chest   Venous Dopplers: LE:   CT Abdomen  Others

## 2022-05-12 NOTE — DISCHARGE NOTE NURSING/CASE MANAGEMENT/SOCIAL WORK - PATIENT PORTAL LINK FT
You can access the FollowMyHealth Patient Portal offered by [facility] by registering at the following website: [email or web address]. By joining [employer]’s FollowMyHealth portal, you will also be able to view your health information using other applications (apps) compatible with our system.

## 2022-05-13 VITALS
WEIGHT: 149.69 LBS | TEMPERATURE: 97 F | HEART RATE: 68 BPM | SYSTOLIC BLOOD PRESSURE: 123 MMHG | DIASTOLIC BLOOD PRESSURE: 51 MMHG | RESPIRATION RATE: 17 BRPM

## 2022-05-13 LAB
ANION GAP SERPL CALC-SCNC: 18 MMOL/L — HIGH (ref 7–14)
BUN SERPL-MCNC: 45 MG/DL — HIGH (ref 7–23)
CALCIUM SERPL-MCNC: 9.8 MG/DL — SIGNIFICANT CHANGE UP (ref 8.4–10.5)
CHLORIDE SERPL-SCNC: 94 MMOL/L — LOW (ref 98–107)
CO2 SERPL-SCNC: 26 MMOL/L — SIGNIFICANT CHANGE UP (ref 22–31)
CREAT SERPL-MCNC: 6.81 MG/DL — HIGH (ref 0.5–1.3)
EGFR: 6 ML/MIN/1.73M2 — LOW
GLUCOSE SERPL-MCNC: 95 MG/DL — SIGNIFICANT CHANGE UP (ref 70–99)
HCT VFR BLD CALC: 35.9 % — SIGNIFICANT CHANGE UP (ref 34.5–45)
HGB BLD-MCNC: 10.9 G/DL — LOW (ref 11.5–15.5)
MAGNESIUM SERPL-MCNC: 2.3 MG/DL — SIGNIFICANT CHANGE UP (ref 1.6–2.6)
MCHC RBC-ENTMCNC: 27.3 PG — SIGNIFICANT CHANGE UP (ref 27–34)
MCHC RBC-ENTMCNC: 30.4 GM/DL — LOW (ref 32–36)
MCV RBC AUTO: 89.8 FL — SIGNIFICANT CHANGE UP (ref 80–100)
NRBC # BLD: 0 /100 WBCS — SIGNIFICANT CHANGE UP
NRBC # FLD: 0.05 K/UL — HIGH
PHOSPHATE SERPL-MCNC: 5.5 MG/DL — HIGH (ref 2.5–4.5)
PLATELET # BLD AUTO: 335 K/UL — SIGNIFICANT CHANGE UP (ref 150–400)
POTASSIUM SERPL-MCNC: 4.1 MMOL/L — SIGNIFICANT CHANGE UP (ref 3.5–5.3)
POTASSIUM SERPL-SCNC: 4.1 MMOL/L — SIGNIFICANT CHANGE UP (ref 3.5–5.3)
RBC # BLD: 4 M/UL — SIGNIFICANT CHANGE UP (ref 3.8–5.2)
RBC # FLD: 16.2 % — HIGH (ref 10.3–14.5)
SARS-COV-2 RNA SPEC QL NAA+PROBE: SIGNIFICANT CHANGE UP
SODIUM SERPL-SCNC: 138 MMOL/L — SIGNIFICANT CHANGE UP (ref 135–145)
WBC # BLD: 9.11 K/UL — SIGNIFICANT CHANGE UP (ref 3.8–10.5)
WBC # FLD AUTO: 9.11 K/UL — SIGNIFICANT CHANGE UP (ref 3.8–10.5)

## 2022-05-13 RX ORDER — SODIUM HYPOCHLORITE 0.125 %
1 SOLUTION, NON-ORAL MISCELLANEOUS
Qty: 0 | Refills: 0 | DISCHARGE
Start: 2022-05-13

## 2022-05-13 RX ORDER — CADEXOMER IODINE 0.9 %
1 PADS, MEDICATED (EA) TOPICAL
Qty: 1 | Refills: 0
Start: 2022-05-13

## 2022-05-13 RX ORDER — SEVELAMER CARBONATE 2400 MG/1
2 POWDER, FOR SUSPENSION ORAL
Qty: 180 | Refills: 0
Start: 2022-05-13 | End: 2022-06-11

## 2022-05-13 RX ORDER — COLLAGENASE CLOSTRIDIUM HIST. 250 UNIT/G
1 OINTMENT (GRAM) TOPICAL
Qty: 1 | Refills: 0
Start: 2022-05-13

## 2022-05-13 RX ORDER — SEVELAMER CARBONATE 2400 MG/1
2 POWDER, FOR SUSPENSION ORAL
Qty: 0 | Refills: 0 | DISCHARGE

## 2022-05-13 RX ADMIN — HEPARIN SODIUM 5000 UNIT(S): 5000 INJECTION INTRAVENOUS; SUBCUTANEOUS at 17:26

## 2022-05-13 RX ADMIN — Medication 81 MILLIGRAM(S): at 11:10

## 2022-05-13 RX ADMIN — CHLORHEXIDINE GLUCONATE 1 APPLICATION(S): 213 SOLUTION TOPICAL at 11:08

## 2022-05-13 RX ADMIN — Medication 1 TABLET(S): at 11:09

## 2022-05-13 RX ADMIN — SEVELAMER CARBONATE 800 MILLIGRAM(S): 2400 POWDER, FOR SUSPENSION ORAL at 09:00

## 2022-05-13 RX ADMIN — SEVELAMER CARBONATE 800 MILLIGRAM(S): 2400 POWDER, FOR SUSPENSION ORAL at 11:11

## 2022-05-13 RX ADMIN — Medication 1 APPLICATION(S): at 11:08

## 2022-05-13 RX ADMIN — HEPARIN SODIUM 5000 UNIT(S): 5000 INJECTION INTRAVENOUS; SUBCUTANEOUS at 05:44

## 2022-05-13 RX ADMIN — SEVELAMER CARBONATE 800 MILLIGRAM(S): 2400 POWDER, FOR SUSPENSION ORAL at 17:24

## 2022-05-13 RX ADMIN — CLOPIDOGREL BISULFATE 75 MILLIGRAM(S): 75 TABLET, FILM COATED ORAL at 11:10

## 2022-05-13 NOTE — PROGRESS NOTE ADULT - SUBJECTIVE AND OBJECTIVE BOX
Subjective:  Patient seen at bedside this AM. Reports feeling well, without complaints. Denies chest pain, SOB.      24h Events:   - Overnight, no acute events    Objective:  Vital Signs  T(C): 36.8 (05-13 @ 05:20), Max: 36.8 (05-12 @ 22:59)  HR: 70 (05-13 @ 05:20) (70 - 76)  BP: 110/50 (05-13 @ 05:20) (110/50 - 130/56)  RR: 18 (05-13 @ 05:20) (16 - 18)  SpO2: 100% (05-13 @ 05:20) (97% - 100%)      Physical Exam:  GEN: resting in bed comfortably in NAD  NEURO: awake, alert  RESP: no increased WOB   RLE: 1st toe amp site c/d/i, palpable DP pulse, Dopplerable AT signal, sensation + motor intact; R groin soft, dressing c/d/i  - LLE: wound between 2nd and third digits, open, no purulence or malodor, 5th toe amp site c/d/i, Dopplerable DP/PT signal, sensation + motor intact    Labs:             10.3   9.10  )-----------( 354      ( 12 May 2022 07:15 )             33.8     138  |  93<L>  |  70<H>  ----------------------------<  99  4.5   |  24  |  9.49<H>    Ca    9.6      12 May 2022 07:15  Phos  7.5     05-12  Mg     2.50     05-12      Medications:   MEDICATIONS  (STANDING):  aspirin  chewable 81 milliGRAM(s) Oral daily  atorvastatin 40 milliGRAM(s) Oral at bedtime  cadexomer iodine 0.9% Gel 1 Application(s) Topical daily  chlorhexidine 2% Cloths 1 Application(s) Topical daily  clopidogrel Tablet 75 milliGRAM(s) Oral daily  Dakins Solution - 1/2 Strength 1 Application(s) Topical daily  epoetin niesha-epbx (RETACRIT) Injectable 87388 Unit(s) IV Push <User Schedule>  heparin   Injectable 5000 Unit(s) SubCutaneous every 12 hours  multivitamin 1 Tablet(s) Oral daily  sevelamer carbonate 800 milliGRAM(s) Oral three times a day with meals    MEDICATIONS  (PRN):  acetaminophen     Tablet .. 650 milliGRAM(s) Oral every 6 hours PRN Temp greater or equal to 38.5C (101.3F), Moderate Pain (4 - 6)  nitroglycerin     SubLingual 0.4 milliGRAM(s) SubLingual every 5 minutes PRN Pulmonary Edema

## 2022-05-13 NOTE — PROGRESS NOTE ADULT - ASSESSMENT
60 yo F with pmh of ESRD on dialysis (Tu,Thurs,Sat), s/p L foot partial 3rd ray amputation and R hallux amputation 3/4 presenting from [place] for resp distress. [place] well yesterday however woke up at 5 AM this morning and could not breathe. Associated with subjective fevers and sweats. No chest pain. Is COVID vaccinated. Mild bl LE swelling. Has not missed dialysis.     Problem/Plan - 1:  ·  Problem: Acute respiratory failure with hypoxia.   ·  Plan: Off BIPAP and off NC Oxygen.   Resolved.      Problem/Plan - 2:  ·  Problem: Flash pulmonary edema.   ·  Plan: Sec to Fluid Overload from ESRD .  HD per renal.   Recent TTE noted.     Problem/Plan - 3:  ·  Problem: ESRD on hemodialysis.   ·  Plan: HD per renal.     Problem/Plan - 4:  ·  Problem: Hypertensive urgency.   ·  Plan: BP readings better .     Problem/Plan - 5:  ·  Problem: Fever with foot wound with PVD  .   ·  Plan: Podiatry consult noted.   ID following . No Abxs for now.   Awaiting RLE Angiogram . S/P LLE Angiogram .     Dispo : DC planning home as wants to do Outpt. D/W Vascular attending andd she got Preauth .     Medically optimized for RLE Angiogram. .

## 2022-05-13 NOTE — PROGRESS NOTE ADULT - PROBLEM SELECTOR PROBLEM 3
ESRD on hemodialysis

## 2022-05-13 NOTE — PROVIDER CONTACT NOTE (OTHER) - SITUATION
Patient /49
/52, pt asymptomatic and denies any distress or discomfort.
Pt had L foot partial 3rd toe amputation 3/4; upon assessment surgical incision looks infected
bp electronically - 109/43  repeated it manually and got 104/48  hr 72

## 2022-05-13 NOTE — PROGRESS NOTE ADULT - SUBJECTIVE AND OBJECTIVE BOX
Date of Service  : 05-13-22     INTERVAL HPI/OVERNIGHT EVENTS: I feel fine.   Vital Signs Last 24 Hrs  T(C): 36.3 (13 May 2022 16:45), Max: 37.2 (13 May 2022 14:10)  T(F): 97.3 (13 May 2022 16:45), Max: 99 (13 May 2022 14:10)  HR: 68 (13 May 2022 16:45) (68 - 74)  BP: 123/51 (13 May 2022 16:45) (109/43 - 123/51)  BP(mean): --  RR: 17 (13 May 2022 16:45) (17 - 18)  SpO2: 99% (13 May 2022 11:13) (99% - 100%)  I&O's Summary    12 May 2022 07:01  -  13 May 2022 07:00  --------------------------------------------------------  IN: 600 mL / OUT: 1600 mL / NET: -1000 mL    13 May 2022 07:01  -  13 May 2022 17:44  --------------------------------------------------------  IN: 400 mL / OUT: 1900 mL / NET: -1500 mL      MEDICATIONS  (STANDING):  aspirin  chewable 81 milliGRAM(s) Oral daily  atorvastatin 40 milliGRAM(s) Oral at bedtime  cadexomer iodine 0.9% Gel 1 Application(s) Topical daily  chlorhexidine 2% Cloths 1 Application(s) Topical daily  clopidogrel Tablet 75 milliGRAM(s) Oral daily  Dakins Solution - 1/2 Strength 1 Application(s) Topical daily  epoetin niesha-epbx (RETACRIT) Injectable 63014 Unit(s) IV Push <User Schedule>  heparin   Injectable 5000 Unit(s) SubCutaneous every 12 hours  multivitamin 1 Tablet(s) Oral daily  sevelamer carbonate 800 milliGRAM(s) Oral three times a day with meals    MEDICATIONS  (PRN):  acetaminophen     Tablet .. 650 milliGRAM(s) Oral every 6 hours PRN Temp greater or equal to 38.5C (101.3F), Moderate Pain (4 - 6)  nitroglycerin     SubLingual 0.4 milliGRAM(s) SubLingual every 5 minutes PRN Pulmonary Edema    LABS:                        10.9   9.11  )-----------( 335      ( 13 May 2022 06:00 )             35.9     05-13    138  |  94<L>  |  45<H>  ----------------------------<  95  4.1   |  26  |  6.81<H>    Ca    9.8      13 May 2022 06:00  Phos  5.5     05-13  Mg     2.30     05-13          CAPILLARY BLOOD GLUCOSE              Consultant(s) Notes Reviewed:  [x ] YES  [ ] NO    PHYSICAL EXAM:  GENERAL: NAD, well-groomed, well-developed, not in any distress ,  HEAD:  Atraumatic, Normocephalic  NECK: Supple, No JVD, Normal thyroid  NERVOUS SYSTEM:  Alert & Oriented X3, No focal deficit   CHEST/LUNG: Good air entry bilateral with no  rales, rhonchi, wheezing, or rubs  HEART: Regular rate and rhythm; No murmurs, rubs, or gallops  ABDOMEN: Soft, Nontender, Nondistended; Bowel sounds present  EXTREMITIES:  Feet dressed     Care Discussed with Consultants/Other Providers [ x] YES  [ ] NO

## 2022-05-13 NOTE — PROVIDER CONTACT NOTE (OTHER) - ACTION/TREATMENT ORDERED:
ACP aware  awaiting orders
As per PA, continue to monitor at this time
MARY Garcia to order podiatry consult
PA made aware, pending further orders.

## 2022-05-13 NOTE — PROGRESS NOTE ADULT - ASSESSMENT
60 yo F with pmh of ESRD on dialysis (Tu,Thurs,Sat), s/p L foot partial 3rd ray amputation and R hallux amputation 3/4 presenting from [place] for resp distress, admitted for acute resp failure, flash pulm edema, for urgent HD. on BiPAP. Renal consulted for urgent HD, now following for ESRD Mx.     End Stage Renal Disease on Dialysis  hemodialysis TTS at [place]  K wnl  hypervolemia resolved. s/p Flash pulmonary edema on admission   hb level at goal    s/p HD 5/12, Rx sheet reviewed, net UF 1kg removed, tolerated well. uneventful.   outpt HD set up to start next Monday at [facility]  plan for short HD today w/2k bath, 1L UF as tolerated  renal diet, fluid restriction 1L/day   dose all meds for ESRD      s/p Acute respiratory failure with hypoxia.   Off BIPAP     s/p Hypertensive urgency.   BP controlled well. not on bp meds. watch closely    stable for d/c home after a short hd this afternoon  poc d/w pt, pts RN bedside, covering PA, HD RN  labs, chart reviewed  For any question, pl call:  Nephrology  [facility] -[phone]  Office [phone]  Ans Serv [phone]

## 2022-05-13 NOTE — PROGRESS NOTE ADULT - PROBLEM SELECTOR PLAN 4
Blood pressure is much better controlled1    05/08; stable
Blood pressure is much better controlled1    05/08; stable  5/10: now better controlled  5/11: controlled
Blood pressure is much better controlled1    05/08; stable  5/10: now better controlled  5/11: controlled  5/13: controlled
Blood pressure is much better controlled1
Blood pressure is much better controlled1    05/08; stable  5/10: now better controlled
Blood pressure is much better controlled1    05/08; kinjal
Blood pressure is much better controlled1
Blood pressure is much better controlled1    05/08; stable  5/10: now better controlled  5/11: controlled
Blood pressure is much better controlled1
Blood pressure is much better controlled1

## 2022-05-13 NOTE — PROVIDER CONTACT NOTE (OTHER) - BACKGROUND
pt admitted from [place] from respiratory distress s/p BIPAP and HD on admission. PMH of PAD, ESRD, CKD, HLD, and HF.
Pt admitted for chronic pulmonary edema
pt admitted from [place] from respiratory distress s/p BIPAP and HD on admission. PMH of PAD, ESRD, CKD, HLD, and HF.
pt admitted from [place] from respiratory distress s/p BIPAP and HD on admission. PMH of PAD, ESRD, CKD, HLD, and HF.

## 2022-05-13 NOTE — PROGRESS NOTE ADULT - ASSESSMENT
60yo F with Hx ESRD (on HD TRS) s/p L foot partial 3rd ray amputation and R hallux amputation on 3/4 who p/w respiratory distress. Vascular Surgery consulted for evaluation of PAD iso non-healing LE foot wounds. Patient is s/p LLE angiogram with popliteal/AT/PT balloon angioplasty & lithotripsy 5/11. OR planning for RLE angiogram on Wednesday 5/18.     PLAN:   - OR planning for RLE angiogram on Wednesday 5/18  - Please pre-op patient as follows:      - NPO past midnight for OR on TUESDAY 5/17, IVF/DM management per discretion of primary team     - AM labs WEDNESDAY 5/18: CBC, BMP, PT/PTT/INR     - Maintain an active T+S     - COVID result within 72h       Rosie Peace, PGY-2  C Team Surgery  #[id number]  60yo F with Hx ESRD (on HD TRS) s/p L foot partial 3rd ray amputation and R hallux amputation on 3/4 who p/w respiratory distress. Vascular Surgery consulted for evaluation of PAD iso non-healing LE foot wounds. Patient is s/p LLE angiogram with popliteal/AT/PT balloon angioplasty & lithotripsy 5/11. OR planning for RLE angiogram on Wednesday 5/18.     PLAN:   - OR planning for RLE angiogram on Wednesday 5/18  - If appropriate arrangements are made for patient to return as OP on Wednesday, thens he can be discharged  - If patient's insurance is denied in OP setting, then please have patient RTED on Tuesday   - Please obtain COVID swab today if patient is to be discharged     Rosie Peace, PGY-2  C Team Surgery  #[id number]  62yo F with Hx ESRD (on HD TRS) s/p L foot partial 3rd ray amputation and R hallux amputation on 3/4 who p/w respiratory distress. Vascular Surgery consulted for evaluation of PAD iso non-healing LE foot wounds. Patient is s/p LLE angiogram with popliteal/AT/PT balloon angioplasty & lithotripsy 5/11. OR planning for RLE angiogram on Wednesday 5/18.     PLAN:   - OR planning for RLE angiogram on Wednesday 5/18  - If appropriate arrangements are made for patient to return as OP on Wednesday, then she can be discharged  - If patient's insurance is denied in OP setting, then please have patient RTED on Tuesday   - Please obtain COVID swab today if patient is to be discharged     Rosie Peace, PGY-2  C Team Surgery  #[id number]

## 2022-05-13 NOTE — PROGRESS NOTE ADULT - ASSESSMENT
62 yo F with pmh of ESRD on dialysis (Tu,Thurs,Sat), s/p L foot partial 3rd ray amputation and R hallux amputation 3/4 presenting from [place] for resp distress. [place] well yesterday however woke up at 5 AM this morning and could not breathe. Associated with subjective fevers and sweats. No chest pain. Is COVID vaccinated. Mild bl LE swelling. Has not missed dialysis.

## 2022-05-13 NOTE — PROGRESS NOTE ADULT - NSPROGADDITIONALINFOA_GEN_ALL_CORE
RANJIT ACP!
dw acp
being following by podiatry
dw acp
being following by podiatry
s/p angiogram: tolerated well:
being following by podiatry
s/p angiogram: tolerated well:    5/12: stable: pulm wise  5/13?: stable
RANJIT ACP!
s/p angiogram: tolerated well:    5/12: stable:pulm wise

## 2022-05-13 NOTE — PROGRESS NOTE ADULT - PROVIDER SPECIALTY LIST ADULT
Cardiology
Internal Medicine
Internal Medicine
Nephrology
Cardiology
Infectious Disease
Pulmonology
Vascular Surgery
Cardiology
Internal Medicine
Nephrology
Podiatry
Podiatry
Surgery
Internal Medicine
Nephrology
Nephrology
Pulmonology
Vascular Surgery
Internal Medicine
Pulmonology

## 2022-05-13 NOTE — PROGRESS NOTE ADULT - PROBLEM SELECTOR PLAN 2
repeat chest xray after HD today    05/06: chest xray is better
repeat chest xray after HD today    05/06: chest xray is better    05/07: clinically she is doing much better:
repeat chest xray after HD today    05/06: chest xray is better    05/07: clinically she is doing much better:    0/08: doing much better: has chr changes of pulm edema on chest xray
last chest xray improved: off bipap and oxygen
repeat chest xray after HD today
repeat chest xray after HD today    05/06: chest xray is better    05/07: clinically she is doing much better:    0/08: doing much better: has chr changes of pulm edema on chest xray    05/09: seems clinically better  05/101; clinically she is much better
repeat chest xray after HD today    05/06: chest xray is better    05/07: clinically she is doing much better:    0/08: doing much better: has chr changes of pulm edema on chest xray    05/09: seems clinically better  05/10; clinically she is much better  5/11: doing ok: no sob:
repeat chest xray after HD today
last chest xray improved: off bipap and oxygen
repeat chest xray after HD today    05/06: chest xray is better    05/07: clinically she is doing much better:    0/08: doing much better: has chr changes of pulm edema on chest xray    05/09: seems clinically better

## 2022-05-13 NOTE — PROVIDER CONTACT NOTE (OTHER) - REASON
/52, pt asymptomatic and denies any distress or discomfort.
Patient BP
Patient /49
Possible infected Left foot third toe wound

## 2022-05-13 NOTE — PROGRESS NOTE ADULT - SUBJECTIVE AND OBJECTIVE BOX
Cardiovascular Disease Progress Note    Overnight events: No acute events overnight. Ms. Boogie denies chest pain or SOB.     Otherwise review of systems negative    Objective Findings:  T(C): 36.8 (22 @ 05:20), Max: 36.8 (22 @ 22:59)  HR: 70 (22 @ 05:20) (70 - 76)  BP: 110/50 (22 @ 05:20) (110/50 - 130/56)  RR: 18 (22 @ 05:20) (16 - 18)  SpO2: 100% (22 @ 05:20) (97% - 100%)  Wt(kg): --  Daily     Daily Weight in k.8 (12 May 2022 10:30)      Physical Exam:  Gen: NAD; Patient resting comfortably  HEENT: EOMI, Normocephalic/ atraumatic  CV: RRR, normal S1 + S2, no m/r/g  Lungs:  Normal respiratory effort; clear to auscultation bilaterally  Abd: soft, non-tender; bowel sounds present  Ext: No edema; warm and well perfused    Telemetry: Sinus; no ectopy    Laboratory Data:                        10.3   9.10  )-----------( 354      ( 12 May 2022 07:15 )             33.8     05-12    138  |  93<L>  |  70<H>  ----------------------------<  99  4.5   |  24  |  9.49<H>    Ca    9.6      12 May 2022 07:15  Phos  7.5     05-12  Mg     2.50     05-12                Inpatient Medications:  MEDICATIONS  (STANDING):  aspirin  chewable 81 milliGRAM(s) Oral daily  atorvastatin 40 milliGRAM(s) Oral at bedtime  cadexomer iodine 0.9% Gel 1 Application(s) Topical daily  chlorhexidine 2% Cloths 1 Application(s) Topical daily  clopidogrel Tablet 75 milliGRAM(s) Oral daily  Dakins Solution - 1/2 Strength 1 Application(s) Topical daily  epoetin niesha-epbx (RETACRIT) Injectable 16533 Unit(s) IV Push <User Schedule>  heparin   Injectable 5000 Unit(s) SubCutaneous every 12 hours  multivitamin 1 Tablet(s) Oral daily  sevelamer carbonate 800 milliGRAM(s) Oral three times a day with meals      Assessment:61-year-old female with ESRD on HD, HLD and peripheral vascular disease s/p lower extremity resection presents with respiratory distress.     Plan of Care:    #Respiratory Distress-  Secondary to fluid overloaded.   Volume status is improved post HD.  Patient now on room air.   Fluid removal with HD as per the renal team.   No cardiac objection to RLE angiogram.     #Elevated troponins-  Patient denies chest pain and EKG is non ischemic.  Likely due to reduced creatinine clearance.   Recent echo noted- normal LV systolic function.  No indication for cath at this time.  Continue DAPT given PVD.       #Peripheral vascular disease.  - S/p L foot partial 3rd ray amputation and R hallux amputation 3/2022.   - S/p lower extremity angioplasty 2022  - Vascular input noted.   - Continue medical therapy with DAPT and statin    #ESRD-  - HD as per renal.          Over 25 minutes spent on total encounter; more than 50% of the visit was spent counseling and/or coordinating care by the attending physician.      Cristino Adams MD [facility]  Cardiovascular Disease  [phone] Cardiovascular Disease Progress Note    Overnight events: No acute events overnight. Ms. Boogie denies chest pain or SOB.     Otherwise review of systems negative    Objective Findings:  T(C): 36.8 (22 @ 05:20), Max: 36.8 (22 @ 22:59)  HR: 70 (22 @ 05:20) (70 - 76)  BP: 110/50 (22 @ 05:20) (110/50 - 130/56)  RR: 18 (22 @ 05:20) (16 - 18)  SpO2: 100% (22 @ 05:20) (97% - 100%)  Wt(kg): --  Daily     Daily Weight in k.8 (12 May 2022 10:30)      Physical Exam:  Gen: NAD; Patient resting comfortably  HEENT: EOMI, Normocephalic/ atraumatic  CV: RRR, normal S1 + S2, no m/r/g  Lungs:  Normal respiratory effort; clear to auscultation bilaterally  Abd: soft, non-tender; bowel sounds present  Ext: No edema; warm and well perfused    Telemetry: Sinus; no ectopy    Laboratory Data:                        10.3   9.10  )-----------( 354      ( 12 May 2022 07:15 )             33.8     05-12    138  |  93<L>  |  70<H>  ----------------------------<  99  4.5   |  24  |  9.49<H>    Ca    9.6      12 May 2022 07:15  Phos  7.5     05-12  Mg     2.50     05-12                Inpatient Medications:  MEDICATIONS  (STANDING):  aspirin  chewable 81 milliGRAM(s) Oral daily  atorvastatin 40 milliGRAM(s) Oral at bedtime  cadexomer iodine 0.9% Gel 1 Application(s) Topical daily  chlorhexidine 2% Cloths 1 Application(s) Topical daily  clopidogrel Tablet 75 milliGRAM(s) Oral daily  Dakins Solution - 1/2 Strength 1 Application(s) Topical daily  epoetin niesha-epbx (RETACRIT) Injectable 19996 Unit(s) IV Push <User Schedule>  heparin   Injectable 5000 Unit(s) SubCutaneous every 12 hours  multivitamin 1 Tablet(s) Oral daily  sevelamer carbonate 800 milliGRAM(s) Oral three times a day with meals      Assessment:61-year-old female with ESRD on HD, HLD and peripheral vascular disease s/p lower extremity resection presents with respiratory distress.     Plan of Care:    #Respiratory Distress-  Secondary to fluid overloaded.   Volume status is improved post HD.  Patient now on room air.   Fluid removal with HD as per the renal team.   No cardiac objection to RLE angiogram.     #Elevated troponins-  Patient denies chest pain and EKG is non ischemic.  Likely due to reduced creatinine clearance.   Recent echo noted- normal LV systolic function.  No indication for cath at this time.  Continue DAPT given PVD.       #Peripheral vascular disease.  - S/p L foot partial 3rd ray amputation and R hallux amputation 3/2022.   - S/p lower extremity angioplasty 2022  - Vascular input noted.   - Continue medical therapy with DAPT and statin    #ESRD-  - HD as per renal.    #ACP (advance care planning)-  CPT 36453  Advanced care planning was discussed with the patient.    Cardiac findings were discussed in detail and all questions were answered.        Over 25 minutes spent on total encounter; more than 50% of the visit was spent counseling and/or coordinating care by the attending physician.      Cristino Adams MD [facility]  Cardiovascular Disease  [phone]

## 2022-05-13 NOTE — PROVIDER CONTACT NOTE (OTHER) - ASSESSMENT
Incision is open and purulent drainage is noted.
asymptomatic. Denies any distress or discomfort
/49, asymptomatic. Denies any distress or discomfort
VS as per flowsheet.

## 2022-05-13 NOTE — PROGRESS NOTE ADULT - SUBJECTIVE AND OBJECTIVE BOX
[facility] - S Jluis / Shabbir S /D Romina/ SHERRI Mcfarlane/ SHERRI Madden/ Andrés Perkins / M Tristianu/ O Gladis  service -[phone], office [phone]  ---------------------------------------------------------------------------------------------------------------    Patient seen and examined bedside    Subjective and Objective: No overnight events, sob resolved. No complaints today. feeling better    Allergies: No Known Allergies      Hospital Medications:   MEDICATIONS  (STANDING):  aspirin  chewable 81 milliGRAM(s) Oral daily  atorvastatin 40 milliGRAM(s) Oral at bedtime  cadexomer iodine 0.9% Gel 1 Application(s) Topical daily  chlorhexidine 2% Cloths 1 Application(s) Topical daily  clopidogrel Tablet 75 milliGRAM(s) Oral daily  Dakins Solution - 1/2 Strength 1 Application(s) Topical daily  epoetin niesha-epbx (RETACRIT) Injectable 62105 Unit(s) IV Push <User Schedule>  heparin   Injectable 5000 Unit(s) SubCutaneous every 12 hours  multivitamin 1 Tablet(s) Oral daily  sevelamer carbonate 800 milliGRAM(s) Oral three times a day with meals      REVIEW OF SYSTEMS:  CONSTITUTIONAL: No weakness, fevers or chills  EYES/ENT: No visual changes;  No vertigo or throat pain   NECK: No pain or stiffness  RESPIRATORY: No cough, wheezing, hemoptysis; No shortness of breath  CARDIOVASCULAR: No chest pain or palpitations.  GASTROINTESTINAL: No abdominal or epigastric pain. No nausea, vomiting, or hematemesis; No diarrhea or constipation. No melena or hematochezia.  GENITOURINARY: No dysuria, frequency, foamy urine, urinary urgency, incontinence or hematuria  NEUROLOGICAL: No numbness or weakness  SKIN: No itching, burning, rashes, or lesions   VASCULAR: No bilateral lower extremity edema.   All other review of systems is negative unless indicated above.    VITALS:  T(F): 98.2 (05-13-22 @ 11:13), Max: 98.3 (05-13-22 @ 05:20)  HR: 72 (05-13-22 @ 11:13)  BP: 109/43 (05-13-22 @ 11:13)  RR: 18 (05-13-22 @ 11:13)  SpO2: 99% (05-13-22 @ 11:13)  Wt(kg): --    05-12 @ 07:01  -  05-13 @ 07:00  --------------------------------------------------------  IN: 600 mL / OUT: 1600 mL / NET: -1000 mL          PHYSICAL EXAM:  Constitutional: NAD  HEENT: anicteric sclera, oropharynx clear  Neck: No JVD  Respiratory: CTAB, no wheezes, rales or rhonchi  Cardiovascular: S1, S2, RRR  Gastrointestinal: BS+, soft, NT/ND  Extremities: No cyanosis or clubbing. No peripheral edema  Neurological: A/O x 3, no focal deficits  Psychiatric: Normal mood, normal affect  : No CVA tenderness. No salcido.   Skin: No rashes  Vascular Access:    LABS:  05-13    138  |  94<L>  |  45<H>  ----------------------------<  95  4.1   |  26  |  6.81<H>    Ca    9.8      13 May 2022 06:00  Phos  5.5     05-13  Mg     2.30     05-13      Creatinine Trend: 6.81 <--, 9.49 <--, 7.12 <--, 10.32 <--, 8.30 <--, 6.14 <--, 8.22 <--                        10.9   9.11  )-----------( 335      ( 13 May 2022 06:00 )             35.9     Urine Studies:        RADIOLOGY & ADDITIONAL STUDIES:   [facility] - S Jluis / Shabbir S /D Romina/ S Denys/ S Chano/ Andrés Perkins / M Katlyn/ O Gladis  service -[phone], office [phone]  ---------------------------------------------------------------------------------------------------------------    Patient seen and examined bedside    Subjective and Objective: No overnight events, sob resolved. No complaints today. feeling better  wants to go home    Allergies: No Known Allergies      Hospital Medications:   MEDICATIONS  (STANDING):  aspirin  chewable 81 milliGRAM(s) Oral daily  atorvastatin 40 milliGRAM(s) Oral at bedtime  cadexomer iodine 0.9% Gel 1 Application(s) Topical daily  chlorhexidine 2% Cloths 1 Application(s) Topical daily  clopidogrel Tablet 75 milliGRAM(s) Oral daily  Dakins Solution - 1/2 Strength 1 Application(s) Topical daily  epoetin niesha-epbx (RETACRIT) Injectable 83715 Unit(s) IV Push <User Schedule>  heparin   Injectable 5000 Unit(s) SubCutaneous every 12 hours  multivitamin 1 Tablet(s) Oral daily  sevelamer carbonate 800 milliGRAM(s) Oral three times a day with meals      VITALS:  T(F): 98.2 (05-13-22 @ 11:13), Max: 98.3 (05-13-22 @ 05:20)  HR: 72 (05-13-22 @ 11:13)  BP: 109/43 (05-13-22 @ 11:13)  RR: 18 (05-13-22 @ 11:13)  SpO2: 99% (05-13-22 @ 11:13)  Wt(kg): --    05-12 @ 07:01  -  05-13 @ 07:00  --------------------------------------------------------  IN: 600 mL / OUT: 1600 mL / NET: -1000 mL      PHYSICAL EXAM:  Constitutional: NAD  HEENT: anicteric sclera  Neck: No JVD  Respiratory: CTAB, no wheezes, rales or rhonchi  Cardiovascular: S1, S2, RRR  Gastrointestinal: BS+, soft, NT  Extremities: No peripheral edema  Neurological: A/O x 3  Psychiatric: Normal mood, normal affect  : No salcido.   Vascular Access: femoral AVG    LABS:  05-13    138  |  94<L>  |  45<H>  ----------------------------<  95  4.1   |  26  |  6.81<H>    Ca    9.8      13 May 2022 06:00  Phos  5.5     05-13  Mg     2.30     05-13      Creatinine Trend: 6.81 <--, 9.49 <--, 7.12 <--, 10.32 <--, 8.30 <--, 6.14 <--, 8.22 <--                        10.9   9.11  )-----------( 335      ( 13 May 2022 06:00 )             35.9     Urine Studies:        RADIOLOGY & ADDITIONAL STUDIES:

## 2022-05-13 NOTE — PROGRESS NOTE ADULT - SUBJECTIVE AND OBJECTIVE BOX
Date of Service: 05-13-22 @ 10:41    Patient is a 61y old  Female who presents with a chief complaint of SOB and wheezing (13 May 2022 07:20)      Any change in ROS: Doing ok: no SOB:     MEDICATIONS  (STANDING):  aspirin  chewable 81 milliGRAM(s) Oral daily  atorvastatin 40 milliGRAM(s) Oral at bedtime  cadexomer iodine 0.9% Gel 1 Application(s) Topical daily  chlorhexidine 2% Cloths 1 Application(s) Topical daily  clopidogrel Tablet 75 milliGRAM(s) Oral daily  Dakins Solution - 1/2 Strength 1 Application(s) Topical daily  epoetin niesha-epbx (RETACRIT) Injectable 45326 Unit(s) IV Push <User Schedule>  heparin   Injectable 5000 Unit(s) SubCutaneous every 12 hours  multivitamin 1 Tablet(s) Oral daily  sevelamer carbonate 800 milliGRAM(s) Oral three times a day with meals    MEDICATIONS  (PRN):  acetaminophen     Tablet .. 650 milliGRAM(s) Oral every 6 hours PRN Temp greater or equal to 38.5C (101.3F), Moderate Pain (4 - 6)  nitroglycerin     SubLingual 0.4 milliGRAM(s) SubLingual every 5 minutes PRN Pulmonary Edema    Vital Signs Last 24 Hrs  T(C): 36.8 (13 May 2022 05:20), Max: 36.8 (12 May 2022 22:59)  T(F): 98.3 (13 May 2022 05:20), Max: 98.3 (13 May 2022 05:20)  HR: 70 (13 May 2022 05:20) (70 - 76)  BP: 110/50 (13 May 2022 05:20) (110/50 - 115/53)  BP(mean): --  RR: 18 (13 May 2022 05:20) (17 - 18)  SpO2: 100% (13 May 2022 05:20) (97% - 100%)    I&O's Summary    12 May 2022 07:01  -  13 May 2022 07:00  --------------------------------------------------------  IN: 600 mL / OUT: 1600 mL / NET: -1000 mL          Physical Exam:   GENERAL: NAD, well-groomed, well-developed  HEENT: MICAELA/   Atraumatic, Normocephalic  ENMT: No tonsillar erythema, exudates, or enlargement; Moist mucous membranes, Good dentition, No lesions  NECK: Supple, No JVD, Normal thyroid  CHEST/LUNG: Clear to auscultaion  CVS: Regular rate and rhythm; No murmurs, rubs, or gallops  GI: : Soft, Nontender, Nondistended; Bowel sounds present  NERVOUS SYSTEM:  Alert & Oriented X3  EXTREMITIES:  - edema  LYMPH: No lymphadenopathy noted  SKIN: No rashes or lesions  ENDOCRINOLOGY: No Thyromegaly  PSYCH: Appropriate    Labs:                              10.9   9.11  )-----------( 335      ( 13 May 2022 06:00 )             35.9                         10.3   9.10  )-----------( 354      ( 12 May 2022 07:15 )             33.8                         11.2   8.71  )-----------( 360      ( 11 May 2022 04:00 )             35.8                         10.3   8.72  )-----------( 357      ( 10 May 2022 06:40 )             33.7     05-13    138  |  94<L>  |  45<H>  ----------------------------<  95  4.1   |  26  |  6.81<H>  05-12    138  |  93<L>  |  70<H>  ----------------------------<  99  4.5   |  24  |  9.49<H>  05-11    137  |  93<L>  |  50<H>  ----------------------------<  112<H>  4.1   |  26  |  7.12<H>  05-10    138  |  92<L>  |  86<H>  ----------------------------<  92  4.7   |  23  |  10.32<H>    Ca    9.8      13 May 2022 06:00  Ca    9.6      12 May 2022 07:15  Phos  5.5     05-13  Phos  7.5     05-12  Mg     2.30     05-13  Mg     2.50     05-12      CAPILLARY BLOOD GLUCOSE              rad< from: Xray Chest 1 View- PORTABLE-Urgent (Xray Chest 1 View- PORTABLE-Urgent .) (05.05.22 @ 14:44) >    ACC: [id number] EXAM:  XR CHEST PORTABLE URGENT 1V                          PROCEDURE DATE:  05/05/2022          INTERPRETATION:  INDICATION: Followup of pulmonary edema.    COMPARISON: 2/28/22    FINDINGS:  Heart/Vascular: Cardiomediastinal silhouette is within normal limits.  Pulmonary: Redemonstration of bilateral diffuse airspace opacities   greater on the right than the left likely due to mild pulmonary edema. No   pleural effusion or pneumothorax.  Bones: Degenerative changes of the bony structures.    Impression:  Stable mild pulmonary edema.        --- End of Report ---            SHARAD TODD MD; Attending Radiologist  This document has been electronically signed. May  6 2022  9:47AM    < end of copied text >  < from: Xray Chest 1 View-PORTABLE IMMEDIATE (05.03.22 @ 06:52) >  ACC: [id number] EXAM:  XR CHEST PORTABLE IMMED 1V                          PROCEDURE DATE:  05/03/2022          INTERPRETATION:  INDICATION: Shortness of breath    COMPARISON: Chest radiograph 2/28/2022    FINDINGS:  Heart/Vascular: The cardiac silhouette is normal in size.  Pulmonary: Bilateral diffuse airspace opacities greater on the right than   the left side consistent with pulmonary edema.    Bones: Mild osteoarthrosis of the glenohumeral joints and spine.    Impression: Pulmonary edema.        --- End of Report ---          ALYSSA BAEZA DO; Resident Radiologist  This document has been electronically signed.  SAM GLASER MD; Attending Radiologist  This document has been electronically signed. May  3 2022  7:55AM    < end of copied text >          RECENT CULTURES:        RESPIRATORY CULTURES:          Studies  Chest X-RAY  CT SCAN Chest   Venous Dopplers: LE:   CT Abdomen  Others

## 2022-05-13 NOTE — PROGRESS NOTE ADULT - PROBLEM SELECTOR PLAN 1
doing prettty good:  on room air:  no sob:  last chest xray reviewed: improved:
seems to be getting better:  she is off bipa now:  vbg showed mild hypercarbic resp failure:  off bipap at this time:  try without bipap tonight:    05/05: doing pretty good:  tolerated being off bipap:  currently on 2 L of oxygen , getting HD:  will try to wean off the oxygen too:  she is pretty alert and awake:    05/06;    doing pretty good:  tolerated being off bipap:  currently on 2 L of oxygen , getting HD:  will try to wean off the oxygen too:  she is pretty alert and awake:  chest xray to me does look better:
05/07: doing ok: no SOB:  off oxygen at this time:  have been off bipap for some time now:  chest x-ray with improvement:    05/08: she is alert and awake:  off oxygen  no SOB: doingpretty good;
doing prettty good:  on room air:  no sob:  last chest xray reviewed: improved:    05/13:    seems OK: no SOB:  no cough:  on walter stephanie:   for le angiogram
05/07: doing ok: no SOB:  off oxygen at this time:  have been off bipap for some time now:  chest x-ray with improvement:    05/08: she is alert and awake:  off oxygen  no SOB: doing pretty good;    05/09: doing pretty good;  off oxygen: no bipap: stable pulm wise:  chest xary is with chr mild pulm edema
05/07: doing ok: no SOB:  off oxygen at this time:  have been off bipap for some time now:  chest x-ray with improvement:    05/08: she is alert and awake:  off oxygen  no SOB: doing pretty good;    05/09: doing pretty good;  off oxygen: no bipap: stable pulm wise:  chest xary is with chr mild pulm edema    05/10: her breathing is much better;  she is on room air:  does have some scattered crackles on exam and chest xray shows mild pulm edema; better to my eyes from before:  afebrile:  she seems to be optimized for angiogram LLE tomorrow from pulm point of view!
seems to be getting better:  she is off bipa now:  vbg showed mild hypercarbic resp failure:  off bipap at this time:  try without bipap tonight:
05/07: doing ok: no SOB:  off oxygen at this time:  have been off bipap for some time now:  chest x-ray with improvement:
seems to be getting better:  she is off bipa now:  vbg showed mild hypercarbic resp failure:  off bipap at this time:  try without bipap tonight:    05/05: doing pretty good:  tolerated being off bipap:  currently on 2 L of oxygen , getting HD:  will try to wean off the oxygen too:  she is pretty alert and awake:
05/07: doing ok: no SOB:  off oxygen at this time:  have been off bipap for some time now:  chest x-ray with improvement:    05/08: she is alert and awake:  off oxygen  no SOB: doing pretty good;    05/09: doing pretty good;  off oxygen: no bipap: stable pulm wise:  chest xary is with chr mild pulm edema    05/10: her breathing is much better;  she is on room air:  does have some scattered crackles on exam and chest xray shows mild pulm edema; better to my eyes from before:  afebrile:  she seems to be optimized for angiogram LLE tomorrow from pulm point of view!    5/11: doping ok: no sob: on room air:

## 2022-05-13 NOTE — PROGRESS NOTE ADULT - TIME BILLING
pt and acp Solar[employer] Pregnancy And Lactation Text: This medication is Pregnancy Category B and is considered safe. There is some data to suggest avoiding during the third trimester. It is unknown if this medication is excreted in breast milk.

## 2022-05-17 ENCOUNTER — TRANSCRIPTION ENCOUNTER (OUTPATIENT)
Age: 62
End: 2022-05-17

## 2022-05-18 ENCOUNTER — TRANSCRIPTION ENCOUNTER (OUTPATIENT)
Age: 62
End: 2022-05-18

## 2022-05-18 ENCOUNTER — APPOINTMENT (OUTPATIENT)
Dept: VASCULAR SURGERY | Facility: HOSPITAL | Age: 62
End: 2022-05-18

## 2022-05-18 ENCOUNTER — OUTPATIENT (OUTPATIENT)
Dept: OUTPATIENT SERVICES | Facility: HOSPITAL | Age: 62
LOS: 1 days | Discharge: HOME | End: 2022-05-18
Payer: MEDICARE

## 2022-05-18 VITALS
OXYGEN SATURATION: 100 % | RESPIRATION RATE: 15 BRPM | DIASTOLIC BLOOD PRESSURE: 44 MMHG | HEART RATE: 72 BPM | SYSTOLIC BLOOD PRESSURE: 102 MMHG

## 2022-05-18 VITALS
OXYGEN SATURATION: 100 % | HEIGHT: 61 IN | DIASTOLIC BLOOD PRESSURE: 44 MMHG | WEIGHT: 136.91 LBS | HEART RATE: 82 BPM | SYSTOLIC BLOOD PRESSURE: 113 MMHG | TEMPERATURE: 98 F

## 2022-05-18 DIAGNOSIS — Z98.890 OTHER SPECIFIED POSTPROCEDURAL STATES: Chronic | ICD-10-CM

## 2022-05-18 DIAGNOSIS — I73.9 PERIPHERAL VASCULAR DISEASE, UNSPECIFIED: ICD-10-CM

## 2022-05-18 DIAGNOSIS — M86.171 OTHER ACUTE OSTEOMYELITIS, RIGHT ANKLE AND FOOT: Chronic | ICD-10-CM

## 2022-05-18 DIAGNOSIS — Z99.2 DEPENDENCE ON RENAL DIALYSIS: Chronic | ICD-10-CM

## 2022-05-18 PROCEDURE — 76937 US GUIDE VASCULAR ACCESS: CPT | Mod: 26

## 2022-05-18 PROCEDURE — 37228: CPT | Mod: RT

## 2022-05-18 PROCEDURE — 75625 CONTRAST EXAM ABDOMINL AORTA: CPT | Mod: 26

## 2022-05-18 PROCEDURE — 75710 ARTERY X-RAYS ARM/LEG: CPT | Mod: 26,59

## 2022-05-18 DEVICE — GWIRE VASC ENTRY MINISTICK MAX 4FRX10CM: Type: IMPLANTABLE DEVICE | Site: RIGHT | Status: FUNCTIONAL

## 2022-05-18 DEVICE — GWIRE ROSEN STR .035X260CM: Type: IMPLANTABLE DEVICE | Site: RIGHT | Status: FUNCTIONAL

## 2022-05-18 DEVICE — CATH OMNI FLSH 0.035IN 5FRX65: Type: IMPLANTABLE DEVICE | Site: RIGHT | Status: FUNCTIONAL

## 2022-05-18 DEVICE — BLLN COYOTE OTW 3X150MMX150CM: Type: IMPLANTABLE DEVICE | Site: RIGHT | Status: FUNCTIONAL

## 2022-05-18 DEVICE — DEVICE CLOSURE 5F MYNX GRIP MUST ORDER MIN OF 10 EA: Type: IMPLANTABLE DEVICE | Site: RIGHT | Status: FUNCTIONAL

## 2022-05-18 DEVICE — CATH QUICK CROSS .014X135CM: Type: IMPLANTABLE DEVICE | Site: RIGHT | Status: FUNCTIONAL

## 2022-05-18 DEVICE — IMPLANTABLE DEVICE: Type: IMPLANTABLE DEVICE | Site: RIGHT | Status: FUNCTIONAL

## 2022-05-18 DEVICE — GWIRE CHOICE PT .014X300CM XSUPPORT: Type: IMPLANTABLE DEVICE | Site: RIGHT | Status: FUNCTIONAL

## 2022-05-18 DEVICE — GUIDEWIRE RADIFOCUS GLIDEWIRE STANDARD ANGLED TIP 0.035" X 260CM: Type: IMPLANTABLE DEVICE | Site: RIGHT | Status: FUNCTIONAL

## 2022-05-18 DEVICE — GUIDEWIRE ADVANTAGE .014INX300CM: Type: IMPLANTABLE DEVICE | Site: RIGHT | Status: FUNCTIONAL

## 2022-05-18 DEVICE — CATH ANGIO GLIDECATH ANGLE 5FR X 100CM: Type: IMPLANTABLE DEVICE | Site: RIGHT | Status: FUNCTIONAL

## 2022-05-18 DEVICE — CATH SUPPORT CXI 2.3X135CM ANG TIP: Type: IMPLANTABLE DEVICE | Site: RIGHT | Status: FUNCTIONAL

## 2022-05-18 DEVICE — SHEATH INTRODUCER TERUMO PINNACLE PERIPHERAL 5FR X 10CM X 0.035" MINI WIRE: Type: IMPLANTABLE DEVICE | Site: RIGHT | Status: FUNCTIONAL

## 2022-05-18 DEVICE — SHEATH GUIDING STR 5FRX55CM: Type: IMPLANTABLE DEVICE | Site: RIGHT | Status: FUNCTIONAL

## 2022-05-18 DEVICE — WIRE GD BMW UNIV II 300CM: Type: IMPLANTABLE DEVICE | Site: RIGHT | Status: FUNCTIONAL

## 2022-05-18 RX ORDER — ACETAMINOPHEN 500 MG
650 TABLET ORAL EVERY 6 HOURS
Refills: 0 | Status: DISCONTINUED | OUTPATIENT
Start: 2022-05-18 | End: 2022-06-01

## 2022-05-18 RX ORDER — ASPIRIN/CALCIUM CARB/MAGNESIUM 324 MG
81 TABLET ORAL DAILY
Refills: 0 | Status: DISCONTINUED | OUTPATIENT
Start: 2022-05-18 | End: 2022-06-01

## 2022-05-18 RX ORDER — CLOPIDOGREL BISULFATE 75 MG/1
75 TABLET, FILM COATED ORAL DAILY
Refills: 0 | Status: DISCONTINUED | OUTPATIENT
Start: 2022-05-18 | End: 2022-06-01

## 2022-05-18 RX ORDER — SODIUM CHLORIDE 9 MG/ML
250 INJECTION INTRAMUSCULAR; INTRAVENOUS; SUBCUTANEOUS ONCE
Refills: 0 | Status: COMPLETED | OUTPATIENT
Start: 2022-05-18 | End: 2022-05-18

## 2022-05-18 RX ADMIN — Medication 81 MILLIGRAM(S): at 13:32

## 2022-05-18 RX ADMIN — CLOPIDOGREL BISULFATE 75 MILLIGRAM(S): 75 TABLET, FILM COATED ORAL at 13:32

## 2022-05-18 RX ADMIN — SODIUM CHLORIDE 500 MILLILITER(S): 9 INJECTION INTRAMUSCULAR; INTRAVENOUS; SUBCUTANEOUS at 13:58

## 2022-05-18 NOTE — ASU DISCHARGE PLAN (ADULT/PEDIATRIC) - NURSING INSTRUCTIONS
You received IV Tylenol for pain management at __1130_. Please DO NOT take any Tylenol (Acetaminophen) containing products, such as Vicodin, Percocet, Excedrin, and cold medications for the next 6 hours (until _530__ PM). DO NOT TAKE MORE THAN 3000 MG OF TYLENOL in a 24 hour period.

## 2022-05-18 NOTE — H&P ADULT - HISTORY OF PRESENT ILLNESS
VASCULAR SURGERY CONSULT NOTE  --------------------------------------------------------------------------------------------    Patient is a 61y old  Female who presents with a chief complaint of foot wounds, requiring angiogram.     HPI:   62 yo F with pmh of ESRD on dialysis (Tu,Thurs,Sat), s/p L foot partial 3rd ray amputation and R hallux amputation 3/4 presenting for elective angiogram. No new complaints this AM.       ROS: 10-system review is otherwise negative except HPI above.      PAST MEDICAL & SURGICAL HISTORY:  Heart failure      HLD (hyperlipidemia)      Glaucoma      Cataract      CKD (chronic kidney disease), stage IV      ESRD (end stage renal disease) on dialysis      PAD (peripheral artery disease)      Blind right eye      Acute osteomyelitis of toe of right foot  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        FAMILY HISTORY:  No pertinent family history in first degree relatives        SOCIAL HISTORY:      ALLERGIES: No Known Allergies      HOME MEDICATIONS:     CURRENT MEDICATIONS  MEDICATIONS (STANDING):   MEDICATIONS (PRN):  --------------------------------------------------------------------------------------------    Vitals:   T(C): 36.4 (05-18-22 @ 07:19), Max: 36.4 (05-18-22 @ 07:19)  HR: 82 (05-18-22 @ 07:19) (82 - 82)  BP: 113/44 (05-18-22 @ 07:19) (113/44 - 113/44)  RR: --  SpO2: 100% (05-18-22 @ 07:19) (100% - 100%)  CAPILLARY BLOOD GLUCOSE        CAPILLARY BLOOD GLUCOSE          Height (cm): 154.9 (05-18 @ 07:19)  Weight (kg): 62.1 (05-18 @ 07:19)  BMI (kg/m2): 25.9 (05-18 @ 07:19)  BSA (m2): 1.61 (05-18 @ 07:19)    PHYSICAL EXAM:   General: NAD, Lying in bed comfortably  Neuro: A+Ox3  HEENT: NC/AT, EOMI  Neck: Soft, supple  Cardio: RRR, nml S1/S2  Resp: Good effort, CTA b/l  GI/Abd: Soft, NT/ND, no rebound/guarding, no masses palpated  Vascular:   Skin: Intact, no breakdown  Lymphatic/Nodes: No palpable lymphadenopathy  Musculoskeletal: All 4 extremities moving spontaneously, no limitations.  --------------------------------------------------------------------------------------------    LABS    BMP (05-18 @ 07:04)             --      |  --      |  --    		Ca++ --      Ca --                 ---------------------------------( --    		Mg --                 4.2     |  --      |  --    			Ph --                  --------------------------------------------------------------------------------------------    MICROBIOLOGY      --------------------------------------------------------------------------------------------    IMAGING

## 2022-05-18 NOTE — ASU DISCHARGE PLAN (ADULT/PEDIATRIC) - CARE PROVIDER_API CALL
Nina Gilliland)  Surgery  [address] [facility], [address]  [place] [address]  Phone: [phone]  Fax: [phone]  Follow Up Time:

## 2022-05-18 NOTE — BRIEF OPERATIVE NOTE - OPERATION/FINDINGS
Right lower extremity angiogram via left groin percutaneous access, right posterior tibial angioplasty, unable to traverse right anterior tibial artery lesion. Closure attempted with mynx with device failure, manual pressure held for 45 minutes, hemostatic at end of case.

## 2022-05-18 NOTE — CHART NOTE - NSCHARTNOTEFT_GEN_A_CORE
Post Operative Check    Patient is post op from a RLE angiogram via left groin access and is recovering appropriately in PACU. Denies pain, has been flat for 6 hours, and is tolerating diet.     Vitals    T(C): 37 (05-18-22 @ 12:52), Max: 37 (05-18-22 @ 12:52)  HR: 75 (05-18-22 @ 17:45) (70 - 82)  BP: 114/51 (05-18-22 @ 17:45) (98/44 - 114/51)  RR: 21 (05-18-22 @ 17:45) (11 - 25)  SpO2: 100% (05-18-22 @ 17:45) (94% - 100%)        Labs          Physical Exam  General: well appearing, NAD, conversant with examiner  Abdomen: sntnd  Groins: soft, no palpable or visible hematoma, no pulsatile mass, femoral pulse b/l palpable  Extremities: Dressings c/d/i, left DP 1+, moving toes spontaneously       Patient is a 61y old Female s/p RLE angio with PT plasty recovering well in PACU.    Plan:  Flat until 6:45PM  Dc home  resume meds  received aspirin and plavix in PACU  HD per renal  outpatient follow up with Dr. Gilliland in 2 weeks

## 2022-05-18 NOTE — H&P ADULT - NSHPPHYSICALEXAM_GEN_ALL_CORE
GEN: resting in bed comfortably in NAD  NEURO: awake, alert  RESP: no increased WOB   RLE: 1st toe amp site c/d/i, palpable DP pulse, Dopplerable AT signal, sensation + motor intact; R groin soft, dressing c/d/i  - LLE: wound between 2nd and third digits, open, no purulence or malodor, 5th toe amp site c/d/i, Dopplerable DP/PT signal, sensation + motor intact

## 2022-05-18 NOTE — ASU DISCHARGE PLAN (ADULT/PEDIATRIC) - CALL YOUR DOCTOR IF YOU HAVE ANY OF THE FOLLOWING:
Bleeding that does not stop/Swelling that gets worse Bleeding that does not stop/Swelling that gets worse/Pain not relieved by Medications/Fever greater than (need to indicate Fahrenheit or Celsius)/Wound/Surgical Site with redness, or foul smelling discharge or pus/Numbness, tingling, color or temperature change to extremity/Nausea and vomiting that does not stop/Unable to urinate/Inability to tolerate liquids or foods/Increased irritability or sluggishness

## 2022-05-18 NOTE — H&P ADULT - ASSESSMENT
61 year old woman presenting for angiogram    Plan:  - consented, angio today  - flat 4 hours after  - anticipate discharge post procedure

## 2022-05-18 NOTE — ASU DISCHARGE PLAN (ADULT/PEDIATRIC) - FOLLOW UP APPOINTMENTS
[facility] may also call Recovery Room (PACU) 24/7 @ [phone]/[facility]

## 2022-05-30 ENCOUNTER — INPATIENT (INPATIENT)
Facility: HOSPITAL | Age: 62
LOS: 30 days | Discharge: HOME | End: 2022-06-30
Attending: INTERNAL MEDICINE | Admitting: INTERNAL MEDICINE
Payer: MEDICARE

## 2022-05-30 VITALS
WEIGHT: 136.03 LBS | DIASTOLIC BLOOD PRESSURE: 41 MMHG | TEMPERATURE: 98 F | HEART RATE: 86 BPM | OXYGEN SATURATION: 99 % | HEIGHT: 61 IN | SYSTOLIC BLOOD PRESSURE: 142 MMHG | RESPIRATION RATE: 16 BRPM

## 2022-05-30 DIAGNOSIS — M86.171 OTHER ACUTE OSTEOMYELITIS, RIGHT ANKLE AND FOOT: Chronic | ICD-10-CM

## 2022-05-30 DIAGNOSIS — S91.301A UNSPECIFIED OPEN WOUND, RIGHT FOOT, INITIAL ENCOUNTER: ICD-10-CM

## 2022-05-30 DIAGNOSIS — L08.9 LOCAL INFECTION OF THE SKIN AND SUBCUTANEOUS TISSUE, UNSPECIFIED: ICD-10-CM

## 2022-05-30 DIAGNOSIS — Z29.9 ENCOUNTER FOR PROPHYLACTIC MEASURES, UNSPECIFIED: ICD-10-CM

## 2022-05-30 DIAGNOSIS — Z99.2 DEPENDENCE ON RENAL DIALYSIS: Chronic | ICD-10-CM

## 2022-05-30 DIAGNOSIS — E78.5 HYPERLIPIDEMIA, UNSPECIFIED: ICD-10-CM

## 2022-05-30 DIAGNOSIS — I50.9 HEART FAILURE, UNSPECIFIED: ICD-10-CM

## 2022-05-30 DIAGNOSIS — Z98.890 OTHER SPECIFIED POSTPROCEDURAL STATES: Chronic | ICD-10-CM

## 2022-05-30 DIAGNOSIS — N18.6 END STAGE RENAL DISEASE: ICD-10-CM

## 2022-05-30 DIAGNOSIS — I73.9 PERIPHERAL VASCULAR DISEASE, UNSPECIFIED: ICD-10-CM

## 2022-05-30 LAB
ALBUMIN SERPL ELPH-MCNC: 4.3 G/DL — SIGNIFICANT CHANGE UP (ref 3.3–5)
ALP SERPL-CCNC: 75 U/L — SIGNIFICANT CHANGE UP (ref 40–120)
ALT FLD-CCNC: <5 U/L — LOW (ref 4–33)
ANION GAP SERPL CALC-SCNC: 22 MMOL/L — HIGH (ref 7–14)
AST SERPL-CCNC: 12 U/L — SIGNIFICANT CHANGE UP (ref 4–32)
BASE EXCESS BLDV CALC-SCNC: 5.4 MMOL/L — HIGH (ref -2–3)
BASOPHILS # BLD AUTO: 0.04 K/UL — SIGNIFICANT CHANGE UP (ref 0–0.2)
BASOPHILS NFR BLD AUTO: 0.3 % — SIGNIFICANT CHANGE UP (ref 0–2)
BILIRUB SERPL-MCNC: 0.2 MG/DL — SIGNIFICANT CHANGE UP (ref 0.2–1.2)
BLOOD GAS VENOUS COMPREHENSIVE RESULT: SIGNIFICANT CHANGE UP
BUN SERPL-MCNC: 58 MG/DL — HIGH (ref 7–23)
CALCIUM SERPL-MCNC: 10 MG/DL — SIGNIFICANT CHANGE UP (ref 8.4–10.5)
CHLORIDE BLDV-SCNC: 97 MMOL/L — SIGNIFICANT CHANGE UP (ref 96–108)
CHLORIDE SERPL-SCNC: 92 MMOL/L — LOW (ref 98–107)
CO2 BLDV-SCNC: 31.9 MMOL/L — HIGH (ref 22–26)
CO2 SERPL-SCNC: 25 MMOL/L — SIGNIFICANT CHANGE UP (ref 22–31)
CREAT SERPL-MCNC: 10.28 MG/DL — HIGH (ref 0.5–1.3)
CRP SERPL-MCNC: 92.2 MG/L — HIGH
DIALYSIS INSTRUMENT RESULT - HEPATITIS B SURFACE ANTIGEN: NEGATIVE — SIGNIFICANT CHANGE UP
EGFR: 4 ML/MIN/1.73M2 — LOW
EOSINOPHIL # BLD AUTO: 0.13 K/UL — SIGNIFICANT CHANGE UP (ref 0–0.5)
EOSINOPHIL NFR BLD AUTO: 1 % — SIGNIFICANT CHANGE UP (ref 0–6)
ERYTHROCYTE [SEDIMENTATION RATE] IN BLOOD: 91 MM/HR — HIGH (ref 4–25)
FLUAV AG NPH QL: SIGNIFICANT CHANGE UP
FLUBV AG NPH QL: SIGNIFICANT CHANGE UP
GAS PNL BLDV: 135 MMOL/L — LOW (ref 136–145)
GLUCOSE BLDV-MCNC: 123 MG/DL — HIGH (ref 70–99)
GLUCOSE SERPL-MCNC: 117 MG/DL — HIGH (ref 70–99)
HCO3 BLDV-SCNC: 30 MMOL/L — HIGH (ref 22–29)
HCT VFR BLD CALC: 34.9 % — SIGNIFICANT CHANGE UP (ref 34.5–45)
HCT VFR BLDA CALC: 33 % — LOW (ref 34.5–46.5)
HGB BLD CALC-MCNC: 10.9 G/DL — LOW (ref 11.5–15.5)
HGB BLD-MCNC: 10.5 G/DL — LOW (ref 11.5–15.5)
IANC: 10.06 K/UL — HIGH (ref 1.8–7.4)
IMM GRANULOCYTES NFR BLD AUTO: 0.5 % — SIGNIFICANT CHANGE UP (ref 0–1.5)
LACTATE BLDV-MCNC: 1 MMOL/L — SIGNIFICANT CHANGE UP (ref 0.5–2)
LYMPHOCYTES # BLD AUTO: 1.23 K/UL — SIGNIFICANT CHANGE UP (ref 1–3.3)
LYMPHOCYTES # BLD AUTO: 9.8 % — LOW (ref 13–44)
MCHC RBC-ENTMCNC: 27 PG — SIGNIFICANT CHANGE UP (ref 27–34)
MCHC RBC-ENTMCNC: 30.1 GM/DL — LOW (ref 32–36)
MCV RBC AUTO: 89.7 FL — SIGNIFICANT CHANGE UP (ref 80–100)
MONOCYTES # BLD AUTO: 1.08 K/UL — HIGH (ref 0–0.9)
MONOCYTES NFR BLD AUTO: 8.6 % — SIGNIFICANT CHANGE UP (ref 2–14)
NEUTROPHILS # BLD AUTO: 10.06 K/UL — HIGH (ref 1.8–7.4)
NEUTROPHILS NFR BLD AUTO: 79.8 % — HIGH (ref 43–77)
NRBC # BLD: 0 /100 WBCS — SIGNIFICANT CHANGE UP
NRBC # FLD: 0 K/UL — SIGNIFICANT CHANGE UP
PCO2 BLDV: 46 MMHG — HIGH (ref 39–42)
PH BLDV: 7.43 — SIGNIFICANT CHANGE UP (ref 7.32–7.43)
PLATELET # BLD AUTO: 258 K/UL — SIGNIFICANT CHANGE UP (ref 150–400)
PO2 BLDV: 25 MMHG — SIGNIFICANT CHANGE UP
POTASSIUM BLDV-SCNC: 5 MMOL/L — SIGNIFICANT CHANGE UP (ref 3.5–5.1)
POTASSIUM SERPL-MCNC: 4.8 MMOL/L — SIGNIFICANT CHANGE UP (ref 3.5–5.3)
POTASSIUM SERPL-SCNC: 4.8 MMOL/L — SIGNIFICANT CHANGE UP (ref 3.5–5.3)
PROT SERPL-MCNC: 8.4 G/DL — HIGH (ref 6–8.3)
RBC # BLD: 3.89 M/UL — SIGNIFICANT CHANGE UP (ref 3.8–5.2)
RBC # FLD: 15.1 % — HIGH (ref 10.3–14.5)
RSV RNA NPH QL NAA+NON-PROBE: SIGNIFICANT CHANGE UP
SAO2 % BLDV: 35.3 % — SIGNIFICANT CHANGE UP
SARS-COV-2 RNA SPEC QL NAA+PROBE: SIGNIFICANT CHANGE UP
SODIUM SERPL-SCNC: 139 MMOL/L — SIGNIFICANT CHANGE UP (ref 135–145)
WBC # BLD: 12.6 K/UL — HIGH (ref 3.8–10.5)
WBC # FLD AUTO: 12.6 K/UL — HIGH (ref 3.8–10.5)

## 2022-05-30 PROCEDURE — 99285 EMERGENCY DEPT VISIT HI MDM: CPT

## 2022-05-30 PROCEDURE — 73630 X-RAY EXAM OF FOOT: CPT | Mod: 26,LT

## 2022-05-30 PROCEDURE — 99223 1ST HOSP IP/OBS HIGH 75: CPT

## 2022-05-30 RX ORDER — COLLAGENASE CLOSTRIDIUM HIST. 250 UNIT/G
1 OINTMENT (GRAM) TOPICAL DAILY
Refills: 0 | Status: DISCONTINUED | OUTPATIENT
Start: 2022-05-30 | End: 2022-06-30

## 2022-05-30 RX ORDER — HEPARIN SODIUM 5000 [USP'U]/ML
5000 INJECTION INTRAVENOUS; SUBCUTANEOUS EVERY 12 HOURS
Refills: 0 | Status: DISCONTINUED | OUTPATIENT
Start: 2022-05-30 | End: 2022-06-07

## 2022-05-30 RX ORDER — SEVELAMER CARBONATE 2400 MG/1
800 POWDER, FOR SUSPENSION ORAL THREE TIMES A DAY
Refills: 0 | Status: DISCONTINUED | OUTPATIENT
Start: 2022-05-30 | End: 2022-06-04

## 2022-05-30 RX ORDER — ASPIRIN/CALCIUM CARB/MAGNESIUM 324 MG
81 TABLET ORAL DAILY
Refills: 0 | Status: DISCONTINUED | OUTPATIENT
Start: 2022-05-30 | End: 2022-06-30

## 2022-05-30 RX ORDER — CHLORHEXIDINE GLUCONATE 213 G/1000ML
1 SOLUTION TOPICAL DAILY
Refills: 0 | Status: DISCONTINUED | OUTPATIENT
Start: 2022-05-30 | End: 2022-05-31

## 2022-05-30 RX ORDER — PIPERACILLIN AND TAZOBACTAM 4; .5 G/20ML; G/20ML
3.38 INJECTION, POWDER, LYOPHILIZED, FOR SOLUTION INTRAVENOUS EVERY 12 HOURS
Refills: 0 | Status: DISCONTINUED | OUTPATIENT
Start: 2022-05-31 | End: 2022-06-01

## 2022-05-30 RX ORDER — OXYCODONE AND ACETAMINOPHEN 5; 325 MG/1; MG/1
1 TABLET ORAL ONCE
Refills: 0 | Status: DISCONTINUED | OUTPATIENT
Start: 2022-05-30 | End: 2022-05-30

## 2022-05-30 RX ORDER — PIPERACILLIN AND TAZOBACTAM 4; .5 G/20ML; G/20ML
3.38 INJECTION, POWDER, LYOPHILIZED, FOR SOLUTION INTRAVENOUS ONCE
Refills: 0 | Status: COMPLETED | OUTPATIENT
Start: 2022-05-30 | End: 2022-05-30

## 2022-05-30 RX ORDER — VANCOMYCIN HCL 1 G
1000 VIAL (EA) INTRAVENOUS ONCE
Refills: 0 | Status: COMPLETED | OUTPATIENT
Start: 2022-05-30 | End: 2022-05-30

## 2022-05-30 RX ORDER — ATORVASTATIN CALCIUM 80 MG/1
40 TABLET, FILM COATED ORAL AT BEDTIME
Refills: 0 | Status: DISCONTINUED | OUTPATIENT
Start: 2022-05-30 | End: 2022-06-30

## 2022-05-30 RX ORDER — ACETAMINOPHEN 500 MG
650 TABLET ORAL EVERY 6 HOURS
Refills: 0 | Status: DISCONTINUED | OUTPATIENT
Start: 2022-05-30 | End: 2022-06-17

## 2022-05-30 RX ORDER — CLOPIDOGREL BISULFATE 75 MG/1
75 TABLET, FILM COATED ORAL DAILY
Refills: 0 | Status: DISCONTINUED | OUTPATIENT
Start: 2022-05-30 | End: 2022-06-30

## 2022-05-30 RX ADMIN — PIPERACILLIN AND TAZOBACTAM 200 GRAM(S): 4; .5 INJECTION, POWDER, LYOPHILIZED, FOR SOLUTION INTRAVENOUS at 16:29

## 2022-05-30 RX ADMIN — Medication 250 MILLIGRAM(S): at 17:37

## 2022-05-30 RX ADMIN — Medication 650 MILLIGRAM(S): at 21:19

## 2022-05-30 RX ADMIN — OXYCODONE AND ACETAMINOPHEN 1 TABLET(S): 5; 325 TABLET ORAL at 22:31

## 2022-05-30 NOTE — H&P ADULT - ASSESSMENT
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 Admitted for further evaluation

## 2022-05-30 NOTE — ED ADULT NURSE NOTE - OBJECTIVE STATEMENT
received pt in room 16, 61 yr/o female A+OX4, ambulatory with walker and boot. PMH of DMII, HTN, R eye blindness, and ESRD on dialysis M/W/F right upper thigh fistula noted ( strong thrill and bruity noted), had 3rd left toe and 1st right toe amputated in march 2022, wound vac noted on 3rd left toe, wound appears clean dry and dressing intact. presented to the ED C/O worsening 1st right toe wound, cloudy drainage, +2 edema noted, wound is 2cmx1.5cm. VSS, denies chest pain and SOB, RR even and unlabored. pending MD orders. will continue to monitor.

## 2022-05-30 NOTE — ED PROVIDER NOTE - NS ED ATTENDING STATEMENT MOD
This was a shared visit with the VINCE. I reviewed and verified the documentation and independently performed the documented:

## 2022-05-30 NOTE — CONSULT NOTE ADULT - SUBJECTIVE AND OBJECTIVE BOX
[facility] - S Jluis / Shabbir S /D Romina/ SHERRI Mcfarlane/ SHERRI Madden/ Andrés Perkins / SUNI Lubinu/ O Gladis  service -[phone], office [phone]  ---------------------------------------------------------------------------------------------------------------    Patient is a 61y Female whom presented to the hospital with   Denied recent NSAID use/Abx use/iv contrast studies.    Patient seen and examined    PAST MEDICAL & SURGICAL HISTORY:  Heart failure      HLD (hyperlipidemia)      Glaucoma      Cataract      CKD (chronic kidney disease), stage IV      ESRD (end stage renal disease) on dialysis      PAD (peripheral artery disease)      Blind right eye      Acute osteomyelitis of toe of right foot  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          Allergies: No Known Allergies    Home Medications Reviewed  Hospital Medications:   MEDICATIONS  (STANDING):  collagenase Ointment 1 Application(s) Topical daily    SOCIAL HISTORY:  Denies ETOh,Smoking, illicit drug use  FAMILY HISTORY:  No pertinent family history in first degree relatives        REVIEW OF SYSTEMS:  CONSTITUTIONAL: No weakness, fevers or chills  EYES/ENT: No visual changes;  No vertigo or throat pain   NECK: No pain or stiffness  RESPIRATORY: No cough, wheezing, hemoptysis; No shortness of breath  CARDIOVASCULAR: No chest pain or palpitations.  GASTROINTESTINAL: No abdominal or epigastric pain. No nausea, vomiting, or hematemesis; No diarrhea or constipation. No melena or hematochezia.  GENITOURINARY: No dysuria, frequency, foamy urine, urinary urgency, incontinence or hematuria  NEUROLOGICAL: No numbness or weakness  SKIN: No itching, burning, rashes, or lesions   VASCULAR: No bilateral lower extremity edema.   All other review of systems is negative unless indicated above.    VITALS:  T(F): 98.6 (05-30-22 @ 16:03), Max: 98.6 (05-30-22 @ 16:03)  HR: 78 (05-30-22 @ 16:03)  BP: 123/53 (05-30-22 @ 16:03)  RR: 17 (05-30-22 @ 16:03)  SpO2: 100% (05-30-22 @ 16:03)  Wt(kg): --    Height (cm): 154.9 (05-30 @ 13:15)  Weight (kg): 61.7 (05-30 @ 13:15)  BMI (kg/m2): 25.7 (05-30 @ 13:15)  BSA (m2): 1.6 (05-30 @ 13:15)    PHYSICAL EXAM:  Constitutional: NAD  HEENT: anicteric sclera, oropharynx clear, MMM  Neck: No JVD  Respiratory: CTAB, no wheezes, rales or rhonchi  Cardiovascular: S1, S2, RRR  Gastrointestinal: BS+, soft, NT/ND  Extremities: No cyanosis or clubbing. No peripheral edema  Neurological: A/O x 3, no focal deficits  Psychiatric: Normal mood, normal affect  : No CVA tenderness. No salcido.   Skin: No rashes  Vascular Access:    LABS:  05-30    139  |  92<L>  |  58<H>  ----------------------------<  117<H>  4.8   |  25  |  10.28<H>    Ca    10.0      30 May 2022 14:10    TPro  8.4<H>  /  Alb  4.3  /  TBili  0.2  /  DBili      /  AST  12  /  ALT  <5<L>  /  AlkPhos  75  05-30    Creatinine Trend: 10.28 <--                        10.5   12.60 )-----------( 258      ( 30 May 2022 14:10 )             34.9     Urine Studies:        RADIOLOGY & ADDITIONAL STUDIES:                 [facility] - S Jluis / Shabbir S /D Romina/ S Denys/ S Chano/ Andrés Perkins / SUNI Lubinu/ O Gladis  service -[phone], office [phone]  ---------------------------------------------------------------------------------------------------------------  Patient seen and examined in ER    62 y/o woman w/ ESRD on HD (MWF), HF, 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 She report she could still walk on it but recently its been more painful to walk. She had a visiting nurse arrive who assessed it and noted increased swelling to area and ER evaluation. She denies any numbness or tingling of her LE. No fever, chills, cough, chest pain, shortness of breath, abd pain or diarrhea.  In the ER pt was given vanc/zosyn. Renal consulted for ESRD Mx. Pt well known to me from [facility]/outpt hd unit. currently denies sob/cp. last hd was Friday uneventful, due for hd today.      PAST MEDICAL & SURGICAL HISTORY:  Heart failure    HLD (hyperlipidemia)    Glaucoma    Cataract    CKD (chronic kidney disease), stage IV    ESRD (end stage renal disease) on dialysis    PAD (peripheral artery disease)    Blind right eye    Acute osteomyelitis of toe of right foot  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    S/P arteriovenous (AV) fistula creation    Hemodialysis access, AV graft      Allergies: No Known Allergies    Home Medications Reviewed  Hospital Medications:   MEDICATIONS  (STANDING):  collagenase Ointment 1 Application(s) Topical daily    SOCIAL HISTORY:  Denies ETOh,Smoking, illicit drug use  FAMILY HISTORY:  No pertinent family history in first degree relatives    REVIEW OF SYSTEMS:  CONSTITUTIONAL: No weakness, fevers or chills  EYES/ENT: No visual changes;  No vertigo or throat pain   NECK: No pain or stiffness  RESPIRATORY: No cough, wheezing, hemoptysis; No shortness of breath  CARDIOVASCULAR: No chest pain or palpitations.  GASTROINTESTINAL: No abdominal or epigastric pain. No nausea, vomiting, or hematemesis; No diarrhea or constipation. No melena or hematochezia.  NEUROLOGICAL: No numbness or weakness  SKIN: as above  VASCULAR: No bilateral lower extremity edema.   All other review of systems is negative unless indicated above.    VITALS:  T(F): 98.6 (05-30-22 @ 16:03), Max: 98.6 (05-30-22 @ 16:03)  HR: 78 (05-30-22 @ 16:03)  BP: 123/53 (05-30-22 @ 16:03)  RR: 17 (05-30-22 @ 16:03)  SpO2: 100% (05-30-22 @ 16:03)  Wt(kg): --    Height (cm): 154.9 (05-30 @ 13:15)  Weight (kg): 61.7 (05-30 @ 13:15)  BMI (kg/m2): 25.7 (05-30 @ 13:15)  BSA (m2): 1.6 (05-30 @ 13:15)    PHYSICAL EXAM:  Constitutional: NAD  HEENT: anicteric sclera  Neck: No JVD  Respiratory: CTAB, no wheezes, rales or rhonchi  Cardiovascular: S1, S2, RRR  Gastrointestinal: BS+, soft, NT/ND  Extremities: No peripheral edema. b/l feet dressings+  Neurological: A/O x 3, no focal deficits  Psychiatric: Normal mood, normal affect  : No salcido.   Vascular Access: rt femoral AVG+    LABS:  05-30    139  |  92<L>  |  58<H>  ----------------------------<  117<H>  4.8   |  25  |  10.28<H>    Ca    10.0      30 May 2022 14:10    TPro  8.4<H>  /  Alb  4.3  /  TBili  0.2  /  DBili      /  AST  12  /  ALT  <5<L>  /  AlkPhos  75  05-30    Creatinine Trend: 10.28 <--                        10.5   12.60 )-----------( 258      ( 30 May 2022 14:10 )             34.9     Urine Studies:        RADIOLOGY & ADDITIONAL STUDIES:    < from: Xray Foot AP + Lateral + Oblique, Left (05.30.22 @ 15:11) >  IMPRESSION:  Increased permeative appearance of the left fourth metatarsal head and   phalanges, which could be seen in the setting of osteomyelitis.  No radiographic evidence of osteomyelitis involving the right foot.    MRI is more sensitive and can be considered for further evaluation as   clinically warranted.    --- End of Report ---    < end of copied text >

## 2022-05-30 NOTE — ED PROVIDER NOTE - CLINICAL SUMMARY MEDICAL DECISION MAKING FREE TEXT BOX
62 y/o female c/o right foot pain swelling (s/p hallux amputation 2 months ago)  -r/o infection (osteo cellulitis etc)  -cbc cmp esr crp  -xrays  -podiatry eval

## 2022-05-30 NOTE — CONSULT NOTE ADULT - ASSESSMENT
· Assessment	  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 (DOS 3/4/2022)  -pt seen and evaluated  - Afebrile, WBC 12 ESR 92 CRP 9.2  - 3/4/22 s/p L foot partial 3rd ray resection open: fibrogranular wound to subQ, mild malodor, no drainage, no purulence, flaps cool, macerated borders, distal skin necrosis, no tracking, no purulence, no erythema    - 3/4/22 s/p R foot partial hallux: dehisced surgical wound to level of subq, approx 1cc purulence, non-pitting edema extending from digits to rearfoot, distal medial forefoot erythema no fluctuance  - R foot wound culture obtained  - Recommend admit to med and IV vanc + zosyn  - Pt is s/p recent b/l LE angiograms with poor peripheral aterial flow, is a poor surgical candidate  - Podiatry plan LWC with IV abx pending edema and erythema resolution  - discussed with attending

## 2022-05-30 NOTE — ED ADULT NURSE REASSESSMENT NOTE - NS ED NURSE REASSESS COMMENT FT1
Report received from LADONNA Wagner. pt. remains A&Ox4, awake and resting. pt. endorsing pain in the feet B/L at this time. PRN orders to be followed. no acute distress noted. respirations even and unlabored. Labs sent.

## 2022-05-30 NOTE — H&P ADULT - PROBLEM SELECTOR PLAN 2
known PAD, continue asa/plavix. Pt denies any numbness or tingling.  -recent angiograms done w/ poor peripheral arterial flow per podiatry and so poor surgical candidate as above  -continue statin.

## 2022-05-30 NOTE — ED PROVIDER NOTE - OBJECTIVE STATEMENT
60 y/o with pmhx of ESRD on dialysis (T, Thurs, Sat at [place]), s/p L foot partial 3rd ray amputation and R hallux amputation 3/4 presenting to ER c/o right foot/toe pain/ swelling x 4 days. Pt, states over past few days has been experiencing increased pain to base of 1st toe (amputated) with pain and swelling spreading to distal foot and increased pain when pressure/ambulation. Visiting nurse came today and noticed increased swelling to area and recommended coming to ER for further evaluation. Pt. denies fever chills weakness dizziness nausea vomit.

## 2022-05-30 NOTE — H&P ADULT - NSHPREVIEWOFSYSTEMS_GEN_ALL_CORE
REVIEW OF SYSTEMS:    CONSTITUTIONAL: No weakness, fevers or chills  EYES/ENT: +blind R eye. No new visual changes;  No dysphagia  NECK: No pain or stiffness  RESPIRATORY: No cough, wheezing, hemoptysis; No shortness of breath  CARDIOVASCULAR: No chest pain or palpitations; No lower extremity edema  GASTROINTESTINAL: No abdominal or epigastric pain. No nausea, vomiting, or hematemesis; No diarrhea or constipation. No melena or hematochezia.  GENITOURINARY: No dysuria, frequency or hematuria  NEUROLOGICAL: No numbness or weakness  MSK: +R toe pain  SKIN: +swelling/wound of RLE

## 2022-05-30 NOTE — ED PROVIDER NOTE - ATTENDING APP SHARED VISIT CONTRIBUTION OF CARE
The patient is a 61y Female who blind in her right eyehas a past medical and surgery history of HF HLD CKD4 PAD/osteo of Rt foot w/ ? right MCP amputation Heart failure HLD Glaucoma/Cataracts CKD (chronic kidney disease), ESRD (AVG) on dialysis PTED for evaluation s/p b/l toe amputations 2 months ago at the request of his wound care RN who noted increased swelling and pain She denies fever or chills  Vital Signs Last 24 Hrs  T(F): 98 HR: 86 BP: 142/41 RR: 16 SpO2: 99% (30 May 2022 13:15)   PE: as described; my additions and exceptions are noted in the chart    IMPRESSION/RISK:  Dx=PAD with chronic wound with signs of infection   Consideration include  Plan  labs  xr  podiatry to see  will follow reccs; probable admit

## 2022-05-30 NOTE — ED PROVIDER NOTE - PHYSICAL EXAMINATION
Gen: Well appearing in NAD  Head: NC/AT  Neck: trachea midline  Resp:  No distress  Ext: no deformities  Neuro:  A&O appears non focal  Skin:  Warm and dry as visualized  Psych:  Normal affect and mood     right foot: + s/p right hallux ampuation - stump without drainage or bleeding. +ttp palpation  +mild edema to dorsum of distal foot with ttp  DP pulses intact via doppler

## 2022-05-30 NOTE — CONSULT NOTE ADULT - ASSESSMENT
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 Admitted for further evaluation. Renal consulted for ESRD Mx.     ESRD on HD  schedule hemodialysis - Monday, Wednesday and Friday   access- thigh AVG  HD unit [facility]    Informed consent for hemodialysis obtained  from pt. Consent in chart  arranged for routine hemodialysis tonight w/ 2 k bath , Ultrafiltration on Dialysis as tolerated with blood pressure   renal diet  dose all meds for ESRD  renal restrictions in diet.  Anemia of CKD- Hb at goal. no DION for now  HTN, controlled. bp stable.   PAD, infected rt foot wounds, ?OM- vanco+zosyn in ER. f/u w/Podiatry. monitor vanco level    poc d/w pt, HD RN  labs, rad, chart reviewed  For any question, call:  Cell # [phone]  Pager # [phone]  office # [phone]

## 2022-05-30 NOTE — ED ADULT TRIAGE NOTE - CHIEF COMPLAINT QUOTE
pt amb to triage w/ cane s/p b/l toe amputations 2 months ago, pt states wound care RN presented this morning stating increased swelling since last visit, advised to come to ED for further eval, pt states increased pain x past few days, denies fever or chills

## 2022-05-30 NOTE — H&P ADULT - HISTORY OF PRESENT ILLNESS
62 y/o woman w/ ESRD on HD (MWF), HF, 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 She report she could still walk on it but recently its been more painful to walk. She had a visiting nurse arrive who assessed it and noted increased swelling to area and ER evaluation. She denies any numbness or tingling of her LE. No fever, chills, cough, chest pain, shortness of breath, abd pain or diarrhea. She still makes minimal urine. In the ER pt was given vanc/zosyn.

## 2022-05-30 NOTE — H&P ADULT - PROBLEM SELECTOR PLAN 1
Pt w/ increasing R toe pain when walking as well as swelling. Mild white count and elevated inflammatory markers but afebrile with vitals stable  -xrays obtained - possible osteo of LEFT 4th metatarsal head? - will need podiatry input.  -podiatry consulted, took wound culture from R foot and rec continue vanc/zosyn for now. f/u final podiatry recs regarding any intervention. pt is a poor surgical candidate per podiatry  -wound care consult placed Pt w/ increasing R toe pain when walking as well as swelling. Mild white count and elevated inflammatory markers but afebrile with vitals stable  -xrays obtained - possible osteo of LEFT 4th metatarsal head? - will need podiatry input.  -podiatry consulted, took wound culture from R foot and rec continue vanc/zosyn for now. f/u final podiatry recs regarding any intervention. pt is a poor surgical candidate per podiatry  -wound care consult placed  -check vanc level in Am

## 2022-05-30 NOTE — CONSULT NOTE ADULT - SUBJECTIVE AND OBJECTIVE BOX
Podiatry pager #: [phone]/ [phone]    Patient is a 61y old  Female who presents with a chief complaint of bilateral foot wounds    HPI: 60 y/o with pmhx of ESRD on dialysis (T, Thmary, Sat at [place]), s/p L foot partial 3rd ray amputation and R hallux amputation 3/4 presenting to ER c/o right foot/toe pain/ swelling x 4 days. Pt, states over past few days has been experiencing increased pain to base of 1st toe (amputated) with pain and swelling spreading to distal foot and increased pain when pressure/ambulation. Visiting nurse came today and noticed increased swelling to area and recommended coming to ER for further evaluation. Pt. denies fever chills weakness dizziness nausea vomit.      PAST MEDICAL & SURGICAL HISTORY:  Heart failure      HLD (hyperlipidemia)      Glaucoma      Cataract      CKD (chronic kidney disease), stage IV      ESRD (end stage renal disease) on dialysis      PAD (peripheral artery disease)      Blind right eye      Acute osteomyelitis of toe of right foot  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          MEDICATIONS  (STANDING):  piperacillin/tazobactam IVPB... 3.375 Gram(s) IV Intermittent once  vancomycin  IVPB 1000 milliGRAM(s) IV Intermittent Once    MEDICATIONS  (PRN):      Allergies    No Known Allergies    Intolerances        VITALS:    Vital Signs Last 24 Hrs  T(C): 37 (30 May 2022 16:03), Max: 37 (30 May 2022 16:03)  T(F): 98.6 (30 May 2022 16:03), Max: 98.6 (30 May 2022 16:03)  HR: 78 (30 May 2022 16:03) (78 - 86)  BP: 123/53 (30 May 2022 16:03) (123/53 - 142/41)  BP(mean): --  RR: 17 (30 May 2022 16:03) (16 - 17)  SpO2: 100% (30 May 2022 16:03) (99% - 100%)    LABS:                          10.5   12.60 )-----------( 258      ( 30 May 2022 14:10 )             34.9       05-30    139  |  92<L>  |  58<H>  ----------------------------<  117<H>  4.8   |  25  |  10.28<H>    Ca    10.0      30 May 2022 14:10    TPro  8.4<H>  /  Alb  4.3  /  TBili  0.2  /  DBili  x   /  AST  12  /  ALT  <5<L>  /  AlkPhos  75  05-30      CAPILLARY BLOOD GLUCOSE              LOWER EXTREMITY PHYSICAL EXAM:  Vascular: DP/PT 1/4 R,  DP/PT 0/4 L, CFT <3 seconds, Temperature gradient warm to cooo B/L, TG is warm to cool b/l  Neuro: Epicritic sensation diminished to the level of forefoot B/L  Musculoskeletal/Ortho: left partial 5th ray resection healed, LF partial 4th ray resection open   Skin:   3/4/22 s/p L foot partial 3rd ray resection open: fibrogranular wound to subQ, mild malodor, no drainage, no purulence, flaps cool, macerated borders, distal skin necrosis, no tracking, no purulence, no erythema    3/4/22 s/p R foot partial hallux: dehisced surgical wound to level of subq, approx 1cc purulence, non-pitting edema extending from digits to rearfoot, no fluctuance    RADIOLOGY & ADDITIONAL STUDIES:  < from: Xray Foot AP + Lateral + Oblique, Left (05.30.22 @ 15:11) >  ACC: [id number] EXAM:  XR FOOT COMP MIN 3 VIEWS LT                        ACC: [id number] EXAM:  XR FOOT COMP MIN 3 VIEWS RT                          PROCEDURE DATE:  05/30/2022          INTERPRETATION:  CLINICAL INDICATION: Bilateral foot infections, left   foot wound, status post right great toe amputation.    TECHNIQUE: 3 views of the bilateral feet.    COMPARISON: Left foot radiographs dated 5/4/2022. Bilateral foot   radiographs dated 3/4/2022. Left foot MRI dated 3/3/2022.    FINDINGS:  Status post amputation of the right great toe at the level of the   proximal phalangeal head and of the left third and fifth toes at the   level of the metatarsal heads. Amputation sites appear unremarkable.  Somewhat increased permeative appearance of theleft fourth metatarsal   head and phalanges, but without discrete cortical erosion or periosteal   reaction.  No tracking subcutaneous gas.  Extensive bilateral vascular calcifications.  Bilateral pes planus deformity.  Mild to moderate arthrosis throughout the remaining interphalangeal   joints.    IMPRESSION:  Increased permeative appearance of the left fourth metatarsal head and   phalanges, which could be seen in the setting of osteomyelitis.  No radiographic evidence of osteomyelitis involving the right foot.    MRI is more sensitive and can be considered for further evaluation as   clinically warranted.    --- End of Report ---          TERRI VÁZQUEZ MD; Resident Radiologist  This document has been electronically signed.  SHARAD TODD MD; Attending Radiologist  This document has been electronically signed. May 30 2022  3:21PM    < end of copied text >

## 2022-05-30 NOTE — H&P ADULT - NSHPLABSRESULTS_GEN_ALL_CORE
05-30    139  |  92<L>  |  58<H>  ----------------------------<  117<H>  4.8   |  25  |  10.28<H>    Ca    10.0      30 May 2022 14:10    TPro  8.4<H>  /  Alb  4.3  /  TBili  0.2  /  DBili  x   /  AST  12  /  ALT  <5<L>  /  AlkPhos  75  05-30                            10.5   12.60 )-----------( 258      ( 30 May 2022 14:10 )             34.9             LIVER FUNCTIONS - ( 30 May 2022 14:10 )  Alb: 4.3 g/dL / Pro: 8.4 g/dL / ALK PHOS: 75 U/L / ALT: <5 U/L / AST: 12 U/L / GGT: x           Xrays of b/l foot: IMPRESSION:  Increased permeative appearance of the left fourth metatarsal head and   phalanges, which could be seen in the setting of osteomyelitis.  No radiographic evidence of osteomyelitis involving the right foot.    MRI is more sensitive and can be considered for further evaluation as   clinically warranted.

## 2022-05-30 NOTE — H&P ADULT - NSHPPHYSICALEXAM_GEN_ALL_CORE
PHYSICAL EXAM:  GENERAL: NAD, well-developed, well-nourished  HEAD:  Atraumatic, Normocephalic  EYES: L eye PERRL, conjunctiva and sclera clear  NECK: Supple, No JVD  CHEST/LUNG: Clear to auscultation bilaterally; No wheezes, rales or rhonchi  HEART: Regular rate and rhythm; No murmurs, rubs, or gallops, (+)S1, S2  ABDOMEN: Soft, Nontender, Nondistended; Normal Bowel sounds   EXTREMITIES: extremities warm, no calf edema, b/l LE wrapped in gauze, no drainge noted, RLE toe area with mild ttp  PSYCH: normal mood and affect  NEUROLOGY: AAOx3, non-focal  SKIN: as above in extremity

## 2022-05-31 LAB
A1C WITH ESTIMATED AVERAGE GLUCOSE RESULT: 5.1 % — SIGNIFICANT CHANGE UP (ref 4–5.6)
ALBUMIN SERPL ELPH-MCNC: 4.5 G/DL — SIGNIFICANT CHANGE UP (ref 3.3–5)
ALP SERPL-CCNC: 78 U/L — SIGNIFICANT CHANGE UP (ref 40–120)
ALT FLD-CCNC: <5 U/L — LOW (ref 4–33)
ANION GAP SERPL CALC-SCNC: 19 MMOL/L — HIGH (ref 7–14)
AST SERPL-CCNC: 14 U/L — SIGNIFICANT CHANGE UP (ref 4–32)
BASOPHILS # BLD AUTO: 0.05 K/UL — SIGNIFICANT CHANGE UP (ref 0–0.2)
BASOPHILS NFR BLD AUTO: 0.5 % — SIGNIFICANT CHANGE UP (ref 0–2)
BILIRUB DIRECT SERPL-MCNC: <0.2 MG/DL — SIGNIFICANT CHANGE UP (ref 0–0.3)
BILIRUB INDIRECT FLD-MCNC: SIGNIFICANT CHANGE UP MG/DL (ref 0–1)
BILIRUB SERPL-MCNC: <0.2 MG/DL — SIGNIFICANT CHANGE UP (ref 0.2–1.2)
BUN SERPL-MCNC: 28 MG/DL — HIGH (ref 7–23)
CALCIUM SERPL-MCNC: 10.1 MG/DL — SIGNIFICANT CHANGE UP (ref 8.4–10.5)
CHLORIDE SERPL-SCNC: 90 MMOL/L — LOW (ref 98–107)
CHOLEST SERPL-MCNC: 110 MG/DL — SIGNIFICANT CHANGE UP
CO2 SERPL-SCNC: 28 MMOL/L — SIGNIFICANT CHANGE UP (ref 22–31)
CREAT SERPL-MCNC: 6.39 MG/DL — HIGH (ref 0.5–1.3)
EGFR: 7 ML/MIN/1.73M2 — LOW
EOSINOPHIL # BLD AUTO: 0.12 K/UL — SIGNIFICANT CHANGE UP (ref 0–0.5)
EOSINOPHIL NFR BLD AUTO: 1.1 % — SIGNIFICANT CHANGE UP (ref 0–6)
ESTIMATED AVERAGE GLUCOSE: 100 — SIGNIFICANT CHANGE UP
GLUCOSE SERPL-MCNC: 98 MG/DL — SIGNIFICANT CHANGE UP (ref 70–99)
HCT VFR BLD CALC: 34.6 % — SIGNIFICANT CHANGE UP (ref 34.5–45)
HDLC SERPL-MCNC: 43 MG/DL — LOW
HGB BLD-MCNC: 10.6 G/DL — LOW (ref 11.5–15.5)
IANC: 8.33 K/UL — HIGH (ref 1.8–7.4)
IMM GRANULOCYTES NFR BLD AUTO: 0.4 % — SIGNIFICANT CHANGE UP (ref 0–1.5)
INR BLD: 1.14 RATIO — SIGNIFICANT CHANGE UP (ref 0.88–1.16)
LIPID PNL WITH DIRECT LDL SERPL: 47 MG/DL — SIGNIFICANT CHANGE UP
LYMPHOCYTES # BLD AUTO: 1.58 K/UL — SIGNIFICANT CHANGE UP (ref 1–3.3)
LYMPHOCYTES # BLD AUTO: 14.3 % — SIGNIFICANT CHANGE UP (ref 13–44)
MAGNESIUM SERPL-MCNC: 2.4 MG/DL — SIGNIFICANT CHANGE UP (ref 1.6–2.6)
MCHC RBC-ENTMCNC: 26.7 PG — LOW (ref 27–34)
MCHC RBC-ENTMCNC: 30.6 GM/DL — LOW (ref 32–36)
MCV RBC AUTO: 87.2 FL — SIGNIFICANT CHANGE UP (ref 80–100)
MONOCYTES # BLD AUTO: 0.9 K/UL — SIGNIFICANT CHANGE UP (ref 0–0.9)
MONOCYTES NFR BLD AUTO: 8.2 % — SIGNIFICANT CHANGE UP (ref 2–14)
MRSA PCR RESULT.: SIGNIFICANT CHANGE UP
NEUTROPHILS # BLD AUTO: 8.33 K/UL — HIGH (ref 1.8–7.4)
NEUTROPHILS NFR BLD AUTO: 75.5 % — SIGNIFICANT CHANGE UP (ref 43–77)
NON HDL CHOLESTEROL: 67 MG/DL — SIGNIFICANT CHANGE UP
NRBC # BLD: 0 /100 WBCS — SIGNIFICANT CHANGE UP
NRBC # FLD: 0 K/UL — SIGNIFICANT CHANGE UP
PHOSPHATE SERPL-MCNC: 3.7 MG/DL — SIGNIFICANT CHANGE UP (ref 2.5–4.5)
PLATELET # BLD AUTO: 272 K/UL — SIGNIFICANT CHANGE UP (ref 150–400)
POTASSIUM SERPL-MCNC: 4.7 MMOL/L — SIGNIFICANT CHANGE UP (ref 3.5–5.3)
POTASSIUM SERPL-SCNC: 4.7 MMOL/L — SIGNIFICANT CHANGE UP (ref 3.5–5.3)
PROT SERPL-MCNC: 8.3 G/DL — SIGNIFICANT CHANGE UP (ref 6–8.3)
PROTHROM AB SERPL-ACNC: 13.2 SEC — SIGNIFICANT CHANGE UP (ref 10.5–13.4)
RBC # BLD: 3.97 M/UL — SIGNIFICANT CHANGE UP (ref 3.8–5.2)
RBC # FLD: 15.1 % — HIGH (ref 10.3–14.5)
S AUREUS DNA NOSE QL NAA+PROBE: SIGNIFICANT CHANGE UP
SODIUM SERPL-SCNC: 137 MMOL/L — SIGNIFICANT CHANGE UP (ref 135–145)
TRIGL SERPL-MCNC: 98 MG/DL — SIGNIFICANT CHANGE UP
VANCOMYCIN FLD-MCNC: 10.7 UG/ML — SIGNIFICANT CHANGE UP
WBC # BLD: 11.02 K/UL — HIGH (ref 3.8–10.5)
WBC # FLD AUTO: 11.02 K/UL — HIGH (ref 3.8–10.5)

## 2022-05-31 RX ORDER — OXYCODONE HYDROCHLORIDE 5 MG/1
5 TABLET ORAL ONCE
Refills: 0 | Status: DISCONTINUED | OUTPATIENT
Start: 2022-05-31 | End: 2022-05-31

## 2022-05-31 RX ORDER — HYDROMORPHONE HYDROCHLORIDE 2 MG/ML
0.5 INJECTION INTRAMUSCULAR; INTRAVENOUS; SUBCUTANEOUS EVERY 4 HOURS
Refills: 0 | Status: DISCONTINUED | OUTPATIENT
Start: 2022-05-31 | End: 2022-06-07

## 2022-05-31 RX ORDER — OXYCODONE AND ACETAMINOPHEN 5; 325 MG/1; MG/1
1 TABLET ORAL EVERY 6 HOURS
Refills: 0 | Status: DISCONTINUED | OUTPATIENT
Start: 2022-05-31 | End: 2022-06-06

## 2022-05-31 RX ORDER — CHLORHEXIDINE GLUCONATE 213 G/1000ML
1 SOLUTION TOPICAL DAILY
Refills: 0 | Status: DISCONTINUED | OUTPATIENT
Start: 2022-05-31 | End: 2022-06-30

## 2022-05-31 RX ADMIN — OXYCODONE HYDROCHLORIDE 5 MILLIGRAM(S): 5 TABLET ORAL at 22:03

## 2022-05-31 RX ADMIN — HEPARIN SODIUM 5000 UNIT(S): 5000 INJECTION INTRAVENOUS; SUBCUTANEOUS at 17:15

## 2022-05-31 RX ADMIN — ATORVASTATIN CALCIUM 40 MILLIGRAM(S): 80 TABLET, FILM COATED ORAL at 22:03

## 2022-05-31 RX ADMIN — Medication 650 MILLIGRAM(S): at 08:35

## 2022-05-31 RX ADMIN — SEVELAMER CARBONATE 800 MILLIGRAM(S): 2400 POWDER, FOR SUSPENSION ORAL at 17:15

## 2022-05-31 RX ADMIN — Medication 650 MILLIGRAM(S): at 18:32

## 2022-05-31 RX ADMIN — HEPARIN SODIUM 5000 UNIT(S): 5000 INJECTION INTRAVENOUS; SUBCUTANEOUS at 05:19

## 2022-05-31 RX ADMIN — PIPERACILLIN AND TAZOBACTAM 25 GRAM(S): 4; .5 INJECTION, POWDER, LYOPHILIZED, FOR SOLUTION INTRAVENOUS at 05:20

## 2022-05-31 RX ADMIN — Medication 81 MILLIGRAM(S): at 12:12

## 2022-05-31 RX ADMIN — CLOPIDOGREL BISULFATE 75 MILLIGRAM(S): 75 TABLET, FILM COATED ORAL at 12:12

## 2022-05-31 RX ADMIN — PIPERACILLIN AND TAZOBACTAM 25 GRAM(S): 4; .5 INJECTION, POWDER, LYOPHILIZED, FOR SOLUTION INTRAVENOUS at 17:16

## 2022-05-31 RX ADMIN — Medication 1 APPLICATION(S): at 08:00

## 2022-05-31 RX ADMIN — Medication 650 MILLIGRAM(S): at 09:05

## 2022-05-31 RX ADMIN — SEVELAMER CARBONATE 800 MILLIGRAM(S): 2400 POWDER, FOR SUSPENSION ORAL at 05:19

## 2022-05-31 RX ADMIN — Medication 650 MILLIGRAM(S): at 19:02

## 2022-05-31 RX ADMIN — CHLORHEXIDINE GLUCONATE 1 APPLICATION(S): 213 SOLUTION TOPICAL at 18:00

## 2022-05-31 NOTE — PROGRESS NOTE ADULT - SUBJECTIVE AND OBJECTIVE BOX
Podiatry pager #: [phone] ([place])/ [phone] ([facility])    Patient is a 61y old  Female who presents with a chief complaint of RLE toe pain (30 May 2022 19:10)       INTERVAL HPI/OVERNIGHT EVENTS:  Patient seen and evaluated at bedside.  Pt is resting comfortable in NAD. Denies N/V/F/C.     Allergies    No Known Allergies    Intolerances        Vital Signs Last 24 Hrs  T(C): 36.8 (31 May 2022 05:59), Max: 37.3 (30 May 2022 20:01)  T(F): 98.2 (31 May 2022 05:59), Max: 99.1 (30 May 2022 20:01)  HR: 75 (31 May 2022 05:59) (64 - 86)  BP: 103/52 (31 May 2022 05:59) (103/52 - 142/41)  BP(mean): --  RR: 18 (31 May 2022 05:59) (16 - 18)  SpO2: 100% (31 May 2022 05:59) (98% - 100%)    LABS:                        10.6   11.02 )-----------( 272      ( 31 May 2022 05:35 )             34.6     05-31    137  |  90<L>  |  28<H>  ----------------------------<  98  4.7   |  28  |  6.39<H>    Ca    10.1      31 May 2022 05:35  Phos  3.7     05-31  Mg     2.40     05-31    TPro  8.3  /  Alb  4.5  /  TBili  <0.2  /  DBili  <0.2  /  AST  14  /  ALT  <5<L>  /  AlkPhos  78  05-31    PT/INR - ( 31 May 2022 05:35 )   PT: 13.2 sec;   INR: 1.14 ratio             CAPILLARY BLOOD GLUCOSE          Lower Extremity Physical Exam:  Vascular: DP/PT 1/4 R,  DP/PT 0/4 L, CFT <3 seconds, Temperature gradient warm to cooo B/L, TG is warm to cool b/l  Neuro: Epicritic sensation diminished to the level of forefoot B/L  Musculoskeletal/Ortho: left partial 5th ray resection healed, LF partial 4th ray resection open   Skin:   3/4/22 s/p L foot partial 3rd ray resection open: fibrogranular wound to subQ, no malodor, no drainage, no purulence, flaps cool, macerated borders, distal skin necrosis, no tracking, no purulence, no erythema    3/4/22 s/p R foot partial hallux: dehisced surgical wound to level of subq, scant purulence, non-pitting edema extending from digits to rearfoot, no fluctuance    RADIOLOGY & ADDITIONAL TESTS:

## 2022-05-31 NOTE — PROGRESS NOTE ADULT - SUBJECTIVE AND OBJECTIVE BOX
Date of Service  : 05-31-22      INTERVAL HPI/OVERNIGHT EVENTS: Have foot pain.   Vital Signs Last 24 Hrs  T(C): 37.5 (31 May 2022 21:53), Max: 37.5 (31 May 2022 21:53)  T(F): 99.5 (31 May 2022 21:53), Max: 99.5 (31 May 2022 21:53)  HR: 63 (31 May 2022 21:53) (63 - 76)  BP: 107/47 (31 May 2022 21:53) (99/53 - 115/50)  BP(mean): --  RR: 18 (31 May 2022 21:53) (17 - 18)  SpO2: 99% (31 May 2022 21:53) (97% - 100%)  I&O's Summary    30 May 2022 07:01  -  31 May 2022 07:00  --------------------------------------------------------  IN: 400 mL / OUT: 1608 mL / NET: -1208 mL    31 May 2022 07:01  -  31 May 2022 23:25  --------------------------------------------------------  IN: 920 mL / OUT: 1 mL / NET: 919 mL      MEDICATIONS  (STANDING):  aspirin  chewable 81 milliGRAM(s) Oral daily  atorvastatin 40 milliGRAM(s) Oral at bedtime  chlorhexidine 2% Cloths 1 Application(s) Topical daily  clopidogrel Tablet 75 milliGRAM(s) Oral daily  collagenase Ointment 1 Application(s) Topical daily  heparin   Injectable 5000 Unit(s) SubCutaneous every 12 hours  piperacillin/tazobactam IVPB.. 3.375 Gram(s) IV Intermittent every 12 hours  sevelamer carbonate 800 milliGRAM(s) Oral three times a day    MEDICATIONS  (PRN):  acetaminophen     Tablet .. 650 milliGRAM(s) Oral every 6 hours PRN Temp greater or equal to 38C (100.4F), Mild Pain (1 - 3)    LABS:                        10.6   11.02 )-----------( 272      ( 31 May 2022 05:35 )             34.6     05-31    137  |  90<L>  |  28<H>  ----------------------------<  98  4.7   |  28  |  6.39<H>    Ca    10.1      31 May 2022 05:35  Phos  3.7     05-31  Mg     2.40     05-31    TPro  8.3  /  Alb  4.5  /  TBili  <0.2  /  DBili  <0.2  /  AST  14  /  ALT  <5<L>  /  AlkPhos  78  05-31    PT/INR - ( 31 May 2022 05:35 )   PT: 13.2 sec;   INR: 1.14 ratio             CAPILLARY BLOOD GLUCOSE              REVIEW OF SYSTEMS:  CONSTITUTIONAL: No fever, weight loss, or fatigue  EYES: No eye pain, visual disturbances, or discharge  ENMT:  No difficulty hearing, tinnitus, vertigo; No sinus or throat pain  NECK: No pain or stiffness  RESPIRATORY: No cough, wheezing, chills or hemoptysis; No shortness of breath  CARDIOVASCULAR: No chest pain, palpitations, dizziness, or leg swelling  GASTROINTESTINAL: No abdominal or epigastric pain. No nausea, vomiting, or hematemesis; No diarrhea or constipation. No melena or hematochezia.  GENITOURINARY: No dysuria, frequency, hematuria, or incontinence  NEUROLOGICAL: No headaches, memory loss, loss of strength, numbness, or tremors      Consultant(s) Notes Reviewed:  [x ] YES  [ ] NO    PHYSICAL EXAM:  GENERAL: NAD, well-groomed, well-developed,not in any distress ,  HEAD:  Atraumatic, Normocephalic  EYES: EOMI, PERRLA, conjunctiva and sclera clear  ENMT: No tonsillar erythema, exudates, or enlargement; Moist mucous membranes, Good dentition, No lesions  NECK: Supple, No JVD, Normal thyroid  NERVOUS SYSTEM:  Alert & Oriented X3, No focal deficit   CHEST/LUNG: Good air entry bilateral with no  rales, rhonchi, wheezing, or rubs  HEART: Regular rate and rhythm; No murmurs, rubs, or gallops  ABDOMEN: Soft, Nontender, Nondistended; Bowel sounds present  EXTREMITIES:  Foot dressed.   Care Discussed with Consultants/Other Providers [ x] YES  [ ] NO

## 2022-05-31 NOTE — PROGRESS NOTE ADULT - SUBJECTIVE AND OBJECTIVE BOX
[facility] - S Jluis / Shabbir S /D Romina/ S Denys/ S Chano/ Andrés Perkins / M Katlyn/ O Gladis  service -[phone], office [phone]  ---------------------------------------------------------------------------------------------------------------    Patient seen and examined bedside    Subjective and Objective: No overnight events, denied sob. No new complaints today. feeling better. foot pain controlled    Allergies: No Known Allergies      Hospital Medications:   MEDICATIONS  (STANDING):  aspirin  chewable 81 milliGRAM(s) Oral daily  atorvastatin 40 milliGRAM(s) Oral at bedtime  chlorhexidine 2% Cloths 1 Application(s) Topical daily  clopidogrel Tablet 75 milliGRAM(s) Oral daily  collagenase Ointment 1 Application(s) Topical daily  heparin   Injectable 5000 Unit(s) SubCutaneous every 12 hours  piperacillin/tazobactam IVPB.. 3.375 Gram(s) IV Intermittent every 12 hours  sevelamer carbonate 800 milliGRAM(s) Oral three times a day    VITALS:  T(F): 97.9 (05-31-22 @ 14:00), Max: 99.1 (05-30-22 @ 20:01)  HR: 71 (05-31-22 @ 14:00)  BP: 102/48 (05-31-22 @ 14:00)  RR: 18 (05-31-22 @ 14:00)  SpO2: 100% (05-31-22 @ 14:00)  Wt(kg): --    05-30 @ 07:01  -  05-31 @ 07:00  --------------------------------------------------------  IN: 400 mL / OUT: 1608 mL / NET: -1208 mL    05-31 @ 07:01  -  05-31 @ 17:03  --------------------------------------------------------  IN: 600 mL / OUT: 1 mL / NET: 599 mL      PHYSICAL EXAM:  Constitutional: NAD  HEENT: anicteric sclera  Neck: No JVD  Respiratory: CTAB, no wheezes, rales or rhonchi  Cardiovascular: S1, S2, RRR  Gastrointestinal: BS+, soft, NT/ND  Extremities: No peripheral edema. b/l feet dressings+  Neurological: A/O x 3, no focal deficits  Psychiatric: Normal mood, normal affect  : No salcido.   Vascular Access: rt femoral AVG+    LABS:  05-31    137  |  90<L>  |  28<H>  ----------------------------<  98  4.7   |  28  |  6.39<H>    Ca    10.1      31 May 2022 05:35  Phos  3.7     05-31  Mg     2.40     05-31    TPro  8.3  /  Alb  4.5  /  TBili  <0.2  /  DBili  <0.2  /  AST  14  /  ALT  <5<L>  /  AlkPhos  78  05-31    Creatinine Trend: 6.39 <--, 10.28 <--                        10.6   11.02 )-----------( 272      ( 31 May 2022 05:35 )             34.6     Urine Studies:        RADIOLOGY & ADDITIONAL STUDIES:

## 2022-05-31 NOTE — CHART NOTE - NSCHARTNOTEFT_GEN_A_CORE
Patient appropriately being seeing by podiatry for foot wounds. Recommendations in the chart. Please reconsult if needed it

## 2022-05-31 NOTE — PROGRESS NOTE ADULT - ASSESSMENT
62 y/o woman w/ ESRD on HD (MWF), HF, 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 Admitted for further evaluation. Renal following for ESRD Mx.     ESRD on HD  schedule hemodialysis - Monday, Wednesday and Friday   access- thigh AVG  HD unit SQDC  K, vol ok     s/p HD yesterday, Rx sheet reviewed, net UF 1.2kg, tolerated well. uneventful.  plan for next hemodialysis tomorrow w/ 2 k bath , Ultrafiltration 1kg as tolerated with blood pressure   renal diet  dose all meds for ESRD  renal restrictions in diet.  Anemia of CKD- Hb at goal. no DION for now  HTN, controlled. bp stable.   PAD, infected rt foot wounds, ?OM- s/p vanco, on zosyn. f/u w/Podiatry. monitor vanco level    poc d/w pt,  bedside, HD RN  labs, chart reviewed  For any question, call:  Cell # [phone]  Pager # [phone]  office # [phone]

## 2022-05-31 NOTE — PROGRESS NOTE ADULT - ASSESSMENT
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 Admitted for further evaluation     Problem/Plan - 1:  ·  Problem: Wound of right foot.   ·  Plan: Pt w/ increasing R toe pain when walking as well as swelling. Mild white count and elevated inflammatory markers but afebrile with vitals stable  -xrays obtained - possible osteo of LEFT 4th metatarsal head?   -podiatry consulted, took wound culture from R foot and rec continue vanc/zosyn for now. f/u final podiatry recs regarding any intervention. pt is a poor surgical candidate per podiatry  -wound care consult placed  -check vanc level -ID consulted.   - Pain meds.      Problem/Plan - 2:  ·  Problem: PAD (peripheral artery disease).   ·  Plan: known PAD, continue asa/plavix. Pt denies any numbness or tingling.  -recent angiograms done w/ poor peripheral arterial flow per podiatry and so poor surgical candidate as above  -continue statin.     Problem/Plan - 3:  ·  Problem: ESRD (end stage renal disease) on dialysis.   ·  Plan: continue phos binder. last HD last Friday.   Renal helping with HD.   On MW schedule.  -monitor I/O.     Problem/Plan - 4:  ·  Problem: HLD (hyperlipidemia).   ·  Plan: continue statin. check lipid profile.     Problem/Plan - 5:  ·  Problem: Need for prophylactic measure.   ·  Plan: heparin subcu for dvt ppx, dash diet.

## 2022-05-31 NOTE — PROGRESS NOTE ADULT - ASSESSMENT
60 yo f s/p right foot partial hallux amputation and left foot partial 4rd ray amputation (DOS 3/4/2022)  -pt seen and evaluated  - Afebrile, WBC 11 ESR 92 CRP 9.2  - 3/4/22 s/p L foot partial 3rd ray resection open: fibrogranular wound to subQ, no malodor, no drainage, no purulence, flaps cool, macerated borders, distal skin necrosis, no tracking, no purulence, no erythema    - 3/4/22 s/p R foot partial hallux: dehisced surgical wound to level of subq, scant purulence, non-pitting edema extending from digits to rearfoot, distal medial forefoot erythema no fluctuance  - R foot wound culture results pending   - Pt is s/p recent b/l LE angiograms with poor peripheral aterial flow, is a poor surgical candidate  - Podiatry plan LWC with IV abx, will continue to monitor   - discussed with attending

## 2022-05-31 NOTE — PATIENT PROFILE ADULT - FALL HARM RISK - RISK INTERVENTIONS

## 2022-06-01 LAB
-  AMIKACIN: SIGNIFICANT CHANGE UP
-  AMIKACIN: SIGNIFICANT CHANGE UP
-  AMOXICILLIN/CLAVULANIC ACID: SIGNIFICANT CHANGE UP
-  AMOXICILLIN/CLAVULANIC ACID: SIGNIFICANT CHANGE UP
-  AMPICILLIN/SULBACTAM: SIGNIFICANT CHANGE UP
-  AMPICILLIN/SULBACTAM: SIGNIFICANT CHANGE UP
-  AMPICILLIN: SIGNIFICANT CHANGE UP
-  AMPICILLIN: SIGNIFICANT CHANGE UP
-  AZTREONAM: SIGNIFICANT CHANGE UP
-  AZTREONAM: SIGNIFICANT CHANGE UP
-  CEFAZOLIN: SIGNIFICANT CHANGE UP
-  CEFAZOLIN: SIGNIFICANT CHANGE UP
-  CEFEPIME: SIGNIFICANT CHANGE UP
-  CEFEPIME: SIGNIFICANT CHANGE UP
-  CEFOXITIN: SIGNIFICANT CHANGE UP
-  CEFTRIAXONE: SIGNIFICANT CHANGE UP
-  CEFTRIAXONE: SIGNIFICANT CHANGE UP
-  CIPROFLOXACIN: SIGNIFICANT CHANGE UP
-  CIPROFLOXACIN: SIGNIFICANT CHANGE UP
-  ERTAPENEM: SIGNIFICANT CHANGE UP
-  ERTAPENEM: SIGNIFICANT CHANGE UP
-  GENTAMICIN: SIGNIFICANT CHANGE UP
-  GENTAMICIN: SIGNIFICANT CHANGE UP
-  IMIPENEM: SIGNIFICANT CHANGE UP
-  IMIPENEM: SIGNIFICANT CHANGE UP
-  LEVOFLOXACIN: SIGNIFICANT CHANGE UP
-  LEVOFLOXACIN: SIGNIFICANT CHANGE UP
-  MEROPENEM: SIGNIFICANT CHANGE UP
-  MEROPENEM: SIGNIFICANT CHANGE UP
-  PIPERACILLIN/TAZOBACTAM: SIGNIFICANT CHANGE UP
-  PIPERACILLIN/TAZOBACTAM: SIGNIFICANT CHANGE UP
-  TOBRAMYCIN: SIGNIFICANT CHANGE UP
-  TOBRAMYCIN: SIGNIFICANT CHANGE UP
-  TRIMETHOPRIM/SULFAMETHOXAZOLE: SIGNIFICANT CHANGE UP
-  TRIMETHOPRIM/SULFAMETHOXAZOLE: SIGNIFICANT CHANGE UP
ANION GAP SERPL CALC-SCNC: 19 MMOL/L — HIGH (ref 7–14)
BUN SERPL-MCNC: 46 MG/DL — HIGH (ref 7–23)
CALCIUM SERPL-MCNC: 10.1 MG/DL — SIGNIFICANT CHANGE UP (ref 8.4–10.5)
CHLORIDE SERPL-SCNC: 85 MMOL/L — LOW (ref 98–107)
CO2 SERPL-SCNC: 29 MMOL/L — SIGNIFICANT CHANGE UP (ref 22–31)
CREAT SERPL-MCNC: 8.83 MG/DL — HIGH (ref 0.5–1.3)
EGFR: 5 ML/MIN/1.73M2 — LOW
GLUCOSE SERPL-MCNC: 125 MG/DL — HIGH (ref 70–99)
HCT VFR BLD CALC: 36.5 % — SIGNIFICANT CHANGE UP (ref 34.5–45)
HGB BLD-MCNC: 10.9 G/DL — LOW (ref 11.5–15.5)
MAGNESIUM SERPL-MCNC: 2.6 MG/DL — SIGNIFICANT CHANGE UP (ref 1.6–2.6)
MCHC RBC-ENTMCNC: 26.8 PG — LOW (ref 27–34)
MCHC RBC-ENTMCNC: 29.9 GM/DL — LOW (ref 32–36)
MCV RBC AUTO: 89.9 FL — SIGNIFICANT CHANGE UP (ref 80–100)
METHOD TYPE: SIGNIFICANT CHANGE UP
METHOD TYPE: SIGNIFICANT CHANGE UP
NRBC # BLD: 0 /100 WBCS — SIGNIFICANT CHANGE UP
NRBC # FLD: 0 K/UL — SIGNIFICANT CHANGE UP
PHOSPHATE SERPL-MCNC: 4.7 MG/DL — HIGH (ref 2.5–4.5)
PLATELET # BLD AUTO: 295 K/UL — SIGNIFICANT CHANGE UP (ref 150–400)
POTASSIUM SERPL-MCNC: 5 MMOL/L — SIGNIFICANT CHANGE UP (ref 3.5–5.3)
POTASSIUM SERPL-SCNC: 5 MMOL/L — SIGNIFICANT CHANGE UP (ref 3.5–5.3)
RBC # BLD: 4.06 M/UL — SIGNIFICANT CHANGE UP (ref 3.8–5.2)
RBC # FLD: 15 % — HIGH (ref 10.3–14.5)
SODIUM SERPL-SCNC: 133 MMOL/L — LOW (ref 135–145)
VANCOMYCIN TROUGH SERPL-MCNC: 9.8 UG/ML — LOW (ref 10–20)
WBC # BLD: 11.03 K/UL — HIGH (ref 3.8–10.5)
WBC # FLD AUTO: 11.03 K/UL — HIGH (ref 3.8–10.5)

## 2022-06-01 PROCEDURE — 99222 1ST HOSP IP/OBS MODERATE 55: CPT

## 2022-06-01 RX ORDER — ERTAPENEM SODIUM 1 G/1
500 INJECTION, POWDER, LYOPHILIZED, FOR SOLUTION INTRAMUSCULAR; INTRAVENOUS EVERY 24 HOURS
Refills: 0 | Status: DISCONTINUED | OUTPATIENT
Start: 2022-06-01 | End: 2022-06-03

## 2022-06-01 RX ADMIN — HYDROMORPHONE HYDROCHLORIDE 0.5 MILLIGRAM(S): 2 INJECTION INTRAMUSCULAR; INTRAVENOUS; SUBCUTANEOUS at 17:05

## 2022-06-01 RX ADMIN — ERTAPENEM SODIUM 100 MILLIGRAM(S): 1 INJECTION, POWDER, LYOPHILIZED, FOR SOLUTION INTRAMUSCULAR; INTRAVENOUS at 22:09

## 2022-06-01 RX ADMIN — Medication 650 MILLIGRAM(S): at 16:41

## 2022-06-01 RX ADMIN — SEVELAMER CARBONATE 800 MILLIGRAM(S): 2400 POWDER, FOR SUSPENSION ORAL at 21:03

## 2022-06-01 RX ADMIN — OXYCODONE AND ACETAMINOPHEN 1 TABLET(S): 5; 325 TABLET ORAL at 08:21

## 2022-06-01 RX ADMIN — Medication 650 MILLIGRAM(S): at 16:30

## 2022-06-01 RX ADMIN — Medication 81 MILLIGRAM(S): at 11:17

## 2022-06-01 RX ADMIN — HYDROMORPHONE HYDROCHLORIDE 0.5 MILLIGRAM(S): 2 INJECTION INTRAMUSCULAR; INTRAVENOUS; SUBCUTANEOUS at 17:20

## 2022-06-01 RX ADMIN — PIPERACILLIN AND TAZOBACTAM 25 GRAM(S): 4; .5 INJECTION, POWDER, LYOPHILIZED, FOR SOLUTION INTRAVENOUS at 05:07

## 2022-06-01 RX ADMIN — ATORVASTATIN CALCIUM 40 MILLIGRAM(S): 80 TABLET, FILM COATED ORAL at 21:03

## 2022-06-01 RX ADMIN — HEPARIN SODIUM 5000 UNIT(S): 5000 INJECTION INTRAVENOUS; SUBCUTANEOUS at 19:54

## 2022-06-01 RX ADMIN — HEPARIN SODIUM 5000 UNIT(S): 5000 INJECTION INTRAVENOUS; SUBCUTANEOUS at 05:07

## 2022-06-01 RX ADMIN — CHLORHEXIDINE GLUCONATE 1 APPLICATION(S): 213 SOLUTION TOPICAL at 09:00

## 2022-06-01 RX ADMIN — OXYCODONE AND ACETAMINOPHEN 1 TABLET(S): 5; 325 TABLET ORAL at 08:51

## 2022-06-01 RX ADMIN — SEVELAMER CARBONATE 800 MILLIGRAM(S): 2400 POWDER, FOR SUSPENSION ORAL at 08:22

## 2022-06-01 RX ADMIN — CLOPIDOGREL BISULFATE 75 MILLIGRAM(S): 75 TABLET, FILM COATED ORAL at 11:17

## 2022-06-01 NOTE — CONSULT NOTE ADULT - SUBJECTIVE AND OBJECTIVE BOX
Cardiovascular Disease Initial Evaluation    CHIEF COMPLAINT: R foot pain    HISTORY OF PRESENT ILLNESS:    This is a 61-year-old female with ESRD on HD, HLD and peripheral vascular disease who presented to [facility] on 5/31/2022 with R foot pain.  Ms. Boogie was recently admitted earlier this month for SOB and has multiple admissions due to limb ischemia.   She had a visiting nurse arrive who assessed her foot and noted increased swelling to area and recommended ER evaluation. She denies any numbness or tingling of her LE. No fever, chills, cough, chest pain, shortness of breath, abd pain or diarrhea. She still makes minimal urine. In the ER pt was given vanc/zosyn.       She denies chest pain or SOB.       Allergies  No Known Allergies      MEDICATIONS:  aspirin  chewable 81 milliGRAM(s) Oral daily  clopidogrel Tablet 75 milliGRAM(s) Oral daily  heparin   Injectable 5000 Unit(s) SubCutaneous every 12 hours    piperacillin/tazobactam IVPB.. 3.375 Gram(s) IV Intermittent every 12 hours      acetaminophen     Tablet .. 650 milliGRAM(s) Oral every 6 hours PRN  HYDROmorphone  Injectable 0.5 milliGRAM(s) IV Push every 4 hours PRN  oxycodone    5 mG/acetaminophen 325 mG 1 Tablet(s) Oral every 6 hours PRN      atorvastatin 40 milliGRAM(s) Oral at bedtime    chlorhexidine 2% Cloths 1 Application(s) Topical daily  collagenase Ointment 1 Application(s) Topical daily      PAST MEDICAL & SURGICAL HISTORY:  Heart failure      HLD (hyperlipidemia)      Glaucoma      Cataract      CKD (chronic kidney disease), stage IV      ESRD (end stage renal disease) on dialysis      PAD (peripheral artery disease)      Blind right eye      Acute osteomyelitis of toe of right foot  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          FAMILY HISTORY:  No pertinent family history in first degree relatives        SOCIAL HISTORY:    The patient is a nonsmoker       REVIEW OF SYSTEMS:  See HPI, otherwise complete 14 point review of systems negative      PHYSICAL EXAM:  T(C): 36.9 (06-01-22 @ 05:00), Max: 37.5 (05-31-22 @ 21:53)  HR: 71 (06-01-22 @ 05:00) (63 - 75)  BP: 109/56 (06-01-22 @ 05:00) (99/53 - 109/56)  RR: 18 (06-01-22 @ 05:00) (18 - 18)  SpO2: 99% (06-01-22 @ 05:00) (97% - 100%)  Wt(kg): --  I&O's Summary    31 May 2022 07:01  -  01 Jun 2022 07:00  --------------------------------------------------------  IN: 920 mL / OUT: 1 mL / NET: 919 mL        Appearance: No Acute Distress; resting comfortably  HEENT:  Normal oral mucosa, PERRL, EOMI	  Cardiovascular: Normal S1 S2, No JVD, No murmurs/rubs/gallops  Respiratory: Normal respiratory effort; Lungs clear to auscultation bilaterally  Gastrointestinal:  Soft, Non-tender, + BS	  Skin: No rashes, No ecchymoses, No cyanosis	  Neurologic: Non-focal; no weakness  Extremities: No clubbing, cyanosis or edema  Vascular: Peripheral pulses diminished bilaterally  Psychiatry: A & O x 3, Mood & affect appropriate    Laboratory Data:	 	    CBC Full  -  ( 01 Jun 2022 05:42 )  WBC Count : 11.03 K/uL  Hemoglobin : 10.9 g/dL  Hematocrit : 36.5 %  Platelet Count - Automated : 295 K/uL  Mean Cell Volume : 89.9 fL  Mean Cell Hemoglobin : 26.8 pg  Mean Cell Hemoglobin Concentration : 29.9 gm/dL  Auto Neutrophil # : x  Auto Lymphocyte # : x  Auto Monocyte # : x  Auto Eosinophil # : x  Auto Basophil # : x  Auto Neutrophil % : x  Auto Lymphocyte % : x  Auto Monocyte % : x  Auto Eosinophil % : x  Auto Basophil % : x    05-31    137  |  90<L>  |  28<H>  ----------------------------<  98  4.7   |  28  |  6.39<H>  05-30    139  |  92<L>  |  58<H>  ----------------------------<  117<H>  4.8   |  25  |  10.28<H>    Ca    10.1      31 May 2022 05:35  Ca    10.0      30 May 2022 14:10  Phos  3.7     05-31  Mg     2.40     05-31    TPro  8.3  /  Alb  4.5  /  TBili  <0.2  /  DBili  <0.2  /  AST  14  /  ALT  <5<L>  /  AlkPhos  78  05-31  TPro  8.4<H>  /  Alb  4.3  /  TBili  0.2  /  DBili  x   /  AST  12  /  ALT  <5<L>  /  AlkPhos  75  05-30      Interpretation of Telemetry: n/a	    ECG:  	Sinus rhythm; low voltage    Assessment: 61-year-old female with ESRD on HD, HLD and peripheral vascular disease s/p lower extremity resection presents with chronic limb ischemia.     Plan of Care:    #Compensated diastolic CHF-  Volume status is improved post HD.  Patient on room air.   Fluid removal with HD as per the renal team.   Recent echo noted- normal LV systolic function.        #Peripheral vascular disease.  - S/p L foot partial 3rd ray amputation and R hallux amputation 3/2022.   - S/p lower extremity angioplasty 5/11/2022  - Podiatry input noted.   - Continue medical therapy with DAPT and statin    #ESRD-  - HD as per renal.      #ACP (advance care planning)-  CPT 44180  Advanced care planning was discussed with the patient.  Advanced care planning forms were discussed.  Risks, benefits and alternatives of medical treatment and procedures were discussed in detail and all questions were answered. 30 minutes spent addressing advance care plans.    72 minutes spent on total encounter; more than 50% of the visit was spent counseling and/or coordinating care by the attending physician.   	  Cristino Adams MD [facility]  Cardiovascular Diseases  [phone]

## 2022-06-01 NOTE — PROGRESS NOTE ADULT - ASSESSMENT
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 Admitted for further evaluation. Renal following for ESRD Mx.     ESRD on HD  schedule hemodialysis - Monday, Wednesday and Friday   access- thigh AVG  HD unit SQDC  K, vol ok     Due for HD today  renal diet  dose all meds for ESRD  renal restrictions in diet.  Anemia of CKD- Hb at goal. no DION for now  HTN, controlled. bp stable.   PAD, infected rt foot wounds, ?OM- s/p vanco, on zosyn. f/u w/Podiatry. monitor vanco level      For any question, call:  Cell # [phone]  Pager # [phone]  Callback # [phone]

## 2022-06-01 NOTE — PROGRESS NOTE ADULT - SUBJECTIVE AND OBJECTIVE BOX
Patient is a 61y old  Female who presents with a chief complaint of RLE toe pain (01 Jun 2022 10:28)       INTERVAL HPI/OVERNIGHT EVENTS:  Patient seen and evaluated at bedside.  Pt is resting comfortable in NAD. Denies N/V/F/C.    Allergies    No Known Allergies    Intolerances        Vital Signs Last 24 Hrs  T(C): 36.4 (01 Jun 2022 09:38), Max: 37.5 (31 May 2022 21:53)  T(F): 97.6 (01 Jun 2022 09:38), Max: 99.5 (31 May 2022 21:53)  HR: 77 (01 Jun 2022 09:38) (63 - 77)  BP: 110/52 (01 Jun 2022 09:38) (99/53 - 110/52)  BP(mean): --  RR: 18 (01 Jun 2022 09:38) (18 - 18)  SpO2: 99% (01 Jun 2022 09:38) (97% - 100%)    LABS:                        10.9   11.03 )-----------( 295      ( 01 Jun 2022 05:42 )             36.5     05-31    137  |  90<L>  |  28<H>  ----------------------------<  98  4.7   |  28  |  6.39<H>    Ca    10.1      31 May 2022 05:35  Phos  3.7     05-31  Mg     2.40     05-31    TPro  8.3  /  Alb  4.5  /  TBili  <0.2  /  DBili  <0.2  /  AST  14  /  ALT  <5<L>  /  AlkPhos  78  05-31    PT/INR - ( 31 May 2022 05:35 )   PT: 13.2 sec;   INR: 1.14 ratio             CAPILLARY BLOOD GLUCOSE          Lower Extremity Physical Exam:  Vascular: DP/PT 1/4 R,  DP/PT 0/4 L, CFT <3 seconds, Temperature gradient warm to cooo B/L, TG is warm to cool b/l  Neuro: Epicritic sensation diminished to the level of forefoot B/L  Musculoskeletal/Ortho: left partial 5th ray resection healed, LF partial 4th ray resection open   Skin:   3/4/22 s/p L foot partial 3rd ray resection open: fibrogranular wound to subQ, no malodor, no drainage, no purulence, flaps cool, macerated borders, distal skin necrosis, no tracking, no purulence, no erythema    3/4/22 s/p R foot partial hallux: dehisced surgical wound to level of subq, no purulence, non-pitting edema extending from digits to rearfoot, no fluctuance    RADIOLOGY & ADDITIONAL TESTS:

## 2022-06-01 NOTE — CONSULT NOTE ADULT - ASSESSMENT
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 coli  Hx ESBL Klebsiella and E. coli on left foot Cx    Likely superficial cellulitis of right 1st toe  No signs of OM  However there is a concern for left foot OM on xray  It is difficult to eradicate OM due to severe PAD and patient is a poor surgical candidate  Would do a short course of SSTIs for now  Can repeat MR left foot      IMPRESSION:      RECOMMENDATIONS:      * THIS IS AN INCOMPLETE NOTE. 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 coli  Hx ESBL Klebsiella and E. coli on left foot Cx    Likely superficial cellulitis of right 1st toe  No signs of OM  However there is a concern for left 4th toe OM on xray, with was not reported previously  It is difficult to eradicate OM due to severe PAD and patient is a poor surgical candidate  MR left and right foot to rule out OM  Will use ertapenem due to previous ESBL organisms      IMPRESSION:  Right toe infection  Possible left 4th toe OM  PAD    RECOMMENDATIONS:  - D/C vancomycin and Zosyn, start IV ertapenem 500mg q24hrs  - MR left and right foot  - Follow up sensitivity of wound culture  - Podiatry follow up  - Trend WBC, fever curve, transaminases, creatinine daily  - Will continue to follow    Patient is seen and examined with attending and case is discussed with primary team      Vanda Pascual DO  PGY4 ID fellow  Please contact me via page or text through [employer] Teams  If after 5PM or on weekends, please call [phone]         60 y/o woman w/ ESRD on HD (MWF), HF, 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 coli  Hx ESBL Klebsiella and E. coli on left foot Cx    Likely superficial cellulitis of right 1st toe  No signs of OM  However there is a concern for left 4th toe OM on xray, with was not reported previously  It is difficult to eradicate OM due to severe PAD and patient is a poor surgical candidate  MR left and right foot to rule out OM  Will use ertapenem due to previous ESBL organisms      IMPRESSION:  Right toe infection  Possible left 4th toe OM  PAD    RECOMMENDATIONS:  - D/C vancomycin and Zosyn, start IV ertapenem 500mg q24hrs  - MR left and right foot  - Follow up sensitivity of wound culture  - Podiatry follow up  - Trend WBC, fever curve, transaminases, creatinine daily  - Will continue to follow    Patient is seen and examined with attending and case is discussed with primary team      Vanda Pascual DO  PGY4 ID fellow  Please contact me via page or text through [employer] Teams  If after 5PM or on weekends, please call [phone]

## 2022-06-01 NOTE — PROGRESS NOTE ADULT - ASSESSMENT
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 (DOS 3/4/2022)  -pt seen and evaluated  - Afebrile, WBC 11 ESR 92 CRP 9.2  - 3/4/22 s/p L foot partial 3rd ray resection open: fibrogranular wound to subQ, no malodor, no drainage, no purulence, flaps cool, macerated borders, distal skin necrosis, no tracking, no purulence, no erythema    - 3/4/22 s/p R foot partial hallux: dehisced surgical wound to level of subq, no purulence, non-pitting edema extending from digits to rearfoot, distal medial forefoot erythema no fluctuance  - R foot wound culture growing ESBL Klebsiella & E coli  - Pt is s/p recent b/l LE angiograms with poor peripheral arterial flow, is a poor surgical candidate  - Recommend Vascular consult  - Podiatry plan LWC with IV abx, plan for 24 hrs more of IV abx and likely transition to PO Augmentin upon d/c   - Seen with attending

## 2022-06-01 NOTE — CONSULT NOTE ADULT - SUBJECTIVE AND OBJECTIVE BOX
INFECTIOUS DISEASE CONSULT NOTE    Patient is a 61y old  Female who presents with a chief complaint of RLE toe pain (01 Jun 2022 13:03)    HPI:  60 y/o woman w/ ESRD on HD (MWF), HF, 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 She report she could still walk on it but recently its been more painful to walk. She had a visiting nurse arrive who assessed it and noted increased swelling to area and ER evaluation. She denies any numbness or tingling of her LE. No fever, chills, cough, chest pain, shortness of breath, abd pain or diarrhea. She still makes minimal urine. In the ER pt was given vanc/zosyn.  (30 May 2022 19:10)       REVIEW OF SYSTEMS:  CONSTITUTIONAL: No fever or chills  HEENT: No sore throat  RESPIRATORY: No cough, no shortness of breath  CARDIOVASCULAR: No chest pain or palpitations  GASTROINTESTINAL: No abdominal or epigastric pain  GENITOURINARY: No dysuria  NEUROLOGICAL: No headache/dizziness  MSK: No joint pain, erythema, or swelling; no back pain  SKIN: No itching, rashes  All other ROS negative except noted above    Prior hospital charts reviewed [Yes]  Primary team notes reviewed [Yes]  Other consultant notes reviewed [Yes]    PAST MEDICAL & SURGICAL HISTORY:  Heart failure      HLD (hyperlipidemia)      Glaucoma      Cataract      CKD (chronic kidney disease), stage IV      ESRD (end stage renal disease) on dialysis      PAD (peripheral artery disease)      Blind right eye      Acute osteomyelitis of toe of right foot  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          SOCIAL HISTORY:  - Born in _____, migrated [place] in 19XX  - Currently working as / Retired  - Lives with _____; no pets  - No recent travel  - Denies tobacco use  - Denies alcohol use  - Denies illicit drug use  - Currently sexually active, has one male/female sexual partner    FAMILY HISTORY:  No pertinent family history in first degree relatives        Allergies:  No Known Allergies      ANTIMICROBIALS:  piperacillin/tazobactam IVPB.. 3.375 every 12 hours      ANTIMICROBIALS (past 90 days):  MEDICATIONS  (STANDING):  piperacillin/tazobactam IVPB..   25 mL/Hr IV Intermittent (06-01-22 @ 05:07)   25 mL/Hr IV Intermittent (05-31-22 @ 17:16)   25 mL/Hr IV Intermittent (05-31-22 @ 05:20)    piperacillin/tazobactam IVPB...   200 mL/Hr IV Intermittent (05-30-22 @ 16:29)    vancomycin  IVPB   250 mL/Hr IV Intermittent (05-30-22 @ 17:37)        OTHER MEDS:   MEDICATIONS  (STANDING):  acetaminophen     Tablet .. 650 every 6 hours PRN  aspirin  chewable 81 daily  atorvastatin 40 at bedtime  clopidogrel Tablet 75 daily  heparin   Injectable 5000 every 12 hours  HYDROmorphone  Injectable 0.5 every 4 hours PRN  oxycodone    5 mG/acetaminophen 325 mG 1 every 6 hours PRN      VITALS:  Vital Signs Last 24 Hrs  T(F): 97.6 (06-01-22 @ 09:38), Max: 99.5 (05-31-22 @ 21:53)    Vital Signs Last 24 Hrs  HR: 77 (06-01-22 @ 09:38) (63 - 77)  BP: 110/52 (06-01-22 @ 09:38) (99/53 - 110/52)  RR: 18 (06-01-22 @ 09:38)  SpO2: 99% (06-01-22 @ 09:38) (97% - 100%)  Wt(kg): --    EXAM:  GENERAL: NAD, lying in bed comfortably  HEAD: No head lesions  EYES: Conjunctiva pink and cornea white  ENT: Normal external ears and nose, no discharges; moist mucous membranes  NECK: Supple, nontender to palpation; no JVD  CHEST/LUNG: Clear to auscultation bilaterally  HEART: S1 S2  ABDOMEN: Soft, nontender, nondistended; normoactive bowel sounds  EXTREMITIES: No clubbing, cyanosis, or petal edema  NERVOUS SYSTEM: Alert and oriented to person, time, place and situation, speech clear. No focal deficits   MSK: No joint erythema, swelling or pain  SKIN: No rashes or lesions, no superficial thrombophlebitis    Labs:                        10.9   11.03 )-----------( 295      ( 01 Jun 2022 05:42 )             36.5     05-31    137  |  90<L>  |  28<H>  ----------------------------<  98  4.7   |  28  |  6.39<H>    Ca    10.1      31 May 2022 05:35  Phos  3.7     05-31  Mg     2.40     05-31    TPro  8.3  /  Alb  4.5  /  TBili  <0.2  /  DBili  <0.2  /  AST  14  /  ALT  <5<L>  /  AlkPhos  78  05-31      WBC Trend:  WBC Count: 11.03 (06-01-22 @ 05:42)  WBC Count: 11.02 (05-31-22 @ 05:35)  WBC Count: 12.60 (05-30-22 @ 14:10)      Auto Neutrophil #: 8.33 K/uL (05-31-22 @ 05:35)  Auto Neutrophil #: 10.06 K/uL (05-30-22 @ 14:10)  Auto Neutrophil #: 4.86 K/uL (05-05-22 @ 08:25)  Auto Neutrophil #: 11.82 K/uL (05-03-22 @ 06:52)  Auto Neutrophil #: 9.11 K/uL (03-09-22 @ 07:24)      Creatine Trend:  Creatinine, Serum: 6.39 (05-31)  Creatinine, Serum: 10.28 (05-30)      Liver Biochemical Testing Trend:  Alanine Aminotransferase (ALT/SGPT): <5 *L* (05-31)  Alanine Aminotransferase (ALT/SGPT): <5 *L* (05-30)  Alanine Aminotransferase (ALT/SGPT): 8 (05-03)  Alanine Aminotransferase (ALT/SGPT): 25 (03-09)  Alanine Aminotransferase (ALT/SGPT): 28 (03-07)  Aspartate Aminotransferase (AST/SGOT): 14 (05-31-22 @ 05:35)  Aspartate Aminotransferase (AST/SGOT): 12 (05-30-22 @ 14:10)  Aspartate Aminotransferase (AST/SGOT): 37 (05-03-22 @ 06:52)  Aspartate Aminotransferase (AST/SGOT): 22 (03-09-22 @ 07:24)  Aspartate Aminotransferase (AST/SGOT): 28 (03-07-22 @ 07:07)  Bilirubin Direct, Serum: <0.2 (05-31)  Bilirubin Total, Serum: <0.2 (05-31)  Bilirubin Total, Serum: 0.2 (05-30)  Bilirubin Total, Serum: 0.3 (05-03)  Bilirubin Total, Serum: <0.2 (03-09)      Trend LDH      Auto Eosinophil %: 1.1 % (05-31-22 @ 05:35)  Auto Eosinophil %: 1.0 % (05-30-22 @ 14:10)          MICROBIOLOGY:  Vancomycin Level, Random: 10.7 (05-31 @ 05:35)    MRSA PCR Result.: NotDetec (05-30-22 @ 22:00)  MRSA PCR Result.: NotDetec (05-03-22 @ 16:35)  MRSA PCR Result.: NotDetec (03-01-22 @ 09:31)      Culture - Abscess with Gram Stain (collected 30 May 2022 16:55)  Source: .Abscess right foot wound  Preliminary Report:    Numerous Klebsiella pneumoniae ESBL    Numerous Escherichia coli  Organism: Klebsiella pneumoniae ESBL  Escherichia coli  Organism: Escherichia coli    Sensitivities:      -  Amikacin: S <=16      -  Amoxicillin/Clavulanic Acid: R >16/8      -  Ampicillin: R >16 These ampicillin results predict results for amoxicillin      -  Ampicillin/Sulbactam: R >16/8 Enterobacter, Klebsiella aerogenes, Citrobacter, and Serratia may develop resistance during prolonged therapy (3-4 days)      -  Aztreonam: S <=4      -  Cefazolin: R >16 Enterobacter, Klebsiella aerogenes, Citrobacter, and Serratia may develop resistance during prolonged therapy (3-4 days)      -  Cefepime: I 16      -  Cefoxitin: S <=8      -  Ceftriaxone: S <=1 Enterobacter, Klebsiella aerogenes, Citrobacter, and Serratia may develop resistance during prolonged therapy      -  Ciprofloxacin: S <=0.25      -  Ertapenem: S <=0.5      -  Gentamicin: S <=2      -  Imipenem: S <=1      -  Levofloxacin: S <=0.5      -  Meropenem: S <=1      -  Piperacillin/Tazobactam: R >64      -  Tobramycin: S <=2      -  Trimethoprim/Sulfamethoxazole: R >2/38      Method Type: KINGSLEY  Organism: Klebsiella pneumoniae ESBL    Sensitivities:      -  Amikacin: S <=16      -  Amoxicillin/Clavulanic Acid: S <=8/4      -  Ampicillin: R >16 These ampicillin results predict results for amoxicillin      -  Ampicillin/Sulbactam: R >16/8 Enterobacter, Klebsiella aerogenes, Citrobacter, and Serratia may develop resistance during prolonged therapy (3-4 days)      -  Aztreonam: R >16      -  Cefazolin: R >16 Enterobacter, Klebsiella aerogenes, Citrobacter, and Serratia may develop resistance during prolonged therapy (3-4 days)      -  Cefepime: R >16      -  Ceftriaxone: R >32 Enterobacter, Klebsiella aerogenes, Citrobacter, and Serratia may develop resistance during prolonged therapy      -  Ciprofloxacin: R 1      -  Ertapenem: S <=0.5      -  Gentamicin: S <=2      -  Imipenem: S <=1      -  Levofloxacin: R 2      -  Meropenem: S <=1      -  Piperacillin/Tazobactam: R <=8      -  Tobramycin: S <=2      -  Trimethoprim/Sulfamethoxazole: R >2/38      Method Type: KINGSLEY    Culture - Abscess with Gram Stain (collected 04 May 2022 13:27)  Source: .Abscess LF wounbd  Final Report:    Numerous Pseudomonas aeruginosa    Moderate Klebsiella pneumoniae ESBL    Moderate Escherichia coli  Organism: Pseudomonas aeruginosa  Klebsiella pneumoniae ESBL  Escherichia coli  Organism: Escherichia coli    Sensitivities:      -  Amikacin: S <=16      -  Amoxicillin/Clavulanic Acid: R >16/8      -  Ampicillin: R >16 These ampicillin results predict results for amoxicillin      -  Ampicillin/Sulbactam: R >16/8 Enterobacter, Klebsiella aerogenes, Citrobacter, and Serratia may develop resistance during prolonged therapy (3-4 days)      -  Aztreonam: S <=4      -  Cefazolin: R >16 Enterobacter, Klebsiella aerogenes, Citrobacter, and Serratia may develop resistance during prolonged therapy (3-4 days)      -  Cefepime: S <=2      -  Cefoxitin: S <=8      -  Ceftriaxone: S <=1 Enterobacter, Klebsiella aerogenes, Citrobacter, and Serratia may develop resistance during prolonged therapy      -  Ciprofloxacin: S <=0.25      -  Ertapenem: S <=0.5      -  Gentamicin: S <=2      -  Imipenem: S <=1      -  Levofloxacin: S <=0.5      -  Meropenem: S <=1      -  Piperacillin/Tazobactam: R >64      -  Tobramycin: S <=2      -  Trimethoprim/Sulfamethoxazole: R >2/38      Method Type: KINGSLEY  Organism: Klebsiella pneumoniae ESBL    Sensitivities:      -  Amikacin: S <=16      -  Amoxicillin/Clavulanic Acid: S <=8/4      -  Ampicillin: R >16 These ampicillin results predict results for amoxicillin      -  Ampicillin/Sulbactam: R >16/8 Enterobacter, Klebsiella aerogenes, Citrobacter, and Serratia may develop resistance during prolonged therapy (3-4 days)      -  Aztreonam: R >16      -  Cefazolin: R >16 Enterobacter, Klebsiella aerogenes, Citrobacter, and Serratia may develop resistance during prolonged therapy (3-4 days)      -  Cefepime: R >16      -  Ceftriaxone: R >32 Enterobacter, Klebsiella aerogenes, Citrobacter, and Serratia may develop resistance during prolonged therapy      -  Ciprofloxacin: I 0.5      -  Ertapenem: S <=0.5      -  Gentamicin: S <=2      -  Imipenem: S <=1      -  Levofloxacin: S <=0.5      -  Meropenem: S <=1      -  Piperacillin/Tazobactam: R <=8      -  Tobramycin: S <=2      -  Trimethoprim/Sulfamethoxazole: R >2/38      Method Type: KINGSLEY  Organism: Pseudomonas aeruginosa    Sensitivities:      -  Amikacin: S <=16      -  Aztreonam: I 16      -  Cefepime: S 8      -  Ceftazidime: I 16      -  Ciprofloxacin: S <=0.25      -  Gentamicin: S 4      -  Imipenem: S <=1      -  Levofloxacin: I 2      -  Meropenem: S <=1      -  Piperacillin/Tazobactam: I 32      -  Tobramycin: S <=2      Method Type: KINGSLEY    Culture - Blood (collected 03 May 2022 07:05)  Source: .Blood Blood-Peripheral  Final Report:    No Growth Final    Culture - Blood (collected 03 May 2022 06:49)  Source: .Blood Blood-Peripheral  Final Report:    No Growth Final    COVID-19 PCR: NotDetec (05-13-22 @ 14:22)  COVID-19 PCR: NotDetec (05-10-22 @ 07:11)  COVID-19 PCR: NotDetec (05-09-22 @ 09:03)          C-Reactive Protein, Serum: 92.2 (05-30)              Blood Gas Venous - Lactate: 1.0 (05-30 @ 14:10)    A1C with Estimated Average Glucose Result: 5.1 % (05-31-22 @ 05:35)  A1C with Estimated Average Glucose Result: 4.7 % (05-04-22 @ 06:42)  A1C with Estimated Average Glucose Result: 5.6 % (03-01-22 @ 07:59)      RADIOLOGY:  imaging below personally reviewed   INFECTIOUS DISEASE CONSULT NOTE    Patient is a 61y old  Female who presents with a chief complaint of RLE toe pain (01 Jun 2022 13:03)    HPI:  60 y/o woman w/ ESRD on HD (MWF), HF, 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 She report she could still walk on it but recently its been more painful to walk. She had a visiting nurse arrive who assessed it and noted increased swelling to area and ER evaluation. She denies any numbness or tingling of her LE. No fever, chills, cough, chest pain, shortness of breath, abd pain or diarrhea. She still makes minimal urine. In the ER pt was given vanc/zosyn.  (30 May 2022 19:10)       REVIEW OF SYSTEMS:  CONSTITUTIONAL: No fever or chills  HEENT: No sore throat  RESPIRATORY: No cough, no shortness of breath  CARDIOVASCULAR: No chest pain or palpitations  GASTROINTESTINAL: No abdominal or epigastric pain  GENITOURINARY: No dysuria  NEUROLOGICAL: No headache/dizziness  MSK: Right toe pain,   SKIN: + left 3rd toe surgical site with necrotic tissue  All other ROS negative except noted above    Prior hospital charts reviewed [Yes]  Primary team notes reviewed [Yes]  Other consultant notes reviewed [Yes]    PAST MEDICAL & SURGICAL HISTORY:  Heart failure      HLD (hyperlipidemia)      Glaucoma      Cataract      CKD (chronic kidney disease), stage IV      ESRD (end stage renal disease) on dialysis      PAD (peripheral artery disease)      Blind right eye      Acute osteomyelitis of toe of right foot  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    SOCIAL HISTORY:  - Currently retired  - Lives with , no pets  - No recent travel  - Denies tobacco use  - Denies alcohol use  - Denies illicit drug use    FAMILY HISTORY:  No family history of PAD    Allergies:  No Known Allergies      ANTIMICROBIALS:  piperacillin/tazobactam IVPB.. 3.375 every 12 hours      ANTIMICROBIALS (past 90 days):  MEDICATIONS  (STANDING):  piperacillin/tazobactam IVPB..   25 mL/Hr IV Intermittent (06-01-22 @ 05:07)   25 mL/Hr IV Intermittent (05-31-22 @ 17:16)   25 mL/Hr IV Intermittent (05-31-22 @ 05:20)    piperacillin/tazobactam IVPB...   200 mL/Hr IV Intermittent (05-30-22 @ 16:29)    vancomycin  IVPB   250 mL/Hr IV Intermittent (05-30-22 @ 17:37)        OTHER MEDS:   MEDICATIONS  (STANDING):  acetaminophen     Tablet .. 650 every 6 hours PRN  aspirin  chewable 81 daily  atorvastatin 40 at bedtime  clopidogrel Tablet 75 daily  heparin   Injectable 5000 every 12 hours  HYDROmorphone  Injectable 0.5 every 4 hours PRN  oxycodone    5 mG/acetaminophen 325 mG 1 every 6 hours PRN      VITALS:  Vital Signs Last 24 Hrs  T(F): 97.6 (06-01-22 @ 09:38), Max: 99.5 (05-31-22 @ 21:53)    Vital Signs Last 24 Hrs  HR: 77 (06-01-22 @ 09:38) (63 - 77)  BP: 110/52 (06-01-22 @ 09:38) (99/53 - 110/52)  RR: 18 (06-01-22 @ 09:38)  SpO2: 99% (06-01-22 @ 09:38) (97% - 100%)  Wt(kg): --    EXAM:  GENERAL: NAD, lying in bed comfortably  HEAD: No head lesions  EYES: Conjunctiva pink and cornea white  ENT: Normal external ears and nose, no discharges; moist mucous membranes  NECK: Supple, nontender to palpation; no JVD  CHEST/LUNG: Clear to auscultation bilaterally  HEART: S1 S2  ABDOMEN: Soft, nontender, nondistended; normoactive bowel sounds  EXTREMITIES: No clubbing, cyanosis, or petal edema  NERVOUS SYSTEM: Alert and oriented to person, time, place and situation, speech clear. No focal deficits   MSK: Right toe pain  SKIN: swelling and tenderness of right 1st toe, left 3rd toe wound nonhealing with drainage., no superficial thrombophlebitis    Labs:                        10.9   11.03 )-----------( 295      ( 01 Jun 2022 05:42 )             36.5     05-31    137  |  90<L>  |  28<H>  ----------------------------<  98  4.7   |  28  |  6.39<H>    Ca    10.1      31 May 2022 05:35  Phos  3.7     05-31  Mg     2.40     05-31    TPro  8.3  /  Alb  4.5  /  TBili  <0.2  /  DBili  <0.2  /  AST  14  /  ALT  <5<L>  /  AlkPhos  78  05-31      WBC Trend:  WBC Count: 11.03 (06-01-22 @ 05:42)  WBC Count: 11.02 (05-31-22 @ 05:35)  WBC Count: 12.60 (05-30-22 @ 14:10)      Auto Neutrophil #: 8.33 K/uL (05-31-22 @ 05:35)  Auto Neutrophil #: 10.06 K/uL (05-30-22 @ 14:10)  Auto Neutrophil #: 4.86 K/uL (05-05-22 @ 08:25)  Auto Neutrophil #: 11.82 K/uL (05-03-22 @ 06:52)  Auto Neutrophil #: 9.11 K/uL (03-09-22 @ 07:24)      Creatine Trend:  Creatinine, Serum: 6.39 (05-31)  Creatinine, Serum: 10.28 (05-30)      Liver Biochemical Testing Trend:  Alanine Aminotransferase (ALT/SGPT): <5 *L* (05-31)  Alanine Aminotransferase (ALT/SGPT): <5 *L* (05-30)  Alanine Aminotransferase (ALT/SGPT): 8 (05-03)  Alanine Aminotransferase (ALT/SGPT): 25 (03-09)  Alanine Aminotransferase (ALT/SGPT): 28 (03-07)  Aspartate Aminotransferase (AST/SGOT): 14 (05-31-22 @ 05:35)  Aspartate Aminotransferase (AST/SGOT): 12 (05-30-22 @ 14:10)  Aspartate Aminotransferase (AST/SGOT): 37 (05-03-22 @ 06:52)  Aspartate Aminotransferase (AST/SGOT): 22 (03-09-22 @ 07:24)  Aspartate Aminotransferase (AST/SGOT): 28 (03-07-22 @ 07:07)  Bilirubin Direct, Serum: <0.2 (05-31)  Bilirubin Total, Serum: <0.2 (05-31)  Bilirubin Total, Serum: 0.2 (05-30)  Bilirubin Total, Serum: 0.3 (05-03)  Bilirubin Total, Serum: <0.2 (03-09)      Trend LDH      Auto Eosinophil %: 1.1 % (05-31-22 @ 05:35)  Auto Eosinophil %: 1.0 % (05-30-22 @ 14:10)          MICROBIOLOGY:  Vancomycin Level, Random: 10.7 (05-31 @ 05:35)    MRSA PCR Result.: NotDetec (05-30-22 @ 22:00)  MRSA PCR Result.: NotDetec (05-03-22 @ 16:35)  MRSA PCR Result.: NotDetec (03-01-22 @ 09:31)      Culture - Abscess with Gram Stain (collected 30 May 2022 16:55)  Source: .Abscess right foot wound  Preliminary Report:    Numerous Klebsiella pneumoniae ESBL    Numerous Escherichia coli  Organism: Klebsiella pneumoniae ESBL  Escherichia coli  Organism: Escherichia coli    Sensitivities:      -  Amikacin: S <=16      -  Amoxicillin/Clavulanic Acid: R >16/8      -  Ampicillin: R >16 These ampicillin results predict results for amoxicillin      -  Ampicillin/Sulbactam: R >16/8 Enterobacter, Klebsiella aerogenes, Citrobacter, and Serratia may develop resistance during prolonged therapy (3-4 days)      -  Aztreonam: S <=4      -  Cefazolin: R >16 Enterobacter, Klebsiella aerogenes, Citrobacter, and Serratia may develop resistance during prolonged therapy (3-4 days)      -  Cefepime: I 16      -  Cefoxitin: S <=8      -  Ceftriaxone: S <=1 Enterobacter, Klebsiella aerogenes, Citrobacter, and Serratia may develop resistance during prolonged therapy      -  Ciprofloxacin: S <=0.25      -  Ertapenem: S <=0.5      -  Gentamicin: S <=2      -  Imipenem: S <=1      -  Levofloxacin: S <=0.5      -  Meropenem: S <=1      -  Piperacillin/Tazobactam: R >64      -  Tobramycin: S <=2      -  Trimethoprim/Sulfamethoxazole: R >2/38      Method Type: KINGSLEY  Organism: Klebsiella pneumoniae ESBL    Sensitivities:      -  Amikacin: S <=16      -  Amoxicillin/Clavulanic Acid: S <=8/4      -  Ampicillin: R >16 These ampicillin results predict results for amoxicillin      -  Ampicillin/Sulbactam: R >16/8 Enterobacter, Klebsiella aerogenes, Citrobacter, and Serratia may develop resistance during prolonged therapy (3-4 days)      -  Aztreonam: R >16      -  Cefazolin: R >16 Enterobacter, Klebsiella aerogenes, Citrobacter, and Serratia may develop resistance during prolonged therapy (3-4 days)      -  Cefepime: R >16      -  Ceftriaxone: R >32 Enterobacter, Klebsiella aerogenes, Citrobacter, and Serratia may develop resistance during prolonged therapy      -  Ciprofloxacin: R 1      -  Ertapenem: S <=0.5      -  Gentamicin: S <=2      -  Imipenem: S <=1      -  Levofloxacin: R 2      -  Meropenem: S <=1      -  Piperacillin/Tazobactam: R <=8      -  Tobramycin: S <=2      -  Trimethoprim/Sulfamethoxazole: R >2/38      Method Type: KINGSLEY    Culture - Abscess with Gram Stain (collected 04 May 2022 13:27)  Source: .Abscess LF wounbd  Final Report:    Numerous Pseudomonas aeruginosa    Moderate Klebsiella pneumoniae ESBL    Moderate Escherichia coli  Organism: Pseudomonas aeruginosa  Klebsiella pneumoniae ESBL  Escherichia coli  Organism: Escherichia coli    Sensitivities:      -  Amikacin: S <=16      -  Amoxicillin/Clavulanic Acid: R >16/8      -  Ampicillin: R >16 These ampicillin results predict results for amoxicillin      -  Ampicillin/Sulbactam: R >16/8 Enterobacter, Klebsiella aerogenes, Citrobacter, and Serratia may develop resistance during prolonged therapy (3-4 days)      -  Aztreonam: S <=4      -  Cefazolin: R >16 Enterobacter, Klebsiella aerogenes, Citrobacter, and Serratia may develop resistance during prolonged therapy (3-4 days)      -  Cefepime: S <=2      -  Cefoxitin: S <=8      -  Ceftriaxone: S <=1 Enterobacter, Klebsiella aerogenes, Citrobacter, and Serratia may develop resistance during prolonged therapy      -  Ciprofloxacin: S <=0.25      -  Ertapenem: S <=0.5      -  Gentamicin: S <=2      -  Imipenem: S <=1      -  Levofloxacin: S <=0.5      -  Meropenem: S <=1      -  Piperacillin/Tazobactam: R >64      -  Tobramycin: S <=2      -  Trimethoprim/Sulfamethoxazole: R >2/38      Method Type: KINGSLEY  Organism: Klebsiella pneumoniae ESBL    Sensitivities:      -  Amikacin: S <=16      -  Amoxicillin/Clavulanic Acid: S <=8/4      -  Ampicillin: R >16 These ampicillin results predict results for amoxicillin      -  Ampicillin/Sulbactam: R >16/8 Enterobacter, Klebsiella aerogenes, Citrobacter, and Serratia may develop resistance during prolonged therapy (3-4 days)      -  Aztreonam: R >16      -  Cefazolin: R >16 Enterobacter, Klebsiella aerogenes, Citrobacter, and Serratia may develop resistance during prolonged therapy (3-4 days)      -  Cefepime: R >16      -  Ceftriaxone: R >32 Enterobacter, Klebsiella aerogenes, Citrobacter, and Serratia may develop resistance during prolonged therapy      -  Ciprofloxacin: I 0.5      -  Ertapenem: S <=0.5      -  Gentamicin: S <=2      -  Imipenem: S <=1      -  Levofloxacin: S <=0.5      -  Meropenem: S <=1      -  Piperacillin/Tazobactam: R <=8      -  Tobramycin: S <=2      -  Trimethoprim/Sulfamethoxazole: R >2/38      Method Type: KINGSLEY  Organism: Pseudomonas aeruginosa    Sensitivities:      -  Amikacin: S <=16      -  Aztreonam: I 16      -  Cefepime: S 8      -  Ceftazidime: I 16      -  Ciprofloxacin: S <=0.25      -  Gentamicin: S 4      -  Imipenem: S <=1      -  Levofloxacin: I 2      -  Meropenem: S <=1      -  Piperacillin/Tazobactam: I 32      -  Tobramycin: S <=2      Method Type: KINGSLEY    Culture - Blood (collected 03 May 2022 07:05)  Source: .Blood Blood-Peripheral  Final Report:    No Growth Final    Culture - Blood (collected 03 May 2022 06:49)  Source: .Blood Blood-Peripheral  Final Report:    No Growth Final    COVID-19 PCR: NotDetec (05-13-22 @ 14:22)  COVID-19 PCR: NotDetec (05-10-22 @ 07:11)  COVID-19 PCR: NotDetec (05-09-22 @ 09:03)          C-Reactive Protein, Serum: 92.2 (05-30)              Blood Gas Venous - Lactate: 1.0 (05-30 @ 14:10)    A1C with Estimated Average Glucose Result: 5.1 % (05-31-22 @ 05:35)  A1C with Estimated Average Glucose Result: 4.7 % (05-04-22 @ 06:42)  A1C with Estimated Average Glucose Result: 5.6 % (03-01-22 @ 07:59)      RADIOLOGY:  imaging below personally reviewed   INFECTIOUS DISEASE CONSULT NOTE    Patient is a 61y old  Female who presents with a chief complaint of RLE toe pain (01 Jun 2022 13:03)    HPI:  60 y/o woman w/ ESRD on HD (MWF), HF, 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 She report she could still walk on it but recently its been more painful to walk. She had a visiting nurse arrive who assessed it and noted increased swelling to area and ER evaluation. She denies any numbness or tingling of her LE. No fever, chills, cough, chest pain, shortness of breath, abd pain or diarrhea. She still makes minimal urine. In the ER pt was given vanc/zosyn.  (30 May 2022 19:10)       REVIEW OF SYSTEMS:  CONSTITUTIONAL: No fever or chills  HEENT: No sore throat  RESPIRATORY: No cough, no shortness of breath  CARDIOVASCULAR: No chest pain or palpitations  GASTROINTESTINAL: No abdominal or epigastric pain  GENITOURINARY: No dysuria  NEUROLOGICAL: No headache/dizziness  MSK: Right toe pain,   SKIN: + left 3rd toe surgical site with necrotic tissue  All other ROS negative except noted above    Prior hospital charts reviewed [Yes]  Primary team notes reviewed [Yes]  Other consultant notes reviewed [Yes]    PAST MEDICAL & SURGICAL HISTORY:  Heart failure      HLD (hyperlipidemia)      Glaucoma      Cataract      CKD (chronic kidney disease), stage IV      ESRD (end stage renal disease) on dialysis      PAD (peripheral artery disease)      Blind right eye      Acute osteomyelitis of toe of right foot  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    SOCIAL HISTORY:  - Currently retired  - Lives with , no pets  - No recent travel  - Denies tobacco use  - Denies alcohol use  - Denies illicit drug use    FAMILY HISTORY:  No family history of PAD    Allergies:  No Known Allergies      ANTIMICROBIALS:  piperacillin/tazobactam IVPB.. 3.375 every 12 hours      ANTIMICROBIALS (past 90 days):  MEDICATIONS  (STANDING):  piperacillin/tazobactam IVPB..   25 mL/Hr IV Intermittent (06-01-22 @ 05:07)   25 mL/Hr IV Intermittent (05-31-22 @ 17:16)   25 mL/Hr IV Intermittent (05-31-22 @ 05:20)    piperacillin/tazobactam IVPB...   200 mL/Hr IV Intermittent (05-30-22 @ 16:29)    vancomycin  IVPB   250 mL/Hr IV Intermittent (05-30-22 @ 17:37)        OTHER MEDS:   MEDICATIONS  (STANDING):  acetaminophen     Tablet .. 650 every 6 hours PRN  aspirin  chewable 81 daily  atorvastatin 40 at bedtime  clopidogrel Tablet 75 daily  heparin   Injectable 5000 every 12 hours  HYDROmorphone  Injectable 0.5 every 4 hours PRN  oxycodone    5 mG/acetaminophen 325 mG 1 every 6 hours PRN      VITALS:  Vital Signs Last 24 Hrs  T(F): 97.6 (06-01-22 @ 09:38), Max: 99.5 (05-31-22 @ 21:53)    Vital Signs Last 24 Hrs  HR: 77 (06-01-22 @ 09:38) (63 - 77)  BP: 110/52 (06-01-22 @ 09:38) (99/53 - 110/52)  RR: 18 (06-01-22 @ 09:38)  SpO2: 99% (06-01-22 @ 09:38) (97% - 100%)  Wt(kg): --    EXAM:  GENERAL: NAD, lying in bed comfortably  HEAD: No head lesions  EYES: right eye cloudy  ENT: Normal external ears and nose, no discharges; moist mucous membranes  NECK: Supple, nontender to palpation; no JVD  CHEST/LUNG: Clear to auscultation bilaterally  HEART: S1 S2  ABDOMEN: Soft, nontender, nondistended; normoactive bowel sounds  EXTREMITIES: right thigh HD access  NERVOUS SYSTEM: Alert and oriented to person, time, place and situation, speech clear. No focal deficits   MSK: Right toe pain  SKIN: swelling and tenderness of right 1st toe, left 3rd toe wound nonhealing with drainage., no superficial thrombophlebitis    Labs:                        10.9   11.03 )-----------( 295      ( 01 Jun 2022 05:42 )             36.5     05-31    137  |  90<L>  |  28<H>  ----------------------------<  98  4.7   |  28  |  6.39<H>    Ca    10.1      31 May 2022 05:35  Phos  3.7     05-31  Mg     2.40     05-31    TPro  8.3  /  Alb  4.5  /  TBili  <0.2  /  DBili  <0.2  /  AST  14  /  ALT  <5<L>  /  AlkPhos  78  05-31      WBC Trend:  WBC Count: 11.03 (06-01-22 @ 05:42)  WBC Count: 11.02 (05-31-22 @ 05:35)  WBC Count: 12.60 (05-30-22 @ 14:10)      Auto Neutrophil #: 8.33 K/uL (05-31-22 @ 05:35)  Auto Neutrophil #: 10.06 K/uL (05-30-22 @ 14:10)  Auto Neutrophil #: 4.86 K/uL (05-05-22 @ 08:25)  Auto Neutrophil #: 11.82 K/uL (05-03-22 @ 06:52)  Auto Neutrophil #: 9.11 K/uL (03-09-22 @ 07:24)      Creatine Trend:  Creatinine, Serum: 6.39 (05-31)  Creatinine, Serum: 10.28 (05-30)      Liver Biochemical Testing Trend:  Alanine Aminotransferase (ALT/SGPT): <5 *L* (05-31)  Alanine Aminotransferase (ALT/SGPT): <5 *L* (05-30)  Alanine Aminotransferase (ALT/SGPT): 8 (05-03)  Alanine Aminotransferase (ALT/SGPT): 25 (03-09)  Alanine Aminotransferase (ALT/SGPT): 28 (03-07)  Aspartate Aminotransferase (AST/SGOT): 14 (05-31-22 @ 05:35)  Aspartate Aminotransferase (AST/SGOT): 12 (05-30-22 @ 14:10)  Aspartate Aminotransferase (AST/SGOT): 37 (05-03-22 @ 06:52)  Aspartate Aminotransferase (AST/SGOT): 22 (03-09-22 @ 07:24)  Aspartate Aminotransferase (AST/SGOT): 28 (03-07-22 @ 07:07)  Bilirubin Direct, Serum: <0.2 (05-31)  Bilirubin Total, Serum: <0.2 (05-31)  Bilirubin Total, Serum: 0.2 (05-30)  Bilirubin Total, Serum: 0.3 (05-03)  Bilirubin Total, Serum: <0.2 (03-09)      Trend LDH      Auto Eosinophil %: 1.1 % (05-31-22 @ 05:35)  Auto Eosinophil %: 1.0 % (05-30-22 @ 14:10)          MICROBIOLOGY:  Vancomycin Level, Random: 10.7 (05-31 @ 05:35)    MRSA PCR Result.: NotDetec (05-30-22 @ 22:00)  MRSA PCR Result.: NotDetec (05-03-22 @ 16:35)  MRSA PCR Result.: NotDetec (03-01-22 @ 09:31)      Culture - Abscess with Gram Stain (collected 30 May 2022 16:55)  Source: .Abscess right foot wound  Preliminary Report:    Numerous Klebsiella pneumoniae ESBL    Numerous Escherichia coli  Organism: Klebsiella pneumoniae ESBL  Escherichia coli  Organism: Escherichia coli    Sensitivities:      -  Amikacin: S <=16      -  Amoxicillin/Clavulanic Acid: R >16/8      -  Ampicillin: R >16 These ampicillin results predict results for amoxicillin      -  Ampicillin/Sulbactam: R >16/8 Enterobacter, Klebsiella aerogenes, Citrobacter, and Serratia may develop resistance during prolonged therapy (3-4 days)      -  Aztreonam: S <=4      -  Cefazolin: R >16 Enterobacter, Klebsiella aerogenes, Citrobacter, and Serratia may develop resistance during prolonged therapy (3-4 days)      -  Cefepime: I 16      -  Cefoxitin: S <=8      -  Ceftriaxone: S <=1 Enterobacter, Klebsiella aerogenes, Citrobacter, and Serratia may develop resistance during prolonged therapy      -  Ciprofloxacin: S <=0.25      -  Ertapenem: S <=0.5      -  Gentamicin: S <=2      -  Imipenem: S <=1      -  Levofloxacin: S <=0.5      -  Meropenem: S <=1      -  Piperacillin/Tazobactam: R >64      -  Tobramycin: S <=2      -  Trimethoprim/Sulfamethoxazole: R >2/38      Method Type: KINGSLEY  Organism: Klebsiella pneumoniae ESBL    Sensitivities:      -  Amikacin: S <=16      -  Amoxicillin/Clavulanic Acid: S <=8/4      -  Ampicillin: R >16 These ampicillin results predict results for amoxicillin      -  Ampicillin/Sulbactam: R >16/8 Enterobacter, Klebsiella aerogenes, Citrobacter, and Serratia may develop resistance during prolonged therapy (3-4 days)      -  Aztreonam: R >16      -  Cefazolin: R >16 Enterobacter, Klebsiella aerogenes, Citrobacter, and Serratia may develop resistance during prolonged therapy (3-4 days)      -  Cefepime: R >16      -  Ceftriaxone: R >32 Enterobacter, Klebsiella aerogenes, Citrobacter, and Serratia may develop resistance during prolonged therapy      -  Ciprofloxacin: R 1      -  Ertapenem: S <=0.5      -  Gentamicin: S <=2      -  Imipenem: S <=1      -  Levofloxacin: R 2      -  Meropenem: S <=1      -  Piperacillin/Tazobactam: R <=8      -  Tobramycin: S <=2      -  Trimethoprim/Sulfamethoxazole: R >2/38      Method Type: KINGSLEY    Culture - Abscess with Gram Stain (collected 04 May 2022 13:27)  Source: .Abscess LF wounbd  Final Report:    Numerous Pseudomonas aeruginosa    Moderate Klebsiella pneumoniae ESBL    Moderate Escherichia coli  Organism: Pseudomonas aeruginosa  Klebsiella pneumoniae ESBL  Escherichia coli  Organism: Escherichia coli    Sensitivities:      -  Amikacin: S <=16      -  Amoxicillin/Clavulanic Acid: R >16/8      -  Ampicillin: R >16 These ampicillin results predict results for amoxicillin      -  Ampicillin/Sulbactam: R >16/8 Enterobacter, Klebsiella aerogenes, Citrobacter, and Serratia may develop resistance during prolonged therapy (3-4 days)      -  Aztreonam: S <=4      -  Cefazolin: R >16 Enterobacter, Klebsiella aerogenes, Citrobacter, and Serratia may develop resistance during prolonged therapy (3-4 days)      -  Cefepime: S <=2      -  Cefoxitin: S <=8      -  Ceftriaxone: S <=1 Enterobacter, Klebsiella aerogenes, Citrobacter, and Serratia may develop resistance during prolonged therapy      -  Ciprofloxacin: S <=0.25      -  Ertapenem: S <=0.5      -  Gentamicin: S <=2      -  Imipenem: S <=1      -  Levofloxacin: S <=0.5      -  Meropenem: S <=1      -  Piperacillin/Tazobactam: R >64      -  Tobramycin: S <=2      -  Trimethoprim/Sulfamethoxazole: R >2/38      Method Type: KINGSLEY  Organism: Klebsiella pneumoniae ESBL    Sensitivities:      -  Amikacin: S <=16      -  Amoxicillin/Clavulanic Acid: S <=8/4      -  Ampicillin: R >16 These ampicillin results predict results for amoxicillin      -  Ampicillin/Sulbactam: R >16/8 Enterobacter, Klebsiella aerogenes, Citrobacter, and Serratia may develop resistance during prolonged therapy (3-4 days)      -  Aztreonam: R >16      -  Cefazolin: R >16 Enterobacter, Klebsiella aerogenes, Citrobacter, and Serratia may develop resistance during prolonged therapy (3-4 days)      -  Cefepime: R >16      -  Ceftriaxone: R >32 Enterobacter, Klebsiella aerogenes, Citrobacter, and Serratia may develop resistance during prolonged therapy      -  Ciprofloxacin: I 0.5      -  Ertapenem: S <=0.5      -  Gentamicin: S <=2      -  Imipenem: S <=1      -  Levofloxacin: S <=0.5      -  Meropenem: S <=1      -  Piperacillin/Tazobactam: R <=8      -  Tobramycin: S <=2      -  Trimethoprim/Sulfamethoxazole: R >2/38      Method Type: KINGSLEY  Organism: Pseudomonas aeruginosa    Sensitivities:      -  Amikacin: S <=16      -  Aztreonam: I 16      -  Cefepime: S 8      -  Ceftazidime: I 16      -  Ciprofloxacin: S <=0.25      -  Gentamicin: S 4      -  Imipenem: S <=1      -  Levofloxacin: I 2      -  Meropenem: S <=1      -  Piperacillin/Tazobactam: I 32      -  Tobramycin: S <=2      Method Type: KINGSLEY    Culture - Blood (collected 03 May 2022 07:05)  Source: .Blood Blood-Peripheral  Final Report:    No Growth Final    Culture - Blood (collected 03 May 2022 06:49)  Source: .Blood Blood-Peripheral  Final Report:    No Growth Final    COVID-19 PCR: NotDetec (05-13-22 @ 14:22)  COVID-19 PCR: NotDetec (05-10-22 @ 07:11)  COVID-19 PCR: NotDetec (05-09-22 @ 09:03)          C-Reactive Protein, Serum: 92.2 (05-30)              Blood Gas Venous - Lactate: 1.0 (05-30 @ 14:10)    A1C with Estimated Average Glucose Result: 5.1 % (05-31-22 @ 05:35)  A1C with Estimated Average Glucose Result: 4.7 % (05-04-22 @ 06:42)  A1C with Estimated Average Glucose Result: 5.6 % (03-01-22 @ 07:59)      RADIOLOGY:  imaging below personally reviewed

## 2022-06-01 NOTE — PROGRESS NOTE ADULT - ASSESSMENT
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 Admitted for further evaluation     Problem/Plan - 1:  ·  Problem: Wound of right foot with  Cellulitis with Left 4th toe OM .   ·  Plan: Pt w/ increasing R toe pain when walking as well as swelling. Mild white count and elevated inflammatory markers but afebrile with vitals stable  -podiatry and ID help appreciated.   -started  IV ertapenem 500mg q24hrs per iD .      Problem/Plan - 2:  ·  Problem: PAD (peripheral artery disease).   ·  Plan: known PAD, continue asa/plavix. Pt denies any numbness or tingling.  -recent angiograms done w/ poor peripheral arterial flow per podiatry and so poor surgical candidate as above  -continue statin.     Problem/Plan - 3:  ·  Problem: ESRD (end stage renal disease) on dialysis.   ·  Plan: continue phos binder. last HD last Friday.   Renal helping with HD.   On MWF schedule.  -monitor I/O.     Problem/Plan - 4:  ·  Problem: HLD (hyperlipidemia).   ·  Plan: continue statin. check lipid profile.     Problem/Plan - 5:  ·  Problem: Need for prophylactic measure.   ·  Plan: heparin subcu for dvt ppx, dash diet.

## 2022-06-01 NOTE — PROGRESS NOTE ADULT - SUBJECTIVE AND OBJECTIVE BOX
Date of Service  : 06-01-22 @ 22:12    INTERVAL HPI/OVERNIGHT EVENTS: No new concerns.   Vital Signs Last 24 Hrs  T(C): 36.7 (01 Jun 2022 19:49), Max: 37.2 (01 Jun 2022 16:50)  T(F): 98 (01 Jun 2022 19:49), Max: 99 (01 Jun 2022 16:50)  HR: 62 (01 Jun 2022 19:49) (62 - 82)  BP: 112/52 (01 Jun 2022 19:49) (109/56 - 154/65)  BP(mean): --  RR: 17 (01 Jun 2022 19:49) (17 - 18)  SpO2: 100% (01 Jun 2022 19:49) (99% - 100%)  I&O's Summary    31 May 2022 07:01  -  01 Jun 2022 07:00  --------------------------------------------------------  IN: 920 mL / OUT: 1 mL / NET: 919 mL    01 Jun 2022 07:01  -  01 Jun 2022 22:12  --------------------------------------------------------  IN: 900 mL / OUT: 1400 mL / NET: -500 mL      MEDICATIONS  (STANDING):  aspirin  chewable 81 milliGRAM(s) Oral daily  atorvastatin 40 milliGRAM(s) Oral at bedtime  chlorhexidine 2% Cloths 1 Application(s) Topical daily  clopidogrel Tablet 75 milliGRAM(s) Oral daily  collagenase Ointment 1 Application(s) Topical daily  ertapenem  IVPB 500 milliGRAM(s) IV Intermittent every 24 hours  heparin   Injectable 5000 Unit(s) SubCutaneous every 12 hours  sevelamer carbonate 800 milliGRAM(s) Oral three times a day    MEDICATIONS  (PRN):  acetaminophen     Tablet .. 650 milliGRAM(s) Oral every 6 hours PRN Temp greater or equal to 38C (100.4F), Mild Pain (1 - 3)  HYDROmorphone  Injectable 0.5 milliGRAM(s) IV Push every 4 hours PRN Severe Pain (7 - 10)  oxycodone    5 mG/acetaminophen 325 mG 1 Tablet(s) Oral every 6 hours PRN Moderate Pain (4 - 6)    LABS:                        10.9   11.03 )-----------( 295      ( 01 Jun 2022 05:42 )             36.5     06-01    133<L>  |  85<L>  |  46<H>  ----------------------------<  125<H>  5.0   |  29  |  8.83<H>    Ca    10.1      01 Jun 2022 13:00  Phos  4.7     06-01  Mg     2.60     06-01    TPro  8.3  /  Alb  4.5  /  TBili  <0.2  /  DBili  <0.2  /  AST  14  /  ALT  <5<L>  /  AlkPhos  78  05-31    PT/INR - ( 31 May 2022 05:35 )   PT: 13.2 sec;   INR: 1.14 ratio             CAPILLARY BLOOD GLUCOSE              REVIEW OF SYSTEMS:  CONSTITUTIONAL: No fever, weight loss, or fatigue  EYES: No eye pain, visual disturbances, or discharge  ENMT:  No difficulty hearing, tinnitus, vertigo; No sinus or throat pain  NECK: No pain or stiffness  RESPIRATORY: No cough, wheezing, chills or hemoptysis; No shortness of breath  CARDIOVASCULAR: No chest pain, palpitations, dizziness, or leg swelling  GASTROINTESTINAL: No abdominal or epigastric pain. No nausea, vomiting, or hematemesis; No diarrhea or constipation. No melena or hematochezia.  GENITOURINARY: No dysuria, frequency, hematuria, or incontinence  NEUROLOGICAL: No headaches, memory loss, loss of strength, numbness, or tremors      Consultant(s) Notes Reviewed:  [x ] YES  [ ] NO    PHYSICAL EXAM:  GENERAL: NAD, well-groomed, well-developed, not in any distress ,  HEAD:  Atraumatic, Normocephalic  NECK: Supple, No JVD, Normal thyroid  NERVOUS SYSTEM:  Alert & Oriented X3, No focal deficit   CHEST/LUNG: Good air entry bilateral with no  rales, rhonchi, wheezing, or rubs  HEART: Regular rate and rhythm; No murmurs, rubs, or gallops  ABDOMEN: Soft, Nontender, Nondistended; Bowel sounds present  EXTREMITIES: Feet dressed.     Care Discussed with Consultants/Other Providers [ x] YES  [ ] NO

## 2022-06-02 ENCOUNTER — TRANSCRIPTION ENCOUNTER (OUTPATIENT)
Age: 62
End: 2022-06-02

## 2022-06-02 LAB
-  AMIKACIN: SIGNIFICANT CHANGE UP
-  AZTREONAM: SIGNIFICANT CHANGE UP
-  CEFEPIME: SIGNIFICANT CHANGE UP
-  CEFTAZIDIME: SIGNIFICANT CHANGE UP
-  CIPROFLOXACIN: SIGNIFICANT CHANGE UP
-  GENTAMICIN: SIGNIFICANT CHANGE UP
-  IMIPENEM: SIGNIFICANT CHANGE UP
-  LEVOFLOXACIN: SIGNIFICANT CHANGE UP
-  MEROPENEM: SIGNIFICANT CHANGE UP
-  PIPERACILLIN/TAZOBACTAM: SIGNIFICANT CHANGE UP
-  TOBRAMYCIN: SIGNIFICANT CHANGE UP
ANION GAP SERPL CALC-SCNC: 20 MMOL/L — HIGH (ref 7–14)
BUN SERPL-MCNC: 32 MG/DL — HIGH (ref 7–23)
CALCIUM SERPL-MCNC: 10.1 MG/DL — SIGNIFICANT CHANGE UP (ref 8.4–10.5)
CHLORIDE SERPL-SCNC: 88 MMOL/L — LOW (ref 98–107)
CO2 SERPL-SCNC: 25 MMOL/L — SIGNIFICANT CHANGE UP (ref 22–31)
CREAT SERPL-MCNC: 6.9 MG/DL — HIGH (ref 0.5–1.3)
EGFR: 6 ML/MIN/1.73M2 — LOW
GLUCOSE SERPL-MCNC: 90 MG/DL — SIGNIFICANT CHANGE UP (ref 70–99)
HCT VFR BLD CALC: 35.5 % — SIGNIFICANT CHANGE UP (ref 34.5–45)
HGB BLD-MCNC: 10.9 G/DL — LOW (ref 11.5–15.5)
MAGNESIUM SERPL-MCNC: 2.4 MG/DL — SIGNIFICANT CHANGE UP (ref 1.6–2.6)
MCHC RBC-ENTMCNC: 27.3 PG — SIGNIFICANT CHANGE UP (ref 27–34)
MCHC RBC-ENTMCNC: 30.7 GM/DL — LOW (ref 32–36)
MCV RBC AUTO: 88.8 FL — SIGNIFICANT CHANGE UP (ref 80–100)
METHOD TYPE: SIGNIFICANT CHANGE UP
NRBC # BLD: 0 /100 WBCS — SIGNIFICANT CHANGE UP
NRBC # FLD: 0 K/UL — SIGNIFICANT CHANGE UP
PHOSPHATE SERPL-MCNC: 4.4 MG/DL — SIGNIFICANT CHANGE UP (ref 2.5–4.5)
PLATELET # BLD AUTO: 288 K/UL — SIGNIFICANT CHANGE UP (ref 150–400)
POTASSIUM SERPL-MCNC: 4.2 MMOL/L — SIGNIFICANT CHANGE UP (ref 3.5–5.3)
POTASSIUM SERPL-SCNC: 4.2 MMOL/L — SIGNIFICANT CHANGE UP (ref 3.5–5.3)
RBC # BLD: 4 M/UL — SIGNIFICANT CHANGE UP (ref 3.8–5.2)
RBC # FLD: 14.8 % — HIGH (ref 10.3–14.5)
SODIUM SERPL-SCNC: 133 MMOL/L — LOW (ref 135–145)
WBC # BLD: 11.12 K/UL — HIGH (ref 3.8–10.5)
WBC # FLD AUTO: 11.12 K/UL — HIGH (ref 3.8–10.5)

## 2022-06-02 PROCEDURE — 99221 1ST HOSP IP/OBS SF/LOW 40: CPT

## 2022-06-02 RX ADMIN — Medication 81 MILLIGRAM(S): at 14:47

## 2022-06-02 RX ADMIN — HEPARIN SODIUM 5000 UNIT(S): 5000 INJECTION INTRAVENOUS; SUBCUTANEOUS at 20:27

## 2022-06-02 RX ADMIN — HEPARIN SODIUM 5000 UNIT(S): 5000 INJECTION INTRAVENOUS; SUBCUTANEOUS at 05:22

## 2022-06-02 RX ADMIN — HYDROMORPHONE HYDROCHLORIDE 0.5 MILLIGRAM(S): 2 INJECTION INTRAMUSCULAR; INTRAVENOUS; SUBCUTANEOUS at 03:50

## 2022-06-02 RX ADMIN — OXYCODONE AND ACETAMINOPHEN 1 TABLET(S): 5; 325 TABLET ORAL at 15:47

## 2022-06-02 RX ADMIN — CLOPIDOGREL BISULFATE 75 MILLIGRAM(S): 75 TABLET, FILM COATED ORAL at 14:46

## 2022-06-02 RX ADMIN — ATORVASTATIN CALCIUM 40 MILLIGRAM(S): 80 TABLET, FILM COATED ORAL at 21:54

## 2022-06-02 RX ADMIN — Medication 1 APPLICATION(S): at 20:26

## 2022-06-02 RX ADMIN — CHLORHEXIDINE GLUCONATE 1 APPLICATION(S): 213 SOLUTION TOPICAL at 20:27

## 2022-06-02 RX ADMIN — HYDROMORPHONE HYDROCHLORIDE 0.5 MILLIGRAM(S): 2 INJECTION INTRAMUSCULAR; INTRAVENOUS; SUBCUTANEOUS at 03:20

## 2022-06-02 RX ADMIN — SEVELAMER CARBONATE 800 MILLIGRAM(S): 2400 POWDER, FOR SUSPENSION ORAL at 09:03

## 2022-06-02 NOTE — PROGRESS NOTE ADULT - SUBJECTIVE AND OBJECTIVE BOX
Patient is a 61y old  Female who presents with a chief complaint of RLE toe pain (02 Jun 2022 07:02)       INTERVAL HPI/OVERNIGHT EVENTS:  Patient seen and evaluated at bedside.  Pt is resting comfortable in NAD. Denies N/V/F/C.      Allergies    No Known Allergies    Intolerances        Vital Signs Last 24 Hrs  T(C): 36.8 (02 Jun 2022 05:39), Max: 37.2 (01 Jun 2022 16:50)  T(F): 98.3 (02 Jun 2022 05:39), Max: 99 (01 Jun 2022 16:50)  HR: 80 (02 Jun 2022 05:39) (59 - 84)  BP: 115/60 (02 Jun 2022 05:39) (110/52 - 154/65)  BP(mean): --  RR: 17 (02 Jun 2022 05:39) (16 - 18)  SpO2: 99% (02 Jun 2022 05:39) (97% - 100%)    LABS:                        10.9   11.12 )-----------( 288      ( 02 Jun 2022 06:00 )             35.5     06-02    133<L>  |  88<L>  |  32<H>  ----------------------------<  90  4.2   |  25  |  6.90<H>    Ca    10.1      02 Jun 2022 06:00  Phos  4.4     06-02  Mg     2.40     06-02          CAPILLARY BLOOD GLUCOSE          Lower Extremity Physical Exam:  Vascular: DP/PT 1/4 R,  DP/PT 0/4 L, CFT <3 seconds, Temperature gradient warm to cooo B/L, TG is warm to cool b/l  Neuro: Epicritic sensation diminished to the level of forefoot B/L  Musculoskeletal/Ortho: left partial 5th ray resection healed, LF partial 4th ray resection open   Skin:   3/4/22 s/p L foot partial 3rd ray resection open: fibrogranular wound to subQ, no malodor, no drainage, no purulence, flaps cool, macerated borders, distal skin necrosis, no tracking, no purulence, no erythema    3/4/22 s/p R foot partial hallux: dehisced surgical wound to level of subq, no purulence, non-pitting edema extending from digits to rearfoot, no fluctuance    RADIOLOGY & ADDITIONAL TESTS:

## 2022-06-02 NOTE — PROGRESS NOTE ADULT - SUBJECTIVE AND OBJECTIVE BOX
[facility] - S Jluis / Shabbir S /D Romina/ SHERRI Mcfarlane/ S Chano/ Andrés Perkins / M Tristianu/ O Gladis  service -[phone], office [phone]  ---------------------------------------------------------------------------------------------------------------    Patient seen and examined bedside    Subjective and Objective: No overnight events, sob resolved. No complaints today. feeling better    Allergies: No Known Allergies      Hospital Medications:   MEDICATIONS  (STANDING):  aspirin  chewable 81 milliGRAM(s) Oral daily  atorvastatin 40 milliGRAM(s) Oral at bedtime  chlorhexidine 2% Cloths 1 Application(s) Topical daily  clopidogrel Tablet 75 milliGRAM(s) Oral daily  collagenase Ointment 1 Application(s) Topical daily  ertapenem  IVPB 500 milliGRAM(s) IV Intermittent every 24 hours  heparin   Injectable 5000 Unit(s) SubCutaneous every 12 hours  sevelamer carbonate 800 milliGRAM(s) Oral three times a day      REVIEW OF SYSTEMS:  CONSTITUTIONAL: No weakness, fevers or chills  EYES/ENT: No visual changes;  No vertigo or throat pain   NECK: No pain or stiffness  RESPIRATORY: No cough, wheezing, hemoptysis; No shortness of breath  CARDIOVASCULAR: No chest pain or palpitations.  GASTROINTESTINAL: No abdominal or epigastric pain. No nausea, vomiting, or hematemesis; No diarrhea or constipation. No melena or hematochezia.  GENITOURINARY: No dysuria, frequency, foamy urine, urinary urgency, incontinence or hematuria  NEUROLOGICAL: No numbness or weakness  SKIN: No itching, burning, rashes, or lesions   VASCULAR: No bilateral lower extremity edema.   All other review of systems is negative unless indicated above.    VITALS:  T(F): 99.7 (06-02-22 @ 10:02), Max: 99.7 (06-02-22 @ 10:02)  HR: 82 (06-02-22 @ 10:02)  BP: 114/50 (06-02-22 @ 10:02)  RR: 18 (06-02-22 @ 10:02)  SpO2: 100% (06-02-22 @ 10:02)  Wt(kg): --    06-01 @ 07:01  -  06-02 @ 07:00  --------------------------------------------------------  IN: 900 mL / OUT: 1400 mL / NET: -500 mL          PHYSICAL EXAM:  Constitutional: NAD  HEENT: anicteric sclera, oropharynx clear  Neck: No JVD  Respiratory: CTAB, no wheezes, rales or rhonchi  Cardiovascular: S1, S2, RRR  Gastrointestinal: BS+, soft, NT/ND  Extremities: No cyanosis or clubbing. No peripheral edema  Neurological: A/O x 3, no focal deficits  Psychiatric: Normal mood, normal affect  : No CVA tenderness. No salcido.   Skin: No rashes  Vascular Access:    LABS:  06-02    133<L>  |  88<L>  |  32<H>  ----------------------------<  90  4.2   |  25  |  6.90<H>    Ca    10.1      02 Jun 2022 06:00  Phos  4.4     06-02  Mg     2.40     06-02      Creatinine Trend: 6.90 <--, 8.83 <--, 6.39 <--, 10.28 <--                        10.9   11.12 )-----------( 288      ( 02 Jun 2022 06:00 )             35.5     Urine Studies:        RADIOLOGY & ADDITIONAL STUDIES:   [facility] - S Jluis / Shabbir S /D Romina/ S Denys/ S Chano/ Andrés Perkins / M Katlyn/ O Gladis  service -[phone], office [phone]  ---------------------------------------------------------------------------------------------------------------    Patient seen and examined bedside    Subjective and Objective: No overnight events, denied sob. No new complaints today. feeling better    Allergies: No Known Allergies      Hospital Medications:   MEDICATIONS  (STANDING):  aspirin  chewable 81 milliGRAM(s) Oral daily  atorvastatin 40 milliGRAM(s) Oral at bedtime  chlorhexidine 2% Cloths 1 Application(s) Topical daily  clopidogrel Tablet 75 milliGRAM(s) Oral daily  collagenase Ointment 1 Application(s) Topical daily  ertapenem  IVPB 500 milliGRAM(s) IV Intermittent every 24 hours  heparin   Injectable 5000 Unit(s) SubCutaneous every 12 hours  sevelamer carbonate 800 milliGRAM(s) Oral three times a day      VITALS:  T(F): 99.7 (06-02-22 @ 10:02), Max: 99.7 (06-02-22 @ 10:02)  HR: 82 (06-02-22 @ 10:02)  BP: 114/50 (06-02-22 @ 10:02)  RR: 18 (06-02-22 @ 10:02)  SpO2: 100% (06-02-22 @ 10:02)  Wt(kg): --    06-01 @ 07:01  -  06-02 @ 07:00  --------------------------------------------------------  IN: 900 mL / OUT: 1400 mL / NET: -500 mL    PHYSICAL EXAM:  Constitutional: NAD  HEENT: anicteric sclera  Neck: No JVD  Respiratory: CTAB, no wheezes, rales or rhonchi  Cardiovascular: S1, S2, RRR  Gastrointestinal: BS+, soft, NT/ND  Extremities: No peripheral edema. b/l feet dressings+  Neurological: A/O x 3, no focal deficits  Psychiatric: Normal mood, normal affect  : No salcido.   Vascular Access: rt femoral AVG+    LABS:  06-02    133<L>  |  88<L>  |  32<H>  ----------------------------<  90  4.2   |  25  |  6.90<H>    Ca    10.1      02 Jun 2022 06:00  Phos  4.4     06-02  Mg     2.40     06-02      Creatinine Trend: 6.90 <--, 8.83 <--, 6.39 <--, 10.28 <--                        10.9   11.12 )-----------( 288      ( 02 Jun 2022 06:00 )             35.5     Urine Studies:        RADIOLOGY & ADDITIONAL STUDIES:

## 2022-06-02 NOTE — DISCHARGE NOTE PROVIDER - NSDCCPCAREPLAN_GEN_ALL_CORE_FT
PRINCIPAL DISCHARGE DIAGNOSIS  Diagnosis: Osteomyelitis of foot  Assessment and Plan of Treatment: You were seen by podiatry and vascular surgery. You had an amputation of your right lower leg done on 6/15/22. You were treated with antibiotics. Follow up with podiatry and vascular surgery within 1 - 2 weeks for further management recommendations.      SECONDARY DISCHARGE DIAGNOSES  Diagnosis: ESRD (end stage renal disease) on dialysis  Assessment and Plan of Treatment: Please continue to follow your dialysis schedule and refer to your primary provider/nephrologist for further care/recommendations. Continue your medications and supplementation as directed.    Diagnosis: PAD (peripheral artery disease)  Assessment and Plan of Treatment: Continue your Aspirin & Plavix. You were seen by vascular surgery with concern for very poor blood flow to your feet. You had an agiogram done during your hospital stay that showed a blockage in the blood flow to your right foot. You had a below knee amputation done on 6/15/22. Follow up with the vascular surgery team within 1 - 2 weeks of discharge for further evaluation. Staples to be removed after 1 month from surgery date.

## 2022-06-02 NOTE — DISCHARGE NOTE PROVIDER - NSDCFUADDINST_GEN_ALL_CORE_FT
Podiatry Discharge Instructions:  Follow up: Please follow up with Dr. Mayo Daily within 1 week of discharge from the hospital, please call [phone] for appointment and discuss that you recently were seen in the hospital.  Wound Care: Please apply santyl to left foot wound and betadine to right foot wound followed by sterile  4x4 gauze, and kerlex daily  Weight bearing: Please weight bear as tolerated in a surgical shoe.  Antibiotics: Please continue as instructed. Podiatry Discharge Instructions:  Follow up: Please follow up with Dr. Mayo Daily within 1 week of discharge from the hospital, please call [phone] for appointment and discuss that you recently were seen in the hospital.  Wound Care: Please apply santyl to left foot wound and betadine to right foot wound followed by sterile  4x4 gauze, and kerlex daily  Weight bearing: Please weight bear as tolerated in a surgical shoe to left foot

## 2022-06-02 NOTE — DISCHARGE NOTE PROVIDER - HOSPITAL COURSE
61F w/ PMHx of ESRD (HD MWF), PAD s/p R balloon angioplasty (5/11/2022) and foot wounds s/p left foot partial 3rd ray amputation and R hallux amputation (3/4/2022) who presented to the ER with right foot/toe pain and swelling x 4 days with concern for right foot OM.     B/L foot wounds & pain  - Podiatry consulted  - WoundCx (+) ESBL Klebsiella; E coli, Pseudomonas  - Xray & MRI of B/L feet with concern for OM of L 4th ray w/ abscess & R proximal phalanx of first digit OM w/ cellulitis  - ID consulted --> s/p Meropenem  - Now s/p R foot partial ray resection on 6/8 -- L foot TMA deferred 2/2 poor vascular status and planned for conservative management    Severe peripheral artery disease  - Vascular surgery consulted --> s/p RLE angiogram via pedal access (6/7) & unable to cross pedal artery 2/2 stenosis  - Cardiology consulted for risk stratification   - s/p RLE BKA on 6/15 given high risk for poor wound healing of prior R foot ray resection & angiogram findings  - c/w R knee immobilizer; s/p Prevena vac ( d/c'ed 6/21)    ESRD --> Nephrology consulted for HD management (TThS); 1U pRBC for medical optimization prior to discharge    Dispo: Rehab    On ___ this case was reviewed with Dr. Cutler, the patient is medically stable and optimized for discharge. All medications were reviewed and prescriptions were adjusted accordingly. 61F w/ PMHx of ESRD (HD MWF), PAD s/p R balloon angioplasty (5/11/2022) and foot wounds s/p left foot partial 3rd ray amputation and R hallux amputation (3/4/2022) who presented to the ER with right foot/toe pain and swelling x 4 days with concern for right foot OM.     B/L foot wounds & Osteomyelitis  - Podiatry consulted  - WoundCx (+) ESBL Klebsiella; E coli, Pseudomonas  - Xray & MRI of B/L feet with concern for OM of L 4th ray w/ abscess & R proximal phalanx of first digit OM w/ cellulitis  - ID consulted --> s/p Meropenem  - Now s/p R foot partial ray resection on 6/8 -- L foot TMA deferred 2/2 poor vascular status and planned for conservative management    Severe peripheral artery disease  - Vascular surgery consulted --> s/p RLE angiogram via pedal access (6/7) & unable to cross pedal artery 2/2 stenosis  - Cardiology consulted for risk stratification   - s/p RLE BKA on 6/15 given high risk for poor wound healing of prior R foot ray resection & angiogram findings  - c/w R knee immobilizer; s/p Prevena vac ( d/c'ed 6/21)    ESRD --> Nephrology consulted for HD management (TThS); 1U pRBC for medical optimization prior to discharge    Dispo: Rehab    On ___ this case was reviewed with Dr. Cutler, the patient is medically stable and optimized for discharge. All medications were reviewed and prescriptions were adjusted accordingly. 61F w/ PMHx of ESRD (HD MWF), PAD s/p R balloon angioplasty (5/11/2022) and foot wounds s/p left foot partial 3rd ray amputation and R hallux amputation (3/4/2022) who presented to the ER with right foot/toe pain and swelling x 4 days with concern for right foot OM.     B/L foot wounds & Osteomyelitis  - Podiatry consulted  - WoundCx (+) ESBL Klebsiella; E coli, Pseudomonas  - Xray & MRI of B/L feet with concern for OM of L 4th ray w/ abscess & R proximal phalanx of first digit OM w/ cellulitis  - ID consulted --> s/p Meropenem  - Now s/p R foot partial ray resection on 6/8 -- L foot TMA deferred 2/2 poor vascular status and planned for conservative management  - Pain management consulted for recommendations  - c/w gabapentin    Severe peripheral artery disease  - Vascular surgery consulted --> s/p RLE angiogram via pedal access (6/7) & unable to cross pedal artery 2/2 stenosis  - Cardiology consulted for risk stratification   - s/p RLE BKA on 6/15 given high risk for poor wound healing of prior R foot ray resection & angiogram findings  - c/w R knee immobilizer; s/p Prevena vac ( d/c'ed 6/21)    ESRD --> Nephrology consulted for HD management (TThS); 1U pRBC for medical optimization prior to discharge    Dispo: Rehab    On ___ this case was reviewed with Dr. Cutler, the patient is medically stable and optimized for discharge. All medications were reviewed and prescriptions were adjusted accordingly. 61F w/ PMHx of ESRD (HD MWF), PAD s/p R balloon angioplasty (5/11/2022) and foot wounds s/p left foot partial 3rd ray amputation and R hallux amputation (3/4/2022) who presented to the ER with right foot/toe pain and swelling x 4 days with concern for right foot OM.     B/L foot wounds & Osteomyelitis  - Podiatry consulted  - WoundCx (+) ESBL Klebsiella; E coli, Pseudomonas  - Xray & MRI of B/L feet with concern for OM of L 4th ray w/ abscess & R proximal phalanx of first digit OM w/ cellulitis  - ID consulted --> s/p Meropenem  - Now s/p R foot partial ray resection on 6/8 -- L foot TMA deferred 2/2 poor vascular status and planned for conservative management  - Pain management consulted for recommendations  - c/w gabapentin    Severe peripheral artery disease  - Vascular surgery consulted --> s/p RLE angiogram via pedal access (6/7) & unable to cross pedal artery 2/2 stenosis  - Cardiology consulted for risk stratification   - s/p RLE BKA on 6/15 given high risk for poor wound healing of prior R foot ray resection & angiogram findings  - c/w R knee immobilizer; s/p Prevena vac ( d/c'ed 6/21)    ESRD --> Nephrology consulted for HD management (TThS); 1U pRBC for medical optimization prior to discharge    Dispo: Rehab    On June 20, 2022, this case was reviewed with Dr. Cutler, the patient is medically stable and optimized for discharge. All medications were reviewed and prescriptions were adjusted accordingly.

## 2022-06-02 NOTE — CONSULT NOTE ADULT - ASSESSMENT
Assessment:  61y Female with recent right leg angiogram with PT plasty with non healing right toe wound.    Plan:  - will see and examine patient with Dr Gilliland Assessment:  61y Female with recent right leg angiogram with PT plasty with non healing right toe wound.    Plan:  - plan for RLE angio next week via pedal access  - patient seen and examined with Dr Gilliland    Vascular  [id number]

## 2022-06-02 NOTE — DISCHARGE NOTE PROVIDER - NSDCMRMEDTOKEN_GEN_ALL_CORE_FT
acetaminophen 325 mg oral tablet: 2 tab(s) orally every 6 hours, As needed, Mild Pain (1 - 3), Moderate Pain (4 - 6)  aspirin 81 mg oral tablet, chewable: 1 tab(s) orally once a day  atorvastatin 40 mg oral tablet: 1 tab(s) orally once a day  cadexomer iodine 0.9% topical gel: 1 application topically once a day  collagenase 250 units/g topical ointment: 1 application topically once a day  epoetin niesha: 25599 unit(s) intravenously 3 times a week with    Outpatient physical therapy 2-3x a week for balance training; strengthening; bed mobility training; gait training; transfer training:   Plavix 75 mg oral tablet: 1 tab(s) orally once a day  Denice-Nia oral tablet: 1 tab(s) orally once a day  sevelamer hydrochloride 800 mg oral tablet: 1 tab(s) orally 3 times a day  sodium hypochlorite 0.25% topical solution: 1 application topically once a day  Velphoro 500 mg oral tablet, chewable: 3 tab(s) orally 3 times a day (with meals)   acetaminophen 325 mg oral tablet: 2 tab(s) orally every 6 hours, As needed, Mild Pain (1 - 3), Moderate Pain (4 - 6)  aspirin 81 mg oral tablet, chewable: 1 tab(s) orally once a day  atorvastatin 40 mg oral tablet: 1 tab(s) orally once a day  bisacodyl 5 mg oral delayed release tablet: 1 tab(s) orally once a day (at bedtime)  cadexomer iodine 0.9% topical gel: 1 application topically once a day  chlorhexidine 2% topical pad: 1 application topically once a day  collagenase 250 units/g topical ointment: 1 application topically once a day  epoetin niesha: 29358 unit(s) intravenously 3 times a week with HD   gabapentin 100 mg oral capsule: 1 cap(s) orally   Outpatient physical therapy 2-3x a week for balance training; strengthening; bed mobility training; gait training; transfer training:   Plavix 75 mg oral tablet: 1 tab(s) orally once a day  Denice-Nia oral tablet: 1 tab(s) orally once a day  senna leaf extract oral tablet: 2 tab(s) orally once a day (at bedtime)  sevelamer carbonate 800 mg oral tablet: 2 tab(s) orally 3 times a day (with meals)  sevelamer hydrochloride 800 mg oral tablet: 1 tab(s) orally 3 times a day  sodium hypochlorite 0.25% topical solution: 1 application topically once a day  Velphoro 500 mg oral tablet, chewable: 3 tab(s) orally 3 times a day (with meals)   acetaminophen 325 mg oral tablet: 2 tab(s) orally every 6 hours, As needed, Mild Pain (1 - 3), Moderate Pain (4 - 6)  aspirin 81 mg oral tablet, chewable: 1 tab(s) orally once a day  atorvastatin 40 mg oral tablet: 1 tab(s) orally once a day  bisacodyl 5 mg oral delayed release tablet: 1 tab(s) orally once a day (at bedtime)  chlorhexidine 2% topical pad: 1 application topically once a day  collagenase 250 units/g topical ointment: 1 application topically once a day  epoetin niesha: 31951 unit(s) intravenously 3 times a week with HD   gabapentin 100 mg oral capsule: 1 cap(s) orally Monday, Wednesday, and Friday  Plavix 75 mg oral tablet: 1 tab(s) orally once a day  Denice-Nia oral tablet: 1 tab(s) orally once a day  senna leaf extract oral tablet: 2 tab(s) orally once a day (at bedtime)  sevelamer carbonate 800 mg oral tablet: 2 tab(s) orally 3 times a day (with meals)   acetaminophen 325 mg oral tablet: 2 tab(s) orally every 6 hours, As needed, Mild Pain (1 - 3), Moderate Pain (4 - 6)  aspirin 81 mg oral delayed release tablet: 1 tab(s) orally once a day  atorvastatin 40 mg oral tablet: 1 tab(s) orally once a day  bisacodyl 5 mg oral delayed release tablet: 1 tab(s) orally once a day (at bedtime)  chlorhexidine 2% topical pad: 1 application topically once a day  collagenase 250 units/g topical ointment: 1 application topically once a day  epoetin niesha: 57659 unit(s) intravenously 3 times a week with HD   gabapentin 100 mg oral capsule: 1 cap(s) orally Monday, Wednesday, and Friday  Plavix 75 mg oral tablet: 1 tab(s) orally once a day  Denice-Nia oral tablet: 1 tab(s) orally once a day  senna leaf extract oral tablet: 2 tab(s) orally once a day (at bedtime)  sevelamer carbonate 800 mg oral tablet: 2 tab(s) orally 3 times a day (with meals)

## 2022-06-02 NOTE — PROGRESS NOTE ADULT - ASSESSMENT
62 y/o woman w/ ESRD on HD (MWF), HF, 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 Admitted for further evaluation     Problem/Plan - 1:  ·  Problem: Wound of right foot with  Cellulitis with Left 4th toe OM .   ·  Plan: Pt w/ increasing R toe pain when walking as well as swelling. Mild white count and elevated inflammatory markers but afebrile with vitals stable  -podiatry and ID help appreciated.   -started  IV ertapenem 500mg q24hrs per iD .      Problem/Plan - 2:  ·  Problem: PAD (peripheral artery disease).   ·  Plan: RLE Angiogram next week per Vascular.   -known PAD, continue asa/plavix. Pt denies any numbness or tingling.  -recent angiograms done w/ poor peripheral arterial flow per podiatry and so poor surgical candidate as above  -continue statin.     Problem/Plan - 3:  ·  Problem: ESRD (end stage renal disease) on dialysis.   ·  Plan: continue phos binder. last HD last Friday.   Renal helping with HD.   On MWF schedule.  -monitor I/O.     Problem/Plan - 4:  ·  Problem: HLD (hyperlipidemia).   ·  Plan: continue statin. check lipid profile.     Problem/Plan - 5:  ·  Problem: Need for prophylactic measure.   ·  Plan: heparin subcu for dvt ppx, dash diet.

## 2022-06-02 NOTE — DISCHARGE NOTE PROVIDER - NSDCFUADDAPPT_GEN_ALL_CORE_FT
Podiatry Discharge Instructions:  Follow up: Please follow up with Dr. Hayes within 1 week of discharge from the hospital, please call [phone] for appointment and discuss that you recently were seen in the hospital.  Wound Care: Please leave your dressing clean dry intact until your follow up appointment  Weight bearing: Please weight bear as tolerated in a surgical shoe to right heel  Antibiotics: Please continue as instructed. Podiatry Discharge Instructions:  Follow up: Please follow up with Dr. Hayes within 1 week of discharge from the hospital, please call [phone] for appointment and discuss that you recently were seen in the hospital.  Wound Care: Please apply betadine to the left foot wound followed by 4x4 gauze and yosef daily.   Weight bearing: Please weight bear as tolerated in a surgical shoe to left foot.   Antibiotics: Please continue as instructed.

## 2022-06-02 NOTE — PROGRESS NOTE ADULT - SUBJECTIVE AND OBJECTIVE BOX
Cardiovascular Disease Progress Note    Overnight events: No acute events overnight. Patient denies chest pain or SOB.  Foot pain continues.     Otherwise review of systems negative    Objective Findings:  T(C): 36.8 (22 @ 05:39), Max: 37.2 (22 @ 16:50)  HR: 80 (22 @ 05:39) (59 - 84)  BP: 115/60 (22 @ 05:39) (110/52 - 154/65)  RR: 17 (22 @ 05:39) (16 - 18)  SpO2: 99% (22 @ 05:39) (97% - 100%)  Wt(kg): --  Daily     Daily Weight in k.5 (2022 18:54)      Physical Exam:  Gen: NAD; Patient resting comfortably  HEENT: EOMI, Normocephalic/ atraumatic  CV: RRR, normal S1 + S2, no m/r/g  Lungs:  Normal respiratory effort; clear to auscultation bilaterally  Abd: soft, non-tender; bowel sounds present  Ext: No edema; warm and well perfused    Telemetry: n/a    Laboratory Data:                        10.9   11.03 )-----------( 295      ( 2022 05:42 )             36.5         133<L>  |  85<L>  |  46<H>  ----------------------------<  125<H>  5.0   |  29  |  8.83<H>    Ca    10.1      2022 13:00  Phos  4.7       Mg     2.60                     Inpatient Medications:  MEDICATIONS  (STANDING):  aspirin  chewable 81 milliGRAM(s) Oral daily  atorvastatin 40 milliGRAM(s) Oral at bedtime  chlorhexidine 2% Cloths 1 Application(s) Topical daily  clopidogrel Tablet 75 milliGRAM(s) Oral daily  collagenase Ointment 1 Application(s) Topical daily  ertapenem  IVPB 500 milliGRAM(s) IV Intermittent every 24 hours  heparin   Injectable 5000 Unit(s) SubCutaneous every 12 hours  sevelamer carbonate 800 milliGRAM(s) Oral three times a day      Assessment:61-year-old female with ESRD on HD, HLD and peripheral vascular disease s/p lower extremity resection presents with chronic limb ischemia.     Plan of Care:    #Compensated diastolic CHF-  Volume status is improved post HD.  Patient on room air.   Fluid removal with HD as per the renal team.   Recent echo noted- normal LV systolic function.        #Peripheral vascular disease.  - S/p L foot partial 3rd ray amputation and R hallux amputation 3/2022.   - S/p lower extremity angioplasty 2022  - Podiatry input noted.   - Continue medical therapy with DAPT and statin    #ESRD-  - HD as per renal.        Over 25 minutes spent on total encounter; more than 50% of the visit was spent counseling and/or coordinating care by the attending physician.      Cristino Adams MD [facility]  Cardiovascular Disease  [phone]

## 2022-06-02 NOTE — DISCHARGE NOTE PROVIDER - CARE PROVIDERS DIRECT ADDRESSES
[email or web address]

## 2022-06-02 NOTE — PROGRESS NOTE ADULT - ASSESSMENT
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 Admitted for further evaluation. Renal following for ESRD Mx.     ESRD on HD  schedule hemodialysis - Monday, Wednesday and Friday   access- thigh AVG  HD unit SQDC  K, vol ok     s/p HD yesterday, Rx sheet reviewed, net UF 0.5kg, tolerated well. uneventful.  plan for next hemodialysis tomorrow w/ 2 k bath , Ultrafiltration 1kg as tolerated with blood pressure   renal diet  dose all meds for ESRD  renal restrictions in diet.  Anemia of CKD- Hb at goal. no DION for now  HTN, controlled. bp stable.   PAD, infected rt foot wounds, ?OM- s/p vanco, on zosyn. f/u w/Podiatry. monitor vanco level  f/u w/vas sx eval    poc d/w pt, HD RN  labs, chart reviewed  For any question, call:  Cell # [phone]  Pager # [phone]  office # [phone]

## 2022-06-02 NOTE — PROGRESS NOTE ADULT - ASSESSMENT
60 yo f s/p right foot partial hallux amputation and left foot partial 4rd ray amputation (DOS 3/4/2022)  -pt seen and evaluated  - 3/4/22 s/p L foot partial 3rd ray resection open: fibrogranular wound to subQ, no malodor, no drainage, no purulence, flaps cool, macerated borders, distal skin necrosis, no tracking, no purulence, no erythema    - 3/4/22 s/p R foot partial hallux: dehisced surgical wound to level of subq, no purulence, non-pitting edema extending from digits to rearfoot, distal medial forefoot erythema no fluctuance  - R foot wound culture growing ESBL Klebsiella & E coli  - Pt is s/p recent b/l LE angiograms with poor peripheral arterial flow, is a poor surgical candidate  - Podiatry plan LWC with IV abx, plan for 24 hrs more of IV abx and likely transition to PO Augmentin upon d/c   - Follow vascular surgery recommendations  - Discussed with attending 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 (DOS 3/4/2022)  -pt seen and evaluated  - 3/4/22 s/p L foot partial 3rd ray resection open: fibrogranular wound to subQ, no malodor, no drainage, no purulence, flaps cool, macerated borders, distal skin necrosis, no tracking, no purulence, no erythema    - 3/4/22 s/p R foot partial hallux: dehisced surgical wound to level of subq, no purulence, non-pitting edema extending from digits to rearfoot, distal medial forefoot erythema no fluctuance  - R foot wound culture growing ESBL Klebsiella & E coli  - Pt is s/p recent b/l LE angiograms with poor peripheral arterial flow, is a poor surgical candidate  - Podiatry plan LWC with IV abx, plan for 24 hrs more of IV abx and likely transition to PO Augmentin upon d/c   - Follow vascular surgery recommendations & ID recs (appreciated)  - Discussed with attending 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 (DOS 3/4/2022)  -pt seen and evaluated  - 3/4/22 s/p L foot partial 3rd ray resection open: fibrogranular wound to subQ, no malodor, no drainage, no purulence, flaps cool, macerated borders, distal skin necrosis, no tracking, no purulence, no erythema    - 3/4/22 s/p R foot partial hallux: dehisced surgical wound to level of subq, no purulence, non-pitting edema extending from digits to rearfoot, distal medial forefoot erythema no fluctuance  - R foot wound culture growing ESBL Klebsiella & E coli  - Pt is s/p recent b/l LE angiograms with poor peripheral arterial flow, is a poor surgical candidate  - Podiatry plan LWC with IV abx  - Pt is stable from podiatry for d/c pending final ID recs   - F/u and wound care instructions listed in discharge note provider  - Follow vascular surgery recommendations & ID recs (appreciated)  - Discussed with attending

## 2022-06-02 NOTE — CONSULT NOTE ADULT - SUBJECTIVE AND OBJECTIVE BOX
JAYCEE WALTERS [id number]  61y Female  3d    HPI:  62 y/o woman w/ ESRD on HD (MWF), HF, 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 She report she could still walk on it but recently its been more painful to walk. She had a visiting nurse arrive who assessed it and noted increased swelling to area and ER evaluation. She denies any numbness or tingling of her LE. No fever, chills, cough, chest pain, shortness of breath, abd pain or diarrhea. She still makes minimal urine. In the ER pt was given vanc/zosyn.  (30 May 2022 19:10)    Vascular surgery called to evaluate right toe wound. Recent endovascular procedure, right angio and PT plasty on 5/18. Patient reported pain is improving, podiatry recommending local wound care.      PAST MEDICAL & SURGICAL HISTORY:  Heart failure      HLD (hyperlipidemia)      Glaucoma      Cataract      CKD (chronic kidney disease), stage IV      ESRD (end stage renal disease) on dialysis      PAD (peripheral artery disease)      Blind right eye      Acute osteomyelitis of toe of right foot  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            MEDICATIONS  (STANDING):  aspirin  chewable 81 milliGRAM(s) Oral daily  atorvastatin 40 milliGRAM(s) Oral at bedtime  chlorhexidine 2% Cloths 1 Application(s) Topical daily  clopidogrel Tablet 75 milliGRAM(s) Oral daily  collagenase Ointment 1 Application(s) Topical daily  ertapenem  IVPB 500 milliGRAM(s) IV Intermittent every 24 hours  heparin   Injectable 5000 Unit(s) SubCutaneous every 12 hours  sevelamer carbonate 800 milliGRAM(s) Oral three times a day    MEDICATIONS  (PRN):  acetaminophen     Tablet .. 650 milliGRAM(s) Oral every 6 hours PRN Temp greater or equal to 38C (100.4F), Mild Pain (1 - 3)  HYDROmorphone  Injectable 0.5 milliGRAM(s) IV Push every 4 hours PRN Severe Pain (7 - 10)  oxycodone    5 mG/acetaminophen 325 mG 1 Tablet(s) Oral every 6 hours PRN Moderate Pain (4 - 6)      Allergies    No Known Allergies    Intolerances        REVIEW OF SYSTEMS    [ ] A ten-point review of systems was otherwise negative except as noted.  [ ] Due to altered mental status/intubation, subjective information were not able to be obtained from the patient. History was obtained, to the extent possible, from review of the chart and collateral sources of information.      Vital Signs Last 24 Hrs  T(C): 37.6 (02 Jun 2022 10:02), Max: 37.6 (02 Jun 2022 10:02)  T(F): 99.7 (02 Jun 2022 10:02), Max: 99.7 (02 Jun 2022 10:02)  HR: 82 (02 Jun 2022 10:02) (59 - 84)  BP: 114/50 (02 Jun 2022 10:02) (112/52 - 154/65)  BP(mean): --  RR: 18 (02 Jun 2022 10:02) (16 - 18)  SpO2: 100% (02 Jun 2022 10:02) (97% - 100%)    PHYSICAL EXAM:  GENERAL: NAD, well-appearing  CHEST/LUNG: Clear to auscultation bilaterally  HEART: Regular rate and rhythm  ABDOMEN: Soft, Nontender, Nondistended;   EXTREMITIES:  b/l feet wounds, dopplerable DP and PT bilaterally      LABS:  Labs:  CAPILLARY BLOOD GLUCOSE                              10.9   11.12 )-----------( 288      ( 02 Jun 2022 06:00 )             35.5         06-02    133<L>  |  88<L>  |  32<H>  ----------------------------<  90  4.2   |  25  |  6.90<H>      Calcium, Total Serum: 10.1 mg/dL (06-02-22 @ 06:00)      LFTs:     Blood Gas Venous - Lactate: 1.0 mmol/L (05-30-22 @ 14:10)      Coags:                Culture - Abscess with Gram Stain (collected 30 May 2022 16:55)  Source: .Abscess right foot wound  Preliminary Report (02 Jun 2022 09:33):    Numerous Klebsiella pneumoniae ESBL    Numerous Escherichia coli    Moderate Pseudomonas aeruginosa  Organism: Klebsiella pneumoniae ESBL  Escherichia coli  Pseudomonas aeruginosa (02 Jun 2022 09:33)  Organism: Pseudomonas aeruginosa (02 Jun 2022 09:33)  Organism: Escherichia coli (01 Jun 2022 10:57)  Organism: Klebsiella pneumoniae ESBL (01 Jun 2022 10:56)          RADIOLOGY & ADDITIONAL STUDIES:  < from: Xray Foot AP + Lateral + Oblique, Right (05.30.22 @ 15:11) >  IMPRESSION:  Increased permeative appearance of the left fourth metatarsal head and   phalanges, which could be seen in the setting of osteomyelitis.  No radiographic evidence of osteomyelitis involving the right foot.    MRI is more sensitive and can be considered for further evaluation as   clinically warranted.    < end of copied text >

## 2022-06-02 NOTE — CONSULT NOTE ADULT - ATTENDING COMMENTS
Patient seen/examined. She is well known to me and s/p recent BLE angiography, endovascular revascularization. Now p/w worsening bilateral foot wounds. Recommend antiplatelet and statin therapy and antibiotics. Wound care per podiatry. Will perform RLE angiography via pedal access on 6/7/22.
62 yo woman with ESRD on HD, PAD, s/p partial amp right 1st toe 3/2022 presents with infection at wound site.  wound culture growing klebsiella and E coli. S testing.  h/o ESBL klebsiella in left foot wound culture  Suggestions above  Will follow

## 2022-06-02 NOTE — DISCHARGE NOTE PROVIDER - PROVIDER TOKENS
PROVIDER:[TOKEN:[[id number]],FOLLOWUP:[1 week]],PROVIDER:[TOKEN:[[id number]],FOLLOWUP:[2 weeks]]

## 2022-06-02 NOTE — DISCHARGE NOTE PROVIDER - CARE PROVIDER_API CALL
DISPLAY PLAN FREE TEXT Alvin Hayes (DPM)  Podiatric Medicine and Surgery  [address] [place] [address]  Phone: [phone]  Fax: [phone]  Follow Up Time: 1 week    Nina Gilliland)  Surgery  [address] [facility], [address]  [place] [address]  Phone: [phone]  Fax: [phone]  Follow Up Time: 2 weeks

## 2022-06-02 NOTE — PROGRESS NOTE ADULT - SUBJECTIVE AND OBJECTIVE BOX
Date of Service  : 06-02-22     INTERVAL HPI/OVERNIGHT EVENTS: I feel fine.   Vital Signs Last 24 Hrs  T(C): 37.6 (02 Jun 2022 10:02), Max: 37.6 (02 Jun 2022 10:02)  T(F): 99.7 (02 Jun 2022 10:02), Max: 99.7 (02 Jun 2022 10:02)  HR: 82 (02 Jun 2022 10:02) (59 - 84)  BP: 114/50 (02 Jun 2022 10:02) (112/52 - 145/60)  BP(mean): --  RR: 18 (02 Jun 2022 10:02) (16 - 18)  SpO2: 100% (02 Jun 2022 10:02) (97% - 100%)  I&O's Summary    01 Jun 2022 07:01  -  02 Jun 2022 07:00  --------------------------------------------------------  IN: 900 mL / OUT: 1400 mL / NET: -500 mL      MEDICATIONS  (STANDING):  aspirin  chewable 81 milliGRAM(s) Oral daily  atorvastatin 40 milliGRAM(s) Oral at bedtime  chlorhexidine 2% Cloths 1 Application(s) Topical daily  clopidogrel Tablet 75 milliGRAM(s) Oral daily  collagenase Ointment 1 Application(s) Topical daily  ertapenem  IVPB 500 milliGRAM(s) IV Intermittent every 24 hours  heparin   Injectable 5000 Unit(s) SubCutaneous every 12 hours  sevelamer carbonate 800 milliGRAM(s) Oral three times a day    MEDICATIONS  (PRN):  acetaminophen     Tablet .. 650 milliGRAM(s) Oral every 6 hours PRN Temp greater or equal to 38C (100.4F), Mild Pain (1 - 3)  HYDROmorphone  Injectable 0.5 milliGRAM(s) IV Push every 4 hours PRN Severe Pain (7 - 10)  oxycodone    5 mG/acetaminophen 325 mG 1 Tablet(s) Oral every 6 hours PRN Moderate Pain (4 - 6)    LABS:                        10.9   11.12 )-----------( 288      ( 02 Jun 2022 06:00 )             35.5     06-02    133<L>  |  88<L>  |  32<H>  ----------------------------<  90  4.2   |  25  |  6.90<H>    Ca    10.1      02 Jun 2022 06:00  Phos  4.4     06-02  Mg     2.40     06-02          CAPILLARY BLOOD GLUCOSE              REVIEW OF SYSTEMS:  CONSTITUTIONAL: No fever, weight loss, or fatigue  EYES: No eye pain, visual disturbances, or discharge  ENMT:  No difficulty hearing, tinnitus, vertigo; No sinus or throat pain  NECK: No pain or stiffness  RESPIRATORY: No cough, wheezing, chills or hemoptysis; No shortness of breath  CARDIOVASCULAR: No chest pain, palpitations, dizziness, or leg swelling  GASTROINTESTINAL: No abdominal or epigastric pain. No nausea, vomiting, or hematemesis; No diarrhea or constipation. No melena or hematochezia.  GENITOURINARY: No dysuria, frequency, hematuria, or incontinence  NEUROLOGICAL: No headaches, memory loss, loss of strength, numbness, or tremors      Consultant(s) Notes Reviewed:  [x ] YES  [ ] NO    PHYSICAL EXAM:  GENERAL: NAD, well-groomed, well-developed,not in any distress ,  HEAD:  Atraumatic, Normocephalic  NECK: Supple, No JVD, Normal thyroid  NERVOUS SYSTEM:  Alert & Oriented X3, No focal deficit   CHEST/LUNG: Good air entry bilateral with no  rales, rhonchi, wheezing, or rubs  HEART: Regular rate and rhythm; No murmurs, rubs, or gallops  ABDOMEN: Soft, Nontender, Nondistended; Bowel sounds present  EXTREMITIES:  feet dressed .     Care Discussed with Consultants/Other Providers [ x] YES  [ ] NO

## 2022-06-03 LAB
ANION GAP SERPL CALC-SCNC: 21 MMOL/L — HIGH (ref 7–14)
BUN SERPL-MCNC: 45 MG/DL — HIGH (ref 7–23)
CALCIUM SERPL-MCNC: 10 MG/DL — SIGNIFICANT CHANGE UP (ref 8.4–10.5)
CHLORIDE SERPL-SCNC: 89 MMOL/L — LOW (ref 98–107)
CO2 SERPL-SCNC: 23 MMOL/L — SIGNIFICANT CHANGE UP (ref 22–31)
CREAT SERPL-MCNC: 8.91 MG/DL — HIGH (ref 0.5–1.3)
EGFR: 5 ML/MIN/1.73M2 — LOW
GLUCOSE SERPL-MCNC: 108 MG/DL — HIGH (ref 70–99)
HCT VFR BLD CALC: 34.7 % — SIGNIFICANT CHANGE UP (ref 34.5–45)
HGB BLD-MCNC: 10.4 G/DL — LOW (ref 11.5–15.5)
MAGNESIUM SERPL-MCNC: 2.5 MG/DL — SIGNIFICANT CHANGE UP (ref 1.6–2.6)
MCHC RBC-ENTMCNC: 26.7 PG — LOW (ref 27–34)
MCHC RBC-ENTMCNC: 30 GM/DL — LOW (ref 32–36)
MCV RBC AUTO: 89 FL — SIGNIFICANT CHANGE UP (ref 80–100)
NRBC # BLD: 0 /100 WBCS — SIGNIFICANT CHANGE UP
NRBC # FLD: 0 K/UL — SIGNIFICANT CHANGE UP
PHOSPHATE SERPL-MCNC: 5.4 MG/DL — HIGH (ref 2.5–4.5)
PLATELET # BLD AUTO: 266 K/UL — SIGNIFICANT CHANGE UP (ref 150–400)
POTASSIUM SERPL-MCNC: 4.6 MMOL/L — SIGNIFICANT CHANGE UP (ref 3.5–5.3)
POTASSIUM SERPL-SCNC: 4.6 MMOL/L — SIGNIFICANT CHANGE UP (ref 3.5–5.3)
RBC # BLD: 3.9 M/UL — SIGNIFICANT CHANGE UP (ref 3.8–5.2)
RBC # FLD: 15 % — HIGH (ref 10.3–14.5)
SODIUM SERPL-SCNC: 133 MMOL/L — LOW (ref 135–145)
WBC # BLD: 11.24 K/UL — HIGH (ref 3.8–10.5)
WBC # FLD AUTO: 11.24 K/UL — HIGH (ref 3.8–10.5)

## 2022-06-03 PROCEDURE — 99232 SBSQ HOSP IP/OBS MODERATE 35: CPT

## 2022-06-03 RX ORDER — MEROPENEM 1 G/30ML
500 INJECTION INTRAVENOUS EVERY 24 HOURS
Refills: 0 | Status: COMPLETED | OUTPATIENT
Start: 2022-06-03 | End: 2022-06-10

## 2022-06-03 RX ADMIN — HYDROMORPHONE HYDROCHLORIDE 0.5 MILLIGRAM(S): 2 INJECTION INTRAMUSCULAR; INTRAVENOUS; SUBCUTANEOUS at 12:20

## 2022-06-03 RX ADMIN — SEVELAMER CARBONATE 800 MILLIGRAM(S): 2400 POWDER, FOR SUSPENSION ORAL at 14:09

## 2022-06-03 RX ADMIN — HEPARIN SODIUM 5000 UNIT(S): 5000 INJECTION INTRAVENOUS; SUBCUTANEOUS at 05:45

## 2022-06-03 RX ADMIN — CHLORHEXIDINE GLUCONATE 1 APPLICATION(S): 213 SOLUTION TOPICAL at 17:03

## 2022-06-03 RX ADMIN — HYDROMORPHONE HYDROCHLORIDE 0.5 MILLIGRAM(S): 2 INJECTION INTRAMUSCULAR; INTRAVENOUS; SUBCUTANEOUS at 07:12

## 2022-06-03 RX ADMIN — HYDROMORPHONE HYDROCHLORIDE 0.5 MILLIGRAM(S): 2 INJECTION INTRAMUSCULAR; INTRAVENOUS; SUBCUTANEOUS at 12:35

## 2022-06-03 RX ADMIN — Medication 1 APPLICATION(S): at 17:02

## 2022-06-03 RX ADMIN — OXYCODONE AND ACETAMINOPHEN 1 TABLET(S): 5; 325 TABLET ORAL at 02:35

## 2022-06-03 RX ADMIN — HYDROMORPHONE HYDROCHLORIDE 0.5 MILLIGRAM(S): 2 INJECTION INTRAMUSCULAR; INTRAVENOUS; SUBCUTANEOUS at 18:58

## 2022-06-03 RX ADMIN — OXYCODONE AND ACETAMINOPHEN 1 TABLET(S): 5; 325 TABLET ORAL at 21:08

## 2022-06-03 RX ADMIN — CLOPIDOGREL BISULFATE 75 MILLIGRAM(S): 75 TABLET, FILM COATED ORAL at 14:09

## 2022-06-03 RX ADMIN — SEVELAMER CARBONATE 800 MILLIGRAM(S): 2400 POWDER, FOR SUSPENSION ORAL at 08:41

## 2022-06-03 RX ADMIN — OXYCODONE AND ACETAMINOPHEN 1 TABLET(S): 5; 325 TABLET ORAL at 22:10

## 2022-06-03 RX ADMIN — Medication 81 MILLIGRAM(S): at 14:09

## 2022-06-03 RX ADMIN — HYDROMORPHONE HYDROCHLORIDE 0.5 MILLIGRAM(S): 2 INJECTION INTRAMUSCULAR; INTRAVENOUS; SUBCUTANEOUS at 23:00

## 2022-06-03 RX ADMIN — MEROPENEM 100 MILLIGRAM(S): 1 INJECTION INTRAVENOUS at 18:57

## 2022-06-03 RX ADMIN — OXYCODONE AND ACETAMINOPHEN 1 TABLET(S): 5; 325 TABLET ORAL at 03:05

## 2022-06-03 RX ADMIN — HYDROMORPHONE HYDROCHLORIDE 0.5 MILLIGRAM(S): 2 INJECTION INTRAMUSCULAR; INTRAVENOUS; SUBCUTANEOUS at 18:43

## 2022-06-03 RX ADMIN — HEPARIN SODIUM 5000 UNIT(S): 5000 INJECTION INTRAVENOUS; SUBCUTANEOUS at 17:01

## 2022-06-03 NOTE — PROGRESS NOTE ADULT - ASSESSMENT
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 Admitted for further evaluation. Renal following for ESRD Mx.     ESRD on HD  schedule hemodialysis - Monday, Wednesday and Friday   access- thigh AVG  HD unit SQDC  K, vol ok     s/p HD 6/1, Rx sheet reviewed, net UF 0.5kg, tolerated well. uneventful.  plan for next hemodialysis today w/ 2 k bath , Ultrafiltration 1kg as tolerated with blood pressure   renal diet  dose all meds for ESRD  renal restrictions in diet.  Anemia of CKD- Hb at goal. no DION for now  HTN, controlled. bp stable.   PAD, infected rt foot wounds, ?OM- s/p vanco, on zosyn. f/u w/Podiatry. on ertapenem now. plan for MRI foot w/o Morgan  f/u w/vas sx eval    poc d/w pt, HD RN, covering PA  labs, chart reviewed  For any question, call:  Cell # [phone]  Pager # [phone]  office # [phone]

## 2022-06-03 NOTE — PROGRESS NOTE ADULT - ASSESSMENT
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 Admitted for further evaluation     Problem/Plan - 1:  ·  Problem: Wound of right foot with  Cellulitis with Left 4th toe OM .   ·  Plan: Pt w/ increasing R toe pain when walking as well as swelling. Mild white count and elevated inflammatory markers but afebrile with vitals stable  -podiatry and ID help appreciated.   -started  IV ertapenem 500mg q24hrs per iD .      Problem/Plan - 2:  ·  Problem: PAD (peripheral artery disease).   ·  Plan: RLE Angiogram next week per Vascular.   -known PAD, continue asa/plavix. Pt denies any numbness or tingling.  -recent angiograms done w/ poor peripheral arterial flow per podiatry and so poor surgical candidate as above  -continue statin.     Problem/Plan - 3:  ·  Problem: ESRD (end stage renal disease) on dialysis.   ·  Plan: Renal helping with HD.   On MWF schedule.  -monitor I/O.     Problem/Plan - 4:  ·  Problem: HLD (hyperlipidemia).   ·  Plan: continue statin. check lipid profile.     Problem/Plan - 5:  ·  Problem: Need for prophylactic measure.   ·  Plan: heparin subcu for dvt ppx, dash diet.

## 2022-06-03 NOTE — PROGRESS NOTE ADULT - ASSESSMENT
60 yo f s/p right foot partial hallux amputation and left foot partial 4rd ray amputation (DOS 3/4/2022)  -pt seen and evaluated  - 3/4/22 s/p L foot partial 3rd ray resection open: fibrotic wound to subQ, no malodor, no drainage, no purulence, flaps cool, macerated borders, distal skin necrosis, no tracking, no purulence, no erythema    - 3/4/22 s/p R foot partial hallux: dehisced surgical wound to level of bone, scant purulence, non-pitting edema extending from digits to rearfoot, no fluctuance  - R foot wound culture growing ESBL Klebsiella & E coli  - Pt is s/p recent b/l LE angiograms with poor peripheral arterial flow, is a poor surgical candidate  - Podiatry plan LWC with IV abx  - Awaiting final ID recs   - Patient is tentatively booked for left foot TMA and right foot partial 1st ray amputation on Wed at 3pm pending vasc recs   - Please document medical optimization and clearance for bilateral foot surgery under sedation with local anesthesia   - Seen with attending 60 yo f s/p right foot partial hallux amputation and left foot partial 4rd ray amputation (DOS 3/4/2022)  -pt seen and evaluated  - 3/4/22 s/p L foot partial 3rd ray resection open: fibrotic wound to subQ, no malodor, no drainage, no purulence, flaps cool, macerated borders, distal skin necrosis, no tracking, no purulence, no erythema    - 3/4/22 s/p R foot partial hallux: dehisced surgical wound to level of bone, scant purulence, non-pitting edema extending from digits to rearfoot, no fluctuance  - R foot wound culture growing ESBL Klebsiella & E coli  - Pt is s/p recent b/l LE angiograms with poor peripheral arterial flow, is a poor surgical candidate  - Podiatry plan LWC with IV abx  - Awaiting final ID recs   - Ordered bilateral foot MRI   - Patient is tentatively booked for left foot TMA and right foot partial 1st ray amputation on Wed at 3pm pending vasc recs   - Please document medical optimization and clearance for bilateral foot surgery under sedation with local anesthesia   - Seen with attending

## 2022-06-03 NOTE — PROGRESS NOTE ADULT - SUBJECTIVE AND OBJECTIVE BOX
Cardiovascular Disease Progress Note    Overnight events: No acute events overnight. Ms. Boogie denies chest pain or SOB.     Otherwise review of systems negative    Objective Findings:  T(C): 37.2 (06-03-22 @ 01:45), Max: 37.6 (06-02-22 @ 10:02)  HR: 82 (06-03-22 @ 01:45) (80 - 82)  BP: 116/61 (06-03-22 @ 01:45) (95/50 - 116/61)  RR: 18 (06-03-22 @ 01:45) (18 - 18)  SpO2: 100% (06-03-22 @ 01:45) (99% - 100%)  Wt(kg): --  Daily     Daily       Physical Exam:  Gen: NAD; Patient resting comfortably  HEENT: EOMI, Normocephalic/ atraumatic  CV: RRR, normal S1 + S2, no m/r/g  Lungs:  Normal respiratory effort; clear to auscultation bilaterally  Abd: soft, non-tender; bowel sounds present  Ext: No edema; warm and well perfused    Telemetry: n/a    Laboratory Data:                        10.9   11.12 )-----------( 288      ( 02 Jun 2022 06:00 )             35.5     06-02    133<L>  |  88<L>  |  32<H>  ----------------------------<  90  4.2   |  25  |  6.90<H>    Ca    10.1      02 Jun 2022 06:00  Phos  4.4     06-02  Mg     2.40     06-02                Inpatient Medications:  MEDICATIONS  (STANDING):  aspirin  chewable 81 milliGRAM(s) Oral daily  atorvastatin 40 milliGRAM(s) Oral at bedtime  chlorhexidine 2% Cloths 1 Application(s) Topical daily  clopidogrel Tablet 75 milliGRAM(s) Oral daily  collagenase Ointment 1 Application(s) Topical daily  ertapenem  IVPB 500 milliGRAM(s) IV Intermittent every 24 hours  heparin   Injectable 5000 Unit(s) SubCutaneous every 12 hours  sevelamer carbonate 800 milliGRAM(s) Oral three times a day      Assessment: 61-year-old female with ESRD on HD, HLD and peripheral vascular disease s/p lower extremity resection presents with chronic limb ischemia.     Plan of Care:    #Compensated diastolic CHF-  Volume status is improved post HD.  Patient on room air.   Fluid removal with HD as per the renal team.   Recent echo noted- normal LV systolic function.        #Peripheral vascular disease.  - S/p L foot partial 3rd ray amputation and R hallux amputation 3/2022.   - S/p lower extremity angioplasty 5/11/2022  - Plan for RLE angiogram next week by the vascular team.     #ESRD-  - HD as per renal.    #ACP (advance care planning)-  Advanced care planning was discussed with the patient.   Cardiac findings were discussed in detail and all questions were answered.       Over 25 minutes spent on total encounter; more than 50% of the visit was spent counseling and/or coordinating care by the attending physician.      Cristino Adams MD [facility]  Cardiovascular Disease  [phone]

## 2022-06-03 NOTE — PROGRESS NOTE ADULT - SUBJECTIVE AND OBJECTIVE BOX
Follow Up:  right toe infection    Interval History/ROS:  feels OK.  getting pain meds for toe.  planning vascular procedure next week    Allergies  No Known Allergies        ANTIMICROBIALS:  ertapenem  IVPB 500 every 24 hours      OTHER MEDS:  acetaminophen     Tablet .. 650 milliGRAM(s) Oral every 6 hours PRN  aspirin  chewable 81 milliGRAM(s) Oral daily  atorvastatin 40 milliGRAM(s) Oral at bedtime  chlorhexidine 2% Cloths 1 Application(s) Topical daily  clopidogrel Tablet 75 milliGRAM(s) Oral daily  collagenase Ointment 1 Application(s) Topical daily  heparin   Injectable 5000 Unit(s) SubCutaneous every 12 hours  HYDROmorphone  Injectable 0.5 milliGRAM(s) IV Push every 4 hours PRN  oxycodone    5 mG/acetaminophen 325 mG 1 Tablet(s) Oral every 6 hours PRN  sevelamer carbonate 800 milliGRAM(s) Oral three times a day      Vital Signs Last 24 Hrs  T(C): 37.1 (03 Jun 2022 12:35), Max: 37.2 (03 Jun 2022 01:45)  T(F): 98.7 (03 Jun 2022 12:35), Max: 99 (03 Jun 2022 01:45)  HR: 77 (03 Jun 2022 12:35) (77 - 82)  BP: 121/55 (03 Jun 2022 12:35) (95/50 - 121/55)  BP(mean): --  RR: 18 (03 Jun 2022 12:35) (18 - 18)  SpO2: 96% (03 Jun 2022 12:35) (96% - 100%)    PHYSICAL EXAM:  Constitutional: Not in acute distress  Eyes: right eye cloudy   Oral cavity: Clear, no lesions  Neck: Supple  RS: Chest clear   CVS: S1, S2   Abdomen: Soft. No guarding/rigidity/tenderness.  : no salcido  Extremities:  HD access right thigh; right toe #1 dressing, left foot dressing  Skin: No rash  Neuro: Alert, oriented to time/place/person  Cranial nerves 2-12 grossly normal. No focal abnormalities                          10.4   11.24 )-----------( 266      ( 03 Jun 2022 06:50 )             34.7       06-03    133<L>  |  89<L>  |  45<H>  ----------------------------<  108<H>  4.6   |  23  |  8.91<H>    Ca    10.0      03 Jun 2022 06:50  Phos  5.4     06-03  Mg     2.50     06-03            MICROBIOLOGY:  v  .Abscess right foot wound  05-30-22   Numerous Klebsiella pneumoniae ESBL  Numerous Escherichia coli  Moderate Pseudomonas aeruginosa  --  Klebsiella pneumoniae ESBL  Escherichia coli  Pseudomonas aeruginosa        RADIOLOGY:    ra< from: Xray Foot AP + Lateral + Oblique, Left (05.30.22 @ 15:11) >  IMPRESSION:  Increased permeative appearance of the left fourth metatarsal head and   phalanges, which could be seen in the setting of osteomyelitis.  No radiographic evidence of osteomyelitis involving the right foot.    MRI is more sensitive and can be considered for further evaluation as   clinically warranted.    --- End of Report ---    < end of copied text >

## 2022-06-03 NOTE — PROGRESS NOTE ADULT - ASSESSMENT
60 y/o woman w/ ESRD on HD (MWF), HF, glaucoma (blind in R eye), peripheral artery disease and amputations of L foot partial 3rd ray/ R hallux, who presented 5/30 w/ R toe pain and swelling ongoing for about 4-5 days    ID is consulted for right foot infection  S/p partial amputation of right 1st toe in 3/2022, returned with pain + swelling  Afebrile on admission, with leukocytosis  Xray showed no OM on right foot, but possible OM on left 4th MT head and phalanges  ESR/CRP 91/92.2  Right toe wound Cx Klebsiella and E. coli, pseudomonas  Hx ESBL Klebsiella and E. coli on left foot Cx    Likely superficial cellulitis of right 1st toe  No signs of OM  However there is a concern for left 4th toe OM on xray, with was not reported previously  It is difficult to eradicate OM due to severe PAD       IMPRESSION:  Right toe infection  Possible left 4th toe OM  PAD      RECOMMENDATIONS:    - D/C IV ertapenem   - start meropenem 500 mg iv q 24 h

## 2022-06-03 NOTE — PROGRESS NOTE ADULT - SUBJECTIVE AND OBJECTIVE BOX
[facility] - S Jluis / Shabbir S /D Romina/ SHERRI Mcfarlane/ SHERRI Madden/ Andrés Perkins / M Tristianu/ O Gladis  service -[phone], office [phone]  ---------------------------------------------------------------------------------------------------------------    Patient seen and examined bedside    Subjective and Objective: No overnight events, sob resolved. No complaints today. feeling better    Allergies: No Known Allergies      Hospital Medications:   MEDICATIONS  (STANDING):  aspirin  chewable 81 milliGRAM(s) Oral daily  atorvastatin 40 milliGRAM(s) Oral at bedtime  chlorhexidine 2% Cloths 1 Application(s) Topical daily  clopidogrel Tablet 75 milliGRAM(s) Oral daily  collagenase Ointment 1 Application(s) Topical daily  ertapenem  IVPB 500 milliGRAM(s) IV Intermittent every 24 hours  heparin   Injectable 5000 Unit(s) SubCutaneous every 12 hours  sevelamer carbonate 800 milliGRAM(s) Oral three times a day      REVIEW OF SYSTEMS:  CONSTITUTIONAL: No weakness, fevers or chills  EYES/ENT: No visual changes;  No vertigo or throat pain   NECK: No pain or stiffness  RESPIRATORY: No cough, wheezing, hemoptysis; No shortness of breath  CARDIOVASCULAR: No chest pain or palpitations.  GASTROINTESTINAL: No abdominal or epigastric pain. No nausea, vomiting, or hematemesis; No diarrhea or constipation. No melena or hematochezia.  GENITOURINARY: No dysuria, frequency, foamy urine, urinary urgency, incontinence or hematuria  NEUROLOGICAL: No numbness or weakness  SKIN: No itching, burning, rashes, or lesions   VASCULAR: No bilateral lower extremity edema.   All other review of systems is negative unless indicated above.    VITALS:  T(F): 98.7 (06-03-22 @ 12:35), Max: 99 (06-03-22 @ 01:45)  HR: 77 (06-03-22 @ 12:35)  BP: 121/55 (06-03-22 @ 12:35)  RR: 18 (06-03-22 @ 12:35)  SpO2: 96% (06-03-22 @ 12:35)  Wt(kg): --    06-03 @ 07:01  -  06-03 @ 15:22  --------------------------------------------------------  IN: 480 mL / OUT: 0 mL / NET: 480 mL          PHYSICAL EXAM:  Constitutional: NAD  HEENT: anicteric sclera, oropharynx clear  Neck: No JVD  Respiratory: CTAB, no wheezes, rales or rhonchi  Cardiovascular: S1, S2, RRR  Gastrointestinal: BS+, soft, NT/ND  Extremities: No cyanosis or clubbing. No peripheral edema  Neurological: A/O x 3, no focal deficits  Psychiatric: Normal mood, normal affect  : No CVA tenderness. No salcido.   Skin: No rashes  Vascular Access:    LABS:  06-03    133<L>  |  89<L>  |  45<H>  ----------------------------<  108<H>  4.6   |  23  |  8.91<H>    Ca    10.0      03 Jun 2022 06:50  Phos  5.4     06-03  Mg     2.50     06-03      Creatinine Trend: 8.91 <--, 6.90 <--, 8.83 <--, 6.39 <--, 10.28 <--                        10.4   11.24 )-----------( 266      ( 03 Jun 2022 06:50 )             34.7     Urine Studies:        RADIOLOGY & ADDITIONAL STUDIES:   [facility] - S Jluis / Shabbir S /D Romina/ S Denys/ S Chano/ Andrés Perkins / M Katlyn/ O Gladis  service [phone], office [phone]  ---------------------------------------------------------------------------------------------------------------    Patient seen and examined bedside    Subjective and Objective: No overnight events, denied sob. No complaints today. feeling better    Allergies: No Known Allergies      Hospital Medications:   MEDICATIONS  (STANDING):  aspirin  chewable 81 milliGRAM(s) Oral daily  atorvastatin 40 milliGRAM(s) Oral at bedtime  chlorhexidine 2% Cloths 1 Application(s) Topical daily  clopidogrel Tablet 75 milliGRAM(s) Oral daily  collagenase Ointment 1 Application(s) Topical daily  ertapenem  IVPB 500 milliGRAM(s) IV Intermittent every 24 hours  heparin   Injectable 5000 Unit(s) SubCutaneous every 12 hours  sevelamer carbonate 800 milliGRAM(s) Oral three times a day    VITALS:  T(F): 98.7 (06-03-22 @ 12:35), Max: 99 (06-03-22 @ 01:45)  HR: 77 (06-03-22 @ 12:35)  BP: 121/55 (06-03-22 @ 12:35)  RR: 18 (06-03-22 @ 12:35)  SpO2: 96% (06-03-22 @ 12:35)  Wt(kg): --    06-03 @ 07:01  -  06-03 @ 15:22  --------------------------------------------------------  IN: 480 mL / OUT: 0 mL / NET: 480 mL      PHYSICAL EXAM:  Constitutional: NAD  HEENT: anicteric sclera  Neck: No JVD  Respiratory: CTAB, no wheezes, rales or rhonchi  Cardiovascular: S1, S2, RRR  Gastrointestinal: BS+, soft, NT/ND  Extremities: No peripheral edema. b/l feet dressings+  Neurological: A/O x 3, no focal deficits  Psychiatric: Normal mood, normal affect  : No salcido.   Vascular Access: rt femoral AVG+    LABS:  06-03    133<L>  |  89<L>  |  45<H>  ----------------------------<  108<H>  4.6   |  23  |  8.91<H>    Ca    10.0      03 Jun 2022 06:50  Phos  5.4     06-03  Mg     2.50     06-03      Creatinine Trend: 8.91 <--, 6.90 <--, 8.83 <--, 6.39 <--, 10.28 <--                        10.4   11.24 )-----------( 266      ( 03 Jun 2022 06:50 )             34.7     Urine Studies:        RADIOLOGY & ADDITIONAL STUDIES:

## 2022-06-03 NOTE — PROGRESS NOTE ADULT - SUBJECTIVE AND OBJECTIVE BOX
Date of Service  : 06-03-22    INTERVAL HPI/OVERNIGHT EVENTS: I feel fine.   Vital Signs Last 24 Hrs  T(C): 37.1 (03 Jun 2022 12:35), Max: 37.2 (03 Jun 2022 01:45)  T(F): 98.7 (03 Jun 2022 12:35), Max: 99 (03 Jun 2022 01:45)  HR: 77 (03 Jun 2022 12:35) (77 - 82)  BP: 121/55 (03 Jun 2022 12:35) (95/50 - 121/55)  BP(mean): --  RR: 18 (03 Jun 2022 12:35) (18 - 18)  SpO2: 96% (03 Jun 2022 12:35) (96% - 100%)  I&O's Summary    03 Jun 2022 07:01  -  03 Jun 2022 14:22  --------------------------------------------------------  IN: 480 mL / OUT: 0 mL / NET: 480 mL      MEDICATIONS  (STANDING):  aspirin  chewable 81 milliGRAM(s) Oral daily  atorvastatin 40 milliGRAM(s) Oral at bedtime  chlorhexidine 2% Cloths 1 Application(s) Topical daily  clopidogrel Tablet 75 milliGRAM(s) Oral daily  collagenase Ointment 1 Application(s) Topical daily  ertapenem  IVPB 500 milliGRAM(s) IV Intermittent every 24 hours  heparin   Injectable 5000 Unit(s) SubCutaneous every 12 hours  sevelamer carbonate 800 milliGRAM(s) Oral three times a day    MEDICATIONS  (PRN):  acetaminophen     Tablet .. 650 milliGRAM(s) Oral every 6 hours PRN Temp greater or equal to 38C (100.4F), Mild Pain (1 - 3)  HYDROmorphone  Injectable 0.5 milliGRAM(s) IV Push every 4 hours PRN Severe Pain (7 - 10)  oxycodone    5 mG/acetaminophen 325 mG 1 Tablet(s) Oral every 6 hours PRN Moderate Pain (4 - 6)    LABS:                        10.4   11.24 )-----------( 266      ( 03 Jun 2022 06:50 )             34.7     06-03    133<L>  |  89<L>  |  45<H>  ----------------------------<  108<H>  4.6   |  23  |  8.91<H>    Ca    10.0      03 Jun 2022 06:50  Phos  5.4     06-03  Mg     2.50     06-03          CAPILLARY BLOOD GLUCOSE              REVIEW OF SYSTEMS:  CONSTITUTIONAL: No fever, weight loss, or fatigue  EYES: No eye pain, visual disturbances, or discharge  ENMT:  No difficulty hearing, tinnitus, vertigo; No sinus or throat pain  NECK: No pain or stiffness  RESPIRATORY: No cough, wheezing, chills or hemoptysis; No shortness of breath  CARDIOVASCULAR: No chest pain, palpitations, dizziness, or leg swelling  GASTROINTESTINAL: No abdominal or epigastric pain. No nausea, vomiting, or hematemesis; No diarrhea or constipation. No melena or hematochezia.  GENITOURINARY: No dysuria, frequency, hematuria, or incontinence  NEUROLOGICAL: No headaches, memory loss, loss of strength, numbness, or tremors      Consultant(s) Notes Reviewed:  [x ] YES  [ ] NO    PHYSICAL EXAM:  GENERAL: NAD, well-groomed, well-developed,not in any distress ,  HEAD:  Atraumatic, Normocephalic  NECK: Supple, No JVD, Normal thyroid  NERVOUS SYSTEM:  Alert & Oriented X3, No focal deficit   CHEST/LUNG: Good air entry bilateral with no  rales, rhonchi, wheezing, or rubs  HEART: Regular rate and rhythm; No murmurs, rubs, or gallops  ABDOMEN: Soft, Nontender, Nondistended; Bowel sounds present  EXTREMITIES: Feet dressed   Care Discussed with Consultants/Other Providers [ x] YES  [ ] NO

## 2022-06-03 NOTE — PROGRESS NOTE ADULT - SUBJECTIVE AND OBJECTIVE BOX
Patient is a 61y old  Female who presents with a chief complaint of RLE toe pain (03 Jun 2022 07:06)       INTERVAL HPI/OVERNIGHT EVENTS:  Patient seen and evaluated at bedside.  Pt is resting comfortable in NAD. Denies N/V/F/C.  Pain rated at X/10    Allergies    No Known Allergies    Intolerances        Vital Signs Last 24 Hrs  T(C): 37.1 (03 Jun 2022 06:00), Max: 37.2 (03 Jun 2022 01:45)  T(F): 98.7 (03 Jun 2022 06:00), Max: 99 (03 Jun 2022 01:45)  HR: 77 (03 Jun 2022 06:00) (77 - 82)  BP: 108/49 (03 Jun 2022 06:00) (95/50 - 116/61)  BP(mean): --  RR: 18 (03 Jun 2022 06:00) (18 - 18)  SpO2: 98% (03 Jun 2022 06:00) (98% - 100%)    LABS:                        10.4   11.24 )-----------( 266      ( 03 Jun 2022 06:50 )             34.7     06-03    133<L>  |  89<L>  |  45<H>  ----------------------------<  108<H>  4.6   |  23  |  8.91<H>    Ca    10.0      03 Jun 2022 06:50  Phos  5.4     06-03  Mg     2.50     06-03          CAPILLARY BLOOD GLUCOSE          Lower Extremity Physical Exam:  Vascular: DP/PT 1/4 R,  DP/PT 0/4 L, CFT <3 seconds, Temperature gradient warm to cooo B/L, TG is warm to cool b/l  Neuro: Epicritic sensation diminished to the level of forefoot B/L  Musculoskeletal/Ortho: left partial 5th ray resection healed, LF partial 4th ray resection open   Skin:   3/4/22 s/p L foot partial 3rd ray resection open: fibrotic wound to subQ, no malodor, no drainage, no purulence, flaps cool, macerated borders, distal skin necrosis, no tracking, no purulence, no erythema    3/4/22 s/p R foot partial hallux: dehisced surgical wound to level of bone, scant purulence, non-pitting edema extending from digits to rearfoot, no fluctuance    RADIOLOGY & ADDITIONAL TESTS:

## 2022-06-04 LAB
ANION GAP SERPL CALC-SCNC: 18 MMOL/L — HIGH (ref 7–14)
BUN SERPL-MCNC: 22 MG/DL — SIGNIFICANT CHANGE UP (ref 7–23)
CALCIUM SERPL-MCNC: 10.1 MG/DL — SIGNIFICANT CHANGE UP (ref 8.4–10.5)
CHLORIDE SERPL-SCNC: 89 MMOL/L — LOW (ref 98–107)
CO2 SERPL-SCNC: 27 MMOL/L — SIGNIFICANT CHANGE UP (ref 22–31)
CREAT SERPL-MCNC: 5.55 MG/DL — HIGH (ref 0.5–1.3)
CULTURE RESULTS: SIGNIFICANT CHANGE UP
EGFR: 8 ML/MIN/1.73M2 — LOW
GLUCOSE SERPL-MCNC: 116 MG/DL — HIGH (ref 70–99)
HCT VFR BLD CALC: 34.2 % — LOW (ref 34.5–45)
HGB BLD-MCNC: 10.5 G/DL — LOW (ref 11.5–15.5)
MAGNESIUM SERPL-MCNC: 2.3 MG/DL — SIGNIFICANT CHANGE UP (ref 1.6–2.6)
MCHC RBC-ENTMCNC: 26.6 PG — LOW (ref 27–34)
MCHC RBC-ENTMCNC: 30.7 GM/DL — LOW (ref 32–36)
MCV RBC AUTO: 86.8 FL — SIGNIFICANT CHANGE UP (ref 80–100)
NRBC # BLD: 0 /100 WBCS — SIGNIFICANT CHANGE UP
NRBC # FLD: 0 K/UL — SIGNIFICANT CHANGE UP
ORGANISM # SPEC MICROSCOPIC CNT: SIGNIFICANT CHANGE UP
PHOSPHATE SERPL-MCNC: 3.8 MG/DL — SIGNIFICANT CHANGE UP (ref 2.5–4.5)
PLATELET # BLD AUTO: 271 K/UL — SIGNIFICANT CHANGE UP (ref 150–400)
POTASSIUM SERPL-MCNC: 4 MMOL/L — SIGNIFICANT CHANGE UP (ref 3.5–5.3)
POTASSIUM SERPL-SCNC: 4 MMOL/L — SIGNIFICANT CHANGE UP (ref 3.5–5.3)
RBC # BLD: 3.94 M/UL — SIGNIFICANT CHANGE UP (ref 3.8–5.2)
RBC # FLD: 15 % — HIGH (ref 10.3–14.5)
SODIUM SERPL-SCNC: 134 MMOL/L — LOW (ref 135–145)
SPECIMEN SOURCE: SIGNIFICANT CHANGE UP
WBC # BLD: 11.92 K/UL — HIGH (ref 3.8–10.5)
WBC # FLD AUTO: 11.92 K/UL — HIGH (ref 3.8–10.5)

## 2022-06-04 RX ORDER — SEVELAMER CARBONATE 2400 MG/1
800 POWDER, FOR SUSPENSION ORAL THREE TIMES A DAY
Refills: 0 | Status: DISCONTINUED | OUTPATIENT
Start: 2022-06-04 | End: 2022-06-09

## 2022-06-04 RX ADMIN — ATORVASTATIN CALCIUM 40 MILLIGRAM(S): 80 TABLET, FILM COATED ORAL at 01:08

## 2022-06-04 RX ADMIN — HYDROMORPHONE HYDROCHLORIDE 0.5 MILLIGRAM(S): 2 INJECTION INTRAMUSCULAR; INTRAVENOUS; SUBCUTANEOUS at 18:27

## 2022-06-04 RX ADMIN — CLOPIDOGREL BISULFATE 75 MILLIGRAM(S): 75 TABLET, FILM COATED ORAL at 13:27

## 2022-06-04 RX ADMIN — HYDROMORPHONE HYDROCHLORIDE 0.5 MILLIGRAM(S): 2 INJECTION INTRAMUSCULAR; INTRAVENOUS; SUBCUTANEOUS at 18:12

## 2022-06-04 RX ADMIN — HEPARIN SODIUM 5000 UNIT(S): 5000 INJECTION INTRAVENOUS; SUBCUTANEOUS at 06:28

## 2022-06-04 RX ADMIN — HYDROMORPHONE HYDROCHLORIDE 0.5 MILLIGRAM(S): 2 INJECTION INTRAMUSCULAR; INTRAVENOUS; SUBCUTANEOUS at 22:21

## 2022-06-04 RX ADMIN — Medication 1 APPLICATION(S): at 13:33

## 2022-06-04 RX ADMIN — ATORVASTATIN CALCIUM 40 MILLIGRAM(S): 80 TABLET, FILM COATED ORAL at 21:13

## 2022-06-04 RX ADMIN — HYDROMORPHONE HYDROCHLORIDE 0.5 MILLIGRAM(S): 2 INJECTION INTRAMUSCULAR; INTRAVENOUS; SUBCUTANEOUS at 22:36

## 2022-06-04 RX ADMIN — HYDROMORPHONE HYDROCHLORIDE 0.5 MILLIGRAM(S): 2 INJECTION INTRAMUSCULAR; INTRAVENOUS; SUBCUTANEOUS at 10:04

## 2022-06-04 RX ADMIN — CHLORHEXIDINE GLUCONATE 1 APPLICATION(S): 213 SOLUTION TOPICAL at 13:28

## 2022-06-04 RX ADMIN — HEPARIN SODIUM 5000 UNIT(S): 5000 INJECTION INTRAVENOUS; SUBCUTANEOUS at 17:15

## 2022-06-04 RX ADMIN — SEVELAMER CARBONATE 800 MILLIGRAM(S): 2400 POWDER, FOR SUSPENSION ORAL at 06:29

## 2022-06-04 RX ADMIN — HYDROMORPHONE HYDROCHLORIDE 0.5 MILLIGRAM(S): 2 INJECTION INTRAMUSCULAR; INTRAVENOUS; SUBCUTANEOUS at 10:19

## 2022-06-04 RX ADMIN — MEROPENEM 100 MILLIGRAM(S): 1 INJECTION INTRAVENOUS at 17:15

## 2022-06-04 RX ADMIN — SEVELAMER CARBONATE 800 MILLIGRAM(S): 2400 POWDER, FOR SUSPENSION ORAL at 13:33

## 2022-06-04 RX ADMIN — Medication 81 MILLIGRAM(S): at 13:27

## 2022-06-04 NOTE — PROGRESS NOTE ADULT - SUBJECTIVE AND OBJECTIVE BOX
Cardiovascular Disease Progress Note    Overnight events: No acute events overnight.  Ms. Boogie denies chest pain or SOB.   Otherwise review of systems negative    Objective Findings:  T(C): 37.4 (22 @ 06:00), Max: 37.7 (22 @ 17:06)  HR: 85 (22 @ 06:00) (77 - 89)  BP: 109/48 (22 @ 06:00) (108/49 - 172/70)  RR: 18 (22 @ 06:00) (18 - 19)  SpO2: 99% (22 @ 06:00) (95% - 100%)  Wt(kg): --  Daily     Daily Weight in k (2022 23:40)      Physical Exam:  Gen: NAD; Patient resting comfortably  HEENT: EOMI, Normocephalic/ atraumatic  CV: RRR, normal S1 + S2, no m/r/g  Lungs:  Normal respiratory effort; clear to auscultation bilaterally  Abd: soft, non-tender; bowel sounds present  Ext: No edema; warm and well perfused    Telemetry:    Laboratory Data:                        10.5   11.92 )-----------( 271      ( 2022 05:40 )             34.2     06-04    134<L>  |  89<L>  |  22  ----------------------------<  116<H>  4.0   |  27  |  5.55<H>    Ca    10.1      2022 05:40  Phos  3.8     06-  Mg     2.30     06-04                Inpatient Medications:  MEDICATIONS  (STANDING):  aspirin  chewable 81 milliGRAM(s) Oral daily  atorvastatin 40 milliGRAM(s) Oral at bedtime  chlorhexidine 2% Cloths 1 Application(s) Topical daily  clopidogrel Tablet 75 milliGRAM(s) Oral daily  collagenase Ointment 1 Application(s) Topical daily  heparin   Injectable 5000 Unit(s) SubCutaneous every 12 hours  meropenem  IVPB 500 milliGRAM(s) IV Intermittent every 24 hours  sevelamer carbonate 800 milliGRAM(s) Oral three times a day      Assessment:  61-year-old female with ESRD on HD, HLD and peripheral vascular disease s/p lower extremity resection presents with chronic limb ischemia.     Plan of Care:    #Pre-op risk stratification  - Pt tentatively scheduled for left foot TMA and right foot partial 1st ray amputation on Wednesday with podiatry pending angiogram results  - Ms. Boogie displays no signs of pre-op coronary ischemia  - CHF/ Volume status has improved  - At this time, Ms. Boogie is optimized to proceed with surgery at the discretion of the surgical team if recommended by vascular  - Will continue with current cardiac management.     #Compensated diastolic CHF-  Volume status is improved post HD.  Patient on room air.   Fluid removal with HD as per the renal team.   Recent echo noted- normal LV systolic function.    #Peripheral vascular disease.  - S/p L foot partial 3rd ray amputation and R hallux amputation 3/2022.   - S/p lower extremity angioplasty 2022  - Plan for RLE angiogram next week by the vascular team.     #ESRD-  - HD as per renal.          Over 25 minutes spent on total encounter; more than 50% of the visit was spent counseling and/or coordinating care by the attending physician.      Wisam Adams D.O.   Cardiovascular Disease  [phone]

## 2022-06-04 NOTE — PROGRESS NOTE ADULT - SUBJECTIVE AND OBJECTIVE BOX
[facility] - S Jluis / Shabbir S /D Romina/ S Denys/ S Chano/ Andrés Perkins / M Tristianu/ O Gladis  service -[phone], office [phone]  ---------------------------------------------------------------------------------------------------------------    Patient seen and examined bedside    Subjective and Objective: No overnight events, sob resolved. No complaints today. feeling better    Allergies: No Known Allergies      Hospital Medications:   MEDICATIONS  (STANDING):  aspirin  chewable 81 milliGRAM(s) Oral daily  atorvastatin 40 milliGRAM(s) Oral at bedtime  chlorhexidine 2% Cloths 1 Application(s) Topical daily  clopidogrel Tablet 75 milliGRAM(s) Oral daily  collagenase Ointment 1 Application(s) Topical daily  heparin   Injectable 5000 Unit(s) SubCutaneous every 12 hours  meropenem  IVPB 500 milliGRAM(s) IV Intermittent every 24 hours  sevelamer carbonate 800 milliGRAM(s) Oral three times a day      REVIEW OF SYSTEMS:  CONSTITUTIONAL: No weakness, fevers or chills  EYES/ENT: No visual changes;  No vertigo or throat pain   NECK: No pain or stiffness  RESPIRATORY: No cough, wheezing, hemoptysis; No shortness of breath  CARDIOVASCULAR: No chest pain or palpitations.  GASTROINTESTINAL: No abdominal or epigastric pain. No nausea, vomiting, or hematemesis; No diarrhea or constipation. No melena or hematochezia.  GENITOURINARY: No dysuria, frequency, foamy urine, urinary urgency, incontinence or hematuria  NEUROLOGICAL: No numbness or weakness  SKIN: No itching, burning, rashes, or lesions   VASCULAR: No bilateral lower extremity edema.   All other review of systems is negative unless indicated above.    VITALS:  T(F): 98.1 (06-04-22 @ 14:00), Max: 99.9 (06-03-22 @ 20:20)  HR: 83 (06-04-22 @ 18:23)  BP: 109/48 (06-04-22 @ 18:23)  RR: 18 (06-04-22 @ 18:23)  SpO2: 98% (06-04-22 @ 18:23)  Wt(kg): --    06-03 @ 07:01  -  06-04 @ 07:00  --------------------------------------------------------  IN: 1240 mL / OUT: 1400 mL / NET: -160 mL    06-04 @ 07:01  -  06-04 @ 18:57  --------------------------------------------------------  IN: 480 mL / OUT: 0 mL / NET: 480 mL          PHYSICAL EXAM:  Constitutional: NAD  HEENT: anicteric sclera, oropharynx clear  Neck: No JVD  Respiratory: CTAB, no wheezes, rales or rhonchi  Cardiovascular: S1, S2, RRR  Gastrointestinal: BS+, soft, NT/ND  Extremities: No cyanosis or clubbing. No peripheral edema  Neurological: A/O x 3, no focal deficits  Psychiatric: Normal mood, normal affect  : No CVA tenderness. No salcido.   Skin: No rashes  Vascular Access:    LABS:  06-04    134<L>  |  89<L>  |  22  ----------------------------<  116<H>  4.0   |  27  |  5.55<H>    Ca    10.1      04 Jun 2022 05:40  Phos  3.8     06-04  Mg     2.30     06-04      Creatinine Trend: 5.55 <--, 8.91 <--, 6.90 <--, 8.83 <--, 6.39 <--, 10.28 <--                        10.5   11.92 )-----------( 271      ( 04 Jun 2022 05:40 )             34.2     Urine Studies:        RADIOLOGY & ADDITIONAL STUDIES:   [facility] - S Jluis / Shabbir S /D Romina/ S Denys/ S Chano/ Andrés Perkins / M Katlyn/ O Gladis  service -[phone], office [phone]  ---------------------------------------------------------------------------------------------------------------    Patient seen and examined bedside    Subjective and Objective: No overnight events, denied sob. No complaints today. feeling better    Allergies: No Known Allergies      Hospital Medications:   MEDICATIONS  (STANDING):  aspirin  chewable 81 milliGRAM(s) Oral daily  atorvastatin 40 milliGRAM(s) Oral at bedtime  chlorhexidine 2% Cloths 1 Application(s) Topical daily  clopidogrel Tablet 75 milliGRAM(s) Oral daily  collagenase Ointment 1 Application(s) Topical daily  heparin   Injectable 5000 Unit(s) SubCutaneous every 12 hours  meropenem  IVPB 500 milliGRAM(s) IV Intermittent every 24 hours  sevelamer carbonate 800 milliGRAM(s) Oral three times a day    VITALS:  T(F): 98.1 (06-04-22 @ 14:00), Max: 99.9 (06-03-22 @ 20:20)  HR: 83 (06-04-22 @ 18:23)  BP: 109/48 (06-04-22 @ 18:23)  RR: 18 (06-04-22 @ 18:23)  SpO2: 98% (06-04-22 @ 18:23)  Wt(kg): --    06-03 @ 07:01  -  06-04 @ 07:00  --------------------------------------------------------  IN: 1240 mL / OUT: 1400 mL / NET: -160 mL    06-04 @ 07:01  -  06-04 @ 18:57  --------------------------------------------------------  IN: 480 mL / OUT: 0 mL / NET: 480 mL    PHYSICAL EXAM:  Constitutional: NAD  HEENT: anicteric sclera  Neck: No JVD  Respiratory: CTAB, no wheezes, rales or rhonchi  Cardiovascular: S1, S2, RRR  Gastrointestinal: BS+, soft, NT/ND  Extremities: No peripheral edema. b/l feet dressings+  Neurological: A/O x 3, no focal deficits  Psychiatric: Normal mood, normal affect  : No salcido.   Vascular Access: rt femoral AVG+    LABS:  06-04    134<L>  |  89<L>  |  22  ----------------------------<  116<H>  4.0   |  27  |  5.55<H>    Ca    10.1      04 Jun 2022 05:40  Phos  3.8     06-04  Mg     2.30     06-04      Creatinine Trend: 5.55 <--, 8.91 <--, 6.90 <--, 8.83 <--, 6.39 <--, 10.28 <--                        10.5   11.92 )-----------( 271      ( 04 Jun 2022 05:40 )             34.2     Urine Studies:        RADIOLOGY & ADDITIONAL STUDIES:

## 2022-06-04 NOTE — PROGRESS NOTE ADULT - ASSESSMENT
62 y/o woman w/ ESRD on HD (MWF), HF, 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 Admitted for further evaluation     Problem/Plan - 1:  ·  Problem: Wound of right foot with  Cellulitis with Left 4th toe OM .   ·  Plan: Pt w/ increasing R toe pain when walking as well as swelling. Mild white count and elevated inflammatory markers but afebrile with vitals stable  -podiatry and ID help appreciated.   -started  IV ertapenem 500mg q24hrs per iD .      Problem/Plan - 2:  ·  Problem: PAD (peripheral artery disease).   ·  Plan: RLE Angiogram next week per Vascular.   -known PAD, continue asa/plavix. Pt denies any numbness or tingling.  -recent angiograms done w/ poor peripheral arterial flow per podiatry and so poor surgical candidate as above  -continue statin.     Problem/Plan - 3:  ·  Problem: ESRD (end stage renal disease) on dialysis.   ·  Plan: Renal helping with HD.   On MWF schedule.  -monitor I/O.     Problem/Plan - 4:  ·  Problem: HLD (hyperlipidemia).   ·  Plan: continue statin. check lipid profile.     Problem/Plan - 5:  ·  Problem: Need for prophylactic measure.   ·  Plan: heparin subcu for dvt ppx, dash diet.    Medically optimized for Vascular procedure.

## 2022-06-04 NOTE — PROGRESS NOTE ADULT - SUBJECTIVE AND OBJECTIVE BOX
Date of Service  : 06-04-22     INTERVAL HPI/OVERNIGHT EVENTS: I feel fine.   Vital Signs Last 24 Hrs  T(C): 37.7 (04 Jun 2022 21:01), Max: 37.7 (04 Jun 2022 21:01)  T(F): 99.9 (04 Jun 2022 21:01), Max: 99.9 (04 Jun 2022 21:01)  HR: 85 (04 Jun 2022 21:01) (82 - 89)  BP: 109/50 (04 Jun 2022 21:01) (98/50 - 127/52)  BP(mean): --  RR: 18 (04 Jun 2022 21:01) (18 - 18)  SpO2: 94% (04 Jun 2022 21:01) (94% - 100%)  I&O's Summary    03 Jun 2022 07:01  -  04 Jun 2022 07:00  --------------------------------------------------------  IN: 1240 mL / OUT: 1400 mL / NET: -160 mL    04 Jun 2022 07:01  -  04 Jun 2022 22:44  --------------------------------------------------------  IN: 720 mL / OUT: 0 mL / NET: 720 mL      MEDICATIONS  (STANDING):  aspirin  chewable 81 milliGRAM(s) Oral daily  atorvastatin 40 milliGRAM(s) Oral at bedtime  chlorhexidine 2% Cloths 1 Application(s) Topical daily  clopidogrel Tablet 75 milliGRAM(s) Oral daily  collagenase Ointment 1 Application(s) Topical daily  heparin   Injectable 5000 Unit(s) SubCutaneous every 12 hours  meropenem  IVPB 500 milliGRAM(s) IV Intermittent every 24 hours  sevelamer carbonate 800 milliGRAM(s) Oral three times a day    MEDICATIONS  (PRN):  acetaminophen     Tablet .. 650 milliGRAM(s) Oral every 6 hours PRN Temp greater or equal to 38C (100.4F), Mild Pain (1 - 3)  HYDROmorphone  Injectable 0.5 milliGRAM(s) IV Push every 4 hours PRN Severe Pain (7 - 10)  oxycodone    5 mG/acetaminophen 325 mG 1 Tablet(s) Oral every 6 hours PRN Moderate Pain (4 - 6)    LABS:                        10.5   11.92 )-----------( 271      ( 04 Jun 2022 05:40 )             34.2     06-04    134<L>  |  89<L>  |  22  ----------------------------<  116<H>  4.0   |  27  |  5.55<H>    Ca    10.1      04 Jun 2022 05:40  Phos  3.8     06-04  Mg     2.30     06-04          CAPILLARY BLOOD GLUCOSE              REVIEW OF SYSTEMS:  CONSTITUTIONAL: No fever, weight loss, or fatigue  EYES: No eye pain, visual disturbances, or discharge  ENMT:  No difficulty hearing, tinnitus, vertigo; No sinus or throat pain  NECK: No pain or stiffness  RESPIRATORY: No cough, wheezing, chills or hemoptysis; No shortness of breath  CARDIOVASCULAR: No chest pain, palpitations, dizziness, or leg swelling  GASTROINTESTINAL: No abdominal or epigastric pain. No nausea, vomiting, or hematemesis; No diarrhea or constipation. No melena or hematochezia.  GENITOURINARY: No dysuria, frequency, hematuria, or incontinence  NEUROLOGICAL: No headaches, memory loss, loss of strength, numbness, or tremors      Consultant(s) Notes Reviewed:  [x ] YES  [ ] NO    PHYSICAL EXAM:  GENERAL: NAD, well-groomed, well-developed,not in any distress ,  HEAD:  Atraumatic, Normocephalic  NECK: Supple, No JVD, Normal thyroid  NERVOUS SYSTEM:  Alert & Oriented X3, No focal deficit   CHEST/LUNG: Good air entry bilateral with no  rales, rhonchi, wheezing, or rubs  HEART: Regular rate and rhythm; No murmurs, rubs, or gallops  ABDOMEN: Soft, Nontender, Nondistended; Bowel sounds present  EXTREMITIES:  Feet dressed     Care Discussed with Consultants/Other Providers [ x] YES  [ ] NO

## 2022-06-04 NOTE — PROGRESS NOTE ADULT - ASSESSMENT
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 Admitted for further evaluation. Renal following for ESRD Mx.     ESRD on HD  schedule hemodialysis - Monday, Wednesday and Friday   access- thigh AVG  HD unit SQDC  K, vol ok     s/p HD 6/3, Rx sheet reviewed, net UF 1kg, tolerated well. uneventful.  plan for next hemodialysis Monday w/ 2 k bath , Ultrafiltration 1kg as tolerated with blood pressure   renal diet  dose all meds for ESRD  renal restrictions in diet.  Anemia of CKD- Hb at goal. no DION for now  HTN, controlled. bp stable.   PAD, infected rt foot wounds, ?OM- s/p vanco, on zosyn. f/u w/Podiatry. on ertapenem now. plan for MRI foot w/o Morgan  f/u w/vas sx eval    poc d/w pt  labs, chart reviewed  For any question, call:  Cell # [phone]  Pager # [phone]  office # [phone]

## 2022-06-05 LAB
ANION GAP SERPL CALC-SCNC: 19 MMOL/L — HIGH (ref 7–14)
BUN SERPL-MCNC: 38 MG/DL — HIGH (ref 7–23)
CALCIUM SERPL-MCNC: 10.1 MG/DL — SIGNIFICANT CHANGE UP (ref 8.4–10.5)
CHLORIDE SERPL-SCNC: 89 MMOL/L — LOW (ref 98–107)
CO2 SERPL-SCNC: 26 MMOL/L — SIGNIFICANT CHANGE UP (ref 22–31)
CREAT SERPL-MCNC: 7.79 MG/DL — HIGH (ref 0.5–1.3)
EGFR: 5 ML/MIN/1.73M2 — LOW
GLUCOSE SERPL-MCNC: 104 MG/DL — HIGH (ref 70–99)
HCT VFR BLD CALC: 35.8 % — SIGNIFICANT CHANGE UP (ref 34.5–45)
HGB BLD-MCNC: 10.5 G/DL — LOW (ref 11.5–15.5)
MAGNESIUM SERPL-MCNC: 2.5 MG/DL — SIGNIFICANT CHANGE UP (ref 1.6–2.6)
MCHC RBC-ENTMCNC: 26.6 PG — LOW (ref 27–34)
MCHC RBC-ENTMCNC: 29.3 GM/DL — LOW (ref 32–36)
MCV RBC AUTO: 90.6 FL — SIGNIFICANT CHANGE UP (ref 80–100)
NRBC # BLD: 0 /100 WBCS — SIGNIFICANT CHANGE UP
NRBC # FLD: 0 K/UL — SIGNIFICANT CHANGE UP
PHOSPHATE SERPL-MCNC: 5.3 MG/DL — HIGH (ref 2.5–4.5)
PLATELET # BLD AUTO: 256 K/UL — SIGNIFICANT CHANGE UP (ref 150–400)
POTASSIUM SERPL-MCNC: 4.5 MMOL/L — SIGNIFICANT CHANGE UP (ref 3.5–5.3)
POTASSIUM SERPL-SCNC: 4.5 MMOL/L — SIGNIFICANT CHANGE UP (ref 3.5–5.3)
RBC # BLD: 3.95 M/UL — SIGNIFICANT CHANGE UP (ref 3.8–5.2)
RBC # FLD: 14.9 % — HIGH (ref 10.3–14.5)
SODIUM SERPL-SCNC: 134 MMOL/L — LOW (ref 135–145)
WBC # BLD: 13.78 K/UL — HIGH (ref 3.8–10.5)
WBC # FLD AUTO: 13.78 K/UL — HIGH (ref 3.8–10.5)

## 2022-06-05 RX ADMIN — Medication 1 APPLICATION(S): at 13:48

## 2022-06-05 RX ADMIN — ATORVASTATIN CALCIUM 40 MILLIGRAM(S): 80 TABLET, FILM COATED ORAL at 21:03

## 2022-06-05 RX ADMIN — SEVELAMER CARBONATE 800 MILLIGRAM(S): 2400 POWDER, FOR SUSPENSION ORAL at 13:49

## 2022-06-05 RX ADMIN — HYDROMORPHONE HYDROCHLORIDE 0.5 MILLIGRAM(S): 2 INJECTION INTRAMUSCULAR; INTRAVENOUS; SUBCUTANEOUS at 04:54

## 2022-06-05 RX ADMIN — MEROPENEM 100 MILLIGRAM(S): 1 INJECTION INTRAVENOUS at 18:27

## 2022-06-05 RX ADMIN — OXYCODONE AND ACETAMINOPHEN 1 TABLET(S): 5; 325 TABLET ORAL at 14:27

## 2022-06-05 RX ADMIN — HYDROMORPHONE HYDROCHLORIDE 0.5 MILLIGRAM(S): 2 INJECTION INTRAMUSCULAR; INTRAVENOUS; SUBCUTANEOUS at 22:02

## 2022-06-05 RX ADMIN — CHLORHEXIDINE GLUCONATE 1 APPLICATION(S): 213 SOLUTION TOPICAL at 13:50

## 2022-06-05 RX ADMIN — OXYCODONE AND ACETAMINOPHEN 1 TABLET(S): 5; 325 TABLET ORAL at 13:51

## 2022-06-05 RX ADMIN — Medication 81 MILLIGRAM(S): at 13:49

## 2022-06-05 RX ADMIN — HEPARIN SODIUM 5000 UNIT(S): 5000 INJECTION INTRAVENOUS; SUBCUTANEOUS at 06:07

## 2022-06-05 RX ADMIN — HYDROMORPHONE HYDROCHLORIDE 0.5 MILLIGRAM(S): 2 INJECTION INTRAMUSCULAR; INTRAVENOUS; SUBCUTANEOUS at 04:39

## 2022-06-05 RX ADMIN — SEVELAMER CARBONATE 800 MILLIGRAM(S): 2400 POWDER, FOR SUSPENSION ORAL at 21:04

## 2022-06-05 RX ADMIN — CLOPIDOGREL BISULFATE 75 MILLIGRAM(S): 75 TABLET, FILM COATED ORAL at 13:49

## 2022-06-05 RX ADMIN — HEPARIN SODIUM 5000 UNIT(S): 5000 INJECTION INTRAVENOUS; SUBCUTANEOUS at 18:28

## 2022-06-05 RX ADMIN — SEVELAMER CARBONATE 800 MILLIGRAM(S): 2400 POWDER, FOR SUSPENSION ORAL at 09:31

## 2022-06-05 NOTE — PROGRESS NOTE ADULT - SUBJECTIVE AND OBJECTIVE BOX
Date of Service  : 06-05-22     INTERVAL HPI/OVERNIGHT EVENTS: I feel fine.   Vital Signs Last 24 Hrs  T(C): 37.5 (05 Jun 2022 18:32), Max: 37.7 (04 Jun 2022 21:01)  T(F): 99.5 (05 Jun 2022 18:32), Max: 99.9 (04 Jun 2022 21:01)  HR: 77 (05 Jun 2022 18:32) (77 - 85)  BP: 114/51 (05 Jun 2022 18:32) (109/50 - 116/59)  BP(mean): --  RR: 17 (05 Jun 2022 18:32) (17 - 18)  SpO2: 100% (05 Jun 2022 18:32) (94% - 100%)  I&O's Summary    04 Jun 2022 07:01  -  05 Jun 2022 07:00  --------------------------------------------------------  IN: 960 mL / OUT: 0 mL / NET: 960 mL    05 Jun 2022 07:01  -  05 Jun 2022 20:57  --------------------------------------------------------  IN: 800 mL / OUT: 0 mL / NET: 800 mL      MEDICATIONS  (STANDING):  aspirin  chewable 81 milliGRAM(s) Oral daily  atorvastatin 40 milliGRAM(s) Oral at bedtime  chlorhexidine 2% Cloths 1 Application(s) Topical daily  clopidogrel Tablet 75 milliGRAM(s) Oral daily  collagenase Ointment 1 Application(s) Topical daily  heparin   Injectable 5000 Unit(s) SubCutaneous every 12 hours  meropenem  IVPB 500 milliGRAM(s) IV Intermittent every 24 hours  sevelamer carbonate 800 milliGRAM(s) Oral three times a day    MEDICATIONS  (PRN):  acetaminophen     Tablet .. 650 milliGRAM(s) Oral every 6 hours PRN Temp greater or equal to 38C (100.4F), Mild Pain (1 - 3)  HYDROmorphone  Injectable 0.5 milliGRAM(s) IV Push every 4 hours PRN Severe Pain (7 - 10)  oxycodone    5 mG/acetaminophen 325 mG 1 Tablet(s) Oral every 6 hours PRN Moderate Pain (4 - 6)    LABS:                        10.5   13.78 )-----------( 256      ( 05 Jun 2022 06:01 )             35.8     06-05    134<L>  |  89<L>  |  38<H>  ----------------------------<  104<H>  4.5   |  26  |  7.79<H>    Ca    10.1      05 Jun 2022 06:01  Phos  5.3     06-05  Mg     2.50     06-05          CAPILLARY BLOOD GLUCOSE              REVIEW OF SYSTEMS:  CONSTITUTIONAL: No fever, weight loss, or fatigue  EYES: No eye pain, visual disturbances, or discharge  ENMT:  No difficulty hearing, tinnitus, vertigo; No sinus or throat pain  NECK: No pain or stiffness  RESPIRATORY: No cough, wheezing, chills or hemoptysis; No shortness of breath  CARDIOVASCULAR: No chest pain, palpitations, dizziness, or leg swelling  GASTROINTESTINAL: No abdominal or epigastric pain. No nausea, vomiting, or hematemesis; No diarrhea or constipation. No melena or hematochezia.  GENITOURINARY: No dysuria, frequency, hematuria, or incontinence  NEUROLOGICAL: No headaches, memory loss, loss of strength, numbness, or tremors    RADIOLOGY & ADDITIONAL TESTS:    Consultant(s) Notes Reviewed:  [x ] YES  [ ] NO    PHYSICAL EXAM:  GENERAL: NAD, well-groomed, well-developed,not in any distress ,  HEAD:  Atraumatic, Normocephalic  NECK: Supple, No JVD, Normal thyroid  NERVOUS SYSTEM:  Alert & Oriented X3, No focal deficit   CHEST/LUNG: Good air entry bilateral with no  rales, rhonchi, wheezing, or rubs  HEART: Regular rate and rhythm; No murmurs, rubs, or gallops  ABDOMEN: Soft, Nontender, Nondistended; Bowel sounds present  EXTREMITIES:  Feet dressed     Care Discussed with Consultants/Other Providers [ x] YES  [ ] NO

## 2022-06-05 NOTE — PROGRESS NOTE ADULT - ASSESSMENT
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 Admitted for further evaluation     Problem/Plan - 1:  ·  Problem: Wound of right foot with  Cellulitis with Left 4th toe OM .   ·  Plan: Pt w/ increasing R toe pain when walking as well as swelling. Mild white count and elevated inflammatory markers but afebrile with vitals stable  -podiatry and ID help appreciated.   -started  IV ertapenem 500mg q24hrs per iD .      Problem/Plan - 2:  ·  Problem: PAD (peripheral artery disease).   ·  Plan: RLE Angiogram next week per Vascular.   -known PAD, continue asa/plavix. Pt denies any numbness or tingling.  -recent angiograms done w/ poor peripheral arterial flow per podiatry and so poor surgical candidate as above  -continue statin.     Problem/Plan - 3:  ·  Problem: ESRD (end stage renal disease) on dialysis.   ·  Plan: Renal helping with HD.   On MWF schedule.  -monitor I/O.     Problem/Plan - 4:  ·  Problem: HLD (hyperlipidemia).   ·  Plan: continue statin. check lipid profile.     Problem/Plan - 5:  ·  Problem: Need for prophylactic measure.   ·  Plan: heparin subcu for dvt ppx, dash diet.    Medically optimized for Vascular procedure.

## 2022-06-05 NOTE — PROGRESS NOTE ADULT - SUBJECTIVE AND OBJECTIVE BOX
Cardiovascular Disease Progress Note    Overnight events: No acute events overnight.  Ms. Boogie denies chest pain or SOB.   Otherwise review of systems negative    Objective Findings:  T(C): 37 (06-05-22 @ 06:30), Max: 37.7 (06-04-22 @ 21:01)  HR: 77 (06-05-22 @ 06:30) (77 - 88)  BP: 109/54 (06-05-22 @ 06:30) (98/50 - 127/52)  RR: 18 (06-05-22 @ 06:30) (18 - 18)  SpO2: 98% (06-05-22 @ 06:30) (94% - 100%)  Wt(kg): --  Daily     Daily       Physical Exam:  Gen: NAD; Patient resting comfortably  HEENT: EOMI, Normocephalic/ atraumatic  CV: RRR, normal S1 + S2, no m/r/g  Lungs:  Normal respiratory effort; clear to auscultation bilaterally  Abd: soft, non-tender; bowel sounds present  Ext: No edema; warm and well perfused    Telemetry: N/a    Laboratory Data:                        10.5   13.78 )-----------( 256      ( 05 Jun 2022 06:01 )             35.8     06-05    134<L>  |  89<L>  |  38<H>  ----------------------------<  104<H>  4.5   |  26  |  7.79<H>    Ca    10.1      05 Jun 2022 06:01  Phos  5.3     06-05  Mg     2.50     06-05                Inpatient Medications:  MEDICATIONS  (STANDING):  aspirin  chewable 81 milliGRAM(s) Oral daily  atorvastatin 40 milliGRAM(s) Oral at bedtime  chlorhexidine 2% Cloths 1 Application(s) Topical daily  clopidogrel Tablet 75 milliGRAM(s) Oral daily  collagenase Ointment 1 Application(s) Topical daily  heparin   Injectable 5000 Unit(s) SubCutaneous every 12 hours  meropenem  IVPB 500 milliGRAM(s) IV Intermittent every 24 hours  sevelamer carbonate 800 milliGRAM(s) Oral three times a day      Assessment:  61-year-old female with ESRD on HD, HLD and peripheral vascular disease s/p lower extremity resection presents with chronic limb ischemia.     Plan of Care:    #Pre-op risk stratification  - Pt tentatively scheduled for left foot TMA and right foot partial 1st ray amputation on Wednesday with podiatry pending angiogram results  - Ms. Boogie displays no signs of pre-op coronary ischemia  - CHF/ Volume status has improved  - At this time, Ms. Boogie is optimized to proceed with surgery at the discretion of the surgical team if recommended by vascular  - Will continue with current cardiac management.     #Compensated diastolic CHF-  Volume status is improved post HD.   Fluid removal with HD as per the renal team.   Recent echo noted- normal LV systolic function.    #Peripheral vascular disease.  - S/p L foot partial 3rd ray amputation and R hallux amputation 3/2022.   - S/p lower extremity angioplasty 5/11/2022  - Plan for RLE angiogram next week by the vascular team.     #ESRD-  - HD as per renal.          Over 25 minutes spent on total encounter; more than 50% of the visit was spent counseling and/or coordinating care by the attending physician.      Wisam Adams D.O.   Cardiovascular Disease  [phone]

## 2022-06-06 ENCOUNTER — APPOINTMENT (OUTPATIENT)
Dept: VASCULAR SURGERY | Facility: CLINIC | Age: 62
End: 2022-06-06

## 2022-06-06 LAB
ANION GAP SERPL CALC-SCNC: 21 MMOL/L — HIGH (ref 7–14)
BUN SERPL-MCNC: 54 MG/DL — HIGH (ref 7–23)
CALCIUM SERPL-MCNC: 10 MG/DL — SIGNIFICANT CHANGE UP (ref 8.4–10.5)
CHLORIDE SERPL-SCNC: 87 MMOL/L — LOW (ref 98–107)
CO2 SERPL-SCNC: 25 MMOL/L — SIGNIFICANT CHANGE UP (ref 22–31)
CREAT SERPL-MCNC: 9.72 MG/DL — HIGH (ref 0.5–1.3)
EGFR: 4 ML/MIN/1.73M2 — LOW
GLUCOSE SERPL-MCNC: 134 MG/DL — HIGH (ref 70–99)
HCT VFR BLD CALC: 32.6 % — LOW (ref 34.5–45)
HGB BLD-MCNC: 9.7 G/DL — LOW (ref 11.5–15.5)
MAGNESIUM SERPL-MCNC: 2.9 MG/DL — HIGH (ref 1.6–2.6)
MCHC RBC-ENTMCNC: 26.8 PG — LOW (ref 27–34)
MCHC RBC-ENTMCNC: 29.8 GM/DL — LOW (ref 32–36)
MCV RBC AUTO: 90.1 FL — SIGNIFICANT CHANGE UP (ref 80–100)
NRBC # BLD: 0 /100 WBCS — SIGNIFICANT CHANGE UP
NRBC # FLD: 0 K/UL — SIGNIFICANT CHANGE UP
PHOSPHATE SERPL-MCNC: 5.8 MG/DL — HIGH (ref 2.5–4.5)
PLATELET # BLD AUTO: 252 K/UL — SIGNIFICANT CHANGE UP (ref 150–400)
POTASSIUM SERPL-MCNC: 4.4 MMOL/L — SIGNIFICANT CHANGE UP (ref 3.5–5.3)
POTASSIUM SERPL-SCNC: 4.4 MMOL/L — SIGNIFICANT CHANGE UP (ref 3.5–5.3)
RBC # BLD: 3.62 M/UL — LOW (ref 3.8–5.2)
RBC # FLD: 14.9 % — HIGH (ref 10.3–14.5)
SARS-COV-2 RNA SPEC QL NAA+PROBE: SIGNIFICANT CHANGE UP
SODIUM SERPL-SCNC: 133 MMOL/L — LOW (ref 135–145)
WBC # BLD: 14.28 K/UL — HIGH (ref 3.8–10.5)
WBC # FLD AUTO: 14.28 K/UL — HIGH (ref 3.8–10.5)

## 2022-06-06 RX ORDER — ERYTHROPOIETIN 10000 [IU]/ML
6000 INJECTION, SOLUTION INTRAVENOUS; SUBCUTANEOUS
Refills: 0 | Status: DISCONTINUED | OUTPATIENT
Start: 2022-06-06 | End: 2022-06-10

## 2022-06-06 RX ADMIN — MEROPENEM 100 MILLIGRAM(S): 1 INJECTION INTRAVENOUS at 19:48

## 2022-06-06 RX ADMIN — OXYCODONE AND ACETAMINOPHEN 1 TABLET(S): 5; 325 TABLET ORAL at 15:13

## 2022-06-06 RX ADMIN — OXYCODONE AND ACETAMINOPHEN 1 TABLET(S): 5; 325 TABLET ORAL at 16:11

## 2022-06-06 RX ADMIN — SEVELAMER CARBONATE 800 MILLIGRAM(S): 2400 POWDER, FOR SUSPENSION ORAL at 13:12

## 2022-06-06 RX ADMIN — CHLORHEXIDINE GLUCONATE 1 APPLICATION(S): 213 SOLUTION TOPICAL at 12:00

## 2022-06-06 RX ADMIN — ATORVASTATIN CALCIUM 40 MILLIGRAM(S): 80 TABLET, FILM COATED ORAL at 21:02

## 2022-06-06 RX ADMIN — CLOPIDOGREL BISULFATE 75 MILLIGRAM(S): 75 TABLET, FILM COATED ORAL at 13:12

## 2022-06-06 RX ADMIN — SEVELAMER CARBONATE 800 MILLIGRAM(S): 2400 POWDER, FOR SUSPENSION ORAL at 08:57

## 2022-06-06 RX ADMIN — HEPARIN SODIUM 5000 UNIT(S): 5000 INJECTION INTRAVENOUS; SUBCUTANEOUS at 06:41

## 2022-06-06 RX ADMIN — HYDROMORPHONE HYDROCHLORIDE 0.5 MILLIGRAM(S): 2 INJECTION INTRAMUSCULAR; INTRAVENOUS; SUBCUTANEOUS at 13:13

## 2022-06-06 RX ADMIN — HEPARIN SODIUM 5000 UNIT(S): 5000 INJECTION INTRAVENOUS; SUBCUTANEOUS at 19:48

## 2022-06-06 RX ADMIN — SEVELAMER CARBONATE 800 MILLIGRAM(S): 2400 POWDER, FOR SUSPENSION ORAL at 21:02

## 2022-06-06 RX ADMIN — Medication 1 APPLICATION(S): at 13:12

## 2022-06-06 RX ADMIN — Medication 81 MILLIGRAM(S): at 13:12

## 2022-06-06 NOTE — PROGRESS NOTE ADULT - SUBJECTIVE AND OBJECTIVE BOX
Cardiovascular Disease Progress Note    Overnight events: No acute events overnight.   Ms. Boogie denies chest pain or SOB.    Otherwise review of systems negative    Objective Findings:  T(C): 37.3 (06-06-22 @ 06:15), Max: 37.5 (06-05-22 @ 18:32)  HR: 76 (06-06-22 @ 06:15) (76 - 80)  BP: 106/52 (06-06-22 @ 06:15) (106/52 - 122/50)  RR: 18 (06-06-22 @ 06:15) (17 - 18)  SpO2: 98% (06-06-22 @ 06:15) (95% - 100%)  Wt(kg): --  Daily     Daily       Physical Exam:  Gen: NAD; Patient resting comfortably  HEENT: EOMI, Normocephalic/ atraumatic  CV: RRR, normal S1 + S2, no m/r/g  Lungs:  Normal respiratory effort; clear to auscultation bilaterally  Abd: soft, non-tender; bowel sounds present  Ext: No edema; warm and well perfused    Telemetry: n/a    Laboratory Data:                        10.5   13.78 )-----------( 256      ( 05 Jun 2022 06:01 )             35.8     06-05    134<L>  |  89<L>  |  38<H>  ----------------------------<  104<H>  4.5   |  26  |  7.79<H>    Ca    10.1      05 Jun 2022 06:01  Phos  5.3     06-05  Mg     2.50     06-05                Inpatient Medications:  MEDICATIONS  (STANDING):  aspirin  chewable 81 milliGRAM(s) Oral daily  atorvastatin 40 milliGRAM(s) Oral at bedtime  chlorhexidine 2% Cloths 1 Application(s) Topical daily  clopidogrel Tablet 75 milliGRAM(s) Oral daily  collagenase Ointment 1 Application(s) Topical daily  heparin   Injectable 5000 Unit(s) SubCutaneous every 12 hours  meropenem  IVPB 500 milliGRAM(s) IV Intermittent every 24 hours  sevelamer carbonate 800 milliGRAM(s) Oral three times a day      Assessment: 61-year-old female with ESRD on HD, HLD and peripheral vascular disease s/p lower extremity resection presents with chronic limb ischemia.     Plan of Care:    #Pre-op risk stratification  - Ms. Boogie displays no signs of pre-op coronary ischemia  - CHF/ Volume status has improved  - At this time, Ms. Boogie is optimized to proceed with angiogram by the vascular team.  - Continue with current cardiac management.     #Compensated diastolic CHF-  Volume status is improved post HD.   Fluid removal with HD as per the renal team.   Recent echo noted- normal LV systolic function.    #Peripheral vascular disease.  - S/p L foot partial 3rd ray amputation and R hallux amputation 3/2022.   - S/p lower extremity angioplasty 5/11/2022  - Plan for RLE angiogram by the vascular team.     #ESRD-  - HD as per renal.          Over 25 minutes spent on total encounter; more than 50% of the visit was spent counseling and/or coordinating care by the attending physician.      Cristino Adams MD [facility]  Cardiovascular Disease  [phone]

## 2022-06-06 NOTE — PROGRESS NOTE ADULT - SUBJECTIVE AND OBJECTIVE BOX
[facility] - S Jluis / Shabbir S /D Romina/ S Denys/ SHERRI Madden/ Andrés Perkins / M Tristianu/ O Gladis  service -[phone], office [phone]  ---------------------------------------------------------------------------------------------------------------    Patient seen and examined bedside    Subjective and Objective: No overnight events, sob resolved. No complaints today. feeling better    Allergies: No Known Allergies      Hospital Medications:   MEDICATIONS  (STANDING):  aspirin  chewable 81 milliGRAM(s) Oral daily  atorvastatin 40 milliGRAM(s) Oral at bedtime  chlorhexidine 2% Cloths 1 Application(s) Topical daily  clopidogrel Tablet 75 milliGRAM(s) Oral daily  collagenase Ointment 1 Application(s) Topical daily  heparin   Injectable 5000 Unit(s) SubCutaneous every 12 hours  meropenem  IVPB 500 milliGRAM(s) IV Intermittent every 24 hours  sevelamer carbonate 800 milliGRAM(s) Oral three times a day      REVIEW OF SYSTEMS:  CONSTITUTIONAL: No weakness, fevers or chills  EYES/ENT: No visual changes;  No vertigo or throat pain   NECK: No pain or stiffness  RESPIRATORY: No cough, wheezing, hemoptysis; No shortness of breath  CARDIOVASCULAR: No chest pain or palpitations.  GASTROINTESTINAL: No abdominal or epigastric pain. No nausea, vomiting, or hematemesis; No diarrhea or constipation. No melena or hematochezia.  GENITOURINARY: No dysuria, frequency, foamy urine, urinary urgency, incontinence or hematuria  NEUROLOGICAL: No numbness or weakness  SKIN: No itching, burning, rashes, or lesions   VASCULAR: No bilateral lower extremity edema.   All other review of systems is negative unless indicated above.    VITALS:  T(F): 99 (06-06-22 @ 13:41), Max: 99.5 (06-05-22 @ 18:32)  HR: 75 (06-06-22 @ 13:41)  BP: 138/45 (06-06-22 @ 13:41)  RR: 18 (06-06-22 @ 13:41)  SpO2: 100% (06-06-22 @ 13:41)  Wt(kg): --    06-05 @ 07:01  -  06-06 @ 07:00  --------------------------------------------------------  IN: 1160 mL / OUT: 0 mL / NET: 1160 mL    06-06 @ 07:01  -  06-06 @ 15:09  --------------------------------------------------------  IN: 480 mL / OUT: 0 mL / NET: 480 mL          PHYSICAL EXAM:  Constitutional: NAD  HEENT: anicteric sclera, oropharynx clear  Neck: No JVD  Respiratory: CTAB, no wheezes, rales or rhonchi  Cardiovascular: S1, S2, RRR  Gastrointestinal: BS+, soft, NT/ND  Extremities: No cyanosis or clubbing. No peripheral edema  Neurological: A/O x 3, no focal deficits  Psychiatric: Normal mood, normal affect  : No CVA tenderness. No salcido.   Skin: No rashes  Vascular Access:    LABS:  06-06    133<L>  |  87<L>  |  54<H>  ----------------------------<  134<H>  4.4   |  25  |  9.72<H>    Ca    10.0      06 Jun 2022 06:16  Phos  5.8     06-06  Mg     2.90     06-06      Creatinine Trend: 9.72 <--, 7.79 <--, 5.55 <--, 8.91 <--, 6.90 <--, 8.83 <--, 6.39 <--                        9.7    14.28 )-----------( 252      ( 06 Jun 2022 06:16 )             32.6     Urine Studies:        RADIOLOGY & ADDITIONAL STUDIES:   [facility] - S Jluis / Shabbir S /D Romina/ S Denys/ S Chano/ Andrés Perkins / M Katlyn/ O Gladis  service -[phone], office [phone]  ---------------------------------------------------------------------------------------------------------------    Patient seen and examined bedside    Subjective and Objective: No overnight events, denied sob. No complaints today. feeling better    Allergies: No Known Allergies      Hospital Medications:   MEDICATIONS  (STANDING):  aspirin  chewable 81 milliGRAM(s) Oral daily  atorvastatin 40 milliGRAM(s) Oral at bedtime  chlorhexidine 2% Cloths 1 Application(s) Topical daily  clopidogrel Tablet 75 milliGRAM(s) Oral daily  collagenase Ointment 1 Application(s) Topical daily  heparin   Injectable 5000 Unit(s) SubCutaneous every 12 hours  meropenem  IVPB 500 milliGRAM(s) IV Intermittent every 24 hours  sevelamer carbonate 800 milliGRAM(s) Oral three times a day    VITALS:  T(F): 99 (06-06-22 @ 13:41), Max: 99.5 (06-05-22 @ 18:32)  HR: 75 (06-06-22 @ 13:41)  BP: 138/45 (06-06-22 @ 13:41)  RR: 18 (06-06-22 @ 13:41)  SpO2: 100% (06-06-22 @ 13:41)  Wt(kg): --    06-05 @ 07:01  -  06-06 @ 07:00  --------------------------------------------------------  IN: 1160 mL / OUT: 0 mL / NET: 1160 mL    06-06 @ 07:01  -  06-06 @ 15:09  --------------------------------------------------------  IN: 480 mL / OUT: 0 mL / NET: 480 mL      PHYSICAL EXAM:  Constitutional: NAD  HEENT: anicteric sclera  Neck: No JVD  Respiratory: CTAB, no wheezes, rales or rhonchi  Cardiovascular: S1, S2, RRR  Gastrointestinal: BS+, soft, NT/ND  Extremities: + peripheral edema rt leg. b/l feet dressings+  Neurological: A/O x 3  Psychiatric: Normal mood, normal affect  : No salcido.   Vascular Access: rt femoral AVG+    LABS:  06-06    133<L>  |  87<L>  |  54<H>  ----------------------------<  134<H>  4.4   |  25  |  9.72<H>    Ca    10.0      06 Jun 2022 06:16  Phos  5.8     06-06  Mg     2.90     06-06      Creatinine Trend: 9.72 <--, 7.79 <--, 5.55 <--, 8.91 <--, 6.90 <--, 8.83 <--, 6.39 <--                        9.7    14.28 )-----------( 252      ( 06 Jun 2022 06:16 )             32.6     Urine Studies:        RADIOLOGY & ADDITIONAL STUDIES:   42

## 2022-06-06 NOTE — PROGRESS NOTE ADULT - ASSESSMENT
60 yo f s/p right foot partial hallux amputation and left foot partial 4rd ray amputation (DOS 3/4/2022)  -pt seen and evaluated  - T99.2 , WBC 14.28   - 3/4/22 s/p L foot partial 3rd ray resection open: fibrotic wound to subQ, no malodor, no drainage, no purulence, flaps cool, improved maceration, distal skin necrosis, no tracking, no purulence, no erythema    - 3/4/22 s/p R foot partial hallux: dehisced surgical wound to level of bone, no purulence today, non-pitting edema extending from digits to rearfoot, no fluctuance  - R foot wound culture growing ESBL Klebsiella & E coli  - Pt is s/p recent b/l LE angiograms with poor peripheral arterial flow, is a poor surgical candidate  - ID recs meropenem 500 mg iv q 24 h  - Pending bilateral foot MRI   - Patient is tentatively booked for left foot TMA and right foot partial 1st ray amputation on Wed at 3pm pending [facility] recs   - Please document medical optimization and clearance for bilateral foot surgery under sedation with local anesthesia   - discussed with attending

## 2022-06-06 NOTE — CHART NOTE - NSCHARTNOTEFT_GEN_A_CORE
Pre-Operative Note    Procedure Date: 6/7/22  Procedure: right lower extremity angiogram   Surgeon: Dr. Gilliland     Labs:                        9.7    14.28 )-----------( 252      ( 06 Jun 2022 06:16 )             32.6     06-06    133<L>  |  87<L>  |  54<H>  ----------------------------<  134<H>  4.4   |  25  |  9.72<H>    Ca    10.0      06 Jun 2022 06:16  Phos  5.8     06-06  Mg     2.90     06-06        Type & Screen #1: 5/18/22  Type & Screen #2: pending in AM  Pregnancy Test: HCG pending or AM (age for preop HCG 65 and <)    COVID: negative as of 6/6    Plan:  - NPO at midnight  - COVID negative (6/6)  - HD today   - continue VTE ppx   - consent to be obtained     Vascular Surgery [id number] Pre-Operative Note    Procedure Date: 6/7/22  Procedure: right lower extremity angiogram   Surgeon: Dr. Gilliland     Labs:                        9.7    14.28 )-----------( 252      ( 06 Jun 2022 06:16 )             32.6     06-06    133<L>  |  87<L>  |  54<H>  ----------------------------<  134<H>  4.4   |  25  |  9.72<H>    Ca    10.0      06 Jun 2022 06:16  Phos  5.8     06-06  Mg     2.90     06-06        Type & Screen #1: 5/18/22  Type & Screen #2: pending in AM  Pregnancy Test: HCG pending or AM (age for preop HCG 65 and <)    COVID: negative as of 6/6    Plan:  - NPO at midnight  - COVID negative (6/6)  - HD to happen tomorrow after angio, will call HD suite 6/7 to have patient put on last shift   - continue VTE ppx   - consent to be obtained     Vascular Surgery [id number] Pre-Operative Note    Procedure Date: 6/7/22  Procedure: right lower extremity angiogram   Surgeon: Dr. Gilliland     Labs:                        9.7    14.28 )-----------( 252      ( 06 Jun 2022 06:16 )             32.6     06-06    133<L>  |  87<L>  |  54<H>  ----------------------------<  134<H>  4.4   |  25  |  9.72<H>    Ca    10.0      06 Jun 2022 06:16  Phos  5.8     06-06  Mg     2.90     06-06        Type & Screen #1: 5/18/22  Type & Screen #2: pending in AM  Pregnancy Test: HCG pending or AM (age for preop HCG 65 and <)    COVID: negative as of 6/6    Plan:  - NPO at midnight  - COVID negative (6/6)  - HD tomorrow 6/7: on for last shift   - continue VTE ppx   - consent to be obtained     Vascular Surgery [id number]

## 2022-06-06 NOTE — PROGRESS NOTE ADULT - SUBJECTIVE AND OBJECTIVE BOX
Date of Service  : 06-06-22     INTERVAL HPI/OVERNIGHT EVENTS: No new concerns.   Vital Signs Last 24 Hrs  T(C): 36.3 (06 Jun 2022 10:15), Max: 37.5 (05 Jun 2022 18:32)  T(F): 97.4 (06 Jun 2022 10:15), Max: 99.5 (05 Jun 2022 18:32)  HR: 80 (06 Jun 2022 10:15) (76 - 80)  BP: 132/49 (06 Jun 2022 10:15) (106/52 - 132/49)  BP(mean): --  RR: 18 (06 Jun 2022 10:15) (17 - 18)  SpO2: 99% (06 Jun 2022 10:15) (95% - 100%)  I&O's Summary    05 Jun 2022 07:01  -  06 Jun 2022 07:00  --------------------------------------------------------  IN: 1160 mL / OUT: 0 mL / NET: 1160 mL      MEDICATIONS  (STANDING):  aspirin  chewable 81 milliGRAM(s) Oral daily  atorvastatin 40 milliGRAM(s) Oral at bedtime  chlorhexidine 2% Cloths 1 Application(s) Topical daily  clopidogrel Tablet 75 milliGRAM(s) Oral daily  collagenase Ointment 1 Application(s) Topical daily  heparin   Injectable 5000 Unit(s) SubCutaneous every 12 hours  meropenem  IVPB 500 milliGRAM(s) IV Intermittent every 24 hours  sevelamer carbonate 800 milliGRAM(s) Oral three times a day    MEDICATIONS  (PRN):  acetaminophen     Tablet .. 650 milliGRAM(s) Oral every 6 hours PRN Temp greater or equal to 38C (100.4F), Mild Pain (1 - 3)  HYDROmorphone  Injectable 0.5 milliGRAM(s) IV Push every 4 hours PRN Severe Pain (7 - 10)  oxycodone    5 mG/acetaminophen 325 mG 1 Tablet(s) Oral every 6 hours PRN Moderate Pain (4 - 6)    LABS:                        9.7    14.28 )-----------( 252      ( 06 Jun 2022 06:16 )             32.6     06-06    133<L>  |  87<L>  |  54<H>  ----------------------------<  134<H>  4.4   |  25  |  9.72<H>    Ca    10.0      06 Jun 2022 06:16  Phos  5.8     06-06  Mg     2.90     06-06          CAPILLARY BLOOD GLUCOSE              REVIEW OF SYSTEMS:  CONSTITUTIONAL: No fever, weight loss, or fatigue  EYES: No eye pain, visual disturbances, or discharge  ENMT:  No difficulty hearing, tinnitus, vertigo; No sinus or throat pain  NECK: No pain or stiffness  RESPIRATORY: No cough, wheezing, chills or hemoptysis; No shortness of breath  CARDIOVASCULAR: No chest pain, palpitations, dizziness, or leg swelling  GASTROINTESTINAL: No abdominal or epigastric pain. No nausea, vomiting, or hematemesis; No diarrhea or constipation. No melena or hematochezia.  GENITOURINARY: No dysuria, frequency, hematuria, or incontinence  NEUROLOGICAL: No headaches, memory loss, loss of strength, numbness, or tremors    Consultant(s) Notes Reviewed:  [x ] YES  [ ] NO    PHYSICAL EXAM:  GENERAL: NAD, well-groomed, well-developed ,not in any distress ,  HEAD:  Atraumatic, Normocephalic  NECK: Supple, No JVD, Normal thyroid  NERVOUS SYSTEM:  Alert & Oriented X3, No focal deficit   CHEST/LUNG: Good air entry bilateral with no  rales, rhonchi, wheezing, or rubs  HEART: Regular rate and rhythm; No murmurs, rubs, or gallops  ABDOMEN: Soft, Nontender, Nondistended; Bowel sounds present  EXTREMITIES:  Feet dressed     Care Discussed with Consultants/Other Providers [ x] YES  [ ] NO

## 2022-06-06 NOTE — PROGRESS NOTE ADULT - SUBJECTIVE AND OBJECTIVE BOX
Patient is a 61y old  Female who presents with a chief complaint of RLE toe pain (06 Jun 2022 07:46)       INTERVAL HPI/OVERNIGHT EVENTS:  Patient seen and evaluated at bedside.  Pt is resting comfortable in NAD. Denies N/V/F/C.  Pain rated at X/10    Allergies    No Known Allergies    Intolerances        Vital Signs Last 24 Hrs  T(C): 37.3 (06 Jun 2022 06:15), Max: 37.5 (05 Jun 2022 18:32)  T(F): 99.2 (06 Jun 2022 06:15), Max: 99.5 (05 Jun 2022 18:32)  HR: 76 (06 Jun 2022 06:15) (76 - 80)  BP: 106/52 (06 Jun 2022 06:15) (106/52 - 122/50)  BP(mean): --  RR: 18 (06 Jun 2022 06:15) (17 - 18)  SpO2: 98% (06 Jun 2022 06:15) (95% - 100%)    LABS:                        9.7    14.28 )-----------( 252      ( 06 Jun 2022 06:16 )             32.6     06-06    133<L>  |  87<L>  |  54<H>  ----------------------------<  134<H>  4.4   |  25  |  9.72<H>    Ca    10.0      06 Jun 2022 06:16  Phos  5.8     06-06  Mg     2.90     06-06          CAPILLARY BLOOD GLUCOSE          Lower Extremity Physical Exam:    Vascular: DP/PT 1/4 R,  DP/PT 0/4 L, CFT <3 seconds, Temperature gradient warm to cooo B/L, TG is warm to cool b/l  Neuro: Epicritic sensation diminished to the level of forefoot B/L  Musculoskeletal/Ortho: left partial 5th ray resection healed, LF partial 4th ray resection open   Skin:   3/4/22 s/p L foot partial 3rd ray resection open: fibrotic wound to subQ, no malodor, no drainage, no purulence, flaps cool, improved maceration, distal skin necrosis, no tracking, no purulence, no erythema    3/4/22 s/p R foot partial hallux: dehisced surgical wound to level of bone, no purulence today, non-pitting edema extending from digits to rearfoot, no fluctuance      RADIOLOGY & ADDITIONAL TESTS:

## 2022-06-06 NOTE — PROGRESS NOTE ADULT - ASSESSMENT
62 y/o woman w/ ESRD on HD (MWF), HF, 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 Admitted for further evaluation     Problem/Plan - 1:  ·  Problem: Wound of right foot with  Cellulitis with Left 4th toe OM .   ·  Plan: Pt w/ increasing R toe pain when walking as well as swelling. Mild white count and elevated inflammatory markers but afebrile with vitals stable  -podiatry and ID help appreciated.   -started  IV ertapenem 500mg q24hrs per iD .      Problem/Plan - 2:  ·  Problem: PAD (peripheral artery disease).   ·  Plan: RLE Angiogram tomorrow  per Vascular.   -known PAD, continue asa/plavix. Pt denies any numbness or tingling.  -recent angiograms done w/ poor peripheral arterial flow per podiatry and so poor surgical candidate as above  -continue statin.     Problem/Plan - 3:  ·  Problem: ESRD (end stage renal disease) on dialysis.   ·  Plan: Renal helping with HD.   On MWF schedule.  -monitor I/O.     Problem/Plan - 4:  ·  Problem: HLD (hyperlipidemia).   ·  Plan: continue statin. check lipid profile.     Problem/Plan - 5:  ·  Problem: Need for prophylactic measure.   ·  Plan: heparin subcu for dvt ppx, dash diet.    Medically optimized for Vascular procedure.

## 2022-06-06 NOTE — PROGRESS NOTE ADULT - ASSESSMENT
62 y/o woman w/ ESRD on HD (MWF), HF, 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 Admitted for further evaluation. Renal following for ESRD Mx.     ESRD on HD  schedule hemodialysis - Monday, Wednesday and Friday   access- thigh AVG  HD unit SQDC  K, vol ok     s/p HD 6/3, Rx sheet reviewed, net UF 1kg, tolerated well. uneventful.  plan for next hemodialysis tomorrow afetr angiogram w/ 2 k bath , Ultrafiltration 1kg as tolerated with blood pressure   renal diet  dose all meds for ESRD  renal restrictions in diet.  Anemia of CKD- Hb <goal now. will ass DION epo 6k w/next hd tiw  HTN, controlled. bp stable.   PAD, infected rt foot wounds, ?OM- s/p vanco, on zosyn. f/u w/Podiatry. on ertapenem now. plan for MRI foot w/o Morgan  f/u w/vas sx eval.   Podiatry f/u noted- patient is tentatively booked for left foot TMA and right foot partial 1st ray amputation on Wed at 3pm pending vasc recs   pt will need HD tomorrow/Tue pm for renal optimization for OR wednesday. pt doesn't want back to back hd today and tomorrow.   plan for LE angiogram tomorrow. optimized renal stand point.       poc d/w pt, vas s ax resident, HD RN  labs, chart reviewed  For any question, call:  Cell # [phone]  Pager # [phone]  office # [phone]

## 2022-06-07 ENCOUNTER — TRANSCRIPTION ENCOUNTER (OUTPATIENT)
Age: 62
End: 2022-06-07

## 2022-06-07 LAB
ANION GAP SERPL CALC-SCNC: 19 MMOL/L — HIGH (ref 7–14)
APTT BLD: 33.8 SEC — SIGNIFICANT CHANGE UP (ref 27–36.3)
BLD GP AB SCN SERPL QL: NEGATIVE — SIGNIFICANT CHANGE UP
BUN SERPL-MCNC: 64 MG/DL — HIGH (ref 7–23)
CALCIUM SERPL-MCNC: 9.9 MG/DL — SIGNIFICANT CHANGE UP (ref 8.4–10.5)
CHLORIDE SERPL-SCNC: 86 MMOL/L — LOW (ref 98–107)
CO2 SERPL-SCNC: 26 MMOL/L — SIGNIFICANT CHANGE UP (ref 22–31)
CREAT SERPL-MCNC: 11.1 MG/DL — HIGH (ref 0.5–1.3)
EGFR: 4 ML/MIN/1.73M2 — LOW
GLUCOSE SERPL-MCNC: 119 MG/DL — HIGH (ref 70–99)
HCG SERPL-ACNC: <5 MIU/ML — SIGNIFICANT CHANGE UP
HCG SERPL-ACNC: <5 MIU/ML — SIGNIFICANT CHANGE UP
HCT VFR BLD CALC: 31.4 % — LOW (ref 34.5–45)
HGB BLD-MCNC: 9.7 G/DL — LOW (ref 11.5–15.5)
INR BLD: 1.12 RATIO — SIGNIFICANT CHANGE UP (ref 0.88–1.16)
MAGNESIUM SERPL-MCNC: 2.8 MG/DL — HIGH (ref 1.6–2.6)
MCHC RBC-ENTMCNC: 26.9 PG — LOW (ref 27–34)
MCHC RBC-ENTMCNC: 30.9 GM/DL — LOW (ref 32–36)
MCV RBC AUTO: 87.2 FL — SIGNIFICANT CHANGE UP (ref 80–100)
NRBC # BLD: 0 /100 WBCS — SIGNIFICANT CHANGE UP
NRBC # FLD: 0 K/UL — SIGNIFICANT CHANGE UP
PHOSPHATE SERPL-MCNC: 6.2 MG/DL — HIGH (ref 2.5–4.5)
PLATELET # BLD AUTO: 206 K/UL — SIGNIFICANT CHANGE UP (ref 150–400)
POTASSIUM SERPL-MCNC: 4.8 MMOL/L — SIGNIFICANT CHANGE UP (ref 3.5–5.3)
POTASSIUM SERPL-SCNC: 4.8 MMOL/L — SIGNIFICANT CHANGE UP (ref 3.5–5.3)
PROTHROM AB SERPL-ACNC: 13 SEC — SIGNIFICANT CHANGE UP (ref 10.5–13.4)
RBC # BLD: 3.6 M/UL — LOW (ref 3.8–5.2)
RBC # FLD: 14.8 % — HIGH (ref 10.3–14.5)
RH IG SCN BLD-IMP: POSITIVE — SIGNIFICANT CHANGE UP
SARS-COV-2 RNA SPEC QL NAA+PROBE: SIGNIFICANT CHANGE UP
SODIUM SERPL-SCNC: 131 MMOL/L — LOW (ref 135–145)
WBC # BLD: 12.4 K/UL — HIGH (ref 3.8–10.5)
WBC # FLD AUTO: 12.4 K/UL — HIGH (ref 3.8–10.5)

## 2022-06-07 PROCEDURE — 36245 INS CATH ABD/L-EXT ART 1ST: CPT | Mod: RT

## 2022-06-07 PROCEDURE — 99152 MOD SED SAME PHYS/QHP 5/>YRS: CPT

## 2022-06-07 PROCEDURE — 73718 MRI LOWER EXTREMITY W/O DYE: CPT | Mod: 26,RT

## 2022-06-07 PROCEDURE — 76937 US GUIDE VASCULAR ACCESS: CPT | Mod: 26

## 2022-06-07 PROCEDURE — 71045 X-RAY EXAM CHEST 1 VIEW: CPT | Mod: 26

## 2022-06-07 PROCEDURE — 75710 ARTERY X-RAYS ARM/LEG: CPT | Mod: 26

## 2022-06-07 RX ADMIN — HYDROMORPHONE HYDROCHLORIDE 0.5 MILLIGRAM(S): 2 INJECTION INTRAMUSCULAR; INTRAVENOUS; SUBCUTANEOUS at 14:55

## 2022-06-07 RX ADMIN — CLOPIDOGREL BISULFATE 75 MILLIGRAM(S): 75 TABLET, FILM COATED ORAL at 09:32

## 2022-06-07 RX ADMIN — Medication 81 MILLIGRAM(S): at 14:25

## 2022-06-07 RX ADMIN — ERYTHROPOIETIN 6000 UNIT(S): 10000 INJECTION, SOLUTION INTRAVENOUS; SUBCUTANEOUS at 21:41

## 2022-06-07 RX ADMIN — HEPARIN SODIUM 5000 UNIT(S): 5000 INJECTION INTRAVENOUS; SUBCUTANEOUS at 05:43

## 2022-06-07 RX ADMIN — Medication 650 MILLIGRAM(S): at 23:00

## 2022-06-07 RX ADMIN — HYDROMORPHONE HYDROCHLORIDE 0.5 MILLIGRAM(S): 2 INJECTION INTRAMUSCULAR; INTRAVENOUS; SUBCUTANEOUS at 14:25

## 2022-06-07 RX ADMIN — HYDROMORPHONE HYDROCHLORIDE 0.5 MILLIGRAM(S): 2 INJECTION INTRAMUSCULAR; INTRAVENOUS; SUBCUTANEOUS at 18:31

## 2022-06-07 RX ADMIN — Medication 1 APPLICATION(S): at 14:26

## 2022-06-07 RX ADMIN — Medication 650 MILLIGRAM(S): at 22:29

## 2022-06-07 RX ADMIN — CHLORHEXIDINE GLUCONATE 1 APPLICATION(S): 213 SOLUTION TOPICAL at 14:29

## 2022-06-07 NOTE — CHART NOTE - NSCHARTNOTEFT_GEN_A_CORE
GENERAL SURGERY POST-OPERATIVE NOTE    JAYCEE WALTERS | [id number] | [id number] A    Procedure: s/p Diagnostic Angiogram    SUBJECTIVE: Patient seen after procedure. Patient tolerated procedure well. Endorses baseline foot pain. No numbness/paresthesias/weakness in feet.   SOB:  [ ] YES [ x] NO  Chest Discomfort: [ ] YES [x ] NO    Nausea: [ ] YES [x ] NO           Vomiting: [ ] YES [x ] NO  Diarrhea: [ ] YES [x ] NO         Void: [ ]YES [x ]No           Pain Control Adequate: [x ] YES [ ] NO    PAST MEDICAL & SURGICAL HISTORY:  Heart failure      HLD (hyperlipidemia)      Glaucoma      Cataract      CKD (chronic kidney disease), stage IV      ESRD (end stage renal disease) on dialysis      PAD (peripheral artery disease)      Blind right eye      Acute osteomyelitis of toe of right foot  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          PHYSICAL EXAM:  Gen: NAD   Resp: breathing comfortably on room air   CV: RRR  Abdomen: soft, nontender, nondistended  Vasc: RLE foot dressing c/d/i, DP/PT signals  LLE DP/PT signals  Skin: Normal color, no rashes or cyanosis    Vital Signs Last 24 Hrs  T(C): 37.1 (07 Jun 2022 17:45), Max: 37.1 (07 Jun 2022 01:37)  T(F): 98.8 (07 Jun 2022 17:45), Max: 98.8 (07 Jun 2022 17:45)  HR: 71 (07 Jun 2022 17:45) (71 - 89)  BP: 123/53 (07 Jun 2022 17:45) (116/50 - 125/58)  BP(mean): --  RR: 18 (07 Jun 2022 17:45) (17 - 18)  SpO2: 97% (07 Jun 2022 17:45) (97% - 100%)  I&O's Summary    06 Jun 2022 07:01  -  07 Jun 2022 07:00  --------------------------------------------------------  IN: 840 mL / OUT: 0 mL / NET: 840 mL      I&O's Detail    06 Jun 2022 07:01  -  07 Jun 2022 07:00  --------------------------------------------------------  IN:    Oral Fluid: 840 mL  Total IN: 840 mL    OUT:    Stool (mL): 0 mL    Voided (mL): 0 mL  Total OUT: 0 mL    Total NET: 840 mL                              9.7    12.40 )-----------( 206      ( 07 Jun 2022 03:43 )             31.4     06-07    131<L>  |  86<L>  |  64<H>  ----------------------------<  119<H>  4.8   |  26  |  11.10<H>    Ca    9.9      07 Jun 2022 03:43  Phos  6.2     06-07  Mg     2.80     06-07     PT/INR - ( 07 Jun 2022 03:43 )   PT: 13.0 sec;   INR: 1.12 ratio         PTT - ( 07 Jun 2022 03:43 )  PTT:33.8 sec    Assessment:  The patient is a 61y F who is now several hours post-op from a diagnostic angiogram. Recovering well.     Plan:  - Pain control as needed  - Diet: DASH Regs  - DAPT  - Gerard  - F/U Labs    STiki Pearson, PGY-1  Vascular Surgery  [id number]

## 2022-06-07 NOTE — PROGRESS NOTE ADULT - SUBJECTIVE AND OBJECTIVE BOX
[facility] - S Jluis / Shabbir S /D Romina/ S Denys/ SHERRI Madden/ Andrés Perkins / M Tristianu/ O Gladis  service -[phone], office [phone]  ---------------------------------------------------------------------------------------------------------------    Patient seen and examined bedside    Subjective and Objective: No overnight events, sob resolved. No complaints today. feeling better    Allergies: No Known Allergies      Hospital Medications:   MEDICATIONS  (STANDING):  aspirin  chewable 81 milliGRAM(s) Oral daily  atorvastatin 40 milliGRAM(s) Oral at bedtime  chlorhexidine 2% Cloths 1 Application(s) Topical daily  clopidogrel Tablet 75 milliGRAM(s) Oral daily  collagenase Ointment 1 Application(s) Topical daily  epoetin niesha-epbx (RETACRIT) Injectable 6000 Unit(s) IV Push <User Schedule>  meropenem  IVPB 500 milliGRAM(s) IV Intermittent every 24 hours  sevelamer carbonate 800 milliGRAM(s) Oral three times a day      REVIEW OF SYSTEMS:  CONSTITUTIONAL: No weakness, fevers or chills  EYES/ENT: No visual changes;  No vertigo or throat pain   NECK: No pain or stiffness  RESPIRATORY: No cough, wheezing, hemoptysis; No shortness of breath  CARDIOVASCULAR: No chest pain or palpitations.  GASTROINTESTINAL: No abdominal or epigastric pain. No nausea, vomiting, or hematemesis; No diarrhea or constipation. No melena or hematochezia.  GENITOURINARY: No dysuria, frequency, foamy urine, urinary urgency, incontinence or hematuria  NEUROLOGICAL: No numbness or weakness  SKIN: No itching, burning, rashes, or lesions   VASCULAR: No bilateral lower extremity edema.   All other review of systems is negative unless indicated above.    VITALS:  T(F): 98.8 (06-07-22 @ 17:45), Max: 98.8 (06-07-22 @ 17:45)  HR: 71 (06-07-22 @ 17:45)  BP: 123/53 (06-07-22 @ 17:45)  RR: 18 (06-07-22 @ 17:45)  SpO2: 97% (06-07-22 @ 17:45)  Wt(kg): --    06-06 @ 07:01  -  06-07 @ 07:00  --------------------------------------------------------  IN: 840 mL / OUT: 0 mL / NET: 840 mL      Height (cm): 154.9 (06-06 @ 23:52)  Weight (kg): 61.7 (06-06 @ 23:52)  BMI (kg/m2): 25.7 (06-06 @ 23:52)  BSA (m2): 1.6 (06-06 @ 23:52)    PHYSICAL EXAM:  Constitutional: NAD  HEENT: anicteric sclera, oropharynx clear  Neck: No JVD  Respiratory: CTAB, no wheezes, rales or rhonchi  Cardiovascular: S1, S2, RRR  Gastrointestinal: BS+, soft, NT/ND  Extremities: No cyanosis or clubbing. No peripheral edema  Neurological: A/O x 3, no focal deficits  Psychiatric: Normal mood, normal affect  : No CVA tenderness. No salcido.   Skin: No rashes  Vascular Access:    LABS:  06-07    131<L>  |  86<L>  |  64<H>  ----------------------------<  119<H>  4.8   |  26  |  11.10<H>    Ca    9.9      07 Jun 2022 03:43  Phos  6.2     06-07  Mg     2.80     06-07      Creatinine Trend: 11.10 <--, 9.72 <--, 7.79 <--, 5.55 <--, 8.91 <--, 6.90 <--, 8.83 <--                        9.7    12.40 )-----------( 206      ( 07 Jun 2022 03:43 )             31.4     Urine Studies:        RADIOLOGY & ADDITIONAL STUDIES:   [facility] - S Jluis / Shabbir S /D Romina/ S Denys/ S Chano/ Andrés Perkins / M Katlyn/ O Gladis  service -[phone], office [phone]  ---------------------------------------------------------------------------------------------------------------    Patient seen and examined bedside    Subjective and Objective: No overnight events, denied sob. No new complaints today.   s/p LE angio today    Allergies: No Known Allergies      Hospital Medications:   MEDICATIONS  (STANDING):  aspirin  chewable 81 milliGRAM(s) Oral daily  atorvastatin 40 milliGRAM(s) Oral at bedtime  chlorhexidine 2% Cloths 1 Application(s) Topical daily  clopidogrel Tablet 75 milliGRAM(s) Oral daily  collagenase Ointment 1 Application(s) Topical daily  epoetin niesha-epbx (RETACRIT) Injectable 6000 Unit(s) IV Push <User Schedule>  meropenem  IVPB 500 milliGRAM(s) IV Intermittent every 24 hours  sevelamer carbonate 800 milliGRAM(s) Oral three times a day    VITALS:  T(F): 98.8 (06-07-22 @ 17:45), Max: 98.8 (06-07-22 @ 17:45)  HR: 71 (06-07-22 @ 17:45)  BP: 123/53 (06-07-22 @ 17:45)  RR: 18 (06-07-22 @ 17:45)  SpO2: 97% (06-07-22 @ 17:45)  Wt(kg): --    06-06 @ 07:01  -  06-07 @ 07:00  --------------------------------------------------------  IN: 840 mL / OUT: 0 mL / NET: 840 mL      Height (cm): 154.9 (06-06 @ 23:52)  Weight (kg): 61.7 (06-06 @ 23:52)  BMI (kg/m2): 25.7 (06-06 @ 23:52)  BSA (m2): 1.6 (06-06 @ 23:52)    PHYSICAL EXAM:  Constitutional: NAD  HEENT: anicteric sclera  Neck: No JVD  Respiratory: CTAB, no wheezes, rales or rhonchi  Cardiovascular: S1, S2, RRR  Gastrointestinal: BS+, soft, NT/ND  Extremities: + peripheral edema rt leg. b/l feet dressings+  Neurological: A/O x 3  Psychiatric: Normal mood, normal affect  : No salcido.   Vascular Access: rt femoral AVG+    LABS:  06-07    131<L>  |  86<L>  |  64<H>  ----------------------------<  119<H>  4.8   |  26  |  11.10<H>    Ca    9.9      07 Jun 2022 03:43  Phos  6.2     06-07  Mg     2.80     06-07      Creatinine Trend: 11.10 <--, 9.72 <--, 7.79 <--, 5.55 <--, 8.91 <--, 6.90 <--, 8.83 <--                        9.7    12.40 )-----------( 206      ( 07 Jun 2022 03:43 )             31.4     Urine Studies:        RADIOLOGY & ADDITIONAL STUDIES:

## 2022-06-07 NOTE — PROGRESS NOTE ADULT - ASSESSMENT
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 Admitted for further evaluation     Problem/Plan - 1:  ·  Problem: Wound of right foot with  Cellulitis with Left 4th toe OM .   ·  Plan: Pt w/ increasing R toe pain when walking as well as swelling. Mild white count and elevated inflammatory markers but afebrile with vitals stable  -podiatry and ID help appreciated.   -started  IV ertapenem 500mg q24hrs per iD .   - Awaiting  left foot TMA and right foot partial 1st ray amputation on Wed .     Problem/Plan - 2:  ·  Problem: PAD (peripheral artery disease).   ·  Plan: S/P RLE Angiogram but unable to do any intervention per Vascular.   -known PAD, continue asa/plavix. Pt denies any numbness or tingling.  -recent angiograms done w/ poor peripheral arterial flow per podiatry and so poor surgical candidate as above  -continue statin.     Problem/Plan - 3:  ·  Problem: ESRD (end stage renal disease) on dialysis.   ·  Plan: Renal helping with HD.   On MWF schedule.  -monitor I/O.     Problem/Plan - 4:  ·  Problem: HLD (hyperlipidemia).   ·  Plan: continue statin. check lipid profile.     Problem/Plan - 5:  ·  Problem: Need for prophylactic measure.   ·  Plan: heparin subcu for dvt ppx, dash diet.    Medically optimized for podiatry  procedure.

## 2022-06-07 NOTE — PROGRESS NOTE ADULT - ASSESSMENT
62 y/o woman w/ ESRD on HD (MWF), HF, 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 Admitted for further evaluation. Renal following for ESRD Mx.     ESRD on HD  schedule hemodialysis - Monday, Wednesday and Friday   access- thigh AVG  HD unit SQDC  K, vol ok     s/p last HD 6/3, Rx sheet reviewed, net UF 1kg, tolerated well. uneventful.  plan for next hemodialysis today w/ 2 k bath , Ultrafiltration 1-1.5kg as tolerated with blood pressure   renal diet  dose all meds for ESRD  renal restrictions in diet.  Anemia of CKD- Hb <goal now. will ass DION epo 6k w/next hd tiw  HTN, controlled. bp stable.   PAD, infected rt foot wounds, ?OM- s/p vanco, on zosyn. f/u w/Podiatry. on ertapenem now. plan for MRI foot w/o Morgan  f/u w/vas sx eval.   Podiatry f/u noted- patient is tentatively booked for left foot TMA and right foot partial 1st ray amputation on Wed/tomorrow pending vasc recs   HD tonight for renal optimization for OR wednesday.   s/p LE angiogram today    poc d/w pt, HD RN  labs, chart reviewed  For any question, call:  Cell # [phone]  Pager # [phone]  office # [phone]

## 2022-06-07 NOTE — PROGRESS NOTE ADULT - ASSESSMENT
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 (DOS 3/4/2022)  -pt seen and evaluated  - T99.2 , WBC 12.40  - 3/4/22 s/p L foot partial 3rd ray resection open: fibrotic wound to subQ, no malodor, no drainage, no purulence, flaps cool, improved maceration, distal skin necrosis, no tracking, no purulence, no erythema    - 3/4/22 s/p R foot partial hallux: dehisced surgical wound to level of bone, no purulence today, non-pitting edema extending from digits to rearfoot, no fluctuance  - R foot wound culture growing ESBL Klebsiella & E coli  - Pt is s/p recent b/l LE angiograms with poor peripheral arterial flow, is a poor surgical candidate  - ID recs meropenem 500 mg iv q 24 h  - Pending bilateral foot MRI   - Patient is booked for left foot TMA and right foot partial 1st ray amputation on Wed at 7:30am pending [facility] recs   - Please document medical optimization and clearance for bilateral foot surgery under sedation with local anesthesia   - discussed with attending

## 2022-06-07 NOTE — PROGRESS NOTE ADULT - SUBJECTIVE AND OBJECTIVE BOX
Date of Service  : 06-07-22     INTERVAL HPI/OVERNIGHT EVENTS: Seen and examined earlier . S/P Angiogram.   Vital Signs Last 24 Hrs  T(C): 36.9 (07 Jun 2022 09:51), Max: 37.1 (07 Jun 2022 01:37)  T(F): 98.5 (07 Jun 2022 09:51), Max: 98.7 (07 Jun 2022 01:37)  HR: 73 (07 Jun 2022 09:51) (73 - 89)  BP: 116/54 (07 Jun 2022 09:51) (108/57 - 125/58)  BP(mean): --  RR: 17 (07 Jun 2022 09:51) (17 - 18)  SpO2: 100% (07 Jun 2022 09:51) (98% - 100%)  I&O's Summary    06 Jun 2022 07:01  -  07 Jun 2022 07:00  --------------------------------------------------------  IN: 840 mL / OUT: 0 mL / NET: 840 mL      MEDICATIONS  (STANDING):  aspirin  chewable 81 milliGRAM(s) Oral daily  atorvastatin 40 milliGRAM(s) Oral at bedtime  chlorhexidine 2% Cloths 1 Application(s) Topical daily  clopidogrel Tablet 75 milliGRAM(s) Oral daily  collagenase Ointment 1 Application(s) Topical daily  epoetin niesha-epbx (RETACRIT) Injectable 6000 Unit(s) IV Push <User Schedule>  meropenem  IVPB 500 milliGRAM(s) IV Intermittent every 24 hours  sevelamer carbonate 800 milliGRAM(s) Oral three times a day    MEDICATIONS  (PRN):  acetaminophen     Tablet .. 650 milliGRAM(s) Oral every 6 hours PRN Temp greater or equal to 38C (100.4F), Mild Pain (1 - 3)  HYDROmorphone  Injectable 0.5 milliGRAM(s) IV Push every 4 hours PRN Severe Pain (7 - 10)  oxycodone    5 mG/acetaminophen 325 mG 1 Tablet(s) Oral every 6 hours PRN Moderate Pain (4 - 6)    LABS:                        9.7    12.40 )-----------( 206      ( 07 Jun 2022 03:43 )             31.4     06-07    131<L>  |  86<L>  |  64<H>  ----------------------------<  119<H>  4.8   |  26  |  11.10<H>    Ca    9.9      07 Jun 2022 03:43  Phos  6.2     06-07  Mg     2.80     06-07      PT/INR - ( 07 Jun 2022 03:43 )   PT: 13.0 sec;   INR: 1.12 ratio         PTT - ( 07 Jun 2022 03:43 )  PTT:33.8 sec    CAPILLARY BLOOD GLUCOSE              REVIEW OF SYSTEMS:  CONSTITUTIONAL: No fever, weight loss, or fatigue  EYES: No eye pain, visual disturbances, or discharge  ENMT:  No difficulty hearing, tinnitus, vertigo; No sinus or throat pain  NECK: No pain or stiffness  RESPIRATORY: No cough, wheezing, chills or hemoptysis; No shortness of breath  CARDIOVASCULAR: No chest pain, palpitations, dizziness, or leg swelling  GASTROINTESTINAL: No abdominal or epigastric pain. No nausea, vomiting, or hematemesis; No diarrhea or constipation. No melena or hematochezia.  GENITOURINARY: No dysuria, frequency, hematuria, or incontinence      Consultant(s) Notes Reviewed:  [x ] YES  [ ] NO    PHYSICAL EXAM:  GENERAL: NAD, well-groomed, well-developed,not in any distress ,  HEAD:  Atraumatic, Normocephalic  NECK: Supple, No JVD, Normal thyroid  NERVOUS SYSTEM:  Alert & Oriented X3, No focal deficit   CHEST/LUNG: Good air entry bilateral with no  rales, rhonchi, wheezing, or rubs  HEART: Regular rate and rhythm; No murmurs, rubs, or gallops  ABDOMEN: Soft, Nontender, Nondistended; Bowel sounds present  EXTREMITIES: Feet dressed     Care Discussed with Consultants/Other Providers [ x] YES  [ ] NO

## 2022-06-07 NOTE — PROGRESS NOTE ADULT - SUBJECTIVE AND OBJECTIVE BOX
Cardiovascular Disease Progress Note    Overnight events: No acute events overnight. Ms. Boogie continues to have pedal pain.    Otherwise review of systems negative    Objective Findings:  T(C): 36.9 (06-07-22 @ 06:27), Max: 37.2 (06-06-22 @ 13:41)  HR: 82 (06-07-22 @ 06:27) (75 - 89)  BP: 116/50 (06-07-22 @ 06:27) (108/57 - 138/45)  RR: 18 (06-07-22 @ 06:27) (17 - 18)  SpO2: 98% (06-07-22 @ 06:27) (98% - 100%)  Wt(kg): --  Daily Height in cm: 154.9 (06 Jun 2022 23:52)    Daily       Physical Exam:  Gen: NAD; Patient resting comfortably  HEENT: EOMI, Normocephalic/ atraumatic  CV: RRR, normal S1 + S2, no m/r/g  Lungs:  Normal respiratory effort; clear to auscultation bilaterally  Abd: soft, non-tender; bowel sounds present  Ext: No edema; warm and well perfused    Telemetry: n/a    Laboratory Data:                        9.7    12.40 )-----------( 206      ( 07 Jun 2022 03:43 )             31.4     06-07    131<L>  |  86<L>  |  64<H>  ----------------------------<  119<H>  4.8   |  26  |  11.10<H>    Ca    9.9      07 Jun 2022 03:43  Phos  6.2     06-07  Mg     2.80     06-07      PT/INR - ( 07 Jun 2022 03:43 )   PT: 13.0 sec;   INR: 1.12 ratio         PTT - ( 07 Jun 2022 03:43 )  PTT:33.8 sec          Inpatient Medications:  MEDICATIONS  (STANDING):  aspirin  chewable 81 milliGRAM(s) Oral daily  atorvastatin 40 milliGRAM(s) Oral at bedtime  chlorhexidine 2% Cloths 1 Application(s) Topical daily  clopidogrel Tablet 75 milliGRAM(s) Oral daily  collagenase Ointment 1 Application(s) Topical daily  epoetin niesha-epbx (RETACRIT) Injectable 6000 Unit(s) IV Push <User Schedule>  heparin   Injectable 5000 Unit(s) SubCutaneous every 12 hours  meropenem  IVPB 500 milliGRAM(s) IV Intermittent every 24 hours  sevelamer carbonate 800 milliGRAM(s) Oral three times a day      Assessment: 61-year-old female with ESRD on HD, HLD and peripheral vascular disease s/p lower extremity resection presents with chronic limb ischemia.     Plan of Care:    #Pre-op risk stratification  - Ms. Boogie displays no signs of pre-op coronary ischemia  - CHF/ Volume status has improved  - At this time, Ms. Boogie is optimized from a cardiac standpoint to proceed with amputation after an HD session today.   - Continue with current cardiac management.     #Compensated diastolic CHF-  Volume status is improved post HD.   Fluid removal with HD as per the renal team.   Recent echo noted- normal LV systolic function.    #Peripheral vascular disease.  - S/p L foot partial 3rd ray amputation and R hallux amputation 3/2022.   - S/p lower extremity angioplasty 5/11/2022  - Plan for further amputation by podiatry.     #ESRD-  - HD as per renal.    #ACP (advance care planning)-  Advanced care planning was discussed with the patient.  Cardiac findings were discussed in detail and all questions were answered.       Over 25 minutes spent on total encounter; more than 50% of the visit was spent counseling and/or coordinating care by the attending physician.      Cristino Adams MD [facility]  Cardiovascular Disease  [phone]

## 2022-06-07 NOTE — PROGRESS NOTE ADULT - SUBJECTIVE AND OBJECTIVE BOX
Patient is a 61y old  Female who presents with a chief complaint of RLE toe pain (07 Jun 2022 07:43)       INTERVAL HPI/OVERNIGHT EVENTS:  Patient seen and evaluated at bedside.  Pt is resting comfortable in NAD. Denies N/V/F/C.  Pain rated at X/10    Allergies    No Known Allergies    Intolerances        Vital Signs Last 24 Hrs  T(C): 36.9 (07 Jun 2022 06:27), Max: 37.2 (06 Jun 2022 13:41)  T(F): 98.5 (07 Jun 2022 06:27), Max: 99 (06 Jun 2022 13:41)  HR: 82 (07 Jun 2022 06:27) (75 - 89)  BP: 116/50 (07 Jun 2022 06:27) (108/57 - 138/45)  BP(mean): --  RR: 18 (07 Jun 2022 06:27) (17 - 18)  SpO2: 98% (07 Jun 2022 06:27) (98% - 100%)    LABS:                        9.7    12.40 )-----------( 206      ( 07 Jun 2022 03:43 )             31.4     06-07    131<L>  |  86<L>  |  64<H>  ----------------------------<  119<H>  4.8   |  26  |  11.10<H>    Ca    9.9      07 Jun 2022 03:43  Phos  6.2     06-07  Mg     2.80     06-07      PT/INR - ( 07 Jun 2022 03:43 )   PT: 13.0 sec;   INR: 1.12 ratio         PTT - ( 07 Jun 2022 03:43 )  PTT:33.8 sec    CAPILLARY BLOOD GLUCOSE          Lower Extremity Physical Exam:  Vascular: DP/PT 1/4 R,  DP/PT 0/4 L, CFT <3 seconds, Temperature gradient warm to cooo B/L, TG is warm to cool b/l  Neuro: Epicritic sensation diminished to the level of forefoot B/L  Musculoskeletal/Ortho: left partial 5th ray resection healed, LF partial 4th ray resection open   Skin:   3/4/22 s/p L foot partial 3rd ray resection open: fibrotic wound to subQ, no malodor, no drainage, no purulence, flaps cool, improved maceration, distal skin necrosis, no tracking, no purulence, no erythema    3/4/22 s/p R foot partial hallux: dehisced surgical wound to level of bone, no purulence today, non-pitting edema extending from digits to rearfoot, no fluctuance    RADIOLOGY & ADDITIONAL TESTS:

## 2022-06-07 NOTE — CHART NOTE - NSCHARTNOTEFT_GEN_A_CORE
Status post right lower extremity angiogram , Right pedal site without bleeding or hematoma.  Dressing is intact.  Pulses present.  Will continue to monitor.

## 2022-06-08 ENCOUNTER — RESULT REVIEW (OUTPATIENT)
Age: 62
End: 2022-06-08

## 2022-06-08 ENCOUNTER — TRANSCRIPTION ENCOUNTER (OUTPATIENT)
Age: 62
End: 2022-06-08

## 2022-06-08 LAB
ANION GAP SERPL CALC-SCNC: 18 MMOL/L — HIGH (ref 7–14)
APTT BLD: 31.6 SEC — SIGNIFICANT CHANGE UP (ref 27–36.3)
BLD GP AB SCN SERPL QL: NEGATIVE — SIGNIFICANT CHANGE UP
BUN SERPL-MCNC: 33 MG/DL — HIGH (ref 7–23)
CALCIUM SERPL-MCNC: 10.1 MG/DL — SIGNIFICANT CHANGE UP (ref 8.4–10.5)
CHLORIDE SERPL-SCNC: 91 MMOL/L — LOW (ref 98–107)
CO2 SERPL-SCNC: 27 MMOL/L — SIGNIFICANT CHANGE UP (ref 22–31)
CREAT SERPL-MCNC: 6.82 MG/DL — HIGH (ref 0.5–1.3)
EGFR: 6 ML/MIN/1.73M2 — LOW
GLUCOSE SERPL-MCNC: 74 MG/DL — SIGNIFICANT CHANGE UP (ref 70–99)
GRAM STN FLD: SIGNIFICANT CHANGE UP
HCT VFR BLD CALC: 32.8 % — LOW (ref 34.5–45)
HGB BLD-MCNC: 10 G/DL — LOW (ref 11.5–15.5)
INR BLD: 1.13 RATIO — SIGNIFICANT CHANGE UP (ref 0.88–1.16)
MAGNESIUM SERPL-MCNC: 2.5 MG/DL — SIGNIFICANT CHANGE UP (ref 1.6–2.6)
MCHC RBC-ENTMCNC: 26.3 PG — LOW (ref 27–34)
MCHC RBC-ENTMCNC: 30.5 GM/DL — LOW (ref 32–36)
MCV RBC AUTO: 86.3 FL — SIGNIFICANT CHANGE UP (ref 80–100)
NRBC # BLD: 0 /100 WBCS — SIGNIFICANT CHANGE UP
NRBC # FLD: 0 K/UL — SIGNIFICANT CHANGE UP
PHOSPHATE SERPL-MCNC: 4.6 MG/DL — HIGH (ref 2.5–4.5)
PLATELET # BLD AUTO: 262 K/UL — SIGNIFICANT CHANGE UP (ref 150–400)
POTASSIUM SERPL-MCNC: 4.4 MMOL/L — SIGNIFICANT CHANGE UP (ref 3.5–5.3)
POTASSIUM SERPL-SCNC: 4.4 MMOL/L — SIGNIFICANT CHANGE UP (ref 3.5–5.3)
PROTHROM AB SERPL-ACNC: 13.1 SEC — SIGNIFICANT CHANGE UP (ref 10.5–13.4)
RBC # BLD: 3.8 M/UL — SIGNIFICANT CHANGE UP (ref 3.8–5.2)
RBC # FLD: 14.8 % — HIGH (ref 10.3–14.5)
RH IG SCN BLD-IMP: POSITIVE — SIGNIFICANT CHANGE UP
SODIUM SERPL-SCNC: 136 MMOL/L — SIGNIFICANT CHANGE UP (ref 135–145)
SPECIMEN SOURCE: SIGNIFICANT CHANGE UP
WBC # BLD: 15.83 K/UL — HIGH (ref 3.8–10.5)
WBC # FLD AUTO: 15.83 K/UL — HIGH (ref 3.8–10.5)

## 2022-06-08 PROCEDURE — 99232 SBSQ HOSP IP/OBS MODERATE 35: CPT

## 2022-06-08 PROCEDURE — 88311 DECALCIFY TISSUE: CPT | Mod: 26

## 2022-06-08 PROCEDURE — 73630 X-RAY EXAM OF FOOT: CPT | Mod: 26,RT

## 2022-06-08 PROCEDURE — 88305 TISSUE EXAM BY PATHOLOGIST: CPT | Mod: 26

## 2022-06-08 RX ORDER — POLYETHYLENE GLYCOL 3350 17 G/17G
17 POWDER, FOR SOLUTION ORAL DAILY
Refills: 0 | Status: DISCONTINUED | OUTPATIENT
Start: 2022-06-08 | End: 2022-06-13

## 2022-06-08 RX ORDER — HEPARIN SODIUM 5000 [USP'U]/ML
5000 INJECTION INTRAVENOUS; SUBCUTANEOUS EVERY 12 HOURS
Refills: 0 | Status: DISCONTINUED | OUTPATIENT
Start: 2022-06-08 | End: 2022-06-30

## 2022-06-08 RX ORDER — HYDROMORPHONE HYDROCHLORIDE 2 MG/ML
0.5 INJECTION INTRAMUSCULAR; INTRAVENOUS; SUBCUTANEOUS EVERY 4 HOURS
Refills: 0 | Status: DISCONTINUED | OUTPATIENT
Start: 2022-06-08 | End: 2022-06-15

## 2022-06-08 RX ORDER — ONDANSETRON 8 MG/1
4 TABLET, FILM COATED ORAL ONCE
Refills: 0 | Status: DISCONTINUED | OUTPATIENT
Start: 2022-06-08 | End: 2022-06-08

## 2022-06-08 RX ORDER — FENTANYL CITRATE 50 UG/ML
50 INJECTION INTRAVENOUS
Refills: 0 | Status: DISCONTINUED | OUTPATIENT
Start: 2022-06-08 | End: 2022-06-08

## 2022-06-08 RX ORDER — SODIUM CHLORIDE 9 MG/ML
3 INJECTION INTRAMUSCULAR; INTRAVENOUS; SUBCUTANEOUS EVERY 8 HOURS
Refills: 0 | Status: DISCONTINUED | OUTPATIENT
Start: 2022-06-08 | End: 2022-06-08

## 2022-06-08 RX ORDER — SENNA PLUS 8.6 MG/1
2 TABLET ORAL AT BEDTIME
Refills: 0 | Status: DISCONTINUED | OUTPATIENT
Start: 2022-06-08 | End: 2022-06-30

## 2022-06-08 RX ADMIN — HYDROMORPHONE HYDROCHLORIDE 0.5 MILLIGRAM(S): 2 INJECTION INTRAMUSCULAR; INTRAVENOUS; SUBCUTANEOUS at 13:23

## 2022-06-08 RX ADMIN — CHLORHEXIDINE GLUCONATE 1 APPLICATION(S): 213 SOLUTION TOPICAL at 11:43

## 2022-06-08 RX ADMIN — Medication 81 MILLIGRAM(S): at 11:42

## 2022-06-08 RX ADMIN — ATORVASTATIN CALCIUM 40 MILLIGRAM(S): 80 TABLET, FILM COATED ORAL at 21:24

## 2022-06-08 RX ADMIN — MEROPENEM 100 MILLIGRAM(S): 1 INJECTION INTRAVENOUS at 02:36

## 2022-06-08 RX ADMIN — HEPARIN SODIUM 5000 UNIT(S): 5000 INJECTION INTRAVENOUS; SUBCUTANEOUS at 17:11

## 2022-06-08 RX ADMIN — HYDROMORPHONE HYDROCHLORIDE 0.5 MILLIGRAM(S): 2 INJECTION INTRAMUSCULAR; INTRAVENOUS; SUBCUTANEOUS at 17:52

## 2022-06-08 RX ADMIN — CLOPIDOGREL BISULFATE 75 MILLIGRAM(S): 75 TABLET, FILM COATED ORAL at 11:42

## 2022-06-08 RX ADMIN — POLYETHYLENE GLYCOL 3350 17 GRAM(S): 17 POWDER, FOR SOLUTION ORAL at 11:43

## 2022-06-08 RX ADMIN — SENNA PLUS 2 TABLET(S): 8.6 TABLET ORAL at 21:27

## 2022-06-08 RX ADMIN — HYDROMORPHONE HYDROCHLORIDE 0.5 MILLIGRAM(S): 2 INJECTION INTRAMUSCULAR; INTRAVENOUS; SUBCUTANEOUS at 13:53

## 2022-06-08 RX ADMIN — HYDROMORPHONE HYDROCHLORIDE 0.5 MILLIGRAM(S): 2 INJECTION INTRAMUSCULAR; INTRAVENOUS; SUBCUTANEOUS at 21:54

## 2022-06-08 RX ADMIN — HYDROMORPHONE HYDROCHLORIDE 0.5 MILLIGRAM(S): 2 INJECTION INTRAMUSCULAR; INTRAVENOUS; SUBCUTANEOUS at 21:24

## 2022-06-08 RX ADMIN — Medication 1 APPLICATION(S): at 11:43

## 2022-06-08 NOTE — PROGRESS NOTE ADULT - SUBJECTIVE AND OBJECTIVE BOX
Cardiovascular Disease Progress Note    Overnight events: No acute events overnight.   Ms. Boogie denies chest pain or SOB.       Objective Findings:  T(C): 36.8 (22 @ 06:48), Max: 37.1 (22 @ 17:45)  HR: 86 (22 @ 06:48) (70 - 87)  BP: 128/40 (22 @ 06:48) (110/50 - 137/65)  RR: 18 (22 @ 06:48) (17 - 18)  SpO2: 97% (22 @ 06:48) (96% - 100%)  Wt(kg): --  Daily Height in cm: 162.56 (2022 06:14)    Daily Weight in k.7 (2022 21:20)      Physical Exam:  Gen: NAD; Patient resting comfortably  HEENT: EOMI, Normocephalic/ atraumatic  CV: RRR, normal S1 + S2, no m/r/g  Lungs:  Normal respiratory effort; clear to auscultation bilaterally  Abd: soft, non-tender; bowel sounds present  Ext: No edema; warm and well perfused    Telemetry: n/a    Laboratory Data:                        10.0   15.83 )-----------( 262      ( 2022 05:15 )             32.8     06-08    136  |  91<L>  |  33<H>  ----------------------------<  74  4.4   |  27  |  6.82<H>    Ca    10.1      2022 05:15  Phos  4.6     06-08  Mg     2.50     06-08      PT/INR - ( 2022 05:15 )   PT: 13.1 sec;   INR: 1.13 ratio         PTT - ( 2022 05:15 )  PTT:31.6 sec          Inpatient Medications:  MEDICATIONS  (STANDING):  aspirin  chewable 81 milliGRAM(s) Oral daily  atorvastatin 40 milliGRAM(s) Oral at bedtime  chlorhexidine 2% Cloths 1 Application(s) Topical daily  clopidogrel Tablet 75 milliGRAM(s) Oral daily  collagenase Ointment 1 Application(s) Topical daily  epoetin niesha-epbx (RETACRIT) Injectable 6000 Unit(s) IV Push <User Schedule>  meropenem  IVPB 500 milliGRAM(s) IV Intermittent every 24 hours  sevelamer carbonate 800 milliGRAM(s) Oral three times a day      Assessment: 61-year-old female with ESRD on HD, HLD and peripheral vascular disease s/p lower extremity resection presents with chronic limb ischemia.     Plan of Care:    #Pre-op risk stratification  - Ms. Boogie displays no signs of pre-op coronary ischemia  - CHF/ Volume status is improved post HD yesterday.   - At this time, Ms. Boogie is optimized from a cardiac standpoint to proceed with amputation.  - Continue with current cardiac management.     #Compensated diastolic CHF-  Volume status is improved post HD.   Fluid removal with HD as per the renal team.   Recent echo noted- normal LV systolic function.    #Peripheral vascular disease.  - S/p L foot partial 3rd ray amputation and R hallux amputation 3/2022.   - S/p lower extremity angioplasty 2022  - Plan for further amputation by podiatry.     #ESRD-  - HD as per renal.        Over 25 minutes spent on total encounter; more than 50% of the visit was spent counseling and/or coordinating care by the attending physician.      Cristino Adams MD [facility]  Cardiovascular Disease  [phone]

## 2022-06-08 NOTE — PROGRESS NOTE ADULT - ASSESSMENT
Plan:   Pt is scheduled for R foot TMA with HEMANTH with Dr. Hayes at 7:30. Patient is aware of procedure and is NPO since midnight.  CXR on sunrise.  EKG on sunrise.  Medical/Cardiac clearance since 6/3 and documented in chart.  Consent signed and in chart.  Procedure was explained to patient in detail. All alternatives, risks and complications were discussed. All questions answered.

## 2022-06-08 NOTE — PROGRESS NOTE ADULT - SUBJECTIVE AND OBJECTIVE BOX
Patient is a 61y old  Female who presents with a chief complaint of RLE toe pain (07 Jun 2022 17:59)      INTERVAL HPI/OVERNIGHT EVENTS:   Pt is scheduled for R foot TMA with HEMANTH with Dr. Hayes at 7:30. Patient is aware of procedure and is NPO since midnight.    MEDICATIONS  (STANDING):  aspirin  chewable 81 milliGRAM(s) Oral daily  atorvastatin 40 milliGRAM(s) Oral at bedtime  chlorhexidine 2% Cloths 1 Application(s) Topical daily  clopidogrel Tablet 75 milliGRAM(s) Oral daily  collagenase Ointment 1 Application(s) Topical daily  epoetin niesha-epbx (RETACRIT) Injectable 6000 Unit(s) IV Push <User Schedule>  meropenem  IVPB 500 milliGRAM(s) IV Intermittent every 24 hours  sevelamer carbonate 800 milliGRAM(s) Oral three times a day    MEDICATIONS  (PRN):  acetaminophen     Tablet .. 650 milliGRAM(s) Oral every 6 hours PRN Temp greater or equal to 38C (100.4F), Mild Pain (1 - 3)      Allergies    No Known Allergies    Intolerances        Vital Signs Last 24 Hrs  T(C): 36.6 (08 Jun 2022 00:45), Max: 37.1 (07 Jun 2022 17:45)  T(F): 97.9 (08 Jun 2022 00:45), Max: 98.8 (07 Jun 2022 17:45)  HR: 80 (08 Jun 2022 00:45) (71 - 87)  BP: 135/50 (08 Jun 2022 00:45) (116/50 - 137/65)  BP(mean): --  RR: 18 (08 Jun 2022 00:45) (17 - 18)  SpO2: 100% (08 Jun 2022 00:45) (97% - 100%)    LABS:                        9.7    12.40 )-----------( 206      ( 07 Jun 2022 03:43 )             31.4     06-07    131<L>  |  86<L>  |  64<H>  ----------------------------<  119<H>  4.8   |  26  |  11.10<H>    Ca    9.9      07 Jun 2022 03:43  Phos  6.2     06-07  Mg     2.80     06-07      PT/INR - ( 07 Jun 2022 03:43 )   PT: 13.0 sec;   INR: 1.12 ratio         PTT - ( 07 Jun 2022 03:43 )  PTT:33.8 sec    CAPILLARY BLOOD GLUCOSE          RADIOLOGY & ADDITIONAL TESTS:

## 2022-06-08 NOTE — PROGRESS NOTE ADULT - ASSESSMENT
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 Admitted for further evaluation. Renal following for ESRD Mx.     ESRD on HD  schedule hemodialysis - Monday, Wednesday and Friday   access- thigh AVG  HD unit SQDC  K, vol ok     off schedule for hd  s/p last HD 6/7, Rx sheet reviewed, net UF 1.2kg, tolerated well. uneventful.  plan for next hemodialysis tomorrow w/ 2 k bath , Ultrafiltration 1kg as tolerated with blood pressure   renal diet  dose all meds for ESRD  renal restrictions in diet.  Anemia of CKD- Hb at goal now. c/w DION epo 6k w/next hd tiw  HTN, controlled. bp stable.   PAD, infected rt foot wounds, ?OM- s/p vanco, on zosyn. f/u w/Podiatry. on ertapenem now. plan for MRI foot w/o Morgan  f/u w/vas sx eval.   Podiatry f/u - patient s/p left foot TMA and right foot partial 1st ray amputation today  s/p LE angiogram 6/7    poc d/w pt, HD RN  labs, chart reviewed  For any question, call:  Cell # [phone]  Pager # [phone]  office # [phone]

## 2022-06-08 NOTE — CHART NOTE - NSCHARTNOTEFT_GEN_A_CORE
Patient was seen and examined this AM. Right pedal access site c/d/i. Patient planned for right TMA today with podiatry.    No further vascular intervention at this time    Please reconsult as needed    Vascular  [id number]

## 2022-06-08 NOTE — PROGRESS NOTE ADULT - SUBJECTIVE AND OBJECTIVE BOX
Date of Service  : 06-08-22     INTERVAL HPI/OVERNIGHT EVENTS: No new concerns. S/P surgery .  Vital Signs Last 24 Hrs  T(C): 36.8 (08 Jun 2022 14:15), Max: 37.1 (07 Jun 2022 17:45)  T(F): 98.2 (08 Jun 2022 14:15), Max: 98.8 (07 Jun 2022 17:45)  HR: 88 (08 Jun 2022 14:15) (70 - 88)  BP: 136/70 (08 Jun 2022 14:15) (107/44 - 137/65)  BP(mean): 74 (08 Jun 2022 09:45) (62 - 74)  RR: 18 (08 Jun 2022 14:15) (12 - 18)  SpO2: 100% (08 Jun 2022 14:15) (95% - 100%)  I&O's Summary    07 Jun 2022 07:01  -  08 Jun 2022 07:00  --------------------------------------------------------  IN: 400 mL / OUT: 1600 mL / NET: -1200 mL    08 Jun 2022 07:01  -  08 Jun 2022 17:10  --------------------------------------------------------  IN: 240 mL / OUT: 0 mL / NET: 240 mL      MEDICATIONS  (STANDING):  aspirin  chewable 81 milliGRAM(s) Oral daily  atorvastatin 40 milliGRAM(s) Oral at bedtime  chlorhexidine 2% Cloths 1 Application(s) Topical daily  clopidogrel Tablet 75 milliGRAM(s) Oral daily  collagenase Ointment 1 Application(s) Topical daily  epoetin niesha-epbx (RETACRIT) Injectable 6000 Unit(s) IV Push <User Schedule>  heparin   Injectable 5000 Unit(s) SubCutaneous every 12 hours  meropenem  IVPB 500 milliGRAM(s) IV Intermittent every 24 hours  polyethylene glycol 3350 17 Gram(s) Oral daily  senna 2 Tablet(s) Oral at bedtime  sevelamer carbonate 800 milliGRAM(s) Oral three times a day    MEDICATIONS  (PRN):  acetaminophen     Tablet .. 650 milliGRAM(s) Oral every 6 hours PRN Temp greater or equal to 38C (100.4F), Mild Pain (1 - 3)  HYDROmorphone  Injectable 0.5 milliGRAM(s) IV Push every 4 hours PRN Severe Pain (7 - 10)    LABS:                        10.0   15.83 )-----------( 262      ( 08 Jun 2022 05:15 )             32.8     06-08    136  |  91<L>  |  33<H>  ----------------------------<  74  4.4   |  27  |  6.82<H>    Ca    10.1      08 Jun 2022 05:15  Phos  4.6     06-08  Mg     2.50     06-08      PT/INR - ( 08 Jun 2022 05:15 )   PT: 13.1 sec;   INR: 1.13 ratio         PTT - ( 08 Jun 2022 05:15 )  PTT:31.6 sec    CAPILLARY BLOOD GLUCOSE              REVIEW OF SYSTEMS:  CONSTITUTIONAL: No fever, weight loss, or fatigue  EYES: No eye pain, visual disturbances, or discharge  ENMT:  No difficulty hearing, tinnitus, vertigo; No sinus or throat pain  NECK: No pain or stiffness  RESPIRATORY: No cough, wheezing, chills or hemoptysis; No shortness of breath  CARDIOVASCULAR: No chest pain, palpitations, dizziness, or leg swelling  GASTROINTESTINAL: No abdominal or epigastric pain. No nausea, vomiting, or hematemesis; No diarrhea or constipation. No melena or hematochezia.  GENITOURINARY: No dysuria, frequency, hematuria, or incontinence  NEUROLOGICAL: No headaches, memory loss, loss of strength, numbness, or tremors      RADIOLOGY & ADDITIONAL TESTS:    Consultant(s) Notes Reviewed:  [x ] YES  [ ] NO    PHYSICAL EXAM:  GENERAL: NAD, well-groomed, well-developed,not in any distress ,  HEAD:  Atraumatic, Normocephalic  NECK: Supple, No JVD, Normal thyroid  NERVOUS SYSTEM:  Alert & Oriented X3, No focal deficit   CHEST/LUNG: Good air entry bilateral with no  rales, rhonchi, wheezing, or rubs  HEART: Regular rate and rhythm; No murmurs, rubs, or gallops  ABDOMEN: Soft, Nontender, Nondistended; Bowel sounds present  EXTREMITIES: Feet dressed     Care Discussed with Consultants/Other Providers [ x] YES  [ ] NO

## 2022-06-08 NOTE — PROGRESS NOTE ADULT - ASSESSMENT
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 Admitted for further evaluation     Problem/Plan - 1:  ·  Problem: Wound of right foot with  Cellulitis with Left 4th toe OM .   ·  Plan: Pt w/ increasing R toe pain when walking as well as swelling. Mild white count and elevated inflammatory markers but afebrile with vitals stable  -podiatry and ID help appreciated.   -started  IV Meropenem  500mg q24hrs per iD .   - S/P  left foot TMA and right foot partial 1st ray amputation on Wed .     Problem/Plan - 2:  ·  Problem: PAD (peripheral artery disease).   ·  Plan: S/P RLE Angiogram but unable to do any intervention per Vascular.   -known PAD, continue asa/plavix. Pt denies any numbness or tingling.  -recent angiograms done w/ poor peripheral arterial flow per podiatry and so poor surgical candidate as above  -continue statin.     Problem/Plan - 3:  ·  Problem: ESRD (end stage renal disease) on dialysis.   ·  Plan: Renal helping with HD.   On MWF schedule.  -monitor I/O.     Problem/Plan - 4:  ·  Problem: HLD (hyperlipidemia).   ·  Plan: continue statin. check lipid profile.     Problem/Plan - 5:  ·  Problem: Need for prophylactic measure.   ·  Plan: heparin subcu for dvt ppx, dash diet.

## 2022-06-08 NOTE — PROGRESS NOTE ADULT - SUBJECTIVE AND OBJECTIVE BOX
Follow Up:  right toe infection    Interval History/ROS:  s/p partial amp 1st ray right foot today      Allergies  No Known Allergies        ANTIMICROBIALS:  meropenem  IVPB 500 every 24 hours    MEDICATIONS  (STANDING):  acetaminophen     Tablet .. 650 every 6 hours PRN  aspirin  chewable 81 daily  atorvastatin 40 at bedtime  clopidogrel Tablet 75 daily  epoetin niesha-epbx (RETACRIT) Injectable 6000 <User Schedule>  heparin   Injectable 5000 every 12 hours  HYDROmorphone  Injectable 0.5 every 4 hours PRN  polyethylene glycol 3350 17 daily  senna 2 at bedtime    Vital Signs Last 24 Hrs  T(F): 98.5 (06-08-22 @ 17:26), Max: 98.8 (06-07-22 @ 17:45)  HR: 83 (06-08-22 @ 17:26)  BP: 125/55 (06-08-22 @ 17:26)  RR: 18 (06-08-22 @ 17:26)  SpO2: 100% (06-08-22 @ 14:15) (95% - 100%)    PHYSICAL EXAM:  Constitutional: non toxic, sitting side of bed  Eyes: right eye cloudy   Oral cavity: Clear, no lesions  Neck: Supple  RS: Chest clear   CVS: S1, S2   Abdomen: Soft. No guarding/rigidity/tenderness.  : no salcido  Extremities:  HD access right thigh; right foot dressing  Skin: No rash  Neuro: Alert, oriented to time/place/person  Cranial nerves 2-12 grossly normal. No focal abnormalities                                     10.0   15.83 )-----------( 262      ( 08 Jun 2022 05:15 )             32.8 06-08    136  |  91  |  33  ----------------------------<  74  4.4   |  27  |  6.82  Ca    10.1      08 Jun 2022 05:15Phos  4.6     06-08Mg     2.50     06-08              MICROBIOLOGY:    .Abscess right foot wound  05-30-22   Numerous Klebsiella pneumoniae ESBL  Numerous Escherichia coli  Moderate Pseudomonas aeruginosa  --  Klebsiella pneumoniae ESBL  Escherichia coli  Pseudomonas aeruginosa        RADIOLOGY:    ra< from: Xray Foot AP + Lateral + Oblique, Left (05.30.22 @ 15:11) >  IMPRESSION:  Increased permeative appearance of the left fourth metatarsal head and   phalanges, which could be seen in the setting of osteomyelitis.  No radiographic evidence of osteomyelitis involving the right foot.    MRI is more sensitive and can be considered for further evaluation as   clinically warranted.    --- End of Report ---    < end of copied text >

## 2022-06-08 NOTE — PROGRESS NOTE ADULT - SUBJECTIVE AND OBJECTIVE BOX
[facility] - S Jluis / Shabbir S /D Romina/ SHERRI Mcfarlane/ SHERRI Madden/ Andrés Perkins / M Tristianu/ O Gladis  service -[phone], office [phone]  ---------------------------------------------------------------------------------------------------------------    Patient seen and examined bedside    Subjective and Objective: No overnight events, sob resolved. No complaints today. feeling better    Allergies: No Known Allergies      Hospital Medications:   MEDICATIONS  (STANDING):  aspirin  chewable 81 milliGRAM(s) Oral daily  atorvastatin 40 milliGRAM(s) Oral at bedtime  chlorhexidine 2% Cloths 1 Application(s) Topical daily  clopidogrel Tablet 75 milliGRAM(s) Oral daily  collagenase Ointment 1 Application(s) Topical daily  epoetin niesha-epbx (RETACRIT) Injectable 6000 Unit(s) IV Push <User Schedule>  heparin   Injectable 5000 Unit(s) SubCutaneous every 12 hours  meropenem  IVPB 500 milliGRAM(s) IV Intermittent every 24 hours  polyethylene glycol 3350 17 Gram(s) Oral daily  senna 2 Tablet(s) Oral at bedtime  sevelamer carbonate 800 milliGRAM(s) Oral three times a day      REVIEW OF SYSTEMS:  CONSTITUTIONAL: No weakness, fevers or chills  EYES/ENT: No visual changes;  No vertigo or throat pain   NECK: No pain or stiffness  RESPIRATORY: No cough, wheezing, hemoptysis; No shortness of breath  CARDIOVASCULAR: No chest pain or palpitations.  GASTROINTESTINAL: No abdominal or epigastric pain. No nausea, vomiting, or hematemesis; No diarrhea or constipation. No melena or hematochezia.  GENITOURINARY: No dysuria, frequency, foamy urine, urinary urgency, incontinence or hematuria  NEUROLOGICAL: No numbness or weakness  SKIN: No itching, burning, rashes, or lesions   VASCULAR: No bilateral lower extremity edema.   All other review of systems is negative unless indicated above.    VITALS:  T(F): 98.5 (06-08-22 @ 17:26), Max: 98.5 (06-08-22 @ 17:26)  HR: 83 (06-08-22 @ 17:26)  BP: 125/55 (06-08-22 @ 17:26)  RR: 18 (06-08-22 @ 17:26)  SpO2: 100% (06-08-22 @ 14:15)  Wt(kg): --    06-07 @ 07:01  -  06-08 @ 07:00  --------------------------------------------------------  IN: 400 mL / OUT: 1600 mL / NET: -1200 mL    06-08 @ 07:01  -  06-08 @ 19:20  --------------------------------------------------------  IN: 480 mL / OUT: 0 mL / NET: 480 mL      Height (cm): 162.6 (06-08 @ 06:27)  Weight (kg): 64 (06-08 @ 06:27)  BMI (kg/m2): 24.2 (06-08 @ 06:27)  BSA (m2): 1.69 (06-08 @ 06:27)    PHYSICAL EXAM:  Constitutional: NAD  HEENT: anicteric sclera, oropharynx clear  Neck: No JVD  Respiratory: CTAB, no wheezes, rales or rhonchi  Cardiovascular: S1, S2, RRR  Gastrointestinal: BS+, soft, NT/ND  Extremities: No cyanosis or clubbing. No peripheral edema  Neurological: A/O x 3, no focal deficits  Psychiatric: Normal mood, normal affect  : No CVA tenderness. No salcido.   Skin: No rashes  Vascular Access:    LABS:  06-08    136  |  91<L>  |  33<H>  ----------------------------<  74  4.4   |  27  |  6.82<H>    Ca    10.1      08 Jun 2022 05:15  Phos  4.6     06-08  Mg     2.50     06-08      Creatinine Trend: 6.82 <--, 11.10 <--, 9.72 <--, 7.79 <--, 5.55 <--, 8.91 <--, 6.90 <--                        10.0   15.83 )-----------( 262      ( 08 Jun 2022 05:15 )             32.8     Urine Studies:        RADIOLOGY & ADDITIONAL STUDIES:   [facility] - S Jluis / Shabbir S /D Romina/ S Denys/ S Chano/ Andrés Perkins / M Katlyn/ O Gladis  service -[phone], office [phone]  ---------------------------------------------------------------------------------------------------------------    Patient seen and examined bedside    Subjective and Objective: No overnight events, denied sob. No complaints today. pain controlled  s/p OR today    Allergies: No Known Allergies      Hospital Medications:   MEDICATIONS  (STANDING):  aspirin  chewable 81 milliGRAM(s) Oral daily  atorvastatin 40 milliGRAM(s) Oral at bedtime  chlorhexidine 2% Cloths 1 Application(s) Topical daily  clopidogrel Tablet 75 milliGRAM(s) Oral daily  collagenase Ointment 1 Application(s) Topical daily  epoetin niesha-epbx (RETACRIT) Injectable 6000 Unit(s) IV Push <User Schedule>  heparin   Injectable 5000 Unit(s) SubCutaneous every 12 hours  meropenem  IVPB 500 milliGRAM(s) IV Intermittent every 24 hours  polyethylene glycol 3350 17 Gram(s) Oral daily  senna 2 Tablet(s) Oral at bedtime  sevelamer carbonate 800 milliGRAM(s) Oral three times a day    VITALS:  T(F): 98.5 (06-08-22 @ 17:26), Max: 98.5 (06-08-22 @ 17:26)  HR: 83 (06-08-22 @ 17:26)  BP: 125/55 (06-08-22 @ 17:26)  RR: 18 (06-08-22 @ 17:26)  SpO2: 100% (06-08-22 @ 14:15)  Wt(kg): --    06-07 @ 07:01  -  06-08 @ 07:00  --------------------------------------------------------  IN: 400 mL / OUT: 1600 mL / NET: -1200 mL    06-08 @ 07:01  -  06-08 @ 19:20  --------------------------------------------------------  IN: 480 mL / OUT: 0 mL / NET: 480 mL      Height (cm): 162.6 (06-08 @ 06:27)  Weight (kg): 64 (06-08 @ 06:27)  BMI (kg/m2): 24.2 (06-08 @ 06:27)  BSA (m2): 1.69 (06-08 @ 06:27)    PHYSICAL EXAM:  Constitutional: NAD  HEENT: anicteric sclera  Neck: No JVD  Respiratory: CTAB, no wheezes, rales or rhonchi  Cardiovascular: S1, S2, RRR  Gastrointestinal: BS+, soft, NT/ND  Extremities: + peripheral edema rt leg. b/l feet dressings+  Neurological: A/O x 3  Psychiatric: Normal mood, normal affect  : No salcido.   Vascular Access: rt femoral AVG+    LABS:  06-08    136  |  91<L>  |  33<H>  ----------------------------<  74  4.4   |  27  |  6.82<H>    Ca    10.1      08 Jun 2022 05:15  Phos  4.6     06-08  Mg     2.50     06-08      Creatinine Trend: 6.82 <--, 11.10 <--, 9.72 <--, 7.79 <--, 5.55 <--, 8.91 <--, 6.90 <--                        10.0   15.83 )-----------( 262      ( 08 Jun 2022 05:15 )             32.8     Urine Studies:        RADIOLOGY & ADDITIONAL STUDIES:

## 2022-06-08 NOTE — PROGRESS NOTE ADULT - ASSESSMENT
62 y/o woman w/ ESRD on HD (MWF), HF, glaucoma (blind in R eye), peripheral artery disease who presented 5/30 w/ R toe pain and swelling ongoing for about 4-5 days    ID is consulted for right foot infection  S/p partial amputation of right 1st toe in 3/2022, returned with pain + swelling  Xray showed no OM on right foot, but possible OM on left 4th MT head and phalanges  Right toe wound Cx ESBL Klebsiella, E. coli, pseudomonas  Hx ESBL Klebsiella and E. coli on left foot Cx      IMPRESSION:  Right toe infection  s/p partial ray amputation 1st ray right foot today   OR findings: liquefactive necrosis, high concern for residual soft tissue infection, low concern for residual bone infection   PAD  ESRD on HD      RECOMMENDATIONS:      - c/w meropenem 500 mg iv q 24 h  -follow OR path clean margin

## 2022-06-08 NOTE — BRIEF OPERATIVE NOTE - COMMENTS
Pt is s/p right foot partial first ray resection, closed. EBL 10cc, bone quality adequate but liquefactive necrosis noted to soft tissue. high concern for viability, high concern for residual soft tissue infection, low concern for residual bone infection, d/c pending clean bone margin shows no growth x 48 hrs

## 2022-06-08 NOTE — CHART NOTE - NSCHARTNOTEFT_GEN_A_CORE
Discussed care with Podiatry team, Dr Rodriguez. Okay to start DVT prophylaxis this evening. Restarted on previous dose of heprain sq 5000 units bid.     Thank you

## 2022-06-09 LAB
ANION GAP SERPL CALC-SCNC: 21 MMOL/L — HIGH (ref 7–14)
BUN SERPL-MCNC: 49 MG/DL — HIGH (ref 7–23)
CALCIUM SERPL-MCNC: 9.9 MG/DL — SIGNIFICANT CHANGE UP (ref 8.4–10.5)
CHLORIDE SERPL-SCNC: 91 MMOL/L — LOW (ref 98–107)
CO2 SERPL-SCNC: 22 MMOL/L — SIGNIFICANT CHANGE UP (ref 22–31)
CREAT SERPL-MCNC: 8.39 MG/DL — HIGH (ref 0.5–1.3)
EGFR: 5 ML/MIN/1.73M2 — LOW
GLUCOSE SERPL-MCNC: 84 MG/DL — SIGNIFICANT CHANGE UP (ref 70–99)
HCT VFR BLD CALC: 30.9 % — LOW (ref 34.5–45)
HGB BLD-MCNC: 9.1 G/DL — LOW (ref 11.5–15.5)
MAGNESIUM SERPL-MCNC: 2.5 MG/DL — SIGNIFICANT CHANGE UP (ref 1.6–2.6)
MCHC RBC-ENTMCNC: 26.6 PG — LOW (ref 27–34)
MCHC RBC-ENTMCNC: 29.4 GM/DL — LOW (ref 32–36)
MCV RBC AUTO: 90.4 FL — SIGNIFICANT CHANGE UP (ref 80–100)
NRBC # BLD: 0 /100 WBCS — SIGNIFICANT CHANGE UP
NRBC # FLD: 0 K/UL — SIGNIFICANT CHANGE UP
PHOSPHATE SERPL-MCNC: 5.8 MG/DL — HIGH (ref 2.5–4.5)
PLATELET # BLD AUTO: 253 K/UL — SIGNIFICANT CHANGE UP (ref 150–400)
POTASSIUM SERPL-MCNC: 4.4 MMOL/L — SIGNIFICANT CHANGE UP (ref 3.5–5.3)
POTASSIUM SERPL-SCNC: 4.4 MMOL/L — SIGNIFICANT CHANGE UP (ref 3.5–5.3)
RBC # BLD: 3.42 M/UL — LOW (ref 3.8–5.2)
RBC # FLD: 14.9 % — HIGH (ref 10.3–14.5)
SODIUM SERPL-SCNC: 134 MMOL/L — LOW (ref 135–145)
WBC # BLD: 17.76 K/UL — HIGH (ref 3.8–10.5)
WBC # FLD AUTO: 17.76 K/UL — HIGH (ref 3.8–10.5)

## 2022-06-09 RX ORDER — SEVELAMER CARBONATE 2400 MG/1
1600 POWDER, FOR SUSPENSION ORAL
Refills: 0 | Status: DISCONTINUED | OUTPATIENT
Start: 2022-06-09 | End: 2022-06-30

## 2022-06-09 RX ORDER — ACETAMINOPHEN 500 MG
1000 TABLET ORAL ONCE
Refills: 0 | Status: COMPLETED | OUTPATIENT
Start: 2022-06-09 | End: 2022-06-09

## 2022-06-09 RX ADMIN — Medication 81 MILLIGRAM(S): at 11:43

## 2022-06-09 RX ADMIN — HYDROMORPHONE HYDROCHLORIDE 0.5 MILLIGRAM(S): 2 INJECTION INTRAMUSCULAR; INTRAVENOUS; SUBCUTANEOUS at 10:30

## 2022-06-09 RX ADMIN — CLOPIDOGREL BISULFATE 75 MILLIGRAM(S): 75 TABLET, FILM COATED ORAL at 11:42

## 2022-06-09 RX ADMIN — SEVELAMER CARBONATE 800 MILLIGRAM(S): 2400 POWDER, FOR SUSPENSION ORAL at 09:04

## 2022-06-09 RX ADMIN — Medication 400 MILLIGRAM(S): at 01:01

## 2022-06-09 RX ADMIN — ATORVASTATIN CALCIUM 40 MILLIGRAM(S): 80 TABLET, FILM COATED ORAL at 21:55

## 2022-06-09 RX ADMIN — HYDROMORPHONE HYDROCHLORIDE 0.5 MILLIGRAM(S): 2 INJECTION INTRAMUSCULAR; INTRAVENOUS; SUBCUTANEOUS at 16:27

## 2022-06-09 RX ADMIN — HYDROMORPHONE HYDROCHLORIDE 0.5 MILLIGRAM(S): 2 INJECTION INTRAMUSCULAR; INTRAVENOUS; SUBCUTANEOUS at 18:04

## 2022-06-09 RX ADMIN — Medication 1000 MILLIGRAM(S): at 01:31

## 2022-06-09 RX ADMIN — SEVELAMER CARBONATE 800 MILLIGRAM(S): 2400 POWDER, FOR SUSPENSION ORAL at 13:33

## 2022-06-09 RX ADMIN — CHLORHEXIDINE GLUCONATE 1 APPLICATION(S): 213 SOLUTION TOPICAL at 11:42

## 2022-06-09 RX ADMIN — Medication 1 APPLICATION(S): at 11:42

## 2022-06-09 RX ADMIN — HYDROMORPHONE HYDROCHLORIDE 0.5 MILLIGRAM(S): 2 INJECTION INTRAMUSCULAR; INTRAVENOUS; SUBCUTANEOUS at 22:02

## 2022-06-09 RX ADMIN — HYDROMORPHONE HYDROCHLORIDE 0.5 MILLIGRAM(S): 2 INJECTION INTRAMUSCULAR; INTRAVENOUS; SUBCUTANEOUS at 10:26

## 2022-06-09 RX ADMIN — MEROPENEM 100 MILLIGRAM(S): 1 INJECTION INTRAVENOUS at 02:27

## 2022-06-09 RX ADMIN — HEPARIN SODIUM 5000 UNIT(S): 5000 INJECTION INTRAVENOUS; SUBCUTANEOUS at 05:55

## 2022-06-09 RX ADMIN — HYDROMORPHONE HYDROCHLORIDE 0.5 MILLIGRAM(S): 2 INJECTION INTRAMUSCULAR; INTRAVENOUS; SUBCUTANEOUS at 22:17

## 2022-06-09 RX ADMIN — ERYTHROPOIETIN 6000 UNIT(S): 10000 INJECTION, SOLUTION INTRAVENOUS; SUBCUTANEOUS at 19:11

## 2022-06-09 RX ADMIN — HYDROMORPHONE HYDROCHLORIDE 0.5 MILLIGRAM(S): 2 INJECTION INTRAMUSCULAR; INTRAVENOUS; SUBCUTANEOUS at 06:51

## 2022-06-09 RX ADMIN — HYDROMORPHONE HYDROCHLORIDE 0.5 MILLIGRAM(S): 2 INJECTION INTRAMUSCULAR; INTRAVENOUS; SUBCUTANEOUS at 06:21

## 2022-06-09 RX ADMIN — SEVELAMER CARBONATE 1600 MILLIGRAM(S): 2400 POWDER, FOR SUSPENSION ORAL at 20:34

## 2022-06-09 NOTE — DIETITIAN INITIAL EVALUATION ADULT - OTHER INFO
Pt has a history of ESRD on HD. She is s/p L partial 3rd ray amputation and R hallux amputation. pt was admitted for toe pain and further workup.   Pt reports her appetite and po intake have been good. She had no complaints of GI distress not difficulty  chewing or swallowing. Pt has no known food allergies.  Pt states that she knows her knows and follow her diet at home. She is followed by a renal dietitian at dialysis.  Pt's current diet is DASH. Spoke with ACP to request the addition of Renal Replacement to diet order. Pt is aware and amenable.

## 2022-06-09 NOTE — PROGRESS NOTE ADULT - ASSESSMENT
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 Admitted for further evaluation. Renal following for ESRD Mx.     ESRD on HD  schedule hemodialysis - Monday, Wednesday and Friday   access- thigh AVG  HD unit SQDC  K, vol ok     off schedule for hd  s/p last HD 6/7, Rx sheet reviewed, net UF 1.2kg, tolerated well. uneventful.  plan for next hemodialysis today then SAT w/ 2 k bath , Ultrafiltration 1kg as tolerated with blood pressure   will switch to MWF schedule next week  renal diet  dose all meds for ESRD  renal restrictions in diet.  Anemia of CKD- Hb < goal now. added DION epo 6k w/hd tiw  Hyperphosphatemia- c/w renvlea 2tid, changed to w/food  HTN, controlled. bp stable.   PAD, infected rt foot wounds, ?OM- s/p vanco, on zosyn. f/u w/Podiatry. on ertapenem now. plan for MRI foot w/o Morgan  f/u w/vas sx eval.   Podiatry f/u - patient s/p left foot TMA and right foot partial 1st ray amputation 6/8  s/p LE angiogram 6/7    poc d/w pt, HD RN  labs, chart reviewed  For any question, call:  Cell # [phone]  Pager # [phone]  office # [phone]

## 2022-06-09 NOTE — DIETITIAN INITIAL EVALUATION ADULT - PERTINENT LABORATORY DATA
06-09    134<L>  |  91<L>  |  49<H>  ----------------------------<  84  4.4   |  22  |  8.39<H>    Ca    9.9      09 Jun 2022 06:30  Phos  5.8     06-09  Mg     2.50     06-09    A1C with Estimated Average Glucose Result: 5.1 % (05-31-22 @ 05:35)  A1C with Estimated Average Glucose Result: 4.7 % (05-04-22 @ 06:42)  A1C with Estimated Average Glucose Result: 5.6 % (03-01-22 @ 07:59)

## 2022-06-09 NOTE — DIETITIAN INITIAL EVALUATION ADULT - NS FNS DIET ORDER
Diet, DASH/TLC:   Sodium & Cholesterol Restricted  For patients receiving Renal Replacement - No Protein Restr, No Conc K, No Conc Phos, Low Sodium (RENAL) (06-09-22 @ 11:25)

## 2022-06-09 NOTE — PROGRESS NOTE ADULT - SUBJECTIVE AND OBJECTIVE BOX
Date of Service  : 06-09-22     INTERVAL HPI/OVERNIGHT EVENTS: No new concerns.   Vital Signs Last 24 Hrs  T(C): 37.4 (09 Jun 2022 14:15), Max: 37.6 (09 Jun 2022 02:08)  T(F): 99.3 (09 Jun 2022 14:15), Max: 99.7 (09 Jun 2022 02:08)  HR: 85 (09 Jun 2022 14:15) (80 - 88)  BP: 125/53 (09 Jun 2022 14:15) (110/54 - 142/61)  BP(mean): --  RR: 17 (09 Jun 2022 14:15) (17 - 18)  SpO2: 100% (09 Jun 2022 14:15) (98% - 100%)  I&O's Summary    08 Jun 2022 07:01  -  09 Jun 2022 07:00  --------------------------------------------------------  IN: 750 mL / OUT: 0 mL / NET: 750 mL    09 Jun 2022 07:01  -  09 Jun 2022 16:58  --------------------------------------------------------  IN: 500 mL / OUT: 1 mL / NET: 499 mL      MEDICATIONS  (STANDING):  aspirin  chewable 81 milliGRAM(s) Oral daily  atorvastatin 40 milliGRAM(s) Oral at bedtime  chlorhexidine 2% Cloths 1 Application(s) Topical daily  clopidogrel Tablet 75 milliGRAM(s) Oral daily  collagenase Ointment 1 Application(s) Topical daily  epoetin niesha-epbx (RETACRIT) Injectable 6000 Unit(s) IV Push <User Schedule>  heparin   Injectable 5000 Unit(s) SubCutaneous every 12 hours  meropenem  IVPB 500 milliGRAM(s) IV Intermittent every 24 hours  polyethylene glycol 3350 17 Gram(s) Oral daily  senna 2 Tablet(s) Oral at bedtime  sevelamer carbonate 1600 milliGRAM(s) Oral three times a day with meals    MEDICATIONS  (PRN):  acetaminophen     Tablet .. 650 milliGRAM(s) Oral every 6 hours PRN Temp greater or equal to 38C (100.4F), Mild Pain (1 - 3)  HYDROmorphone  Injectable 0.5 milliGRAM(s) IV Push every 4 hours PRN Severe Pain (7 - 10)    LABS:                        9.1    17.76 )-----------( 253      ( 09 Jun 2022 06:30 )             30.9     06-09    134<L>  |  91<L>  |  49<H>  ----------------------------<  84  4.4   |  22  |  8.39<H>    Ca    9.9      09 Jun 2022 06:30  Phos  5.8     06-09  Mg     2.50     06-09      PT/INR - ( 08 Jun 2022 05:15 )   PT: 13.1 sec;   INR: 1.13 ratio         PTT - ( 08 Jun 2022 05:15 )  PTT:31.6 sec    CAPILLARY BLOOD GLUCOSE              REVIEW OF SYSTEMS:  CONSTITUTIONAL: No fever, weight loss, or fatigue  EYES: No eye pain, visual disturbances, or discharge  ENMT:  No difficulty hearing, tinnitus, vertigo; No sinus or throat pain  NECK: No pain or stiffness  RESPIRATORY: No cough, wheezing, chills or hemoptysis; No shortness of breath  CARDIOVASCULAR: No chest pain, palpitations, dizziness, or leg swelling  GASTROINTESTINAL: No abdominal or epigastric pain. No nausea, vomiting, or hematemesis; No diarrhea or constipation. No melena or hematochezia.    RADIOLOGY & ADDITIONAL TESTS:    Consultant(s) Notes Reviewed:  [x ] YES  [ ] NO    PHYSICAL EXAM:  GENERAL: NAD, well-groomed, well-developed ,not in any distress ,  HEAD:  Atraumatic, Normocephalic  NECK: Supple, No JVD, Normal thyroid  NERVOUS SYSTEM:  Alert & Oriented X3, No focal deficit   CHEST/LUNG: Good air entry bilateral with no  rales, rhonchi, wheezing, or rubs  HEART: Regular rate and rhythm; No murmurs, rubs, or gallops  ABDOMEN: Soft, Nontender, Nondistended; Bowel sounds present  EXTREMITIES: Feet dressed     Care Discussed with Consultants/Other Providers [ x] YES  [ ] NO

## 2022-06-09 NOTE — PROGRESS NOTE ADULT - SUBJECTIVE AND OBJECTIVE BOX
Cardiovascular Disease Progress Note    Overnight events: No acute events overnight. Ms. Boogie denies chest pain or SOB.   Otherwise review of systems negative    Objective Findings:  T(C): 37.2 (06-09-22 @ 05:00), Max: 37.6 (06-09-22 @ 02:08)  HR: 80 (06-09-22 @ 05:00) (77 - 88)  BP: 118/52 (06-09-22 @ 05:00) (107/44 - 136/70)  RR: 18 (06-09-22 @ 05:00) (12 - 18)  SpO2: 98% (06-09-22 @ 05:00) (95% - 100%)  Wt(kg): --  Daily     Daily       Physical Exam:  Gen: NAD; Patient resting comfortably  HEENT: EOMI, Normocephalic/ atraumatic  CV: RRR, normal S1 + S2, no m/r/g  Lungs:  Normal respiratory effort; clear to auscultation bilaterally  Abd: soft, non-tender; bowel sounds present  Ext: No edema; warm and well perfused    Telemetry: n/a    Laboratory Data:                        10.0   15.83 )-----------( 262      ( 08 Jun 2022 05:15 )             32.8     06-08    136  |  91<L>  |  33<H>  ----------------------------<  74  4.4   |  27  |  6.82<H>    Ca    10.1      08 Jun 2022 05:15  Phos  4.6     06-08  Mg     2.50     06-08      PT/INR - ( 08 Jun 2022 05:15 )   PT: 13.1 sec;   INR: 1.13 ratio         PTT - ( 08 Jun 2022 05:15 )  PTT:31.6 sec          Inpatient Medications:  MEDICATIONS  (STANDING):  aspirin  chewable 81 milliGRAM(s) Oral daily  atorvastatin 40 milliGRAM(s) Oral at bedtime  chlorhexidine 2% Cloths 1 Application(s) Topical daily  clopidogrel Tablet 75 milliGRAM(s) Oral daily  collagenase Ointment 1 Application(s) Topical daily  epoetin niesha-epbx (RETACRIT) Injectable 6000 Unit(s) IV Push <User Schedule>  heparin   Injectable 5000 Unit(s) SubCutaneous every 12 hours  meropenem  IVPB 500 milliGRAM(s) IV Intermittent every 24 hours  polyethylene glycol 3350 17 Gram(s) Oral daily  senna 2 Tablet(s) Oral at bedtime  sevelamer carbonate 800 milliGRAM(s) Oral three times a day      Assessment: 61-year-old female with ESRD on HD, HLD and peripheral vascular disease s/p lower extremity resection presents with chronic limb ischemia.     Plan of Care:    #Post operative evaluation-  Ms. Boogie tolerated podiatric amputation well.   She displays no signs or symptoms of post operative coronary ischemia or acute CHF.   - Continue with current cardiac management.     #Compensated diastolic CHF-  Volume status is improved.  Fluid removal with HD as per the renal team.   Recent echo noted- normal LV systolic function.    #Peripheral vascular disease.  - Podiatry and vascular input noted.     #ESRD-  - HD as per renal.          Over 25 minutes spent on total encounter; more than 50% of the visit was spent counseling and/or coordinating care by the attending physician.      Cristino Adams MD [facility]  Cardiovascular Disease  [phone]

## 2022-06-09 NOTE — PROGRESS NOTE ADULT - SUBJECTIVE AND OBJECTIVE BOX
[facility] - S Jluis / Shabbir S /D Romina/ SHERRI Mcfarlane/ SHERRI Madden/ Andrés Perkins / M Tristianu/ O Gladis  service -[phone], office [phone]  ---------------------------------------------------------------------------------------------------------------    Patient seen and examined bedside    Subjective and Objective: No overnight events, sob resolved. No complaints today. feeling better    Allergies: No Known Allergies      Hospital Medications:   MEDICATIONS  (STANDING):  aspirin  chewable 81 milliGRAM(s) Oral daily  atorvastatin 40 milliGRAM(s) Oral at bedtime  chlorhexidine 2% Cloths 1 Application(s) Topical daily  clopidogrel Tablet 75 milliGRAM(s) Oral daily  collagenase Ointment 1 Application(s) Topical daily  epoetin niesha-epbx (RETACRIT) Injectable 6000 Unit(s) IV Push <User Schedule>  heparin   Injectable 5000 Unit(s) SubCutaneous every 12 hours  meropenem  IVPB 500 milliGRAM(s) IV Intermittent every 24 hours  polyethylene glycol 3350 17 Gram(s) Oral daily  senna 2 Tablet(s) Oral at bedtime  sevelamer carbonate 800 milliGRAM(s) Oral three times a day      REVIEW OF SYSTEMS:  CONSTITUTIONAL: No weakness, fevers or chills  EYES/ENT: No visual changes;  No vertigo or throat pain   NECK: No pain or stiffness  RESPIRATORY: No cough, wheezing, hemoptysis; No shortness of breath  CARDIOVASCULAR: No chest pain or palpitations.  GASTROINTESTINAL: No abdominal or epigastric pain. No nausea, vomiting, or hematemesis; No diarrhea or constipation. No melena or hematochezia.  GENITOURINARY: No dysuria, frequency, foamy urine, urinary urgency, incontinence or hematuria  NEUROLOGICAL: No numbness or weakness  SKIN: No itching, burning, rashes, or lesions   VASCULAR: No bilateral lower extremity edema.   All other review of systems is negative unless indicated above.    VITALS:  T(F): 98.4 (06-09-22 @ 10:08), Max: 99.7 (06-09-22 @ 02:08)  HR: 81 (06-09-22 @ 10:30)  BP: 115/58 (06-09-22 @ 10:30)  RR: 18 (06-09-22 @ 10:08)  SpO2: 99% (06-09-22 @ 10:08)  Wt(kg): --    06-08 @ 07:01  -  06-09 @ 07:00  --------------------------------------------------------  IN: 750 mL / OUT: 0 mL / NET: 750 mL          PHYSICAL EXAM:  Constitutional: NAD  HEENT: anicteric sclera, oropharynx clear  Neck: No JVD  Respiratory: CTAB, no wheezes, rales or rhonchi  Cardiovascular: S1, S2, RRR  Gastrointestinal: BS+, soft, NT/ND  Extremities: No cyanosis or clubbing. No peripheral edema  Neurological: A/O x 3, no focal deficits  Psychiatric: Normal mood, normal affect  : No CVA tenderness. No salcido.   Skin: No rashes  Vascular Access:    LABS:  06-09    134<L>  |  91<L>  |  49<H>  ----------------------------<  84  4.4   |  22  |  8.39<H>    Ca    9.9      09 Jun 2022 06:30  Phos  5.8     06-09  Mg     2.50     06-09      Creatinine Trend: 8.39 <--, 6.82 <--, 11.10 <--, 9.72 <--, 7.79 <--, 5.55 <--, 8.91 <--                        9.1    17.76 )-----------( 253      ( 09 Jun 2022 06:30 )             30.9     Urine Studies:        RADIOLOGY & ADDITIONAL STUDIES:   [facility] - S Jluis / Shabbir S /D Romina/ S Denys/ S Chano/ Andrés Perkins / M Katlyn/ O Gladis  service -[phone], office [phone]  ---------------------------------------------------------------------------------------------------------------    Patient seen and examined bedside    Subjective and Objective: No overnight events, denied sob. No complaints today. feeling better    Allergies: No Known Allergies      Hospital Medications:   MEDICATIONS  (STANDING):  aspirin  chewable 81 milliGRAM(s) Oral daily  atorvastatin 40 milliGRAM(s) Oral at bedtime  chlorhexidine 2% Cloths 1 Application(s) Topical daily  clopidogrel Tablet 75 milliGRAM(s) Oral daily  collagenase Ointment 1 Application(s) Topical daily  epoetin niesha-epbx (RETACRIT) Injectable 6000 Unit(s) IV Push <User Schedule>  heparin   Injectable 5000 Unit(s) SubCutaneous every 12 hours  meropenem  IVPB 500 milliGRAM(s) IV Intermittent every 24 hours  polyethylene glycol 3350 17 Gram(s) Oral daily  senna 2 Tablet(s) Oral at bedtime  sevelamer carbonate 800 milliGRAM(s) Oral three times a day    VITALS:  T(F): 98.4 (06-09-22 @ 10:08), Max: 99.7 (06-09-22 @ 02:08)  HR: 81 (06-09-22 @ 10:30)  BP: 115/58 (06-09-22 @ 10:30)  RR: 18 (06-09-22 @ 10:08)  SpO2: 99% (06-09-22 @ 10:08)  Wt(kg): --    06-08 @ 07:01  -  06-09 @ 07:00  --------------------------------------------------------  IN: 750 mL / OUT: 0 mL / NET: 750 mL    PHYSICAL EXAM:  Constitutional: NAD  HEENT: anicteric sclera  Neck: No JVD  Respiratory: CTAB, no wheezes, rales or rhonchi  Cardiovascular: S1, S2, RRR  Gastrointestinal: BS+, soft, NT/ND  Extremities: + peripheral edema rt leg. b/l feet dressings+  Neurological: A/O x 3  Psychiatric: Normal mood, normal affect  : No salcido.   Vascular Access: rt femoral AVG+    LABS:  06-09    134<L>  |  91<L>  |  49<H>  ----------------------------<  84  4.4   |  22  |  8.39<H>    Ca    9.9      09 Jun 2022 06:30  Phos  5.8     06-09  Mg     2.50     06-09      Creatinine Trend: 8.39 <--, 6.82 <--, 11.10 <--, 9.72 <--, 7.79 <--, 5.55 <--, 8.91 <--                        9.1    17.76 )-----------( 253      ( 09 Jun 2022 06:30 )             30.9     Urine Studies:        RADIOLOGY & ADDITIONAL STUDIES:

## 2022-06-09 NOTE — DIETITIAN INITIAL EVALUATION ADULT - PERTINENT MEDS FT
MEDICATIONS  (STANDING):  aspirin  chewable 81 milliGRAM(s) Oral daily  atorvastatin 40 milliGRAM(s) Oral at bedtime  chlorhexidine 2% Cloths 1 Application(s) Topical daily  clopidogrel Tablet 75 milliGRAM(s) Oral daily  collagenase Ointment 1 Application(s) Topical daily  epoetin niesha-epbx (RETACRIT) Injectable 6000 Unit(s) IV Push <User Schedule>  heparin   Injectable 5000 Unit(s) SubCutaneous every 12 hours  meropenem  IVPB 500 milliGRAM(s) IV Intermittent every 24 hours  polyethylene glycol 3350 17 Gram(s) Oral daily  senna 2 Tablet(s) Oral at bedtime  sevelamer carbonate 800 milliGRAM(s) Oral three times a day    MEDICATIONS  (PRN):  acetaminophen     Tablet .. 650 milliGRAM(s) Oral every 6 hours PRN Temp greater or equal to 38C (100.4F), Mild Pain (1 - 3)  HYDROmorphone  Injectable 0.5 milliGRAM(s) IV Push every 4 hours PRN Severe Pain (7 - 10)

## 2022-06-09 NOTE — DIETITIAN INITIAL EVALUATION ADULT - NSFNSPHYEXAMSKINFT_GEN_A_CORE
surgical incision right foot first ray  wound bundle #1 left foot between first 2 digits  wound bundle #2 right foot big toe

## 2022-06-09 NOTE — PROGRESS NOTE ADULT - SUBJECTIVE AND OBJECTIVE BOX
Patient is a 61y old  Female who presents with a chief complaint of RLE toe pain (09 Jun 2022 07:39)       INTERVAL HPI/OVERNIGHT EVENTS:  Patient seen and evaluated at bedside.  Pt is resting comfortable in NAD. Denies N/V/F/C.  Pain rated at X/10    Allergies    No Known Allergies    Intolerances        Vital Signs Last 24 Hrs  T(C): 36.9 (09 Jun 2022 10:08), Max: 37.6 (09 Jun 2022 02:08)  T(F): 98.4 (09 Jun 2022 10:08), Max: 99.7 (09 Jun 2022 02:08)  HR: 82 (09 Jun 2022 10:08) (80 - 88)  BP: 142/61 (09 Jun 2022 10:08) (110/54 - 142/61)  BP(mean): --  RR: 18 (09 Jun 2022 10:08) (17 - 18)  SpO2: 99% (09 Jun 2022 10:08) (98% - 100%)    LABS:                        9.1    17.76 )-----------( 253      ( 09 Jun 2022 06:30 )             30.9     06-09    134<L>  |  91<L>  |  49<H>  ----------------------------<  84  4.4   |  22  |  8.39<H>    Ca    9.9      09 Jun 2022 06:30  Phos  5.8     06-09  Mg     2.50     06-09      PT/INR - ( 08 Jun 2022 05:15 )   PT: 13.1 sec;   INR: 1.13 ratio         PTT - ( 08 Jun 2022 05:15 )  PTT:31.6 sec    CAPILLARY BLOOD GLUCOSE          Lower Extremity Physical Exam:    Vascular: DP/PT 1/4 R,  DP/PT 0/4 L, CFT <3 seconds, Temperature gradient warm to cooo B/L, TG is warm to cool b/l  Neuro: Epicritic sensation diminished to the level of forefoot B/L  Musculoskeletal/Ortho: left partial 5th ray resection healed, LF partial 4th ray resection open   Skin:   3/4/22 s/p L foot partial 3rd ray resection open: fibrotic wound to subQ, no malodor, no drainage, no purulence, flaps cool, improved maceration, distal skin necrosis, no tracking, no purulence, no erythema    6/8 s/p R foot partial first ray resection closed: flaps are cold and dusky with delayed CFT, sutures intact, no hematoma, no purulence or discharge, no clinical signs of infection.       RADIOLOGY & ADDITIONAL TESTS:      ACC: [id number] EXAM:  MR FOOT LT                          PROCEDURE DATE:  06/07/2022          INTERPRETATION:   History: Wound, evaluate for osteomyelitis.    Technique: Multiplanar and multisequence MRI images were obtained through   the left forefoot without contrast.    Comparison: Comparison radiographs dated 5/30/2022; comparison MRI dated   3/3/2022    Findings:    Status post transmetatarsal amputation of the third and fifth digits.    There is suggestion of an ulcer overlying the medialaspect of the fourth   digit with osseous edema with T1 marrow signal abnormality within the   fourth middle phalanx. There is osseous edema without definite T1 marrow   signal abnormality within the head of the fourth proximal phalanx. Tiny   phlegmon vs abscess is seen along the lateral aspect of the fourth   proximal phalanx, measuring 9 x 3 mm in short axis.    Mild first metatarsophalangeal joint arthrosis. No acute fracture seen.    Fatty infiltration with edema within the visualized musculature, likely   related to disuse. Lisfranc ligament is intact.    IMPRESSION:    Suggestion of an ulcer overlying the medial aspect of the fourth digit   with findings concerning for osteomyelitis within the fourth middle   phalanx. Osseous edema without definite T1 marrow signal abnormality   within the head of the fourth proximal phalanx, which may represent   osteitis/early osteomyelitis with septic arthritis of the fourth proximal   interphalangeal joint.    Tiny phlegmon vs abscess seen along the lateral aspect of the fourth   proximal phalanx. Evaluation for drainable fluid collection is limited   due to lack of intravenous contrast.    --- End of Report ---            HANNA VALDEZ M.D., ATTENDING RADIOLOGIST  This document has been electronically signed. Jun 8 2022  8:07AM

## 2022-06-09 NOTE — PROGRESS NOTE ADULT - ASSESSMENT
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 Admitted for further evaluation     Problem/Plan - 1:  ·  Problem: Wound of right foot with  Cellulitis with Left 4th toe OM .   ·  Plan: Pt w/ increasing R toe pain when walking as well as swelling. Mild white count and elevated inflammatory markers but afebrile with vitals stable  -podiatry and ID help appreciated.   -started  IV Meropenem  500mg q24hrs per iD . Awaiting OR path results.   - S/P  left foot TMA and right foot partial 1st ray amputation on Wed .     Problem/Plan - 2:  ·  Problem: PAD (peripheral artery disease).   ·  Plan: S/P RLE Angiogram but unable to do any intervention per Vascular.   -known PAD, continue asa/plavix. Pt denies any numbness or tingling.  -recent angiograms done w/ poor peripheral arterial flow per podiatry and so poor surgical candidate as above  -continue statin.     Problem/Plan - 3:  ·  Problem: ESRD (end stage renal disease) on dialysis.   ·  Plan: Renal helping with HD.   On MWF schedule.  -monitor I/O.     Problem/Plan - 4:  ·  Problem: HLD (hyperlipidemia).   ·  Plan: continue statin. check lipid profile.     Problem/Plan - 5:  ·  Problem: Need for prophylactic measure.   ·  Plan: heparin subcu for dvt ppx, dash diet.

## 2022-06-09 NOTE — PROGRESS NOTE ADULT - ASSESSMENT
60 yo f s/p 6/8 right foot partial first ray amputation and left foot partial 3rd ray amputation (DOS 3/4/2022)  -pt seen and evaluated  - Afebrile , WBC 17  - 3/4/22 s/p L foot partial 3rd ray resection open: fibrotic wound to subQ, no malodor, no drainage, no purulence, flaps cool, improved maceration, distal skin necrosis, no tracking, no purulence, no erythema    - 6/8 s/p R foot partial first ray resection closed: flaps are cold and dusky with delayed CFT, sutures intact, no hematoma, no purulence or discharge, no clinical signs of infection.   - R foot OR data pending   - Pt is s/p recent b/l LE angiograms with poor peripheral arterial flow, is a poor surgical candidate  - B/L foot MRI reviewed   - Abscess along lateral aspect of 4th digit proximal phalanx drained (~1cc), accessed from partial 3rd ray surgical wound using suture removal kit, patient tolerated procedure well   - High concern for viability and residual soft tissue infection for R foot per intra op findings  - Viability of the b/l feet are highly guarded   - discussed with attending

## 2022-06-10 LAB
-  AMIKACIN: SIGNIFICANT CHANGE UP
-  AMOXICILLIN/CLAVULANIC ACID: SIGNIFICANT CHANGE UP
-  AMPICILLIN/SULBACTAM: SIGNIFICANT CHANGE UP
-  AMPICILLIN: SIGNIFICANT CHANGE UP
-  AZTREONAM: SIGNIFICANT CHANGE UP
-  CEFAZOLIN: SIGNIFICANT CHANGE UP
-  CEFEPIME: SIGNIFICANT CHANGE UP
-  CEFOXITIN: SIGNIFICANT CHANGE UP
-  CEFTRIAXONE: SIGNIFICANT CHANGE UP
-  CIPROFLOXACIN: SIGNIFICANT CHANGE UP
-  ERTAPENEM: SIGNIFICANT CHANGE UP
-  GENTAMICIN: SIGNIFICANT CHANGE UP
-  IMIPENEM: SIGNIFICANT CHANGE UP
-  LEVOFLOXACIN: SIGNIFICANT CHANGE UP
-  MEROPENEM: SIGNIFICANT CHANGE UP
-  PIPERACILLIN/TAZOBACTAM: SIGNIFICANT CHANGE UP
-  TOBRAMYCIN: SIGNIFICANT CHANGE UP
-  TRIMETHOPRIM/SULFAMETHOXAZOLE: SIGNIFICANT CHANGE UP
ANION GAP SERPL CALC-SCNC: 18 MMOL/L — HIGH (ref 7–14)
BASOPHILS # BLD AUTO: 0.04 K/UL — SIGNIFICANT CHANGE UP (ref 0–0.2)
BASOPHILS NFR BLD AUTO: 0.3 % — SIGNIFICANT CHANGE UP (ref 0–2)
BUN SERPL-MCNC: 26 MG/DL — HIGH (ref 7–23)
CALCIUM SERPL-MCNC: 9.4 MG/DL — SIGNIFICANT CHANGE UP (ref 8.4–10.5)
CHLORIDE SERPL-SCNC: 91 MMOL/L — LOW (ref 98–107)
CO2 SERPL-SCNC: 27 MMOL/L — SIGNIFICANT CHANGE UP (ref 22–31)
CREAT SERPL-MCNC: 5.46 MG/DL — HIGH (ref 0.5–1.3)
EGFR: 8 ML/MIN/1.73M2 — LOW
EOSINOPHIL # BLD AUTO: 0.18 K/UL — SIGNIFICANT CHANGE UP (ref 0–0.5)
EOSINOPHIL NFR BLD AUTO: 1.2 % — SIGNIFICANT CHANGE UP (ref 0–6)
GLUCOSE SERPL-MCNC: 95 MG/DL — SIGNIFICANT CHANGE UP (ref 70–99)
HCT VFR BLD CALC: 28.4 % — LOW (ref 34.5–45)
HGB BLD-MCNC: 8.6 G/DL — LOW (ref 11.5–15.5)
IANC: 11.87 K/UL — HIGH (ref 1.8–7.4)
IMM GRANULOCYTES NFR BLD AUTO: 0.9 % — SIGNIFICANT CHANGE UP (ref 0–1.5)
LYMPHOCYTES # BLD AUTO: 1.32 K/UL — SIGNIFICANT CHANGE UP (ref 1–3.3)
LYMPHOCYTES # BLD AUTO: 8.8 % — LOW (ref 13–44)
MAGNESIUM SERPL-MCNC: 2.3 MG/DL — SIGNIFICANT CHANGE UP (ref 1.6–2.6)
MCHC RBC-ENTMCNC: 27 PG — SIGNIFICANT CHANGE UP (ref 27–34)
MCHC RBC-ENTMCNC: 30.3 GM/DL — LOW (ref 32–36)
MCV RBC AUTO: 89 FL — SIGNIFICANT CHANGE UP (ref 80–100)
METHOD TYPE: SIGNIFICANT CHANGE UP
MONOCYTES # BLD AUTO: 1.38 K/UL — HIGH (ref 0–0.9)
MONOCYTES NFR BLD AUTO: 9.2 % — SIGNIFICANT CHANGE UP (ref 2–14)
NEUTROPHILS # BLD AUTO: 11.87 K/UL — HIGH (ref 1.8–7.4)
NEUTROPHILS NFR BLD AUTO: 79.6 % — HIGH (ref 43–77)
NRBC # BLD: 0 /100 WBCS — SIGNIFICANT CHANGE UP
NRBC # FLD: 0 K/UL — SIGNIFICANT CHANGE UP
PHOSPHATE SERPL-MCNC: 3.9 MG/DL — SIGNIFICANT CHANGE UP (ref 2.5–4.5)
PLATELET # BLD AUTO: 239 K/UL — SIGNIFICANT CHANGE UP (ref 150–400)
POTASSIUM SERPL-MCNC: 3.6 MMOL/L — SIGNIFICANT CHANGE UP (ref 3.5–5.3)
POTASSIUM SERPL-SCNC: 3.6 MMOL/L — SIGNIFICANT CHANGE UP (ref 3.5–5.3)
RBC # BLD: 3.19 M/UL — LOW (ref 3.8–5.2)
RBC # FLD: 15 % — HIGH (ref 10.3–14.5)
SARS-COV-2 RNA SPEC QL NAA+PROBE: SIGNIFICANT CHANGE UP
SODIUM SERPL-SCNC: 136 MMOL/L — SIGNIFICANT CHANGE UP (ref 135–145)
WBC # BLD: 14.92 K/UL — HIGH (ref 3.8–10.5)
WBC # FLD AUTO: 14.92 K/UL — HIGH (ref 3.8–10.5)

## 2022-06-10 PROCEDURE — 99232 SBSQ HOSP IP/OBS MODERATE 35: CPT

## 2022-06-10 RX ORDER — ERYTHROPOIETIN 10000 [IU]/ML
10000 INJECTION, SOLUTION INTRAVENOUS; SUBCUTANEOUS
Refills: 0 | Status: DISCONTINUED | OUTPATIENT
Start: 2022-06-10 | End: 2022-06-17

## 2022-06-10 RX ORDER — MEROPENEM 1 G/30ML
500 INJECTION INTRAVENOUS EVERY 24 HOURS
Refills: 0 | Status: DISCONTINUED | OUTPATIENT
Start: 2022-06-10 | End: 2022-06-20

## 2022-06-10 RX ORDER — ACETAMINOPHEN 500 MG
650 TABLET ORAL ONCE
Refills: 0 | Status: COMPLETED | OUTPATIENT
Start: 2022-06-10 | End: 2022-06-10

## 2022-06-10 RX ADMIN — HYDROMORPHONE HYDROCHLORIDE 0.5 MILLIGRAM(S): 2 INJECTION INTRAMUSCULAR; INTRAVENOUS; SUBCUTANEOUS at 17:38

## 2022-06-10 RX ADMIN — HEPARIN SODIUM 5000 UNIT(S): 5000 INJECTION INTRAVENOUS; SUBCUTANEOUS at 18:00

## 2022-06-10 RX ADMIN — Medication 650 MILLIGRAM(S): at 03:04

## 2022-06-10 RX ADMIN — HYDROMORPHONE HYDROCHLORIDE 0.5 MILLIGRAM(S): 2 INJECTION INTRAMUSCULAR; INTRAVENOUS; SUBCUTANEOUS at 21:28

## 2022-06-10 RX ADMIN — HEPARIN SODIUM 5000 UNIT(S): 5000 INJECTION INTRAVENOUS; SUBCUTANEOUS at 05:24

## 2022-06-10 RX ADMIN — CHLORHEXIDINE GLUCONATE 1 APPLICATION(S): 213 SOLUTION TOPICAL at 12:03

## 2022-06-10 RX ADMIN — SEVELAMER CARBONATE 1600 MILLIGRAM(S): 2400 POWDER, FOR SUSPENSION ORAL at 09:14

## 2022-06-10 RX ADMIN — SEVELAMER CARBONATE 1600 MILLIGRAM(S): 2400 POWDER, FOR SUSPENSION ORAL at 12:02

## 2022-06-10 RX ADMIN — HYDROMORPHONE HYDROCHLORIDE 0.5 MILLIGRAM(S): 2 INJECTION INTRAMUSCULAR; INTRAVENOUS; SUBCUTANEOUS at 10:38

## 2022-06-10 RX ADMIN — Medication 1 APPLICATION(S): at 12:01

## 2022-06-10 RX ADMIN — HYDROMORPHONE HYDROCHLORIDE 0.5 MILLIGRAM(S): 2 INJECTION INTRAMUSCULAR; INTRAVENOUS; SUBCUTANEOUS at 21:10

## 2022-06-10 RX ADMIN — HYDROMORPHONE HYDROCHLORIDE 0.5 MILLIGRAM(S): 2 INJECTION INTRAMUSCULAR; INTRAVENOUS; SUBCUTANEOUS at 10:08

## 2022-06-10 RX ADMIN — ATORVASTATIN CALCIUM 40 MILLIGRAM(S): 80 TABLET, FILM COATED ORAL at 21:26

## 2022-06-10 RX ADMIN — HYDROMORPHONE HYDROCHLORIDE 0.5 MILLIGRAM(S): 2 INJECTION INTRAMUSCULAR; INTRAVENOUS; SUBCUTANEOUS at 21:45

## 2022-06-10 RX ADMIN — MEROPENEM 100 MILLIGRAM(S): 1 INJECTION INTRAVENOUS at 03:11

## 2022-06-10 RX ADMIN — Medication 81 MILLIGRAM(S): at 12:02

## 2022-06-10 RX ADMIN — MEROPENEM 100 MILLIGRAM(S): 1 INJECTION INTRAVENOUS at 18:00

## 2022-06-10 RX ADMIN — HYDROMORPHONE HYDROCHLORIDE 0.5 MILLIGRAM(S): 2 INJECTION INTRAMUSCULAR; INTRAVENOUS; SUBCUTANEOUS at 16:28

## 2022-06-10 RX ADMIN — CLOPIDOGREL BISULFATE 75 MILLIGRAM(S): 75 TABLET, FILM COATED ORAL at 12:02

## 2022-06-10 RX ADMIN — SEVELAMER CARBONATE 1600 MILLIGRAM(S): 2400 POWDER, FOR SUSPENSION ORAL at 17:12

## 2022-06-10 NOTE — PROGRESS NOTE ADULT - SUBJECTIVE AND OBJECTIVE BOX
Follow Up:  right toe infection    Interval History/ROS:  s/p partial amp 1st ray right foot 6/8    planning more proximal amputation next week per patient      Allergies  No Known Allergies        ANTIMICROBIALS:      MEDICATIONS  (STANDING):  acetaminophen     Tablet .. 650 every 6 hours PRN  aspirin  chewable 81 daily  atorvastatin 40 at bedtime  clopidogrel Tablet 75 daily  epoetin niesha-epbx (RETACRIT) Injectable 6000 <User Schedule>  heparin   Injectable 5000 every 12 hours  HYDROmorphone  Injectable 0.5 every 4 hours PRN  polyethylene glycol 3350 17 daily  senna 2 at bedtime    Vital Signs Last 24 Hrs  T(F): 97.9 (06-10-22 @ 10:08), Max: 100.1 (06-10-22 @ 02:12)  HR: 83 (06-10-22 @ 10:08)  BP: 108/58 (06-10-22 @ 10:08)  RR: 18 (06-10-22 @ 10:08)  SpO2: 100% (06-10-22 @ 10:08) (100% - 100%)    PHYSICAL EXAM:  Constitutional: non toxic, sitting side of bed  Eyes: right eye cloudy   Oral cavity: Clear, no lesions  Neck: Supple  RS: Chest clear   CVS: S1, S2   Abdomen: Soft. No guarding/rigidity/tenderness.  : no salcido  Extremities:  HD access right thigh; right foot and left foot dressing  Skin: No rash  Neuro: Alert, oriented to time/place/person  Cranial nerves 2-12 grossly normal. No focal abnormalities                                     8.6    14.92 )-----------( 239      ( 10 Neil 2022 05:38 )             28.4 06-10    136  |  91  |  26  ----------------------------<  95  3.6   |  27  |  5.46  Ca    9.4      10 Neil 2022 05:38Phos  3.9     06-10Mg     2.30     06-10          MICROBIOLOGY:    OR clean margin E coli sesnsitive    .Abscess right foot wound  05-30-22   Numerous Klebsiella pneumoniae ESBL  Numerous Escherichia coli  Moderate Pseudomonas aeruginosa  --  Klebsiella pneumoniae ESBL  Escherichia coli  Pseudomonas aeruginosa        RADIOLOGY:    ra< from: Xray Foot AP + Lateral + Oblique, Left (05.30.22 @ 15:11) >  IMPRESSION:  Increased permeative appearance of the left fourth metatarsal head and   phalanges, which could be seen in the setting of osteomyelitis.  No radiographic evidence of osteomyelitis involving the right foot.    MRI is more sensitive and can be considered for further evaluation as   clinically warranted.    --- End of Report ---    < end of copied text >

## 2022-06-10 NOTE — PROGRESS NOTE ADULT - ASSESSMENT
62 yo f s/p 6/8 right foot partial first ray amputation and left foot partial 3rd ray amputation (DOS 3/4/2022)  -pt seen and evaluated  - Afebrile , WBC 14  - 3/4/22 s/p L foot partial 3rd ray resection open: fibrotic wound to subQ, no malodor, no drainage, no purulence, flaps cool, no maceration, distal skin necrosis, no tracking, no purulence, no erythema    - 6/8 s/p R foot partial first ray resection closed: flaps are cold and dusky to the plantar and dorsal aspect with delayed CFT, sutures intact, no hematoma, no purulence or discharge, no clinical signs of infection.   - Pt is s/p recent b/l LE angiograms with poor peripheral arterial flow, is a poor surgical candidate  - B/L foot MRI reviewed   - R foot OR wound culture e coli, bone culture/path pending   - High concern for viability and residual soft tissue infection for R foot per intra op findings  - Viability of the b/l feet are highly guarded   - Discussed with patient the prognosis of any foot amputations and the poor likelihood of it healing. Pt would benefit from proximal amputation and is amenable at this time.   - [facility]  - Seen with attending

## 2022-06-10 NOTE — PROGRESS NOTE ADULT - ASSESSMENT
62 y/o woman w/ ESRD on HD (MWF), HF, 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 Admitted for further evaluation     Problem/Plan - 1:  ·  Problem: Wound of right foot with  Cellulitis with Left 4th toe OM .   ·  Plan: Pt w/ increasing R toe pain when walking as well as swelling. Mild white count and elevated inflammatory markers but afebrile with vitals stable  -podiatry and ID help appreciated.   -started  IV Meropenem  500mg q24hrs per iD . Awaiting OR path final culture results.   - S/P  left foot TMA and right foot partial 1st ray amputation on Wed .     Problem/Plan - 2:  ·  Problem: PAD (peripheral artery disease).   ·  Plan: S/P RLE Angiogram but unable to do any intervention per Vascular.   -known PAD, continue asa/plavix. Pt denies any numbness or tingling.  -recent angiograms done w/ poor peripheral arterial flow per podiatry and so poor surgical candidate as above  -continue statin.     Problem/Plan - 3:  ·  Problem: ESRD (end stage renal disease) on dialysis.   ·  Plan: Renal helping with HD.   On MWF schedule.  -monitor I/O.     Problem/Plan - 4:  ·  Problem: HLD (hyperlipidemia).   ·  Plan: continue statin. check lipid profile.     Problem/Plan - 5:  ·  Problem: Need for prophylactic measure.   ·  Plan: heparin subcu for dvt ppx, dash diet.

## 2022-06-10 NOTE — PROGRESS NOTE ADULT - SUBJECTIVE AND OBJECTIVE BOX
Date of Service  : 06-10-22    INTERVAL HPI/OVERNIGHT EVENTS: I feel fine. Niece in room.   Vital Signs Last 24 Hrs  T(C): 37.1 (10 Neil 2022 17:10), Max: 37.8 (10 Neil 2022 02:12)  T(F): 98.7 (10 Neil 2022 17:10), Max: 100.1 (10 Neil 2022 02:12)  HR: 80 (10 Neil 2022 17:10) (79 - 86)  BP: 113/48 (10 Neil 2022 17:10) (107/50 - 115/48)  BP(mean): --  RR: 18 (10 Neil 2022 17:10) (18 - 18)  SpO2: 98% (10 Neil 2022 17:10) (98% - 100%)  I&O's Summary    09 Jun 2022 07:01  -  10 Neil 2022 07:00  --------------------------------------------------------  IN: 1190 mL / OUT: 1181 mL / NET: 9 mL      MEDICATIONS  (STANDING):  aspirin  chewable 81 milliGRAM(s) Oral daily  atorvastatin 40 milliGRAM(s) Oral at bedtime  chlorhexidine 2% Cloths 1 Application(s) Topical daily  clopidogrel Tablet 75 milliGRAM(s) Oral daily  collagenase Ointment 1 Application(s) Topical daily  epoetin niesha-epbx (RETACRIT) Injectable 6000 Unit(s) IV Push <User Schedule>  heparin   Injectable 5000 Unit(s) SubCutaneous every 12 hours  meropenem  IVPB 500 milliGRAM(s) IV Intermittent every 24 hours  polyethylene glycol 3350 17 Gram(s) Oral daily  senna 2 Tablet(s) Oral at bedtime  sevelamer carbonate 1600 milliGRAM(s) Oral three times a day with meals    MEDICATIONS  (PRN):  acetaminophen     Tablet .. 650 milliGRAM(s) Oral every 6 hours PRN Temp greater or equal to 38C (100.4F), Mild Pain (1 - 3)  HYDROmorphone  Injectable 0.5 milliGRAM(s) IV Push every 4 hours PRN Severe Pain (7 - 10)    LABS:                        8.6    14.92 )-----------( 239      ( 10 Neil 2022 05:38 )             28.4     06-10    136  |  91<L>  |  26<H>  ----------------------------<  95  3.6   |  27  |  5.46<H>    Ca    9.4      10 Neil 2022 05:38  Phos  3.9     06-10  Mg     2.30     06-10          CAPILLARY BLOOD GLUCOSE              REVIEW OF SYSTEMS:  CONSTITUTIONAL: No fever, weight loss, or fatigue  EYES: No eye pain, visual disturbances, or discharge  ENMT:  No difficulty hearing, tinnitus, vertigo; No sinus or throat pain  NECK: No pain or stiffness  RESPIRATORY: No cough, wheezing, chills or hemoptysis; No shortness of breath  CARDIOVASCULAR: No chest pain, palpitations, dizziness, or leg swelling  GASTROINTESTINAL: No abdominal or epigastric pain. No nausea, vomiting, or hematemesis; No diarrhea or constipation. No melena or hematochezia.  GENITOURINARY: No dysuria, frequency, hematuria, or incontinence  NEUROLOGICAL: No headaches, memory loss, loss of strength, numbness, or tremors      Consultant(s) Notes Reviewed:  [x ] YES  [ ] NO    PHYSICAL EXAM:  GENERAL: NAD, well-groomed, well-developed,not in any distress ,  HEAD:  Atraumatic, Normocephalic  NECK: Supple, No JVD, Normal thyroid  NERVOUS SYSTEM:  Alert & Oriented X3, No focal deficit   CHEST/LUNG: Good air entry bilateral with no  rales, rhonchi, wheezing, or rubs  HEART: Regular rate and rhythm; No murmurs, rubs, or gallops  ABDOMEN: Soft, Nontender, Nondistended; Bowel sounds present  EXTREMITIES: Feet dressed     Care Discussed with Consultants/Other Providers [ x] YES  [ ] NO

## 2022-06-10 NOTE — PROGRESS NOTE ADULT - SUBJECTIVE AND OBJECTIVE BOX
Cardiovascular Disease Progress Note    Overnight events: No acute events overnight.    Ms. Boogie denies chest pain or SOB.   Otherwise review of systems negative    Objective Findings:  T(C): 37.2 (06-10-22 @ 05:20), Max: 37.8 (06-10-22 @ 02:12)  HR: 80 (06-10-22 @ 05:20) (80 - 90)  BP: 115/48 (06-10-22 @ 05:20) (108/50 - 142/61)  RR: 18 (06-10-22 @ 05:20) (17 - 18)  SpO2: 100% (06-10-22 @ 05:20) (99% - 100%)  Wt(kg): --  Daily     Daily       Physical Exam:  Gen: NAD; Patient resting comfortably  HEENT: EOMI, Normocephalic/ atraumatic  CV: RRR, normal S1 + S2, no m/r/g  Lungs:  Normal respiratory effort; clear to auscultation bilaterally  Abd: soft, non-tender; bowel sounds present  Ext: No edema; warm and well perfused    Telemetry: n/a    Laboratory Data:                        8.6    14.92 )-----------( 239      ( 10 Neil 2022 05:38 )             28.4     06-10    136  |  91<L>  |  26<H>  ----------------------------<  95  3.6   |  27  |  5.46<H>    Ca    9.4      10 Neil 2022 05:38  Phos  3.9     06-10  Mg     2.30     06-10                Inpatient Medications:  MEDICATIONS  (STANDING):  aspirin  chewable 81 milliGRAM(s) Oral daily  atorvastatin 40 milliGRAM(s) Oral at bedtime  chlorhexidine 2% Cloths 1 Application(s) Topical daily  clopidogrel Tablet 75 milliGRAM(s) Oral daily  collagenase Ointment 1 Application(s) Topical daily  epoetin niesha-epbx (RETACRIT) Injectable 6000 Unit(s) IV Push <User Schedule>  heparin   Injectable 5000 Unit(s) SubCutaneous every 12 hours  polyethylene glycol 3350 17 Gram(s) Oral daily  senna 2 Tablet(s) Oral at bedtime  sevelamer carbonate 1600 milliGRAM(s) Oral three times a day with meals      Assessment: 61-year-old female with ESRD on HD, HLD and peripheral vascular disease s/p lower extremity resection presents with chronic limb ischemia.     Plan of Care:    #Post operative evaluation-  Ms. Boogie tolerated podiatric amputation well.   She displays no signs or symptoms of post operative coronary ischemia or acute CHF.   - Continue with current cardiac management.     #Compensated diastolic CHF-  Volume status is improved.  Fluid removal with HD as per the renal team.   Recent echo noted- normal LV systolic function.    #Peripheral vascular disease.  - Podiatry and vascular input noted.     #ESRD-  - HD as per renal.    #ACP (advance care planning)-  Advanced care planning was discussed with the patient.    Cardiac findings were discussed in detail and all questions were answered.     Over 25 minutes spent on total encounter; more than 50% of the visit was spent counseling and/or coordinating care by the attending physician.      Cristino Adams MD [facility]  Cardiovascular Disease  [phone]

## 2022-06-10 NOTE — PROGRESS NOTE ADULT - SUBJECTIVE AND OBJECTIVE BOX
[facility] - S Jluis / Shabbir S /D Romina/ SHERRI Mcfarlane/ SHERRI Madden/ Andrés Perkins / M Tristianu/ O Gladis  service -[phone], office [phone]  ---------------------------------------------------------------------------------------------------------------    Patient seen and examined bedside    Subjective and Objective: No overnight events, sob resolved. No complaints today. feeling better    Allergies: No Known Allergies      Hospital Medications:   MEDICATIONS  (STANDING):  aspirin  chewable 81 milliGRAM(s) Oral daily  atorvastatin 40 milliGRAM(s) Oral at bedtime  chlorhexidine 2% Cloths 1 Application(s) Topical daily  clopidogrel Tablet 75 milliGRAM(s) Oral daily  collagenase Ointment 1 Application(s) Topical daily  epoetin niesha-epbx (RETACRIT) Injectable 6000 Unit(s) IV Push <User Schedule>  heparin   Injectable 5000 Unit(s) SubCutaneous every 12 hours  meropenem  IVPB 500 milliGRAM(s) IV Intermittent every 24 hours  polyethylene glycol 3350 17 Gram(s) Oral daily  senna 2 Tablet(s) Oral at bedtime  sevelamer carbonate 1600 milliGRAM(s) Oral three times a day with meals      REVIEW OF SYSTEMS:  CONSTITUTIONAL: No weakness, fevers or chills  EYES/ENT: No visual changes;  No vertigo or throat pain   NECK: No pain or stiffness  RESPIRATORY: No cough, wheezing, hemoptysis; No shortness of breath  CARDIOVASCULAR: No chest pain or palpitations.  GASTROINTESTINAL: No abdominal or epigastric pain. No nausea, vomiting, or hematemesis; No diarrhea or constipation. No melena or hematochezia.  GENITOURINARY: No dysuria, frequency, foamy urine, urinary urgency, incontinence or hematuria  NEUROLOGICAL: No numbness or weakness  SKIN: No itching, burning, rashes, or lesions   VASCULAR: No bilateral lower extremity edema.   All other review of systems is negative unless indicated above.    VITALS:  T(F): 98.4 (06-10-22 @ 14:45), Max: 100.1 (06-10-22 @ 02:12)  HR: 79 (06-10-22 @ 14:45)  BP: 107/50 (06-10-22 @ 14:45)  RR: 18 (06-10-22 @ 14:45)  SpO2: 100% (06-10-22 @ 14:45)  Wt(kg): --    06-09 @ 07:01  -  06-10 @ 07:00  --------------------------------------------------------  IN: 1190 mL / OUT: 1181 mL / NET: 9 mL          PHYSICAL EXAM:  Constitutional: NAD  HEENT: anicteric sclera, oropharynx clear  Neck: No JVD  Respiratory: CTAB, no wheezes, rales or rhonchi  Cardiovascular: S1, S2, RRR  Gastrointestinal: BS+, soft, NT/ND  Extremities: No cyanosis or clubbing. No peripheral edema  Neurological: A/O x 3, no focal deficits  Psychiatric: Normal mood, normal affect  : No CVA tenderness. No salcido.   Skin: No rashes  Vascular Access:    LABS:  06-10    136  |  91<L>  |  26<H>  ----------------------------<  95  3.6   |  27  |  5.46<H>    Ca    9.4      10 Neil 2022 05:38  Phos  3.9     06-10  Mg     2.30     06-10      Creatinine Trend: 5.46 <--, 8.39 <--, 6.82 <--, 11.10 <--, 9.72 <--, 7.79 <--, 5.55 <--                        8.6    14.92 )-----------( 239      ( 10 Neil 2022 05:38 )             28.4     Urine Studies:        RADIOLOGY & ADDITIONAL STUDIES:   [facility] - S Jluis / Shabbir S /D Romina/ S Denys/ S Chano/ Andrés Perkins / M Katlyn/ O Gladis  service -[phone], office [phone]  ---------------------------------------------------------------------------------------------------------------    Patient seen and examined bedside    Subjective and Objective: No overnight events, denied sob. No complaints today. states rec to get rt BKA  Niece bedside    Allergies: No Known Allergies      Hospital Medications:   MEDICATIONS  (STANDING):  aspirin  chewable 81 milliGRAM(s) Oral daily  atorvastatin 40 milliGRAM(s) Oral at bedtime  chlorhexidine 2% Cloths 1 Application(s) Topical daily  clopidogrel Tablet 75 milliGRAM(s) Oral daily  collagenase Ointment 1 Application(s) Topical daily  epoetin niesha-epbx (RETACRIT) Injectable 6000 Unit(s) IV Push <User Schedule>  heparin   Injectable 5000 Unit(s) SubCutaneous every 12 hours  meropenem  IVPB 500 milliGRAM(s) IV Intermittent every 24 hours  polyethylene glycol 3350 17 Gram(s) Oral daily  senna 2 Tablet(s) Oral at bedtime  sevelamer carbonate 1600 milliGRAM(s) Oral three times a day with meals    VITALS:  T(F): 98.4 (06-10-22 @ 14:45), Max: 100.1 (06-10-22 @ 02:12)  HR: 79 (06-10-22 @ 14:45)  BP: 107/50 (06-10-22 @ 14:45)  RR: 18 (06-10-22 @ 14:45)  SpO2: 100% (06-10-22 @ 14:45)  Wt(kg): --    06-09 @ 07:01  -  06-10 @ 07:00  --------------------------------------------------------  IN: 1190 mL / OUT: 1181 mL / NET: 9 mL      PHYSICAL EXAM:  Constitutional: NAD  HEENT: anicteric sclera  Neck: No JVD  Respiratory: CTAB, no wheezes, rales or rhonchi  Cardiovascular: S1, S2, RRR  Gastrointestinal: BS+, soft, NT/ND  Extremities: + peripheral edema rt leg. b/l feet dressings+  Neurological: A/O x 3  Psychiatric: Normal mood, normal affect  : No salcido.   Vascular Access: rt femoral AVG+    LABS:  06-10    136  |  91<L>  |  26<H>  ----------------------------<  95  3.6   |  27  |  5.46<H>    Ca    9.4      10 Neil 2022 05:38  Phos  3.9     06-10  Mg     2.30     06-10      Creatinine Trend: 5.46 <--, 8.39 <--, 6.82 <--, 11.10 <--, 9.72 <--, 7.79 <--, 5.55 <--                        8.6    14.92 )-----------( 239      ( 10 Neil 2022 05:38 )             28.4     Urine Studies:        RADIOLOGY & ADDITIONAL STUDIES:

## 2022-06-10 NOTE — PROGRESS NOTE ADULT - ASSESSMENT
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 Admitted for further evaluation. Renal following for ESRD Mx.     ESRD on HD  schedule hemodialysis - Monday, Wednesday and Friday   access- thigh AVG  HD unit SQDC  K, vol ok     off schedule for hd  s/p last HD 6/9, Rx sheet reviewed, net UF 780ml, tolerated well. uneventful.  plan for next hemodialysis tomorrow then Mon w/ 2 k bath , Ultrafiltration 1kg as tolerated with blood pressure   will switch to MWF schedule next week  renal diet  dose all meds for ESRD  renal restrictions in diet.  Anemia of CKD- Hb < goal now. increased DION epo 6>10k w/hd tiw  Hyperphosphatemia- c/w renvlea 2tid, changed to w/food  HTN, controlled. bp stable. not on anti-htn meds  PAD, infected rt foot wounds, ?OM- s/p vanco, on zosyn. f/u w/Podiatry. on meropenem now. plan for MRI foot w/o Morgan  f/u w/vas sx.   Podiatry f/u - patient s/p left foot TMA and right foot partial 1st ray amputation 6/8  s/p LE angiogram 6/7    poc d/w pt, HD RN, pts RN, [place] bedside  labs, chart reviewed  For any question, call:  Cell # [phone]  Pager # [phone]  office # [phone]

## 2022-06-10 NOTE — PROGRESS NOTE ADULT - ASSESSMENT
Assessment:  61y Female with b/l feet wounds s/p recent RLE PT plasty. Wounds high risk for non healing and viability.    Plan:  - patient will require more proximal amputations bilaterally  - discussed with patient extensively this morning that she will ultimately require bilateral BKAs  - TMA is also a possibility but low likelihood of TMA flap healing, this was discussed with patient as well  - at this time, patient is not ready to make a decision on bilateral amputations  - will follow up     Vascular  [id number]

## 2022-06-10 NOTE — PROGRESS NOTE ADULT - SUBJECTIVE AND OBJECTIVE BOX
Patient is a 61y old  Female who presents with a chief complaint of RLE toe pain (10 Neil 2022 07:58)       INTERVAL HPI/OVERNIGHT EVENTS:  Patient seen and evaluated at bedside.  Pt is resting comfortable in NAD. Denies N/V/F/C.  Pain rated at X/10    Allergies    No Known Allergies    Intolerances        Vital Signs Last 24 Hrs  T(C): 37.2 (10 Neil 2022 05:20), Max: 37.8 (10 Neil 2022 02:12)  T(F): 98.9 (10 Neil 2022 05:20), Max: 100.1 (10 Neil 2022 02:12)  HR: 80 (10 Neil 2022 05:20) (80 - 90)  BP: 115/48 (10 Neil 2022 05:20) (108/50 - 142/61)  BP(mean): --  RR: 18 (10 Neil 2022 05:20) (17 - 18)  SpO2: 100% (10 Neil 2022 05:20) (99% - 100%)    LABS:                        8.6    14.92 )-----------( 239      ( 10 Neil 2022 05:38 )             28.4     06-10    136  |  91<L>  |  26<H>  ----------------------------<  95  3.6   |  27  |  5.46<H>    Ca    9.4      10 Neil 2022 05:38  Phos  3.9     06-10  Mg     2.30     06-10          CAPILLARY BLOOD GLUCOSE          Lower Extremity Physical Exam:  Vascular: DP/PT 1/4 R,  DP/PT 0/4 L, CFT <3 seconds, Temperature gradient warm to cooo B/L, TG is warm to cool b/l  Neuro: Epicritic sensation diminished to the level of forefoot B/L  Musculoskeletal/Ortho: left partial 5th ray resection healed, LF partial 4th ray resection open   Skin:   3/4/22 s/p L foot partial 3rd ray resection open: fibrotic wound to subQ, no malodor, no drainage, no purulence, flaps cool, no maceration, distal skin necrosis, no tracking, no purulence, no erythema    6/8 s/p R foot partial first ray resection closed: flaps are cold and dusky to the plantar and dorsal aspect with delayed CFT, sutures intact, no hematoma, no purulence or discharge, no clinical signs of infection.     RADIOLOGY & ADDITIONAL TESTS:

## 2022-06-10 NOTE — PROGRESS NOTE ADULT - SUBJECTIVE AND OBJECTIVE BOX
VASCULAR SURGERY PROGRESS NOTE     JAYCEE WALTERS  61y  Female  Hospital day :11d    T(F): 98.9 (06-10-22 @ 05:20), Max: 100.1 (06-10-22 @ 02:12)  HR: 80 (06-10-22 @ 05:20) (80 - 90)  BP: 115/48 (06-10-22 @ 05:20) (108/50 - 142/61)  RR: 18 (06-10-22 @ 05:20) (17 - 18)  SpO2: 100% (06-10-22 @ 05:20) (99% - 100%)  GI proph:      AC/ proph: aspirin  chewable 81 milliGRAM(s) Oral daily  clopidogrel Tablet 75 milliGRAM(s) Oral daily  heparin   Injectable 5000 Unit(s) SubCutaneous every 12 hours      LABS  Labs:  CAPILLARY BLOOD GLUCOSE                              8.6    14.92 )-----------( 239      ( 10 Neil 2022 05:38 )             28.4       Auto Neutrophil %: 79.6 % (06-10-22 @ 05:38)  Auto Immature Granulocyte %: 0.9 % (06-10-22 @ 05:38)    06-10    136  |  91<L>  |  26<H>  ----------------------------<  95  3.6   |  27  |  5.46<H>      Magnesium, Serum: 2.30 mg/dL (06-10-22 @ 05:38)      LFTs:         Coags:                Culture - Surgical Swab (collected 08 Jun 2022 08:39)  Source: .Surgical Swab 2 -  clean margin right first metatarsal  Preliminary Report (09 Jun 2022 13:48):    Few Escherichia coli  Organism: Escherichia coli (10 Neil 2022 07:42)  Organism: Escherichia coli (10 Neil 2022 07:42)    Culture - Tissue with Gram Stain (collected 08 Jun 2022 08:38)  Source: .Tissue 2 -  clean margin bone  right first metatarsal  Gram Stain (08 Jun 2022 23:44):    No polymorphonuclear leukocytes per low power field    No organisms seen  Preliminary Report (09 Jun 2022 11:55):    No growth

## 2022-06-10 NOTE — PROGRESS NOTE ADULT - ASSESSMENT
62 y/o woman w/ ESRD on HD (MWF), HF, glaucoma (blind in R eye), peripheral artery disease who presented 5/30 w/ R toe pain and swelling ongoing for about 4-5 days    ID is consulted for right foot infection  S/p partial amputation of right 1st toe in 3/2022, returned with pain + swelling  Xray showed no OM on right foot, but possible OM on left 4th MT head and phalanges  Right toe wound Cx ESBL Klebsiella, E. coli, pseudomonas  Hx ESBL Klebsiella and E. coli on left foot Cx    s/p OR 6/8       IMPRESSION:  Right toe infection  PAD  s/p partial ray amputation 1st ray right foot 6/8   OR findings: liquefactive necrosis, high concern for residual soft tissue infection, low concern for residual bone infection    OR wound culture E coli    planning higher level amputation next week  ESRD on HD      RECOMMENDATIONS:      - resume meropenem 500 mg iv q 24 h  -follow OR path clean margin    ID service available over weekend

## 2022-06-11 LAB
-  AMIKACIN: SIGNIFICANT CHANGE UP
-  AMOXICILLIN/CLAVULANIC ACID: SIGNIFICANT CHANGE UP
-  AMPICILLIN/SULBACTAM: SIGNIFICANT CHANGE UP
-  AMPICILLIN: SIGNIFICANT CHANGE UP
-  AZTREONAM: SIGNIFICANT CHANGE UP
-  CEFAZOLIN: SIGNIFICANT CHANGE UP
-  CEFEPIME: SIGNIFICANT CHANGE UP
-  CEFOXITIN: SIGNIFICANT CHANGE UP
-  CEFTRIAXONE: SIGNIFICANT CHANGE UP
-  CIPROFLOXACIN: SIGNIFICANT CHANGE UP
-  ERTAPENEM: SIGNIFICANT CHANGE UP
-  GENTAMICIN: SIGNIFICANT CHANGE UP
-  IMIPENEM: SIGNIFICANT CHANGE UP
-  LEVOFLOXACIN: SIGNIFICANT CHANGE UP
-  MEROPENEM: SIGNIFICANT CHANGE UP
-  PIPERACILLIN/TAZOBACTAM: SIGNIFICANT CHANGE UP
-  TOBRAMYCIN: SIGNIFICANT CHANGE UP
-  TRIMETHOPRIM/SULFAMETHOXAZOLE: SIGNIFICANT CHANGE UP
ANION GAP SERPL CALC-SCNC: 20 MMOL/L — HIGH (ref 7–14)
BUN SERPL-MCNC: 43 MG/DL — HIGH (ref 7–23)
CALCIUM SERPL-MCNC: 9.7 MG/DL — SIGNIFICANT CHANGE UP (ref 8.4–10.5)
CHLORIDE SERPL-SCNC: 90 MMOL/L — LOW (ref 98–107)
CO2 SERPL-SCNC: 25 MMOL/L — SIGNIFICANT CHANGE UP (ref 22–31)
CREAT SERPL-MCNC: 7.37 MG/DL — HIGH (ref 0.5–1.3)
EGFR: 6 ML/MIN/1.73M2 — LOW
GLUCOSE SERPL-MCNC: 117 MG/DL — HIGH (ref 70–99)
GRAM STN FLD: SIGNIFICANT CHANGE UP
HCT VFR BLD CALC: 32 % — LOW (ref 34.5–45)
HGB BLD-MCNC: 9.3 G/DL — LOW (ref 11.5–15.5)
MAGNESIUM SERPL-MCNC: 2.7 MG/DL — HIGH (ref 1.6–2.6)
MCHC RBC-ENTMCNC: 26 PG — LOW (ref 27–34)
MCHC RBC-ENTMCNC: 29.1 GM/DL — LOW (ref 32–36)
MCV RBC AUTO: 89.4 FL — SIGNIFICANT CHANGE UP (ref 80–100)
METHOD TYPE: SIGNIFICANT CHANGE UP
NRBC # BLD: 0 /100 WBCS — SIGNIFICANT CHANGE UP
NRBC # FLD: 0 K/UL — SIGNIFICANT CHANGE UP
PHOSPHATE SERPL-MCNC: 5 MG/DL — HIGH (ref 2.5–4.5)
PLATELET # BLD AUTO: 302 K/UL — SIGNIFICANT CHANGE UP (ref 150–400)
POTASSIUM SERPL-MCNC: 4.3 MMOL/L — SIGNIFICANT CHANGE UP (ref 3.5–5.3)
POTASSIUM SERPL-SCNC: 4.3 MMOL/L — SIGNIFICANT CHANGE UP (ref 3.5–5.3)
RBC # BLD: 3.58 M/UL — LOW (ref 3.8–5.2)
RBC # FLD: 15.1 % — HIGH (ref 10.3–14.5)
SODIUM SERPL-SCNC: 135 MMOL/L — SIGNIFICANT CHANGE UP (ref 135–145)
SPECIMEN SOURCE: SIGNIFICANT CHANGE UP
WBC # BLD: 14.11 K/UL — HIGH (ref 3.8–10.5)
WBC # FLD AUTO: 14.11 K/UL — HIGH (ref 3.8–10.5)

## 2022-06-11 RX ADMIN — MEROPENEM 100 MILLIGRAM(S): 1 INJECTION INTRAVENOUS at 18:19

## 2022-06-11 RX ADMIN — POLYETHYLENE GLYCOL 3350 17 GRAM(S): 17 POWDER, FOR SOLUTION ORAL at 12:01

## 2022-06-11 RX ADMIN — SEVELAMER CARBONATE 1600 MILLIGRAM(S): 2400 POWDER, FOR SUSPENSION ORAL at 09:00

## 2022-06-11 RX ADMIN — HYDROMORPHONE HYDROCHLORIDE 0.5 MILLIGRAM(S): 2 INJECTION INTRAMUSCULAR; INTRAVENOUS; SUBCUTANEOUS at 13:47

## 2022-06-11 RX ADMIN — HYDROMORPHONE HYDROCHLORIDE 0.5 MILLIGRAM(S): 2 INJECTION INTRAMUSCULAR; INTRAVENOUS; SUBCUTANEOUS at 01:58

## 2022-06-11 RX ADMIN — Medication 1 APPLICATION(S): at 12:03

## 2022-06-11 RX ADMIN — HYDROMORPHONE HYDROCHLORIDE 0.5 MILLIGRAM(S): 2 INJECTION INTRAMUSCULAR; INTRAVENOUS; SUBCUTANEOUS at 14:05

## 2022-06-11 RX ADMIN — Medication 81 MILLIGRAM(S): at 12:01

## 2022-06-11 RX ADMIN — HYDROMORPHONE HYDROCHLORIDE 0.5 MILLIGRAM(S): 2 INJECTION INTRAMUSCULAR; INTRAVENOUS; SUBCUTANEOUS at 06:26

## 2022-06-11 RX ADMIN — CHLORHEXIDINE GLUCONATE 1 APPLICATION(S): 213 SOLUTION TOPICAL at 12:02

## 2022-06-11 RX ADMIN — HYDROMORPHONE HYDROCHLORIDE 0.5 MILLIGRAM(S): 2 INJECTION INTRAMUSCULAR; INTRAVENOUS; SUBCUTANEOUS at 20:40

## 2022-06-11 RX ADMIN — HEPARIN SODIUM 5000 UNIT(S): 5000 INJECTION INTRAVENOUS; SUBCUTANEOUS at 05:36

## 2022-06-11 RX ADMIN — SEVELAMER CARBONATE 1600 MILLIGRAM(S): 2400 POWDER, FOR SUSPENSION ORAL at 18:01

## 2022-06-11 RX ADMIN — HEPARIN SODIUM 5000 UNIT(S): 5000 INJECTION INTRAVENOUS; SUBCUTANEOUS at 18:01

## 2022-06-11 RX ADMIN — SEVELAMER CARBONATE 1600 MILLIGRAM(S): 2400 POWDER, FOR SUSPENSION ORAL at 12:02

## 2022-06-11 RX ADMIN — CLOPIDOGREL BISULFATE 75 MILLIGRAM(S): 75 TABLET, FILM COATED ORAL at 12:01

## 2022-06-11 RX ADMIN — HYDROMORPHONE HYDROCHLORIDE 0.5 MILLIGRAM(S): 2 INJECTION INTRAMUSCULAR; INTRAVENOUS; SUBCUTANEOUS at 06:56

## 2022-06-11 RX ADMIN — ATORVASTATIN CALCIUM 40 MILLIGRAM(S): 80 TABLET, FILM COATED ORAL at 21:30

## 2022-06-11 RX ADMIN — ERYTHROPOIETIN 10000 UNIT(S): 10000 INJECTION, SOLUTION INTRAVENOUS; SUBCUTANEOUS at 16:06

## 2022-06-11 RX ADMIN — HYDROMORPHONE HYDROCHLORIDE 0.5 MILLIGRAM(S): 2 INJECTION INTRAMUSCULAR; INTRAVENOUS; SUBCUTANEOUS at 02:28

## 2022-06-11 NOTE — PROGRESS NOTE ADULT - SUBJECTIVE AND OBJECTIVE BOX
[facility] : Ans Serv [phone], Office [phone]  Dr Vanegas/Dr Bazzi  /Dr Kingsley patricia /Dr SHERRI Mcfarlane/Dr Jarrell Madden/Dr Andrés Perkins /Dr SUNI Potts  _______________________________________________________________________________________________    seen and examined today for End Stage Renal Disease on Dialysis   Interval :  VITALS:  T(F): 97.9 (06-11 @ 11:17), Max: 100.1 (06-10 @ 02:12)  HR: 71 (06-11 @ 11:17)  BP: 107/51 (06-11 @ 11:17)  ABP: --  RR: 18 (06-11 @ 11:17)  SpO2: 99% (06-11 @ 11:17)    06-10 @ 07:01  -  06-11 @ 07:00  --------------------------------------------------------  IN: 240 mL / OUT: 1 mL / NET: 239 mL      Physical Exam :-  Constitutional: NAD  Respiratory: Bilateral equal breath sounds, few Crackles present.  Cardiovascular: S1, S2 normal, positive Murmur  Gastrointestinal: Bowel Sounds present, soft    Data:-  Allergies :   No Known Allergies    Hospital Medications:   MEDICATIONS  (STANDING):  aspirin  chewable 81 milliGRAM(s) Oral daily  atorvastatin 40 milliGRAM(s) Oral at bedtime  chlorhexidine 2% Cloths 1 Application(s) Topical daily  clopidogrel Tablet 75 milliGRAM(s) Oral daily  collagenase Ointment 1 Application(s) Topical daily  epoetin niesha-epbx (RETACRIT) Injectable 35168 Unit(s) IV Push <User Schedule>  heparin   Injectable 5000 Unit(s) SubCutaneous every 12 hours  meropenem  IVPB 500 milliGRAM(s) IV Intermittent every 24 hours  polyethylene glycol 3350 17 Gram(s) Oral daily  senna 2 Tablet(s) Oral at bedtime  sevelamer carbonate 1600 milliGRAM(s) Oral three times a day with meals    06-11    135  |  90<L>  |  43<H>  ----------------------------<  117<H>  4.3   |  25  |  7.37<H>    Ca    9.7      11 Jun 2022 05:18  Phos  5.0     06-11  Mg     2.70     06-11      Creatinine Trend: 7.37 <--, 5.46 <--, 8.39 <--, 6.82 <--, 11.10 <--, 9.72 <--  egfr trend : 6 <--, 8 <--, 5 <--, 6 <--, 4 <--, 4 <--, 5 <--                        9.3    14.11 )-----------( 302      ( 11 Jun 2022 05:18 )             32.0

## 2022-06-11 NOTE — PROGRESS NOTE ADULT - ASSESSMENT
62 y/o woman w/ ESRD on HD (MWF), HF, 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 Admitted for further evaluation     Problem/Plan - 1:  ·  Problem: Wound of right foot with  Cellulitis with Left 4th toe OM .   ·  Plan: Pt w/ increasing R toe pain when walking as well as swelling. Mild white count and elevated inflammatory markers but afebrile with vitals stable  -podiatry and ID help appreciated.   -started  IV Meropenem  500mg q24hrs per iD . Awaiting OR path final culture results.   - S/P  left foot TMA and right foot partial 1st ray amputation on Wed .     Problem/Plan - 2:  ·  Problem: PAD (peripheral artery disease).   ·  Plan: S/P RLE Angiogram but unable to do any intervention per Vascular.   -known PAD, continue asa/plavix. Pt denies any numbness or tingling.  -recent angiograms done w/ poor peripheral arterial flow per podiatry and so poor surgical candidate as above  -continue statin.     Problem/Plan - 3:  ·  Problem: ESRD (end stage renal disease) on dialysis.   ·  Plan: Renal helping with HD.   On MWF schedule.  -monitor I/O.     Problem/Plan - 4:  ·  Problem: HLD (hyperlipidemia).   ·  Plan: continue statin. check lipid profile.     Problem/Plan - 5:  ·  Problem: Need for prophylactic measure.   ·  Plan: heparin subcu for dvt ppx, dash diet.      Dispo : DC planning pending ID and Podiatry clearance.

## 2022-06-11 NOTE — PROGRESS NOTE ADULT - SUBJECTIVE AND OBJECTIVE BOX
Date of Service  : 06-11-22     INTERVAL HPI/OVERNIGHT EVENTS: Seen and examined . Getting HD .   Vital Signs Last 24 Hrs  T(C): 36.6 (11 Jun 2022 16:19), Max: 36.9 (11 Jun 2022 02:17)  T(F): 97.9 (11 Jun 2022 16:19), Max: 98.5 (11 Jun 2022 02:17)  HR: 80 (11 Jun 2022 16:19) (71 - 82)  BP: 123/50 (11 Jun 2022 16:19) (103/44 - 130/49)  BP(mean): --  RR: 18 (11 Jun 2022 16:19) (17 - 18)  SpO2: 99% (11 Jun 2022 11:17) (98% - 100%)  I&O's Summary    10 Neil 2022 07:01  -  11 Jun 2022 07:00  --------------------------------------------------------  IN: 240 mL / OUT: 1 mL / NET: 239 mL    11 Jun 2022 07:01  -  11 Jun 2022 20:47  --------------------------------------------------------  IN: 400 mL / OUT: 1400 mL / NET: -1000 mL      MEDICATIONS  (STANDING):  aspirin  chewable 81 milliGRAM(s) Oral daily  atorvastatin 40 milliGRAM(s) Oral at bedtime  chlorhexidine 2% Cloths 1 Application(s) Topical daily  clopidogrel Tablet 75 milliGRAM(s) Oral daily  collagenase Ointment 1 Application(s) Topical daily  epoetin niesha-epbx (RETACRIT) Injectable 00468 Unit(s) IV Push <User Schedule>  heparin   Injectable 5000 Unit(s) SubCutaneous every 12 hours  meropenem  IVPB 500 milliGRAM(s) IV Intermittent every 24 hours  polyethylene glycol 3350 17 Gram(s) Oral daily  senna 2 Tablet(s) Oral at bedtime  sevelamer carbonate 1600 milliGRAM(s) Oral three times a day with meals    MEDICATIONS  (PRN):  acetaminophen     Tablet .. 650 milliGRAM(s) Oral every 6 hours PRN Temp greater or equal to 38C (100.4F), Mild Pain (1 - 3)  HYDROmorphone  Injectable 0.5 milliGRAM(s) IV Push every 4 hours PRN Severe Pain (7 - 10)    LABS:                        9.3    14.11 )-----------( 302      ( 11 Jun 2022 05:18 )             32.0     06-11    135  |  90<L>  |  43<H>  ----------------------------<  117<H>  4.3   |  25  |  7.37<H>    Ca    9.7      11 Jun 2022 05:18  Phos  5.0     06-11  Mg     2.70     06-11          CAPILLARY BLOOD GLUCOSE              REVIEW OF SYSTEMS:  CONSTITUTIONAL: No fever, weight loss, or fatigue  EYES: No eye pain, visual disturbances, or discharge  ENMT:  No difficulty hearing, tinnitus, vertigo; No sinus or throat pain  NECK: No pain or stiffness  RESPIRATORY: No cough, wheezing, chills or hemoptysis; No shortness of breath  CARDIOVASCULAR: No chest pain, palpitations, dizziness, or leg swelling  GASTROINTESTINAL: No abdominal or epigastric pain. No nausea, vomiting, or hematemesis; No diarrhea or constipation. No melena or hematochezia.  GENITOURINARY: No dysuria, frequency, hematuria, or incontinence  NEUROLOGICAL: No headaches, memory loss, loss of strength, numbness, or tremors    Consultant(s) Notes Reviewed:  [x ] YES  [ ] NO    PHYSICAL EXAM:  GENERAL: NAD, well-groomed, well-developed,not in any distress ,  HEAD:  Atraumatic, Normocephalic  NECK: Supple, No JVD, Normal thyroid  NERVOUS SYSTEM:  Alert & Oriented X3, No focal deficit   CHEST/LUNG: Good air entry bilateral with no  rales, rhonchi, wheezing, or rubs  HEART: Regular rate and rhythm; No murmurs, rubs, or gallops  ABDOMEN: Soft, Nontender, Nondistended; Bowel sounds present  EXTREMITIES:  Feet dressed     Care Discussed with Consultants/Other Providers [ x] YES  [ ] NO

## 2022-06-11 NOTE — PROGRESS NOTE ADULT - SUBJECTIVE AND OBJECTIVE BOX
Cardiovascular Disease Progress Note    Overnight events: No acute events overnight. Ms. Boogie denies chest pain or SOB.     Otherwise review of systems negative    Objective Findings:  T(C): 36.4 (06-11-22 @ 06:26), Max: 37.1 (06-10-22 @ 17:10)  HR: 76 (06-11-22 @ 06:26) (76 - 83)  BP: 112/47 (06-11-22 @ 06:26) (103/44 - 130/49)  RR: 18 (06-11-22 @ 06:26) (17 - 18)  SpO2: 100% (06-11-22 @ 06:26) (98% - 100%)  Wt(kg): --  Daily     Daily       Physical Exam:  Gen: NAD; Patient resting comfortably  HEENT: EOMI, Normocephalic/ atraumatic  CV: RRR, normal S1 + S2, no m/r/g  Lungs:  Normal respiratory effort; clear to auscultation bilaterally  Abd: soft, non-tender; bowel sounds present  Ext: No edema; warm and well perfused    Telemetry: n/a    Laboratory Data:                        9.3    14.11 )-----------( 302      ( 11 Jun 2022 05:18 )             32.0     06-11    135  |  90<L>  |  43<H>  ----------------------------<  117<H>  4.3   |  25  |  7.37<H>    Ca    9.7      11 Jun 2022 05:18  Phos  5.0     06-11  Mg     2.70     06-11                Inpatient Medications:  MEDICATIONS  (STANDING):  aspirin  chewable 81 milliGRAM(s) Oral daily  atorvastatin 40 milliGRAM(s) Oral at bedtime  chlorhexidine 2% Cloths 1 Application(s) Topical daily  clopidogrel Tablet 75 milliGRAM(s) Oral daily  collagenase Ointment 1 Application(s) Topical daily  epoetin niesha-epbx (RETACRIT) Injectable 60092 Unit(s) IV Push <User Schedule>  heparin   Injectable 5000 Unit(s) SubCutaneous every 12 hours  meropenem  IVPB 500 milliGRAM(s) IV Intermittent every 24 hours  polyethylene glycol 3350 17 Gram(s) Oral daily  senna 2 Tablet(s) Oral at bedtime  sevelamer carbonate 1600 milliGRAM(s) Oral three times a day with meals      Assessment: 61-year-old female with ESRD on HD, HLD and peripheral vascular disease s/p lower extremity resection presents with chronic limb ischemia.     Plan of Care:    #Post operative evaluation-  Ms. Boogie tolerated podiatric amputation well.   She displays no signs or symptoms of post operative coronary ischemia or acute CHF.   - Continue with current cardiac management.     #Compensated diastolic CHF-  Volume status is improved.  Fluid removal with HD as per the renal team.   Recent echo noted- normal LV systolic function.    #Peripheral vascular disease.  - Podiatry and vascular input noted.     #ESRD-  - HD as per renal.        Over 25 minutes spent on total encounter; more than 50% of the visit was spent counseling and/or coordinating care by the attending physician.      Cristino Adams MD Located within [facility]  Cardiovascular Disease  [phone]

## 2022-06-11 NOTE — PROGRESS NOTE ADULT - ASSESSMENT
62 y/o woman w/ ESRD on HD (MWF), HF, 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 Admitted for further evaluation. Renal following for ESRD Mx.     ESRD on HD  schedule hemodialysis - Monday, Wednesday and Friday   access- thigh AVG  HD unit SQDC  K, vol ok     Plan  plan for hemodialysis today  will switch to MWF schedule next week  renal diet  dose all meds for ESRD  renal restrictions in diet.    Anemia of CKD- Hb < goal now. increased DION epo 6>10k w/hd tiw  Hyperphosphatemia- c/w renvlea 2tid, changed to w/food  HTN, controlled. bp stable. not on anti-htn meds  PAD, infected rt foot wounds, ?OM- s/p vanco, on zosyn. f/u w/Podiatry. on meropenem now. plan for MRI foot w/o Morgan  f/u w/vas sx.   Podiatry f/u - patient s/p left foot TMA and right foot partial 1st ray amputation 6/8  s/p LE angiogram 6/7

## 2022-06-12 LAB
ANION GAP SERPL CALC-SCNC: 17 MMOL/L — HIGH (ref 7–14)
BUN SERPL-MCNC: 26 MG/DL — HIGH (ref 7–23)
CALCIUM SERPL-MCNC: 9.3 MG/DL — SIGNIFICANT CHANGE UP (ref 8.4–10.5)
CHLORIDE SERPL-SCNC: 91 MMOL/L — LOW (ref 98–107)
CO2 SERPL-SCNC: 28 MMOL/L — SIGNIFICANT CHANGE UP (ref 22–31)
CREAT SERPL-MCNC: 5.56 MG/DL — HIGH (ref 0.5–1.3)
EGFR: 8 ML/MIN/1.73M2 — LOW
GLUCOSE SERPL-MCNC: 114 MG/DL — HIGH (ref 70–99)
HCT VFR BLD CALC: 29.5 % — LOW (ref 34.5–45)
HGB BLD-MCNC: 8.7 G/DL — LOW (ref 11.5–15.5)
MAGNESIUM SERPL-MCNC: 2.4 MG/DL — SIGNIFICANT CHANGE UP (ref 1.6–2.6)
MCHC RBC-ENTMCNC: 26.3 PG — LOW (ref 27–34)
MCHC RBC-ENTMCNC: 29.5 GM/DL — LOW (ref 32–36)
MCV RBC AUTO: 89.1 FL — SIGNIFICANT CHANGE UP (ref 80–100)
NRBC # BLD: 0 /100 WBCS — SIGNIFICANT CHANGE UP
NRBC # FLD: 0 K/UL — SIGNIFICANT CHANGE UP
PHOSPHATE SERPL-MCNC: 3.3 MG/DL — SIGNIFICANT CHANGE UP (ref 2.5–4.5)
PLATELET # BLD AUTO: 290 K/UL — SIGNIFICANT CHANGE UP (ref 150–400)
POTASSIUM SERPL-MCNC: 3.8 MMOL/L — SIGNIFICANT CHANGE UP (ref 3.5–5.3)
POTASSIUM SERPL-SCNC: 3.8 MMOL/L — SIGNIFICANT CHANGE UP (ref 3.5–5.3)
RBC # BLD: 3.31 M/UL — LOW (ref 3.8–5.2)
RBC # FLD: 15 % — HIGH (ref 10.3–14.5)
SODIUM SERPL-SCNC: 136 MMOL/L — SIGNIFICANT CHANGE UP (ref 135–145)
WBC # BLD: 11.78 K/UL — HIGH (ref 3.8–10.5)
WBC # FLD AUTO: 11.78 K/UL — HIGH (ref 3.8–10.5)

## 2022-06-12 RX ADMIN — MEROPENEM 100 MILLIGRAM(S): 1 INJECTION INTRAVENOUS at 17:16

## 2022-06-12 RX ADMIN — HYDROMORPHONE HYDROCHLORIDE 0.5 MILLIGRAM(S): 2 INJECTION INTRAMUSCULAR; INTRAVENOUS; SUBCUTANEOUS at 05:46

## 2022-06-12 RX ADMIN — Medication 1 APPLICATION(S): at 12:41

## 2022-06-12 RX ADMIN — SEVELAMER CARBONATE 1600 MILLIGRAM(S): 2400 POWDER, FOR SUSPENSION ORAL at 17:16

## 2022-06-12 RX ADMIN — Medication 81 MILLIGRAM(S): at 12:40

## 2022-06-12 RX ADMIN — SEVELAMER CARBONATE 1600 MILLIGRAM(S): 2400 POWDER, FOR SUSPENSION ORAL at 12:40

## 2022-06-12 RX ADMIN — CHLORHEXIDINE GLUCONATE 1 APPLICATION(S): 213 SOLUTION TOPICAL at 12:44

## 2022-06-12 RX ADMIN — HEPARIN SODIUM 5000 UNIT(S): 5000 INJECTION INTRAVENOUS; SUBCUTANEOUS at 17:16

## 2022-06-12 RX ADMIN — ATORVASTATIN CALCIUM 40 MILLIGRAM(S): 80 TABLET, FILM COATED ORAL at 22:08

## 2022-06-12 RX ADMIN — HYDROMORPHONE HYDROCHLORIDE 0.5 MILLIGRAM(S): 2 INJECTION INTRAMUSCULAR; INTRAVENOUS; SUBCUTANEOUS at 13:10

## 2022-06-12 RX ADMIN — HYDROMORPHONE HYDROCHLORIDE 0.5 MILLIGRAM(S): 2 INJECTION INTRAMUSCULAR; INTRAVENOUS; SUBCUTANEOUS at 06:16

## 2022-06-12 RX ADMIN — HYDROMORPHONE HYDROCHLORIDE 0.5 MILLIGRAM(S): 2 INJECTION INTRAMUSCULAR; INTRAVENOUS; SUBCUTANEOUS at 22:09

## 2022-06-12 RX ADMIN — SEVELAMER CARBONATE 1600 MILLIGRAM(S): 2400 POWDER, FOR SUSPENSION ORAL at 08:45

## 2022-06-12 RX ADMIN — HEPARIN SODIUM 5000 UNIT(S): 5000 INJECTION INTRAVENOUS; SUBCUTANEOUS at 05:11

## 2022-06-12 RX ADMIN — HYDROMORPHONE HYDROCHLORIDE 0.5 MILLIGRAM(S): 2 INJECTION INTRAMUSCULAR; INTRAVENOUS; SUBCUTANEOUS at 22:04

## 2022-06-12 RX ADMIN — CLOPIDOGREL BISULFATE 75 MILLIGRAM(S): 75 TABLET, FILM COATED ORAL at 12:40

## 2022-06-12 RX ADMIN — HYDROMORPHONE HYDROCHLORIDE 0.5 MILLIGRAM(S): 2 INJECTION INTRAMUSCULAR; INTRAVENOUS; SUBCUTANEOUS at 12:40

## 2022-06-12 NOTE — PROGRESS NOTE ADULT - SUBJECTIVE AND OBJECTIVE BOX
Date of Service  : 06-12-22    INTERVAL HPI/OVERNIGHT EVENTS: I want to go home.   Vital Signs Last 24 Hrs  T(C): 36.7 (12 Jun 2022 14:15), Max: 37.3 (11 Jun 2022 22:39)  T(F): 98 (12 Jun 2022 14:15), Max: 99.2 (11 Jun 2022 22:39)  HR: 75 (12 Jun 2022 14:15) (67 - 79)  BP: 112/49 (12 Jun 2022 14:15) (96/45 - 113/43)  BP(mean): --  RR: 18 (12 Jun 2022 14:15) (18 - 18)  SpO2: 100% (12 Jun 2022 14:15) (100% - 100%)  I&O's Summary    11 Jun 2022 07:01  -  12 Jun 2022 07:00  --------------------------------------------------------  IN: 760 mL / OUT: 1400 mL / NET: -640 mL    12 Jun 2022 07:01  -  12 Jun 2022 18:20  --------------------------------------------------------  IN: 480 mL / OUT: 0 mL / NET: 480 mL      MEDICATIONS  (STANDING):  aspirin  chewable 81 milliGRAM(s) Oral daily  atorvastatin 40 milliGRAM(s) Oral at bedtime  chlorhexidine 2% Cloths 1 Application(s) Topical daily  clopidogrel Tablet 75 milliGRAM(s) Oral daily  collagenase Ointment 1 Application(s) Topical daily  epoetin niesha-epbx (RETACRIT) Injectable 88618 Unit(s) IV Push <User Schedule>  heparin   Injectable 5000 Unit(s) SubCutaneous every 12 hours  meropenem  IVPB 500 milliGRAM(s) IV Intermittent every 24 hours  polyethylene glycol 3350 17 Gram(s) Oral daily  senna 2 Tablet(s) Oral at bedtime  sevelamer carbonate 1600 milliGRAM(s) Oral three times a day with meals    MEDICATIONS  (PRN):  acetaminophen     Tablet .. 650 milliGRAM(s) Oral every 6 hours PRN Temp greater or equal to 38C (100.4F), Mild Pain (1 - 3)  HYDROmorphone  Injectable 0.5 milliGRAM(s) IV Push every 4 hours PRN Severe Pain (7 - 10)    LABS:                        8.7    11.78 )-----------( 290      ( 12 Jun 2022 05:30 )             29.5     06-12    136  |  91<L>  |  26<H>  ----------------------------<  114<H>  3.8   |  28  |  5.56<H>    Ca    9.3      12 Jun 2022 05:30  Phos  3.3     06-12  Mg     2.40     06-12          CAPILLARY BLOOD GLUCOSE              REVIEW OF SYSTEMS:  CONSTITUTIONAL: No fever, weight loss, or fatigue  EYES: No eye pain, visual disturbances, or discharge  ENMT:  No difficulty hearing, tinnitus, vertigo; No sinus or throat pain  NECK: No pain or stiffness  RESPIRATORY: No cough, wheezing, chills or hemoptysis; No shortness of breath  CARDIOVASCULAR: No chest pain, palpitations, dizziness, or leg swelling  GASTROINTESTINAL: No abdominal or epigastric pain. No nausea, vomiting, or hematemesis; No diarrhea or constipation. No melena or hematochezia.  GENITOURINARY: No dysuria, frequency, hematuria, or incontinence  NEUROLOGICAL: No headaches, memory loss, loss of strength, numbness, or tremors      Consultant(s) Notes Reviewed:  [x ] YES  [ ] NO    PHYSICAL EXAM:  GENERAL: NAD, well-groomed, well-developed,not in any distress ,  HEAD:  Atraumatic, Normocephalic  NECK: Supple, No JVD, Normal thyroid  NERVOUS SYSTEM:  Alert & Oriented X3, No focal deficit   CHEST/LUNG: Good air entry bilateral with no  rales, rhonchi, wheezing, or rubs  HEART: Regular rate and rhythm; No murmurs, rubs, or gallops  ABDOMEN: Soft, Nontender, Nondistended; Bowel sounds present  EXTREMITIES:  Feet dressed     Care Discussed with Consultants/Other Providers [ x] YES  [ ] NO

## 2022-06-12 NOTE — PROGRESS NOTE ADULT - ASSESSMENT
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 Admitted for further evaluation. Renal following for ESRD Mx.     ESRD on HD  schedule hemodialysis - Monday, Wednesday and Friday   access- thigh AVG  HD unit SQDC  K, vol ok     Plan  next hemodialysis tomorrow for Monday, Wednesday and Friday schedule  renal diet advised  dose all meds for ESRD  renal restrictions in diet.    Anemia of CKD- Hb < goal now. increased DION epo 6>10k w/hd tiw  Hyperphosphatemia- c/w renvlea 2tid, changed to w/food    HTN, controlled. bp stable. not on anti-htn meds  PAD, infected rt foot wounds, ?OM- s/p vanco, on zosyn. f/u w/Podiatry. on meropenem now. plan for MRI foot w/o Morgan  f/u w/vas sx.   Podiatry f/u - patient s/p left foot TMA and right foot partial 1st ray amputation 6/8  s/p LE angiogram 6/7

## 2022-06-12 NOTE — PROGRESS NOTE ADULT - SUBJECTIVE AND OBJECTIVE BOX
Cardiovascular Disease Progress Note    Overnight events: No acute events overnight. Ms. Boogie denies chest pain or SOB.     Otherwise review of systems negative    Objective Findings:  T(C): 36.6 (22 @ 09:45), Max: 37.3 (22 @ 22:39)  HR: 67 (22 @ 09:45) (67 - 80)  BP: 107/49 (22 @ 09:45) (96/45 - 123/50)  RR: 18 (22 @ 09:45) (17 - 18)  SpO2: 100% (22 @ 09:45) (99% - 100%)  Wt(kg): --  Daily     Daily Weight in k.2 (2022 16:19)      Physical Exam:  Gen: NAD; Patient resting comfortably  HEENT: EOMI, Normocephalic/ atraumatic  CV: RRR, normal S1 + S2, no m/r/g  Lungs:  Normal respiratory effort; clear to auscultation bilaterally  Abd: soft, non-tender; bowel sounds present  Ext: No edema; warm and well perfused    Telemetry: n/a    Laboratory Data:                        8.7    11.78 )-----------( 290      ( 2022 05:30 )             29.5     06-12    136  |  91<L>  |  26<H>  ----------------------------<  114<H>  3.8   |  28  |  5.56<H>    Ca    9.3      2022 05:30  Phos  3.3       Mg     2.40     06-12                Inpatient Medications:  MEDICATIONS  (STANDING):  aspirin  chewable 81 milliGRAM(s) Oral daily  atorvastatin 40 milliGRAM(s) Oral at bedtime  chlorhexidine 2% Cloths 1 Application(s) Topical daily  clopidogrel Tablet 75 milliGRAM(s) Oral daily  collagenase Ointment 1 Application(s) Topical daily  epoetin niesha-epbx (RETACRIT) Injectable 31904 Unit(s) IV Push <User Schedule>  heparin   Injectable 5000 Unit(s) SubCutaneous every 12 hours  meropenem  IVPB 500 milliGRAM(s) IV Intermittent every 24 hours  polyethylene glycol 3350 17 Gram(s) Oral daily  senna 2 Tablet(s) Oral at bedtime  sevelamer carbonate 1600 milliGRAM(s) Oral three times a day with meals      Assessment: 61-year-old female with ESRD on HD, HLD and peripheral vascular disease s/p lower extremity resection presents with chronic limb ischemia.     Plan of Care:    #Post operative evaluation-  Ms. Boogie tolerated podiatric amputation well.   She displays no signs or symptoms of post operative coronary ischemia or acute CHF.   - Continue with current cardiac management.     #Compensated diastolic CHF-  Volume status is improved.  Fluid removal with HD as per the renal team.   Recent echo noted- normal LV systolic function.    #Peripheral vascular disease.  - Podiatry and vascular input noted.     #ESRD-  - HD as per renal.      Over 25 minutes spent on total encounter; more than 50% of the visit was spent counseling and/or coordinating care by the attending physician.      Cristino Adams MD [facility]  Cardiovascular Disease  [phone]

## 2022-06-12 NOTE — PROGRESS NOTE ADULT - ASSESSMENT
62 y/o woman w/ ESRD on HD (MWF), HF, 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 Admitted for further evaluation     Problem/Plan - 1:  ·  Problem: Wound of right foot with  Cellulitis with Left 4th toe OM .   ·  Plan: Pt w/ increasing R toe pain when walking as well as swelling. Mild white count and elevated inflammatory markers but afebrile with vitals stable  -podiatry and ID help appreciated.   -started  IV Meropenem  500mg q24hrs per iD . Awaiting OR path final culture results.   - S/P  left foot TMA and right foot partial 1st ray amputation on Wed .     Problem/Plan - 2:  ·  Problem: PAD (peripheral artery disease).   ·  Plan: S/P RLE Angiogram but unable to do any intervention per Vascular.   -known PAD, continue asa/plavix. Pt denies any numbness or tingling.  -recent angiograms done w/ poor peripheral arterial flow per podiatry and so poor surgical candidate as above  -continue statin.     Problem/Plan - 3:  ·  Problem: ESRD (end stage renal disease) on dialysis.   ·  Plan: Renal helping with HD.   On MWF schedule.  -monitor I/O.     Problem/Plan - 4:  ·  Problem: HLD (hyperlipidemia).   ·  Plan: continue statin. check lipid profile.     Problem/Plan - 5:  ·  Problem: Need for prophylactic measure.   ·  Plan: heparin subcu for dvt ppx, dash diet.      Dispo : DC planning pending ID and Podiatry clearance. Refusing JOSE so home.

## 2022-06-13 LAB
ANION GAP SERPL CALC-SCNC: 16 MMOL/L — HIGH (ref 7–14)
BUN SERPL-MCNC: 38 MG/DL — HIGH (ref 7–23)
CALCIUM SERPL-MCNC: 9.4 MG/DL — SIGNIFICANT CHANGE UP (ref 8.4–10.5)
CHLORIDE SERPL-SCNC: 92 MMOL/L — LOW (ref 98–107)
CO2 SERPL-SCNC: 26 MMOL/L — SIGNIFICANT CHANGE UP (ref 22–31)
CREAT SERPL-MCNC: 7.35 MG/DL — HIGH (ref 0.5–1.3)
CULTURE RESULTS: SIGNIFICANT CHANGE UP
CULTURE RESULTS: SIGNIFICANT CHANGE UP
EGFR: 6 ML/MIN/1.73M2 — LOW
GLUCOSE SERPL-MCNC: 100 MG/DL — HIGH (ref 70–99)
HCT VFR BLD CALC: 29.4 % — LOW (ref 34.5–45)
HGB BLD-MCNC: 8.7 G/DL — LOW (ref 11.5–15.5)
MAGNESIUM SERPL-MCNC: 2.4 MG/DL — SIGNIFICANT CHANGE UP (ref 1.6–2.6)
MCHC RBC-ENTMCNC: 26.6 PG — LOW (ref 27–34)
MCHC RBC-ENTMCNC: 29.6 GM/DL — LOW (ref 32–36)
MCV RBC AUTO: 89.9 FL — SIGNIFICANT CHANGE UP (ref 80–100)
NRBC # BLD: 0 /100 WBCS — SIGNIFICANT CHANGE UP
NRBC # FLD: 0 K/UL — SIGNIFICANT CHANGE UP
ORGANISM # SPEC MICROSCOPIC CNT: SIGNIFICANT CHANGE UP
PHOSPHATE SERPL-MCNC: 3.6 MG/DL — SIGNIFICANT CHANGE UP (ref 2.5–4.5)
PLATELET # BLD AUTO: 340 K/UL — SIGNIFICANT CHANGE UP (ref 150–400)
POTASSIUM SERPL-MCNC: 4.1 MMOL/L — SIGNIFICANT CHANGE UP (ref 3.5–5.3)
POTASSIUM SERPL-SCNC: 4.1 MMOL/L — SIGNIFICANT CHANGE UP (ref 3.5–5.3)
RBC # BLD: 3.27 M/UL — LOW (ref 3.8–5.2)
RBC # FLD: 15 % — HIGH (ref 10.3–14.5)
SODIUM SERPL-SCNC: 134 MMOL/L — LOW (ref 135–145)
SPECIMEN SOURCE: SIGNIFICANT CHANGE UP
SPECIMEN SOURCE: SIGNIFICANT CHANGE UP
WBC # BLD: 12.06 K/UL — HIGH (ref 3.8–10.5)
WBC # FLD AUTO: 12.06 K/UL — HIGH (ref 3.8–10.5)

## 2022-06-13 PROCEDURE — 93010 ELECTROCARDIOGRAM REPORT: CPT

## 2022-06-13 RX ORDER — POLYETHYLENE GLYCOL 3350 17 G/17G
17 POWDER, FOR SOLUTION ORAL DAILY
Refills: 0 | Status: DISCONTINUED | OUTPATIENT
Start: 2022-06-14 | End: 2022-06-21

## 2022-06-13 RX ADMIN — CLOPIDOGREL BISULFATE 75 MILLIGRAM(S): 75 TABLET, FILM COATED ORAL at 11:53

## 2022-06-13 RX ADMIN — SENNA PLUS 2 TABLET(S): 8.6 TABLET ORAL at 21:25

## 2022-06-13 RX ADMIN — HYDROMORPHONE HYDROCHLORIDE 0.5 MILLIGRAM(S): 2 INJECTION INTRAMUSCULAR; INTRAVENOUS; SUBCUTANEOUS at 20:53

## 2022-06-13 RX ADMIN — SEVELAMER CARBONATE 1600 MILLIGRAM(S): 2400 POWDER, FOR SUSPENSION ORAL at 11:52

## 2022-06-13 RX ADMIN — HEPARIN SODIUM 5000 UNIT(S): 5000 INJECTION INTRAVENOUS; SUBCUTANEOUS at 05:55

## 2022-06-13 RX ADMIN — HYDROMORPHONE HYDROCHLORIDE 0.5 MILLIGRAM(S): 2 INJECTION INTRAMUSCULAR; INTRAVENOUS; SUBCUTANEOUS at 10:45

## 2022-06-13 RX ADMIN — MEROPENEM 100 MILLIGRAM(S): 1 INJECTION INTRAVENOUS at 19:13

## 2022-06-13 RX ADMIN — Medication 1 APPLICATION(S): at 11:52

## 2022-06-13 RX ADMIN — HEPARIN SODIUM 5000 UNIT(S): 5000 INJECTION INTRAVENOUS; SUBCUTANEOUS at 19:13

## 2022-06-13 RX ADMIN — HYDROMORPHONE HYDROCHLORIDE 0.5 MILLIGRAM(S): 2 INJECTION INTRAMUSCULAR; INTRAVENOUS; SUBCUTANEOUS at 05:55

## 2022-06-13 RX ADMIN — HYDROMORPHONE HYDROCHLORIDE 0.5 MILLIGRAM(S): 2 INJECTION INTRAMUSCULAR; INTRAVENOUS; SUBCUTANEOUS at 11:15

## 2022-06-13 RX ADMIN — Medication 81 MILLIGRAM(S): at 11:53

## 2022-06-13 RX ADMIN — ATORVASTATIN CALCIUM 40 MILLIGRAM(S): 80 TABLET, FILM COATED ORAL at 21:25

## 2022-06-13 RX ADMIN — HYDROMORPHONE HYDROCHLORIDE 0.5 MILLIGRAM(S): 2 INJECTION INTRAMUSCULAR; INTRAVENOUS; SUBCUTANEOUS at 20:30

## 2022-06-13 RX ADMIN — SEVELAMER CARBONATE 1600 MILLIGRAM(S): 2400 POWDER, FOR SUSPENSION ORAL at 19:13

## 2022-06-13 RX ADMIN — CHLORHEXIDINE GLUCONATE 1 APPLICATION(S): 213 SOLUTION TOPICAL at 11:52

## 2022-06-13 RX ADMIN — SEVELAMER CARBONATE 1600 MILLIGRAM(S): 2400 POWDER, FOR SUSPENSION ORAL at 10:08

## 2022-06-13 RX ADMIN — HYDROMORPHONE HYDROCHLORIDE 0.5 MILLIGRAM(S): 2 INJECTION INTRAMUSCULAR; INTRAVENOUS; SUBCUTANEOUS at 06:10

## 2022-06-13 RX ADMIN — ERYTHROPOIETIN 10000 UNIT(S): 10000 INJECTION, SOLUTION INTRAVENOUS; SUBCUTANEOUS at 08:38

## 2022-06-13 NOTE — PROGRESS NOTE ADULT - SUBJECTIVE AND OBJECTIVE BOX
Podiatry pager #: [phone] ([place])/ [phone] ([facility])    Patient is a 61y old  Female who presents with a chief complaint of RLE toe pain (13 Jun 2022 07:12)       INTERVAL HPI/OVERNIGHT EVENTS:  Patient seen and evaluated at bedside.  Pt is resting comfortable in NAD. Denies N/V/F/C.     Allergies    No Known Allergies    Intolerances        Vital Signs Last 24 Hrs  T(C): 37 (13 Jun 2022 07:10), Max: 37.2 (12 Jun 2022 21:20)  T(F): 98.6 (13 Jun 2022 07:10), Max: 98.9 (12 Jun 2022 21:20)  HR: 75 (13 Jun 2022 07:10) (75 - 83)  BP: 133/62 (13 Jun 2022 07:10) (107/53 - 133/62)  BP(mean): 60 (12 Jun 2022 18:40) (60 - 60)  RR: 16 (13 Jun 2022 07:10) (16 - 18)  SpO2: 100% (13 Jun 2022 06:01) (99% - 100%)    LABS:                        8.7    12.06 )-----------( 340      ( 13 Jun 2022 05:20 )             29.4     06-13    134<L>  |  92<L>  |  38<H>  ----------------------------<  100<H>  4.1   |  26  |  7.35<H>    Ca    9.4      13 Jun 2022 05:20  Phos  3.6     06-13  Mg     2.40     06-13          CAPILLARY BLOOD GLUCOSE          Lower Extremity Physical Exam:  Neuro: Epicritic sensation diminished to the level of forefoot B/L  Musculoskeletal/Ortho: left partial 5th ray resection healed, LF partial 4th ray resection open   Skin:   3/4/22 s/p L foot partial 3rd ray resection open: fibrotic wound to subQ, no malodor, no drainage, no purulence, flaps cool, no maceration, distal skin necrosis, no tracking, no purulence, no erythema    6/8 s/p R foot partial first ray resection closed: flaps are cold and dusky to the plantar and dorsal aspect with delayed CFT, sutures intact, no hematoma, no purulence or discharge, no clinical signs of infection.     RADIOLOGY & ADDITIONAL TESTS:

## 2022-06-13 NOTE — PROGRESS NOTE ADULT - ASSESSMENT
62 y/o woman w/ ESRD on HD (MWF), HF, 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 Admitted for further evaluation     Problem/Plan - 1:  ·  Problem: Wound of right foot with  Cellulitis with Left 4th toe OM .   ·  Plan: Awaiting proximal amputation.   Pt w/ increasing R toe pain when walking as well as swelling. Mild white count and elevated inflammatory markers but afebrile with vitals stable  -podiatry and ID help appreciated.   -started  IV Meropenem  500mg q24hrs per iD . Awaiting OR path final culture results.   - S/P  left foot TMA and right foot partial 1st ray amputation on Wed .     Problem/Plan - 2:  ·  Problem: PAD (peripheral artery disease).   ·  Plan: S/P RLE Angiogram but unable to do any intervention per Vascular.   -known PAD, continue asa/plavix. Pt denies any numbness or tingling.  -recent angiograms done w/ poor peripheral arterial flow per podiatry and so poor surgical candidate as above  -continue statin.     Problem/Plan - 3:  ·  Problem: ESRD (end stage renal disease) on dialysis.   ·  Plan: Renal helping with HD.   On MWF schedule.  -monitor I/O.     Problem/Plan - 4:  ·  Problem: HLD (hyperlipidemia).   ·  Plan: continue statin. check lipid profile.     Problem/Plan - 5:  ·  Problem: Need for prophylactic measure.   ·  Plan: heparin subcu for dvt ppx, dash diet.      Dispo : DC planning pending above.     Medically optimized for surgery .

## 2022-06-13 NOTE — PROGRESS NOTE ADULT - ASSESSMENT
62 yo f s/p 6/8 right foot partial first ray amputation and left foot partial 3rd ray amputation (DOS 3/4/2022)  -pt seen and evaluated  - Afebrile , WBC 12  - 3/4/22 s/p L foot partial 3rd ray resection open: fibrotic wound to subQ, no malodor, no drainage, no purulence, flaps cool, no maceration, distal skin necrosis, no tracking, no purulence, no erythema    - 6/8 s/p R foot partial first ray resection closed: flaps are cold and dusky to the plantar and dorsal aspect with delayed CFT, sutures intact, no hematoma, no purulence or discharge, no clinical signs of infection.   - Pt is s/p recent b/l LE angiograms with poor peripheral arterial flow, is a poor surgical candidate  - B/L foot MRI reviewed   - R foot OR wound culture e coli, bone culture/path pending   - High concern for viability and residual soft tissue infection for R foot per intra op findings  - Viability of the b/l feet are highly guarded   - Discussed with patient the prognosis of any foot amputations and the poor likelihood of it healing. Pt would benefit from proximal amputation and is amenable at this time.   - [facility]  - discussed with attending

## 2022-06-13 NOTE — PROGRESS NOTE ADULT - ASSESSMENT
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 Admitted for further evaluation. Renal following for ESRD Mx.     ESRD on HD  schedule hemodialysis - Monday, Wednesday and Friday   access- thigh AVG  HD unit SQDC  K, vol ok     s/p HD in am, Rx sheet reviewed, net UF 800ml, tolerated well. uneventful.  plan for next hemodialysis tomorrow night for preop renal optimization w/ 2 k bath, Ultrafiltration 0.5kg as tolerated with blood pressure   renal diet  dose all meds for ESRD  renal restrictions in diet.  Anemia of CKD- Hb < goal now. increased DION epo 6>10k w/hd tiw  Hyperphosphatemia- c/w renvlea 2tid, changed to w/food  HTN, controlled. bp stable. not on anti-htn meds  PAD, infected rt foot wounds, ?OM- s/p vanco, on zosyn. f/u w/Podiatry. on meropenem now. plan for MRI foot w/o Morgan  f/u w/vas sx.   Podiatry f/u - patient s/p left foot TMA and right foot partial 1st ray amputation 6/8  s/p LE angiogram 6/7  plan for OR rt BKA 6/15    poc d/w pt, HD RN  labs, chart reviewed  For any question, call:  Cell # [phone]  Pager # [phone]  office # [phone]

## 2022-06-13 NOTE — PROGRESS NOTE ADULT - SUBJECTIVE AND OBJECTIVE BOX
Date of Service  : 06-13-22     INTERVAL HPI/OVERNIGHT EVENTS: Getting surgery as not healing with residual infection. .   Vital Signs Last 24 Hrs  T(C): 36.9 (13 Jun 2022 17:36), Max: 37.2 (12 Jun 2022 21:20)  T(F): 98.4 (13 Jun 2022 17:36), Max: 98.9 (12 Jun 2022 21:20)  HR: 85 (13 Jun 2022 17:36) (75 - 85)  BP: 102/58 (13 Jun 2022 17:36) (102/58 - 133/62)  BP(mean): 60 (12 Jun 2022 18:40) (60 - 60)  RR: 16 (13 Jun 2022 17:36) (16 - 18)  SpO2: 99% (13 Jun 2022 17:36) (99% - 100%)  I&O's Summary    12 Jun 2022 07:01  -  13 Jun 2022 07:00  --------------------------------------------------------  IN: 770 mL / OUT: 0 mL / NET: 770 mL    13 Jun 2022 07:01  -  13 Jun 2022 17:44  --------------------------------------------------------  IN: 640 mL / OUT: 1200 mL / NET: -560 mL      MEDICATIONS  (STANDING):  aspirin  chewable 81 milliGRAM(s) Oral daily  atorvastatin 40 milliGRAM(s) Oral at bedtime  chlorhexidine 2% Cloths 1 Application(s) Topical daily  clopidogrel Tablet 75 milliGRAM(s) Oral daily  collagenase Ointment 1 Application(s) Topical daily  epoetin niesha-epbx (RETACRIT) Injectable 42754 Unit(s) IV Push <User Schedule>  heparin   Injectable 5000 Unit(s) SubCutaneous every 12 hours  meropenem  IVPB 500 milliGRAM(s) IV Intermittent every 24 hours  polyethylene glycol 3350 17 Gram(s) Oral daily  senna 2 Tablet(s) Oral at bedtime  sevelamer carbonate 1600 milliGRAM(s) Oral three times a day with meals    MEDICATIONS  (PRN):  acetaminophen     Tablet .. 650 milliGRAM(s) Oral every 6 hours PRN Temp greater or equal to 38C (100.4F), Mild Pain (1 - 3)  HYDROmorphone  Injectable 0.5 milliGRAM(s) IV Push every 4 hours PRN Severe Pain (7 - 10)    LABS:                        8.7    12.06 )-----------( 340      ( 13 Jun 2022 05:20 )             29.4     06-13    134<L>  |  92<L>  |  38<H>  ----------------------------<  100<H>  4.1   |  26  |  7.35<H>    Ca    9.4      13 Jun 2022 05:20  Phos  3.6     06-13  Mg     2.40     06-13          CAPILLARY BLOOD GLUCOSE              REVIEW OF SYSTEMS:  CONSTITUTIONAL: No fever, weight loss, or fatigue  EYES: No eye pain, visual disturbances, or discharge  ENMT:  No difficulty hearing, tinnitus, vertigo; No sinus or throat pain  NECK: No pain or stiffness  RESPIRATORY: No cough, wheezing, chills or hemoptysis; No shortness of breath  CARDIOVASCULAR: No chest pain, palpitations, dizziness, or leg swelling  GASTROINTESTINAL: No abdominal or epigastric pain. No nausea, vomiting, or hematemesis; No diarrhea or constipation. No melena or hematochezia.  GENITOURINARY: No dysuria, frequency, hematuria, or incontinence  NEUROLOGICAL: No headaches, memory loss, loss of strength, numbness, or tremors      Consultant(s) Notes Reviewed:  [x ] YES  [ ] NO    PHYSICAL EXAM:  GENERAL: NAD, well-groomed, well-developed,not in any distress ,  HEAD:  Atraumatic, Normocephalic  NECK: Supple, No JVD, Normal thyroid  NERVOUS SYSTEM:  Alert & Oriented X3, No focal deficit   CHEST/LUNG: Good air entry bilateral with no  rales, rhonchi, wheezing, or rubs  HEART: Regular rate and rhythm; No murmurs, rubs, or gallops  ABDOMEN: Soft, Nontender, Nondistended; Bowel sounds present  EXTREMITIES:  Feet dressed with edema + RLE    Care Discussed with Consultants/Other Providers [ x] YES  [ ] NO

## 2022-06-13 NOTE — PROGRESS NOTE ADULT - SUBJECTIVE AND OBJECTIVE BOX
[facility] - S Jluis / Shabbir S /D Romina/ SHERRI Mcfarlane/ SHERRI Madden/ Andrés Perkins / M Tristianu/ O Gladis  service -[phone], office [phone]  ---------------------------------------------------------------------------------------------------------------    Patient seen and examined bedside    Subjective and Objective: No overnight events, sob resolved. No complaints today. feeling better    Allergies: No Known Allergies      Hospital Medications:   MEDICATIONS  (STANDING):  aspirin  chewable 81 milliGRAM(s) Oral daily  atorvastatin 40 milliGRAM(s) Oral at bedtime  chlorhexidine 2% Cloths 1 Application(s) Topical daily  clopidogrel Tablet 75 milliGRAM(s) Oral daily  collagenase Ointment 1 Application(s) Topical daily  epoetin niesha-epbx (RETACRIT) Injectable 32514 Unit(s) IV Push <User Schedule>  heparin   Injectable 5000 Unit(s) SubCutaneous every 12 hours  meropenem  IVPB 500 milliGRAM(s) IV Intermittent every 24 hours  polyethylene glycol 3350 17 Gram(s) Oral daily  senna 2 Tablet(s) Oral at bedtime  sevelamer carbonate 1600 milliGRAM(s) Oral three times a day with meals      REVIEW OF SYSTEMS:  CONSTITUTIONAL: No weakness, fevers or chills  EYES/ENT: No visual changes;  No vertigo or throat pain   NECK: No pain or stiffness  RESPIRATORY: No cough, wheezing, hemoptysis; No shortness of breath  CARDIOVASCULAR: No chest pain or palpitations.  GASTROINTESTINAL: No abdominal or epigastric pain. No nausea, vomiting, or hematemesis; No diarrhea or constipation. No melena or hematochezia.  GENITOURINARY: No dysuria, frequency, foamy urine, urinary urgency, incontinence or hematuria  NEUROLOGICAL: No numbness or weakness  SKIN: No itching, burning, rashes, or lesions   VASCULAR: No bilateral lower extremity edema.   All other review of systems is negative unless indicated above.    VITALS:  T(F): 98.8 (06-13-22 @ 14:01), Max: 98.9 (06-12-22 @ 21:20)  HR: 84 (06-13-22 @ 14:01)  BP: 106/50 (06-13-22 @ 14:01)  RR: 17 (06-13-22 @ 14:01)  SpO2: 100% (06-13-22 @ 14:01)  Wt(kg): --    06-12 @ 07:01  -  06-13 @ 07:00  --------------------------------------------------------  IN: 770 mL / OUT: 0 mL / NET: 770 mL    06-13 @ 07:01  -  06-13 @ 15:28  --------------------------------------------------------  IN: 640 mL / OUT: 1200 mL / NET: -560 mL          PHYSICAL EXAM:  Constitutional: NAD  HEENT: anicteric sclera, oropharynx clear  Neck: No JVD  Respiratory: CTAB, no wheezes, rales or rhonchi  Cardiovascular: S1, S2, RRR  Gastrointestinal: BS+, soft, NT/ND  Extremities: No cyanosis or clubbing. No peripheral edema  Neurological: A/O x 3, no focal deficits  Psychiatric: Normal mood, normal affect  : No CVA tenderness. No salcido.   Skin: No rashes  Vascular Access:    LABS:  06-13    134<L>  |  92<L>  |  38<H>  ----------------------------<  100<H>  4.1   |  26  |  7.35<H>    Ca    9.4      13 Jun 2022 05:20  Phos  3.6     06-13  Mg     2.40     06-13      Creatinine Trend: 7.35 <--, 5.56 <--, 7.37 <--, 5.46 <--, 8.39 <--, 6.82 <--, 11.10 <--                        8.7    12.06 )-----------( 575 ( 13 Jun 2022 05:20 )             29.4     Urine Studies:        RADIOLOGY & ADDITIONAL STUDIES:   [facility] - S Jluis / Shabbir S /D Romina/ S Densy/ S Chano/ Andrés Perkins / M Katlyn/ O Gladis  service -[phone], office [phone]  ---------------------------------------------------------------------------------------------------------------    Patient seen and examined bedside    Subjective and Objective: No overnight events, denied sob. No complaints today.     Allergies: No Known Allergies      Hospital Medications:   MEDICATIONS  (STANDING):  aspirin  chewable 81 milliGRAM(s) Oral daily  atorvastatin 40 milliGRAM(s) Oral at bedtime  chlorhexidine 2% Cloths 1 Application(s) Topical daily  clopidogrel Tablet 75 milliGRAM(s) Oral daily  collagenase Ointment 1 Application(s) Topical daily  epoetin niesha-epbx (RETACRIT) Injectable 28531 Unit(s) IV Push <User Schedule>  heparin   Injectable 5000 Unit(s) SubCutaneous every 12 hours  meropenem  IVPB 500 milliGRAM(s) IV Intermittent every 24 hours  polyethylene glycol 3350 17 Gram(s) Oral daily  senna 2 Tablet(s) Oral at bedtime  sevelamer carbonate 1600 milliGRAM(s) Oral three times a day with meals    VITALS:  T(F): 98.8 (06-13-22 @ 14:01), Max: 98.9 (06-12-22 @ 21:20)  HR: 84 (06-13-22 @ 14:01)  BP: 106/50 (06-13-22 @ 14:01)  RR: 17 (06-13-22 @ 14:01)  SpO2: 100% (06-13-22 @ 14:01)  Wt(kg): --    06-12 @ 07:01  -  06-13 @ 07:00  --------------------------------------------------------  IN: 770 mL / OUT: 0 mL / NET: 770 mL    06-13 @ 07:01  -  06-13 @ 15:28  --------------------------------------------------------  IN: 640 mL / OUT: 1200 mL / NET: -560 mL      PHYSICAL EXAM:  Constitutional: NAD  HEENT: anicteric sclera  Neck: No JVD  Respiratory: CTAB, no wheezes, rales or rhonchi  Cardiovascular: S1, S2, RRR  Gastrointestinal: BS+, soft, NT/ND  Extremities: + peripheral edema rt leg. b/l feet dressings+  Neurological: A/O x 3  Psychiatric: Normal mood, normal affect  : No salcido.   Vascular Access: rt femoral AVG+    LABS:  06-13    134<L>  |  92<L>  |  38<H>  ----------------------------<  100<H>  4.1   |  26  |  7.35<H>    Ca    9.4      13 Jun 2022 05:20  Phos  3.6     06-13  Mg     2.40     06-13      Creatinine Trend: 7.35 <--, 5.56 <--, 7.37 <--, 5.46 <--, 8.39 <--, 6.82 <--, 11.10 <--                        8.7    12.06 )-----------( 340      ( 13 Jun 2022 05:20 )             29.4     Urine Studies:        RADIOLOGY & ADDITIONAL STUDIES:

## 2022-06-13 NOTE — PROGRESS NOTE ADULT - SUBJECTIVE AND OBJECTIVE BOX
Cardiovascular Disease Progress Note    Overnight events: No acute events overnight. Ms. Boogie denies chest pain or SOB.     Otherwise review of systems negative    Objective Findings:  T(C): 36.8 (06-13-22 @ 06:01), Max: 37.2 (06-12-22 @ 21:20)  HR: 76 (06-13-22 @ 06:01) (67 - 83)  BP: 109/50 (06-13-22 @ 06:01) (107/49 - 117/60)  RR: 17 (06-13-22 @ 06:01) (16 - 18)  SpO2: 100% (06-13-22 @ 06:01) (99% - 100%)  Wt(kg): --  Daily     Daily       Physical Exam:  Gen: NAD; Patient resting comfortably  HEENT: EOMI, Normocephalic/ atraumatic  CV: RRR, normal S1 + S2, no m/r/g  Lungs:  Normal respiratory effort; clear to auscultation bilaterally  Abd: soft, non-tender; bowel sounds present  Ext: No edema; warm and well perfused    Telemetry: n/a    Laboratory Data:                        8.7    11.78 )-----------( 290      ( 12 Jun 2022 05:30 )             29.5     06-12    136  |  91<L>  |  26<H>  ----------------------------<  114<H>  3.8   |  28  |  5.56<H>    Ca    9.3      12 Jun 2022 05:30  Phos  3.3     06-12  Mg     2.40     06-12                Inpatient Medications:  MEDICATIONS  (STANDING):  aspirin  chewable 81 milliGRAM(s) Oral daily  atorvastatin 40 milliGRAM(s) Oral at bedtime  chlorhexidine 2% Cloths 1 Application(s) Topical daily  clopidogrel Tablet 75 milliGRAM(s) Oral daily  collagenase Ointment 1 Application(s) Topical daily  epoetin niesha-epbx (RETACRIT) Injectable 31132 Unit(s) IV Push <User Schedule>  heparin   Injectable 5000 Unit(s) SubCutaneous every 12 hours  meropenem  IVPB 500 milliGRAM(s) IV Intermittent every 24 hours  polyethylene glycol 3350 17 Gram(s) Oral daily  senna 2 Tablet(s) Oral at bedtime  sevelamer carbonate 1600 milliGRAM(s) Oral three times a day with meals      Assessment:  61-year-old female with ESRD on HD, HLD and peripheral vascular disease s/p lower extremity resection presents with chronic limb ischemia.     Plan of Care:    #Post operative evaluation-  Ms. Boogie tolerated podiatric amputation well.   She displays no signs or symptoms of post operative coronary ischemia or acute CHF.   - Continue with current cardiac management.     #Compensated diastolic CHF-  Volume status is improved.  Fluid removal with HD as per the renal team.   Recent echo noted- normal LV systolic function.    #Peripheral vascular disease.  - Podiatry and vascular input noted.     #ESRD-  - HD as per renal.    #ACP (advance care planning)-  Advanced care planning was discussed with the patient.    Cardiac findings were discussed in detail and all questions were answered.       Over 25 minutes spent on total encounter; more than 50% of the visit was spent counseling and/or coordinating care by the attending physician.      Cristino Adams MD [facility]  Cardiovascular Disease  [phone]

## 2022-06-14 ENCOUNTER — TRANSCRIPTION ENCOUNTER (OUTPATIENT)
Age: 62
End: 2022-06-14

## 2022-06-14 LAB
ALBUMIN SERPL ELPH-MCNC: 3.4 G/DL — SIGNIFICANT CHANGE UP (ref 3.3–5)
ALP SERPL-CCNC: 60 U/L — SIGNIFICANT CHANGE UP (ref 40–120)
ALT FLD-CCNC: 5 U/L — SIGNIFICANT CHANGE UP (ref 4–33)
ANION GAP SERPL CALC-SCNC: 13 MMOL/L — SIGNIFICANT CHANGE UP (ref 7–14)
AST SERPL-CCNC: 11 U/L — SIGNIFICANT CHANGE UP (ref 4–32)
BILIRUB SERPL-MCNC: <0.2 MG/DL — SIGNIFICANT CHANGE UP (ref 0.2–1.2)
BUN SERPL-MCNC: 27 MG/DL — HIGH (ref 7–23)
CALCIUM SERPL-MCNC: 9.5 MG/DL — SIGNIFICANT CHANGE UP (ref 8.4–10.5)
CHLORIDE SERPL-SCNC: 95 MMOL/L — LOW (ref 98–107)
CO2 SERPL-SCNC: 30 MMOL/L — SIGNIFICANT CHANGE UP (ref 22–31)
CREAT SERPL-MCNC: 5.91 MG/DL — HIGH (ref 0.5–1.3)
EGFR: 8 ML/MIN/1.73M2 — LOW
GLUCOSE SERPL-MCNC: 103 MG/DL — HIGH (ref 70–99)
HCT VFR BLD CALC: 29.3 % — LOW (ref 34.5–45)
HGB BLD-MCNC: 8.6 G/DL — LOW (ref 11.5–15.5)
MAGNESIUM SERPL-MCNC: 2.4 MG/DL — SIGNIFICANT CHANGE UP (ref 1.6–2.6)
MCHC RBC-ENTMCNC: 26.4 PG — LOW (ref 27–34)
MCHC RBC-ENTMCNC: 29.4 GM/DL — LOW (ref 32–36)
MCV RBC AUTO: 89.9 FL — SIGNIFICANT CHANGE UP (ref 80–100)
NRBC # BLD: 0 /100 WBCS — SIGNIFICANT CHANGE UP
NRBC # FLD: 0.03 K/UL — HIGH
PHOSPHATE SERPL-MCNC: 3.4 MG/DL — SIGNIFICANT CHANGE UP (ref 2.5–4.5)
PLATELET # BLD AUTO: 327 K/UL — SIGNIFICANT CHANGE UP (ref 150–400)
POTASSIUM SERPL-MCNC: 3.8 MMOL/L — SIGNIFICANT CHANGE UP (ref 3.5–5.3)
POTASSIUM SERPL-SCNC: 3.8 MMOL/L — SIGNIFICANT CHANGE UP (ref 3.5–5.3)
PROT SERPL-MCNC: 7.3 G/DL — SIGNIFICANT CHANGE UP (ref 6–8.3)
RBC # BLD: 3.26 M/UL — LOW (ref 3.8–5.2)
RBC # FLD: 15 % — HIGH (ref 10.3–14.5)
SARS-COV-2 RNA SPEC QL NAA+PROBE: SIGNIFICANT CHANGE UP
SODIUM SERPL-SCNC: 138 MMOL/L — SIGNIFICANT CHANGE UP (ref 135–145)
WBC # BLD: 11.51 K/UL — HIGH (ref 3.8–10.5)
WBC # FLD AUTO: 11.51 K/UL — HIGH (ref 3.8–10.5)

## 2022-06-14 RX ORDER — ACETAMINOPHEN 500 MG
1000 TABLET ORAL ONCE
Refills: 0 | Status: COMPLETED | OUTPATIENT
Start: 2022-06-14 | End: 2022-06-14

## 2022-06-14 RX ADMIN — HYDROMORPHONE HYDROCHLORIDE 0.5 MILLIGRAM(S): 2 INJECTION INTRAMUSCULAR; INTRAVENOUS; SUBCUTANEOUS at 19:35

## 2022-06-14 RX ADMIN — Medication 650 MILLIGRAM(S): at 23:47

## 2022-06-14 RX ADMIN — Medication 1 APPLICATION(S): at 11:41

## 2022-06-14 RX ADMIN — Medication 400 MILLIGRAM(S): at 02:01

## 2022-06-14 RX ADMIN — CLOPIDOGREL BISULFATE 75 MILLIGRAM(S): 75 TABLET, FILM COATED ORAL at 11:42

## 2022-06-14 RX ADMIN — HYDROMORPHONE HYDROCHLORIDE 0.5 MILLIGRAM(S): 2 INJECTION INTRAMUSCULAR; INTRAVENOUS; SUBCUTANEOUS at 13:58

## 2022-06-14 RX ADMIN — Medication 1000 MILLIGRAM(S): at 02:15

## 2022-06-14 RX ADMIN — MEROPENEM 100 MILLIGRAM(S): 1 INJECTION INTRAVENOUS at 17:09

## 2022-06-14 RX ADMIN — Medication 650 MILLIGRAM(S): at 23:17

## 2022-06-14 RX ADMIN — CHLORHEXIDINE GLUCONATE 1 APPLICATION(S): 213 SOLUTION TOPICAL at 11:49

## 2022-06-14 RX ADMIN — SEVELAMER CARBONATE 1600 MILLIGRAM(S): 2400 POWDER, FOR SUSPENSION ORAL at 11:42

## 2022-06-14 RX ADMIN — HYDROMORPHONE HYDROCHLORIDE 0.5 MILLIGRAM(S): 2 INJECTION INTRAMUSCULAR; INTRAVENOUS; SUBCUTANEOUS at 19:20

## 2022-06-14 RX ADMIN — HEPARIN SODIUM 5000 UNIT(S): 5000 INJECTION INTRAVENOUS; SUBCUTANEOUS at 17:09

## 2022-06-14 RX ADMIN — SEVELAMER CARBONATE 1600 MILLIGRAM(S): 2400 POWDER, FOR SUSPENSION ORAL at 08:30

## 2022-06-14 RX ADMIN — SEVELAMER CARBONATE 1600 MILLIGRAM(S): 2400 POWDER, FOR SUSPENSION ORAL at 17:07

## 2022-06-14 RX ADMIN — HYDROMORPHONE HYDROCHLORIDE 0.5 MILLIGRAM(S): 2 INJECTION INTRAMUSCULAR; INTRAVENOUS; SUBCUTANEOUS at 14:17

## 2022-06-14 RX ADMIN — ERYTHROPOIETIN 10000 UNIT(S): 10000 INJECTION, SOLUTION INTRAVENOUS; SUBCUTANEOUS at 21:38

## 2022-06-14 RX ADMIN — HEPARIN SODIUM 5000 UNIT(S): 5000 INJECTION INTRAVENOUS; SUBCUTANEOUS at 06:12

## 2022-06-14 RX ADMIN — Medication 81 MILLIGRAM(S): at 11:42

## 2022-06-14 RX ADMIN — Medication 650 MILLIGRAM(S): at 16:47

## 2022-06-14 RX ADMIN — ATORVASTATIN CALCIUM 40 MILLIGRAM(S): 80 TABLET, FILM COATED ORAL at 23:15

## 2022-06-14 RX ADMIN — POLYETHYLENE GLYCOL 3350 17 GRAM(S): 17 POWDER, FOR SOLUTION ORAL at 11:41

## 2022-06-14 NOTE — PROGRESS NOTE ADULT - ASSESSMENT
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 Admitted for further evaluation     Problem/Plan - 1:  ·  Problem: Wound of right foot with  Cellulitis with Left 4th toe OM .   ·  Plan: Awaiting proximal amputation.   Pt w/ increasing R toe pain when walking as well as swelling. Mild white count and elevated inflammatory markers but afebrile with vitals stable  -podiatry and ID help appreciated.   -started  IV Meropenem  500mg q24hrs per iD . Awaiting OR path final culture results.   - S/P  left foot TMA and right foot partial 1st ray amputation on Wed .     Problem/Plan - 2:  ·  Problem: PAD (peripheral artery disease).   ·  Plan: S/P RLE Angiogram but unable to do any intervention per Vascular.   -known PAD, continue asa/plavix. Pt denies any numbness or tingling.  -recent angiograms done w/ poor peripheral arterial flow per podiatry and so poor surgical candidate as above  -continue statin.     Problem/Plan - 3:  ·  Problem: ESRD (end stage renal disease) on dialysis.   ·  Plan: Renal helping with HD.   On MWF schedule.  -monitor I/O.     Problem/Plan - 4:  ·  Problem: HLD (hyperlipidemia).   ·  Plan: continue statin. check lipid profile.     Problem/Plan - 5:  ·  Problem: Need for prophylactic measure.   ·  Plan: heparin subcu for dvt ppx, dash diet.      Dispo : DC planning pending above.     Medically optimized for surgery .    will like to speak to Podiatrist before surgery .

## 2022-06-14 NOTE — PROGRESS NOTE ADULT - SUBJECTIVE AND OBJECTIVE BOX
Cardiovascular Disease Progress Note    Overnight events: No acute events overnight. Ms. Boogie denies chest pain or SOB.     Otherwise review of systems negative    Objective Findings:  T(C): 36.9 (22 @ 06:02), Max: 37.1 (22 @ 14:01)  HR: 74 (22 @ 06:02) (74 - 88)  BP: 98/46 (22 @ 06:02) (87/39 - 130/50)  RR: 17 (22 @ 06:02) (16 - 18)  SpO2: 100% (22 @ 06:02) (99% - 100%)  Wt(kg): --  Daily     Daily Weight in k.8 (2022 10:20)      Physical Exam:  Gen: NAD; Patient resting comfortably  HEENT: EOMI, Normocephalic/ atraumatic  CV: RRR, normal S1 + S2, no m/r/g  Lungs:  Normal respiratory effort; clear to auscultation bilaterally  Abd: soft, non-tender; bowel sounds present  Ext: No edema; warm and well perfused    Telemetry: n/a    Laboratory Data:                        8.7    12.06 )-----------( 340      ( 2022 05:20 )             29.4     06-13    134<L>  |  92<L>  |  38<H>  ----------------------------<  100<H>  4.1   |  26  |  7.35<H>    Ca    9.4      2022 05:20  Phos  3.6       Mg     2.40     06-13                Inpatient Medications:  MEDICATIONS  (STANDING):  aspirin  chewable 81 milliGRAM(s) Oral daily  atorvastatin 40 milliGRAM(s) Oral at bedtime  chlorhexidine 2% Cloths 1 Application(s) Topical daily  clopidogrel Tablet 75 milliGRAM(s) Oral daily  collagenase Ointment 1 Application(s) Topical daily  epoetin niesha-epbx (RETACRIT) Injectable 51050 Unit(s) IV Push <User Schedule>  heparin   Injectable 5000 Unit(s) SubCutaneous every 12 hours  meropenem  IVPB 500 milliGRAM(s) IV Intermittent every 24 hours  polyethylene glycol 3350 17 Gram(s) Oral daily  senna 2 Tablet(s) Oral at bedtime  sevelamer carbonate 1600 milliGRAM(s) Oral three times a day with meals  sorbitol 70%/mineral oil/magnesium hydroxide/glycerin Enema 120 milliLiter(s) Rectal once      Assessment: 61-year-old female with ESRD on HD, HLD and peripheral vascular disease s/p lower extremity resection presents with chronic limb ischemia.     Plan of Care:    #Post operative evaluation-  Ms. Boogie tolerated podiatric amputation well.   She displays no signs or symptoms of post operative coronary ischemia or acute CHF.   - Continue with current cardiac management.     #Compensated diastolic CHF-  Volume status is improved.  Fluid removal with HD as per the renal team.   Recent echo noted- normal LV systolic function.    #Peripheral vascular disease.  - Podiatry and vascular input noted.     #ESRD-  - HD as per renal.        Over 25 minutes spent on total encounter; more than 50% of the visit was spent counseling and/or coordinating care by the attending physician.      Cristino Adams MD [facility]  Cardiovascular Disease  [phone] Cardiovascular Disease Progress Note    Overnight events: No acute events overnight. Ms. Boogie denies chest pain or SOB.     Otherwise review of systems negative    Objective Findings:  T(C): 36.9 (22 @ 06:02), Max: 37.1 (22 @ 14:01)  HR: 74 (22 @ 06:02) (74 - 88)  BP: 98/46 (22 @ 06:02) (87/39 - 130/50)  RR: 17 (22 @ 06:02) (16 - 18)  SpO2: 100% (22 @ 06:02) (99% - 100%)  Wt(kg): --  Daily     Daily Weight in k.8 (2022 10:20)      Physical Exam:  Gen: NAD; Patient resting comfortably  HEENT: EOMI, Normocephalic/ atraumatic  CV: RRR, normal S1 + S2, no m/r/g  Lungs:  Normal respiratory effort; clear to auscultation bilaterally  Abd: soft, non-tender; bowel sounds present  Ext: No edema; warm and well perfused    Telemetry: n/a    Laboratory Data:                        8.7    12.06 )-----------( 340      ( 2022 05:20 )             29.4     06-13    134<L>  |  92<L>  |  38<H>  ----------------------------<  100<H>  4.1   |  26  |  7.35<H>    Ca    9.4      2022 05:20  Phos  3.6       Mg     2.40     06-13                Inpatient Medications:  MEDICATIONS  (STANDING):  aspirin  chewable 81 milliGRAM(s) Oral daily  atorvastatin 40 milliGRAM(s) Oral at bedtime  chlorhexidine 2% Cloths 1 Application(s) Topical daily  clopidogrel Tablet 75 milliGRAM(s) Oral daily  collagenase Ointment 1 Application(s) Topical daily  epoetin niesha-epbx (RETACRIT) Injectable 29419 Unit(s) IV Push <User Schedule>  heparin   Injectable 5000 Unit(s) SubCutaneous every 12 hours  meropenem  IVPB 500 milliGRAM(s) IV Intermittent every 24 hours  polyethylene glycol 3350 17 Gram(s) Oral daily  senna 2 Tablet(s) Oral at bedtime  sevelamer carbonate 1600 milliGRAM(s) Oral three times a day with meals  sorbitol 70%/mineral oil/magnesium hydroxide/glycerin Enema 120 milliLiter(s) Rectal once      Assessment: 61-year-old female with ESRD on HD, HLD and peripheral vascular disease s/p lower extremity resection presents with chronic limb ischemia.     Plan of Care:    #Post operative evaluation-  Ms. Boogie tolerated podiatric amputation well.   She displays no signs or symptoms of post operative coronary ischemia or acute CHF.   No cardiac objection to proceeding with BKA by the vascular team.   - Continue with current cardiac management.     #Compensated diastolic CHF-  Volume status is improved.  Fluid removal with HD as per the renal team.   Recent echo noted- normal LV systolic function.    #Peripheral vascular disease.  - Podiatry and vascular input noted.     #ESRD-  - HD as per renal.        Over 25 minutes spent on total encounter; more than 50% of the visit was spent counseling and/or coordinating care by the attending physician.      Cristino Adams MD [facility]  Cardiovascular Disease  [phone]

## 2022-06-14 NOTE — PROGRESS NOTE ADULT - SUBJECTIVE AND OBJECTIVE BOX
[facility] - S Jluis / Shabbir S /D Romina/ SHERRI Mcfarlane/ SHERRI Madden/ Andrés Perkins / M Tristianu/ O Gladis  service -[phone], office [phone]  ---------------------------------------------------------------------------------------------------------------    Patient seen and examined bedside    Subjective and Objective: No overnight events, sob resolved. No complaints today. feeling better    Allergies: No Known Allergies      Hospital Medications:   MEDICATIONS  (STANDING):  aspirin  chewable 81 milliGRAM(s) Oral daily  atorvastatin 40 milliGRAM(s) Oral at bedtime  chlorhexidine 2% Cloths 1 Application(s) Topical daily  clopidogrel Tablet 75 milliGRAM(s) Oral daily  collagenase Ointment 1 Application(s) Topical daily  epoetin niesha-epbx (RETACRIT) Injectable 97834 Unit(s) IV Push <User Schedule>  heparin   Injectable 5000 Unit(s) SubCutaneous every 12 hours  meropenem  IVPB 500 milliGRAM(s) IV Intermittent every 24 hours  polyethylene glycol 3350 17 Gram(s) Oral daily  senna 2 Tablet(s) Oral at bedtime  sevelamer carbonate 1600 milliGRAM(s) Oral three times a day with meals  sorbitol 70%/mineral oil/magnesium hydroxide/glycerin Enema 120 milliLiter(s) Rectal once      REVIEW OF SYSTEMS:  CONSTITUTIONAL: No weakness, fevers or chills  EYES/ENT: No visual changes;  No vertigo or throat pain   NECK: No pain or stiffness  RESPIRATORY: No cough, wheezing, hemoptysis; No shortness of breath  CARDIOVASCULAR: No chest pain or palpitations.  GASTROINTESTINAL: No abdominal or epigastric pain. No nausea, vomiting, or hematemesis; No diarrhea or constipation. No melena or hematochezia.  GENITOURINARY: No dysuria, frequency, foamy urine, urinary urgency, incontinence or hematuria  NEUROLOGICAL: No numbness or weakness  SKIN: No itching, burning, rashes, or lesions   VASCULAR: No bilateral lower extremity edema.   All other review of systems is negative unless indicated above.    VITALS:  T(F): 97.8 (06-14-22 @ 17:13), Max: 98.4 (06-13-22 @ 20:57)  HR: 76 (06-14-22 @ 17:13)  BP: 103/46 (06-14-22 @ 17:13)  RR: 18 (06-14-22 @ 17:13)  SpO2: 98% (06-14-22 @ 17:13)  Wt(kg): --    06-13 @ 07:01  -  06-14 @ 07:00  --------------------------------------------------------  IN: 640 mL / OUT: 1200 mL / NET: -560 mL          PHYSICAL EXAM:  Constitutional: NAD  HEENT: anicteric sclera, oropharynx clear  Neck: No JVD  Respiratory: CTAB, no wheezes, rales or rhonchi  Cardiovascular: S1, S2, RRR  Gastrointestinal: BS+, soft, NT/ND  Extremities: No cyanosis or clubbing. No peripheral edema  Neurological: A/O x 3, no focal deficits  Psychiatric: Normal mood, normal affect  : No CVA tenderness. No salcido.   Skin: No rashes  Vascular Access:    LABS:  06-14    138  |  95<L>  |  27<H>  ----------------------------<  103<H>  3.8   |  30  |  5.91<H>    Ca    9.5      14 Jun 2022 07:23  Phos  3.4     06-14  Mg     2.40     06-14    TPro  7.3  /  Alb  3.4  /  TBili  <0.2  /  DBili      /  AST  11  /  ALT  5   /  AlkPhos  60  06-14    Creatinine Trend: 5.91 <--, 7.35 <--, 5.56 <--, 7.37 <--, 5.46 <--, 8.39 <--, 6.82 <--                        8.6    11.51 )-----------( 327      ( 14 Jun 2022 07:23 )             29.3     Urine Studies:        RADIOLOGY & ADDITIONAL STUDIES:   [facility] - S Jluis / Shabbir S /D Romina/ S Denys/ S Chano/ Andrés Perkins / M Katlyn/ O Gladis  service -[phone], office [phone]  ---------------------------------------------------------------------------------------------------------------    Patient seen and examined bedside    Subjective and Objective: No overnight events, denied sob. No new complaints today.     Allergies: No Known Allergies      Hospital Medications:   MEDICATIONS  (STANDING):  aspirin  chewable 81 milliGRAM(s) Oral daily  atorvastatin 40 milliGRAM(s) Oral at bedtime  chlorhexidine 2% Cloths 1 Application(s) Topical daily  clopidogrel Tablet 75 milliGRAM(s) Oral daily  collagenase Ointment 1 Application(s) Topical daily  epoetin niesha-epbx (RETACRIT) Injectable 07665 Unit(s) IV Push <User Schedule>  heparin   Injectable 5000 Unit(s) SubCutaneous every 12 hours  meropenem  IVPB 500 milliGRAM(s) IV Intermittent every 24 hours  polyethylene glycol 3350 17 Gram(s) Oral daily  senna 2 Tablet(s) Oral at bedtime  sevelamer carbonate 1600 milliGRAM(s) Oral three times a day with meals  sorbitol 70%/mineral oil/magnesium hydroxide/glycerin Enema 120 milliLiter(s) Rectal once      VITALS:  T(F): 97.8 (06-14-22 @ 17:13), Max: 98.4 (06-13-22 @ 20:57)  HR: 76 (06-14-22 @ 17:13)  BP: 103/46 (06-14-22 @ 17:13)  RR: 18 (06-14-22 @ 17:13)  SpO2: 98% (06-14-22 @ 17:13)  Wt(kg): --    06-13 @ 07:01  -  06-14 @ 07:00  --------------------------------------------------------  IN: 640 mL / OUT: 1200 mL / NET: -560 mL      PHYSICAL EXAM:  Constitutional: NAD  HEENT: anicteric sclera  Neck: No JVD  Respiratory: CTAB, no wheezes, rales or rhonchi  Cardiovascular: S1, S2, RRR  Gastrointestinal: BS+, soft, NT/ND  Extremities: + peripheral edema rt leg. b/l feet dressings+  Neurological: A/O x 3  Psychiatric: Normal mood, normal affect  : No salcido.   Vascular Access: rt femoral AVG+    LABS:  06-14    138  |  95<L>  |  27<H>  ----------------------------<  103<H>  3.8   |  30  |  5.91<H>    Ca    9.5      14 Jun 2022 07:23  Phos  3.4     06-14  Mg     2.40     06-14    TPro  7.3  /  Alb  3.4  /  TBili  <0.2  /  DBili      /  AST  11  /  ALT  5   /  AlkPhos  60  06-14    Creatinine Trend: 5.91 <--, 7.35 <--, 5.56 <--, 7.37 <--, 5.46 <--, 8.39 <--, 6.82 <--                        8.6    11.51 )-----------( 327      ( 14 Jun 2022 07:23 )             29.3     Urine Studies:        RADIOLOGY & ADDITIONAL STUDIES:

## 2022-06-14 NOTE — PRE-OP CHECKLIST - 1.
1100 to ASU  vs : 80,129/45,16,100 % Temp. 98.1
received pt from [address] [place] [address] accompanied by transporter

## 2022-06-14 NOTE — PROGRESS NOTE ADULT - SUBJECTIVE AND OBJECTIVE BOX
Date of Service  : 06-14-22     INTERVAL HPI/OVERNIGHT EVENTS:  in room seen earlier. Going for surgery tomorrow.   Vital Signs Last 24 Hrs  T(C): 36.7 (14 Jun 2022 09:45), Max: 36.9 (13 Jun 2022 17:36)  T(F): 98 (14 Jun 2022 09:45), Max: 98.4 (13 Jun 2022 17:36)  HR: 87 (14 Jun 2022 09:45) (74 - 88)  BP: 93/45 (14 Jun 2022 09:45) (87/39 - 117/51)  BP(mean): --  RR: 17 (14 Jun 2022 09:45) (16 - 18)  SpO2: 100% (14 Jun 2022 09:45) (99% - 100%)  I&O's Summary    13 Jun 2022 07:01  -  14 Jun 2022 07:00  --------------------------------------------------------  IN: 640 mL / OUT: 1200 mL / NET: -560 mL      MEDICATIONS  (STANDING):  aspirin  chewable 81 milliGRAM(s) Oral daily  atorvastatin 40 milliGRAM(s) Oral at bedtime  chlorhexidine 2% Cloths 1 Application(s) Topical daily  clopidogrel Tablet 75 milliGRAM(s) Oral daily  collagenase Ointment 1 Application(s) Topical daily  epoetin niesha-epbx (RETACRIT) Injectable 21918 Unit(s) IV Push <User Schedule>  heparin   Injectable 5000 Unit(s) SubCutaneous every 12 hours  meropenem  IVPB 500 milliGRAM(s) IV Intermittent every 24 hours  polyethylene glycol 3350 17 Gram(s) Oral daily  senna 2 Tablet(s) Oral at bedtime  sevelamer carbonate 1600 milliGRAM(s) Oral three times a day with meals  sorbitol 70%/mineral oil/magnesium hydroxide/glycerin Enema 120 milliLiter(s) Rectal once    MEDICATIONS  (PRN):  acetaminophen     Tablet .. 650 milliGRAM(s) Oral every 6 hours PRN Temp greater or equal to 38C (100.4F), Mild Pain (1 - 3)  HYDROmorphone  Injectable 0.5 milliGRAM(s) IV Push every 4 hours PRN Severe Pain (7 - 10)    LABS:                        8.6    11.51 )-----------( 327      ( 14 Jun 2022 07:23 )             29.3     06-14    138  |  95<L>  |  27<H>  ----------------------------<  103<H>  3.8   |  30  |  5.91<H>    Ca    9.5      14 Jun 2022 07:23  Phos  3.4     06-14  Mg     2.40     06-14    TPro  7.3  /  Alb  3.4  /  TBili  <0.2  /  DBili  x   /  AST  11  /  ALT  5   /  AlkPhos  60  06-14        CAPILLARY BLOOD GLUCOSE              REVIEW OF SYSTEMS:  CONSTITUTIONAL: No fever, weight loss, or fatigue  EYES: No eye pain, visual disturbances, or discharge  ENMT:  No difficulty hearing, tinnitus, vertigo; No sinus or throat pain  NECK: No pain or stiffness  RESPIRATORY: No cough, wheezing, chills or hemoptysis; No shortness of breath  CARDIOVASCULAR: No chest pain, palpitations, dizziness, or leg swelling  GASTROINTESTINAL: No abdominal or epigastric pain. No nausea, vomiting, or hematemesis; No diarrhea or constipation. No melena or hematochezia.  GENITOURINARY: No dysuria, frequency, hematuria, or incontinence  NEUROLOGICAL: No headaches, memory loss, loss of strength, numbness, or tremors      Consultant(s) Notes Reviewed:  [x ] YES  [ ] NO    PHYSICAL EXAM:  GENERAL: NAD, well-groomed, well-developed,not in any distress ,  HEAD:  Atraumatic, Normocephalic  NECK: Supple, No JVD, Normal thyroid  NERVOUS SYSTEM:  Alert & Oriented X3, No focal deficit   CHEST/LUNG: Good air entry bilateral with no  rales, rhonchi, wheezing, or rubs  HEART: Regular rate and rhythm; No murmurs, rubs, or gallops  ABDOMEN: Soft, Nontender, Nondistended; Bowel sounds present  EXTREMITIES: feet dressed and rle swelling+  Care Discussed with Consultants/Other Providers [ x] YES  [ ] NO

## 2022-06-14 NOTE — PROGRESS NOTE ADULT - ASSESSMENT
62 y/o woman w/ ESRD on HD (MWF), HF, 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 Admitted for further evaluation. Renal following for ESRD Mx.     ESRD on HD  schedule hemodialysis - Monday, Wednesday and Friday   access- thigh AVG  HD unit SQDC  K, vol ok     s/p HD 6/13, Rx sheet reviewed, net UF 800ml, tolerated well. uneventful.  plan for next hemodialysis tonight for preop renal optimization w/ 2 k bath, Ultrafiltration 0.5kg as tolerated with blood pressure   renal diet  dose all meds for ESRD  renal restrictions in diet.  Anemia of CKD- Hb < goal now. increased DION epo 6>10k w/hd tiw  Hyperphosphatemia- c/w renvlea 2tid, changed to w/food  HTN, controlled. bp stable. not on anti-htn meds  PAD, infected rt foot wounds, ?OM- s/p vanco, on zosyn. f/u w/Podiatry. on meropenem now. plan for MRI foot w/o Morgan  f/u w/vas sx.   Podiatry f/u - patient s/p left foot TMA and right foot partial 1st ray amputation 6/8  s/p LE angiogram 6/7  plan for OR rt BKA 6/15 tomorrow. optimized renal stand point     poc d/w pt, HD RN  labs, chart reviewed  For any question, call:  Cell # [phone]  Pager # [phone]  office # [phone]

## 2022-06-15 ENCOUNTER — RESULT REVIEW (OUTPATIENT)
Age: 62
End: 2022-06-15

## 2022-06-15 LAB
ANION GAP SERPL CALC-SCNC: 12 MMOL/L — SIGNIFICANT CHANGE UP (ref 7–14)
APTT BLD: 29.7 SEC — SIGNIFICANT CHANGE UP (ref 27–36.3)
BLD GP AB SCN SERPL QL: NEGATIVE — SIGNIFICANT CHANGE UP
BUN SERPL-MCNC: 16 MG/DL — SIGNIFICANT CHANGE UP (ref 7–23)
CALCIUM SERPL-MCNC: 9.5 MG/DL — SIGNIFICANT CHANGE UP (ref 8.4–10.5)
CHLORIDE SERPL-SCNC: 93 MMOL/L — LOW (ref 98–107)
CO2 SERPL-SCNC: 30 MMOL/L — SIGNIFICANT CHANGE UP (ref 22–31)
CREAT SERPL-MCNC: 3.49 MG/DL — HIGH (ref 0.5–1.3)
EGFR: 14 ML/MIN/1.73M2 — LOW
GLUCOSE SERPL-MCNC: 99 MG/DL — SIGNIFICANT CHANGE UP (ref 70–99)
HAV IGM SER-ACNC: SIGNIFICANT CHANGE UP
HBV CORE IGM SER-ACNC: SIGNIFICANT CHANGE UP
HBV SURFACE AG SER-ACNC: SIGNIFICANT CHANGE UP
HCT VFR BLD CALC: 30.4 % — LOW (ref 34.5–45)
HCV AB S/CO SERPL IA: 0.13 S/CO — SIGNIFICANT CHANGE UP (ref 0–0.99)
HCV AB SERPL-IMP: SIGNIFICANT CHANGE UP
HGB BLD-MCNC: 8.7 G/DL — LOW (ref 11.5–15.5)
INR BLD: 1.12 RATIO — SIGNIFICANT CHANGE UP (ref 0.88–1.16)
MAGNESIUM SERPL-MCNC: 2.1 MG/DL — SIGNIFICANT CHANGE UP (ref 1.6–2.6)
MCHC RBC-ENTMCNC: 26.4 PG — LOW (ref 27–34)
MCHC RBC-ENTMCNC: 28.6 GM/DL — LOW (ref 32–36)
MCV RBC AUTO: 92.1 FL — SIGNIFICANT CHANGE UP (ref 80–100)
NRBC # BLD: 0 /100 WBCS — SIGNIFICANT CHANGE UP
NRBC # FLD: 0.04 K/UL — HIGH
PHOSPHATE SERPL-MCNC: 2.3 MG/DL — LOW (ref 2.5–4.5)
PLATELET # BLD AUTO: 352 K/UL — SIGNIFICANT CHANGE UP (ref 150–400)
POTASSIUM SERPL-MCNC: 3.3 MMOL/L — LOW (ref 3.5–5.3)
POTASSIUM SERPL-SCNC: 3.3 MMOL/L — LOW (ref 3.5–5.3)
PROTHROM AB SERPL-ACNC: 13 SEC — SIGNIFICANT CHANGE UP (ref 10.5–13.4)
RBC # BLD: 3.3 M/UL — LOW (ref 3.8–5.2)
RBC # FLD: 15 % — HIGH (ref 10.3–14.5)
RH IG SCN BLD-IMP: POSITIVE — SIGNIFICANT CHANGE UP
SODIUM SERPL-SCNC: 135 MMOL/L — SIGNIFICANT CHANGE UP (ref 135–145)
SURGICAL PATHOLOGY STUDY: SIGNIFICANT CHANGE UP
WBC # BLD: 11.17 K/UL — HIGH (ref 3.8–10.5)
WBC # FLD AUTO: 11.17 K/UL — HIGH (ref 3.8–10.5)

## 2022-06-15 PROCEDURE — 88307 TISSUE EXAM BY PATHOLOGIST: CPT | Mod: 26

## 2022-06-15 PROCEDURE — 97605 NEG PRS WND THER DME<=50SQCM: CPT

## 2022-06-15 PROCEDURE — 27880 AMPUTATION OF LOWER LEG: CPT | Mod: RT

## 2022-06-15 DEVICE — LIGATING CLIPS WECK HORIZON MEDIUM (BLUE) 24: Type: IMPLANTABLE DEVICE | Site: RIGHT | Status: FUNCTIONAL

## 2022-06-15 DEVICE — LIGATING CLIPS WECK HORIZON LARGE (ORANGE) 24: Type: IMPLANTABLE DEVICE | Site: RIGHT | Status: FUNCTIONAL

## 2022-06-15 DEVICE — CLIP APPLIER ETHICON LIGACLIP 11.5" MEDIUM: Type: IMPLANTABLE DEVICE | Site: RIGHT | Status: FUNCTIONAL

## 2022-06-15 RX ORDER — HYDROMORPHONE HYDROCHLORIDE 2 MG/ML
0.5 INJECTION INTRAMUSCULAR; INTRAVENOUS; SUBCUTANEOUS EVERY 4 HOURS
Refills: 0 | Status: DISCONTINUED | OUTPATIENT
Start: 2022-06-15 | End: 2022-06-16

## 2022-06-15 RX ORDER — FENTANYL CITRATE 50 UG/ML
25 INJECTION INTRAVENOUS ONCE
Refills: 0 | Status: COMPLETED | OUTPATIENT
Start: 2022-06-15 | End: 2022-06-15

## 2022-06-15 RX ORDER — ACETAMINOPHEN 500 MG
1000 TABLET ORAL ONCE
Refills: 0 | Status: COMPLETED | OUTPATIENT
Start: 2022-06-15 | End: 2022-06-15

## 2022-06-15 RX ORDER — HYDROMORPHONE HYDROCHLORIDE 2 MG/ML
0.25 INJECTION INTRAMUSCULAR; INTRAVENOUS; SUBCUTANEOUS
Refills: 0 | Status: DISCONTINUED | OUTPATIENT
Start: 2022-06-15 | End: 2022-06-15

## 2022-06-15 RX ORDER — OXYCODONE HYDROCHLORIDE 5 MG/1
2.5 TABLET ORAL ONCE
Refills: 0 | Status: DISCONTINUED | OUTPATIENT
Start: 2022-06-15 | End: 2022-06-15

## 2022-06-15 RX ORDER — ONDANSETRON 8 MG/1
4 TABLET, FILM COATED ORAL ONCE
Refills: 0 | Status: DISCONTINUED | OUTPATIENT
Start: 2022-06-15 | End: 2022-06-15

## 2022-06-15 RX ADMIN — HYDROMORPHONE HYDROCHLORIDE 0.25 MILLIGRAM(S): 2 INJECTION INTRAMUSCULAR; INTRAVENOUS; SUBCUTANEOUS at 17:17

## 2022-06-15 RX ADMIN — HYDROMORPHONE HYDROCHLORIDE 0.5 MILLIGRAM(S): 2 INJECTION INTRAMUSCULAR; INTRAVENOUS; SUBCUTANEOUS at 07:44

## 2022-06-15 RX ADMIN — Medication 400 MILLIGRAM(S): at 16:41

## 2022-06-15 RX ADMIN — MEROPENEM 100 MILLIGRAM(S): 1 INJECTION INTRAVENOUS at 19:03

## 2022-06-15 RX ADMIN — Medication 650 MILLIGRAM(S): at 05:58

## 2022-06-15 RX ADMIN — HEPARIN SODIUM 5000 UNIT(S): 5000 INJECTION INTRAVENOUS; SUBCUTANEOUS at 19:01

## 2022-06-15 RX ADMIN — HYDROMORPHONE HYDROCHLORIDE 0.5 MILLIGRAM(S): 2 INJECTION INTRAMUSCULAR; INTRAVENOUS; SUBCUTANEOUS at 22:29

## 2022-06-15 RX ADMIN — HYDROMORPHONE HYDROCHLORIDE 0.25 MILLIGRAM(S): 2 INJECTION INTRAMUSCULAR; INTRAVENOUS; SUBCUTANEOUS at 16:50

## 2022-06-15 RX ADMIN — HYDROMORPHONE HYDROCHLORIDE 0.25 MILLIGRAM(S): 2 INJECTION INTRAMUSCULAR; INTRAVENOUS; SUBCUTANEOUS at 17:05

## 2022-06-15 RX ADMIN — ATORVASTATIN CALCIUM 40 MILLIGRAM(S): 80 TABLET, FILM COATED ORAL at 21:11

## 2022-06-15 RX ADMIN — Medication 1000 MILLIGRAM(S): at 17:17

## 2022-06-15 RX ADMIN — Medication 650 MILLIGRAM(S): at 22:40

## 2022-06-15 RX ADMIN — HEPARIN SODIUM 5000 UNIT(S): 5000 INJECTION INTRAVENOUS; SUBCUTANEOUS at 05:28

## 2022-06-15 RX ADMIN — HYDROMORPHONE HYDROCHLORIDE 0.25 MILLIGRAM(S): 2 INJECTION INTRAMUSCULAR; INTRAVENOUS; SUBCUTANEOUS at 16:55

## 2022-06-15 RX ADMIN — HYDROMORPHONE HYDROCHLORIDE 0.5 MILLIGRAM(S): 2 INJECTION INTRAMUSCULAR; INTRAVENOUS; SUBCUTANEOUS at 07:14

## 2022-06-15 RX ADMIN — HYDROMORPHONE HYDROCHLORIDE 0.25 MILLIGRAM(S): 2 INJECTION INTRAMUSCULAR; INTRAVENOUS; SUBCUTANEOUS at 16:40

## 2022-06-15 RX ADMIN — Medication 650 MILLIGRAM(S): at 05:28

## 2022-06-15 RX ADMIN — HYDROMORPHONE HYDROCHLORIDE 0.25 MILLIGRAM(S): 2 INJECTION INTRAMUSCULAR; INTRAVENOUS; SUBCUTANEOUS at 17:02

## 2022-06-15 RX ADMIN — Medication 650 MILLIGRAM(S): at 21:47

## 2022-06-15 RX ADMIN — HYDROMORPHONE HYDROCHLORIDE 0.5 MILLIGRAM(S): 2 INJECTION INTRAMUSCULAR; INTRAVENOUS; SUBCUTANEOUS at 22:14

## 2022-06-15 NOTE — PROGRESS NOTE ADULT - SUBJECTIVE AND OBJECTIVE BOX
Date of Service  : 06-15-22     INTERVAL HPI/OVERNIGHT EVENTS: no new concerns.   Vital Signs Last 24 Hrs  T(C): 36.7 (15 Neil 2022 06:09), Max: 36.8 (14 Jun 2022 19:35)  T(F): 98 (15 Neil 2022 06:09), Max: 98.2 (14 Jun 2022 19:35)  HR: 88 (15 Neil 2022 06:09) (76 - 88)  BP: 99/45 (15 Neil 2022 06:09) (99/45 - 113/55)  BP(mean): --  RR: 18 (15 Neil 2022 06:09) (17 - 18)  SpO2: 95% (15 Neil 2022 06:09) (95% - 100%)  I&O's Summary    14 Jun 2022 07:01  -  15 Neil 2022 07:00  --------------------------------------------------------  IN: 400 mL / OUT: 900 mL / NET: -500 mL      MEDICATIONS  (STANDING):  aspirin  chewable 81 milliGRAM(s) Oral daily  atorvastatin 40 milliGRAM(s) Oral at bedtime  chlorhexidine 2% Cloths 1 Application(s) Topical daily  clopidogrel Tablet 75 milliGRAM(s) Oral daily  collagenase Ointment 1 Application(s) Topical daily  epoetin niesha-epbx (RETACRIT) Injectable 94018 Unit(s) IV Push <User Schedule>  heparin   Injectable 5000 Unit(s) SubCutaneous every 12 hours  meropenem  IVPB 500 milliGRAM(s) IV Intermittent every 24 hours  polyethylene glycol 3350 17 Gram(s) Oral daily  senna 2 Tablet(s) Oral at bedtime  sevelamer carbonate 1600 milliGRAM(s) Oral three times a day with meals  sorbitol 70%/mineral oil/magnesium hydroxide/glycerin Enema 120 milliLiter(s) Rectal once    MEDICATIONS  (PRN):  acetaminophen     Tablet .. 650 milliGRAM(s) Oral every 6 hours PRN Temp greater or equal to 38C (100.4F), Mild Pain (1 - 3)  HYDROmorphone  Injectable 0.5 milliGRAM(s) IV Push every 4 hours PRN Severe Pain (7 - 10)    LABS:                        8.7    11.17 )-----------( 352      ( 15 Neil 2022 02:25 )             30.4     06-15    135  |  93<L>  |  16  ----------------------------<  99  3.3<L>   |  30  |  3.49<H>    Ca    9.5      15 Neil 2022 02:25  Phos  2.3     06-15  Mg     2.10     06-15    TPro  7.3  /  Alb  3.4  /  TBili  <0.2  /  DBili  x   /  AST  11  /  ALT  5   /  AlkPhos  60  06-14    PT/INR - ( 15 Neil 2022 02:25 )   PT: 13.0 sec;   INR: 1.12 ratio         PTT - ( 15 Neil 2022 02:25 )  PTT:29.7 sec    CAPILLARY BLOOD GLUCOSE              REVIEW OF SYSTEMS:  CONSTITUTIONAL: No fever, weight loss, or fatigue  EYES: No eye pain, visual disturbances, or discharge  ENMT:  No difficulty hearing, tinnitus, vertigo; No sinus or throat pain  NECK: No pain or stiffness  RESPIRATORY: No cough, wheezing, chills or hemoptysis; No shortness of breath  CARDIOVASCULAR: No chest pain, palpitations, dizziness, or leg swelling  GASTROINTESTINAL: No abdominal or epigastric pain. No nausea, vomiting, or hematemesis; No diarrhea or constipation. No melena or hematochezia.  GENITOURINARY: No dysuria, frequency, hematuria, or incontinence  NEUROLOGICAL: No headaches, memory loss, loss of strength, numbness, or tremors      Consultant(s) Notes Reviewed:  [x ] YES  [ ] NO    PHYSICAL EXAM:  GENERAL: NAD, well-groomed, well-developed, not in any distress ,  HEAD:  Atraumatic, Normocephalic  NECK: Supple, No JVD, Normal thyroid  NERVOUS SYSTEM:  Alert & Oriented X3, No focal deficit   CHEST/LUNG: Good air entry bilateral with no  rales, rhonchi, wheezing, or rubs  HEART: Regular rate and rhythm; No murmurs, rubs, or gallops  ABDOMEN: Soft, Nontender, Nondistended; Bowel sounds present  EXTREMITIES:  2+ Peripheral Pulses, No clubbing, cyanosis, or edema      Care Discussed with Consultants/Other Providers [ x] YES  [ ] NO Date of Service  : 06-15-22     INTERVAL HPI/OVERNIGHT EVENTS: no new concerns.   Vital Signs Last 24 Hrs  T(C): 36.7 (15 Neil 2022 06:09), Max: 36.8 (14 Jun 2022 19:35)  T(F): 98 (15 Neil 2022 06:09), Max: 98.2 (14 Jun 2022 19:35)  HR: 88 (15 Neil 2022 06:09) (76 - 88)  BP: 99/45 (15 Neil 2022 06:09) (99/45 - 113/55)  BP(mean): --  RR: 18 (15 Neil 2022 06:09) (17 - 18)  SpO2: 95% (15 Neil 2022 06:09) (95% - 100%)  I&O's Summary    14 Jun 2022 07:01  -  15 Neil 2022 07:00  --------------------------------------------------------  IN: 400 mL / OUT: 900 mL / NET: -500 mL      MEDICATIONS  (STANDING):  aspirin  chewable 81 milliGRAM(s) Oral daily  atorvastatin 40 milliGRAM(s) Oral at bedtime  chlorhexidine 2% Cloths 1 Application(s) Topical daily  clopidogrel Tablet 75 milliGRAM(s) Oral daily  collagenase Ointment 1 Application(s) Topical daily  epoetin niesha-epbx (RETACRIT) Injectable 23168 Unit(s) IV Push <User Schedule>  heparin   Injectable 5000 Unit(s) SubCutaneous every 12 hours  meropenem  IVPB 500 milliGRAM(s) IV Intermittent every 24 hours  polyethylene glycol 3350 17 Gram(s) Oral daily  senna 2 Tablet(s) Oral at bedtime  sevelamer carbonate 1600 milliGRAM(s) Oral three times a day with meals  sorbitol 70%/mineral oil/magnesium hydroxide/glycerin Enema 120 milliLiter(s) Rectal once    MEDICATIONS  (PRN):  acetaminophen     Tablet .. 650 milliGRAM(s) Oral every 6 hours PRN Temp greater or equal to 38C (100.4F), Mild Pain (1 - 3)  HYDROmorphone  Injectable 0.5 milliGRAM(s) IV Push every 4 hours PRN Severe Pain (7 - 10)    LABS:                        8.7    11.17 )-----------( 352      ( 15 Neil 2022 02:25 )             30.4     06-15    135  |  93<L>  |  16  ----------------------------<  99  3.3<L>   |  30  |  3.49<H>    Ca    9.5      15 Neil 2022 02:25  Phos  2.3     06-15  Mg     2.10     06-15    TPro  7.3  /  Alb  3.4  /  TBili  <0.2  /  DBili  x   /  AST  11  /  ALT  5   /  AlkPhos  60  06-14    PT/INR - ( 15 Neil 2022 02:25 )   PT: 13.0 sec;   INR: 1.12 ratio         PTT - ( 15 Neil 2022 02:25 )  PTT:29.7 sec    CAPILLARY BLOOD GLUCOSE              REVIEW OF SYSTEMS:  CONSTITUTIONAL: No fever, weight loss, or fatigue  EYES: No eye pain, visual disturbances, or discharge  ENMT:  No difficulty hearing, tinnitus, vertigo; No sinus or throat pain  NECK: No pain or stiffness  RESPIRATORY: No cough, wheezing, chills or hemoptysis; No shortness of breath  CARDIOVASCULAR: No chest pain, palpitations, dizziness, or leg swelling  GASTROINTESTINAL: No abdominal or epigastric pain. No nausea, vomiting, or hematemesis; No diarrhea or constipation. No melena or hematochezia.  GENITOURINARY: No dysuria, frequency, hematuria, or incontinence  NEUROLOGICAL: No headaches, memory loss, loss of strength, numbness, or tremors      Consultant(s) Notes Reviewed:  [x ] YES  [ ] NO    PHYSICAL EXAM:  GENERAL: NAD, well-groomed, well-developed, not in any distress ,  HEAD:  Atraumatic, Normocephalic  NECK: Supple, No JVD, Normal thyroid  NERVOUS SYSTEM:  Alert & Oriented X3, No focal deficit   CHEST/LUNG: Good air entry bilateral with no  rales, rhonchi, wheezing, or rubs  HEART: Regular rate and rhythm; No murmurs, rubs, or gallops  ABDOMEN: Soft, Nontender, Nondistended; Bowel sounds present  EXTREMITIES:  Feet dressed .      Care Discussed with Consultants/Other Providers [ x] YES  [ ] NO

## 2022-06-15 NOTE — BRIEF OPERATIVE NOTE - NSICDXBRIEFPREOP_GEN_ALL_CORE_FT
PRE-OP DIAGNOSIS:  Foot osteomyelitis, right 08-Jun-2022 09:22:02  Sameera Torres  
PRE-OP DIAGNOSIS:  Foot osteomyelitis, right 08-Jun-2022 09:22:02  Sameera Torres

## 2022-06-15 NOTE — BRIEF OPERATIVE NOTE - NSICDXBRIEFPROCEDURE_GEN_ALL_CORE_FT
PROCEDURES:  Partial amputation of first ray of right foot by open approach 08-Jun-2022 09:21:43  Sameera Torres  
PROCEDURES:  Right below knee amputation 15-Neil-2022 15:40:57  Zachary Sullivan

## 2022-06-15 NOTE — BRIEF OPERATIVE NOTE - NSICDXBRIEFPOSTOP_GEN_ALL_CORE_FT
POST-OP DIAGNOSIS:  Foot osteomyelitis, right 08-Jun-2022 09:22:23  Sameera Torres  
POST-OP DIAGNOSIS:  Foot osteomyelitis, right 08-Jun-2022 09:22:23  Sameera Torres

## 2022-06-15 NOTE — PROGRESS NOTE ADULT - ASSESSMENT
60 yo f s/p 6/8 right foot partial first ray amputation and left foot partial 3rd ray amputation (DOS 3/4/2022)  -pt seen and evaluated  - Afebrile , WBC 11.17  - 3/4/22 s/p L foot partial 3rd ray resection open: fibrotic wound to subQ, no malodor, no drainage, no purulence, flaps cool, no maceration, distal skin necrosis, no tracking, no purulence, no erythema    - 6/8 s/p R foot partial first ray resection closed: flaps are cold and dusky to the plantar and dorsal aspect with delayed CFT, sutures intact, no hematoma, no purulence or discharge, no clinical signs of infection.   - Pt is s/p recent b/l LE angiograms with poor peripheral arterial flow, is a poor surgical candidate  - B/L foot MRI reviewed   - R foot OR wound culture e coli, bone culture/path pending   - High concern for viability and residual soft tissue infection for R foot per intra op findings  - Viability of the b/l feet are highly guarded   - Discussed with patient the prognosis of any foot amputations and the poor likelihood of it healing. Pt would benefit from proximal amputation and is amenable at this time.   - [facility] plan for RLE BKA today  - Pod plan for local wound care pending vas recs/ BKA  - discussed with attending 60 yo f s/p 6/8 right foot partial first ray amputation and left foot partial 3rd ray amputation (DOS 3/4/2022)  -pt seen and evaluated  - Afebrile , WBC 11.17  - 3/4/22 s/p L foot partial 3rd ray resection open: fibrotic wound to subQ, no malodor, no drainage, no purulence, flaps cool, no maceration, distal skin necrosis, no tracking, no purulence, no erythema    - 6/8 s/p R foot partial first ray resection closed: flaps are cold and dusky to the plantar and dorsal aspect with delayed CFT, sutures intact, no hematoma, no purulence or discharge, no clinical signs of infection.   - Pt is s/p recent b/l LE angiograms with poor peripheral arterial flow, is a poor surgical candidate  - B/L foot MRI reviewed   - R foot OR wound culture e coli, bone culture/path pending   - High concern for viability and residual soft tissue infection for R foot per intra op findings  - Viability of the b/l feet are highly guarded   - Discussed with patient the prognosis of any foot amputations and the poor likelihood of it healing. Pt would benefit from proximal amputation and is amenable at this time.   - [facility] plan for RLE BKA today  - Pod stable for d/c pending R BKA  - discussed with attending

## 2022-06-15 NOTE — PROGRESS NOTE ADULT - ASSESSMENT
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 Admitted for further evaluation     Problem/Plan - 1:  ·  Problem: Wound of right foot with  Cellulitis with Left 4th toe OM .   ·  Plan: Awaiting Right BKA   Pt w/ increasing R toe pain when walking as well as swelling. Mild white count and elevated inflammatory markers but afebrile with vitals stable  -podiatry and ID help appreciated.   -started  IV Meropenem  500mg q24hrs per iD . Awaiting OR path final culture results.   - S/P  left foot TMA and right foot partial 1st ray amputation on Wed .     Problem/Plan - 2:  ·  Problem: PAD (peripheral artery disease).   ·  Plan: S/P RLE Angiogram but unable to do any intervention per Vascular.   -known PAD, continue asa/plavix. Pt denies any numbness or tingling.  -recent angiograms done w/ poor peripheral arterial flow per podiatry and so poor surgical candidate as above  -continue statin.     Problem/Plan - 3:  ·  Problem: ESRD (end stage renal disease) on dialysis.   ·  Plan: Renal helping with HD.   On MWF schedule.  -monitor I/O.     Problem/Plan - 4:  ·  Problem: HLD (hyperlipidemia).   ·  Plan: continue statin. check lipid profile.     Problem/Plan - 5:  ·  Problem: Need for prophylactic measure.   ·  Plan: heparin subcu for dvt ppx, dash diet.      Dispo : DC planning pending above.     Medically optimized for surgery .

## 2022-06-15 NOTE — BRIEF OPERATIVE NOTE - OPERATION/FINDINGS
Pt is s/p right foot partial first ray resection, closed. EBL 10cc, bone quality adequate but liquefactive necrosis noted to soft tissue. high concern for viability, high concern for residual soft tissue infection, low concern for residual bone infection, d/c pending clean bone margin shows no growth x 48 hrs
right bka closed

## 2022-06-15 NOTE — PROGRESS NOTE ADULT - SUBJECTIVE AND OBJECTIVE BOX
Cardiovascular Disease Progress Note    Overnight events: No acute events overnight.  Ms. Boogie denies chest pain or SOB.   Otherwise review of systems negative    Objective Findings:  T(C): 36.7 (06-15-22 @ 06:09), Max: 36.8 (22 @ 19:35)  HR: 88 (06-15-22 @ 06:09) (76 - 88)  BP: 99/45 (06-15-22 @ 06:09) (93/45 - 113/55)  RR: 18 (06-15-22 @ 06:09) (17 - 18)  SpO2: 95% (06-15-22 @ 06:09) (95% - 100%)  Wt(kg): --  Daily     Daily Weight in k (2022 22:37)      Physical Exam:  Gen: NAD; Patient resting comfortably  HEENT: EOMI, Normocephalic/ atraumatic  CV: RRR, normal S1 + S2, no m/r/g  Lungs:  Normal respiratory effort; clear to auscultation bilaterally  Abd: soft, non-tender; bowel sounds present  Ext: No edema; warm and well perfused    Telemetry: n/a    Laboratory Data:                        8.7    11.17 )-----------( 352      ( 15 Neil 2022 02:25 )             30.4     06-15    135  |  93<L>  |  16  ----------------------------<  99  3.3<L>   |  30  |  3.49<H>    Ca    9.5      15 Neil 2022 02:25  Phos  2.3     06-15  Mg     2.10     06-15    TPro  7.3  /  Alb  3.4  /  TBili  <0.2  /  DBili  x   /  AST  11  /  ALT  5   /  AlkPhos  60  06-14    PT/INR - ( 15 Neil 2022 02:25 )   PT: 13.0 sec;   INR: 1.12 ratio         PTT - ( 15 Neil 2022 02:25 )  PTT:29.7 sec          Inpatient Medications:  MEDICATIONS  (STANDING):  aspirin  chewable 81 milliGRAM(s) Oral daily  atorvastatin 40 milliGRAM(s) Oral at bedtime  chlorhexidine 2% Cloths 1 Application(s) Topical daily  clopidogrel Tablet 75 milliGRAM(s) Oral daily  collagenase Ointment 1 Application(s) Topical daily  epoetin niesha-epbx (RETACRIT) Injectable 53525 Unit(s) IV Push <User Schedule>  heparin   Injectable 5000 Unit(s) SubCutaneous every 12 hours  meropenem  IVPB 500 milliGRAM(s) IV Intermittent every 24 hours  polyethylene glycol 3350 17 Gram(s) Oral daily  senna 2 Tablet(s) Oral at bedtime  sevelamer carbonate 1600 milliGRAM(s) Oral three times a day with meals  sorbitol 70%/mineral oil/magnesium hydroxide/glycerin Enema 120 milliLiter(s) Rectal once      Assessment: 61-year-old female with ESRD on HD, HLD and peripheral vascular disease s/p lower extremity resection presents with chronic limb ischemia.     Plan of Care:    #Pre-operative evaluation-  Ms. Boogie tolerated podiatric amputation well.   She displays no signs or symptoms of post operative coronary ischemia or acute CHF.   No cardiac objection to proceeding with BKA by the vascular team.   - Continue with current cardiac management.     #Compensated diastolic CHF-  Volume status is improved.  Fluid removal with HD as per the renal team.   Recent echo noted- normal LV systolic function.    #Peripheral vascular disease.  - Podiatry and vascular input noted.     #ESRD-  - HD as per renal.      Over 25 minutes spent on total encounter; more than 50% of the visit was spent counseling and/or coordinating care by the attending physician.      Cristino Adams MD [facility]  Cardiovascular Disease  [phone]

## 2022-06-15 NOTE — CHART NOTE - NSCHARTNOTEFT_GEN_A_CORE
POST-OPERATIVE CHECK    SUBJECTIVE  Patient is s/p R BKA. Pt c/o severe r leg pain. Denies nausea, vomiting, chest pain.    Vital Signs Last 24 Hrs  T(C): 36.2 (15 Neil 2022 22:45), Max: 36.7 (15 Neil 2022 06:09)  T(F): 97.2 (15 Neil 2022 22:45), Max: 98.1 (15 Neil 2022 11:00)  HR: 92 (15 Neil 2022 22:45) (80 - 96)  BP: 102/58 (15 Neil 2022 22:45) (99/45 - 144/55)  BP(mean): 62 (15 Neil 2022 18:00) (62 - 79)  RR: 18 (15 Neil 2022 22:45) (12 - 20)  SpO2: 97% (15 Neil 2022 22:45) (95% - 100%)  I&O's Detail    14 Jun 2022 07:01  -  15 Neil 2022 07:00  --------------------------------------------------------  IN:    Other (mL): 400 mL  Total IN: 400 mL    OUT:    Oral Fluid: 0 mL    Other (mL): 900 mL    Voided (mL): 0 mL  Total OUT: 900 mL    Total NET: -500 mL      15 Neil 2022 07:01  -  15 Neil 2022 22:59  --------------------------------------------------------  IN:    Oral Fluid: 50 mL  Total IN: 50 mL    OUT:    Stool (mL): 0 mL    Voided (mL): 0 mL  Total OUT: 0 mL    Total NET: 50 mL        meropenem  IVPB 500  aspirin  chewable 81  clopidogrel Tablet 75  heparin   Injectable 5000  meropenem  IVPB 500    PAST MEDICAL & SURGICAL HISTORY:  Heart failure      HLD (hyperlipidemia)      Glaucoma      Cataract      CKD (chronic kidney disease), stage IV      ESRD (end stage renal disease) on dialysis      PAD (peripheral artery disease)      Blind right eye      Acute osteomyelitis of toe of right foot  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          PHYSICAL EXAM  General: uncomfortable  Pulmonary: Nonlabored breathing, no respiratory distress  Extremities: provena to R BKA incision, knee immobilizer in place    LABS                        8.7    11.17 )-----------( 352      ( 15 Neil 2022 02:25 )             30.4     06-15    135  |  93<L>  |  16  ----------------------------<  99  3.3<L>   |  30  |  3.49<H>    Ca    9.5      15 Neil 2022 02:25  Phos  2.3     06-15  Mg     2.10     06-15    TPro  7.3  /  Alb  3.4  /  TBili  <0.2  /  DBili  x   /  AST  11  /  ALT  5   /  AlkPhos  60  06-14    PT/INR - ( 15 Neil 2022 02:25 )   PT: 13.0 sec;   INR: 1.12 ratio         PTT - ( 15 Neil 2022 02:25 )  PTT:29.7 sec  CAPILLARY BLOOD GLUCOSE          IMAGING    ASSESSMENT  61F with b/l feet wounds s/p recent RLE PT plasty. Now, s/p R BKA 06-15. Recovering appropriately on the floor.    PLAN  - Diet: DASH   - Pain control as needed- recommend PCA  - DVT ppx  - OOBAT - PT  - Keep knee immobilizer in place  - F/u AM labs    C team - Vascular Surgery  [id number]

## 2022-06-15 NOTE — BRIEF OPERATIVE NOTE - SPECIMENS
right bka
path - dirty bone, clean bone margin; micro - right foot clean bone, right foot deep wound culture

## 2022-06-15 NOTE — PROGRESS NOTE ADULT - SUBJECTIVE AND OBJECTIVE BOX
Patient is a 61y old  Female who presents with a chief complaint of RLE toe pain (15 Neil 2022 06:54)       INTERVAL HPI/OVERNIGHT EVENTS:  Patient seen and evaluated at bedside.  Pt is resting comfortable in NAD. Denies N/V/F/C.    Allergies    No Known Allergies    Intolerances        Vital Signs Last 24 Hrs  T(C): 36.7 (15 Neil 2022 06:09), Max: 36.8 (14 Jun 2022 19:35)  T(F): 98 (15 Neil 2022 06:09), Max: 98.2 (14 Jun 2022 19:35)  HR: 88 (15 Neil 2022 06:09) (76 - 88)  BP: 99/45 (15 Neil 2022 06:09) (99/45 - 113/55)  BP(mean): --  RR: 18 (15 Neil 2022 06:09) (18 - 18)  SpO2: 95% (15 Neil 2022 06:09) (95% - 100%)    LABS:                        8.7    11.17 )-----------( 352      ( 15 Neil 2022 02:25 )             30.4     06-15    135  |  93<L>  |  16  ----------------------------<  99  3.3<L>   |  30  |  3.49<H>    Ca    9.5      15 Neil 2022 02:25  Phos  2.3     06-15  Mg     2.10     06-15    TPro  7.3  /  Alb  3.4  /  TBili  <0.2  /  DBili  x   /  AST  11  /  ALT  5   /  AlkPhos  60  06-14    PT/INR - ( 15 Neil 2022 02:25 )   PT: 13.0 sec;   INR: 1.12 ratio         PTT - ( 15 Neil 2022 02:25 )  PTT:29.7 sec    CAPILLARY BLOOD GLUCOSE          Lower Extremity Physical Exam:  Neuro: Epicritic sensation diminished to the level of forefoot B/L  Musculoskeletal/Ortho: left partial 5th ray resection healed, LF partial 4th ray resection open   Skin:   3/4/22 s/p L foot partial 3rd ray resection open: fibrotic wound to subQ, no malodor, no drainage, no purulence, flaps cool, no maceration, distal skin necrosis, no tracking, no purulence, no erythema    6/8 s/p R foot partial first ray resection closed: flaps are cold and dusky to the plantar and dorsal aspect with delayed CFT, sutures intact, no hematoma, no purulence or discharge, no clinical signs of infection.     RADIOLOGY & ADDITIONAL TESTS:

## 2022-06-16 LAB
ANION GAP SERPL CALC-SCNC: 19 MMOL/L — HIGH (ref 7–14)
BUN SERPL-MCNC: 30 MG/DL — HIGH (ref 7–23)
CALCIUM SERPL-MCNC: 9.4 MG/DL — SIGNIFICANT CHANGE UP (ref 8.4–10.5)
CHLORIDE SERPL-SCNC: 93 MMOL/L — LOW (ref 98–107)
CO2 SERPL-SCNC: 24 MMOL/L — SIGNIFICANT CHANGE UP (ref 22–31)
CREAT SERPL-MCNC: 5.34 MG/DL — HIGH (ref 0.5–1.3)
EGFR: 8 ML/MIN/1.73M2 — LOW
GLUCOSE SERPL-MCNC: 115 MG/DL — HIGH (ref 70–99)
HCT VFR BLD CALC: 26.3 % — LOW (ref 34.5–45)
HGB BLD-MCNC: 7.8 G/DL — LOW (ref 11.5–15.5)
MAGNESIUM SERPL-MCNC: 2.1 MG/DL — SIGNIFICANT CHANGE UP (ref 1.6–2.6)
MCHC RBC-ENTMCNC: 27.1 PG — SIGNIFICANT CHANGE UP (ref 27–34)
MCHC RBC-ENTMCNC: 29.7 GM/DL — LOW (ref 32–36)
MCV RBC AUTO: 91.3 FL — SIGNIFICANT CHANGE UP (ref 80–100)
NRBC # BLD: 0 /100 WBCS — SIGNIFICANT CHANGE UP
NRBC # FLD: 0 K/UL — SIGNIFICANT CHANGE UP
PHOSPHATE SERPL-MCNC: 5.3 MG/DL — HIGH (ref 2.5–4.5)
PLATELET # BLD AUTO: 354 K/UL — SIGNIFICANT CHANGE UP (ref 150–400)
POTASSIUM SERPL-MCNC: 4.4 MMOL/L — SIGNIFICANT CHANGE UP (ref 3.5–5.3)
POTASSIUM SERPL-SCNC: 4.4 MMOL/L — SIGNIFICANT CHANGE UP (ref 3.5–5.3)
RBC # BLD: 2.88 M/UL — LOW (ref 3.8–5.2)
RBC # FLD: 15 % — HIGH (ref 10.3–14.5)
SODIUM SERPL-SCNC: 136 MMOL/L — SIGNIFICANT CHANGE UP (ref 135–145)
WBC # BLD: 16.61 K/UL — HIGH (ref 3.8–10.5)
WBC # FLD AUTO: 16.61 K/UL — HIGH (ref 3.8–10.5)

## 2022-06-16 PROCEDURE — 99232 SBSQ HOSP IP/OBS MODERATE 35: CPT

## 2022-06-16 RX ORDER — HYDROMORPHONE HYDROCHLORIDE 2 MG/ML
0.5 INJECTION INTRAMUSCULAR; INTRAVENOUS; SUBCUTANEOUS EVERY 4 HOURS
Refills: 0 | Status: DISCONTINUED | OUTPATIENT
Start: 2022-06-16 | End: 2022-06-17

## 2022-06-16 RX ORDER — GABAPENTIN 400 MG/1
100 CAPSULE ORAL
Refills: 0 | Status: DISCONTINUED | OUTPATIENT
Start: 2022-06-16 | End: 2022-06-30

## 2022-06-16 RX ADMIN — HYDROMORPHONE HYDROCHLORIDE 0.5 MILLIGRAM(S): 2 INJECTION INTRAMUSCULAR; INTRAVENOUS; SUBCUTANEOUS at 23:05

## 2022-06-16 RX ADMIN — MEROPENEM 100 MILLIGRAM(S): 1 INJECTION INTRAVENOUS at 17:06

## 2022-06-16 RX ADMIN — HYDROMORPHONE HYDROCHLORIDE 0.5 MILLIGRAM(S): 2 INJECTION INTRAMUSCULAR; INTRAVENOUS; SUBCUTANEOUS at 14:39

## 2022-06-16 RX ADMIN — SEVELAMER CARBONATE 1600 MILLIGRAM(S): 2400 POWDER, FOR SUSPENSION ORAL at 17:06

## 2022-06-16 RX ADMIN — HYDROMORPHONE HYDROCHLORIDE 0.5 MILLIGRAM(S): 2 INJECTION INTRAMUSCULAR; INTRAVENOUS; SUBCUTANEOUS at 19:00

## 2022-06-16 RX ADMIN — Medication 81 MILLIGRAM(S): at 11:12

## 2022-06-16 RX ADMIN — HEPARIN SODIUM 5000 UNIT(S): 5000 INJECTION INTRAVENOUS; SUBCUTANEOUS at 05:35

## 2022-06-16 RX ADMIN — CLOPIDOGREL BISULFATE 75 MILLIGRAM(S): 75 TABLET, FILM COATED ORAL at 11:12

## 2022-06-16 RX ADMIN — ATORVASTATIN CALCIUM 40 MILLIGRAM(S): 80 TABLET, FILM COATED ORAL at 21:11

## 2022-06-16 RX ADMIN — POLYETHYLENE GLYCOL 3350 17 GRAM(S): 17 POWDER, FOR SOLUTION ORAL at 11:12

## 2022-06-16 RX ADMIN — HYDROMORPHONE HYDROCHLORIDE 0.5 MILLIGRAM(S): 2 INJECTION INTRAMUSCULAR; INTRAVENOUS; SUBCUTANEOUS at 18:52

## 2022-06-16 RX ADMIN — HYDROMORPHONE HYDROCHLORIDE 0.5 MILLIGRAM(S): 2 INJECTION INTRAMUSCULAR; INTRAVENOUS; SUBCUTANEOUS at 02:47

## 2022-06-16 RX ADMIN — SEVELAMER CARBONATE 1600 MILLIGRAM(S): 2400 POWDER, FOR SUSPENSION ORAL at 08:58

## 2022-06-16 RX ADMIN — HYDROMORPHONE HYDROCHLORIDE 0.5 MILLIGRAM(S): 2 INJECTION INTRAMUSCULAR; INTRAVENOUS; SUBCUTANEOUS at 10:23

## 2022-06-16 RX ADMIN — HYDROMORPHONE HYDROCHLORIDE 0.5 MILLIGRAM(S): 2 INJECTION INTRAMUSCULAR; INTRAVENOUS; SUBCUTANEOUS at 06:51

## 2022-06-16 RX ADMIN — HYDROMORPHONE HYDROCHLORIDE 0.5 MILLIGRAM(S): 2 INJECTION INTRAMUSCULAR; INTRAVENOUS; SUBCUTANEOUS at 07:51

## 2022-06-16 RX ADMIN — HEPARIN SODIUM 5000 UNIT(S): 5000 INJECTION INTRAVENOUS; SUBCUTANEOUS at 17:06

## 2022-06-16 RX ADMIN — HYDROMORPHONE HYDROCHLORIDE 0.5 MILLIGRAM(S): 2 INJECTION INTRAMUSCULAR; INTRAVENOUS; SUBCUTANEOUS at 10:38

## 2022-06-16 RX ADMIN — HYDROMORPHONE HYDROCHLORIDE 0.5 MILLIGRAM(S): 2 INJECTION INTRAMUSCULAR; INTRAVENOUS; SUBCUTANEOUS at 03:17

## 2022-06-16 RX ADMIN — HYDROMORPHONE HYDROCHLORIDE 0.5 MILLIGRAM(S): 2 INJECTION INTRAMUSCULAR; INTRAVENOUS; SUBCUTANEOUS at 23:35

## 2022-06-16 RX ADMIN — SEVELAMER CARBONATE 1600 MILLIGRAM(S): 2400 POWDER, FOR SUSPENSION ORAL at 11:13

## 2022-06-16 RX ADMIN — Medication 1 APPLICATION(S): at 11:13

## 2022-06-16 RX ADMIN — HYDROMORPHONE HYDROCHLORIDE 0.5 MILLIGRAM(S): 2 INJECTION INTRAMUSCULAR; INTRAVENOUS; SUBCUTANEOUS at 14:24

## 2022-06-16 RX ADMIN — SENNA PLUS 2 TABLET(S): 8.6 TABLET ORAL at 21:10

## 2022-06-16 RX ADMIN — CHLORHEXIDINE GLUCONATE 1 APPLICATION(S): 213 SOLUTION TOPICAL at 11:13

## 2022-06-16 NOTE — PROGRESS NOTE ADULT - ASSESSMENT
62 y/o woman w/ ESRD on HD (MWF), HF, 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 Admitted for further evaluation     Problem/Plan - 1:  ·  Problem: Wound of right foot with  Cellulitis with Left 4th toe OM .   ·  Plan: S/P  Right BKA   Pt w/ increasing R toe pain when walking as well as swelling. Mild white count and elevated inflammatory markers but afebrile with vitals stable  -podiatry and ID help appreciated.   -started  IV Meropenem  500mg q24hrs per iD . Awaiting OR path final culture results.   - S/P  left foot TMA and right foot partial 1st ray amputation on Wed .     Problem/Plan - 2:  ·  Problem: PAD (peripheral artery disease).   ·  Plan: S/P RLE Angiogram but unable to do any intervention per Vascular.   -known PAD, continue asa/plavix. Pt denies any numbness or tingling.  -recent angiograms done w/ poor peripheral arterial flow per podiatry and so poor surgical candidate as above  -continue statin.     Problem/Plan - 3:  ·  Problem: ESRD (end stage renal disease) on dialysis.   ·  Plan: Renal helping with HD.   On MWF schedule.  -monitor I/O.     Problem/Plan - 4:  ·  Problem: HLD (hyperlipidemia).   ·  Plan: continue statin. check lipid profile.     Problem/Plan - 5:  ·  Problem: Need for prophylactic measure.   ·  Plan: heparin subcu for dvt ppx, dash diet.      Dispo : DC planning pending above.

## 2022-06-16 NOTE — PROGRESS NOTE ADULT - SUBJECTIVE AND OBJECTIVE BOX
Follow Up:  right toe infection    Interval History/ROS:  s/p right BKA 6/15   feels ok        Allergies  No Known Allergies        ANTIMICROBIALS:  meropenem  IVPB 500 every 24 hours      MEDICATIONS  (STANDING):  acetaminophen     Tablet .. 650 every 6 hours PRN  aspirin  chewable 81 daily  atorvastatin 40 at bedtime  clopidogrel Tablet 75 daily  epoetin niesha-epbx (RETACRIT) Injectable 6000 <User Schedule>  heparin   Injectable 5000 every 12 hours  HYDROmorphone  Injectable 0.5 every 4 hours PRN  polyethylene glycol 3350 17 daily  senna 2 at bedtime    Vital Signs Last 24 Hrs  T(F): 97.8 (06-16-22 @ 13:10), Max: 97.9 (06-16-22 @ 02:05)  HR: 83 (06-16-22 @ 13:10)  BP: 115/50 (06-16-22 @ 13:10)  RR: 16 (06-16-22 @ 13:10)  SpO2: 98% (06-16-22 @ 13:10) (95% - 100%)    PHYSICAL EXAM:  Constitutional: non toxic, sitting side of bed  Eyes: right eye cloudy   Oral cavity: Clear, no lesions  Neck: Supple  RS: Chest clear   CVS: S1, S2   Abdomen: Soft. No guarding/rigidity/tenderness.  : no salcido  Extremities:  HD access right thigh; right BKA in immobilizer; dressing left foot  Skin: No rash  Neuro: Alert, oriented to time/place/person  Cranial nerves 2-12 grossly normal. No focal abnormalities                                     7.8    16.61 )-----------( 354      ( 16 Jun 2022 06:52 )             26.3 06-16    136  |  93  |  30  ----------------------------<  115  4.4   |  24  |  5.34  Ca    9.4      16 Jun 2022 06:52Phos  5.3     06-16Mg     2.10     06-16            MICROBIOLOGY:    OR clean margin E coli     .Abscess right foot wound  05-30-22   Numerous Klebsiella pneumoniae ESBL  Numerous Escherichia coli  Moderate Pseudomonas aeruginosa  --  Klebsiella pneumoniae ESBL  Escherichia coli  Pseudomonas aeruginosa        RADIOLOGY:    ra< from: Xray Foot AP + Lateral + Oblique, Left (05.30.22 @ 15:11) >  IMPRESSION:  Increased permeative appearance of the left fourth metatarsal head and   phalanges, which could be seen in the setting of osteomyelitis.  No radiographic evidence of osteomyelitis involving the right foot.    MRI is more sensitive and can be considered for further evaluation as   clinically warranted.    --- End of Report ---    < end of copied text >

## 2022-06-16 NOTE — PROGRESS NOTE ADULT - SUBJECTIVE AND OBJECTIVE BOX
Date of Service  : 06-16-22     INTERVAL HPI/OVERNIGHT EVENTS: I feel fine.   Vital Signs Last 24 Hrs  T(C): 36.2 (16 Jun 2022 20:48), Max: 37 (16 Jun 2022 17:19)  T(F): 97.2 (16 Jun 2022 20:48), Max: 98.6 (16 Jun 2022 17:19)  HR: 89 (16 Jun 2022 20:48) (75 - 89)  BP: 116/58 (16 Jun 2022 20:48) (113/52 - 123/53)  BP(mean): --  RR: 17 (16 Jun 2022 20:48) (16 - 19)  SpO2: 99% (16 Jun 2022 20:48) (98% - 100%)  I&O's Summary    15 Neil 2022 07:01  -  16 Jun 2022 07:00  --------------------------------------------------------  IN: 290 mL / OUT: 0 mL / NET: 290 mL    16 Jun 2022 07:01  -  16 Jun 2022 22:48  --------------------------------------------------------  IN: 910 mL / OUT: 0 mL / NET: 910 mL      MEDICATIONS  (STANDING):  aspirin  chewable 81 milliGRAM(s) Oral daily  atorvastatin 40 milliGRAM(s) Oral at bedtime  chlorhexidine 2% Cloths 1 Application(s) Topical daily  clopidogrel Tablet 75 milliGRAM(s) Oral daily  collagenase Ointment 1 Application(s) Topical daily  epoetin niesha-epbx (RETACRIT) Injectable 37333 Unit(s) IV Push <User Schedule>  gabapentin 100 milliGRAM(s) Oral <User Schedule>  heparin   Injectable 5000 Unit(s) SubCutaneous every 12 hours  meropenem  IVPB 500 milliGRAM(s) IV Intermittent every 24 hours  polyethylene glycol 3350 17 Gram(s) Oral daily  senna 2 Tablet(s) Oral at bedtime  sevelamer carbonate 1600 milliGRAM(s) Oral three times a day with meals    MEDICATIONS  (PRN):  acetaminophen     Tablet .. 650 milliGRAM(s) Oral every 6 hours PRN Temp greater or equal to 38C (100.4F), Mild Pain (1 - 3)  HYDROmorphone  Injectable 0.5 milliGRAM(s) IV Push every 4 hours PRN Severe Pain (7 - 10)    LABS:                        7.8    16.61 )-----------( 354      ( 16 Jun 2022 06:52 )             26.3     06-16    136  |  93<L>  |  30<H>  ----------------------------<  115<H>  4.4   |  24  |  5.34<H>    Ca    9.4      16 Jun 2022 06:52  Phos  5.3     06-16  Mg     2.10     06-16      PT/INR - ( 15 Neil 2022 02:25 )   PT: 13.0 sec;   INR: 1.12 ratio         PTT - ( 15 Neil 2022 02:25 )  PTT:29.7 sec    CAPILLARY BLOOD GLUCOSE              REVIEW OF SYSTEMS:  CONSTITUTIONAL: No fever, weight loss, or fatigue  EYES: No eye pain, visual disturbances, or discharge  ENMT:  No difficulty hearing, tinnitus, vertigo; No sinus or throat pain  NECK: No pain or stiffness  RESPIRATORY: No cough, wheezing, chills or hemoptysis; No shortness of breath  CARDIOVASCULAR: No chest pain, palpitations, dizziness, or leg swelling  GASTROINTESTINAL: No abdominal or epigastric pain. No nausea, vomiting, or hematemesis; No diarrhea or constipation. No melena or hematochezia.  GENITOURINARY: No dysuria, frequency, hematuria, or incontinence  NEUROLOGICAL: No headaches, memory loss, loss of strength, numbness, or tremors      Consultant(s) Notes Reviewed:  [x ] YES  [ ] NO    PHYSICAL EXAM:  GENERAL: NAD, well-groomed, well-developed, not in any distress ,  HEAD:  Atraumatic, Normocephalic  NECK: Supple, No JVD, Normal thyroid  NERVOUS SYSTEM:  Alert & Oriented X3, No focal deficit   CHEST/LUNG: Good air entry bilateral with no  rales, rhonchi, wheezing, or rubs  HEART: Regular rate and rhythm; No murmurs, rubs, or gallops  ABDOMEN: Soft, Nontender, Nondistended; Bowel sounds present  EXTREMITIES:  Rt BKA and left foot dressed     Care Discussed with Consultants/Other Providers [ x] YES  [ ] NO

## 2022-06-16 NOTE — PROGRESS NOTE ADULT - SUBJECTIVE AND OBJECTIVE BOX
Cardiovascular Disease Progress Note    Overnight events: No acute events overnight.   Ms. Boogie denies chest pain or SOB.     Otherwise review of systems negative    Objective Findings:  T(C): 36.4 (06-16-22 @ 06:14), Max: 36.7 (06-15-22 @ 11:00)  HR: 88 (06-16-22 @ 06:14) (75 - 96)  BP: 122/50 (06-16-22 @ 06:14) (102/58 - 144/55)  RR: 18 (06-16-22 @ 06:14) (12 - 20)  SpO2: 100% (06-16-22 @ 06:14) (95% - 100%)  Wt(kg): --  Daily     Daily       Physical Exam:  Gen: NAD; Patient resting comfortably  HEENT: EOMI, Normocephalic/ atraumatic  CV: RRR, normal S1 + S2, no m/r/g  Lungs:  Normal respiratory effort; clear to auscultation bilaterally  Abd: soft, non-tender; bowel sounds present  Ext: No edema; warm and well perfused    Telemetry: n/a    Laboratory Data:                        8.7    11.17 )-----------( 352      ( 15 Neil 2022 02:25 )             30.4     06-15    135  |  93<L>  |  16  ----------------------------<  99  3.3<L>   |  30  |  3.49<H>    Ca    9.5      15 Neil 2022 02:25  Phos  2.3     06-15  Mg     2.10     06-15      PT/INR - ( 15 Neil 2022 02:25 )   PT: 13.0 sec;   INR: 1.12 ratio         PTT - ( 15 Neil 2022 02:25 )  PTT:29.7 sec          Inpatient Medications:  MEDICATIONS  (STANDING):  aspirin  chewable 81 milliGRAM(s) Oral daily  atorvastatin 40 milliGRAM(s) Oral at bedtime  chlorhexidine 2% Cloths 1 Application(s) Topical daily  clopidogrel Tablet 75 milliGRAM(s) Oral daily  collagenase Ointment 1 Application(s) Topical daily  epoetin niesha-epbx (RETACRIT) Injectable 54938 Unit(s) IV Push <User Schedule>  heparin   Injectable 5000 Unit(s) SubCutaneous every 12 hours  meropenem  IVPB 500 milliGRAM(s) IV Intermittent every 24 hours  polyethylene glycol 3350 17 Gram(s) Oral daily  senna 2 Tablet(s) Oral at bedtime  sevelamer carbonate 1600 milliGRAM(s) Oral three times a day with meals  sorbitol 70%/mineral oil/magnesium hydroxide/glycerin Enema 120 milliLiter(s) Rectal once      Assessment: 61-year-old female with ESRD on HD, HLD and peripheral vascular disease s/p lower extremity resection presents with chronic limb ischemia.     Plan of Care:    #Post-operative evaluation-  Ms. Boogie tolerated R BKA well on 6/14.   She displays no signs or symptoms of post operative coronary ischemia or acute CHF.   - Continue with current cardiac management.     #Compensated diastolic CHF-  Volume status is improved.  Fluid removal with HD as per the renal team.   Recent echo noted- normal LV systolic function.    #Peripheral vascular disease.  - Podiatry and vascular input noted.     #ESRD-  - HD as per renal.    #ACP (advance care planning)-  Advanced care planning was discussed with the patient.   Cardiac findings were discussed in detail and all questions were answered.        Over 25 minutes spent on total encounter; more than 50% of the visit was spent counseling and/or coordinating care by the attending physician.      Cristino Adams MD [facility]  Cardiovascular Disease  [phone]

## 2022-06-16 NOTE — PROGRESS NOTE ADULT - SUBJECTIVE AND OBJECTIVE BOX
[facility] - S Jluis / Shabbir S /D Romina/ SHERRI Mcfarlane/ SHERRI Madden/ Andrés Perkins / M Tristianu/ O Gladis  service -[phone], office [phone]  ---------------------------------------------------------------------------------------------------------------    Patient seen and examined bedside    Subjective and Objective: No overnight events, sob resolved. No complaints today. feeling better    Allergies: No Known Allergies      Hospital Medications:   MEDICATIONS  (STANDING):  aspirin  chewable 81 milliGRAM(s) Oral daily  atorvastatin 40 milliGRAM(s) Oral at bedtime  chlorhexidine 2% Cloths 1 Application(s) Topical daily  clopidogrel Tablet 75 milliGRAM(s) Oral daily  collagenase Ointment 1 Application(s) Topical daily  epoetin niesha-epbx (RETACRIT) Injectable 57812 Unit(s) IV Push <User Schedule>  gabapentin 100 milliGRAM(s) Oral <User Schedule>  heparin   Injectable 5000 Unit(s) SubCutaneous every 12 hours  meropenem  IVPB 500 milliGRAM(s) IV Intermittent every 24 hours  polyethylene glycol 3350 17 Gram(s) Oral daily  senna 2 Tablet(s) Oral at bedtime  sevelamer carbonate 1600 milliGRAM(s) Oral three times a day with meals      REVIEW OF SYSTEMS:  CONSTITUTIONAL: No weakness, fevers or chills  EYES/ENT: No visual changes;  No vertigo or throat pain   NECK: No pain or stiffness  RESPIRATORY: No cough, wheezing, hemoptysis; No shortness of breath  CARDIOVASCULAR: No chest pain or palpitations.  GASTROINTESTINAL: No abdominal or epigastric pain. No nausea, vomiting, or hematemesis; No diarrhea or constipation. No melena or hematochezia.  GENITOURINARY: No dysuria, frequency, foamy urine, urinary urgency, incontinence or hematuria  NEUROLOGICAL: No numbness or weakness  SKIN: No itching, burning, rashes, or lesions   VASCULAR: No bilateral lower extremity edema.   All other review of systems is negative unless indicated above.    VITALS:  T(F): 97.8 (06-16-22 @ 13:10), Max: 97.9 (06-15-22 @ 16:00)  HR: 83 (06-16-22 @ 13:10)  BP: 115/50 (06-16-22 @ 13:10)  RR: 16 (06-16-22 @ 13:10)  SpO2: 98% (06-16-22 @ 13:10)  Wt(kg): --    06-15 @ 07:01  -  06-16 @ 07:00  --------------------------------------------------------  IN: 290 mL / OUT: 0 mL / NET: 290 mL    06-16 @ 07:01  -  06-16 @ 14:08  --------------------------------------------------------  IN: 200 mL / OUT: 0 mL / NET: 200 mL          PHYSICAL EXAM:  Constitutional: NAD  HEENT: anicteric sclera, oropharynx clear  Neck: No JVD  Respiratory: CTAB, no wheezes, rales or rhonchi  Cardiovascular: S1, S2, RRR  Gastrointestinal: BS+, soft, NT/ND  Extremities: No cyanosis or clubbing. No peripheral edema  Neurological: A/O x 3, no focal deficits  Psychiatric: Normal mood, normal affect  : No CVA tenderness. No salcido.   Skin: No rashes  Vascular Access:    LABS:  06-16    136  |  93<L>  |  30<H>  ----------------------------<  115<H>  4.4   |  24  |  5.34<H>    Ca    9.4      16 Jun 2022 06:52  Phos  5.3     06-16  Mg     2.10     06-16      Creatinine Trend: 5.34 <--, 3.49 <--, 5.91 <--, 7.35 <--, 5.56 <--, 7.37 <--, 5.46 <--                        7.8    16.61 )-----------( 354       16 Jun 2022 06:52 )             26.3     Urine Studies:        RADIOLOGY & ADDITIONAL STUDIES:   [facility] - S Jluis / Shabbir S /D Romina/ S Denys/ S Chano/ Andrés Perkins / M Katlyn/ O Gladis  service -[phone], office [phone]  ---------------------------------------------------------------------------------------------------------------    Patient seen and examined bedside    Subjective and Objective: No overnight events, denied sob. No complaints today. pain better    Allergies: No Known Allergies      Hospital Medications:   MEDICATIONS  (STANDING):  aspirin  chewable 81 milliGRAM(s) Oral daily  atorvastatin 40 milliGRAM(s) Oral at bedtime  chlorhexidine 2% Cloths 1 Application(s) Topical daily  clopidogrel Tablet 75 milliGRAM(s) Oral daily  collagenase Ointment 1 Application(s) Topical daily  epoetin niesha-epbx (RETACRIT) Injectable 47888 Unit(s) IV Push <User Schedule>  gabapentin 100 milliGRAM(s) Oral <User Schedule>  heparin   Injectable 5000 Unit(s) SubCutaneous every 12 hours  meropenem  IVPB 500 milliGRAM(s) IV Intermittent every 24 hours  polyethylene glycol 3350 17 Gram(s) Oral daily  senna 2 Tablet(s) Oral at bedtime  sevelamer carbonate 1600 milliGRAM(s) Oral three times a day with meals    VITALS:  T(F): 97.8 (06-16-22 @ 13:10), Max: 97.9 (06-15-22 @ 16:00)  HR: 83 (06-16-22 @ 13:10)  BP: 115/50 (06-16-22 @ 13:10)  RR: 16 (06-16-22 @ 13:10)  SpO2: 98% (06-16-22 @ 13:10)  Wt(kg): --    06-15 @ 07:01  -  06-16 @ 07:00  --------------------------------------------------------  IN: 290 mL / OUT: 0 mL / NET: 290 mL    06-16 @ 07:01  -  06-16 @ 14:08  --------------------------------------------------------  IN: 200 mL / OUT: 0 mL / NET: 200 mL      PHYSICAL EXAM:  Constitutional: NAD  HEENT: anicteric sclera  Neck: No JVD  Respiratory: CTAB, no wheezes, rales or rhonchi  Cardiovascular: S1, S2, RRR  Gastrointestinal: BS+, soft, NT/ND  Extremities: rt BKA+.  left foot dressing+  Neurological: A/O x 3  Psychiatric: Normal mood, normal affect  : No salcido.   Vascular Access: rt femoral AVG+  LABS:  06-16    136  |  93<L>  |  30<H>  ----------------------------<  115<H>  4.4   |  24  |  5.34<H>    Ca    9.4      16 Jun 2022 06:52  Phos  5.3     06-16  Mg     2.10     06-16      Creatinine Trend: 5.34 <--, 3.49 <--, 5.91 <--, 7.35 <--, 5.56 <--, 7.37 <--, 5.46 <--                        7.8    16.61 )-----------( 354      ( 16 Jun 2022 06:52 )             26.3     Urine Studies:        RADIOLOGY & ADDITIONAL STUDIES:

## 2022-06-16 NOTE — PROGRESS NOTE ADULT - ASSESSMENT
62 y/o woman w/ ESRD on HD (MWF), HF, 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 Admitted for further evaluation. Renal following for ESRD Mx.     ESRD on HD  schedule hemodialysis - Monday, Wednesday and Friday   access- thigh AVG  HD unit SQDC  K, vol ok     s/p HD 6/14, uneventful.  plan for next hemodialysis tomorrow w/ 2 k bath, Ultrafiltration 1kg as tolerated with blood pressure   renal diet  dose all meds for ESRD  renal restrictions in diet.  Anemia of CKD- Hb < goal now. increased DION epo 6>10k w/hd tiw  Hyperphosphatemia- c/w renvlea 2tid, changed to w/food  HTN, controlled. bp stable. not on anti-htn meds  PAD, infected rt foot wounds, ?OM- s/p vanco, on zosyn. f/u w/Podiatry. on meropenem now. s/p MRI foot w/o Morgan  f/u w/vas sx.   Podiatry f/u - patient s/p left foot TMA and right foot partial 1st ray amputation 6/8  s/p LE angiogram 6/7  s/p rt BKA 6/15    poc d/w pt, HD RN  labs, chart reviewed  For any question, call:  Cell # [phone]  Pager # [phone]  office # [phone]

## 2022-06-16 NOTE — PROGRESS NOTE ADULT - ASSESSMENT
62 y/o woman w/ ESRD on HD (MWF), HF, glaucoma (blind in R eye), peripheral artery disease who presented 5/30 w/ R toe pain and swelling ongoing for about 4-5 days  S/p partial amputation of right 1st toe in 3/2022, returned with pain + swelling  Xray showed no OM on right foot, but possible OM on left 4th MT head and phalanges  Right toe wound Cx ESBL Klebsiella, E. coli, pseudomonas.  OR culture 6/8 partial ray amp grew E coli in clean margin  Hx ESBL Klebsiella and E. coli on left foot Cx    s/p OR 6/8   s/p OR for BKA 6/15      IMPRESSION:  Right toe infection/ toe om  PAD  s/p partial ray amputation 1st ray right foot 6/8   OR findings: liquefactive necrosis  s/p right BKA 6/15  ESRD on HD      RECOMMENDATIONS:      - c/w meropenem 500 mg iv q 24 h   - anticipate 7 day duration now that infected tissue has been amputated  - plan for left foot tx to be discussed

## 2022-06-17 LAB
ANION GAP SERPL CALC-SCNC: 20 MMOL/L — HIGH (ref 7–14)
BASOPHILS # BLD AUTO: 0.03 K/UL — SIGNIFICANT CHANGE UP (ref 0–0.2)
BASOPHILS NFR BLD AUTO: 0.2 % — SIGNIFICANT CHANGE UP (ref 0–2)
BLD GP AB SCN SERPL QL: NEGATIVE — SIGNIFICANT CHANGE UP
BUN SERPL-MCNC: 42 MG/DL — HIGH (ref 7–23)
CALCIUM SERPL-MCNC: 9.4 MG/DL — SIGNIFICANT CHANGE UP (ref 8.4–10.5)
CHLORIDE SERPL-SCNC: 89 MMOL/L — LOW (ref 98–107)
CO2 SERPL-SCNC: 24 MMOL/L — SIGNIFICANT CHANGE UP (ref 22–31)
CREAT SERPL-MCNC: 6.76 MG/DL — HIGH (ref 0.5–1.3)
EGFR: 6 ML/MIN/1.73M2 — LOW
EOSINOPHIL # BLD AUTO: 0.06 K/UL — SIGNIFICANT CHANGE UP (ref 0–0.5)
EOSINOPHIL NFR BLD AUTO: 0.5 % — SIGNIFICANT CHANGE UP (ref 0–6)
GLUCOSE SERPL-MCNC: 108 MG/DL — HIGH (ref 70–99)
HCT VFR BLD CALC: 25.1 % — LOW (ref 34.5–45)
HGB BLD-MCNC: 7.6 G/DL — LOW (ref 11.5–15.5)
IANC: 10.16 K/UL — HIGH (ref 1.8–7.4)
IMM GRANULOCYTES NFR BLD AUTO: 1 % — SIGNIFICANT CHANGE UP (ref 0–1.5)
LYMPHOCYTES # BLD AUTO: 1.1 K/UL — SIGNIFICANT CHANGE UP (ref 1–3.3)
LYMPHOCYTES # BLD AUTO: 8.8 % — LOW (ref 13–44)
MAGNESIUM SERPL-MCNC: 2.4 MG/DL — SIGNIFICANT CHANGE UP (ref 1.6–2.6)
MCHC RBC-ENTMCNC: 27.5 PG — SIGNIFICANT CHANGE UP (ref 27–34)
MCHC RBC-ENTMCNC: 30.3 GM/DL — LOW (ref 32–36)
MCV RBC AUTO: 90.9 FL — SIGNIFICANT CHANGE UP (ref 80–100)
MONOCYTES # BLD AUTO: 1.06 K/UL — HIGH (ref 0–0.9)
MONOCYTES NFR BLD AUTO: 8.5 % — SIGNIFICANT CHANGE UP (ref 2–14)
NEUTROPHILS # BLD AUTO: 10.16 K/UL — HIGH (ref 1.8–7.4)
NEUTROPHILS NFR BLD AUTO: 81 % — HIGH (ref 43–77)
NRBC # BLD: 0 /100 WBCS — SIGNIFICANT CHANGE UP
NRBC # FLD: 0 K/UL — SIGNIFICANT CHANGE UP
PHOSPHATE SERPL-MCNC: 5 MG/DL — HIGH (ref 2.5–4.5)
PLATELET # BLD AUTO: 381 K/UL — SIGNIFICANT CHANGE UP (ref 150–400)
POTASSIUM SERPL-MCNC: 4.5 MMOL/L — SIGNIFICANT CHANGE UP (ref 3.5–5.3)
POTASSIUM SERPL-SCNC: 4.5 MMOL/L — SIGNIFICANT CHANGE UP (ref 3.5–5.3)
RBC # BLD: 2.76 M/UL — LOW (ref 3.8–5.2)
RBC # FLD: 15.4 % — HIGH (ref 10.3–14.5)
RH IG SCN BLD-IMP: POSITIVE — SIGNIFICANT CHANGE UP
SODIUM SERPL-SCNC: 133 MMOL/L — LOW (ref 135–145)
WBC # BLD: 12.53 K/UL — HIGH (ref 3.8–10.5)
WBC # FLD AUTO: 12.53 K/UL — HIGH (ref 3.8–10.5)

## 2022-06-17 PROCEDURE — 90792 PSYCH DIAG EVAL W/MED SRVCS: CPT

## 2022-06-17 RX ORDER — HYDROMORPHONE HYDROCHLORIDE 2 MG/ML
0.5 INJECTION INTRAMUSCULAR; INTRAVENOUS; SUBCUTANEOUS EVERY 4 HOURS
Refills: 0 | Status: DISCONTINUED | OUTPATIENT
Start: 2022-06-17 | End: 2022-06-17

## 2022-06-17 RX ORDER — HYDROMORPHONE HYDROCHLORIDE 2 MG/ML
0.5 INJECTION INTRAMUSCULAR; INTRAVENOUS; SUBCUTANEOUS ONCE
Refills: 0 | Status: DISCONTINUED | OUTPATIENT
Start: 2022-06-17 | End: 2022-06-17

## 2022-06-17 RX ORDER — ERYTHROPOIETIN 10000 [IU]/ML
16000 INJECTION, SOLUTION INTRAVENOUS; SUBCUTANEOUS
Refills: 0 | Status: DISCONTINUED | OUTPATIENT
Start: 2022-06-17 | End: 2022-06-30

## 2022-06-17 RX ORDER — ACETAMINOPHEN 500 MG
650 TABLET ORAL EVERY 8 HOURS
Refills: 0 | Status: COMPLETED | OUTPATIENT
Start: 2022-06-17 | End: 2022-06-19

## 2022-06-17 RX ORDER — OXYCODONE HYDROCHLORIDE 5 MG/1
5 TABLET ORAL EVERY 4 HOURS
Refills: 0 | Status: DISCONTINUED | OUTPATIENT
Start: 2022-06-17 | End: 2022-06-23

## 2022-06-17 RX ORDER — HYDROMORPHONE HYDROCHLORIDE 2 MG/ML
1 INJECTION INTRAMUSCULAR; INTRAVENOUS; SUBCUTANEOUS EVERY 4 HOURS
Refills: 0 | Status: DISCONTINUED | OUTPATIENT
Start: 2022-06-17 | End: 2022-06-17

## 2022-06-17 RX ADMIN — HEPARIN SODIUM 5000 UNIT(S): 5000 INJECTION INTRAVENOUS; SUBCUTANEOUS at 17:17

## 2022-06-17 RX ADMIN — HEPARIN SODIUM 5000 UNIT(S): 5000 INJECTION INTRAVENOUS; SUBCUTANEOUS at 05:15

## 2022-06-17 RX ADMIN — GABAPENTIN 100 MILLIGRAM(S): 400 CAPSULE ORAL at 10:44

## 2022-06-17 RX ADMIN — Medication 650 MILLIGRAM(S): at 22:26

## 2022-06-17 RX ADMIN — HYDROMORPHONE HYDROCHLORIDE 0.5 MILLIGRAM(S): 2 INJECTION INTRAMUSCULAR; INTRAVENOUS; SUBCUTANEOUS at 08:34

## 2022-06-17 RX ADMIN — Medication 1 APPLICATION(S): at 16:48

## 2022-06-17 RX ADMIN — CHLORHEXIDINE GLUCONATE 1 APPLICATION(S): 213 SOLUTION TOPICAL at 15:30

## 2022-06-17 RX ADMIN — ATORVASTATIN CALCIUM 40 MILLIGRAM(S): 80 TABLET, FILM COATED ORAL at 22:26

## 2022-06-17 RX ADMIN — OXYCODONE HYDROCHLORIDE 5 MILLIGRAM(S): 5 TABLET ORAL at 19:48

## 2022-06-17 RX ADMIN — POLYETHYLENE GLYCOL 3350 17 GRAM(S): 17 POWDER, FOR SOLUTION ORAL at 15:32

## 2022-06-17 RX ADMIN — SEVELAMER CARBONATE 1600 MILLIGRAM(S): 2400 POWDER, FOR SUSPENSION ORAL at 17:12

## 2022-06-17 RX ADMIN — HYDROMORPHONE HYDROCHLORIDE 0.5 MILLIGRAM(S): 2 INJECTION INTRAMUSCULAR; INTRAVENOUS; SUBCUTANEOUS at 11:45

## 2022-06-17 RX ADMIN — Medication 81 MILLIGRAM(S): at 15:32

## 2022-06-17 RX ADMIN — ERYTHROPOIETIN 10000 UNIT(S): 10000 INJECTION, SOLUTION INTRAVENOUS; SUBCUTANEOUS at 12:21

## 2022-06-17 RX ADMIN — HYDROMORPHONE HYDROCHLORIDE 0.5 MILLIGRAM(S): 2 INJECTION INTRAMUSCULAR; INTRAVENOUS; SUBCUTANEOUS at 08:49

## 2022-06-17 RX ADMIN — HYDROMORPHONE HYDROCHLORIDE 0.5 MILLIGRAM(S): 2 INJECTION INTRAMUSCULAR; INTRAVENOUS; SUBCUTANEOUS at 04:30

## 2022-06-17 RX ADMIN — CLOPIDOGREL BISULFATE 75 MILLIGRAM(S): 75 TABLET, FILM COATED ORAL at 15:32

## 2022-06-17 RX ADMIN — MEROPENEM 100 MILLIGRAM(S): 1 INJECTION INTRAVENOUS at 17:16

## 2022-06-17 RX ADMIN — SENNA PLUS 2 TABLET(S): 8.6 TABLET ORAL at 22:26

## 2022-06-17 RX ADMIN — HYDROMORPHONE HYDROCHLORIDE 0.5 MILLIGRAM(S): 2 INJECTION INTRAMUSCULAR; INTRAVENOUS; SUBCUTANEOUS at 03:33

## 2022-06-17 NOTE — PROGRESS NOTE ADULT - ASSESSMENT
62 y/o woman w/ ESRD on HD (MWF), HF, 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 Admitted for further evaluation     Problem/Plan - 1:  ·  Problem: Wound of right foot with  Cellulitis with Left 4th toe OM .   ·  Plan: S/P  Right BKA   Pt w/ increasing R toe pain when walking as well as swelling. Mild white count and elevated inflammatory markers but afebrile with vitals stable  -podiatry and ID help appreciated.   -started  IV Meropenem  500mg q24hrs per iD .   - Will consult pain management .      Problem/Plan - 2:  ·  Problem: PAD (peripheral artery disease).   ·  Plan: S/P RLE Angiogram but unable to do any intervention per Vascular.   -known PAD, continue asa/plavix. Pt denies any numbness or tingling.  -recent angiograms done w/ poor peripheral arterial flow per podiatry and so poor surgical candidate as above  -continue statin.     Problem/Plan - 3:  ·  Problem: ESRD (end stage renal disease) on dialysis.   ·  Plan: Renal helping with HD.   On MWF schedule.  -monitor I/O.     Problem/Plan - 4:  ·  Problem: HLD (hyperlipidemia).   ·  Plan: continue statin. check lipid profile.     Problem/Plan - 5:  ·  Problem: Need for prophylactic measure.   ·  Plan: heparin subcu for dvt ppx, dash diet.      Dispo : DC planning pending above.

## 2022-06-17 NOTE — BH CONSULTATION LIAISON ASSESSMENT NOTE - CASE SUMMARY
met with the patient in HD unit today. case discussed with pa-s impression and plan discussed and agreed upon. Patient denies any mood symptoms, denies any si or hi, denies any ah or vh or paranoia.   Continue plan as above

## 2022-06-17 NOTE — BH CONSULTATION LIAISON ASSESSMENT NOTE - NSBHCHARTREVIEWLAB_PSY_A_CORE FT
CBC Full  -  ( 17 Jun 2022 05:45 )  WBC Count : 12.53 K/uL  RBC Count : 2.76 M/uL  Hemoglobin : 7.6 g/dL  Hematocrit : 25.1 %  Platelet Count - Automated : 381 K/uL  Mean Cell Volume : 90.9 fL  Mean Cell Hemoglobin : 27.5 pg  Mean Cell Hemoglobin Concentration : 30.3 gm/dL  Auto Neutrophil # : 10.16 K/uL  Auto Lymphocyte # : 1.10 K/uL  Auto Monocyte # : 1.06 K/uL  Auto Eosinophil # : 0.06 K/uL  Auto Basophil # : 0.03 K/uL  Auto Neutrophil % : 81.0 %  Auto Lymphocyte % : 8.8 %  Auto Monocyte % : 8.5 %  Auto Eosinophil % : 0.5 %  Auto Basophil % : 0.2 %  06-17    133<L>  |  89<L>  |  42<H>  ----------------------------<  108<H>  4.5   |  24  |  6.76<H>    Ca    9.4      17 Jun 2022 05:45  Phos  5.0     06-17  Mg     2.40     06-17

## 2022-06-17 NOTE — PROGRESS NOTE ADULT - ASSESSMENT
Assessment:  61y Female s/p right BKA. right leg in knee immbolizer, stump dry prevena vac on.    Plan:  - will remove prevena on POD5  - pain control as needed  - podiatry eval appreciated    Vascular  [id number]

## 2022-06-17 NOTE — PROGRESS NOTE ADULT - SUBJECTIVE AND OBJECTIVE BOX
VASCULAR SURGERY PROGRESS NOTE     JAYCEE WALTERS  61y  Female  Hospital day :18d    OVERNIGHT EVENTS: no acute events overnight     T(F): 98.3 (06-17-22 @ 11:10), Max: 98.8 (06-17-22 @ 02:00)  HR: 80 (06-17-22 @ 11:10) (67 - 89)  BP: 150/70 (06-17-22 @ 11:10) (113/52 - 150/70)  RR: 18 (06-17-22 @ 11:10) (17 - 18)  SpO2: 100% (06-17-22 @ 09:15) (98% - 100%)    GI proph:    AC/ proph: aspirin  chewable 81 milliGRAM(s) Oral daily  clopidogrel Tablet 75 milliGRAM(s) Oral daily  heparin   Injectable 5000 Unit(s) SubCutaneous every 12 hours    ABx: meropenem  IVPB 500 milliGRAM(s) IV Intermittent every 24 hours    PHYSICAL EXAM:  GENERAL: NAD, well-appearing  EXTREMITIES: R BKA stump prevena vac in place, c/d/i    LABS  CAPILLARY BLOOD GLUCOSE                        7.6    12.53 )-----------( 381      ( 17 Jun 2022 05:45 )             25.1       Auto Neutrophil %: 81.0 % (06-17-22 @ 05:45)  Auto Immature Granulocyte %: 1.0 % (06-17-22 @ 05:45)    06-17    133<L>  |  89<L>  |  42<H>  ----------------------------<  108<H>  4.5   |  24  |  6.76<H>      Calcium, Total Serum: 9.4 mg/dL (06-17-22 @ 05:45)

## 2022-06-17 NOTE — CONSULT NOTE ADULT - SUBJECTIVE AND OBJECTIVE BOX
Chief Complaint: unrelieved s/p RT BKA pain     HPI:  62 y/o woman w/ ESRD on HD (MWF), HF, 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 She report she could still walk on it but recently its been more painful to walk. She had a visiting nurse arrive who assessed it and noted increased swelling to area and ER evaluation. She denies any numbness or tingling of her LE. No fever, chills, cough, chest pain, shortness of breath, abd pain or diarrhea. She still makes minimal urine. In the ER pt was given vanc/zosyn.  (30 May 2022 19:10)      PAST MEDICAL & SURGICAL HISTORY:  Heart failure  HLD (hyperlipidemia)  Glaucoma  Cataract  CKD (chronic kidney disease), stage IV  ESRD (end stage renal disease) on dialysis  PAD (peripheral artery disease)  Blind right eye  Acute osteomyelitis of toe of right foot  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  S/P arteriovenous (AV) fistula creation  Hemodialysis access, AV graft    FAMILY HISTORY:  No pertinent family history in first degree relatives    SOCIAL HISTORY:  [ x] Denies Smoking, Alcohol, or Drug Use    Allergies  No Known Allergies    PAIN MEDICATIONS:  acetaminophen     Tablet .. 650 milliGRAM(s) Oral every 6 hours PRN  gabapentin 100 milliGRAM(s) Oral <User Schedule>  HYDROmorphone  Injectable 0.5 milliGRAM(s) IV Push every 4 hours PRN    Heme:  aspirin  chewable 81 milliGRAM(s) Oral daily  clopidogrel Tablet 75 milliGRAM(s) Oral daily  heparin   Injectable 5000 Unit(s) SubCutaneous every 12 hours    Antibiotics:  meropenem  IVPB 500 milliGRAM(s) IV Intermittent every 24 hours    GI:  polyethylene glycol 3350 17 Gram(s) Oral daily  senna 2 Tablet(s) Oral at bedtime    Endocrine:  atorvastatin 40 milliGRAM(s) Oral at bedtime    All Other Medications:  chlorhexidine 2% Cloths 1 Application(s) Topical daily  collagenase Ointment 1 Application(s) Topical daily  epoetin niesha-epbx (RETACRIT) Injectable 62646 Unit(s) IV Push <User Schedule>  sevelamer carbonate 1600 milliGRAM(s) Oral three times a day with meals      Vital Signs Last 24 Hrs  T(C): 36.7 (17 Jun 2022 14:20), Max: 37.1 (17 Jun 2022 02:00)  T(F): 98 (17 Jun 2022 14:20), Max: 98.8 (17 Jun 2022 02:00)  HR: 82 (17 Jun 2022 14:20) (67 - 89)  BP: 149/53 (17 Jun 2022 14:20) (113/52 - 150/70)  BP(mean): --  RR: 17 (17 Jun 2022 14:20) (17 - 18)  SpO2: 100% (17 Jun 2022 09:15) (98% - 100%)    PAIN SCORE:    >10/10     SCALE USED: (1-10 VNRS)           LABS:                          7.6    12.53 )-----------( 381      ( 17 Jun 2022 05:45 )             25.1     06-17    133<L>  |  89<L>  |  42<H>  ----------------------------<  108<H>  4.5   |  24  |  6.76<H>    Ca    9.4      17 Jun 2022 05:45  Phos  5.0     06-17  Mg     2.40     06-17        [x ]  [place]  Reviewed last dispensed 2/1/2022 Percocet 5/325mg, Quantity 16, 4 day supply     Summary:  Called by the Medicine team to help manage post op right BKA pain for the patient. Patient is a 61 year old female, with PAD, CKD, ESRD, HD: MWF, and CHF.  As per patient, when she was younger she had uncontrolled diabetes which led to her being blind in her right eye, CKD and now a BKA.  At home, the patient has been prescribed Percocet 5/325 mg by her podiatrist, but she only took it when she had severe pain, which was rare.  On 6/15/22, patient was found to have foot osteomyelitis, underwent a right BKA, and now her right stump and part of her thigh is in a brace.  The patient was seen in dialysis, and she was complaining of being in  >10/10 pain at the surgical site.  She described it as being non-radiating, and alternating between stabbing, and throbbing pain. The pain has been so severe that she has been unable to sleep at night.   The IV Dilaudid 0.5mg PRN already ordered helps with her pain and lasts about 4 hours.     PHYSICAL EXAM:  GENERAL: Alert & Oriented x 3 in NAD, well-groomed, well-developed     Impression/Plan: Requested by Medicine team to help manage pain.   Recommendations: FOR ALL MEDICATIONS RECOMMENDED BELOW, ONLY ORDER IF OKAY WITH NEPHROLOGY/ PRIMARY ATTENDING  -  Consider changing current Tylenol order to Tylenol 650 mg every 8 hours standing x 1-2 days, then PRN for pain.  -  Consider ordering Oxycodone 5mg every 4 hours PRN for moderate to severe pain (4-10).  Hold for oversedation. Not to be given within 1 hour of any other immediate acting opioids.  -  Consider changing IV Dilaudid order to IV Dilaudid 0.5mg every 4 hours PRN for severe BREAKTHROUGH pain X 2-3 DAYS or until not using for more than 1 day, THEN DISCONTINUE. Hold for oversedation. ONLY TO BE GIVEN IF PAIN UNRELIEVED BY PO OXYCODONE. Not to be given within 1 hour of any other immediate acting opioids.  -  Recommend maintaining continuous pulse oximetry.  -  Recommend Holistic RN for complementary and alternative therapies for pain, using a mid-body-spirit-emotion approach.  -  Recommend follow up with surgical team as needed.  -  Recommend follow up with outpatient pain management doctor when discharged from hospital or rehab, only if patient is still on opioids. If patient does not have an outpatient pain management doctor, may acquire one through patient's personal insurance carrier.  Discussed patient with Anesthesia Pain Attending, Dr. Conteh, who agrees with the above recommendations.  No further recommendations at this time, Acute Pain service to sign off. May call Acute Pain Service if needed.   Thank you.

## 2022-06-17 NOTE — PROGRESS NOTE ADULT - SUBJECTIVE AND OBJECTIVE BOX
Cardiovascular Disease Progress Note    Overnight events: No acute events overnight.  Ms. Boogie denies chest pain or SOB.   Otherwise review of systems negative    Objective Findings:  T(C): 37 (06-17-22 @ 06:00), Max: 37.1 (06-17-22 @ 02:00)  HR: 67 (06-17-22 @ 06:00) (67 - 89)  BP: 122/47 (06-17-22 @ 06:00) (113/52 - 123/53)  RR: 18 (06-17-22 @ 06:00) (16 - 18)  SpO2: 98% (06-17-22 @ 06:00) (98% - 100%)  Wt(kg): --  Daily     Daily       Physical Exam:  Gen: NAD; Patient resting comfortably  HEENT: EOMI, Normocephalic/ atraumatic  CV: RRR, normal S1 + S2, no m/r/g  Lungs:  Normal respiratory effort; clear to auscultation bilaterally  Abd: soft, non-tender; bowel sounds present  Ext: No edema; warm and well perfused    Telemetry: n/a    Laboratory Data:                        7.6    12.53 )-----------( 381      ( 17 Jun 2022 05:45 )             25.1     06-17    133<L>  |  89<L>  |  42<H>  ----------------------------<  108<H>  4.5   |  24  |  6.76<H>    Ca    9.4      17 Jun 2022 05:45  Phos  5.0     06-17  Mg     2.40     06-17                Inpatient Medications:  MEDICATIONS  (STANDING):  aspirin  chewable 81 milliGRAM(s) Oral daily  atorvastatin 40 milliGRAM(s) Oral at bedtime  chlorhexidine 2% Cloths 1 Application(s) Topical daily  clopidogrel Tablet 75 milliGRAM(s) Oral daily  collagenase Ointment 1 Application(s) Topical daily  epoetin niesha-epbx (RETACRIT) Injectable 62357 Unit(s) IV Push <User Schedule>  gabapentin 100 milliGRAM(s) Oral <User Schedule>  heparin   Injectable 5000 Unit(s) SubCutaneous every 12 hours  meropenem  IVPB 500 milliGRAM(s) IV Intermittent every 24 hours  polyethylene glycol 3350 17 Gram(s) Oral daily  senna 2 Tablet(s) Oral at bedtime  sevelamer carbonate 1600 milliGRAM(s) Oral three times a day with meals      Assessment:  61-year-old female with ESRD on HD, HLD and peripheral vascular disease s/p lower extremity resection presents with chronic limb ischemia.     Plan of Care:    #Post-operative evaluation-  Ms. Boogie tolerated R BKA well on 6/14.   She displays no signs or symptoms of post operative coronary ischemia or acute CHF.   - Continue with current cardiac management.     #Compensated diastolic CHF-  Volume status is improved.  Fluid removal with HD as per the renal team.   Recent echo noted- normal LV systolic function.    #Peripheral vascular disease.  - Podiatry and vascular input noted.     #ESRD-  - HD as per renal.      Over 25 minutes spent on total encounter; more than 50% of the visit was spent counseling and/or coordinating care by the attending physician.      Cristino Adams MD [facility]  Cardiovascular Disease  [phone]

## 2022-06-17 NOTE — BH CONSULTATION LIAISON ASSESSMENT NOTE - RISK ASSESSMENT
Chronic health conditions with complications, recent major physical change with partial R foot amputation  No psychiatric history, supportive family, identifies reasons and desire to live, residential stability, no SI, HI, or psychosis  Low acute suicide and violence risk

## 2022-06-17 NOTE — PROGRESS NOTE ADULT - ASSESSMENT
60 yo f s/p 6/8 right foot partial first ray amputation and left foot partial 3rd ray amputation (DOS 3/4/2022)  -pt seen and evaluated  - Afebrile , WBC 12.5  - 3/4/22 s/p L foot partial 3rd ray resection open: fibrotic wound to subQ, no malodor, no drainage, no purulence, flaps cool, no maceration, distal skin necrosis, no tracking, no purulence, no erythema    - 6/8 s/p R foot partial first ray resection closed: flaps are cold and dusky to the plantar and dorsal aspect with delayed CFT, sutures intact, no hematoma, no purulence or discharge, no clinical signs of infection.   - Pt is s/p recent b/l LE angiograms with poor peripheral arterial flow, is a poor surgical candidate  - B/L foot MRI reviewed   - R foot OR wound culture e coli, bone culture/path pending   - High concern for viability and residual soft tissue infection for R foot per intra op findings  - Viability of the b/l feet are highly guarded   - Discussed with patient the prognosis of any foot amputations and the poor likelihood of it healing. Pt would benefit from proximal amputation and is amenable at this time.   - [facility] plan for RLE BKA today  - Pod stable for d/c pending R BKA  - discussed with attending 60 yo f left foot partial 3rd ray amputation (DOS 3/4/2022)  -pt seen and evaluated  - Afebrile , WBC 12.5  - 3/4/22 s/p L foot partial 3rd ray resection open: fibrotic wound to subQ, no malodor, no drainage, no purulence, flaps cool, no maceration, distal skin necrosis, no tracking, no purulence, no erythema    - Pt is s/p recent b/l LE angiograms with poor peripheral arterial flow, is a poor surgical candidate  - B/L foot MRI reviewed   - Viability of the b/l feet are highly guarded   - s/p right BKA  - seen with attending 60 yo f left foot partial 3rd ray amputation (DOS 3/4/2022)  -pt seen and evaluated  - Afebrile , WBC 12.5  - 3/4/22 s/p L foot partial 3rd ray resection open: fibrotic wound to subQ, no malodor, no drainage, no purulence, flaps cool, no maceration, distal skin necrosis, no tracking, no purulence, no erythema    - Pt is s/p recent b/l LE angiograms with poor peripheral arterial flow, is a poor surgical candidate  - B/L foot MRI reviewed   - s/p right BKA  - Pod plan Local wound care   - Prognosis of L foot is highly guarded   - Patient is stable for discharge from podiatry standpoint, f/u information & instruction in d/c provider note   - seen with attending

## 2022-06-17 NOTE — PROGRESS NOTE ADULT - SUBJECTIVE AND OBJECTIVE BOX
Patient is a 61y old  Female who presents with a chief complaint of RLE toe pain (17 Jun 2022 07:46)       INTERVAL HPI/OVERNIGHT EVENTS:  Patient seen and evaluated at bedside.  Pt is resting comfortable in NAD. Denies N/V/F/C.    Allergies    No Known Allergies    Intolerances        Vital Signs Last 24 Hrs  T(C): 36.8 (17 Jun 2022 09:15), Max: 37.1 (17 Jun 2022 02:00)  T(F): 98.2 (17 Jun 2022 09:15), Max: 98.8 (17 Jun 2022 02:00)  HR: 76 (17 Jun 2022 09:15) (67 - 89)  BP: 134/56 (17 Jun 2022 09:15) (113/52 - 134/56)  BP(mean): --  RR: 18 (17 Jun 2022 09:15) (16 - 18)  SpO2: 100% (17 Jun 2022 09:15) (98% - 100%)    LABS:                        7.6    12.53 )-----------( 381      ( 17 Jun 2022 05:45 )             25.1     06-17    133<L>  |  89<L>  |  42<H>  ----------------------------<  108<H>  4.5   |  24  |  6.76<H>    Ca    9.4      17 Jun 2022 05:45  Phos  5.0     06-17  Mg     2.40     06-17          CAPILLARY BLOOD GLUCOSE          Lower Extremity Physical Exam:  Neuro: Epicritic sensation diminished to the level of forefoot B/L  Musculoskeletal/Ortho: left partial 5th ray resection healed, LF partial 4th ray resection open   Skin:   3/4/22 s/p L foot partial 3rd ray resection open: fibrotic wound to subQ, no malodor, no drainage, no purulence, flaps cool, no maceration, distal skin necrosis, no tracking, no purulence, no erythema    6/8 s/p R foot partial first ray resection closed: flaps are cold and dusky to the plantar and dorsal aspect with delayed CFT, sutures intact, no hematoma, no purulence or discharge, no clinical signs of infection.       RADIOLOGY & ADDITIONAL TESTS:   Patient is a 61y old  Female who presents with a chief complaint of RLE toe pain (17 Jun 2022 07:46)       INTERVAL HPI/OVERNIGHT EVENTS:  Patient seen and evaluated at bedside.  Pt is resting comfortable in NAD. Denies N/V/F/C.    Allergies    No Known Allergies    Intolerances        Vital Signs Last 24 Hrs  T(C): 36.8 (17 Jun 2022 09:15), Max: 37.1 (17 Jun 2022 02:00)  T(F): 98.2 (17 Jun 2022 09:15), Max: 98.8 (17 Jun 2022 02:00)  HR: 76 (17 Jun 2022 09:15) (67 - 89)  BP: 134/56 (17 Jun 2022 09:15) (113/52 - 134/56)  BP(mean): --  RR: 18 (17 Jun 2022 09:15) (16 - 18)  SpO2: 100% (17 Jun 2022 09:15) (98% - 100%)    LABS:                        7.6    12.53 )-----------( 381      ( 17 Jun 2022 05:45 )             25.1     06-17    133<L>  |  89<L>  |  42<H>  ----------------------------<  108<H>  4.5   |  24  |  6.76<H>    Ca    9.4      17 Jun 2022 05:45  Phos  5.0     06-17  Mg     2.40     06-17          CAPILLARY BLOOD GLUCOSE          Lower Extremity Physical Exam:  Neuro: Epicritic sensation diminished to the level of forefoot B/L  Musculoskeletal/Ortho: left partial 5th ray resection healed, LF partial 4th ray resection open, s/p R BKA  Skin:   3/4/22 s/p L foot partial 3rd ray resection open: fibrotic wound to subQ, no malodor, no drainage, no purulence, flaps cool, no maceration, distal skin necrosis, no tracking, no purulence, no erythema        RADIOLOGY & ADDITIONAL TESTS:

## 2022-06-17 NOTE — PROGRESS NOTE ADULT - SUBJECTIVE AND OBJECTIVE BOX
[facility] - S Jluis / Shabbir S /D Romina/ S Denys/ S Chano/ Andrés Perkins / M Katlyn/ O Gladis  service -[phone], office [phone]  ---------------------------------------------------------------------------------------------------------------    Patient seen and examined bedside    Subjective and Objective: No overnight events, denied sob. No new complaints today. sx site pain +    Allergies: No Known Allergies      Hospital Medications:   MEDICATIONS  (STANDING):  aspirin  chewable 81 milliGRAM(s) Oral daily  atorvastatin 40 milliGRAM(s) Oral at bedtime  chlorhexidine 2% Cloths 1 Application(s) Topical daily  clopidogrel Tablet 75 milliGRAM(s) Oral daily  collagenase Ointment 1 Application(s) Topical daily  epoetin niesha-epbx (RETACRIT) Injectable 92756 Unit(s) IV Push <User Schedule>  gabapentin 100 milliGRAM(s) Oral <User Schedule>  heparin   Injectable 5000 Unit(s) SubCutaneous every 12 hours  meropenem  IVPB 500 milliGRAM(s) IV Intermittent every 24 hours  polyethylene glycol 3350 17 Gram(s) Oral daily  senna 2 Tablet(s) Oral at bedtime  sevelamer carbonate 1600 milliGRAM(s) Oral three times a day with meals      VITALS:  T(F): 99.1 (06-17-22 @ 17:08), Max: 99.1 (06-17-22 @ 17:08)  HR: 94 (06-17-22 @ 17:08)  BP: 124/47 (06-17-22 @ 17:08)  RR: 17 (06-17-22 @ 17:08)  SpO2: 100% (06-17-22 @ 17:08)  Wt(kg): --    06-16 @ 07:01  -  06-17 @ 07:00  --------------------------------------------------------  IN: 1160 mL / OUT: 0 mL / NET: 1160 mL    06-17 @ 07:01  -  06-17 @ 17:17  --------------------------------------------------------  IN: 800 mL / OUT: 1500 mL / NET: -700 mL      PHYSICAL EXAM:  Constitutional: NAD  HEENT: anicteric sclera  Neck: No JVD  Respiratory: CTAB, no wheezes, rales or rhonchi  Cardiovascular: S1, S2, RRR  Gastrointestinal: BS+, soft, NT/ND  Extremities: rt BKA+.  left foot dressing+  Neurological: A/O x 3  Psychiatric: Normal mood, normal affect  : No salcido.   Vascular Access: rt femoral AVG+    LABS:  06-17    133<L>  |  89<L>  |  42<H>  ----------------------------<  108<H>  4.5   |  24  |  6.76<H>    Ca    9.4      17 Jun 2022 05:45  Phos  5.0     06-17  Mg     2.40     06-17      Creatinine Trend: 6.76 <--, 5.34 <--, 3.49 <--, 5.91 <--, 7.35 <--, 5.56 <--, 7.37 <--                        7.6    12.53 )-----------( 381      ( 17 Jun 2022 05:45 )             25.1     Urine Studies:        RADIOLOGY & ADDITIONAL STUDIES:

## 2022-06-17 NOTE — BH CONSULTATION LIAISON ASSESSMENT NOTE - CURRENT MEDICATION
MEDICATIONS  (STANDING):  aspirin  chewable 81 milliGRAM(s) Oral daily  atorvastatin 40 milliGRAM(s) Oral at bedtime  chlorhexidine 2% Cloths 1 Application(s) Topical daily  clopidogrel Tablet 75 milliGRAM(s) Oral daily  collagenase Ointment 1 Application(s) Topical daily  epoetin niesha-epbx (RETACRIT) Injectable 23112 Unit(s) IV Push <User Schedule>  gabapentin 100 milliGRAM(s) Oral <User Schedule>  heparin   Injectable 5000 Unit(s) SubCutaneous every 12 hours  meropenem  IVPB 500 milliGRAM(s) IV Intermittent every 24 hours  polyethylene glycol 3350 17 Gram(s) Oral daily  senna 2 Tablet(s) Oral at bedtime  sevelamer carbonate 1600 milliGRAM(s) Oral three times a day with meals    MEDICATIONS  (PRN):  acetaminophen     Tablet .. 650 milliGRAM(s) Oral every 6 hours PRN Temp greater or equal to 38C (100.4F), Mild Pain (1 - 3)  HYDROmorphone  Injectable 0.5 milliGRAM(s) IV Push every 4 hours PRN Severe Pain (7 - 10)

## 2022-06-17 NOTE — BH CONSULTATION LIAISON ASSESSMENT NOTE - HPI (INCLUDE ILLNESS QUALITY, SEVERITY, DURATION, TIMING, CONTEXT, MODIFYING FACTORS, ASSOCIATED SIGNS AND SYMPTOMS)
61 year old woman former ' at [facility] [employer] [facility] [employer]' who can no longer work d/t ESRD who lives at home with , brother, and sister-in-law with no past psychiatric history and PMHx of ESRD on HD (MWF), HF, glaucoma (blind in R eye), PAD s/p amputations of L foot partial 3rd ray/ R hallux, presents to [facility] ED with c/o R toe pain and swelling x4-5 days, s/p partial amputation of 1st ray of R foot during current hospitalization (6/8), psychiatry consulted for evaluation of mood per patient request after life-altering surgery.     Met with patient in her room this morning. Patient very pleasant and respectful. A&Ox3. Mood is 'good'. Reports poor sleep for the last 3 months, stating that prior to hospitalization she would get about 3-4hrs of sleep before waking up and being unable to fall back to sleep. Since hospitalization, patient reports less than 3-4hrs of sleep per night, but is unable to identify a reason for this poor sleep. Appetite has been poor due to nausea for the last few days and vomiting since last night. Required 3 PRN doses of Dilaudid 0.5mg IV overnight/in the morning prior to interview for pain. When asked why she asked to speak with psychiatry, patient reports that her niece encouraged her to 'speak with someone' while in the hospital because of 'having major surgery'. Patient denies any previous psychiatric history, psychiatric hospitalizations, any prior psychotropic trials, and previous SI or SA. Patient expresses desire to recover and be 'as independent as I possibly can be' and expresses a strong will to live. Denies sadness and reduced energy or concentration. Expresses understanding she has 'a long road of recovery' but reports desire to work hard to 'get back to doing things I love'. Reports having a strong support system in her family at home and does not have any safety concerns for herself or others. Denies current SI, HI, AH, VH, and substance use.    Spoke with patient's niece, Itzel, who reports no concerns for patient's safety. Reports that she suggested her aunt speak with psychiatry while in the hospital to 'let her know there is other support for her there'. She explains that patient enjoys being an 'independent and active woman' and she wants patient to feel 'as supported as possible' so that she can 'stay positive and work towards recovery' because she worries that the amputation impacting patient's physical functioning may discourage her. Presently, vinicio reports feeling the patient is in good spirits with desire to work hard for improvement.

## 2022-06-17 NOTE — PROGRESS NOTE ADULT - SUBJECTIVE AND OBJECTIVE BOX
Date of Service  : 06-17-22     INTERVAL HPI/OVERNIGHT EVENTS: Had sever RBKA stump pain . Getting HD   Vital Signs Last 24 Hrs  T(C): 36.7 (17 Jun 2022 14:20), Max: 37.1 (17 Jun 2022 02:00)  T(F): 98 (17 Jun 2022 14:20), Max: 98.8 (17 Jun 2022 02:00)  HR: 82 (17 Jun 2022 14:20) (67 - 89)  BP: 149/53 (17 Jun 2022 14:20) (113/52 - 150/70)  BP(mean): --  RR: 17 (17 Jun 2022 14:20) (17 - 18)  SpO2: 100% (17 Jun 2022 09:15) (98% - 100%)  I&O's Summary    16 Jun 2022 07:01  -  17 Jun 2022 07:00  --------------------------------------------------------  IN: 1160 mL / OUT: 0 mL / NET: 1160 mL    17 Jun 2022 07:01  -  17 Jun 2022 14:55  --------------------------------------------------------  IN: 800 mL / OUT: 1500 mL / NET: -700 mL      MEDICATIONS  (STANDING):  aspirin  chewable 81 milliGRAM(s) Oral daily  atorvastatin 40 milliGRAM(s) Oral at bedtime  chlorhexidine 2% Cloths 1 Application(s) Topical daily  clopidogrel Tablet 75 milliGRAM(s) Oral daily  collagenase Ointment 1 Application(s) Topical daily  epoetin niesha-epbx (RETACRIT) Injectable 70176 Unit(s) IV Push <User Schedule>  gabapentin 100 milliGRAM(s) Oral <User Schedule>  heparin   Injectable 5000 Unit(s) SubCutaneous every 12 hours  meropenem  IVPB 500 milliGRAM(s) IV Intermittent every 24 hours  polyethylene glycol 3350 17 Gram(s) Oral daily  senna 2 Tablet(s) Oral at bedtime  sevelamer carbonate 1600 milliGRAM(s) Oral three times a day with meals    MEDICATIONS  (PRN):  acetaminophen     Tablet .. 650 milliGRAM(s) Oral every 6 hours PRN Temp greater or equal to 38C (100.4F), Mild Pain (1 - 3)  HYDROmorphone  Injectable 0.5 milliGRAM(s) IV Push every 4 hours PRN Severe Pain (7 - 10)    LABS:                        7.6    12.53 )-----------( 381      ( 17 Jun 2022 05:45 )             25.1     06-17    133<L>  |  89<L>  |  42<H>  ----------------------------<  108<H>  4.5   |  24  |  6.76<H>    Ca    9.4      17 Jun 2022 05:45  Phos  5.0     06-17  Mg     2.40     06-17          CAPILLARY BLOOD GLUCOSE              REVIEW OF SYSTEMS:  CONSTITUTIONAL: No fever, weight loss, or fatigue  EYES: No eye pain, visual disturbances, or discharge  ENMT:  No difficulty hearing, tinnitus, vertigo; No sinus or throat pain  NECK: No pain or stiffness  RESPIRATORY: No cough, wheezing, chills or hemoptysis; No shortness of breath  CARDIOVASCULAR: No chest pain, palpitations, dizziness, or leg swelling  GASTROINTESTINAL: No abdominal or epigastric pain. No nausea, vomiting, or hematemesis; No diarrhea or constipation. No melena or hematochezia.  GENITOURINARY: No dysuria, frequency, hematuria, or incontinence  NEUROLOGICAL: No headaches, memory loss, loss of strength, numbness, or tremors      Consultant(s) Notes Reviewed:  [x ] YES  [ ] NO    PHYSICAL EXAM:  GENERAL: NAD, well-groomed, well-developed,not in any distress ,  HEAD:  Atraumatic, Normocephalic  EYES: EOMI, PERRLA, conjunctiva and sclera clear  ENMT: No tonsillar erythema, exudates, or enlargement; Moist mucous membranes, Good dentition, No lesions  NECK: Supple, No JVD, Normal thyroid  NERVOUS SYSTEM:  Alert & Oriented X3, No focal deficit   CHEST/LUNG: Good air entry bilateral with no  rales, rhonchi, wheezing, or rubs  HEART: Regular rate and rhythm; No murmurs, rubs, or gallops  ABDOMEN: Soft, Nontender, Nondistended; Bowel sounds present  EXTREMITIES:  RBKA     Care Discussed with Consultants/Other Providers [ x] YES  [ ] NO

## 2022-06-17 NOTE — BH CONSULTATION LIAISON ASSESSMENT NOTE - SUMMARY
61 year old woman former ' at [employer]' who can no longer work d/t ESRD who lives at home with , brother, and sister-in-law with no past psychiatric history and PMHx of ESRD on HD (MWF), HF, glaucoma (blind in R eye), PAD s/p amputations of L foot partial 3rd ray/ R hallux, presents to [facility] ED with c/o R toe pain and swelling x4-5 days, s/p partial amputation of 1st ray of R foot during current hospitalization (6/8), psychiatry consulted for evaluation of mood per patient request after life-altering surgery.     Patient is not endorsing symptoms of mood disorder or concerns for safety. Expresses desire for support and encouragement from staff. Denies SI, HI, and psychosis.     Recommendations:  - No indication for 1:1 CO  - Medicine management per medicine on 8S  - Labs: reviewed. EKG: QTc = 462ms, NSR.  - START Melatonin 3mg PO @8PM for sleep  - No indication for psychotropic medication management currently  - DISPO: safe discharge plan to be determined by medicine team and patient.

## 2022-06-17 NOTE — PROGRESS NOTE ADULT - ASSESSMENT
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 Admitted for further evaluation. Renal following for ESRD Mx.     ESRD on HD  schedule hemodialysis - Monday, Wednesday and Friday   access- thigh AVG  HD unit SQDC  K, vol ok     s/p HD earlier today, rx sheet reviewed, net uf 0.7kg, tolerated well. hd interrupted per pts request due to stump pain, after iv pain med, pt felt better and hd resumed  plan for next hemodialysis Monday w/ 2 k bath, Ultrafiltration 1kg as tolerated with blood pressure   renal diet  dose all meds for ESRD  renal restrictions in diet.  Anemia of CKD- Hb < goal now. increase DION epo 6>10>16k w/hd tiw  Hyperphosphatemia- c/w renvlea 2tid w/food  HTN, controlled. bp stable. not on anti-htn meds  PAD, infected rt foot wounds, ?OM- s/p vanco, on zosyn. f/u w/Podiatry. on meropenem now. s/p MRI foot w/o Morgan  f/u w/vas sx.   Podiatry f/u - patient s/p left foot TMA and right foot partial 1st ray amputation 6/8  s/p LE angiogram 6/7  s/p rt BKA 6/15    poc d/w pt, HD RN  labs, chart reviewed  For any question, call:  Cell # [phone]  Pager # [phone]  office # [phone]

## 2022-06-17 NOTE — BH CONSULTATION LIAISON ASSESSMENT NOTE - NSBHCHARTREVIEWVS_PSY_A_CORE FT
Vital Signs Last 24 Hrs  T(C): 36.8 (17 Jun 2022 11:10), Max: 37.1 (17 Jun 2022 02:00)  T(F): 98.3 (17 Jun 2022 11:10), Max: 98.8 (17 Jun 2022 02:00)  HR: 80 (17 Jun 2022 11:10) (67 - 89)  BP: 150/70 (17 Jun 2022 11:10) (113/52 - 150/70)  BP(mean): --  RR: 18 (17 Jun 2022 11:10) (17 - 18)  SpO2: 100% (17 Jun 2022 09:15) (98% - 100%)

## 2022-06-18 LAB
ANION GAP SERPL CALC-SCNC: 15 MMOL/L — HIGH (ref 7–14)
BUN SERPL-MCNC: 24 MG/DL — HIGH (ref 7–23)
CALCIUM SERPL-MCNC: 8.6 MG/DL — SIGNIFICANT CHANGE UP (ref 8.4–10.5)
CHLORIDE SERPL-SCNC: 93 MMOL/L — LOW (ref 98–107)
CO2 SERPL-SCNC: 27 MMOL/L — SIGNIFICANT CHANGE UP (ref 22–31)
CREAT SERPL-MCNC: 4.72 MG/DL — HIGH (ref 0.5–1.3)
EGFR: 10 ML/MIN/1.73M2 — LOW
GLUCOSE SERPL-MCNC: 98 MG/DL — SIGNIFICANT CHANGE UP (ref 70–99)
HCT VFR BLD CALC: 24.3 % — LOW (ref 34.5–45)
HGB BLD-MCNC: 7.4 G/DL — LOW (ref 11.5–15.5)
MAGNESIUM SERPL-MCNC: 2.3 MG/DL — SIGNIFICANT CHANGE UP (ref 1.6–2.6)
MCHC RBC-ENTMCNC: 27.2 PG — SIGNIFICANT CHANGE UP (ref 27–34)
MCHC RBC-ENTMCNC: 30.5 GM/DL — LOW (ref 32–36)
MCV RBC AUTO: 89.3 FL — SIGNIFICANT CHANGE UP (ref 80–100)
NRBC # BLD: 0 /100 WBCS — SIGNIFICANT CHANGE UP
NRBC # FLD: 0.02 K/UL — HIGH
PHOSPHATE SERPL-MCNC: 3.9 MG/DL — SIGNIFICANT CHANGE UP (ref 2.5–4.5)
PLATELET # BLD AUTO: 340 K/UL — SIGNIFICANT CHANGE UP (ref 150–400)
POTASSIUM SERPL-MCNC: 3.5 MMOL/L — SIGNIFICANT CHANGE UP (ref 3.5–5.3)
POTASSIUM SERPL-SCNC: 3.5 MMOL/L — SIGNIFICANT CHANGE UP (ref 3.5–5.3)
RBC # BLD: 2.72 M/UL — LOW (ref 3.8–5.2)
RBC # FLD: 15.7 % — HIGH (ref 10.3–14.5)
SODIUM SERPL-SCNC: 135 MMOL/L — SIGNIFICANT CHANGE UP (ref 135–145)
WBC # BLD: 9.17 K/UL — SIGNIFICANT CHANGE UP (ref 3.8–10.5)
WBC # FLD AUTO: 9.17 K/UL — SIGNIFICANT CHANGE UP (ref 3.8–10.5)

## 2022-06-18 RX ADMIN — SEVELAMER CARBONATE 1600 MILLIGRAM(S): 2400 POWDER, FOR SUSPENSION ORAL at 13:29

## 2022-06-18 RX ADMIN — CLOPIDOGREL BISULFATE 75 MILLIGRAM(S): 75 TABLET, FILM COATED ORAL at 13:29

## 2022-06-18 RX ADMIN — Medication 650 MILLIGRAM(S): at 22:49

## 2022-06-18 RX ADMIN — Medication 81 MILLIGRAM(S): at 13:29

## 2022-06-18 RX ADMIN — Medication 650 MILLIGRAM(S): at 05:45

## 2022-06-18 RX ADMIN — Medication 650 MILLIGRAM(S): at 22:19

## 2022-06-18 RX ADMIN — ATORVASTATIN CALCIUM 40 MILLIGRAM(S): 80 TABLET, FILM COATED ORAL at 22:20

## 2022-06-18 RX ADMIN — MEROPENEM 100 MILLIGRAM(S): 1 INJECTION INTRAVENOUS at 18:25

## 2022-06-18 RX ADMIN — Medication 1 APPLICATION(S): at 13:29

## 2022-06-18 RX ADMIN — CHLORHEXIDINE GLUCONATE 1 APPLICATION(S): 213 SOLUTION TOPICAL at 13:29

## 2022-06-18 RX ADMIN — HEPARIN SODIUM 5000 UNIT(S): 5000 INJECTION INTRAVENOUS; SUBCUTANEOUS at 18:25

## 2022-06-18 RX ADMIN — Medication 650 MILLIGRAM(S): at 13:29

## 2022-06-18 RX ADMIN — Medication 650 MILLIGRAM(S): at 13:59

## 2022-06-18 RX ADMIN — SENNA PLUS 2 TABLET(S): 8.6 TABLET ORAL at 22:19

## 2022-06-18 RX ADMIN — SEVELAMER CARBONATE 1600 MILLIGRAM(S): 2400 POWDER, FOR SUSPENSION ORAL at 08:41

## 2022-06-18 NOTE — PROGRESS NOTE ADULT - ASSESSMENT
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 Admitted for further evaluation. Renal following for ESRD Mx.     ESRD on HD  schedule hemodialysis - Monday, Wednesday and Friday   access- thigh AVG  HD unit SQDC  K, vol ok     s/p HD yesterday--> rx sheet reviewed, net uf 0.7kg, tolerated well. hd interrupted per pts request due to stump pain, after iv pain med, pt felt better and hd resumed  plan for next hemodialysis Monday w/ 2 k bath, Ultrafiltration 1kg as tolerated with blood pressure   renal diet  dose all meds for ESRD  renal restrictions in diet.    Anemia of CKD- Hb < goal now.   [id number] w/hd tiw  trend hgb    Hyperphosphatemia-   c/w Renvela 2tid w/food  trend rei and phos    HTN, controlled. bp stable. not on anti-htn meds  PAD, infected rt foot wounds, ?OM- s/p vanco, on zosyn. f/u w/Podiatry. on meropenem now. s/p MRI foot w/o Morgan  f/u w/vas sx.   Podiatry f/u - patient s/p left foot TMA and right foot partial 1st ray amputation 6/8  s/p LE angiogram 6/7  s/p rt BKA 6/15    Dr Mcfarlane  [phone]

## 2022-06-18 NOTE — PROGRESS NOTE ADULT - SUBJECTIVE AND OBJECTIVE BOX
Date of Service  : 06-19-22     INTERVAL HPI/OVERNIGHT EVENTS: No new concerns.   Vital Signs Last 24 Hrs  T(C): 36.4 (19 Jun 2022 10:08), Max: 36.8 (19 Jun 2022 06:42)  T(F): 97.6 (19 Jun 2022 10:08), Max: 98.3 (19 Jun 2022 06:42)  HR: 89 (19 Jun 2022 10:08) (72 - 89)  BP: 100/44 (19 Jun 2022 10:08) (100/44 - 109/61)  BP(mean): --  RR: 17 (19 Jun 2022 10:08) (17 - 18)  SpO2: 100% (19 Jun 2022 10:08) (100% - 100%)  I&O's Summary    18 Jun 2022 07:01  -  19 Jun 2022 07:00  --------------------------------------------------------  IN: 960 mL / OUT: 0 mL / NET: 960 mL    19 Jun 2022 07:01  -  19 Jun 2022 16:13  --------------------------------------------------------  IN: 240 mL / OUT: 0 mL / NET: 240 mL      MEDICATIONS  (STANDING):  aspirin  chewable 81 milliGRAM(s) Oral daily  atorvastatin 40 milliGRAM(s) Oral at bedtime  bisacodyl 5 milliGRAM(s) Oral at bedtime  chlorhexidine 2% Cloths 1 Application(s) Topical daily  clopidogrel Tablet 75 milliGRAM(s) Oral daily  collagenase Ointment 1 Application(s) Topical daily  epoetin niesha-epbx (RETACRIT) Injectable 66173 Unit(s) IV Push <User Schedule>  gabapentin 100 milliGRAM(s) Oral <User Schedule>  heparin   Injectable 5000 Unit(s) SubCutaneous every 12 hours  meropenem  IVPB 500 milliGRAM(s) IV Intermittent every 24 hours  polyethylene glycol 3350 17 Gram(s) Oral daily  senna 2 Tablet(s) Oral at bedtime  sevelamer carbonate 1600 milliGRAM(s) Oral three times a day with meals    MEDICATIONS  (PRN):  HYDROmorphone  Injectable 0.5 milliGRAM(s) IV Push every 4 hours PRN Breakthrough Pain  oxyCODONE    IR 5 milliGRAM(s) Oral every 4 hours PRN Moderate-Severe Pain (4 - 10)    LABS:                        7.6    7.58  )-----------( 341      ( 19 Jun 2022 06:50 )             25.3     06-19    136  |  92<L>  |  36<H>  ----------------------------<  98  4.0   |  26  |  6.55<H>    Ca    8.8      19 Jun 2022 06:50  Phos  5.0     06-19  Mg     2.50     06-19          CAPILLARY BLOOD GLUCOSE              REVIEW OF SYSTEMS:  CONSTITUTIONAL: No fever, weight loss, or fatigue  EYES: No eye pain, visual disturbances, or discharge  ENMT:  No difficulty hearing, tinnitus, vertigo; No sinus or throat pain  NECK: No pain or stiffness  RESPIRATORY: No cough, wheezing, chills or hemoptysis; No shortness of breath  CARDIOVASCULAR: No chest pain, palpitations, dizziness, or leg swelling  GASTROINTESTINAL: No abdominal or epigastric pain. No nausea, vomiting, or hematemesis; No diarrhea or constipation. No melena or hematochezia.  GENITOURINARY: No dysuria, frequency, hematuria, or incontinence      Consultant(s) Notes Reviewed:  [x ] YES  [ ] NO    PHYSICAL EXAM:  GENERAL: NAD, well-groomed, well-developed,not in any distress ,  HEAD:  Atraumatic, Normocephalic  NECK: Supple, No JVD, Normal thyroid  NERVOUS SYSTEM:  Alert & Oriented X3, No focal deficit   CHEST/LUNG: Good air entry bilateral with no  rales, rhonchi, wheezing, or rubs  HEART: Regular rate and rhythm; No murmurs, rubs, or gallops  ABDOMEN: Soft, Nontender, Nondistended; Bowel sounds present  EXTREMITIES: R BKA     Care Discussed with Consultants/Other Providers [ x] YES  [ ] NO

## 2022-06-18 NOTE — PROGRESS NOTE ADULT - SUBJECTIVE AND OBJECTIVE BOX
Cardiovascular Disease Progress Note    Overnight events: No acute events overnight.    Ms. Boogie denies chest pain or SOB.   Otherwise review of systems negative    Objective Findings:  T(C): 36.8 (22 @ 10:10), Max: 37.3 (22 @ 17:08)  HR: 85 (22 @ 10:10) (62 - 94)  BP: 100/45 (22 @ 10:10) (100/45 - 149/53)  RR: 18 (22 @ 10:10) (17 - 18)  SpO2: 99% (22 @ 10:10) (98% - 100%)  Wt(kg): --  Daily     Daily Weight in k.3 (2022 14:20)      Physical Exam:  Gen: NAD; Patient resting comfortably  HEENT: EOMI, Normocephalic/ atraumatic  CV: RRR, normal S1 + S2, no m/r/g  Lungs:  Normal respiratory effort; clear to auscultation bilaterally  Abd: soft, non-tender; bowel sounds present  Ext: No edema; warm and well perfused    Telemetry: n/a    Laboratory Data:                        7.4    9.17  )-----------( 340      ( 2022 07:42 )             24.3         135  |  93<L>  |  24<H>  ----------------------------<  98  3.5   |  27  |  4.72<H>    Ca    8.6      2022 07:42  Phos  3.9       Mg     2.30                     Inpatient Medications:  MEDICATIONS  (STANDING):  acetaminophen     Tablet .. 650 milliGRAM(s) Oral every 8 hours  aspirin  chewable 81 milliGRAM(s) Oral daily  atorvastatin 40 milliGRAM(s) Oral at bedtime  chlorhexidine 2% Cloths 1 Application(s) Topical daily  clopidogrel Tablet 75 milliGRAM(s) Oral daily  collagenase Ointment 1 Application(s) Topical daily  epoetin niesha-epbx (RETACRIT) Injectable 34225 Unit(s) IV Push <User Schedule>  gabapentin 100 milliGRAM(s) Oral <User Schedule>  heparin   Injectable 5000 Unit(s) SubCutaneous every 12 hours  meropenem  IVPB 500 milliGRAM(s) IV Intermittent every 24 hours  polyethylene glycol 3350 17 Gram(s) Oral daily  senna 2 Tablet(s) Oral at bedtime  sevelamer carbonate 1600 milliGRAM(s) Oral three times a day with meals      Assessment:  61-year-old female with ESRD on HD, HLD and peripheral vascular disease s/p lower extremity resection presents with chronic limb ischemia.     Plan of Care:    #Post-operative evaluation-  Ms. Boogie tolerated R BKA well on .   She displays no signs or symptoms of post operative coronary ischemia or acute CHF.   - Continue with current cardiac management.     #Compensated diastolic CHF-  Volume status is improved.  Fluid removal with HD as per the renal team.   Recent echo noted- normal LV systolic function.    #Peripheral vascular disease.  - Podiatry and vascular input noted.     #ESRD-  - HD as per renal.      Over 25 minutes spent on total encounter; more than 50% of the visit was spent counseling and/or coordinating care by the attending physician.      Cristino Adams MD [facility]  Cardiovascular Disease  [phone]

## 2022-06-18 NOTE — PROGRESS NOTE ADULT - SUBJECTIVE AND OBJECTIVE BOX
Patient seen and examined  no complaints    No Known Allergies    Hospital Medications:   MEDICATIONS  (STANDING):  acetaminophen     Tablet .. 650 milliGRAM(s) Oral every 8 hours  aspirin  chewable 81 milliGRAM(s) Oral daily  atorvastatin 40 milliGRAM(s) Oral at bedtime  chlorhexidine 2% Cloths 1 Application(s) Topical daily  clopidogrel Tablet 75 milliGRAM(s) Oral daily  collagenase Ointment 1 Application(s) Topical daily  epoetin niesha-epbx (RETACRIT) Injectable 02716 Unit(s) IV Push <User Schedule>  gabapentin 100 milliGRAM(s) Oral <User Schedule>  heparin   Injectable 5000 Unit(s) SubCutaneous every 12 hours  meropenem  IVPB 500 milliGRAM(s) IV Intermittent every 24 hours  polyethylene glycol 3350 17 Gram(s) Oral daily  senna 2 Tablet(s) Oral at bedtime  sevelamer carbonate 1600 milliGRAM(s) Oral three times a day with meals        VITALS:  T(F): 98.3 (06-18-22 @ 10:10), Max: 99.1 (06-17-22 @ 17:08)  HR: 85 (06-18-22 @ 10:10)  BP: 100/45 (06-18-22 @ 10:10)  RR: 18 (06-18-22 @ 10:10)  SpO2: 99% (06-18-22 @ 10:10)  Wt(kg): --    06-17 @ 07:01  -  06-18 @ 07:00  --------------------------------------------------------  IN: 1070 mL / OUT: 2000 mL / NET: -930 mL    06-18 @ 07:01  -  06-18 @ 11:27  --------------------------------------------------------  IN: 240 mL / OUT: 0 mL / NET: 240 mL        PHYSICAL EXAM:  Constitutional: NAD  HEENT: anicteric sclera  Neck: No JVD  Respiratory: CTAB, no wheezes, rales or rhonchi  Cardiovascular: S1, S2, RRR  Gastrointestinal: BS+, soft, NT/ND  Extremities: rt BKA+.  left foot dressing+  Neurological: A/O x 3  Psychiatric: Normal mood, normal affect  : No salcido.   Vascular Access: rt femoral AVG+    LABS:  06-18    135  |  93<L>  |  24<H>  ----------------------------<  98  3.5   |  27  |  4.72<H>    Ca    8.6      18 Jun 2022 07:42  Phos  3.9     06-18  Mg     2.30     06-18      Creatinine Trend: 4.72 <--, 6.76 <--, 5.34 <--, 3.49 <--, 5.91 <--, 7.35 <--, 5.56 <--                        7.4    9.17  )-----------( 340      ( 18 Jun 2022 07:42 )             24.3     Urine Studies:      RADIOLOGY & ADDITIONAL STUDIES:

## 2022-06-19 LAB
ANION GAP SERPL CALC-SCNC: 18 MMOL/L — HIGH (ref 7–14)
BUN SERPL-MCNC: 36 MG/DL — HIGH (ref 7–23)
CALCIUM SERPL-MCNC: 8.8 MG/DL — SIGNIFICANT CHANGE UP (ref 8.4–10.5)
CHLORIDE SERPL-SCNC: 92 MMOL/L — LOW (ref 98–107)
CO2 SERPL-SCNC: 26 MMOL/L — SIGNIFICANT CHANGE UP (ref 22–31)
CREAT SERPL-MCNC: 6.55 MG/DL — HIGH (ref 0.5–1.3)
EGFR: 7 ML/MIN/1.73M2 — LOW
GLUCOSE SERPL-MCNC: 98 MG/DL — SIGNIFICANT CHANGE UP (ref 70–99)
HCT VFR BLD CALC: 25.3 % — LOW (ref 34.5–45)
HGB BLD-MCNC: 7.6 G/DL — LOW (ref 11.5–15.5)
MAGNESIUM SERPL-MCNC: 2.5 MG/DL — SIGNIFICANT CHANGE UP (ref 1.6–2.6)
MCHC RBC-ENTMCNC: 27.7 PG — SIGNIFICANT CHANGE UP (ref 27–34)
MCHC RBC-ENTMCNC: 30 GM/DL — LOW (ref 32–36)
MCV RBC AUTO: 92.3 FL — SIGNIFICANT CHANGE UP (ref 80–100)
NRBC # BLD: 0 /100 WBCS — SIGNIFICANT CHANGE UP
NRBC # FLD: 0 K/UL — SIGNIFICANT CHANGE UP
PHOSPHATE SERPL-MCNC: 5 MG/DL — HIGH (ref 2.5–4.5)
PLATELET # BLD AUTO: 341 K/UL — SIGNIFICANT CHANGE UP (ref 150–400)
POTASSIUM SERPL-MCNC: 4 MMOL/L — SIGNIFICANT CHANGE UP (ref 3.5–5.3)
POTASSIUM SERPL-SCNC: 4 MMOL/L — SIGNIFICANT CHANGE UP (ref 3.5–5.3)
RBC # BLD: 2.74 M/UL — LOW (ref 3.8–5.2)
RBC # FLD: 15.7 % — HIGH (ref 10.3–14.5)
SODIUM SERPL-SCNC: 136 MMOL/L — SIGNIFICANT CHANGE UP (ref 135–145)
WBC # BLD: 7.58 K/UL — SIGNIFICANT CHANGE UP (ref 3.8–10.5)
WBC # FLD AUTO: 7.58 K/UL — SIGNIFICANT CHANGE UP (ref 3.8–10.5)

## 2022-06-19 RX ADMIN — CLOPIDOGREL BISULFATE 75 MILLIGRAM(S): 75 TABLET, FILM COATED ORAL at 12:48

## 2022-06-19 RX ADMIN — Medication 650 MILLIGRAM(S): at 12:47

## 2022-06-19 RX ADMIN — SEVELAMER CARBONATE 1600 MILLIGRAM(S): 2400 POWDER, FOR SUSPENSION ORAL at 19:00

## 2022-06-19 RX ADMIN — MEROPENEM 100 MILLIGRAM(S): 1 INJECTION INTRAVENOUS at 19:00

## 2022-06-19 RX ADMIN — Medication 650 MILLIGRAM(S): at 13:47

## 2022-06-19 RX ADMIN — OXYCODONE HYDROCHLORIDE 5 MILLIGRAM(S): 5 TABLET ORAL at 20:00

## 2022-06-19 RX ADMIN — HEPARIN SODIUM 5000 UNIT(S): 5000 INJECTION INTRAVENOUS; SUBCUTANEOUS at 05:58

## 2022-06-19 RX ADMIN — Medication 650 MILLIGRAM(S): at 06:30

## 2022-06-19 RX ADMIN — SEVELAMER CARBONATE 1600 MILLIGRAM(S): 2400 POWDER, FOR SUSPENSION ORAL at 08:52

## 2022-06-19 RX ADMIN — OXYCODONE HYDROCHLORIDE 5 MILLIGRAM(S): 5 TABLET ORAL at 19:00

## 2022-06-19 RX ADMIN — Medication 10 MILLIGRAM(S): at 10:13

## 2022-06-19 RX ADMIN — POLYETHYLENE GLYCOL 3350 17 GRAM(S): 17 POWDER, FOR SOLUTION ORAL at 12:49

## 2022-06-19 RX ADMIN — HEPARIN SODIUM 5000 UNIT(S): 5000 INJECTION INTRAVENOUS; SUBCUTANEOUS at 19:00

## 2022-06-19 RX ADMIN — CHLORHEXIDINE GLUCONATE 1 APPLICATION(S): 213 SOLUTION TOPICAL at 12:48

## 2022-06-19 RX ADMIN — Medication 650 MILLIGRAM(S): at 05:58

## 2022-06-19 RX ADMIN — Medication 81 MILLIGRAM(S): at 12:49

## 2022-06-19 RX ADMIN — SENNA PLUS 2 TABLET(S): 8.6 TABLET ORAL at 22:11

## 2022-06-19 RX ADMIN — Medication 1 APPLICATION(S): at 12:49

## 2022-06-19 RX ADMIN — ATORVASTATIN CALCIUM 40 MILLIGRAM(S): 80 TABLET, FILM COATED ORAL at 22:11

## 2022-06-19 RX ADMIN — SEVELAMER CARBONATE 1600 MILLIGRAM(S): 2400 POWDER, FOR SUSPENSION ORAL at 12:48

## 2022-06-19 RX ADMIN — Medication 5 MILLIGRAM(S): at 22:11

## 2022-06-19 NOTE — PROGRESS NOTE ADULT - ASSESSMENT
62 y/o woman w/ ESRD on HD (MWF), HF, 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 Admitted for further evaluation     Problem/Plan - 1:  ·  Problem: Wound of right foot with  Cellulitis with Left 4th toe OM .   ·  Plan: S/P  Right BKA   Pt w/ increasing R toe pain when walking as well as swelling. Mild white count and elevated inflammatory markers but afebrile with vitals stable  -podiatry and ID help appreciated.   -started  IV Meropenem  500mg q24hrs per iD .   - Pain management helping .     Problem/Plan - 2:  ·  Problem: PAD (peripheral artery disease).   ·  Plan: S/P RLE Angiogram but unable to do any intervention per Vascular.   -known PAD, continue asa/plavix. Pt denies any numbness or tingling.  -recent angiograms done w/ poor peripheral arterial flow per podiatry and so poor surgical candidate as above  -continue statin.     Problem/Plan - 3:  ·  Problem: ESRD (end stage renal disease) on dialysis.   ·  Plan: Renal helping with HD.   On MWF schedule.  -monitor I/O.     Problem/Plan - 4:  ·  Problem: HLD (hyperlipidemia).   ·  Plan: continue statin. check lipid profile.     Problem/Plan - 5:  ·  Problem: Constipation .   ·  Plan: Laxatives.       Dispo : DC planning pending above.

## 2022-06-19 NOTE — PROGRESS NOTE ADULT - SUBJECTIVE AND OBJECTIVE BOX
Date of Service  : 06-19-22     INTERVAL HPI/OVERNIGHT EVENTS: I feel fine.   Vital Signs Last 24 Hrs  T(C): 36.4 (19 Jun 2022 10:08), Max: 36.8 (19 Jun 2022 06:42)  T(F): 97.6 (19 Jun 2022 10:08), Max: 98.3 (19 Jun 2022 06:42)  HR: 89 (19 Jun 2022 10:08) (72 - 89)  BP: 100/44 (19 Jun 2022 10:08) (100/44 - 109/61)  BP(mean): --  RR: 17 (19 Jun 2022 10:08) (17 - 18)  SpO2: 100% (19 Jun 2022 10:08) (100% - 100%)  I&O's Summary    18 Jun 2022 07:01  -  19 Jun 2022 07:00  --------------------------------------------------------  IN: 960 mL / OUT: 0 mL / NET: 960 mL    19 Jun 2022 07:01  -  19 Jun 2022 16:09  --------------------------------------------------------  IN: 240 mL / OUT: 0 mL / NET: 240 mL      MEDICATIONS  (STANDING):  aspirin  chewable 81 milliGRAM(s) Oral daily  atorvastatin 40 milliGRAM(s) Oral at bedtime  bisacodyl 5 milliGRAM(s) Oral at bedtime  chlorhexidine 2% Cloths 1 Application(s) Topical daily  clopidogrel Tablet 75 milliGRAM(s) Oral daily  collagenase Ointment 1 Application(s) Topical daily  epoetin niesha-epbx (RETACRIT) Injectable 62389 Unit(s) IV Push <User Schedule>  gabapentin 100 milliGRAM(s) Oral <User Schedule>  heparin   Injectable 5000 Unit(s) SubCutaneous every 12 hours  meropenem  IVPB 500 milliGRAM(s) IV Intermittent every 24 hours  polyethylene glycol 3350 17 Gram(s) Oral daily  senna 2 Tablet(s) Oral at bedtime  sevelamer carbonate 1600 milliGRAM(s) Oral three times a day with meals    MEDICATIONS  (PRN):  HYDROmorphone  Injectable 0.5 milliGRAM(s) IV Push every 4 hours PRN Breakthrough Pain  oxyCODONE    IR 5 milliGRAM(s) Oral every 4 hours PRN Moderate-Severe Pain (4 - 10)    LABS:                        7.6    7.58  )-----------( 341      ( 19 Jun 2022 06:50 )             25.3     06-19    136  |  92<L>  |  36<H>  ----------------------------<  98  4.0   |  26  |  6.55<H>    Ca    8.8      19 Jun 2022 06:50  Phos  5.0     06-19  Mg     2.50     06-19          CAPILLARY BLOOD GLUCOSE              REVIEW OF SYSTEMS:  CONSTITUTIONAL: No fever, weight loss, or fatigue  EYES: No eye pain, visual disturbances, or discharge  ENMT:  No difficulty hearing, tinnitus, vertigo; No sinus or throat pain  NECK: No pain or stiffness  RESPIRATORY: No cough, wheezing, chills or hemoptysis; No shortness of breath  CARDIOVASCULAR: No chest pain, palpitations, dizziness, or leg swelling  GASTROINTESTINAL: No abdominal or epigastric pain. No nausea, vomiting, or hematemesis; No diarrhea or constipation. No melena or hematochezia.    Consultant(s) Notes Reviewed:  [x ] YES  [ ] NO    PHYSICAL EXAM:  GENERAL: NAD, well-groomed, well-developed, not in any distress ,  HEAD:  Atraumatic, Normocephalic  NECK: Supple, No JVD, Normal thyroid  NERVOUS SYSTEM:  Alert & Oriented X3, No focal deficit   CHEST/LUNG: Good air entry bilateral with no  rales, rhonchi, wheezing, or rubs  HEART: Regular rate and rhythm; No murmurs, rubs, or gallops  ABDOMEN: Soft, Nontender, Nondistended; Bowel sounds present  EXTREMITIES:  R BKA and left foot dressed     Care Discussed with Consultants/Other Providers [ x] YES  [ ] NO

## 2022-06-19 NOTE — PROGRESS NOTE ADULT - ASSESSMENT
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 Admitted for further evaluation. Renal following for ESRD Mx.     ESRD on HD  schedule hemodialysis - Monday, Wednesday and Friday   access- thigh AVG  HD unit SQDC  K, vol ok     plan for next hemodialysis Monday w/ 2 k bath, Ultrafiltration 1kg as tolerated with blood pressure   renal diet  dose all meds for ESRD  renal restrictions in diet.    Anemia of CKD- Hb < goal now.   [id number] w/hd tiw  trend hgb    Hyperphosphatemia-   c/w Renvela 2tid w/food  trend rei and phos    HTN, controlled. bp stable. not on anti-htn meds  PAD, infected rt foot wounds, ?OM- s/p vanco, on zosyn. f/u w/Podiatry. on meropenem now. s/p MRI foot w/o Morgan  f/u w/vas sx.   Podiatry f/u - patient s/p left foot TMA and right foot partial 1st ray amputation 6/8  s/p LE angiogram 6/7  s/p rt BKA 6/15      For any question, call:  Cell # [phone]  Pager # [phone]  Callback # [phone]

## 2022-06-19 NOTE — CHART NOTE - NSCHARTNOTEFT_GEN_A_CORE
Medicine NP note     Notified by RN that pt is c/o constipation despite bowel regimen, suppository Dulcolax ordered, will monitor.    Ayana Bermudez NP-C  Department of Medicine- [facility]  In House Pager #[phone]

## 2022-06-19 NOTE — PROGRESS NOTE ADULT - SUBJECTIVE AND OBJECTIVE BOX
[facility] - TIFFANY Bazzi / TIFFANY Shea / APARNA Vanegas/ TIFFANY Mcfarlane/ TIFFANY Madden/ MASSIEL Perkins / RAINER Potts / ESTEPHANIE Griffith  ---------------------------------------------------------------------------------------------------------------  seen and examined today for ESRD  Interval : NAD  VITALS:  T(F): 98.3 (06-19-22 @ 06:42), Max: 98.3 (06-18-22 @ 10:10)  HR: 80 (06-19-22 @ 06:42)  BP: 109/61 (06-19-22 @ 06:42)  RR: 17 (06-19-22 @ 06:42)  SpO2: 100% (06-19-22 @ 06:42)  Wt(kg): --    06-18 @ 07:01  -  06-19 @ 07:00  --------------------------------------------------------  IN: 960 mL / OUT: 0 mL / NET: 960 mL    06-19 @ 07:01  -  06-19 @ 10:08  --------------------------------------------------------  IN: 240 mL / OUT: 0 mL / NET: 240 mL      Physical Exam :-  Constitutional: NAD  Neck: Supple.  Respiratory: Bilateral equal breath sounds,  Cardiovascular: S1, S2 normal,  Gastrointestinal: Bowel Sounds present, soft, non tender.  Extremities: Right BKA  Neurological: Alert and Oriented x 3, no focal deficits  Psychiatric: Normal mood, normal affect  Data:-  Allergies :   No Known Allergies    Hospital Medications:   MEDICATIONS  (STANDING):  acetaminophen     Tablet .. 650 milliGRAM(s) Oral every 8 hours  aspirin  chewable 81 milliGRAM(s) Oral daily  atorvastatin 40 milliGRAM(s) Oral at bedtime  bisacodyl 5 milliGRAM(s) Oral at bedtime  bisacodyl Suppository 10 milliGRAM(s) Rectal once  chlorhexidine 2% Cloths 1 Application(s) Topical daily  clopidogrel Tablet 75 milliGRAM(s) Oral daily  collagenase Ointment 1 Application(s) Topical daily  epoetin niesha-epbx (RETACRIT) Injectable 73108 Unit(s) IV Push <User Schedule>  gabapentin 100 milliGRAM(s) Oral <User Schedule>  heparin   Injectable 5000 Unit(s) SubCutaneous every 12 hours  meropenem  IVPB 500 milliGRAM(s) IV Intermittent every 24 hours  polyethylene glycol 3350 17 Gram(s) Oral daily  senna 2 Tablet(s) Oral at bedtime  sevelamer carbonate 1600 milliGRAM(s) Oral three times a day with meals    06-19    136  |  92<L>  |  36<H>  ----------------------------<  98  4.0   |  26  |  6.55<H>    Ca    8.8      19 Jun 2022 06:50  Phos  5.0     06-19  Mg     2.50     06-19      Creatinine Trend: 6.55 <--, 4.72 <--, 6.76 <--, 5.34 <--, 3.49 <--, 5.91 <--, 7.35 <--                        7.6    7.58  )-----------( 341      ( 19 Jun 2022 06:50 )             25.3

## 2022-06-19 NOTE — PROGRESS NOTE ADULT - SUBJECTIVE AND OBJECTIVE BOX
Cardiovascular Disease Progress Note    Overnight events: No acute events overnight.    Ms. Boogie denies chest pain or SOB.   Otherwise review of systems negative    Objective Findings:  T(C): 36.8 (06-19-22 @ 06:42), Max: 36.8 (06-18-22 @ 10:10)  HR: 80 (06-19-22 @ 06:42) (72 - 85)  BP: 109/61 (06-19-22 @ 06:42) (100/45 - 109/61)  RR: 17 (06-19-22 @ 06:42) (17 - 18)  SpO2: 100% (06-19-22 @ 06:42) (98% - 100%)  Wt(kg): --  Daily     Daily       Physical Exam:  Gen: NAD; Patient resting comfortably  HEENT: EOMI, Normocephalic/ atraumatic  CV: RRR, normal S1 + S2, no m/r/g  Lungs:  Normal respiratory effort; clear to auscultation bilaterally  Abd: soft, non-tender; bowel sounds present  Ext: No edema;     Telemetry: n/a    Laboratory Data:                        7.6    7.58  )-----------( 341      ( 19 Jun 2022 06:50 )             25.3     06-19    136  |  92<L>  |  36<H>  ----------------------------<  98  4.0   |  26  |  6.55<H>    Ca    8.8      19 Jun 2022 06:50  Phos  5.0     06-19  Mg     2.50     06-19                Inpatient Medications:  MEDICATIONS  (STANDING):  acetaminophen     Tablet .. 650 milliGRAM(s) Oral every 8 hours  aspirin  chewable 81 milliGRAM(s) Oral daily  atorvastatin 40 milliGRAM(s) Oral at bedtime  bisacodyl 5 milliGRAM(s) Oral at bedtime  bisacodyl Suppository 10 milliGRAM(s) Rectal once  chlorhexidine 2% Cloths 1 Application(s) Topical daily  clopidogrel Tablet 75 milliGRAM(s) Oral daily  collagenase Ointment 1 Application(s) Topical daily  epoetin niesha-epbx (RETACRIT) Injectable 98272 Unit(s) IV Push <User Schedule>  gabapentin 100 milliGRAM(s) Oral <User Schedule>  heparin   Injectable 5000 Unit(s) SubCutaneous every 12 hours  meropenem  IVPB 500 milliGRAM(s) IV Intermittent every 24 hours  polyethylene glycol 3350 17 Gram(s) Oral daily  senna 2 Tablet(s) Oral at bedtime  sevelamer carbonate 1600 milliGRAM(s) Oral three times a day with meals      Assessment:  61-year-old female with ESRD on HD, HLD and peripheral vascular disease s/p lower extremity resection presents with chronic limb ischemia.     Plan of Care:    #Post-operative evaluation-  Ms. Boogie tolerated R BKA well on 6/14.   She displays no signs or symptoms of post operative coronary ischemia or acute CHF.   - Continue with current cardiac management.     #Compensated diastolic CHF-  Volume status is improved.  Fluid removal with HD as per the renal team.   Recent echo noted- normal LV systolic function.    #Peripheral vascular disease.  - Podiatry and vascular input noted.     #ESRD-  - HD as per renal.    #ACP (advance care planning)-  Advanced care planning was discussed with the patient.   Cardiac findings were discussed in detail and all questions were answered.      Over 25 minutes spent on total encounter; more than 50% of the visit was spent counseling and/or coordinating care by the attending physician.      Cristino Adams MD [facility]  Cardiovascular Disease  [phone]

## 2022-06-19 NOTE — CHART NOTE - NSCHARTNOTESELECT_GEN_ALL_CORE
Event Note
Vascular Surgery
Chart Note/Nutrition Services
Event Note
Event Note
POC
POST-OP CHECK
PreOp/Event Note
wound care/Event Note

## 2022-06-20 LAB
ANION GAP SERPL CALC-SCNC: 17 MMOL/L — HIGH (ref 7–14)
BUN SERPL-MCNC: 46 MG/DL — HIGH (ref 7–23)
CALCIUM SERPL-MCNC: 9 MG/DL — SIGNIFICANT CHANGE UP (ref 8.4–10.5)
CHLORIDE SERPL-SCNC: 89 MMOL/L — LOW (ref 98–107)
CO2 SERPL-SCNC: 28 MMOL/L — SIGNIFICANT CHANGE UP (ref 22–31)
CREAT SERPL-MCNC: 8.48 MG/DL — HIGH (ref 0.5–1.3)
EGFR: 5 ML/MIN/1.73M2 — LOW
GLUCOSE SERPL-MCNC: 108 MG/DL — HIGH (ref 70–99)
HCT VFR BLD CALC: 25.8 % — LOW (ref 34.5–45)
HGB BLD-MCNC: 7.6 G/DL — LOW (ref 11.5–15.5)
MAGNESIUM SERPL-MCNC: 2.6 MG/DL — SIGNIFICANT CHANGE UP (ref 1.6–2.6)
MCHC RBC-ENTMCNC: 27.3 PG — SIGNIFICANT CHANGE UP (ref 27–34)
MCHC RBC-ENTMCNC: 29.5 GM/DL — LOW (ref 32–36)
MCV RBC AUTO: 92.8 FL — SIGNIFICANT CHANGE UP (ref 80–100)
NRBC # BLD: 0 /100 WBCS — SIGNIFICANT CHANGE UP
NRBC # FLD: 0 K/UL — SIGNIFICANT CHANGE UP
PHOSPHATE SERPL-MCNC: 6 MG/DL — HIGH (ref 2.5–4.5)
PLATELET # BLD AUTO: 380 K/UL — SIGNIFICANT CHANGE UP (ref 150–400)
POTASSIUM SERPL-MCNC: 4 MMOL/L — SIGNIFICANT CHANGE UP (ref 3.5–5.3)
POTASSIUM SERPL-SCNC: 4 MMOL/L — SIGNIFICANT CHANGE UP (ref 3.5–5.3)
RBC # BLD: 2.78 M/UL — LOW (ref 3.8–5.2)
RBC # FLD: 15.8 % — HIGH (ref 10.3–14.5)
SODIUM SERPL-SCNC: 134 MMOL/L — LOW (ref 135–145)
WBC # BLD: 7.48 K/UL — SIGNIFICANT CHANGE UP (ref 3.8–10.5)
WBC # FLD AUTO: 7.48 K/UL — SIGNIFICANT CHANGE UP (ref 3.8–10.5)

## 2022-06-20 RX ADMIN — ATORVASTATIN CALCIUM 40 MILLIGRAM(S): 80 TABLET, FILM COATED ORAL at 21:24

## 2022-06-20 RX ADMIN — SEVELAMER CARBONATE 1600 MILLIGRAM(S): 2400 POWDER, FOR SUSPENSION ORAL at 17:14

## 2022-06-20 RX ADMIN — Medication 81 MILLIGRAM(S): at 17:15

## 2022-06-20 RX ADMIN — SEVELAMER CARBONATE 1600 MILLIGRAM(S): 2400 POWDER, FOR SUSPENSION ORAL at 08:39

## 2022-06-20 RX ADMIN — MEROPENEM 100 MILLIGRAM(S): 1 INJECTION INTRAVENOUS at 17:14

## 2022-06-20 RX ADMIN — OXYCODONE HYDROCHLORIDE 5 MILLIGRAM(S): 5 TABLET ORAL at 13:13

## 2022-06-20 RX ADMIN — Medication 1 APPLICATION(S): at 17:16

## 2022-06-20 RX ADMIN — GABAPENTIN 100 MILLIGRAM(S): 400 CAPSULE ORAL at 09:31

## 2022-06-20 RX ADMIN — HEPARIN SODIUM 5000 UNIT(S): 5000 INJECTION INTRAVENOUS; SUBCUTANEOUS at 05:38

## 2022-06-20 RX ADMIN — OXYCODONE HYDROCHLORIDE 5 MILLIGRAM(S): 5 TABLET ORAL at 21:27

## 2022-06-20 RX ADMIN — HEPARIN SODIUM 5000 UNIT(S): 5000 INJECTION INTRAVENOUS; SUBCUTANEOUS at 17:14

## 2022-06-20 RX ADMIN — CLOPIDOGREL BISULFATE 75 MILLIGRAM(S): 75 TABLET, FILM COATED ORAL at 17:15

## 2022-06-20 RX ADMIN — OXYCODONE HYDROCHLORIDE 5 MILLIGRAM(S): 5 TABLET ORAL at 12:44

## 2022-06-20 RX ADMIN — SENNA PLUS 2 TABLET(S): 8.6 TABLET ORAL at 21:24

## 2022-06-20 RX ADMIN — ERYTHROPOIETIN 16000 UNIT(S): 10000 INJECTION, SOLUTION INTRAVENOUS; SUBCUTANEOUS at 13:07

## 2022-06-20 RX ADMIN — CHLORHEXIDINE GLUCONATE 1 APPLICATION(S): 213 SOLUTION TOPICAL at 17:15

## 2022-06-20 NOTE — PROGRESS NOTE ADULT - ASSESSMENT
62 yo f left foot partial 3rd ray amputation (DOS 3/4/2022)  -pt seen and evaluated  - Afebrile , No leukocytosis  - 3/4/22 s/p L foot partial 3rd ray resection open: fibrotic wound to subQ, no malodor, no drainage, no purulence, flaps cool, no maceration, distal skin necrosis, no tracking, no purulence, no erythema    - Pt is s/p recent b/l LE angiograms with poor peripheral arterial flow, is a poor surgical candidate  - B/L foot MRI reviewed   - s/p right BKA  - Pod plan Local wound care   - Prognosis of L foot is highly guarded   - Patient is stable for discharge from podiatry standpoint, f/u information & instruction in d/c provider note   - Discussed with attending

## 2022-06-20 NOTE — PROGRESS NOTE ADULT - ASSESSMENT
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 Admitted for further evaluation     Problem/Plan - 1:  ·  Problem: Wound of right foot with  Cellulitis with Left 4th toe OM .   ·  Plan: S/P  Right BKA   -podiatry and ID help appreciated.   -S/P   IV Meropenem  500mg q24hrs per iD .   - Pain management helping .     Problem/Plan - 2:  ·  Problem: PAD (peripheral artery disease).   ·  Plan: S/P RLE Angiogram but unable to do any intervention per Vascular.   -known PAD, continue asa/plavix. Pt denies any numbness or tingling.  -recent angiograms done w/ poor peripheral arterial flow per podiatry and so poor surgical candidate as above  -continue statin.     Problem/Plan - 3:  ·  Problem: ESRD (end stage renal disease) on dialysis.   ·  Plan: Renal helping with HD.   On MWF schedule.  -monitor I/O.     Problem/Plan - 4:  ·  Problem: HLD (hyperlipidemia).   ·  Plan: continue statin. check lipid profile.     Problem/Plan - 5:  ·  Problem: Constipation .   ·  Plan: Laxatives.       Dispo : DC planning pending to [facility] pending placement.

## 2022-06-20 NOTE — PROGRESS NOTE ADULT - SUBJECTIVE AND OBJECTIVE BOX
Date of Service  : 06-20-22     INTERVAL HPI/OVERNIGHT EVENTS: Getting HD today . Pain under control and had BM yesterday .   Vital Signs Last 24 Hrs  T(C): 36.8 (20 Jun 2022 21:07), Max: 37.3 (20 Jun 2022 02:00)  T(F): 98.2 (20 Jun 2022 21:07), Max: 99.1 (20 Jun 2022 02:00)  HR: 59 (20 Jun 2022 21:07) (59 - 86)  BP: 96/48 (20 Jun 2022 21:07) (96/48 - 136/55)  BP(mean): --  RR: 17 (20 Jun 2022 21:07) (17 - 18)  SpO2: 94% (20 Jun 2022 21:07) (94% - 100%)  I&O's Summary    19 Jun 2022 07:01  -  20 Jun 2022 07:00  --------------------------------------------------------  IN: 720 mL / OUT: 0 mL / NET: 720 mL    20 Jun 2022 07:01  -  20 Jun 2022 21:18  --------------------------------------------------------  IN: 880 mL / OUT: 1400 mL / NET: -520 mL      MEDICATIONS  (STANDING):  aspirin  chewable 81 milliGRAM(s) Oral daily  atorvastatin 40 milliGRAM(s) Oral at bedtime  bisacodyl 5 milliGRAM(s) Oral at bedtime  chlorhexidine 2% Cloths 1 Application(s) Topical daily  clopidogrel Tablet 75 milliGRAM(s) Oral daily  collagenase Ointment 1 Application(s) Topical daily  epoetin niesha-epbx (RETACRIT) Injectable 21591 Unit(s) IV Push <User Schedule>  gabapentin 100 milliGRAM(s) Oral <User Schedule>  heparin   Injectable 5000 Unit(s) SubCutaneous every 12 hours  polyethylene glycol 3350 17 Gram(s) Oral daily  senna 2 Tablet(s) Oral at bedtime  sevelamer carbonate 1600 milliGRAM(s) Oral three times a day with meals    MEDICATIONS  (PRN):  oxyCODONE    IR 5 milliGRAM(s) Oral every 4 hours PRN Moderate-Severe Pain (4 - 10)    LABS:                        7.6    7.48  )-----------( 380      ( 20 Jun 2022 05:19 )             25.8     06-20    134<L>  |  89<L>  |  46<H>  ----------------------------<  108<H>  4.0   |  28  |  8.48<H>    Ca    9.0      20 Jun 2022 05:19  Phos  6.0     06-20  Mg     2.60     06-20          CAPILLARY BLOOD GLUCOSE              REVIEW OF SYSTEMS:  CONSTITUTIONAL: No fever, weight loss, or fatigue  EYES: No eye pain, visual disturbances, or discharge  ENMT:  No difficulty hearing, tinnitus, vertigo; No sinus or throat pain  NECK: No pain or stiffness  RESPIRATORY: No cough, wheezing, chills or hemoptysis; No shortness of breath  CARDIOVASCULAR: No chest pain, palpitations, dizziness, or leg swelling  GASTROINTESTINAL: No abdominal or epigastric pain. No nausea, vomiting, or hematemesis; No diarrhea or constipation. No melena or hematochezia.  GENITOURINARY: No dysuria, frequency, hematuria, or incontinence  NEUROLOGICAL: No headaches, memory loss, loss of strength, numbness, or tremors      RADIOLOGY & ADDITIONAL TESTS:    Consultant(s) Notes Reviewed:  [x ] YES  [ ] NO    PHYSICAL EXAM:  GENERAL: NAD, well-groomed, well-developed,not in any distress ,  HEAD:  Atraumatic, Normocephalic  NECK: Supple, No JVD, Normal thyroid  NERVOUS SYSTEM:  Alert & Oriented X3, No focal deficit   CHEST/LUNG: Good air entry bilateral with no  rales, rhonchi, wheezing, or rubs  HEART: Regular rate and rhythm; No murmurs, rubs, or gallops  ABDOMEN: Soft, Nontender, Nondistended; Bowel sounds present  EXTREMITIES: RBKA     Care Discussed with Consultants/Other Providers [ x] YES  [ ] NO

## 2022-06-20 NOTE — PROGRESS NOTE ADULT - SUBJECTIVE AND OBJECTIVE BOX
Cardiovascular Disease Progress Note    Overnight events: No acute events overnight. Ms. Boogie denies chest pain or SOB.     Otherwise review of systems negative    Objective Findings:  T(C): 36.9 (06-20-22 @ 06:14), Max: 37.3 (06-20-22 @ 02:00)  HR: 82 (06-20-22 @ 06:14) (80 - 89)  BP: 107/54 (06-20-22 @ 06:14) (100/44 - 115/41)  RR: 18 (06-20-22 @ 06:14) (17 - 18)  SpO2: 100% (06-20-22 @ 06:14) (99% - 100%)  Wt(kg): --  Daily     Daily       Physical Exam:  Gen: NAD; Patient resting comfortably  HEENT: EOMI, Normocephalic/ atraumatic  CV: RRR, normal S1 + S2, no m/r/g  Lungs:  Normal respiratory effort; clear to auscultation bilaterally  Abd: soft, non-tender; bowel sounds present  Ext: No edema;      Telemetry: n/a    Laboratory Data:                        7.6    7.48  )-----------( 380      ( 20 Jun 2022 05:19 )             25.8     06-20    134<L>  |  89<L>  |  46<H>  ----------------------------<  108<H>  4.0   |  28  |  8.48<H>    Ca    9.0      20 Jun 2022 05:19  Phos  6.0     06-20  Mg     2.60     06-20                Inpatient Medications:  MEDICATIONS  (STANDING):  aspirin  chewable 81 milliGRAM(s) Oral daily  atorvastatin 40 milliGRAM(s) Oral at bedtime  bisacodyl 5 milliGRAM(s) Oral at bedtime  chlorhexidine 2% Cloths 1 Application(s) Topical daily  clopidogrel Tablet 75 milliGRAM(s) Oral daily  collagenase Ointment 1 Application(s) Topical daily  epoetin niesha-epbx (RETACRIT) Injectable 92011 Unit(s) IV Push <User Schedule>  gabapentin 100 milliGRAM(s) Oral <User Schedule>  heparin   Injectable 5000 Unit(s) SubCutaneous every 12 hours  meropenem  IVPB 500 milliGRAM(s) IV Intermittent every 24 hours  polyethylene glycol 3350 17 Gram(s) Oral daily  senna 2 Tablet(s) Oral at bedtime  sevelamer carbonate 1600 milliGRAM(s) Oral three times a day with meals      Assessment: 61-year-old female with ESRD on HD, HLD and peripheral vascular disease s/p lower extremity resection presents with chronic limb ischemia.     Plan of Care:    #Post-operative evaluation-  Ms. Boogie tolerated R BKA well on 6/14.   She displays no signs or symptoms of post operative coronary ischemia or acute CHF.   - Continue with current cardiac management.     #Compensated diastolic CHF-  Volume status is improved.  Fluid removal with HD as per the renal team.   Recent echo noted- normal LV systolic function.    #Peripheral vascular disease.  - Podiatry and vascular input noted.     #ESRD-  - HD as per renal.      Over 25 minutes spent on total encounter; more than 50% of the visit was spent counseling and/or coordinating care by the attending physician.      Cristino Adams MD [facility]  Cardiovascular Disease  [phone]

## 2022-06-20 NOTE — PROGRESS NOTE ADULT - SUBJECTIVE AND OBJECTIVE BOX
Podiatry pager #: [phone] ([place])/ [phone] ([facility])    Patient is a 61y old  Female who presents with a chief complaint of RLE toe pain (20 Jun 2022 08:50)       INTERVAL HPI/OVERNIGHT EVENTS:  Patient seen and evaluated at bedside.  Pt is resting comfortable in NAD. Denies N/V/F/C.     Allergies    No Known Allergies    Intolerances        Vital Signs Last 24 Hrs  T(C): 36.9 (20 Jun 2022 06:14), Max: 37.3 (20 Jun 2022 02:00)  T(F): 98.4 (20 Jun 2022 06:14), Max: 99.1 (20 Jun 2022 02:00)  HR: 82 (20 Jun 2022 06:14) (80 - 89)  BP: 107/54 (20 Jun 2022 06:14) (100/44 - 115/41)  BP(mean): --  RR: 18 (20 Jun 2022 06:14) (17 - 18)  SpO2: 100% (20 Jun 2022 06:14) (99% - 100%)    LABS:                        7.6    7.48  )-----------( 380      ( 20 Jun 2022 05:19 )             25.8     06-20    134<L>  |  89<L>  |  46<H>  ----------------------------<  108<H>  4.0   |  28  |  8.48<H>    Ca    9.0      20 Jun 2022 05:19  Phos  6.0     06-20  Mg     2.60     06-20          CAPILLARY BLOOD GLUCOSE          Lower Extremity Physical Exam:  Neuro: Epicritic sensation diminished to the level of forefoot B/L  Musculoskeletal/Ortho: left partial 5th ray resection healed, LF partial 4th ray resection open, s/p R BKA  Skin:   3/4/22 s/p L foot partial 3rd ray resection open: fibrotic wound to subQ, no malodor, no drainage, no purulence, flaps cool, no maceration, distal skin necrosis, no tracking, no purulence, no erythema        RADIOLOGY & ADDITIONAL TESTS:

## 2022-06-21 LAB
ANION GAP SERPL CALC-SCNC: 11 MMOL/L — SIGNIFICANT CHANGE UP (ref 7–14)
BUN SERPL-MCNC: 26 MG/DL — HIGH (ref 7–23)
CALCIUM SERPL-MCNC: 9 MG/DL — SIGNIFICANT CHANGE UP (ref 8.4–10.5)
CHLORIDE SERPL-SCNC: 95 MMOL/L — LOW (ref 98–107)
CO2 SERPL-SCNC: 31 MMOL/L — SIGNIFICANT CHANGE UP (ref 22–31)
CREAT SERPL-MCNC: 5.56 MG/DL — HIGH (ref 0.5–1.3)
EGFR: 8 ML/MIN/1.73M2 — LOW
GLUCOSE SERPL-MCNC: 109 MG/DL — HIGH (ref 70–99)
HCT VFR BLD CALC: 24.4 % — LOW (ref 34.5–45)
HGB BLD-MCNC: 7.3 G/DL — LOW (ref 11.5–15.5)
MAGNESIUM SERPL-MCNC: 2.5 MG/DL — SIGNIFICANT CHANGE UP (ref 1.6–2.6)
MCHC RBC-ENTMCNC: 27.3 PG — SIGNIFICANT CHANGE UP (ref 27–34)
MCHC RBC-ENTMCNC: 29.9 GM/DL — LOW (ref 32–36)
MCV RBC AUTO: 91.4 FL — SIGNIFICANT CHANGE UP (ref 80–100)
NRBC # BLD: 0 /100 WBCS — SIGNIFICANT CHANGE UP
NRBC # FLD: 0 K/UL — SIGNIFICANT CHANGE UP
PHOSPHATE SERPL-MCNC: 4.5 MG/DL — SIGNIFICANT CHANGE UP (ref 2.5–4.5)
PLATELET # BLD AUTO: 387 K/UL — SIGNIFICANT CHANGE UP (ref 150–400)
POTASSIUM SERPL-MCNC: 4.2 MMOL/L — SIGNIFICANT CHANGE UP (ref 3.5–5.3)
POTASSIUM SERPL-SCNC: 4.2 MMOL/L — SIGNIFICANT CHANGE UP (ref 3.5–5.3)
RBC # BLD: 2.67 M/UL — LOW (ref 3.8–5.2)
RBC # FLD: 15.9 % — HIGH (ref 10.3–14.5)
SODIUM SERPL-SCNC: 137 MMOL/L — SIGNIFICANT CHANGE UP (ref 135–145)
WBC # BLD: 7.81 K/UL — SIGNIFICANT CHANGE UP (ref 3.8–10.5)
WBC # FLD AUTO: 7.81 K/UL — SIGNIFICANT CHANGE UP (ref 3.8–10.5)

## 2022-06-21 PROCEDURE — 99231 SBSQ HOSP IP/OBS SF/LOW 25: CPT

## 2022-06-21 RX ORDER — POLYETHYLENE GLYCOL 3350 17 G/17G
17 POWDER, FOR SOLUTION ORAL
Refills: 0 | Status: DISCONTINUED | OUTPATIENT
Start: 2022-06-21 | End: 2022-06-30

## 2022-06-21 RX ADMIN — CHLORHEXIDINE GLUCONATE 1 APPLICATION(S): 213 SOLUTION TOPICAL at 12:18

## 2022-06-21 RX ADMIN — ATORVASTATIN CALCIUM 40 MILLIGRAM(S): 80 TABLET, FILM COATED ORAL at 21:24

## 2022-06-21 RX ADMIN — SEVELAMER CARBONATE 1600 MILLIGRAM(S): 2400 POWDER, FOR SUSPENSION ORAL at 12:16

## 2022-06-21 RX ADMIN — Medication 81 MILLIGRAM(S): at 12:15

## 2022-06-21 RX ADMIN — Medication 5 MILLIGRAM(S): at 21:24

## 2022-06-21 RX ADMIN — SEVELAMER CARBONATE 1600 MILLIGRAM(S): 2400 POWDER, FOR SUSPENSION ORAL at 09:11

## 2022-06-21 RX ADMIN — OXYCODONE HYDROCHLORIDE 5 MILLIGRAM(S): 5 TABLET ORAL at 12:15

## 2022-06-21 RX ADMIN — OXYCODONE HYDROCHLORIDE 5 MILLIGRAM(S): 5 TABLET ORAL at 12:45

## 2022-06-21 RX ADMIN — SEVELAMER CARBONATE 1600 MILLIGRAM(S): 2400 POWDER, FOR SUSPENSION ORAL at 17:07

## 2022-06-21 RX ADMIN — Medication 1 APPLICATION(S): at 12:16

## 2022-06-21 RX ADMIN — CLOPIDOGREL BISULFATE 75 MILLIGRAM(S): 75 TABLET, FILM COATED ORAL at 12:15

## 2022-06-21 RX ADMIN — HEPARIN SODIUM 5000 UNIT(S): 5000 INJECTION INTRAVENOUS; SUBCUTANEOUS at 17:07

## 2022-06-21 RX ADMIN — SENNA PLUS 2 TABLET(S): 8.6 TABLET ORAL at 21:24

## 2022-06-21 RX ADMIN — HEPARIN SODIUM 5000 UNIT(S): 5000 INJECTION INTRAVENOUS; SUBCUTANEOUS at 05:39

## 2022-06-21 NOTE — PHYSICAL THERAPY INITIAL EVALUATION ADULT - ACTIVE RANGE OF MOTION EXAMINATION, REHAB EVAL
Right knee not assessed secondary to knee immobilizer/no Active ROM deficits were identified
except ankle due to surgery/bilateral upper extremity Active ROM was WFL (within functional limits)/bilateral  lower extremity Active ROM was WFL (within functional limits)

## 2022-06-21 NOTE — PROGRESS NOTE ADULT - ASSESSMENT
62 y/o woman w/ ESRD on HD (MWF), HF, 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 Admitted for further evaluation. Renal following for ESRD Mx.     ESRD on HD  schedule hemodialysis - Monday, Wednesday and Friday   access- thigh AVG  HD unit SQDC  K, vol ok     s/p HD 6/20, rx sheet reviewed, net uf 1kg, tolerated well.   plan for next hemodialysis tomorrow w/ 2 k bath, Ultrafiltration 1kg as tolerated with blood pressure   renal diet  dose all meds for ESRD  renal restrictions in diet.  Anemia of CKD- Hb < goal now. increased DION epo 6>10>16k w/hd tiw. agree w/1unit PRBC w/hd tomorrow  Hyperphosphatemia- c/w renvlea 2tid w/food  HTN, controlled. bp stable. not on anti-htn meds  PAD, infected rt foot wounds, ?OM- s/p vanco, on zosyn. f/u w/Podiatry. on meropenem now. s/p MRI foot w/o Morgan  f/u w/vas sx.   Podiatry f/u - patient s/p left foot TMA and right foot partial 1st ray amputation 6/8  s/p LE angiogram 6/7  s/p rt BKA 6/15  pain control per team    awaiting rehab w/onsite hd  poc d/w pt, HD RN  labs, chart reviewed  For any question, call:  Cell # [phone]  Pager # [phone]  office # [phone]

## 2022-06-21 NOTE — PHYSICAL THERAPY INITIAL EVALUATION ADULT - DISCHARGE DISPOSITION, PT EVAL
Recommend discharge to rehabilitation to address impairments to return to prior level of function.
rehabilitation facility

## 2022-06-21 NOTE — PROGRESS NOTE ADULT - SUBJECTIVE AND OBJECTIVE BOX
Date of Service  : 06-21-22 @ 14:18    INTERVAL HPI/OVERNIGHT EVENTS: I feel fine.   Vital Signs Last 24 Hrs  T(C): 36.8 (21 Jun 2022 09:55), Max: 36.8 (20 Jun 2022 15:16)  T(F): 98.2 (21 Jun 2022 09:55), Max: 98.2 (20 Jun 2022 15:16)  HR: 85 (21 Jun 2022 09:55) (59 - 86)  BP: 98/42 (21 Jun 2022 09:55) (96/48 - 113/51)  BP(mean): --  RR: 18 (21 Jun 2022 09:55) (17 - 18)  SpO2: 100% (21 Jun 2022 09:55) (94% - 100%)  I&O's Summary    20 Jun 2022 07:01  -  21 Jun 2022 07:00  --------------------------------------------------------  IN: 1270 mL / OUT: 1400 mL / NET: -130 mL    21 Jun 2022 07:01  -  21 Jun 2022 14:18  --------------------------------------------------------  IN: 240 mL / OUT: 0 mL / NET: 240 mL      MEDICATIONS  (STANDING):  aspirin  chewable 81 milliGRAM(s) Oral daily  atorvastatin 40 milliGRAM(s) Oral at bedtime  bisacodyl 5 milliGRAM(s) Oral at bedtime  chlorhexidine 2% Cloths 1 Application(s) Topical daily  clopidogrel Tablet 75 milliGRAM(s) Oral daily  collagenase Ointment 1 Application(s) Topical daily  epoetin niesha-epbx (RETACRIT) Injectable 30262 Unit(s) IV Push <User Schedule>  gabapentin 100 milliGRAM(s) Oral <User Schedule>  heparin   Injectable 5000 Unit(s) SubCutaneous every 12 hours  polyethylene glycol 3350 17 Gram(s) Oral daily  senna 2 Tablet(s) Oral at bedtime  sevelamer carbonate 1600 milliGRAM(s) Oral three times a day with meals    MEDICATIONS  (PRN):  oxyCODONE    IR 5 milliGRAM(s) Oral every 4 hours PRN Moderate-Severe Pain (4 - 10)    LABS:                        7.3    7.81  )-----------( 387      ( 21 Jun 2022 06:50 )             24.4     06-21    137  |  95<L>  |  26<H>  ----------------------------<  109<H>  4.2   |  31  |  5.56<H>    Ca    9.0      21 Jun 2022 06:50  Phos  4.5     06-21  Mg     2.50     06-21          CAPILLARY BLOOD GLUCOSE              REVIEW OF SYSTEMS:  CONSTITUTIONAL: No fever, weight loss, or fatigue  EYES: No eye pain, visual disturbances, or discharge  ENMT:  No difficulty hearing, tinnitus, vertigo; No sinus or throat pain  NECK: No pain or stiffness  RESPIRATORY: No cough, wheezing, chills or hemoptysis; No shortness of breath  CARDIOVASCULAR: No chest pain, palpitations, dizziness, or leg swelling  GASTROINTESTINAL: No abdominal or epigastric pain. No nausea, vomiting, or hematemesis; No diarrhea or constipation. No melena or hematochezia.  GENITOURINARY: No dysuria, frequency, hematuria, or incontinence  NEUROLOGICAL: No headaches, memory loss, loss of strength, numbness, or tremors      Consultant(s) Notes Reviewed:  [x ] YES  [ ] NO    PHYSICAL EXAM:  GENERAL: NAD, well-groomed, well-developed ,not in any distress ,  HEAD:  Atraumatic, Normocephalic  NECK: Supple, No JVD, Normal thyroid  NERVOUS SYSTEM:  Alert & Oriented X3, No focal deficit   CHEST/LUNG: Good air entry bilateral with no  rales, rhonchi, wheezing, or rubs  HEART: Regular rate and rhythm; No murmurs, rubs, or gallops  ABDOMEN: Soft, Nontender, Nondistended; Bowel sounds present  EXTREMITIES:  RBKA and left foot dressed     Care Discussed with Consultants/Other Providers [ x] YES  [ ] NO

## 2022-06-21 NOTE — PROGRESS NOTE ADULT - SUBJECTIVE AND OBJECTIVE BOX
Follow Up:  right toe infection    Interval History/ROS:     feels ok  vascular and podiatry eval noted.       Allergies  No Known Allergies        ANTIMICROBIALS:  meropenem  IVPB 500 every 24 hours 6/3- 6/20      MEDICATIONS  (STANDING):  acetaminophen     Tablet .. 650 every 6 hours PRN  aspirin  chewable 81 daily  atorvastatin 40 at bedtime  clopidogrel Tablet 75 daily  epoetin niesha-epbx (RETACRIT) Injectable 6000 <User Schedule>  heparin   Injectable 5000 every 12 hours  HYDROmorphone  Injectable 0.5 every 4 hours PRN  polyethylene glycol 3350 17 daily  senna 2 at bedtime    Vital Signs Last 24 Hrs  T(F): 98.2 (06-21-22 @ 09:55), Max: 98.2 (06-20-22 @ 15:16)  HR: 85 (06-21-22 @ 09:55)  BP: 98/42 (06-21-22 @ 09:55)  RR: 18 (06-21-22 @ 09:55)  SpO2: 100% (06-21-22 @ 09:55) (94% - 100%)    PHYSICAL EXAM:  Constitutional: non toxic, sitting side of bed  Eyes: right eye cloudy   Oral cavity: Clear, no lesions  Neck: Supple  RS: Chest clear   CVS: S1, S2   Abdomen: Soft. No guarding/rigidity/tenderness.  : no salcido  Extremities:  HD access right thigh; right BKA in immobilizer; dressing left foot  Skin: No rash  Neuro: Alert, oriented to time/place/person  Cranial nerves 2-12 grossly normal. No focal abnormalities                                     7.3    7.81  )-----------( 387      ( 21 Jun 2022 06:50 )             24.4 06-21    137  |  95  |  26  ----------------------------<  109  4.2   |  31  |  5.56  Ca    9.0      21 Jun 2022 06:50Phos  4.5     06-21Mg     2.50     06-21              MICROBIOLOGY:    6/8 OR clean margin E coli     .Abscess right foot wound  05-30-22   Numerous Klebsiella pneumoniae ESBL  Numerous Escherichia coli  Moderate Pseudomonas aeruginosa  --  Klebsiella pneumoniae ESBL  Escherichia coli  Pseudomonas aeruginosa        RADIOLOGY:    ra< from: Xray Foot AP + Lateral + Oblique, Left (05.30.22 @ 15:11) >  IMPRESSION:  Increased permeative appearance of the left fourth metatarsal head and   phalanges, which could be seen in the setting of osteomyelitis.  No radiographic evidence of osteomyelitis involving the right foot.    MRI is more sensitive and can be considered for further evaluation as   clinically warranted.    --- End of Report ---    < end of copied text >

## 2022-06-21 NOTE — PHYSICAL THERAPY INITIAL EVALUATION ADULT - ADDITIONAL COMMENTS
Patient report lives with  in house, 11 steps, ambulated with a cane.
Pt lives in a private home with her ; there are 11 steps to negotiate. Pt reports she was independent with mobility, self-care, and ADLs; utilized cane for ambulation.

## 2022-06-21 NOTE — PHYSICAL THERAPY INITIAL EVALUATION ADULT - PATIENT PROFILE REVIEW, REHAB EVAL
PT initial evaluation received and chart review completed. Pt agreeable to participate in PT evaluation. Pt cleared by ACP Tristan./yes PT re-evaluation received and chart review completed. Pt agreeable to participate in PT evaluation. Pt cleared by ACP Tristan./yes

## 2022-06-21 NOTE — PHYSICAL THERAPY INITIAL EVALUATION ADULT - PLANNED THERAPY INTERVENTIONS, PT EVAL
stairs training/balance training/bed mobility training/gait training/strengthening/transfer training
neuromuscular re-education/postural re-education/transfer training

## 2022-06-21 NOTE — PHYSICAL THERAPY INITIAL EVALUATION ADULT - MANUAL MUSCLE TESTING RESULTS, REHAB EVAL
Unable to formally assess right LE secondary to knee immobilizer; pt able to perform straight leg raise in supine. Left LE grossly 4+/5
both UE 3+/5 both hip and knee 3/5

## 2022-06-21 NOTE — PROGRESS NOTE ADULT - ASSESSMENT
60 y/o woman w/ ESRD on HD (MWF), HF, glaucoma (blind in R eye), peripheral artery disease who presented 5/30 w/ R toe pain and swelling ongoing for about 4-5 days  S/p partial amputation of right 1st toe in 3/2022, returned with pain + swelling  Xray showed no OM on right foot, but possible OM on left 4th MT head and phalanges  Right toe wound Cx ESBL Klebsiella, E. coli, pseudomonas.  OR culture 6/8 partial ray amp grew E coli in clean margin  Hx ESBL Klebsiella and E. coli on left foot Cx    s/p OR for partial ray amp 6/8   s/p OR for BKA 6/15      IMPRESSION:  Right toe infection/ toe om now s/p BKA 6/15  PAD  s/p partial ray amputation 1st ray right foot 6/8   OR findings: liquefactive necrosis  ESRD on HD    podiatry eval left foot noted    Ertapenem 5/30- 6/3  Meropenem 6/3-6/20      RECOMMENDATIONS:    - observe off antibiotics

## 2022-06-21 NOTE — PHYSICAL THERAPY INITIAL EVALUATION ADULT - IMPAIRMENTS FOUND, PT EVAL
gait, locomotion, and balance/joint integrity and mobility/muscle strength/ROM
aerobic capacity/endurance/gait, locomotion, and balance/muscle strength

## 2022-06-21 NOTE — PROGRESS NOTE ADULT - SUBJECTIVE AND OBJECTIVE BOX
Cardiovascular Disease Progress Note    Overnight events: No acute events overnight.  Patient denies chest pain or SOB.   Otherwise review of systems negative    Objective Findings:  T(C): 36.7 (22 @ 06:30), Max: 36.8 (22 @ 09:15)  HR: 80 (22 @ 06:30) (59 - 86)  BP: 103/39 (22 @ 06:30) (96/48 - 136/55)  RR: 18 (22 @ 06:30) (17 - 18)  SpO2: 99% (22 @ 06:30) (94% - 100%)  Wt(kg): --  Daily     Daily Weight in k (2022 15:16)      Physical Exam:  Gen: NAD; Patient resting comfortably  HEENT: EOMI, Normocephalic/ atraumatic  CV: RRR, normal S1 + S2, no m/r/g  Lungs:  Normal respiratory effort; clear to auscultation bilaterally  Abd: soft, non-tender; bowel sounds present  Ext: No edema;     Telemetry: n/a    Laboratory Data:                        7.6    7.48  )-----------( 380      ( 2022 05:19 )             25.8     06-20    134<L>  |  89<L>  |  46<H>  ----------------------------<  108<H>  4.0   |  28  |  8.48<H>    Ca    9.0      2022 05:19  Phos  6.0     06-20  Mg     2.60     06-20                Inpatient Medications:  MEDICATIONS  (STANDING):  aspirin  chewable 81 milliGRAM(s) Oral daily  atorvastatin 40 milliGRAM(s) Oral at bedtime  bisacodyl 5 milliGRAM(s) Oral at bedtime  chlorhexidine 2% Cloths 1 Application(s) Topical daily  clopidogrel Tablet 75 milliGRAM(s) Oral daily  collagenase Ointment 1 Application(s) Topical daily  epoetin niesha-epbx (RETACRIT) Injectable 04102 Unit(s) IV Push <User Schedule>  gabapentin 100 milliGRAM(s) Oral <User Schedule>  heparin   Injectable 5000 Unit(s) SubCutaneous every 12 hours  polyethylene glycol 3350 17 Gram(s) Oral daily  senna 2 Tablet(s) Oral at bedtime  sevelamer carbonate 1600 milliGRAM(s) Oral three times a day with meals      Assessment: 61-year-old female with ESRD on HD, HLD and peripheral vascular disease s/p lower extremity resection presents with chronic limb ischemia.     Plan of Care:    #Post-operative evaluation-  Ms. Boogie tolerated R BKA well on .   She displays no signs or symptoms of post operative coronary ischemia or acute CHF.   - Continue with current cardiac management.     #Compensated diastolic CHF-  Volume status is improved.  Fluid removal with HD as per the renal team.   Recent echo noted- normal LV systolic function.    #Peripheral vascular disease.  - Podiatry and vascular input noted.     #ESRD-  - HD as per renal.      Over 25 minutes spent on total encounter; more than 50% of the visit was spent counseling and/or coordinating care by the attending physician.      Cristino Adams MD [facility]  Cardiovascular Disease  [phone]

## 2022-06-21 NOTE — PHYSICAL THERAPY INITIAL EVALUATION ADULT - LEVEL OF INDEPENDENCE: SIT/STAND, REHAB EVAL
Deferred due to bilateral NWB orders prior to podiatry consultation to confirm left LE NWB
minimum assist (75% patients effort)

## 2022-06-21 NOTE — PHYSICAL THERAPY INITIAL EVALUATION ADULT - PERTINENT HX OF CURRENT PROBLEM, REHAB EVAL
Pt is a 61 year old female presented to ED with right toe pain and swelling x 4-5 days. Pt had left foot partial 3rd ray amputation 03/2022. Pt underwent right foot partial first ray resection for wound and left foot TMA for OM 06/08. Pt then required right BKA for right foot OM 06/15.
Patient is 61 year old woman with ESRD on HD (MWF), HF, glaucoma (blind in R eye), peripheral artery disease and amputations of Lt foot partial 3rd ray/ Rt hallux, who presents to Er with right toe pain and swelling

## 2022-06-21 NOTE — PHYSICAL THERAPY INITIAL EVALUATION ADULT - DIAGNOSIS, PT EVAL
Upon evaluation, pt presents with impairments in transfers. Pt would benefit from skilled PT services in the acute care setting to address impairments to facilitate return to prior level of function.
s/p R foot partial first ray resection closed

## 2022-06-21 NOTE — PHYSICAL THERAPY INITIAL EVALUATION ADULT - GENERAL OBSERVATIONS, REHAB EVAL
Patient seen semi supine in bed, NAD.
Upon entry, pt seated on EOB in NAD; + right knee immobilizer. Pt left as received with all tubes/lines intact, bed alarm on, call bell in reach and in NAD.

## 2022-06-21 NOTE — PROGRESS NOTE ADULT - ASSESSMENT
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 Admitted for further evaluation     Problem/Plan - 1:  ·  Problem: Wound of right foot with  Cellulitis with Left 4th toe OM .   ·  Plan: S/P  Right BKA   -podiatry and ID help appreciated.   -S/P   IV Meropenem  500mg q24hrs per iD .   - Pain management helping .     Problem/Plan - 2:  ·  Problem: PAD (peripheral artery disease).   ·  Plan: S/P RLE Angiogram but unable to do any intervention per Vascular.   -known PAD, continue asa/plavix. Pt denies any numbness or tingling.  -recent angiograms done w/ poor peripheral arterial flow per podiatry and so poor surgical candidate as above  -continue statin.     Problem/Plan - 3:  ·  Problem: ESRD (end stage renal disease) on dialysis.   ·  Plan: Renal helping with HD.   On MWF schedule.  -monitor I/O.     Problem/Plan - 4:  ·  Problem: HLD (hyperlipidemia).   ·  Plan: continue statin. check lipid profile.     Problem/Plan - 5:  ·  Problem: Constipation .   ·  Plan: Laxatives.       Dispo : OPAL planning to [facility] pending placement.

## 2022-06-21 NOTE — PROGRESS NOTE ADULT - SUBJECTIVE AND OBJECTIVE BOX
[facility] - S Jluis / Shabbir S /D Romina/ S Denys/ S Chano/ Andrés Perkins / M Katlyn/ O Gladis  service -[phone], office [phone]  ---------------------------------------------------------------------------------------------------------------    Patient seen and examined bedside    Subjective and Objective: No overnight events, denied sob. No complaints today. feeling better    Allergies: No Known Allergies      Hospital Medications:   MEDICATIONS  (STANDING):  aspirin  chewable 81 milliGRAM(s) Oral daily  atorvastatin 40 milliGRAM(s) Oral at bedtime  bisacodyl 5 milliGRAM(s) Oral at bedtime  chlorhexidine 2% Cloths 1 Application(s) Topical daily  clopidogrel Tablet 75 milliGRAM(s) Oral daily  collagenase Ointment 1 Application(s) Topical daily  epoetin niesha-epbx (RETACRIT) Injectable 77340 Unit(s) IV Push <User Schedule>  gabapentin 100 milliGRAM(s) Oral <User Schedule>  heparin   Injectable 5000 Unit(s) SubCutaneous every 12 hours  polyethylene glycol 3350 17 Gram(s) Oral two times a day  senna 2 Tablet(s) Oral at bedtime  sevelamer carbonate 1600 milliGRAM(s) Oral three times a day with meals    VITALS:  T(F): 98 (06-21-22 @ 14:15), Max: 98.2 (06-20-22 @ 21:07)  HR: 82 (06-21-22 @ 14:15)  BP: 95/52 (06-21-22 @ 14:15)  RR: 18 (06-21-22 @ 14:15)  SpO2: 100% (06-21-22 @ 14:15)  Wt(kg): --    06-20 @ 07:01  -  06-21 @ 07:00  --------------------------------------------------------  IN: 1270 mL / OUT: 1400 mL / NET: -130 mL    06-21 @ 07:01  -  06-21 @ 17:01  --------------------------------------------------------  IN: 480 mL / OUT: 0 mL / NET: 480 mL      PHYSICAL EXAM:  Constitutional: NAD  HEENT: anicteric sclera  Neck: No JVD  Respiratory: CTAB, no wheezes, rales or rhonchi  Cardiovascular: S1, S2, RRR  Gastrointestinal: BS+, soft, NT/ND  Extremities: rt BKA+.  left foot dressing+  Neurological: A/O x 3  Psychiatric: Normal mood, normal affect  : No salcido.   Vascular Access: rt femoral AVG+    LABS:  06-21    137  |  95<L>  |  26<H>  ----------------------------<  109<H>  4.2   |  31  |  5.56<H>    Ca    9.0      21 Jun 2022 06:50  Phos  4.5     06-21  Mg     2.50     06-21      Creatinine Trend: 5.56 <--, 8.48 <--, 6.55 <--, 4.72 <--, 6.76 <--, 5.34 <--, 3.49 <--                        7.3    7.81  )-----------( 387      ( 21 Jun 2022 06:50 )             24.4     Urine Studies:        RADIOLOGY & ADDITIONAL STUDIES:

## 2022-06-22 LAB
ANION GAP SERPL CALC-SCNC: 14 MMOL/L — SIGNIFICANT CHANGE UP (ref 7–14)
BLD GP AB SCN SERPL QL: NEGATIVE — SIGNIFICANT CHANGE UP
BUN SERPL-MCNC: 40 MG/DL — HIGH (ref 7–23)
CALCIUM SERPL-MCNC: 9.3 MG/DL — SIGNIFICANT CHANGE UP (ref 8.4–10.5)
CHLORIDE SERPL-SCNC: 96 MMOL/L — LOW (ref 98–107)
CO2 SERPL-SCNC: 29 MMOL/L — SIGNIFICANT CHANGE UP (ref 22–31)
CREAT SERPL-MCNC: 7.65 MG/DL — HIGH (ref 0.5–1.3)
EGFR: 6 ML/MIN/1.73M2 — LOW
GLUCOSE SERPL-MCNC: 145 MG/DL — HIGH (ref 70–99)
HCT VFR BLD CALC: 24.8 % — LOW (ref 34.5–45)
HGB BLD-MCNC: 7.2 G/DL — LOW (ref 11.5–15.5)
MAGNESIUM SERPL-MCNC: 2.7 MG/DL — HIGH (ref 1.6–2.6)
MCHC RBC-ENTMCNC: 26.8 PG — LOW (ref 27–34)
MCHC RBC-ENTMCNC: 29 GM/DL — LOW (ref 32–36)
MCV RBC AUTO: 92.2 FL — SIGNIFICANT CHANGE UP (ref 80–100)
NRBC # BLD: 0 /100 WBCS — SIGNIFICANT CHANGE UP
NRBC # FLD: 0.02 K/UL — HIGH
PHOSPHATE SERPL-MCNC: 4.8 MG/DL — HIGH (ref 2.5–4.5)
PLATELET # BLD AUTO: 390 K/UL — SIGNIFICANT CHANGE UP (ref 150–400)
POTASSIUM SERPL-MCNC: 4.4 MMOL/L — SIGNIFICANT CHANGE UP (ref 3.5–5.3)
POTASSIUM SERPL-SCNC: 4.4 MMOL/L — SIGNIFICANT CHANGE UP (ref 3.5–5.3)
RBC # BLD: 2.69 M/UL — LOW (ref 3.8–5.2)
RBC # FLD: 15.9 % — HIGH (ref 10.3–14.5)
RH IG SCN BLD-IMP: POSITIVE — SIGNIFICANT CHANGE UP
SODIUM SERPL-SCNC: 139 MMOL/L — SIGNIFICANT CHANGE UP (ref 135–145)
WBC # BLD: 7.78 K/UL — SIGNIFICANT CHANGE UP (ref 3.8–10.5)
WBC # FLD AUTO: 7.78 K/UL — SIGNIFICANT CHANGE UP (ref 3.8–10.5)

## 2022-06-22 RX ADMIN — ATORVASTATIN CALCIUM 40 MILLIGRAM(S): 80 TABLET, FILM COATED ORAL at 21:27

## 2022-06-22 RX ADMIN — Medication 81 MILLIGRAM(S): at 11:10

## 2022-06-22 RX ADMIN — OXYCODONE HYDROCHLORIDE 5 MILLIGRAM(S): 5 TABLET ORAL at 11:08

## 2022-06-22 RX ADMIN — HEPARIN SODIUM 5000 UNIT(S): 5000 INJECTION INTRAVENOUS; SUBCUTANEOUS at 17:49

## 2022-06-22 RX ADMIN — HEPARIN SODIUM 5000 UNIT(S): 5000 INJECTION INTRAVENOUS; SUBCUTANEOUS at 05:04

## 2022-06-22 RX ADMIN — GABAPENTIN 100 MILLIGRAM(S): 400 CAPSULE ORAL at 11:10

## 2022-06-22 RX ADMIN — CHLORHEXIDINE GLUCONATE 1 APPLICATION(S): 213 SOLUTION TOPICAL at 11:10

## 2022-06-22 RX ADMIN — Medication 1 APPLICATION(S): at 11:09

## 2022-06-22 RX ADMIN — SEVELAMER CARBONATE 1600 MILLIGRAM(S): 2400 POWDER, FOR SUSPENSION ORAL at 12:17

## 2022-06-22 RX ADMIN — CLOPIDOGREL BISULFATE 75 MILLIGRAM(S): 75 TABLET, FILM COATED ORAL at 11:10

## 2022-06-22 RX ADMIN — ERYTHROPOIETIN 16000 UNIT(S): 10000 INJECTION, SOLUTION INTRAVENOUS; SUBCUTANEOUS at 07:44

## 2022-06-22 RX ADMIN — OXYCODONE HYDROCHLORIDE 5 MILLIGRAM(S): 5 TABLET ORAL at 11:35

## 2022-06-22 NOTE — PROGRESS NOTE ADULT - SUBJECTIVE AND OBJECTIVE BOX
[facility] - TIFFANY Bazzi / TIFFANY Shea / APARNA Vanegas/ TIFFANY Mcfarlane/ TIFFANY Madden/ MASSIEL Perkins / RAINER Potts / ESTEPHANIE Griffith  ---------------------------------------------------------------------------------------------------------------  seen and examined today for ESRD  Interval : NAD, tolerating HD well today  VITALS:  T(F): 97.3 (06-22-22 @ 10:00), Max: 98.6 (06-22-22 @ 06:15)  HR: 83 (06-22-22 @ 10:00)  BP: 114/50 (06-22-22 @ 10:00)  RR: 20 (06-22-22 @ 10:00)  SpO2: 98% (06-22-22 @ 05:33)  Wt(kg): --    06-21 @ 07:01  -  06-22 @ 07:00  --------------------------------------------------------  IN: 865 mL / OUT: 0 mL / NET: 865 mL    06-22 @ 07:01  -  06-22 @ 10:40  --------------------------------------------------------  IN: 750 mL / OUT: 1800 mL / NET: -1050 mL      Physical Exam :-  Constitutional: NAD  Neck: Supple.  Respiratory: Bilateral equal breath sounds,  Cardiovascular: S1, S2 normal,  Gastrointestinal: Bowel Sounds present, soft, non tender.  Extremities: no edema  Neurological: Alert and Oriented x 3, no focal deficits  Psychiatric: Normal mood, normal affect  Data:-  Allergies :   No Known Allergies    Hospital Medications:   MEDICATIONS  (STANDING):  aspirin  chewable 81 milliGRAM(s) Oral daily  atorvastatin 40 milliGRAM(s) Oral at bedtime  bisacodyl 5 milliGRAM(s) Oral at bedtime  chlorhexidine 2% Cloths 1 Application(s) Topical daily  clopidogrel Tablet 75 milliGRAM(s) Oral daily  collagenase Ointment 1 Application(s) Topical daily  epoetin niesha-epbx (RETACRIT) Injectable 46198 Unit(s) IV Push <User Schedule>  gabapentin 100 milliGRAM(s) Oral <User Schedule>  heparin   Injectable 5000 Unit(s) SubCutaneous every 12 hours  polyethylene glycol 3350 17 Gram(s) Oral two times a day  senna 2 Tablet(s) Oral at bedtime  sevelamer carbonate 1600 milliGRAM(s) Oral three times a day with meals    06-22    139  |  96<L>  |  40<H>  ----------------------------<  145<H>  4.4   |  29  |  7.65<H>    Ca    9.3      22 Jun 2022 05:53  Phos  4.8     06-22  Mg     2.70     06-22      Creatinine Trend: 7.65 <--, 5.56 <--, 8.48 <--, 6.55 <--, 4.72 <--, 6.76 <--, 5.34 <--                        7.2    7.78  )-----------( 390      ( 22 Jun 2022 05:53 )             24.8

## 2022-06-22 NOTE — PROGRESS NOTE ADULT - ASSESSMENT
62 y/o woman w/ ESRD on HD (MWF), HF, 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 Admitted for further evaluation     Problem/Plan - 1:  ·  Problem: Wound of right foot with  Cellulitis with Left 4th toe OM .   ·  Plan: S/P  Right BKA   -podiatry and ID help appreciated.   -S/P   IV Meropenem  500mg q24hrs per iD .   - Pain management helping .  - Left foot prognosis is also guarded.      Problem/Plan - 2:  ·  Problem: PAD (peripheral artery disease).   ·  Plan: S/P RLE Angiogram but unable to do any intervention per Vascular.   -known PAD, continue asa/plavix. Pt denies any numbness or tingling.  -recent angiograms done w/ poor peripheral arterial flow per podiatry and so poor surgical candidate as above  -continue statin.     Problem/Plan - 3:  ·  Problem: ESRD (end stage renal disease) on dialysis.   ·  Plan: Renal helping with HD.   On MWF schedule.  -monitor I/O.     Problem/Plan - 4:  ·  Problem: HLD (hyperlipidemia).   ·  Plan: continue statin. check lipid profile.     Problem/Plan - 5:  ·  Problem: Constipation .   ·  Plan: Laxatives.      Problem/Plan - 6:  ·  Problem: Chronic Anemia with acute blood loss  .   ·  Plan: PRBC to keep Hgb 8 G.     Dispo : DC planning to [facility] pending placement.

## 2022-06-22 NOTE — PROGRESS NOTE ADULT - ASSESSMENT
62 y/o woman w/ ESRD on HD (MWF), HF, 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 Admitted for further evaluation. Renal following for ESRD Mx.     ESRD on HD  schedule hemodialysis - Monday, Wednesday and Friday   access- thigh AVG  HD unit SQDC  K, vol ok     tolerated HD well today  renal diet  dose all meds for ESRD  renal restrictions in diet.  Anemia of CKD- Hb < goal now. increased DION epo 6>10>16k w/hd tiw. agree w/1unit PRBC w/hd tomorrow  Hyperphosphatemia- c/w renvlea 2tid w/food  HTN, controlled. bp stable. not on anti-htn meds  PAD, infected rt foot wounds, ?OM- s/p vanco, on zosyn. f/u w/Podiatry. on meropenem now. s/p MRI foot w/o Morgan  f/u w/vas sx.   Podiatry f/u - patient s/p left foot TMA and right foot partial 1st ray amputation 6/8  s/p LE angiogram 6/7  s/p rt BKA 6/15  pain control per team      For any question, call:  Cell # [phone]  Pager # [phone]  Callback # [phone]

## 2022-06-22 NOTE — PROGRESS NOTE ADULT - SUBJECTIVE AND OBJECTIVE BOX
Patient is a 61y old  Female who presents with a chief complaint of RLE toe pain (22 Jun 2022 10:39)       INTERVAL HPI/OVERNIGHT EVENTS:  Patient seen and evaluated at bedside.  Pt is resting comfortable in NAD. Denies N/V/F/C.  Pain rated at X/10    Allergies    No Known Allergies    Intolerances        Vital Signs Last 24 Hrs  T(C): 36.3 (22 Jun 2022 10:00), Max: 37 (22 Jun 2022 06:15)  T(F): 97.3 (22 Jun 2022 10:00), Max: 98.6 (22 Jun 2022 06:15)  HR: 83 (22 Jun 2022 10:00) (76 - 86)  BP: 114/50 (22 Jun 2022 10:00) (95/52 - 121/55)  BP(mean): --  RR: 20 (22 Jun 2022 10:00) (17 - 20)  SpO2: 98% (22 Jun 2022 05:33) (96% - 100%)    LABS:                        7.2    7.78  )-----------( 390      ( 22 Jun 2022 05:53 )             24.8     06-22    139  |  96<L>  |  40<H>  ----------------------------<  145<H>  4.4   |  29  |  7.65<H>    Ca    9.3      22 Jun 2022 05:53  Phos  4.8     06-22  Mg     2.70     06-22          CAPILLARY BLOOD GLUCOSE          Lower Extremity Physical Exam:    Neuro: Epicritic sensation diminished to the level of forefoot B/L  Musculoskeletal/Ortho: left partial 5th ray resection healed, LF partial 4th ray resection open, s/p R BKA  Skin:   3/4/22 s/p L foot partial 3rd ray resection open: fibrotic wound to subQ, no malodor, no drainage, no purulence, flaps cool, no maceration, distal skin necrosis, no tracking, no purulence, no erythema        RADIOLOGY & ADDITIONAL TESTS:

## 2022-06-22 NOTE — PROGRESS NOTE ADULT - SUBJECTIVE AND OBJECTIVE BOX
Cardiovascular Disease Progress Note    Overnight events: No acute events overnight. Ms. Boogie denies chest pain or SOB.    Otherwise review of systems negative    Objective Findings:  T(C): 37 (06-22-22 @ 06:15), Max: 37 (06-22-22 @ 06:15)  HR: 82 (06-22-22 @ 05:33) (81 - 86)  BP: 117/50 (06-22-22 @ 05:33) (95/52 - 117/50)  RR: 17 (06-22-22 @ 05:33) (17 - 18)  SpO2: 98% (06-22-22 @ 05:33) (96% - 100%)  Wt(kg): --  Daily     Daily       Physical Exam:  Gen: NAD; Patient resting comfortably  HEENT: EOMI, Normocephalic/ atraumatic  CV: RRR, normal S1 + S2, no m/r/g  Lungs:  Normal respiratory effort; clear to auscultation bilaterally  Abd: soft, non-tender; bowel sounds present  Ext: No edema;      Telemetry: n/a    Laboratory Data:                        7.2    7.78  )-----------( 390      ( 22 Jun 2022 05:53 )             24.8     06-22    139  |  96<L>  |  40<H>  ----------------------------<  145<H>  4.4   |  29  |  7.65<H>    Ca    9.3      22 Jun 2022 05:53  Phos  4.8     06-22  Mg     2.70     06-22                Inpatient Medications:  MEDICATIONS  (STANDING):  aspirin  chewable 81 milliGRAM(s) Oral daily  atorvastatin 40 milliGRAM(s) Oral at bedtime  bisacodyl 5 milliGRAM(s) Oral at bedtime  chlorhexidine 2% Cloths 1 Application(s) Topical daily  clopidogrel Tablet 75 milliGRAM(s) Oral daily  collagenase Ointment 1 Application(s) Topical daily  epoetin niesha-epbx (RETACRIT) Injectable 14548 Unit(s) IV Push <User Schedule>  gabapentin 100 milliGRAM(s) Oral <User Schedule>  heparin   Injectable 5000 Unit(s) SubCutaneous every 12 hours  polyethylene glycol 3350 17 Gram(s) Oral two times a day  senna 2 Tablet(s) Oral at bedtime  sevelamer carbonate 1600 milliGRAM(s) Oral three times a day with meals      Assessment: 61-year-old female with ESRD on HD, HLD and peripheral vascular disease s/p lower extremity resection presents with chronic limb ischemia.     Plan of Care:    #Post-operative evaluation-  Ms. Boogie tolerated R BKA well on 6/14.   She displays no signs or symptoms of post operative coronary ischemia or acute CHF.   - Continue with current cardiac management.     #Compensated diastolic CHF-  Volume status is improved.  Fluid removal with HD as per the renal team.   Recent echo noted- normal LV systolic function.    #Peripheral vascular disease.  - Podiatry and vascular input noted.     #ESRD-  - HD as per renal.      Over 25 minutes spent on total encounter; more than 50% of the visit was spent counseling and/or coordinating care by the attending physician.      Cristino Adams MD [facility]  Cardiovascular Disease  [phone]

## 2022-06-22 NOTE — PROGRESS NOTE ADULT - SUBJECTIVE AND OBJECTIVE BOX
Date of Service  : 06-22-22     INTERVAL HPI/OVERNIGHT EVENTS: I feel fine.   Vital Signs Last 24 Hrs  T(C): 36.9 (22 Jun 2022 17:10), Max: 37 (22 Jun 2022 06:15)  T(F): 98.4 (22 Jun 2022 17:10), Max: 98.6 (22 Jun 2022 06:15)  HR: 86 (22 Jun 2022 17:10) (76 - 86)  BP: 102/52 (22 Jun 2022 17:10) (102/52 - 121/55)  BP(mean): --  RR: 17 (22 Jun 2022 17:10) (17 - 20)  SpO2: 100% (22 Jun 2022 17:10) (96% - 100%)  I&O's Summary    21 Jun 2022 07:01  -  22 Jun 2022 07:00  --------------------------------------------------------  IN: 865 mL / OUT: 0 mL / NET: 865 mL    22 Jun 2022 07:01  -  22 Jun 2022 20:03  --------------------------------------------------------  IN: 1300 mL / OUT: 1800 mL / NET: -500 mL      MEDICATIONS  (STANDING):  aspirin  chewable 81 milliGRAM(s) Oral daily  atorvastatin 40 milliGRAM(s) Oral at bedtime  bisacodyl 5 milliGRAM(s) Oral at bedtime  chlorhexidine 2% Cloths 1 Application(s) Topical daily  clopidogrel Tablet 75 milliGRAM(s) Oral daily  collagenase Ointment 1 Application(s) Topical daily  epoetin niesha-epbx (RETACRIT) Injectable 21355 Unit(s) IV Push <User Schedule>  gabapentin 100 milliGRAM(s) Oral <User Schedule>  heparin   Injectable 5000 Unit(s) SubCutaneous every 12 hours  polyethylene glycol 3350 17 Gram(s) Oral two times a day  senna 2 Tablet(s) Oral at bedtime  sevelamer carbonate 1600 milliGRAM(s) Oral three times a day with meals    MEDICATIONS  (PRN):  oxyCODONE    IR 5 milliGRAM(s) Oral every 4 hours PRN Moderate-Severe Pain (4 - 10)    LABS:                        7.2    7.78  )-----------( 390      ( 22 Jun 2022 05:53 )             24.8     06-22    139  |  96<L>  |  40<H>  ----------------------------<  145<H>  4.4   |  29  |  7.65<H>    Ca    9.3      22 Jun 2022 05:53  Phos  4.8     06-22  Mg     2.70     06-22          CAPILLARY BLOOD GLUCOSE              REVIEW OF SYSTEMS:  CONSTITUTIONAL: No fever, weight loss, or fatigue  EYES: No eye pain, visual disturbances, or discharge  ENMT:  No difficulty hearing, tinnitus, vertigo; No sinus or throat pain  NECK: No pain or stiffness  RESPIRATORY: No cough, wheezing, chills or hemoptysis; No shortness of breath  CARDIOVASCULAR: No chest pain, palpitations, dizziness, or leg swelling  GASTROINTESTINAL: No abdominal or epigastric pain. No nausea, vomiting, or hematemesis; No diarrhea or constipation. No melena or hematochezia.  GENITOURINARY: No dysuria, frequency, hematuria, or incontinence  NEUROLOGICAL: No headaches, memory loss, loss of strength, numbness, or tremors    Consultant(s) Notes Reviewed:  [x ] YES  [ ] NO    PHYSICAL EXAM:  GENERAL: NAD,  not in any distress ,  HEAD:  Atraumatic, Normocephalic  NECK: Supple, No JVD, Normal thyroid  NERVOUS SYSTEM:  Alert & Oriented X3, No focal deficit   CHEST/LUNG: Good air entry bilateral with no  rales, rhonchi, wheezing, or rubs  HEART: Regular rate and rhythm; No murmurs, rubs, or gallops  ABDOMEN: Soft, Nontender, Nondistended; Bowel sounds present  EXTREMITIES:  RBKA and left foot dressed     Care Discussed with Consultants/Other Providers [ x] YES  [ ] NO

## 2022-06-23 LAB
ANION GAP SERPL CALC-SCNC: 14 MMOL/L — SIGNIFICANT CHANGE UP (ref 7–14)
BUN SERPL-MCNC: 24 MG/DL — HIGH (ref 7–23)
CALCIUM SERPL-MCNC: 9.1 MG/DL — SIGNIFICANT CHANGE UP (ref 8.4–10.5)
CHLORIDE SERPL-SCNC: 94 MMOL/L — LOW (ref 98–107)
CO2 SERPL-SCNC: 27 MMOL/L — SIGNIFICANT CHANGE UP (ref 22–31)
CREAT SERPL-MCNC: 5.48 MG/DL — HIGH (ref 0.5–1.3)
EGFR: 8 ML/MIN/1.73M2 — LOW
GLUCOSE SERPL-MCNC: 143 MG/DL — HIGH (ref 70–99)
HCT VFR BLD CALC: 30.1 % — LOW (ref 34.5–45)
HGB BLD-MCNC: 9.2 G/DL — LOW (ref 11.5–15.5)
MAGNESIUM SERPL-MCNC: 2.4 MG/DL — SIGNIFICANT CHANGE UP (ref 1.6–2.6)
MCHC RBC-ENTMCNC: 28.1 PG — SIGNIFICANT CHANGE UP (ref 27–34)
MCHC RBC-ENTMCNC: 30.6 GM/DL — LOW (ref 32–36)
MCV RBC AUTO: 92 FL — SIGNIFICANT CHANGE UP (ref 80–100)
NRBC # BLD: 0 /100 WBCS — SIGNIFICANT CHANGE UP
NRBC # FLD: 0.06 K/UL — HIGH
PHOSPHATE SERPL-MCNC: 3.3 MG/DL — SIGNIFICANT CHANGE UP (ref 2.5–4.5)
PLATELET # BLD AUTO: 382 K/UL — SIGNIFICANT CHANGE UP (ref 150–400)
POTASSIUM SERPL-MCNC: 4 MMOL/L — SIGNIFICANT CHANGE UP (ref 3.5–5.3)
POTASSIUM SERPL-SCNC: 4 MMOL/L — SIGNIFICANT CHANGE UP (ref 3.5–5.3)
RBC # BLD: 3.27 M/UL — LOW (ref 3.8–5.2)
RBC # FLD: 16.1 % — HIGH (ref 10.3–14.5)
SARS-COV-2 RNA SPEC QL NAA+PROBE: SIGNIFICANT CHANGE UP
SODIUM SERPL-SCNC: 135 MMOL/L — SIGNIFICANT CHANGE UP (ref 135–145)
WBC # BLD: 8.46 K/UL — SIGNIFICANT CHANGE UP (ref 3.8–10.5)
WBC # FLD AUTO: 8.46 K/UL — SIGNIFICANT CHANGE UP (ref 3.8–10.5)

## 2022-06-23 RX ORDER — OXYCODONE HYDROCHLORIDE 5 MG/1
5 TABLET ORAL EVERY 4 HOURS
Refills: 0 | Status: DISCONTINUED | OUTPATIENT
Start: 2022-06-23 | End: 2022-06-30

## 2022-06-23 RX ADMIN — ATORVASTATIN CALCIUM 40 MILLIGRAM(S): 80 TABLET, FILM COATED ORAL at 22:16

## 2022-06-23 RX ADMIN — CHLORHEXIDINE GLUCONATE 1 APPLICATION(S): 213 SOLUTION TOPICAL at 11:16

## 2022-06-23 RX ADMIN — SEVELAMER CARBONATE 1600 MILLIGRAM(S): 2400 POWDER, FOR SUSPENSION ORAL at 12:22

## 2022-06-23 RX ADMIN — Medication 1 APPLICATION(S): at 11:16

## 2022-06-23 RX ADMIN — SEVELAMER CARBONATE 1600 MILLIGRAM(S): 2400 POWDER, FOR SUSPENSION ORAL at 17:32

## 2022-06-23 RX ADMIN — SEVELAMER CARBONATE 1600 MILLIGRAM(S): 2400 POWDER, FOR SUSPENSION ORAL at 08:35

## 2022-06-23 RX ADMIN — HEPARIN SODIUM 5000 UNIT(S): 5000 INJECTION INTRAVENOUS; SUBCUTANEOUS at 23:33

## 2022-06-23 RX ADMIN — CLOPIDOGREL BISULFATE 75 MILLIGRAM(S): 75 TABLET, FILM COATED ORAL at 11:18

## 2022-06-23 RX ADMIN — HEPARIN SODIUM 5000 UNIT(S): 5000 INJECTION INTRAVENOUS; SUBCUTANEOUS at 11:17

## 2022-06-23 RX ADMIN — Medication 81 MILLIGRAM(S): at 11:17

## 2022-06-23 NOTE — PROGRESS NOTE ADULT - SUBJECTIVE AND OBJECTIVE BOX
[facility] - TIFFANY Bazzi / TIFFANY Shea / APARNA Vanegas/ TIFFANY Mcfarlane/ TIFFANY Madden/ MASSIEL Perkins / RAINER Potts / ESTEPHANIE Griffith  ---------------------------------------------------------------------------------------------------------------  seen and examined today for ESRD  Interval : NAD  VITALS:  T(F): 98 (06-23-22 @ 10:02), Max: 98.5 (06-23-22 @ 01:46)  HR: 79 (06-23-22 @ 10:02)  BP: 103/48 (06-23-22 @ 10:02)  RR: 18 (06-23-22 @ 10:02)  SpO2: 100% (06-23-22 @ 10:02)  Wt(kg): --    06-22 @ 07:01  -  06-23 @ 07:00  --------------------------------------------------------  IN: 1360 mL / OUT: 1800 mL / NET: -440 mL      Physical Exam :-  Constitutional: NAD  Neck: Supple.  Respiratory: Bilateral equal breath sounds,  Cardiovascular: S1, S2 normal,  Gastrointestinal: Bowel Sounds present, soft, non tender.  Extremities: right BKA  Neurological: Alert and Oriented x 3, no focal deficits  Psychiatric: Normal mood, normal affect  Data:-  Allergies :   No Known Allergies    Hospital Medications:   MEDICATIONS  (STANDING):  aspirin  chewable 81 milliGRAM(s) Oral daily  atorvastatin 40 milliGRAM(s) Oral at bedtime  bisacodyl 5 milliGRAM(s) Oral at bedtime  chlorhexidine 2% Cloths 1 Application(s) Topical daily  clopidogrel Tablet 75 milliGRAM(s) Oral daily  collagenase Ointment 1 Application(s) Topical daily  epoetin niesha-epbx (RETACRIT) Injectable 81083 Unit(s) IV Push <User Schedule>  gabapentin 100 milliGRAM(s) Oral <User Schedule>  heparin   Injectable 5000 Unit(s) SubCutaneous every 12 hours  polyethylene glycol 3350 17 Gram(s) Oral two times a day  senna 2 Tablet(s) Oral at bedtime  sevelamer carbonate 1600 milliGRAM(s) Oral three times a day with meals    06-23    135  |  94<L>  |  24<H>  ----------------------------<  143<H>  4.0   |  27  |  5.48<H>    Ca    9.1      23 Jun 2022 05:45  Phos  3.3     06-23  Mg     2.40     06-23      Creatinine Trend: 5.48 <--, 7.65 <--, 5.56 <--, 8.48 <--, 6.55 <--, 4.72 <--, 6.76 <--                        9.2    8.46  )-----------( 382      ( 23 Jun 2022 05:45 )             30.1

## 2022-06-23 NOTE — PROGRESS NOTE ADULT - SUBJECTIVE AND OBJECTIVE BOX
Cardiovascular Disease Progress Note    Overnight events: No acute events overnight.   Ms. Boogie denies chest pain or SOB.    Otherwise review of systems negative    Objective Findings:  T(C): 36.8 (22 @ 06:00), Max: 36.9 (22 @ 17:10)  HR: 88 (22 @ 06:00) (76 - 88)  BP: 110/55 (22 @ 06:00) (101/49 - 114/50)  RR: 16 (22 @ 06:00) (16 - 20)  SpO2: 100% (22 @ 06:00) (96% - 100%)  Wt(kg): --  Daily     Daily Weight in k.4 (2022 09:40)      Physical Exam:  Gen: NAD; Patient resting comfortably  HEENT: EOMI, Normocephalic/ atraumatic  CV: RRR, normal S1 + S2, no m/r/g  Lungs:  Normal respiratory effort; clear to auscultation bilaterally  Abd: soft, non-tender; bowel sounds present  Ext: No edema;      Telemetry: n/a    Laboratory Data:                        9.2    8.46  )-----------( 382      ( 2022 05:45 )             30.1         135  |  94<L>  |  24<H>  ----------------------------<  143<H>  4.0   |  27  |  5.48<H>    Ca    9.1      2022 05:45  Phos  3.3       Mg     2.40                     Inpatient Medications:  MEDICATIONS  (STANDING):  aspirin  chewable 81 milliGRAM(s) Oral daily  atorvastatin 40 milliGRAM(s) Oral at bedtime  bisacodyl 5 milliGRAM(s) Oral at bedtime  chlorhexidine 2% Cloths 1 Application(s) Topical daily  clopidogrel Tablet 75 milliGRAM(s) Oral daily  collagenase Ointment 1 Application(s) Topical daily  epoetin niesha-epbx (RETACRIT) Injectable 54139 Unit(s) IV Push <User Schedule>  gabapentin 100 milliGRAM(s) Oral <User Schedule>  heparin   Injectable 5000 Unit(s) SubCutaneous every 12 hours  polyethylene glycol 3350 17 Gram(s) Oral two times a day  senna 2 Tablet(s) Oral at bedtime  sevelamer carbonate 1600 milliGRAM(s) Oral three times a day with meals      Assessment: 61-year-old female with ESRD on HD, HLD and peripheral vascular disease s/p lower extremity resection presents with chronic limb ischemia.     Plan of Care:    #Post-operative evaluation-  Ms. Boogie tolerated R BKA well on .   She displays no signs or symptoms of post operative coronary ischemia or acute CHF.   - Continue with current cardiac management.     #Compensated diastolic CHF-  Volume status is improved.  Fluid removal with HD as per the renal team.   Recent echo noted- normal LV systolic function.    #Peripheral vascular disease.  - Podiatry and vascular input noted.     #ESRD-  - HD as per renal.    #ACP (advance care planning)-  Advanced care planning was discussed with the patient.    Cardiac findings were discussed in detail and all questions were answered.       Over 25 minutes spent on total encounter; more than 50% of the visit was spent counseling and/or coordinating care by the attending physician.      Cristino Adams MD [facility]  Cardiovascular Disease  [phone]

## 2022-06-23 NOTE — PROGRESS NOTE ADULT - ASSESSMENT
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 Admitted for further evaluation     Problem/Plan - 1:  ·  Problem: Wound of right foot with  Cellulitis with Left 4th toe OM .   ·  Plan: S/P  Right BKA   -podiatry and ID help appreciated.   -S/P   IV Meropenem  500mg q24hrs per iD .   - Pain management helping .  - Left foot prognosis is also guarded.      Problem/Plan - 2:  ·  Problem: PAD (peripheral artery disease).   ·  Plan: S/P RLE Angiogram but unable to do any intervention per Vascular.   -known PAD, continue asa/plavix. Pt denies any numbness or tingling.  -recent angiograms done w/ poor peripheral arterial flow per podiatry and so poor surgical candidate as above  -continue statin.     Problem/Plan - 3:  ·  Problem: ESRD (end stage renal disease) on dialysis.   ·  Plan: Renal helping with HD.   On MWF schedule.  -monitor I/O.     Problem/Plan - 4:  ·  Problem: HLD (hyperlipidemia).   ·  Plan: continue statin. check lipid profile.     Problem/Plan - 5:  ·  Problem: Constipation .   ·  Plan: Laxatives.      Problem/Plan - 6:  ·  Problem: Chronic Anemia with acute blood loss  .   ·  Plan: PRBC to keep Hgb 8 G.     Dispo : DC planning to [facility] pending placement.

## 2022-06-23 NOTE — PROGRESS NOTE ADULT - SUBJECTIVE AND OBJECTIVE BOX
Date of Service  : 06-23-22    INTERVAL HPI/OVERNIGHT EVENTS: I feel fine.   Vital Signs Last 24 Hrs  T(C): 36.7 (23 Jun 2022 10:02), Max: 36.9 (22 Jun 2022 17:10)  T(F): 98 (23 Jun 2022 10:02), Max: 98.5 (23 Jun 2022 01:46)  HR: 79 (23 Jun 2022 10:02) (78 - 88)  BP: 103/48 (23 Jun 2022 10:02) (101/49 - 112/50)  BP(mean): --  RR: 18 (23 Jun 2022 10:02) (16 - 18)  SpO2: 100% (23 Jun 2022 10:02) (96% - 100%)  I&O's Summary    22 Jun 2022 07:01  -  23 Jun 2022 07:00  --------------------------------------------------------  IN: 1360 mL / OUT: 1800 mL / NET: -440 mL      MEDICATIONS  (STANDING):  aspirin  chewable 81 milliGRAM(s) Oral daily  atorvastatin 40 milliGRAM(s) Oral at bedtime  bisacodyl 5 milliGRAM(s) Oral at bedtime  chlorhexidine 2% Cloths 1 Application(s) Topical daily  clopidogrel Tablet 75 milliGRAM(s) Oral daily  collagenase Ointment 1 Application(s) Topical daily  epoetin niesha-epbx (RETACRIT) Injectable 99172 Unit(s) IV Push <User Schedule>  gabapentin 100 milliGRAM(s) Oral <User Schedule>  heparin   Injectable 5000 Unit(s) SubCutaneous every 12 hours  polyethylene glycol 3350 17 Gram(s) Oral two times a day  senna 2 Tablet(s) Oral at bedtime  sevelamer carbonate 1600 milliGRAM(s) Oral three times a day with meals    MEDICATIONS  (PRN):  oxyCODONE    IR 5 milliGRAM(s) Oral every 4 hours PRN Moderate-Severe Pain (4 - 10)    LABS:                        9.2    8.46  )-----------( 382      ( 23 Jun 2022 05:45 )             30.1     06-23    135  |  94<L>  |  24<H>  ----------------------------<  143<H>  4.0   |  27  |  5.48<H>    Ca    9.1      23 Jun 2022 05:45  Phos  3.3     06-23  Mg     2.40     06-23          CAPILLARY BLOOD GLUCOSE              REVIEW OF SYSTEMS:  CONSTITUTIONAL: No fever, weight loss, or fatigue  EYES: No eye pain, visual disturbances, or discharge  ENMT:  No difficulty hearing, tinnitus, vertigo; No sinus or throat pain  NECK: No pain or stiffness  RESPIRATORY: No cough, wheezing, chills or hemoptysis; No shortness of breath  CARDIOVASCULAR: No chest pain, palpitations, dizziness, or leg swelling  GASTROINTESTINAL: No abdominal or epigastric pain. No nausea, vomiting, or hematemesis; No diarrhea or constipation. No melena or hematochezia.  GENITOURINARY: No dysuria, frequency, hematuria, or incontinence  NEUROLOGICAL: No headaches, memory loss, loss of strength, numbness, or tremors      Consultant(s) Notes Reviewed:  [x ] YES  [ ] NO    PHYSICAL EXAM:  GENERAL: NAD, well-groomed, well-developed,not in any distress ,  HEAD:  Atraumatic, Normocephalic  NECK: Supple, No JVD, Normal thyroid  NERVOUS SYSTEM:  Alert & Oriented X3, No focal deficit   CHEST/LUNG: Good air entry bilateral with no  rales, rhonchi, wheezing, or rubs  HEART: Regular rate and rhythm; No murmurs, rubs, or gallops  ABDOMEN: Soft, Nontender, Nondistended; Bowel sounds present  EXTREMITIES:  RBKA and left foot dressed     Care Discussed with Consultants/Other Providers [ x] YES  [ ] NO

## 2022-06-23 NOTE — PROGRESS NOTE ADULT - ASSESSMENT
62 y/o woman w/ ESRD on HD (MWF), HF, 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 Admitted for further evaluation. Renal following for ESRD Mx.     ESRD on HD  schedule hemodialysis - Monday, Wednesday and Friday   access- thigh AVG  HD unit SQDC  K, vol ok     tolerated HD well today  renal diet  dose all meds for ESRD  renal restrictions in diet.  Anemia of CKD- Hb < goal now. increased DION epo 6>10>16k w/hd tiw. agree w/1unit PRBC w/hd tomorrow  Hyperphosphatemia- c/w renvlea 2tid w/food  HTN, controlled. bp stable. not on anti-htn meds  PAD, infected rt foot wounds, ?OM- s/p vanco, on zosyn. f/u w/Podiatry. on meropenem now. s/p MRI foot w/o Morgan  f/u w/vas sx.   Podiatry f/u - patient s/p left foot TMA and right foot partial 1st ray amputation 6/8  s/p LE angiogram 6/7  s/p rt BKA 6/15  pain control per team      For any question, call:  Cell # [phone]  Pager # [phone]  Callback # [phone]

## 2022-06-24 LAB
ANION GAP SERPL CALC-SCNC: 13 MMOL/L — SIGNIFICANT CHANGE UP (ref 7–14)
BUN SERPL-MCNC: 32 MG/DL — HIGH (ref 7–23)
CALCIUM SERPL-MCNC: 9.1 MG/DL — SIGNIFICANT CHANGE UP (ref 8.4–10.5)
CHLORIDE SERPL-SCNC: 95 MMOL/L — LOW (ref 98–107)
CO2 SERPL-SCNC: 29 MMOL/L — SIGNIFICANT CHANGE UP (ref 22–31)
CREAT SERPL-MCNC: 5.62 MG/DL — HIGH (ref 0.5–1.3)
EGFR: 8 ML/MIN/1.73M2 — LOW
GLUCOSE SERPL-MCNC: 108 MG/DL — HIGH (ref 70–99)
HCT VFR BLD CALC: 31 % — LOW (ref 34.5–45)
HGB BLD-MCNC: 8.9 G/DL — LOW (ref 11.5–15.5)
MAGNESIUM SERPL-MCNC: 2.5 MG/DL — SIGNIFICANT CHANGE UP (ref 1.6–2.6)
MCHC RBC-ENTMCNC: 26.8 PG — LOW (ref 27–34)
MCHC RBC-ENTMCNC: 28.7 GM/DL — LOW (ref 32–36)
MCV RBC AUTO: 93.4 FL — SIGNIFICANT CHANGE UP (ref 80–100)
NRBC # BLD: 0 /100 WBCS — SIGNIFICANT CHANGE UP
NRBC # FLD: 0.02 K/UL — HIGH
PHOSPHATE SERPL-MCNC: 2.7 MG/DL — SIGNIFICANT CHANGE UP (ref 2.5–4.5)
PLATELET # BLD AUTO: 406 K/UL — HIGH (ref 150–400)
POTASSIUM SERPL-MCNC: 3.9 MMOL/L — SIGNIFICANT CHANGE UP (ref 3.5–5.3)
POTASSIUM SERPL-SCNC: 3.9 MMOL/L — SIGNIFICANT CHANGE UP (ref 3.5–5.3)
RBC # BLD: 3.32 M/UL — LOW (ref 3.8–5.2)
RBC # FLD: 16 % — HIGH (ref 10.3–14.5)
SODIUM SERPL-SCNC: 137 MMOL/L — SIGNIFICANT CHANGE UP (ref 135–145)
WBC # BLD: 8.88 K/UL — SIGNIFICANT CHANGE UP (ref 3.8–10.5)
WBC # FLD AUTO: 8.88 K/UL — SIGNIFICANT CHANGE UP (ref 3.8–10.5)

## 2022-06-24 RX ADMIN — SEVELAMER CARBONATE 1600 MILLIGRAM(S): 2400 POWDER, FOR SUSPENSION ORAL at 13:13

## 2022-06-24 RX ADMIN — HEPARIN SODIUM 5000 UNIT(S): 5000 INJECTION INTRAVENOUS; SUBCUTANEOUS at 13:13

## 2022-06-24 RX ADMIN — SEVELAMER CARBONATE 1600 MILLIGRAM(S): 2400 POWDER, FOR SUSPENSION ORAL at 10:02

## 2022-06-24 RX ADMIN — Medication 1 APPLICATION(S): at 13:13

## 2022-06-24 RX ADMIN — SEVELAMER CARBONATE 1600 MILLIGRAM(S): 2400 POWDER, FOR SUSPENSION ORAL at 19:15

## 2022-06-24 RX ADMIN — CHLORHEXIDINE GLUCONATE 1 APPLICATION(S): 213 SOLUTION TOPICAL at 13:12

## 2022-06-24 RX ADMIN — ATORVASTATIN CALCIUM 40 MILLIGRAM(S): 80 TABLET, FILM COATED ORAL at 21:20

## 2022-06-24 RX ADMIN — ERYTHROPOIETIN 16000 UNIT(S): 10000 INJECTION, SOLUTION INTRAVENOUS; SUBCUTANEOUS at 16:23

## 2022-06-24 RX ADMIN — Medication 81 MILLIGRAM(S): at 13:13

## 2022-06-24 RX ADMIN — CLOPIDOGREL BISULFATE 75 MILLIGRAM(S): 75 TABLET, FILM COATED ORAL at 13:13

## 2022-06-24 RX ADMIN — GABAPENTIN 100 MILLIGRAM(S): 400 CAPSULE ORAL at 10:03

## 2022-06-24 NOTE — PROGRESS NOTE ADULT - ATTENDING COMMENTS
-
.
Seen at bedside.  Bilateral sites stable, no purulence expressed.  Right foot with mild edema.  Pain likely ischemic in nature.  No acute intervention planned unless infection worsens/wet gangrene sets in.  Patient unlikely to heal any procedures in the foot based on circulation.
.
.
Seen at bedside.  Discussed current condition.  Vasc plan for angio tomorrow. Patient with known PAD with poor healing potential.  Patient against BKA at this time.  Patient agrees to TMA, understands risk of it not healing.  Will continue to work with vascular to determine best plan going forward.  Tentatively holding OR time for Wednesday AM.  Please medically optimize.

## 2022-06-24 NOTE — PROGRESS NOTE ADULT - SUBJECTIVE AND OBJECTIVE BOX
[facility] - TIFFANY Bazzi / TIFFANY Shea / APARNA Vanegas/ TIFFANY Mcfarlane/ TIFFANY Madden/ MASSIEL Perkins / RAINER Potts / ESTEPHANIE Griffith  ---------------------------------------------------------------------------------------------------------------  seen and examined today for ESRD  Interval : NAD  VITALS:  T(F): 98.5 (06-24-22 @ 09:57), Max: 98.5 (06-24-22 @ 09:57)  HR: 80 (06-24-22 @ 09:57)  BP: 100/51 (06-24-22 @ 09:57)  RR: 17 (06-24-22 @ 09:57)  SpO2: 100% (06-24-22 @ 09:57)  Wt(kg): --    Physical Exam :-  Constitutional: NAD  Neck: Supple.  Respiratory: Bilateral equal breath sounds,  Cardiovascular: S1, S2 normal,  Gastrointestinal: Bowel Sounds present, soft, non tender.  Extremities: No edema  Neurological: Alert and Oriented x 3, no focal deficits  Psychiatric: Normal mood, normal affect  Data:-  Allergies :   No Known Allergies    Hospital Medications:   MEDICATIONS  (STANDING):  aspirin  chewable 81 milliGRAM(s) Oral daily  atorvastatin 40 milliGRAM(s) Oral at bedtime  bisacodyl 5 milliGRAM(s) Oral at bedtime  chlorhexidine 2% Cloths 1 Application(s) Topical daily  clopidogrel Tablet 75 milliGRAM(s) Oral daily  collagenase Ointment 1 Application(s) Topical daily  epoetin niesha-epbx (RETACRIT) Injectable 56899 Unit(s) IV Push <User Schedule>  gabapentin 100 milliGRAM(s) Oral <User Schedule>  heparin   Injectable 5000 Unit(s) SubCutaneous every 12 hours  polyethylene glycol 3350 17 Gram(s) Oral two times a day  senna 2 Tablet(s) Oral at bedtime  sevelamer carbonate 1600 milliGRAM(s) Oral three times a day with meals    06-23    135  |  94<L>  |  24<H>  ----------------------------<  143<H>  4.0   |  27  |  5.48<H>    Ca    9.1      23 Jun 2022 05:45  Phos  3.3     06-23  Mg     2.40     06-23      Creatinine Trend: 5.48 <--, 7.65 <--, 5.56 <--, 8.48 <--, 6.55 <--, 4.72 <--                        9.2    8.46  )-----------( 382      ( 23 Jun 2022 05:45 )             30.1

## 2022-06-24 NOTE — PROGRESS NOTE ADULT - SUBJECTIVE AND OBJECTIVE BOX
Patient is a 61y old  Female who presents with a chief complaint of RLE toe pain (24 Jun 2022 09:14)       INTERVAL HPI/OVERNIGHT EVENTS:  Patient seen and evaluated at bedside.  Pt is resting comfortable in NAD. Denies N/V/F/C.  Pain rated at X/10    Allergies    No Known Allergies    Intolerances        Vital Signs Last 24 Hrs  T(C): 36.9 (24 Jun 2022 09:57), Max: 36.9 (23 Jun 2022 21:58)  T(F): 98.5 (24 Jun 2022 09:57), Max: 98.5 (24 Jun 2022 09:57)  HR: 80 (24 Jun 2022 09:57) (74 - 80)  BP: 100/51 (24 Jun 2022 09:57) (100/51 - 117/50)  BP(mean): --  RR: 17 (24 Jun 2022 09:57) (17 - 18)  SpO2: 100% (24 Jun 2022 09:57) (98% - 100%)    LABS:                        9.2    8.46  )-----------( 382      ( 23 Jun 2022 05:45 )             30.1     06-23    135  |  94<L>  |  24<H>  ----------------------------<  143<H>  4.0   |  27  |  5.48<H>    Ca    9.1      23 Jun 2022 05:45  Phos  3.3     06-23  Mg     2.40     06-23          CAPILLARY BLOOD GLUCOSE          Lower Extremity Physical Exam:    Neuro: Epicritic sensation diminished to the level of forefoot B/L  Musculoskeletal/Ortho: left partial 5th ray resection healed, LF partial 4th ray resection open, s/p R BKA  Skin:   3/4/22 s/p L foot partial 3rd ray resection open: fibrotic wound to subQ, no malodor, no drainage, no purulence, flaps cool, no maceration, distal skin necrosis, no tracking, no purulence, no erythema          RADIOLOGY & ADDITIONAL TESTS:

## 2022-06-24 NOTE — PROGRESS NOTE ADULT - ASSESSMENT
62 yo f left foot partial 3rd ray amputation (DOS 3/4/2022)  -pt seen and evaluated  - Afebrile , No leukocytosis  - 3/4/22 s/p L foot partial 3rd ray resection open: fibrotic wound to subQ, no malodor, no drainage, no purulence, flaps cool, no maceration, distal skin necrosis, no tracking, no purulence, no erythema    - Pt is s/p recent b/l LE angiograms with poor peripheral arterial flow, is a poor surgical candidate  - B/L foot MRI reviewed   - s/p right BKA  - Pod plan Local wound care   - Prognosis of L foot is highly guarded   - Patient is stable for discharge from podiatry standpoint, f/u information & instruction in d/c provider note   - seen with attending

## 2022-06-24 NOTE — PROGRESS NOTE ADULT - SUBJECTIVE AND OBJECTIVE BOX
Date of Service  : 06-24-22     INTERVAL HPI/OVERNIGHT EVENTS: I feel fine.   Vital Signs Last 24 Hrs  T(C): 36.9 (24 Jun 2022 05:20), Max: 36.9 (23 Jun 2022 21:58)  T(F): 98.4 (24 Jun 2022 05:20), Max: 98.4 (23 Jun 2022 21:58)  HR: 74 (24 Jun 2022 05:20) (74 - 80)  BP: 108/54 (24 Jun 2022 05:20) (103/48 - 117/50)  BP(mean): --  RR: 17 (24 Jun 2022 05:20) (17 - 18)  SpO2: 100% (24 Jun 2022 05:20) (98% - 100%)  I&O's Summary    MEDICATIONS  (STANDING):  aspirin  chewable 81 milliGRAM(s) Oral daily  atorvastatin 40 milliGRAM(s) Oral at bedtime  bisacodyl 5 milliGRAM(s) Oral at bedtime  chlorhexidine 2% Cloths 1 Application(s) Topical daily  clopidogrel Tablet 75 milliGRAM(s) Oral daily  collagenase Ointment 1 Application(s) Topical daily  epoetin niesha-epbx (RETACRIT) Injectable 64276 Unit(s) IV Push <User Schedule>  gabapentin 100 milliGRAM(s) Oral <User Schedule>  heparin   Injectable 5000 Unit(s) SubCutaneous every 12 hours  polyethylene glycol 3350 17 Gram(s) Oral two times a day  senna 2 Tablet(s) Oral at bedtime  sevelamer carbonate 1600 milliGRAM(s) Oral three times a day with meals    MEDICATIONS  (PRN):  oxyCODONE    IR 5 milliGRAM(s) Oral every 4 hours PRN Moderate-Severe Pain (4 - 10)    LABS:                        9.2    8.46  )-----------( 382      ( 23 Jun 2022 05:45 )             30.1     06-23    135  |  94<L>  |  24<H>  ----------------------------<  143<H>  4.0   |  27  |  5.48<H>    Ca    9.1      23 Jun 2022 05:45  Phos  3.3     06-23  Mg     2.40     06-23          CAPILLARY BLOOD GLUCOSE              REVIEW OF SYSTEMS:  CONSTITUTIONAL: No fever, weight loss, or fatigue  EYES: No eye pain, visual disturbances, or discharge  ENMT:  No difficulty hearing, tinnitus, vertigo; No sinus or throat pain  NECK: No pain or stiffness  RESPIRATORY: No cough, wheezing, chills or hemoptysis; No shortness of breath  CARDIOVASCULAR: No chest pain, palpitations, dizziness, or leg swelling  GASTROINTESTINAL: No abdominal or epigastric pain. No nausea, vomiting, or hematemesis; No diarrhea or constipation. No melena or hematochezia.  GENITOURINARY: No dysuria, frequency, hematuria, or incontinence  NEUROLOGICAL: No headaches, memory loss, loss of strength, numbness, or tremors      Consultant(s) Notes Reviewed:  [x ] YES  [ ] NO    PHYSICAL EXAM:  GENERAL: NAD, well-groomed, well-developed, not in any distress ,  HEAD:  Atraumatic, Normocephalic  NECK: Supple, No JVD, Normal thyroid  NERVOUS SYSTEM:  Alert & Oriented X3, No focal deficit   CHEST/LUNG: Good air entry bilateral with no  rales, rhonchi, wheezing, or rubs  HEART: Regular rate and rhythm; No murmurs, rubs, or gallops  ABDOMEN: Soft, Nontender, Nondistended; Bowel sounds present  EXTREMITIES:  Rt . BKA and left foot dressed     Care Discussed with Consultants/Other Providers [ x] YES  [ ] NO

## 2022-06-24 NOTE — PROGRESS NOTE ADULT - SUBJECTIVE AND OBJECTIVE BOX
Cardiovascular Disease Progress Note    Overnight events: No acute events overnight. Ms. Boogie denies chest pain or SOB.    Otherwise review of systems negative    Objective Findings:  T(C): 36.9 (06-24-22 @ 05:20), Max: 36.9 (06-23-22 @ 21:58)  HR: 74 (06-24-22 @ 05:20) (74 - 80)  BP: 108/54 (06-24-22 @ 05:20) (103/48 - 117/50)  RR: 17 (06-24-22 @ 05:20) (17 - 18)  SpO2: 100% (06-24-22 @ 05:20) (98% - 100%)  Wt(kg): --  Daily     Daily       Physical Exam:  Gen: NAD; Patient resting comfortably  HEENT: EOMI, Normocephalic/ atraumatic  CV: RRR, normal S1 + S2, no m/r/g  Lungs:  Normal respiratory effort; clear to auscultation bilaterally  Abd: soft, non-tender; bowel sounds present  Ext: No edema; warm and well perfused    Telemetry: n/a    Laboratory Data:                        9.2    8.46  )-----------( 382      ( 23 Jun 2022 05:45 )             30.1     06-23    135  |  94<L>  |  24<H>  ----------------------------<  143<H>  4.0   |  27  |  5.48<H>    Ca    9.1      23 Jun 2022 05:45  Phos  3.3     06-23  Mg     2.40     06-23                Inpatient Medications:  MEDICATIONS  (STANDING):  aspirin  chewable 81 milliGRAM(s) Oral daily  atorvastatin 40 milliGRAM(s) Oral at bedtime  bisacodyl 5 milliGRAM(s) Oral at bedtime  chlorhexidine 2% Cloths 1 Application(s) Topical daily  clopidogrel Tablet 75 milliGRAM(s) Oral daily  collagenase Ointment 1 Application(s) Topical daily  epoetin niesha-epbx (RETACRIT) Injectable 50030 Unit(s) IV Push <User Schedule>  gabapentin 100 milliGRAM(s) Oral <User Schedule>  heparin   Injectable 5000 Unit(s) SubCutaneous every 12 hours  polyethylene glycol 3350 17 Gram(s) Oral two times a day  senna 2 Tablet(s) Oral at bedtime  sevelamer carbonate 1600 milliGRAM(s) Oral three times a day with meals      Assessment: 61-year-old female with ESRD on HD, HLD and peripheral vascular disease s/p lower extremity resection presents with chronic limb ischemia.     Plan of Care:    #Post-operative evaluation-  Ms. Boogie tolerated R BKA well on 6/14.   She displays no signs or symptoms of post operative coronary ischemia or acute CHF.   - Continue with current cardiac management.     #Compensated diastolic CHF-  Volume status is improved.  Fluid removal with HD as per the renal team.   Recent echo noted- normal LV systolic function.    #Peripheral vascular disease.  - Podiatry and vascular input noted.     #ESRD-  - HD as per renal.        Over 25 minutes spent on total encounter; more than 50% of the visit was spent counseling and/or coordinating care by the attending physician.      Cristino Adams MD [facility]  Cardiovascular Disease  [phone]

## 2022-06-24 NOTE — PROGRESS NOTE ADULT - ASSESSMENT
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 Admitted for further evaluation. Renal following for ESRD Mx.     ESRD on HD  schedule hemodialysis - Monday, Wednesday and Friday   access- thigh AVG  HD unit SQDC  K, vol ok     tolerated HD well today  renal diet  dose all meds for ESRD  renal restrictions in diet.  Anemia of CKD- Hb < goal now. increased DION epo 6>10>16k w/hd tiw. agree w/1unit PRBC w/hd tomorrow  Hyperphosphatemia- c/w renvlea 2tid w/food  HTN, controlled. bp stable. not on anti-htn meds  PAD, infected rt foot wounds, ?OM- s/p vanco, on zosyn. f/u w/Podiatry. on meropenem now. s/p MRI foot w/o Morgan  f/u w/vas sx.   Podiatry f/u - patient s/p left foot TMA and right foot partial 1st ray amputation 6/8  s/p LE angiogram 6/7  s/p rt BKA 6/15  pain control per team      For any question, call:  Cell # [phone]  Pager # [phone]  Callback # [phone]

## 2022-06-25 LAB
ANION GAP SERPL CALC-SCNC: 14 MMOL/L — SIGNIFICANT CHANGE UP (ref 7–14)
BUN SERPL-MCNC: 31 MG/DL — HIGH (ref 7–23)
CALCIUM SERPL-MCNC: 9.4 MG/DL — SIGNIFICANT CHANGE UP (ref 8.4–10.5)
CHLORIDE SERPL-SCNC: 95 MMOL/L — LOW (ref 98–107)
CO2 SERPL-SCNC: 28 MMOL/L — SIGNIFICANT CHANGE UP (ref 22–31)
CREAT SERPL-MCNC: 5.4 MG/DL — HIGH (ref 0.5–1.3)
EGFR: 8 ML/MIN/1.73M2 — LOW
GLUCOSE SERPL-MCNC: 110 MG/DL — HIGH (ref 70–99)
HCT VFR BLD CALC: 31.4 % — LOW (ref 34.5–45)
HGB BLD-MCNC: 9 G/DL — LOW (ref 11.5–15.5)
MAGNESIUM SERPL-MCNC: 2.5 MG/DL — SIGNIFICANT CHANGE UP (ref 1.6–2.6)
MCHC RBC-ENTMCNC: 27.1 PG — SIGNIFICANT CHANGE UP (ref 27–34)
MCHC RBC-ENTMCNC: 28.7 GM/DL — LOW (ref 32–36)
MCV RBC AUTO: 94.6 FL — SIGNIFICANT CHANGE UP (ref 80–100)
NRBC # BLD: 0 /100 WBCS — SIGNIFICANT CHANGE UP
NRBC # FLD: 0.06 K/UL — HIGH
PHOSPHATE SERPL-MCNC: 3.3 MG/DL — SIGNIFICANT CHANGE UP (ref 2.5–4.5)
PLATELET # BLD AUTO: 387 K/UL — SIGNIFICANT CHANGE UP (ref 150–400)
POTASSIUM SERPL-MCNC: 4.1 MMOL/L — SIGNIFICANT CHANGE UP (ref 3.5–5.3)
POTASSIUM SERPL-SCNC: 4.1 MMOL/L — SIGNIFICANT CHANGE UP (ref 3.5–5.3)
RBC # BLD: 3.32 M/UL — LOW (ref 3.8–5.2)
RBC # FLD: 16.4 % — HIGH (ref 10.3–14.5)
SODIUM SERPL-SCNC: 137 MMOL/L — SIGNIFICANT CHANGE UP (ref 135–145)
WBC # BLD: 9.65 K/UL — SIGNIFICANT CHANGE UP (ref 3.8–10.5)
WBC # FLD AUTO: 9.65 K/UL — SIGNIFICANT CHANGE UP (ref 3.8–10.5)

## 2022-06-25 RX ADMIN — CHLORHEXIDINE GLUCONATE 1 APPLICATION(S): 213 SOLUTION TOPICAL at 11:34

## 2022-06-25 RX ADMIN — Medication 1 APPLICATION(S): at 11:34

## 2022-06-25 RX ADMIN — SEVELAMER CARBONATE 1600 MILLIGRAM(S): 2400 POWDER, FOR SUSPENSION ORAL at 11:34

## 2022-06-25 RX ADMIN — CLOPIDOGREL BISULFATE 75 MILLIGRAM(S): 75 TABLET, FILM COATED ORAL at 11:36

## 2022-06-25 RX ADMIN — Medication 81 MILLIGRAM(S): at 11:35

## 2022-06-25 RX ADMIN — HEPARIN SODIUM 5000 UNIT(S): 5000 INJECTION INTRAVENOUS; SUBCUTANEOUS at 05:03

## 2022-06-25 RX ADMIN — ATORVASTATIN CALCIUM 40 MILLIGRAM(S): 80 TABLET, FILM COATED ORAL at 22:56

## 2022-06-25 RX ADMIN — HEPARIN SODIUM 5000 UNIT(S): 5000 INJECTION INTRAVENOUS; SUBCUTANEOUS at 18:26

## 2022-06-25 NOTE — PROGRESS NOTE ADULT - ASSESSMENT
62 y/o woman w/ ESRD on HD (MWF), HF, 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 Admitted for further evaluation. Renal following for ESRD Mx.     ESRD on HD  schedule hemodialysis - Monday, Wednesday and Friday   access- thigh AVG  HD unit SQDC  K, vol ok     s/p HD yesterday, Rx sheet reviewed, net UF 1.5kg, tolerated well. uneventful.   plan for next routine HD 6/27  renal diet  dose all meds for ESRD  renal restrictions in diet.  Anemia of CKD- Hb < goal now. increased DION epo 6>10>16k w/hd tiw. s/p 1unit PRBC w/hd   Hyperphosphatemia- c/w renvlea 2tid w/food  HTN, controlled. bp stable. not on anti-htn meds  PAD, infected rt foot wounds, ?OM- s/p vanco, on zosyn. f/u w/Podiatry. on meropenem now. s/p MRI foot w/o Morgan  f/u w/vas sx.   Podiatry f/u - patient s/p left foot TMA and right foot partial 1st ray amputation 6/8  s/p LE angiogram 6/7  s/p rt BKA 6/15  pain control per team      labs, chart reviewed  poc d/w pt  For any question, call:  Cell # [phone]  Pager # [phone]

## 2022-06-25 NOTE — PROGRESS NOTE ADULT - SUBJECTIVE AND OBJECTIVE BOX
[facility] - S Jluis / Shabbir S /D Romina/ S Denys/ S Chano/ Andrés Perkins / M Katlyn/ O Gladis  service -[phone], office [phone]  ---------------------------------------------------------------------------------------------------------------    Patient seen and examined bedside    Subjective and Objective: No overnight events, denied sob. No complaints today. feeling better    Allergies: No Known Allergies      Hospital Medications:   MEDICATIONS  (STANDING):  aspirin  chewable 81 milliGRAM(s) Oral daily  atorvastatin 40 milliGRAM(s) Oral at bedtime  bisacodyl 5 milliGRAM(s) Oral at bedtime  chlorhexidine 2% Cloths 1 Application(s) Topical daily  clopidogrel Tablet 75 milliGRAM(s) Oral daily  collagenase Ointment 1 Application(s) Topical daily  epoetin niesha-epbx (RETACRIT) Injectable 81033 Unit(s) IV Push <User Schedule>  gabapentin 100 milliGRAM(s) Oral <User Schedule>  heparin   Injectable 5000 Unit(s) SubCutaneous every 12 hours  polyethylene glycol 3350 17 Gram(s) Oral two times a day  senna 2 Tablet(s) Oral at bedtime  sevelamer carbonate 1600 milliGRAM(s) Oral three times a day with meals      VITALS:  T(F): 97.8 (06-25-22 @ 14:07), Max: 97.9 (06-24-22 @ 20:47)  HR: 78 (06-25-22 @ 14:07)  BP: 115/51 (06-25-22 @ 14:07)  RR: 18 (06-25-22 @ 14:07)  SpO2: 100% (06-25-22 @ 14:07)  Wt(kg): --    06-24 @ 07:01  -  06-25 @ 07:00  --------------------------------------------------------  IN: 400 mL / OUT: 1900 mL / NET: -1500 mL      PHYSICAL EXAM:  Constitutional: NAD  HEENT: anicteric sclera  Neck: No JVD  Respiratory: CTAB, no wheezes, rales or rhonchi  Cardiovascular: S1, S2, RRR  Gastrointestinal: BS+, soft, NT/ND  Extremities: No peripheral edema. rt BKA+  Neurological: A/O x 3  Psychiatric: Normal mood, normal affect  : No salcido.   Vascular Access: thigh AVG+    LABS:  06-25    137  |  95<L>  |  31<H>  ----------------------------<  110<H>  4.1   |  28  |  5.40<H>    Ca    9.4      25 Jun 2022 07:10  Phos  3.3     06-25  Mg     2.50     06-25      Creatinine Trend: 5.40 <--, 5.62 <--, 5.48 <--, 7.65 <--, 5.56 <--, 8.48 <--, 6.55 <--                        9.0    9.65  )-----------( 387      ( 25 Jun 2022 07:10 )             31.4     Urine Studies:        RADIOLOGY & ADDITIONAL STUDIES:

## 2022-06-25 NOTE — PROGRESS NOTE ADULT - SUBJECTIVE AND OBJECTIVE BOX
Cardiovascular Disease Progress Note    Overnight events: No acute events overnight. Ms. Boogie denies chest pain or SOB.     Otherwise review of systems negative    Objective Findings:  T(C): 36.6 (22 @ 02:12), Max: 36.9 (22 @ 09:57)  HR: 72 (22 @ 02:12) (72 - 80)  BP: 110/54 (22 @ 02:12) (100/51 - 120/55)  RR: 17 (22 @ 02:12) (17 - 18)  SpO2: 100% (22 @ 02:12) (98% - 100%)  Wt(kg): --  Daily     Daily Weight in k (2022 18:15)      Physical Exam:  Gen: NAD; Patient resting comfortably  HEENT: EOMI, Normocephalic/ atraumatic  CV: RRR, normal S1 + S2, no m/r/g  Lungs:  Normal respiratory effort; clear to auscultation bilaterally  Abd: soft, non-tender; bowel sounds present  Ext: No edema;      Telemetry: n/a    Laboratory Data:                        8.9    8.88  )-----------( 406      ( 2022 17:07 )             31.0     06-24    137  |  95<L>  |  32<H>  ----------------------------<  108<H>  3.9   |  29  |  5.62<H>    Ca    9.1      2022 17:07  Phos  2.7     -  Mg     2.50     06-24                Inpatient Medications:  MEDICATIONS  (STANDING):  aspirin  chewable 81 milliGRAM(s) Oral daily  atorvastatin 40 milliGRAM(s) Oral at bedtime  bisacodyl 5 milliGRAM(s) Oral at bedtime  chlorhexidine 2% Cloths 1 Application(s) Topical daily  clopidogrel Tablet 75 milliGRAM(s) Oral daily  collagenase Ointment 1 Application(s) Topical daily  epoetin niesha-epbx (RETACRIT) Injectable 49240 Unit(s) IV Push <User Schedule>  gabapentin 100 milliGRAM(s) Oral <User Schedule>  heparin   Injectable 5000 Unit(s) SubCutaneous every 12 hours  polyethylene glycol 3350 17 Gram(s) Oral two times a day  senna 2 Tablet(s) Oral at bedtime  sevelamer carbonate 1600 milliGRAM(s) Oral three times a day with meals      Assessment: 61-year-old female with ESRD on HD, HLD and peripheral vascular disease s/p lower extremity resection presents with chronic limb ischemia.     Plan of Care:    #Post-operative evaluation-  Ms. Boogie tolerated R BKA well on .   She displays no signs or symptoms of post operative coronary ischemia or acute CHF.   - Continue with current cardiac management.     #Compensated diastolic CHF-  Volume status is improved.  Fluid removal with HD as per the renal team.   Recent echo noted- normal LV systolic function.    #Peripheral vascular disease.  - Podiatry and vascular input noted.     #ESRD-  - HD as per renal.      Over 25 minutes spent on total encounter; more than 50% of the visit was spent counseling and/or coordinating care by the attending physician.      Cristino Adams MD [facility]  Cardiovascular Disease  [phone]

## 2022-06-25 NOTE — PROGRESS NOTE ADULT - SUBJECTIVE AND OBJECTIVE BOX
Date of Service  : 06-25-22     INTERVAL HPI/OVERNIGHT EVENTS: I feel fine.  Vital Signs Last 24 Hrs  T(C): 36.6 (25 Jun 2022 02:12), Max: 36.7 (24 Jun 2022 14:50)  T(F): 97.9 (25 Jun 2022 02:12), Max: 98.1 (24 Jun 2022 14:50)  HR: 72 (25 Jun 2022 02:12) (72 - 79)  BP: 110/54 (25 Jun 2022 02:12) (100/53 - 120/55)  BP(mean): --  RR: 17 (25 Jun 2022 02:12) (17 - 18)  SpO2: 100% (25 Jun 2022 02:12) (98% - 100%)  I&O's Summary    24 Jun 2022 07:01  -  25 Jun 2022 07:00  --------------------------------------------------------  IN: 400 mL / OUT: 1900 mL / NET: -1500 mL      MEDICATIONS  (STANDING):  aspirin  chewable 81 milliGRAM(s) Oral daily  atorvastatin 40 milliGRAM(s) Oral at bedtime  bisacodyl 5 milliGRAM(s) Oral at bedtime  chlorhexidine 2% Cloths 1 Application(s) Topical daily  clopidogrel Tablet 75 milliGRAM(s) Oral daily  collagenase Ointment 1 Application(s) Topical daily  epoetin niesha-epbx (RETACRIT) Injectable 39425 Unit(s) IV Push <User Schedule>  gabapentin 100 milliGRAM(s) Oral <User Schedule>  heparin   Injectable 5000 Unit(s) SubCutaneous every 12 hours  polyethylene glycol 3350 17 Gram(s) Oral two times a day  senna 2 Tablet(s) Oral at bedtime  sevelamer carbonate 1600 milliGRAM(s) Oral three times a day with meals    MEDICATIONS  (PRN):  oxyCODONE    IR 5 milliGRAM(s) Oral every 4 hours PRN Moderate-Severe Pain (4 - 10)    LABS:                        9.0    9.65  )-----------( 387      ( 25 Jun 2022 07:10 )             31.4     06-25    137  |  95<L>  |  31<H>  ----------------------------<  110<H>  4.1   |  28  |  5.40<H>    Ca    9.4      25 Jun 2022 07:10  Phos  3.3     06-25  Mg     2.50     06-25          CAPILLARY BLOOD GLUCOSE              REVIEW OF SYSTEMS:  CONSTITUTIONAL: No fever, weight loss, or fatigue  EYES: No eye pain, visual disturbances, or discharge  ENMT:  No difficulty hearing, tinnitus, vertigo; No sinus or throat pain  NECK: No pain or stiffness  RESPIRATORY: No cough, wheezing, chills or hemoptysis; No shortness of breath  CARDIOVASCULAR: No chest pain, palpitations, dizziness, or leg swelling  GASTROINTESTINAL: No abdominal or epigastric pain. No nausea, vomiting, or hematemesis; No diarrhea or constipation. No melena or hematochezia.  GENITOURINARY: No dysuria, frequency, hematuria, or incontinence  NEUROLOGICAL: No headaches, memory loss, loss of strength, numbness, or tremors      Consultant(s) Notes Reviewed:  [x ] YES  [ ] NO    PHYSICAL EXAM:  GENERAL: NAD, well-groomed, well-developed,not in any distress ,  HEAD:  Atraumatic, Normocephalic  EYES: EOMI, PERRLA, conjunctiva and sclera clear  ENMT: No tonsillar erythema, exudates, or enlargement; Moist mucous membranes, Good dentition, No lesions  NECK: Supple, No JVD, Normal thyroid  NERVOUS SYSTEM:  Alert & Oriented X3, No focal deficit   CHEST/LUNG: Good air entry bilateral with no  rales, rhonchi, wheezing, or rubs  HEART: Regular rate and rhythm; No murmurs, rubs, or gallops  ABDOMEN: Soft, Nontender, Nondistended; Bowel sounds present  EXTREMITIES:  RBKA and left foot dressed.    Care Discussed with Consultants/Other Providers [ x] YES  [ ] NO

## 2022-06-26 LAB
ANION GAP SERPL CALC-SCNC: 17 MMOL/L — HIGH (ref 7–14)
BUN SERPL-MCNC: 46 MG/DL — HIGH (ref 7–23)
CALCIUM SERPL-MCNC: 9.7 MG/DL — SIGNIFICANT CHANGE UP (ref 8.4–10.5)
CHLORIDE SERPL-SCNC: 96 MMOL/L — LOW (ref 98–107)
CO2 SERPL-SCNC: 25 MMOL/L — SIGNIFICANT CHANGE UP (ref 22–31)
CREAT SERPL-MCNC: 7 MG/DL — HIGH (ref 0.5–1.3)
EGFR: 6 ML/MIN/1.73M2 — LOW
GLUCOSE SERPL-MCNC: 93 MG/DL — SIGNIFICANT CHANGE UP (ref 70–99)
HCT VFR BLD CALC: 31.4 % — LOW (ref 34.5–45)
HGB BLD-MCNC: 8.9 G/DL — LOW (ref 11.5–15.5)
MAGNESIUM SERPL-MCNC: 2.7 MG/DL — HIGH (ref 1.6–2.6)
MCHC RBC-ENTMCNC: 27.1 PG — SIGNIFICANT CHANGE UP (ref 27–34)
MCHC RBC-ENTMCNC: 28.3 GM/DL — LOW (ref 32–36)
MCV RBC AUTO: 95.4 FL — SIGNIFICANT CHANGE UP (ref 80–100)
NRBC # BLD: 0 /100 WBCS — SIGNIFICANT CHANGE UP
NRBC # FLD: 0.02 K/UL — HIGH
PHOSPHATE SERPL-MCNC: 4.2 MG/DL — SIGNIFICANT CHANGE UP (ref 2.5–4.5)
PLATELET # BLD AUTO: 355 K/UL — SIGNIFICANT CHANGE UP (ref 150–400)
POTASSIUM SERPL-MCNC: 4.5 MMOL/L — SIGNIFICANT CHANGE UP (ref 3.5–5.3)
POTASSIUM SERPL-SCNC: 4.5 MMOL/L — SIGNIFICANT CHANGE UP (ref 3.5–5.3)
RBC # BLD: 3.29 M/UL — LOW (ref 3.8–5.2)
RBC # FLD: 16.6 % — HIGH (ref 10.3–14.5)
SARS-COV-2 RNA SPEC QL NAA+PROBE: SIGNIFICANT CHANGE UP
SODIUM SERPL-SCNC: 138 MMOL/L — SIGNIFICANT CHANGE UP (ref 135–145)
WBC # BLD: 7.59 K/UL — SIGNIFICANT CHANGE UP (ref 3.8–10.5)
WBC # FLD AUTO: 7.59 K/UL — SIGNIFICANT CHANGE UP (ref 3.8–10.5)

## 2022-06-26 RX ADMIN — ATORVASTATIN CALCIUM 40 MILLIGRAM(S): 80 TABLET, FILM COATED ORAL at 22:16

## 2022-06-26 RX ADMIN — Medication 1 APPLICATION(S): at 12:30

## 2022-06-26 RX ADMIN — SEVELAMER CARBONATE 1600 MILLIGRAM(S): 2400 POWDER, FOR SUSPENSION ORAL at 09:32

## 2022-06-26 RX ADMIN — HEPARIN SODIUM 5000 UNIT(S): 5000 INJECTION INTRAVENOUS; SUBCUTANEOUS at 05:51

## 2022-06-26 RX ADMIN — CHLORHEXIDINE GLUCONATE 1 APPLICATION(S): 213 SOLUTION TOPICAL at 12:28

## 2022-06-26 RX ADMIN — SEVELAMER CARBONATE 1600 MILLIGRAM(S): 2400 POWDER, FOR SUSPENSION ORAL at 11:45

## 2022-06-26 RX ADMIN — CLOPIDOGREL BISULFATE 75 MILLIGRAM(S): 75 TABLET, FILM COATED ORAL at 11:44

## 2022-06-26 RX ADMIN — Medication 81 MILLIGRAM(S): at 11:45

## 2022-06-26 NOTE — PROGRESS NOTE ADULT - SUBJECTIVE AND OBJECTIVE BOX
Cardiovascular Disease Progress Note    Overnight events: No acute events overnight.    Ms. Boogie denies chest pain or SOB.   Otherwise review of systems negative    Objective Findings:  T(C): 36.5 (06-26-22 @ 05:42), Max: 36.9 (06-25-22 @ 18:01)  HR: 75 (06-26-22 @ 05:42) (75 - 81)  BP: 102/50 (06-26-22 @ 05:42) (102/50 - 115/51)  RR: 16 (06-26-22 @ 05:42) (16 - 18)  SpO2: 99% (06-26-22 @ 05:42) (99% - 100%)  Wt(kg): --  Daily     Daily       Physical Exam:  Gen: NAD; Patient resting comfortably  HEENT: EOMI, Normocephalic/ atraumatic  CV: RRR, normal S1 + S2, no m/r/g  Lungs:  Normal respiratory effort; clear to auscultation bilaterally  Abd: soft, non-tender; bowel sounds present  Ext: No edema;     Telemetry: n/a    Laboratory Data:                        8.9    7.59  )-----------( 355      ( 26 Jun 2022 07:30 )             31.4     06-25    137  |  95<L>  |  31<H>  ----------------------------<  110<H>  4.1   |  28  |  5.40<H>    Ca    9.4      25 Jun 2022 07:10  Phos  3.3     06-25  Mg     2.50     06-25                Inpatient Medications:  MEDICATIONS  (STANDING):  aspirin  chewable 81 milliGRAM(s) Oral daily  atorvastatin 40 milliGRAM(s) Oral at bedtime  bisacodyl 5 milliGRAM(s) Oral at bedtime  chlorhexidine 2% Cloths 1 Application(s) Topical daily  clopidogrel Tablet 75 milliGRAM(s) Oral daily  collagenase Ointment 1 Application(s) Topical daily  epoetin niesha-epbx (RETACRIT) Injectable 59005 Unit(s) IV Push <User Schedule>  gabapentin 100 milliGRAM(s) Oral <User Schedule>  heparin   Injectable 5000 Unit(s) SubCutaneous every 12 hours  polyethylene glycol 3350 17 Gram(s) Oral two times a day  senna 2 Tablet(s) Oral at bedtime  sevelamer carbonate 1600 milliGRAM(s) Oral three times a day with meals      Assessment:  61-year-old female with ESRD on HD, HLD and peripheral vascular disease s/p lower extremity resection presents with chronic limb ischemia.     Plan of Care:    #Post-operative evaluation-  Ms. Boogie tolerated R BKA well on 6/14.   She displays no signs or symptoms of post operative coronary ischemia or acute CHF.   - Continue with current cardiac management.     #Compensated diastolic CHF-  Volume status is improved.  Fluid removal with HD as per the renal team.   Recent echo noted- normal LV systolic function.    #Peripheral vascular disease.  - Podiatry and vascular input noted.     #ESRD-  - HD as per renal.    #ACP (advance care planning)-  Advanced care planning was discussed with the patient.  Advanced care planning forms were discussed.  Risks, benefits and alternatives of medical treatment and procedures were discussed in detail and all questions were answered. 30 minutes spent addressing advance care plans.      Over 25 minutes spent on total encounter; more than 50% of the visit was spent counseling and/or coordinating care by the attending physician.      Cristino Adams MD [facility]  Cardiovascular Disease  [phone]

## 2022-06-26 NOTE — PROGRESS NOTE ADULT - ASSESSMENT
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 Admitted for further evaluation     Problem/Plan - 1:  ·  Problem: Wound of right foot with  Cellulitis with Left 4th toe OM .   ·  Plan: S/P  Right BKA   -podiatry and ID help appreciated.   -S/P   IV Meropenem  500mg q24hrs per iD .   - Pain management helping .  - Left foot prognosis is also guarded.      Problem/Plan - 2:  ·  Problem: PAD (peripheral artery disease).   ·  Plan: S/P RLE Angiogram but unable to do any intervention per Vascular.   -known PAD, continue asa/plavix. Pt denies any numbness or tingling.  -recent angiograms done w/ poor peripheral arterial flow per podiatry and so poor surgical candidate as above  -continue statin.     Problem/Plan - 3:  ·  Problem: ESRD (end stage renal disease) on dialysis.   ·  Plan: Renal helping with HD.   On MWF schedule.  -monitor I/O.     Problem/Plan - 4:  ·  Problem: HLD (hyperlipidemia).   ·  Plan: continue statin. check lipid profile.     Problem/Plan - 5:  ·  Problem: Constipation .   ·  Plan: Laxatives.      Problem/Plan - 6:  ·  Problem: Chronic Anemia with acute blood loss  .   ·  Plan: PRBC to keep Hgb 8 G.     Dispo : DC planning to [facility] pending placement since last week .

## 2022-06-26 NOTE — PROGRESS NOTE ADULT - SUBJECTIVE AND OBJECTIVE BOX
Date of Service  : 06-26-22     INTERVAL HPI/OVERNIGHT EVENTS: i feel fine.   Vital Signs Last 24 Hrs  T(C): 36.4 (26 Jun 2022 10:27), Max: 36.9 (25 Jun 2022 18:01)  T(F): 97.5 (26 Jun 2022 10:27), Max: 98.4 (25 Jun 2022 18:01)  HR: 82 (26 Jun 2022 10:27) (75 - 82)  BP: 119/52 (26 Jun 2022 10:27) (102/50 - 119/52)  BP(mean): --  RR: 17 (26 Jun 2022 10:27) (16 - 18)  SpO2: 99% (26 Jun 2022 10:27) (99% - 100%)  I&O's Summary    MEDICATIONS  (STANDING):  aspirin  chewable 81 milliGRAM(s) Oral daily  atorvastatin 40 milliGRAM(s) Oral at bedtime  bisacodyl 5 milliGRAM(s) Oral at bedtime  chlorhexidine 2% Cloths 1 Application(s) Topical daily  clopidogrel Tablet 75 milliGRAM(s) Oral daily  collagenase Ointment 1 Application(s) Topical daily  epoetin niesha-epbx (RETACRIT) Injectable 10150 Unit(s) IV Push <User Schedule>  gabapentin 100 milliGRAM(s) Oral <User Schedule>  heparin   Injectable 5000 Unit(s) SubCutaneous every 12 hours  polyethylene glycol 3350 17 Gram(s) Oral two times a day  senna 2 Tablet(s) Oral at bedtime  sevelamer carbonate 1600 milliGRAM(s) Oral three times a day with meals    MEDICATIONS  (PRN):  oxyCODONE    IR 5 milliGRAM(s) Oral every 4 hours PRN Moderate-Severe Pain (4 - 10)    LABS:                        8.9    7.59  )-----------( 355      ( 26 Jun 2022 07:30 )             31.4     06-26    138  |  96<L>  |  46<H>  ----------------------------<  93  4.5   |  25  |  7.00<H>    Ca    9.7      26 Jun 2022 07:30  Phos  4.2     06-26  Mg     2.70     06-26          CAPILLARY BLOOD GLUCOSE              REVIEW OF SYSTEMS:  CONSTITUTIONAL: No fever, weight loss, or fatigue  EYES: No eye pain, visual disturbances, or discharge  ENMT:  No difficulty hearing, tinnitus, vertigo; No sinus or throat pain  NECK: No pain or stiffness  RESPIRATORY: No cough, wheezing, chills or hemoptysis; No shortness of breath  CARDIOVASCULAR: No chest pain, palpitations, dizziness, or leg swelling  GASTROINTESTINAL: No abdominal or epigastric pain. No nausea, vomiting, or hematemesis; No diarrhea or constipation. No melena or hematochezia.  GENITOURINARY: No dysuria, frequency, hematuria, or incontinence  NEUROLOGICAL: No headaches, memory loss, loss of strength, numbness, or tremors    Consultant(s) Notes Reviewed:  [x ] YES  [ ] NO    PHYSICAL EXAM:  GENERAL: NAD, well-groomed, well-developed,not in any distress ,  HEAD:  Atraumatic, Normocephalic  EYES: EOMI, PERRLA, conjunctiva and sclera clear  ENMT: No tonsillar erythema, exudates, or enlargement; Moist mucous membranes, Good dentition, No lesions  NECK: Supple, No JVD, Normal thyroid  NERVOUS SYSTEM:  Alert & Oriented X3, No focal deficit   CHEST/LUNG: Good air entry bilateral with no  rales, rhonchi, wheezing, or rubs  HEART: Regular rate and rhythm; No murmurs, rubs, or gallops  ABDOMEN: Soft, Nontender, Nondistended; Bowel sounds present  EXTREMITIES:  RBKA and left foot dressed     Care Discussed with Consultants/Other Providers [ x] YES  [ ] NO

## 2022-06-27 LAB
ALBUMIN SERPL ELPH-MCNC: 3.1 G/DL — LOW (ref 3.3–5)
ALP SERPL-CCNC: 66 U/L — SIGNIFICANT CHANGE UP (ref 40–120)
ALT FLD-CCNC: <5 U/L — LOW (ref 4–33)
ANION GAP SERPL CALC-SCNC: 16 MMOL/L — HIGH (ref 7–14)
AST SERPL-CCNC: 10 U/L — SIGNIFICANT CHANGE UP (ref 4–32)
BASOPHILS # BLD AUTO: 0.02 K/UL — SIGNIFICANT CHANGE UP (ref 0–0.2)
BASOPHILS NFR BLD AUTO: 0.3 % — SIGNIFICANT CHANGE UP (ref 0–2)
BILIRUB SERPL-MCNC: 0.2 MG/DL — SIGNIFICANT CHANGE UP (ref 0.2–1.2)
BUN SERPL-MCNC: 65 MG/DL — HIGH (ref 7–23)
CALCIUM SERPL-MCNC: 9.7 MG/DL — SIGNIFICANT CHANGE UP (ref 8.4–10.5)
CHLORIDE SERPL-SCNC: 98 MMOL/L — SIGNIFICANT CHANGE UP (ref 98–107)
CO2 SERPL-SCNC: 25 MMOL/L — SIGNIFICANT CHANGE UP (ref 22–31)
CREAT SERPL-MCNC: 8.55 MG/DL — HIGH (ref 0.5–1.3)
EGFR: 5 ML/MIN/1.73M2 — LOW
EOSINOPHIL # BLD AUTO: 0.21 K/UL — SIGNIFICANT CHANGE UP (ref 0–0.5)
EOSINOPHIL NFR BLD AUTO: 2.9 % — SIGNIFICANT CHANGE UP (ref 0–6)
GLUCOSE SERPL-MCNC: 88 MG/DL — SIGNIFICANT CHANGE UP (ref 70–99)
HCT VFR BLD CALC: 30.7 % — LOW (ref 34.5–45)
HGB BLD-MCNC: 8.8 G/DL — LOW (ref 11.5–15.5)
IANC: 5.08 K/UL — SIGNIFICANT CHANGE UP (ref 1.8–7.4)
IMM GRANULOCYTES NFR BLD AUTO: 0.7 % — SIGNIFICANT CHANGE UP (ref 0–1.5)
LYMPHOCYTES # BLD AUTO: 1.4 K/UL — SIGNIFICANT CHANGE UP (ref 1–3.3)
LYMPHOCYTES # BLD AUTO: 19.3 % — SIGNIFICANT CHANGE UP (ref 13–44)
MAGNESIUM SERPL-MCNC: 2.8 MG/DL — HIGH (ref 1.6–2.6)
MCHC RBC-ENTMCNC: 27.1 PG — SIGNIFICANT CHANGE UP (ref 27–34)
MCHC RBC-ENTMCNC: 28.7 GM/DL — LOW (ref 32–36)
MCV RBC AUTO: 94.5 FL — SIGNIFICANT CHANGE UP (ref 80–100)
MONOCYTES # BLD AUTO: 0.51 K/UL — SIGNIFICANT CHANGE UP (ref 0–0.9)
MONOCYTES NFR BLD AUTO: 7 % — SIGNIFICANT CHANGE UP (ref 2–14)
NEUTROPHILS # BLD AUTO: 5.08 K/UL — SIGNIFICANT CHANGE UP (ref 1.8–7.4)
NEUTROPHILS NFR BLD AUTO: 69.8 % — SIGNIFICANT CHANGE UP (ref 43–77)
NRBC # BLD: 0 /100 WBCS — SIGNIFICANT CHANGE UP
NRBC # FLD: 0 K/UL — SIGNIFICANT CHANGE UP
PHOSPHATE SERPL-MCNC: 5.2 MG/DL — HIGH (ref 2.5–4.5)
PLATELET # BLD AUTO: 342 K/UL — SIGNIFICANT CHANGE UP (ref 150–400)
POTASSIUM SERPL-MCNC: 4.5 MMOL/L — SIGNIFICANT CHANGE UP (ref 3.5–5.3)
POTASSIUM SERPL-SCNC: 4.5 MMOL/L — SIGNIFICANT CHANGE UP (ref 3.5–5.3)
PROT SERPL-MCNC: 6.5 G/DL — SIGNIFICANT CHANGE UP (ref 6–8.3)
RBC # BLD: 3.25 M/UL — LOW (ref 3.8–5.2)
RBC # FLD: 16.9 % — HIGH (ref 10.3–14.5)
SODIUM SERPL-SCNC: 139 MMOL/L — SIGNIFICANT CHANGE UP (ref 135–145)
WBC # BLD: 7.27 K/UL — SIGNIFICANT CHANGE UP (ref 3.8–10.5)
WBC # FLD AUTO: 7.27 K/UL — SIGNIFICANT CHANGE UP (ref 3.8–10.5)

## 2022-06-27 RX ADMIN — CHLORHEXIDINE GLUCONATE 1 APPLICATION(S): 213 SOLUTION TOPICAL at 13:11

## 2022-06-27 RX ADMIN — Medication 81 MILLIGRAM(S): at 12:39

## 2022-06-27 RX ADMIN — ERYTHROPOIETIN 16000 UNIT(S): 10000 INJECTION, SOLUTION INTRAVENOUS; SUBCUTANEOUS at 05:16

## 2022-06-27 RX ADMIN — GABAPENTIN 100 MILLIGRAM(S): 400 CAPSULE ORAL at 10:07

## 2022-06-27 RX ADMIN — CLOPIDOGREL BISULFATE 75 MILLIGRAM(S): 75 TABLET, FILM COATED ORAL at 12:39

## 2022-06-27 RX ADMIN — HEPARIN SODIUM 5000 UNIT(S): 5000 INJECTION INTRAVENOUS; SUBCUTANEOUS at 05:16

## 2022-06-27 RX ADMIN — SEVELAMER CARBONATE 1600 MILLIGRAM(S): 2400 POWDER, FOR SUSPENSION ORAL at 12:39

## 2022-06-27 RX ADMIN — Medication 1 APPLICATION(S): at 11:40

## 2022-06-27 RX ADMIN — ATORVASTATIN CALCIUM 40 MILLIGRAM(S): 80 TABLET, FILM COATED ORAL at 21:31

## 2022-06-27 NOTE — PROGRESS NOTE ADULT - ASSESSMENT
62 y/o woman w/ ESRD on HD (MWF), HF, 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 Admitted for further evaluation     Problem/Plan - 1:  ·  Problem: Wound of right foot with  Cellulitis with Left 4th toe OM .   ·  Plan: S/P  Right BKA   -podiatry and ID help appreciated.   -S/P   IV Meropenem  500mg q24hrs per iD .   - Pain management helping .  - Left foot prognosis is also guarded.      Problem/Plan - 2:  ·  Problem: PAD (peripheral artery disease).   ·  Plan: S/P RLE Angiogram but unable to do any intervention per Vascular.   -known PAD, continue asa/plavix. Pt denies any numbness or tingling.  -recent angiograms done w/ poor peripheral arterial flow per podiatry and so poor surgical candidate as above  -continue statin.     Problem/Plan - 3:  ·  Problem: ESRD (end stage renal disease) on dialysis.   ·  Plan: Renal helping with HD.   On MWF schedule.  -monitor I/O.     Problem/Plan - 4:  ·  Problem: HLD (hyperlipidemia).   ·  Plan: continue statin. check lipid profile.     Problem/Plan - 5:  ·  Problem: Constipation .   ·  Plan: Laxatives.      Problem/Plan - 6:  ·  Problem: Chronic Anemia with acute blood loss  .   ·  Plan: PRBC to keep Hgb 8 G.     Dispo : DC planning to [facility] pending placement

## 2022-06-27 NOTE — PROGRESS NOTE ADULT - SUBJECTIVE AND OBJECTIVE BOX
[facility] - S Jluis / Shabbir S /D Romina/ SHERRI Mcfarlane/ SHERRI Madden/ Andrés Perkins / M Tristianu/ O Gladis  service -[phone], office [phone]  ---------------------------------------------------------------------------------------------------------------    Patient seen and examined bedside    Subjective and Objective: No overnight events, sob resolved. No complaints today. feeling better    Allergies: No Known Allergies      Hospital Medications:   MEDICATIONS  (STANDING):  aspirin  chewable 81 milliGRAM(s) Oral daily  atorvastatin 40 milliGRAM(s) Oral at bedtime  bisacodyl 5 milliGRAM(s) Oral at bedtime  chlorhexidine 2% Cloths 1 Application(s) Topical daily  clopidogrel Tablet 75 milliGRAM(s) Oral daily  collagenase Ointment 1 Application(s) Topical daily  epoetin niesha-epbx (RETACRIT) Injectable 51525 Unit(s) IV Push <User Schedule>  gabapentin 100 milliGRAM(s) Oral <User Schedule>  heparin   Injectable 5000 Unit(s) SubCutaneous every 12 hours  polyethylene glycol 3350 17 Gram(s) Oral two times a day  senna 2 Tablet(s) Oral at bedtime  sevelamer carbonate 1600 milliGRAM(s) Oral three times a day with meals      REVIEW OF SYSTEMS:  CONSTITUTIONAL: No weakness, fevers or chills  EYES/ENT: No visual changes;  No vertigo or throat pain   NECK: No pain or stiffness  RESPIRATORY: No cough, wheezing, hemoptysis; No shortness of breath  CARDIOVASCULAR: No chest pain or palpitations.  GASTROINTESTINAL: No abdominal or epigastric pain. No nausea, vomiting, or hematemesis; No diarrhea or constipation. No melena or hematochezia.  GENITOURINARY: No dysuria, frequency, foamy urine, urinary urgency, incontinence or hematuria  NEUROLOGICAL: No numbness or weakness  SKIN: No itching, burning, rashes, or lesions   VASCULAR: No bilateral lower extremity edema.   All other review of systems is negative unless indicated above.    VITALS:  T(F): 97.8 (06-27-22 @ 15:45), Max: 98.5 (06-26-22 @ 21:46)  HR: 73 (06-27-22 @ 15:45)  BP: 102/43 (06-27-22 @ 15:45)  RR: 18 (06-27-22 @ 15:45)  SpO2: 97% (06-27-22 @ 09:50)  Wt(kg): --        PHYSICAL EXAM:  Constitutional: NAD  HEENT: anicteric sclera, oropharynx clear  Neck: No JVD  Respiratory: CTAB, no wheezes, rales or rhonchi  Cardiovascular: S1, S2, RRR  Gastrointestinal: BS+, soft, NT/ND  Extremities: No cyanosis or clubbing. No peripheral edema  Neurological: A/O x 3, no focal deficits  Psychiatric: Normal mood, normal affect  : No CVA tenderness. No salcido.   Skin: No rashes  Vascular Access:    LABS:  06-27    139  |  98  |  65<H>  ----------------------------<  88  4.5   |  25  |  8.55<H>    Ca    9.7      27 Jun 2022 06:15  Phos  5.2     06-27  Mg     2.80     06-27    TPro  6.5  /  Alb  3.1<L>  /  TBili  0.2  /  DBili      /  AST  10  /  ALT  <5<L>  /  AlkPhos  66  06-27    Creatinine Trend: 8.55 <--, 7.00 <--, 5.40 <--, 5.62 <--, 5.48 <--, 7.65 <--, 5.56 <--                        8.8    7.27  )-----------( 342      ( 27 Jun 2022 06:15 )             30.7     Urine Studies:        RADIOLOGY & ADDITIONAL STUDIES:   [facility] - S Jluis / Shabbir S /D Romina/ S Denys/ S Chano/ Andrés Perkins / M Katlyn/ O Gladis  service -[phone], office [phone]  ---------------------------------------------------------------------------------------------------------------    Patient seen and examined bedside during hd, in hd unit    Subjective and Objective: No overnight events, denied sob. No complaints today. feeling better    Allergies: No Known Allergies      Hospital Medications:   MEDICATIONS  (STANDING):  aspirin  chewable 81 milliGRAM(s) Oral daily  atorvastatin 40 milliGRAM(s) Oral at bedtime  bisacodyl 5 milliGRAM(s) Oral at bedtime  chlorhexidine 2% Cloths 1 Application(s) Topical daily  clopidogrel Tablet 75 milliGRAM(s) Oral daily  collagenase Ointment 1 Application(s) Topical daily  epoetin niesha-epbx (RETACRIT) Injectable 52349 Unit(s) IV Push <User Schedule>  gabapentin 100 milliGRAM(s) Oral <User Schedule>  heparin   Injectable 5000 Unit(s) SubCutaneous every 12 hours  polyethylene glycol 3350 17 Gram(s) Oral two times a day  senna 2 Tablet(s) Oral at bedtime  sevelamer carbonate 1600 milliGRAM(s) Oral three times a day with meals    VITALS:  T(F): 97.8 (06-27-22 @ 15:45), Max: 98.5 (06-26-22 @ 21:46)  HR: 73 (06-27-22 @ 15:45)  BP: 102/43 (06-27-22 @ 15:45)  RR: 18 (06-27-22 @ 15:45)  SpO2: 97% (06-27-22 @ 09:50)  Wt(kg): --    PHYSICAL EXAM:  Constitutional: NAD  HEENT: anicteric sclera  Neck: No JVD  Respiratory: CTAB, no wheezes, rales or rhonchi  Cardiovascular: S1, S2, RRR  Gastrointestinal: BS+, soft, NT/ND  Extremities: No peripheral edema. rt BKA+  Neurological: A/O x 3  Psychiatric: Normal mood, normal affect  : No salcido.   Vascular Access: thigh AVG+    LABS:  06-27    139  |  98  |  65<H>  ----------------------------<  88  4.5   |  25  |  8.55<H>    Ca    9.7      27 Jun 2022 06:15  Phos  5.2     06-27  Mg     2.80     06-27    TPro  6.5  /  Alb  3.1<L>  /  TBili  0.2  /  DBili      /  AST  10  /  ALT  <5<L>  /  AlkPhos  66  06-27    Creatinine Trend: 8.55 <--, 7.00 <--, 5.40 <--, 5.62 <--, 5.48 <--, 7.65 <--, 5.56 <--                        8.8    7.27  )-----------( 342      ( 27 Jun 2022 06:15 )             30.7     Urine Studies:        RADIOLOGY & ADDITIONAL STUDIES:

## 2022-06-27 NOTE — PROGRESS NOTE ADULT - SUBJECTIVE AND OBJECTIVE BOX
Date of Service  : 06-27-22     INTERVAL HPI/OVERNIGHT EVENTS: I feel fine.   Vital Signs Last 24 Hrs  T(C): 36.6 (27 Jun 2022 15:45), Max: 36.9 (26 Jun 2022 21:46)  T(F): 97.8 (27 Jun 2022 15:45), Max: 98.5 (26 Jun 2022 21:46)  HR: 73 (27 Jun 2022 15:45) (70 - 80)  BP: 102/43 (27 Jun 2022 15:45) (102/43 - 115/65)  BP(mean): --  RR: 18 (27 Jun 2022 15:45) (17 - 18)  SpO2: 97% (27 Jun 2022 09:50) (95% - 100%)  I&O's Summary    MEDICATIONS  (STANDING):  aspirin  chewable 81 milliGRAM(s) Oral daily  atorvastatin 40 milliGRAM(s) Oral at bedtime  bisacodyl 5 milliGRAM(s) Oral at bedtime  chlorhexidine 2% Cloths 1 Application(s) Topical daily  clopidogrel Tablet 75 milliGRAM(s) Oral daily  collagenase Ointment 1 Application(s) Topical daily  epoetin niesha-epbx (RETACRIT) Injectable 61279 Unit(s) IV Push <User Schedule>  gabapentin 100 milliGRAM(s) Oral <User Schedule>  heparin   Injectable 5000 Unit(s) SubCutaneous every 12 hours  polyethylene glycol 3350 17 Gram(s) Oral two times a day  senna 2 Tablet(s) Oral at bedtime  sevelamer carbonate 1600 milliGRAM(s) Oral three times a day with meals    MEDICATIONS  (PRN):  oxyCODONE    IR 5 milliGRAM(s) Oral every 4 hours PRN Moderate-Severe Pain (4 - 10)    LABS:                        8.8    7.27  )-----------( 342      ( 27 Jun 2022 06:15 )             30.7     06-27    139  |  98  |  65<H>  ----------------------------<  88  4.5   |  25  |  8.55<H>    Ca    9.7      27 Jun 2022 06:15  Phos  5.2     06-27  Mg     2.80     06-27    TPro  6.5  /  Alb  3.1<L>  /  TBili  0.2  /  DBili  x   /  AST  10  /  ALT  <5<L>  /  AlkPhos  66  06-27        CAPILLARY BLOOD GLUCOSE              REVIEW OF SYSTEMS:  CONSTITUTIONAL: No fever, weight loss, or fatigue  EYES: No eye pain, visual disturbances, or discharge  ENMT:  No difficulty hearing, tinnitus, vertigo; No sinus or throat pain  NECK: No pain or stiffness  RESPIRATORY: No cough, wheezing, chills or hemoptysis; No shortness of breath  CARDIOVASCULAR: No chest pain, palpitations, dizziness, or leg swelling  GASTROINTESTINAL: No abdominal or epigastric pain. No nausea, vomiting, or hematemesis; No diarrhea or constipation. No melena or hematochezia.  GENITOURINARY: No dysuria, frequency, hematuria, or incontinence  NEUROLOGICAL: No headaches, memory loss, loss of strength, numbness, or tremors      Consultant(s) Notes Reviewed:  [x ] YES  [ ] NO    PHYSICAL EXAM:  GENERAL: NAD, well-groomed, well-developed,not in any distress ,  HEAD:  Atraumatic, Normocephalic  EYES: EOMI, PERRLA, conjunctiva and sclera clear  ENMT: No tonsillar erythema, exudates, or enlargement; Moist mucous membranes, Good dentition, No lesions  NECK: Supple, No JVD, Normal thyroid  NERVOUS SYSTEM:  Alert & Oriented X3, No focal deficit   CHEST/LUNG: Good air entry bilateral with no  rales, rhonchi, wheezing, or rubs  HEART: Regular rate and rhythm; No murmurs, rubs, or gallops  ABDOMEN: Soft, Nontender, Nondistended; Bowel sounds present  EXTREMITIES:  RBKA and Left foot dressed.     Care Discussed with Consultants/Other Providers [ x] YES  [ ] NO

## 2022-06-27 NOTE — PROGRESS NOTE ADULT - SUBJECTIVE AND OBJECTIVE BOX
Cardiovascular Disease Progress Note    Overnight events: No acute events overnight.   Ms. Boogie denies chest pain or SOB.    Otherwise review of systems negative    Objective Findings:  T(C): 36.8 (06-27-22 @ 05:00), Max: 36.9 (06-26-22 @ 21:46)  HR: 70 (06-27-22 @ 05:00) (70 - 86)  BP: 115/65 (06-27-22 @ 05:00) (104/48 - 119/52)  RR: 18 (06-27-22 @ 05:00) (17 - 18)  SpO2: 70% (06-27-22 @ 05:00) (70% - 100%)  Wt(kg): --  Daily     Daily       Physical Exam:  Gen: NAD; Patient resting comfortably  HEENT: EOMI, Normocephalic/ atraumatic  CV: RRR, normal S1 + S2, no m/r/g  Lungs:  Normal respiratory effort; clear to auscultation bilaterally  Abd: soft, non-tender; bowel sounds present  Ext: No edema;      Telemetry: n/a    Laboratory Data:                        8.9    7.59  )-----------( 355      ( 26 Jun 2022 07:30 )             31.4     06-26    138  |  96<L>  |  46<H>  ----------------------------<  93  4.5   |  25  |  7.00<H>    Ca    9.7      26 Jun 2022 07:30  Phos  4.2     06-26  Mg     2.70     06-26                Inpatient Medications:  MEDICATIONS  (STANDING):  aspirin  chewable 81 milliGRAM(s) Oral daily  atorvastatin 40 milliGRAM(s) Oral at bedtime  bisacodyl 5 milliGRAM(s) Oral at bedtime  chlorhexidine 2% Cloths 1 Application(s) Topical daily  clopidogrel Tablet 75 milliGRAM(s) Oral daily  collagenase Ointment 1 Application(s) Topical daily  epoetin niesha-epbx (RETACRIT) Injectable 56242 Unit(s) IV Push <User Schedule>  gabapentin 100 milliGRAM(s) Oral <User Schedule>  heparin   Injectable 5000 Unit(s) SubCutaneous every 12 hours  polyethylene glycol 3350 17 Gram(s) Oral two times a day  senna 2 Tablet(s) Oral at bedtime  sevelamer carbonate 1600 milliGRAM(s) Oral three times a day with meals      Assessment: 61-year-old female with ESRD on HD, HLD and peripheral vascular disease s/p lower extremity resection presents with chronic limb ischemia.     Plan of Care:    #Post-operative evaluation-  Ms. Boogie tolerated R BKA well on 6/14.   She displays no signs or symptoms of post operative coronary ischemia or acute CHF.   - Continue with current cardiac management.     #Compensated diastolic CHF-  Volume status is improved.  Fluid removal with HD as per the renal team.   Recent echo noted- normal LV systolic function.    #Peripheral vascular disease.  - Podiatry and vascular input noted.     #ESRD-  - HD as per renal.    Over 25 minutes spent on total encounter; more than 50% of the visit was spent counseling and/or coordinating care by the attending physician.      Cristino Adams MD [facility]  Cardiovascular Disease  [phone]

## 2022-06-27 NOTE — PROGRESS NOTE ADULT - ASSESSMENT
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 Admitted for further evaluation. Renal following for ESRD Mx.     ESRD on HD  schedule hemodialysis - Monday, Wednesday and Friday   access- thigh AVG  HD unit SQDC  K, vol ok     c/w HD w/2k bath, net UF 1.5kg as tolerated. uneventful so far.  plan for next routine HD 6/27  renal diet  dose all meds for ESRD  renal restrictions in diet.  Anemia of CKD- Hb < goal now. increased DION epo 6>10>16k w/hd tiw. s/p 1unit PRBC w/hd   Hyperphosphatemia- c/w renvlea 2tid w/food  HTN, controlled. bp stable. not on anti-htn meds  PAD, infected rt foot wounds, ?OM- s/p vanco, on zosyn. f/u w/Podiatry. on meropenem now. s/p MRI foot w/o Morgan  f/u w/vas sx.   Podiatry f/u - patient s/p left foot TMA and right foot partial 1st ray amputation 6/8  s/p LE angiogram 6/7  s/p rt BKA 6/15  pain control per team    plan for rehab placement w/onsite HD. f/u w/SW  labs, chart reviewed  poc d/w pt  For any question, call:  Cell # [phone]  Pager # [phone]

## 2022-06-28 LAB
ANION GAP SERPL CALC-SCNC: 15 MMOL/L — HIGH (ref 7–14)
BUN SERPL-MCNC: 38 MG/DL — HIGH (ref 7–23)
CALCIUM SERPL-MCNC: 9.6 MG/DL — SIGNIFICANT CHANGE UP (ref 8.4–10.5)
CHLORIDE SERPL-SCNC: 95 MMOL/L — LOW (ref 98–107)
CO2 SERPL-SCNC: 28 MMOL/L — SIGNIFICANT CHANGE UP (ref 22–31)
CREAT SERPL-MCNC: 5.77 MG/DL — HIGH (ref 0.5–1.3)
EGFR: 8 ML/MIN/1.73M2 — LOW
GLUCOSE SERPL-MCNC: 113 MG/DL — HIGH (ref 70–99)
HCT VFR BLD CALC: 34.9 % — SIGNIFICANT CHANGE UP (ref 34.5–45)
HGB BLD-MCNC: 10.2 G/DL — LOW (ref 11.5–15.5)
MAGNESIUM SERPL-MCNC: 2.5 MG/DL — SIGNIFICANT CHANGE UP (ref 1.6–2.6)
MCHC RBC-ENTMCNC: 27.5 PG — SIGNIFICANT CHANGE UP (ref 27–34)
MCHC RBC-ENTMCNC: 29.2 GM/DL — LOW (ref 32–36)
MCV RBC AUTO: 94.1 FL — SIGNIFICANT CHANGE UP (ref 80–100)
NRBC # BLD: 0 /100 WBCS — SIGNIFICANT CHANGE UP
NRBC # FLD: 0 K/UL — SIGNIFICANT CHANGE UP
PHOSPHATE SERPL-MCNC: 3.9 MG/DL — SIGNIFICANT CHANGE UP (ref 2.5–4.5)
PLATELET # BLD AUTO: 329 K/UL — SIGNIFICANT CHANGE UP (ref 150–400)
POTASSIUM SERPL-MCNC: 4.5 MMOL/L — SIGNIFICANT CHANGE UP (ref 3.5–5.3)
POTASSIUM SERPL-SCNC: 4.5 MMOL/L — SIGNIFICANT CHANGE UP (ref 3.5–5.3)
RBC # BLD: 3.71 M/UL — LOW (ref 3.8–5.2)
RBC # FLD: 16.8 % — HIGH (ref 10.3–14.5)
SODIUM SERPL-SCNC: 138 MMOL/L — SIGNIFICANT CHANGE UP (ref 135–145)
WBC # BLD: 8.24 K/UL — SIGNIFICANT CHANGE UP (ref 3.8–10.5)
WBC # FLD AUTO: 8.24 K/UL — SIGNIFICANT CHANGE UP (ref 3.8–10.5)

## 2022-06-28 RX ADMIN — HEPARIN SODIUM 5000 UNIT(S): 5000 INJECTION INTRAVENOUS; SUBCUTANEOUS at 17:32

## 2022-06-28 RX ADMIN — ATORVASTATIN CALCIUM 40 MILLIGRAM(S): 80 TABLET, FILM COATED ORAL at 21:29

## 2022-06-28 RX ADMIN — Medication 81 MILLIGRAM(S): at 11:23

## 2022-06-28 RX ADMIN — HEPARIN SODIUM 5000 UNIT(S): 5000 INJECTION INTRAVENOUS; SUBCUTANEOUS at 05:24

## 2022-06-28 RX ADMIN — CHLORHEXIDINE GLUCONATE 1 APPLICATION(S): 213 SOLUTION TOPICAL at 11:22

## 2022-06-28 RX ADMIN — SEVELAMER CARBONATE 1600 MILLIGRAM(S): 2400 POWDER, FOR SUSPENSION ORAL at 12:28

## 2022-06-28 RX ADMIN — CLOPIDOGREL BISULFATE 75 MILLIGRAM(S): 75 TABLET, FILM COATED ORAL at 11:23

## 2022-06-28 RX ADMIN — Medication 1 APPLICATION(S): at 11:22

## 2022-06-28 RX ADMIN — SEVELAMER CARBONATE 1600 MILLIGRAM(S): 2400 POWDER, FOR SUSPENSION ORAL at 17:32

## 2022-06-28 RX ADMIN — SEVELAMER CARBONATE 1600 MILLIGRAM(S): 2400 POWDER, FOR SUSPENSION ORAL at 08:58

## 2022-06-28 NOTE — PROGRESS NOTE ADULT - SUBJECTIVE AND OBJECTIVE BOX
Date of Service  : 06-28-22     INTERVAL HPI/OVERNIGHT EVENTS: I feel fine.   Vital Signs Last 24 Hrs  T(C): 36.5 (28 Jun 2022 16:51), Max: 36.8 (27 Jun 2022 21:44)  T(F): 97.7 (28 Jun 2022 16:51), Max: 98.2 (27 Jun 2022 21:44)  HR: 75 (28 Jun 2022 16:51) (69 - 79)  BP: 106/52 (28 Jun 2022 16:51) (100/59 - 113/49)  BP(mean): --  RR: 18 (28 Jun 2022 16:51) (17 - 18)  SpO2: 100% (28 Jun 2022 16:51) (100% - 100%)  I&O's Summary    27 Jun 2022 07:01  -  28 Jun 2022 07:00  --------------------------------------------------------  IN: 600 mL / OUT: 1400 mL / NET: -800 mL    28 Jun 2022 07:01  -  28 Jun 2022 16:57  --------------------------------------------------------  IN: 0 mL / OUT: 0 mL / NET: 0 mL      MEDICATIONS  (STANDING):  aspirin  chewable 81 milliGRAM(s) Oral daily  atorvastatin 40 milliGRAM(s) Oral at bedtime  bisacodyl 5 milliGRAM(s) Oral at bedtime  chlorhexidine 2% Cloths 1 Application(s) Topical daily  clopidogrel Tablet 75 milliGRAM(s) Oral daily  collagenase Ointment 1 Application(s) Topical daily  epoetin niesha-epbx (RETACRIT) Injectable 58255 Unit(s) IV Push <User Schedule>  gabapentin 100 milliGRAM(s) Oral <User Schedule>  heparin   Injectable 5000 Unit(s) SubCutaneous every 12 hours  polyethylene glycol 3350 17 Gram(s) Oral two times a day  senna 2 Tablet(s) Oral at bedtime  sevelamer carbonate 1600 milliGRAM(s) Oral three times a day with meals    MEDICATIONS  (PRN):  oxyCODONE    IR 5 milliGRAM(s) Oral every 4 hours PRN Moderate-Severe Pain (4 - 10)    LABS:                        10.2   8.24  )-----------( 329      ( 28 Jun 2022 09:21 )             34.9     06-28    138  |  95<L>  |  38<H>  ----------------------------<  113<H>  4.5   |  28  |  5.77<H>    Ca    9.6      28 Jun 2022 09:21  Phos  3.9     06-28  Mg     2.50     06-28    TPro  6.5  /  Alb  3.1<L>  /  TBili  0.2  /  DBili  x   /  AST  10  /  ALT  <5<L>  /  AlkPhos  66  06-27        CAPILLARY BLOOD GLUCOSE              REVIEW OF SYSTEMS:  CONSTITUTIONAL: No fever, weight loss, or fatigue  EYES: No eye pain, visual disturbances, or discharge  ENMT:  No difficulty hearing, tinnitus, vertigo; No sinus or throat pain  NECK: No pain or stiffness  RESPIRATORY: No cough, wheezing, chills or hemoptysis; No shortness of breath  CARDIOVASCULAR: No chest pain, palpitations, dizziness, or leg swelling  GASTROINTESTINAL: No abdominal or epigastric pain. No nausea, vomiting, or hematemesis; No diarrhea or constipation. No melena or hematochezia.  GENITOURINARY: No dysuria, frequency, hematuria, or incontinence  NEUROLOGICAL: No headaches, memory loss, loss of strength, numbness, or tremors      Consultant(s) Notes Reviewed:  [x ] YES  [ ] NO    PHYSICAL EXAM:  GENERAL: NAD, well-groomed, well-developed,not in any distress ,  HEAD:  Atraumatic, Normocephalic  NECK: Supple, No JVD, Normal thyroid  NERVOUS SYSTEM:  Alert & Oriented X3, No focal deficit   CHEST/LUNG: Good air entry bilateral with no  rales, rhonchi, wheezing, or rubs  HEART: Regular rate and rhythm; No murmurs, rubs, or gallops  ABDOMEN: Soft, Nontender, Nondistended; Bowel sounds present  EXTREMITIES:  RBKA and Left foot dressed.     Care Discussed with Consultants/Other Providers [ x] YES  [ ] NO

## 2022-06-28 NOTE — PROGRESS NOTE ADULT - ASSESSMENT
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 Admitted for further evaluation     Problem/Plan - 1:  ·  Problem: Wound of right foot with  Cellulitis with Left 4th toe OM .   ·  Plan: S/P  Right BKA   -podiatry and ID help appreciated.   -S/P   IV Meropenem  500mg q24hrs per iD .   - Pain management helping .  - Left foot prognosis is also guarded.      Problem/Plan - 2:  ·  Problem: PAD (peripheral artery disease).   ·  Plan: S/P RLE Angiogram but unable to do any intervention per Vascular.   -known PAD, continue asa/plavix. Pt denies any numbness or tingling.  -recent angiograms done w/ poor peripheral arterial flow per podiatry and so poor surgical candidate as above  -continue statin.     Problem/Plan - 3:  ·  Problem: ESRD (end stage renal disease) on dialysis.   ·  Plan: Renal helping with HD.   On MWF schedule.  -monitor I/O.     Problem/Plan - 4:  ·  Problem: HLD (hyperlipidemia).   ·  Plan: continue statin. check lipid profile.     Problem/Plan - 5:  ·  Problem: Constipation .   ·  Plan: Laxatives.      Problem/Plan - 6:  ·  Problem: Chronic Anemia with acute blood loss  .   ·  Plan: PRBC to keep Hgb 8 G.     Dispo : DC planning to [facility] pending placement

## 2022-06-28 NOTE — PROGRESS NOTE ADULT - SUBJECTIVE AND OBJECTIVE BOX
[facility] - S Jluis / Shabbir S /D Romina/ SHERRI Mcfarlane/ SHERRI Madden/ Andrés Perkins / M Tristianu/ O Gladis  service -[phone], office [phone]  ---------------------------------------------------------------------------------------------------------------    Patient seen and examined bedside    Subjective and Objective: No overnight events, sob resolved. No complaints today. feeling better    Allergies: No Known Allergies      Hospital Medications:   MEDICATIONS  (STANDING):  aspirin  chewable 81 milliGRAM(s) Oral daily  atorvastatin 40 milliGRAM(s) Oral at bedtime  bisacodyl 5 milliGRAM(s) Oral at bedtime  chlorhexidine 2% Cloths 1 Application(s) Topical daily  clopidogrel Tablet 75 milliGRAM(s) Oral daily  collagenase Ointment 1 Application(s) Topical daily  epoetin niesha-epbx (RETACRIT) Injectable 46348 Unit(s) IV Push <User Schedule>  gabapentin 100 milliGRAM(s) Oral <User Schedule>  heparin   Injectable 5000 Unit(s) SubCutaneous every 12 hours  polyethylene glycol 3350 17 Gram(s) Oral two times a day  senna 2 Tablet(s) Oral at bedtime  sevelamer carbonate 1600 milliGRAM(s) Oral three times a day with meals      REVIEW OF SYSTEMS:  CONSTITUTIONAL: No weakness, fevers or chills  EYES/ENT: No visual changes;  No vertigo or throat pain   NECK: No pain or stiffness  RESPIRATORY: No cough, wheezing, hemoptysis; No shortness of breath  CARDIOVASCULAR: No chest pain or palpitations.  GASTROINTESTINAL: No abdominal or epigastric pain. No nausea, vomiting, or hematemesis; No diarrhea or constipation. No melena or hematochezia.  GENITOURINARY: No dysuria, frequency, foamy urine, urinary urgency, incontinence or hematuria  NEUROLOGICAL: No numbness or weakness  SKIN: No itching, burning, rashes, or lesions   VASCULAR: No bilateral lower extremity edema.   All other review of systems is negative unless indicated above.    VITALS:  T(F): 97.7 (06-28-22 @ 16:51), Max: 98.2 (06-27-22 @ 21:44)  HR: 75 (06-28-22 @ 16:51)  BP: 106/52 (06-28-22 @ 16:51)  RR: 18 (06-28-22 @ 16:51)  SpO2: 100% (06-28-22 @ 16:51)  Wt(kg): --    06-27 @ 07:01  -  06-28 @ 07:00  --------------------------------------------------------  IN: 600 mL / OUT: 1400 mL / NET: -800 mL    06-28 @ 07:01  -  06-28 @ 17:42  --------------------------------------------------------  IN: 0 mL / OUT: 0 mL / NET: 0 mL          PHYSICAL EXAM:  Constitutional: NAD  HEENT: anicteric sclera, oropharynx clear  Neck: No JVD  Respiratory: CTAB, no wheezes, rales or rhonchi  Cardiovascular: S1, S2, RRR  Gastrointestinal: BS+, soft, NT/ND  Extremities: No cyanosis or clubbing. No peripheral edema  Neurological: A/O x 3, no focal deficits  Psychiatric: Normal mood, normal affect  : No CVA tenderness. No salcido.   Skin: No rashes  Vascular Access:    LABS:  06-28    138  |  95<L>  |  38<H>  ----------------------------<  113<H>  4.5   |  28  |  5.77<H>    Ca    9.6      28 Jun 2022 09:21  Phos  3.9     06-28  Mg     2.50     06-28    TPro  6.5  /  Alb  3.1<L>  /  TBili  0.2  /  DBili      /  AST  10  /  ALT  <5<L>  /  AlkPhos  66  06-27    Creatinine Trend: 5.77 <--, 8.55 <--, 7.00 <--, 5.40 <--, 5.62 <--, 5.48 <--, 7.65 <--                        10.2   8.24  )-----------( 849 ( 28 Jun 2022 09:21 )             34.9     Urine Studies:        RADIOLOGY & ADDITIONAL STUDIES:   [facility] - S Jluis / Shabbir S /D Romina/ S Denys/ S Chano/ Andrés Perkins / M Katlyn/ O Gladis  service -[phone], office [phone]  ---------------------------------------------------------------------------------------------------------------    Patient seen and examined bedside    Subjective and Objective: No overnight events, denied sob. No complaints today. feeling better    Allergies: No Known Allergies      Hospital Medications:   MEDICATIONS  (STANDING):  aspirin  chewable 81 milliGRAM(s) Oral daily  atorvastatin 40 milliGRAM(s) Oral at bedtime  bisacodyl 5 milliGRAM(s) Oral at bedtime  chlorhexidine 2% Cloths 1 Application(s) Topical daily  clopidogrel Tablet 75 milliGRAM(s) Oral daily  collagenase Ointment 1 Application(s) Topical daily  epoetin niesha-epbx (RETACRIT) Injectable 77381 Unit(s) IV Push <User Schedule>  gabapentin 100 milliGRAM(s) Oral <User Schedule>  heparin   Injectable 5000 Unit(s) SubCutaneous every 12 hours  polyethylene glycol 3350 17 Gram(s) Oral two times a day  senna 2 Tablet(s) Oral at bedtime  sevelamer carbonate 1600 milliGRAM(s) Oral three times a day with meals    VITALS:  T(F): 97.7 (06-28-22 @ 16:51), Max: 98.2 (06-27-22 @ 21:44)  HR: 75 (06-28-22 @ 16:51)  BP: 106/52 (06-28-22 @ 16:51)  RR: 18 (06-28-22 @ 16:51)  SpO2: 100% (06-28-22 @ 16:51)  Wt(kg): --    06-27 @ 07:01  -  06-28 @ 07:00  --------------------------------------------------------  IN: 600 mL / OUT: 1400 mL / NET: -800 mL    06-28 @ 07:01  -  06-28 @ 17:42  --------------------------------------------------------  IN: 0 mL / OUT: 0 mL / NET: 0 mL    PHYSICAL EXAM:  Constitutional: NAD  HEENT: anicteric sclera  Neck: No JVD  Respiratory: CTAB, no wheezes, rales or rhonchi  Cardiovascular: S1, S2, RRR  Gastrointestinal: BS+, soft, NT/ND  Extremities: No peripheral edema. rt BKA+  Neurological: A/O x 3  Psychiatric: Normal mood, normal affect  : No salcido.   Vascular Access: thigh AVG+    LABS:  06-28    138  |  95<L>  |  38<H>  ----------------------------<  113<H>  4.5   |  28  |  5.77<H>    Ca    9.6      28 Jun 2022 09:21  Phos  3.9     06-28  Mg     2.50     06-28    TPro  6.5  /  Alb  3.1<L>  /  TBili  0.2  /  DBili      /  AST  10  /  ALT  <5<L>  /  AlkPhos  66  06-27    Creatinine Trend: 5.77 <--, 8.55 <--, 7.00 <--, 5.40 <--, 5.62 <--, 5.48 <--, 7.65 <--                        10.2   8.24  )-----------( 329      ( 28 Jun 2022 09:21 )             34.9     Urine Studies:        RADIOLOGY & ADDITIONAL STUDIES:

## 2022-06-28 NOTE — PROGRESS NOTE ADULT - SUBJECTIVE AND OBJECTIVE BOX
Cardiovascular Disease Progress Note    Overnight events: No acute events overnight.    Patient denies chest pain or SOB.   Otherwise review of systems negative    Objective Findings:  T(C): 36.7 (22 @ 05:26), Max: 36.8 (22 @ 21:44)  HR: 69 (22 @ 05:26) (69 - 79)  BP: 100/59 (22 @ 05:26) (100/59 - 113/49)  RR: 18 (22 @ 05:26) (18 - 18)  SpO2: 100% (22 @ 05:26) (97% - 100%)  Wt(kg): --  Daily     Daily Weight in k.5 (2022 18:51)      Physical Exam:  Gen: NAD; Patient resting comfortably  HEENT: EOMI, Normocephalic/ atraumatic  CV: RRR, normal S1 + S2, no m/r/g  Lungs:  Normal respiratory effort; clear to auscultation bilaterally  Abd: soft, non-tender; bowel sounds present  Ext: No edema;      Telemetry: n/a    Laboratory Data:                        8.8    7.27  )-----------( 342      ( 2022 06:15 )             30.7         139  |  98  |  65<H>  ----------------------------<  88  4.5   |  25  |  8.55<H>    Ca    9.7      2022 06:15  Phos  5.2       Mg     2.80         TPro  6.5  /  Alb  3.1<L>  /  TBili  0.2  /  DBili  x   /  AST  10  /  ALT  <5<L>  /  AlkPhos  66                Inpatient Medications:  MEDICATIONS  (STANDING):  aspirin  chewable 81 milliGRAM(s) Oral daily  atorvastatin 40 milliGRAM(s) Oral at bedtime  bisacodyl 5 milliGRAM(s) Oral at bedtime  chlorhexidine 2% Cloths 1 Application(s) Topical daily  clopidogrel Tablet 75 milliGRAM(s) Oral daily  collagenase Ointment 1 Application(s) Topical daily  epoetin niesha-epbx (RETACRIT) Injectable 64974 Unit(s) IV Push <User Schedule>  gabapentin 100 milliGRAM(s) Oral <User Schedule>  heparin   Injectable 5000 Unit(s) SubCutaneous every 12 hours  polyethylene glycol 3350 17 Gram(s) Oral two times a day  senna 2 Tablet(s) Oral at bedtime  sevelamer carbonate 1600 milliGRAM(s) Oral three times a day with meals      Assessment:  61-year-old female with ESRD on HD, HLD and peripheral vascular disease s/p lower extremity resection presents with chronic limb ischemia.     Plan of Care:    #Post-operative evaluation-  Ms. Boogie tolerated R BKA well on .   She displays no signs or symptoms of post operative coronary ischemia or acute CHF.   - Continue with current cardiac management.     #Compensated diastolic CHF-  Volume status is improved.  Fluid removal with HD as per the renal team.   Recent echo noted- normal LV systolic function.    #Peripheral vascular disease.  - Podiatry and vascular input noted.     #ESRD-  - HD as per renal.      Over 25 minutes spent on total encounter; more than 50% of the visit was spent counseling and/or coordinating care by the attending physician.      Cristino Adams MD [facility]  Cardiovascular Disease  [phone]

## 2022-06-28 NOTE — PROGRESS NOTE ADULT - ASSESSMENT
62 y/o woman w/ ESRD on HD (MWF), HF, 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 Admitted for further evaluation. Renal following for ESRD Mx.     ESRD on HD  schedule hemodialysis - Monday, Wednesday and Friday   access- thigh AVG  HD unit SQDC  K, vol ok     s/p HD 6/27 w/2k bath, net UF 0.9kg, tolerated well, ufg dec due to rel hypotension   plan for next routine HD 6/29 tomorrow   renal diet  dose all meds for ESRD  renal restrictions in diet.  Anemia of CKD- Hb at goal now. c/w DION epo v16k w/hd tiw. s/p 1unit PRBC w/hd before  Hyperphosphatemia- c/w renvlea 2tid w/food  HTN, controlled. bp stable. not on anti-htn meds  PAD, infected rt foot wounds, ?OM- s/p vanco, on zosyn. f/u w/Podiatry. on meropenem now. s/p MRI foot w/o Morgan  f/u w/vas sx.   Podiatry f/u - patient s/p left foot TMA and right foot partial 1st ray amputation 6/8  s/p LE angiogram 6/7  s/p rt BKA 6/15  pain control per team    plan for rehab placement w/onsite HD. f/u w/SW  labs, chart reviewed  poc d/w pt  For any question, call:  Cell # [phone]  Pager # [phone]

## 2022-06-29 LAB
ANION GAP SERPL CALC-SCNC: 16 MMOL/L — HIGH (ref 7–14)
BUN SERPL-MCNC: 55 MG/DL — HIGH (ref 7–23)
CALCIUM SERPL-MCNC: 9.4 MG/DL — SIGNIFICANT CHANGE UP (ref 8.4–10.5)
CHLORIDE SERPL-SCNC: 95 MMOL/L — LOW (ref 98–107)
CO2 SERPL-SCNC: 26 MMOL/L — SIGNIFICANT CHANGE UP (ref 22–31)
CREAT SERPL-MCNC: 7.81 MG/DL — HIGH (ref 0.5–1.3)
EGFR: 5 ML/MIN/1.73M2 — LOW
GLUCOSE SERPL-MCNC: 95 MG/DL — SIGNIFICANT CHANGE UP (ref 70–99)
HCT VFR BLD CALC: 33.1 % — LOW (ref 34.5–45)
HGB BLD-MCNC: 9.5 G/DL — LOW (ref 11.5–15.5)
MAGNESIUM SERPL-MCNC: 2.6 MG/DL — SIGNIFICANT CHANGE UP (ref 1.6–2.6)
MCHC RBC-ENTMCNC: 27.1 PG — SIGNIFICANT CHANGE UP (ref 27–34)
MCHC RBC-ENTMCNC: 28.7 GM/DL — LOW (ref 32–36)
MCV RBC AUTO: 94.6 FL — SIGNIFICANT CHANGE UP (ref 80–100)
NRBC # BLD: 0 /100 WBCS — SIGNIFICANT CHANGE UP
NRBC # FLD: 0 K/UL — SIGNIFICANT CHANGE UP
PHOSPHATE SERPL-MCNC: 4.4 MG/DL — SIGNIFICANT CHANGE UP (ref 2.5–4.5)
PLATELET # BLD AUTO: 306 K/UL — SIGNIFICANT CHANGE UP (ref 150–400)
POTASSIUM SERPL-MCNC: 4 MMOL/L — SIGNIFICANT CHANGE UP (ref 3.5–5.3)
POTASSIUM SERPL-SCNC: 4 MMOL/L — SIGNIFICANT CHANGE UP (ref 3.5–5.3)
RBC # BLD: 3.5 M/UL — LOW (ref 3.8–5.2)
RBC # FLD: 16.8 % — HIGH (ref 10.3–14.5)
SODIUM SERPL-SCNC: 137 MMOL/L — SIGNIFICANT CHANGE UP (ref 135–145)
WBC # BLD: 7.31 K/UL — SIGNIFICANT CHANGE UP (ref 3.8–10.5)
WBC # FLD AUTO: 7.31 K/UL — SIGNIFICANT CHANGE UP (ref 3.8–10.5)

## 2022-06-29 RX ADMIN — CLOPIDOGREL BISULFATE 75 MILLIGRAM(S): 75 TABLET, FILM COATED ORAL at 17:33

## 2022-06-29 RX ADMIN — SEVELAMER CARBONATE 1600 MILLIGRAM(S): 2400 POWDER, FOR SUSPENSION ORAL at 08:48

## 2022-06-29 RX ADMIN — GABAPENTIN 100 MILLIGRAM(S): 400 CAPSULE ORAL at 09:24

## 2022-06-29 RX ADMIN — SEVELAMER CARBONATE 1600 MILLIGRAM(S): 2400 POWDER, FOR SUSPENSION ORAL at 17:33

## 2022-06-29 RX ADMIN — Medication 81 MILLIGRAM(S): at 17:33

## 2022-06-29 RX ADMIN — HEPARIN SODIUM 5000 UNIT(S): 5000 INJECTION INTRAVENOUS; SUBCUTANEOUS at 06:29

## 2022-06-29 RX ADMIN — ATORVASTATIN CALCIUM 40 MILLIGRAM(S): 80 TABLET, FILM COATED ORAL at 22:06

## 2022-06-29 RX ADMIN — HEPARIN SODIUM 5000 UNIT(S): 5000 INJECTION INTRAVENOUS; SUBCUTANEOUS at 17:32

## 2022-06-29 RX ADMIN — ERYTHROPOIETIN 16000 UNIT(S): 10000 INJECTION, SOLUTION INTRAVENOUS; SUBCUTANEOUS at 13:06

## 2022-06-29 NOTE — PROGRESS NOTE ADULT - SUBJECTIVE AND OBJECTIVE BOX
Patient is a 61y old  Female who presents with a chief complaint of RLE toe pain (29 Jun 2022 11:44)       INTERVAL HPI/OVERNIGHT EVENTS:  Patient seen and evaluated at bedside.  Pt is resting comfortable in NAD. Denies N/V/F/C.  Pain rated at X/10    Allergies    No Known Allergies    Intolerances        Vital Signs Last 24 Hrs  T(C): 36.7 (29 Jun 2022 13:45), Max: 36.8 (29 Jun 2022 06:00)  T(F): 98 (29 Jun 2022 13:45), Max: 98.2 (29 Jun 2022 06:00)  HR: 70 (29 Jun 2022 13:45) (70 - 94)  BP: 119/57 (29 Jun 2022 13:45) (100/48 - 119/57)  BP(mean): --  RR: 17 (29 Jun 2022 13:45) (17 - 18)  SpO2: 97% (29 Jun 2022 09:34) (97% - 100%)    LABS:                        9.5    7.31  )-----------( 306      ( 29 Jun 2022 05:54 )             33.1     06-29    137  |  95<L>  |  55<H>  ----------------------------<  95  4.0   |  26  |  7.81<H>    Ca    9.4      29 Jun 2022 05:54  Phos  4.4     06-29  Mg     2.60     06-29          CAPILLARY BLOOD GLUCOSE          Lower Extremity Physical Exam:  Neuro: Epicritic sensation diminished to the level of forefoot B/L  Musculoskeletal/Ortho: left partial 5th ray resection healed, LF partial 4th ray resection open, s/p R BKA  Skin: 3/4/22 s/p L foot partial 3rd ray resection open: fibrotic wound to subQ, no malodor, no drainage, no purulence, flaps cool, no maceration, distal skin necrosis, no tracking, no purulence, no erythema     RADIOLOGY & ADDITIONAL TESTS:

## 2022-06-29 NOTE — PROGRESS NOTE ADULT - SUBJECTIVE AND OBJECTIVE BOX
[facility] - S Jluis / Shabbir S /D Romina/ S Denys/ S Chano/ Andrés Perkins / M Katlyn/ O Gladis  service [phone], office [phone]  ---------------------------------------------------------------------------------------------------------------    Patient seen and examined bedside    Subjective and Objective: No overnight events, denied sob. No complaints today. feeling better    Allergies: No Known Allergies      Hospital Medications:   MEDICATIONS  (STANDING):  aspirin  chewable 81 milliGRAM(s) Oral daily  atorvastatin 40 milliGRAM(s) Oral at bedtime  bisacodyl 5 milliGRAM(s) Oral at bedtime  chlorhexidine 2% Cloths 1 Application(s) Topical daily  clopidogrel Tablet 75 milliGRAM(s) Oral daily  collagenase Ointment 1 Application(s) Topical daily  epoetin niesha-epbx (RETACRIT) Injectable 02554 Unit(s) IV Push <User Schedule>  gabapentin 100 milliGRAM(s) Oral <User Schedule>  heparin   Injectable 5000 Unit(s) SubCutaneous every 12 hours  polyethylene glycol 3350 17 Gram(s) Oral two times a day  senna 2 Tablet(s) Oral at bedtime  sevelamer carbonate 1600 milliGRAM(s) Oral three times a day with meals    VITALS:  T(F): 97.5 (06-29-22 @ 17:58), Max: 98.2 (06-29-22 @ 06:00)  HR: 79 (06-29-22 @ 17:58)  BP: 115/36 (06-29-22 @ 17:58)  RR: 18 (06-29-22 @ 17:58)  SpO2: 100% (06-29-22 @ 17:58)  Wt(kg): --    06-28 @ 07:01  -  06-29 @ 07:00  --------------------------------------------------------  IN: 120 mL / OUT: 0 mL / NET: 120 mL    06-29 @ 07:01  -  06-29 @ 18:27  --------------------------------------------------------  IN: 400 mL / OUT: 1400 mL / NET: -1000 mL    PHYSICAL EXAM:  Constitutional: NAD  HEENT: anicteric sclera  Neck: No JVD  Respiratory: CTAB, no wheezes, rales or rhonchi  Cardiovascular: S1, S2, RRR  Gastrointestinal: BS+, soft, NT/ND  Extremities: No peripheral edema. rt BKA+  Neurological: A/O x 3  Psychiatric: Normal mood, normal affect  : No salcido.   Vascular Access: thigh AVG+    LABS:  06-29    137  |  95<L>  |  55<H>  ----------------------------<  95  4.0   |  26  |  7.81<H>    Ca    9.4      29 Jun 2022 05:54  Phos  4.4     06-29  Mg     2.60     06-29      Creatinine Trend: 7.81 <--, 5.77 <--, 8.55 <--, 7.00 <--, 5.40 <--, 5.62 <--, 5.48 <--                        9.5    7.31  )-----------( 306      ( 29 Jun 2022 05:54 )             33.1     Urine Studies:        RADIOLOGY & ADDITIONAL STUDIES:

## 2022-06-29 NOTE — PROGRESS NOTE ADULT - SUBJECTIVE AND OBJECTIVE BOX
Date of Service  : 06-29-22     INTERVAL HPI/OVERNIGHT EVENTS: I feel fine.   Vital Signs Last 24 Hrs  T(C): 36.4 (29 Jun 2022 17:58), Max: 36.8 (29 Jun 2022 06:00)  T(F): 97.5 (29 Jun 2022 17:58), Max: 98.2 (29 Jun 2022 06:00)  HR: 79 (29 Jun 2022 17:58) (70 - 94)  BP: 115/36 (29 Jun 2022 17:58) (100/48 - 119/57)  BP(mean): --  RR: 18 (29 Jun 2022 17:58) (17 - 18)  SpO2: 100% (29 Jun 2022 17:58) (97% - 100%)  I&O's Summary    28 Jun 2022 07:01  -  29 Jun 2022 07:00  --------------------------------------------------------  IN: 120 mL / OUT: 0 mL / NET: 120 mL    29 Jun 2022 07:01  -  29 Jun 2022 18:59  --------------------------------------------------------  IN: 400 mL / OUT: 1400 mL / NET: -1000 mL      MEDICATIONS  (STANDING):  aspirin  chewable 81 milliGRAM(s) Oral daily  atorvastatin 40 milliGRAM(s) Oral at bedtime  bisacodyl 5 milliGRAM(s) Oral at bedtime  chlorhexidine 2% Cloths 1 Application(s) Topical daily  clopidogrel Tablet 75 milliGRAM(s) Oral daily  collagenase Ointment 1 Application(s) Topical daily  epoetin niesha-epbx (RETACRIT) Injectable 53796 Unit(s) IV Push <User Schedule>  gabapentin 100 milliGRAM(s) Oral <User Schedule>  heparin   Injectable 5000 Unit(s) SubCutaneous every 12 hours  polyethylene glycol 3350 17 Gram(s) Oral two times a day  senna 2 Tablet(s) Oral at bedtime  sevelamer carbonate 1600 milliGRAM(s) Oral three times a day with meals    MEDICATIONS  (PRN):  oxyCODONE    IR 5 milliGRAM(s) Oral every 4 hours PRN Moderate-Severe Pain (4 - 10)    LABS:                        9.5    7.31  )-----------( 306      ( 29 Jun 2022 05:54 )             33.1     06-29    137  |  95<L>  |  55<H>  ----------------------------<  95  4.0   |  26  |  7.81<H>    Ca    9.4      29 Jun 2022 05:54  Phos  4.4     06-29  Mg     2.60     06-29          CAPILLARY BLOOD GLUCOSE              REVIEW OF SYSTEMS:  CONSTITUTIONAL: No fever, weight loss, or fatigue  EYES: No eye pain, visual disturbances, or discharge  ENMT:  No difficulty hearing, tinnitus, vertigo; No sinus or throat pain  NECK: No pain or stiffness  RESPIRATORY: No cough, wheezing, chills or hemoptysis; No shortness of breath  CARDIOVASCULAR: No chest pain, palpitations, dizziness, or leg swelling  GASTROINTESTINAL: No abdominal or epigastric pain. No nausea, vomiting, or hematemesis; No diarrhea or constipation. No melena or hematochezia.  GENITOURINARY: No dysuria, frequency, hematuria, or incontinence    Consultant(s) Notes Reviewed:  [x ] YES  [ ] NO    PHYSICAL EXAM:  GENERAL: NAD, well-groomed, well-developed,not in any distress ,  HEAD:  Atraumatic, Normocephalic  NECK: Supple, No JVD, Normal thyroid  NERVOUS SYSTEM:  Alert & Oriented X3, No focal deficit   CHEST/LUNG: Good air entry bilateral with no  rales, rhonchi, wheezing, or rubs  HEART: Regular rate and rhythm; No murmurs, rubs, or gallops  ABDOMEN: Soft, Nontender, Nondistended; Bowel sounds present  EXTREMITIES:  R KA and left foot dressed.     Care Discussed with Consultants/Other Providers [ x] YES  [ ] NO

## 2022-06-29 NOTE — PROGRESS NOTE ADULT - SUBJECTIVE AND OBJECTIVE BOX
Cardiovascular Disease Progress Note    Overnight events: No acute events overnight.   Ms. Boogie denies chest pain or SOB.    Otherwise review of systems negative    Objective Findings:  T(C): 36.7 (22 @ 10:30), Max: 36.8 (22 @ 06:00)  HR: 75 (22 @ 10:30) (71 - 94)  BP: 110/54 (22 @ 10:30) (100/48 - 110/54)  RR: 17 (22 @ 10:30) (17 - 18)  SpO2: 97% (22 @ 09:34) (97% - 100%)  Wt(kg): --  Daily     Daily Weight in k.9 (2022 10:30)      Physical Exam:  Gen: NAD; Patient resting comfortably  HEENT: EOMI, Normocephalic/ atraumatic  CV: RRR, normal S1 + S2, no m/r/g  Lungs:  Normal respiratory effort; clear to auscultation bilaterally  Abd: soft, non-tender; bowel sounds present  Ext: No edema;     Telemetry: n/a    Laboratory Data:                        9.5    7.31  )-----------( 306      ( 2022 05:54 )             33.1         137  |  95<L>  |  55<H>  ----------------------------<  95  4.0   |  26  |  7.81<H>    Ca    9.4      2022 05:54  Phos  4.4       Mg     2.60                     Inpatient Medications:  MEDICATIONS  (STANDING):  aspirin  chewable 81 milliGRAM(s) Oral daily  atorvastatin 40 milliGRAM(s) Oral at bedtime  bisacodyl 5 milliGRAM(s) Oral at bedtime  chlorhexidine 2% Cloths 1 Application(s) Topical daily  clopidogrel Tablet 75 milliGRAM(s) Oral daily  collagenase Ointment 1 Application(s) Topical daily  epoetin niesha-epbx (RETACRIT) Injectable 34142 Unit(s) IV Push <User Schedule>  gabapentin 100 milliGRAM(s) Oral <User Schedule>  heparin   Injectable 5000 Unit(s) SubCutaneous every 12 hours  polyethylene glycol 3350 17 Gram(s) Oral two times a day  senna 2 Tablet(s) Oral at bedtime  sevelamer carbonate 1600 milliGRAM(s) Oral three times a day with meals      Assessment: 61-year-old female with ESRD on HD, HLD and peripheral vascular disease s/p lower extremity resection presents with chronic limb ischemia.     Plan of Care:    #Post-operative evaluation-  Ms. Boogie tolerated R BKA well on .   She displays no signs or symptoms of post operative coronary ischemia or acute CHF.   - Continue with current cardiac management.     #Compensated diastolic CHF-  Volume status is improved.  Fluid removal with HD as per the renal team.   Recent echo noted- normal LV systolic function.    #Peripheral vascular disease.  - Podiatry and vascular input noted.     #ESRD-  - HD as per renal.      #ACP (advance care planning)-  Advanced care planning was discussed with the patient.    Cardiac findings were discussed in detail and all questions were answered.     Over 25 minutes spent on total encounter; more than 50% of the visit was spent counseling and/or coordinating care by the attending physician.      Cristino Adams MD [facility]  Cardiovascular Disease  [phone]

## 2022-06-29 NOTE — PROGRESS NOTE ADULT - ASSESSMENT
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 Admitted for further evaluation. Renal following for ESRD Mx.     ESRD on HD  schedule hemodialysis - Monday, Wednesday and Friday   access- thigh AVG  HD unit SQDC  K, vol ok     s/p HD in am w/2k bath, net UF 1kg, tolerated well, uneventful  plan for next routine HD friday  renal diet  dose all meds for ESRD  renal restrictions in diet.  Anemia of CKD- Hb at goal now. c/w DION epo v16k w/hd tiw. s/p 1unit PRBC w/hd before  Hyperphosphatemia- c/w renvlea 2tid w/food  HTN, controlled. bp stable. not on anti-htn meds  PAD, infected rt foot wounds, ?OM- s/p vanco, on zosyn. f/u w/Podiatry. on meropenem now. s/p MRI foot w/o Morgan  f/u w/vas sx.   Podiatry f/u - patient s/p left foot TMA and right foot partial 1st ray amputation 6/8  s/p LE angiogram 6/7  s/p rt BKA 6/15  pain control per team    plan for rehab placement w/onsite HD. f/u w/SW  labs, chart reviewed  poc d/w pt  For any question, call:  Cell # [phone]  Pager # [phone]

## 2022-06-30 ENCOUNTER — TRANSCRIPTION ENCOUNTER (OUTPATIENT)
Age: 62
End: 2022-06-30

## 2022-06-30 VITALS
HEART RATE: 76 BPM | DIASTOLIC BLOOD PRESSURE: 43 MMHG | OXYGEN SATURATION: 100 % | RESPIRATION RATE: 17 BRPM | SYSTOLIC BLOOD PRESSURE: 107 MMHG | TEMPERATURE: 99 F

## 2022-06-30 LAB
ANION GAP SERPL CALC-SCNC: 15 MMOL/L — HIGH (ref 7–14)
BUN SERPL-MCNC: 41 MG/DL — HIGH (ref 7–23)
CALCIUM SERPL-MCNC: 9.5 MG/DL — SIGNIFICANT CHANGE UP (ref 8.4–10.5)
CHLORIDE SERPL-SCNC: 99 MMOL/L — SIGNIFICANT CHANGE UP (ref 98–107)
CO2 SERPL-SCNC: 26 MMOL/L — SIGNIFICANT CHANGE UP (ref 22–31)
CREAT SERPL-MCNC: 6.04 MG/DL — HIGH (ref 0.5–1.3)
EGFR: 7 ML/MIN/1.73M2 — LOW
GLUCOSE SERPL-MCNC: 89 MG/DL — SIGNIFICANT CHANGE UP (ref 70–99)
HCT VFR BLD CALC: 31.6 % — LOW (ref 34.5–45)
HGB BLD-MCNC: 9.4 G/DL — LOW (ref 11.5–15.5)
MAGNESIUM SERPL-MCNC: 2.3 MG/DL — SIGNIFICANT CHANGE UP (ref 1.6–2.6)
MCHC RBC-ENTMCNC: 27.6 PG — SIGNIFICANT CHANGE UP (ref 27–34)
MCHC RBC-ENTMCNC: 29.7 GM/DL — LOW (ref 32–36)
MCV RBC AUTO: 92.9 FL — SIGNIFICANT CHANGE UP (ref 80–100)
NRBC # BLD: 0 /100 WBCS — SIGNIFICANT CHANGE UP
NRBC # FLD: 0.03 K/UL — HIGH
PHOSPHATE SERPL-MCNC: 3.5 MG/DL — SIGNIFICANT CHANGE UP (ref 2.5–4.5)
PLATELET # BLD AUTO: 306 K/UL — SIGNIFICANT CHANGE UP (ref 150–400)
POTASSIUM SERPL-MCNC: 3.8 MMOL/L — SIGNIFICANT CHANGE UP (ref 3.5–5.3)
POTASSIUM SERPL-SCNC: 3.8 MMOL/L — SIGNIFICANT CHANGE UP (ref 3.5–5.3)
RBC # BLD: 3.4 M/UL — LOW (ref 3.8–5.2)
RBC # FLD: 17 % — HIGH (ref 10.3–14.5)
SARS-COV-2 RNA SPEC QL NAA+PROBE: SIGNIFICANT CHANGE UP
SODIUM SERPL-SCNC: 140 MMOL/L — SIGNIFICANT CHANGE UP (ref 135–145)
WBC # BLD: 7.58 K/UL — SIGNIFICANT CHANGE UP (ref 3.8–10.5)
WBC # FLD AUTO: 7.58 K/UL — SIGNIFICANT CHANGE UP (ref 3.8–10.5)

## 2022-06-30 RX ORDER — ASPIRIN/CALCIUM CARB/MAGNESIUM 324 MG
81 TABLET ORAL DAILY
Refills: 0 | Status: DISCONTINUED | OUTPATIENT
Start: 2022-06-30 | End: 2022-06-30

## 2022-06-30 RX ORDER — GABAPENTIN 400 MG/1
1 CAPSULE ORAL
Qty: 0 | Refills: 0 | DISCHARGE
Start: 2022-06-30

## 2022-06-30 RX ORDER — SUCROFERRIC OXYHYDROXIDE 500 MG/1
3 TABLET, CHEWABLE ORAL
Qty: 0 | Refills: 0 | DISCHARGE

## 2022-06-30 RX ORDER — CHLORHEXIDINE GLUCONATE 213 G/1000ML
1 SOLUTION TOPICAL
Qty: 0 | Refills: 0 | DISCHARGE
Start: 2022-06-30

## 2022-06-30 RX ORDER — SENNA PLUS 8.6 MG/1
2 TABLET ORAL
Qty: 0 | Refills: 0 | DISCHARGE
Start: 2022-06-30

## 2022-06-30 RX ORDER — ASPIRIN/CALCIUM CARB/MAGNESIUM 324 MG
1 TABLET ORAL
Qty: 0 | Refills: 0 | DISCHARGE
Start: 2022-06-30

## 2022-06-30 RX ORDER — SEVELAMER CARBONATE 2400 MG/1
2 POWDER, FOR SUSPENSION ORAL
Qty: 0 | Refills: 0 | DISCHARGE
Start: 2022-06-30

## 2022-06-30 RX ORDER — OXYCODONE HYDROCHLORIDE 5 MG/1
5 TABLET ORAL EVERY 4 HOURS
Refills: 0 | Status: DISCONTINUED | OUTPATIENT
Start: 2022-06-30 | End: 2022-06-30

## 2022-06-30 RX ADMIN — Medication 1 APPLICATION(S): at 12:06

## 2022-06-30 RX ADMIN — CHLORHEXIDINE GLUCONATE 1 APPLICATION(S): 213 SOLUTION TOPICAL at 12:07

## 2022-06-30 RX ADMIN — Medication 81 MILLIGRAM(S): at 12:06

## 2022-06-30 RX ADMIN — SEVELAMER CARBONATE 1600 MILLIGRAM(S): 2400 POWDER, FOR SUSPENSION ORAL at 12:20

## 2022-06-30 RX ADMIN — HEPARIN SODIUM 5000 UNIT(S): 5000 INJECTION INTRAVENOUS; SUBCUTANEOUS at 06:06

## 2022-06-30 RX ADMIN — SEVELAMER CARBONATE 1600 MILLIGRAM(S): 2400 POWDER, FOR SUSPENSION ORAL at 08:50

## 2022-06-30 RX ADMIN — CLOPIDOGREL BISULFATE 75 MILLIGRAM(S): 75 TABLET, FILM COATED ORAL at 12:06

## 2022-06-30 NOTE — PROGRESS NOTE ADULT - SUBJECTIVE AND OBJECTIVE BOX
Date of Service  : 06-30-22     INTERVAL HPI/OVERNIGHT EVENTS: I am leaving today .   Vital Signs Last 24 Hrs  T(C): 37.1 (30 Jun 2022 15:36), Max: 37.2 (30 Jun 2022 06:00)  T(F): 98.7 (30 Jun 2022 15:36), Max: 98.9 (30 Jun 2022 06:00)  HR: 76 (30 Jun 2022 15:36) (75 - 76)  BP: 107/43 (30 Jun 2022 15:36) (107/43 - 117/60)  BP(mean): --  RR: 17 (30 Jun 2022 15:36) (17 - 17)  SpO2: 100% (30 Jun 2022 15:36) (100% - 100%)  I&O's Summary    29 Jun 2022 07:01  -  30 Jun 2022 07:00  --------------------------------------------------------  IN: 400 mL / OUT: 1400 mL / NET: -1000 mL      MEDICATIONS  (STANDING):  aspirin enteric coated 81 milliGRAM(s) Oral daily  atorvastatin 40 milliGRAM(s) Oral at bedtime  bisacodyl 5 milliGRAM(s) Oral at bedtime  chlorhexidine 2% Cloths 1 Application(s) Topical daily  clopidogrel Tablet 75 milliGRAM(s) Oral daily  collagenase Ointment 1 Application(s) Topical daily  epoetin niesha-epbx (RETACRIT) Injectable 58760 Unit(s) IV Push <User Schedule>  gabapentin 100 milliGRAM(s) Oral <User Schedule>  heparin   Injectable 5000 Unit(s) SubCutaneous every 12 hours  polyethylene glycol 3350 17 Gram(s) Oral two times a day  senna 2 Tablet(s) Oral at bedtime  sevelamer carbonate 1600 milliGRAM(s) Oral three times a day with meals    MEDICATIONS  (PRN):  oxyCODONE    IR 5 milliGRAM(s) Oral every 4 hours PRN Moderate-Severe Pain (4 - 10)    LABS:                        9.4    7.58  )-----------( 306      ( 30 Jun 2022 07:05 )             31.6     06-30    140  |  99  |  41<H>  ----------------------------<  89  3.8   |  26  |  6.04<H>    Ca    9.5      30 Jun 2022 07:05  Phos  3.5     06-30  Mg     2.30     06-30          CAPILLARY BLOOD GLUCOSE              REVIEW OF SYSTEMS:  CONSTITUTIONAL: No fever, weight loss, or fatigue  EYES: No eye pain, visual disturbances, or discharge  ENMT:  No difficulty hearing, tinnitus, vertigo; No sinus or throat pain  NECK: No pain or stiffness  RESPIRATORY: No cough, wheezing, chills or hemoptysis; No shortness of breath  CARDIOVASCULAR: No chest pain, palpitations, dizziness, or leg swelling  GASTROINTESTINAL: No abdominal or epigastric pain. No nausea, vomiting, or hematemesis; No diarrhea or constipation. No melena or hematochezia.  GENITOURINARY: No dysuria, frequency, hematuria, or incontinence  NEUROLOGICAL: No headaches, memory loss, loss of strength, numbness, or tremors      Consultant(s) Notes Reviewed:  [x ] YES  [ ] NO    PHYSICAL EXAM:  GENERAL: NAD, well-groomed, well-developed,not in any distress ,  HEAD:  Atraumatic, Normocephalic  EYES: EOMI, PERRLA, conjunctiva and sclera clear  ENMT: No tonsillar erythema, exudates, or enlargement; Moist mucous membranes, Good dentition, No lesions  NECK: Supple, No JVD, Normal thyroid  NERVOUS SYSTEM:  Alert & Oriented X3, No focal deficit   CHEST/LUNG: Good air entry bilateral with no  rales, rhonchi, wheezing, or rubs  HEART: Regular rate and rhythm; No murmurs, rubs, or gallops  ABDOMEN: Soft, Nontender, Nondistended; Bowel sounds present  EXTREMITIES:  RBKA and left foot dressed.     Care Discussed with Consultants/Other Providers [ x] YES  [ ] NO

## 2022-06-30 NOTE — PROGRESS NOTE ADULT - SUBJECTIVE AND OBJECTIVE BOX
Cardiovascular Disease Progress Note    Overnight events: No acute events overnight.  Ms. Boogie denies chest pain or SOB.     Otherwise review of systems negative    Objective Findings:  T(C): 37.2 (22 @ 06:00), Max: 37.2 (22 @ 23:20)  HR: 75 (22 @ 06:00) (70 - 94)  BP: 117/60 (22 @ 06:00) (101/41 - 119/57)  RR: 17 (22 @ 06:00) (17 - 18)  SpO2: 100% (22 @ 06:00) (94% - 100%)  Wt(kg): --  Daily     Daily Weight in k.9 (2022 13:45)      Physical Exam:  Gen: NAD; Patient resting comfortably  HEENT: EOMI, Normocephalic/ atraumatic  CV: RRR, normal S1 + S2, no m/r/g  Lungs:  Normal respiratory effort; clear to auscultation bilaterally  Abd: soft, non-tender; bowel sounds present  Ext: No edema;      Telemetry: n/a    Laboratory Data:                        9.5    7.31  )-----------( 306      ( 2022 05:54 )             33.1         137  |  95<L>  |  55<H>  ----------------------------<  95  4.0   |  26  |  7.81<H>    Ca    9.4      2022 05:54  Phos  4.4       Mg     2.60                     Inpatient Medications:  MEDICATIONS  (STANDING):  aspirin  chewable 81 milliGRAM(s) Oral daily  atorvastatin 40 milliGRAM(s) Oral at bedtime  bisacodyl 5 milliGRAM(s) Oral at bedtime  chlorhexidine 2% Cloths 1 Application(s) Topical daily  clopidogrel Tablet 75 milliGRAM(s) Oral daily  collagenase Ointment 1 Application(s) Topical daily  epoetin niesha-epbx (RETACRIT) Injectable 72506 Unit(s) IV Push <User Schedule>  gabapentin 100 milliGRAM(s) Oral <User Schedule>  heparin   Injectable 5000 Unit(s) SubCutaneous every 12 hours  polyethylene glycol 3350 17 Gram(s) Oral two times a day  senna 2 Tablet(s) Oral at bedtime  sevelamer carbonate 1600 milliGRAM(s) Oral three times a day with meals      Assessment:  61-year-old female with ESRD on HD, HLD and peripheral vascular disease s/p lower extremity resection presents with chronic limb ischemia.     Plan of Care:    #Post-operative evaluation-  Ms. Boogie tolerated R BKA well on .   She displays no signs or symptoms of post operative coronary ischemia or acute CHF.   - Continue with current cardiac management.     #Compensated diastolic CHF-  Volume status is improved.  Fluid removal with HD as per the renal team.   Recent echo noted- normal LV systolic function.    #Peripheral vascular disease.  - Podiatry and vascular input noted.     #ESRD-  - HD as per renal.      Over 25 minutes spent on total encounter; more than 50% of the visit was spent counseling and/or coordinating care by the attending physician.      Cristino Adams MD [facility]  Cardiovascular Disease  [phone]

## 2022-06-30 NOTE — PROGRESS NOTE ADULT - ASSESSMENT
62 y/o woman w/ ESRD on HD (MWF), HF, 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 Admitted for further evaluation     Problem/Plan - 1:  ·  Problem: Wound of right foot with  Cellulitis with Left 4th toe OM .   ·  Plan: S/P  Right BKA   -podiatry and ID help appreciated.   -S/P   IV Meropenem  500mg q24hrs per iD .   - Pain management helping .  - Left foot prognosis is also guarded.      Problem/Plan - 2:  ·  Problem: PAD (peripheral artery disease).   ·  Plan: S/P RLE Angiogram but unable to do any intervention per Vascular.   -known PAD, continue asa/plavix. Pt denies any numbness or tingling.  -recent angiograms done w/ poor peripheral arterial flow per podiatry and so poor surgical candidate as above  -continue statin.     Problem/Plan - 3:  ·  Problem: ESRD (end stage renal disease) on dialysis.   ·  Plan: Renal helping with HD.   On MWF schedule.  -monitor I/O.     Problem/Plan - 4:  ·  Problem: HLD (hyperlipidemia).   ·  Plan: continue statin. check lipid profile.     Problem/Plan - 5:  ·  Problem: Constipation .   ·  Plan: Laxatives.      Problem/Plan - 6:  ·  Problem: Chronic Anemia with acute blood loss  .   ·  Plan: PRBC to keep Hgb 8 G.     Dispo : OPAL planning to [facility] today .        Poor

## 2022-06-30 NOTE — PROGRESS NOTE ADULT - ASSESSMENT
62 y/o woman w/ ESRD on HD (MWF), HF, 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 Admitted for further evaluation. Renal following for ESRD Mx.     ESRD on HD  schedule hemodialysis - Monday, Wednesday and Friday   access- thigh AVG  HD unit SQDC  K, vol ok     due for HD tomorrow  renal diet  dose all meds for ESRD  renal restrictions in diet.  Anemia of CKD- Hb at goal now. c/w DION epo v16k w/hd tiw. s/p 1unit PRBC w/hd before  Hyperphosphatemia- c/w renvlea 2tid w/food  HTN, controlled. bp stable. not on anti-htn meds  PAD, infected rt foot wounds, ?OM- s/p vanco, on zosyn. f/u w/Podiatry. on meropenem now. s/p MRI foot w/o Morgan  f/u w/vas sx.   Podiatry f/u - patient s/p left foot TMA and right foot partial 1st ray amputation 6/8  s/p LE angiogram 6/7  s/p rt BKA 6/15  pain control per team      plan for rehab placement w/onsite HD. f/u w/SW  For any question, call:  Cell # [phone]  Pager # [phone]  Callback # [phone]

## 2022-06-30 NOTE — PROGRESS NOTE ADULT - REASON FOR ADMISSION
RLE toe pain

## 2022-06-30 NOTE — DISCHARGE NOTE NURSING/CASE MANAGEMENT/SOCIAL WORK - NSDCPEFALRISK_GEN_ALL_CORE
For information on Fall & Injury Prevention, visit: [email or web address] OR  [email or web address] OR  https://www.cdc.gov/steadi/patient.html

## 2022-06-30 NOTE — PROGRESS NOTE ADULT - SUBJECTIVE AND OBJECTIVE BOX
[facility] - TIFFANY Bazzi / TIFFANY Shea / APARNA Vanegas/ TIFFANY Mcfarlane/ TIFFANY Madden/ MASSIEL Perkins / RAINER Potts / ESTEPHANIE Griffith  ---------------------------------------------------------------------------------------------------------------  seen and examined today for ESRD  Interval : NAD  VITALS:  T(F): 98.9 (06-30-22 @ 06:00), Max: 98.9 (06-29-22 @ 23:20)  HR: 75 (06-30-22 @ 06:00)  BP: 117/60 (06-30-22 @ 06:00)  RR: 17 (06-30-22 @ 06:00)  SpO2: 100% (06-30-22 @ 06:00)  Wt(kg): --    06-29 @ 07:01  -  06-30 @ 07:00  --------------------------------------------------------  IN: 400 mL / OUT: 1400 mL / NET: -1000 mL      Physical Exam :-  Constitutional: NAD  Neck: Supple.  Respiratory: Bilateral equal breath sounds,  Cardiovascular: S1, S2 normal,  Gastrointestinal: Bowel Sounds present, soft, non tender.  Extremities: No edema  Neurological: Alert and Oriented x 3, no focal deficits  Psychiatric: Normal mood, normal affect  Data:-  Allergies :   No Known Allergies    Hospital Medications:   MEDICATIONS  (STANDING):  aspirin  chewable 81 milliGRAM(s) Oral daily  atorvastatin 40 milliGRAM(s) Oral at bedtime  bisacodyl 5 milliGRAM(s) Oral at bedtime  chlorhexidine 2% Cloths 1 Application(s) Topical daily  clopidogrel Tablet 75 milliGRAM(s) Oral daily  collagenase Ointment 1 Application(s) Topical daily  epoetin niesha-epbx (RETACRIT) Injectable 01580 Unit(s) IV Push <User Schedule>  gabapentin 100 milliGRAM(s) Oral <User Schedule>  heparin   Injectable 5000 Unit(s) SubCutaneous every 12 hours  polyethylene glycol 3350 17 Gram(s) Oral two times a day  senna 2 Tablet(s) Oral at bedtime  sevelamer carbonate 1600 milliGRAM(s) Oral three times a day with meals    06-30    140  |  99  |  41<H>  ----------------------------<  89  3.8   |  26  |  6.04<H>    Ca    9.5      30 Jun 2022 07:05  Phos  3.5     06-30  Mg     2.30     06-30      Creatinine Trend: 6.04 <--, 7.81 <--, 5.77 <--, 8.55 <--, 7.00 <--, 5.40 <--, 5.62 <--                        9.4    7.58  )-----------( 306      ( 30 Jun 2022 07:05 )             31.6

## 2022-07-01 LAB — SURGICAL PATHOLOGY STUDY: SIGNIFICANT CHANGE UP

## 2022-07-07 NOTE — PROGRESS NOTE ADULT - ASSESSMENT
61-year-old female with ESRD on HD MWF via right groin AVF, HLD and PAD presenting with L sided foot pain, found to have likely OM     Problem/Plan - 1:  ·  Problem: Foot osteomyelitis.   ·  Plan: S/P  left foot partial 3rd ray amputation- open and right foot partial hallux amputation- partially open, poor intraop bleeding, good bone quality at the level of resection, low concern for bone infection, high concern for viability/soft tissue infection   ID help appreciated and now on Zosyn.   appreciate podiatry recs .  MARTA/PVR noted. Severe Small vessel disease.   vascular surgery help appreciated.  s/p I&D by podiatry in ED, plan for left foot partial 3rd ray resection pending MARTA/vasc recs     < from: MR Foot w/wo IV Cont, Left (03.03.22 @ 15:01) >  IMPRESSION:    Faint edema within the head of the third metatarsal and third phalanges   with mild periosteal enhancement of the third metatarsal head. Findings   may represent sequela of early osteomyelitis. Questionable soft tissue   defect of the third digit.  Lack of enhancement of the soft tissues plantarly and about the third and   fourth digits, favored to represent sequela of ischemia.    < end of copied text >     Problem/Plan - 2:  ·  Problem: ESRD on dialysis.   ·  Plan: appreciate renal recs  dose meds for intermittent HD  renal restricted diet  c/w phosphate binders     Problem/Plan - 3:  ·  Problem: PAD (peripheral artery disease).   ·  Plan: c/w DAPT and statin  f/u VSx recs  MARTA/PVR noted.      Problem/Plan - 4:  ·  Problem: Lower extremity edema.   ·  Plan: b/l LE edema   LE dopplers negative for  VTE.     Problem/Plan - 5:  ·  Problem: Need for prophylactic measure.   ·  Plan: pharmacologic DVT ppx pending VSx recs/AM podiatry recs (?plan for OR)      Medically optimized for surgery .      patient declined/no anesthesia administered

## 2022-07-25 ENCOUNTER — APPOINTMENT (OUTPATIENT)
Dept: VASCULAR SURGERY | Facility: CLINIC | Age: 62
End: 2022-07-25

## 2022-07-25 VITALS
HEART RATE: 76 BPM | SYSTOLIC BLOOD PRESSURE: 155 MMHG | HEIGHT: 61 IN | BODY MASS INDEX: 26.24 KG/M2 | TEMPERATURE: 98 F | DIASTOLIC BLOOD PRESSURE: 64 MMHG | WEIGHT: 139 LBS

## 2022-07-25 PROCEDURE — 99024 POSTOP FOLLOW-UP VISIT: CPT

## 2022-08-01 NOTE — PHYSICAL EXAM
[Normal Breath Sounds] : Normal breath sounds [Normal Heart Sounds] : normal heart sounds [de-identified] : NAD [de-identified] : n [FreeTextEntry1] : Right thigh graft with thrill. Right BKA stump healing well. Left foot wound with fibrinous exudate.

## 2022-08-01 NOTE — ASSESSMENT
[FreeTextEntry1] : In summary, Mrs. Boogie presents s/p right BKA. All staples were removed from the incision. She may now be fit for a prosthesis and stump . Rx was sent to her prosthetist. She will follow up in 3 months. She should follow podiatry for ongoing foot wound care. \par \par Wound care:\par -wash right BKA stump daily with soap and water; pat dry\par -apply gauze, kerlix, ace bandage to stump\par -left foot care per podiatry

## 2022-08-01 NOTE — HISTORY OF PRESENT ILLNESS
[FreeTextEntry1] : 60 y/o female presents to the office for post-op visit s/p right BKA on 6/15/22. Patient reports wound is healing well. She denies erythema, induration, fluctuance or drainage. Her left foot wound is being monitored by podiatry. \par \par Meds:\par Aspirin\par Simvastatin\par Neurontin\par

## 2022-10-31 ENCOUNTER — APPOINTMENT (OUTPATIENT)
Dept: VASCULAR SURGERY | Facility: CLINIC | Age: 62
End: 2022-10-31

## 2022-11-14 ENCOUNTER — APPOINTMENT (OUTPATIENT)
Dept: VASCULAR SURGERY | Facility: CLINIC | Age: 62
End: 2022-11-14

## 2022-11-17 ENCOUNTER — INPATIENT (INPATIENT)
Facility: HOSPITAL | Age: 62
LOS: 12 days | Discharge: NURSING, REHAB OR LONG-TERM CARE | End: 2022-11-30
Attending: INTERNAL MEDICINE | Admitting: INTERNAL MEDICINE
Payer: MEDICARE

## 2022-11-17 VITALS
SYSTOLIC BLOOD PRESSURE: 144 MMHG | DIASTOLIC BLOOD PRESSURE: 45 MMHG | TEMPERATURE: 100 F | OXYGEN SATURATION: 100 % | HEART RATE: 87 BPM | RESPIRATION RATE: 16 BRPM

## 2022-11-17 DIAGNOSIS — N18.6 END STAGE RENAL DISEASE: ICD-10-CM

## 2022-11-17 DIAGNOSIS — D64.9 ANEMIA, UNSPECIFIED: ICD-10-CM

## 2022-11-17 DIAGNOSIS — Z99.2 DEPENDENCE ON RENAL DIALYSIS: Chronic | ICD-10-CM

## 2022-11-17 DIAGNOSIS — M86.171 OTHER ACUTE OSTEOMYELITIS, RIGHT ANKLE AND FOOT: Chronic | ICD-10-CM

## 2022-11-17 DIAGNOSIS — E78.5 HYPERLIPIDEMIA, UNSPECIFIED: ICD-10-CM

## 2022-11-17 DIAGNOSIS — L08.9 LOCAL INFECTION OF THE SKIN AND SUBCUTANEOUS TISSUE, UNSPECIFIED: ICD-10-CM

## 2022-11-17 DIAGNOSIS — I50.9 HEART FAILURE, UNSPECIFIED: ICD-10-CM

## 2022-11-17 DIAGNOSIS — N18.9 CHRONIC KIDNEY DISEASE, UNSPECIFIED: ICD-10-CM

## 2022-11-17 DIAGNOSIS — Z98.890 OTHER SPECIFIED POSTPROCEDURAL STATES: Chronic | ICD-10-CM

## 2022-11-17 DIAGNOSIS — I73.9 PERIPHERAL VASCULAR DISEASE, UNSPECIFIED: ICD-10-CM

## 2022-11-17 DIAGNOSIS — Z29.9 ENCOUNTER FOR PROPHYLACTIC MEASURES, UNSPECIFIED: ICD-10-CM

## 2022-11-17 LAB
ALBUMIN SERPL ELPH-MCNC: 3.8 G/DL — SIGNIFICANT CHANGE UP (ref 3.3–5)
ALP SERPL-CCNC: 70 U/L — SIGNIFICANT CHANGE UP (ref 40–120)
ALT FLD-CCNC: 6 U/L — SIGNIFICANT CHANGE UP (ref 4–33)
ANION GAP SERPL CALC-SCNC: 19 MMOL/L — HIGH (ref 7–14)
APTT BLD: 29.8 SEC — SIGNIFICANT CHANGE UP (ref 27–36.3)
AST SERPL-CCNC: 11 U/L — SIGNIFICANT CHANGE UP (ref 4–32)
BASE EXCESS BLDV CALC-SCNC: 6.7 MMOL/L — HIGH (ref -2–3)
BASOPHILS # BLD AUTO: 0.03 K/UL — SIGNIFICANT CHANGE UP (ref 0–0.2)
BASOPHILS NFR BLD AUTO: 0.2 % — SIGNIFICANT CHANGE UP (ref 0–2)
BILIRUB SERPL-MCNC: <0.2 MG/DL — SIGNIFICANT CHANGE UP (ref 0.2–1.2)
BLD GP AB SCN SERPL QL: NEGATIVE — SIGNIFICANT CHANGE UP
BLOOD GAS VENOUS COMPREHENSIVE RESULT: SIGNIFICANT CHANGE UP
BUN SERPL-MCNC: 33 MG/DL — HIGH (ref 7–23)
CALCIUM SERPL-MCNC: 9.8 MG/DL — SIGNIFICANT CHANGE UP (ref 8.4–10.5)
CHLORIDE BLDV-SCNC: 96 MMOL/L — SIGNIFICANT CHANGE UP (ref 96–108)
CHLORIDE SERPL-SCNC: 92 MMOL/L — LOW (ref 98–107)
CO2 BLDV-SCNC: 34.4 MMOL/L — HIGH (ref 22–26)
CO2 SERPL-SCNC: 29 MMOL/L — SIGNIFICANT CHANGE UP (ref 22–31)
CREAT SERPL-MCNC: 5.88 MG/DL — HIGH (ref 0.5–1.3)
CRP SERPL-MCNC: 126 MG/L — HIGH
CRP SERPL-MCNC: 132.3 MG/L — HIGH
EGFR: 8 ML/MIN/1.73M2 — LOW
EOSINOPHIL # BLD AUTO: 0.17 K/UL — SIGNIFICANT CHANGE UP (ref 0–0.5)
EOSINOPHIL NFR BLD AUTO: 1.3 % — SIGNIFICANT CHANGE UP (ref 0–6)
ERYTHROCYTE [SEDIMENTATION RATE] IN BLOOD: 95 MM/HR — HIGH (ref 4–25)
FLUAV AG NPH QL: SIGNIFICANT CHANGE UP
FLUBV AG NPH QL: SIGNIFICANT CHANGE UP
GAS PNL BLDV: 136 MMOL/L — SIGNIFICANT CHANGE UP (ref 136–145)
GAS PNL BLDV: SIGNIFICANT CHANGE UP
GLUCOSE BLDC GLUCOMTR-MCNC: 88 MG/DL — SIGNIFICANT CHANGE UP (ref 70–99)
GLUCOSE BLDV-MCNC: 98 MG/DL — SIGNIFICANT CHANGE UP (ref 70–99)
GLUCOSE SERPL-MCNC: 105 MG/DL — HIGH (ref 70–99)
HCO3 BLDV-SCNC: 33 MMOL/L — HIGH (ref 22–29)
HCT VFR BLD CALC: 34.9 % — SIGNIFICANT CHANGE UP (ref 34.5–45)
HCT VFR BLDA CALC: 28 % — LOW (ref 34.5–46.5)
HGB BLD CALC-MCNC: 9.2 G/DL — LOW (ref 11.5–15.5)
HGB BLD-MCNC: 10.3 G/DL — LOW (ref 11.5–15.5)
IANC: 10.25 K/UL — HIGH (ref 1.8–7.4)
IMM GRANULOCYTES NFR BLD AUTO: 1 % — HIGH (ref 0–0.9)
INR BLD: 1.08 RATIO — SIGNIFICANT CHANGE UP (ref 0.88–1.16)
LACTATE BLDV-MCNC: 1.7 MMOL/L — SIGNIFICANT CHANGE UP (ref 0.5–2)
LYMPHOCYTES # BLD AUTO: 1.2 K/UL — SIGNIFICANT CHANGE UP (ref 1–3.3)
LYMPHOCYTES # BLD AUTO: 9.4 % — LOW (ref 13–44)
MCHC RBC-ENTMCNC: 27.2 PG — SIGNIFICANT CHANGE UP (ref 27–34)
MCHC RBC-ENTMCNC: 29.5 GM/DL — LOW (ref 32–36)
MCV RBC AUTO: 92.3 FL — SIGNIFICANT CHANGE UP (ref 80–100)
MONOCYTES # BLD AUTO: 0.97 K/UL — HIGH (ref 0–0.9)
MONOCYTES NFR BLD AUTO: 7.6 % — SIGNIFICANT CHANGE UP (ref 2–14)
NEUTROPHILS # BLD AUTO: 10.25 K/UL — HIGH (ref 1.8–7.4)
NEUTROPHILS NFR BLD AUTO: 80.5 % — HIGH (ref 43–77)
NRBC # BLD: 0 /100 WBCS — SIGNIFICANT CHANGE UP (ref 0–0)
NRBC # FLD: 0 K/UL — SIGNIFICANT CHANGE UP (ref 0–0)
PCO2 BLDV: 54 MMHG — HIGH (ref 39–42)
PH BLDV: 7.39 — SIGNIFICANT CHANGE UP (ref 7.32–7.43)
PLATELET # BLD AUTO: 271 K/UL — SIGNIFICANT CHANGE UP (ref 150–400)
PO2 BLDV: 31 MMHG — SIGNIFICANT CHANGE UP
POTASSIUM BLDV-SCNC: 4.4 MMOL/L — SIGNIFICANT CHANGE UP (ref 3.5–5.1)
POTASSIUM SERPL-MCNC: 4.4 MMOL/L — SIGNIFICANT CHANGE UP (ref 3.5–5.3)
POTASSIUM SERPL-SCNC: 4.4 MMOL/L — SIGNIFICANT CHANGE UP (ref 3.5–5.3)
PROT SERPL-MCNC: 8.1 G/DL — SIGNIFICANT CHANGE UP (ref 6–8.3)
PROTHROM AB SERPL-ACNC: 12.5 SEC — SIGNIFICANT CHANGE UP (ref 10.5–13.4)
RBC # BLD: 3.78 M/UL — LOW (ref 3.8–5.2)
RBC # FLD: 15.3 % — HIGH (ref 10.3–14.5)
RH IG SCN BLD-IMP: POSITIVE — SIGNIFICANT CHANGE UP
RSV RNA NPH QL NAA+NON-PROBE: SIGNIFICANT CHANGE UP
SAO2 % BLDV: 46.1 % — SIGNIFICANT CHANGE UP
SARS-COV-2 RNA SPEC QL NAA+PROBE: SIGNIFICANT CHANGE UP
SODIUM SERPL-SCNC: 140 MMOL/L — SIGNIFICANT CHANGE UP (ref 135–145)
WBC # BLD: 12.75 K/UL — HIGH (ref 3.8–10.5)
WBC # FLD AUTO: 12.75 K/UL — HIGH (ref 3.8–10.5)

## 2022-11-17 PROCEDURE — 73630 X-RAY EXAM OF FOOT: CPT | Mod: 26,LT

## 2022-11-17 PROCEDURE — 93010 ELECTROCARDIOGRAM REPORT: CPT

## 2022-11-17 PROCEDURE — 99223 1ST HOSP IP/OBS HIGH 75: CPT

## 2022-11-17 PROCEDURE — 99285 EMERGENCY DEPT VISIT HI MDM: CPT | Mod: GC

## 2022-11-17 RX ORDER — SIMVASTATIN 20 MG/1
40 TABLET, FILM COATED ORAL AT BEDTIME
Refills: 0 | Status: DISCONTINUED | OUTPATIENT
Start: 2022-11-17 | End: 2022-11-30

## 2022-11-17 RX ORDER — ACETAMINOPHEN 500 MG
650 TABLET ORAL EVERY 6 HOURS
Refills: 0 | Status: DISCONTINUED | OUTPATIENT
Start: 2022-11-17 | End: 2022-11-23

## 2022-11-17 RX ORDER — ERYTHROPOIETIN 10000 [IU]/ML
6000 INJECTION, SOLUTION INTRAVENOUS; SUBCUTANEOUS
Refills: 0 | Status: DISCONTINUED | OUTPATIENT
Start: 2022-11-17 | End: 2022-11-21

## 2022-11-17 RX ORDER — ASPIRIN/CALCIUM CARB/MAGNESIUM 324 MG
81 TABLET ORAL DAILY
Refills: 0 | Status: DISCONTINUED | OUTPATIENT
Start: 2022-11-17 | End: 2022-11-30

## 2022-11-17 RX ORDER — MEROPENEM 1 G/30ML
1000 INJECTION INTRAVENOUS ONCE
Refills: 0 | Status: DISCONTINUED | OUTPATIENT
Start: 2022-11-17 | End: 2022-11-17

## 2022-11-17 RX ORDER — VANCOMYCIN HCL 1 G
1000 VIAL (EA) INTRAVENOUS ONCE
Refills: 0 | Status: COMPLETED | OUTPATIENT
Start: 2022-11-17 | End: 2022-11-17

## 2022-11-17 RX ORDER — ATORVASTATIN CALCIUM 80 MG/1
1 TABLET, FILM COATED ORAL
Qty: 0 | Refills: 0 | DISCHARGE

## 2022-11-17 RX ORDER — SEVELAMER CARBONATE 2400 MG/1
1600 POWDER, FOR SUSPENSION ORAL
Refills: 0 | Status: DISCONTINUED | OUTPATIENT
Start: 2022-11-17 | End: 2022-11-30

## 2022-11-17 RX ORDER — HYDROMORPHONE HYDROCHLORIDE 2 MG/ML
0.5 INJECTION INTRAMUSCULAR; INTRAVENOUS; SUBCUTANEOUS ONCE
Refills: 0 | Status: DISCONTINUED | OUTPATIENT
Start: 2022-11-17 | End: 2022-11-17

## 2022-11-17 RX ORDER — GABAPENTIN 400 MG/1
100 CAPSULE ORAL
Refills: 0 | Status: DISCONTINUED | OUTPATIENT
Start: 2022-11-17 | End: 2022-11-30

## 2022-11-17 RX ORDER — OXYCODONE HYDROCHLORIDE 5 MG/1
5 TABLET ORAL EVERY 4 HOURS
Refills: 0 | Status: DISCONTINUED | OUTPATIENT
Start: 2022-11-17 | End: 2022-11-23

## 2022-11-17 RX ORDER — MEROPENEM 1 G/30ML
500 INJECTION INTRAVENOUS ONCE
Refills: 0 | Status: COMPLETED | OUTPATIENT
Start: 2022-11-17 | End: 2022-11-17

## 2022-11-17 RX ORDER — SODIUM CHLORIDE 9 MG/ML
100 INJECTION INTRAMUSCULAR; INTRAVENOUS; SUBCUTANEOUS
Refills: 0 | Status: DISCONTINUED | OUTPATIENT
Start: 2022-11-18 | End: 2022-11-30

## 2022-11-17 RX ADMIN — Medication 250 MILLIGRAM(S): at 23:48

## 2022-11-17 RX ADMIN — MEROPENEM 100 MILLIGRAM(S): 1 INJECTION INTRAVENOUS at 20:21

## 2022-11-17 RX ADMIN — HYDROMORPHONE HYDROCHLORIDE 0.5 MILLIGRAM(S): 2 INJECTION INTRAMUSCULAR; INTRAVENOUS; SUBCUTANEOUS at 17:54

## 2022-11-17 RX ADMIN — OXYCODONE HYDROCHLORIDE 5 MILLIGRAM(S): 5 TABLET ORAL at 23:18

## 2022-11-17 NOTE — ED PROVIDER NOTE - PROGRESS NOTE DETAILS
Angela Oliveros PGY-3: podiatry to see patient. Abel Spencer, PGY-3- podiatry evaluated pt - recommending broad spectrum abx and admission for MR. CBC delayed in lab. Received Meropenem based on prior sensitivities. Hemodynamically stable. Last received HD yesterday.

## 2022-11-17 NOTE — H&P ADULT - NSHPLABSRESULTS_GEN_ALL_CORE
.  LABS:     11-17    140  |  92<L>  |  33<H>  ----------------------------<  105<H>  4.4   |  29  |  5.88<H>    Ca    9.8      17 Nov 2022 18:00    TPro  8.1  /  Alb  3.8  /  TBili  <0.2  /  DBili  x   /  AST  11  /  ALT  6   /  AlkPhos  70  11-17    PT/INR - ( 17 Nov 2022 18:00 )   PT: 12.5 sec;   INR: 1.08 ratio         PTT - ( 17 Nov 2022 18:00 )  PTT:29.8 sec              RADIOLOGY, EKG & ADDITIONAL TESTS: Reviewed.

## 2022-11-17 NOTE — CONSULT NOTE ADULT - ATTENDING COMMENTS
Patient seen/examined. Well known to me with BLE PAD with small vessel disease, s/p right BKA with chronic left foot wound. Pedal pulses non-palpable. The only surgical option is a below knee amputation as patient has no revascularization options. Patient not presently agreeable to procedure. Recommend antibiotics and wound care per podiatry.

## 2022-11-17 NOTE — H&P ADULT - HISTORY OF PRESENT ILLNESS
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 Pt states she has been having pain in the base of her left big toe for the past couple of weeks. She states she went to her podiatrist today who was able to express some pus. The podiatrist was concerned for an infection and pt was sent to ED for further evaluation. Pt denies any fever or chills. She states she has not noticed any drainage from her foot. She otherwise denies any other symptoms of chest pain, SOB, abd pain, n/v/d, or urinary symptoms. She states her last HD session was 11/16.    In ED, vitals T 100 oral, HR 87, /45, RR 16, O2 100% on RA  Labs significant for wbc 12.75, Hb 10.3, BUN 33/5.88, ESR 95,   EKG: pending  XR left foot: Prelim radiology INTERPRETATION:  Severe degenerative changes of the foot with extensive   progressive erosion along the remainder of the distal second and fourth   metatarsal heads as well as the base of the remaining fourth and second   proximal phalangeal bases.    These findings are concerning for osteomyelitis versus progressive   ischemic erosion and are new when compared to 5/30/2022. No tracking gas   collection.    An MRI with contrast is warranted for definitive assessment.  ED management: Meropenem x1, podiatry and vascular consulted

## 2022-11-17 NOTE — CONSULT NOTE ADULT - ASSESSMENT
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 Pt admitted for IV abx and further work up of L toe infection. Renal consulted for ESRD Mx.     ESRD on dialysis.    Maintenance HD schedule MWF  HD unit- SQDC  K, vol acceptable  Informed consent for HD obtained from pt. in chart     s/p HD 11/16 outpt  plan for next HD tomorrow w/2k bath, uf as tolerated  Ultrafiltration on Dialysis as tolerated with blood pressure   anemia in ckd- Hb at goal. will add Epo 6k w/hd tiw  renal diet , fluid restriction   dose all meds for ESRD    HTN, controlled-bp stable. resume home bp meds.   L diabetic foot ulcer.   Pt w/ left 2nd and 4th toe infection, s/p debridement and wound culture w/ podiatry. Concern for possible OM.   - C/w vanc and meropenem   - f/u wound cx  - f/u podiatry and vascular eval.    Thanks for consulting. will closely follow up.   poc d/w pt, HD RN  labs, rad, chart reviewed  For any question, pl call:  Nephrology  Cell -[phone]  Office [phone]  Ans Serv [phone]

## 2022-11-17 NOTE — H&P ADULT - PROBLEM SELECTOR PLAN 3
Pt w/ chronic diastolic CHF. Previous TTE from 3/2022 w/ nml LVEF. Appears euvolemic on exam.  - HD per renal

## 2022-11-17 NOTE — ED PROVIDER NOTE - CLINICAL SUMMARY MEDICAL DECISION MAKING FREE TEXT BOX
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 VSS, non toxic appearing. Patient with worsening LLE diabetic wounds. Prior history of growing Klebsiella and EBSL from tissue cultures. Will require admission as patient is high risk. Will get sepsis labs, ESR/CRP, XR to r/o osteo. Podiatry consult.

## 2022-11-17 NOTE — H&P ADULT - PROBLEM SELECTOR PLAN 4
Hb 10.3, likely in setting of CKD and chronic disease. No signs or evidence of bleeding  - continue to monitor

## 2022-11-17 NOTE — CONSULT NOTE ADULT - SUBJECTIVE AND OBJECTIVE BOX
Patient is a 61y old  Female who presents with a chief complaint of     HPI:      PAST MEDICAL & SURGICAL HISTORY:  Heart failure      HLD (hyperlipidemia)      Glaucoma      Cataract      CKD (chronic kidney disease), stage IV      ESRD (end stage renal disease) on dialysis      PAD (peripheral artery disease)      Blind right eye      Acute osteomyelitis of toe of right foot  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          MEDICATIONS  (STANDING):  meropenem  IVPB 1000 milliGRAM(s) IV Intermittent Once    MEDICATIONS  (PRN):      Allergies    No Known Allergies    Intolerances        VITALS:    Vital Signs Last 24 Hrs  T(C): 37.6 (17 Nov 2022 17:02), Max: 37.8 (17 Nov 2022 14:44)  T(F): 99.7 (17 Nov 2022 17:02), Max: 100 (17 Nov 2022 14:44)  HR: 78 (17 Nov 2022 17:02) (78 - 87)  BP: 141/56 (17 Nov 2022 17:02) (128/42 - 144/45)  BP(mean): --  RR: 18 (17 Nov 2022 17:02) (16 - 18)  SpO2: 100% (17 Nov 2022 17:02) (100% - 100%)    Parameters below as of 17 Nov 2022 14:44  Patient On (Oxygen Delivery Method): room air        LABS:                CAPILLARY BLOOD GLUCOSE              LOWER EXTREMITY PHYSICAL EXAM:    Vascular: DP/PT 0/4, left foot, Temperature gradient cool to cool, B/L.   Neuro: Epicritic sensation diminished to the level of forefoot, B/L.  Musculoskeletal/Ortho: s/p RLE BKA  Skin: Left foot forefoot ischemic changes, maceration of the 2nd interspace with wound probing to bone. Fluctuance seen to the dorsal foot and plantar submet 1 wound with tracking to bone, tunneling proximally and distally, tunneling circumferentially  S/p R foot BKA.        RADIOLOGY & ADDITIONAL STUDIES:     Patient is a 61y old  Female who presents with a chief complaint of     HPI: Pt sent from the podiatrist office for a toe infection on the L foot. Had foot cleaned and wrapped today at the office. Pt with a R BKA, and L toe and L pinky toe amputation, ESRD (on dialysis M, W, F with a graft), CKD, glaucoma      PAST MEDICAL & SURGICAL HISTORY:  Heart failure      HLD (hyperlipidemia)      Glaucoma      Cataract      CKD (chronic kidney disease), stage IV      ESRD (end stage renal disease) on dialysis      PAD (peripheral artery disease)      Blind right eye      Acute osteomyelitis of toe of right foot  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          MEDICATIONS  (STANDING):  meropenem  IVPB 1000 milliGRAM(s) IV Intermittent Once    MEDICATIONS  (PRN):      Allergies    No Known Allergies    Intolerances        VITALS:    Vital Signs Last 24 Hrs  T(C): 37.6 (17 Nov 2022 17:02), Max: 37.8 (17 Nov 2022 14:44)  T(F): 99.7 (17 Nov 2022 17:02), Max: 100 (17 Nov 2022 14:44)  HR: 78 (17 Nov 2022 17:02) (78 - 87)  BP: 141/56 (17 Nov 2022 17:02) (128/42 - 144/45)  BP(mean): --  RR: 18 (17 Nov 2022 17:02) (16 - 18)  SpO2: 100% (17 Nov 2022 17:02) (100% - 100%)    Parameters below as of 17 Nov 2022 14:44  Patient On (Oxygen Delivery Method): room air        LABS:                CAPILLARY BLOOD GLUCOSE              LOWER EXTREMITY PHYSICAL EXAM:    Vascular: DP/PT 0/4, left foot, Temperature gradient cool to cool, B/L.   Neuro: Epicritic sensation diminished to the level of forefoot, B/L.  Musculoskeletal/Ortho: s/p RLE BKA  Skin: Left foot forefoot ischemic changes, maceration of the 2nd interspace with wound probing to bone. Fluctuance seen to the dorsal foot and plantar submet 1 wound with tracking to bone, tunneling proximally and distally, tunneling circumferentially  S/p R foot BKA.        RADIOLOGY & ADDITIONAL STUDIES:

## 2022-11-17 NOTE — H&P ADULT - NSHPPHYSICALEXAM_GEN_ALL_CORE
VITAL SIGNS:  T(C): 37.6 (11-17-22 @ 17:02), Max: 37.8 (11-17-22 @ 14:44)  T(F): 99.7 (11-17-22 @ 17:02), Max: 100 (11-17-22 @ 14:44)  HR: 78 (11-17-22 @ 17:02) (78 - 87)  BP: 141/56 (11-17-22 @ 17:02) (128/42 - 144/45)  BP(mean): --  RR: 18 (11-17-22 @ 17:02) (16 - 18)  SpO2: 100% (11-17-22 @ 17:02) (100% - 100%)  Wt(kg): --    PHYSICAL EXAM:    Constitutional: WDWN resting comfortably in bed; NAD  Head: NC/AT  Eyes: PERRL, EOMI, anicteric sclera  ENT: no nasal discharge; uvula midline, no oropharyngeal erythema or exudates; MMM  Neck: supple; no JVD or thyromegaly  Respiratory: CTA B/L; no W/R/R, no retractions  Cardiac: +S1/S2; RRR; no M/R/G  Gastrointestinal: abdomen soft, NT/ND; no rebound or guarding; +BSx4  Back: spine midline, no bony tenderness or step-offs  Extremities: WWP, no clubbing or cyanosis; no peripheral edema  Musculoskeletal: NROM x4; no joint swelling, tenderness or erythema  Vascular: 2+ radial, DP/PT pulses B/L  Dermatologic: skin warm, dry and intact; no rashes, wounds, or scars  Lymphatic: no submandibular or cervical LAD  Neurologic: AAOx3; CNII-XII grossly intact; no focal deficits  Psychiatric: affect and characteristics of appearance, verbalizations, behaviors are appropriate VITAL SIGNS:  T(C): 37.6 (11-17-22 @ 17:02), Max: 37.8 (11-17-22 @ 14:44)  T(F): 99.7 (11-17-22 @ 17:02), Max: 100 (11-17-22 @ 14:44)  HR: 78 (11-17-22 @ 17:02) (78 - 87)  BP: 141/56 (11-17-22 @ 17:02) (128/42 - 144/45)  BP(mean): --  RR: 18 (11-17-22 @ 17:02) (16 - 18)  SpO2: 100% (11-17-22 @ 17:02) (100% - 100%)  Wt(kg): --    PHYSICAL EXAM:    Constitutional: WDWN resting comfortably in bed; NAD  Head: NC/AT  Eyes: PERRL, EOMI, anicteric sclera  ENT: no nasal discharge; uvula midline, no oropharyngeal erythema or exudates; MMM  Neck: supple; no JVD  Respiratory: CTA B/L; no W/R/R, no retractions  Cardiac: +S1/S2; RRR; no M/R/G  Gastrointestinal: abdomen soft, NT/ND; no rebound or guarding; +BSx4  Back: spine midline, no bony tenderness  Extremities: WWP, no clubbing or cyanosis; no peripheral edema  Musculoskeletal: NROM x4; no joint swelling. Right BKA. Left foot wrapped in ace bandage - exam per podiatry and vascular.  Vascular: distal pulses intact  Dermatologic: skin warm, dry and intact; no rashes  Lymphatic: no submandibular or cervical LAD  Neurologic: AAOx3; moves all 4 extremities spontaneously   Psychiatric: affect and characteristics of appearance, verbalizations, behaviors are appropriate

## 2022-11-17 NOTE — PATIENT PROFILE ADULT - FALL HARM RISK - RISK INTERVENTIONS
Assistance OOB with selected safe patient handling equipment/Assistance with ambulation/Communicate Fall Risk and Risk Factors to all staff, patient, and family/Discuss with provider need for PT consult/Monitor gait and stability/Provide patient with walking aids - walker, cane, crutches/Reinforce activity limits and safety measures with patient and family/Review medications for side effects contributing to fall risk/Sit up slowly, dangle for a short time, stand at bedside before walking/Toileting schedule using arm’s reach rule for commode and bathroom/Visual Cue: Yellow wristband/Bed in lowest position, wheels locked, appropriate side rails in place/Call bell, personal items and telephone in reach/Instruct patient to call for assistance before getting out of bed or chair/Non-slip footwear when patient is out of bed/[place] to call system/Physically safe environment - no spills, clutter or unnecessary equipment/Purposeful Proactive Rounding/Room/bathroom lighting operational, light cord in reach

## 2022-11-17 NOTE — H&P ADULT - PROBLEM SELECTOR PLAN 2
Pt received HD on MWF. Last HD 11/16.   - renal consult in AM for HD  - c/w gabapentin 100 mg TID before HD

## 2022-11-17 NOTE — H&P ADULT - ASSESSMENT
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 Pt admitted for IV abx and further work up of L toe infection.

## 2022-11-17 NOTE — CONSULT NOTE ADULT - ASSESSMENT
61 y.o F with left foot wound   - Pt was seen and evaluated.   - Afebrile, WBC/CBC/ESR pending   - X-Ray: pending   - Left foot forefoot ischemic changes, maceration of the 2nd interspace with wound probing to bone. Fluctuance seen to the dorsal foot and plantar submet 1 wound with tracking to bone, tunneling proximally and distally, tunneling circumferentially  S/p R foot BKA.  - Using Sterile suture removal kit and #15 blade, the wound was explored and stab incision was made to area of fluctuance to the dorsal and plantar forefoot, mild serous draiange (~1cc) expressed, no probe to bone, no purulence.   - The wound was flushed and packed, and dressed with dry sterile dressing   - Pt tolerated the procedure well  - Recommend admission through medicine.  - Recommend IV antibiotics, vanco and zosyn   - Left foot wound cultured  - Ordered MRI of the left foot   - Ordered MARTA/PVR  - Recommend vascular consult   - Pod Plan: Local wound care versus possible transmetatarsal amputation pending MRI results   - Please document medical clearance for surgical intervention under local anesthesia and light IV sedation  - Discussed with attending.   61 y.o F with left foot wound   - Pt was seen and evaluated.   - Afebrile, WBC/CBC/ESR pending   - X-Ray: pending   - Left foot forefoot ischemic changes, maceration of the 2nd interspace with wound probing to bone. Fluctuance seen to the dorsal foot and plantar submet 1 wound with tracking to bone, tunneling proximally and distally, tunneling circumferentially  S/p R foot BKA.  - Using Sterile suture removal kit and #15 blade, the wound was explored and stab incision was made to area of fluctuance to the dorsal and plantar forefoot, mild serous draiange (~1cc) expressed, no probe to bone, no purulence.   - The wound was flushed and packed, and dressed with dry sterile dressing   - Pt tolerated the procedure well  - Recommend admission through medicine.  - Recommend IV antibiotics, vanco and zosyn   - Left foot wound cultured  - Ordered MRI of the left foot without contrast  - Ordered MARTA/PVR  - Recommend vascular consult   - Pod Plan: Local wound care versus possible transmetatarsal amputation pending MRI results   - Please document medical clearance for surgical intervention under local anesthesia and light IV sedation  - Discussed with attending.   61 y.o F with left foot wound   - Pt was seen and evaluated.   - Afebrile, WBC/CBC/ESR pending   - X-Ray: OM vs ischemic erosions of distal 2nd and 4th metatarsal heads, base of 4th, and 2nd proximal phalynx,. NO gas (prelim).   - Left foot forefoot ischemic changes, maceration of the 2nd interspace with wound probing to bone. Fluctuance seen to the dorsal foot and plantar submet 1 wound with tracking to bone, tunneling proximally and distally, tunneling circumferentially  S/p R foot BKA.  - Using Sterile suture removal kit and #15 blade, the wounds  were explored and stab incision was made to area of fluctuance to the dorsal and plantar forefoot, mild serous draiange (~1cc) expressed, probe to bone to the 2nd interspace, mild purulence expressed.   - The wounds were flushed and packed, and dressed with dry sterile dressing   - Pt tolerated the procedure well  - Recommend admission through medicine.  - Recommend IV antibiotics, vanco and cefepime  - Left foot wound cultured  - Ordered MRI of the left foot without contrast  - Ordered MARTA/PVR  - Recommend vascular consult   - Pod Plan: Local wound care versus possible transmetatarsal amputation pending MRI results/ vascular recommendations    - Please document medical clearance for surgical intervention under local anesthesia and light IV sedation  - Discussed with attending.   61 y.o F with left foot wound   - Pt was seen and evaluated.   - Afebrile, WBC/CBC/ESR pending   - X-Ray: OM vs ischemic erosions of distal 2nd and 4th metatarsal heads, base of 4th, and 2nd proximal phalynx,. NO gas (prelim).   - Left foot forefoot ischemic changes, maceration of the 2nd interspace with wound probing to bone. Fluctuance seen to the dorsal foot and plantar submet 1 wound with tracking to bone, tunneling proximally and distally, tunneling circumferentially  S/p R foot BKA.  - Using Sterile suture removal kit and #15 blade, the wounds  were explored and stab incision was made to area of fluctuance to the dorsal and plantar forefoot, mild serous draiange (~1cc) expressed, probe to bone to the 2nd interspace, mild purulence expressed.   - The wounds were flushed and packed, and dressed with dry sterile dressing   - Pt tolerated the procedure well  - Recommend admission through medicine.  - Recommend IV antibiotics, vanco and cefepime  - Left foot wound cultured  - Ordered MRI of the left foot without contrast  - Ordered MARTA/PVR  - Recommend vascular consult   - Pod Plan: Local wound care versus possible proximal amputation pending MRI results/ vascular recommendations    - Patient with known history of PVD without good revasc options.  Severe small vessel disease  - Please document medical clearance for surgical intervention under local anesthesia and light IV sedation  - Discussed with attending.

## 2022-11-17 NOTE — ED ADULT NURSE NOTE - OBJECTIVE STATEMENT
A04 60 y/o female with pmhx of DM, rt BKA, ESRD (HD MWF) presents to the ED for left diabetic foot ulcer. Patient was sent in from her podiatry office because the wound was draining. Patient denies chest pain, shortness of breath, nausea, vomiting and diarrhea. X-ray completed. Labs drawn and sent. Wound cultures sent by podiatry.

## 2022-11-17 NOTE — PATIENT PROFILE ADULT - MEDICATIONS/VISITS
Problem: Patient Care Overview (Adult)  Goal: Plan of Care Review    02/16/17 1614   Outcome Evaluation   Outcome Summary/Follow up Plan ROM and mobility remain limited by R knee pain. Pt tolerated exercises and was able to transfer to [facility] [place] [facility].            no

## 2022-11-17 NOTE — ED ADULT NURSE REASSESSMENT NOTE - NS ED NURSE REASSESS COMMENT FT1
pt received A&Ox4 offering no complaints at this time. R upper thigh graft intact, bruit noted. AKA wrapped, skin intact. LLE wrapped as well, no drainage noted. denies c/p, fevers, HA, SOB, weakness. respirations even and unlabored. VS as noted in flowsheet. last dialisys Wednesday. (M,W,F). awaiting bed assignment

## 2022-11-17 NOTE — ED PROVIDER NOTE - ATTENDING CONTRIBUTION TO CARE
61-year-old female past medical history ESRD on hemodialysis Monday Wednesday Friday, peripheral vascular disease foot wound status post partial amputation, right BKA in the past presents with left foot ulcer concern for infection.  Was seen by podiatry at routine visit and noted to have drainage from wound.  Patient does report some pain between the first and second left toe.  Denies any fever, chills, chest pain, shortness of breath, cough, abdominal pain, nausea, vomiting, diarrhea, constipation.  Denies any numbness or weakness.  Last had dialysis a day ago and is adherent to session scheduled.  Exam as above. no crepitus on exam  Concern for skin soft tissue infection, rule out osteomyelitis, necrotizing fasciitis.  Plan: Antibiotics, symptom relief as needed, x-ray, podiatry consult, admit

## 2022-11-17 NOTE — ED PROVIDER NOTE - OBJECTIVE STATEMENT
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   Patient was seen by podiatry for routine checkup.  For patient, podiatrist noted purulent drainage from the wound and sent her into the ED for further evaluation.  Patient notes pain between the left great toe hallux and difficulty ambulating.  Has history of prior left foot partial third ray amputation.  Last full session of dialysis was yesterday.  Patient denies fevers/chills, chest pain, shortness of breath, nausea/vomiting/diarrhea, abdominal pain, falls. Takes percocet for her pain.

## 2022-11-17 NOTE — CONSULT NOTE ADULT - SUBJECTIVE AND OBJECTIVE BOX
Vascular Surgery Consult  Consulting surgical team: Vascular  Consulting attending: Talat    HPI:  61F w/ PMHx of ESRD (HD MWF), PAD s/p R balloon angioplasty (5/11/2022) and foot wounds s/p 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 She reports she has had this L foot wound for a long time. She reports pain with walking. She reports the wound hasn't really changed. She went to see her podiatrist today and was told to come in to get her foot evaluated. She denies f/c. She denies redness or drainage.     In the ER the pt was hemodynamically stable.       PAST MEDICAL HISTORY:  Heart failure    HLD (hyperlipidemia)    Glaucoma    Cataract    CKD (chronic kidney disease), stage IV    ESRD (end stage renal disease) on dialysis    PAD (peripheral artery disease)    Blind right eye        PAST SURGICAL HISTORY:  Acute osteomyelitis of toe of right foot    S/P arteriovenous (AV) fistula creation    Hemodialysis access, AV graft        MEDICATIONS:  acetaminophen     Tablet .. 650 milliGRAM(s) Oral every 6 hours PRN      ALLERGIES:  No Known Allergies      VITALS & I/Os:  Vital Signs Last 24 Hrs  T(C): 37.1 (17 Nov 2022 22:05), Max: 37.8 (17 Nov 2022 14:44)  T(F): 98.8 (17 Nov 2022 22:05), Max: 100 (17 Nov 2022 14:44)  HR: 81 (17 Nov 2022 22:05) (78 - 87)  BP: 138/58 (17 Nov 2022 22:05) (128/42 - 144/45)  BP(mean): --  RR: 16 (17 Nov 2022 22:05) (16 - 18)  SpO2: 100% (17 Nov 2022 22:05) (100% - 100%)    Parameters below as of 17 Nov 2022 22:05  Patient On (Oxygen Delivery Method): room air        I&O's Summary      PHYSICAL EXAM:  General: No acute distress  Respiratory: Nonlabored  Cardiovascular: RRR  Abdominal: Soft, nondistended, nontender. No rebound or guarding. No organomegaly, no palpable mass.  Extremities:   RLE: palpable femoral/popliteal pulse, BKA stump dressing c/d/i  LLE: palpable femoral/popliteal pulse, DP/PT signals, ischemic changes to toes w/ skin changes, no erythema or draiange    LABS:                        10.3   12.75 )-----------( 271      ( 17 Nov 2022 18:00 )             34.9     11-17    140  |  92<L>  |  33<H>  ----------------------------<  105<H>  4.4   |  29  |  5.88<H>    Ca    9.8      17 Nov 2022 18:00    TPro  8.1  /  Alb  3.8  /  TBili  <0.2  /  DBili  x   /  AST  11  /  ALT  6   /  AlkPhos  70  11-17    Lactate:  11-17 @ 18:00  1.7    PT/INR - ( 17 Nov 2022 18:00 )   PT: 12.5 sec;   INR: 1.08 ratio         PTT - ( 17 Nov 2022 18:00 )  PTT:29.8 sec              IMAGING:

## 2022-11-17 NOTE — CONSULT NOTE ADULT - ASSESSMENT
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    -no acute surgical intervention  -MARTA/PVR  -will follow for possible angiogram  -f/u podiatry  -care per medicine    Discussed w/ Fellow on behalf of Dr. Talat GRAHAM Surgery, [id number]

## 2022-11-17 NOTE — CONSULT NOTE ADULT - SUBJECTIVE AND OBJECTIVE BOX
[facility] - S Jluis / Shabbir S /D Romina/ SHERRI Mcfarlane/ SHERRI Madden/ Andrés Perkins / M Tristianu/ O Gladis  service [phone], office [phone]  ---------------------------------------------------------------------------------------------------------------    Patient is a 61y Female whom presented to the hospital with   Denied recent NSAID use/Abx use/iv contrast studies.    Patient seen and examined    PAST MEDICAL & SURGICAL HISTORY:  Heart failure      HLD (hyperlipidemia)      Glaucoma      Cataract      CKD (chronic kidney disease), stage IV      ESRD (end stage renal disease) on dialysis      PAD (peripheral artery disease)      Blind right eye      Acute osteomyelitis of toe of right foot  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          Allergies: No Known Allergies    Home Medications Reviewed  Hospital Medications:   MEDICATIONS  (STANDING):  meropenem  IVPB 500 milliGRAM(s) IV Intermittent Once    SOCIAL HISTORY:  Denies ETOh,Smoking, illicit drug use  FAMILY HISTORY:  No pertinent family history in first degree relatives        REVIEW OF SYSTEMS:  CONSTITUTIONAL: No weakness, fevers or chills  EYES/ENT: No visual changes;  No vertigo or throat pain   NECK: No pain or stiffness  RESPIRATORY: No cough, wheezing, hemoptysis; No shortness of breath  CARDIOVASCULAR: No chest pain or palpitations.  GASTROINTESTINAL: No abdominal or epigastric pain. No nausea, vomiting, or hematemesis; No diarrhea or constipation. No melena or hematochezia.  GENITOURINARY: No dysuria, frequency, foamy urine, urinary urgency, incontinence or hematuria  NEUROLOGICAL: No numbness or weakness  SKIN: No itching, burning, rashes, or lesions   VASCULAR: No bilateral lower extremity edema.   All other review of systems is negative unless indicated above.    VITALS:  T(F): 99.7 (11-17-22 @ 17:02), Max: 100 (11-17-22 @ 14:44)  HR: 78 (11-17-22 @ 17:02)  BP: 141/56 (11-17-22 @ 17:02)  RR: 18 (11-17-22 @ 17:02)  SpO2: 100% (11-17-22 @ 17:02)  Wt(kg): --        PHYSICAL EXAM:  Constitutional: NAD  HEENT: anicteric sclera, oropharynx clear, MMM  Neck: No JVD  Respiratory: CTAB, no wheezes, rales or rhonchi  Cardiovascular: S1, S2, RRR  Gastrointestinal: BS+, soft, NT/ND  Extremities: No cyanosis or clubbing. No peripheral edema  Neurological: A/O x 3, no focal deficits  Psychiatric: Normal mood, normal affect  : No CVA tenderness. No salcido.   Skin: No rashes  Vascular Access:    LABS:  11-17    140  |  92<L>  |  33<H>  ----------------------------<  105<H>  4.4   |  29  |  5.88<H>    Ca    9.8      17 Nov 2022 18:00    TPro  8.1  /  Alb  3.8  /  TBili  <0.2  /  DBili      /  AST  11  /  ALT  6   /  AlkPhos  70  11-17    Creatinine Trend: 5.88 <--    Urine Studies:        RADIOLOGY & ADDITIONAL STUDIES:                 [facility] - S Jluis / Shabbir S /D Romina/ S Denys/ SHERRI Madden/ Andrés Perkins / SUNI Lubinu/ O Gladis  service [phone], office [phone]  ---------------------------------------------------------------------------------------------------------------  Patient seen and examined in ER    61F w/ PMHx of ESRD (HD MWF), PAD s/p R balloon angioplasty (5/11/2022) and foot wounds s/p left foot partial 3rd ray amputation and R BKA presenting with L toe infection. Pt states she has been having pain in the base of her left big toe for the past couple of weeks. She states she went to her podiatrist today who was able to express some pus. The podiatrist was concerned for an infection and pt was sent to ED for further evaluation. Renal consulted for ESRD Mx. Pt denies any fever or chills. She states she has not noticed any drainage from her foot. She otherwise denies any other symptoms of chest pain, SOB, abd pain, n/v/d, or urinary symptoms. She had her last HD session was 11/16. pt well known to me [facility]. denied cp/sob   ED management: Meropenem x1, podiatry and vascular consulted    PAST MEDICAL & SURGICAL HISTORY:  Heart failure      HLD (hyperlipidemia)      Glaucoma      Cataract      CKD (chronic kidney disease), stage IV      ESRD (end stage renal disease) on dialysis      PAD (peripheral artery disease)      Blind right eye      Acute osteomyelitis of toe of right foot  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      Allergies: No Known Allergies    Home Medications Reviewed  Hospital Medications:   MEDICATIONS  (STANDING):  meropenem  IVPB 500 milliGRAM(s) IV Intermittent Once    SOCIAL HISTORY:  Denies ETOh,Smoking, illicit drug use  FAMILY HISTORY:  No pertinent family history in first degree relatives      REVIEW OF SYSTEMS:  CONSTITUTIONAL: No weakness, fevers or chills  EYES/ENT: No visual changes;  No vertigo or throat pain   NECK: No pain or stiffness  RESPIRATORY: No cough, wheezing, hemoptysis; No shortness of breath  CARDIOVASCULAR: No chest pain or palpitations.  GASTROINTESTINAL: No abdominal or epigastric pain. No nausea, vomiting, or hematemesis; No diarrhea or constipation. No melena or hematochezia.  NEUROLOGICAL: No numbness or weakness  SKIN: No itching, burning, rashes, or lesions   VASCULAR: No bilateral lower extremity edema.   All other review of systems is negative unless indicated above.    VITALS:  T(F): 99.7 (11-17-22 @ 17:02), Max: 100 (11-17-22 @ 14:44)  HR: 78 (11-17-22 @ 17:02)  BP: 141/56 (11-17-22 @ 17:02)  RR: 18 (11-17-22 @ 17:02)  SpO2: 100% (11-17-22 @ 17:02)  Wt(kg): --    PHYSICAL EXAM:  Constitutional: NAD  HEENT: anicteric sclera  Neck: No JVD  Respiratory: CTAB, no wheezes, rales or rhonchi  Cardiovascular: S1, S2, RRR  Gastrointestinal: BS+, soft, NT/ND  Extremities: rt BKA+, left foot dressing+   Neurological: A/O x 3  Psychiatric: Normal mood, normal affect  : No salcido.   Vascular Access: rt femoral avg+    LABS:  11-17    140  |  92<L>  |  33<H>  ----------------------------<  105<H>  4.4   |  29  |  5.88<H>    Ca    9.8      17 Nov 2022 18:00    TPro  8.1  /  Alb  3.8  /  TBili  <0.2  /  DBili      /  AST  11  /  ALT  6   /  AlkPhos  70  11-17    Creatinine Trend: 5.88 <--    Urine Studies:                          10.3   12.75 )-----------( 271      ( 17 Nov 2022 18:00 )             34.9       RADIOLOGY & ADDITIONAL STUDIES:  < from: Xray Foot AP + Lateral + Oblique, Left (11.17.22 @ 17:24) >  INTERPRETATION:  Severe degenerative changes of the foot with extensive   progressive erosion along the remainder of the distal second and fourth   metatarsal heads as well as the base of the remaining fourth and second   proximal phalangeal bases.    These findings are concerning for osteomyelitis versus progressive   ischemic erosion and are new when compared to 5/30/2022. No tracking gas   collection.    An MRI with contrast is warranted for definitive assessment.        ******PRELIMINARY REPORT******      < end of copied text >

## 2022-11-17 NOTE — ED PROVIDER NOTE - PHYSICAL EXAMINATION
GENERAL: Awake, alert, NAD  HEENT: NC/AT, moist mucous membranes, PERRL, EOMI  LUNGS: CTAB, no wheezes or crackles   CARDIAC: RRR, no m/r/g  ABDOMEN: Soft, normal BS, non tender, non distended, no rebound, no guarding  BACK: No midline spinal tenderness, no CVA tenderness  EXT: RLE - BKA. LLE - DP and PT pulses 2+. L 3rd ray amputation. Purulence on plantar aspect of great toe hallux. Second toe appears necrotic with decreased sensation.   NEURO: A&Ox3. Moving all extremities.  SKIN: Warm and dry. No rash.  PSYCH: Normal affect.

## 2022-11-17 NOTE — H&P ADULT - PROBLEM SELECTOR PLAN 5
Pt w/ hx of R balloon angioplasty (5/11/2022) and foot wounds s/p left foot partial 3rd ray amputation (3/2022) and R BKA (6/2022)  - c/w asa 81 mg, pt unsure if still on plavix  - f/u official med rec

## 2022-11-17 NOTE — H&P ADULT - PROBLEM SELECTOR PLAN 1
Pt w/ left 2nd and 4th toe infection, s/p debridement and wound culture w/ podiatry. Concern for possible OM.   - C/w vanc and meropenem based off previous wound cx  - f/u wound cx  - f/u podiatry and vascular recs Pt w/ left 2nd and 4th toe infection, s/p debridement and wound culture w/ podiatry. Concern for possible OM.   - C/w vanc and meropenem based off previous wound cx. Dose w/ HD.  - f/u wound cx  - f/u podiatry and vascular recs Pt w/ left 2nd and 4th toe infection, s/p debridement and wound culture w/ podiatry. Concern for possible OM.   - C/w vanc and meropenem based off previous wound cx. Dose w/ HD.  - f/u wound cx  - f/u podiatry and vascular recs  - f/u MRI

## 2022-11-17 NOTE — ED ADULT TRIAGE NOTE - CHIEF COMPLAINT QUOTE
Pt sent from the podiatrist office for a toe infection on the L foot. Had foot cleaned and wrapped today at the office. Pt with a R BKA, and L toe and L pinky toe amputation, ESRD (on dialysis M, W, F with a graft), CKD, glaucoma

## 2022-11-18 LAB
ANION GAP SERPL CALC-SCNC: 17 MMOL/L — HIGH (ref 7–14)
BASOPHILS # BLD AUTO: 0.04 K/UL — SIGNIFICANT CHANGE UP (ref 0–0.2)
BASOPHILS NFR BLD AUTO: 0.4 % — SIGNIFICANT CHANGE UP (ref 0–2)
BUN SERPL-MCNC: 40 MG/DL — HIGH (ref 7–23)
CALCIUM SERPL-MCNC: 9.7 MG/DL — SIGNIFICANT CHANGE UP (ref 8.4–10.5)
CHLORIDE SERPL-SCNC: 92 MMOL/L — LOW (ref 98–107)
CO2 SERPL-SCNC: 28 MMOL/L — SIGNIFICANT CHANGE UP (ref 22–31)
CREAT SERPL-MCNC: 6.76 MG/DL — HIGH (ref 0.5–1.3)
EGFR: 6 ML/MIN/1.73M2 — LOW
EOSINOPHIL # BLD AUTO: 0.24 K/UL — SIGNIFICANT CHANGE UP (ref 0–0.5)
EOSINOPHIL NFR BLD AUTO: 2.2 % — SIGNIFICANT CHANGE UP (ref 0–6)
GLUCOSE BLDC GLUCOMTR-MCNC: 112 MG/DL — HIGH (ref 70–99)
GLUCOSE BLDC GLUCOMTR-MCNC: 124 MG/DL — HIGH (ref 70–99)
GLUCOSE BLDC GLUCOMTR-MCNC: 89 MG/DL — SIGNIFICANT CHANGE UP (ref 70–99)
GLUCOSE BLDC GLUCOMTR-MCNC: 96 MG/DL — SIGNIFICANT CHANGE UP (ref 70–99)
GLUCOSE SERPL-MCNC: 99 MG/DL — SIGNIFICANT CHANGE UP (ref 70–99)
HCT VFR BLD CALC: 30.6 % — LOW (ref 34.5–45)
HGB BLD-MCNC: 9.2 G/DL — LOW (ref 11.5–15.5)
IANC: 7.85 K/UL — HIGH (ref 1.8–7.4)
IMM GRANULOCYTES NFR BLD AUTO: 0.8 % — SIGNIFICANT CHANGE UP (ref 0–0.9)
LYMPHOCYTES # BLD AUTO: 1.6 K/UL — SIGNIFICANT CHANGE UP (ref 1–3.3)
LYMPHOCYTES # BLD AUTO: 14.8 % — SIGNIFICANT CHANGE UP (ref 13–44)
MAGNESIUM SERPL-MCNC: 2.2 MG/DL — SIGNIFICANT CHANGE UP (ref 1.6–2.6)
MCHC RBC-ENTMCNC: 27.6 PG — SIGNIFICANT CHANGE UP (ref 27–34)
MCHC RBC-ENTMCNC: 30.1 GM/DL — LOW (ref 32–36)
MCV RBC AUTO: 91.9 FL — SIGNIFICANT CHANGE UP (ref 80–100)
MONOCYTES # BLD AUTO: 0.98 K/UL — HIGH (ref 0–0.9)
MONOCYTES NFR BLD AUTO: 9.1 % — SIGNIFICANT CHANGE UP (ref 2–14)
NEUTROPHILS # BLD AUTO: 7.85 K/UL — HIGH (ref 1.8–7.4)
NEUTROPHILS NFR BLD AUTO: 72.7 % — SIGNIFICANT CHANGE UP (ref 43–77)
NRBC # BLD: 0 /100 WBCS — SIGNIFICANT CHANGE UP (ref 0–0)
NRBC # FLD: 0 K/UL — SIGNIFICANT CHANGE UP (ref 0–0)
PHOSPHATE SERPL-MCNC: 5.7 MG/DL — HIGH (ref 2.5–4.5)
PLATELET # BLD AUTO: 256 K/UL — SIGNIFICANT CHANGE UP (ref 150–400)
POTASSIUM SERPL-MCNC: 4.6 MMOL/L — SIGNIFICANT CHANGE UP (ref 3.5–5.3)
POTASSIUM SERPL-SCNC: 4.6 MMOL/L — SIGNIFICANT CHANGE UP (ref 3.5–5.3)
RBC # BLD: 3.33 M/UL — LOW (ref 3.8–5.2)
RBC # FLD: 15.2 % — HIGH (ref 10.3–14.5)
SODIUM SERPL-SCNC: 137 MMOL/L — SIGNIFICANT CHANGE UP (ref 135–145)
VANCOMYCIN FLD-MCNC: 10.6 UG/ML — SIGNIFICANT CHANGE UP
WBC # BLD: 10.8 K/UL — HIGH (ref 3.8–10.5)
WBC # FLD AUTO: 10.8 K/UL — HIGH (ref 3.8–10.5)

## 2022-11-18 PROCEDURE — 93923 UPR/LXTR ART STDY 3+ LVLS: CPT | Mod: 26

## 2022-11-18 RX ORDER — HEPARIN SODIUM 5000 [USP'U]/ML
5000 INJECTION INTRAVENOUS; SUBCUTANEOUS EVERY 8 HOURS
Refills: 0 | Status: DISCONTINUED | OUTPATIENT
Start: 2022-11-18 | End: 2022-11-30

## 2022-11-18 RX ORDER — VANCOMYCIN HCL 1 G
1000 VIAL (EA) INTRAVENOUS ONCE
Refills: 0 | Status: COMPLETED | OUTPATIENT
Start: 2022-11-18 | End: 2022-11-18

## 2022-11-18 RX ORDER — MEROPENEM 1 G/30ML
500 INJECTION INTRAVENOUS EVERY 24 HOURS
Refills: 0 | Status: DISCONTINUED | OUTPATIENT
Start: 2022-11-18 | End: 2022-11-26

## 2022-11-18 RX ORDER — CHLORHEXIDINE GLUCONATE 213 G/1000ML
1 SOLUTION TOPICAL DAILY
Refills: 0 | Status: DISCONTINUED | OUTPATIENT
Start: 2022-11-18 | End: 2022-11-30

## 2022-11-18 RX ADMIN — Medication 1 TABLET(S): at 12:47

## 2022-11-18 RX ADMIN — HEPARIN SODIUM 5000 UNIT(S): 5000 INJECTION INTRAVENOUS; SUBCUTANEOUS at 21:45

## 2022-11-18 RX ADMIN — OXYCODONE HYDROCHLORIDE 5 MILLIGRAM(S): 5 TABLET ORAL at 14:48

## 2022-11-18 RX ADMIN — OXYCODONE HYDROCHLORIDE 5 MILLIGRAM(S): 5 TABLET ORAL at 00:18

## 2022-11-18 RX ADMIN — ERYTHROPOIETIN 6000 UNIT(S): 10000 INJECTION, SOLUTION INTRAVENOUS; SUBCUTANEOUS at 18:57

## 2022-11-18 RX ADMIN — SEVELAMER CARBONATE 1600 MILLIGRAM(S): 2400 POWDER, FOR SUSPENSION ORAL at 12:47

## 2022-11-18 RX ADMIN — HEPARIN SODIUM 5000 UNIT(S): 5000 INJECTION INTRAVENOUS; SUBCUTANEOUS at 14:43

## 2022-11-18 RX ADMIN — OXYCODONE HYDROCHLORIDE 5 MILLIGRAM(S): 5 TABLET ORAL at 15:30

## 2022-11-18 RX ADMIN — SEVELAMER CARBONATE 1600 MILLIGRAM(S): 2400 POWDER, FOR SUSPENSION ORAL at 21:40

## 2022-11-18 RX ADMIN — SIMVASTATIN 40 MILLIGRAM(S): 20 TABLET, FILM COATED ORAL at 21:40

## 2022-11-18 RX ADMIN — SEVELAMER CARBONATE 1600 MILLIGRAM(S): 2400 POWDER, FOR SUSPENSION ORAL at 09:00

## 2022-11-18 RX ADMIN — MEROPENEM 100 MILLIGRAM(S): 1 INJECTION INTRAVENOUS at 22:06

## 2022-11-18 RX ADMIN — Medication 250 MILLIGRAM(S): at 23:40

## 2022-11-18 RX ADMIN — GABAPENTIN 100 MILLIGRAM(S): 400 CAPSULE ORAL at 06:41

## 2022-11-18 RX ADMIN — Medication 81 MILLIGRAM(S): at 12:47

## 2022-11-18 RX ADMIN — HEPARIN SODIUM 5000 UNIT(S): 5000 INJECTION INTRAVENOUS; SUBCUTANEOUS at 06:42

## 2022-11-18 RX ADMIN — CHLORHEXIDINE GLUCONATE 1 APPLICATION(S): 213 SOLUTION TOPICAL at 14:44

## 2022-11-18 NOTE — PROGRESS NOTE ADULT - SUBJECTIVE AND OBJECTIVE BOX
Vascular Surgery Progress Note    SUBJECTIVE: Pt seen and examined at bedside. Patient comfortable and in no-apparent distress. No nausea, vomiting, diarrhea. Pain is controlled. +Gas/+BM. Tolerating diet.    Vital Signs Last 24 Hrs  T(C): 37.2 (18 Nov 2022 06:11), Max: 37.8 (17 Nov 2022 14:44)  T(F): 98.9 (18 Nov 2022 06:11), Max: 100 (17 Nov 2022 14:44)  HR: 74 (18 Nov 2022 06:11) (74 - 87)  BP: 107/50 (18 Nov 2022 06:11) (107/50 - 144/45)  BP(mean): --  RR: 16 (18 Nov 2022 06:11) (16 - 18)  SpO2: 98% (18 Nov 2022 06:11) (98% - 100%)    Parameters below as of 18 Nov 2022 06:11  Patient On (Oxygen Delivery Method): room air    Physical Exam:  General: No acute distress  Respiratory: Nonlabored, room air   Cardiovascular: RRR  Abdominal: Soft, nondistended, nontender. No rebound or guarding. No organomegaly, no palpable mass.  Extremities:   RLE: palpable femoral/popliteal pulse, BKA stump dressing c/d/i  LLE: palpable femoral/popliteal pulse, DP/PT signals, ischemic changes to toes w/ skin changes, purulent drainage lateral foot     LABS:                        9.2    10.80 )-----------( 256      ( 18 Nov 2022 06:00 )             30.6     11-18    137  |  92<L>  |  40<H>  ----------------------------<  99  4.6   |  28  |  6.76<H>    Ca    9.7      18 Nov 2022 06:00  Phos  5.7     11-18  Mg     2.20     11-18    TPro  8.1  /  Alb  3.8  /  TBili  <0.2  /  DBili  x   /  AST  11  /  ALT  6   /  AlkPhos  70  11-17    PT/INR - ( 17 Nov 2022 18:00 )   PT: 12.5 sec;   INR: 1.08 ratio         PTT - ( 17 Nov 2022 18:00 )  PTT:29.8 sec      INs and OUTs:

## 2022-11-18 NOTE — PROGRESS NOTE ADULT - ASSESSMENT
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 Pt admitted for IV abx and further work up of L toe infection.      Problem/Plan - 1:  ·  Problem: Toe infection.   ·  Plan: Pt w/ left 2nd and 4th toe infection, s/p debridement and wound culture w/ podiatry. Concern for possible OM.   - C/w vanc and meropenem based off previous wound cx. Dose w/ HD.  - f/u wound cx  - f/u podiatry and vascular recs  - f/u MRI.     Problem/Plan - 2:  ·  Problem: ESRD on dialysis.   ·  Plan: Pt received HD on MWF. Last HD 11/16.   - renal following and HD per them .   - c/w gabapentin 100 mg TID before HD.     Problem/Plan - 3:  ·  Problem: Chronic CHF.   ·  Plan: Pt w/ chronic diastolic CHF. Previous TTE from 3/2022 w/ nml LVEF. Appears euvolemic on exam.  - HD per renal.     Problem/Plan - 4:  ·  Problem: Anemia.   ·  Plan: Hb 10.3, likely in setting of CKD and chronic disease. No signs or evidence of bleeding  - continue to monitor.     Problem/Plan - 5:  ·  Problem: PAD (peripheral artery disease).   ·  Plan: Pt w/ hx of R balloon angioplasty (5/11/2022) and foot wounds s/p left foot partial 3rd ray amputation (3/2022) and R BKA (6/2022)  - c/w asa 81 mg, pt unsure if still on plavix  - Vascular help appreciated.      Problem/Plan - 6:  ·  Problem: HLD (hyperlipidemia).   ·  Plan: c/w simvastatin 40 mg QHS.     Problem/Plan - 7:  ·  Problem: Prophylactic measure.   ·  Plan: DVT prophylaxis: heparin subq  Diet: dash/tlc, renal.

## 2022-11-18 NOTE — PROGRESS NOTE ADULT - SUBJECTIVE AND OBJECTIVE BOX
Date of Service  : 11-18-22     INTERVAL HPI/OVERNIGHT EVENTS:  I feel fine.   Vital Signs Last 24 Hrs  T(C): 37.1 (18 Nov 2022 14:00), Max: 37.2 (18 Nov 2022 06:11)  T(F): 98.8 (18 Nov 2022 14:00), Max: 98.9 (18 Nov 2022 06:11)  HR: 82 (18 Nov 2022 14:00) (74 - 82)  BP: 106/52 (18 Nov 2022 14:00) (106/52 - 138/58)  BP(mean): --  RR: 16 (18 Nov 2022 06:11) (16 - 16)  SpO2: 96% (18 Nov 2022 14:00) (96% - 100%)    Parameters below as of 18 Nov 2022 14:00  Patient On (Oxygen Delivery Method): room air      I&O's Summary    MEDICATIONS  (STANDING):  aspirin enteric coated 81 milliGRAM(s) Oral daily  chlorhexidine 2% Cloths 1 Application(s) Topical daily  epoetin niesha-epbx (RETACRIT) Injectable 6000 Unit(s) IV Push <User Schedule>  gabapentin 100 milliGRAM(s) Oral <User Schedule>  heparin   Injectable 5000 Unit(s) SubCutaneous every 8 hours  meropenem  IVPB 500 milliGRAM(s) IV Intermittent every 24 hours  multivitamin 1 Tablet(s) Oral daily  sevelamer carbonate 1600 milliGRAM(s) Oral three times a day with meals  simvastatin 40 milliGRAM(s) Oral at bedtime    MEDICATIONS  (PRN):  acetaminophen     Tablet .. 650 milliGRAM(s) Oral every 6 hours PRN Temp greater or equal to 38C (100.4F), Mild Pain (1 - 3)  oxyCODONE    IR 5 milliGRAM(s) Oral every 4 hours PRN Severe Pain (7 - 10)  sodium chloride 0.9% Bolus. 100 milliLiter(s) IV Bolus every 5 minutes PRN SBP LESS THAN or EQUAL to 90 mmHg    LABS:                        9.2    10.80 )-----------( 256      ( 18 Nov 2022 06:00 )             30.6     11-18    137  |  92<L>  |  40<H>  ----------------------------<  99  4.6   |  28  |  6.76<H>    Ca    9.7      18 Nov 2022 06:00  Phos  5.7     11-18  Mg     2.20     11-18    TPro  8.1  /  Alb  3.8  /  TBili  <0.2  /  DBili  x   /  AST  11  /  ALT  6   /  AlkPhos  70  11-17    PT/INR - ( 17 Nov 2022 18:00 )   PT: 12.5 sec;   INR: 1.08 ratio         PTT - ( 17 Nov 2022 18:00 )  PTT:29.8 sec    CAPILLARY BLOOD GLUCOSE      POCT Blood Glucose.: 124 mg/dL (18 Nov 2022 12:16)  POCT Blood Glucose.: 96 mg/dL (18 Nov 2022 09:06)  POCT Blood Glucose.: 88 mg/dL (17 Nov 2022 23:22)  POCT Blood Glucose.: 102 mg/dL (17 Nov 2022 18:12)          REVIEW OF SYSTEMS:  CONSTITUTIONAL: No fever, weight loss, or fatigue  EYES: No eye pain, visual disturbances, or discharge  ENMT:  No difficulty hearing, tinnitus, vertigo; No sinus or throat pain  NECK: No pain or stiffness  RESPIRATORY: No cough, wheezing, chills or hemoptysis; No shortness of breath  CARDIOVASCULAR: No chest pain, palpitations, dizziness, or leg swelling  GASTROINTESTINAL: No abdominal or epigastric pain. No nausea, vomiting, or hematemesis; No diarrhea or constipation. No melena or hematochezia.  GENITOURINARY: No dysuria, frequency, hematuria, or incontinence  NEUROLOGICAL: No headaches, memory loss, loss of strength, numbness, or tremors      Consultant(s) Notes Reviewed:  [x ] YES  [ ] NO    PHYSICAL EXAM:  GENERAL: NAD, well-groomed, well-developed,not in any distress ,  HEAD:  Atraumatic, Normocephalic  EYES: EOMI, PERRLA, conjunctiva and sclera clear  ENMT: No tonsillar erythema, exudates, or enlargement; Moist mucous membranes, Good dentition, No lesions  NECK: Supple, No JVD, Normal thyroid  NERVOUS SYSTEM:  Alert & Oriented X3, No focal deficit   CHEST/LUNG: Good air entry bilateral with no  rales, rhonchi, wheezing, or rubs  HEART: Regular rate and rhythm; No murmurs, rubs, or gallops  ABDOMEN: Soft, Nontender, Nondistended; Bowel sounds present  EXTREMITIES:  Left foot dressed .     Care Discussed with Consultants/Other Providers [ x] YES  [ ] NO

## 2022-11-18 NOTE — PROGRESS NOTE ADULT - ASSESSMENT
61 y.o F with left foot wound , s/p L foot Partial Fifth Ray Resection and Partial Third Ray Resection, s/p R foot BKA   - Pt was seen and evaluated.   - Afebrile, WBC 10.80, ESR 95, CRP 12.6  - X-Ray: concerning OM to the  remainder of the distal second and fourth metatarsal heads as well as the base of the remaining fourth and second proximal phalangeal bases.  - Left foot forefoot ischemic changes to the 2nd digit, Partial Third Ray Resection surgical site dehisced wound to probe to bone, 1cc pus expressed, fibbronecrotic wound bed, erythema and edema to the level of midtibfib.   - cont IV abx  - Left foot wound culture pending  - MRI of the left foot without contrast pending  - MARTA/PVR pending   - Patient with known history of PVD without good revasc options.  Severe small vessel disease  - Pod Plan: Local wound care versus possible proximal amputation pending MRI results/ vascular recommendations    - Please document Medical/Cardiac optimization for anesthesia for Podiatry surgery.   - Discussed with attending.

## 2022-11-18 NOTE — PROGRESS NOTE ADULT - ASSESSMENT
Assessment: 61F w/ PMHx of ESRD (HD MWF), PAD s/p R balloon angioplasty (5/11/2022) and foot wounds s/p 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    Recs:  -no acute surgical intervention  -MARTA/PVR  -will follow for possible angiogram  -f/u podiatry recs   -care per medicine    C Team Surgery   [id number]

## 2022-11-18 NOTE — PROGRESS NOTE ADULT - SUBJECTIVE AND OBJECTIVE BOX
[facility] - S Jluis / Shabbir S /D Romina/ SHERRI Mcfarlane/ SHERRI Madden/ Andrés Perkins / M Tristianu/ O Gladis  service [phone], office [phone]  ---------------------------------------------------------------------------------------------------------------    Patient seen and examined bedside    Subjective and Objective: No overnight events, sob resolved. No complaints today. feeling better    Allergies: No Known Allergies      Hospital Medications:   MEDICATIONS  (STANDING):  aspirin enteric coated 81 milliGRAM(s) Oral daily  chlorhexidine 2% Cloths 1 Application(s) Topical daily  epoetin niesha-epbx (RETACRIT) Injectable 6000 Unit(s) IV Push <User Schedule>  gabapentin 100 milliGRAM(s) Oral <User Schedule>  heparin   Injectable 5000 Unit(s) SubCutaneous every 8 hours  meropenem  IVPB 500 milliGRAM(s) IV Intermittent every 24 hours  multivitamin 1 Tablet(s) Oral daily  sevelamer carbonate 1600 milliGRAM(s) Oral three times a day with meals  simvastatin 40 milliGRAM(s) Oral at bedtime      REVIEW OF SYSTEMS:  CONSTITUTIONAL: No weakness, fevers or chills  EYES/ENT: No visual changes;  No vertigo or throat pain   NECK: No pain or stiffness  RESPIRATORY: No cough, wheezing, hemoptysis; No shortness of breath  CARDIOVASCULAR: No chest pain or palpitations.  GASTROINTESTINAL: No abdominal or epigastric pain. No nausea, vomiting, or hematemesis; No diarrhea or constipation. No melena or hematochezia.  GENITOURINARY: No dysuria, frequency, foamy urine, urinary urgency, incontinence or hematuria  NEUROLOGICAL: No numbness or weakness  SKIN: No itching, burning, rashes, or lesions   VASCULAR: No bilateral lower extremity edema.   All other review of systems is negative unless indicated above.    VITALS:  T(F): 98.9 (11-18-22 @ 06:11), Max: 100 (11-17-22 @ 14:44)  HR: 74 (11-18-22 @ 06:11)  BP: 107/50 (11-18-22 @ 06:11)  RR: 16 (11-18-22 @ 06:11)  SpO2: 98% (11-18-22 @ 06:11)  Wt(kg): --    Height (cm): 162.6 (11-18 @ 06:11)  Weight (kg): 67 (11-18 @ 06:11)  BMI (kg/m2): 25.3 (11-18 @ 06:11)  BSA (m2): 1.72 (11-18 @ 06:11)    PHYSICAL EXAM:  Constitutional: NAD  HEENT: anicteric sclera, oropharynx clear  Neck: No JVD  Respiratory: CTAB, no wheezes, rales or rhonchi  Cardiovascular: S1, S2, RRR  Gastrointestinal: BS+, soft, NT/ND  Extremities: No cyanosis or clubbing. No peripheral edema  Neurological: A/O x 3, no focal deficits  Psychiatric: Normal mood, normal affect  : No CVA tenderness. No salcido.   Skin: No rashes  Vascular Access:    LABS:  11-18    137  |  92<L>  |  40<H>  ----------------------------<  99  4.6   |  28  |  6.76<H>    Ca    9.7      18 Nov 2022 06:00  Phos  5.7     11-18  Mg     2.20     11-18    TPro  8.1  /  Alb  3.8  /  TBili  <0.2  /  DBili      /  AST  11  /  ALT  6   /  AlkPhos  70  11-17    Creatinine Trend: 6.76 <--, 5.88 <--                        9.2    10.80 )-----------( 256      ( 18 Nov 2022 06:00 )             30.6     Urine Studies:        RADIOLOGY & ADDITIONAL STUDIES:   [facility] - S Jluis / Shabbir S /D Romina/ S Denys/ S Chano/ Andrés Perkins / M Katlyn/ O Gladis  service [phone], office [phone]  ---------------------------------------------------------------------------------------------------------------    Patient seen and examined bedside    Subjective and Objective: No overnight events, denied sob. No new complaints today.     Allergies: No Known Allergies      Hospital Medications:   MEDICATIONS  (STANDING):  aspirin enteric coated 81 milliGRAM(s) Oral daily  chlorhexidine 2% Cloths 1 Application(s) Topical daily  epoetin niesha-epbx (RETACRIT) Injectable 6000 Unit(s) IV Push <User Schedule>  gabapentin 100 milliGRAM(s) Oral <User Schedule>  heparin   Injectable 5000 Unit(s) SubCutaneous every 8 hours  meropenem  IVPB 500 milliGRAM(s) IV Intermittent every 24 hours  multivitamin 1 Tablet(s) Oral daily  sevelamer carbonate 1600 milliGRAM(s) Oral three times a day with meals  simvastatin 40 milliGRAM(s) Oral at bedtime    VITALS:  T(F): 98.9 (11-18-22 @ 06:11), Max: 100 (11-17-22 @ 14:44)  HR: 74 (11-18-22 @ 06:11)  BP: 107/50 (11-18-22 @ 06:11)  RR: 16 (11-18-22 @ 06:11)  SpO2: 98% (11-18-22 @ 06:11)  Wt(kg): --    Height (cm): 162.6 (11-18 @ 06:11)  Weight (kg): 67 (11-18 @ 06:11)  BMI (kg/m2): 25.3 (11-18 @ 06:11)  BSA (m2): 1.72 (11-18 @ 06:11)    PHYSICAL EXAM:  Constitutional: NAD  HEENT: anicteric sclera  Neck: No JVD  Respiratory: CTAB, no wheezes, rales or rhonchi  Cardiovascular: S1, S2, RRR  Gastrointestinal: BS+, soft, NT/ND  Extremities: rt BKA+, left foot dressing+   Neurological: A/O x 3  Psychiatric: Normal mood, normal affect  : No salcido.   Vascular Access: rt femoral avg+    LABS:  11-18    137  |  92<L>  |  40<H>  ----------------------------<  99  4.6   |  28  |  6.76<H>    Ca    9.7      18 Nov 2022 06:00  Phos  5.7     11-18  Mg     2.20     11-18    TPro  8.1  /  Alb  3.8  /  TBili  <0.2  /  DBili      /  AST  11  /  ALT  6   /  AlkPhos  70  11-17    Creatinine Trend: 6.76 <--, 5.88 <--                        9.2    10.80 )-----------( 256      ( 18 Nov 2022 06:00 )             30.6     Urine Studies:        RADIOLOGY & ADDITIONAL STUDIES:

## 2022-11-18 NOTE — PROGRESS NOTE ADULT - SUBJECTIVE AND OBJECTIVE BOX
Patient is a 61y old  Female who presents with a chief complaint of toe infection (17 Nov 2022 22:12)       INTERVAL HPI/OVERNIGHT EVENTS:  Patient seen and evaluated at bedside.  Pt is resting comfortable in NAD. Denies N/V/F/C.    Allergies    No Known Allergies    Intolerances        Vital Signs Last 24 Hrs  T(C): 37.2 (18 Nov 2022 06:11), Max: 37.8 (17 Nov 2022 14:44)  T(F): 98.9 (18 Nov 2022 06:11), Max: 100 (17 Nov 2022 14:44)  HR: 74 (18 Nov 2022 06:11) (74 - 87)  BP: 107/50 (18 Nov 2022 06:11) (107/50 - 144/45)  BP(mean): --  RR: 16 (18 Nov 2022 06:11) (16 - 18)  SpO2: 98% (18 Nov 2022 06:11) (98% - 100%)    Parameters below as of 18 Nov 2022 06:11  Patient On (Oxygen Delivery Method): room air        LABS:                        9.2    10.80 )-----------( 256      ( 18 Nov 2022 06:00 )             30.6     11-18    137  |  92<L>  |  40<H>  ----------------------------<  99  4.6   |  28  |  6.76<H>    Ca    9.7      18 Nov 2022 06:00  Phos  5.7     11-18  Mg     2.20     11-18    TPro  8.1  /  Alb  3.8  /  TBili  <0.2  /  DBili  x   /  AST  11  /  ALT  6   /  AlkPhos  70  11-17    PT/INR - ( 17 Nov 2022 18:00 )   PT: 12.5 sec;   INR: 1.08 ratio         PTT - ( 17 Nov 2022 18:00 )  PTT:29.8 sec    CAPILLARY BLOOD GLUCOSE      POCT Blood Glucose.: 96 mg/dL (18 Nov 2022 09:06)  POCT Blood Glucose.: 88 mg/dL (17 Nov 2022 23:22)  POCT Blood Glucose.: 102 mg/dL (17 Nov 2022 18:12)      Lower Extremity Physical Exam:    Vascular: DP/PT 0/4, left foot, Temperature gradient warm to warm L   Neuro: Epicritic sensation diminished to the level of ankle L  Musculoskeletal/Ortho: s/p RLE BKA  Skin: Left foot forefoot ischemic changes to the 2nd digit, Partial Third Ray Resection surgical site dehisced wound to probe to bone, 1cc pus expressed, fibbronecrotic wound bed, erythema and edema to the level of midtibfib.       RADIOLOGY & ADDITIONAL TESTS:    ACC: [id number] EXAM:  XR FOOT COMP MIN 3 VIEWS LT                          PROCEDURE DATE:  11/17/2022          INTERPRETATION:  CLINICAL INDICATION: Left foot pain. Diabetic ulcers.    TECHNIQUE: 2 views of the left foot    COMPARISON: X-ray foot 5/30/2022    FINDINGS/  IMPRESSION:  Severe degenerative changes of the foot with extensive progressive   erosion along the remainder of the distal second and fourth metatarsal   heads as well as the base of the remaining fourth and second proximal   phalangeal bases. Prominent osteopenia of the fourth phalanges.    These findings are concerning for osteomyelitis erosion and are new when   compared to 5/30/2022. No tracking gas collection. Extensive bilateral   vascular calcifications.      An MRI with contrast is warranted for definitive assessment..      --- End of Report ---          MARIA E BUNCH MD; Resident Radiologist  This document has been electronically signed.  JAYCEE JOHNSON MD; Attending Radiologist  This document has been electronically signed. Nov 18 2022  9:46AM

## 2022-11-18 NOTE — PROGRESS NOTE ADULT - ASSESSMENT
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 Pt admitted for IV abx and further work up of L toe infection. Renal consulted for ESRD Mx.     ESRD on dialysis.    Maintenance HD schedule MWF  HD unit- [facility]  K, vol acceptable  Informed consent for HD obtained from pt. in chart   s/p last HD 11/16 outpt    plan for routine HD today w/2k bath, uf 1kg as tolerated  Ultrafiltration on Dialysis as tolerated with blood pressure   anemia in ckd- Hb < goal. added Epo 6k w/hd tiw  renal diet , fluid restriction   dose all meds for ESRD  c/w renvela 2tidac  HTN, controlled-bp stable. not on bp meds. watch bp  L diabetic foot ulcer.   Pt w/ left 2nd and 4th toe infection, s/p debridement and wound culture w/ podiatry. Concern for possible OM.   - s/p vanc. on meropenem q24hrs. vanco by level  - f/u wound cx  - f/u podiatry and vascular. awaiting MRI foot    will closely follow up.   poc d/w pt, HD RN  labs, rad, chart reviewed  For any question, pl call:  Nephrology  Cell -[phone]  Office [phone]  Ans Serv [phone]

## 2022-11-18 NOTE — PHARMACOTHERAPY INTERVENTION NOTE - COMMENTS
Medication list in OMR updated, currently saved as incomplete.  Pt unable to confirm if taking Plavix 75mg at home however [facility] notes a 90day supply was dispensed to patient on 10/4/22   Sevelamer 800mg was last dispensed to pt in May/2022 x 30 day supply.

## 2022-11-19 LAB
-  AMIKACIN: SIGNIFICANT CHANGE UP
-  AMOXICILLIN/CLAVULANIC ACID: SIGNIFICANT CHANGE UP
-  AMPICILLIN/SULBACTAM: SIGNIFICANT CHANGE UP
-  AMPICILLIN: SIGNIFICANT CHANGE UP
-  AZTREONAM: SIGNIFICANT CHANGE UP
-  CEFAZOLIN: SIGNIFICANT CHANGE UP
-  CEFEPIME: SIGNIFICANT CHANGE UP
-  CEFOXITIN: SIGNIFICANT CHANGE UP
-  CEFOXITIN: SIGNIFICANT CHANGE UP
-  CEFTRIAXONE: SIGNIFICANT CHANGE UP
-  CIPROFLOXACIN: SIGNIFICANT CHANGE UP
-  ERTAPENEM: SIGNIFICANT CHANGE UP
-  GENTAMICIN: SIGNIFICANT CHANGE UP
-  IMIPENEM: SIGNIFICANT CHANGE UP
-  IMIPENEM: SIGNIFICANT CHANGE UP
-  LEVOFLOXACIN: SIGNIFICANT CHANGE UP
-  MEROPENEM: SIGNIFICANT CHANGE UP
-  PIPERACILLIN/TAZOBACTAM: SIGNIFICANT CHANGE UP
-  TOBRAMYCIN: SIGNIFICANT CHANGE UP
-  TRIMETHOPRIM/SULFAMETHOXAZOLE: SIGNIFICANT CHANGE UP
ANION GAP SERPL CALC-SCNC: 13 MMOL/L — SIGNIFICANT CHANGE UP (ref 7–14)
BASOPHILS # BLD AUTO: 0.04 K/UL — SIGNIFICANT CHANGE UP (ref 0–0.2)
BASOPHILS NFR BLD AUTO: 0.4 % — SIGNIFICANT CHANGE UP (ref 0–2)
BUN SERPL-MCNC: 23 MG/DL — SIGNIFICANT CHANGE UP (ref 7–23)
CALCIUM SERPL-MCNC: 9.2 MG/DL — SIGNIFICANT CHANGE UP (ref 8.4–10.5)
CHLORIDE SERPL-SCNC: 92 MMOL/L — LOW (ref 98–107)
CO2 SERPL-SCNC: 31 MMOL/L — SIGNIFICANT CHANGE UP (ref 22–31)
CREAT SERPL-MCNC: 5.13 MG/DL — HIGH (ref 0.5–1.3)
CULTURE RESULTS: SIGNIFICANT CHANGE UP
EGFR: 9 ML/MIN/1.73M2 — LOW
EOSINOPHIL # BLD AUTO: 0.24 K/UL — SIGNIFICANT CHANGE UP (ref 0–0.5)
EOSINOPHIL NFR BLD AUTO: 2.2 % — SIGNIFICANT CHANGE UP (ref 0–6)
GLUCOSE BLDC GLUCOMTR-MCNC: 121 MG/DL — HIGH (ref 70–99)
GLUCOSE SERPL-MCNC: 114 MG/DL — HIGH (ref 70–99)
HCT VFR BLD CALC: 29.5 % — LOW (ref 34.5–45)
HGB BLD-MCNC: 9 G/DL — LOW (ref 11.5–15.5)
IANC: 8 K/UL — HIGH (ref 1.8–7.4)
IMM GRANULOCYTES NFR BLD AUTO: 1.1 % — HIGH (ref 0–0.9)
LYMPHOCYTES # BLD AUTO: 1.59 K/UL — SIGNIFICANT CHANGE UP (ref 1–3.3)
LYMPHOCYTES # BLD AUTO: 14.5 % — SIGNIFICANT CHANGE UP (ref 13–44)
MAGNESIUM SERPL-MCNC: 2 MG/DL — SIGNIFICANT CHANGE UP (ref 1.6–2.6)
MCHC RBC-ENTMCNC: 27.8 PG — SIGNIFICANT CHANGE UP (ref 27–34)
MCHC RBC-ENTMCNC: 30.5 GM/DL — LOW (ref 32–36)
MCV RBC AUTO: 91 FL — SIGNIFICANT CHANGE UP (ref 80–100)
METHOD TYPE: SIGNIFICANT CHANGE UP
MONOCYTES # BLD AUTO: 0.96 K/UL — HIGH (ref 0–0.9)
MONOCYTES NFR BLD AUTO: 8.8 % — SIGNIFICANT CHANGE UP (ref 2–14)
MRSA PCR RESULT.: SIGNIFICANT CHANGE UP
NEUTROPHILS # BLD AUTO: 8 K/UL — HIGH (ref 1.8–7.4)
NEUTROPHILS NFR BLD AUTO: 73 % — SIGNIFICANT CHANGE UP (ref 43–77)
NRBC # BLD: 0 /100 WBCS — SIGNIFICANT CHANGE UP (ref 0–0)
NRBC # FLD: 0 K/UL — SIGNIFICANT CHANGE UP (ref 0–0)
ORGANISM # SPEC MICROSCOPIC CNT: SIGNIFICANT CHANGE UP
PHOSPHATE SERPL-MCNC: 4 MG/DL — SIGNIFICANT CHANGE UP (ref 2.5–4.5)
PLATELET # BLD AUTO: 243 K/UL — SIGNIFICANT CHANGE UP (ref 150–400)
POTASSIUM SERPL-MCNC: 4.2 MMOL/L — SIGNIFICANT CHANGE UP (ref 3.5–5.3)
POTASSIUM SERPL-SCNC: 4.2 MMOL/L — SIGNIFICANT CHANGE UP (ref 3.5–5.3)
RBC # BLD: 3.24 M/UL — LOW (ref 3.8–5.2)
RBC # FLD: 14.9 % — HIGH (ref 10.3–14.5)
S AUREUS DNA NOSE QL NAA+PROBE: SIGNIFICANT CHANGE UP
SODIUM SERPL-SCNC: 136 MMOL/L — SIGNIFICANT CHANGE UP (ref 135–145)
SPECIMEN SOURCE: SIGNIFICANT CHANGE UP
WBC # BLD: 10.95 K/UL — HIGH (ref 3.8–10.5)
WBC # FLD AUTO: 10.95 K/UL — HIGH (ref 3.8–10.5)

## 2022-11-19 PROCEDURE — 99232 SBSQ HOSP IP/OBS MODERATE 35: CPT

## 2022-11-19 RX ADMIN — Medication 81 MILLIGRAM(S): at 12:10

## 2022-11-19 RX ADMIN — OXYCODONE HYDROCHLORIDE 5 MILLIGRAM(S): 5 TABLET ORAL at 08:54

## 2022-11-19 RX ADMIN — HEPARIN SODIUM 5000 UNIT(S): 5000 INJECTION INTRAVENOUS; SUBCUTANEOUS at 22:00

## 2022-11-19 RX ADMIN — SEVELAMER CARBONATE 1600 MILLIGRAM(S): 2400 POWDER, FOR SUSPENSION ORAL at 16:56

## 2022-11-19 RX ADMIN — OXYCODONE HYDROCHLORIDE 5 MILLIGRAM(S): 5 TABLET ORAL at 17:56

## 2022-11-19 RX ADMIN — SEVELAMER CARBONATE 1600 MILLIGRAM(S): 2400 POWDER, FOR SUSPENSION ORAL at 12:09

## 2022-11-19 RX ADMIN — HEPARIN SODIUM 5000 UNIT(S): 5000 INJECTION INTRAVENOUS; SUBCUTANEOUS at 06:56

## 2022-11-19 RX ADMIN — SEVELAMER CARBONATE 1600 MILLIGRAM(S): 2400 POWDER, FOR SUSPENSION ORAL at 08:55

## 2022-11-19 RX ADMIN — OXYCODONE HYDROCHLORIDE 5 MILLIGRAM(S): 5 TABLET ORAL at 07:54

## 2022-11-19 RX ADMIN — SIMVASTATIN 40 MILLIGRAM(S): 20 TABLET, FILM COATED ORAL at 21:59

## 2022-11-19 RX ADMIN — HEPARIN SODIUM 5000 UNIT(S): 5000 INJECTION INTRAVENOUS; SUBCUTANEOUS at 15:14

## 2022-11-19 RX ADMIN — OXYCODONE HYDROCHLORIDE 5 MILLIGRAM(S): 5 TABLET ORAL at 16:56

## 2022-11-19 RX ADMIN — MEROPENEM 100 MILLIGRAM(S): 1 INJECTION INTRAVENOUS at 21:59

## 2022-11-19 RX ADMIN — CHLORHEXIDINE GLUCONATE 1 APPLICATION(S): 213 SOLUTION TOPICAL at 12:10

## 2022-11-19 RX ADMIN — Medication 1 TABLET(S): at 12:09

## 2022-11-19 NOTE — CONSULT NOTE ADULT - SUBJECTIVE AND OBJECTIVE BOX
Cardiovascular Disease Initial Evaluation  Date of Service: 11-19-22 @ 08:47    CHIEF COMPLAINT: L foot ulcer    HISTORY OF PRESENT ILLNESS:  This is a 61-year-old female with ESRD on HD, HLD and peripheral vascular disease who presented to [facility] on 11/17/2022 with L foot ulcer.  Ms. Boogie has multiple admissions due to limb ischemia.   She is s/p R BKA and L ray resection.   No fever, chills, cough, chest pain, shortness of breath, abd pain or diarrhea. She still makes minimal urine.   The plan is for medical management of the ulcer, as per the surgical team.       She denies chest pain or SOB.       Allergies  No Known Allergies        MEDICATIONS:  aspirin enteric coated 81 milliGRAM(s) Oral daily  heparin   Injectable 5000 Unit(s) SubCutaneous every 8 hours    meropenem  IVPB 500 milliGRAM(s) IV Intermittent every 24 hours      acetaminophen     Tablet .. 650 milliGRAM(s) Oral every 6 hours PRN  gabapentin 100 milliGRAM(s) Oral <User Schedule>  oxyCODONE    IR 5 milliGRAM(s) Oral every 4 hours PRN      simvastatin 40 milliGRAM(s) Oral at bedtime    chlorhexidine 2% Cloths 1 Application(s) Topical daily  epoetin niesha-epbx (RETACRIT) Injectable 6000 Unit(s) IV Push <User Schedule>  multivitamin 1 Tablet(s) Oral daily  sodium chloride 0.9% Bolus. 100 milliLiter(s) IV Bolus every 5 minutes PRN      PAST MEDICAL & SURGICAL HISTORY:  Heart failure      HLD (hyperlipidemia)      Glaucoma      Cataract      CKD (chronic kidney disease), stage IV      ESRD (end stage renal disease) on dialysis      PAD (peripheral artery disease)      Blind right eye      Acute osteomyelitis of toe of right foot  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          FAMILY HISTORY:  No pertinent family history in first degree relatives        SOCIAL HISTORY:    The patient is a nonsmoker       REVIEW OF SYSTEMS:  See HPI, otherwise complete 14 point review of systems negative      PHYSICAL EXAM:  T(C): 37.4 (11-19-22 @ 06:46), Max: 37.4 (11-18-22 @ 21:42)  HR: 75 (11-19-22 @ 06:46) (75 - 88)  BP: 99/50 (11-19-22 @ 06:46) (99/50 - 142/69)  RR: 17 (11-19-22 @ 06:46) (17 - 18)  SpO2: 100% (11-19-22 @ 06:46) (96% - 100%)  Wt(kg): --  I&O's Summary    18 Nov 2022 07:01  -  19 Nov 2022 07:00  --------------------------------------------------------  IN: 400 mL / OUT: 1770 mL / NET: -1370 mL        Appearance: No Acute Distress; resting comfortably  HEENT:  Normal oral mucosa, PERRL, EOMI	  Cardiovascular: Normal S1 S2, No JVD, No murmurs/rubs/gallops  Respiratory: Normal respiratory effort; Lungs clear to auscultation bilaterally  Gastrointestinal:  Soft, Non-tender, + BS	  Skin: No rashes, No ecchymoses, No cyanosis	  Neurologic: Non-focal; no weakness  Vascular: Peripheral pulses palpable 2+ bilaterally  Psychiatry: Mood & affect appropriate    Laboratory Data:	 	    CBC Full  -  ( 19 Nov 2022 05:46 )  WBC Count : 10.95 K/uL  Hemoglobin : 9.0 g/dL  Hematocrit : 29.5 %  Platelet Count - Automated : 243 K/uL  Mean Cell Volume : 91.0 fL  Mean Cell Hemoglobin : 27.8 pg  Mean Cell Hemoglobin Concentration : 30.5 gm/dL  Auto Neutrophil # : 8.00 K/uL  Auto Lymphocyte # : 1.59 K/uL  Auto Monocyte # : 0.96 K/uL  Auto Eosinophil # : 0.24 K/uL  Auto Basophil # : 0.04 K/uL  Auto Neutrophil % : 73.0 %  Auto Lymphocyte % : 14.5 %  Auto Monocyte % : 8.8 %  Auto Eosinophil % : 2.2 %  Auto Basophil % : 0.4 %    11-19    136  |  92<L>  |  23  ----------------------------<  114<H>  4.2   |  31  |  5.13<H>  11-18    137  |  92<L>  |  40<H>  ----------------------------<  99  4.6   |  28  |  6.76<H>    Ca    9.2      19 Nov 2022 05:46  Ca    9.7      18 Nov 2022 06:00  Phos  4.0     11-19  Phos  5.7     11-18  Mg     2.00     11-19  Mg     2.20     11-18    TPro  8.1  /  Alb  3.8  /  TBili  <0.2  /  DBili  x   /  AST  11  /  ALT  6   /  AlkPhos  70  11-17        Interpretation of Telemetry: n/a	    ECG:  	No admission EKG      Assessment: 61-year-old female with ESRD on HD, HLD and peripheral vascular disease s/p lower extremity resection presents with chronic limb ischemia and L foot ulcer.     Plan of Care:    #Peripheral vascular disease-  Chronic limb ischemia.   Ms. Boogie is status post R BKA and L ray resection.    She displays no signs or symptoms of coronary ischemia or acute CHF.   Antiplatelet therapy as per the vascular team.  Consider a high intensity statin.     #Compensated diastolic CHF-  Euvolemic on exam.  Fluid removal with HD as per the renal team.   Echo from 3/2022 reviewed- normal LV systolic function with no significant valvular disease. .      #ESRD-  - HD as per renal.    #ACP (advance care planning)-  Advanced care planning was discussed with the patient.    Cardiac findings  were discussed in detail and all questions were answered.       52 minutes spent on total encounter; more than 50% of the visit was spent counseling and/or coordinating care by the attending physician.   	  Cristino Adams MD [facility]  Cardiovascular Diseases  [phone]

## 2022-11-19 NOTE — PROGRESS NOTE ADULT - ASSESSMENT
61 y.o F with left foot wound , s/p L foot Partial Fifth Ray Resection and Partial Third Ray Resection, s/p R foot BKA   - Pt was seen and evaluated.   - Afebrile, WBC 10.95  - X-Ray: concerning OM to the  remainder of the distal second and fourth metatarsal heads as well as the base of the remaining fourth and second proximal phalangeal bases.  - Left foot forefoot ischemic changes to the 2nd digit, Partial Third Ray Resection surgical site dehisced wound to probe to bone, 1cc pus expressed, fibbronecrotic wound bed, erythema resolved  with +1 pitting edema to the level of the ankle  - cont IV abx  - Left foot wound culture : E. coli, Klebsiella pneumoniae, and proteus mirabilis.  - MRI of the left foot without contrast pending  - MARTA/PVR: RABI was not assessed 2/2 R BKA, LABI (1.45), Waveforms are mildly diminished at the ankle, and severely diminished at the metatarsal segment, and flat at the digits.  - Patient with known history of PVD without good revasc options.  Severe small vessel disease  - Pod Plan: Local wound care versus possible proximal amputation pending MRI results/ vascular recommendations    - Please document Medical/Cardiac optimization for anesthesia for Podiatry surgery.   - Discussed with attending.   61 y.o F with left foot wound , s/p L foot Partial Fifth Ray Resection and Partial Third Ray Resection, s/p R foot BKA   - Pt was seen and evaluated.   - Afebrile, WBC 10.95  - X-Ray: concerning OM to the  remainder of the distal second and fourth metatarsal heads as well as the base of the remaining fourth and second proximal phalangeal bases.  - Left foot forefoot ischemic changes to the 2nd digit, Partial Third Ray Resection surgical site dehisced wound to probe to bone, no pus expressed today, fibbronecrotic wound bed, erythema resolved  with +1 pitting edema to the level of the ankle  - cont IV abx  - Left foot wound culture : E. coli, Klebsiella pneumoniae, and proteus mirabilis.  - MRI of the left foot without contrast pending  - MARTA/PVR: RABI was not assessed 2/2 R BKA, LABI (1.45), Waveforms are mildly diminished at the ankle, and severely diminished at the metatarsal segment, and flat at the digits.  - Patient with known history of PVD without good revasc options.  Severe small vessel disease  - Pod Plan: Local wound care versus possible proximal amputation pending MRI results/ vascular recommendations    - Please document Medical/Cardiac optimization for anesthesia for Podiatry surgery.   - Discussed with attending.

## 2022-11-19 NOTE — PROGRESS NOTE ADULT - SUBJECTIVE AND OBJECTIVE BOX
Podiatry pager #: [phone] ([place])/ [phone] ([facility])    Patient is a 61y old  Female who presents with a chief complaint of toe infection (19 Nov 2022 08:47)       INTERVAL HPI/OVERNIGHT EVENTS:  Patient seen and evaluated at bedside.  Pt is resting comfortable in NAD. Denies N/V/F/C.     Allergies    No Known Allergies    Intolerances        Vital Signs Last 24 Hrs  T(C): 37.4 (19 Nov 2022 06:46), Max: 37.4 (18 Nov 2022 21:42)  T(F): 99.3 (19 Nov 2022 06:46), Max: 99.4 (18 Nov 2022 21:42)  HR: 75 (19 Nov 2022 06:46) (75 - 88)  BP: 99/50 (19 Nov 2022 06:46) (99/50 - 142/69)  BP(mean): --  RR: 17 (19 Nov 2022 06:46) (17 - 18)  SpO2: 100% (19 Nov 2022 06:46) (96% - 100%)    Parameters below as of 19 Nov 2022 06:46  Patient On (Oxygen Delivery Method): room air        LABS:                        9.0    10.95 )-----------( 243      ( 19 Nov 2022 05:46 )             29.5     11-19    136  |  92<L>  |  23  ----------------------------<  114<H>  4.2   |  31  |  5.13<H>    Ca    9.2      19 Nov 2022 05:46  Phos  4.0     11-19  Mg     2.00     11-19    TPro  8.1  /  Alb  3.8  /  TBili  <0.2  /  DBili  x   /  AST  11  /  ALT  6   /  AlkPhos  70  11-17    PT/INR - ( 17 Nov 2022 18:00 )   PT: 12.5 sec;   INR: 1.08 ratio         PTT - ( 17 Nov 2022 18:00 )  PTT:29.8 sec    CAPILLARY BLOOD GLUCOSE      POCT Blood Glucose.: 121 mg/dL (19 Nov 2022 08:31)  POCT Blood Glucose.: 112 mg/dL (18 Nov 2022 21:29)  POCT Blood Glucose.: 89 mg/dL (18 Nov 2022 19:07)  POCT Blood Glucose.: 124 mg/dL (18 Nov 2022 12:16)      Lower Extremity Physical Exam:  Vascular: DP/PT 0/4, left foot, Temperature gradient warm to warm L   Neuro: Epicritic sensation diminished to the level of ankle L  Musculoskeletal/Ortho: s/p RLE BKA  Skin: Left foot forefoot ischemic changes to the 2nd digit, Partial Third Ray Resection surgical site dehisced wound to probe to bone, 1cc pus expressed, fibbronecrotic wound bed, erythema and edema to the level of midtibfib.   RADIOLOGY & ADDITIONAL TESTS:   Podiatry pager #: [phone] ([place])/ [phone] ([facility])    Patient is a 61y old  Female who presents with a chief complaint of toe infection (19 Nov 2022 08:47)       INTERVAL HPI/OVERNIGHT EVENTS:  Patient seen and evaluated at bedside.  Pt is resting comfortable in NAD. Denies N/V/F/C.     Allergies    No Known Allergies    Intolerances        Vital Signs Last 24 Hrs  T(C): 37.4 (19 Nov 2022 06:46), Max: 37.4 (18 Nov 2022 21:42)  T(F): 99.3 (19 Nov 2022 06:46), Max: 99.4 (18 Nov 2022 21:42)  HR: 75 (19 Nov 2022 06:46) (75 - 88)  BP: 99/50 (19 Nov 2022 06:46) (99/50 - 142/69)  BP(mean): --  RR: 17 (19 Nov 2022 06:46) (17 - 18)  SpO2: 100% (19 Nov 2022 06:46) (96% - 100%)    Parameters below as of 19 Nov 2022 06:46  Patient On (Oxygen Delivery Method): room air        LABS:                        9.0    10.95 )-----------( 243      ( 19 Nov 2022 05:46 )             29.5     11-19    136  |  92<L>  |  23  ----------------------------<  114<H>  4.2   |  31  |  5.13<H>    Ca    9.2      19 Nov 2022 05:46  Phos  4.0     11-19  Mg     2.00     11-19    TPro  8.1  /  Alb  3.8  /  TBili  <0.2  /  DBili  x   /  AST  11  /  ALT  6   /  AlkPhos  70  11-17    PT/INR - ( 17 Nov 2022 18:00 )   PT: 12.5 sec;   INR: 1.08 ratio         PTT - ( 17 Nov 2022 18:00 )  PTT:29.8 sec    CAPILLARY BLOOD GLUCOSE      POCT Blood Glucose.: 121 mg/dL (19 Nov 2022 08:31)  POCT Blood Glucose.: 112 mg/dL (18 Nov 2022 21:29)  POCT Blood Glucose.: 89 mg/dL (18 Nov 2022 19:07)  POCT Blood Glucose.: 124 mg/dL (18 Nov 2022 12:16)      Lower Extremity Physical Exam:  Vascular: DP/PT 0/4, left foot, Temperature gradient warm to warm L   Neuro: Epicritic sensation diminished to the level of ankle L  Musculoskeletal/Ortho: s/p RLE BKA  Skin: Left foot forefoot ischemic changes to the 2nd digit, Partial Third Ray Resection surgical site dehisced wound to probe to bone, no pus expressed, fibbronecrotic wound bed, erythema and edema to the level of midtibfib.   RADIOLOGY & ADDITIONAL TESTS:   Podiatry pager #: [phone] ([place])/ [phone] ([facility])    Patient is a 61y old  Female who presents with a chief complaint of toe infection (19 Nov 2022 08:47)       INTERVAL HPI/OVERNIGHT EVENTS:  Patient seen and evaluated at bedside.  Pt is resting comfortable in NAD. Denies N/V/F/C.     Allergies    No Known Allergies    Intolerances        Vital Signs Last 24 Hrs  T(C): 37.4 (19 Nov 2022 06:46), Max: 37.4 (18 Nov 2022 21:42)  T(F): 99.3 (19 Nov 2022 06:46), Max: 99.4 (18 Nov 2022 21:42)  HR: 75 (19 Nov 2022 06:46) (75 - 88)  BP: 99/50 (19 Nov 2022 06:46) (99/50 - 142/69)  BP(mean): --  RR: 17 (19 Nov 2022 06:46) (17 - 18)  SpO2: 100% (19 Nov 2022 06:46) (96% - 100%)    Parameters below as of 19 Nov 2022 06:46  Patient On (Oxygen Delivery Method): room air        LABS:                        9.0    10.95 )-----------( 243      ( 19 Nov 2022 05:46 )             29.5     11-19    136  |  92<L>  |  23  ----------------------------<  114<H>  4.2   |  31  |  5.13<H>    Ca    9.2      19 Nov 2022 05:46  Phos  4.0     11-19  Mg     2.00     11-19    TPro  8.1  /  Alb  3.8  /  TBili  <0.2  /  DBili  x   /  AST  11  /  ALT  6   /  AlkPhos  70  11-17    PT/INR - ( 17 Nov 2022 18:00 )   PT: 12.5 sec;   INR: 1.08 ratio         PTT - ( 17 Nov 2022 18:00 )  PTT:29.8 sec    CAPILLARY BLOOD GLUCOSE      POCT Blood Glucose.: 121 mg/dL (19 Nov 2022 08:31)  POCT Blood Glucose.: 112 mg/dL (18 Nov 2022 21:29)  POCT Blood Glucose.: 89 mg/dL (18 Nov 2022 19:07)  POCT Blood Glucose.: 124 mg/dL (18 Nov 2022 12:16)      Lower Extremity Physical Exam:  Vascular: DP/PT 0/4, left foot, Temperature gradient warm to warm L   Neuro: Epicritic sensation diminished to the level of ankle L  Musculoskeletal/Ortho: s/p RLE BKA  Skin: Left foot forefoot ischemic changes to the 2nd digit, Partial Third Ray Resection surgical site dehisced wound to probe to bone, no pus expressed, fibbronecrotic wound bed, erythema resolved  with +1 pitting edema to the level of the ankle  RADIOLOGY & ADDITIONAL TESTS:

## 2022-11-19 NOTE — PROGRESS NOTE ADULT - SUBJECTIVE AND OBJECTIVE BOX
Date of Service  : 11-19-22     INTERVAL HPI/OVERNIGHT EVENTS: I feel fine.   Vital Signs Last 24 Hrs  T(C): 37.1 (19 Nov 2022 13:36), Max: 37.4 (18 Nov 2022 21:42)  T(F): 98.7 (19 Nov 2022 13:36), Max: 99.4 (18 Nov 2022 21:42)  HR: 78 (19 Nov 2022 13:36) (75 - 88)  BP: 107/53 (19 Nov 2022 13:36) (99/50 - 133/61)  BP(mean): --  RR: 17 (19 Nov 2022 06:46) (17 - 18)  SpO2: 100% (19 Nov 2022 13:36) (100% - 100%)    Parameters below as of 19 Nov 2022 13:36  Patient On (Oxygen Delivery Method): room air      I&O's Summary    18 Nov 2022 07:01  -  19 Nov 2022 07:00  --------------------------------------------------------  IN: 400 mL / OUT: 1770 mL / NET: -1370 mL      MEDICATIONS  (STANDING):  aspirin enteric coated 81 milliGRAM(s) Oral daily  chlorhexidine 2% Cloths 1 Application(s) Topical daily  epoetin niesha-epbx (RETACRIT) Injectable 6000 Unit(s) IV Push <User Schedule>  gabapentin 100 milliGRAM(s) Oral <User Schedule>  heparin   Injectable 5000 Unit(s) SubCutaneous every 8 hours  meropenem  IVPB 500 milliGRAM(s) IV Intermittent every 24 hours  multivitamin 1 Tablet(s) Oral daily  sevelamer carbonate 1600 milliGRAM(s) Oral three times a day with meals  simvastatin 40 milliGRAM(s) Oral at bedtime    MEDICATIONS  (PRN):  acetaminophen     Tablet .. 650 milliGRAM(s) Oral every 6 hours PRN Temp greater or equal to 38C (100.4F), Mild Pain (1 - 3)  oxyCODONE    IR 5 milliGRAM(s) Oral every 4 hours PRN Severe Pain (7 - 10)  sodium chloride 0.9% Bolus. 100 milliLiter(s) IV Bolus every 5 minutes PRN SBP LESS THAN or EQUAL to 90 mmHg    LABS:                        9.0    10.95 )-----------( 243      ( 19 Nov 2022 05:46 )             29.5     11-19    136  |  92<L>  |  23  ----------------------------<  114<H>  4.2   |  31  |  5.13<H>    Ca    9.2      19 Nov 2022 05:46  Phos  4.0     11-19  Mg     2.00     11-19          CAPILLARY BLOOD GLUCOSE      POCT Blood Glucose.: 121 mg/dL (19 Nov 2022 08:31)  POCT Blood Glucose.: 112 mg/dL (18 Nov 2022 21:29)          REVIEW OF SYSTEMS:  CONSTITUTIONAL: No fever, weight loss, or fatigue  EYES: No eye pain, visual disturbances, or discharge  ENMT:  No difficulty hearing, tinnitus, vertigo; No sinus or throat pain  NECK: No pain or stiffness  RESPIRATORY: No cough, wheezing, chills or hemoptysis; No shortness of breath  CARDIOVASCULAR: No chest pain, palpitations, dizziness, or leg swelling  GASTROINTESTINAL: No abdominal or epigastric pain. No nausea, vomiting, or hematemesis; No diarrhea or constipation. No melena or hematochezia.  GENITOURINARY: No dysuria, frequency, hematuria, or incontinence  NEUROLOGICAL: No headaches, memory loss, loss of strength, numbness, or tremors  SKIN: No itching, burning, rashes, or lesions   ENDOCRINE: No heat or cold intolerance; No hair loss  MUSCULOSKELETAL: No joint pain or swelling; No muscle, back, or extremity pain      Consultant(s) Notes Reviewed:  [x ] YES  [ ] NO    PHYSICAL EXAM:  GENERAL: NAD, well-groomed, well-developed,not in any distress ,  HEAD:  Atraumatic, Normocephalic  EYES: EOMI, PERRLA, conjunctiva and sclera clear  ENMT: No tonsillar erythema, exudates, or enlargement; Moist mucous membranes, Good dentition, No lesions  NECK: Supple, No JVD, Normal thyroid  NERVOUS SYSTEM:  Alert & Oriented X3, No focal deficit   CHEST/LUNG: Good air entry bilateral with no  rales, rhonchi, wheezing, or rubs  HEART: Regular rate and rhythm; No murmurs, rubs, or gallops  ABDOMEN: Soft, Nontender, Nondistended; Bowel sounds present  EXTREMITIES: Foot Dressed.   Care Discussed with Consultants/Other Providers [ x] YES  [ ] NO

## 2022-11-19 NOTE — PROGRESS NOTE ADULT - SUBJECTIVE AND OBJECTIVE BOX
[facility] : Ans Serv [phone], Office [phone]  Dr Vanegas/Dr Bazzi  /Dr Kingsley patricia /Dr SHERRI Mcfarlane/Dr Jarrell Madden/Dr Adnrés Perkins /Dr SUNI Potts  _______________________________________________________________________________________________    seen and examined today for End Stage Renal Disease on Dialysis   Interval :  VITALS:  T(F): 99.3 (11-19 @ 06:46), Max: 100 (11-17 @ 14:44)  HR: 75 (11-19 @ 06:46)  BP: 99/50 (11-19 @ 06:46)  ABP: --  RR: 17 (11-19 @ 06:46)  SpO2: 100% (11-19 @ 06:46)    11-18 @ 07:01  -  11-19 @ 07:00  --------------------------------------------------------  IN: 400 mL / OUT: 1770 mL / NET: -1370 mL    Physical Exam :-  Constitutional: NAD  Respiratory: Bilateral equal breath sounds, few Crackles present.  Cardiovascular: S1, S2 normal, positive Murmur  Gastrointestinal: Bowel Sounds present, soft  Extremities: no Edema Feet      Data:-  Allergies :   No Known Allergies    Hospital Medications:   MEDICATIONS  (STANDING):  aspirin enteric coated 81 milliGRAM(s) Oral daily  chlorhexidine 2% Cloths 1 Application(s) Topical daily  epoetin niesha-epbx (RETACRIT) Injectable 6000 Unit(s) IV Push <User Schedule>  gabapentin 100 milliGRAM(s) Oral <User Schedule>  heparin   Injectable 5000 Unit(s) SubCutaneous every 8 hours  meropenem  IVPB 500 milliGRAM(s) IV Intermittent every 24 hours  multivitamin 1 Tablet(s) Oral daily  sevelamer carbonate 1600 milliGRAM(s) Oral three times a day with meals  simvastatin 40 milliGRAM(s) Oral at bedtime    11-19    136  |  92<L>  |  23  ----------------------------<  114<H>  4.2   |  31  |  5.13<H>    Ca    9.2      19 Nov 2022 05:46  Phos  4.0     11-19  Mg     2.00     11-19    TPro  8.1  /  Alb  3.8  /  TBili  <0.2  /  DBili      /  AST  11  /  ALT  6   /  AlkPhos  70  11-17    Creatinine Trend: 5.13 <--, 6.76 <--, 5.88 <--  egfr trend : 9 <--, 6 <--, 8 <--                        9.0    10.95 )-----------( 243      ( 19 Nov 2022 05:46 )             29.5

## 2022-11-19 NOTE — PROGRESS NOTE ADULT - ASSESSMENT
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    Recs:  - left foot with no revascularization options based on last angiogram  - recommended local wound care vs left BKA  - at this time patient does not want to discuss amputation and wants to continue antibiotics and local wound care  - patient understands risks and benefits    C Team Surgery   [id number]

## 2022-11-19 NOTE — PROGRESS NOTE ADULT - ASSESSMENT
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 Pt admitted for IV abx and further work up of L toe infection. Renal consulted for ESRD Mx.     ESRD on dialysis.    Maintenance HD schedule MWF  HD unit- [facility]  K, vol acceptable  Informed consent for HD obtained from pt. in chart   s/p last HD 11/16 outpt    Plan  plan for hemodialysis tomorrow-   2k bath on hemodialysis   Ultrafiltration on Dialysis as tolerated with blood pressure     anemia in ckd-  Anemia Labs   Hemoglobin : 9.0 <--, 9.2 <--, 10.3 <--  Platelets : 243 <--, 256 <--, 271 <--    Hb < goal. added Epo 6k w/hd tiw  renal diet , fluid restriction   dose all meds for ESRD    high phos level : controlled  Serum calcium mg/dl : 9.2 <--, 9.7 <--, 9.8 <--  Serum Phosphorus level mg/dl : 4.0 <--, 5.7 <--    c/w renvela 2tidac    HTN, controlled-    L diabetic foot ulcer.   Pt w/ left 2nd and 4th toe infection, s/p debridement and wound culture w/ podiatry. Concern for possible OM.   - s/p vanc. on meropenem q24hrs. vanco by level  - f/u wound cx  HTN, controlled-bp stable. not on bp meds. watch blood pressure

## 2022-11-20 LAB
ANION GAP SERPL CALC-SCNC: 17 MMOL/L — HIGH (ref 7–14)
BASOPHILS # BLD AUTO: 0.05 K/UL — SIGNIFICANT CHANGE UP (ref 0–0.2)
BASOPHILS NFR BLD AUTO: 0.4 % — SIGNIFICANT CHANGE UP (ref 0–2)
BUN SERPL-MCNC: 40 MG/DL — HIGH (ref 7–23)
CALCIUM SERPL-MCNC: 9.5 MG/DL — SIGNIFICANT CHANGE UP (ref 8.4–10.5)
CHLORIDE SERPL-SCNC: 89 MMOL/L — LOW (ref 98–107)
CO2 SERPL-SCNC: 27 MMOL/L — SIGNIFICANT CHANGE UP (ref 22–31)
CREAT SERPL-MCNC: 7.23 MG/DL — HIGH (ref 0.5–1.3)
EGFR: 6 ML/MIN/1.73M2 — LOW
EOSINOPHIL # BLD AUTO: 0.25 K/UL — SIGNIFICANT CHANGE UP (ref 0–0.5)
EOSINOPHIL NFR BLD AUTO: 2.1 % — SIGNIFICANT CHANGE UP (ref 0–6)
GLUCOSE SERPL-MCNC: 112 MG/DL — HIGH (ref 70–99)
HCT VFR BLD CALC: 30.2 % — LOW (ref 34.5–45)
HGB BLD-MCNC: 9.2 G/DL — LOW (ref 11.5–15.5)
IANC: 9.08 K/UL — HIGH (ref 1.8–7.4)
IMM GRANULOCYTES NFR BLD AUTO: 0.8 % — SIGNIFICANT CHANGE UP (ref 0–0.9)
LYMPHOCYTES # BLD AUTO: 1.54 K/UL — SIGNIFICANT CHANGE UP (ref 1–3.3)
LYMPHOCYTES # BLD AUTO: 12.9 % — LOW (ref 13–44)
MAGNESIUM SERPL-MCNC: 2.2 MG/DL — SIGNIFICANT CHANGE UP (ref 1.6–2.6)
MCHC RBC-ENTMCNC: 28 PG — SIGNIFICANT CHANGE UP (ref 27–34)
MCHC RBC-ENTMCNC: 30.5 GM/DL — LOW (ref 32–36)
MCV RBC AUTO: 91.8 FL — SIGNIFICANT CHANGE UP (ref 80–100)
MONOCYTES # BLD AUTO: 0.95 K/UL — HIGH (ref 0–0.9)
MONOCYTES NFR BLD AUTO: 7.9 % — SIGNIFICANT CHANGE UP (ref 2–14)
NEUTROPHILS # BLD AUTO: 9.08 K/UL — HIGH (ref 1.8–7.4)
NEUTROPHILS NFR BLD AUTO: 75.9 % — SIGNIFICANT CHANGE UP (ref 43–77)
NRBC # BLD: 0 /100 WBCS — SIGNIFICANT CHANGE UP (ref 0–0)
NRBC # FLD: 0 K/UL — SIGNIFICANT CHANGE UP (ref 0–0)
PHOSPHATE SERPL-MCNC: 5.2 MG/DL — HIGH (ref 2.5–4.5)
PLATELET # BLD AUTO: 257 K/UL — SIGNIFICANT CHANGE UP (ref 150–400)
POTASSIUM SERPL-MCNC: 4.5 MMOL/L — SIGNIFICANT CHANGE UP (ref 3.5–5.3)
POTASSIUM SERPL-SCNC: 4.5 MMOL/L — SIGNIFICANT CHANGE UP (ref 3.5–5.3)
RBC # BLD: 3.29 M/UL — LOW (ref 3.8–5.2)
RBC # FLD: 14.8 % — HIGH (ref 10.3–14.5)
SODIUM SERPL-SCNC: 133 MMOL/L — LOW (ref 135–145)
WBC # BLD: 11.96 K/UL — HIGH (ref 3.8–10.5)
WBC # FLD AUTO: 11.96 K/UL — HIGH (ref 3.8–10.5)

## 2022-11-20 RX ADMIN — SIMVASTATIN 40 MILLIGRAM(S): 20 TABLET, FILM COATED ORAL at 22:00

## 2022-11-20 RX ADMIN — OXYCODONE HYDROCHLORIDE 5 MILLIGRAM(S): 5 TABLET ORAL at 04:15

## 2022-11-20 RX ADMIN — Medication 1 TABLET(S): at 11:02

## 2022-11-20 RX ADMIN — MEROPENEM 100 MILLIGRAM(S): 1 INJECTION INTRAVENOUS at 22:00

## 2022-11-20 RX ADMIN — SEVELAMER CARBONATE 1600 MILLIGRAM(S): 2400 POWDER, FOR SUSPENSION ORAL at 09:05

## 2022-11-20 RX ADMIN — HEPARIN SODIUM 5000 UNIT(S): 5000 INJECTION INTRAVENOUS; SUBCUTANEOUS at 06:17

## 2022-11-20 RX ADMIN — CHLORHEXIDINE GLUCONATE 1 APPLICATION(S): 213 SOLUTION TOPICAL at 11:02

## 2022-11-20 RX ADMIN — SEVELAMER CARBONATE 1600 MILLIGRAM(S): 2400 POWDER, FOR SUSPENSION ORAL at 13:06

## 2022-11-20 RX ADMIN — OXYCODONE HYDROCHLORIDE 5 MILLIGRAM(S): 5 TABLET ORAL at 03:45

## 2022-11-20 RX ADMIN — SEVELAMER CARBONATE 1600 MILLIGRAM(S): 2400 POWDER, FOR SUSPENSION ORAL at 20:36

## 2022-11-20 RX ADMIN — HEPARIN SODIUM 5000 UNIT(S): 5000 INJECTION INTRAVENOUS; SUBCUTANEOUS at 21:59

## 2022-11-20 RX ADMIN — HEPARIN SODIUM 5000 UNIT(S): 5000 INJECTION INTRAVENOUS; SUBCUTANEOUS at 13:06

## 2022-11-20 RX ADMIN — Medication 81 MILLIGRAM(S): at 11:01

## 2022-11-20 NOTE — PROGRESS NOTE ADULT - SUBJECTIVE AND OBJECTIVE BOX
Cardiovascular Disease Progress Note  Date of Service: 11-20-22 @ 09:13    Overnight events: No acute events overnight.   Ms. Boogie denies chest pain or SOB.    Otherwise review of systems negative    Objective Findings:  T(C): 36.9 (11-20-22 @ 05:44), Max: 37.1 (11-19-22 @ 13:36)  HR: 75 (11-20-22 @ 05:44) (75 - 78)  BP: 111/46 (11-20-22 @ 05:44) (103/71 - 111/46)  RR: 17 (11-20-22 @ 05:44) (17 - 18)  SpO2: 94% (11-20-22 @ 05:44) (94% - 100%)  Wt(kg): --  Daily     Daily       Physical Exam:  Gen: NAD; Patient resting comfortably  HEENT: EOMI, Normocephalic/ atraumatic  CV: RRR, normal S1 + S2, no m/r/g  Lungs:  Normal respiratory effort; clear to auscultation bilaterally  Abd: soft, non-tender; bowel sounds present  Ext: No edema;      Telemetry: n/a    Laboratory Data:                        9.2    11.96 )-----------( 257      ( 20 Nov 2022 06:04 )             30.2     11-20    133<L>  |  89<L>  |  40<H>  ----------------------------<  112<H>  4.5   |  27  |  7.23<H>    Ca    9.5      20 Nov 2022 06:04  Phos  5.2     11-20  Mg     2.20     11-20                Inpatient Medications:  MEDICATIONS  (STANDING):  aspirin enteric coated 81 milliGRAM(s) Oral daily  chlorhexidine 2% Cloths 1 Application(s) Topical daily  epoetin niesha-epbx (RETACRIT) Injectable 6000 Unit(s) IV Push <User Schedule>  gabapentin 100 milliGRAM(s) Oral <User Schedule>  heparin   Injectable 5000 Unit(s) SubCutaneous every 8 hours  meropenem  IVPB 500 milliGRAM(s) IV Intermittent every 24 hours  multivitamin 1 Tablet(s) Oral daily  sevelamer carbonate 1600 milliGRAM(s) Oral three times a day with meals  simvastatin 40 milliGRAM(s) Oral at bedtime      Assessment: 61-year-old female with ESRD on HD, HLD and peripheral vascular disease s/p lower extremity resection presents with chronic limb ischemia and L foot ulcer.     Plan of Care:    #Peripheral vascular disease-  Chronic limb ischemia.   Ms. Boogie is status post R BKA and L ray resection.    She displays no signs or symptoms of coronary ischemia or acute CHF.   At this time, she is declining further amputation.  Awaiting MRI.   Antiplatelet therapy as per the vascular team.      #Compensated diastolic CHF-  Euvolemic on exam.  Fluid removal with HD as per the renal team.   Echo from 3/2022 reviewed- normal LV systolic function with no significant valvular disease. .      #ESRD-  - HD as per renal.        Over 35 minutes spent on total encounter; more than 50% of the visit was spent counseling and/or coordinating care by the attending physician.      Cristino Adams MD [facility]  Cardiovascular Disease  [phone]

## 2022-11-20 NOTE — PROGRESS NOTE ADULT - SUBJECTIVE AND OBJECTIVE BOX
Date of Service  : 11-20-22     INTERVAL HPI/OVERNIGHT EVENTS: Getting HD.   Vital Signs Last 24 Hrs  T(C): 37.2 (20 Nov 2022 21:29), Max: 37.2 (20 Nov 2022 21:29)  T(F): 98.9 (20 Nov 2022 21:29), Max: 98.9 (20 Nov 2022 21:29)  HR: 76 (20 Nov 2022 21:29) (75 - 84)  BP: 118/52 (20 Nov 2022 21:29) (103/71 - 147/65)  BP(mean): --  RR: 18 (20 Nov 2022 21:29) (17 - 18)  SpO2: 98% (20 Nov 2022 21:29) (94% - 100%)    Parameters below as of 20 Nov 2022 21:29  Patient On (Oxygen Delivery Method): room air      I&O's Summary    20 Nov 2022 07:01  -  20 Nov 2022 21:31  --------------------------------------------------------  IN: 760 mL / OUT: 1900 mL / NET: -1140 mL      MEDICATIONS  (STANDING):  aspirin enteric coated 81 milliGRAM(s) Oral daily  chlorhexidine 2% Cloths 1 Application(s) Topical daily  epoetin niesha-epbx (RETACRIT) Injectable 6000 Unit(s) IV Push <User Schedule>  gabapentin 100 milliGRAM(s) Oral <User Schedule>  heparin   Injectable 5000 Unit(s) SubCutaneous every 8 hours  meropenem  IVPB 500 milliGRAM(s) IV Intermittent every 24 hours  multivitamin 1 Tablet(s) Oral daily  sevelamer carbonate 1600 milliGRAM(s) Oral three times a day with meals  simvastatin 40 milliGRAM(s) Oral at bedtime    MEDICATIONS  (PRN):  acetaminophen     Tablet .. 650 milliGRAM(s) Oral every 6 hours PRN Temp greater or equal to 38C (100.4F), Mild Pain (1 - 3)  oxyCODONE    IR 5 milliGRAM(s) Oral every 4 hours PRN Severe Pain (7 - 10)  sodium chloride 0.9% Bolus. 100 milliLiter(s) IV Bolus every 5 minutes PRN SBP LESS THAN or EQUAL to 90 mmHg    LABS:                        9.2    11.96 )-----------( 257      ( 20 Nov 2022 06:04 )             30.2     11-20    133<L>  |  89<L>  |  40<H>  ----------------------------<  112<H>  4.5   |  27  |  7.23<H>    Ca    9.5      20 Nov 2022 06:04  Phos  5.2     11-20  Mg     2.20     11-20          CAPILLARY BLOOD GLUCOSE              REVIEW OF SYSTEMS:  CONSTITUTIONAL: No fever, weight loss, or fatigue  EYES: No eye pain, visual disturbances, or discharge  ENMT:  No difficulty hearing, tinnitus, vertigo; No sinus or throat pain  NECK: No pain or stiffness  RESPIRATORY: No cough, wheezing, chills or hemoptysis; No shortness of breath  CARDIOVASCULAR: No chest pain, palpitations, dizziness, or leg swelling  GASTROINTESTINAL: No abdominal or epigastric pain. No nausea, vomiting, or hematemesis; No diarrhea or constipation. No melena or hematochezia.  GENITOURINARY: No dysuria, frequency, hematuria, or incontinence  NEUROLOGICAL: No headaches, memory loss, loss of strength, numbness, or tremors      Consultant(s) Notes Reviewed:  [x ] YES  [ ] NO    PHYSICAL EXAM:  GENERAL: NAD, well-groomed, well-developed,not in any distress ,  HEAD:  Atraumatic, Normocephalic  NECK: Supple, No JVD, Normal thyroid  NERVOUS SYSTEM:  Alert & Oriented X3, No focal deficit   CHEST/LUNG: Good air entry bilateral with no  rales, rhonchi, wheezing, or rubs  HEART: Regular rate and rhythm; No murmurs, rubs, or gallops  ABDOMEN: Soft, Nontender, Nondistended; Bowel sounds present  EXTREMITIES: L foot dressed.     Care Discussed with Consultants/Other Providers [ x] YES  [ ] NO

## 2022-11-20 NOTE — PROGRESS NOTE ADULT - ASSESSMENT
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 Pt admitted for IV abx and further work up of L toe infection. Renal consulted for ESRD Mx.     ESRD on dialysis.    Maintenance HD schedule MWF  access- thigh Arterio-Venous Graft   HD unit- [facility]  K, vol acceptable      Plan  plan for hemodialysis today  2k bath on hemodialysis   Ultrafiltration on Dialysis as tolerated with blood pressure     anemia in ckd-  Anemia Labs   Hemoglobin : 9.0 <--, 9.2 <--, 10.3 <--  Platelets : 243 <--, 256 <--, 271 <--    Hb < goal. added Epo 6k w/hd tiw  renal diet , fluid restriction   dose all meds for ESRD    high phos level : controlled  Serum calcium mg/dl : 9.2 <--, 9.7 <--, 9.8 <--  Serum Phosphorus level mg/dl : 5.2<--4.0 <--, 5.7 <--    c/w renvela 2tidac    HTN, controlled-    L diabetic foot ulcer.   Pt w/ left 2nd and 4th toe infection, s/p debridement and wound culture w/ podiatry. Concern for possible OM.   - s/p vanc. on meropenem q24hrs. vanco by level  - f/u wound cx  HTN, controlled-bp stable. not on bp meds. watch blood pressure

## 2022-11-20 NOTE — PROGRESS NOTE ADULT - ASSESSMENT
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 Pt admitted for IV abx and further work up of L toe infection.      Problem/Plan - 1:  ·  Problem: Toe infection with Severe PAD .   ·  Plan: Vascular planning Amputation.   Pt w/ left 2nd and 4th toe infection, s/p debridement and wound culture w/ podiatry. Concern for possible OM.   - C/w vanc and meropenem based off previous wound cx. Dose w/ HD.  - f/u wound cx  - f/u podiatry and vascular recs  - f/u MRI.  < from: [facility] Duplex Physiol Ext Low 3+ Level, BI. ([facility] Duplex Physiol Ext Low 3+ Level, LT.) (11.18.22 @ 08:29) >  Left MARTA is elevated (1.45), likely due to calcific  vessels.  Flat left digit waveforms noted. Findings  suggest the presence of distal infrapopliteal and small  vessel arterial disease in the left foot.    < end of copied text >     Problem/Plan - 2:  ·  Problem: ESRD on dialysis.   ·  Plan: Pt received HD on MWF. Last HD 11/16.   - renal following and HD per them .   - c/w gabapentin 100 mg TID before HD.     Problem/Plan - 3:  ·  Problem: Chronic CHF.   ·  Plan: Pt w/ chronic diastolic CHF. Previous TTE from 3/2022 w/ nml LVEF. Appears euvolemic on exam.  - HD per renal.     Problem/Plan - 4:  ·  Problem: Anemia.   ·  Plan: Hb 10.3, likely in setting of CKD and chronic disease. No signs or evidence of bleeding  - continue to monitor.     Problem/Plan - 5:  ·  Problem: PAD (peripheral artery disease).   ·  Plan: Pt w/ hx of R balloon angioplasty (5/11/2022) and foot wounds s/p left foot partial 3rd ray amputation (3/2022) and R BKA (6/2022)  - c/w asa 81 mg, pt unsure if still on plavix  - Vascular help appreciated.      Problem/Plan - 6:  ·  Problem: HLD (hyperlipidemia).   ·  Plan: c/w simvastatin 40 mg QHS.     Problem/Plan - 7:  ·  Problem: Prophylactic measure.   ·  Plan: DVT prophylaxis: heparin subq  Diet: dash/tlc, renal.      Medically optimized for surgery .

## 2022-11-20 NOTE — PROGRESS NOTE ADULT - SUBJECTIVE AND OBJECTIVE BOX
[facility] : Ans Serv [phone], Office [phone]  Dr Vanegas/Dr Bazzi  /Dr Kingsley patricia /Dr SHERRI Mcfarlane/Dr Jarrell Madden/Dr Andrés Perkins /Dr SUNI Potts  _______________________________________________________________________________________________    seen and examined today for End Stage Renal Disease on Dialysis   Interval :  VITALS:  T(F): 98.4 (11-20 @ 05:44), Max: 99.4 (11-18 @ 21:42)  HR: 75 (11-20 @ 05:44)  BP: 111/46 (11-20 @ 05:44)  ABP: --  RR: 17 (11-20 @ 05:44)  SpO2: 94% (11-20 @ 05:44)    Physical Exam :-  Constitutional: NAD  Respiratory: Bilateral equal breath sounds, few Crackles present.  Cardiovascular: S1, S2 normal, positive Murmur  Gastrointestinal: Bowel Sounds present, soft  Extremities: no Edema Feet  Neurological: Alert and Oriented x 3  Psychiatric: Normal mood, normal affect    Data:-  Allergies :   No Known Allergies    Hospital Medications:   MEDICATIONS  (STANDING):  aspirin enteric coated 81 milliGRAM(s) Oral daily  chlorhexidine 2% Cloths 1 Application(s) Topical daily  epoetin niesha-epbx (RETACRIT) Injectable 6000 Unit(s) IV Push <User Schedule>  gabapentin 100 milliGRAM(s) Oral <User Schedule>  heparin   Injectable 5000 Unit(s) SubCutaneous every 8 hours  meropenem  IVPB 500 milliGRAM(s) IV Intermittent every 24 hours  multivitamin 1 Tablet(s) Oral daily  sevelamer carbonate 1600 milliGRAM(s) Oral three times a day with meals  simvastatin 40 milliGRAM(s) Oral at bedtime    11-20    133<L>  |  89<L>  |  40<H>  ----------------------------<  112<H>  4.5   |  27  |  7.23<H>    Ca    9.5      20 Nov 2022 06:04  Phos  5.2     11-20  Mg     2.20     11-20      Creatinine Trend: 7.23 <--, 5.13 <--, 6.76 <--, 5.88 <--  egfr trend : 6 <--, 9 <--, 6 <--, 8 <--                        9.2    11.96 )-----------( 257      ( 20 Nov 2022 06:04 )             30.2

## 2022-11-21 LAB
ANION GAP SERPL CALC-SCNC: 13 MMOL/L — SIGNIFICANT CHANGE UP (ref 7–14)
BASOPHILS # BLD AUTO: 0.03 K/UL — SIGNIFICANT CHANGE UP (ref 0–0.2)
BASOPHILS NFR BLD AUTO: 0.2 % — SIGNIFICANT CHANGE UP (ref 0–2)
BUN SERPL-MCNC: 24 MG/DL — HIGH (ref 7–23)
CALCIUM SERPL-MCNC: 9.6 MG/DL — SIGNIFICANT CHANGE UP (ref 8.4–10.5)
CHLORIDE SERPL-SCNC: 93 MMOL/L — LOW (ref 98–107)
CO2 SERPL-SCNC: 30 MMOL/L — SIGNIFICANT CHANGE UP (ref 22–31)
CREAT SERPL-MCNC: 5.35 MG/DL — HIGH (ref 0.5–1.3)
EGFR: 8 ML/MIN/1.73M2 — LOW
EOSINOPHIL # BLD AUTO: 0.23 K/UL — SIGNIFICANT CHANGE UP (ref 0–0.5)
EOSINOPHIL NFR BLD AUTO: 1.7 % — SIGNIFICANT CHANGE UP (ref 0–6)
GLUCOSE SERPL-MCNC: 110 MG/DL — HIGH (ref 70–99)
HBV SURFACE AG SER-ACNC: SIGNIFICANT CHANGE UP
HCT VFR BLD CALC: 30.3 % — LOW (ref 34.5–45)
HGB BLD-MCNC: 9.1 G/DL — LOW (ref 11.5–15.5)
IANC: 10.35 K/UL — HIGH (ref 1.8–7.4)
IMM GRANULOCYTES NFR BLD AUTO: 1 % — HIGH (ref 0–0.9)
LYMPHOCYTES # BLD AUTO: 1.41 K/UL — SIGNIFICANT CHANGE UP (ref 1–3.3)
LYMPHOCYTES # BLD AUTO: 10.6 % — LOW (ref 13–44)
MAGNESIUM SERPL-MCNC: 2.3 MG/DL — SIGNIFICANT CHANGE UP (ref 1.6–2.6)
MCHC RBC-ENTMCNC: 27.9 PG — SIGNIFICANT CHANGE UP (ref 27–34)
MCHC RBC-ENTMCNC: 30 GM/DL — LOW (ref 32–36)
MCV RBC AUTO: 92.9 FL — SIGNIFICANT CHANGE UP (ref 80–100)
MONOCYTES # BLD AUTO: 1.15 K/UL — HIGH (ref 0–0.9)
MONOCYTES NFR BLD AUTO: 8.6 % — SIGNIFICANT CHANGE UP (ref 2–14)
NEUTROPHILS # BLD AUTO: 10.35 K/UL — HIGH (ref 1.8–7.4)
NEUTROPHILS NFR BLD AUTO: 77.9 % — HIGH (ref 43–77)
NRBC # BLD: 0 /100 WBCS — SIGNIFICANT CHANGE UP (ref 0–0)
NRBC # FLD: 0 K/UL — SIGNIFICANT CHANGE UP (ref 0–0)
PHOSPHATE SERPL-MCNC: 4.5 MG/DL — SIGNIFICANT CHANGE UP (ref 2.5–4.5)
PLATELET # BLD AUTO: 269 K/UL — SIGNIFICANT CHANGE UP (ref 150–400)
POTASSIUM SERPL-MCNC: 4.1 MMOL/L — SIGNIFICANT CHANGE UP (ref 3.5–5.3)
POTASSIUM SERPL-SCNC: 4.1 MMOL/L — SIGNIFICANT CHANGE UP (ref 3.5–5.3)
RBC # BLD: 3.26 M/UL — LOW (ref 3.8–5.2)
RBC # FLD: 14.6 % — HIGH (ref 10.3–14.5)
SODIUM SERPL-SCNC: 136 MMOL/L — SIGNIFICANT CHANGE UP (ref 135–145)
VANCOMYCIN TROUGH SERPL-MCNC: 17 UG/ML — SIGNIFICANT CHANGE UP (ref 10–20)
WBC # BLD: 13.3 K/UL — HIGH (ref 3.8–10.5)
WBC # FLD AUTO: 13.3 K/UL — HIGH (ref 3.8–10.5)

## 2022-11-21 RX ORDER — ERYTHROPOIETIN 10000 [IU]/ML
10000 INJECTION, SOLUTION INTRAVENOUS; SUBCUTANEOUS
Refills: 0 | Status: DISCONTINUED | OUTPATIENT
Start: 2022-11-21 | End: 2022-11-30

## 2022-11-21 RX ADMIN — HEPARIN SODIUM 5000 UNIT(S): 5000 INJECTION INTRAVENOUS; SUBCUTANEOUS at 06:50

## 2022-11-21 RX ADMIN — Medication 1 TABLET(S): at 12:54

## 2022-11-21 RX ADMIN — SEVELAMER CARBONATE 1600 MILLIGRAM(S): 2400 POWDER, FOR SUSPENSION ORAL at 12:54

## 2022-11-21 RX ADMIN — SIMVASTATIN 40 MILLIGRAM(S): 20 TABLET, FILM COATED ORAL at 22:13

## 2022-11-21 RX ADMIN — OXYCODONE HYDROCHLORIDE 5 MILLIGRAM(S): 5 TABLET ORAL at 16:19

## 2022-11-21 RX ADMIN — Medication 81 MILLIGRAM(S): at 12:55

## 2022-11-21 RX ADMIN — HEPARIN SODIUM 5000 UNIT(S): 5000 INJECTION INTRAVENOUS; SUBCUTANEOUS at 13:33

## 2022-11-21 RX ADMIN — MEROPENEM 100 MILLIGRAM(S): 1 INJECTION INTRAVENOUS at 22:13

## 2022-11-21 RX ADMIN — SEVELAMER CARBONATE 1600 MILLIGRAM(S): 2400 POWDER, FOR SUSPENSION ORAL at 09:44

## 2022-11-21 RX ADMIN — OXYCODONE HYDROCHLORIDE 5 MILLIGRAM(S): 5 TABLET ORAL at 15:19

## 2022-11-21 RX ADMIN — SEVELAMER CARBONATE 1600 MILLIGRAM(S): 2400 POWDER, FOR SUSPENSION ORAL at 18:06

## 2022-11-21 RX ADMIN — HEPARIN SODIUM 5000 UNIT(S): 5000 INJECTION INTRAVENOUS; SUBCUTANEOUS at 22:16

## 2022-11-21 RX ADMIN — CHLORHEXIDINE GLUCONATE 1 APPLICATION(S): 213 SOLUTION TOPICAL at 12:55

## 2022-11-21 RX ADMIN — GABAPENTIN 100 MILLIGRAM(S): 400 CAPSULE ORAL at 06:49

## 2022-11-21 NOTE — PROGRESS NOTE ADULT - SUBJECTIVE AND OBJECTIVE BOX
Patient is a 61y old  Female who presents with a chief complaint of toe infection (21 Nov 2022 07:48)       INTERVAL HPI/OVERNIGHT EVENTS:  Patient seen and evaluated at bedside.  Pt is resting comfortable in NAD. Denies N/V/F/C.    Allergies    No Known Allergies    Intolerances        Vital Signs Last 24 Hrs  T(C): 37.4 (21 Nov 2022 05:30), Max: 37.4 (21 Nov 2022 05:30)  T(F): 99.4 (21 Nov 2022 05:30), Max: 99.4 (21 Nov 2022 05:30)  HR: 74 (21 Nov 2022 05:30) (74 - 84)  BP: 108/46 (21 Nov 2022 05:30) (108/46 - 147/65)  BP(mean): --  RR: 18 (21 Nov 2022 05:30) (18 - 18)  SpO2: 99% (21 Nov 2022 05:30) (98% - 99%)    Parameters below as of 21 Nov 2022 05:30  Patient On (Oxygen Delivery Method): room air        LABS:                        9.1    13.30 )-----------( 269      ( 21 Nov 2022 06:45 )             30.3     11-21    136  |  93<L>  |  24<H>  ----------------------------<  110<H>  4.1   |  30  |  5.35<H>    Ca    9.6      21 Nov 2022 06:45  Phos  4.5     11-21  Mg     2.30     11-21          CAPILLARY BLOOD GLUCOSE          Lower Extremity Physical Exam:  Vascular: DP/PT 0/4, left foot, Temperature gradient warm to warm L   Neuro: Epicritic sensation diminished to the level of ankle L  Musculoskeletal/Ortho: s/p RLE BKA  Skin: Left foot forefoot ischemic changes to the 2nd digit, Partial Third Ray Resection surgical site dehisced wound to probe to bone, no pus expressed, fibbronecrotic wound bed, erythema resolved  with +1 pitting edema to the level of the ankle    RADIOLOGY & ADDITIONAL TESTS:

## 2022-11-21 NOTE — PROGRESS NOTE ADULT - SUBJECTIVE AND OBJECTIVE BOX
Cardiovascular Disease Progress Note  Date of Service: 11-21-22 @ 07:48    Overnight events: No acute events overnight.    Ms. Boogie denies chest pain or SOB.     Objective Findings:  T(C): 37.4 (11-21-22 @ 05:30), Max: 37.4 (11-21-22 @ 05:30)  HR: 74 (11-21-22 @ 05:30) (74 - 84)  BP: 108/46 (11-21-22 @ 05:30) (108/46 - 147/65)  RR: 18 (11-21-22 @ 05:30) (18 - 18)  SpO2: 99% (11-21-22 @ 05:30) (98% - 99%)  Wt(kg): --  Daily     Daily       Physical Exam:  Gen: NAD; Patient resting comfortably  HEENT: EOMI, Normocephalic/ atraumatic  CV: RRR, normal S1 + S2, no m/r/g  Lungs:  Normal respiratory effort; clear to auscultation bilaterally  Abd: soft, non-tender; bowel sounds present  Ext: No edema;      Telemetry: n/a    Laboratory Data:                        9.2    11.96 )-----------( 257      ( 20 Nov 2022 06:04 )             30.2     11-20    133<L>  |  89<L>  |  40<H>  ----------------------------<  112<H>  4.5   |  27  |  7.23<H>    Ca    9.5      20 Nov 2022 06:04  Phos  5.2     11-20  Mg     2.20     11-20                Inpatient Medications:  MEDICATIONS  (STANDING):  aspirin enteric coated 81 milliGRAM(s) Oral daily  chlorhexidine 2% Cloths 1 Application(s) Topical daily  epoetin niesha-epbx (RETACRIT) Injectable 6000 Unit(s) IV Push <User Schedule>  gabapentin 100 milliGRAM(s) Oral <User Schedule>  heparin   Injectable 5000 Unit(s) SubCutaneous every 8 hours  meropenem  IVPB 500 milliGRAM(s) IV Intermittent every 24 hours  multivitamin 1 Tablet(s) Oral daily  sevelamer carbonate 1600 milliGRAM(s) Oral three times a day with meals  simvastatin 40 milliGRAM(s) Oral at bedtime      Assessment: 61-year-old female with ESRD on HD, HLD and peripheral vascular disease s/p lower extremity resection presents with chronic limb ischemia and L foot ulcer.     Plan of Care:    #Peripheral vascular disease-  Chronic limb ischemia.   Ms. Boogie is status post R BKA and L ray resection.    She displays no signs or symptoms of coronary ischemia or acute CHF.   At this time, she is declining further amputation.  Awaiting MRI.   Antiplatelet therapy as per the vascular team.      #Compensated diastolic CHF-  Euvolemic on exam.  Fluid removal with HD as per the renal team.   Echo from 3/2022 reviewed- normal LV systolic function with no significant valvular disease. .      #ESRD-  - HD as per renal.        Over 25 minutes spent on total encounter; more than 50% of the visit was spent counseling and/or coordinating care by the attending physician.      Cristino Adams MD [facility]  Cardiovascular Disease  [phone]

## 2022-11-21 NOTE — PROGRESS NOTE ADULT - ASSESSMENT
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 Pt admitted for IV abx and further work up of L toe infection. Renal consulted for ESRD Mx.     ESRD on dialysis.    Maintenance HD schedule MWF  access- thigh Arterio-Venous Graft   HD unit- [facility]  K, vol acceptable    Plan  s/p HD 11/20, Rx sheet reviewed, net UF 1.5kg removed, tolerated well. uneventful.   plan for next HD tomorrow w/2k bath, uf as tolerated (holiday schedule)  Ultrafiltration on Dialysis as tolerated with blood pressure   renal diet , fluid restriction   dose all meds for ESRD  anemia in ckd-  Hemoglobin : 9.1 <- 9.0 <--, 9.2 <--, 10.3 <--  Hb < goal.   will inc Epo 6>10k w/hd tiw    high phos level : controlled  Serum calcium mg/dl : 9.2 <--, 9.7 <--, 9.8 <--  Serum Phosphorus level mg/dl : 5.2<--4.0 <--, 5.7 <--  c/w renvela 2tidac    HTN, controlled- no meds. bp stable    L diabetic foot ulcer.   Pt w/ left 2nd and 4th toe infection, s/p debridement and wound culture w/ podiatry. Concern for possible OM.   - s/p vanc. on meropenem q24hrs. vanco by level  - f/u wound cx    will closely follow up.   poc d/w pt, HD RN  labs, chart reviewed  For any question, pl call:  Nephrology  Cell -[phone]  Office [phone]  Ans Serv [phone]

## 2022-11-21 NOTE — PROGRESS NOTE ADULT - SUBJECTIVE AND OBJECTIVE BOX
Date of Service  : 11-21-22 @ 10:25    INTERVAL HPI/OVERNIGHT EVENTS: I feel fine. Getting MRI now.   Vital Signs Last 24 Hrs  T(C): 37.4 (21 Nov 2022 05:30), Max: 37.4 (21 Nov 2022 05:30)  T(F): 99.4 (21 Nov 2022 05:30), Max: 99.4 (21 Nov 2022 05:30)  HR: 74 (21 Nov 2022 05:30) (74 - 84)  BP: 108/46 (21 Nov 2022 05:30) (108/46 - 147/65)  BP(mean): --  RR: 18 (21 Nov 2022 05:30) (18 - 18)  SpO2: 99% (21 Nov 2022 05:30) (98% - 99%)    Parameters below as of 21 Nov 2022 05:30  Patient On (Oxygen Delivery Method): room air      I&O's Summary    20 Nov 2022 07:01  -  21 Nov 2022 07:00  --------------------------------------------------------  IN: 760 mL / OUT: 1900 mL / NET: -1140 mL      MEDICATIONS  (STANDING):  aspirin enteric coated 81 milliGRAM(s) Oral daily  chlorhexidine 2% Cloths 1 Application(s) Topical daily  epoetin niesha-epbx (RETACRIT) Injectable 6000 Unit(s) IV Push <User Schedule>  gabapentin 100 milliGRAM(s) Oral <User Schedule>  heparin   Injectable 5000 Unit(s) SubCutaneous every 8 hours  meropenem  IVPB 500 milliGRAM(s) IV Intermittent every 24 hours  multivitamin 1 Tablet(s) Oral daily  sevelamer carbonate 1600 milliGRAM(s) Oral three times a day with meals  simvastatin 40 milliGRAM(s) Oral at bedtime    MEDICATIONS  (PRN):  acetaminophen     Tablet .. 650 milliGRAM(s) Oral every 6 hours PRN Temp greater or equal to 38C (100.4F), Mild Pain (1 - 3)  oxyCODONE    IR 5 milliGRAM(s) Oral every 4 hours PRN Severe Pain (7 - 10)  sodium chloride 0.9% Bolus. 100 milliLiter(s) IV Bolus every 5 minutes PRN SBP LESS THAN or EQUAL to 90 mmHg    LABS:                        9.1    13.30 )-----------( 269      ( 21 Nov 2022 06:45 )             30.3     11-21    136  |  93<L>  |  24<H>  ----------------------------<  110<H>  4.1   |  30  |  5.35<H>    Ca    9.6      21 Nov 2022 06:45  Phos  4.5     11-21  Mg     2.30     11-21          CAPILLARY BLOOD GLUCOSE              REVIEW OF SYSTEMS:  CONSTITUTIONAL: No fever, weight loss, or fatigue  EYES: No eye pain, visual disturbances, or discharge  ENMT:  No difficulty hearing, tinnitus, vertigo; No sinus or throat pain  NECK: No pain or stiffness  RESPIRATORY: No cough, wheezing, chills or hemoptysis; No shortness of breath  CARDIOVASCULAR: No chest pain, palpitations, dizziness, or leg swelling  GASTROINTESTINAL: No abdominal or epigastric pain. No nausea, vomiting, or hematemesis; No diarrhea or constipation. No melena or hematochezia.  GENITOURINARY: No dysuria, frequency, hematuria, or incontinence  NEUROLOGICAL: No headaches, memory loss, loss of strength, numbness, or tremors      Consultant(s) Notes Reviewed:  [x ] YES  [ ] NO    PHYSICAL EXAM:  GENERAL: NAD, well-groomed, well-developed,not in any distress ,  HEAD:  Atraumatic, Normocephalic  EYES: EOMI, PERRLA, conjunctiva and sclera clear  ENMT: No tonsillar erythema, exudates, or enlargement; Moist mucous membranes, Good dentition, No lesions  NECK: Supple, No JVD, Normal thyroid  NERVOUS SYSTEM:  Alert & Oriented X3, No focal deficit   CHEST/LUNG: Good air entry bilateral with no  rales, rhonchi, wheezing, or rubs  HEART: Regular rate and rhythm; No murmurs, rubs, or gallops  ABDOMEN: Soft, Nontender, Nondistended; Bowel sounds present  EXTREMITIES: Foot Dressed.   Care Discussed with Consultants/Other Providers [ x] YES  [ ] NO

## 2022-11-21 NOTE — PROGRESS NOTE ADULT - SUBJECTIVE AND OBJECTIVE BOX
[facility] - S Jluis / Shabbir S /D Romina/ SHERRI Mcfarlane/ SHERRI Madden/ Andrés Perkins / M Tristianu/ O Gladis  service -[phone], office [phone]  ---------------------------------------------------------------------------------------------------------------    Patient seen and examined bedside    Subjective and Objective: No overnight events, sob resolved. No complaints today. feeling better    Allergies: No Known Allergies      Hospital Medications:   MEDICATIONS  (STANDING):  aspirin enteric coated 81 milliGRAM(s) Oral daily  chlorhexidine 2% Cloths 1 Application(s) Topical daily  epoetin niesha-epbx (RETACRIT) Injectable 6000 Unit(s) IV Push <User Schedule>  gabapentin 100 milliGRAM(s) Oral <User Schedule>  heparin   Injectable 5000 Unit(s) SubCutaneous every 8 hours  meropenem  IVPB 500 milliGRAM(s) IV Intermittent every 24 hours  multivitamin 1 Tablet(s) Oral daily  sevelamer carbonate 1600 milliGRAM(s) Oral three times a day with meals  simvastatin 40 milliGRAM(s) Oral at bedtime      REVIEW OF SYSTEMS:  CONSTITUTIONAL: No weakness, fevers or chills  EYES/ENT: No visual changes;  No vertigo or throat pain   NECK: No pain or stiffness  RESPIRATORY: No cough, wheezing, hemoptysis; No shortness of breath  CARDIOVASCULAR: No chest pain or palpitations.  GASTROINTESTINAL: No abdominal or epigastric pain. No nausea, vomiting, or hematemesis; No diarrhea or constipation. No melena or hematochezia.  GENITOURINARY: No dysuria, frequency, foamy urine, urinary urgency, incontinence or hematuria  NEUROLOGICAL: No numbness or weakness  SKIN: No itching, burning, rashes, or lesions   VASCULAR: No bilateral lower extremity edema.   All other review of systems is negative unless indicated above.    VITALS:  T(F): 98.3 (11-21-22 @ 15:00), Max: 99.4 (11-21-22 @ 05:30)  HR: 79 (11-21-22 @ 15:00)  BP: 109/52 (11-21-22 @ 15:00)  RR: 18 (11-21-22 @ 15:00)  SpO2: 100% (11-21-22 @ 15:00)  Wt(kg): --    11-20 @ 07:01  -  11-21 @ 07:00  --------------------------------------------------------  IN: 760 mL / OUT: 1900 mL / NET: -1140 mL          PHYSICAL EXAM:  Constitutional: NAD  HEENT: anicteric sclera, oropharynx clear  Neck: No JVD  Respiratory: CTAB, no wheezes, rales or rhonchi  Cardiovascular: S1, S2, RRR  Gastrointestinal: BS+, soft, NT/ND  Extremities: No cyanosis or clubbing. No peripheral edema  Neurological: A/O x 3, no focal deficits  Psychiatric: Normal mood, normal affect  : No CVA tenderness. No salcido.   Skin: No rashes  Vascular Access:    LABS:  11-21    136  |  93<L>  |  24<H>  ----------------------------<  110<H>  4.1   |  30  |  5.35<H>    Ca    9.6      21 Nov 2022 06:45  Phos  4.5     11-21  Mg     2.30     11-21      Creatinine Trend: 5.35 <--, 7.23 <--, 5.13 <--, 6.76 <--, 5.88 <--                        9.1    13.30 )-----------( 269      ( 21 Nov 2022 06:45 )             30.3     Urine Studies:        RADIOLOGY & ADDITIONAL STUDIES:   [facility] - S Jluis / Shabbir S /D Romina/ S Denys/ S Chano/ Andrés Perkins / M Katlyn/ O Gladis  service -[phone], office [phone]  ---------------------------------------------------------------------------------------------------------------    Patient seen and examined bedside    Subjective and Objective: No overnight events, denied sob. No complaints today. feeling better    Allergies: No Known Allergies      Hospital Medications:   MEDICATIONS  (STANDING):  aspirin enteric coated 81 milliGRAM(s) Oral daily  chlorhexidine 2% Cloths 1 Application(s) Topical daily  epoetin niesha-epbx (RETACRIT) Injectable 6000 Unit(s) IV Push <User Schedule>  gabapentin 100 milliGRAM(s) Oral <User Schedule>  heparin   Injectable 5000 Unit(s) SubCutaneous every 8 hours  meropenem  IVPB 500 milliGRAM(s) IV Intermittent every 24 hours  multivitamin 1 Tablet(s) Oral daily  sevelamer carbonate 1600 milliGRAM(s) Oral three times a day with meals  simvastatin 40 milliGRAM(s) Oral at bedtime    VITALS:  T(F): 98.3 (11-21-22 @ 15:00), Max: 99.4 (11-21-22 @ 05:30)  HR: 79 (11-21-22 @ 15:00)  BP: 109/52 (11-21-22 @ 15:00)  RR: 18 (11-21-22 @ 15:00)  SpO2: 100% (11-21-22 @ 15:00)  Wt(kg): --    11-20 @ 07:01  -  11-21 @ 07:00  --------------------------------------------------------  IN: 760 mL / OUT: 1900 mL / NET: -1140 mL      PHYSICAL EXAM:  Constitutional: NAD  HEENT: anicteric sclera  Neck: No JVD  Respiratory: CTAB, no wheezes, rales or rhonchi  Cardiovascular: S1, S2, RRR  Gastrointestinal: BS+, soft, NT/ND  Extremities: no pedal edema   Neurological: A/O x 3  Psychiatric: Normal mood, normal affect  : No salcido.   Vascular Access: rt femoral AVG+    LABS:  11-21    136  |  93<L>  |  24<H>  ----------------------------<  110<H>  4.1   |  30  |  5.35<H>    Ca    9.6      21 Nov 2022 06:45  Phos  4.5     11-21  Mg     2.30     11-21      Creatinine Trend: 5.35 <--, 7.23 <--, 5.13 <--, 6.76 <--, 5.88 <--                        9.1    13.30 )-----------( 269      ( 21 Nov 2022 06:45 )             30.3     Urine Studies:        RADIOLOGY & ADDITIONAL STUDIES:   [facility] - S Jluis / Shabbir S /D Romina/ S Denys/ S Chano/ Andrés Perkins / M Katlyn/ O Gladis  service -[phone], office [phone]  ---------------------------------------------------------------------------------------------------------------    Patient seen and examined bedside    Subjective and Objective: No overnight events, denied sob. No complaints today. feeling better    Allergies: No Known Allergies      Hospital Medications:   MEDICATIONS  (STANDING):  aspirin enteric coated 81 milliGRAM(s) Oral daily  chlorhexidine 2% Cloths 1 Application(s) Topical daily  epoetin niesha-epbx (RETACRIT) Injectable 6000 Unit(s) IV Push <User Schedule>  gabapentin 100 milliGRAM(s) Oral <User Schedule>  heparin   Injectable 5000 Unit(s) SubCutaneous every 8 hours  meropenem  IVPB 500 milliGRAM(s) IV Intermittent every 24 hours  multivitamin 1 Tablet(s) Oral daily  sevelamer carbonate 1600 milliGRAM(s) Oral three times a day with meals  simvastatin 40 milliGRAM(s) Oral at bedtime    VITALS:  T(F): 98.3 (11-21-22 @ 15:00), Max: 99.4 (11-21-22 @ 05:30)  HR: 79 (11-21-22 @ 15:00)  BP: 109/52 (11-21-22 @ 15:00)  RR: 18 (11-21-22 @ 15:00)  SpO2: 100% (11-21-22 @ 15:00)  Wt(kg): --    11-20 @ 07:01  -  11-21 @ 07:00  --------------------------------------------------------  IN: 760 mL / OUT: 1900 mL / NET: -1140 mL      PHYSICAL EXAM:  Constitutional: NAD  HEENT: anicteric sclera  Neck: No JVD  Respiratory: CTAB, no wheezes, rales or rhonchi  Cardiovascular: S1, S2, RRR  Gastrointestinal: BS+, soft, NT/ND  Extremities: rt BKA+, left foot dressing+   Neurological: A/O x 3  Psychiatric: Normal mood, normal affect  : No salcido.   Vascular Access: rt femoral AVG+    LABS:  11-21    136  |  93<L>  |  24<H>  ----------------------------<  110<H>  4.1   |  30  |  5.35<H>    Ca    9.6      21 Nov 2022 06:45  Phos  4.5     11-21  Mg     2.30     11-21      Creatinine Trend: 5.35 <--, 7.23 <--, 5.13 <--, 6.76 <--, 5.88 <--                        9.1    13.30 )-----------( 269      ( 21 Nov 2022 06:45 )             30.3     Urine Studies:        RADIOLOGY & ADDITIONAL STUDIES:

## 2022-11-21 NOTE — PROGRESS NOTE ADULT - ASSESSMENT
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    Recs:  - left foot with no revascularization options based on last angiogram  - Local wound care vs left BKA  - at this time patient does not want to discuss amputation and wants to continue antibiotics and local wound care  - patient understands risks and benefits  - Planning for L BKA if case patient becomes amenable  - Please document cardiology optimization for possible OR     C Team Surgery   [id number]   61F w/ PMHx of ESRD (HD MWF), PAD s/p R balloon angioplasty (5/11/2022) and foot wounds s/p 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    Recs:  - left foot with no revascularization options based on last angiogram  - L BKA Wednesday   - HD tomorrow  - Please document cardiology optimization for OR    C Team Surgery   [id number]

## 2022-11-21 NOTE — PROGRESS NOTE ADULT - ASSESSMENT
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 Pt admitted for IV abx and further work up of L toe infection.      Problem/Plan - 1:  ·  Problem: Toe infection with Severe PAD .   ·  Plan: Vascular planning BKA.   Pt w/ left 2nd and 4th toe infection, s/p debridement and wound culture w/ podiatry. Concern for possible OM.   - C/w vanc and meropenem based off previous wound cx. Dose w/ HD.  - f/u wound cx  - f/u podiatry and vascular recs  - f/u MRI.  < from: [facility] Duplex Physiol Ext Low 3+ Level, BI. (VA Duplex Physiol Ext Low 3+ Level, LT.) (11.18.22 @ 08:29) >  Left MARTA is elevated (1.45), likely due to calcific  vessels.  Flat left digit waveforms noted. Findings  suggest the presence of distal infrapopliteal and small  vessel arterial disease in the left foot.    < end of copied text >     Problem/Plan - 2:  ·  Problem: ESRD on dialysis.   ·  Plan: Pt received HD on MWF. Last HD 11/16.   - renal following and HD per them .   - c/w gabapentin 100 mg TID before HD.     Problem/Plan - 3:  ·  Problem: Chronic CHF.   ·  Plan: Pt w/ chronic diastolic CHF. Previous TTE from 3/2022 w/ nml LVEF. Appears euvolemic on exam.  - HD per renal.     Problem/Plan - 4:  ·  Problem: Anemia.   ·  Plan: Hb 10.3, likely in setting of CKD and chronic disease. No signs or evidence of bleeding  - continue to monitor.     Problem/Plan - 5:  ·  Problem: PAD (peripheral artery disease).   ·  Plan: Pt w/ hx of R balloon angioplasty (5/11/2022) and foot wounds s/p left foot partial 3rd ray amputation (3/2022) and R BKA (6/2022)  - c/w asa 81 mg, pt unsure if still on plavix  - Vascular help appreciated.      Problem/Plan - 6:  ·  Problem: HLD (hyperlipidemia).   ·  Plan: c/w simvastatin 40 mg QHS.     Problem/Plan - 7:  ·  Problem: Prophylactic measure.   ·  Plan: DVT prophylaxis: heparin subq  Diet: dash/tlc, renal.      Medically optimized for surgery .

## 2022-11-21 NOTE — PROGRESS NOTE ADULT - SUBJECTIVE AND OBJECTIVE BOX
24h Events:  No acute events overnight.    Subjective:   Patient seen at bedside this AM.     Objective:  Vital Signs  T(C): 37.4 (11-21 @ 05:30), Max: 37.4 (11-21 @ 05:30)  HR: 74 (11-21 @ 05:30) (74 - 84)  BP: 108/46 (11-21 @ 05:30) (108/46 - 147/65)  RR: 18 (11-21 @ 05:30) (18 - 18)  SpO2: 99% (11-21 @ 05:30) (98% - 99%)      Physical Exam:  GEN:   RESP:   ABD:   EXTR:   NEURO:     Labs:                        9.1    13.30 )-----------( 269      ( 21 Nov 2022 06:45 )             30.3   11-21    136  |  93<L>  |  24<H>  ----------------------------<  110<H>  4.1   |  30  |  5.35<H>    Ca    9.6      21 Nov 2022 06:45  Phos  4.5     11-21  Mg     2.30     11-21      CAPILLARY BLOOD GLUCOSE          Imaging:     24h Events:  No acute events overnight.    Subjective:   Patient seen at bedside this AM.     Objective:  Vital Signs  T(C): 37.4 (11-21 @ 05:30), Max: 37.4 (11-21 @ 05:30)  HR: 74 (11-21 @ 05:30) (74 - 84)  BP: 108/46 (11-21 @ 05:30) (108/46 - 147/65)  RR: 18 (11-21 @ 05:30) (18 - 18)  SpO2: 99% (11-21 @ 05:30) (98% - 99%)      Physical Exam:  General: No acute distress  Respiratory: Nonlabored, room air   Cardiovascular: RRR  Abdominal: Soft, nondistended, nontender. No rebound or guarding. No organomegaly, no palpable mass.  Extremities:   RLE: palpable femoral/popliteal pulse, BKA stump dressing c/d/i  LLE: palpable femoral/popliteal pulse, DP/PT signals, ischemic changes to toes w/ skin changes, purulent drainage lateral foot     Labs:                        9.1    13.30 )-----------( 269      ( 21 Nov 2022 06:45 )             30.3   11-21    136  |  93<L>  |  24<H>  ----------------------------<  110<H>  4.1   |  30  |  5.35<H>    Ca    9.6      21 Nov 2022 06:45  Phos  4.5     11-21  Mg     2.30     11-21      CAPILLARY BLOOD GLUCOSE          Imaging:

## 2022-11-21 NOTE — PROGRESS NOTE ADULT - ASSESSMENT
61 y.o F with left foot wound , s/p L foot Partial Fifth Ray Resection and Partial Third Ray Resection, s/p R foot BKA   - Pt was seen and evaluated.   - Afebrile, WBC 10.95  - Left foot forefoot ischemic changes to the 2nd digit, Partial Third Ray Resection surgical site dehisced wound to probe to bone, no pus expressed today, fibbronecrotic wound bed, erythema resolved  with +1 pitting edema to the level of the ankle  - cont IV abx  - Left foot wound culture : E. coli, Klebsiella pneumoniae, and proteus mirabilis.  - MRI of the left foot without contrast pending  - MARTA/PVR: RABI was not assessed 2/2 R BKA, LABI (1.45), Waveforms are mildly diminished at the ankle, and severely diminished at the metatarsal segment, and flat at the digits.  - Patient with known history of PVD without good revasc options.  Severe small vessel disease. Pt now amenable to L BKA w/ vasc, appreciated  - Pod Plan: Local wound care pending proximal amputation/ vasc recs  - Discussed with attending.

## 2022-11-22 ENCOUNTER — TRANSCRIPTION ENCOUNTER (OUTPATIENT)
Age: 62
End: 2022-11-22

## 2022-11-22 LAB
ANION GAP SERPL CALC-SCNC: 16 MMOL/L — HIGH (ref 7–14)
BUN SERPL-MCNC: 40 MG/DL — HIGH (ref 7–23)
CALCIUM SERPL-MCNC: 9.4 MG/DL — SIGNIFICANT CHANGE UP (ref 8.4–10.5)
CHLORIDE SERPL-SCNC: 93 MMOL/L — LOW (ref 98–107)
CO2 SERPL-SCNC: 27 MMOL/L — SIGNIFICANT CHANGE UP (ref 22–31)
CREAT SERPL-MCNC: 7.53 MG/DL — HIGH (ref 0.5–1.3)
EGFR: 6 ML/MIN/1.73M2 — LOW
GLUCOSE SERPL-MCNC: 89 MG/DL — SIGNIFICANT CHANGE UP (ref 70–99)
HCT VFR BLD CALC: 28 % — LOW (ref 34.5–45)
HGB BLD-MCNC: 8.5 G/DL — LOW (ref 11.5–15.5)
MAGNESIUM SERPL-MCNC: 2.4 MG/DL — SIGNIFICANT CHANGE UP (ref 1.6–2.6)
MCHC RBC-ENTMCNC: 27.8 PG — SIGNIFICANT CHANGE UP (ref 27–34)
MCHC RBC-ENTMCNC: 30.4 GM/DL — LOW (ref 32–36)
MCV RBC AUTO: 91.5 FL — SIGNIFICANT CHANGE UP (ref 80–100)
NRBC # BLD: 0 /100 WBCS — SIGNIFICANT CHANGE UP (ref 0–0)
NRBC # FLD: 0 K/UL — SIGNIFICANT CHANGE UP (ref 0–0)
PHOSPHATE SERPL-MCNC: 5.8 MG/DL — HIGH (ref 2.5–4.5)
PLATELET # BLD AUTO: 288 K/UL — SIGNIFICANT CHANGE UP (ref 150–400)
POTASSIUM SERPL-MCNC: 4.6 MMOL/L — SIGNIFICANT CHANGE UP (ref 3.5–5.3)
POTASSIUM SERPL-SCNC: 4.6 MMOL/L — SIGNIFICANT CHANGE UP (ref 3.5–5.3)
RBC # BLD: 3.06 M/UL — LOW (ref 3.8–5.2)
RBC # FLD: 14.6 % — HIGH (ref 10.3–14.5)
SARS-COV-2 RNA SPEC QL NAA+PROBE: SIGNIFICANT CHANGE UP
SODIUM SERPL-SCNC: 136 MMOL/L — SIGNIFICANT CHANGE UP (ref 135–145)
VANCOMYCIN FLD-MCNC: 16 UG/ML — SIGNIFICANT CHANGE UP
WBC # BLD: 12.2 K/UL — HIGH (ref 3.8–10.5)
WBC # FLD AUTO: 12.2 K/UL — HIGH (ref 3.8–10.5)

## 2022-11-22 PROCEDURE — 99222 1ST HOSP IP/OBS MODERATE 55: CPT

## 2022-11-22 RX ADMIN — CHLORHEXIDINE GLUCONATE 1 APPLICATION(S): 213 SOLUTION TOPICAL at 12:32

## 2022-11-22 RX ADMIN — OXYCODONE HYDROCHLORIDE 5 MILLIGRAM(S): 5 TABLET ORAL at 21:40

## 2022-11-22 RX ADMIN — Medication 81 MILLIGRAM(S): at 12:30

## 2022-11-22 RX ADMIN — OXYCODONE HYDROCHLORIDE 5 MILLIGRAM(S): 5 TABLET ORAL at 10:18

## 2022-11-22 RX ADMIN — OXYCODONE HYDROCHLORIDE 5 MILLIGRAM(S): 5 TABLET ORAL at 20:59

## 2022-11-22 RX ADMIN — OXYCODONE HYDROCHLORIDE 5 MILLIGRAM(S): 5 TABLET ORAL at 11:18

## 2022-11-22 RX ADMIN — SIMVASTATIN 40 MILLIGRAM(S): 20 TABLET, FILM COATED ORAL at 20:59

## 2022-11-22 RX ADMIN — MEROPENEM 100 MILLIGRAM(S): 1 INJECTION INTRAVENOUS at 20:59

## 2022-11-22 RX ADMIN — Medication 1 TABLET(S): at 12:30

## 2022-11-22 RX ADMIN — ERYTHROPOIETIN 10000 UNIT(S): 10000 INJECTION, SOLUTION INTRAVENOUS; SUBCUTANEOUS at 08:00

## 2022-11-22 RX ADMIN — SEVELAMER CARBONATE 1600 MILLIGRAM(S): 2400 POWDER, FOR SUSPENSION ORAL at 12:30

## 2022-11-22 RX ADMIN — SEVELAMER CARBONATE 1600 MILLIGRAM(S): 2400 POWDER, FOR SUSPENSION ORAL at 18:16

## 2022-11-22 NOTE — PROGRESS NOTE ADULT - ASSESSMENT
61 y.o F with left foot wound , s/p L foot Partial Fifth Ray Resection and Partial Third Ray Resection, s/p R foot BKA   - Pt was seen and evaluated.   - Afebrile, WBC 12.20  - Left foot foref.oot ischemic changes to the 2nd digit, Partial Third Ray Resection surgical site dehisced wound to probe to bone, no pus expressed today, fibbronecrotic wound bed, erythema resolved  with +1 pitting edema to the level of the ankle  - cont IV abx  - Left foot wound culture : E. coli, Klebsiella pneumoniae, and proteus mirabilis.  - Pt unable to tolerate MRI  - MARTA/PVR: RABI was not assessed 2/2 R BKA, LABI (1.45), Waveforms are mildly diminished at the ankle, and severely diminished at the metatarsal segment, and flat at the digits.  - Patient with known history of PVD without good revasc options.  Severe small vessel disease.   - Vasc plan Left BKA tomorrow Wednesday 11/23  - Pod Plan: Local wound care pending proximal amputation/ vasc recs  - Seen with attending.

## 2022-11-22 NOTE — PROGRESS NOTE ADULT - SUBJECTIVE AND OBJECTIVE BOX
Cardiovascular Disease Progress Note  Date of Service: 22 @ 08:48    Overnight events: No acute events overnight.  Ms. Boogie is undergoing HD in no distress.  No chest pain or SOB.         Objective Findings:  T(C): 36.3 (22 @ 06:30), Max: 36.9 (22 @ 05:24)  HR: 77 (22 @ 06:30) (72 - 79)  BP: 122/62 (22 @ 06:30) (98/47 - 122/62)  RR: 18 (22 @ 06:30) (17 - 18)  SpO2: 99% (22 @ 05:24) (99% - 100%)  Wt(kg): --  Daily     Daily Weight in k (2022 06:30)      Physical Exam:  Gen: NAD; Patient resting comfortably  HEENT: EOMI, Normocephalic/ atraumatic  CV: RRR, normal S1 + S2, no m/r/g  Lungs:  Normal respiratory effort; clear to auscultation bilaterally  Abd: soft, non-tender; bowel sounds present  Ext: No edema;     Telemetry: n/a    Laboratory Data:                        8.5    . )-----------( 288      ( 2022 06:20 )             28.0         136  |  93<L>  |  40<H>  ----------------------------<  89  4.6   |  27  |  7.53<H>    Ca    9.4      2022 06:20  Phos  5.8       Mg     2.40                     Inpatient Medications:  MEDICATIONS  (STANDING):  aspirin enteric coated 81 milliGRAM(s) Oral daily  chlorhexidine 2% Cloths 1 Application(s) Topical daily  epoetin niesha-epbx (RETACRIT) Injectable 70194 Unit(s) IV Push <User Schedule>  gabapentin 100 milliGRAM(s) Oral <User Schedule>  heparin   Injectable 5000 Unit(s) SubCutaneous every 8 hours  meropenem  IVPB 500 milliGRAM(s) IV Intermittent every 24 hours  multivitamin 1 Tablet(s) Oral daily  sevelamer carbonate 1600 milliGRAM(s) Oral three times a day with meals  simvastatin 40 milliGRAM(s) Oral at bedtime      Assessment:  61-year-old female with ESRD on HD, HLD and peripheral vascular disease s/p lower extremity resection presents with chronic limb ischemia and L foot ulcer.     Plan of Care:    #Peripheral vascular disease-  Chronic limb ischemia status post R BKA and L ray resection.    Ms. Boogie displays no signs or symptoms of coronary ischemia or acute CHF.   Echo from 3/2022 reviewed- normal LV systolic function with no significant valvular disease.   Ms. Boogie is optimized from a cardiac standpoint to proceed with L BKA.   Antiplatelet therapy as per the vascular team.      #Compensated diastolic CHF-  Euvolemic on exam.  Fluid removal with HD as per the renal team.   Echo from 3/2022 reviewed- normal LV systolic function with no significant valvular disease. .      #ESRD-  - HD as per renal.    #ACP (advance care planning)-  Advanced care planning was discussed with the patient.    Cardiac findings were discussed in detail and all questions were answered.      Over 25 minutes spent on total encounter; more than 50% of the visit was spent counseling and/or coordinating care by the attending physician.      Cristino Adams MD [facility]  Cardiovascular Disease  [phone]

## 2022-11-22 NOTE — PROGRESS NOTE ADULT - SUBJECTIVE AND OBJECTIVE BOX
24h Events:  No acute events overnight.    Subjective:   Patient seen at bedside this AM.     Objective:  Vital Signs  T(C): 36.3 (11-22 @ 06:30), Max: 36.9 (11-22 @ 05:24)  HR: 77 (11-22 @ 06:30) (72 - 79)  BP: 122/62 (11-22 @ 06:30) (98/47 - 122/62)  RR: 18 (11-22 @ 06:30) (17 - 18)  SpO2: 99% (11-22 @ 05:24) (99% - 100%)    Physical Exam:  General: No acute distress  Respiratory: Nonlabored, room air   Cardiovascular: RRR  Abdominal: Soft, nondistended, nontender. No rebound or guarding. No organomegaly, no palpable mass.  Extremities:   RLE: palpable femoral/popliteal pulse, BKA stump dressing c/d/i  LLE: palpable femoral/popliteal pulse, DP/PT signals, ischemic changes to toes w/ skin changes, purulent drainage lateral foot     Labs:                        8.5    12.20 )-----------( 288      ( 22 Nov 2022 06:20 )             28.0   11-21    136  |  93<L>  |  24<H>  ----------------------------<  110<H>  4.1   |  30  |  5.35<H>    Ca    9.6      21 Nov 2022 06:45  Phos  4.5     11-21  Mg     2.30     11-21      CAPILLARY BLOOD GLUCOSE          Imaging:

## 2022-11-22 NOTE — CONSULT NOTE ADULT - ASSESSMENT
61 year old female with ESRD on HD MWF with R AVG, PAD s/p R balloon angioplastic 5/11/22, R BKA, s/p L foot partial 3rd  and 5th ray amputations, presents with L foot infection with ischemic changes. Planned for BKA tomorrow with vascular.     Wound cx with E. coli, ESBL Kleb pneumo, and Proteus mirabilis.    Recommend:  #L foot wound infection, PAD, leukocytosis  -Continue meropenem 500 mg IV q 24 hours, given daily but on days of HD administer after HD  -F/U abscess cx  -F/U blood cultures  -Planned for L foot BKA tomorrow with vascular  -Monitor for fevers  -Trend WBC    #ESRD on HD  -Continue abx renally adjusted  -Nephro following    Charly Zamora MD  Available through MS Teams  If no response, or after 5pm/weekends, call [phone]   61 year old female with ESRD on HD MWF with R AVG, PAD s/p R balloon angioplastic 5/11/22, R BKA, s/p L foot partial 3rd  and 5th ray amputations, presents with L foot infection with ischemic changes. Planned for BKA tomorrow with vascular.     Wound cx with E. coli, ESBL Kleb pneumo, and Proteus mirabilis.    Recommend:  #L foot wound infection, PAD, leukocytosis  -Continue meropenem 500 mg IV q 24 hours, given daily but on days of HD administer after HD  -Discontinue vancomycin IV  -F/U abscess cx  -F/U blood cultures  -Planned for L foot BKA tomorrow with vascular  -Monitor for fevers  -Trend WBC    #ESRD on HD  -Continue abx renally adjusted  -Nephro following    Charly Zamora MD  Available through MS Teams  If no response, or after 5pm/weekends, call [phone]

## 2022-11-22 NOTE — PROGRESS NOTE ADULT - ASSESSMENT
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 Pt admitted for IV abx and further work up of L toe infection. Renal consulted for ESRD Mx.     ESRD on dialysis.    Maintenance HD schedule MWF  access- thigh Arterio-Venous Graft   HD unit- S[facility]  K, vol acceptable    Plan  s/p HD today am, Rx sheet reviewed, net UF 1kg removed, tolerated well. uneventful. (holiday schedule)  Ultrafiltration on Dialysis as tolerated with blood pressure   renal diet , fluid restriction   dose all meds for ESRD  anemia in ckd- Hb < goal.   Hemoglobin : 8.5<- 9.1 <- 9.0 <--, 9.2 <--, 10.3 <--  increased Epo 6>10k w/hd tiw  monitor H/H    high phos level : controlled  Serum Phosphorus level mg/dl : 5.8<- 5.2<--4.0 <--, 5.7 <--  c/w renvela 2tidac    h/o HTN- no meds. bp low, monitor closely    L diabetic foot ulcer.   Pt w/ left 2nd and 4th toe infection, s/p debridement and wound culture w/ podiatry. Concern for possible OM.   - s/p vanc. on meropenem q24hrs. vanco by level  -plan for amputation tomorrow per vas sx  pt optimized renal stand point for OR     will closely follow up.   poc d/w pt  labs, chart reviewed  For any question, pl call:  Nephrology  Cell -[phone]  Office [phone]  Ans Serv [phone]

## 2022-11-22 NOTE — PROGRESS NOTE ADULT - SUBJECTIVE AND OBJECTIVE BOX
Podiatry pager #: [phone] ([place])/ [phone] ([facility])    Patient is a 61y old  Female who presents with a chief complaint of toe infection (22 Nov 2022 08:11)       INTERVAL HPI/OVERNIGHT EVENTS:  Patient seen and evaluated at bedside.  Pt is resting comfortable in NAD. Denies N/V/F/C.     Allergies    No Known Allergies    Intolerances        Vital Signs Last 24 Hrs  T(C): 36.3 (22 Nov 2022 06:30), Max: 36.9 (22 Nov 2022 05:24)  T(F): 97.3 (22 Nov 2022 06:30), Max: 98.5 (22 Nov 2022 05:24)  HR: 77 (22 Nov 2022 06:30) (72 - 79)  BP: 122/62 (22 Nov 2022 06:30) (98/47 - 122/62)  BP(mean): --  RR: 18 (22 Nov 2022 06:30) (17 - 18)  SpO2: 99% (22 Nov 2022 05:24) (99% - 100%)    Parameters below as of 22 Nov 2022 06:30  Patient On (Oxygen Delivery Method): room air        LABS:                        8.5    12.20 )-----------( 288      ( 22 Nov 2022 06:20 )             28.0     11-22    136  |  93<L>  |  40<H>  ----------------------------<  89  4.6   |  27  |  7.53<H>    Ca    9.4      22 Nov 2022 06:20  Phos  5.8     11-22  Mg     2.40     11-22          CAPILLARY BLOOD GLUCOSE          Lower Extremity Physical Exam:  Vascular: DP/PT 0/4, left foot, Temperature gradient warm to warm L   Neuro: Epicritic sensation diminished to the level of ankle L  Musculoskeletal/Ortho: s/p RLE BKA  Skin: Left foot forefoot ischemic changes to the 2nd digit, Partial Third Ray Resection surgical site dehisced wound to probe to bone, no pus expressed, fibbronecrotic wound bed, erythema resolved  with +1 pitting edema to the level of the ankle  RADIOLOGY & ADDITIONAL TESTS:

## 2022-11-22 NOTE — PROGRESS NOTE ADULT - SUBJECTIVE AND OBJECTIVE BOX
[facility] - S Jluis / Shabbir S /D Romina/ SHERRI Mcfarlane/ SHERRI Madden/ Andrés Perkins / M Tristianu/ O Gladis  service -[phone], office [phone]  ---------------------------------------------------------------------------------------------------------------    Patient seen and examined bedside    Subjective and Objective: No overnight events, sob resolved. No complaints today. feeling better    Allergies: No Known Allergies      Hospital Medications:   MEDICATIONS  (STANDING):  aspirin enteric coated 81 milliGRAM(s) Oral daily  chlorhexidine 2% Cloths 1 Application(s) Topical daily  epoetin niesha-epbx (RETACRIT) Injectable 55852 Unit(s) IV Push <User Schedule>  gabapentin 100 milliGRAM(s) Oral <User Schedule>  heparin   Injectable 5000 Unit(s) SubCutaneous every 8 hours  meropenem  IVPB 500 milliGRAM(s) IV Intermittent every 24 hours  multivitamin 1 Tablet(s) Oral daily  sevelamer carbonate 1600 milliGRAM(s) Oral three times a day with meals  simvastatin 40 milliGRAM(s) Oral at bedtime      REVIEW OF SYSTEMS:  CONSTITUTIONAL: No weakness, fevers or chills  EYES/ENT: No visual changes;  No vertigo or throat pain   NECK: No pain or stiffness  RESPIRATORY: No cough, wheezing, hemoptysis; No shortness of breath  CARDIOVASCULAR: No chest pain or palpitations.  GASTROINTESTINAL: No abdominal or epigastric pain. No nausea, vomiting, or hematemesis; No diarrhea or constipation. No melena or hematochezia.  GENITOURINARY: No dysuria, frequency, foamy urine, urinary urgency, incontinence or hematuria  NEUROLOGICAL: No numbness or weakness  SKIN: No itching, burning, rashes, or lesions   VASCULAR: No bilateral lower extremity edema.   All other review of systems is negative unless indicated above.    VITALS:  T(F): 99.4 (11-22-22 @ 15:31), Max: 99.4 (11-22-22 @ 15:31)  HR: 80 (11-22-22 @ 15:31)  BP: 92/41 (11-22-22 @ 15:31)  RR: 17 (11-22-22 @ 15:31)  SpO2: 96% (11-22-22 @ 15:31)  Wt(kg): --    11-22 @ 07:01  -  11-22 @ 16:10  --------------------------------------------------------  IN: 400 mL / OUT: 1400 mL / NET: -1000 mL          PHYSICAL EXAM:  Constitutional: NAD  HEENT: anicteric sclera, oropharynx clear  Neck: No JVD  Respiratory: CTAB, no wheezes, rales or rhonchi  Cardiovascular: S1, S2, RRR  Gastrointestinal: BS+, soft, NT/ND  Extremities: No cyanosis or clubbing. No peripheral edema  Neurological: A/O x 3, no focal deficits  Psychiatric: Normal mood, normal affect  : No CVA tenderness. No salcido.   Skin: No rashes  Vascular Access:    LABS:  11-22    136  |  93<L>  |  40<H>  ----------------------------<  89  4.6   |  27  |  7.53<H>    Ca    9.4      22 Nov 2022 06:20  Phos  5.8     11-22  Mg     2.40     11-22      Creatinine Trend: 7.53 <--, 5.35 <--, 7.23 <--, 5.13 <--, 6.76 <--, 5.88 <--                        8.5    12.20 )-----------( 288      ( 22 Nov 2022 06:20 )             28.0     Urine Studies:        RADIOLOGY & ADDITIONAL STUDIES:   [facility] - S Jluis / Shabbir S /D Romina/ S Denys/ S Chano/ Andrés Perkins / M Katlyn/ O Gladis  service [phone], office [phone]  ---------------------------------------------------------------------------------------------------------------    Patient seen and examined bedside    Subjective and Objective: No overnight events, denied sob. No complaints today. feeling better    Allergies: No Known Allergies      Hospital Medications:   MEDICATIONS  (STANDING):  aspirin enteric coated 81 milliGRAM(s) Oral daily  chlorhexidine 2% Cloths 1 Application(s) Topical daily  epoetin niesha-epbx (RETACRIT) Injectable 44169 Unit(s) IV Push <User Schedule>  gabapentin 100 milliGRAM(s) Oral <User Schedule>  heparin   Injectable 5000 Unit(s) SubCutaneous every 8 hours  meropenem  IVPB 500 milliGRAM(s) IV Intermittent every 24 hours  multivitamin 1 Tablet(s) Oral daily  sevelamer carbonate 1600 milliGRAM(s) Oral three times a day with meals  simvastatin 40 milliGRAM(s) Oral at bedtime    VITALS:  T(F): 99.4 (11-22-22 @ 15:31), Max: 99.4 (11-22-22 @ 15:31)  HR: 80 (11-22-22 @ 15:31)  BP: 92/41 (11-22-22 @ 15:31)  RR: 17 (11-22-22 @ 15:31)  SpO2: 96% (11-22-22 @ 15:31)  Wt(kg): --    11-22 @ 07:01  -  11-22 @ 16:10  --------------------------------------------------------  IN: 400 mL / OUT: 1400 mL / NET: -1000 mL      PHYSICAL EXAM:  Constitutional: NAD  HEENT: anicteric sclera  Neck: No JVD  Respiratory: CTAB, no wheezes, rales or rhonchi  Cardiovascular: S1, S2, RRR  Gastrointestinal: BS+, soft, NT/ND  Extremities: rt BKA+, left foot dressing+   Neurological: A/O x 3  Psychiatric: Normal mood, normal affect  : No salcido.   Vascular Access: rt femoral AVG+    LABS:  11-22    136  |  93<L>  |  40<H>  ----------------------------<  89  4.6   |  27  |  7.53<H>    Ca    9.4      22 Nov 2022 06:20  Phos  5.8     11-22  Mg     2.40     11-22      Creatinine Trend: 7.53 <--, 5.35 <--, 7.23 <--, 5.13 <--, 6.76 <--, 5.88 <--                        8.5    12.20 )-----------( 288      ( 22 Nov 2022 06:20 )             28.0     Urine Studies:        RADIOLOGY & ADDITIONAL STUDIES:

## 2022-11-22 NOTE — CONSULT NOTE ADULT - PROVIDER SPECIALTY LIST ADULT
Nephrology
Vascular Surgery
Cardiology
Podiatry
Temples.../Clavicles.../Shoulders.../Calf...
Infectious Disease

## 2022-11-22 NOTE — CONSULT NOTE ADULT - SUBJECTIVE AND OBJECTIVE BOX
HPI:  61F w/ PMHx of ESRD (HD MWF), PAD s/p R balloon angioplasty (5/11/2022) and foot wounds s/p left foot partial 3rd ray amputation and R BKA presenting with L toe infection. Pt states she has been having pain in the base of her left big toe for the past couple of weeks. She states she went to her podiatrist today who was able to express some pus. The podiatrist was concerned for an infection and pt was sent to ED for further evaluation. Pt denies any fever or chills. She states she has not noticed any drainage from her foot. She otherwise denies any other symptoms of chest pain, SOB, abd pain, n/v/d, or urinary symptoms. She states her last HD session was 11/16.    In ED, vitals T 100 oral, HR 87, /45, RR 16, O2 100% on RA  Labs significant for wbc 12.75, Hb 10.3, BUN 33/5.88, ESR 95,   EKG: pending  XR left foot: Prelim radiology INTERPRETATION:  Severe degenerative changes of the foot with extensive   progressive erosion along the remainder of the distal second and fourth   metatarsal heads as well as the base of the remaining fourth and second   proximal phalangeal bases.    These findings are concerning for osteomyelitis versus progressive   ischemic erosion and are new when compared to 5/30/2022. No tracking gas   collection.    An MRI with contrast is warranted for definitive assessment.  ED management: Meropenem x1, podiatry and vascular consulted    Planned for L BKA tomorrow with vascular.  Wound cx with E. coli, ESBL Kleb pneumo, Proteus mirabilis.  Tmax 100F.  Leukocytosis.    PAST MEDICAL & SURGICAL HISTORY:  Heart failure      HLD (hyperlipidemia)      Glaucoma      Cataract      CKD (chronic kidney disease), stage IV      ESRD (end stage renal disease) on dialysis      PAD (peripheral artery disease)      Blind right eye      Acute osteomyelitis of toe of right foot  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      S/P arteriovenous (AV) fistula creation    Hemodialysis access, AV graft    Allergies    No Known Allergies    Intolerances        ANTIMICROBIALS:  meropenem  IVPB 500 every 24 hours      OTHER MEDS:  acetaminophen     Tablet .. 650 milliGRAM(s) Oral every 6 hours PRN  aspirin enteric coated 81 milliGRAM(s) Oral daily  chlorhexidine 2% Cloths 1 Application(s) Topical daily  epoetin niesha-epbx (RETACRIT) Injectable 76107 Unit(s) IV Push <User Schedule>  gabapentin 100 milliGRAM(s) Oral <User Schedule>  heparin   Injectable 5000 Unit(s) SubCutaneous every 8 hours  multivitamin 1 Tablet(s) Oral daily  oxyCODONE    IR 5 milliGRAM(s) Oral every 4 hours PRN  sevelamer carbonate 1600 milliGRAM(s) Oral three times a day with meals  simvastatin 40 milliGRAM(s) Oral at bedtime  sodium chloride 0.9% Bolus. 100 milliLiter(s) IV Bolus every 5 minutes PRN      SOCIAL HISTORY:    FAMILY HISTORY:  No pertinent family history in first degree relatives        Drug Dosing Weight  Height (cm): 162.6 (18 Nov 2022 06:11)  Weight (kg): 67 (18 Nov 2022 06:11)  BMI (kg/m2): 25.3 (18 Nov 2022 06:11)  BSA (m2): 1.72 (18 Nov 2022 06:11)    PE:    Vital Signs Last 24 Hrs  T(C): 37.4 (22 Nov 2022 15:31), Max: 37.4 (22 Nov 2022 15:31)  T(F): 99.4 (22 Nov 2022 15:31), Max: 99.4 (22 Nov 2022 15:31)  HR: 80 (22 Nov 2022 15:31) (72 - 80)  BP: 92/41 (22 Nov 2022 15:31) (92/41 - 122/62)  BP(mean): --  RR: 17 (22 Nov 2022 15:31) (17 - 18)  SpO2: 96% (22 Nov 2022 15:31) (96% - 100%)    Parameters below as of 22 Nov 2022 15:31  Patient On (Oxygen Delivery Method): room air    Gen: AOx3, NAD  CV: S1+S2 normal, no murmurs  Resp: Clear bilat, no resp distress  Abd: Soft, nontender, +BS  Ext: R BKA intact, L foot s/p 3rd and 5th toe amputations, swelling of the left foot, ischemic changes  : No Peralta  IV/Skin: No thrombophlebitis  Msk: R AVG intact  Neuro: No sensory deficits, no motor deficits    LABS:                          8.5    12.20 )-----------( 288      ( 22 Nov 2022 06:20 )             28.0       11-22    136  |  93<L>  |  40<H>  ----------------------------<  89  4.6   |  27  |  7.53<H>    Ca    9.4      22 Nov 2022 06:20  Phos  5.8     11-22  Mg     2.40     11-22            MICROBIOLOGY:  Vancomycin Level, Random: 16.0 ug/mL (11-22-22 @ 06:20)  v  .Abscess left foot  11-17-22   Culture yields >4 types of aerobic and/or anaerobic bacteria  Call client services within 7 days if further workup is clinically  indicated.  Culture includes  Moderate Escherichia coli  Few Klebsiella pneumoniae ESBL  Few Proteus mirabilis  --  Escherichia coli  Klebsiella pneumoniae ESBL  Proteus mirabilis      .Blood Blood-Peripheral  11-17-22   No growth to date.  --  --      .Blood Blood-Peripheral  11-17-22   No growth to date.  --  --    RADIOLOGY:    < from: Xray Foot AP + Lateral + Oblique, Left (11.17.22 @ 17:24) >  IMPRESSION:  Severe degenerative changes of the foot with extensive progressive   erosion along the remainder of the distal second and fourth metatarsal   heads as well as the base of the remaining fourth and second proximal   phalangeal bases. Prominent osteopenia of the fourth phalanges.    These findings are concerning for osteomyelitis erosion and are new when   compared to 5/30/2022. No tracking gas collection. Extensive bilateral   vascular calcifications.      An MRI with contrast is warranted for definitive assessment..    < end of copied text >

## 2022-11-22 NOTE — PROGRESS NOTE ADULT - ASSESSMENT
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    Recs:  - left foot with no revascularization options based on last angiogram  - L BKA Wednesday   - HD today   - Please document cardiology optimization for OR  - 1 AM Labs for OR   - Replete electrolytes as needed      C Team Surgery   [id number]   61F w/ PMHx of ESRD (HD MWF), PAD s/p R balloon angioplasty (5/11/2022) and foot wounds s/p 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    Recs:  - left foot with no revascularization options based on last angiogram  - L BKA Wednesday   - HD today   - NO HD TOMORROW   - Please document cardiology optimization for OR  - 1 AM Labs for OR   - Replete electrolytes as needed      C Team Surgery   [id number]

## 2022-11-22 NOTE — PROGRESS NOTE ADULT - ATTENDING COMMENTS
Patient seen/examined. Now agreeable to left BKA. Plantar wound stable. Please continue antibiotics and document medical clearance for left BKA.
Seen at bedside. Discussed limited treatment options. Patient agrees to proceed with BKA.  Vasc tentatively planning for tomorrow.  Podiatry will sign off after completion of BKA

## 2022-11-22 NOTE — PROGRESS NOTE ADULT - SUBJECTIVE AND OBJECTIVE BOX
Date of Service  : 11-22-22     INTERVAL HPI/OVERNIGHT EVENTS: I feel fine.   Vital Signs Last 24 Hrs  T(C): 37.4 (22 Nov 2022 15:31), Max: 37.4 (22 Nov 2022 15:31)  T(F): 99.4 (22 Nov 2022 15:31), Max: 99.4 (22 Nov 2022 15:31)  HR: 80 (22 Nov 2022 15:31) (72 - 80)  BP: 92/41 (22 Nov 2022 15:31) (92/41 - 122/62)  BP(mean): --  RR: 17 (22 Nov 2022 15:31) (17 - 18)  SpO2: 96% (22 Nov 2022 15:31) (96% - 100%)    Parameters below as of 22 Nov 2022 15:31  Patient On (Oxygen Delivery Method): room air      I&O's Summary    22 Nov 2022 07:01  -  22 Nov 2022 19:37  --------------------------------------------------------  IN: 400 mL / OUT: 1400 mL / NET: -1000 mL      MEDICATIONS  (STANDING):  aspirin enteric coated 81 milliGRAM(s) Oral daily  chlorhexidine 2% Cloths 1 Application(s) Topical daily  epoetin niesha-epbx (RETACRIT) Injectable 78119 Unit(s) IV Push <User Schedule>  gabapentin 100 milliGRAM(s) Oral <User Schedule>  heparin   Injectable 5000 Unit(s) SubCutaneous every 8 hours  meropenem  IVPB 500 milliGRAM(s) IV Intermittent every 24 hours  multivitamin 1 Tablet(s) Oral daily  sevelamer carbonate 1600 milliGRAM(s) Oral three times a day with meals  simvastatin 40 milliGRAM(s) Oral at bedtime    MEDICATIONS  (PRN):  acetaminophen     Tablet .. 650 milliGRAM(s) Oral every 6 hours PRN Temp greater or equal to 38C (100.4F), Mild Pain (1 - 3)  oxyCODONE    IR 5 milliGRAM(s) Oral every 4 hours PRN Severe Pain (7 - 10)  sodium chloride 0.9% Bolus. 100 milliLiter(s) IV Bolus every 5 minutes PRN SBP LESS THAN or EQUAL to 90 mmHg    LABS:                        8.5    12.20 )-----------( 288      ( 22 Nov 2022 06:20 )             28.0     11-22    136  |  93<L>  |  40<H>  ----------------------------<  89  4.6   |  27  |  7.53<H>    Ca    9.4      22 Nov 2022 06:20  Phos  5.8     11-22  Mg     2.40     11-22          CAPILLARY BLOOD GLUCOSE              REVIEW OF SYSTEMS:  CONSTITUTIONAL: No fever, weight loss, or fatigue  EYES: No eye pain, visual disturbances, or discharge  ENMT:  No difficulty hearing, tinnitus, vertigo; No sinus or throat pain  NECK: No pain or stiffness  RESPIRATORY: No cough, wheezing, chills or hemoptysis; No shortness of breath  CARDIOVASCULAR: No chest pain, palpitations, dizziness, or leg swelling  GASTROINTESTINAL: No abdominal or epigastric pain. No nausea, vomiting, or hematemesis; No diarrhea or constipation. No melena or hematochezia.  GENITOURINARY: No dysuria, frequency, hematuria, or incontinence  NEUROLOGICAL: No headaches, memory loss, loss of strength, numbness, or tremors      Consultant(s) Notes Reviewed:  [x ] YES  [ ] NO    PHYSICAL EXAM:  GENERAL: NAD, well-groomed, well-developed,not in any distress ,  HEAD:  Atraumatic, Normocephalic  EYES: EOMI, PERRLA, conjunctiva and sclera clear  ENMT: No tonsillar erythema, exudates, or enlargement; Moist mucous membranes, Good dentition, No lesions  NECK: Supple, No JVD, Normal thyroid  NERVOUS SYSTEM:  Alert & Oriented X3, No focal deficit   CHEST/LUNG: Good air entry bilateral with no  rales, rhonchi, wheezing, or rubs  HEART: Regular rate and rhythm; No murmurs, rubs, or gallops  ABDOMEN: Soft, Nontender, Nondistended; Bowel sounds present  EXTREMITIES:  L foot dressed.     Care Discussed with Consultants/Other Providers [ x] YES  [ ] NO

## 2022-11-23 ENCOUNTER — RESULT REVIEW (OUTPATIENT)
Age: 62
End: 2022-11-23

## 2022-11-23 LAB
ALBUMIN SERPL ELPH-MCNC: 3.7 G/DL — SIGNIFICANT CHANGE UP (ref 3.3–5)
ALP SERPL-CCNC: 56 U/L — SIGNIFICANT CHANGE UP (ref 40–120)
ALT FLD-CCNC: 7 U/L — SIGNIFICANT CHANGE UP (ref 4–33)
ANION GAP SERPL CALC-SCNC: 14 MMOL/L — SIGNIFICANT CHANGE UP (ref 7–14)
APTT BLD: 30.4 SEC — SIGNIFICANT CHANGE UP (ref 27–36.3)
AST SERPL-CCNC: 16 U/L — SIGNIFICANT CHANGE UP (ref 4–32)
BILIRUB SERPL-MCNC: 0.2 MG/DL — SIGNIFICANT CHANGE UP (ref 0.2–1.2)
BUN SERPL-MCNC: 26 MG/DL — HIGH (ref 7–23)
CALCIUM SERPL-MCNC: 9.4 MG/DL — SIGNIFICANT CHANGE UP (ref 8.4–10.5)
CHLORIDE SERPL-SCNC: 91 MMOL/L — LOW (ref 98–107)
CO2 SERPL-SCNC: 31 MMOL/L — SIGNIFICANT CHANGE UP (ref 22–31)
CREAT SERPL-MCNC: 5.36 MG/DL — HIGH (ref 0.5–1.3)
CULTURE RESULTS: SIGNIFICANT CHANGE UP
CULTURE RESULTS: SIGNIFICANT CHANGE UP
EGFR: 8 ML/MIN/1.73M2 — LOW
GLUCOSE SERPL-MCNC: 98 MG/DL — SIGNIFICANT CHANGE UP (ref 70–99)
HCT VFR BLD CALC: 27.6 % — LOW (ref 34.5–45)
HGB BLD-MCNC: 8.4 G/DL — LOW (ref 11.5–15.5)
INR BLD: 1.18 RATIO — HIGH (ref 0.88–1.16)
MAGNESIUM SERPL-MCNC: 2.2 MG/DL — SIGNIFICANT CHANGE UP (ref 1.6–2.6)
MCHC RBC-ENTMCNC: 27.8 PG — SIGNIFICANT CHANGE UP (ref 27–34)
MCHC RBC-ENTMCNC: 30.4 GM/DL — LOW (ref 32–36)
MCV RBC AUTO: 91.4 FL — SIGNIFICANT CHANGE UP (ref 80–100)
NRBC # BLD: 0 /100 WBCS — SIGNIFICANT CHANGE UP (ref 0–0)
NRBC # FLD: 0 K/UL — SIGNIFICANT CHANGE UP (ref 0–0)
PHOSPHATE SERPL-MCNC: 4.1 MG/DL — SIGNIFICANT CHANGE UP (ref 2.5–4.5)
PLATELET # BLD AUTO: 296 K/UL — SIGNIFICANT CHANGE UP (ref 150–400)
POTASSIUM SERPL-MCNC: 4.2 MMOL/L — SIGNIFICANT CHANGE UP (ref 3.5–5.3)
POTASSIUM SERPL-SCNC: 4.2 MMOL/L — SIGNIFICANT CHANGE UP (ref 3.5–5.3)
PROT SERPL-MCNC: 7.5 G/DL — SIGNIFICANT CHANGE UP (ref 6–8.3)
PROTHROM AB SERPL-ACNC: 13.7 SEC — HIGH (ref 10.5–13.4)
RBC # BLD: 3.02 M/UL — LOW (ref 3.8–5.2)
RBC # FLD: 14.3 % — SIGNIFICANT CHANGE UP (ref 10.3–14.5)
SODIUM SERPL-SCNC: 136 MMOL/L — SIGNIFICANT CHANGE UP (ref 135–145)
SPECIMEN SOURCE: SIGNIFICANT CHANGE UP
SPECIMEN SOURCE: SIGNIFICANT CHANGE UP
WBC # BLD: 13.44 K/UL — HIGH (ref 3.8–10.5)
WBC # FLD AUTO: 13.44 K/UL — HIGH (ref 3.8–10.5)

## 2022-11-23 PROCEDURE — 88307 TISSUE EXAM BY PATHOLOGIST: CPT | Mod: 26

## 2022-11-23 PROCEDURE — 27880 AMPUTATION OF LOWER LEG: CPT | Mod: LT

## 2022-11-23 PROCEDURE — 97605 NEG PRS WND THER DME<=50SQCM: CPT | Mod: 59

## 2022-11-23 DEVICE — CLIP APPLIER COVIDIEN SURGICLIP 11.5" MEDIUM: Type: IMPLANTABLE DEVICE | Site: LEFT | Status: FUNCTIONAL

## 2022-11-23 DEVICE — CLIP APPLIER COVIDIEN SURGICLIP III 9" SM: Type: IMPLANTABLE DEVICE | Site: LEFT | Status: FUNCTIONAL

## 2022-11-23 RX ORDER — HYDROMORPHONE HYDROCHLORIDE 2 MG/ML
0.5 INJECTION INTRAMUSCULAR; INTRAVENOUS; SUBCUTANEOUS
Refills: 0 | Status: DISCONTINUED | OUTPATIENT
Start: 2022-11-23 | End: 2022-11-23

## 2022-11-23 RX ORDER — OXYCODONE HYDROCHLORIDE 5 MG/1
5 TABLET ORAL EVERY 4 HOURS
Refills: 0 | Status: DISCONTINUED | OUTPATIENT
Start: 2022-11-23 | End: 2022-11-24

## 2022-11-23 RX ORDER — HYDROMORPHONE HYDROCHLORIDE 2 MG/ML
0.5 INJECTION INTRAMUSCULAR; INTRAVENOUS; SUBCUTANEOUS ONCE
Refills: 0 | Status: DISCONTINUED | OUTPATIENT
Start: 2022-11-23 | End: 2022-11-23

## 2022-11-23 RX ORDER — ACETAMINOPHEN 500 MG
975 TABLET ORAL EVERY 8 HOURS
Refills: 0 | Status: COMPLETED | OUTPATIENT
Start: 2022-11-23 | End: 2022-11-24

## 2022-11-23 RX ORDER — ONDANSETRON 8 MG/1
4 TABLET, FILM COATED ORAL ONCE
Refills: 0 | Status: DISCONTINUED | OUTPATIENT
Start: 2022-11-23 | End: 2022-11-23

## 2022-11-23 RX ORDER — HYDROMORPHONE HYDROCHLORIDE 2 MG/ML
0.25 INJECTION INTRAMUSCULAR; INTRAVENOUS; SUBCUTANEOUS
Refills: 0 | Status: DISCONTINUED | OUTPATIENT
Start: 2022-11-23 | End: 2022-11-23

## 2022-11-23 RX ADMIN — HYDROMORPHONE HYDROCHLORIDE 0.5 MILLIGRAM(S): 2 INJECTION INTRAMUSCULAR; INTRAVENOUS; SUBCUTANEOUS at 20:57

## 2022-11-23 RX ADMIN — OXYCODONE HYDROCHLORIDE 5 MILLIGRAM(S): 5 TABLET ORAL at 17:00

## 2022-11-23 RX ADMIN — OXYCODONE HYDROCHLORIDE 5 MILLIGRAM(S): 5 TABLET ORAL at 01:05

## 2022-11-23 RX ADMIN — OXYCODONE HYDROCHLORIDE 5 MILLIGRAM(S): 5 TABLET ORAL at 14:20

## 2022-11-23 RX ADMIN — SIMVASTATIN 40 MILLIGRAM(S): 20 TABLET, FILM COATED ORAL at 21:01

## 2022-11-23 RX ADMIN — OXYCODONE HYDROCHLORIDE 5 MILLIGRAM(S): 5 TABLET ORAL at 01:50

## 2022-11-23 RX ADMIN — HYDROMORPHONE HYDROCHLORIDE 0.5 MILLIGRAM(S): 2 INJECTION INTRAMUSCULAR; INTRAVENOUS; SUBCUTANEOUS at 21:15

## 2022-11-23 RX ADMIN — Medication 975 MILLIGRAM(S): at 17:10

## 2022-11-23 RX ADMIN — OXYCODONE HYDROCHLORIDE 5 MILLIGRAM(S): 5 TABLET ORAL at 17:50

## 2022-11-23 RX ADMIN — HEPARIN SODIUM 5000 UNIT(S): 5000 INJECTION INTRAVENOUS; SUBCUTANEOUS at 21:01

## 2022-11-23 RX ADMIN — Medication 975 MILLIGRAM(S): at 17:50

## 2022-11-23 RX ADMIN — SEVELAMER CARBONATE 1600 MILLIGRAM(S): 2400 POWDER, FOR SUSPENSION ORAL at 17:00

## 2022-11-23 RX ADMIN — OXYCODONE HYDROCHLORIDE 5 MILLIGRAM(S): 5 TABLET ORAL at 13:25

## 2022-11-23 RX ADMIN — MEROPENEM 100 MILLIGRAM(S): 1 INJECTION INTRAVENOUS at 20:58

## 2022-11-23 NOTE — PROGRESS NOTE ADULT - SUBJECTIVE AND OBJECTIVE BOX
Date of Service  : 11-23-22     INTERVAL HPI/OVERNIGHT EVENTS: S/P LBKA   Vital Signs Last 24 Hrs  T(C): 36.2 (23 Nov 2022 15:20), Max: 37.1 (23 Nov 2022 05:32)  T(F): 97.1 (23 Nov 2022 15:20), Max: 98.7 (23 Nov 2022 05:32)  HR: 76 (23 Nov 2022 15:20) (69 - 78)  BP: 121/54 (23 Nov 2022 15:20) (107/51 - 127/44)  BP(mean): 73 (23 Nov 2022 11:15) (71 - 74)  RR: 16 (23 Nov 2022 15:20) (14 - 18)  SpO2: 97% (23 Nov 2022 15:20) (95% - 100%)    Parameters below as of 23 Nov 2022 15:20  Patient On (Oxygen Delivery Method): room air      I&O's Summary    22 Nov 2022 07:01  -  23 Nov 2022 07:00  --------------------------------------------------------  IN: 400 mL / OUT: 1400 mL / NET: -1000 mL      MEDICATIONS  (STANDING):  acetaminophen     Tablet .. 975 milliGRAM(s) Oral every 8 hours  aspirin enteric coated 81 milliGRAM(s) Oral daily  chlorhexidine 2% Cloths 1 Application(s) Topical daily  epoetin niesha-epbx (RETACRIT) Injectable 46864 Unit(s) IV Push <User Schedule>  gabapentin 100 milliGRAM(s) Oral <User Schedule>  heparin   Injectable 5000 Unit(s) SubCutaneous every 8 hours  meropenem  IVPB 500 milliGRAM(s) IV Intermittent every 24 hours  multivitamin 1 Tablet(s) Oral daily  sevelamer carbonate 1600 milliGRAM(s) Oral three times a day with meals  simvastatin 40 milliGRAM(s) Oral at bedtime    MEDICATIONS  (PRN):  oxyCODONE    IR 5 milliGRAM(s) Oral every 4 hours PRN Severe Pain (7 - 10)  sodium chloride 0.9% Bolus. 100 milliLiter(s) IV Bolus every 5 minutes PRN SBP LESS THAN or EQUAL to 90 mmHg    LABS:                        8.4    13.44 )-----------( 296      ( 23 Nov 2022 00:45 )             27.6     11-23    136  |  91<L>  |  26<H>  ----------------------------<  98  4.2   |  31  |  5.36<H>    Ca    9.4      23 Nov 2022 00:45  Phos  4.1     11-23  Mg     2.20     11-23    TPro  7.5  /  Alb  3.7  /  TBili  0.2  /  DBili  x   /  AST  16  /  ALT  7   /  AlkPhos  56  11-23    PT/INR - ( 23 Nov 2022 00:45 )   PT: 13.7 sec;   INR: 1.18 ratio         PTT - ( 23 Nov 2022 00:45 )  PTT:30.4 sec    CAPILLARY BLOOD GLUCOSE              REVIEW OF SYSTEMS:  CONSTITUTIONAL: No fever, weight loss, or fatigue  EYES: No eye pain, visual disturbances, or discharge  ENMT:  No difficulty hearing, tinnitus, vertigo; No sinus or throat pain  NECK: No pain or stiffness  RESPIRATORY: No cough, wheezing, chills or hemoptysis; No shortness of breath  CARDIOVASCULAR: No chest pain, palpitations, dizziness, or leg swelling  GASTROINTESTINAL: No abdominal or epigastric pain. No nausea, vomiting, or hematemesis; No diarrhea or constipation. No melena or hematochezia.  GENITOURINARY: No dysuria, frequency, hematuria, or incontinence  NEUROLOGICAL: No headaches, memory loss, loss of strength, numbness, or tremors      Consultant(s) Notes Reviewed:  [x ] YES  [ ] NO    PHYSICAL EXAM:  GENERAL: NAD, well-groomed, well-developed,not in any distress ,  HEAD:  Atraumatic, Normocephalic  NECK: Supple, No JVD, Normal thyroid  NERVOUS SYSTEM:  Alert & Oriented X3, No focal deficit   CHEST/LUNG: Good air entry bilateral with no  rales, rhonchi, wheezing, or rubs  HEART: Regular rate and rhythm; No murmurs, rubs, or gallops  ABDOMEN: Soft, Nontender, Nondistended; Bowel sounds present  EXTREMITIES:  S/P L BKA   Care Discussed with Consultants/Other Providers [ x] YES  [ ] NO

## 2022-11-23 NOTE — PROGRESS NOTE ADULT - SUBJECTIVE AND OBJECTIVE BOX
[facility] - S Jluis / Shabbir S /D Romina/ SHERRI Mcfarlane/ SHERRI Madden/ Andrés Perkins / M Tristianu/ O Gladis  service [phone], office [phone]  ---------------------------------------------------------------------------------------------------------------    Patient seen and examined bedside    Subjective and Objective: No overnight events, sob resolved. No complaints today. feeling better    Allergies: No Known Allergies      Hospital Medications:   MEDICATIONS  (STANDING):  acetaminophen     Tablet .. 975 milliGRAM(s) Oral every 8 hours  aspirin enteric coated 81 milliGRAM(s) Oral daily  chlorhexidine 2% Cloths 1 Application(s) Topical daily  epoetin niesha-epbx (RETACRIT) Injectable 78146 Unit(s) IV Push <User Schedule>  gabapentin 100 milliGRAM(s) Oral <User Schedule>  heparin   Injectable 5000 Unit(s) SubCutaneous every 8 hours  meropenem  IVPB 500 milliGRAM(s) IV Intermittent every 24 hours  multivitamin 1 Tablet(s) Oral daily  sevelamer carbonate 1600 milliGRAM(s) Oral three times a day with meals  simvastatin 40 milliGRAM(s) Oral at bedtime      REVIEW OF SYSTEMS:  CONSTITUTIONAL: No weakness, fevers or chills  EYES/ENT: No visual changes;  No vertigo or throat pain   NECK: No pain or stiffness  RESPIRATORY: No cough, wheezing, hemoptysis; No shortness of breath  CARDIOVASCULAR: No chest pain or palpitations.  GASTROINTESTINAL: No abdominal or epigastric pain. No nausea, vomiting, or hematemesis; No diarrhea or constipation. No melena or hematochezia.  GENITOURINARY: No dysuria, frequency, foamy urine, urinary urgency, incontinence or hematuria  NEUROLOGICAL: No numbness or weakness  SKIN: No itching, burning, rashes, or lesions   VASCULAR: No bilateral lower extremity edema.   All other review of systems is negative unless indicated above.    VITALS:  T(F): 97.1 (11-23-22 @ 15:20), Max: 98.7 (11-23-22 @ 05:32)  HR: 76 (11-23-22 @ 15:20)  BP: 121/54 (11-23-22 @ 15:20)  RR: 16 (11-23-22 @ 15:20)  SpO2: 97% (11-23-22 @ 15:20)  Wt(kg): --    11-22 @ 07:01  -  11-23 @ 07:00  --------------------------------------------------------  IN: 400 mL / OUT: 1400 mL / NET: -1000 mL      Height (cm): 162.6 (11-23 @ 07:07)  Weight (kg): 67 (11-23 @ 07:07)  BMI (kg/m2): 25.3 (11-23 @ 07:07)  BSA (m2): 1.72 (11-23 @ 07:07)    PHYSICAL EXAM:  Constitutional: NAD  HEENT: anicteric sclera, oropharynx clear  Neck: No JVD  Respiratory: CTAB, no wheezes, rales or rhonchi  Cardiovascular: S1, S2, RRR  Gastrointestinal: BS+, soft, NT/ND  Extremities: No cyanosis or clubbing. No peripheral edema  Neurological: A/O x 3, no focal deficits  Psychiatric: Normal mood, normal affect  : No CVA tenderness. No salcido.   Skin: No rashes  Vascular Access:    LABS:  11-23    136  |  91<L>  |  26<H>  ----------------------------<  98  4.2   |  31  |  5.36<H>    Ca    9.4      23 Nov 2022 00:45  Phos  4.1     11-23  Mg     2.20     11-23    TPro  7.5  /  Alb  3.7  /  TBili  0.2  /  DBili      /  AST  16  /  ALT  7   /  AlkPhos  56  11-23    Creatinine Trend: 5.36 <--, 7.53 <--, 5.35 <--, 7.23 <--, 5.13 <--, 6.76 <--, 5.88 <--                        8.4    13.44 )-----------( 296      ( 23 Nov 2022 00:45 )             27.6     Urine Studies:        RADIOLOGY & ADDITIONAL STUDIES:   [facility] - S Jluis / Shabbir S /D Romina/ S Denys/ S Chano/ Andrés Perkins / M Katlyn/ O Gladis  service [phone], office [phone]  ---------------------------------------------------------------------------------------------------------------    Patient seen and examined bedside    Subjective and Objective: No overnight events, denied sob. No complaints today.  s/p Lt BKA today    Allergies: No Known Allergies      Hospital Medications:   MEDICATIONS  (STANDING):  acetaminophen     Tablet .. 975 milliGRAM(s) Oral every 8 hours  aspirin enteric coated 81 milliGRAM(s) Oral daily  chlorhexidine 2% Cloths 1 Application(s) Topical daily  epoetin niesha-epbx (RETACRIT) Injectable 69909 Unit(s) IV Push <User Schedule>  gabapentin 100 milliGRAM(s) Oral <User Schedule>  heparin   Injectable 5000 Unit(s) SubCutaneous every 8 hours  meropenem  IVPB 500 milliGRAM(s) IV Intermittent every 24 hours  multivitamin 1 Tablet(s) Oral daily  sevelamer carbonate 1600 milliGRAM(s) Oral three times a day with meals  simvastatin 40 milliGRAM(s) Oral at bedtime    VITALS:  T(F): 97.1 (11-23-22 @ 15:20), Max: 98.7 (11-23-22 @ 05:32)  HR: 76 (11-23-22 @ 15:20)  BP: 121/54 (11-23-22 @ 15:20)  RR: 16 (11-23-22 @ 15:20)  SpO2: 97% (11-23-22 @ 15:20)  Wt(kg): --    11-22 @ 07:01  -  11-23 @ 07:00  --------------------------------------------------------  IN: 400 mL / OUT: 1400 mL / NET: -1000 mL      Height (cm): 162.6 (11-23 @ 07:07)  Weight (kg): 67 (11-23 @ 07:07)  BMI (kg/m2): 25.3 (11-23 @ 07:07)  BSA (m2): 1.72 (11-23 @ 07:07)    PHYSICAL EXAM:  Constitutional: NAD  HEENT: anicteric sclera  Neck: No JVD  Respiratory: CTAB, no wheezes, rales or rhonchi  Cardiovascular: S1, S2, RRR  Gastrointestinal: BS+, soft, NT/ND  Extremities: rt BKA+, left BKA dressing+  Neurological: A/O x 3  Psychiatric: Normal mood, normal affect  : No salcido.   Vascular Access: rt femoral AVG+    LABS:  11-23    136  |  91<L>  |  26<H>  ----------------------------<  98  4.2   |  31  |  5.36<H>    Ca    9.4      23 Nov 2022 00:45  Phos  4.1     11-23  Mg     2.20     11-23    TPro  7.5  /  Alb  3.7  /  TBili  0.2  /  DBili      /  AST  16  /  ALT  7   /  AlkPhos  56  11-23    Creatinine Trend: 5.36 <--, 7.53 <--, 5.35 <--, 7.23 <--, 5.13 <--, 6.76 <--, 5.88 <--                        8.4    13.44 )-----------( 296      ( 23 Nov 2022 00:45 )             27.6     Urine Studies:        RADIOLOGY & ADDITIONAL STUDIES:

## 2022-11-23 NOTE — PROGRESS NOTE ADULT - ASSESSMENT
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    Recs:  - left foot with no revascularization options based on last angiogram  - OR for L BKA today   - NPO  - Appreciate cardiac optimization for OR   - POC   - Will have knee immobilizer in place post operatively, please maintain until Vascular Sx says otherwise  - Will have Prevena Vac in place post operatively, to stay for 3-5 days  - Appreciate excellent care per primary team      C Team Surgery   [id number]

## 2022-11-23 NOTE — PROGRESS NOTE ADULT - ASSESSMENT
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 Pt admitted for IV abx and further work up of L toe infection. Renal consulted for ESRD Mx.     ESRD on dialysis.    Maintenance HD schedule MWF  access- thigh Arterio-Venous Graft   HD unit- SQDC  K, vol acceptable    Plan  s/p HD 11/22, Rx sheet reviewed, net UF 1kg removed, tolerated well. uneventful. (holiday schedule)  plan for next HD 11/25  Ultrafiltration on Dialysis as tolerated with blood pressure   renal diet , fluid restriction   dose all meds for ESRD  anemia in ckd- Hb < goal.   Hemoglobin : 8.4<- 8.5<- 9.1 <- 9.0 <--, 9.2 <--, 10.3 <--  increased Epo 6>10k w/hd tiw. will inc dose w/next hd  monitor H/H    high phos level : controlled  Serum Phosphorus level mg/dl : 5.8<- 5.2<--4.0 <--, 5.7 <--  c/w renvela 2tidac    h/o HTN- no meds. bp low, monitor closely    L diabetic foot ulcer.   Pt w/ left 2nd and 4th toe infection, s/p debridement and wound culture w/ podiatry. Concern for possible OM.   - s/p vanc. on meropenem q24hrs. vanco by level  - s/p L BKA amputation. f/u w/ vas sx  - on meropenem      will closely follow up.   poc d/w pt  labs, chart reviewed  For any question, pl call:  Nephrology  Cell -[phone]  Office [phone]  Ans Serv [phone]

## 2022-11-23 NOTE — PROGRESS NOTE ADULT - SUBJECTIVE AND OBJECTIVE BOX
24h Events:  No acute events overnight.    Subjective:   Patient seen at bedside this AM. Discussed plans for OR today with patient, and patient is aware. Has not eaten/drank since midnight. Denies fevers, chills, nausea, vomiting.     Objective:  Vital Signs  T(C): 37 (11-23 @ 10:45), Max: 37.4 (11-22 @ 15:31)  HR: 70 (11-23 @ 11:15) (69 - 80)  BP: 125/44 (11-23 @ 11:15) (92/41 - 127/44)  RR: 16 (11-23 @ 11:15) (14 - 18)  SpO2: 100% (11-23 @ 11:15) (95% - 100%)      Physical Exam:  General: No acute distress  Respiratory: Nonlabored, room air   Cardiovascular: RRR  Extremities:   RLE: palpable femoral/popliteal pulse, BKA stump dressing c/d/i  LLE: palpable femoral/popliteal pulse, DP/PT signals, ischemic changes to toes w/ skin changes, purulent drainage lateral foot       Labs:                        8.4    13.44 )-----------( 296      ( 23 Nov 2022 00:45 )             27.6   11-23    136  |  91<L>  |  26<H>  ----------------------------<  98  4.2   |  31  |  5.36<H>    Ca    9.4      23 Nov 2022 00:45  Phos  4.1     11-23  Mg     2.20     11-23    TPro  7.5  /  Alb  3.7  /  TBili  0.2  /  DBili  x   /  AST  16  /  ALT  7   /  AlkPhos  56  11-23    CAPILLARY BLOOD GLUCOSE          Imaging:

## 2022-11-23 NOTE — CHART NOTE - NSCHARTNOTEFT_GEN_A_CORE
Post Operative Note    Procedure: S/P LLE BKA    Subjective:   Patient seen and examined at bedside. Admits to uncontrolled pain with current pain regimen. Offers no other complaints, denies dizziness, CP, SOB nausea/vomiting.    Objective:  Vitals: T(F): 97.1 (11-23-22 @ 15:20), Max: 98.7 (11-23-22 @ 05:32)  HR: 76 (11-23-22 @ 15:20)  BP: 121/54 (11-23-22 @ 15:20) (107/51 - 127/44)  RR: 16 (11-23-22 @ 15:20)  SpO2: 97% (11-23-22 @ 15:20)  Vent Settings:     In:   11-22-22 @ 07:01  -  11-23-22 @ 07:00  --------------------------------------------------------  IN: 400 mL      IV Fluids: multivitamin 1 Tablet(s) Oral daily      Out:   11-22-22 @ 07:01  -  11-23-22 @ 07:00  --------------------------------------------------------  OUT: 1400 mL      EBL:     Voided Urine:   11-22-22 @ 07:01  -  11-23-22 @ 07:00  --------------------------------------------------------  OUT: 1400 mL    Physical Exam:  General: No acute distress  Respiratory: Nonlabored, room air   Extremities:   RLE: BKA stump dressing c/d/i  LLE: BKA site with prevena VAC in place suctioning appropriately and knee immobilizer in place    Medications: [Standing]  acetaminophen     Tablet .. 650 milliGRAM(s) Oral every 6 hours PRN  aspirin enteric coated 81 milliGRAM(s) Oral daily  chlorhexidine 2% Cloths 1 Application(s) Topical daily  epoetin niesha-epbx (RETACRIT) Injectable 92969 Unit(s) IV Push <User Schedule>  gabapentin 100 milliGRAM(s) Oral <User Schedule>  heparin   Injectable 5000 Unit(s) SubCutaneous every 8 hours  meropenem  IVPB 500 milliGRAM(s) IV Intermittent every 24 hours  multivitamin 1 Tablet(s) Oral daily  oxyCODONE    IR 5 milliGRAM(s) Oral every 4 hours PRN  sevelamer carbonate 1600 milliGRAM(s) Oral three times a day with meals  simvastatin 40 milliGRAM(s) Oral at bedtime  sodium chloride 0.9% Bolus. 100 milliLiter(s) IV Bolus every 5 minutes PRN    Medications: [PRN]  acetaminophen     Tablet .. 650 milliGRAM(s) Oral every 6 hours PRN  aspirin enteric coated 81 milliGRAM(s) Oral daily  chlorhexidine 2% Cloths 1 Application(s) Topical daily  epoetin niesha-epbx (RETACRIT) Injectable 08081 Unit(s) IV Push <User Schedule>  gabapentin 100 milliGRAM(s) Oral <User Schedule>  heparin   Injectable 5000 Unit(s) SubCutaneous every 8 hours  meropenem  IVPB 500 milliGRAM(s) IV Intermittent every 24 hours  multivitamin 1 Tablet(s) Oral daily  oxyCODONE    IR 5 milliGRAM(s) Oral every 4 hours PRN  sevelamer carbonate 1600 milliGRAM(s) Oral three times a day with meals  simvastatin 40 milliGRAM(s) Oral at bedtime  sodium chloride 0.9% Bolus. 100 milliLiter(s) IV Bolus every 5 minutes PRN    Labs:                        8.4    13.44 )-----------( 296      ( 23 Nov 2022 00:45 )             27.6     11-23    136  |  91<L>  |  26<H>  ----------------------------<  98  4.2   |  31  |  5.36<H>    Ca    9.4      23 Nov 2022 00:45  Phos  4.1     11-23  Mg     2.20     11-23    TPro  7.5  /  Alb  3.7  /  TBili  0.2  /  DBili  x   /  AST  16  /  ALT  7   /  AlkPhos  56  11-23    PT/INR - ( 23 Nov 2022 00:45 )   PT: 13.7 sec;   INR: 1.18 ratio         PTT - ( 23 Nov 2022 00:45 )  PTT:30.4 sec      61F w/ PMHx of ESRD (HD MWF), PAD s/p R balloon angioplasty (5/11/2022) and foot wounds s/p 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 Now several hours postop from LLE BKA.    Recs:  - regular diet  - optimize pain regimen  - prevena VAC x 5 days  - knee immobilizer  - Appreciate excellent care per primary team      C Team Surgery   [id number]

## 2022-11-23 NOTE — PROGRESS NOTE ADULT - SUBJECTIVE AND OBJECTIVE BOX
Cardiovascular Disease Progress Note  Date of Service: 22 @ 06:32    Overnight events: No acute events overnight.  Ms. Boogie denies chest pain or SOB.     Otherwise review of systems negative    Objective Findings:  T(C): 37.1 (22 @ 05:32), Max: 37.4 (22 @ 15:31)  HR: 75 (22 @ 05:32) (75 - 80)  BP: 107/51 (22 @ 05:32) (92/41 - 114/-)  RR: 18 (22 @ 05:32) (17 - 18)  SpO2: 99% (22 @ 05:32) (96% - 99%)  Wt(kg): --  Daily     Daily Weight in k (2022 09:40)      Physical Exam:  Gen: NAD; Patient resting comfortably  HEENT: EOMI, Normocephalic/ atraumatic  CV: RRR, normal S1 + S2, no m/r/g  Lungs:  Normal respiratory effort; clear to auscultation bilaterally  Abd: soft, non-tender; bowel sounds present  Ext: No edema;      Telemetry: n/a    Laboratory Data:                        8.4    13.44 )-----------( 296      ( 2022 00:45 )             27.6         136  |  91<L>  |  26<H>  ----------------------------<  98  4.2   |  31  |  5.36<H>    Ca    9.4      2022 00:45  Phos  4.1       Mg     2.20         TPro  7.5  /  Alb  3.7  /  TBili  0.2  /  DBili  x   /  AST  16  /  ALT  7   /  AlkPhos  56      PT/INR - ( 2022 00:45 )   PT: 13.7 sec;   INR: 1.18 ratio         PTT - ( 2022 00:45 )  PTT:30.4 sec          Inpatient Medications:  MEDICATIONS  (STANDING):  aspirin enteric coated 81 milliGRAM(s) Oral daily  chlorhexidine 2% Cloths 1 Application(s) Topical daily  epoetin niesha-epbx (RETACRIT) Injectable 90756 Unit(s) IV Push <User Schedule>  gabapentin 100 milliGRAM(s) Oral <User Schedule>  heparin   Injectable 5000 Unit(s) SubCutaneous every 8 hours  meropenem  IVPB 500 milliGRAM(s) IV Intermittent every 24 hours  multivitamin 1 Tablet(s) Oral daily  sevelamer carbonate 1600 milliGRAM(s) Oral three times a day with meals  simvastatin 40 milliGRAM(s) Oral at bedtime      Assessment: 61-year-old female with ESRD on HD, HLD and peripheral vascular disease s/p lower extremity resection presents with chronic limb ischemia and L foot ulcer.     Plan of Care:    #Peripheral vascular disease-  Chronic limb ischemia status post R BKA and L ray resection.    Ms. Boogie displays no signs or symptoms of coronary ischemia or acute CHF.   Echo from 3/2022 reviewed- normal LV systolic function with no significant valvular disease.   Ms. Boogie is optimized from a cardiac standpoint to proceed with L BKA.   Antiplatelet therapy as per the vascular team.      #Compensated diastolic CHF-  Euvolemic on exam.  Fluid removal with HD as per the renal team.   Echo from 3/2022 reviewed- normal LV systolic function with no significant valvular disease. .      #ESRD-  - HD as per renal.        Over 25 minutes spent on total encounter; more than 50% of the visit was spent counseling and/or coordinating care by the attending physician.      Cristino Adams MD [facility]  Cardiovascular Disease  [phone]

## 2022-11-23 NOTE — PROGRESS NOTE ADULT - ASSESSMENT
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 Pt admitted for IV abx and further work up of L toe infection.      Problem/Plan - 1:  ·  Problem: Toe infection with Severe PAD .   ·  Plan: Vascular planning BKA.   Pt w/ left 2nd and 4th toe infection, s/p debridement and wound culture w/ podiatry. Concern for possible OM.   - C/w vanc and meropenem based off previous wound cx. Dose w/ HD.  - f/u wound cx  - f/u podiatry and vascular recs  < from: [facility] Duplex Physiol Ext Low 3+ Level, BI. ([facility] Duplex Physiol Ext Low 3+ Level, LT.) (11.18.22 @ 08:29) >  Left MARTA is elevated (1.45), likely due to calcific  vessels.  Flat left digit waveforms noted. Findings  suggest the presence of distal infrapopliteal and small  vessel arterial disease in the left foot.         Problem/Plan - 2:  ·  Problem: ESRD on dialysis.   ·  Plan: Pt received HD on MWF. Last HD 11/16.   - renal following and HD per them .   - c/w gabapentin 100 mg TID before HD.     Problem/Plan - 3:  ·  Problem: Chronic CHF.   ·  Plan: Pt w/ chronic diastolic CHF. Previous TTE from 3/2022 w/ nml LVEF. Appears euvolemic on exam.  - HD per renal.     Problem/Plan - 4:  ·  Problem: Anemia.   ·  Plan: PRBC per Vascular.    No signs or evidence of bleeding  - continue to monitor.     Problem/Plan - 5:  ·  Problem: PAD (peripheral artery disease).   ·  Plan: Pt w/ hx of R balloon angioplasty (5/11/2022) and foot wounds s/p left foot partial 3rd ray amputation (3/2022) and R BKA (6/2022)  - c/w asa 81 mg, pt unsure if still on plavix  - Vascular help appreciated.   -S/P BKA.      Problem/Plan - 6:  ·  Problem: HLD (hyperlipidemia).   ·  Plan: c/w simvastatin 40 mg QHS.     Problem/Plan - 7:  ·  Problem: Prophylactic measure.   ·  Plan: DVT prophylaxis: heparin subq  Diet: dash/tlc, renal.

## 2022-11-24 LAB
ANION GAP SERPL CALC-SCNC: 19 MMOL/L — HIGH (ref 7–14)
BUN SERPL-MCNC: 44 MG/DL — HIGH (ref 7–23)
CALCIUM SERPL-MCNC: 9.3 MG/DL — SIGNIFICANT CHANGE UP (ref 8.4–10.5)
CHLORIDE SERPL-SCNC: 89 MMOL/L — LOW (ref 98–107)
CO2 SERPL-SCNC: 25 MMOL/L — SIGNIFICANT CHANGE UP (ref 22–31)
CREAT SERPL-MCNC: 7.29 MG/DL — HIGH (ref 0.5–1.3)
EGFR: 6 ML/MIN/1.73M2 — LOW
GLUCOSE SERPL-MCNC: 79 MG/DL — SIGNIFICANT CHANGE UP (ref 70–99)
HCT VFR BLD CALC: 29 % — LOW (ref 34.5–45)
HGB BLD-MCNC: 9.3 G/DL — LOW (ref 11.5–15.5)
MAGNESIUM SERPL-MCNC: 2.5 MG/DL — SIGNIFICANT CHANGE UP (ref 1.6–2.6)
MCHC RBC-ENTMCNC: 28.3 PG — SIGNIFICANT CHANGE UP (ref 27–34)
MCHC RBC-ENTMCNC: 32.1 GM/DL — SIGNIFICANT CHANGE UP (ref 32–36)
MCV RBC AUTO: 88.1 FL — SIGNIFICANT CHANGE UP (ref 80–100)
NRBC # BLD: 0 /100 WBCS — SIGNIFICANT CHANGE UP (ref 0–0)
NRBC # FLD: 0 K/UL — SIGNIFICANT CHANGE UP (ref 0–0)
PHOSPHATE SERPL-MCNC: 7.2 MG/DL — HIGH (ref 2.5–4.5)
PLATELET # BLD AUTO: 279 K/UL — SIGNIFICANT CHANGE UP (ref 150–400)
POTASSIUM SERPL-MCNC: 4.9 MMOL/L — SIGNIFICANT CHANGE UP (ref 3.5–5.3)
POTASSIUM SERPL-SCNC: 4.9 MMOL/L — SIGNIFICANT CHANGE UP (ref 3.5–5.3)
RBC # BLD: 3.29 M/UL — LOW (ref 3.8–5.2)
RBC # FLD: 15.6 % — HIGH (ref 10.3–14.5)
SODIUM SERPL-SCNC: 133 MMOL/L — LOW (ref 135–145)
WBC # BLD: 13.63 K/UL — HIGH (ref 3.8–10.5)
WBC # FLD AUTO: 13.63 K/UL — HIGH (ref 3.8–10.5)

## 2022-11-24 PROCEDURE — 99232 SBSQ HOSP IP/OBS MODERATE 35: CPT

## 2022-11-24 RX ORDER — OXYCODONE HYDROCHLORIDE 5 MG/1
5 TABLET ORAL EVERY 4 HOURS
Refills: 0 | Status: DISCONTINUED | OUTPATIENT
Start: 2022-11-24 | End: 2022-11-30

## 2022-11-24 RX ADMIN — OXYCODONE HYDROCHLORIDE 5 MILLIGRAM(S): 5 TABLET ORAL at 12:30

## 2022-11-24 RX ADMIN — OXYCODONE HYDROCHLORIDE 5 MILLIGRAM(S): 5 TABLET ORAL at 11:31

## 2022-11-24 RX ADMIN — OXYCODONE HYDROCHLORIDE 5 MILLIGRAM(S): 5 TABLET ORAL at 03:17

## 2022-11-24 RX ADMIN — SEVELAMER CARBONATE 1600 MILLIGRAM(S): 2400 POWDER, FOR SUSPENSION ORAL at 11:31

## 2022-11-24 RX ADMIN — Medication 975 MILLIGRAM(S): at 02:18

## 2022-11-24 RX ADMIN — Medication 975 MILLIGRAM(S): at 18:11

## 2022-11-24 RX ADMIN — Medication 1 TABLET(S): at 11:31

## 2022-11-24 RX ADMIN — Medication 975 MILLIGRAM(S): at 10:30

## 2022-11-24 RX ADMIN — HEPARIN SODIUM 5000 UNIT(S): 5000 INJECTION INTRAVENOUS; SUBCUTANEOUS at 14:23

## 2022-11-24 RX ADMIN — OXYCODONE HYDROCHLORIDE 5 MILLIGRAM(S): 5 TABLET ORAL at 02:16

## 2022-11-24 RX ADMIN — OXYCODONE HYDROCHLORIDE 5 MILLIGRAM(S): 5 TABLET ORAL at 17:39

## 2022-11-24 RX ADMIN — SIMVASTATIN 40 MILLIGRAM(S): 20 TABLET, FILM COATED ORAL at 22:24

## 2022-11-24 RX ADMIN — Medication 81 MILLIGRAM(S): at 11:31

## 2022-11-24 RX ADMIN — SEVELAMER CARBONATE 1600 MILLIGRAM(S): 2400 POWDER, FOR SUSPENSION ORAL at 17:05

## 2022-11-24 RX ADMIN — HEPARIN SODIUM 5000 UNIT(S): 5000 INJECTION INTRAVENOUS; SUBCUTANEOUS at 05:32

## 2022-11-24 RX ADMIN — Medication 975 MILLIGRAM(S): at 03:16

## 2022-11-24 RX ADMIN — OXYCODONE HYDROCHLORIDE 5 MILLIGRAM(S): 5 TABLET ORAL at 18:10

## 2022-11-24 RX ADMIN — MEROPENEM 100 MILLIGRAM(S): 1 INJECTION INTRAVENOUS at 22:18

## 2022-11-24 RX ADMIN — Medication 975 MILLIGRAM(S): at 09:33

## 2022-11-24 RX ADMIN — CHLORHEXIDINE GLUCONATE 1 APPLICATION(S): 213 SOLUTION TOPICAL at 11:31

## 2022-11-24 RX ADMIN — Medication 975 MILLIGRAM(S): at 17:05

## 2022-11-24 RX ADMIN — HEPARIN SODIUM 5000 UNIT(S): 5000 INJECTION INTRAVENOUS; SUBCUTANEOUS at 22:20

## 2022-11-24 RX ADMIN — SEVELAMER CARBONATE 1600 MILLIGRAM(S): 2400 POWDER, FOR SUSPENSION ORAL at 09:33

## 2022-11-24 NOTE — PROGRESS NOTE ADULT - ASSESSMENT
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 LLE without revascularization options. Now s/p L BKA 11/23.     Recs:  - Maintain knee immobilizer in place post operatively, please maintain until Vascular Sx says otherwise  - Prevena Vac in place post operatively, to stay for 3-5 days  - Appreciate excellent care per primary team  - Vascular to follow    C Team Surgery   [id number]

## 2022-11-24 NOTE — PROGRESS NOTE ADULT - SUBJECTIVE AND OBJECTIVE BOX
Date of Service  : 11-24-22     INTERVAL HPI/OVERNIGHT EVENTS: I feel fine.   Vital Signs Last 24 Hrs  T(C): 37.1 (24 Nov 2022 14:09), Max: 37.1 (24 Nov 2022 14:09)  T(F): 98.8 (24 Nov 2022 14:09), Max: 98.8 (24 Nov 2022 14:09)  HR: 76 (24 Nov 2022 14:09) (76 - 77)  BP: 117/50 (24 Nov 2022 14:09) (114/51 - 117/50)  BP(mean): --  RR: 16 (24 Nov 2022 14:09) (16 - 16)  SpO2: 96% (24 Nov 2022 14:09) (96% - 100%)    Parameters below as of 24 Nov 2022 14:09  Patient On (Oxygen Delivery Method): room air      I&O's Summary    MEDICATIONS  (STANDING):  aspirin enteric coated 81 milliGRAM(s) Oral daily  chlorhexidine 2% Cloths 1 Application(s) Topical daily  epoetin niesha-epbx (RETACRIT) Injectable 13907 Unit(s) IV Push <User Schedule>  gabapentin 100 milliGRAM(s) Oral <User Schedule>  heparin   Injectable 5000 Unit(s) SubCutaneous every 8 hours  meropenem  IVPB 500 milliGRAM(s) IV Intermittent every 24 hours  multivitamin 1 Tablet(s) Oral daily  sevelamer carbonate 1600 milliGRAM(s) Oral three times a day with meals  simvastatin 40 milliGRAM(s) Oral at bedtime    MEDICATIONS  (PRN):  oxyCODONE    IR 5 milliGRAM(s) Oral every 4 hours PRN Severe Pain (7 - 10)  sodium chloride 0.9% Bolus. 100 milliLiter(s) IV Bolus every 5 minutes PRN SBP LESS THAN or EQUAL to 90 mmHg    LABS:                        9.3    13.63 )-----------( 279      ( 24 Nov 2022 06:00 )             29.0     11-24    133<L>  |  89<L>  |  44<H>  ----------------------------<  79  4.9   |  25  |  7.29<H>    Ca    9.3      24 Nov 2022 06:37  Phos  7.2     11-24  Mg     2.50     11-24    TPro  7.5  /  Alb  3.7  /  TBili  0.2  /  DBili  x   /  AST  16  /  ALT  7   /  AlkPhos  56  11-23    PT/INR - ( 23 Nov 2022 00:45 )   PT: 13.7 sec;   INR: 1.18 ratio         PTT - ( 23 Nov 2022 00:45 )  PTT:30.4 sec    CAPILLARY BLOOD GLUCOSE              REVIEW OF SYSTEMS:  CONSTITUTIONAL: No fever, weight loss, or fatigue  EYES: No eye pain, visual disturbances, or discharge  ENMT:  No difficulty hearing, tinnitus, vertigo; No sinus or throat pain  NECK: No pain or stiffness  RESPIRATORY: No cough, wheezing, chills or hemoptysis; No shortness of breath  CARDIOVASCULAR: No chest pain, palpitations, dizziness, or leg swelling  GASTROINTESTINAL: No abdominal or epigastric pain. No nausea, vomiting, or hematemesis; No diarrhea or constipation. No melena or hematochezia.      RADIOLOGY & ADDITIONAL TESTS:    Consultant(s) Notes Reviewed:  [x ] YES  [ ] NO    PHYSICAL EXAM:  GENERAL: NAD, well-groomed, well-developed,not in any distress ,  HEAD:  Atraumatic, Normocephalic  EYES: EOMI, PERRLA, conjunctiva and sclera clear  ENMT: No tonsillar erythema, exudates, or enlargement; Moist mucous membranes, Good dentition, No lesions  NECK: Supple, No JVD, Normal thyroid  NERVOUS SYSTEM:  Alert & Oriented X3, No focal deficit   CHEST/LUNG: Good air entry bilateral with no  rales, rhonchi, wheezing, or rubs  HEART: Regular rate and rhythm; No murmurs, rubs, or gallops  ABDOMEN: Soft, Nontender, Nondistended; Bowel sounds present  EXTREMITIES:  LBKA   Care Discussed with Consultants/Other Providers [ x] YES  [ ] NO

## 2022-11-24 NOTE — PROGRESS NOTE ADULT - SUBJECTIVE AND OBJECTIVE BOX
24h Events:  No acute events overnight.    Subjective:   Patient seen at bedside this AM. Pain well controlled. Denies lightheadedness, dizziness, SOB, CP.     Objective:  Vital Signs  T(C): 36.9 (11-24 @ 05:24), Max: 36.9 (11-24 @ 05:24)  HR: 77 (11-24 @ 05:24) (74 - 77)  BP: 114/51 (11-24 @ 05:24) (114/51 - 121/54)  RR: 16 (11-24 @ 05:24) (16 - 17)  SpO2: 100% (11-24 @ 05:24) (97% - 100%)    Physical Exam:  GEN: NAD  RESP: RA, no increased work of breathing  EXTR: R BKA stump well healed, LLE BKA w prevena vac in place, holding suction, knee immobilizer in place   NEURO: A/Ox4    Labs:                        9.3    13.63 )-----------( 279      ( 24 Nov 2022 06:00 )             29.0   11-24    133<L>  |  89<L>  |  44<H>  ----------------------------<  79  4.9   |  25  |  7.29<H>    Ca    9.3      24 Nov 2022 06:37  Phos  7.2     11-24  Mg     2.50     11-24    TPro  7.5  /  Alb  3.7  /  TBili  0.2  /  DBili  x   /  AST  16  /  ALT  7   /  AlkPhos  56  11-23    CAPILLARY BLOOD GLUCOSE          Imaging:     24h Events:  -s/p LLE BKA   -No acute events overnight.    Subjective:   Patient seen at bedside this AM. Pain well controlled. Denies lightheadedness, dizziness, SOB, CP.     Objective:  Vital Signs  T(C): 36.9 (11-24 @ 05:24), Max: 36.9 (11-24 @ 05:24)  HR: 77 (11-24 @ 05:24) (74 - 77)  BP: 114/51 (11-24 @ 05:24) (114/51 - 121/54)  RR: 16 (11-24 @ 05:24) (16 - 17)  SpO2: 100% (11-24 @ 05:24) (97% - 100%)    Physical Exam:  GEN: NAD  RESP: RA, no increased work of breathing  EXTR: R BKA stump well healed, LLE BKA w prevena vac in place, holding suction, knee immobilizer in place   NEURO: A/Ox4    Labs:                        9.3    13.63 )-----------( 279      ( 24 Nov 2022 06:00 )             29.0   11-24    133<L>  |  89<L>  |  44<H>  ----------------------------<  79  4.9   |  25  |  7.29<H>    Ca    9.3      24 Nov 2022 06:37  Phos  7.2     11-24  Mg     2.50     11-24    TPro  7.5  /  Alb  3.7  /  TBili  0.2  /  DBili  x   /  AST  16  /  ALT  7   /  AlkPhos  56  11-23    CAPILLARY BLOOD GLUCOSE          Imaging:

## 2022-11-24 NOTE — PROGRESS NOTE ADULT - ASSESSMENT
61 year old female with ESRD on HD MWF with R AVG, PAD s/p R balloon angioplastic 5/11/22, R BKA, s/p L foot partial 3rd  and 5th ray amputations, presents with L foot infection with ischemic changes. Planned for BKA tomorrow with vascular.     Wound cx with E. coli, ESBL Kleb pneumo, and Proteus mirabilis.    Recommend:  #L foot wound infection, PAD, leukocytosis  -Continue meropenem 500 mg IV q 24 hours, given daily but on days of HD administer after HD  -s/p L foot BKA 11/23  -Monitor for fevers  -Trend WBC  -Given source control can dc abx tomorrow    #ESRD on HD  -Continue abx renally adjusted  -Nephro following    Charly Zamora MD  Available through MS Teams  If no response, or after 5pm/weekends, call [phone]

## 2022-11-24 NOTE — PROGRESS NOTE ADULT - SUBJECTIVE AND OBJECTIVE BOX
[facility] : [facility] [phone], Office [phone]  Dr Vanegas/Dr Bazzi  /Dr Kingsley patricia /Dr SHERRI Mcfarlane/Dr Jarrell Madden/Dr Andrés Perkins /Dr SUNI Potts  _______________________________________________________________________________________________    seen and examined today for End Stage Renal Disease on Dialysis   Interval : no new complains  VITALS:  T(F): 98.5 (11-24 @ 05:24), Max: 99.4 (11-22 @ 15:31)  HR: 77 (11-24 @ 05:24)  BP: 114/51 (11-24 @ 05:24)  ABP: --  RR: 16 (11-24 @ 05:24)  SpO2: 100% (11-24 @ 05:24)    Physical Exam :-  Constitutional: NAD  Respiratory: Bilateral equal breath sounds, no Crackles present.  Cardiovascular: S1, S2 normal, positive Murmur  Gastrointestinal: Bowel Sounds present, soft  Extremities: no Edema Feet  Neurological: Alert   Psychiatric: Normal mood, normal affect    Data:-  Allergies :   No Known Allergies    Hospital Medications:   MEDICATIONS  (STANDING):  acetaminophen     Tablet .. 975 milliGRAM(s) Oral every 8 hours  aspirin enteric coated 81 milliGRAM(s) Oral daily  chlorhexidine 2% Cloths 1 Application(s) Topical daily  epoetin niesha-epbx (RETACRIT) Injectable 79993 Unit(s) IV Push <User Schedule>  gabapentin 100 milliGRAM(s) Oral <User Schedule>  heparin   Injectable 5000 Unit(s) SubCutaneous every 8 hours  meropenem  IVPB 500 milliGRAM(s) IV Intermittent every 24 hours  multivitamin 1 Tablet(s) Oral daily  sevelamer carbonate 1600 milliGRAM(s) Oral three times a day with meals  simvastatin 40 milliGRAM(s) Oral at bedtime    11-24    133<L>  |  89<L>  |  44<H>  ----------------------------<  79  4.9   |  25  |  7.29<H>    Ca    9.3      24 Nov 2022 06:37  Phos  7.2     11-24  Mg     2.50     11-24    TPro  7.5  /  Alb  3.7  /  TBili  0.2  /  DBili      /  AST  16  /  ALT  7   /  AlkPhos  56  11-23    Creatinine Trend: 7.29 <--, 5.36 <--, 7.53 <--, 5.35 <--, 7.23 <--, 5.13 <--  egfr trend : 6 <--, 8 <--, 6 <--, 8 <--, 6 <--, 9 <--, 6 <--                        9.3    13.63 )-----------( 279      ( 24 Nov 2022 06:00 )             29.0

## 2022-11-24 NOTE — PROGRESS NOTE ADULT - SUBJECTIVE AND OBJECTIVE BOX
CC: Patient is a 61y old  Female who presents with a chief complaint of toe infection (24 Nov 2022 10:08)    ID following for L foot infection    Interval History/ROS: Patient s/p BKA on 11/23. Remains with pain. No fevers.    Rest of ROS negative.    Allergies  No Known Allergies    ANTIMICROBIALS:  meropenem  IVPB 500 every 24 hours    OTHER MEDS:  acetaminophen     Tablet .. 975 milliGRAM(s) Oral every 8 hours  aspirin enteric coated 81 milliGRAM(s) Oral daily  chlorhexidine 2% Cloths 1 Application(s) Topical daily  epoetin niesha-epbx (RETACRIT) Injectable 48759 Unit(s) IV Push <User Schedule>  gabapentin 100 milliGRAM(s) Oral <User Schedule>  heparin   Injectable 5000 Unit(s) SubCutaneous every 8 hours  multivitamin 1 Tablet(s) Oral daily  oxyCODONE    IR 5 milliGRAM(s) Oral every 4 hours PRN  sevelamer carbonate 1600 milliGRAM(s) Oral three times a day with meals  simvastatin 40 milliGRAM(s) Oral at bedtime  sodium chloride 0.9% Bolus. 100 milliLiter(s) IV Bolus every 5 minutes PRN    PE:    Vital Signs Last 24 Hrs  T(C): 36.9 (24 Nov 2022 05:24), Max: 36.9 (24 Nov 2022 05:24)  T(F): 98.5 (24 Nov 2022 05:24), Max: 98.5 (24 Nov 2022 05:24)  HR: 77 (24 Nov 2022 05:24) (74 - 77)  BP: 114/51 (24 Nov 2022 05:24) (114/51 - 121/54)  BP(mean): --  RR: 16 (24 Nov 2022 05:24) (16 - 17)  SpO2: 100% (24 Nov 2022 05:24) (97% - 100%)    Parameters below as of 24 Nov 2022 05:24  Patient On (Oxygen Delivery Method): room air    Gen: AOx3, NAD  CV: S1+S2 normal, no murmurs  Resp: Clear bilat, no resp distress  Abd: Soft, nontender, +BS  Ext: R BKA intact, L BKA braced with Prevena vac  : No Peralta  Neuro: no focal deficits    LABS:                          9.3    13.63 )-----------( 279      ( 24 Nov 2022 06:00 )             29.0       11-24    133<L>  |  89<L>  |  44<H>  ----------------------------<  79  4.9   |  25  |  7.29<H>    Ca    9.3      24 Nov 2022 06:37  Phos  7.2     11-24  Mg     2.50     11-24    TPro  7.5  /  Alb  3.7  /  TBili  0.2  /  DBili  x   /  AST  16  /  ALT  7   /  AlkPhos  56  11-23    MICROBIOLOGY:  v  .Abscess left foot  11-17-22   Culture yields >4 types of aerobic and/or anaerobic bacteria  Call client services within 7 days if further workup is clinically  indicated.  Culture includes  Moderate Escherichia coli  Few Klebsiella pneumoniae ESBL  Few Proteus mirabilis  --  Escherichia coli  Klebsiella pneumoniae ESBL  Proteus mirabilis      .Blood Blood-Peripheral  11-17-22   No Growth Final  --  --      .Blood Blood-Peripheral  11-17-22   No Growth Final  --  --    RADIOLOGY:    < from: Xray Foot AP + Lateral + Oblique, Left (11.17.22 @ 17:24) >  IMPRESSION:  Severe degenerative changes of the foot with extensive progressive   erosion along the remainder of the distal second and fourth metatarsal   heads as well as the base of the remaining fourth and second proximal   phalangeal bases. Prominent osteopenia of the fourth phalanges.    These findings are concerning for osteomyelitis erosion and are new when   compared to 5/30/2022. No tracking gas collection. Extensive bilateral   vascular calcifications.      An MRI with contrast is warranted for definitive assessment..      < end of copied text >

## 2022-11-24 NOTE — PROGRESS NOTE ADULT - SUBJECTIVE AND OBJECTIVE BOX
Cardiovascular Disease Progress Note  Date of Service: 11-24-22 @ 08:45    Overnight events: No acute events overnight.  Ms. Boogie denies chest pain or SOB.     Otherwise review of systems negative    Objective Findings:  T(C): 36.9 (11-24-22 @ 05:24), Max: 37 (11-23-22 @ 10:45)  HR: 77 (11-24-22 @ 05:24) (69 - 77)  BP: 114/51 (11-24-22 @ 05:24) (114/51 - 127/44)  RR: 16 (11-24-22 @ 05:24) (14 - 17)  SpO2: 100% (11-24-22 @ 05:24) (95% - 100%)  Wt(kg): --  Daily     Daily       Physical Exam:  Gen: NAD; Patient resting comfortably  HEENT: EOMI, Normocephalic/ atraumatic  CV: RRR, normal S1 + S2, no m/r/g  Lungs:  Normal respiratory effort; clear to auscultation bilaterally  Abd: soft, non-tender; bowel sounds present  Ext: No edema;      Telemetry: n/a    Laboratory Data:                        9.3    13.63 )-----------( 279      ( 24 Nov 2022 06:00 )             29.0     11-24    133<L>  |  89<L>  |  44<H>  ----------------------------<  79  4.9   |  25  |  7.29<H>    Ca    9.3      24 Nov 2022 06:37  Phos  7.2     11-24  Mg     2.50     11-24    TPro  7.5  /  Alb  3.7  /  TBili  0.2  /  DBili  x   /  AST  16  /  ALT  7   /  AlkPhos  56  11-23    PT/INR - ( 23 Nov 2022 00:45 )   PT: 13.7 sec;   INR: 1.18 ratio         PTT - ( 23 Nov 2022 00:45 )  PTT:30.4 sec          Inpatient Medications:  MEDICATIONS  (STANDING):  acetaminophen     Tablet .. 975 milliGRAM(s) Oral every 8 hours  aspirin enteric coated 81 milliGRAM(s) Oral daily  chlorhexidine 2% Cloths 1 Application(s) Topical daily  epoetin niesha-epbx (RETACRIT) Injectable 77279 Unit(s) IV Push <User Schedule>  gabapentin 100 milliGRAM(s) Oral <User Schedule>  heparin   Injectable 5000 Unit(s) SubCutaneous every 8 hours  meropenem  IVPB 500 milliGRAM(s) IV Intermittent every 24 hours  multivitamin 1 Tablet(s) Oral daily  sevelamer carbonate 1600 milliGRAM(s) Oral three times a day with meals  simvastatin 40 milliGRAM(s) Oral at bedtime      Assessment: 61-year-old female with ESRD on HD, HLD and peripheral vascular disease s/p lower extremity resection presents with chronic limb ischemia and L foot ulcer.     Plan of Care:    #Peripheral vascular disease-  Ms. Boogie tolerated L BKA well.   She displays no signs or symptoms of post operative coronary ischemia or acute CHF.   Echo from 3/2022 reviewed- normal LV systolic function with no significant valvular disease.   Continue current cardiac management.   Antiplatelet therapy as per the vascular team.      #Compensated diastolic CHF-  Euvolemic on exam.  Fluid removal with HD as per the renal team.   Echo from 3/2022 reviewed- normal LV systolic function with no significant valvular disease. .      #ESRD-  - HD as per renal      Over 25 minutes spent on total encounter; more than 50% of the visit was spent counseling and/or coordinating care by the attending physician.      Cristino Adams MD [facility]  Cardiovascular Disease  [phone]

## 2022-11-24 NOTE — PROGRESS NOTE ADULT - ASSESSMENT
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 Pt admitted for IV abx and further work up of L toe infection. Renal consulted for ESRD Mx.     ESRD on dialysis.    Maintenance HD schedule MWF  access- thigh Arterio-Venous Graft   HD unit- SQDC  K, vol acceptable    Plan  next hemodialysis tomorrow,  order- 2k bath, Ultrafiltration on Dialysis as tolerated with blood pressure   renal diet , fluid restriction   dose all meds for ESRD    anemia in ckd- Hb < goal.   Anemia Labs   Hemoglobin : 9.3 <--, 8.4 <--, 8.5 <--, 9.1 <--, 9.2 <--  Platelets : 279 <--, 296 <--, 288 <--    plan  on Retacrit 32062 units iv three times per week with hemodialysis   monitor H/H    high phos level : controlled  Serum calcium mg/dl : 9.3 <--, 9.4 <--, 9.4 <--  Serum Phosphorus level mg/dl : 7.2 <--, 4.1 <--, 5.8 <--    c/w renvela 2tidac  low phos diet, renal diet    h/o HTN- no meds. bp low, monitor closely    L diabetic foot ulcer.   Pt w/ left 2nd and 4th toe infection, s/p debridement and wound culture w/ podiatry. Concern for possible OM.   - s/p vanc. on meropenem q24hrs. vanco by level  - s/p L BKA amputation. f/u w/ vas sx  - on meropenem

## 2022-11-25 LAB
ANION GAP SERPL CALC-SCNC: 16 MMOL/L — HIGH (ref 7–14)
BUN SERPL-MCNC: 58 MG/DL — HIGH (ref 7–23)
CALCIUM SERPL-MCNC: 9 MG/DL — SIGNIFICANT CHANGE UP (ref 8.4–10.5)
CHLORIDE SERPL-SCNC: 90 MMOL/L — LOW (ref 98–107)
CO2 SERPL-SCNC: 26 MMOL/L — SIGNIFICANT CHANGE UP (ref 22–31)
CREAT SERPL-MCNC: 9.47 MG/DL — HIGH (ref 0.5–1.3)
EGFR: 4 ML/MIN/1.73M2 — LOW
GLUCOSE SERPL-MCNC: 84 MG/DL — SIGNIFICANT CHANGE UP (ref 70–99)
HCT VFR BLD CALC: 27.2 % — LOW (ref 34.5–45)
HGB BLD-MCNC: 8.6 G/DL — LOW (ref 11.5–15.5)
MAGNESIUM SERPL-MCNC: 2.5 MG/DL — SIGNIFICANT CHANGE UP (ref 1.6–2.6)
MCHC RBC-ENTMCNC: 28.2 PG — SIGNIFICANT CHANGE UP (ref 27–34)
MCHC RBC-ENTMCNC: 31.6 GM/DL — LOW (ref 32–36)
MCV RBC AUTO: 89.2 FL — SIGNIFICANT CHANGE UP (ref 80–100)
NRBC # BLD: 0 /100 WBCS — SIGNIFICANT CHANGE UP (ref 0–0)
NRBC # FLD: 0 K/UL — SIGNIFICANT CHANGE UP (ref 0–0)
PHOSPHATE SERPL-MCNC: 7.4 MG/DL — HIGH (ref 2.5–4.5)
PLATELET # BLD AUTO: 297 K/UL — SIGNIFICANT CHANGE UP (ref 150–400)
POTASSIUM SERPL-MCNC: 5.1 MMOL/L — SIGNIFICANT CHANGE UP (ref 3.5–5.3)
POTASSIUM SERPL-SCNC: 5.1 MMOL/L — SIGNIFICANT CHANGE UP (ref 3.5–5.3)
RBC # BLD: 3.05 M/UL — LOW (ref 3.8–5.2)
RBC # FLD: 15 % — HIGH (ref 10.3–14.5)
SODIUM SERPL-SCNC: 132 MMOL/L — LOW (ref 135–145)
WBC # BLD: 12.3 K/UL — HIGH (ref 3.8–10.5)
WBC # FLD AUTO: 12.3 K/UL — HIGH (ref 3.8–10.5)

## 2022-11-25 RX ADMIN — OXYCODONE HYDROCHLORIDE 5 MILLIGRAM(S): 5 TABLET ORAL at 19:14

## 2022-11-25 RX ADMIN — CHLORHEXIDINE GLUCONATE 1 APPLICATION(S): 213 SOLUTION TOPICAL at 11:49

## 2022-11-25 RX ADMIN — OXYCODONE HYDROCHLORIDE 5 MILLIGRAM(S): 5 TABLET ORAL at 11:47

## 2022-11-25 RX ADMIN — OXYCODONE HYDROCHLORIDE 5 MILLIGRAM(S): 5 TABLET ORAL at 12:47

## 2022-11-25 RX ADMIN — OXYCODONE HYDROCHLORIDE 5 MILLIGRAM(S): 5 TABLET ORAL at 06:00

## 2022-11-25 RX ADMIN — Medication 1 TABLET(S): at 11:49

## 2022-11-25 RX ADMIN — MEROPENEM 100 MILLIGRAM(S): 1 INJECTION INTRAVENOUS at 22:49

## 2022-11-25 RX ADMIN — Medication 81 MILLIGRAM(S): at 11:49

## 2022-11-25 RX ADMIN — SEVELAMER CARBONATE 1600 MILLIGRAM(S): 2400 POWDER, FOR SUSPENSION ORAL at 11:49

## 2022-11-25 RX ADMIN — SEVELAMER CARBONATE 1600 MILLIGRAM(S): 2400 POWDER, FOR SUSPENSION ORAL at 18:03

## 2022-11-25 RX ADMIN — OXYCODONE HYDROCHLORIDE 5 MILLIGRAM(S): 5 TABLET ORAL at 18:14

## 2022-11-25 RX ADMIN — ERYTHROPOIETIN 10000 UNIT(S): 10000 INJECTION, SOLUTION INTRAVENOUS; SUBCUTANEOUS at 09:15

## 2022-11-25 RX ADMIN — HEPARIN SODIUM 5000 UNIT(S): 5000 INJECTION INTRAVENOUS; SUBCUTANEOUS at 06:00

## 2022-11-25 RX ADMIN — HEPARIN SODIUM 5000 UNIT(S): 5000 INJECTION INTRAVENOUS; SUBCUTANEOUS at 22:49

## 2022-11-25 RX ADMIN — SIMVASTATIN 40 MILLIGRAM(S): 20 TABLET, FILM COATED ORAL at 22:49

## 2022-11-25 RX ADMIN — GABAPENTIN 100 MILLIGRAM(S): 400 CAPSULE ORAL at 05:58

## 2022-11-25 RX ADMIN — OXYCODONE HYDROCHLORIDE 5 MILLIGRAM(S): 5 TABLET ORAL at 07:00

## 2022-11-25 NOTE — DIETITIAN INITIAL EVALUATION ADULT - OTHER INFO
Patient endorses good appetite and PO intake 100% completed at breakfast today-observed tray at bedside. Per nursing flowsheet, PO intake mostly % recorded in nursing flowsheet. No nausea/vomiting/diarrhea/constipation or difficulty chewing and swallowing reported. Weight relatively stable. Previous admission 6/2022- patient with weight of 63.9kg and now this admission POST HD weights 62kg (11/22) and 65.5kg (11/18). Weight change likely related to fluid shift.

## 2022-11-25 NOTE — DIETITIAN INITIAL EVALUATION ADULT - WEIGHT FOR BMI (LBS)
Written/documentated by Chana Urrutia, acting as a scribe in Dr. Putnam's presence.    SUBJECTIVE:  The patient is 52 year old, presenting today for a physical exam. After the first covid vaccine he had some arm soreness and after the second he had waxing and waning low grade fever for 36 hours, arm soreness and fatigue. He was asymptomatic when he had the pinworms. No new concerns today.     PROBLEM LIST:  Patient Active Problem List   Diagnosis   • Allergic rhinitis   • Cavus foot, acquired   • Eosinophilia   • Left bundle branch block (LBBB)   • Stress reaction of fibula       SOCIAL HISTORY:  Social History     Socioeconomic History   • Marital status: /Civil Union     Spouse name: Not on file   • Number of children: Not on file   • Years of education: Not on file   • Highest education level: Not on file   Occupational History   • Not on file   Tobacco Use   • Smoking status: Never Smoker   • Smokeless tobacco: Never Used   Substance and Sexual Activity   • Alcohol use: Not on file   • Drug use: Not on file   • Sexual activity: Not on file   Other Topics Concern   • Not on file   Social History Narrative   • Not on file     Social Determinants of Health     Financial Resource Strain:    • Social Determinants: Financial Resource Strain:    Food Insecurity:    • Social Determinants: Food Insecurity:    Transportation Needs:    • Lack of Transportation (Medical):    • Lack of Transportation (Non-Medical):    Physical Activity:    • Days of Exercise per Week:    • Minutes of Exercise per Session:    Stress:    • Social Determinants: Stress:    Social Connections:    • Social Determinants: Social Connections:    Intimate Partner Violence:    • Social Determinants: Intimate Partner Violence Past Fear:    • Social Determinants: Intimate Partner Violence Current Fear:        SURGICAL HISTORY:  No past surgical history on file.    FAMILY HISTORY:  No family history on file.    MEDICATIONS:  Current Outpatient  Medications   Medication Sig   • amoxicillin-clavulanate (AUGMENTIN) 875-125 MG per tablet Take 1 tablet by mouth every 12 hours. Take with food.     No current facility-administered medications for this visit.       ALLERGIES:  No Known Allergies    OBJECTIVE:  Visit Vitals  /72   Pulse 84   Ht 5' 11\" (1.803 m)   Wt 94.8 kg (209 lb)   BMI 29.15 kg/m²     General:  Alert, oriented, in no acute distress.   HEENT:  Pupils round, reactive to light.  Ears normal and free of wax.  Oral mucosa moist.  Posterior pharynx unremarkable.   Neck:  Supple.  No lymphadenopathy.   Lungs:  Clear.  No wheezing, rhonchi, or crackle.   Cardiac:  Regular rate and rhythm. Murmur noted over mitral area, seems to sound louder than I recall.  Abdomen: soft, nontender.  No masses.   Extremities:  No pitting edema.     ASSESSMENT/PLAN:  1. Hx heart murmur/trival-mild MR.  Murmur sounds louder on exam today. Trivial mitral regurgitation seen on echocardiogram 10/2014 and trace mitral regurgitation seen on echocardiogram 4/2018. To update echocardiogram.     2. Allergic rhinitis. Under good control with daily Zyrtec.  3. History of left bundle branch block.  ekg is stable/unchanged today.  4. Last colonoscopy 4/2018 showed pinworms and poor prep. Recommended to follow-up in 1 year. Will refer to Gastroenterology for repeat colonoscopy now.  5. To update fasting labs.   6. First Shingrix and tetanus vaccine done today.   7. Will see him back in 1 year for annual physical.       Scribe: Electronically signed: Chana Urrutia has scribed for Dr Putnam 5/10/2021      I have reviewed and edited the progress note and agree with what has been scribed. Electronically signed by: Rigo Putnam MD 5/10/2021      147.7

## 2022-11-25 NOTE — DIETITIAN INITIAL EVALUATION ADULT - NS FNS DIET ORDER
Diet, DASH/TLC:   Sodium & Cholesterol Restricted  For patients receiving Renal Replacement - No Protein Restr, No Conc K, No Conc Phos, Low Sodium (RENAL) (11-17-22 @ 22:11)

## 2022-11-25 NOTE — DIETITIAN INITIAL EVALUATION ADULT - REASON FOR ADMISSION
Local infection of skin and subcutaneous tissue    61F w/ PMHx of ESRD (HD MWF), PAD s/p R balloon angioplasty (5/11/2022) and foot wounds s/p left foot partial 3rd ray amputation and R BKA presenting with L toe infection. Pt admitted for IV abx and further work up of L toe infection per chart review. S/p L BKA on 11/23.

## 2022-11-25 NOTE — PROGRESS NOTE ADULT - SUBJECTIVE AND OBJECTIVE BOX
Date of Service  : 11-25-22    INTERVAL HPI/OVERNIGHT EVENTS: I feel fine.   Vital Signs Last 24 Hrs  T(C): 36.8 (25 Nov 2022 21:33), Max: 37.3 (25 Nov 2022 06:00)  T(F): 98.2 (25 Nov 2022 21:33), Max: 99.1 (25 Nov 2022 06:00)  HR: 74 (25 Nov 2022 21:33) (74 - 79)  BP: 110/49 (25 Nov 2022 21:33) (110/49 - 125/55)  BP(mean): --  RR: 17 (25 Nov 2022 21:33) (17 - 18)  SpO2: 98% (25 Nov 2022 21:33) (97% - 98%)    Parameters below as of 25 Nov 2022 21:33  Patient On (Oxygen Delivery Method): room air      I&O's Summary    25 Nov 2022 07:01  -  25 Nov 2022 23:03  --------------------------------------------------------  IN: 900 mL / OUT: 1900 mL / NET: -1000 mL      MEDICATIONS  (STANDING):  aspirin enteric coated 81 milliGRAM(s) Oral daily  chlorhexidine 2% Cloths 1 Application(s) Topical daily  epoetin niesha-epbx (RETACRIT) Injectable 45790 Unit(s) IV Push <User Schedule>  gabapentin 100 milliGRAM(s) Oral <User Schedule>  heparin   Injectable 5000 Unit(s) SubCutaneous every 8 hours  meropenem  IVPB 500 milliGRAM(s) IV Intermittent every 24 hours  multivitamin 1 Tablet(s) Oral daily  sevelamer carbonate 1600 milliGRAM(s) Oral three times a day with meals  simvastatin 40 milliGRAM(s) Oral at bedtime    MEDICATIONS  (PRN):  oxyCODONE    IR 5 milliGRAM(s) Oral every 4 hours PRN Severe Pain (7 - 10)  sodium chloride 0.9% Bolus. 100 milliLiter(s) IV Bolus every 5 minutes PRN SBP LESS THAN or EQUAL to 90 mmHg    LABS:                        8.6    12.30 )-----------( 297      ( 25 Nov 2022 07:30 )             27.2     11-25    132<L>  |  90<L>  |  58<H>  ----------------------------<  84  5.1   |  26  |  9.47<H>    Ca    9.0      25 Nov 2022 07:30  Phos  7.4     11-25  Mg     2.50     11-25          CAPILLARY BLOOD GLUCOSE              REVIEW OF SYSTEMS:  CONSTITUTIONAL: No fever, weight loss, or fatigue  EYES: No eye pain, visual disturbances, or discharge  ENMT:  No difficulty hearing, tinnitus, vertigo; No sinus or throat pain  NECK: No pain or stiffness  RESPIRATORY: No cough, wheezing, chills or hemoptysis; No shortness of breath  CARDIOVASCULAR: No chest pain, palpitations, dizziness, or leg swelling  GASTROINTESTINAL: No abdominal or epigastric pain. No nausea, vomiting, or hematemesis; No diarrhea or constipation. No melena or hematochezia.  GENITOURINARY: No dysuria, frequency, hematuria, or incontinence  NEUROLOGICAL: No headaches, memory loss, loss of strength, numbness, or tremors      Consultant(s) Notes Reviewed:  [x ] YES  [ ] NO    PHYSICAL EXAM:  GENERAL: NAD, well-groomed, well-developed,not in any distress ,  HEAD:  Atraumatic, Normocephalic  NECK: Supple, No JVD, Normal thyroid  NERVOUS SYSTEM:  Alert & Oriented X3, No focal deficit   CHEST/LUNG: Good air entry bilateral with no  rales, rhonchi, wheezing, or rubs  HEART: Regular rate and rhythm; No murmurs, rubs, or gallops  ABDOMEN: Soft, Nontender, Nondistended; Bowel sounds present  EXTREMITIES:  BKAA ++    Care Discussed with Consultants/Other Providers [ x] YES  [ ] NO

## 2022-11-25 NOTE — DIETITIAN INITIAL EVALUATION ADULT - PERTINENT LABORATORY DATA
11-25    132<L>  |  90<L>  |  58<H>  ----------------------------<  84  5.1   |  26  |  9.47<H>    Ca    9.0      25 Nov 2022 07:30  Phos  7.4     11-25  Mg     2.50     11-25    A1C with Estimated Average Glucose Result: 5.1 % (05-31-22 @ 05:35)  A1C with Estimated Average Glucose Result: 4.7 % (05-04-22 @ 06:42)  A1C with Estimated Average Glucose Result: 5.6 % (03-01-22 @ 07:59)

## 2022-11-25 NOTE — PROGRESS NOTE ADULT - ASSESSMENT
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 Pt admitted for IV abx and further work up of L toe infection.      Problem/Plan - 1:  ·  Problem: Toe infection with Severe PAD .   ·  Plan: Vascular planning BKA.   Pt w/ left 2nd and 4th toe infection, s/p debridement and wound culture w/ podiatry. Concern for possible OM.   - C/w vanc and meropenem based off previous wound cx. Dose w/ HD.  - f/u wound cx  - f/u podiatry and vascular recs  < from: [facility] Duplex Physiol Ext Low 3+ Level, BI. (VA Duplex Physiol Ext Low 3+ Level, LT.) (11.18.22 @ 08:29) >  Left MARTA is elevated (1.45), likely due to calcific  vessels.  Flat left digit waveforms noted. Findings  suggest the presence of distal infrapopliteal and small  vessel arterial disease in the left foot.         Problem/Plan - 2:  ·  Problem: ESRD on dialysis.   ·  Plan: Pt received HD on MWF. Last HD 11/16.   - renal following and HD per them .   - c/w gabapentin 100 mg TID before HD.     Problem/Plan - 3:  ·  Problem: Chronic CHF.   ·  Plan: Pt w/ chronic diastolic CHF. Previous TTE from 3/2022 w/ nml LVEF. Appears euvolemic on exam.  - HD per renal.     Problem/Plan - 4:  ·  Problem: Anemia.   ·  Plan: PRBC per Vascular.    No signs or evidence of bleeding  - continue to monitor.     Problem/Plan - 5:  ·  Problem: PAD (peripheral artery disease).   ·  Plan: Pt w/ hx of R balloon angioplasty (5/11/2022) and foot wounds s/p left foot partial 3rd ray amputation (3/2022) and R BKA (6/2022)  - c/w asa 81 mg, pt unsure if still on plavix  - Vascular help appreciated.   -S/P BKA.      Problem/Plan - 6:  ·  Problem: HLD (hyperlipidemia).   ·  Plan: c/w simvastatin 40 mg QHS.     Problem/Plan - 7:  ·  Problem: Prophylactic measure.   ·  Plan: DVT prophylaxis: heparin subq  Diet: dash/tlc, renal.      Dispo : DC planning to [facility].

## 2022-11-25 NOTE — DIETITIAN INITIAL EVALUATION ADULT - PERTINENT MEDS FT
MEDICATIONS  (STANDING):  aspirin enteric coated 81 milliGRAM(s) Oral daily  chlorhexidine 2% Cloths 1 Application(s) Topical daily  epoetin niesha-epbx (RETACRIT) Injectable 77386 Unit(s) IV Push <User Schedule>  gabapentin 100 milliGRAM(s) Oral <User Schedule>  heparin   Injectable 5000 Unit(s) SubCutaneous every 8 hours  meropenem  IVPB 500 milliGRAM(s) IV Intermittent every 24 hours  multivitamin 1 Tablet(s) Oral daily  sevelamer carbonate 1600 milliGRAM(s) Oral three times a day with meals  simvastatin 40 milliGRAM(s) Oral at bedtime    MEDICATIONS  (PRN):  oxyCODONE    IR 5 milliGRAM(s) Oral every 4 hours PRN Severe Pain (7 - 10)  sodium chloride 0.9% Bolus. 100 milliLiter(s) IV Bolus every 5 minutes PRN SBP LESS THAN or EQUAL to 90 mmHg

## 2022-11-25 NOTE — PROGRESS NOTE ADULT - SUBJECTIVE AND OBJECTIVE BOX
Cardiovascular Disease Progress Note  Date of Service: 22 @ 09:47    Overnight events: No acute events overnight.   Ms. Boogie is undergoing HD in no distress.        Objective Findings:  T(C): 37 (22 @ 07:25), Max: 37.3 (22 @ 06:00)  HR: 76 (22 @ 07:25) (72 - 76)  BP: 125/55 (22 @ 07:25) (113/47 - 125/55)  RR: 17 (22 @ 07:25) (16 - 17)  SpO2: 97% (22 @ 06:00) (96% - 98%)  Wt(kg): --  Daily     Daily Weight in k.5 (2022 07:25)      Physical Exam:  Gen: NAD; Patient resting comfortably  HEENT: EOMI, Normocephalic/ atraumatic  CV: RRR, normal S1 + S2, no m/r/g  Lungs:  Normal respiratory effort; clear to auscultation bilaterally  Abd: soft, non-tender; bowel sounds present  Ext: No edema;       Telemetry: n/a    Laboratory Data:                        8.6    12. )-----------( 297      ( 2022 07:30 )             27.2         132<L>  |  90<L>  |  58<H>  ----------------------------<  84  5.1   |  26  |  9.47<H>    Ca    9.0      2022 07:30  Phos  7.4       Mg     2.50                     Inpatient Medications:  MEDICATIONS  (STANDING):  aspirin enteric coated 81 milliGRAM(s) Oral daily  chlorhexidine 2% Cloths 1 Application(s) Topical daily  epoetin niesha-epbx (RETACRIT) Injectable 92707 Unit(s) IV Push <User Schedule>  gabapentin 100 milliGRAM(s) Oral <User Schedule>  heparin   Injectable 5000 Unit(s) SubCutaneous every 8 hours  meropenem  IVPB 500 milliGRAM(s) IV Intermittent every 24 hours  multivitamin 1 Tablet(s) Oral daily  sevelamer carbonate 1600 milliGRAM(s) Oral three times a day with meals  simvastatin 40 milliGRAM(s) Oral at bedtime      Assessment: 61-year-old female with ESRD on HD, HLD and peripheral vascular disease s/p lower extremity resection presents with chronic limb ischemia and L foot ulcer.     Plan of Care:    #Peripheral vascular disease-  Ms. Boogie tolerated L BKA well.   She displays no signs or symptoms of post operative coronary ischemia or acute CHF.   Echo from 3/2022 reviewed- normal LV systolic function with no significant valvular disease.   Continue current cardiac management.   Antiplatelet therapy as per the vascular team.      #Compensated diastolic CHF-  Euvolemic on exam.  Fluid removal with HD as per the renal team.   Echo from 3/2022 reviewed- normal LV systolic function with no significant valvular disease. .      #ESRD-  - HD this morning as per renal.    #ACP (advance care planning)-  Advanced care planning was discussed with the patient.  Cardiac findings were discussed in detail and all questions were answered.      Over 25 minutes spent on total encounter; more than 50% of the visit was spent counseling and/or coordinating care by the attending physician.      Cristino Adams MD [facility]  Cardiovascular Disease  [phone]

## 2022-11-25 NOTE — PROGRESS NOTE ADULT - ASSESSMENT
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 Pt admitted for IV abx and further work up of L toe infection. Renal consulted for ESRD Mx.     ESRD on dialysis.    Maintenance HD schedule MWF  access- thigh Arterio-Venous Graft   HD unit- SQDC  K, vol acceptable    Plan  tolerated HD well today  order- 2k bath, Ultrafiltration on Dialysis as tolerated with blood pressure   renal diet , fluid restriction   dose all meds for ESRD    anemia in ckd- Hb < goal.   Anemia Labs   Hemoglobin : 9.3 <--, 8.4 <--, 8.5 <--, 9.1 <--, 9.2 <--  Platelets : 279 <--, 296 <--, 288 <--    plan  on Retacrit 06248 units iv three times per week with hemodialysis   monitor H/H    high phos level : controlled  Serum calcium mg/dl : 9.3 <--, 9.4 <--, 9.4 <--  Serum Phosphorus level mg/dl : 7.2 <--, 4.1 <--, 5.8 <--    c/w renvela 2tidac  low phos diet, renal diet    h/o HTN- no meds. bp low, monitor closely    L diabetic foot ulcer.   - s/p L BKA amputation. f/u w/ vas sx  - on meropenem        For any question, call:  Cell # [phone]  Pager # [phone]  Callback # [phone]

## 2022-11-25 NOTE — PROGRESS NOTE ADULT - SUBJECTIVE AND OBJECTIVE BOX
[facility] - TIFFANY Bazzi / TIFFANY Shea / APARNA Vanegas/ TIFFANY Mcfarlane/ TIFFANY Madden/ MASSIEL Perkins / RAINER Potts / ESTEPHANIE Griffith  ---------------------------------------------------------------------------------------------------------------  seen and examined today for ESRD  Interval : NAD, tolerated HD  VITALS:  T(F): 98.6 (11-25-22 @ 07:25), Max: 99.1 (11-25-22 @ 06:00)  HR: 76 (11-25-22 @ 07:25)  BP: 125/55 (11-25-22 @ 07:25)  RR: 17 (11-25-22 @ 07:25)  SpO2: 97% (11-25-22 @ 06:00)  Wt(kg): --    11-25 @ 07:01  -  11-25 @ 10:48  --------------------------------------------------------  IN: 900 mL / OUT: 1900 mL / NET: -1000 mL      Physical Exam :-  Constitutional: NAD  Neck: Supple.  Respiratory: Bilateral equal breath sounds,  Cardiovascular: S1, S2 normal,  Gastrointestinal: Bowel Sounds present, soft, non tender.  Extremities: B/L BKA, wound vac on left stump  Neurological: Alert and Oriented x 3, no focal deficits  Psychiatric: Normal mood, normal affect  Data:-  Allergies :   No Known Allergies    Hospital Medications:   MEDICATIONS  (STANDING):  aspirin enteric coated 81 milliGRAM(s) Oral daily  chlorhexidine 2% Cloths 1 Application(s) Topical daily  epoetin niesha-epbx (RETACRIT) Injectable 87763 Unit(s) IV Push <User Schedule>  gabapentin 100 milliGRAM(s) Oral <User Schedule>  heparin   Injectable 5000 Unit(s) SubCutaneous every 8 hours  meropenem  IVPB 500 milliGRAM(s) IV Intermittent every 24 hours  multivitamin 1 Tablet(s) Oral daily  sevelamer carbonate 1600 milliGRAM(s) Oral three times a day with meals  simvastatin 40 milliGRAM(s) Oral at bedtime    11-25    132<L>  |  90<L>  |  58<H>  ----------------------------<  84  5.1   |  26  |  9.47<H>    Ca    9.0      25 Nov 2022 07:30  Phos  7.4     11-25  Mg     2.50     11-25      Creatinine Trend: 9.47 <--, 7.29 <--, 5.36 <--, 7.53 <--, 5.35 <--, 7.23 <--, 5.13 <--                        8.6    12.30 )-----------( 297      ( 25 Nov 2022 07:30 )             27.2

## 2022-11-25 NOTE — PROGRESS NOTE ADULT - ASSESSMENT
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 LLE without revascularization options. Now s/p L BKA 11/23.     Recs:  - Maintain knee immobilizer in place post operatively, please maintain until Vascular Sx says otherwise  - Prevena Vac in place post operatively, to stay for 3-5 days  - Appreciate excellent care per primary team  - Vascular to follow    C Team Surgery   [id number]

## 2022-11-25 NOTE — DIETITIAN INITIAL EVALUATION ADULT - ADD RECOMMEND
1. Monitor weights, labs, BM's, skin integrity, p.o. intake.   2. Please document % PO intake in nursing flowsheet.

## 2022-11-25 NOTE — PROGRESS NOTE ADULT - SUBJECTIVE AND OBJECTIVE BOX
24h Events:  No acute events overnight.    Subjective:   Patient seen at bedside this AM. Pain well controlled. Denies lightheadedness, dizziness, SOB, CP.     Objective:  Vital Signs  T(C): 37 (11-25 @ 07:25), Max: 37.3 (11-25 @ 06:00)  HR: 76 (11-25 @ 07:25) (72 - 76)  BP: 125/55 (11-25 @ 07:25) (113/47 - 125/55)  RR: 17 (11-25 @ 07:25) (16 - 17)  SpO2: 97% (11-25 @ 06:00) (96% - 98%)      Physical Exam:  GEN: NAD  RESP: RA, no increased work of breathing  EXTR: R BKA stump well healed, LLE BKA w prevena vac in place, holding suction, knee immobilizer in place   NEURO: A/Ox4    Labs:                        8.6    12.30 )-----------( 297      ( 25 Nov 2022 07:30 )             27.2   11-25    132<L>  |  90<L>  |  58<H>  ----------------------------<  84  5.1   |  26  |  9.47<H>    Ca    9.0      25 Nov 2022 07:30  Phos  7.4     11-25  Mg     2.50     11-25      CAPILLARY BLOOD GLUCOSE          Imaging:

## 2022-11-26 LAB
ANION GAP SERPL CALC-SCNC: 18 MMOL/L — HIGH (ref 7–14)
BUN SERPL-MCNC: 35 MG/DL — HIGH (ref 7–23)
CALCIUM SERPL-MCNC: 9.4 MG/DL — SIGNIFICANT CHANGE UP (ref 8.4–10.5)
CHLORIDE SERPL-SCNC: 93 MMOL/L — LOW (ref 98–107)
CO2 SERPL-SCNC: 26 MMOL/L — SIGNIFICANT CHANGE UP (ref 22–31)
CREAT SERPL-MCNC: 6.66 MG/DL — HIGH (ref 0.5–1.3)
EGFR: 7 ML/MIN/1.73M2 — LOW
GLUCOSE SERPL-MCNC: 67 MG/DL — LOW (ref 70–99)
HCT VFR BLD CALC: 30.3 % — LOW (ref 34.5–45)
HGB BLD-MCNC: 9.3 G/DL — LOW (ref 11.5–15.5)
MAGNESIUM SERPL-MCNC: 2.4 MG/DL — SIGNIFICANT CHANGE UP (ref 1.6–2.6)
MCHC RBC-ENTMCNC: 28 PG — SIGNIFICANT CHANGE UP (ref 27–34)
MCHC RBC-ENTMCNC: 30.7 GM/DL — LOW (ref 32–36)
MCV RBC AUTO: 91.3 FL — SIGNIFICANT CHANGE UP (ref 80–100)
NRBC # BLD: 0 /100 WBCS — SIGNIFICANT CHANGE UP (ref 0–0)
NRBC # FLD: 0 K/UL — SIGNIFICANT CHANGE UP (ref 0–0)
PHOSPHATE SERPL-MCNC: 5.6 MG/DL — HIGH (ref 2.5–4.5)
PLATELET # BLD AUTO: 303 K/UL — SIGNIFICANT CHANGE UP (ref 150–400)
POTASSIUM SERPL-MCNC: 4.6 MMOL/L — SIGNIFICANT CHANGE UP (ref 3.5–5.3)
POTASSIUM SERPL-SCNC: 4.6 MMOL/L — SIGNIFICANT CHANGE UP (ref 3.5–5.3)
RBC # BLD: 3.32 M/UL — LOW (ref 3.8–5.2)
RBC # FLD: 14.7 % — HIGH (ref 10.3–14.5)
SODIUM SERPL-SCNC: 137 MMOL/L — SIGNIFICANT CHANGE UP (ref 135–145)
WBC # BLD: 9.62 K/UL — SIGNIFICANT CHANGE UP (ref 3.8–10.5)
WBC # FLD AUTO: 9.62 K/UL — SIGNIFICANT CHANGE UP (ref 3.8–10.5)

## 2022-11-26 RX ADMIN — HEPARIN SODIUM 5000 UNIT(S): 5000 INJECTION INTRAVENOUS; SUBCUTANEOUS at 06:42

## 2022-11-26 RX ADMIN — SEVELAMER CARBONATE 1600 MILLIGRAM(S): 2400 POWDER, FOR SUSPENSION ORAL at 17:49

## 2022-11-26 RX ADMIN — HEPARIN SODIUM 5000 UNIT(S): 5000 INJECTION INTRAVENOUS; SUBCUTANEOUS at 22:11

## 2022-11-26 RX ADMIN — Medication 1 TABLET(S): at 13:19

## 2022-11-26 RX ADMIN — Medication 81 MILLIGRAM(S): at 13:20

## 2022-11-26 RX ADMIN — HEPARIN SODIUM 5000 UNIT(S): 5000 INJECTION INTRAVENOUS; SUBCUTANEOUS at 13:19

## 2022-11-26 RX ADMIN — CHLORHEXIDINE GLUCONATE 1 APPLICATION(S): 213 SOLUTION TOPICAL at 17:49

## 2022-11-26 RX ADMIN — SEVELAMER CARBONATE 1600 MILLIGRAM(S): 2400 POWDER, FOR SUSPENSION ORAL at 08:30

## 2022-11-26 RX ADMIN — SIMVASTATIN 40 MILLIGRAM(S): 20 TABLET, FILM COATED ORAL at 22:11

## 2022-11-26 RX ADMIN — SEVELAMER CARBONATE 1600 MILLIGRAM(S): 2400 POWDER, FOR SUSPENSION ORAL at 13:19

## 2022-11-26 RX ADMIN — OXYCODONE HYDROCHLORIDE 5 MILLIGRAM(S): 5 TABLET ORAL at 21:30

## 2022-11-26 RX ADMIN — OXYCODONE HYDROCHLORIDE 5 MILLIGRAM(S): 5 TABLET ORAL at 20:34

## 2022-11-26 NOTE — PROGRESS NOTE ADULT - SUBJECTIVE AND OBJECTIVE BOX
[facility] - S Jluis / Shabbir S /D Romina/ S Denys/ S Chano/ Andrés Perkins / M Katlyn/ O Gladis  service -[phone], office [phone]  ---------------------------------------------------------------------------------------------------------------    Patient seen and examined bedside    Subjective and Objective: No overnight events, sob resolved. No complaints today. feeling better    Allergies: No Known Allergies      Hospital Medications:   MEDICATIONS  (STANDING):  aspirin enteric coated 81 milliGRAM(s) Oral daily  chlorhexidine 2% Cloths 1 Application(s) Topical daily  epoetin niesha-epbx (RETACRIT) Injectable 10716 Unit(s) IV Push <User Schedule>  gabapentin 100 milliGRAM(s) Oral <User Schedule>  heparin   Injectable 5000 Unit(s) SubCutaneous every 8 hours  multivitamin 1 Tablet(s) Oral daily  sevelamer carbonate 1600 milliGRAM(s) Oral three times a day with meals  simvastatin 40 milliGRAM(s) Oral at bedtime    VITALS:  T(F): 98.4 (11-26-22 @ 13:28), Max: 98.4 (11-26-22 @ 06:38)  HR: 71 (11-26-22 @ 13:28)  BP: 115/49 (11-26-22 @ 13:28)  RR: 18 (11-26-22 @ 13:28)  SpO2: 100% (11-26-22 @ 13:28)  Wt(kg): --    11-25 @ 07:01  -  11-26 @ 07:00  --------------------------------------------------------  IN: 900 mL / OUT: 1900 mL / NET: -1000 mL      PHYSICAL EXAM:  Constitutional: NAD  HEENT: anicteric sclera, oropharynx clear  Neck: No JVD  Respiratory: CTAB, no wheezes, rales or rhonchi  Cardiovascular: S1, S2, RRR  Gastrointestinal: BS+, soft, NT/ND  Extremities: No cyanosis or clubbing. No peripheral edema  Neurological: A/O x 3, no focal deficits  Psychiatric: Normal mood, normal affect  : No CVA tenderness. No salcido.   Skin: No rashes  Vascular Access:    LABS:  11-26    137  |  93<L>  |  35<H>  ----------------------------<  67<L>  4.6   |  26  |  6.66<H>    Ca    9.4      26 Nov 2022 07:15  Phos  5.6     11-26  Mg     2.40     11-26      Creatinine Trend: 6.66 <--, 9.47 <--, 7.29 <--, 5.36 <--, 7.53 <--, 5.35 <--, 7.23 <--                        9.3    9.62  )-----------( 303      ( 26 Nov 2022 07:15 )             30.3     Urine Studies:        RADIOLOGY & ADDITIONAL STUDIES:   [facility] - S Jluis / Shabbir S /D Romina/ S Denys/ S Chano/ Andrés Perkins / M Katlyn/ O Gladis  service [phone], office [phone]  ---------------------------------------------------------------------------------------------------------------    Patient seen and examined bedside    Subjective and Objective: No overnight events, denied sob. No complaints today. feeling better    Allergies: No Known Allergies      Hospital Medications:   MEDICATIONS  (STANDING):  aspirin enteric coated 81 milliGRAM(s) Oral daily  chlorhexidine 2% Cloths 1 Application(s) Topical daily  epoetin niesha-epbx (RETACRIT) Injectable 56915 Unit(s) IV Push <User Schedule>  gabapentin 100 milliGRAM(s) Oral <User Schedule>  heparin   Injectable 5000 Unit(s) SubCutaneous every 8 hours  multivitamin 1 Tablet(s) Oral daily  sevelamer carbonate 1600 milliGRAM(s) Oral three times a day with meals  simvastatin 40 milliGRAM(s) Oral at bedtime    VITALS:  T(F): 98.4 (11-26-22 @ 13:28), Max: 98.4 (11-26-22 @ 06:38)  HR: 71 (11-26-22 @ 13:28)  BP: 115/49 (11-26-22 @ 13:28)  RR: 18 (11-26-22 @ 13:28)  SpO2: 100% (11-26-22 @ 13:28)  Wt(kg): --    11-25 @ 07:01  -  11-26 @ 07:00  --------------------------------------------------------  IN: 900 mL / OUT: 1900 mL / NET: -1000 mL      PHYSICAL EXAM:  Constitutional: NAD  HEENT: anicteric sclera  Neck: No JVD  Respiratory: CTAB, no wheezes, rales or rhonchi  Cardiovascular: S1, S2, RRR  Gastrointestinal: BS+, soft, NT/ND  Extremities: b/l BKA +   Neurological: A/O x 3  Psychiatric: Normal mood, normal affect  : No salcido.  Vascular Access: rt femoral AVG+    LABS:  11-26    137  |  93<L>  |  35<H>  ----------------------------<  67<L>  4.6   |  26  |  6.66<H>    Ca    9.4      26 Nov 2022 07:15  Phos  5.6     11-26  Mg     2.40     11-26      Creatinine Trend: 6.66 <--, 9.47 <--, 7.29 <--, 5.36 <--, 7.53 <--, 5.35 <--, 7.23 <--                        9.3    9.62  )-----------( 303      ( 26 Nov 2022 07:15 )             30.3     Urine Studies:        RADIOLOGY & ADDITIONAL STUDIES:

## 2022-11-26 NOTE — PROGRESS NOTE ADULT - SUBJECTIVE AND OBJECTIVE BOX
Date of Service  : 11-26-22     INTERVAL HPI/OVERNIGHT EVENTS: I feel fine.   Vital Signs Last 24 Hrs  T(C): 36.9 (26 Nov 2022 13:28), Max: 36.9 (26 Nov 2022 06:38)  T(F): 98.4 (26 Nov 2022 13:28), Max: 98.4 (26 Nov 2022 06:38)  HR: 71 (26 Nov 2022 13:28) (71 - 74)  BP: 115/49 (26 Nov 2022 13:28) (109/46 - 115/49)  BP(mean): --  RR: 18 (26 Nov 2022 13:28) (16 - 18)  SpO2: 100% (26 Nov 2022 13:28) (98% - 100%)    Parameters below as of 26 Nov 2022 13:28  Patient On (Oxygen Delivery Method): room air      I&O's Summary    25 Nov 2022 07:01  -  26 Nov 2022 07:00  --------------------------------------------------------  IN: 900 mL / OUT: 1900 mL / NET: -1000 mL      MEDICATIONS  (STANDING):  aspirin enteric coated 81 milliGRAM(s) Oral daily  chlorhexidine 2% Cloths 1 Application(s) Topical daily  epoetin niesha-epbx (RETACRIT) Injectable 51765 Unit(s) IV Push <User Schedule>  gabapentin 100 milliGRAM(s) Oral <User Schedule>  heparin   Injectable 5000 Unit(s) SubCutaneous every 8 hours  multivitamin 1 Tablet(s) Oral daily  sevelamer carbonate 1600 milliGRAM(s) Oral three times a day with meals  simvastatin 40 milliGRAM(s) Oral at bedtime    MEDICATIONS  (PRN):  oxyCODONE    IR 5 milliGRAM(s) Oral every 4 hours PRN Severe Pain (7 - 10)  sodium chloride 0.9% Bolus. 100 milliLiter(s) IV Bolus every 5 minutes PRN SBP LESS THAN or EQUAL to 90 mmHg    LABS:                        9.3    9.62  )-----------( 303      ( 26 Nov 2022 07:15 )             30.3     11-26    137  |  93<L>  |  35<H>  ----------------------------<  67<L>  4.6   |  26  |  6.66<H>    Ca    9.4      26 Nov 2022 07:15  Phos  5.6     11-26  Mg     2.40     11-26          CAPILLARY BLOOD GLUCOSE              REVIEW OF SYSTEMS:  CONSTITUTIONAL: No fever, weight loss, or fatigue  EYES: No eye pain, visual disturbances, or discharge  ENMT:  No difficulty hearing, tinnitus, vertigo; No sinus or throat pain  NECK: No pain or stiffness  RESPIRATORY: No cough, wheezing, chills or hemoptysis; No shortness of breath  CARDIOVASCULAR: No chest pain, palpitations, dizziness, or leg swelling  GASTROINTESTINAL: No abdominal or epigastric pain. No nausea, vomiting, or hematemesis; No diarrhea or constipation. No melena or hematochezia.  GENITOURINARY: No dysuria, frequency, hematuria, or incontinence  NEUROLOGICAL: No headaches, memory loss, loss of strength, numbness, or tremors      Consultant(s) Notes Reviewed:  [x ] YES  [ ] NO    PHYSICAL EXAM:  GENERAL: NAD, well-groomed, well-developed,not in any distress ,  HEAD:  Atraumatic, Normocephalic  NECK: Supple, No JVD, Normal thyroid  NERVOUS SYSTEM:  Alert & Oriented X3, No focal deficit   CHEST/LUNG: Good air entry bilateral with no  rales, rhonchi, wheezing, or rubs  HEART: Regular rate and rhythm; No murmurs, rubs, or gallops  ABDOMEN: Soft, Nontender, Nondistended; Bowel sounds present  EXTREMITIES: LBKA     Care Discussed with Consultants/Other Providers [ x] YES  [ ] NO

## 2022-11-26 NOTE — PHYSICAL THERAPY INITIAL EVALUATION ADULT - PHYSICAL ASSIST/NONPHYSICAL ASSIST, REHAB EVAL
Please follow up with Dr. Vee within 1-2 weeks. You may call [phone] to schedule an appointment.
verbal cues/nonverbal cues (demo/gestures)/1 person assist

## 2022-11-26 NOTE — PROGRESS NOTE ADULT - ASSESSMENT
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 Pt admitted for IV abx and further work up of L toe infection.      Problem/Plan - 1:  ·  Problem: Toe infection with Severe PAD .   ·  Plan: S/P  BKA.   Pt w/ left 2nd and 4th toe infection, s/p debridement and wound culture w/ podiatry. Concern for possible OM.   - C/w vanc and meropenem based off previous wound cx. Dose w/ HD.  - f/u wound cx  - f/u podiatry and vascular recs  < from: [facility] Duplex Physiol Ext Low 3+ Level, BI. (VA Duplex Physiol Ext Low 3+ Level, LT.) (11.18.22 @ 08:29) >  Left MARTA is elevated (1.45), likely due to calcific  vessels.  Flat left digit waveforms noted. Findings  suggest the presence of distal infrapopliteal and small  vessel arterial disease in the left foot.         Problem/Plan - 2:  ·  Problem: ESRD on dialysis.   ·  Plan: Pt received HD on MWF. Last HD 11/16.   - renal following and HD per them .   - c/w gabapentin 100 mg TID before HD.     Problem/Plan - 3:  ·  Problem: Chronic CHF.   ·  Plan: Pt w/ chronic diastolic CHF. Previous TTE from 3/2022 w/ nml LVEF. Appears euvolemic on exam.  - HD per renal.     Problem/Plan - 4:  ·  Problem: Anemia.   ·  Plan: PRBC per Vascular.    No signs or evidence of bleeding  - continue to monitor.     Problem/Plan - 5:  ·  Problem: PAD (peripheral artery disease).   ·  Plan: Pt w/ hx of R balloon angioplasty (5/11/2022) and foot wounds s/p left foot partial 3rd ray amputation (3/2022) and R BKA (6/2022)  - c/w asa 81 mg, pt unsure if still on plavix  - Vascular help appreciated.   -S/P BKA.      Problem/Plan - 6:  ·  Problem: HLD (hyperlipidemia).   ·  Plan: c/w simvastatin 40 mg QHS.     Problem/Plan - 7:  ·  Problem: Prophylactic measure.   ·  Plan: DVT prophylaxis: heparin subq  Diet: dash/tlc, renal.      Dispo : DC planning to [facility].

## 2022-11-26 NOTE — PHYSICAL THERAPY INITIAL EVALUATION ADULT - PLANNED THERAPY INTERVENTIONS, PT EVAL
Pt up to bathroom with assist for second time to void without difficulty. Small amount of rubra lochia on Pt's tree pad. Pt instructed on tree care, voiced understanding. Tree care and tucks and spray applied independently by pt. Pt returned to bed and fundus firm and U/U. Tolerated well. Pt denied needs at this time. Patient left positioned for safety in bed in NAD, call bell in reach, all lines intact./balance training/bed mobility training/gait training/ROM/strengthening/transfer training

## 2022-11-26 NOTE — PHYSICAL THERAPY INITIAL EVALUATION ADULT - ACTIVE RANGE OF MOTION EXAMINATION, REHAB EVAL
right hip flexion 0-100 degrees; left hip flexion 0-55 degrees/bilateral upper extremity Active ROM was WFL (within functional limits)

## 2022-11-26 NOTE — PHYSICAL THERAPY INITIAL EVALUATION ADULT - PERTINENT HX OF CURRENT PROBLEM, REHAB EVAL
61 year old female with PMHx of ESRD (HD MWF), PAD s/p R balloon angioplasty (5/11/2022) and foot wounds s/p left foot partial 3rd ray amputation and right BKA presenting with left toe infection. Pt states she has been having pain in the base of her left big toe for the past couple of weeks. She states she went to her podiatrist today who was able to express some pus. The podiatrist was concerned for an infection and pt was sent to ED for further evaluation. Pt found to have left foot gangrene.

## 2022-11-26 NOTE — PROGRESS NOTE ADULT - ASSESSMENT
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 Pt admitted for IV abx and further work up of L toe infection. Renal consulted for ESRD Mx.     ESRD on dialysis.    Maintenance HD schedule MWF  access- thigh Arterio-Venous Graft   HD unit- SQDC  K, vol acceptable    Plan    order- 2k bath, Ultrafiltration on Dialysis as tolerated with blood pressure   renal diet , fluid restriction   dose all meds for ESRD    anemia in ckd- Hb < goal.   Anemia Labs   Hemoglobin : 9.3 <--, 8.4 <--, 8.5 <--, 9.1 <--, 9.2 <--  Platelets : 279 <--, 296 <--, 288 <--    plan  on Retacrit 51187 units iv three times per week with hemodialysis   monitor H/H    high phos level : controlled  Serum calcium mg/dl : 9.3 <--, 9.4 <--, 9.4 <--  Serum Phosphorus level mg/dl : 7.2 <--, 4.1 <--, 5.8 <--    c/w renvela 2tidac  low phos diet, renal diet    h/o HTN- no meds. bp low, monitor closely    L diabetic foot ulcer.   - s/p L BKA amputation. f/u w/ vas sx  - on meropenem         61F w/ PMHx of ESRD (HD MWF), PAD s/p R balloon angioplasty (5/11/2022) and foot wounds s/p left foot partial 3rd ray amputation and R BKA presenting with L toe infection. Pt admitted for IV abx and further work up of L toe infection. Renal consulted for ESRD Mx.     ESRD on dialysis.    Maintenance HD schedule MWF  access- thigh Arterio-Venous Graft   HD unit- SQDC  K, vol acceptable    Plan  s/p HD 11/25, Rx sheet reviewed, net UF 1kg removed, tolerated well. uneventful.   plan for next HD Monday w/2k bath, Ultrafiltration on Dialysis as tolerated with blood pressure   renal diet , fluid restriction   dose all meds for ESRD    anemia in ckd- Hb < goal.   Anemia Labs   Hemoglobin : 9.3 <--, 8.4 <--, 8.5 <--, 9.1 <--, 9.2 <--  Platelets : 279 <--, 296 <--, 288 <--    plan  c/w Retacrit 86377 units iv three times per week with hemodialysis   monitor H/H    high phos level : controlled  Serum calcium mg/dl : 9.3 <--, 9.4 <--, 9.4 <--  Serum Phosphorus level mg/dl : 7.2 <--, 4.1 <--, 5.8 <--    c/w renvela 2tidac  low phos diet, renal diet    h/o HTN- no meds. bp low, monitor closely    L diabetic foot ulcer.   - s/p L BKA amputation. f/u w/ vas sx  - on meropenem      will closely follow up.   poc d/w pt  labs, chart reviewed  For any question, pl call:  Nephrology  Cell -[phone]  Office [phone]  Ans Serv [phone]

## 2022-11-26 NOTE — PHYSICAL THERAPY INITIAL EVALUATION ADULT - LEVEL OF INDEPENDENCE: SIT/STAND, REHAB EVAL
minimum assist (75% patients effort) maximum assist (25% patients effort) patient attempted sit to stand transfer 2 times and able to tolerate standing for 1 minute each attempt/maximum assist (25% patients effort)

## 2022-11-26 NOTE — PHYSICAL THERAPY INITIAL EVALUATION ADULT - GENERAL OBSERVATIONS, REHAB EVAL
Patient found in bed; patient agreeable to PT; +left BKA with knee immobilizer intact; patient assisted in donning right BKA prosthesis.

## 2022-11-26 NOTE — PROGRESS NOTE ADULT - SUBJECTIVE AND OBJECTIVE BOX
Cardiovascular Disease Progress Note  Date of Service: 22 @ 10:18    Overnight events: No acute events overnight.    Ms. Boogie denies chest pain or SOB.   Otherwise review of systems negative    Objective Findings:  T(C): 36.9 (22 @ 06:38), Max: 36.9 (22 @ 10:59)  HR: 73 (22 @ 06:38) (73 - 79)  BP: 109/46 (22 @ 06:38) (109/46 - 115/54)  RR: 16 (22 @ 06:38) (16 - 18)  SpO2: 98% (22 @ 06:38) (98% - 98%)  Wt(kg): --  Daily     Daily Weight in k.5 (2022 10:59)      Physical Exam:  Gen: NAD; Patient resting comfortably  HEENT: EOMI, Normocephalic/ atraumatic  CV: RRR, normal S1 + S2, no m/r/g  Lungs:  Normal respiratory effort; clear to auscultation bilaterally  Abd: soft, non-tender; bowel sounds present  Ext: No edema;      Telemetry: n/a    Laboratory Data:                        9.3    9.62  )-----------( 303      ( 2022 07:15 )             30.3         137  |  93<L>  |  35<H>  ----------------------------<  67<L>  4.6   |  26  |  6.66<H>    Ca    9.4      2022 07:15  Phos  5.6       Mg     2.40                     Inpatient Medications:  MEDICATIONS  (STANDING):  aspirin enteric coated 81 milliGRAM(s) Oral daily  chlorhexidine 2% Cloths 1 Application(s) Topical daily  epoetin niesha-epbx (RETACRIT) Injectable 45629 Unit(s) IV Push <User Schedule>  gabapentin 100 milliGRAM(s) Oral <User Schedule>  heparin   Injectable 5000 Unit(s) SubCutaneous every 8 hours  meropenem  IVPB 500 milliGRAM(s) IV Intermittent every 24 hours  multivitamin 1 Tablet(s) Oral daily  sevelamer carbonate 1600 milliGRAM(s) Oral three times a day with meals  simvastatin 40 milliGRAM(s) Oral at bedtime      Assessment: 61-year-old female with ESRD on HD, HLD and peripheral vascular disease s/p lower extremity resection presents with chronic limb ischemia and L foot ulcer.     Plan of Care:    #Peripheral vascular disease-  Ms. Boogie tolerated L BKA well.   She displays no signs or symptoms of post operative coronary ischemia or acute CHF.   Echo from 3/2022 reviewed- normal LV systolic function with no significant valvular disease.   Continue current cardiac management.   Antiplatelet therapy as per the vascular team.      #Compensated diastolic CHF-  Euvolemic on exam.  Fluid removal with HD as per the renal team.   Echo from 3/2022 reviewed- normal LV systolic function with no significant valvular disease. .      #ESRD-  - HD, as per renal.      Over 25 minutes spent on total encounter; more than 50% of the visit was spent counseling and/or coordinating care by the attending physician.      Cristino Adams MD [facility]  Cardiovascular Disease  [phone]

## 2022-11-26 NOTE — CHART NOTE - NSCHARTNOTEFT_GEN_A_CORE
Per ID note 11/24, "given source control, can d/c abx tomorrow". Meropenem not discontinued 11/25, per recommendations. Will discontinue Meropenem today.

## 2022-11-26 NOTE — PROGRESS NOTE ADULT - SUBJECTIVE AND OBJECTIVE BOX
C Team (Vascular Surgery) Daily Progress Note    SUBJECTIVE:  Pt seen and examined, and is resting comfortably in bed. No acute events overnight. Pain is adequately controlled on current regimen. Pt has no complaints at this time.     OBJECTIVE:  Vital Signs Last 24 Hrs  T(C): 36.9 (26 Nov 2022 06:38), Max: 36.9 (25 Nov 2022 10:59)  T(F): 98.4 (26 Nov 2022 06:38), Max: 98.5 (25 Nov 2022 14:00)  HR: 73 (26 Nov 2022 06:38) (73 - 79)  BP: 109/46 (26 Nov 2022 06:38) (109/46 - 115/54)  BP(mean): --  RR: 16 (26 Nov 2022 06:38) (16 - 18)  SpO2: 98% (26 Nov 2022 06:38) (98% - 98%)    Parameters below as of 26 Nov 2022 06:38  Patient On (Oxygen Delivery Method): room air        I&O's Detail    25 Nov 2022 07:01  -  26 Nov 2022 07:00  --------------------------------------------------------  IN:    Other (mL): 900 mL  Total IN: 900 mL    OUT:    Other (mL): 1900 mL  Total OUT: 1900 mL    Total NET: -1000 mL        Exam:  GEN: NAD  RESP: RA, no increased work of breathing  EXTR: R BKA stump well healed, LLE BKA w prevena vac in place, holding suction, knee immobilizer in place   NEURO: A/Ox4                        9.3    9.62  )-----------( 303      ( 26 Nov 2022 07:15 )             30.3       11-26    137  |  93<L>  |  35<H>  ----------------------------<  67<L>  4.6   |  26  |  6.66<H>    Ca    9.4      26 Nov 2022 07:15  Phos  5.6     11-26  Mg     2.40     11-26                    ASSESSMENT:  61F w/ PMHx of ESRD (HD MWF), PAD s/p R balloon angioplasty (5/11/2022) and foot wounds s/p 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 LLE without revascularization options. Now s/p L BKA 11/23.     Recs:  - Maintain knee immobilizer in place post operatively, please maintain until Vascular Sx says otherwise  - Prevena Vac in place post operatively, take down wound vac sunday 11/27  - Appreciate excellent care per primary team  - Vascular to follow        Vascular Surgery  [id number]

## 2022-11-27 LAB
ANION GAP SERPL CALC-SCNC: 14 MMOL/L — SIGNIFICANT CHANGE UP (ref 7–14)
BUN SERPL-MCNC: 45 MG/DL — HIGH (ref 7–23)
CALCIUM SERPL-MCNC: 9.3 MG/DL — SIGNIFICANT CHANGE UP (ref 8.4–10.5)
CHLORIDE SERPL-SCNC: 92 MMOL/L — LOW (ref 98–107)
CO2 SERPL-SCNC: 29 MMOL/L — SIGNIFICANT CHANGE UP (ref 22–31)
CREAT SERPL-MCNC: 8.18 MG/DL — HIGH (ref 0.5–1.3)
EGFR: 5 ML/MIN/1.73M2 — LOW
GLUCOSE SERPL-MCNC: 86 MG/DL — SIGNIFICANT CHANGE UP (ref 70–99)
HCT VFR BLD CALC: 27.4 % — LOW (ref 34.5–45)
HGB BLD-MCNC: 8.5 G/DL — LOW (ref 11.5–15.5)
MAGNESIUM SERPL-MCNC: 2.6 MG/DL — SIGNIFICANT CHANGE UP (ref 1.6–2.6)
MCHC RBC-ENTMCNC: 28.1 PG — SIGNIFICANT CHANGE UP (ref 27–34)
MCHC RBC-ENTMCNC: 31 GM/DL — LOW (ref 32–36)
MCV RBC AUTO: 90.7 FL — SIGNIFICANT CHANGE UP (ref 80–100)
NRBC # BLD: 0 /100 WBCS — SIGNIFICANT CHANGE UP (ref 0–0)
NRBC # FLD: 0 K/UL — SIGNIFICANT CHANGE UP (ref 0–0)
PHOSPHATE SERPL-MCNC: 6 MG/DL — HIGH (ref 2.5–4.5)
PLATELET # BLD AUTO: 300 K/UL — SIGNIFICANT CHANGE UP (ref 150–400)
POTASSIUM SERPL-MCNC: 4.6 MMOL/L — SIGNIFICANT CHANGE UP (ref 3.5–5.3)
POTASSIUM SERPL-SCNC: 4.6 MMOL/L — SIGNIFICANT CHANGE UP (ref 3.5–5.3)
RBC # BLD: 3.02 M/UL — LOW (ref 3.8–5.2)
RBC # FLD: 14.4 % — SIGNIFICANT CHANGE UP (ref 10.3–14.5)
SODIUM SERPL-SCNC: 135 MMOL/L — SIGNIFICANT CHANGE UP (ref 135–145)
WBC # BLD: 8.51 K/UL — SIGNIFICANT CHANGE UP (ref 3.8–10.5)
WBC # FLD AUTO: 8.51 K/UL — SIGNIFICANT CHANGE UP (ref 3.8–10.5)

## 2022-11-27 RX ADMIN — SEVELAMER CARBONATE 1600 MILLIGRAM(S): 2400 POWDER, FOR SUSPENSION ORAL at 13:24

## 2022-11-27 RX ADMIN — HEPARIN SODIUM 5000 UNIT(S): 5000 INJECTION INTRAVENOUS; SUBCUTANEOUS at 06:42

## 2022-11-27 RX ADMIN — HEPARIN SODIUM 5000 UNIT(S): 5000 INJECTION INTRAVENOUS; SUBCUTANEOUS at 15:01

## 2022-11-27 RX ADMIN — SEVELAMER CARBONATE 1600 MILLIGRAM(S): 2400 POWDER, FOR SUSPENSION ORAL at 18:16

## 2022-11-27 RX ADMIN — Medication 1 TABLET(S): at 13:24

## 2022-11-27 RX ADMIN — Medication 81 MILLIGRAM(S): at 13:24

## 2022-11-27 RX ADMIN — CHLORHEXIDINE GLUCONATE 1 APPLICATION(S): 213 SOLUTION TOPICAL at 13:25

## 2022-11-27 RX ADMIN — OXYCODONE HYDROCHLORIDE 5 MILLIGRAM(S): 5 TABLET ORAL at 22:44

## 2022-11-27 RX ADMIN — HEPARIN SODIUM 5000 UNIT(S): 5000 INJECTION INTRAVENOUS; SUBCUTANEOUS at 21:03

## 2022-11-27 RX ADMIN — SIMVASTATIN 40 MILLIGRAM(S): 20 TABLET, FILM COATED ORAL at 21:03

## 2022-11-27 RX ADMIN — OXYCODONE HYDROCHLORIDE 5 MILLIGRAM(S): 5 TABLET ORAL at 21:44

## 2022-11-27 RX ADMIN — SEVELAMER CARBONATE 1600 MILLIGRAM(S): 2400 POWDER, FOR SUSPENSION ORAL at 09:24

## 2022-11-27 NOTE — PROGRESS NOTE ADULT - ASSESSMENT
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 Pt admitted for IV abx and further work up of L toe infection.      Problem/Plan - 1:  ·  Problem: Toe infection with Severe PAD .   ·  Plan: S/P  BKA.   Pt w/ left 2nd and 4th toe infection, s/p debridement and wound culture w/ podiatry. Concern for possible OM.   - C/w vanc and meropenem based off previous wound cx. Dose w/ HD.  - f/u wound cx  - f/u podiatry and vascular recs  < from: [facility] Duplex Physiol Ext Low 3+ Level, BI. (VA Duplex Physiol Ext Low 3+ Level, LT.) (11.18.22 @ 08:29) >  Left MARTA is elevated (1.45), likely due to calcific  vessels.  Flat left digit waveforms noted. Findings  suggest the presence of distal infrapopliteal and small  vessel arterial disease in the left foot.         Problem/Plan - 2:  ·  Problem: ESRD on dialysis.   ·  Plan: Pt received HD on MWF. Last HD 11/16.   - renal following and HD per them .   - c/w gabapentin 100 mg TID before HD.     Problem/Plan - 3:  ·  Problem: Chronic CHF.   ·  Plan: Pt w/ chronic diastolic CHF. Previous TTE from 3/2022 w/ nml LVEF. Appears euvolemic on exam.  - HD per renal.     Problem/Plan - 4:  ·  Problem: Anemia.   ·  Plan: PRBC per Vascular.    No signs or evidence of bleeding  - continue to monitor.     Problem/Plan - 5:  ·  Problem: PAD (peripheral artery disease).   ·  Plan: Pt w/ hx of R balloon angioplasty (5/11/2022) and foot wounds s/p left foot partial 3rd ray amputation (3/2022) and R BKA (6/2022)  - c/w asa 81 mg, pt unsure if still on plavix  - Vascular help appreciated.   -S/P LBKA.      Problem/Plan - 6:  ·  Problem: HLD (hyperlipidemia).   ·  Plan: c/w simvastatin 40 mg QHS.     Problem/Plan - 7:  ·  Problem: Prophylactic measure.   ·  Plan: DVT prophylaxis: heparin subq  Diet: dash/tlc, renal.      Dispo : DC planning to [facility].

## 2022-11-27 NOTE — PROGRESS NOTE ADULT - SUBJECTIVE AND OBJECTIVE BOX
Date of Service  : 11-27-22 @ 19:38    INTERVAL HPI/OVERNIGHT EVENTS:  Vital Signs Last 24 Hrs  T(C): 36.8 (27 Nov 2022 14:13), Max: 37.1 (26 Nov 2022 22:21)  T(F): 98.2 (27 Nov 2022 14:13), Max: 98.8 (26 Nov 2022 22:21)  HR: 64 (27 Nov 2022 14:13) (64 - 73)  BP: 108/49 (27 Nov 2022 14:13) (108/49 - 113/44)  BP(mean): --  RR: 18 (27 Nov 2022 14:13) (16 - 18)  SpO2: 98% (27 Nov 2022 14:13) (96% - 100%)    Parameters below as of 27 Nov 2022 14:13  Patient On (Oxygen Delivery Method): room air      I&O's Summary    MEDICATIONS  (STANDING):  aspirin enteric coated 81 milliGRAM(s) Oral daily  chlorhexidine 2% Cloths 1 Application(s) Topical daily  epoetin niesha-epbx (RETACRIT) Injectable 92397 Unit(s) IV Push <User Schedule>  gabapentin 100 milliGRAM(s) Oral <User Schedule>  heparin   Injectable 5000 Unit(s) SubCutaneous every 8 hours  multivitamin 1 Tablet(s) Oral daily  sevelamer carbonate 1600 milliGRAM(s) Oral three times a day with meals  simvastatin 40 milliGRAM(s) Oral at bedtime    MEDICATIONS  (PRN):  oxyCODONE    IR 5 milliGRAM(s) Oral every 4 hours PRN Severe Pain (7 - 10)  sodium chloride 0.9% Bolus. 100 milliLiter(s) IV Bolus every 5 minutes PRN SBP LESS THAN or EQUAL to 90 mmHg    LABS:                        8.5    8.51  )-----------( 300      ( 27 Nov 2022 05:50 )             27.4     11-27    135  |  92<L>  |  45<H>  ----------------------------<  86  4.6   |  29  |  8.18<H>    Ca    9.3      27 Nov 2022 05:50  Phos  6.0     11-27  Mg     2.60     11-27          CAPILLARY BLOOD GLUCOSE              REVIEW OF SYSTEMS:  CONSTITUTIONAL: No fever, weight loss, or fatigue  EYES: No eye pain, visual disturbances, or discharge  ENMT:  No difficulty hearing, tinnitus, vertigo; No sinus or throat pain  NECK: No pain or stiffness  RESPIRATORY: No cough, wheezing, chills or hemoptysis; No shortness of breath  CARDIOVASCULAR: No chest pain, palpitations, dizziness, or leg swelling  GASTROINTESTINAL: No abdominal or epigastric pain. No nausea, vomiting, or hematemesis; No diarrhea or constipation. No melena or hematochezia.  GENITOURINARY: No dysuria, frequency, hematuria, or incontinence  NEUROLOGICAL: No headaches, memory loss, loss of strength, numbness, or tremors      Consultant(s) Notes Reviewed:  [x ] YES  [ ] NO    PHYSICAL EXAM:  GENERAL: NAD, well-groomed, well-developed,not in any distress ,  HEAD:  Atraumatic, Normocephalic  NECK: Supple, No JVD, Normal thyroid  NERVOUS SYSTEM:  Alert & Oriented X3, No focal deficit   CHEST/LUNG: Good air entry bilateral with no  rales, rhonchi, wheezing, or rubs  HEART: Regular rate and rhythm; No murmurs, rubs, or gallops  ABDOMEN: Soft, Nontender, Nondistended; Bowel sounds present  EXTREMITIES:  BKA with wound Vac.     Care Discussed with Consultants/Other Providers [ x] YES  [ ] NO

## 2022-11-27 NOTE — PROGRESS NOTE ADULT - SUBJECTIVE AND OBJECTIVE BOX
Cardiovascular Disease Progress Note  Date of Service: 11-27-22 @ 08:52    Overnight events: No acute events overnight.   Ms. Boogie denies chest pain or SOB.    Otherwise review of systems negative    Objective Findings:  T(C): 37.1 (11-27-22 @ 06:38), Max: 37.1 (11-26-22 @ 22:21)  HR: 67 (11-27-22 @ 06:38) (67 - 73)  BP: 108/49 (11-27-22 @ 06:38) (108/49 - 115/49)  RR: 16 (11-27-22 @ 06:38) (16 - 18)  SpO2: 96% (11-27-22 @ 06:38) (96% - 100%)  Wt(kg): --  Daily     Daily       Physical Exam:  Gen: NAD; Patient resting comfortably  HEENT: EOMI, Normocephalic/ atraumatic  CV: RRR, normal S1 + S2, no m/r/g  Lungs:  Normal respiratory effort; clear to auscultation bilaterally  Abd: soft, non-tender; bowel sounds present  Ext: No edema;     Telemetry: n/a    Laboratory Data:                        8.5    8.51  )-----------( 300      ( 27 Nov 2022 05:50 )             27.4     11-27    135  |  92<L>  |  45<H>  ----------------------------<  86  4.6   |  29  |  8.18<H>    Ca    9.3      27 Nov 2022 05:50  Phos  6.0     11-27  Mg     2.60     11-27                Inpatient Medications:  MEDICATIONS  (STANDING):  aspirin enteric coated 81 milliGRAM(s) Oral daily  chlorhexidine 2% Cloths 1 Application(s) Topical daily  epoetin niesha-epbx (RETACRIT) Injectable 56199 Unit(s) IV Push <User Schedule>  gabapentin 100 milliGRAM(s) Oral <User Schedule>  heparin   Injectable 5000 Unit(s) SubCutaneous every 8 hours  multivitamin 1 Tablet(s) Oral daily  sevelamer carbonate 1600 milliGRAM(s) Oral three times a day with meals  simvastatin 40 milliGRAM(s) Oral at bedtime      Assessment: 61-year-old female with ESRD on HD, HLD and peripheral vascular disease s/p lower extremity resection presents with chronic limb ischemia and L foot ulcer.     Plan of Care:    #Peripheral vascular disease-  Ms. Boogie tolerated L BKA well.   She displays no signs or symptoms of post operative coronary ischemia or acute CHF.   Echo from 3/2022 reviewed- normal LV systolic function with no significant valvular disease.   Continue current cardiac management.   Antiplatelet therapy as per the vascular team.      #Compensated diastolic CHF-  Euvolemic on exam.  Fluid removal with HD as per the renal team.   Echo from 3/2022 reviewed- normal LV systolic function with no significant valvular disease. .      #ESRD-  - HD, as per renal.    #ACP (advance care planning)-  Advanced care planning was discussed with the patient.   Cardiac findings were discussed in detail and all questions were answered.        Over 25 minutes spent on total encounter; more than 50% of the visit was spent counseling and/or coordinating care by the attending physician.      Cristino Adams MD [facility]  Cardiovascular Disease  [phone]

## 2022-11-28 LAB
ALT FLD-CCNC: < 5 U/L — SIGNIFICANT CHANGE UP (ref 4–33)
ANION GAP SERPL CALC-SCNC: 18 MMOL/L — HIGH (ref 7–14)
BUN SERPL-MCNC: 51 MG/DL — HIGH (ref 7–23)
CALCIUM SERPL-MCNC: 9.1 MG/DL — SIGNIFICANT CHANGE UP (ref 8.4–10.5)
CHLORIDE SERPL-SCNC: 91 MMOL/L — LOW (ref 98–107)
CO2 SERPL-SCNC: 26 MMOL/L — SIGNIFICANT CHANGE UP (ref 22–31)
CREAT SERPL-MCNC: 10.04 MG/DL — HIGH (ref 0.5–1.3)
EGFR: 4 ML/MIN/1.73M2 — LOW
GLUCOSE SERPL-MCNC: 94 MG/DL — SIGNIFICANT CHANGE UP (ref 70–99)
HCT VFR BLD CALC: 26.6 % — LOW (ref 34.5–45)
HGB BLD-MCNC: 8.2 G/DL — LOW (ref 11.5–15.5)
MAGNESIUM SERPL-MCNC: 2.7 MG/DL — HIGH (ref 1.6–2.6)
MCHC RBC-ENTMCNC: 28 PG — SIGNIFICANT CHANGE UP (ref 27–34)
MCHC RBC-ENTMCNC: 30.8 GM/DL — LOW (ref 32–36)
MCV RBC AUTO: 90.8 FL — SIGNIFICANT CHANGE UP (ref 80–100)
NRBC # BLD: 0 /100 WBCS — SIGNIFICANT CHANGE UP (ref 0–0)
NRBC # FLD: 0 K/UL — SIGNIFICANT CHANGE UP (ref 0–0)
PHOSPHATE SERPL-MCNC: 6.5 MG/DL — HIGH (ref 2.5–4.5)
PLATELET # BLD AUTO: 330 K/UL — SIGNIFICANT CHANGE UP (ref 150–400)
POTASSIUM SERPL-MCNC: 5 MMOL/L — SIGNIFICANT CHANGE UP (ref 3.5–5.3)
POTASSIUM SERPL-SCNC: 5 MMOL/L — SIGNIFICANT CHANGE UP (ref 3.5–5.3)
RBC # BLD: 2.93 M/UL — LOW (ref 3.8–5.2)
RBC # FLD: 14.3 % — SIGNIFICANT CHANGE UP (ref 10.3–14.5)
SARS-COV-2 RNA SPEC QL NAA+PROBE: SIGNIFICANT CHANGE UP
SODIUM SERPL-SCNC: 135 MMOL/L — SIGNIFICANT CHANGE UP (ref 135–145)
WBC # BLD: 8.82 K/UL — SIGNIFICANT CHANGE UP (ref 3.8–10.5)
WBC # FLD AUTO: 8.82 K/UL — SIGNIFICANT CHANGE UP (ref 3.8–10.5)

## 2022-11-28 PROCEDURE — 99232 SBSQ HOSP IP/OBS MODERATE 35: CPT | Mod: 57

## 2022-11-28 RX ADMIN — HEPARIN SODIUM 5000 UNIT(S): 5000 INJECTION INTRAVENOUS; SUBCUTANEOUS at 05:23

## 2022-11-28 RX ADMIN — SEVELAMER CARBONATE 1600 MILLIGRAM(S): 2400 POWDER, FOR SUSPENSION ORAL at 17:42

## 2022-11-28 RX ADMIN — Medication 1 TABLET(S): at 12:58

## 2022-11-28 RX ADMIN — GABAPENTIN 100 MILLIGRAM(S): 400 CAPSULE ORAL at 05:23

## 2022-11-28 RX ADMIN — CHLORHEXIDINE GLUCONATE 1 APPLICATION(S): 213 SOLUTION TOPICAL at 12:59

## 2022-11-28 RX ADMIN — SIMVASTATIN 40 MILLIGRAM(S): 20 TABLET, FILM COATED ORAL at 20:29

## 2022-11-28 RX ADMIN — SEVELAMER CARBONATE 1600 MILLIGRAM(S): 2400 POWDER, FOR SUSPENSION ORAL at 12:58

## 2022-11-28 RX ADMIN — Medication 81 MILLIGRAM(S): at 12:58

## 2022-11-28 RX ADMIN — OXYCODONE HYDROCHLORIDE 5 MILLIGRAM(S): 5 TABLET ORAL at 21:29

## 2022-11-28 RX ADMIN — OXYCODONE HYDROCHLORIDE 5 MILLIGRAM(S): 5 TABLET ORAL at 04:14

## 2022-11-28 RX ADMIN — HEPARIN SODIUM 5000 UNIT(S): 5000 INJECTION INTRAVENOUS; SUBCUTANEOUS at 13:13

## 2022-11-28 RX ADMIN — OXYCODONE HYDROCHLORIDE 5 MILLIGRAM(S): 5 TABLET ORAL at 03:14

## 2022-11-28 RX ADMIN — HEPARIN SODIUM 5000 UNIT(S): 5000 INJECTION INTRAVENOUS; SUBCUTANEOUS at 20:29

## 2022-11-28 RX ADMIN — OXYCODONE HYDROCHLORIDE 5 MILLIGRAM(S): 5 TABLET ORAL at 20:29

## 2022-11-28 NOTE — PROGRESS NOTE ADULT - SUBJECTIVE AND OBJECTIVE BOX
24h Events:  No acute events overnight.    Subjective:   Patient seen at bedside this AM. W/o complaints this AM. Denies fevers, chills, pain, numbness, tingling, CP, nausea, vomiting. Prevena vac dc'd at bedside and dry dressing applied.     Objective:  Vital Signs  T(C): 36.7 (11-28 @ 10:05), Max: 36.9 (11-27 @ 21:38)  HR: 69 (11-28 @ 10:05) (64 - 70)  BP: 111/51 (11-28 @ 10:05) (100/49 - 120/44)  RR: 16 (11-28 @ 10:05) (16 - 18)  SpO2: 96% (11-28 @ 05:26) (96% - 99%)      Physical Exam:  GEN: NAD  RESP: RA, no increased work of breathing  ABD: s, nd, nttp  EXTR: R BKA well healed, L BKA with prevena vac off, incision cdi, staples in place, no palpable collections, dry dressing applied  NEURO: A/Ox4    Labs:                        8.2    8.82  )-----------( 330      ( 28 Nov 2022 06:40 )             26.6   11-28    135  |  91<L>  |  51<H>  ----------------------------<  94  5.0   |  26  |  10.04<H>    Ca    9.1      28 Nov 2022 06:40  Phos  6.5     11-28  Mg     2.70     11-28      CAPILLARY BLOOD GLUCOSE          Imaging:

## 2022-11-28 NOTE — PROGRESS NOTE ADULT - SUBJECTIVE AND OBJECTIVE BOX
Date of Service  : 11-28-22     INTERVAL HPI/OVERNIGHT EVENTS: I feel fine.   Vital Signs Last 24 Hrs  T(C): 36.2 (28 Nov 2022 13:51), Max: 36.9 (27 Nov 2022 21:38)  T(F): 97.2 (28 Nov 2022 13:51), Max: 98.4 (27 Nov 2022 21:38)  HR: 77 (28 Nov 2022 13:51) (68 - 77)  BP: 91/52 (28 Nov 2022 13:51) (91/52 - 120/44)  BP(mean): --  RR: 18 (28 Nov 2022 13:51) (16 - 18)  SpO2: 100% (28 Nov 2022 13:51) (96% - 100%)    Parameters below as of 28 Nov 2022 13:51  Patient On (Oxygen Delivery Method): room air      I&O's Summary    28 Nov 2022 07:01  -  28 Nov 2022 17:24  --------------------------------------------------------  IN: 500 mL / OUT: 1600 mL / NET: -1100 mL      MEDICATIONS  (STANDING):  aspirin enteric coated 81 milliGRAM(s) Oral daily  chlorhexidine 2% Cloths 1 Application(s) Topical daily  epoetin niesha-epbx (RETACRIT) Injectable 11591 Unit(s) IV Push <User Schedule>  gabapentin 100 milliGRAM(s) Oral <User Schedule>  heparin   Injectable 5000 Unit(s) SubCutaneous every 8 hours  multivitamin 1 Tablet(s) Oral daily  sevelamer carbonate 1600 milliGRAM(s) Oral three times a day with meals  simvastatin 40 milliGRAM(s) Oral at bedtime    MEDICATIONS  (PRN):  oxyCODONE    IR 5 milliGRAM(s) Oral every 4 hours PRN Severe Pain (7 - 10)  sodium chloride 0.9% Bolus. 100 milliLiter(s) IV Bolus every 5 minutes PRN SBP LESS THAN or EQUAL to 90 mmHg    LABS:                        8.2    8.82  )-----------( 330      ( 28 Nov 2022 06:40 )             26.6     11-28    135  |  91<L>  |  51<H>  ----------------------------<  94  5.0   |  26  |  10.04<H>    Ca    9.1      28 Nov 2022 06:40  Phos  6.5     11-28  Mg     2.70     11-28    TPro  x   /  Alb  x   /  TBili  x   /  DBili  x   /  AST  x   /  ALT  < 5  /  AlkPhos  x   11-28        CAPILLARY BLOOD GLUCOSE              REVIEW OF SYSTEMS:  CONSTITUTIONAL: No fever, weight loss, or fatigue  EYES: No eye pain, visual disturbances, or discharge  ENMT:  No difficulty hearing, tinnitus, vertigo; No sinus or throat pain  NECK: No pain or stiffness  RESPIRATORY: No cough, wheezing, chills or hemoptysis; No shortness of breath  CARDIOVASCULAR: No chest pain, palpitations, dizziness, or leg swelling  GASTROINTESTINAL: No abdominal or epigastric pain. No nausea, vomiting, or hematemesis; No diarrhea or constipation. No melena or hematochezia.  GENITOURINARY: No dysuria, frequency, hematuria, or incontinence  NEUROLOGICAL: No headaches, memory loss, loss of strength, numbness, or tremors    Consultant(s) Notes Reviewed:  [x ] YES  [ ] NO    PHYSICAL EXAM:  GENERAL: NAD, well-groomed, well-developed,not in any distress ,  HEAD:  Atraumatic, Normocephalic  EYES: EOMI, PERRLA, conjunctiva and sclera clear  ENMT: No tonsillar erythema, exudates, or enlargement; Moist mucous membranes, Good dentition, No lesions  NECK: Supple, No JVD, Normal thyroid  NERVOUS SYSTEM:  Alert & Oriented X3, No focal deficit   CHEST/LUNG: Good air entry bilateral with no  rales, rhonchi, wheezing, or rubs  HEART: Regular rate and rhythm; No murmurs, rubs, or gallops  ABDOMEN: Soft, Nontender, Nondistended; Bowel sounds present  EXTREMITIES:  LBKA     Care Discussed with Consultants/Other Providers [ x] YES  [ ] NO

## 2022-11-28 NOTE — PROGRESS NOTE ADULT - SUBJECTIVE AND OBJECTIVE BOX
Cardiovascular Disease Progress Note  Date of Service: 22 @ 07:54    Overnight events: No acute events overnight.    Ms. Boogie is undergoing HD in no distress.   Otherwise review of systems negative    Objective Findings:  T(C): 36.8 (22 @ 06:30), Max: 36.9 (22 @ 21:38)  HR: 70 (22 @ 06:30) (64 - 70)  BP: 120/44 (22 @ 06:30) (100/49 - 120/44)  RR: 16 (22 @ 06:30) (16 - 18)  SpO2: 96% (22 @ 05:26) (96% - 99%)  Wt(kg): --  Daily     Daily Weight in k.5 (2022 06:30)      Physical Exam:  Gen: NAD; Patient resting comfortably  HEENT: EOMI, Normocephalic/ atraumatic  CV: RRR, normal S1 + S2, no m/r/g  Lungs:  Normal respiratory effort; clear to auscultation bilaterally  Abd: soft, non-tender; bowel sounds present  Ext: No edema;      Telemetry: n/a    Laboratory Data:                        8.2    8.82  )-----------( 330      ( 2022 06:40 )             26.6         135  |  92<L>  |  45<H>  ----------------------------<  86  4.6   |  29  |  8.18<H>    Ca    9.3      2022 05:50  Phos  6.0       Mg     2.60                     Inpatient Medications:  MEDICATIONS  (STANDING):  aspirin enteric coated 81 milliGRAM(s) Oral daily  chlorhexidine 2% Cloths 1 Application(s) Topical daily  epoetin niesha-epbx (RETACRIT) Injectable 51623 Unit(s) IV Push <User Schedule>  gabapentin 100 milliGRAM(s) Oral <User Schedule>  heparin   Injectable 5000 Unit(s) SubCutaneous every 8 hours  multivitamin 1 Tablet(s) Oral daily  sevelamer carbonate 1600 milliGRAM(s) Oral three times a day with meals  simvastatin 40 milliGRAM(s) Oral at bedtime      Assessment: 61-year-old female with ESRD on HD, HLD and peripheral vascular disease s/p lower extremity resection presents with chronic limb ischemia and L foot ulcer.     Plan of Care:    #Peripheral vascular disease-  Ms. Boogie tolerated L BKA well.   She displays no signs or symptoms of post operative coronary ischemia or acute CHF.   Echo from 3/2022 reviewed- normal LV systolic function with no significant valvular disease.   Continue current cardiac management.   Antiplatelet therapy as per the vascular team.      #Compensated diastolic CHF-  Euvolemic on exam.  Fluid removal with HD as per the renal team.   Echo from 3/2022 reviewed- normal LV systolic function with no significant valvular disease. .      #ESRD-  - HD, as per renal.        Over 25 minutes spent on total encounter; more than 50% of the visit was spent counseling and/or coordinating care by the attending physician.      Cristino Adams MD [facility]  Cardiovascular Disease  [phone]

## 2022-11-28 NOTE — PROGRESS NOTE ADULT - SUBJECTIVE AND OBJECTIVE BOX
[facility] - S Jluis / Shabbir S /D Romina/ S Denys/ S Chano/ Andrés Perkins / M Tristianu/ O Gladis  service -[phone], office [phone]  ---------------------------------------------------------------------------------------------------------------    Patient seen and examined bedside    Subjective and Objective: No overnight events, sob resolved. No complaints today. feeling better    Allergies: No Known Allergies      Hospital Medications:   MEDICATIONS  (STANDING):  aspirin enteric coated 81 milliGRAM(s) Oral daily  chlorhexidine 2% Cloths 1 Application(s) Topical daily  epoetin niesha-epbx (RETACRIT) Injectable 96088 Unit(s) IV Push <User Schedule>  gabapentin 100 milliGRAM(s) Oral <User Schedule>  heparin   Injectable 5000 Unit(s) SubCutaneous every 8 hours  multivitamin 1 Tablet(s) Oral daily  sevelamer carbonate 1600 milliGRAM(s) Oral three times a day with meals  simvastatin 40 milliGRAM(s) Oral at bedtime      REVIEW OF SYSTEMS:  CONSTITUTIONAL: No weakness, fevers or chills  EYES/ENT: No visual changes;  No vertigo or throat pain   NECK: No pain or stiffness  RESPIRATORY: No cough, wheezing, hemoptysis; No shortness of breath  CARDIOVASCULAR: No chest pain or palpitations.  GASTROINTESTINAL: No abdominal or epigastric pain. No nausea, vomiting, or hematemesis; No diarrhea or constipation. No melena or hematochezia.  GENITOURINARY: No dysuria, frequency, foamy urine, urinary urgency, incontinence or hematuria  NEUROLOGICAL: No numbness or weakness  SKIN: No itching, burning, rashes, or lesions   VASCULAR: No bilateral lower extremity edema.   All other review of systems is negative unless indicated above.    VITALS:  T(F): 98.1 (11-28-22 @ 10:05), Max: 98.4 (11-27-22 @ 21:38)  HR: 69 (11-28-22 @ 10:05)  BP: 111/51 (11-28-22 @ 10:05)  RR: 16 (11-28-22 @ 10:05)  SpO2: 96% (11-28-22 @ 05:26)  Wt(kg): --    11-28 @ 07:01  -  11-28 @ 12:58  --------------------------------------------------------  IN: 500 mL / OUT: 1600 mL / NET: -1100 mL          PHYSICAL EXAM:  Constitutional: NAD  HEENT: anicteric sclera, oropharynx clear  Neck: No JVD  Respiratory: CTAB, no wheezes, rales or rhonchi  Cardiovascular: S1, S2, RRR  Gastrointestinal: BS+, soft, NT/ND  Extremities: No cyanosis or clubbing. No peripheral edema  Neurological: A/O x 3, no focal deficits  Psychiatric: Normal mood, normal affect  : No CVA tenderness. No salcido.   Skin: No rashes  Vascular Access:    LABS:  11-28    135  |  91<L>  |  51<H>  ----------------------------<  94  5.0   |  26  |  10.04<H>    Ca    9.1      28 Nov 2022 06:40  Phos  6.5     11-28  Mg     2.70     11-28      Creatinine Trend: 10.04 <--, 8.18 <--, 6.66 <--, 9.47 <--, 7.29 <--, 5.36 <--, 7.53 <--                        8.2    8.82  )-----------( 330      ( 28 Nov 2022 06:40 )             26.6     Urine Studies:        RADIOLOGY & ADDITIONAL STUDIES:   [facility] - S Jluis / Shabbir S /D Romina/ S Denys/ S Chano/ Andrés Perkins / M Katlyn/ O Gladis  service [phone], office [phone]  ---------------------------------------------------------------------------------------------------------------    Patient seen and examined bedside    Subjective and Objective: No overnight events, denied sob. No complaints today. feeling better    Allergies: No Known Allergies      Hospital Medications:   MEDICATIONS  (STANDING):  aspirin enteric coated 81 milliGRAM(s) Oral daily  chlorhexidine 2% Cloths 1 Application(s) Topical daily  epoetin niesha-epbx (RETACRIT) Injectable 84792 Unit(s) IV Push <User Schedule>  gabapentin 100 milliGRAM(s) Oral <User Schedule>  heparin   Injectable 5000 Unit(s) SubCutaneous every 8 hours  multivitamin 1 Tablet(s) Oral daily  sevelamer carbonate 1600 milliGRAM(s) Oral three times a day with meals  simvastatin 40 milliGRAM(s) Oral at bedtime    VITALS:  T(F): 98.1 (11-28-22 @ 10:05), Max: 98.4 (11-27-22 @ 21:38)  HR: 69 (11-28-22 @ 10:05)  BP: 111/51 (11-28-22 @ 10:05)  RR: 16 (11-28-22 @ 10:05)  SpO2: 96% (11-28-22 @ 05:26)  Wt(kg): --    11-28 @ 07:01  -  11-28 @ 12:58  --------------------------------------------------------  IN: 500 mL / OUT: 1600 mL / NET: -1100 mL      PHYSICAL EXAM:  Constitutional: NAD  HEENT: anicteric sclera  Neck: No JVD  Respiratory: CTAB, no wheezes, rales or rhonchi  Cardiovascular: S1, S2, RRR  Gastrointestinal: BS+, soft, NT/ND  Extremities: b/l BKA +   Neurological: A/O x 3  Psychiatric: Normal mood, normal affect  : No salcido.  Vascular Access: rt femoral AVG+    LABS:  11-28    135  |  91<L>  |  51<H>  ----------------------------<  94  5.0   |  26  |  10.04<H>    Ca    9.1      28 Nov 2022 06:40  Phos  6.5     11-28  Mg     2.70     11-28      Creatinine Trend: 10.04 <--, 8.18 <--, 6.66 <--, 9.47 <--, 7.29 <--, 5.36 <--, 7.53 <--                        8.2    8.82  )-----------( 330      ( 28 Nov 2022 06:40 )             26.6     Urine Studies:        RADIOLOGY & ADDITIONAL STUDIES:

## 2022-11-28 NOTE — PROGRESS NOTE ADULT - ASSESSMENT
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 LLE without revascularization options. Now s/p L BKA 11/23.     Recs:  - Maintain knee immobilizer in place post operatively, please maintain until Vascular Sx says otherwise  - Prevena Vac off  - Daily dressing change: gauze-->kerlix-->ace  - Appreciate excellent care per primary team  - Vascular to follow        Vascular Surgery  [id number]

## 2022-11-28 NOTE — PROGRESS NOTE ADULT - ASSESSMENT
61 year old female with ESRD on HD MWF with R AVG, PAD s/p R balloon angioplastic 5/11/22, R BKA, s/p L foot partial 3rd  and 5th ray amputations, presents with L foot infection with ischemic changes. Planned for BKA tomorrow with vascular.     Wound cx with E. coli, ESBL Kleb pneumo, and Proteus mirabilis.  L BKA site with staples in place, no drainage, no purulence, no signs of infection    Recommend:  #L foot wound infection, PAD, leukocytosis  -s/p L foot BKA 11/23  -Monitor for fevers  -Trend WBC  -Given source control now off abx    #ESRD on HD  -Nephro following    Charly Zamora MD  Available through MS Teams  If no response, or after 5pm/weekends, call [phone]    Will sign off. Please call with questions.

## 2022-11-28 NOTE — PROGRESS NOTE ADULT - SUBJECTIVE AND OBJECTIVE BOX
CC: Patient is a 61y old  Female who presents with a chief complaint of toe infection (28 Nov 2022 12:57)    ID following for   Interval History/ROS:    Rest of ROS negative.    Allergies  No Known Allergies        ANTIMICROBIALS:      OTHER MEDS:  aspirin enteric coated 81 milliGRAM(s) Oral daily  chlorhexidine 2% Cloths 1 Application(s) Topical daily  epoetin niesha-epbx (RETACRIT) Injectable 23870 Unit(s) IV Push <User Schedule>  gabapentin 100 milliGRAM(s) Oral <User Schedule>  heparin   Injectable 5000 Unit(s) SubCutaneous every 8 hours  multivitamin 1 Tablet(s) Oral daily  oxyCODONE    IR 5 milliGRAM(s) Oral every 4 hours PRN  sevelamer carbonate 1600 milliGRAM(s) Oral three times a day with meals  simvastatin 40 milliGRAM(s) Oral at bedtime  sodium chloride 0.9% Bolus. 100 milliLiter(s) IV Bolus every 5 minutes PRN      PE:    Vital Signs Last 24 Hrs  T(C): 36.2 (28 Nov 2022 13:51), Max: 36.9 (27 Nov 2022 21:38)  T(F): 97.2 (28 Nov 2022 13:51), Max: 98.4 (27 Nov 2022 21:38)  HR: 77 (28 Nov 2022 13:51) (64 - 77)  BP: 91/52 (28 Nov 2022 13:51) (91/52 - 120/44)  BP(mean): --  RR: 18 (28 Nov 2022 13:51) (16 - 18)  SpO2: 100% (28 Nov 2022 13:51) (96% - 100%)    Parameters below as of 28 Nov 2022 13:51  Patient On (Oxygen Delivery Method): room air    Gen: AOx3, NAD  CV: S1+S2 normal, no murmurs  Resp: Clear bilat, no resp distress  Abd: Soft, nontender, +BS  Ext: R BKA intact, L BKA site with staples, no drainage, no fluctuance, no erythema  : No Peralta  Neuro: no focal deficits    LABS:                          8.2    8.82  )-----------( 330      ( 28 Nov 2022 06:40 )             26.6       11-28    135  |  91<L>  |  51<H>  ----------------------------<  94  5.0   |  26  |  10.04<H>    Ca    9.1      28 Nov 2022 06:40  Phos  6.5     11-28  Mg     2.70     11-28            MICROBIOLOGY:  v  .Abscess left foot  11-17-22   Culture yields >4 types of aerobic and/or anaerobic bacteria  Call client services within 7 days if further workup is clinically  indicated.  Culture includes  Moderate Escherichia coli  Few Klebsiella pneumoniae ESBL  Few Proteus mirabilis  --  Escherichia coli  Klebsiella pneumoniae ESBL  Proteus mirabilis      .Blood Blood-Peripheral  11-17-22   No Growth Final  --  --      .Blood Blood-Peripheral  11-17-22   No Growth Final  --  --    RADIOLOGY:    < from: Xray Foot AP + Lateral + Oblique, Left (11.17.22 @ 17:24) >  IMPRESSION:  Severe degenerative changes of the foot with extensive progressive   erosion along the remainder of the distal second and fourth metatarsal   heads as well as the base of the remaining fourth and second proximal   phalangeal bases. Prominent osteopenia of the fourth phalanges.    These findings are concerning for osteomyelitis erosion and are new when   compared to 5/30/2022. No tracking gas collection. Extensive bilateral   vascular calcifications.      An MRI with contrast is warranted for definitive assessment..    < end of copied text >

## 2022-11-28 NOTE — PROGRESS NOTE ADULT - ASSESSMENT
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 Pt admitted for IV abx and further work up of L toe infection. Renal consulted for ESRD Mx.     ESRD on dialysis.    Maintenance HD schedule MWF  access- thigh Arterio-Venous Graft   HD unit- SQDC  K, vol acceptable    Plan  s/p HD in am, Rx sheet reviewed, net UF 1.1kg removed, tolerated well. uneventful.   plan for next HD Monday w/2k bath, Ultrafiltration on Dialysis as tolerated with blood pressure   renal diet , fluid restriction   dose all meds for ESRD    anemia in ckd- Hb < goal.   Anemia Labs   Hemoglobin : 8.2 < -  9.3 <--, 8.4 <--, 8.5 <--, 9.1 <--, 9.2 <--  Platelets : 279 <--, 296 <--, 288 <--    plan  will inc Retacrit 10k>14k units w/next hd- iv three times per week with hemodialysis   monitor H/H    high phos level : uncontrolled  Serum calcium mg/dl : 9.3 <--, 9.4 <--, 9.4 <--  Serum Phosphorus level mg/dl : 6.5< -7.2 <--, 4.1 <--, 5.8 <--    c/w renvela 2tidac -time w/meals, d/w pt  low phos diet/ renal diet    h/o HTN- no meds. bp low, monitor closely    L diabetic foot ulcer.   - s/p L BKA amputation. f/u w/ vas sx  - on meropenem      plan for rehab placement per team  will closely follow up.   poc d/w pt  labs, chart reviewed  For any question, pl call:  Nephrology  Cell -[phone]  Office [phone]  Ans Serv [phone]

## 2022-11-29 ENCOUNTER — TRANSCRIPTION ENCOUNTER (OUTPATIENT)
Age: 62
End: 2022-11-29

## 2022-11-29 LAB
ANION GAP SERPL CALC-SCNC: 12 MMOL/L — SIGNIFICANT CHANGE UP (ref 7–14)
BUN SERPL-MCNC: 34 MG/DL — HIGH (ref 7–23)
CALCIUM SERPL-MCNC: 9.1 MG/DL — SIGNIFICANT CHANGE UP (ref 8.4–10.5)
CHLORIDE SERPL-SCNC: 94 MMOL/L — LOW (ref 98–107)
CO2 SERPL-SCNC: 31 MMOL/L — SIGNIFICANT CHANGE UP (ref 22–31)
CREAT SERPL-MCNC: 7.12 MG/DL — HIGH (ref 0.5–1.3)
EGFR: 6 ML/MIN/1.73M2 — LOW
GLUCOSE SERPL-MCNC: 105 MG/DL — HIGH (ref 70–99)
HAV IGM SER-ACNC: SIGNIFICANT CHANGE UP
HBV CORE AB SER-ACNC: SIGNIFICANT CHANGE UP
HBV CORE IGM SER-ACNC: SIGNIFICANT CHANGE UP
HBV SURFACE AB SER-ACNC: 674.3 MIU/ML — SIGNIFICANT CHANGE UP
HBV SURFACE AG SER-ACNC: SIGNIFICANT CHANGE UP
HCT VFR BLD CALC: 30 % — LOW (ref 34.5–45)
HCV AB S/CO SERPL IA: 0.1 S/CO — SIGNIFICANT CHANGE UP (ref 0–0.99)
HCV AB SERPL-IMP: SIGNIFICANT CHANGE UP
HGB BLD-MCNC: 8.9 G/DL — LOW (ref 11.5–15.5)
MAGNESIUM SERPL-MCNC: 2.6 MG/DL — SIGNIFICANT CHANGE UP (ref 1.6–2.6)
MCHC RBC-ENTMCNC: 27.8 PG — SIGNIFICANT CHANGE UP (ref 27–34)
MCHC RBC-ENTMCNC: 29.7 GM/DL — LOW (ref 32–36)
MCV RBC AUTO: 93.8 FL — SIGNIFICANT CHANGE UP (ref 80–100)
NRBC # BLD: 0 /100 WBCS — SIGNIFICANT CHANGE UP (ref 0–0)
NRBC # FLD: 0 K/UL — SIGNIFICANT CHANGE UP (ref 0–0)
PHOSPHATE SERPL-MCNC: 5 MG/DL — HIGH (ref 2.5–4.5)
PLATELET # BLD AUTO: 348 K/UL — SIGNIFICANT CHANGE UP (ref 150–400)
POTASSIUM SERPL-MCNC: 4.9 MMOL/L — SIGNIFICANT CHANGE UP (ref 3.5–5.3)
POTASSIUM SERPL-SCNC: 4.9 MMOL/L — SIGNIFICANT CHANGE UP (ref 3.5–5.3)
RBC # BLD: 3.2 M/UL — LOW (ref 3.8–5.2)
RBC # FLD: 14.1 % — SIGNIFICANT CHANGE UP (ref 10.3–14.5)
SODIUM SERPL-SCNC: 137 MMOL/L — SIGNIFICANT CHANGE UP (ref 135–145)
SURGICAL PATHOLOGY STUDY: SIGNIFICANT CHANGE UP
WBC # BLD: 8.39 K/UL — SIGNIFICANT CHANGE UP (ref 3.8–10.5)
WBC # FLD AUTO: 8.39 K/UL — SIGNIFICANT CHANGE UP (ref 3.8–10.5)

## 2022-11-29 PROCEDURE — 71045 X-RAY EXAM CHEST 1 VIEW: CPT | Mod: 26

## 2022-11-29 RX ORDER — SIMVASTATIN 20 MG/1
1 TABLET, FILM COATED ORAL
Qty: 0 | Refills: 0 | DISCHARGE
Start: 2022-11-29

## 2022-11-29 RX ORDER — SEVELAMER CARBONATE 2400 MG/1
2 POWDER, FOR SUSPENSION ORAL
Qty: 0 | Refills: 0 | DISCHARGE
Start: 2022-11-29

## 2022-11-29 RX ORDER — ASPIRIN/CALCIUM CARB/MAGNESIUM 324 MG
1 TABLET ORAL
Qty: 0 | Refills: 0 | DISCHARGE
Start: 2022-11-29

## 2022-11-29 RX ORDER — SIMVASTATIN 20 MG/1
1 TABLET, FILM COATED ORAL
Qty: 0 | Refills: 0 | DISCHARGE

## 2022-11-29 RX ORDER — GABAPENTIN 400 MG/1
1 CAPSULE ORAL
Qty: 0 | Refills: 0 | DISCHARGE
Start: 2022-11-29

## 2022-11-29 RX ADMIN — CHLORHEXIDINE GLUCONATE 1 APPLICATION(S): 213 SOLUTION TOPICAL at 12:41

## 2022-11-29 RX ADMIN — HEPARIN SODIUM 5000 UNIT(S): 5000 INJECTION INTRAVENOUS; SUBCUTANEOUS at 05:56

## 2022-11-29 RX ADMIN — OXYCODONE HYDROCHLORIDE 5 MILLIGRAM(S): 5 TABLET ORAL at 10:00

## 2022-11-29 RX ADMIN — SEVELAMER CARBONATE 1600 MILLIGRAM(S): 2400 POWDER, FOR SUSPENSION ORAL at 12:42

## 2022-11-29 RX ADMIN — SEVELAMER CARBONATE 1600 MILLIGRAM(S): 2400 POWDER, FOR SUSPENSION ORAL at 09:47

## 2022-11-29 RX ADMIN — Medication 1 TABLET(S): at 12:42

## 2022-11-29 RX ADMIN — SIMVASTATIN 40 MILLIGRAM(S): 20 TABLET, FILM COATED ORAL at 22:08

## 2022-11-29 RX ADMIN — HEPARIN SODIUM 5000 UNIT(S): 5000 INJECTION INTRAVENOUS; SUBCUTANEOUS at 22:09

## 2022-11-29 RX ADMIN — SEVELAMER CARBONATE 1600 MILLIGRAM(S): 2400 POWDER, FOR SUSPENSION ORAL at 17:54

## 2022-11-29 RX ADMIN — OXYCODONE HYDROCHLORIDE 5 MILLIGRAM(S): 5 TABLET ORAL at 10:35

## 2022-11-29 RX ADMIN — Medication 81 MILLIGRAM(S): at 12:42

## 2022-11-29 RX ADMIN — HEPARIN SODIUM 5000 UNIT(S): 5000 INJECTION INTRAVENOUS; SUBCUTANEOUS at 14:17

## 2022-11-29 NOTE — PROGRESS NOTE ADULT - ASSESSMENT
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 Pt admitted for IV abx and further work up of L toe infection.      Problem/Plan - 1:  ·  Problem: Toe infection with Severe PAD .   ·  Plan: S/P  BKA.   Pt w/ left 2nd and 4th toe infection, s/p debridement and wound culture w/ podiatry. Concern for possible OM.   - C/w vanc and meropenem based off previous wound cx. Dose w/ HD.  - f/u wound cx  - f/u podiatry and vascular recs  < from: VA Duplex Physiol Ext Low 3+ Level, BI. (VA Duplex Physiol Ext Low 3+ Level, LT.) (11.18.22 @ 08:29) >  Left MARTA is elevated (1.45), likely due to calcific  vessels.  Flat left digit waveforms noted. Findings  suggest the presence of distal infrapopliteal and small  vessel arterial disease in the left foot.         Problem/Plan - 2:  ·  Problem: ESRD on dialysis.   ·  Plan: Pt received HD on MWF. Last HD 11/16.   - renal following and HD per them .   - c/w gabapentin 100 mg TID before HD.     Problem/Plan - 3:  ·  Problem: Chronic CHF.   ·  Plan: Pt w/ chronic diastolic CHF. Previous TTE from 3/2022 w/ nml LVEF. Appears euvolemic on exam.  - HD per renal.     Problem/Plan - 4:  ·  Problem: Anemia.   ·  Plan: HH stable .   No signs or evidence of bleeding  - continue to monitor.     Problem/Plan - 5:  ·  Problem: PAD (peripheral artery disease).   ·  Plan: Pt w/ hx of R balloon angioplasty (5/11/2022) and foot wounds s/p left foot partial 3rd ray amputation (3/2022) and R BKA (6/2022)  - c/w asa 81 mg, pt unsure if still on plavix  - Vascular help appreciated.   -S/P LBKA.      Problem/Plan - 6:  ·  Problem: HLD (hyperlipidemia).   ·  Plan: c/w simvastatin 40 mg QHS.     Problem/Plan - 7:  ·  Problem: Prophylactic measure.   ·  Plan: DVT prophylaxis: heparin subq  Diet: dash/tlc, renal.      Dispo : DC planning to [facility].

## 2022-11-29 NOTE — PROGRESS NOTE ADULT - SUBJECTIVE AND OBJECTIVE BOX
[facility] - S Jluis / Shabbir S /D Romina/ S Denys/ S Chano/ Andrés Perkins / M Tristianu/ O Gladis  service [phone], office [phone]  ---------------------------------------------------------------------------------------------------------------    Patient seen and examined bedside    Subjective and Objective: No overnight events, sob resolved. No complaints today. feeling better    Allergies: No Known Allergies      Hospital Medications:   MEDICATIONS  (STANDING):  aspirin enteric coated 81 milliGRAM(s) Oral daily  chlorhexidine 2% Cloths 1 Application(s) Topical daily  epoetin niesha-epbx (RETACRIT) Injectable 64695 Unit(s) IV Push <User Schedule>  gabapentin 100 milliGRAM(s) Oral <User Schedule>  heparin   Injectable 5000 Unit(s) SubCutaneous every 8 hours  multivitamin 1 Tablet(s) Oral daily  sevelamer carbonate 1600 milliGRAM(s) Oral three times a day with meals  simvastatin 40 milliGRAM(s) Oral at bedtime      REVIEW OF SYSTEMS:  CONSTITUTIONAL: No weakness, fevers or chills  EYES/ENT: No visual changes;  No vertigo or throat pain   NECK: No pain or stiffness  RESPIRATORY: No cough, wheezing, hemoptysis; No shortness of breath  CARDIOVASCULAR: No chest pain or palpitations.  GASTROINTESTINAL: No abdominal or epigastric pain. No nausea, vomiting, or hematemesis; No diarrhea or constipation. No melena or hematochezia.  GENITOURINARY: No dysuria, frequency, foamy urine, urinary urgency, incontinence or hematuria  NEUROLOGICAL: No numbness or weakness  SKIN: No itching, burning, rashes, or lesions   VASCULAR: No bilateral lower extremity edema.   All other review of systems is negative unless indicated above.    VITALS:  T(F): 98.1 (11-29-22 @ 13:55), Max: 98.6 (11-29-22 @ 06:04)  HR: 72 (11-29-22 @ 13:55)  BP: 116/51 (11-29-22 @ 13:55)  RR: 18 (11-29-22 @ 13:55)  SpO2: 98% (11-29-22 @ 13:55)  Wt(kg): --    11-28 @ 07:01  -  11-29 @ 07:00  --------------------------------------------------------  IN: 500 mL / OUT: 1600 mL / NET: -1100 mL          PHYSICAL EXAM:  Constitutional: NAD  HEENT: anicteric sclera, oropharynx clear  Neck: No JVD  Respiratory: CTAB, no wheezes, rales or rhonchi  Cardiovascular: S1, S2, RRR  Gastrointestinal: BS+, soft, NT/ND  Extremities: No cyanosis or clubbing. No peripheral edema  Neurological: A/O x 3, no focal deficits  Psychiatric: Normal mood, normal affect  : No CVA tenderness. No salcido.   Skin: No rashes  Vascular Access:    LABS:  11-29    137  |  94<L>  |  34<H>  ----------------------------<  105<H>  4.9   |  31  |  7.12<H>    Ca    9.1      29 Nov 2022 06:10  Phos  5.0     11-29  Mg     2.60     11-29    TPro      /  Alb      /  TBili      /  DBili      /  AST      /  ALT  < 5  /  AlkPhos      11-28    Creatinine Trend: 7.12 <--, 10.04 <--, 8.18 <--, 6.66 <--, 9.47 <--, 7.29 <--, 5.36 <--                        8.9    8.39  )-----------( 348      ( 29 Nov 2022 06:10 )             30.0     Urine Studies:        RADIOLOGY & ADDITIONAL STUDIES:   [facility] - S Jluis / Shabbir S /D Romina/ S Denys/ S Chano/ Andrés Perkins / M Katlyn/ O Gladis  service [phone], office [phone]  ---------------------------------------------------------------------------------------------------------------    Patient seen and examined bedside    Subjective and Objective: No overnight events, denied sob. No complaints today. feeling better    Allergies: No Known Allergies      Hospital Medications:   MEDICATIONS  (STANDING):  aspirin enteric coated 81 milliGRAM(s) Oral daily  chlorhexidine 2% Cloths 1 Application(s) Topical daily  epoetin niesha-epbx (RETACRIT) Injectable 52990 Unit(s) IV Push <User Schedule>  gabapentin 100 milliGRAM(s) Oral <User Schedule>  heparin   Injectable 5000 Unit(s) SubCutaneous every 8 hours  multivitamin 1 Tablet(s) Oral daily  sevelamer carbonate 1600 milliGRAM(s) Oral three times a day with meals  simvastatin 40 milliGRAM(s) Oral at bedtime    VITALS:  T(F): 98.1 (11-29-22 @ 13:55), Max: 98.6 (11-29-22 @ 06:04)  HR: 72 (11-29-22 @ 13:55)  BP: 116/51 (11-29-22 @ 13:55)  RR: 18 (11-29-22 @ 13:55)  SpO2: 98% (11-29-22 @ 13:55)  Wt(kg): --    11-28 @ 07:01  -  11-29 @ 07:00  --------------------------------------------------------  IN: 500 mL / OUT: 1600 mL / NET: -1100 mL      PHYSICAL EXAM:  Constitutional: NAD  HEENT: anicteric sclera  Neck: No JVD  Respiratory: CTAB, no wheezes, rales or rhonchi  Cardiovascular: S1, S2, RRR  Gastrointestinal: BS+, soft, NT/ND  Extremities: b/l BKA +   Neurological: A/O x 3  Psychiatric: Normal mood, normal affect  : No salcido.  Vascular Access: rt femoral AVG+    LABS:  11-29    137  |  94<L>  |  34<H>  ----------------------------<  105<H>  4.9   |  31  |  7.12<H>    Ca    9.1      29 Nov 2022 06:10  Phos  5.0     11-29  Mg     2.60     11-29    TPro      /  Alb      /  TBili      /  DBili      /  AST      /  ALT  < 5  /  AlkPhos      11-28    Creatinine Trend: 7.12 <--, 10.04 <--, 8.18 <--, 6.66 <--, 9.47 <--, 7.29 <--, 5.36 <--                        8.9    8.39  )-----------( 348      ( 29 Nov 2022 06:10 )             30.0     Urine Studies:        RADIOLOGY & ADDITIONAL STUDIES:

## 2022-11-29 NOTE — DISCHARGE NOTE PROVIDER - NSDCMRMEDTOKEN_GEN_ALL_CORE_FT
aspirin 81 mg oral delayed release tablet: 1 tab(s) orally once a day  gabapentin 100 mg oral capsule: 1 cap(s) orally Monday, Wednesday, and Friday  Multiple Vitamins oral tablet: 1 tab(s) orally once a day  Plavix 75 mg oral tablet: 1 tab(s) orally once a day  Per [facility]: pt picked up on 10/4 x 90 days (active rx on file).   sevelamer carbonate 800 mg oral tablet: 2 tab(s) orally 3 times a day (with meals)  simvastatin 40 mg oral tablet: 1 tab(s) orally once a day (at bedtime)   aspirin 81 mg oral delayed release tablet: 1 tab(s) orally once a day  gabapentin 100 mg oral capsule: 1 cap(s) orally Monday, Wednesday, and Friday  Multiple Vitamins oral tablet: 1 tab(s) orally once a day  oxyCODONE 5 mg oral tablet: 1 tab(s) orally every 6 hours, As Needed - for severe pain  Plavix 75 mg oral tablet: 1 tab(s) orally once a day  Per [facility]: pt picked up on 10/4 x 90 days (active rx on file).   sevelamer carbonate 800 mg oral tablet: 2 tab(s) orally 3 times a day (with meals)  simvastatin 40 mg oral tablet: 1 tab(s) orally once a day (at bedtime)

## 2022-11-29 NOTE — PROGRESS NOTE ADULT - ASSESSMENT
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 Pt admitted for IV abx and further work up of L toe infection. Renal consulted for ESRD Mx.     ESRD on dialysis.    Maintenance HD schedule MWF  access- thigh Arterio-Venous Graft   HD unit- SQDC  K, vol acceptable    Plan  s/p HD 11/28, Rx sheet reviewed, net UF 1.1kg removed, tolerated well. uneventful.   plan for next HD tomorrow w/2k bath, Ultrafiltration on Dialysis as tolerated with blood pressure   renal diet , fluid restriction   dose all meds for ESRD    anemia in ckd- Hb < goal.   Anemia Labs   Hemoglobin : 8.2 < -  9.3 <--, 8.4 <--, 8.5 <--, 9.1 <--, 9.2 <--  Platelets : 279 <--, 296 <--, 288 <--    plan  will inc Retacrit 10k>14k units w/next hd- iv three times per week with hemodialysis   monitor H/H    high phos level : uncontrolled  Serum calcium mg/dl : 9.3 <--, 9.4 <--, 9.4 <--  Serum Phosphorus level mg/dl : 6.5< -7.2 <--, 4.1 <--, 5.8 <--    c/w renvela 2tidac -time w/meals, d/w pt  low phos diet/ renal diet    h/o HTN- no meds. bp low, monitor closely    L diabetic foot ulcer.   - s/p L BKA amputation. f/u w/ vas sx  - on meropenem      plan for rehab placement per team  will closely follow up.   poc d/w pt  labs, chart reviewed  For any question, pl call:  Nephrology  Cell -[phone]  Office [phone]  Ans Serv [phone]

## 2022-11-29 NOTE — PROGRESS NOTE ADULT - SUBJECTIVE AND OBJECTIVE BOX
Cardiovascular Disease Progress Note  Date of Service: 22 @ 09:56    Overnight events: No acute events overnight.    Ms. Boogie denies chest pain or SOB.   Otherwise review of systems negative    Objective Findings:  T(C): 37 (22 @ 06:04), Max: 37 (22 @ 06:04)  HR: 72 (22 @ 06:04) (69 - 77)  BP: 114/52 (22 @ 06:04) (91/52 - 119/59)  RR: 17 (22 @ 06:04) (16 - 18)  SpO2: 99% (22 @ 06:04) (99% - 100%)  Wt(kg): --  Daily     Daily Weight in k.5 (2022 10:05)      Physical Exam:  Gen: NAD; Patient resting comfortably  HEENT: EOMI, Normocephalic/ atraumatic  CV: RRR, normal S1 + S2, no m/r/g  Lungs:  Normal respiratory effort; clear to auscultation bilaterally  Abd: soft, non-tender; bowel sounds present  Ext: No edema;      Telemetry: n/a    Laboratory Data:                        8.9    8.39  )-----------( 348      ( 2022 06:10 )             30.0         137  |  94<L>  |  34<H>  ----------------------------<  105<H>  4.9   |  31  |  7.12<H>    Ca    9.1      2022 06:10  Phos  5.0       Mg     2.60         TPro  x   /  Alb  x   /  TBili  x   /  DBili  x   /  AST  x   /  ALT  < 5  /  AlkPhos  x                 Inpatient Medications:  MEDICATIONS  (STANDING):  aspirin enteric coated 81 milliGRAM(s) Oral daily  chlorhexidine 2% Cloths 1 Application(s) Topical daily  epoetin niesha-epbx (RETACRIT) Injectable 02654 Unit(s) IV Push <User Schedule>  gabapentin 100 milliGRAM(s) Oral <User Schedule>  heparin   Injectable 5000 Unit(s) SubCutaneous every 8 hours  multivitamin 1 Tablet(s) Oral daily  sevelamer carbonate 1600 milliGRAM(s) Oral three times a day with meals  simvastatin 40 milliGRAM(s) Oral at bedtime      Assessment: 61-year-old female with ESRD on HD, HLD and peripheral vascular disease s/p lower extremity resection presents with chronic limb ischemia and L foot ulcer.     Plan of Care:    #Peripheral vascular disease-  Ms. Boogie tolerated L BKA well.   She displays no signs or symptoms of post operative coronary ischemia or acute CHF.   Echo from 3/2022 reviewed- normal LV systolic function with no significant valvular disease.   Continue current cardiac management.   Antiplatelet therapy as per the vascular team.      #Compensated diastolic CHF-  Euvolemic on exam.  Fluid removal with HD as per the renal team.   Echo from 3/2022 reviewed- normal LV systolic function with no significant valvular disease. .      #ESRD-  - HD, as per renal.        Over 25 minutes spent on total encounter; more than 50% of the visit was spent counseling and/or coordinating care by the attending physician.      Cristino Adams MD [facility]  Cardiovascular Disease  [phone]

## 2022-11-29 NOTE — DISCHARGE NOTE PROVIDER - HOSPITAL COURSE
Toe infection  - left 2nd and 4th toe infection, s/p debridement and wound culture w/ podiatry; concern for possible OM  - XR L foot: severe degenerative changes of foot w/ extensive progressive erosion along remainder of distal second and fourth metatarsal heads as well as base of remaining fourth and second proximal phalangeal bases; findings c/f OM vs. progressive ischemic erosion; no tracking gas collection  - s/p Vanco/Meropenem per ID  - MARTA: distal infrapopliteal and small vessel arterial disease in L foot  - wound cx growing E. coli, Klebsiella, Proteus sensitive to Meropenem  - podiatry consulted: s/p L BKA  - wound vac in place 11/23-11/27 per vascular surgery recommendations    ESRD on dialysis  - c/w HD per nephrology  - c/w gabapentin before HD    Chronic CHF  - chronic diastolic CHF  - previous TTE from 3/2022 w/ nl LV EF; euvolemic on exam    Anemia  - Hgb 10.3, likely in setting of CKD and chronic disease; no signs or evidence of bleeding  - continue to monitor    PAD (peripheral artery disease)  - hx of R balloon angioplasty (5/11/2022) and foot wounds s/p left foot partial 3rd ray amputation (3/2022) and R BKA (6/2022)  - c/w ASA pt unsure if still on plavix, however, per med rec pharmacist, Plavix dispensed 10/4/22  - MARTA: distal infrapopliteal and small vessel arterial disease in L foot  - vascular surgery consulted: L foot w/o revascularization options, recommended left BKA, now s/p BKA; c/w knee immobilizer post-operatively until vascular surgery says to discontinue    HLD (hyperlipidemia)  - c/w simvastatin    DVT PPx: SQ Heparin    Dispo- Rehab

## 2022-11-29 NOTE — DISCHARGE NOTE PROVIDER - PROVIDER TOKENS
PROVIDER:[TOKEN:[[id number]]] PROVIDER:[TOKEN:[[id number]]],FREE:[LAST:[PCP],PHONE:[(   )    -],FAX:[(   )    -]]

## 2022-11-29 NOTE — DISCHARGE NOTE NURSING/CASE MANAGEMENT/SOCIAL WORK - PATIENT PORTAL LINK FT
You can access the FollowMyHealth Patient Portal offered by [facility] by registering at the following website: [email or web address]. By joining [employer]’s FollowMyHealth portal, you will also be able to view your health information using other applications (apps) compatible with our system.

## 2022-11-29 NOTE — DISCHARGE NOTE PROVIDER - NSDCCPCAREPLAN_GEN_ALL_CORE_FT
PRINCIPAL DISCHARGE DIAGNOSIS  Diagnosis: Toe infection  Assessment and Plan of Treatment: You had a left below the knee amputation on this admission. - Maintain knee immobilizer in place post operatively until follow up with Vascular Surgery. Follow up with Dr Gilliland in 1 week. Please call to schedule an appointment.  - Daily dressing change: gauze-->kerlix-->ace  -Continue aspirin and Plavix daily      SECONDARY DISCHARGE DIAGNOSES  Diagnosis: PAD (peripheral artery disease)  Assessment and Plan of Treatment: Follow up with vascular surgery in 1 week. Call the office of Dr Gilliland to schedule an appointment. Continue Aspirin and Plavix    Diagnosis: ESRD on dialysis  Assessment and Plan of Treatment: Continue with HD 3x/week per your nephrologist. Follow up with your nephrologist at your next HD session     PRINCIPAL DISCHARGE DIAGNOSIS  Diagnosis: Toe infection  Assessment and Plan of Treatment: You had a left below the knee amputation on this admission. - Maintain knee immobilizer in place post operatively until follow up with Vascular Surgery. Follow up with Dr Gilliland in 1 month. Please call to schedule an appointment.  - Daily dressing change: gauze-->kerlix-->ace  -Continue aspirin and Plavix daily      SECONDARY DISCHARGE DIAGNOSES  Diagnosis: PAD (peripheral artery disease)  Assessment and Plan of Treatment: Follow up with vascular surgery in 1 month. Call the office of Dr Gilliland to schedule an appointment. Continue Aspirin and Plavix    Diagnosis: ESRD on dialysis  Assessment and Plan of Treatment: Continue with HD 3x/week per your nephrologist. Follow up with your nephrologist at your next HD session

## 2022-11-29 NOTE — DISCHARGE NOTE PROVIDER - CARE PROVIDER_API CALL
Nina Gilliland)  Surgery  [address], [address]  [place] [address]  Phone: [phone]  Fax: [phone]  Follow Up Time:    Nina Gilliland)  Surgery  [address], [address]  [place] [address]  Phone: [phone]  Fax: [phone]  Follow Up Time:     PCP,   Phone: (   )    -  Fax: (   )    -  Follow Up Time:

## 2022-11-29 NOTE — DISCHARGE NOTE PROVIDER - CARE PROVIDERS DIRECT ADDRESSES
,[email or web address]_Unknown

## 2022-11-29 NOTE — PROGRESS NOTE ADULT - SUBJECTIVE AND OBJECTIVE BOX
24h Events:  No acute events overnight.    Subjective:   Patient seen at bedside this AM. Dressing changed at bedside. Tolerated well.     Objective:  Vital Signs  T(C): 36.7 (11-29 @ 13:55), Max: 37 (11-29 @ 06:04)  HR: 72 (11-29 @ 13:55) (69 - 72)  BP: 116/51 (11-29 @ 13:55) (114/52 - 119/59)  RR: 18 (11-29 @ 13:55) (17 - 18)  SpO2: 98% (11-29 @ 13:55) (98% - 100%)      Physical Exam:  GEN: NAD  RESP: RA, no increased work of breathing  ABD: s, nd, nttp  EXTR: R BKA well healed, L BKA with prevena vac off, incision cdi, staples in place, no palpable collections, dry dressing applied  NEURO: A/Ox4    Labs:                        8.9    8.39  )-----------( 348      ( 29 Nov 2022 06:10 )             30.0   11-29    137  |  94<L>  |  34<H>  ----------------------------<  105<H>  4.9   |  31  |  7.12<H>    Ca    9.1      29 Nov 2022 06:10  Phos  5.0     11-29  Mg     2.60     11-29    TPro  x   /  Alb  x   /  TBili  x   /  DBili  x   /  AST  x   /  ALT  < 5  /  AlkPhos  x   11-28    CAPILLARY BLOOD GLUCOSE          Imaging:

## 2022-11-29 NOTE — PROGRESS NOTE ADULT - ASSESSMENT
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 LLE without revascularization options. Now s/p L BKA 11/23.     Recs:  - Maintain knee immobilizer in place post operatively, please maintain until Vascular Sx says otherwise  - Prevena Vac off  - Daily dressing change: gauze-->kerlix-->ace  - Appreciate excellent care per primary team  - Vascular to sign off    Vascular Surgery  [id number]

## 2022-11-29 NOTE — PROGRESS NOTE ADULT - SUBJECTIVE AND OBJECTIVE BOX
Date of Service  : 11-29-22     INTERVAL HPI/OVERNIGHT EVENTS: I feel fine.   Vital Signs Last 24 Hrs  T(C): 36.7 (29 Nov 2022 13:55), Max: 37 (29 Nov 2022 06:04)  T(F): 98.1 (29 Nov 2022 13:55), Max: 98.6 (29 Nov 2022 06:04)  HR: 72 (29 Nov 2022 13:55) (69 - 72)  BP: 116/51 (29 Nov 2022 13:55) (114/52 - 119/59)  BP(mean): --  RR: 18 (29 Nov 2022 13:55) (17 - 18)  SpO2: 98% (29 Nov 2022 13:55) (98% - 100%)    Parameters below as of 29 Nov 2022 13:55  Patient On (Oxygen Delivery Method): room air      I&O's Summary    28 Nov 2022 07:01  -  29 Nov 2022 07:00  --------------------------------------------------------  IN: 500 mL / OUT: 1600 mL / NET: -1100 mL    29 Nov 2022 07:01  -  29 Nov 2022 19:19  --------------------------------------------------------  IN: 500 mL / OUT: 0 mL / NET: 500 mL      MEDICATIONS  (STANDING):  aspirin enteric coated 81 milliGRAM(s) Oral daily  chlorhexidine 2% Cloths 1 Application(s) Topical daily  epoetin niesha-epbx (RETACRIT) Injectable 72047 Unit(s) IV Push <User Schedule>  gabapentin 100 milliGRAM(s) Oral <User Schedule>  heparin   Injectable 5000 Unit(s) SubCutaneous every 8 hours  multivitamin 1 Tablet(s) Oral daily  sevelamer carbonate 1600 milliGRAM(s) Oral three times a day with meals  simvastatin 40 milliGRAM(s) Oral at bedtime    MEDICATIONS  (PRN):  oxyCODONE    IR 5 milliGRAM(s) Oral every 4 hours PRN Severe Pain (7 - 10)  sodium chloride 0.9% Bolus. 100 milliLiter(s) IV Bolus every 5 minutes PRN SBP LESS THAN or EQUAL to 90 mmHg    LABS:                        8.9    8.39  )-----------( 348      ( 29 Nov 2022 06:10 )             30.0     11-29    137  |  94<L>  |  34<H>  ----------------------------<  105<H>  4.9   |  31  |  7.12<H>    Ca    9.1      29 Nov 2022 06:10  Phos  5.0     11-29  Mg     2.60     11-29    TPro  x   /  Alb  x   /  TBili  x   /  DBili  x   /  AST  x   /  ALT  < 5  /  AlkPhos  x   11-28        CAPILLARY BLOOD GLUCOSE              REVIEW OF SYSTEMS:  CONSTITUTIONAL: No fever, weight loss, or fatigue  EYES: No eye pain, visual disturbances, or discharge  ENMT:  No difficulty hearing, tinnitus, vertigo; No sinus or throat pain  NECK: No pain or stiffness  RESPIRATORY: No cough, wheezing, chills or hemoptysis; No shortness of breath  CARDIOVASCULAR: No chest pain, palpitations, dizziness, or leg swelling  GASTROINTESTINAL: No abdominal or epigastric pain. No nausea, vomiting, or hematemesis; No diarrhea or constipation. No melena or hematochezia.  GENITOURINARY: No dysuria, frequency, hematuria, or incontinence  NEUROLOGICAL: No headaches, memory loss, loss of strength, numbness, or tremors  SKIN: No itching, burning, rashes, or lesions   ENDOCRINE: No heat or cold intolerance; No hair loss  MUSCULOSKELETAL: No joint pain or swelling; No muscle, back, or extremity pain  PSYCHIATRIC: No depression, anxiety, mood swings, or difficulty sleeping  HEME/LYMPH: No easy bruising, or bleeding gums  ALLERY AND IMMUNOLOGIC: No hives or eczema    RADIOLOGY & ADDITIONAL TESTS:    Consultant(s) Notes Reviewed:  [x ] YES  [ ] NO    PHYSICAL EXAM:  GENERAL: NAD, well-groomed, well-developed,not in any distress ,  HEAD:  Atraumatic, Normocephalic  NECK: Supple, No JVD, Normal thyroid  NERVOUS SYSTEM:  Alert & Oriented X3, No focal deficit   CHEST/LUNG: Good air entry bilateral with no  rales, rhonchi, wheezing, or rubs  HEART: Regular rate and rhythm; No murmurs, rubs, or gallops  ABDOMEN: Soft, Nontender, Nondistended; Bowel sounds present  EXTREMITIES:  2+ Peripheral Pulses, No clubbing, cyanosis, or edema      Care Discussed with Consultants/Other Providers [ x] YES  [ ] NO

## 2022-11-30 VITALS
TEMPERATURE: 98 F | DIASTOLIC BLOOD PRESSURE: 49 MMHG | RESPIRATION RATE: 18 BRPM | SYSTOLIC BLOOD PRESSURE: 108 MMHG | HEART RATE: 81 BPM | OXYGEN SATURATION: 97 %

## 2022-11-30 LAB
ANION GAP SERPL CALC-SCNC: 20 MMOL/L — HIGH (ref 7–14)
BUN SERPL-MCNC: 47 MG/DL — HIGH (ref 7–23)
CALCIUM SERPL-MCNC: 9.4 MG/DL — SIGNIFICANT CHANGE UP (ref 8.4–10.5)
CHLORIDE SERPL-SCNC: 93 MMOL/L — LOW (ref 98–107)
CO2 SERPL-SCNC: 25 MMOL/L — SIGNIFICANT CHANGE UP (ref 22–31)
CREAT SERPL-MCNC: 8.93 MG/DL — HIGH (ref 0.5–1.3)
EGFR: 5 ML/MIN/1.73M2 — LOW
GLUCOSE SERPL-MCNC: 71 MG/DL — SIGNIFICANT CHANGE UP (ref 70–99)
HCT VFR BLD CALC: 28.2 % — LOW (ref 34.5–45)
HGB BLD-MCNC: 8.4 G/DL — LOW (ref 11.5–15.5)
MAGNESIUM SERPL-MCNC: 2.6 MG/DL — SIGNIFICANT CHANGE UP (ref 1.6–2.6)
MCHC RBC-ENTMCNC: 27.3 PG — SIGNIFICANT CHANGE UP (ref 27–34)
MCHC RBC-ENTMCNC: 29.8 GM/DL — LOW (ref 32–36)
MCV RBC AUTO: 91.6 FL — SIGNIFICANT CHANGE UP (ref 80–100)
NRBC # BLD: 0 /100 WBCS — SIGNIFICANT CHANGE UP (ref 0–0)
NRBC # FLD: 0 K/UL — SIGNIFICANT CHANGE UP (ref 0–0)
PHOSPHATE SERPL-MCNC: 6.2 MG/DL — HIGH (ref 2.5–4.5)
PLATELET # BLD AUTO: 351 K/UL — SIGNIFICANT CHANGE UP (ref 150–400)
POTASSIUM SERPL-MCNC: 4.7 MMOL/L — SIGNIFICANT CHANGE UP (ref 3.5–5.3)
POTASSIUM SERPL-SCNC: 4.7 MMOL/L — SIGNIFICANT CHANGE UP (ref 3.5–5.3)
RBC # BLD: 3.08 M/UL — LOW (ref 3.8–5.2)
RBC # FLD: 13.9 % — SIGNIFICANT CHANGE UP (ref 10.3–14.5)
SODIUM SERPL-SCNC: 138 MMOL/L — SIGNIFICANT CHANGE UP (ref 135–145)
WBC # BLD: 7.92 K/UL — SIGNIFICANT CHANGE UP (ref 3.8–10.5)
WBC # FLD AUTO: 7.92 K/UL — SIGNIFICANT CHANGE UP (ref 3.8–10.5)

## 2022-11-30 PROCEDURE — 93010 ELECTROCARDIOGRAM REPORT: CPT

## 2022-11-30 RX ORDER — ERYTHROPOIETIN 10000 [IU]/ML
10000 INJECTION, SOLUTION INTRAVENOUS; SUBCUTANEOUS
Refills: 0 | Status: DISCONTINUED | OUTPATIENT
Start: 2022-11-30 | End: 2022-11-30

## 2022-11-30 RX ADMIN — Medication 1 TABLET(S): at 11:54

## 2022-11-30 RX ADMIN — SEVELAMER CARBONATE 1600 MILLIGRAM(S): 2400 POWDER, FOR SUSPENSION ORAL at 10:25

## 2022-11-30 RX ADMIN — Medication 81 MILLIGRAM(S): at 11:54

## 2022-11-30 RX ADMIN — GABAPENTIN 100 MILLIGRAM(S): 400 CAPSULE ORAL at 06:01

## 2022-11-30 RX ADMIN — HEPARIN SODIUM 5000 UNIT(S): 5000 INJECTION INTRAVENOUS; SUBCUTANEOUS at 06:01

## 2022-11-30 RX ADMIN — CHLORHEXIDINE GLUCONATE 1 APPLICATION(S): 213 SOLUTION TOPICAL at 11:56

## 2022-11-30 RX ADMIN — ERYTHROPOIETIN 10000 UNIT(S): 10000 INJECTION, SOLUTION INTRAVENOUS; SUBCUTANEOUS at 07:35

## 2022-11-30 NOTE — PROGRESS NOTE ADULT - SUBJECTIVE AND OBJECTIVE BOX
Cardiovascular Disease Progress Note  Date of Service: 11-30-22 @ 06:30    Overnight events: No acute events overnight.    Ms. Boogie denies chest pain or SOB.   Otherwise review of systems negative    Objective Findings:  T(C): 36.8 (11-30-22 @ 05:40), Max: 36.9 (11-29-22 @ 21:30)  HR: 70 (11-30-22 @ 05:40) (67 - 72)  BP: 123/42 (11-30-22 @ 05:40) (94/43 - 123/42)  RR: 17 (11-30-22 @ 05:40) (17 - 19)  SpO2: 98% (11-30-22 @ 05:40) (97% - 98%)  Wt(kg): --  Daily     Daily       Physical Exam:  Gen: NAD; Patient resting comfortably  HEENT: EOMI, Normocephalic/ atraumatic  CV: RRR, normal S1 + S2, no m/r/g  Lungs:  Normal respiratory effort; clear to auscultation bilaterally  Abd: soft, non-tender; bowel sounds present  Ext: No edema;      Telemetry: n/a    Laboratory Data:                        8.9    8.39  )-----------( 348      ( 29 Nov 2022 06:10 )             30.0     11-29    137  |  94<L>  |  34<H>  ----------------------------<  105<H>  4.9   |  31  |  7.12<H>    Ca    9.1      29 Nov 2022 06:10  Phos  5.0     11-29  Mg     2.60     11-29    TPro  x   /  Alb  x   /  TBili  x   /  DBili  x   /  AST  x   /  ALT  < 5  /  AlkPhos  x   11-28              Inpatient Medications:  MEDICATIONS  (STANDING):  aspirin enteric coated 81 milliGRAM(s) Oral daily  chlorhexidine 2% Cloths 1 Application(s) Topical daily  epoetin niesha-epbx (RETACRIT) Injectable 86984 Unit(s) IV Push <User Schedule>  gabapentin 100 milliGRAM(s) Oral <User Schedule>  heparin   Injectable 5000 Unit(s) SubCutaneous every 8 hours  multivitamin 1 Tablet(s) Oral daily  sevelamer carbonate 1600 milliGRAM(s) Oral three times a day with meals  simvastatin 40 milliGRAM(s) Oral at bedtime      Assessment: 61-year-old female with ESRD on HD, HLD and peripheral vascular disease s/p lower extremity resection presents with chronic limb ischemia and L foot ulcer.     Plan of Care:    #Peripheral vascular disease-  Ms. Boogie tolerated L BKA well.   She displays no signs or symptoms of post operative coronary ischemia or acute CHF.   Echo from 3/2022 reviewed- normal LV systolic function with no significant valvular disease.   Continue current cardiac management.   Antiplatelet therapy as per the vascular team.      #Compensated diastolic CHF-  Euvolemic on exam.  Fluid removal with HD as per the renal team.   Echo from 3/2022 reviewed- normal LV systolic function with no significant valvular disease. .      #ESRD-  - HD, as per renal.    #ACP (advance care planning)-  Advanced care planning was discussed with the patient.   Cardiac findings were discussed in detail and all questions were answered. 30 additional minutes spent addressing advance care plans.      Over 25 minutes spent on total encounter; more than 50% of the visit was spent counseling and/or coordinating care by the attending physician.      Cristino Adams MD [facility]  Cardiovascular Disease  [phone]

## 2022-11-30 NOTE — PROGRESS NOTE ADULT - REASON FOR ADMISSION
toe infection

## 2022-11-30 NOTE — PROGRESS NOTE ADULT - PROVIDER SPECIALTY LIST ADULT
Cardiology
Nephrology
Vascular Surgery
Vascular Surgery
Cardiology
Cardiology
Infectious Disease
Internal Medicine
Internal Medicine
Nephrology
Vascular Surgery
Cardiology
Internal Medicine
Nephrology
Podiatry
Vascular Surgery
Infectious Disease
Internal Medicine
Nephrology
Nephrology
Podiatry
Vascular Surgery

## 2022-11-30 NOTE — PROGRESS NOTE ADULT - ASSESSMENT
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 Pt admitted for IV abx and further work up of L toe infection. Renal consulted for ESRD Mx.     ESRD on dialysis.    Maintenance HD schedule MWF  access- thigh Arterio-Venous Graft   HD unit- SQDC  K, vol acceptable    Plan  s/p HD 11/28, Rx sheet reviewed, net UF 1.1kg removed, tolerated well. uneventful.   plan for next HD Friday w/2k bath, Ultrafiltration on Dialysis as tolerated with blood pressure   renal diet , fluid restriction   dose all meds for ESRD    anemia in ckd- Hb < goal.   Anemia Labs   Hemoglobin : 8.2 < -  9.3 <--, 8.4 <--, 8.5 <--, 9.1 <--, 9.2 <--  Platelets : 279 <--, 296 <--, 288 <--    plan  will inc Retacrit 10k>14k units w/next hd- iv three times per week with hemodialysis   monitor H/H    high phos level : uncontrolled  Serum calcium mg/dl : 9.3 <--, 9.4 <--, 9.4 <--  Serum Phosphorus level mg/dl : 6.5< -7.2 <--, 4.1 <--, 5.8 <--    c/w renvela 2tidac -time w/meals, d/w pt  low phos diet/ renal diet    h/o HTN- no meds. bp low, monitor closely    L diabetic foot ulcer.   - s/p L BKA amputation. f/u w/ vas sx  - on meropenem      Stable for DC from Renal perspective    For any question, call:  Cell # [phone]  Pager # [phone]  Callback # [phone]

## 2022-11-30 NOTE — PROGRESS NOTE ADULT - SUBJECTIVE AND OBJECTIVE BOX
[facility] - TIFFANY Bazzi / TIFFANY Shea / APARNA Vanegas/ TIFFANY Mcfarlane/ TIFFANY Madden/ MASSIEL Perkins / RAINER Potts / ESTEPHANIE Griffith  ---------------------------------------------------------------------------------------------------------------  seen and examined today for ESRD  Interval : NAD  VITALS:  T(F): 98.2 (11-30-22 @ 07:08), Max: 98.5 (11-29-22 @ 21:30)  HR: 70 (11-30-22 @ 07:08)  BP: 117/52 (11-30-22 @ 07:08)  RR: 17 (11-30-22 @ 07:08)  SpO2: 98% (11-30-22 @ 05:40)  Wt(kg): --    11-29 @ 07:01  -  11-30 @ 07:00  --------------------------------------------------------  IN: 500 mL / OUT: 0 mL / NET: 500 mL      Physical Exam :-  Constitutional: NAD  Neck: Supple.  Respiratory: Bilateral equal breath sounds,  Cardiovascular: S1, S2 normal,  Gastrointestinal: Bowel Sounds present, soft, non tender.  Extremities: B/L BKA  Neurological: Alert and Oriented x 3, no focal deficits  Psychiatric: Normal mood, normal affect  Data:-  Allergies :   No Known Allergies    Hospital Medications:   MEDICATIONS  (STANDING):  aspirin enteric coated 81 milliGRAM(s) Oral daily  chlorhexidine 2% Cloths 1 Application(s) Topical daily  epoetin niesha-epbx (RETACRIT) Injectable 59268 Unit(s) IV Push <User Schedule>  gabapentin 100 milliGRAM(s) Oral <User Schedule>  heparin   Injectable 5000 Unit(s) SubCutaneous every 8 hours  multivitamin 1 Tablet(s) Oral daily  sevelamer carbonate 1600 milliGRAM(s) Oral three times a day with meals  simvastatin 40 milliGRAM(s) Oral at bedtime    11-30    138  |  93<L>  |  47<H>  ----------------------------<  71  4.7   |  25  |  8.93<H>    Ca    9.4      30 Nov 2022 07:01  Phos  6.2     11-30  Mg     2.60     11-30    TPro      /  Alb      /  TBili      /  DBili      /  AST      /  ALT  < 5  /  AlkPhos      11-28    Creatinine Trend: 8.93 <--, 7.12 <--, 10.04 <--, 8.18 <--, 6.66 <--, 9.47 <--, 7.29 <--                        8.4    7.92  )-----------( 351      ( 30 Nov 2022 07:01 )             28.2

## 2022-12-07 NOTE — ED ADULT TRIAGE NOTE - PRO INTERPRETER NEED 2
Department of Anesthesiology  Postprocedure Note    Patient: Christina Bundy  MRN: [id number]  YOB: 1982  Date of evaluation: 12/7/2022      Procedure Summary     Date: 12/07/22 Room / Location: [address]    Anesthesia Start: 1119 Anesthesia Stop: 1113    Procedure: RIGHT KNEE ARTHROSCOPY, MEDIAL MENISCECTOMY (Right: Knee) Diagnosis:       Acute medial meniscus tear of right knee, initial encounter      (RIGHT KNEE MEDIAL MENISCUS TEAR)    Surgeons: Roc Oakley MD Responsible Provider: Thomas Ann MD    Anesthesia Type: general ASA Status: 1          Anesthesia Type: No value filed.     Tomás Phase I: Tomás Score: 10    Tomás Phase II: Tomás Score: 10      Anesthesia Post Evaluation    Patient location during evaluation: PACU  Patient participation: complete - patient participated  Level of consciousness: awake  Pain score: 3  Airway patency: patent  Nausea & Vomiting: no nausea and no vomiting  Complications: no  Cardiovascular status: blood pressure returned to baseline  Respiratory status: acceptable  Hydration status: euvolemic English

## 2022-12-13 NOTE — PATIENT PROFILE ADULT - PATIENT REPRESENTATIVE: ( YOU CAN CHOOSE ANY PERSON THAT CAN ASSIST YOU WITH YOUR HEALTH CARE PREFERENCES, DOES NOT HAVE TO BE A SPOUSE, IMMEDIATE FAMILY OR SIGNIFICANT OTHER/PARTNER)
You may take Tylenol (acetaminophen) and/or Motrin (ibuprofen) with the medications that are prescribed for you, if you are permitted to take these medications. Please follow package directions for the appropriate dosing and frequency. yes

## 2022-12-22 ENCOUNTER — APPOINTMENT (OUTPATIENT)
Dept: VASCULAR SURGERY | Facility: CLINIC | Age: 62
End: 2022-12-22

## 2022-12-22 VITALS
WEIGHT: 137 LBS | TEMPERATURE: 98.6 F | HEART RATE: 71 BPM | SYSTOLIC BLOOD PRESSURE: 127 MMHG | HEIGHT: 61 IN | DIASTOLIC BLOOD PRESSURE: 69 MMHG | BODY MASS INDEX: 25.86 KG/M2

## 2022-12-22 PROCEDURE — 99024 POSTOP FOLLOW-UP VISIT: CPT

## 2022-12-22 NOTE — HISTORY OF PRESENT ILLNESS
[FreeTextEntry1] : Mrs. Boogie presents in follow up s/p LLE BKA, performed on 11/23/22. She currently resides at [facility]. Her stump is healing well and she denies any erythema, induration, fluctuance or drainage. She reports that the wound is not being cleaned daily as instructed. She is otherwise without complaints.  \par \par PMH:  \par 1) Right lower extremity angiography and angioplasty of TPT and single vessel PT runoff, performed on 11/5/21 for tissue loss (right 1st toe gangrene) (the AT and peroneal are occluded and there is small vessel disease in the foot)\par 2) Left lower extremity angiography and DCB PTA of the SFA at the adductor canal, and PTA of the AT and PT for PAD with tissue loss, performed on 12/4/2020\par 3) Left 5th toe amputation by podiatry\par 4) Right below knee amputation 6/15/22\par \par ESRD on HD via RLE AVF\par \par Medications: Aspirin, Plavix, Simvastatin, Vitamins\par \par All: NKDA\par \par SH: Non-smoker\par \par FH: NC

## 2022-12-22 NOTE — ASSESSMENT
[FreeTextEntry1] : In summary, Mrs. Boogie presents s/p left BKA. All staples were removed from the stump and steri strips were applied.\par \par Wound care instructions:\par -Wash stump daily with soap and water over steri strips, pat dry\par -Apply Kerlix and ACE bandage\par -Remove steri strips in one week\par \par She is now ready for a stump  and prosthesis. Prosthetist prescription attached.

## 2022-12-22 NOTE — PHYSICAL EXAM
[Normal Breath Sounds] : Normal breath sounds [Normal Heart Sounds] : normal heart sounds [2+] : left 2+ [de-identified] : NAD [de-identified] : WNL [FreeTextEntry1] : Right groin- AVF. Right BKA with prosthesis.\par \par Left BKA stump with staples in place. No signs of infection. \par

## 2023-01-27 ENCOUNTER — APPOINTMENT (OUTPATIENT)
Dept: VASCULAR SURGERY | Facility: CLINIC | Age: 63
End: 2023-01-27
Payer: MEDICARE

## 2023-01-27 VITALS
BODY MASS INDEX: 25.86 KG/M2 | HEART RATE: 75 BPM | SYSTOLIC BLOOD PRESSURE: 149 MMHG | TEMPERATURE: 98 F | HEIGHT: 61 IN | DIASTOLIC BLOOD PRESSURE: 79 MMHG | WEIGHT: 137 LBS

## 2023-01-27 VITALS — DIASTOLIC BLOOD PRESSURE: 91 MMHG | HEART RATE: 75 BPM | SYSTOLIC BLOOD PRESSURE: 156 MMHG

## 2023-01-27 DIAGNOSIS — Z89.519 ACQUIRED ABSENCE OF UNSPECIFIED LEG BELOW KNEE: ICD-10-CM

## 2023-01-27 PROCEDURE — 11042 DBRDMT SUBQ TIS 1ST 20SQCM/<: CPT | Mod: 58

## 2023-01-27 NOTE — PHYSICAL EXAM
[JVD] : no jugular venous distention  [Respiratory Effort] : normal respiratory effort [Normal Heart Sounds] : normal heart sounds [de-identified] : awake, alert [FreeTextEntry1] : R groin avf\par L BKA stump wound, thick fibrinous slough.

## 2023-01-27 NOTE — HISTORY OF PRESENT ILLNESS
[FreeTextEntry1] : Mrs. Boogie presents in follow up s/p LLE BKA, performed on 11/23/22. She currently resides at [facility]. Her stump is healing well and she denies any erythema, induration, fluctuance or drainage. She reports that the wound is not being cleaned daily as instructed. She is otherwise without complaints. \par \par PMH: \par 1) Right lower extremity angiography and angioplasty of TPT and single vessel PT runoff, performed on 11/5/21 for tissue loss (right 1st toe gangrene) (the AT and peroneal are occluded and there is small vessel disease in the foot)\par 2) Left lower extremity angiography and DCB PTA of the SFA at the adductor canal, and PTA of the AT and PT for PAD with tissue loss, performed on 12/4/2020\par 3) Left 5th toe amputation by podiatry\par 4) Right below knee amputation 6/15/22\par \par ESRD on HD via RLE AVF\par \par Medications: Aspirin, Plavix, Simvastatin, Vitamins\par \par All: NKDA\par \par SH: Non-smoker\par \par FH: NC  [de-identified] : 1/27/23: pt noticed wound deveoping on the L bka stump incision.  No pain, no fevers.  no foul smell or increased drainage.

## 2023-01-27 NOTE — PROCEDURE
[FreeTextEntry1] : betadine to cleanse wound sharp excisional debridement, including skin and soft tissue, 2x2 cm.

## 2023-01-27 NOTE — ASSESSMENT
[FreeTextEntry1] : rec santyl to wound\par no signs infection, but need to monitor closely for any changes\par pt to return tues for wound check, poss additional debridment

## 2023-01-31 ENCOUNTER — APPOINTMENT (OUTPATIENT)
Dept: VASCULAR SURGERY | Facility: CLINIC | Age: 63
End: 2023-01-31
Payer: MEDICARE

## 2023-01-31 VITALS
TEMPERATURE: 98.8 F | SYSTOLIC BLOOD PRESSURE: 157 MMHG | HEART RATE: 89 BPM | DIASTOLIC BLOOD PRESSURE: 90 MMHG | WEIGHT: 137 LBS | BODY MASS INDEX: 25.86 KG/M2 | HEIGHT: 61 IN

## 2023-01-31 PROCEDURE — 99024 POSTOP FOLLOW-UP VISIT: CPT

## 2023-01-31 NOTE — PHYSICAL EXAM
[Normal Breath Sounds] : Normal breath sounds [Normal Heart Sounds] : normal heart sounds [2+] : left 2+ [de-identified] : NAD [de-identified] : WNL [FreeTextEntry1] : Right groin- AVF. Right BKA with prosthesis.\par \par Left BKA stump with 2.5 x 1cm anterior open wound with fibrinous base. No erythema, induration, fluctuance or drainage. \par

## 2023-01-31 NOTE — HISTORY OF PRESENT ILLNESS
[FreeTextEntry1] : Mrs. Boogie presents in follow up s/p LLE BKA, performed on 11/23/22. She currently resides at [facility]. Her stump is healing well and she denies any erythema, induration, fluctuance or drainage. She reports that the wound is not being cleaned daily as instructed. She is otherwise without complaints.  \par \par PMH:  \par 1) Right lower extremity angiography and angioplasty of TPT and single vessel PT runoff, performed on 11/5/21 for tissue loss (right 1st toe gangrene) (the AT and peroneal are occluded and there is small vessel disease in the foot)\par 2) Left lower extremity angiography and DCB PTA of the SFA at the adductor canal, and PTA of the AT and PT for PAD with tissue loss, performed on 12/4/2020\par 3) Left 5th toe amputation by podiatry\par 4) Right below knee amputation 6/15/22\par \par ESRD on HD via RLE AVF\par \par Medications: Aspirin, Plavix, Simvastatin, Midodrine, Neurontin, Omeprazole, Vitamins\par \par All: NKDA\par \par SH: Non-smoker\par \par FH: NC [de-identified] : Since her last visit, Mrs. Boogie developed dehiscence of the anterior aspect of her left BKA stump with a fibrinous base. This was debrided by Dr. Kyle last week. She reports that it is being washed daily with soap and water and Santyl is being applied. She has a cough but is otherwise without complaints. She denies constitutional symptoms. She resides at [facility] for rehabilitation.

## 2023-01-31 NOTE — ASSESSMENT
[FreeTextEntry1] : In summary, Mrs. Boogie presents s/p left BKA with wound dehiscence. The area was excisionally debrided of fibrinous soft tissue. Santyl was applied, followed by gauze, kerlix and ACE bandage. Please continue to wash daily with soap and water and apply dressing as described above. She will follow up in one week for additional debridement.

## 2023-02-06 ENCOUNTER — APPOINTMENT (OUTPATIENT)
Dept: VASCULAR SURGERY | Facility: CLINIC | Age: 63
End: 2023-02-06
Payer: MEDICARE

## 2023-02-06 VITALS
WEIGHT: 137 LBS | HEART RATE: 77 BPM | HEIGHT: 71 IN | TEMPERATURE: 98.2 F | BODY MASS INDEX: 19.18 KG/M2 | SYSTOLIC BLOOD PRESSURE: 160 MMHG | DIASTOLIC BLOOD PRESSURE: 68 MMHG

## 2023-02-06 PROCEDURE — 99024 POSTOP FOLLOW-UP VISIT: CPT

## 2023-02-06 NOTE — PHYSICAL EXAM
[Normal Breath Sounds] : Normal breath sounds [Normal Heart Sounds] : normal heart sounds [2+] : left 2+ [de-identified] : NAD [de-identified] : WNL [FreeTextEntry1] : Right groin- AVF. Right BKA with prosthesis.\par \par Left BKA stump with 3 x 1cm anterior open wound with fibrinous base. No erythema, induration, fluctuance or drainage. Wound is larger in surface area however more shallow than on last exam. \par

## 2023-02-06 NOTE — HISTORY OF PRESENT ILLNESS
[FreeTextEntry1] : Mrs. Boogie presents in follow up s/p LLE BKA, performed on 11/23/22. She currently resides at [facility]. Her stump is healing well and she denies any erythema, induration, fluctuance or drainage. She reports that the wound is not being cleaned daily as instructed. She is otherwise without complaints.  \par \par PMH:  \par 1) Right lower extremity angiography and angioplasty of TPT and single vessel PT runoff, performed on 11/5/21 for tissue loss (right 1st toe gangrene) (the AT and peroneal are occluded and there is small vessel disease in the foot)\par 2) Left lower extremity angiography and DCB PTA of the SFA at the adductor canal, and PTA of the AT and PT for PAD with tissue loss, performed on 12/4/2020\par 3) Left 5th toe amputation by podiatry\par 4) Right below knee amputation 6/15/22\par \par 1/27/23 Left BKA stump debrided, Santyl and compressive dressing applied by Dr. Kyle. \par 1/31/23 Left BKA stump debrided, Santyl and compressive dressing applied. \par \par ESRD on HD via RLE AVF\par \par Medications: Aspirin, Plavix, Simvastatin, Midodrine, Singulair, Inhalers, Neurontin, Omeprazole, Vitamins\par \par All: NKDA\par \par SH: Non-smoker\par \par FH: NC [de-identified] : Mrs. Boogie returns for left BKA stump wound assessment. She denies any erythema, drainage or induration. She is otherwise without complaints.

## 2023-02-06 NOTE — ASSESSMENT
[FreeTextEntry1] : In summary, Mrs. Boogie presents s/p left BKA with wound dehiscence. The area was excisionally debrided of fibrinous soft tissue. Santyl was applied, followed by gauze, kerlix and ACE bandage. \par \par At this point I would like to transition her to a wound vac. \par \par Wound care instructions:\par -Wound care vac change three times per week at [facility] \par -Wash wound with soap/water prior to dressing change\par -Apply black vac sponge in wound. ENSURE THAT THE SPONGE IS SMALLER THAN THE WOUND. Apply to suction at 125mmHg\par \par She will follow up in one month for assessment. She will only continue with prosthetic application to left BKA once the wound heals completely. \par \par

## 2023-03-13 ENCOUNTER — APPOINTMENT (OUTPATIENT)
Dept: VASCULAR SURGERY | Facility: CLINIC | Age: 63
End: 2023-03-13
Payer: MEDICARE

## 2023-03-13 VITALS
HEART RATE: 71 BPM | HEIGHT: 71 IN | SYSTOLIC BLOOD PRESSURE: 160 MMHG | WEIGHT: 137 LBS | BODY MASS INDEX: 19.18 KG/M2 | TEMPERATURE: 97.6 F | DIASTOLIC BLOOD PRESSURE: 64 MMHG

## 2023-03-13 PROCEDURE — 11042 DBRDMT SUBQ TIS 1ST 20SQCM/<: CPT

## 2023-03-13 PROCEDURE — 99213 OFFICE O/P EST LOW 20 MIN: CPT | Mod: 25

## 2023-03-13 NOTE — HISTORY OF PRESENT ILLNESS
[FreeTextEntry1] : Mrs. Boogie presents in follow up s/p LLE BKA, performed on 11/23/22. She currently resides at [facility]. Her stump is healing well and she denies any erythema, induration, fluctuance or drainage. She reports that the wound is not being cleaned daily as instructed. She is otherwise without complaints.  \par \par PMH:  \par 1) Right lower extremity angiography and angioplasty of TPT and single vessel PT runoff, performed on 11/5/21 for tissue loss (right 1st toe gangrene) (the AT and peroneal are occluded and there is small vessel disease in the foot)\par 2) Left lower extremity angiography and DCB PTA of the SFA at the adductor canal, and PTA of the AT and PT for PAD with tissue loss, performed on 12/4/2020\par 3) Left 5th toe amputation by podiatry\par 4) Right below knee amputation 6/15/22\par \par 1/27/23 Left BKA stump debrided, Santyl and compressive dressing applied by Dr. Kyle. \par 1/31/23 Left BKA stump debrided, Santyl and compressive dressing applied. \par \par ESRD on HD via RLE AVF\par \par Medications: Aspirin, Plavix, Simvastatin, Midodrine, Singulair, Inhalers, Neurontin, Omeprazole, Vitamins\par \par All: NKDA\par \par SH: Non-smoker\par \par FH: NC [de-identified] : 1/31/23Since her last visit, Mrs. Boogie developed dehiscence of the anterior aspect of her left BKA stump with a fibrinous base. This was debrided by Dr. Kyle last week. She reports that it is being washed daily with soap and water and Santyl is being applied. She has a cough but is otherwise without complaints. She denies constitutional symptoms. She resides at [facility] for rehabilitation. \par \par 2/6/23: Mrs. Boogie returns for left BKA stump wound assessment. She denies any erythema, drainage or induration. She is otherwise without complaints. \par \par 3/13/23: Since her last visit, Mrs. Boogie left BKA stump wound has enlarged in size. She reports that despite my clear orders to place a wound vac, this was not done by her facility because reportedly "they did not think it was necessary." Xeroform is being applied to the wound, which is macerating the ulcera and surrounding skin.

## 2023-03-13 NOTE — PHYSICAL EXAM
[Normal Breath Sounds] : Normal breath sounds [Normal Heart Sounds] : normal heart sounds [2+] : left 2+ [de-identified] : NAD [de-identified] : WNL [FreeTextEntry1] : Right groin- AVF. Right BKA with prosthesis.\par \par Left BKA stump with 3 x 1cm anterior open wound with fibrinous base. No erythema, induration, fluctuance or drainage. Wound is larger in surface area however more shallow than on last exam. \par

## 2023-03-13 NOTE — ASSESSMENT
[FreeTextEntry1] : In summary, Mrs. Boogie presents s/p left BKA with wound dehiscence. The area was excisionally debrided of fibrinous soft tissue. My wound care instructions are not being followed properly. Because facility is not following vac instructions for placement, wound should be dressed as follows:\par \par -Wash wound with soap/water prior to dressing change\par -Apply Telfa, followed by gauze, kerlix and ACE bandage. \par \par She will follow up in two weeks for assessment. She will only continue with prosthetic application to left BKA once the wound heals completely. \par \par  \par

## 2023-03-27 ENCOUNTER — APPOINTMENT (OUTPATIENT)
Dept: VASCULAR SURGERY | Facility: CLINIC | Age: 63
End: 2023-03-27
Payer: MEDICARE

## 2023-03-27 VITALS
WEIGHT: 137 LBS | HEART RATE: 73 BPM | SYSTOLIC BLOOD PRESSURE: 117 MMHG | BODY MASS INDEX: 19.18 KG/M2 | DIASTOLIC BLOOD PRESSURE: 58 MMHG | HEIGHT: 71 IN

## 2023-03-27 VITALS — HEART RATE: 74 BPM | TEMPERATURE: 98.1 F | SYSTOLIC BLOOD PRESSURE: 136 MMHG | DIASTOLIC BLOOD PRESSURE: 72 MMHG

## 2023-03-27 PROCEDURE — 99213 OFFICE O/P EST LOW 20 MIN: CPT

## 2023-03-27 NOTE — PHYSICAL EXAM
[Normal Breath Sounds] : Normal breath sounds [Normal Heart Sounds] : normal heart sounds [2+] : left 2+ [de-identified] : NAD [de-identified] : WNL [FreeTextEntry1] : Right groin- AVF. Right BKA with prosthesis.\par \par Left BKA stump with 3 x 1cm anterior open wound with granulation tissue at base. No erythema, induration, fluctuance or drainage. \par

## 2023-03-27 NOTE — ASSESSMENT
[FreeTextEntry1] : In summary, Mrs. Boogie presents s/p left BKA with wound dehiscence. The wound should be dressed as follows:\par \par -Wash wound with soap/water daily prior to dressing change\par -Apply Telfa, followed by gauze, kerlix and ACE bandage. \par \par She will follow up in one month for assessment. She will only continue with prosthetic application to left BKA once the wound heals completely. \par \par  \par

## 2023-03-27 NOTE — HISTORY OF PRESENT ILLNESS
[FreeTextEntry1] : Mrs. Boogie presents in follow up s/p LLE BKA, performed on 11/23/22. She currently resides at [facility]. Her stump is healing well and she denies any erythema, induration, fluctuance or drainage. She reports that the wound is not being cleaned daily as instructed. She is otherwise without complaints.  \par \par PMH:  \par 1) Right lower extremity angiography and angioplasty of TPT and single vessel PT runoff, performed on 11/5/21 for tissue loss (right 1st toe gangrene) (the AT and peroneal are occluded and there is small vessel disease in the foot)\par 2) Left lower extremity angiography and DCB PTA of the SFA at the adductor canal, and PTA of the AT and PT for PAD with tissue loss, performed on 12/4/2020\par 3) Left 5th toe amputation by podiatry\par 4) Right below knee amputation 6/15/22\par \par 1/27/23 Left BKA stump debrided, Santyl and compressive dressing applied by Dr. Kyle. \par 1/31/23 Left BKA stump debrided, Santyl and compressive dressing applied. \par \par ESRD on HD via RLE AVF\par \par Medications: Aspirin, Plavix, Simvastatin, Midodrine, Singulair, Inhalers, Neurontin, Omeprazole, Vitamins\par \par All: NKDA\par \par SH: Non-smoker\par \par FH: NC [de-identified] : Since her last visit, Mrs. Boogie is doing well. She presents today for left BKA stump wound check. She denies any constitutional symptoms, drainage, erythema or induration. Wound care instructions are being followed. The wound is granulating from below. She is otherwise without complaints.

## 2023-04-03 NOTE — PATIENT PROFILE ADULT - NSPROPTRIGHTNOTIFY_GEN_A_NUR
Cindy Contreras presents to clinic today at the request of Idania Herrera MD (ordering provider) for Allergy Immunotherapy injection(s).       This service provided today was under the care of Idania Herrera MD; the supervising provider of the day; who was available if needed.      Patient presented after waiting 30 minutes with no reaction to  injections. Discharged from clinic.    Belle Ortiz RN      
yes

## 2023-04-06 NOTE — DISCHARGE NOTE ADULT - NS MD DC FALL RISK RISK
oriented to person, place and time For information on Fall & Injury Prevention, visit [email or web address]

## 2023-05-01 ENCOUNTER — APPOINTMENT (OUTPATIENT)
Dept: VASCULAR SURGERY | Facility: CLINIC | Age: 63
End: 2023-05-01
Payer: MEDICARE

## 2023-05-01 VITALS
HEIGHT: 71 IN | BODY MASS INDEX: 19.18 KG/M2 | WEIGHT: 137 LBS | HEART RATE: 76 BPM | DIASTOLIC BLOOD PRESSURE: 62 MMHG | SYSTOLIC BLOOD PRESSURE: 156 MMHG | TEMPERATURE: 98 F

## 2023-05-01 PROCEDURE — 99213 OFFICE O/P EST LOW 20 MIN: CPT

## 2023-05-01 NOTE — PHYSICAL EXAM
[Normal Breath Sounds] : Normal breath sounds [Normal Heart Sounds] : normal heart sounds [2+] : left 2+ [de-identified] : NAD [de-identified] : WNL [FreeTextEntry1] : Right groin- AVF. Right BKA with prosthesis. No wounds present.\par \par Left BKA stump with 1 x 1cm anterior wound with scab, almost healed.

## 2023-05-01 NOTE — HISTORY OF PRESENT ILLNESS
[FreeTextEntry1] : Mrs. Boogie presents in follow up s/p LLE BKA, performed on 11/23/22. She currently resides at [facility]. Her stump is healing well and she denies any erythema, induration, fluctuance or drainage. She reports that the wound is not being cleaned daily as instructed. She is otherwise without complaints.  \par \par PMH:  \par 1) Right lower extremity angiography and angioplasty of TPT and single vessel PT runoff, performed on 11/5/21 for tissue loss (right 1st toe gangrene) (the AT and peroneal are occluded and there is small vessel disease in the foot)\par 2) Left lower extremity angiography and DCB PTA of the SFA at the adductor canal, and PTA of the AT and PT for PAD with tissue loss, performed on 12/4/2020\par 3) Left 5th toe amputation by podiatry\par 4) Right below knee amputation 6/15/22\par \par 1/27/23 Left BKA stump debrided, Santyl and compressive dressing applied by Dr. Kyle. \par 1/31/23 Left BKA stump debrided, Santyl and compressive dressing applied. \par \par ESRD on HD via RLE AVF\par \par Medications: Aspirin, Plavix, Simvastatin, Midodrine, Singulair, Inhalers, Neurontin, Omeprazole, Vitamins\par \par All: NKDA\par \par SH: Non-smoker\par \par FH: NC [de-identified] : Since her last visit, Mrs. Boogie is doing well. She presents today for left BKA stump wound check. She denies any constitutional symptoms, drainage, erythema or induration. The wound has scabbed over and is almost healed. The right BKA stump is well healed and she is using a prosthesis.

## 2023-05-01 NOTE — ASSESSMENT
[FreeTextEntry1] : In summary, Mrs. Boogie presents s/p left BKA with wound dehiscence. The wound is almost healed. It should be cared for as follows:\par \par -Wash wound with soap/water \par -Leave wound open to air\par \par She will follow up in two weeks for assessment. She will only continue with prosthetic application to left BKA once the wound heals completely. \par \par

## 2023-05-15 ENCOUNTER — APPOINTMENT (OUTPATIENT)
Dept: VASCULAR SURGERY | Facility: CLINIC | Age: 63
End: 2023-05-15
Payer: MEDICARE

## 2023-05-15 VITALS
TEMPERATURE: 97.6 F | SYSTOLIC BLOOD PRESSURE: 152 MMHG | WEIGHT: 146 LBS | HEIGHT: 62 IN | DIASTOLIC BLOOD PRESSURE: 60 MMHG | HEART RATE: 74 BPM | BODY MASS INDEX: 26.87 KG/M2

## 2023-05-15 PROCEDURE — 99213 OFFICE O/P EST LOW 20 MIN: CPT

## 2023-05-30 NOTE — PHYSICAL EXAM
[Normal Breath Sounds] : Normal breath sounds [Normal Heart Sounds] : normal heart sounds [2+] : left 2+ [de-identified] : NAD [de-identified] : WNL [FreeTextEntry1] : Right groin- AVF. Right BKA with prosthesis. No wounds present.\par \par Left BKA stump with healed wound and dry scab.

## 2023-05-30 NOTE — ASSESSMENT
[FreeTextEntry1] : In summary, Mrs. Boogie presents s/p left BKA with wound dehiscence. The wound is now healed. It should be cared for as follows:\par \par -Wash stump daily with soap/water \par -Leave wound open to air\par \par Patient may now resume use of stump  and may be fit for prosthesis. Rx for prosthetist was provided.\par

## 2023-05-30 NOTE — ADDENDUM
[FreeTextEntry1] : s/p right below knee amputation in 2022 and recent left below knee amputation due to complications from PVD and Diabetes\par \par Patient’s left residual limb is healed and he is ready to be fit with her left below knee prosthesis\par \par Since being fit with her right below knee prosthesis, patient has gained 30 pounds\par \par She is unable to don the prosthesis despite recent adjustments and the socket requires replacement \par \par Patient’s foot on her right below knee prosthesis is no longer appropriately weight-rated; the foot is too soft and she presents with instability and is it at risk for falls\par \par The right prosthetic foot requires replacement following patient’s 30-lb weight-gain\par \par MMT:\par left lower extremity:	Hip flexion 4/5, hip extension 4/5, knee flexion 4/5, knee extension 4/5\par right lower extremity:  Hip flexion 4/5, Hip extension 4/5, knee flexion 4/5, knee extension 4/5, \par \par Patient is cognitively intact and highly motivated to maintain ambulation as a bilateral amputee\par \par She lives  in a private home and there are 11 stairs she is required to navigate \par \par Patient is a retired  who states her job responsibilities required her to be on her feet most of the day\par \par She notes prior to her amputations, she was independent with her ADLs, completed shopping, laundry, cooking, cleaning and travelled\par \par She demonstrates both the physical ability and medical necessity consistent with K-2 level ambulation at this time; she should be re-assed in 3-6 months as he has the ability to progress to a K3 ambulator\par \par Patient’s prosthesis will require a combination of gel socket insert and flexible inner socket/rigid frame design prosthesis (so that the rigid frame can be fenestrated and the flexible inner socket heated and pushed through the fenestrations in the frame) to relieve these bony prominences and permit the patient to walk farther and for longer periods of time without skin breakdown or excessive pressures.\par Patient’s prosthesis must include a protective cover to protect the contralateral limb from injury, especially because she is Diabetic\par The rigid frame of the prosthesis must be constructed using ultralight materials (carbon fiber) laminated with acrylic resin.  This construction (method and materials) will provide increased strength to the external frame, as the socket will be fenestrated to reduce pressure on the residual limb\par \par \par

## 2023-05-30 NOTE — HISTORY OF PRESENT ILLNESS
[FreeTextEntry1] : Mrs. Boogie presents in follow up s/p LLE BKA, performed on 11/23/22. She currently resides at [facility]. Her stump is healing well and she denies any erythema, induration, fluctuance or drainage. She reports that the wound is not being cleaned daily as instructed. She is otherwise without complaints.  \par \par PMH:  \par 1) Right lower extremity angiography and angioplasty of TPT and single vessel PT runoff, performed on 11/5/21 for tissue loss (right 1st toe gangrene) (the AT and peroneal are occluded and there is small vessel disease in the foot)\par 2) Left lower extremity angiography and DCB PTA of the SFA at the adductor canal, and PTA of the AT and PT for PAD with tissue loss, performed on 12/4/2020\par 3) Left 5th toe amputation by podiatry\par 4) Right below knee amputation 6/15/22\par \par 1/27/23 Left BKA stump debrided, Santyl and compressive dressing applied by Dr. Kyle. \par 1/31/23 Left BKA stump debrided, Santyl and compressive dressing applied. \par \par ESRD on HD via RLE AVF\par \par Medications: Aspirin, Plavix, Simvastatin, Midodrine, Singulair, Inhalers, Neurontin, Omeprazole, Vitamins\par \par All: NKDA\par \par SH: Non-smoker\par \par FH: NC [de-identified] : Since her last visit, Mrs. Boogie' left BKA has healed completely. She is without new complaints.

## 2023-07-05 ENCOUNTER — TRANSCRIPTION ENCOUNTER (OUTPATIENT)
Age: 63
End: 2023-07-05

## 2023-07-05 ENCOUNTER — INPATIENT (INPATIENT)
Facility: HOSPITAL | Age: 63
LOS: 0 days | Discharge: ANOTHER HOSPITAL | End: 2023-07-06
Attending: INTERNAL MEDICINE | Admitting: INTERNAL MEDICINE
Payer: MEDICARE

## 2023-07-05 VITALS
HEART RATE: 73 BPM | SYSTOLIC BLOOD PRESSURE: 157 MMHG | RESPIRATION RATE: 18 BRPM | OXYGEN SATURATION: 97 % | TEMPERATURE: 99 F | DIASTOLIC BLOOD PRESSURE: 66 MMHG

## 2023-07-05 DIAGNOSIS — Z99.2 DEPENDENCE ON RENAL DIALYSIS: Chronic | ICD-10-CM

## 2023-07-05 DIAGNOSIS — Z98.890 OTHER SPECIFIED POSTPROCEDURAL STATES: Chronic | ICD-10-CM

## 2023-07-05 DIAGNOSIS — I21.4 NON-ST ELEVATION (NSTEMI) MYOCARDIAL INFARCTION: ICD-10-CM

## 2023-07-05 DIAGNOSIS — M86.171 OTHER ACUTE OSTEOMYELITIS, RIGHT ANKLE AND FOOT: Chronic | ICD-10-CM

## 2023-07-05 LAB
ANION GAP SERPL CALC-SCNC: 22 MMOL/L — HIGH (ref 7–14)
APTT BLD: 31.2 SEC — SIGNIFICANT CHANGE UP (ref 27–36.3)
BUN SERPL-MCNC: 82 MG/DL — HIGH (ref 7–23)
CALCIUM SERPL-MCNC: 9.7 MG/DL — SIGNIFICANT CHANGE UP (ref 8.4–10.5)
CHLORIDE SERPL-SCNC: 96 MMOL/L — LOW (ref 98–107)
CO2 SERPL-SCNC: 21 MMOL/L — LOW (ref 22–31)
CREAT SERPL-MCNC: 9.64 MG/DL — HIGH (ref 0.5–1.3)
DIALYSIS INSTRUMENT RESULT - HEPATITIS B SURFACE ANTIGEN: NEGATIVE — SIGNIFICANT CHANGE UP
EGFR: 4 ML/MIN/1.73M2 — LOW
GLUCOSE BLDC GLUCOMTR-MCNC: 146 MG/DL — HIGH (ref 70–99)
GLUCOSE BLDC GLUCOMTR-MCNC: 68 MG/DL — LOW (ref 70–99)
GLUCOSE BLDC GLUCOMTR-MCNC: 73 MG/DL — SIGNIFICANT CHANGE UP (ref 70–99)
GLUCOSE BLDC GLUCOMTR-MCNC: 78 MG/DL — SIGNIFICANT CHANGE UP (ref 70–99)
GLUCOSE SERPL-MCNC: 85 MG/DL — SIGNIFICANT CHANGE UP (ref 70–99)
HCT VFR BLD CALC: 28.5 % — LOW (ref 34.5–45)
HGB BLD-MCNC: 9 G/DL — LOW (ref 11.5–15.5)
INR BLD: 1.12 RATIO — SIGNIFICANT CHANGE UP (ref 0.88–1.16)
MAGNESIUM SERPL-MCNC: 2.3 MG/DL — SIGNIFICANT CHANGE UP (ref 1.6–2.6)
MCHC RBC-ENTMCNC: 28.8 PG — SIGNIFICANT CHANGE UP (ref 27–34)
MCHC RBC-ENTMCNC: 31.6 GM/DL — LOW (ref 32–36)
MCV RBC AUTO: 91.1 FL — SIGNIFICANT CHANGE UP (ref 80–100)
NRBC # BLD: 0 /100 WBCS — SIGNIFICANT CHANGE UP (ref 0–0)
NRBC # FLD: 0 K/UL — SIGNIFICANT CHANGE UP (ref 0–0)
PLATELET # BLD AUTO: 147 K/UL — LOW (ref 150–400)
POTASSIUM SERPL-MCNC: 5.4 MMOL/L — HIGH (ref 3.5–5.3)
POTASSIUM SERPL-SCNC: 5.4 MMOL/L — HIGH (ref 3.5–5.3)
PROTHROM AB SERPL-ACNC: 13 SEC — SIGNIFICANT CHANGE UP (ref 10.5–13.4)
RBC # BLD: 3.13 M/UL — LOW (ref 3.8–5.2)
RBC # FLD: 13.4 % — SIGNIFICANT CHANGE UP (ref 10.3–14.5)
SODIUM SERPL-SCNC: 139 MMOL/L — SIGNIFICANT CHANGE UP (ref 135–145)
WBC # BLD: 7.03 K/UL — SIGNIFICANT CHANGE UP (ref 3.8–10.5)
WBC # FLD AUTO: 7.03 K/UL — SIGNIFICANT CHANGE UP (ref 3.8–10.5)

## 2023-07-05 PROCEDURE — 93010 ELECTROCARDIOGRAM REPORT: CPT

## 2023-07-05 RX ORDER — SODIUM CHLORIDE 9 MG/ML
1000 INJECTION, SOLUTION INTRAVENOUS
Refills: 0 | Status: DISCONTINUED | OUTPATIENT
Start: 2023-07-05 | End: 2023-07-06

## 2023-07-05 RX ORDER — SODIUM CHLORIDE 9 MG/ML
100 INJECTION INTRAMUSCULAR; INTRAVENOUS; SUBCUTANEOUS
Refills: 0 | Status: DISCONTINUED | OUTPATIENT
Start: 2023-07-05 | End: 2023-07-06

## 2023-07-05 RX ORDER — GLUCAGON INJECTION, SOLUTION 0.5 MG/.1ML
1 INJECTION, SOLUTION SUBCUTANEOUS ONCE
Refills: 0 | Status: DISCONTINUED | OUTPATIENT
Start: 2023-07-05 | End: 2023-07-06

## 2023-07-05 RX ORDER — DEXTROSE 50 % IN WATER 50 %
12.5 SYRINGE (ML) INTRAVENOUS ONCE
Refills: 0 | Status: DISCONTINUED | OUTPATIENT
Start: 2023-07-05 | End: 2023-07-06

## 2023-07-05 RX ORDER — CALCIUM ACETATE 667 MG
1 TABLET ORAL
Qty: 0 | Refills: 0 | DISCHARGE
Start: 2023-07-05

## 2023-07-05 RX ORDER — METOPROLOL TARTRATE 50 MG
1 TABLET ORAL
Refills: 0 | DISCHARGE

## 2023-07-05 RX ORDER — ERYTHROPOIETIN 10000 [IU]/ML
10000 INJECTION, SOLUTION INTRAVENOUS; SUBCUTANEOUS
Refills: 0 | Status: DISCONTINUED | OUTPATIENT
Start: 2023-07-05 | End: 2023-07-05

## 2023-07-05 RX ORDER — DEXTROSE 50 % IN WATER 50 %
25 SYRINGE (ML) INTRAVENOUS ONCE
Refills: 0 | Status: DISCONTINUED | OUTPATIENT
Start: 2023-07-05 | End: 2023-07-06

## 2023-07-05 RX ORDER — ASPIRIN/CALCIUM CARB/MAGNESIUM 324 MG
1 TABLET ORAL
Qty: 0 | Refills: 0 | DISCHARGE
Start: 2023-07-05

## 2023-07-05 RX ORDER — ERYTHROPOIETIN 10000 [IU]/ML
10000 INJECTION, SOLUTION INTRAVENOUS; SUBCUTANEOUS
Refills: 0 | Status: DISCONTINUED | OUTPATIENT
Start: 2023-07-05 | End: 2023-07-06

## 2023-07-05 RX ORDER — DEXTROSE 50 % IN WATER 50 %
15 SYRINGE (ML) INTRAVENOUS ONCE
Refills: 0 | Status: DISCONTINUED | OUTPATIENT
Start: 2023-07-05 | End: 2023-07-06

## 2023-07-05 RX ORDER — METOPROLOL TARTRATE 50 MG
1 TABLET ORAL
Qty: 0 | Refills: 0 | DISCHARGE
Start: 2023-07-05

## 2023-07-05 RX ORDER — ATORVASTATIN CALCIUM 80 MG/1
1 TABLET, FILM COATED ORAL
Refills: 0 | DISCHARGE

## 2023-07-05 RX ORDER — ATORVASTATIN CALCIUM 80 MG/1
1 TABLET, FILM COATED ORAL
Qty: 0 | Refills: 0 | DISCHARGE
Start: 2023-07-05

## 2023-07-05 RX ORDER — CLOPIDOGREL BISULFATE 75 MG/1
1 TABLET, FILM COATED ORAL
Refills: 0 | DISCHARGE

## 2023-07-05 RX ORDER — CLOPIDOGREL BISULFATE 75 MG/1
1 TABLET, FILM COATED ORAL
Qty: 0 | Refills: 0 | DISCHARGE

## 2023-07-05 RX ORDER — CALCIUM ACETATE 667 MG
667 TABLET ORAL
Refills: 0 | Status: DISCONTINUED | OUTPATIENT
Start: 2023-07-05 | End: 2023-07-06

## 2023-07-05 RX ORDER — CHLORHEXIDINE GLUCONATE 213 G/1000ML
1 SOLUTION TOPICAL DAILY
Refills: 0 | Status: DISCONTINUED | OUTPATIENT
Start: 2023-07-05 | End: 2023-07-06

## 2023-07-05 RX ORDER — DOCUSATE SODIUM 100 MG
1 CAPSULE ORAL
Refills: 0 | DISCHARGE

## 2023-07-05 RX ORDER — ATORVASTATIN CALCIUM 80 MG/1
40 TABLET, FILM COATED ORAL AT BEDTIME
Refills: 0 | Status: DISCONTINUED | OUTPATIENT
Start: 2023-07-05 | End: 2023-07-06

## 2023-07-05 RX ORDER — OXYCODONE HYDROCHLORIDE 5 MG/1
1 TABLET ORAL
Qty: 0 | Refills: 0 | DISCHARGE

## 2023-07-05 RX ORDER — ASPIRIN/CALCIUM CARB/MAGNESIUM 324 MG
81 TABLET ORAL DAILY
Refills: 0 | Status: DISCONTINUED | OUTPATIENT
Start: 2023-07-05 | End: 2023-07-06

## 2023-07-05 RX ORDER — METOPROLOL TARTRATE 50 MG
25 TABLET ORAL EVERY 12 HOURS
Refills: 0 | Status: DISCONTINUED | OUTPATIENT
Start: 2023-07-05 | End: 2023-07-06

## 2023-07-05 RX ORDER — CALCIUM ACETATE 667 MG
1 TABLET ORAL
Refills: 0 | DISCHARGE

## 2023-07-05 RX ADMIN — ERYTHROPOIETIN 10000 UNIT(S): 10000 INJECTION, SOLUTION INTRAVENOUS; SUBCUTANEOUS at 23:19

## 2023-07-05 RX ADMIN — ATORVASTATIN CALCIUM 40 MILLIGRAM(S): 80 TABLET, FILM COATED ORAL at 22:14

## 2023-07-05 RX ADMIN — Medication 1 TABLET(S): at 22:14

## 2023-07-05 NOTE — DISCHARGE NOTE PROVIDER - NSDCCPCAREPLAN_GEN_ALL_CORE_FT
PRINCIPAL DISCHARGE DIAGNOSIS  Diagnosis: Shortness of breath  Assessment and Plan of Treatment: Pt transferred to [facility] for cath. Underwent [facility] 7/5 transferred to [facility] for CT surgery consult with Dr. Alma Mabry.

## 2023-07-05 NOTE — DISCHARGE NOTE PROVIDER - NSDCMRMEDTOKEN_GEN_ALL_CORE_FT
aspirin 81 mg oral delayed release tablet: 1 tab(s) orally once a day  atorvastatin 40 mg oral tablet: 1 tab(s) orally once a day (at bedtime)  calcium acetate 667 mg oral tablet: 1 tab(s) orally 3 times a day  metoprolol tartrate 25 mg oral tablet: 1 tab(s) orally every 12 hours  Multiple Vitamins oral tablet: 1 tab(s) orally once a day   acetaminophen 325 mg oral tablet: 2 tab(s) orally every 6 hours As needed Temp greater or equal to 38C (100.4F), Mild Pain (1 - 3), Moderate Pain (4 - 6)  aspirin 81 mg oral delayed release tablet: 1 tab(s) orally once a day  atorvastatin 40 mg oral tablet: 1 tab(s) orally once a day (at bedtime)  calcium acetate 667 mg oral tablet: 1 tab(s) orally 3 times a day  metoprolol tartrate 25 mg oral tablet: 1 tab(s) orally every 12 hours  Multiple Vitamins oral tablet: 1 tab(s) orally once a day

## 2023-07-05 NOTE — PROVIDER CONTACT NOTE (OTHER) - ACTION/TREATMENT ORDERED:
As per provider, hold metoprolol tartrate 25mg. Aspirin 81mg to be given tomorrow, 7/6. Atorvastatin 40mg and multivitamin are okay to give prior to dialysis.

## 2023-07-05 NOTE — CONSULT NOTE ADULT - SUBJECTIVE AND OBJECTIVE BOX
[facility] - S Jluis / Shabbir S /D Romina/ SHERRI Mcfarlane/ SHERRI Madden/ Andrés Perkins / SUNI Potts/ O Gladis  service -[phone], office [phone]  ---------------------------------------------------------------------------------------------------------------    Patient is a 62y Female whom presented to the hospital with   Denied recent NSAID use/Abx use/iv contrast studies.    Patient seen and examined    PAST MEDICAL & SURGICAL HISTORY:  Heart failure      HLD (hyperlipidemia)      Glaucoma      Cataract      ESRD (end stage renal disease) on dialysis      PAD (peripheral artery disease)      Blind right eye      Acute osteomyelitis of toe of right foot  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          Allergies: No Known Allergies    Home Medications Reviewed  Hospital Medications:   MEDICATIONS  (STANDING):  aspirin enteric coated 81 milliGRAM(s) Oral daily  atorvastatin 40 milliGRAM(s) Oral at bedtime  calcium acetate 667 milliGRAM(s) Oral three times a day with meals  dextrose 5%. 1000 milliLiter(s) (100 mL/Hr) IV Continuous <Continuous>  dextrose 5%. 1000 milliLiter(s) (50 mL/Hr) IV Continuous <Continuous>  dextrose 50% Injectable 25 Gram(s) IV Push once  dextrose 50% Injectable 25 Gram(s) IV Push once  dextrose 50% Injectable 12.5 Gram(s) IV Push once  glucagon  Injectable 1 milliGRAM(s) IntraMuscular once  metoprolol tartrate 25 milliGRAM(s) Oral every 12 hours  multivitamin 1 Tablet(s) Oral daily    SOCIAL HISTORY:  Denies ETOh,Smoking, illicit drug use  FAMILY HISTORY:  No pertinent family history in first degree relatives        REVIEW OF SYSTEMS:  CONSTITUTIONAL: No weakness, fevers or chills  EYES/ENT: No visual changes;  No vertigo or throat pain   NECK: No pain or stiffness  RESPIRATORY: No cough, wheezing, hemoptysis; No shortness of breath  CARDIOVASCULAR: No chest pain or palpitations.  GASTROINTESTINAL: No abdominal or epigastric pain. No nausea, vomiting, or hematemesis; No diarrhea or constipation. No melena or hematochezia.  GENITOURINARY: No dysuria, frequency, foamy urine, urinary urgency, incontinence or hematuria  NEUROLOGICAL: No numbness or weakness  SKIN: No itching, burning, rashes, or lesions   VASCULAR: No bilateral lower extremity edema.   All other review of systems is negative unless indicated above.    VITALS:  T(F): 98.7 (07-05-23 @ 18:50), Max: 98.7 (07-05-23 @ 18:50)  HR: 73 (07-05-23 @ 18:50)  BP: 157/66 (07-05-23 @ 18:50)  RR: 18 (07-05-23 @ 18:50)  SpO2: 97% (07-05-23 @ 18:50)  Wt(kg): --        PHYSICAL EXAM:  Constitutional: NAD  HEENT: anicteric sclera, oropharynx clear, MMM  Neck: No JVD  Respiratory: CTAB, no wheezes, rales or rhonchi  Cardiovascular: S1, S2, RRR  Gastrointestinal: BS+, soft, NT/ND  Extremities: No cyanosis or clubbing. No peripheral edema  Neurological: A/O x 3, no focal deficits  Psychiatric: Normal mood, normal affect  : No CVA tenderness. No salcido.   Skin: No rashes  Vascular Access:    LABS:  07-05    139  |  96<L>  |  82<H>  ----------------------------<  85  5.4<H>   |  21<L>  |  9.64<H>    Ca    9.7      05 Jul 2023 16:20  Mg     2.30     07-05      Creatinine Trend: 9.64 <--                        9.0    7.03  )-----------( 147      ( 05 Jul 2023 16:20 )             28.5     Urine Studies:  Urinalysis Basic - ( 05 Jul 2023 16:20 )    Color:  / Appearance:  / SG:  / pH:   Gluc: 85 mg/dL / Ketone:   / Bili:  / Urobili:    Blood:  / Protein:  / Nitrite:    Leuk Esterase:  / RBC:  / WBC    Sq Epi:  / Non Sq Epi:  / Bacteria:           RADIOLOGY & ADDITIONAL STUDIES:                 [facility] - S Jluis / Shabbir S /D Romina/ S Denys/ SHERRI Madden/ Andrés Perkins / SUNI Lubinu/ O Gladis  service -[phone], office [phone]  ---------------------------------------------------------------------------------------------------------------  Patient seen and examined bedside    63 y/o F with PMH of ESRD(on HD M/W/F thru right groin AVF), HTN, HLD, DM type II, bilateral BKA, right eye blindness presented initially to [facility] on 07/03 from rehab center for acute SOB and fluid overload and now transferred to [facility] for LHC due to elevated troponin. Patient stated that she had her normal full session of HD on Friday and on Monday she got up and took a shower and when she was putting on her clothes she because acutely SOB and was wheezing. Patient stated that she was perspiring a lot and then she called the nurse who tried to check her O2 but was unable to and then the doctor at the rehab transferred her to the hospital. In the hospital patient was found to be in fluid overload and was placed on BIPAP and noted to have elevated troponin. Patient stated that earlier in the week she ate a lot of fast food and knew she would go into fluid overload as this has happened multiple  times in the past. Patient also endorsed of dry cough that started same time as the SOB. Patient otherwise denied any CP, fevers, chills, N/V/D/C, abdominal pain, dysuria, melena, hematochezia, recent travel, sick contact, body aches, pleuritic or positional chest pain.      Renal consulted for ESRD Mx. Pt well known to me, our gp [place] [facility]. [facility] last [facility] hospitalization  pt was admitted to [facility] for ACS, transfered to [facility] for cath, had last HD 7/3/23 at [facility]   s/p cardiac cath today  pt seen and examined in [facility]. denied cp/sob     PAST MEDICAL & SURGICAL HISTORY:  Heart failure      HLD (hyperlipidemia)      Glaucoma      Cataract      ESRD (end stage renal disease) on dialysis      PAD (peripheral artery disease)      Blind right eye      Acute osteomyelitis of toe of right foot  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    Allergies: No Known Allergies    Home Medications Reviewed  Hospital Medications:   MEDICATIONS  (STANDING):  aspirin enteric coated 81 milliGRAM(s) Oral daily  atorvastatin 40 milliGRAM(s) Oral at bedtime  calcium acetate 667 milliGRAM(s) Oral three times a day with meals  dextrose 5%. 1000 milliLiter(s) (100 mL/Hr) IV Continuous <Continuous>  dextrose 5%. 1000 milliLiter(s) (50 mL/Hr) IV Continuous <Continuous>  dextrose 50% Injectable 25 Gram(s) IV Push once  dextrose 50% Injectable 25 Gram(s) IV Push once  dextrose 50% Injectable 12.5 Gram(s) IV Push once  glucagon  Injectable 1 milliGRAM(s) IntraMuscular once  metoprolol tartrate 25 milliGRAM(s) Oral every 12 hours  multivitamin 1 Tablet(s) Oral daily    SOCIAL HISTORY:  Denies ETOh,Smoking, illicit drug use  FAMILY HISTORY:  No pertinent family history in first degree relatives        REVIEW OF SYSTEMS:  CONSTITUTIONAL: No weakness, fevers or chills  EYES/ENT: No visual changes;  No vertigo or throat pain   NECK: No pain or stiffness  RESPIRATORY: No cough, wheezing, hemoptysis; No shortness of breath  CARDIOVASCULAR: No chest pain or palpitations.  GASTROINTESTINAL: No abdominal or epigastric pain. No nausea, vomiting, or hematemesis; No diarrhea or constipation. No melena or hematochezia.  NEUROLOGICAL: No numbness or weakness  SKIN: No itching, burning, rashes, or lesions   All other review of systems is negative unless indicated above.    VITALS:  T(F): 98.7 (07-05-23 @ 18:50), Max: 98.7 (07-05-23 @ 18:50)  HR: 73 (07-05-23 @ 18:50)  BP: 157/66 (07-05-23 @ 18:50)  RR: 18 (07-05-23 @ 18:50)  SpO2: 97% (07-05-23 @ 18:50)  Wt(kg): --      PHYSICAL EXAM:  Constitutional: NAD  HEENT: anicteric sclera  Neck: No JVD  Respiratory: CTAB, no wheezes, rales or rhonchi  Cardiovascular: S1, S2, RRR  Gastrointestinal: BS+, soft, NT/ND  Extremities: b/l BKA  Neurological: A/O x 3  Psychiatric: Normal mood, normal affect  : No salcido.   Vascular Access: rt femoral avg+thrill    LABS:  07-05    139  |  96<L>  |  82<H>  ----------------------------<  85  5.4<H>   |  21<L>  |  9.64<H>    Ca    9.7      05 Jul 2023 16:20  Mg     2.30     07-05      Creatinine Trend: 9.64 <--                        9.0    7.03  )-----------( 147      ( 05 Jul 2023 16:20 )             28.5     Urine Studies:  Urinalysis Basic - ( 05 Jul 2023 16:20 )    Color:  / Appearance:  / SG:  / pH:   Gluc: 85 mg/dL / Ketone:   / Bili:  / Urobili:    Blood:  / Protein:  / Nitrite:    Leuk Esterase:  / RBC:  / WBC    Sq Epi:  / Non Sq Epi:  / Bacteria:       RADIOLOGY & ADDITIONAL STUDIES:

## 2023-07-05 NOTE — PATIENT PROFILE ADULT - FALL HARM RISK - RISK INTERVENTIONS
Assistance OOB with selected safe patient handling equipment/Assistance with ambulation/Communicate Fall Risk and Risk Factors to all staff, patient, and family/Discuss with provider need for PT consult/Monitor gait and stability/Provide patient with walking aids - walker, cane, crutches/Reinforce activity limits and safety measures with patient and family/Review medications for side effects contributing to fall risk/Sit up slowly, dangle for a short time, stand at bedside before walking/Toileting schedule using arm’s reach rule for commode and bathroom/Visual Cue: Yellow wristband/Bed in lowest position, wheels locked, appropriate side rails in place/Call bell, personal items and telephone in reach/Instruct patient to call for assistance before getting out of bed or chair/Non-slip footwear when patient is out of bed/[place] to call system/Physically safe environment - no spills, clutter or unnecessary equipment/Purposeful Proactive Rounding/Room/bathroom lighting operational, light cord in reach

## 2023-07-05 NOTE — CHART NOTE - NSCHARTNOTEFT_GEN_A_CORE
[facility] ACP COVERAGE    JAYCEE WALTERS  MRN-[id number] 62y    Patient status post LHC via LFA access site. Site clean, dry, intact. Pulses present, capillary refill appropriate. Without hematoma. No bruits auscultated. Given contrast administration today and M/W/F HD schedule, nephro was consulted for inpatient HD setup, plan for session tonight. Pt made aware. Pt denies any other acute complaints. Will continue to monitor closely.    Vital Signs Last 24 Hrs  T(C): 37.1 (05 Jul 2023 18:50), Max: 37.1 (05 Jul 2023 18:50)  T(F): 98.7 (05 Jul 2023 18:50), Max: 98.7 (05 Jul 2023 18:50)  HR: 73 (05 Jul 2023 18:50) (73 - 73)  BP: 157/66 (05 Jul 2023 18:50) (157/66 - 157/66)  BP(mean): --  RR: 18 (05 Jul 2023 18:50) (18 - 18)  SpO2: 97% (05 Jul 2023 18:50) (97% - 97%)    Parameters below as of 05 Jul 2023 18:50  Patient On (Oxygen Delivery Method): nasal cannula    TIFFANY Simental PA-C  [id number] [facility] ACP COVERAGE    JAYCEE WALTERS  MRN-[id number] 62y    Patient status post LHC via LFA access site. Site clean, dry, intact. Pulses present, capillary refill appropriate. Without hematoma. No bruits auscultated. Given contrast administration today and M/W/F HD schedule, nephro was consulted for inpatient HD setup, plan for session tonight. Pt made aware. Pt endorsed one episode of hematuria. Will order UA and montior. Pt denies any other acute complaints.     Vital Signs Last 24 Hrs  T(C): 37.1 (05 Jul 2023 18:50), Max: 37.1 (05 Jul 2023 18:50)  T(F): 98.7 (05 Jul 2023 18:50), Max: 98.7 (05 Jul 2023 18:50)  HR: 73 (05 Jul 2023 18:50) (73 - 73)  BP: 157/66 (05 Jul 2023 18:50) (157/66 - 157/66)  BP(mean): --  RR: 18 (05 Jul 2023 18:50) (18 - 18)  SpO2: 97% (05 Jul 2023 18:50) (97% - 97%)    Parameters below as of 05 Jul 2023 18:50  Patient On (Oxygen Delivery Method): nasal cannula    Will continue to monitor closely.  TIFFANY Simental PA-C  [id number]

## 2023-07-05 NOTE — DISCHARGE NOTE PROVIDER - HOSPITAL COURSE
63 y/o F with PMH of ESRD(on HD M/W/F thru right groin AVF), HTN, HLD, DM type II, bilateral BKA, right eye blindness presented initially to [facility] on 07/03 from rehab center for acute SOB and fluid overload and now transferred to [facility] for LHC due to elevated troponin. Pt underwent LHC 7/5: LM okay, prox LAD of 80-90%, mid Lcx of 80-90%, OM of 90%, ostial RCA of 90%. LFA access site. LVEDP: 28. On Aspirin 81mg. Plan to be transferred to [facility] for CT surgery consult with Dr. Alma Mabry.

## 2023-07-05 NOTE — H&P CARDIOLOGY - NSICDXPASTMEDICALHX_GEN_ALL_CORE_FT
PAST MEDICAL HISTORY:  Blind right eye     Cataract     ESRD (end stage renal disease) on dialysis     Glaucoma     Heart failure     HLD (hyperlipidemia)     PAD (peripheral artery disease)

## 2023-07-05 NOTE — PATIENT PROFILE ADULT - VISION (WITH CORRECTIVE LENSES IF THE PATIENT USUALLY WEARS THEM):
blind R eye/Severely impaired: cannot locate objects without hearing or touching them or patient nonresponsive.

## 2023-07-05 NOTE — CONSULT NOTE ADULT - ASSESSMENT
63 y/o F with PMH of ESRD(on HD M/W/F thru right groin AVF), HTN, HLD, DM type II, bilateral BKA, right eye blindness presented initially to [facility] on 07/03 from rehab center for acute SOB and fluid overload and now transferred to [facility] for LHC due to elevated troponin. Renal consulted for ESRD Mx.     ESRD on dialysis.    Maintenance HD schedule MWF  HD unit- [place] [facility], now [facility] w/onsite hd  K, vol acceptable  Informed consent for HD obtained from pt. in chart   s/p HD 7/3    arranged for HD tonight w/2k bath, uf 2kg as tolerated    Ultrafiltration on Dialysis as tolerated with blood pressure   renal diet , fluid restriction when not NPO  dose all meds for ESRD  Anemia in ckd- Hb < goal. added Epo 10k w/hd tiw  HTN, controlled-bp stable. c/w BB metoprolol tartrate 25 milliGRAM(s) Oral every 12 hours  CAD s/p cath-Mx per cardiology. ASA, statin, BB    Thanks for consulting. will closely follow up.   poc d/w pt, pts RN, HD RN  labs, rad, chart reviewed  For any question, pl call:  Nephrology  Cell -[phone]  Office [phone]  Ans Serv [phone]

## 2023-07-05 NOTE — H&P CARDIOLOGY - HISTORY OF PRESENT ILLNESS
61 y/o F with PMH of ESRD(on HD M/W/F thru right groin AVF), HTN, HLD, DM type II, bilateral BKA, right eye blindness presented initially to [facility] on 07/03 from rehab center for acute SOB and fluid overload and now transferred to [facility] for LHC due to elevated troponin. Patient stated that she had her normal full session of HD on Friday and on Monday she got up and took a shower and when she was putting on her clothes she because acutely SOB and was wheezing. Patient stated that she was perspiring a lot and then she called the nurse who tried to check her O2 but was unable to and then the doctor at the rehab transferred her to the hospital. In the hospital patient was found to be in fluid overload and was placed on BIPAP and noted to have elevated troponin. Patient stated that earlier in the week she ate a lot of fast food and knew she would go into fluid overload as this has happened multiple  times in the past. Patient also endorsed of dry cough that started same time as the SOB. Patient otherwise denied any CP, fevers, chills, N/V/D/C, abdominal pain, dysuria, melena, hematochezia, recent travel, sick contact, body aches, pleuritic or positional chest pain.

## 2023-07-06 ENCOUNTER — TRANSCRIPTION ENCOUNTER (OUTPATIENT)
Age: 63
End: 2023-07-06

## 2023-07-06 ENCOUNTER — INPATIENT (INPATIENT)
Facility: HOSPITAL | Age: 63
LOS: 18 days | Discharge: HOME | DRG: 235 | End: 2023-07-25
Attending: THORACIC SURGERY (CARDIOTHORACIC VASCULAR SURGERY) | Admitting: THORACIC SURGERY (CARDIOTHORACIC VASCULAR SURGERY)
Payer: MEDICARE

## 2023-07-06 VITALS
HEART RATE: 70 BPM | RESPIRATION RATE: 18 BRPM | DIASTOLIC BLOOD PRESSURE: 62 MMHG | OXYGEN SATURATION: 92 % | TEMPERATURE: 98 F | SYSTOLIC BLOOD PRESSURE: 114 MMHG

## 2023-07-06 VITALS
OXYGEN SATURATION: 96 % | DIASTOLIC BLOOD PRESSURE: 56 MMHG | SYSTOLIC BLOOD PRESSURE: 136 MMHG | RESPIRATION RATE: 19 BRPM | HEART RATE: 81 BPM | TEMPERATURE: 98 F

## 2023-07-06 DIAGNOSIS — I25.10 ATHEROSCLEROTIC HEART DISEASE OF NATIVE CORONARY ARTERY WITHOUT ANGINA PECTORIS: ICD-10-CM

## 2023-07-06 DIAGNOSIS — M86.171 OTHER ACUTE OSTEOMYELITIS, RIGHT ANKLE AND FOOT: Chronic | ICD-10-CM

## 2023-07-06 DIAGNOSIS — N18.6 END STAGE RENAL DISEASE: ICD-10-CM

## 2023-07-06 DIAGNOSIS — Z98.890 OTHER SPECIFIED POSTPROCEDURAL STATES: Chronic | ICD-10-CM

## 2023-07-06 DIAGNOSIS — Z99.2 DEPENDENCE ON RENAL DIALYSIS: Chronic | ICD-10-CM

## 2023-07-06 LAB
A1C WITH ESTIMATED AVERAGE GLUCOSE RESULT: 4.9 % — SIGNIFICANT CHANGE UP (ref 4–5.6)
ALBUMIN SERPL ELPH-MCNC: 4 G/DL — SIGNIFICANT CHANGE UP (ref 3.3–5)
ALP SERPL-CCNC: 72 U/L — SIGNIFICANT CHANGE UP (ref 40–120)
ALT FLD-CCNC: 8 U/L — LOW (ref 10–45)
ANION GAP SERPL CALC-SCNC: 15 MMOL/L — HIGH (ref 7–14)
AST SERPL-CCNC: 16 U/L — SIGNIFICANT CHANGE UP (ref 10–40)
BILIRUB DIRECT SERPL-MCNC: 0.1 MG/DL — SIGNIFICANT CHANGE UP (ref 0–0.3)
BILIRUB INDIRECT FLD-MCNC: 0.2 MG/DL — SIGNIFICANT CHANGE UP (ref 0.2–1)
BILIRUB SERPL-MCNC: 0.3 MG/DL — SIGNIFICANT CHANGE UP (ref 0.2–1.2)
BLD GP AB SCN SERPL QL: NEGATIVE — SIGNIFICANT CHANGE UP
BUN SERPL-MCNC: 43 MG/DL — HIGH (ref 7–23)
CALCIUM SERPL-MCNC: 9.9 MG/DL — SIGNIFICANT CHANGE UP (ref 8.4–10.5)
CHLORIDE SERPL-SCNC: 97 MMOL/L — LOW (ref 98–107)
CO2 SERPL-SCNC: 28 MMOL/L — SIGNIFICANT CHANGE UP (ref 22–31)
CREAT SERPL-MCNC: 6.36 MG/DL — HIGH (ref 0.5–1.3)
EGFR: 7 ML/MIN/1.73M2 — LOW
ESTIMATED AVERAGE GLUCOSE: 94 — SIGNIFICANT CHANGE UP
FIBRINOGEN PPP-MCNC: 576 MG/DL — HIGH (ref 200–445)
GLUCOSE BLDC GLUCOMTR-MCNC: 100 MG/DL — HIGH (ref 70–99)
GLUCOSE BLDC GLUCOMTR-MCNC: 102 MG/DL — HIGH (ref 70–99)
GLUCOSE BLDC GLUCOMTR-MCNC: 109 MG/DL — HIGH (ref 70–99)
GLUCOSE BLDC GLUCOMTR-MCNC: 92 MG/DL — SIGNIFICANT CHANGE UP (ref 70–99)
GLUCOSE SERPL-MCNC: 88 MG/DL — SIGNIFICANT CHANGE UP (ref 70–99)
HBV CORE AB SER-ACNC: SIGNIFICANT CHANGE UP
HBV SURFACE AB SER-ACNC: 209.2 MIU/ML — SIGNIFICANT CHANGE UP
HBV SURFACE AG SER-ACNC: SIGNIFICANT CHANGE UP
HCT VFR BLD CALC: 30.6 % — LOW (ref 34.5–45)
HCV AB S/CO SERPL IA: 0.08 S/CO — SIGNIFICANT CHANGE UP (ref 0–0.99)
HCV AB SERPL-IMP: SIGNIFICANT CHANGE UP
HGB BLD-MCNC: 9.5 G/DL — LOW (ref 11.5–15.5)
MCHC RBC-ENTMCNC: 28.4 PG — SIGNIFICANT CHANGE UP (ref 27–34)
MCHC RBC-ENTMCNC: 31 GM/DL — LOW (ref 32–36)
MCV RBC AUTO: 91.6 FL — SIGNIFICANT CHANGE UP (ref 80–100)
MRSA PCR RESULT.: SIGNIFICANT CHANGE UP
NRBC # BLD: 0 /100 WBCS — SIGNIFICANT CHANGE UP (ref 0–0)
NRBC # FLD: 0 K/UL — SIGNIFICANT CHANGE UP (ref 0–0)
PLATELET # BLD AUTO: 159 K/UL — SIGNIFICANT CHANGE UP (ref 150–400)
POTASSIUM SERPL-MCNC: 4.5 MMOL/L — SIGNIFICANT CHANGE UP (ref 3.5–5.3)
POTASSIUM SERPL-SCNC: 4.5 MMOL/L — SIGNIFICANT CHANGE UP (ref 3.5–5.3)
PROT SERPL-MCNC: 7.2 G/DL — SIGNIFICANT CHANGE UP (ref 6–8.3)
RBC # BLD: 3.34 M/UL — LOW (ref 3.8–5.2)
RBC # FLD: 13.2 % — SIGNIFICANT CHANGE UP (ref 10.3–14.5)
RH IG SCN BLD-IMP: POSITIVE — SIGNIFICANT CHANGE UP
S AUREUS DNA NOSE QL NAA+PROBE: SIGNIFICANT CHANGE UP
SODIUM SERPL-SCNC: 140 MMOL/L — SIGNIFICANT CHANGE UP (ref 135–145)
TSH SERPL-MCNC: 2.21 UIU/ML — SIGNIFICANT CHANGE UP (ref 0.27–4.2)
WBC # BLD: 8.5 K/UL — SIGNIFICANT CHANGE UP (ref 3.8–10.5)
WBC # FLD AUTO: 8.5 K/UL — SIGNIFICANT CHANGE UP (ref 3.8–10.5)

## 2023-07-06 PROCEDURE — 93880 EXTRACRANIAL BILAT STUDY: CPT | Mod: 26

## 2023-07-06 PROCEDURE — 71045 X-RAY EXAM CHEST 1 VIEW: CPT | Mod: 26

## 2023-07-06 PROCEDURE — 93306 TTE W/DOPPLER COMPLETE: CPT | Mod: 26

## 2023-07-06 RX ORDER — ACETAMINOPHEN 500 MG
2 TABLET ORAL
Qty: 0 | Refills: 0 | DISCHARGE
Start: 2023-07-06

## 2023-07-06 RX ORDER — ATORVASTATIN CALCIUM 80 MG/1
40 TABLET, FILM COATED ORAL AT BEDTIME
Refills: 0 | Status: DISCONTINUED | OUTPATIENT
Start: 2023-07-06 | End: 2023-07-13

## 2023-07-06 RX ORDER — SODIUM CHLORIDE 9 MG/ML
3 INJECTION INTRAMUSCULAR; INTRAVENOUS; SUBCUTANEOUS EVERY 8 HOURS
Refills: 0 | Status: DISCONTINUED | OUTPATIENT
Start: 2023-07-06 | End: 2023-07-13

## 2023-07-06 RX ORDER — METOPROLOL TARTRATE 50 MG
25 TABLET ORAL EVERY 12 HOURS
Refills: 0 | Status: DISCONTINUED | OUTPATIENT
Start: 2023-07-06 | End: 2023-07-13

## 2023-07-06 RX ORDER — ASPIRIN/CALCIUM CARB/MAGNESIUM 324 MG
81 TABLET ORAL DAILY
Refills: 0 | Status: DISCONTINUED | OUTPATIENT
Start: 2023-07-06 | End: 2023-07-13

## 2023-07-06 RX ORDER — ACETAMINOPHEN 500 MG
650 TABLET ORAL EVERY 6 HOURS
Refills: 0 | Status: DISCONTINUED | OUTPATIENT
Start: 2023-07-06 | End: 2023-07-06

## 2023-07-06 RX ADMIN — Medication 25 MILLIGRAM(S): at 17:47

## 2023-07-06 RX ADMIN — SODIUM CHLORIDE 3 MILLILITER(S): 9 INJECTION INTRAMUSCULAR; INTRAVENOUS; SUBCUTANEOUS at 21:36

## 2023-07-06 RX ADMIN — Medication 81 MILLIGRAM(S): at 15:23

## 2023-07-06 RX ADMIN — Medication 650 MILLIGRAM(S): at 05:32

## 2023-07-06 RX ADMIN — Medication 1 TABLET(S): at 15:23

## 2023-07-06 RX ADMIN — SODIUM CHLORIDE 3 MILLILITER(S): 9 INJECTION INTRAMUSCULAR; INTRAVENOUS; SUBCUTANEOUS at 13:43

## 2023-07-06 RX ADMIN — ATORVASTATIN CALCIUM 40 MILLIGRAM(S): 80 TABLET, FILM COATED ORAL at 21:43

## 2023-07-06 RX ADMIN — Medication 25 MILLIGRAM(S): at 06:07

## 2023-07-06 NOTE — H&P ADULT - NSHPPHYSICALEXAM_GEN_ALL_CORE
General: WN/WD NAD  Neurology: A&Ox3, nonfocal, SMITH x 4  Head:  Normocephalic, atraumatic  ENT:  Mucosa moist, no ulcerations  Neck:  Supple, no sinuses or palpable masses  Lymphatic:  No palpable cervical, supraclavicular, axillary or inguinal adenopathy  Respiratory: CTA B/L  CV: RRR, S1S2, no murmur  Abdominal: Soft, NT, ND no palpable mass  MSK: BILAT BKA   R thigh AVF +

## 2023-07-06 NOTE — DISCHARGE NOTE NURSING/CASE MANAGEMENT/SOCIAL WORK - NSDCPEFALRISK_GEN_ALL_CORE
For information on Fall & Injury Prevention, visit: [email or web address] OR  [email or web address] OR  https://www.cdc.gov/steadi/patient.html

## 2023-07-06 NOTE — H&P ADULT - ASSESSMENT
61 y/o F with PMH of ESRD(on HD M/W/F thru right groin AVF), HTN, HLD, DM type II, bilateral BKA, right eye blindness presented initially to [facility] on 07/03 from rehab center for acute SOB and fluid overload and now transferred to [facility] for LHC due to elevated troponin. Pt underwent LHC 7/5: LM okay, prox LAD of 80-90%, mid Lcx of 80-90%, OM of 90%, ostial RCA of 90%. LFA access site. LVEDP: 28. On Aspirin 81mg. Plan to be transferred to [facility] for CT surgery consult with Dr. Alma Mabry.       7/6 VSS; Transfer from [facility] for CABG Eval. with Dr. Mabry.

## 2023-07-06 NOTE — DISCHARGE NOTE NURSING/CASE MANAGEMENT/SOCIAL WORK - PATIENT PORTAL LINK FT
You can access the FollowMyHealth Patient Portal offered by [facility] by registering at the following website: [email or web address]. By joining [facility]’s FollowMyHealth portal, you will also be able to view your health information using other applications (apps) compatible with our system.

## 2023-07-06 NOTE — H&P ADULT - NSHPREVIEWOFSYSTEMS_GEN_ALL_CORE
REVIEW OF SYSTEMS:  CONSTITUTIONAL: No fever, weight loss, or fatigue  EYES: No eye pain, visual disturbances, or discharge  ENMT:  No difficulty hearing, tinnitus, vertigo; No sinus or throat pain  NECK: No pain or stiffness  RESPIRATORY: Has non productive cough,No wheezing, chills or hemoptysis; No shortness of breath  CARDIOVASCULAR: No chest pain,No palpitations, dizziness, or leg swelling  GASTROINTESTINAL: No abdominal or epigastric pain. No nausea, vomiting, or hematemesis; No diarrhea, has No melena  GENITOURINARY: No dysuria, frequency, hematuria, or incontinence  NEUROLOGICAL: No headaches, memory loss, loss of strength, numbness, or tremors  SKIN: No itching, burning, rashes, or lesions   LYMPH NODES: No enlarged glands  ENDOCRINE: No heat or cold intolerance; No hair loss  MUSCULOSKELETAL: No joint pain or swelling; No muscle, back, or extremity pain  PSYCHIATRIC: No depression, anxiety, mood swings, or difficulty sleeping  HEME/LYMPH: No easy bruising, or bleeding gums  ALLERGY: No hives or eczema

## 2023-07-06 NOTE — H&P ADULT - PROBLEM SELECTOR PLAN 1
Continue with ASA 81 Ator 40mg daily   Continue with LOP 25BID   CABG EVAL in progress  Dr. Mabry to review imaging and discuss plan with patient   F/U TTE and Bilat Carotids   Pending preop labs

## 2023-07-06 NOTE — PATIENT PROFILE ADULT - FALL HARM RISK - RISK INTERVENTIONS
Assistance OOB with selected safe patient handling equipment/Assistance with ambulation/Communicate Fall Risk and Risk Factors to all staff, patient, and family/Discuss with provider need for PT consult/Monitor gait and stability/Provide patient with walking aids - walker, cane, crutches/Reinforce activity limits and safety measures with patient and family/Review medications for side effects contributing to fall risk/Sit up slowly, dangle for a short time, stand at bedside before walking/Toileting schedule using arm’s reach rule for commode and bathroom/Visual Cue: Yellow wristband/Bed in lowest position, wheels locked, appropriate side rails in place/Call bell, personal items and telephone in reach/Instruct patient to call for assistance before getting out of bed or chair/Non-slip footwear when patient is out of bed/[place] to call system/Physically safe environment - no spills, clutter or unnecessary equipment/Purposeful Proactive Rounding/Room/bathroom lighting operational, light cord in reach

## 2023-07-06 NOTE — H&P ADULT - HISTORY OF PRESENT ILLNESS
63 y/o F with PMH of ESRD(on HD M/W/F thru right groin AVF), HTN, HLD, DM type II, bilateral BKA, right eye blindness presented initially to [facility] on 07/03 from rehab center for acute SOB and fluid overload and now transferred to [facility] for LHC due to elevated troponin. Pt underwent LHC 7/5: LM okay, prox LAD of 80-90%, mid Lcx of 80-90%, OM of 90%, ostial RCA of 90%. LFA access site. LVEDP: 28. On Aspirin 81mg. Plan to be transferred to [facility] for CT surgery consult with Dr. Alma Mabry.

## 2023-07-06 NOTE — PROGRESS NOTE ADULT - SUBJECTIVE AND OBJECTIVE BOX
Cardiovascular Disease Progress Note  Date of Service: 23 @ 08:57    Overnight events: No acute events overnight.    Patient denies chest pain or SOB.        Objective Findings:  T(C): 36.4 (23 @ 06:05), Max: 37.7 (23 @ 22:50)  HR: 81 (23 @ 06:05) (67 - 81)  BP: 136/56 (23 @ 06:05) (98/50 - 159/66)  RR: 19 (23 @ 06:05) (16 - 19)  SpO2: 96% (23 @ 06:05) (96% - 100%)  Wt(kg): --  Daily     Daily Weight in k.2 (2023 01:52)      Physical Exam:  Gen: NAD; Patient resting comfortably  HEENT: EOMI, Normocephalic/ atraumatic  CV: RRR, normal S1 + S2, no m/r/g  Lungs:  Normal respiratory effort; clear to auscultation bilaterally  Abd: soft, non-tender; bowel sounds present  Ext: No edema; warm and well perfused    Telemetry: Sinus; no ectopy    Laboratory Data:                        9.5    8.50  )-----------( 159      ( 2023 06:37 )             30.6     07-06    140  |  97<L>  |  43<H>  ----------------------------<  88  4.5   |  28  |  6.36<H>    Ca    9.9      2023 06:37  Mg     2.30     07-05      PT/INR - ( 2023 16:20 )   PT: 13.0 sec;   INR: 1.12 ratio         PTT - ( 2023 16:20 )  PTT:31.2 sec          Inpatient Medications:  MEDICATIONS  (STANDING):  aspirin enteric coated 81 milliGRAM(s) Oral daily  atorvastatin 40 milliGRAM(s) Oral at bedtime  calcium acetate 667 milliGRAM(s) Oral three times a day with meals  chlorhexidine 2% Cloths 1 Application(s) Topical daily  dextrose 5%. 1000 milliLiter(s) (100 mL/Hr) IV Continuous <Continuous>  dextrose 5%. 1000 milliLiter(s) (50 mL/Hr) IV Continuous <Continuous>  dextrose 50% Injectable 25 Gram(s) IV Push once  dextrose 50% Injectable 25 Gram(s) IV Push once  dextrose 50% Injectable 12.5 Gram(s) IV Push once  epoetin niesha (EPOGEN) Injectable 75106 Unit(s) IV Push <User Schedule>  glucagon  Injectable 1 milliGRAM(s) IntraMuscular once  metoprolol tartrate 25 milliGRAM(s) Oral every 12 hours  multivitamin 1 Tablet(s) Oral daily      Assessment: 61-year-old female with ESRD on HD, HLD and peripheral vascular disease s/p lower extremity resection presents with chest pain found to have multivessel disease.      Plan of Care:      #Multivessel CAD-  Noted on cardiac cath .  Groin site is soft and NT.  Awaiting transfer to [facility] under CT surgery- Dr. Mabry.     #Peripheral vascular disease-  s/p L BKA well.   Continue current cardiac management.   Antiplatelet therapy with ASA.       #Compensated diastolic CHF-  Euvolemic on exam.  Fluid removal with HD as per the renal team.     #ESRD-  - HD, as per renal.    #ACP (advance care planning)-  Advanced care planning was discussed with the patient.   She is agreeable to transfer for CABG evaluation.     Over 25 minutes spent on total encounter; more than 50% of the visit was spent counseling and/or coordinating care by the attending physician.      Cristino Adams MD [facility]  Cardiovascular Disease  [phone]

## 2023-07-06 NOTE — PHYSICAL THERAPY INITIAL EVALUATION ADULT - ACTIVE RANGE OF MOTION EXAMINATION, REHAB EVAL
L knee extension AAROM -10 degrees/bilateral upper extremity Active ROM was WFL (within functional limits)/bilateral  lower extremity Active ROM was WFL (within functional limits)

## 2023-07-07 DIAGNOSIS — I25.10 ATHEROSCLEROTIC HEART DISEASE OF NATIVE CORONARY ARTERY WITHOUT ANGINA PECTORIS: ICD-10-CM

## 2023-07-07 DIAGNOSIS — N18.9 CHRONIC KIDNEY DISEASE, UNSPECIFIED: ICD-10-CM

## 2023-07-07 DIAGNOSIS — H54.8 LEGAL BLINDNESS, AS DEFINED IN USA: ICD-10-CM

## 2023-07-07 DIAGNOSIS — I73.9 PERIPHERAL VASCULAR DISEASE, UNSPECIFIED: ICD-10-CM

## 2023-07-07 DIAGNOSIS — E78.5 HYPERLIPIDEMIA, UNSPECIFIED: ICD-10-CM

## 2023-07-07 DIAGNOSIS — N18.6 END STAGE RENAL DISEASE: ICD-10-CM

## 2023-07-07 DIAGNOSIS — I10 ESSENTIAL (PRIMARY) HYPERTENSION: ICD-10-CM

## 2023-07-07 DIAGNOSIS — E11.9 TYPE 2 DIABETES MELLITUS WITHOUT COMPLICATIONS: ICD-10-CM

## 2023-07-07 LAB
ANION GAP SERPL CALC-SCNC: 18 MMOL/L — HIGH (ref 5–17)
BUN SERPL-MCNC: 62 MG/DL — HIGH (ref 7–23)
CALCIUM SERPL-MCNC: 9.8 MG/DL — SIGNIFICANT CHANGE UP (ref 8.4–10.5)
CHLORIDE SERPL-SCNC: 98 MMOL/L — SIGNIFICANT CHANGE UP (ref 96–108)
CO2 SERPL-SCNC: 26 MMOL/L — SIGNIFICANT CHANGE UP (ref 22–31)
CREAT SERPL-MCNC: 8.26 MG/DL — HIGH (ref 0.5–1.3)
EGFR: 5 ML/MIN/1.73M2 — LOW
GLUCOSE BLDC GLUCOMTR-MCNC: 102 MG/DL — HIGH (ref 70–99)
GLUCOSE SERPL-MCNC: 92 MG/DL — SIGNIFICANT CHANGE UP (ref 70–99)
HCT VFR BLD CALC: 31.9 % — LOW (ref 34.5–45)
HGB BLD-MCNC: 9.9 G/DL — LOW (ref 11.5–15.5)
MCHC RBC-ENTMCNC: 29.4 PG — SIGNIFICANT CHANGE UP (ref 27–34)
MCHC RBC-ENTMCNC: 31 GM/DL — LOW (ref 32–36)
MCV RBC AUTO: 94.7 FL — SIGNIFICANT CHANGE UP (ref 80–100)
MRSA PCR RESULT.: SIGNIFICANT CHANGE UP
NRBC # BLD: 0 /100 WBCS — SIGNIFICANT CHANGE UP (ref 0–0)
PA ADP PRP-ACNC: 217 PRU — SIGNIFICANT CHANGE UP (ref 194–417)
PLATELET # BLD AUTO: 157 K/UL — SIGNIFICANT CHANGE UP (ref 150–400)
POTASSIUM SERPL-MCNC: 4.9 MMOL/L — SIGNIFICANT CHANGE UP (ref 3.5–5.3)
POTASSIUM SERPL-SCNC: 4.9 MMOL/L — SIGNIFICANT CHANGE UP (ref 3.5–5.3)
RBC # BLD: 3.37 M/UL — LOW (ref 3.8–5.2)
RBC # FLD: 13.2 % — SIGNIFICANT CHANGE UP (ref 10.3–14.5)
S AUREUS DNA NOSE QL NAA+PROBE: SIGNIFICANT CHANGE UP
SODIUM SERPL-SCNC: 142 MMOL/L — SIGNIFICANT CHANGE UP (ref 135–145)
WBC # BLD: 6.52 K/UL — SIGNIFICANT CHANGE UP (ref 3.8–10.5)
WBC # FLD AUTO: 6.52 K/UL — SIGNIFICANT CHANGE UP (ref 3.8–10.5)

## 2023-07-07 PROCEDURE — 99232 SBSQ HOSP IP/OBS MODERATE 35: CPT | Mod: FS

## 2023-07-07 PROCEDURE — 93930 UPPER EXTREMITY STUDY: CPT | Mod: 26

## 2023-07-07 RX ORDER — SENNA PLUS 8.6 MG/1
2 TABLET ORAL AT BEDTIME
Refills: 0 | Status: DISCONTINUED | OUTPATIENT
Start: 2023-07-07 | End: 2023-07-13

## 2023-07-07 RX ADMIN — SODIUM CHLORIDE 3 MILLILITER(S): 9 INJECTION INTRAMUSCULAR; INTRAVENOUS; SUBCUTANEOUS at 05:24

## 2023-07-07 RX ADMIN — SODIUM CHLORIDE 3 MILLILITER(S): 9 INJECTION INTRAMUSCULAR; INTRAVENOUS; SUBCUTANEOUS at 23:15

## 2023-07-07 RX ADMIN — ATORVASTATIN CALCIUM 40 MILLIGRAM(S): 80 TABLET, FILM COATED ORAL at 22:37

## 2023-07-07 RX ADMIN — Medication 25 MILLIGRAM(S): at 05:30

## 2023-07-07 RX ADMIN — SENNA PLUS 2 TABLET(S): 8.6 TABLET ORAL at 22:37

## 2023-07-07 RX ADMIN — Medication 81 MILLIGRAM(S): at 17:07

## 2023-07-07 RX ADMIN — SODIUM CHLORIDE 3 MILLILITER(S): 9 INJECTION INTRAMUSCULAR; INTRAVENOUS; SUBCUTANEOUS at 16:09

## 2023-07-07 RX ADMIN — Medication 25 MILLIGRAM(S): at 17:06

## 2023-07-07 RX ADMIN — Medication 1 TABLET(S): at 17:06

## 2023-07-07 NOTE — PROGRESS NOTE ADULT - ASSESSMENT
63 y/o F with PMH of ESRD(on HD M/W/F thru right groin AVF), HTN, HLD, DM type II, bilateral BKA, right eye blindness presented initially to [facility] on 07/03 from rehab center for acute SOB and fluid overload and now transferred to [facility] for LHC due to elevated troponin. Pt underwent LHC 7/5: LM okay, prox LAD of 80-90%, mid Lcx of 80-90%, OM of 90%, ostial RCA of 90%. LFA access site. LVEDP: 28. On Aspirin 81mg. Plan to be transferred to [facility] for CT surgery consult with Dr. Alma Mabry.     1) ESRD on HD  MWF  Last HD yesterday at [facility]  access: right upper thigh avg  consent in chart  Plan for HD today  trend bmp    2) Anemia in ckd  hgb is 9.9  due to active coronary disease hold liv for now  monitor hgb      Dr Mcfarlane  [phone]

## 2023-07-07 NOTE — CONSULT NOTE ADULT - SUBJECTIVE AND OBJECTIVE BOX
Cardiovascular Disease Initial Evaluation  Date of service: 07-07-23 @ 08:19    CHIEF COMPLAINT: CAD    HISTORY OF PRESENT ILLNESS:  61 y/o F with PMH of ESRD(on HD M/W/F thru right groin AVF), HTN, HLD, DM type II, bilateral BKA, right eye blindness presented initially to [facility] on 07/03 from rehab center for acute SOB and fluid overload and now transferred to [facility] for LHC due to elevated troponin. Pt underwent LHC 7/5: LM okay, prox LAD of 80-90%, mid Lcx of 80-90%, OM of 90%, ostial RCA of 90%. LFA access site. LVEDP: 28. On Aspirin 81mg. Plan to be transferred to [facility] for CT surgery consult with Dr. Alma Mabry. Pt is currently chest pain free. Denies sob or palpitations.       Allergies    No Known Allergies    Intolerances    	    MEDICATIONS:  aspirin enteric coated 81 milliGRAM(s) Oral daily  metoprolol tartrate 25 milliGRAM(s) Oral every 12 hours            atorvastatin 40 milliGRAM(s) Oral at bedtime    multivitamin 1 Tablet(s) Oral daily  sodium chloride 0.9% lock flush 3 milliLiter(s) IV Push every 8 hours      PAST MEDICAL & SURGICAL HISTORY:  Heart failure      HLD (hyperlipidemia)      Glaucoma      Cataract      ESRD (end stage renal disease) on dialysis      PAD (peripheral artery disease)      Blind right eye      Acute osteomyelitis of toe of right foot  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          FAMILY HISTORY:  No pertinent family history in first degree relatives        SOCIAL HISTORY:    The patient is a nonsmoker       REVIEW OF SYSTEMS:  See HPI, otherwise complete 14 point review of systems negative    [x ] All others negative	  [ ] Unable to obtain    PHYSICAL EXAM:  T(C): 36.9 (07-07-23 @ 04:19), Max: 37.3 (07-06-23 @ 17:36)  HR: 69 (07-07-23 @ 04:19) (69 - 79)  BP: 110/61 (07-07-23 @ 04:19) (110/61 - 136/79)  RR: 18 (07-07-23 @ 04:19) (18 - 18)  SpO2: 93% (07-07-23 @ 04:19) (92% - 95%)  Wt(kg): --  I&O's Summary      Appearance: No Acute Distress; resting comfortably  HEENT:  Normal oral mucosa, PERRL, EOMI	  Cardiovascular: Normal S1 S2, No JVD, No murmurs/rubs/gallops  Respiratory: Normal respiratory effort; Lungs clear to auscultation bilaterally  Gastrointestinal:  Soft, Non-tender, + BS	  Skin: No rashes, No ecchymoses, No cyanosis	  Neurologic: Non-focal; no weakness  Extremities: No clubbing, cyanosis or edema  Vascular: Peripheral pulses palpable 2+ bilaterally  Psychiatry: A & O x 3, Mood & affect appropriate    Laboratory Data:	 	    CBC Full  -  ( 07 Jul 2023 04:41 )  WBC Count : 6.52 K/uL  Hemoglobin : 9.9 g/dL  Hematocrit : 31.9 %  Platelet Count - Automated : 157 K/uL  Mean Cell Volume : 94.7 fl  Mean Cell Hemoglobin : 29.4 pg  Mean Cell Hemoglobin Concentration : 31.0 gm/dL  Auto Neutrophil # : x  Auto Lymphocyte # : x  Auto Monocyte # : x  Auto Eosinophil # : x  Auto Basophil # : x  Auto Neutrophil % : x  Auto Lymphocyte % : x  Auto Monocyte % : x  Auto Eosinophil % : x  Auto Basophil % : x    07-07    142  |  98  |  62<H>  ----------------------------<  92  4.9   |  26  |  8.26<H>  07-06    140  |  97<L>  |  43<H>  ----------------------------<  88  4.5   |  28  |  6.36<H>    Ca    9.8      07 Jul 2023 04:41  Ca    9.9      06 Jul 2023 06:37  Mg     2.30     07-05    TPro  7.2  /  Alb  4.0  /  TBili  0.3  /  DBili  0.1  /  AST  16  /  ALT  8<L>  /  AlkPhos  72  07-06      proBNP:   Lipid Profile:   HgA1c:   TSH: Thyroid Stimulating Hormone, Serum: 2.21 uIU/mL (07-06 @ 13:26)        CARDIAC MARKERS:            Interpretation of Telemetry: Sinus     ECG:  	  RADIOLOGY:  OTHER: 	    PREVIOUS DIAGNOSTIC TESTING:    [ x] Echocardiogram: 7/6   1. Normal left ventricular cavity size. The left ventricular wall thickness is normal. The left ventricular systolic function is normal with an ejection fraction of 63 % by Obrien's method of disks. There are regional wall motion abnormalities.   2. Apical septal segment is abnormal.   3. The right ventricle is not well visualized. Normal right ventricular cavity size, normal right ventricular wall thickness and normal right ventricular systolic function. The tricuspid annular plane systolic excursion (TAPSE) is 1.8 cm (normal >=1.7 cm).   4. There is moderate mitral valve stenosis.   5. Mild mitral regurgitation.   6. Trileaflet aortic valve with normal systolic excursion.   7. Mild aortic regurgitation.   8. No pericardial effusion seen.   9. No prior echocardiogram is available for comparison.  [x ] Catheterization: 7/5    LM   Left main artery: Angiography shows no disease. JESSEE Flow 3.      LAD   Left anterior descending artery: Angiography shows severe  atherosclerosis. Proximal to Mid LAD calcified sequential stenoses  80-90%. . There is a 90 % stenosis in the proximal third portion of  the segment. JESSEE Flow 3.    CX   Circumflex: Angiography shows severe atherosclerosis. 80-90% stenosis  calcified. . There is a 90 % stenosis in the middle  third portion of the segment. JESSEE Flow 3. First obtuse marginal:  Angiography shows severe atherosclerosis. 90% stenosis.  There is a 90 % stenosis in the proximal third portion of the segment.  JESSEE Flow 3.    RCA   Right coronary artery: Angiography shows severe atherosclerosis.  Severe pressure dampening and ventricularization occurred  upon cannulation. . There is an 80 % stenosis in the ostium portion of  the segment. JESSEE Flow 3.    Left Heart Cath   LV to AO pullback was performed and there is no pressure gradient. The  left ventricular end diastolic pressure was 28 mmHg.    [ ] Stress Test:  	    Assessment:  61-year-old female with ESRD on HD, HLD and peripheral vascular disease s/p lower extremity resection presents with chest pain found to have multivessel disease.      Plan of Care:      #Multivessel CAD-  Noted on cardiac cath 7/5.  Pt to undergo CABG evaluation with Dr. Mabry      #Peripheral vascular disease-  s/p L BKA well.   Continue current cardiac management.   Antiplatelet therapy with ASA.       #Compensated diastolic CHF-  Euvolemic on exam.  Fluid removal with HD as per the renal team.     #ESRD-  - HD, as per renal.            72 minutes spent on total encounter; more than 50% of the visit was spent counseling and/or coordinating care by the attending physician.   	  Wisam Adams,  [facility]  Cardiovascular Diseases  [phone]    
[facility] Consult Note Nephrology - CONSULTATION NOTE    61 y/o F with PMH of ESRD(on HD M/W/F thru right groin AVF), HTN, HLD, DM type II, bilateral BKA, right eye blindness presented initially to [facility] on 07/03 from rehab center for acute SOB and fluid overload and now transferred to [facility] for LHC due to elevated troponin. Pt underwent LHC 7/5: LM okay, prox LAD of 80-90%, mid Lcx of 80-90%, OM of 90%, ostial RCA of 90%. LFA access site. LVEDP: 28. On Aspirin 81mg. Plan to be transferred to [facility] for CT surgery consult with Dr. Alma Mabry.     Renal consult for ESRd on HD  s/p HD yesterday at [facility]  denies any sob /cp/n/v/d/c/    PAST MEDICAL & SURGICAL HISTORY:  Heart failure      HLD (hyperlipidemia)      Glaucoma      Cataract      ESRD (end stage renal disease) on dialysis      PAD (peripheral artery disease)      Blind right eye      Acute osteomyelitis of toe of right foot  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        No Known Allergies    Home Medications Reviewed  Hospital Medications:   MEDICATIONS  (STANDING):  aspirin enteric coated 81 milliGRAM(s) Oral daily  atorvastatin 40 milliGRAM(s) Oral at bedtime  metoprolol tartrate 25 milliGRAM(s) Oral every 12 hours  multivitamin 1 Tablet(s) Oral daily  sodium chloride 0.9% lock flush 3 milliLiter(s) IV Push every 8 hours    SOCIAL HISTORY:  Denies ETOh,Smoking,   FAMILY HISTORY:  No pertinent family history in first degree relatives      REVIEW OF SYSTEMS:  CONSTITUTIONAL: No weakness, fevers or chills  EYES/ENT: No visual changes;  No vertigo or throat pain   NECK: No pain or stiffness  RESPIRATORY: No cough, wheezing, hemoptysis; No shortness of breath  CARDIOVASCULAR: No chest pain or palpitations.  GASTROINTESTINAL: No abdominal or epigastric pain. No nausea, vomiting, or hematemesis; No diarrhea or constipation. No melena or hematochezia.  GENITOURINARY: No dysuria, frequency, foamy urine, urinary urgency, incontinence or hematuria  NEUROLOGICAL: No numbness or weakness  SKIN: No itching, burning, rashes, or lesions   VASCULAR: No bilateral lower extremity edema.   All other review of systems is negative unless indicated above.    VITALS:  T(F): 97.5 (07-06-23 @ 10:01), Max: 99.9 (07-05-23 @ 22:50)  HR: 70 (07-06-23 @ 10:01)  BP: 114/62 (07-06-23 @ 10:01)  RR: 18 (07-06-23 @ 10:01)  SpO2: 92% (07-06-23 @ 10:01)  Wt(kg): --      PHYSICAL EXAM:  Constitutional: NAD  HEENT: anicteric sclera, oropharynx clear, MMM  Neck: No JVD  Respiratory: CTAB, no wheezes, rales or rhonchi  Cardiovascular: S1, S2, RRR  Gastrointestinal: BS+, soft, NT/ND  Extremities: b/l lower ext amputee  Neurological: A/O x 3, no focal deficits  Psychiatric: Normal mood, normal affect  : No CVA tenderness. No aslcido.   Skin: No rashes  Vascular Access: right thigh avg    LABS:  07-06    140  |  97<L>  |  43<H>  ----------------------------<  88  4.5   |  28  |  6.36<H>    Ca    9.9      06 Jul 2023 06:37  Mg     2.30     07-05    TPro  7.2  /  Alb  4.0  /  TBili  0.3  /  DBili  0.1  /  AST  16  /  ALT  8<L>  /  AlkPhos  72  07-06    Creatinine Trend: 6.36 <--, 9.64 <--                        9.5    8.50  )-----------( 159      ( 06 Jul 2023 06:37 )             30.6     Urine Studies:  Urinalysis Basic - ( 06 Jul 2023 06:37 )    Color:  / Appearance:  / SG:  / pH:   Gluc: 88 mg/dL / Ketone:   / Bili:  / Urobili:    Blood:  / Protein:  / Nitrite:    Leuk Esterase:  / RBC:  / WBC    Sq Epi:  / Non Sq Epi:  / Bacteria:         RADIOLOGY & ADDITIONAL STUDIES:                
    Patient is a 62y old  Female who presents with a chief complaint of CABG EVAL       HPI:  63 y/o F with PMH of ESRD(on HD M/W/F thru right groin AVF), HTN, HLD, DM type II, bilateral BKA, right eye blindness presented initially to [facility] on 07/03 from rehab center for acute SOB and fluid overload and now transferred to [facility] for LHC due to elevated troponin. Pt underwent LHC 7/5: LM okay, prox LAD of 80-90%, mid Lcx of 80-90%, OM of 90%, ostial RCA of 90%. LFA access site. LVEDP: 28. On Aspirin 81mg. awaiting CABG.     PAST MEDICAL & SURGICAL HISTORY:  Heart failure      HLD (hyperlipidemia)      Glaucoma      Cataract      ESRD (end stage renal disease) on dialysis      PAD (peripheral artery disease)      Blind right eye      Acute osteomyelitis of toe of right foot  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          Social History: No smoking.     FAMILY HISTORY:  No pertinent family history in first degree relatives        Allergies    No Known Allergies    Intolerances        REVIEW OF SYSTEMS:    CONSTITUTIONAL: No fever, weight loss, or fatigue  EYES: No eye pain, visual disturbances, or discharge  RESPIRATORY: No cough, wheezing, chills or hemoptysis; No shortness of breath  CARDIOVASCULAR: No chest pain, palpitations, dizziness, or leg swelling  GASTROINTESTINAL: No abdominal or epigastric pain. No nausea, vomiting, or hematemesis; No diarrhea or constipation. No melena or hematochezia.  GENITOURINARY: No dysuria, frequency, hematuria, or incontinence  NEUROLOGICAL: No headaches, memory loss, loss of strength, numbness, or tremors        MEDICATIONS  (STANDING):  aspirin enteric coated 81 milliGRAM(s) Oral daily  atorvastatin 40 milliGRAM(s) Oral at bedtime  metoprolol tartrate 25 milliGRAM(s) Oral every 12 hours  multivitamin 1 Tablet(s) Oral daily  sodium chloride 0.9% lock flush 3 milliLiter(s) IV Push every 8 hours    MEDICATIONS  (PRN):      Vital Signs Last 24 Hrs  T(C): 36.8 (07 Jul 2023 16:20), Max: 36.9 (06 Jul 2023 21:36)  T(F): 98.2 (07 Jul 2023 16:20), Max: 98.5 (06 Jul 2023 21:36)  HR: 68 (07 Jul 2023 16:20) (68 - 75)  BP: 139/59 (07 Jul 2023 16:20) (110/61 - 139/59)  BP(mean): --  RR: 18 (07 Jul 2023 16:20) (18 - 18)  SpO2: 100% (07 Jul 2023 16:20) (93% - 100%)    Parameters below as of 07 Jul 2023 16:20  Patient On (Oxygen Delivery Method): room air        PHYSICAL EXAM:    GENERAL: NAD, well-groomed, well-developed  HEAD:  Atraumatic, Normocephalic  EYES: EOMI, PERRLA, conjunctiva and sclera clear  ENMT: No tonsillar erythema, exudates, or enlargement; Moist mucous membranes, Good dentition, No lesions  NECK: Supple, No JVD, Normal thyroid  NERVOUS SYSTEM:  Alert & Oriented X3, No new  focal sign .  CHEST/LUNG: Air entry good bilaterally; No rales, rhonchi, wheezing, or rubs  HEART: Regular rate and rhythm; No murmurs, rubs, or gallops  ABDOMEN: Soft, Nontender, Nondistended; Bowel sounds present  EXTREMITIES:  Stump +    LABS:                        9.9    6.52  )-----------( 157      ( 07 Jul 2023 04:41 )             31.9     07-07    142  |  98  |  62<H>  ----------------------------<  92  4.9   |  26  |  8.26<H>    Ca    9.8      07 Jul 2023 04:41    TPro  7.2  /  Alb  4.0  /  TBili  0.3  /  DBili  0.1  /  AST  16  /  ALT  8<L>  /  AlkPhos  72  07-06      Urinalysis Basic - ( 07 Jul 2023 04:41 )    Color: x / Appearance: x / SG: x / pH: x  Gluc: 92 mg/dL / Ketone: x  / Bili: x / Urobili: x   Blood: x / Protein: x / Nitrite: x   Leuk Esterase: x / RBC: x / WBC x   Sq Epi: x / Non Sq Epi: x / Bacteria: x          RADIOLOGY & ADDITIONAL STUDIES:

## 2023-07-07 NOTE — PROGRESS NOTE ADULT - ASSESSMENT
63 y/o F with PMH of ESRD(on HD M/W/F thru right groin AVF), HTN, HLD, DM type II, bilateral BKA, right eye blindness presented initially to [facility] on 07/03 from rehab center for acute SOB and fluid overload and now transferred to [facility] for LHC due to elevated troponin. Pt underwent LHC 7/5: LM okay, prox LAD of 80-90%, mid Lcx of 80-90%, OM of 90%, ostial RCA of 90%. LFA access site. LVEDP: 28. On Aspirin 81mg. Plan to be transferred to [facility] for CT surgery consult with Dr. Alma Mabry.       7/6 VSS; Transfer from [facility] for CABG Eval. with Dr. Mabry.  63 y/o F with PMH of ESRD(on HD M/W/F thru right groin AVF), HTN, HLD, DM type II, bilateral BKA, right eye blindness presented initially to [facility] on 07/03 from rehab center for acute SOB and fluid overload and now transferred to [facility] for LHC due to elevated troponin. Pt underwent LHC 7/5: LM okay, prox LAD of 80-90%, mid Lcx of 80-90%, OM of 90%, ostial RCA of 90%. LFA access site. LVEDP: 28. On Aspirin 81mg. Plan to be transferred to [facility] for CT surgery consult with Dr. Alma Mabry.       7/6 VSS; Transfer from [facility] for CABG Eval. with Dr. Mabry.   7/7 VSS: RSR 60-80; continue preop workup; HD today as per renal; carotids neg sig stenosis; echo mod mv stenosis; mild AR; ef nl; neg pericardial effusion  [id number];  pt undergoing preop workup and eval; assess for conduit grafting

## 2023-07-07 NOTE — PROGRESS NOTE ADULT - PROBLEM SELECTOR PLAN 1
Continue with ASA 81 Ator 40mg daily   Continue with LOP 25BID   CABG EVAL in progress  Dr. Mabry to review imaging and discuss plan with patient   F/U TTE and Bilat Carotids   Pending preop labs continue preop workup  HD today as per renal   carotids neg sig stenosis   echo mod mv stenosis; mild AR; ef nl; neg pericardial effusion  [id number]  pt undergoing preop workup and eval; assess for conduit grafting

## 2023-07-07 NOTE — PROGRESS NOTE ADULT - PROBLEM SELECTOR PLAN 2
R Thigh AVF   HD M W F   Renal Consulted R Thigh AVF   HD M W F   Renal following  HD today as per renal

## 2023-07-07 NOTE — CONSULT NOTE ADULT - CONSULT REQUESTED BY NAME
Dr Mcfarlane
On behalf of Jose Madden MD
Known to my service , dr Tiki Madden and Raghavendra howell

## 2023-07-07 NOTE — PROGRESS NOTE ADULT - SUBJECTIVE AND OBJECTIVE BOX
Patient seen and examined  no complaints    No Known Allergies    Hospital Medications:   MEDICATIONS  (STANDING):  aspirin enteric coated 81 milliGRAM(s) Oral daily  atorvastatin 40 milliGRAM(s) Oral at bedtime  metoprolol tartrate 25 milliGRAM(s) Oral every 12 hours  multivitamin 1 Tablet(s) Oral daily  sodium chloride 0.9% lock flush 3 milliLiter(s) IV Push every 8 hours        VITALS:  T(F): 98.4 (07-07-23 @ 04:19), Max: 99.1 (07-06-23 @ 17:36)  HR: 69 (07-07-23 @ 04:19)  BP: 110/61 (07-07-23 @ 04:19)  RR: 18 (07-07-23 @ 04:19)  SpO2: 93% (07-07-23 @ 04:19)  Wt(kg): --    07-07 @ 07:01  -  07-07 @ 11:34  --------------------------------------------------------  IN: 200 mL / OUT: 0 mL / NET: 200 mL      PHYSICAL EXAM:  Constitutional: NAD  HEENT: anicteric sclera, oropharynx clear, MMM  Neck: No JVD  Respiratory: CTAB, no wheezes, rales or rhonchi  Cardiovascular: S1, S2, RRR  Gastrointestinal: BS+, soft, NT/ND  Extremities: b/l lower ext amputee  Neurological: A/O x 3, no focal deficits  Psychiatric: Normal mood, normal affect  : No CVA tenderness. No salcido.   Skin: No rashes  Vascular Access: right thigh avg    LABS:  07-07    142  |  98  |  62<H>  ----------------------------<  92  4.9   |  26  |  8.26<H>    Ca    9.8      07 Jul 2023 04:41  Mg     2.30     07-05    TPro  7.2  /  Alb  4.0  /  TBili  0.3  /  DBili  0.1  /  AST  16  /  ALT  8<L>  /  AlkPhos  72  07-06    Creatinine Trend: 8.26 <--, 6.36 <--, 9.64 <--                        9.9    6.52  )-----------( 157      ( 07 Jul 2023 04:41 )             31.9     Urine Studies:  Urinalysis Basic - ( 07 Jul 2023 04:41 )    Color:  / Appearance:  / SG:  / pH:   Gluc: 92 mg/dL / Ketone:   / Bili:  / Urobili:    Blood:  / Protein:  / Nitrite:    Leuk Esterase:  / RBC:  / WBC    Sq Epi:  / Non Sq Epi:  / Bacteria:         RADIOLOGY & ADDITIONAL STUDIES:

## 2023-07-07 NOTE — CONSULT NOTE ADULT - ASSESSMENT
61 y/o F with PMH of ESRD(on HD M/W/F thru right groin AVF), HTN, HLD, DM type II, bilateral BKA, right eye blindness presented initially to [facility] on 07/03 from rehab center for acute SOB and fluid overload and now transferred to [facility] for LHC due to elevated troponin. Pt underwent LHC 7/5: LM okay, prox LAD of 80-90%, mid Lcx of 80-90%, OM of 90%, ostial RCA of 90%. LFA access site. LVEDP: 28. On Aspirin 81mg. Plan to be transferred to [facility] for CT surgery consult with Dr. Alma Mabry.     1) ESRD on HD  MWF  Last HD yesterday at [facility]  access: right upper thigh avg  consent in chart  Plan for next HD tomm  trend bmp    2) Anemia in ckd  hgb is 9.5  due to active coronary disease hold liv for now  monitor hgb      Dr Mcfarlane  [phone]
63 y/o F with PMH of ESRD(on HD M/W/F thru right groin AVF), HTN, HLD, DM type II, bilateral BKA, right eye blindness presented initially to [facility] on 07/03 from rehab center for acute SOB and fluid overload and now transferred to [facility] for LHC due to elevated troponin. Pt underwent LHC 7/5: LM okay, prox LAD of 80-90%, mid Lcx of 80-90%, OM of 90%, ostial RCA of 90%. LFA access site. LVEDP: 28. On Aspirin 81mg. awaiting CABG.

## 2023-07-08 LAB
ANION GAP SERPL CALC-SCNC: 17 MMOL/L — SIGNIFICANT CHANGE UP (ref 5–17)
BUN SERPL-MCNC: 42 MG/DL — HIGH (ref 7–23)
CALCIUM SERPL-MCNC: 10.2 MG/DL — SIGNIFICANT CHANGE UP (ref 8.4–10.5)
CHLORIDE SERPL-SCNC: 96 MMOL/L — SIGNIFICANT CHANGE UP (ref 96–108)
CO2 SERPL-SCNC: 28 MMOL/L — SIGNIFICANT CHANGE UP (ref 22–31)
CREAT SERPL-MCNC: 6.41 MG/DL — HIGH (ref 0.5–1.3)
EGFR: 7 ML/MIN/1.73M2 — LOW
GLUCOSE BLDC GLUCOMTR-MCNC: 112 MG/DL — HIGH (ref 70–99)
GLUCOSE SERPL-MCNC: 99 MG/DL — SIGNIFICANT CHANGE UP (ref 70–99)
HCT VFR BLD CALC: 32.3 % — LOW (ref 34.5–45)
HGB BLD-MCNC: 10.1 G/DL — LOW (ref 11.5–15.5)
MCHC RBC-ENTMCNC: 29.1 PG — SIGNIFICANT CHANGE UP (ref 27–34)
MCHC RBC-ENTMCNC: 31.3 GM/DL — LOW (ref 32–36)
MCV RBC AUTO: 93.1 FL — SIGNIFICANT CHANGE UP (ref 80–100)
NRBC # BLD: 0 /100 WBCS — SIGNIFICANT CHANGE UP (ref 0–0)
PLATELET # BLD AUTO: 173 K/UL — SIGNIFICANT CHANGE UP (ref 150–400)
POTASSIUM SERPL-MCNC: 4.5 MMOL/L — SIGNIFICANT CHANGE UP (ref 3.5–5.3)
POTASSIUM SERPL-SCNC: 4.5 MMOL/L — SIGNIFICANT CHANGE UP (ref 3.5–5.3)
RBC # BLD: 3.47 M/UL — LOW (ref 3.8–5.2)
RBC # FLD: 12.8 % — SIGNIFICANT CHANGE UP (ref 10.3–14.5)
SODIUM SERPL-SCNC: 141 MMOL/L — SIGNIFICANT CHANGE UP (ref 135–145)
WBC # BLD: 6.7 K/UL — SIGNIFICANT CHANGE UP (ref 3.8–10.5)
WBC # FLD AUTO: 6.7 K/UL — SIGNIFICANT CHANGE UP (ref 3.8–10.5)

## 2023-07-08 PROCEDURE — 99232 SBSQ HOSP IP/OBS MODERATE 35: CPT | Mod: FS

## 2023-07-08 RX ORDER — HEPARIN SODIUM 5000 [USP'U]/ML
5000 INJECTION INTRAVENOUS; SUBCUTANEOUS EVERY 12 HOURS
Refills: 0 | Status: DISCONTINUED | OUTPATIENT
Start: 2023-07-08 | End: 2023-07-13

## 2023-07-08 RX ADMIN — HEPARIN SODIUM 5000 UNIT(S): 5000 INJECTION INTRAVENOUS; SUBCUTANEOUS at 17:57

## 2023-07-08 RX ADMIN — Medication 25 MILLIGRAM(S): at 17:57

## 2023-07-08 RX ADMIN — Medication 81 MILLIGRAM(S): at 11:21

## 2023-07-08 RX ADMIN — Medication 1 TABLET(S): at 11:21

## 2023-07-08 RX ADMIN — SODIUM CHLORIDE 3 MILLILITER(S): 9 INJECTION INTRAMUSCULAR; INTRAVENOUS; SUBCUTANEOUS at 06:07

## 2023-07-08 RX ADMIN — SODIUM CHLORIDE 3 MILLILITER(S): 9 INJECTION INTRAMUSCULAR; INTRAVENOUS; SUBCUTANEOUS at 22:26

## 2023-07-08 RX ADMIN — ATORVASTATIN CALCIUM 40 MILLIGRAM(S): 80 TABLET, FILM COATED ORAL at 22:20

## 2023-07-08 RX ADMIN — SODIUM CHLORIDE 3 MILLILITER(S): 9 INJECTION INTRAMUSCULAR; INTRAVENOUS; SUBCUTANEOUS at 13:37

## 2023-07-08 RX ADMIN — Medication 25 MILLIGRAM(S): at 06:11

## 2023-07-08 RX ADMIN — SENNA PLUS 2 TABLET(S): 8.6 TABLET ORAL at 22:20

## 2023-07-08 NOTE — PROGRESS NOTE ADULT - PROBLEM SELECTOR PLAN 1
continue preop workup  HD today as per renal   carotids neg sig stenosis   echo mod mv stenosis; mild AR; ef nl; neg pericardial effusion  [id number]  pt undergoing preop workup and eval; assess for conduit grafting continue preop workup  continue asa/ bb and statin  HD today as per renal   carotids neg sig stenosis   echo mod mv stenosis; mild AR; ef nl; neg pericardial effusion  [id number]  OR this week with Dr. Mabry
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
## 2023-07-08 NOTE — PROGRESS NOTE ADULT - ASSESSMENT
61 y/o F with PMH of ESRD(on HD M/W/F thru right groin AVF), HTN, HLD, DM type II, bilateral BKA, right eye blindness presented initially to [facility] on 07/03 from rehab center for acute SOB and fluid overload and now transferred to [facility] for LHC due to elevated troponin. Pt underwent LHC 7/5: LM okay, prox LAD of 80-90%, mid Lcx of 80-90%, OM of 90%, ostial RCA of 90%. LFA access site. LVEDP: 28. On Aspirin 81mg. Plan to be transferred to [facility] for CT surgery consult with Dr. Alma Mabry.     1) ESRD on HD  MWF  Last HD yesterday   access: right upper thigh avg  consent in chart  Plan for HD monday   trend bmp    2) Anemia in ckd  hgb is 9.9  due to active coronary disease hold liv for now  monitor hgb    3) CAD- pre-op work-up for CABG ongoing     Dr Potts  [phone]

## 2023-07-08 NOTE — PROGRESS NOTE ADULT - PROBLEM SELECTOR PLAN 2
R Thigh AVF   HD M W F   Renal following  HD today as per renal R Thigh AVF   HD M W F   Renal following  trend bmp

## 2023-07-08 NOTE — PROGRESS NOTE ADULT - SUBJECTIVE AND OBJECTIVE BOX
Cardiovascular Disease Progress Note  Date of service: 23 @ 09:06    Overnight events: No acute events overnight.  Pt denies chest pain or SOB.   Otherwise review of systems negative    Objective Findings:  T(C): 36.4 (23 @ 20:09), Max: 36.8 (23 @ 12:56)  HR: 69 (23 @ 20:09) (68 - 69)  BP: 110/62 (23 @ 06:00) (110/62 - 139/59)  RR: 18 (23 @ 20:09) (18 - 18)  SpO2: 95% (23 @ 20:09) (95% - 100%)  Wt(kg): --  Daily     Daily Weight in k (2023 16:20)      Physical Exam:  Gen: NAD; Patient resting comfortably  HEENT: EOMI, Normocephalic/ atraumatic  CV: RRR, normal S1 + S2, no m/r/g  Lungs:  Normal respiratory effort; clear to auscultation bilaterally  Abd: soft, non-tender; bowel sounds present  Ext: No edema; warm and well perfused    Telemetry: Sinus      Laboratory Data:                        10.1   6.70  )-----------( 173      ( 2023 07:17 )             32.3         141  |  96  |  42<H>  ----------------------------<  99  4.5   |  28  |  6.41<H>    Ca    10.2      2023 07:18    TPro  7.2  /  Alb  4.0  /  TBili  0.3  /  DBili  0.1  /  AST  16  /  ALT  8<L>  /  AlkPhos  72  -              Inpatient Medications:  MEDICATIONS  (STANDING):  aspirin enteric coated 81 milliGRAM(s) Oral daily  atorvastatin 40 milliGRAM(s) Oral at bedtime  metoprolol tartrate 25 milliGRAM(s) Oral every 12 hours  multivitamin 1 Tablet(s) Oral daily  senna 2 Tablet(s) Oral at bedtime  sodium chloride 0.9% lock flush 3 milliLiter(s) IV Push every 8 hours      Assessment:  61-year-old female with ESRD on HD, HLD and peripheral vascular disease s/p lower extremity resection presents with chest pain found to have multivessel disease.      Plan of Care:      #Multivessel CAD-  Noted on cardiac cath .  Pt to undergo CABG evaluation with Dr. Mabry      #Peripheral vascular disease-  s/p L BKA well.   Continue current cardiac management.   Antiplatelet therapy with ASA.       #Compensated diastolic CHF-  Euvolemic on exam.  Fluid removal with HD as per the renal team.     #ESRD-  - HD, as per renal.      #ACP (advance care planning)-  Advanced care planning was discussed with the patient.  Risks, benefits and alternatives of medical treatment and procedures were discussed in detail and all questions were answered. 30 additional minutes spent addressing advance care plans.        Over 25 minutes spent on total encounter; more than 50% of the visit was spent counseling and/or coordinating care by the attending physician.      Wisam Adams DO [facility]  Cardiovascular Disease  [phone]

## 2023-07-08 NOTE — PROGRESS NOTE ADULT - ASSESSMENT
61 y/o F with PMH of ESRD(on HD M/W/F thru right groin AVF), HTN, HLD, DM type II, bilateral BKA, right eye blindness presented initially to [facility] on 07/03 from rehab center for acute SOB and fluid overload and now transferred to [facility] for LHC due to elevated troponin. Pt underwent LHC 7/5: LM okay, prox LAD of 80-90%, mid Lcx of 80-90%, OM of 90%, ostial RCA of 90%. LFA access site. LVEDP: 28. On Aspirin 81mg. Plan to be transferred to [facility] for CT surgery consult with Dr. Alma Mabry.       7/6 VSS; Transfer from [facility] for CABG Eval. with Dr. Mabry.   7/7 VSS: RSR 60-80; continue preop workup; HD today as per renal; carotids neg sig stenosis; echo mod mv stenosis; mild AR; ef nl; neg pericardial effusion  p2y12 217;  pt undergoing preop workup and eval; assess for conduit grafting  61 y/o F with PMH of ESRD(on HD M/W/F thru right groin AVF), HTN, HLD, DM type II, bilateral BKA, right eye blindness presented initially to [facility] on 07/03 from rehab center for acute SOB and fluid overload and now transferred to [facility] for LHC due to elevated troponin. Pt underwent LHC 7/5: LM okay, prox LAD of 80-90%, mid Lcx of 80-90%, OM of 90%, ostial RCA of 90%. LFA access site. LVEDP: 28. On Aspirin 81mg. Plan to be transferred to [facility] for CT surgery consult with Dr. Alma Mabry.       7/6 VSS; Transfer from [facility] for CABG Eval. with Dr. Mabry.   7/7 VSS: RSR 60-80; continue preop workup; HD today as per renal; carotids neg sig stenosis; echo mod mv stenosis; mild AR; ef nl; neg pericardial effusion  p2y12 217;  pt undergoing preop workup and eval; assess for conduit grafting   7/8 VSS; RSR 60-70;  continue asa/ statin/ bb; hd as per renal; pt eval  OR this week with Dr. Mabry

## 2023-07-08 NOTE — PROGRESS NOTE ADULT - SUBJECTIVE AND OBJECTIVE BOX
[facility] : Progress Note :: [phone], (office [phone]),   Dr Potts / Dr Perkins / Dr Madden / Dr Shea / Dr Denys POLANCO / Dr Vanegas / Dr Bazzi / Dr Quentin patricia  _____________________________________________________________________________________________    no complains   No Known Allergies    Hospital Medications:   MEDICATIONS  (STANDING):  aspirin enteric coated 81 milliGRAM(s) Oral daily  atorvastatin 40 milliGRAM(s) Oral at bedtime  heparin   Injectable 5000 Unit(s) SubCutaneous every 12 hours  metoprolol tartrate 25 milliGRAM(s) Oral every 12 hours  multivitamin 1 Tablet(s) Oral daily  senna 2 Tablet(s) Oral at bedtime  sodium chloride 0.9% lock flush 3 milliLiter(s) IV Push every 8 hours        VITALS:  T(F): 97.7 (07-08-23 @ 12:10), Max: 98.2 (07-07-23 @ 16:20)  HR: 67 (07-08-23 @ 12:10)  BP: 153/70 (07-08-23 @ 12:10)  RR: 18 (07-08-23 @ 12:10)  SpO2: 95% (07-08-23 @ 12:10)  Wt(kg): --    07-07 @ 07:01  -  07-08 @ 07:00  --------------------------------------------------------  IN: 910 mL / OUT: 1000 mL / NET: -90 mL    07-08 @ 07:01  -  07-08 @ 13:49  --------------------------------------------------------  IN: 480 mL / OUT: 0 mL / NET: 480 mL        PHYSICAL EXAM:  Constitutional: NAD  HEENT: anicteric sclera, oropharynx clear,  Neck: No JVD  Respiratory: CTAB, no wheezes, rales or rhonchi  Cardiovascular: S1, S2, RRR  Gastrointestinal: BS+, soft, NT/ND  Extremities: bilateral  BKA   Neurological: A/O x 3, no focal deficits  : No CVA tenderness. No salcido.   Skin: No rashes  Vascular Access: RT thigh AVG     LABS:  07-08    141  |  96  |  42<H>  ----------------------------<  99  4.5   |  28  |  6.41<H>    Ca    10.2      08 Jul 2023 07:18      Creatinine Trend: 6.41 <--, 8.26 <--, 6.36 <--, 9.64 <--                        10.1   6.70  )-----------( 173      ( 08 Jul 2023 07:17 )             32.3     Urine Studies:  Urinalysis Basic - ( 08 Jul 2023 07:18 )    Color:  / Appearance:  / SG:  / pH:   Gluc: 99 mg/dL / Ketone:   / Bili:  / Urobili:    Blood:  / Protein:  / Nitrite:    Leuk Esterase:  / RBC:  / WBC    Sq Epi:  / Non Sq Epi:  / Bacteria:         RADIOLOGY & ADDITIONAL STUDIES:

## 2023-07-08 NOTE — PROGRESS NOTE ADULT - SUBJECTIVE AND OBJECTIVE BOX
Date of Service  : 07-08-23    INTERVAL HPI/OVERNIGHT EVENTS: I feel fine.  Vital Signs Last 24 Hrs  T(C): 36.5 (08 Jul 2023 12:10), Max: 36.8 (07 Jul 2023 16:20)  T(F): 97.7 (08 Jul 2023 12:10), Max: 98.2 (07 Jul 2023 16:20)  HR: 67 (08 Jul 2023 12:10) (67 - 69)  BP: 153/70 (08 Jul 2023 12:10) (110/62 - 153/70)  BP(mean): --  RR: 18 (08 Jul 2023 12:10) (18 - 18)  SpO2: 95% (08 Jul 2023 12:10) (95% - 100%)    Parameters below as of 08 Jul 2023 12:10  Patient On (Oxygen Delivery Method): room air      I&O's Summary    07 Jul 2023 07:01  -  08 Jul 2023 07:00  --------------------------------------------------------  IN: 910 mL / OUT: 1000 mL / NET: -90 mL    08 Jul 2023 07:01  -  08 Jul 2023 14:22  --------------------------------------------------------  IN: 480 mL / OUT: 0 mL / NET: 480 mL      MEDICATIONS  (STANDING):  aspirin enteric coated 81 milliGRAM(s) Oral daily  atorvastatin 40 milliGRAM(s) Oral at bedtime  heparin   Injectable 5000 Unit(s) SubCutaneous every 12 hours  metoprolol tartrate 25 milliGRAM(s) Oral every 12 hours  multivitamin 1 Tablet(s) Oral daily  senna 2 Tablet(s) Oral at bedtime  sodium chloride 0.9% lock flush 3 milliLiter(s) IV Push every 8 hours    MEDICATIONS  (PRN):    LABS:                        10.1   6.70  )-----------( 173      ( 08 Jul 2023 07:17 )             32.3     07-08    141  |  96  |  42<H>  ----------------------------<  99  4.5   |  28  |  6.41<H>    Ca    10.2      08 Jul 2023 07:18        Urinalysis Basic - ( 08 Jul 2023 07:18 )    Color: x / Appearance: x / SG: x / pH: x  Gluc: 99 mg/dL / Ketone: x  / Bili: x / Urobili: x   Blood: x / Protein: x / Nitrite: x   Leuk Esterase: x / RBC: x / WBC x   Sq Epi: x / Non Sq Epi: x / Bacteria: x      CAPILLARY BLOOD GLUCOSE      POCT Blood Glucose.: 112 mg/dL (08 Jul 2023 08:09)        Urinalysis Basic - ( 08 Jul 2023 07:18 )    Color: x / Appearance: x / SG: x / pH: x  Gluc: 99 mg/dL / Ketone: x  / Bili: x / Urobili: x   Blood: x / Protein: x / Nitrite: x   Leuk Esterase: x / RBC: x / WBC x   Sq Epi: x / Non Sq Epi: x / Bacteria: x      REVIEW OF SYSTEMS:  CONSTITUTIONAL: No fever, weight loss, or fatigue  EYES: No eye pain, visual disturbances, or discharge  ENMT:  No difficulty hearing, tinnitus, vertigo; No sinus or throat pain  NECK: No pain or stiffness  RESPIRATORY: No cough, wheezing, chills or hemoptysis; No shortness of breath  CARDIOVASCULAR: No chest pain, palpitations, dizziness, or leg swelling  GASTROINTESTINAL: No abdominal or epigastric pain. No nausea, vomiting, or hematemesis; No diarrhea or constipation. No melena or hematochezia.      Consultant(s) Notes Reviewed:  [x ] YES  [ ] NO    PHYSICAL EXAM:  GENERAL: NAD, well-groomed, well-developed,not in any distress ,  HEAD:  Atraumatic, Normocephalic  NECK: Supple, No JVD, Normal thyroid  NERVOUS SYSTEM:  Alert & Oriented X3, No focal deficit   CHEST/LUNG: Good air entry bilateral with no  rales, rhonchi, wheezing, or rubs  HEART: Regular rate and rhythm; No murmurs, rubs, or gallops  ABDOMEN: Soft, Nontender, Nondistended; Bowel sounds present  EXTREMITIES: B/L BKA    Care Discussed with Consultants/Other Providers [ x] YES  [ ] NO

## 2023-07-08 NOTE — PROGRESS NOTE ADULT - ASSESSMENT
61 y/o F with PMH of ESRD(on HD M/W/F thru right groin AVF), HTN, HLD, DM type II, bilateral BKA, right eye blindness presented initially to [facility] on 07/03 from rehab center for acute SOB and fluid overload and now transferred to [facility] for LHC due to elevated troponin. Pt underwent LHC 7/5: LM okay, prox LAD of 80-90%, mid Lcx of 80-90%, OM of 90%, ostial RCA of 90%. LFA access site. LVEDP: 28. On Aspirin 81mg. awaiting CABG.        Problem/Recommendation - 1:  ·  Problem: CAD, multiple vessel.   ·  Recommendation: Awaiting CABG . No Cp etc,   On ASA,BB and Statin.     Problem/Recommendation - 2:  ·  Problem: ESRD on hemodialysis.   ·  Recommendation: HD per renal .     Problem/Recommendation - 3:  ·  Problem: DM (diabetes mellitus).   ·  Recommendation: Sugars in good range.     Problem/Recommendation - 4:  ·  Problem: HTN (hypertension).   ·  Recommendation: BP readings better.     Problem/Recommendation - 5:  ·  Problem: Anemia secondary to renal failure.   ·  Recommendation: HH stable. Epogen per renal.     Problem/Recommendation - 6:  ·  Problem: HLD (hyperlipidemia).   ·  Recommendation: Statin.     Problem/Recommendation - 7:  ·  Problem: PVD (peripheral vascular disease).   ·  Recommendation: S/P BKA . Has Prosthesis .     Problem/Recommendation - 8:  ·  Problem: Legally blind.   ·  Recommendation: Supportive care.

## 2023-07-09 LAB
ANION GAP SERPL CALC-SCNC: 20 MMOL/L — HIGH (ref 5–17)
BUN SERPL-MCNC: 60 MG/DL — HIGH (ref 7–23)
CALCIUM SERPL-MCNC: 9.7 MG/DL — SIGNIFICANT CHANGE UP (ref 8.4–10.5)
CHLORIDE SERPL-SCNC: 97 MMOL/L — SIGNIFICANT CHANGE UP (ref 96–108)
CO2 SERPL-SCNC: 25 MMOL/L — SIGNIFICANT CHANGE UP (ref 22–31)
CREAT SERPL-MCNC: 8.17 MG/DL — HIGH (ref 0.5–1.3)
EGFR: 5 ML/MIN/1.73M2 — LOW
GLUCOSE SERPL-MCNC: 93 MG/DL — SIGNIFICANT CHANGE UP (ref 70–99)
HCT VFR BLD CALC: 32.3 % — LOW (ref 34.5–45)
HGB BLD-MCNC: 10.1 G/DL — LOW (ref 11.5–15.5)
MCHC RBC-ENTMCNC: 29.2 PG — SIGNIFICANT CHANGE UP (ref 27–34)
MCHC RBC-ENTMCNC: 31.3 GM/DL — LOW (ref 32–36)
MCV RBC AUTO: 93.4 FL — SIGNIFICANT CHANGE UP (ref 80–100)
NRBC # BLD: 0 /100 WBCS — SIGNIFICANT CHANGE UP (ref 0–0)
PLATELET # BLD AUTO: 180 K/UL — SIGNIFICANT CHANGE UP (ref 150–400)
POTASSIUM SERPL-MCNC: 4.5 MMOL/L — SIGNIFICANT CHANGE UP (ref 3.5–5.3)
POTASSIUM SERPL-SCNC: 4.5 MMOL/L — SIGNIFICANT CHANGE UP (ref 3.5–5.3)
RBC # BLD: 3.46 M/UL — LOW (ref 3.8–5.2)
RBC # FLD: 12.7 % — SIGNIFICANT CHANGE UP (ref 10.3–14.5)
SODIUM SERPL-SCNC: 142 MMOL/L — SIGNIFICANT CHANGE UP (ref 135–145)
WBC # BLD: 6.74 K/UL — SIGNIFICANT CHANGE UP (ref 3.8–10.5)
WBC # FLD AUTO: 6.74 K/UL — SIGNIFICANT CHANGE UP (ref 3.8–10.5)

## 2023-07-09 PROCEDURE — 99232 SBSQ HOSP IP/OBS MODERATE 35: CPT

## 2023-07-09 RX ADMIN — SODIUM CHLORIDE 3 MILLILITER(S): 9 INJECTION INTRAMUSCULAR; INTRAVENOUS; SUBCUTANEOUS at 22:24

## 2023-07-09 RX ADMIN — SODIUM CHLORIDE 3 MILLILITER(S): 9 INJECTION INTRAMUSCULAR; INTRAVENOUS; SUBCUTANEOUS at 05:55

## 2023-07-09 RX ADMIN — Medication 25 MILLIGRAM(S): at 05:44

## 2023-07-09 RX ADMIN — Medication 25 MILLIGRAM(S): at 17:20

## 2023-07-09 RX ADMIN — HEPARIN SODIUM 5000 UNIT(S): 5000 INJECTION INTRAVENOUS; SUBCUTANEOUS at 05:44

## 2023-07-09 RX ADMIN — HEPARIN SODIUM 5000 UNIT(S): 5000 INJECTION INTRAVENOUS; SUBCUTANEOUS at 17:20

## 2023-07-09 RX ADMIN — Medication 1 TABLET(S): at 17:20

## 2023-07-09 RX ADMIN — Medication 81 MILLIGRAM(S): at 17:20

## 2023-07-09 RX ADMIN — SODIUM CHLORIDE 3 MILLILITER(S): 9 INJECTION INTRAMUSCULAR; INTRAVENOUS; SUBCUTANEOUS at 17:13

## 2023-07-09 RX ADMIN — ATORVASTATIN CALCIUM 40 MILLIGRAM(S): 80 TABLET, FILM COATED ORAL at 21:48

## 2023-07-09 NOTE — PROGRESS NOTE ADULT - ASSESSMENT
61 y/o F with PMH of ESRD(on HD M/W/F thru right groin AVF), HTN, HLD, DM type II, bilateral BKA, right eye blindness presented initially to [facility] on 07/03 from rehab center for acute SOB and fluid overload and now transferred to [facility] for LHC due to elevated troponin. Pt underwent LHC 7/5: LM okay, prox LAD of 80-90%, mid Lcx of 80-90%, OM of 90%, ostial RCA of 90%. LFA access site. LVEDP: 28. On Aspirin 81mg. Plan to be transferred to [facility] for CT surgery consult with Dr. Alma Mabry.       7/6 VSS; Transfer from [facility] for CABG Eval. with Dr. Mabry.   7/7 VSS: RSR 60-80; continue preop workup; HD today as per renal; carotids neg sig stenosis; echo mod mv stenosis; mild AR; ef nl; neg pericardial effusion  p2y12 217;  pt undergoing preop workup and eval; assess for conduit grafting   7/8 VSS; RSR 60-70;  continue asa/ statin/ bb; hd as per renal; pt eval  OR this week with Dr. Mabry  7/9 HD tomorrow. Scheduled for CABG Thursday

## 2023-07-09 NOTE — PROGRESS NOTE ADULT - SUBJECTIVE AND OBJECTIVE BOX
Cardiovascular Disease Progress Note  Date of service: 07-09-23 @ 08:20    Overnight events: No acute events overnight.  Pt is in no distress. Denies chest pain or SOB.   Otherwise review of systems negative    Objective Findings:  T(C): 36.8 (07-09-23 @ 04:03), Max: 37.1 (07-08-23 @ 19:45)  HR: 65 (07-09-23 @ 04:03) (65 - 72)  BP: 115/63 (07-09-23 @ 04:03) (115/63 - 153/70)  RR: 18 (07-09-23 @ 04:03) (18 - 18)  SpO2: 96% (07-09-23 @ 04:03) (94% - 97%)  Wt(kg): --  Daily     Daily       Physical Exam:  Gen: NAD; Patient resting comfortably  HEENT: EOMI, Normocephalic/ atraumatic  CV: RRR, normal S1 + S2, no m/r/g  Lungs:  Normal respiratory effort; clear to auscultation bilaterally  Abd: soft, non-tender; bowel sounds present  Ext: No edema; warm and well perfused    Telemetry:  Sinus    Laboratory Data:                        10.1   6.74  )-----------( 180      ( 09 Jul 2023 05:13 )             32.3     07-09    142  |  97  |  60<H>  ----------------------------<  93  4.5   |  25  |  8.17<H>    Ca    9.7      09 Jul 2023 05:12                Inpatient Medications:  MEDICATIONS  (STANDING):  aspirin enteric coated 81 milliGRAM(s) Oral daily  atorvastatin 40 milliGRAM(s) Oral at bedtime  heparin   Injectable 5000 Unit(s) SubCutaneous every 12 hours  metoprolol tartrate 25 milliGRAM(s) Oral every 12 hours  multivitamin 1 Tablet(s) Oral daily  senna 2 Tablet(s) Oral at bedtime  sodium chloride 0.9% lock flush 3 milliLiter(s) IV Push every 8 hours      Assessment:  61-year-old female with ESRD on HD, HLD and peripheral vascular disease s/p lower extremity resection presents with chest pain found to have multivessel disease.      Plan of Care:      #Multivessel CAD-  Noted on cardiac cath 7/5.  Echo 7/6 with normal LV systolic function. Apical Hypokinesis and moderate MS.   OR this week for CABG with Dr. Mabry. Date TBD      #Peripheral vascular disease-  s/p L BKA well.   Continue current cardiac management.   Antiplatelet therapy with ASA.       #Compensated diastolic CHF-  Euvolemic on exam.  Fluid removal with HD as per the renal team.     #ESRD-  - HD, as per renal.                Over 25 minutes spent on total encounter; more than 50% of the visit was spent counseling and/or coordinating care by the attending physician.      Wisam Adams,  [facility]  Cardiovascular Disease  [phone]

## 2023-07-09 NOTE — PROGRESS NOTE ADULT - ASSESSMENT
61 y/o F with PMH of ESRD(on HD M/W/F thru right groin AVF), HTN, HLD, DM type II, bilateral BKA, right eye blindness presented initially to [facility] on 07/03 from rehab center for acute SOB and fluid overload and now transferred to [facility] for LHC due to elevated troponin. Pt underwent LHC 7/5: LM okay, prox LAD of 80-90%, mid Lcx of 80-90%, OM of 90%, ostial RCA of 90%. LFA access site. LVEDP: 28. On Aspirin 81mg. Plan to be transferred to [facility] for CT surgery consult with Dr. Alma Mabry.     1) ESRD on HD  MWF  Last HD Frdiday  access: right upper thigh avg  consent in chart  Plan for HD monday   trend bmp    2) Anemia in ckd  hgb is 9.9  due to active coronary disease hold liv for now  monitor hgb    3) CAD- pre-op work-up for CABG ongoing     Dr Potts  [phone]

## 2023-07-09 NOTE — PROGRESS NOTE ADULT - PROBLEM SELECTOR PLAN 1
continue preop workup  continue asa/ bb and statin  HD today as per renal   carotids neg sig stenosis   echo mod mv stenosis; mild AR; ef nl; neg pericardial effusion  p2y12 217  OR this week with Dr. Mbary

## 2023-07-09 NOTE — PROGRESS NOTE ADULT - SUBJECTIVE AND OBJECTIVE BOX
VITAL SIGNS    Telemetry:    Vital Signs Last 24 Hrs  T(C): 36.8 (07-09-23 @ 04:03), Max: 37.1 (07-08-23 @ 19:45)  T(F): 98.2 (07-09-23 @ 04:03), Max: 98.8 (07-08-23 @ 19:45)  HR: 65 (07-09-23 @ 04:03) (65 - 72)  BP: 115/63 (07-09-23 @ 04:03) (115/63 - 152/73)  RR: 18 (07-09-23 @ 04:03) (18 - 18)  SpO2: 96% (07-09-23 @ 04:03) (94% - 97%)            07-08 @ 07:01  -  07-09 @ 07:00  --------------------------------------------------------  IN: 960 mL / OUT: 0 mL / NET: 960 mL    07-09 @ 07:01  -  07-09 @ 13:31  --------------------------------------------------------  IN: 240 mL / OUT: 0 mL / NET: 240 mL       Daily     Daily   Admit Wt: Drug Dosing Weight  Height (cm): 162.6 (23 Nov 2022 07:07)  Weight (kg): 67 (23 Nov 2022 07:07)  BMI (kg/m2): 25.3 (23 Nov 2022 07:07)  BSA (m2): 1.72 (23 Nov 2022 07:07)      CAPILLARY BLOOD GLUCOSE              MEDICATIONS  aspirin enteric coated 81 milliGRAM(s) Oral daily  atorvastatin 40 milliGRAM(s) Oral at bedtime  heparin   Injectable 5000 Unit(s) SubCutaneous every 12 hours  metoprolol tartrate 25 milliGRAM(s) Oral every 12 hours  multivitamin 1 Tablet(s) Oral daily  senna 2 Tablet(s) Oral at bedtime  sodium chloride 0.9% lock flush 3 milliLiter(s) IV Push every 8 hours      >>> <<<  PHYSICAL EXAM    Constitutional: NAD  HEENT: anicteric sclera, oropharynx clear,  Neck: No JVD  Respiratory: CTAB, no wheezes, rales or rhonchi  Cardiovascular: S1, S2, RRR  Gastrointestinal: BS+, soft, NT/ND  Extremities: No peripheral edema  Neurological: A/O x 3, no focal deficits  Vascular Access: RT thigh AVG    LABS  07-09    142  |  97  |  60<H>  ----------------------------<  93  4.5   |  25  |  8.17<H>    Ca    9.7      09 Jul 2023 05:12                                   10.1   6.74  )-----------( 180      ( 09 Jul 2023 05:13 )             32.3                 PAST MEDICAL & SURGICAL HISTORY:  Heart failure      HLD (hyperlipidemia)      Glaucoma      Cataract      ESRD (end stage renal disease) on dialysis      PAD (peripheral artery disease)      Blind right eye      Acute osteomyelitis of toe of right foot  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

## 2023-07-09 NOTE — PROGRESS NOTE ADULT - SUBJECTIVE AND OBJECTIVE BOX
[facility] : Progress Note :: [phone], (office [phone]),   Dr Potts / Dr Perkins / Dr Madden / Dr Shea / Dr Denys POLANCO / Dr Vanegas / Dr Bazzi / Dr Quentin patricia  _____________________________________________________________________________________________  no complains     No Known Allergies    Hospital Medications:   MEDICATIONS  (STANDING):  aspirin enteric coated 81 milliGRAM(s) Oral daily  atorvastatin 40 milliGRAM(s) Oral at bedtime  heparin   Injectable 5000 Unit(s) SubCutaneous every 12 hours  metoprolol tartrate 25 milliGRAM(s) Oral every 12 hours  multivitamin 1 Tablet(s) Oral daily  senna 2 Tablet(s) Oral at bedtime  sodium chloride 0.9% lock flush 3 milliLiter(s) IV Push every 8 hours        VITALS:  T(F): 98.2 (07-09-23 @ 04:03), Max: 98.8 (07-08-23 @ 19:45)  HR: 65 (07-09-23 @ 04:03)  BP: 115/63 (07-09-23 @ 04:03)  RR: 18 (07-09-23 @ 04:03)  SpO2: 96% (07-09-23 @ 04:03)  Wt(kg): --    07-08 @ 07:01  -  07-09 @ 07:00  --------------------------------------------------------  IN: 960 mL / OUT: 0 mL / NET: 960 mL    07-09 @ 07:01  -  07-09 @ 12:52  --------------------------------------------------------  IN: 240 mL / OUT: 0 mL / NET: 240 mL        PHYSICAL EXAM:  Constitutional: NAD  HEENT: anicteric sclera, oropharynx clear,  Neck: No JVD  Respiratory: CTAB, no wheezes, rales or rhonchi  Cardiovascular: S1, S2, RRR  Gastrointestinal: BS+, soft, NT/ND  Extremities: No peripheral edema  Neurological: A/O x 3, no focal deficits  Vascular Access: RT thigh AVG    LABS:  07-09    142  |  97  |  60<H>  ----------------------------<  93  4.5   |  25  |  8.17<H>    Ca    9.7      09 Jul 2023 05:12      Creatinine Trend: 8.17 <--, 6.41 <--, 8.26 <--, 6.36 <--, 9.64 <--                        10.1   6.74  )-----------( 180      ( 09 Jul 2023 05:13 )             32.3     Urine Studies:  Urinalysis Basic - ( 09 Jul 2023 05:12 )    Color:  / Appearance:  / SG:  / pH:   Gluc: 93 mg/dL / Ketone:   / Bili:  / Urobili:    Blood:  / Protein:  / Nitrite:    Leuk Esterase:  / RBC:  / WBC    Sq Epi:  / Non Sq Epi:  / Bacteria:         RADIOLOGY & ADDITIONAL STUDIES:

## 2023-07-10 LAB
ALBUMIN SERPL ELPH-MCNC: 3.7 G/DL — SIGNIFICANT CHANGE UP (ref 3.3–5)
ALP SERPL-CCNC: 70 U/L — SIGNIFICANT CHANGE UP (ref 40–120)
ALT FLD-CCNC: 7 U/L — LOW (ref 10–45)
ANION GAP SERPL CALC-SCNC: 21 MMOL/L — HIGH (ref 5–17)
AST SERPL-CCNC: 16 U/L — SIGNIFICANT CHANGE UP (ref 10–40)
BILIRUB SERPL-MCNC: 0.2 MG/DL — SIGNIFICANT CHANGE UP (ref 0.2–1.2)
BUN SERPL-MCNC: 77 MG/DL — HIGH (ref 7–23)
CALCIUM SERPL-MCNC: 9.5 MG/DL — SIGNIFICANT CHANGE UP (ref 8.4–10.5)
CHLORIDE SERPL-SCNC: 96 MMOL/L — SIGNIFICANT CHANGE UP (ref 96–108)
CO2 SERPL-SCNC: 23 MMOL/L — SIGNIFICANT CHANGE UP (ref 22–31)
CREAT SERPL-MCNC: 10.24 MG/DL — HIGH (ref 0.5–1.3)
EGFR: 4 ML/MIN/1.73M2 — LOW
GLUCOSE SERPL-MCNC: 90 MG/DL — SIGNIFICANT CHANGE UP (ref 70–99)
HCT VFR BLD CALC: 30.8 % — LOW (ref 34.5–45)
HGB BLD-MCNC: 9.8 G/DL — LOW (ref 11.5–15.5)
MCHC RBC-ENTMCNC: 29.3 PG — SIGNIFICANT CHANGE UP (ref 27–34)
MCHC RBC-ENTMCNC: 31.8 GM/DL — LOW (ref 32–36)
MCV RBC AUTO: 92.2 FL — SIGNIFICANT CHANGE UP (ref 80–100)
NRBC # BLD: 0 /100 WBCS — SIGNIFICANT CHANGE UP (ref 0–0)
PLATELET # BLD AUTO: 207 K/UL — SIGNIFICANT CHANGE UP (ref 150–400)
POTASSIUM SERPL-MCNC: 5.2 MMOL/L — SIGNIFICANT CHANGE UP (ref 3.5–5.3)
POTASSIUM SERPL-SCNC: 5.2 MMOL/L — SIGNIFICANT CHANGE UP (ref 3.5–5.3)
PROT SERPL-MCNC: 7 G/DL — SIGNIFICANT CHANGE UP (ref 6–8.3)
RBC # BLD: 3.34 M/UL — LOW (ref 3.8–5.2)
RBC # FLD: 12.9 % — SIGNIFICANT CHANGE UP (ref 10.3–14.5)
SODIUM SERPL-SCNC: 140 MMOL/L — SIGNIFICANT CHANGE UP (ref 135–145)
WBC # BLD: 7.01 K/UL — SIGNIFICANT CHANGE UP (ref 3.8–10.5)
WBC # FLD AUTO: 7.01 K/UL — SIGNIFICANT CHANGE UP (ref 3.8–10.5)

## 2023-07-10 PROCEDURE — 99232 SBSQ HOSP IP/OBS MODERATE 35: CPT | Mod: FS

## 2023-07-10 RX ADMIN — Medication 25 MILLIGRAM(S): at 05:05

## 2023-07-10 RX ADMIN — Medication 1 TABLET(S): at 16:13

## 2023-07-10 RX ADMIN — HEPARIN SODIUM 5000 UNIT(S): 5000 INJECTION INTRAVENOUS; SUBCUTANEOUS at 18:00

## 2023-07-10 RX ADMIN — SODIUM CHLORIDE 3 MILLILITER(S): 9 INJECTION INTRAMUSCULAR; INTRAVENOUS; SUBCUTANEOUS at 06:49

## 2023-07-10 RX ADMIN — SODIUM CHLORIDE 3 MILLILITER(S): 9 INJECTION INTRAMUSCULAR; INTRAVENOUS; SUBCUTANEOUS at 14:00

## 2023-07-10 RX ADMIN — HEPARIN SODIUM 5000 UNIT(S): 5000 INJECTION INTRAVENOUS; SUBCUTANEOUS at 05:05

## 2023-07-10 RX ADMIN — Medication 25 MILLIGRAM(S): at 18:45

## 2023-07-10 RX ADMIN — SODIUM CHLORIDE 3 MILLILITER(S): 9 INJECTION INTRAMUSCULAR; INTRAVENOUS; SUBCUTANEOUS at 22:13

## 2023-07-10 RX ADMIN — ATORVASTATIN CALCIUM 40 MILLIGRAM(S): 80 TABLET, FILM COATED ORAL at 21:06

## 2023-07-10 RX ADMIN — Medication 81 MILLIGRAM(S): at 16:13

## 2023-07-10 NOTE — PROGRESS NOTE ADULT - SUBJECTIVE AND OBJECTIVE BOX
Subjective: "Hello"      Tele:    SR 70s                            T(C): 36.3 (07-10-23 @ 15:20), Max: 36.7 (07-09-23 @ 19:43)  HR: 64 (07-10-23 @ 15:20) (64 - 72)  BP: 124/64 (07-10-23 @ 15:20) (116/64 - 162/65)  RR: 18 (07-10-23 @ 15:20) (18 - 18)  SpO2: 99% (07-10-23 @ 15:20) (93% - 99%)        07-10    140  |  96  |  77<H>  ----------------------------<  90  5.2   |  23  |  10.24<H>    Ca    9.5      10 Jul 2023 06:04    TPro  7.0  /  Alb  3.7  /  TBili  0.2  /  DBili  x   /  AST  16  /  ALT  7<L>  /  AlkPhos  70  07-10                               9.8    7.01  )-----------( 207      ( 10 Jul 2023 06:04 )             30.8                Assessment    Constitutional: NAD  Respiratory: CTAB, no wheezes, rales or rhonchi  Cardiovascular: S1, S2, RRR  Gastrointestinal: BS+, soft, NT/ND  Extremities: No peripheral edema  Neurological: A/O x 3, no focal deficits  Vascular Access: RT thigh AVG        MEDICATIONS  (STANDING):  aspirin enteric coated 81 milliGRAM(s) Oral daily  atorvastatin 40 milliGRAM(s) Oral at bedtime  heparin   Injectable 5000 Unit(s) SubCutaneous every 12 hours  metoprolol tartrate 25 milliGRAM(s) Oral every 12 hours  multivitamin 1 Tablet(s) Oral daily  senna 2 Tablet(s) Oral at bedtime  sodium chloride 0.9% lock flush 3 milliLiter(s) IV Push every 8 hours       PAST MEDICAL & SURGICAL HISTORY:  Heart failure      HLD (hyperlipidemia)      Glaucoma      Cataract      ESRD (end stage renal disease) on dialysis      PAD (peripheral artery disease)      Blind right eye      Acute osteomyelitis of toe of right foot  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

## 2023-07-10 NOTE — PROGRESS NOTE ADULT - SUBJECTIVE AND OBJECTIVE BOX
Cardiovascular Disease Progress Note  Date of service: 07-10-23 @ 08:09    Overnight events: No acute events overnight.  Ms. Boogie denies chest pain or SOB  Otherwise review of systems negative    Objective Findings:  T(C): 36.7 (07-10-23 @ 04:35), Max: 36.7 (07-09-23 @ 19:43)  HR: 69 (07-10-23 @ 04:35) (67 - 72)  BP: 124/72 (07-10-23 @ 04:35) (116/64 - 162/65)  RR: 18 (07-10-23 @ 04:35) (18 - 18)  SpO2: 93% (07-10-23 @ 04:35) (93% - 98%)  Wt(kg): --  Daily     Daily       Physical Exam:  Gen: NAD; Patient resting comfortably  HEENT: EOMI, Normocephalic/ atraumatic  CV: RRR, normal S1 + S2, no m/r/g  Lungs:  Normal respiratory effort; clear to auscultation bilaterally  Abd: soft, non-tender; bowel sounds present  Ext: AKA    Telemetry: Sinus     Laboratory Data:                        9.8    7.01  )-----------( 207      ( 10 Jul 2023 06:04 )             30.8     07-10    140  |  96  |  77<H>  ----------------------------<  90  5.2   |  23  |  10.24<H>    Ca    9.5      10 Jul 2023 06:04    TPro  7.0  /  Alb  3.7  /  TBili  0.2  /  DBili  x   /  AST  16  /  ALT  7<L>  /  AlkPhos  70  07-10              Inpatient Medications:  MEDICATIONS  (STANDING):  aspirin enteric coated 81 milliGRAM(s) Oral daily  atorvastatin 40 milliGRAM(s) Oral at bedtime  heparin   Injectable 5000 Unit(s) SubCutaneous every 12 hours  metoprolol tartrate 25 milliGRAM(s) Oral every 12 hours  multivitamin 1 Tablet(s) Oral daily  senna 2 Tablet(s) Oral at bedtime  sodium chloride 0.9% lock flush 3 milliLiter(s) IV Push every 8 hours      Assessment:  61-year-old female with ESRD on HD, HLD and peripheral vascular disease s/p lower extremity resection presents with chest pain found to have multivessel disease.      Plan of Care:      #Multivessel CAD-  Noted on cardiac cath 7/5.  Echo 7/6 with normal LV systolic function. Apical Hypokinesis and moderate MS.   OR this week for CABG with Dr. Mabry. Plan for Thursday      #Peripheral vascular disease-  s/p L BKA well.   Continue current cardiac management.   Antiplatelet therapy with ASA.       #Compensated diastolic CHF-  Euvolemic on exam.  Fluid removal with HD as per the renal team.     #ESRD-  - HD, as per renal.    #ACP (advance care planning)-  Advanced care planning was discussed with the patient.  Risks, benefits and alternatives of medical treatment and procedures were discussed in detail and all questions were answered. 30 additional minutes spent addressing advance care plans.    Over 25 minutes spent on total encounter; more than 50% of the visit was spent counseling and/or coordinating care by the attending physician.      Wisam Adams DO [facility]  Cardiovascular Disease  [phone]

## 2023-07-10 NOTE — PROGRESS NOTE ADULT - ASSESSMENT
63 y/o F with PMH of ESRD(on HD M/W/F thru right groin AVF), HTN, HLD, DM type II, bilateral BKA, right eye blindness presented initially to [facility] on 07/03 from rehab center for acute SOB and fluid overload and now transferred to [facility] for LHC due to elevated troponin. Pt underwent LHC 7/5: LM okay, prox LAD of 80-90%, mid Lcx of 80-90%, OM of 90%, ostial RCA of 90%. LFA access site. LVEDP: 28. On Aspirin 81mg. awaiting CABG.        Problem/Recommendation - 1:  ·  Problem: CAD, multiple vessel.   ·  Recommendation: Awaiting CABG  and planned for Thursday .    No Cp etc,   On ASA,BB and Statin.     Problem/Recommendation - 2:  ·  Problem: ESRD on hemodialysis.   ·  Recommendation: HD per renal .     Problem/Recommendation - 3:  ·  Problem: DM (diabetes mellitus).   ·  Recommendation: Sugars in good range.     Problem/Recommendation - 4:  ·  Problem: HTN (hypertension).   ·  Recommendation: BP readings better.     Problem/Recommendation - 5:  ·  Problem: Anemia secondary to renal failure.   ·  Recommendation: HH stable. Epogen per renal.     Problem/Recommendation - 6:  ·  Problem: HLD (hyperlipidemia).   ·  Recommendation: Statin.     Problem/Recommendation - 7:  ·  Problem: PVD (peripheral vascular disease).   ·  Recommendation: S/P BKA . Has Prosthesis .     Problem/Recommendation - 8:  ·  Problem: Legally blind.   ·  Recommendation: Supportive care.

## 2023-07-10 NOTE — PROGRESS NOTE ADULT - PROBLEM SELECTOR PLAN 1
continue preop workup  continue asa/ bb and statin  HD today as per renal   carotids neg sig stenosis   echo mod mv stenosis; mild AR; ef nl; neg pericardial effusion  p2y12 217  OR this week with Dr. Mabry

## 2023-07-10 NOTE — PROGRESS NOTE ADULT - SUBJECTIVE AND OBJECTIVE BOX
[facility] - TIFFANY Bazzi / TIFFANY Shea / APARNA Vanegas/ TIFFANY Mcfarlane/ TIFFANY Madden/ MASSIEL Perkins / RAINER Potts / ESTEPHANIE Griffith  ---------------------------------------------------------------------------------------------------------------  seen and examined today for ESRD  Interval : NAD  VITALS:  T(F): 98 (07-10-23 @ 04:35), Max: 98 (07-09-23 @ 19:43)  HR: 69 (07-10-23 @ 04:35)  BP: 124/72 (07-10-23 @ 04:35)  RR: 18 (07-10-23 @ 04:35)  SpO2: 93% (07-10-23 @ 04:35)  Wt(kg): --    07-09 @ 07:01  -  07-10 @ 07:00  --------------------------------------------------------  IN: 360 mL / OUT: 0 mL / NET: 360 mL    07-10 @ 07:01  -  07-10 @ 10:46  --------------------------------------------------------  IN: 240 mL / OUT: 0 mL / NET: 240 mL      Physical Exam :-  Constitutional: NAD  Neck: Supple.  Respiratory: Bilateral equal breath sounds,  Cardiovascular: S1, S2 normal,  Gastrointestinal: Bowel Sounds present, soft, non tender.  Extremities: B/L BKA and right thigh graft +  Neurological: Alert and Oriented x 3, no focal deficits  Psychiatric: Normal mood, normal affect  Data:-  Allergies :   No Known Allergies    Hospital Medications:   MEDICATIONS  (STANDING):  aspirin enteric coated 81 milliGRAM(s) Oral daily  atorvastatin 40 milliGRAM(s) Oral at bedtime  heparin   Injectable 5000 Unit(s) SubCutaneous every 12 hours  metoprolol tartrate 25 milliGRAM(s) Oral every 12 hours  multivitamin 1 Tablet(s) Oral daily  senna 2 Tablet(s) Oral at bedtime  sodium chloride 0.9% lock flush 3 milliLiter(s) IV Push every 8 hours    07-10    140  |  96  |  77<H>  ----------------------------<  90  5.2   |  23  |  10.24<H>    Ca    9.5      10 Jul 2023 06:04    TPro  7.0  /  Alb  3.7  /  TBili  0.2  /  DBili      /  AST  16  /  ALT  7<L>  /  AlkPhos  70  07-10    Creatinine Trend: 10.24 <--, 8.17 <--, 6.41 <--, 8.26 <--, 6.36 <--, 9.64 <--                        9.8    7.01  )-----------( 207      ( 10 Jul 2023 06:04 )             30.8

## 2023-07-10 NOTE — PROGRESS NOTE ADULT - ASSESSMENT
63 y/o F with PMH of ESRD(on HD M/W/F thru right groin AVF), HTN, HLD, DM type II, bilateral BKA, right eye blindness presented initially to [facility] on 07/03 from rehab center for acute SOB and fluid overload and now transferred to [facility] for LHC due to elevated troponin. Pt underwent LHC 7/5: LM okay, prox LAD of 80-90%, mid Lcx of 80-90%, OM of 90%, ostial RCA of 90%. LFA access site. LVEDP: 28. On Aspirin 81mg. Plan to be transferred to [facility] for CT surgery consult with Dr. Alma Mabry.     1) ESRD on HD  MWF  Last HD Frdiday  access: right upper thigh avg  consent in chart  Plan for HD today  trend bmp    2) Anemia in ckd  hgb is 9.9  due to active coronary disease hold liv for now  monitor hgb    3) CAD- plan for CABG Thursday      For any question, call:  Cell # [phone]  Pager # [phone]  Callback # [phone]

## 2023-07-10 NOTE — PROGRESS NOTE ADULT - SUBJECTIVE AND OBJECTIVE BOX
Date of Service  : 07-10-23     INTERVAL HPI/OVERNIGHT EVENTS: I feel okay .   Vital Signs Last 24 Hrs  T(C): 36.7 (10 Jul 2023 04:35), Max: 36.7 (09 Jul 2023 19:43)  T(F): 98 (10 Jul 2023 04:35), Max: 98 (09 Jul 2023 19:43)  HR: 69 (10 Jul 2023 04:35) (67 - 72)  BP: 124/72 (10 Jul 2023 04:35) (116/64 - 162/65)  BP(mean): 97 (09 Jul 2023 17:17) (97 - 97)  RR: 18 (10 Jul 2023 04:35) (18 - 18)  SpO2: 93% (10 Jul 2023 04:35) (93% - 98%)    Parameters below as of 10 Jul 2023 04:35  Patient On (Oxygen Delivery Method): room air      I&O's Summary    09 Jul 2023 07:01  -  10 Jul 2023 07:00  --------------------------------------------------------  IN: 360 mL / OUT: 0 mL / NET: 360 mL    10 Jul 2023 07:01  -  10 Jul 2023 12:35  --------------------------------------------------------  IN: 240 mL / OUT: 0 mL / NET: 240 mL      MEDICATIONS  (STANDING):  aspirin enteric coated 81 milliGRAM(s) Oral daily  atorvastatin 40 milliGRAM(s) Oral at bedtime  heparin   Injectable 5000 Unit(s) SubCutaneous every 12 hours  metoprolol tartrate 25 milliGRAM(s) Oral every 12 hours  multivitamin 1 Tablet(s) Oral daily  senna 2 Tablet(s) Oral at bedtime  sodium chloride 0.9% lock flush 3 milliLiter(s) IV Push every 8 hours    MEDICATIONS  (PRN):    LABS:                        9.8    7.01  )-----------( 207      ( 10 Jul 2023 06:04 )             30.8     07-10    140  |  96  |  77<H>  ----------------------------<  90  5.2   |  23  |  10.24<H>    Ca    9.5      10 Jul 2023 06:04    TPro  7.0  /  Alb  3.7  /  TBili  0.2  /  DBili  x   /  AST  16  /  ALT  7<L>  /  AlkPhos  70  07-10      Urinalysis Basic - ( 10 Jul 2023 06:04 )    Color: x / Appearance: x / SG: x / pH: x  Gluc: 90 mg/dL / Ketone: x  / Bili: x / Urobili: x   Blood: x / Protein: x / Nitrite: x   Leuk Esterase: x / RBC: x / WBC x   Sq Epi: x / Non Sq Epi: x / Bacteria: x      CAPILLARY BLOOD GLUCOSE            Urinalysis Basic - ( 10 Jul 2023 06:04 )    Color: x / Appearance: x / SG: x / pH: x  Gluc: 90 mg/dL / Ketone: x  / Bili: x / Urobili: x   Blood: x / Protein: x / Nitrite: x   Leuk Esterase: x / RBC: x / WBC x   Sq Epi: x / Non Sq Epi: x / Bacteria: x      REVIEW OF SYSTEMS:  CONSTITUTIONAL: No fever, weight loss, or fatigue  ENMT:  No difficulty hearing, tinnitus, vertigo; No sinus or throat pain  NECK: No pain or stiffness  RESPIRATORY: No cough, wheezing, chills or hemoptysis; No shortness of breath  CARDIOVASCULAR: No chest pain, palpitations, dizziness, or leg swelling  GASTROINTESTINAL: No abdominal or epigastric pain. No nausea, vomiting, or hematemesis; No diarrhea or constipation. No melena or hematochezia.    RADIOLOGY & ADDITIONAL TESTS:    Consultant(s) Notes Reviewed:  [x ] YES  [ ] NO    PHYSICAL EXAM:  GENERAL: NAD, well-groomed, well-developed,not in any distress ,  HEAD:  Atraumatic, Normocephalic  NECK: Supple, No JVD, Normal thyroid  NERVOUS SYSTEM:  Alert & Oriented X3, No focal deficit   CHEST/LUNG: Good air entry bilateral with no  rales, rhonchi, wheezing, or rubs  HEART: Regular rate and rhythm; No murmurs, rubs, or gallops  ABDOMEN: Soft, Nontender, Nondistended; Bowel sounds present  EXTREMITIES:  B/L BKA   Care Discussed with Consultants/Other Providers [ x] YES  [ ] NO

## 2023-07-10 NOTE — PROGRESS NOTE ADULT - ASSESSMENT
63 y/o F with PMH of ESRD(on HD M/W/F thru right groin AVF), HTN, HLD, DM type II, bilateral BKA, right eye blindness presented initially to [facility] on 07/03 from rehab center for acute SOB and fluid overload and now transferred to [facility] for LHC due to elevated troponin. Pt underwent LHC 7/5: LM okay, prox LAD of 80-90%, mid Lcx of 80-90%, OM of 90%, ostial RCA of 90%. LFA access site. LVEDP: 28. On Aspirin 81mg. Plan to be transferred to [facility] for CT surgery consult with Dr. Alma Mabry.       7/6 VSS; Transfer from [facility] for CABG Eval. with Dr. Mabry.   7/7 VSS: RSR 60-80; continue preop workup; HD today as per renal; carotids neg sig stenosis; echo mod mv stenosis; mild AR; ef nl; neg pericardial effusion  p2y12 217;  pt undergoing preop workup and eval; assess for conduit grafting   7/8 VSS; RSR 60-70;  continue asa/ statin/ bb; hd as per renal; pt eval  OR this week with Dr. Mabry  7/9 HD tomorrow. Scheduled for CABG Thursday  7/10 HD  CABG this week

## 2023-07-11 LAB
ALBUMIN SERPL ELPH-MCNC: 4 G/DL — SIGNIFICANT CHANGE UP (ref 3.3–5)
ALP SERPL-CCNC: 72 U/L — SIGNIFICANT CHANGE UP (ref 40–120)
ALT FLD-CCNC: 10 U/L — SIGNIFICANT CHANGE UP (ref 10–45)
ANION GAP SERPL CALC-SCNC: 16 MMOL/L — SIGNIFICANT CHANGE UP (ref 5–17)
AST SERPL-CCNC: 13 U/L — SIGNIFICANT CHANGE UP (ref 10–40)
BILIRUB SERPL-MCNC: 0.2 MG/DL — SIGNIFICANT CHANGE UP (ref 0.2–1.2)
BUN SERPL-MCNC: 41 MG/DL — HIGH (ref 7–23)
CALCIUM SERPL-MCNC: 9.7 MG/DL — SIGNIFICANT CHANGE UP (ref 8.4–10.5)
CHLORIDE SERPL-SCNC: 98 MMOL/L — SIGNIFICANT CHANGE UP (ref 96–108)
CO2 SERPL-SCNC: 27 MMOL/L — SIGNIFICANT CHANGE UP (ref 22–31)
CREAT SERPL-MCNC: 7.31 MG/DL — HIGH (ref 0.5–1.3)
EGFR: 6 ML/MIN/1.73M2 — LOW
GLUCOSE SERPL-MCNC: 102 MG/DL — HIGH (ref 70–99)
HCT VFR BLD CALC: 32.6 % — LOW (ref 34.5–45)
HGB BLD-MCNC: 10 G/DL — LOW (ref 11.5–15.5)
MAGNESIUM SERPL-MCNC: 2.2 MG/DL — SIGNIFICANT CHANGE UP (ref 1.6–2.6)
MCHC RBC-ENTMCNC: 28.8 PG — SIGNIFICANT CHANGE UP (ref 27–34)
MCHC RBC-ENTMCNC: 30.7 GM/DL — LOW (ref 32–36)
MCV RBC AUTO: 93.9 FL — SIGNIFICANT CHANGE UP (ref 80–100)
NRBC # BLD: 0 /100 WBCS — SIGNIFICANT CHANGE UP (ref 0–0)
PHOSPHATE SERPL-MCNC: 4.9 MG/DL — HIGH (ref 2.5–4.5)
PLATELET # BLD AUTO: 234 K/UL — SIGNIFICANT CHANGE UP (ref 150–400)
POTASSIUM SERPL-MCNC: 4.4 MMOL/L — SIGNIFICANT CHANGE UP (ref 3.5–5.3)
POTASSIUM SERPL-SCNC: 4.4 MMOL/L — SIGNIFICANT CHANGE UP (ref 3.5–5.3)
PROT SERPL-MCNC: 6.9 G/DL — SIGNIFICANT CHANGE UP (ref 6–8.3)
RBC # BLD: 3.47 M/UL — LOW (ref 3.8–5.2)
RBC # FLD: 13 % — SIGNIFICANT CHANGE UP (ref 10.3–14.5)
SODIUM SERPL-SCNC: 141 MMOL/L — SIGNIFICANT CHANGE UP (ref 135–145)
WBC # BLD: 7.58 K/UL — SIGNIFICANT CHANGE UP (ref 3.8–10.5)
WBC # FLD AUTO: 7.58 K/UL — SIGNIFICANT CHANGE UP (ref 3.8–10.5)

## 2023-07-11 PROCEDURE — 99232 SBSQ HOSP IP/OBS MODERATE 35: CPT | Mod: FS

## 2023-07-11 RX ADMIN — Medication 81 MILLIGRAM(S): at 11:19

## 2023-07-11 RX ADMIN — SODIUM CHLORIDE 3 MILLILITER(S): 9 INJECTION INTRAMUSCULAR; INTRAVENOUS; SUBCUTANEOUS at 06:56

## 2023-07-11 RX ADMIN — SODIUM CHLORIDE 3 MILLILITER(S): 9 INJECTION INTRAMUSCULAR; INTRAVENOUS; SUBCUTANEOUS at 13:39

## 2023-07-11 RX ADMIN — ATORVASTATIN CALCIUM 40 MILLIGRAM(S): 80 TABLET, FILM COATED ORAL at 22:05

## 2023-07-11 RX ADMIN — Medication 25 MILLIGRAM(S): at 05:21

## 2023-07-11 RX ADMIN — Medication 1 TABLET(S): at 11:19

## 2023-07-11 RX ADMIN — HEPARIN SODIUM 5000 UNIT(S): 5000 INJECTION INTRAVENOUS; SUBCUTANEOUS at 17:28

## 2023-07-11 RX ADMIN — SODIUM CHLORIDE 3 MILLILITER(S): 9 INJECTION INTRAMUSCULAR; INTRAVENOUS; SUBCUTANEOUS at 21:58

## 2023-07-11 RX ADMIN — Medication 25 MILLIGRAM(S): at 17:28

## 2023-07-11 RX ADMIN — HEPARIN SODIUM 5000 UNIT(S): 5000 INJECTION INTRAVENOUS; SUBCUTANEOUS at 05:20

## 2023-07-11 NOTE — PROGRESS NOTE ADULT - SUBJECTIVE AND OBJECTIVE BOX
Date of Service  : 07-11-23     INTERVAL HPI/OVERNIGHT EVENTS: I feel fine.   Vital Signs Last 24 Hrs  T(C): 36.8 (11 Jul 2023 19:37), Max: 36.8 (11 Jul 2023 19:37)  T(F): 98.2 (11 Jul 2023 19:37), Max: 98.2 (11 Jul 2023 19:37)  HR: 63 (11 Jul 2023 19:37) (63 - 76)  BP: 120/77 (11 Jul 2023 19:37) (110/56 - 149/62)  BP(mean): 86 (11 Jul 2023 11:00) (74 - 86)  RR: 18 (11 Jul 2023 19:37) (18 - 18)  SpO2: 98% (11 Jul 2023 19:37) (96% - 100%)    Parameters below as of 11 Jul 2023 19:37  Patient On (Oxygen Delivery Method): room air      I&O's Summary    10 Jul 2023 07:01  -  11 Jul 2023 07:00  --------------------------------------------------------  IN: 1700 mL / OUT: 2300 mL / NET: -600 mL    11 Jul 2023 07:01  -  11 Jul 2023 20:55  --------------------------------------------------------  IN: 460 mL / OUT: 0 mL / NET: 460 mL      MEDICATIONS  (STANDING):  aspirin enteric coated 81 milliGRAM(s) Oral daily  atorvastatin 40 milliGRAM(s) Oral at bedtime  heparin   Injectable 5000 Unit(s) SubCutaneous every 12 hours  metoprolol tartrate 25 milliGRAM(s) Oral every 12 hours  multivitamin 1 Tablet(s) Oral daily  senna 2 Tablet(s) Oral at bedtime  sodium chloride 0.9% lock flush 3 milliLiter(s) IV Push every 8 hours    MEDICATIONS  (PRN):    LABS:                        10.0   7.58  )-----------( 234      ( 11 Jul 2023 05:33 )             32.6     07-11    141  |  98  |  41<H>  ----------------------------<  102<H>  4.4   |  27  |  7.31<H>    Ca    9.7      11 Jul 2023 05:39  Phos  4.9     07-11  Mg     2.2     07-11    TPro  6.9  /  Alb  4.0  /  TBili  0.2  /  DBili  x   /  AST  13  /  ALT  10  /  AlkPhos  72  07-11      Urinalysis Basic - ( 11 Jul 2023 05:39 )    Color: x / Appearance: x / SG: x / pH: x  Gluc: 102 mg/dL / Ketone: x  / Bili: x / Urobili: x   Blood: x / Protein: x / Nitrite: x   Leuk Esterase: x / RBC: x / WBC x   Sq Epi: x / Non Sq Epi: x / Bacteria: x      CAPILLARY BLOOD GLUCOSE            Urinalysis Basic - ( 11 Jul 2023 05:39 )    Color: x / Appearance: x / SG: x / pH: x  Gluc: 102 mg/dL / Ketone: x  / Bili: x / Urobili: x   Blood: x / Protein: x / Nitrite: x   Leuk Esterase: x / RBC: x / WBC x   Sq Epi: x / Non Sq Epi: x / Bacteria: x      REVIEW OF SYSTEMS:  CONSTITUTIONAL: No fever, weight loss, or fatigue  EYES: No eye pain, visual disturbances, or discharge  ENMT:  No difficulty hearing, tinnitus, vertigo; No sinus or throat pain  NECK: No pain or stiffness  RESPIRATORY: No cough, wheezing, chills or hemoptysis; No shortness of breath  CARDIOVASCULAR: No chest pain, palpitations, dizziness, or leg swelling  GASTROINTESTINAL: No abdominal or epigastric pain. No nausea, vomiting, or hematemesis; No diarrhea or constipation. No melena or hematochezia.  GENITOURINARY: No dysuria, frequency, hematuria, or incontinence  NEUROLOGICAL: No headaches, memory loss, loss of strength, numbness, or tremors      Consultant(s) Notes Reviewed:  [x ] YES  [ ] NO    PHYSICAL EXAM:  GENERAL: NAD, well-groomed, well-developed,not in any distress ,  HEAD:  Atraumatic, Normocephalic  NECK: Supple, No JVD, Normal thyroid  NERVOUS SYSTEM:  Alert & Oriented X3, No focal deficit   CHEST/LUNG: Good air entry bilateral with no  rales, rhonchi, wheezing, or rubs  HEART: Regular rate and rhythm; No murmurs, rubs, or gallops  ABDOMEN: Soft, Nontender, Nondistended; Bowel sounds present  EXTREMITIES:  B/L BKA     Care Discussed with Consultants/Other Providers [ x] YES  [ ] NO

## 2023-07-11 NOTE — PROGRESS NOTE ADULT - ASSESSMENT
63 y/o F with PMH of ESRD(on HD M/W/F thru right groin AVF), HTN, HLD, DM type II, bilateral BKA, right eye blindness presented initially to [facility] on 07/03 from rehab center for acute SOB and fluid overload and now transferred to [facility] for LHC due to elevated troponin. Pt underwent LHC 7/5: LM okay, prox LAD of 80-90%, mid Lcx of 80-90%, OM of 90%, ostial RCA of 90%. LFA access site. LVEDP: 28. On Aspirin 81mg. Plan to be transferred to [facility] for CT surgery consult with Dr. Alma Mabry.       7/6 VSS; Transfer from [facility] for CABG Eval. with Dr. Mabry.   7/7 VSS: RSR 60-80; continue preop workup; HD today as per renal; carotids neg sig stenosis; echo mod mv stenosis; mild AR; ef nl; neg pericardial effusion  p2y12 217;  pt undergoing preop workup and eval; assess for conduit grafting   7/8 VSS; RSR 60-70;  continue asa/ statin/ bb; hd as per renal; pt eval  OR this week with Dr. Mabry  7/9 HD tomorrow. Scheduled for CABG Thursday  7/10 HD 1.5L off  CABG this week  7/11 VSS, no complaints, plan for HD Wednesday late day and CABG Thursday.

## 2023-07-11 NOTE — PROGRESS NOTE ADULT - PROBLEM SELECTOR PLAN 1
Continue asa 81 daily, metoprolol 25 BID and atorvastatin 40 HS  Titrate up beta-blocker as tolerated  carotids neg sig stenosis   echo mod mv stenosis; mild AR; ef nl; neg pericardial effusion  p2y12 217  Plan for OR Thursday 7/13 with Dr. Mabry

## 2023-07-11 NOTE — PROGRESS NOTE ADULT - ASSESSMENT
63 y/o F with PMH of ESRD(on HD M/W/F thru right groin AVF), HTN, HLD, DM type II, bilateral BKA, right eye blindness presented initially to [facility] on 07/03 from rehab center for acute SOB and fluid overload and now transferred to [facility] for LHC due to elevated troponin. Pt underwent LHC 7/5: LM okay, prox LAD of 80-90%, mid Lcx of 80-90%, OM of 90%, ostial RCA of 90%. LFA access site. LVEDP: 28. On Aspirin 81mg. Plan to be transferred to [facility] for CT surgery consult with Dr. Alma Mabry.     1) ESRD on HD  MWF  access: right upper thigh avg  consent in chart  Plan for HD tomorrow  trend bmp    2) Anemia in ckd  hgb is 9.9  due to active coronary disease hold liv for now  monitor hgb    3) CAD- plan for CABG Thursday      For any question, call:  Cell # [phone]  Pager # [phone]  Callback # [phone]

## 2023-07-11 NOTE — PROGRESS NOTE ADULT - ASSESSMENT
61 y/o F with PMH of ESRD(on HD M/W/F thru right groin AVF), HTN, HLD, DM type II, bilateral BKA, right eye blindness presented initially to [facility] on 07/03 from rehab center for acute SOB and fluid overload and now transferred to [facility] for LHC due to elevated troponin. Pt underwent LHC 7/5: LM okay, prox LAD of 80-90%, mid Lcx of 80-90%, OM of 90%, ostial RCA of 90%. LFA access site. LVEDP: 28. On Aspirin 81mg. awaiting CABG.        Problem/Recommendation - 1:  ·  Problem: CAD, multiple vessel.   ·  Recommendation: Awaiting CABG  and planned for Thursday .    No Cp etc,   On ASA,BB and Statin.     Problem/Recommendation - 2:  ·  Problem: ESRD on hemodialysis.   ·  Recommendation: HD per renal .     Problem/Recommendation - 3:  ·  Problem: DM (diabetes mellitus).   ·  Recommendation: Sugars in good range.     Problem/Recommendation - 4:  ·  Problem: HTN (hypertension).   ·  Recommendation: BP readings better.     Problem/Recommendation - 5:  ·  Problem: Anemia secondary to renal failure.   ·  Recommendation: HH stable. Epogen per renal.     Problem/Recommendation - 6:  ·  Problem: HLD (hyperlipidemia).   ·  Recommendation: Statin.     Problem/Recommendation - 7:  ·  Problem: PVD (peripheral vascular disease).   ·  Recommendation: S/P BKA . Has Prosthesis .     Problem/Recommendation - 8:  ·  Problem: Legally blind.   ·  Recommendation: Supportive care.

## 2023-07-11 NOTE — PROGRESS NOTE ADULT - SUBJECTIVE AND OBJECTIVE BOX
Cardiovascular Disease Progress Note  Date of service: 23 @ 08:27    Overnight events: No acute events overnight.  Pt is in no distress.   Otherwise review of systems negative    Objective Findings:  T(C): 36.7 (23 @ 04:20), Max: 36.8 (07-10-23 @ 20:01)  HR: 69 (23 @ 04:20) (64 - 72)  BP: 110/56 (23 @ 04:20) (104/56 - 144/57)  RR: 18 (23 @ 04:20) (18 - 18)  SpO2: 96% (23 @ 04:20) (96% - 99%)  Wt(kg): --  Daily     Daily Weight in k.4 (2023 06:58)      Physical Exam:  Gen: NAD; Patient resting comfortably  HEENT: EOMI, Normocephalic/ atraumatic  CV: RRR, normal S1 + S2, no m/r/g  Lungs:  Normal respiratory effort; clear to auscultation bilaterally  Abd: soft, non-tender; bowel sounds present  Ext: No edema; warm and well perfused    Telemetry: Sinus     Laboratory Data:                        10.0   7.58  )-----------( 234      ( 2023 05:33 )             32.6         141  |  98  |  41<H>  ----------------------------<  102<H>  4.4   |  27  |  7.31<H>    Ca    9.7      2023 05:39  Phos  4.9       Mg     2.2         TPro  6.9  /  Alb  4.0  /  TBili  0.2  /  DBili  x   /  AST  13  /  ALT  10  /  AlkPhos  72                Inpatient Medications:  MEDICATIONS  (STANDING):  aspirin enteric coated 81 milliGRAM(s) Oral daily  atorvastatin 40 milliGRAM(s) Oral at bedtime  heparin   Injectable 5000 Unit(s) SubCutaneous every 12 hours  metoprolol tartrate 25 milliGRAM(s) Oral every 12 hours  multivitamin 1 Tablet(s) Oral daily  senna 2 Tablet(s) Oral at bedtime  sodium chloride 0.9% lock flush 3 milliLiter(s) IV Push every 8 hours      Assessment:  61-year-old female with ESRD on HD, HLD and peripheral vascular disease s/p lower extremity resection presents with chest pain found to have multivessel disease.      Plan of Care:      #Multivessel CAD-  Noted on cardiac cath .  Echo  with normal LV systolic function. Apical Hypokinesis and moderate MS.   OR this week for CABG with Dr. Mabry. Plan for Thursday      #Peripheral vascular disease-  s/p L BKA well.   Continue current cardiac management.   Antiplatelet therapy with ASA.       #Compensated diastolic CHF-  Euvolemic on exam.  Fluid removal with HD as per the renal team.     #ESRD-  - HD, as per renal.            Over 25 minutes spent on total encounter; more than 50% of the visit was spent counseling and/or coordinating care by the attending physician.      Wisam Adams DO [facility]  Cardiovascular Disease  [phone]

## 2023-07-11 NOTE — PROGRESS NOTE ADULT - SUBJECTIVE AND OBJECTIVE BOX
Subjective: Pt states "Hello" denies any CP or SOB. No acute events overnight.     Telemetry:  SR 60 - 70  Vital Signs Last 24 Hrs  T(C): 36.4 (23 @ 11:00), Max: 36.8 (07-10-23 @ 20:01)  T(F): 97.5 (23 @ 11:00), Max: 98.2 (07-10-23 @ 20:01)  HR: 76 (23 @ 12:45) (64 - 76)  BP: 149/62 (23 @ 12:45) (104/56 - 149/62)  RR: 18 (23 @ 12:45) (18 - 18)  SpO2: 100% (23 @ 12:45) (96% - 100%)             07-10 @ 07:01  -   @ 07:00  --------------------------------------------------------  IN: 1700 mL / OUT: 2300 mL / NET: -600 mL        Daily Weight in k.4 (2023 06:58)                        10.0   7.58  )-----------( 234      ( 2023 05:33 )             32.6     141  |  98  |  41<H>  ----------------------------<  102<H>  4.4   |  27  |  7.31<H>    AST  13  /  ALT  10  /  AlkPhos  72  -          PHYSICAL EXAM  Neurology: A&Ox3, NAD, Rt eye blind  CV : RRR+S1S2  Lungs: Respirations non-labored, B/L BS clear, diminished at bases  Abdomen: Soft, NT/ND, +BSx4Q, last BM 7/10  (-)N/V/D  Extremities: B/L LE AKA, +Right thigh AVF      MEDICATIONS  aspirin enteric coated 81 milliGRAM(s) Oral daily  atorvastatin 40 milliGRAM(s) Oral at bedtime  heparin   Injectable 5000 Unit(s) SubCutaneous every 12 hours  metoprolol tartrate 25 milliGRAM(s) Oral every 12 hours  multivitamin 1 Tablet(s) Oral daily  senna 2 Tablet(s) Oral at bedtime  sodium chloride 0.9% lock flush 3 milliLiter(s) IV Push every 8 hours      Physical Therapy Rec:   Home  [  ]   Home w/ PT  [  ]  Rehab  [  ]  TBD post CABG    Discussed with Cardiothoracic Team at AM rounds.

## 2023-07-11 NOTE — PROGRESS NOTE ADULT - SUBJECTIVE AND OBJECTIVE BOX
[facility] - TIFFANY Bazzi / TIFFANY Shea / APARNA Vanegas/ TIFFANY Mcfarlane/ TIFFANY Madden/ MASSIEL Perkins / RAINER Potts / ESTEPHANIE Griffith  ---------------------------------------------------------------------------------------------------------------  seen and examined today for ESRD  Interval : NAD  VITALS:  T(F): 97.5 (07-11-23 @ 11:00), Max: 98.2 (07-10-23 @ 20:01)  HR: 76 (07-11-23 @ 12:45)  BP: 149/62 (07-11-23 @ 12:45)  RR: 18 (07-11-23 @ 12:45)  SpO2: 100% (07-11-23 @ 12:45)  Wt(kg): --    07-10 @ 07:01  -  07-11 @ 07:00  --------------------------------------------------------  IN: 1700 mL / OUT: 2300 mL / NET: -600 mL    07-11 @ 07:01  -  07-11 @ 14:51  --------------------------------------------------------  IN: 460 mL / OUT: 0 mL / NET: 460 mL      Physical Exam :-  Constitutional: NAD  Neck: Supple.  Respiratory: Bilateral equal breath sounds,  Cardiovascular: S1, S2 normal,  Gastrointestinal: Bowel Sounds present, soft, non tender.  Extremities: B/L BKA, right thigh graft  Neurological: Alert and Oriented x 3, no focal deficits  Psychiatric: Normal mood, normal affect  Data:-  Allergies :   No Known Allergies    Hospital Medications:   MEDICATIONS  (STANDING):  aspirin enteric coated 81 milliGRAM(s) Oral daily  atorvastatin 40 milliGRAM(s) Oral at bedtime  heparin   Injectable 5000 Unit(s) SubCutaneous every 12 hours  metoprolol tartrate 25 milliGRAM(s) Oral every 12 hours  multivitamin 1 Tablet(s) Oral daily  senna 2 Tablet(s) Oral at bedtime  sodium chloride 0.9% lock flush 3 milliLiter(s) IV Push every 8 hours    07-11    141  |  98  |  41<H>  ----------------------------<  102<H>  4.4   |  27  |  7.31<H>    Ca    9.7      11 Jul 2023 05:39  Phos  4.9     07-11  Mg     2.2     07-11    TPro  6.9  /  Alb  4.0  /  TBili  0.2  /  DBili      /  AST  13  /  ALT  10  /  AlkPhos  72  07-11    Creatinine Trend: 7.31 <--, 10.24 <--, 8.17 <--, 6.41 <--, 8.26 <--, 6.36 <--, 9.64 <--                        10.0   7.58  )-----------( 234      ( 11 Jul 2023 05:33 )             32.6

## 2023-07-11 NOTE — PROGRESS NOTE ADULT - PROBLEM SELECTOR PLAN 2
Renal following  R Thigh AVF   HD per renal  M W F   Plan for late day HD on Wednesday 7/12 for OR Thursday  trend BUN/Cr on bmp

## 2023-07-12 ENCOUNTER — TRANSCRIPTION ENCOUNTER (OUTPATIENT)
Age: 63
End: 2023-07-12

## 2023-07-12 LAB
ANION GAP SERPL CALC-SCNC: 20 MMOL/L — HIGH (ref 5–17)
BLD GP AB SCN SERPL QL: NEGATIVE — SIGNIFICANT CHANGE UP
BUN SERPL-MCNC: 54 MG/DL — HIGH (ref 7–23)
CALCIUM SERPL-MCNC: 10.1 MG/DL — SIGNIFICANT CHANGE UP (ref 8.4–10.5)
CHLORIDE SERPL-SCNC: 97 MMOL/L — SIGNIFICANT CHANGE UP (ref 96–108)
CO2 SERPL-SCNC: 25 MMOL/L — SIGNIFICANT CHANGE UP (ref 22–31)
CREAT SERPL-MCNC: 9.14 MG/DL — HIGH (ref 0.5–1.3)
EGFR: 4 ML/MIN/1.73M2 — LOW
GLUCOSE SERPL-MCNC: 77 MG/DL — SIGNIFICANT CHANGE UP (ref 70–99)
HCT VFR BLD CALC: 33.3 % — LOW (ref 34.5–45)
HGB BLD-MCNC: 10.4 G/DL — LOW (ref 11.5–15.5)
MCHC RBC-ENTMCNC: 29.1 PG — SIGNIFICANT CHANGE UP (ref 27–34)
MCHC RBC-ENTMCNC: 31.2 GM/DL — LOW (ref 32–36)
MCV RBC AUTO: 93 FL — SIGNIFICANT CHANGE UP (ref 80–100)
NRBC # BLD: 0 /100 WBCS — SIGNIFICANT CHANGE UP (ref 0–0)
PLATELET # BLD AUTO: 227 K/UL — SIGNIFICANT CHANGE UP (ref 150–400)
POTASSIUM SERPL-MCNC: 4.4 MMOL/L — SIGNIFICANT CHANGE UP (ref 3.5–5.3)
POTASSIUM SERPL-SCNC: 4.4 MMOL/L — SIGNIFICANT CHANGE UP (ref 3.5–5.3)
RBC # BLD: 3.58 M/UL — LOW (ref 3.8–5.2)
RBC # FLD: 13.2 % — SIGNIFICANT CHANGE UP (ref 10.3–14.5)
RH IG SCN BLD-IMP: POSITIVE — SIGNIFICANT CHANGE UP
SODIUM SERPL-SCNC: 142 MMOL/L — SIGNIFICANT CHANGE UP (ref 135–145)
WBC # BLD: 7 K/UL — SIGNIFICANT CHANGE UP (ref 3.8–10.5)
WBC # FLD AUTO: 7 K/UL — SIGNIFICANT CHANGE UP (ref 3.8–10.5)

## 2023-07-12 PROCEDURE — 71250 CT THORAX DX C-: CPT | Mod: 26

## 2023-07-12 RX ORDER — CHLORHEXIDINE GLUCONATE 213 G/1000ML
15 SOLUTION TOPICAL ONCE
Refills: 0 | Status: COMPLETED | OUTPATIENT
Start: 2023-07-12 | End: 2023-07-13

## 2023-07-12 RX ORDER — CEFUROXIME AXETIL 250 MG
1500 TABLET ORAL ONCE
Refills: 0 | Status: DISCONTINUED | OUTPATIENT
Start: 2023-07-12 | End: 2023-07-13

## 2023-07-12 RX ORDER — CHLORHEXIDINE GLUCONATE 213 G/1000ML
1 SOLUTION TOPICAL ONCE
Refills: 0 | Status: COMPLETED | OUTPATIENT
Start: 2023-07-12 | End: 2023-07-12

## 2023-07-12 RX ADMIN — Medication 1 TABLET(S): at 11:29

## 2023-07-12 RX ADMIN — Medication 81 MILLIGRAM(S): at 11:30

## 2023-07-12 RX ADMIN — Medication 25 MILLIGRAM(S): at 21:40

## 2023-07-12 RX ADMIN — Medication 25 MILLIGRAM(S): at 06:28

## 2023-07-12 RX ADMIN — SODIUM CHLORIDE 3 MILLILITER(S): 9 INJECTION INTRAMUSCULAR; INTRAVENOUS; SUBCUTANEOUS at 14:05

## 2023-07-12 RX ADMIN — CHLORHEXIDINE GLUCONATE 1 APPLICATION(S): 213 SOLUTION TOPICAL at 21:30

## 2023-07-12 RX ADMIN — SODIUM CHLORIDE 3 MILLILITER(S): 9 INJECTION INTRAMUSCULAR; INTRAVENOUS; SUBCUTANEOUS at 21:41

## 2023-07-12 RX ADMIN — SODIUM CHLORIDE 3 MILLILITER(S): 9 INJECTION INTRAMUSCULAR; INTRAVENOUS; SUBCUTANEOUS at 06:34

## 2023-07-12 RX ADMIN — HEPARIN SODIUM 5000 UNIT(S): 5000 INJECTION INTRAVENOUS; SUBCUTANEOUS at 06:28

## 2023-07-12 RX ADMIN — ATORVASTATIN CALCIUM 40 MILLIGRAM(S): 80 TABLET, FILM COATED ORAL at 21:40

## 2023-07-12 RX ADMIN — HEPARIN SODIUM 5000 UNIT(S): 5000 INJECTION INTRAVENOUS; SUBCUTANEOUS at 21:41

## 2023-07-12 NOTE — PROGRESS NOTE ADULT - ASSESSMENT
63 y/o F with PMH of ESRD(on HD M/W/F thru right groin AVF), HTN, HLD, DM type II, bilateral BKA, right eye blindness presented initially to [facility] on 07/03 from rehab center for acute SOB and fluid overload and now transferred to [facility] for LHC due to elevated troponin. Pt underwent LHC 7/5: LM okay, prox LAD of 80-90%, mid Lcx of 80-90%, OM of 90%, ostial RCA of 90%. LFA access site. LVEDP: 28. On Aspirin 81mg. awaiting CABG.        Problem/Recommendation - 1:  ·  Problem: CAD, multiple vessel.   ·  Recommendation: Awaiting CABG  and planned for tomorrow  .    No Cp etc,   On ASA,BB and Statin.     Problem/Recommendation - 2:  ·  Problem: ESRD on hemodialysis.   ·  Recommendation: HD per renal .     Problem/Recommendation - 3:  ·  Problem: DM (diabetes mellitus).   ·  Recommendation: Sugars in good range.     Problem/Recommendation - 4:  ·  Problem: HTN (hypertension).   ·  Recommendation: BP readings better.     Problem/Recommendation - 5:  ·  Problem: Anemia secondary to renal failure.   ·  Recommendation: HH stable. Epogen per renal.     Problem/Recommendation - 6:  ·  Problem: HLD (hyperlipidemia).   ·  Recommendation: Statin.     Problem/Recommendation - 7:  ·  Problem: PVD (peripheral vascular disease).   ·  Recommendation: S/P BKA . Has Prosthesis .     Problem/Recommendation - 8:  ·  Problem: Legally blind.   ·  Recommendation: Supportive care.

## 2023-07-12 NOTE — PROGRESS NOTE ADULT - ASSESSMENT
63 y/o F with PMH of ESRD(on HD M/W/F thru right groin AVF), HTN, HLD, DM type II, bilateral BKA, right eye blindness presented initially to [facility] on 07/03 from rehab center for acute SOB and fluid overload and now transferred to [facility] for LHC due to elevated troponin. Pt underwent LHC 7/5: LM okay, prox LAD of 80-90%, mid Lcx of 80-90%, OM of 90%, ostial RCA of 90%. LFA access site. LVEDP: 28. On Aspirin 81mg. Plan to be transferred to [facility] for CT surgery consult with Dr. Alma Mabry.       7/6 VSS; Transfer from [facility] for CABG Eval. with Dr. Mabry.   7/7 VSS: RSR 60-80; continue preop workup; HD today as per renal; carotids neg sig stenosis; echo mod mv stenosis; mild AR; ef nl; neg pericardial effusion  [id number];  pt undergoing preop workup and eval; assess for conduit grafting   7/8 VSS; RSR 60-70;  continue asa/ statin/ bb; hd as per renal; pt eval  OR this week with Dr. Mabry  7/9 HD tomorrow. Scheduled for CABG Thursday  7/10 HD 1.5L off  CABG this week  7/11 VSS, no complaints, plan for HD Wednesday late day and CABG Thursday.   7/12 NPO for cabg in am - chest ct non con done- hd later today- health care proxy completed

## 2023-07-12 NOTE — PROGRESS NOTE ADULT - SUBJECTIVE AND OBJECTIVE BOX
Cardiovascular Disease Progress Note  Date of service: 07-12-23 @ 07:45    Overnight events: No acute events overnight.  Pt is in no distress.   Otherwise review of systems negative    Objective Findings:  T(C): 36.6 (07-12-23 @ 04:16), Max: 36.8 (07-11-23 @ 19:37)  HR: 68 (07-12-23 @ 06:20) (63 - 76)  BP: 143/72 (07-12-23 @ 06:20) (116/65 - 149/62)  RR: 18 (07-12-23 @ 04:16) (18 - 18)  SpO2: 99% (07-12-23 @ 04:16) (97% - 100%)  Wt(kg): --  Daily     Daily       Physical Exam:  Gen: NAD; Patient resting comfortably  HEENT: EOMI, Normocephalic/ atraumatic  CV: RRR, normal S1 + S2, no m/r/g  Lungs:  Normal respiratory effort; clear to auscultation bilaterally  Abd: soft, non-tender; bowel sounds present  Ext: No edema; warm and well perfused    Telemetry: Sinus     Laboratory Data:                        10.4   7.00  )-----------( 227      ( 12 Jul 2023 05:32 )             33.3     07-12    142  |  97  |  54<H>  ----------------------------<  77  4.4   |  25  |  9.14<H>    Ca    10.1      12 Jul 2023 05:32  Phos  4.9     07-11  Mg     2.2     07-11    TPro  6.9  /  Alb  4.0  /  TBili  0.2  /  DBili  x   /  AST  13  /  ALT  10  /  AlkPhos  72  07-11              Inpatient Medications:  MEDICATIONS  (STANDING):  aspirin enteric coated 81 milliGRAM(s) Oral daily  atorvastatin 40 milliGRAM(s) Oral at bedtime  heparin   Injectable 5000 Unit(s) SubCutaneous every 12 hours  metoprolol tartrate 25 milliGRAM(s) Oral every 12 hours  multivitamin 1 Tablet(s) Oral daily  senna 2 Tablet(s) Oral at bedtime  sodium chloride 0.9% lock flush 3 milliLiter(s) IV Push every 8 hours      Assessment:  61-year-old female with ESRD on HD, HLD and peripheral vascular disease s/p lower extremity resection presents with chest pain found to have multivessel disease.      Plan of Care:      #Multivessel CAD-  Noted on cardiac cath 7/5.  Echo 7/6 with normal LV systolic function. Apical Hypokinesis and moderate MS.   OR this week for CABG with Dr. Mabry. Plan for Thursday      #Peripheral vascular disease-  s/p L BKA well.   Continue current cardiac management.   Antiplatelet therapy with ASA.       #Compensated diastolic CHF-  Euvolemic on exam.  Fluid removal with HD as per the renal team.     #ESRD-  - HD, as per renal.      #ACP (advance care planning)-  Advanced care planning was discussed with the patient.  Risks, benefits and alternatives of medical treatment and procedures were discussed in detail and all questions were answered. 30 additional minutes spent addressing advance care plans.          Over 25 minutes spent on total encounter; more than 50% of the visit was spent counseling and/or coordinating care by the attending physician.      Wisam Adams DO [facility]  Cardiovascular Disease  [phone]

## 2023-07-12 NOTE — PROGRESS NOTE ADULT - SUBJECTIVE AND OBJECTIVE BOX
Date of Service  : 07-12-23     INTERVAL HPI/OVERNIGHT EVENTS: I feel fine.   Vital Signs Last 24 Hrs  T(C): 36.3 (12 Jul 2023 11:04), Max: 36.8 (11 Jul 2023 19:37)  T(F): 97.3 (12 Jul 2023 11:04), Max: 98.2 (11 Jul 2023 19:37)  HR: 64 (12 Jul 2023 11:04) (63 - 76)  BP: 110/56 (12 Jul 2023 11:04) (110/56 - 149/62)  BP(mean): 94 (12 Jul 2023 04:16) (94 - 94)  RR: 18 (12 Jul 2023 11:04) (18 - 18)  SpO2: 100% (12 Jul 2023 11:04) (98% - 100%)    Parameters below as of 12 Jul 2023 11:04  Patient On (Oxygen Delivery Method): room air      I&O's Summary    11 Jul 2023 07:01  -  12 Jul 2023 07:00  --------------------------------------------------------  IN: 1000 mL / OUT: 0 mL / NET: 1000 mL    12 Jul 2023 07:01  -  12 Jul 2023 11:20  --------------------------------------------------------  IN: 240 mL / OUT: 0 mL / NET: 240 mL      MEDICATIONS  (STANDING):  aspirin enteric coated 81 milliGRAM(s) Oral daily  atorvastatin 40 milliGRAM(s) Oral at bedtime  cefuroxime  IVPB 1500 milliGRAM(s) IV Intermittent once  chlorhexidine 0.12% Liquid 15 milliLiter(s) Swish and Spit once  chlorhexidine 4% Liquid 1 Application(s) Topical once  heparin   Injectable 5000 Unit(s) SubCutaneous every 12 hours  metoprolol tartrate 25 milliGRAM(s) Oral every 12 hours  multivitamin 1 Tablet(s) Oral daily  senna 2 Tablet(s) Oral at bedtime  sodium chloride 0.9% lock flush 3 milliLiter(s) IV Push every 8 hours    MEDICATIONS  (PRN):    LABS:                        10.4   7.00  )-----------( 227      ( 12 Jul 2023 05:32 )             33.3     07-12    142  |  97  |  54<H>  ----------------------------<  77  4.4   |  25  |  9.14<H>    Ca    10.1      12 Jul 2023 05:32  Phos  4.9     07-11  Mg     2.2     07-11    TPro  6.9  /  Alb  4.0  /  TBili  0.2  /  DBili  x   /  AST  13  /  ALT  10  /  AlkPhos  72  07-11      Urinalysis Basic - ( 12 Jul 2023 05:32 )    Color: x / Appearance: x / SG: x / pH: x  Gluc: 77 mg/dL / Ketone: x  / Bili: x / Urobili: x   Blood: x / Protein: x / Nitrite: x   Leuk Esterase: x / RBC: x / WBC x   Sq Epi: x / Non Sq Epi: x / Bacteria: x      CAPILLARY BLOOD GLUCOSE            Urinalysis Basic - ( 12 Jul 2023 05:32 )    Color: x / Appearance: x / SG: x / pH: x  Gluc: 77 mg/dL / Ketone: x  / Bili: x / Urobili: x   Blood: x / Protein: x / Nitrite: x   Leuk Esterase: x / RBC: x / WBC x   Sq Epi: x / Non Sq Epi: x / Bacteria: x      REVIEW OF SYSTEMS:  CONSTITUTIONAL: No fever, weight loss, or fatigue  EYES: No eye pain, visual disturbances, or discharge  ENMT:  No difficulty hearing, tinnitus, vertigo; No sinus or throat pain  NECK: No pain or stiffness  RESPIRATORY: No cough, wheezing, chills or hemoptysis; No shortness of breath  CARDIOVASCULAR: No chest pain, palpitations, dizziness, or leg swelling  GASTROINTESTINAL: No abdominal or epigastric pain. No nausea, vomiting, or hematemesis; No diarrhea or constipation. No melena or hematochezia.  GENITOURINARY: No dysuria, frequency, hematuria, or incontinence  NEUROLOGICAL: No headaches, memory loss, loss of strength, numbness, or tremors    Consultant(s) Notes Reviewed:  [x ] YES  [ ] NO    PHYSICAL EXAM:  GENERAL: NAD, well-groomed, well-developed,not in any distress ,  HEAD:  Atraumatic, Normocephalic  NECK: Supple, No JVD, Normal thyroid  NERVOUS SYSTEM:  Alert & Oriented X3, No focal deficit   CHEST/LUNG: Good air entry bilateral with no  rales, rhonchi, wheezing, or rubs  HEART: Regular rate and rhythm; No murmurs, rubs, or gallops  ABDOMEN: Soft, Nontender, Nondistended; Bowel sounds present  EXTREMITIES:  B/L BKA     Care Discussed with Consultants/Other Providers [ x] YES  [ ] NO

## 2023-07-12 NOTE — PROGRESS NOTE ADULT - SUBJECTIVE AND OBJECTIVE BOX
Cardiac Surgery Pre-op Note:  CC: Patient is a 62y old  Female who presents with a chief complaint of CABG EVAL (12 Jul 2023 09:14)      Referring Physician:       Dr. Adams                                                                                     Surgeon: Dr. Alma Mabry  Procedure: (Date) (Procedure) cabg    Allergies: No Known Allergies    Intolerances    HPI:  61 y/o F with PMH of ESRD(on HD M/W/F thru right groin AVF), HTN, HLD, DM type II, bilateral BKA, right eye blindness presented initially to [facility] on 07/03 from rehab center for acute SOB and fluid overload and now transferred to [facility] for LHC due to elevated troponin. Pt underwent LHC 7/5: LM okay, prox LAD of 80-90%, mid Lcx of 80-90%, OM of 90%, ostial RCA of 90%. LFA access site. LVEDP: 28. On Aspirin 81mg. Plan to be transferred to [facility] for CT surgery consult with Dr. Alma Mabry.    (06 Jul 2023 10:18)      PAST MEDICAL & SURGICAL HISTORY:  Heart failure      HLD (hyperlipidemia)      Glaucoma      Cataract      ESRD (end stage renal disease) on dialysis      PAD (peripheral artery disease)      Blind right eye      Acute osteomyelitis of toe of right foot  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    MEDICATIONS  (STANDING):  aspirin enteric coated 81 milliGRAM(s) Oral daily  atorvastatin 40 milliGRAM(s) Oral at bedtime  cefuroxime  IVPB 1500 milliGRAM(s) IV Intermittent once  chlorhexidine 0.12% Liquid 15 milliLiter(s) Swish and Spit once  chlorhexidine 4% Liquid 1 Application(s) Topical once  heparin   Injectable 5000 Unit(s) SubCutaneous every 12 hours  metoprolol tartrate 25 milliGRAM(s) Oral every 12 hours  multivitamin 1 Tablet(s) Oral daily  senna 2 Tablet(s) Oral at bedtime  sodium chloride 0.9% lock flush 3 milliLiter(s) IV Push every 8 hours    Labs:                        10.4   7.00  )-----------( 227      ( 12 Jul 2023 05:32 )             33.3     142  |  97  |  54<H>  ----------------------------<  77  4.4   |  25  |  9.14<H>    Ca    10.1      12 Jul 2023 05:32  Phos  4.9     07-11  Mg     2.2     07-11    TPro  6.9  /  Alb  4.0  /  TBili  0.2  /  DBili  x   /  AST  13  /  ALT  10  /  AlkPhos  72  07-1    Blood Type:   HGB A1C: A1C with Estimated Average Glucose (07.06.23 @ 06:37)    A1C with Estimated Average Glucose Result: 4.9: High Risk (prediabetic)    5.7 - 6.4 %  Diabetic, diagnostic           > 6.5 %  ADA diabetic treatment goal    < 7.0 %  HbA1C values may not accurately reflect mean blood glucose in patients  with Hb variants.  Suggest clinical correlation. %   Estimated Average Glucose: 94    Thyroid Panel: 07-06 @ 13:26/2.21  --/--/--    MRSA:  / MSSA: MRSA/MSSA PCR (07.06.23 @ 13:26)    MRSA PCR Result.: NotDetec: The results of this test should be interpreted with consideration of  clinical context.  Not Detected result indicates the absence of organisms or that the number  of organisms is below the assay limit of detection.  Detected result indicates the presence of organism nucleic acid.  Indeterminate result may indicate the presence of amplification  inhibitors in specimen; presence or absence of organisms cannot be  determined. Consider collecting new specimen if further testing is still  needed.  This qualitative PCR assay is FDA-approved, and its performance was  established by [facility], [place], [place].   Staph aureus PCR Result: NotDete    Urinalysis Basic - ( 12 Jul 2023 05:32 )    Color: x / Appearance: x / SG: x / pH: x  Gluc: 77 mg/dL / Ketone: x  / Bili: x / Urobili: x   Blood: x / Protein: x / Nitrite: x   Leuk Esterase: x / RBC: x / WBC x   Sq Epi: x / Non Sq Epi: x / Bacteria: x    CXR: < from: Xray Chest 1 View- PORTABLE-Routine (Xray Chest 1 View- PORTABLE-Routine .) (07.06.23 @ 13:33) >  FINDINGS:  Heart/Vascular: Cardiomediastinal silhouette is within normal limits  Pulmonary: No focal consolidations. No pleural effusion or pneumothorax.  Bones: No acute bony finding    Impression:  No active pulmonary disease.    EKG: < from: 12 Lead ECG (11.30.22 @ 11:17) >    Diagnosis Line Normal sinus rhythm  Cannot rule out Anterior infarct , age undetermined  Abnormal ECg    Carotid Duplex:  < from: [facility] Duplex Upper Extrem Arterial, Bilat (07.07.23 @ 11:12) >    IMPRESSION:    Positive Juan J's test was NOT demonstrated on this study for either upper   extremity.    PFT's:    Echocardiogram: < from: TTE W or WO Ultrasound Enhancing Agent (07.06.23 @ 15:46) >  CONCLUSIONS:      1. Normal left ventricular cavity size. The left ventricular wall thickness is normal. The left ventricular systolic function is normal with an ejection fraction of 63 % by Obrien's method of disks. There are regional wall motion abnormalities.   2. Apical septal segment is abnormal.   3. The right ventricle is not well visualized. Normal right ventricular cavity size, normal right ventricular wall thickness and normal right ventricular systolic function. The tricuspid annular plane systolic excursion (TAPSE) is 1.8 cm (normal >=1.7 cm).   4. There is moderate mitral valve stenosis.   5. Mild mitralregurgitation.   6. Trileaflet aortic valve with normal systolic excursion.   7. Mild aortic regurgitation.   8. No pericardial effusion seen.   9. No prior echocardiogram is available for comparison.    Cardiac catheterization: < from: Cardiac Catheterization (07.05.23 @ 16:05) >      LM   Left main artery: Angiography shows no disease. JESSEE Flow 3.      LAD   Left anterior descending artery: Angiography shows severe  atherosclerosis. Proximal to Mid LAD calcified sequential stenoses  80-90%. . There is a 90 % stenosis in the proximal third portion of  the segment. JESSEE Flow 3.    CX   Circumflex: Angiography shows severe atherosclerosis. 80-90% stenosis  calcified. . There is a 90 % stenosis in the middle  third portion of the segment. JESSEE Flow 3. First obtuse marginal:  Angiography shows severe atherosclerosis. 90% stenosis.  There is a 90 % stenosis in the proximal third portion of the segment.  JESSEE Flow 3.    RCA   Right coronary artery: Angiography showssevere atherosclerosis.  Severe pressure dampening and ventricularization occurred  upon cannulation. . There is an 80 % stenosis in the ostium portion of  the segment. JESSEE Flow 3.    Gen: WN/WD NAD  Neuro: AAOx3, nonfocal  Pulm: CTA B/L  CV: RRR, S1S2   Abd: Soft, NT, ND +BS  Ext: b/l aka- r thigh av fistula - +thrill + bruit      Pt has AICD/PPM [ ] Yes  [x ] No             Brand Name:  Pre-op Beta Blocker ordered within 24 hrs of surgery (CABG ONLY)?  [x ] Yes  [ ] No  If not, Why?  Type & Cross  [x ] Yes  [ ] No  NPO after Midnight [x ] Yes  [ ] No  Pre-op ABX ordered, to be taped on chart:  [ x] Yes  [ ] No     Hibiclens/Peridex ordered [x ] Yes  [ ] No  Intraop on Hold: PRBCs, CXR, SHILPA [x ]   Consent obtained  [x ] Yes  [ ] No

## 2023-07-12 NOTE — PROGRESS NOTE ADULT - SUBJECTIVE AND OBJECTIVE BOX
[facility] - TIFFANY Bazzi / TIFFANY Shea / APARNA Vanegas/ TIFFANY Mcfarlane/ TIFFANY Madden/ MASSIEL Perkins / RAINER Potts / ESTEPHANIE Griffith  ---------------------------------------------------------------------------------------------------------------  seen and examined today for ESRD  Interval : NAD  VITALS:  T(F): 97.9 (07-12-23 @ 04:16), Max: 98.2 (07-11-23 @ 19:37)  HR: 68 (07-12-23 @ 06:20)  BP: 143/72 (07-12-23 @ 06:20)  RR: 18 (07-12-23 @ 04:16)  SpO2: 99% (07-12-23 @ 04:16)  Wt(kg): --    07-11 @ 07:01  -  07-12 @ 07:00  --------------------------------------------------------  IN: 1000 mL / OUT: 0 mL / NET: 1000 mL      Physical Exam :-  Constitutional: NAD  Neck: Supple.  Respiratory: Bilateral equal breath sounds,  Cardiovascular: S1, S2 normal,  Gastrointestinal: Bowel Sounds present, soft, non tender.  Extremities: B/L BKA, right thigh graft  Neurological: Alert and Oriented x 3, no focal deficits  Psychiatric: Normal mood, normal affect  Data:-  Allergies :   No Known Allergies    Hospital Medications:   MEDICATIONS  (STANDING):  aspirin enteric coated 81 milliGRAM(s) Oral daily  atorvastatin 40 milliGRAM(s) Oral at bedtime  heparin   Injectable 5000 Unit(s) SubCutaneous every 12 hours  metoprolol tartrate 25 milliGRAM(s) Oral every 12 hours  multivitamin 1 Tablet(s) Oral daily  senna 2 Tablet(s) Oral at bedtime  sodium chloride 0.9% lock flush 3 milliLiter(s) IV Push every 8 hours    07-12    142  |  97  |  54<H>  ----------------------------<  77  4.4   |  25  |  9.14<H>    Ca    10.1      12 Jul 2023 05:32  Phos  4.9     07-11  Mg     2.2     07-11    TPro  6.9  /  Alb  4.0  /  TBili  0.2  /  DBili      /  AST  13  /  ALT  10  /  AlkPhos  72  07-11    Creatinine Trend: 9.14 <--, 7.31 <--, 10.24 <--, 8.17 <--, 6.41 <--, 8.26 <--, 6.36 <--, 9.64 <--                        10.4   7.00  )-----------( 227      ( 12 Jul 2023 05:32 )             33.3

## 2023-07-12 NOTE — PROGRESS NOTE ADULT - ASSESSMENT
63 y/o F with PMH of ESRD(on HD M/W/F thru right groin AVF), HTN, HLD, DM type II, bilateral BKA, right eye blindness presented initially to [facility] on 07/03 from rehab center for acute SOB and fluid overload and now transferred to [facility] for LHC due to elevated troponin. Pt underwent LHC 7/5: LM okay, prox LAD of 80-90%, mid Lcx of 80-90%, OM of 90%, ostial RCA of 90%. LFA access site. LVEDP: 28. On Aspirin 81mg. Plan to be transferred to [facility] for CT surgery consult with Dr. Alma Mabry.     1) ESRD on HD  MWF  access: right upper thigh avg  consent in chart  Plan for HD today  trend bmp    2) Anemia in ckd  hgb is 9.9  due to active coronary disease hold liv for now  monitor hgb    3) CAD- plan for CABG Thursday      For any question, call:  Cell # [phone]  Pager # [phone]  Callback # [phone]

## 2023-07-13 ENCOUNTER — APPOINTMENT (OUTPATIENT)
Dept: CARDIOTHORACIC SURGERY | Facility: HOSPITAL | Age: 63
End: 2023-07-13

## 2023-07-13 LAB
ALBUMIN SERPL ELPH-MCNC: 3.6 G/DL — SIGNIFICANT CHANGE UP (ref 3.3–5)
ALP SERPL-CCNC: 49 U/L — SIGNIFICANT CHANGE UP (ref 40–120)
ALT FLD-CCNC: 23 U/L — SIGNIFICANT CHANGE UP (ref 10–45)
ANION GAP SERPL CALC-SCNC: 22 MMOL/L — HIGH (ref 5–17)
APTT BLD: 32.5 SEC — SIGNIFICANT CHANGE UP (ref 27.5–35.5)
AST SERPL-CCNC: 46 U/L — HIGH (ref 10–40)
BASE EXCESS BLDMV CALC-SCNC: -1.2 MMOL/L — SIGNIFICANT CHANGE UP (ref -3–3)
BASE EXCESS BLDMV CALC-SCNC: -1.5 MMOL/L — SIGNIFICANT CHANGE UP (ref -3–3)
BASE EXCESS BLDV CALC-SCNC: -4 MMOL/L — LOW (ref -2–3)
BASE EXCESS BLDV CALC-SCNC: 3.5 MMOL/L — HIGH (ref -2–3)
BASE EXCESS BLDV CALC-SCNC: 3.8 MMOL/L — HIGH (ref -2–3)
BASE EXCESS BLDV CALC-SCNC: 3.9 MMOL/L — HIGH (ref -2–3)
BASE EXCESS BLDV CALC-SCNC: 4.2 MMOL/L — HIGH (ref -2–3)
BASE EXCESS BLDV CALC-SCNC: 4.5 MMOL/L — HIGH (ref -2–3)
BASOPHILS # BLD AUTO: 0.02 K/UL — SIGNIFICANT CHANGE UP (ref 0–0.2)
BASOPHILS NFR BLD AUTO: 0.2 % — SIGNIFICANT CHANGE UP (ref 0–2)
BILIRUB SERPL-MCNC: 0.5 MG/DL — SIGNIFICANT CHANGE UP (ref 0.2–1.2)
BLOOD GAS VENOUS - CREATININE: SIGNIFICANT CHANGE UP MG/DL (ref 0.5–1.3)
BUN SERPL-MCNC: 30 MG/DL — HIGH (ref 7–23)
CA-I SERPL-SCNC: 0.77 MMOL/L — LOW (ref 1.15–1.33)
CA-I SERPL-SCNC: 0.89 MMOL/L — LOW (ref 1.15–1.33)
CA-I SERPL-SCNC: 0.94 MMOL/L — LOW (ref 1.15–1.33)
CA-I SERPL-SCNC: 0.94 MMOL/L — LOW (ref 1.15–1.33)
CA-I SERPL-SCNC: 0.95 MMOL/L — LOW (ref 1.15–1.33)
CA-I SERPL-SCNC: 0.95 MMOL/L — LOW (ref 1.15–1.33)
CALCIUM SERPL-MCNC: 9.5 MG/DL — SIGNIFICANT CHANGE UP (ref 8.4–10.5)
CHLORIDE BLDV-SCNC: 96 MMOL/L — SIGNIFICANT CHANGE UP (ref 96–108)
CHLORIDE BLDV-SCNC: 97 MMOL/L — SIGNIFICANT CHANGE UP (ref 96–108)
CHLORIDE BLDV-SCNC: 98 MMOL/L — SIGNIFICANT CHANGE UP (ref 96–108)
CHLORIDE BLDV-SCNC: 99 MMOL/L — SIGNIFICANT CHANGE UP (ref 96–108)
CHLORIDE SERPL-SCNC: 99 MMOL/L — SIGNIFICANT CHANGE UP (ref 96–108)
CK MB BLD-MCNC: 7.7 % — HIGH (ref 0–3.5)
CK MB CFR SERPL CALC: 47.8 NG/ML — HIGH (ref 0–3.8)
CK SERPL-CCNC: 624 U/L — HIGH (ref 25–170)
CO2 BLDMV-SCNC: 25 MMOL/L — SIGNIFICANT CHANGE UP (ref 21–29)
CO2 BLDMV-SCNC: 26 MMOL/L — SIGNIFICANT CHANGE UP (ref 21–29)
CO2 BLDV-SCNC: 21 MMOL/L — LOW (ref 22–26)
CO2 BLDV-SCNC: 29 MMOL/L — HIGH (ref 22–26)
CO2 BLDV-SCNC: 29 MMOL/L — HIGH (ref 22–26)
CO2 BLDV-SCNC: 30 MMOL/L — HIGH (ref 22–26)
CO2 SERPL-SCNC: 19 MMOL/L — LOW (ref 22–31)
CREAT SERPL-MCNC: 5.41 MG/DL — HIGH (ref 0.5–1.3)
EGFR: 8 ML/MIN/1.73M2 — LOW
EOSINOPHIL # BLD AUTO: 0.03 K/UL — SIGNIFICANT CHANGE UP (ref 0–0.5)
EOSINOPHIL NFR BLD AUTO: 0.3 % — SIGNIFICANT CHANGE UP (ref 0–6)
FIBRINOGEN PPP-MCNC: 224 MG/DL — SIGNIFICANT CHANGE UP (ref 200–445)
GAS PNL BLDA: SIGNIFICANT CHANGE UP
GAS PNL BLDMV: SIGNIFICANT CHANGE UP
GAS PNL BLDMV: SIGNIFICANT CHANGE UP
GAS PNL BLDV: 133 MMOL/L — LOW (ref 136–145)
GAS PNL BLDV: 135 MMOL/L — LOW (ref 136–145)
GAS PNL BLDV: 136 MMOL/L — SIGNIFICANT CHANGE UP (ref 136–145)
GAS PNL BLDV: 137 MMOL/L — SIGNIFICANT CHANGE UP (ref 136–145)
GAS PNL BLDV: SIGNIFICANT CHANGE UP
GLUCOSE BLDC GLUCOMTR-MCNC: 111 MG/DL — HIGH (ref 70–99)
GLUCOSE BLDC GLUCOMTR-MCNC: 112 MG/DL — HIGH (ref 70–99)
GLUCOSE BLDC GLUCOMTR-MCNC: 129 MG/DL — HIGH (ref 70–99)
GLUCOSE BLDC GLUCOMTR-MCNC: 138 MG/DL — HIGH (ref 70–99)
GLUCOSE BLDC GLUCOMTR-MCNC: 154 MG/DL — HIGH (ref 70–99)
GLUCOSE BLDC GLUCOMTR-MCNC: 167 MG/DL — HIGH (ref 70–99)
GLUCOSE BLDC GLUCOMTR-MCNC: 95 MG/DL — SIGNIFICANT CHANGE UP (ref 70–99)
GLUCOSE BLDV-MCNC: 104 MG/DL — HIGH (ref 70–99)
GLUCOSE BLDV-MCNC: 109 MG/DL — HIGH (ref 70–99)
GLUCOSE BLDV-MCNC: 110 MG/DL — HIGH (ref 70–99)
GLUCOSE BLDV-MCNC: 114 MG/DL — HIGH (ref 70–99)
GLUCOSE BLDV-MCNC: 118 MG/DL — HIGH (ref 70–99)
GLUCOSE BLDV-MCNC: 121 MG/DL — HIGH (ref 70–99)
GLUCOSE SERPL-MCNC: 114 MG/DL — HIGH (ref 70–99)
HCO3 BLDMV-SCNC: 24 MMOL/L — SIGNIFICANT CHANGE UP (ref 20–28)
HCO3 BLDMV-SCNC: 24 MMOL/L — SIGNIFICANT CHANGE UP (ref 20–28)
HCO3 BLDV-SCNC: 20 MMOL/L — LOW (ref 22–29)
HCO3 BLDV-SCNC: 28 MMOL/L — SIGNIFICANT CHANGE UP (ref 22–29)
HCO3 BLDV-SCNC: 29 MMOL/L — SIGNIFICANT CHANGE UP (ref 22–29)
HCT VFR BLD CALC: 32 % — LOW (ref 34.5–45)
HCT VFR BLDA CALC: 20 % — CRITICAL LOW (ref 34.5–46.5)
HCT VFR BLDA CALC: 22 % — LOW (ref 34.5–46.5)
HCT VFR BLDA CALC: 23 % — LOW (ref 34.5–46.5)
HCT VFR BLDA CALC: 23 % — LOW (ref 34.5–46.5)
HCT VFR BLDA CALC: 24 % — LOW (ref 34.5–46.5)
HCT VFR BLDA CALC: 24 % — LOW (ref 34.5–46.5)
HGB BLD CALC-MCNC: 6.6 G/DL — CRITICAL LOW (ref 11.7–16.1)
HGB BLD CALC-MCNC: 7.2 G/DL — LOW (ref 11.7–16.1)
HGB BLD CALC-MCNC: 7.5 G/DL — LOW (ref 11.7–16.1)
HGB BLD CALC-MCNC: 7.6 G/DL — LOW (ref 11.7–16.1)
HGB BLD CALC-MCNC: 7.9 G/DL — LOW (ref 11.7–16.1)
HGB BLD CALC-MCNC: 8 G/DL — LOW (ref 11.7–16.1)
HGB BLD-MCNC: 10.8 G/DL — LOW (ref 11.5–15.5)
HOROWITZ INDEX BLDMV+IHG-RTO: 100 — SIGNIFICANT CHANGE UP
HOROWITZ INDEX BLDMV+IHG-RTO: 50 — SIGNIFICANT CHANGE UP
IMM GRANULOCYTES NFR BLD AUTO: 1.2 % — HIGH (ref 0–0.9)
INR BLD: 1.39 RATIO — HIGH (ref 0.88–1.16)
LACTATE BLDV-MCNC: 0.5 MMOL/L — SIGNIFICANT CHANGE UP (ref 0.5–2)
LACTATE BLDV-MCNC: 0.5 MMOL/L — SIGNIFICANT CHANGE UP (ref 0.5–2)
LACTATE BLDV-MCNC: 0.6 MMOL/L — SIGNIFICANT CHANGE UP (ref 0.5–2)
LACTATE BLDV-MCNC: 0.6 MMOL/L — SIGNIFICANT CHANGE UP (ref 0.5–2)
LACTATE BLDV-MCNC: 0.7 MMOL/L — SIGNIFICANT CHANGE UP (ref 0.5–2)
LACTATE BLDV-MCNC: 0.8 MMOL/L — SIGNIFICANT CHANGE UP (ref 0.5–2)
LYMPHOCYTES # BLD AUTO: 0.99 K/UL — LOW (ref 1–3.3)
LYMPHOCYTES # BLD AUTO: 10.7 % — LOW (ref 13–44)
MAGNESIUM SERPL-MCNC: 2 MG/DL — SIGNIFICANT CHANGE UP (ref 1.6–2.6)
MANUAL SMEAR VERIFICATION: SIGNIFICANT CHANGE UP
MCHC RBC-ENTMCNC: 29 PG — SIGNIFICANT CHANGE UP (ref 27–34)
MCHC RBC-ENTMCNC: 33.8 GM/DL — SIGNIFICANT CHANGE UP (ref 32–36)
MCV RBC AUTO: 86 FL — SIGNIFICANT CHANGE UP (ref 80–100)
MONOCYTES # BLD AUTO: 0.62 K/UL — SIGNIFICANT CHANGE UP (ref 0–0.9)
MONOCYTES NFR BLD AUTO: 6.7 % — SIGNIFICANT CHANGE UP (ref 2–14)
NEUTROPHILS # BLD AUTO: 7.46 K/UL — HIGH (ref 1.8–7.4)
NEUTROPHILS NFR BLD AUTO: 80.9 % — HIGH (ref 43–77)
NRBC # BLD: 0 /100 WBCS — SIGNIFICANT CHANGE UP (ref 0–0)
O2 CT VFR BLD CALC: 45 MMHG — SIGNIFICANT CHANGE UP (ref 30–65)
O2 CT VFR BLD CALC: 52 MMHG — SIGNIFICANT CHANGE UP (ref 30–65)
PCO2 BLDMV: 39 MMHG — SIGNIFICANT CHANGE UP (ref 30–65)
PCO2 BLDMV: 44 MMHG — SIGNIFICANT CHANGE UP (ref 30–65)
PCO2 BLDV: 31 MMHG — LOW (ref 39–42)
PCO2 BLDV: 37 MMHG — LOW (ref 39–42)
PCO2 BLDV: 38 MMHG — LOW (ref 39–42)
PCO2 BLDV: 40 MMHG — SIGNIFICANT CHANGE UP (ref 39–42)
PCO2 BLDV: 40 MMHG — SIGNIFICANT CHANGE UP (ref 39–42)
PCO2 BLDV: 47 MMHG — HIGH (ref 39–42)
PH BLDMV: 7.35 — SIGNIFICANT CHANGE UP (ref 7.32–7.45)
PH BLDMV: 7.39 — SIGNIFICANT CHANGE UP (ref 7.32–7.45)
PH BLDV: 7.4 — SIGNIFICANT CHANGE UP (ref 7.32–7.43)
PH BLDV: 7.42 — SIGNIFICANT CHANGE UP (ref 7.32–7.43)
PH BLDV: 7.45 — HIGH (ref 7.32–7.43)
PH BLDV: 7.46 — HIGH (ref 7.32–7.43)
PH BLDV: 7.48 — HIGH (ref 7.32–7.43)
PH BLDV: 7.48 — HIGH (ref 7.32–7.43)
PHOSPHATE SERPL-MCNC: 4.9 MG/DL — HIGH (ref 2.5–4.5)
PLAT MORPH BLD: NORMAL — SIGNIFICANT CHANGE UP
PLATELET # BLD AUTO: 113 K/UL — LOW (ref 150–400)
PO2 BLDV: 114 MMHG — HIGH (ref 25–45)
PO2 BLDV: 140 MMHG — HIGH (ref 25–45)
PO2 BLDV: 146 MMHG — HIGH (ref 25–45)
PO2 BLDV: 86 MMHG — HIGH (ref 25–45)
PO2 BLDV: 89 MMHG — HIGH (ref 25–45)
PO2 BLDV: 90 MMHG — HIGH (ref 25–45)
POTASSIUM BLDV-SCNC: 3.7 MMOL/L — SIGNIFICANT CHANGE UP (ref 3.5–5.1)
POTASSIUM BLDV-SCNC: 3.7 MMOL/L — SIGNIFICANT CHANGE UP (ref 3.5–5.1)
POTASSIUM BLDV-SCNC: 3.9 MMOL/L — SIGNIFICANT CHANGE UP (ref 3.5–5.1)
POTASSIUM BLDV-SCNC: 3.9 MMOL/L — SIGNIFICANT CHANGE UP (ref 3.5–5.1)
POTASSIUM BLDV-SCNC: 4.1 MMOL/L — SIGNIFICANT CHANGE UP (ref 3.5–5.1)
POTASSIUM BLDV-SCNC: 4.8 MMOL/L — SIGNIFICANT CHANGE UP (ref 3.5–5.1)
POTASSIUM SERPL-MCNC: 4 MMOL/L — SIGNIFICANT CHANGE UP (ref 3.5–5.3)
POTASSIUM SERPL-SCNC: 4 MMOL/L — SIGNIFICANT CHANGE UP (ref 3.5–5.3)
PROT SERPL-MCNC: 5.4 G/DL — LOW (ref 6–8.3)
PROTHROM AB SERPL-ACNC: 16.2 SEC — HIGH (ref 10.5–13.4)
RBC # BLD: 3.72 M/UL — LOW (ref 3.8–5.2)
RBC # FLD: 15.5 % — HIGH (ref 10.3–14.5)
RBC BLD AUTO: SIGNIFICANT CHANGE UP
SAO2 % BLDMV: 75.7 — SIGNIFICANT CHANGE UP (ref 60–90)
SAO2 % BLDMV: 83.9 — SIGNIFICANT CHANGE UP (ref 60–90)
SAO2 % BLDV: 96.2 % — HIGH (ref 67–88)
SAO2 % BLDV: 96.2 % — HIGH (ref 67–88)
SAO2 % BLDV: 96.5 % — HIGH (ref 67–88)
SAO2 % BLDV: 96.5 % — HIGH (ref 67–88)
SAO2 % BLDV: 97.3 % — HIGH (ref 67–88)
SAO2 % BLDV: 97.7 % — HIGH (ref 67–88)
SODIUM SERPL-SCNC: 140 MMOL/L — SIGNIFICANT CHANGE UP (ref 135–145)
TROPONIN T, HIGH SENSITIVITY RESULT: 1484 NG/L — HIGH (ref 0–51)
WBC # BLD: 9.23 K/UL — SIGNIFICANT CHANGE UP (ref 3.8–10.5)
WBC # FLD AUTO: 9.23 K/UL — SIGNIFICANT CHANGE UP (ref 3.8–10.5)

## 2023-07-13 PROCEDURE — 99291 CRITICAL CARE FIRST HOUR: CPT

## 2023-07-13 PROCEDURE — 93010 ELECTROCARDIOGRAM REPORT: CPT

## 2023-07-13 PROCEDURE — 71045 X-RAY EXAM CHEST 1 VIEW: CPT | Mod: 26

## 2023-07-13 PROCEDURE — 33533 CABG ARTERIAL SINGLE: CPT

## 2023-07-13 PROCEDURE — 33518 CABG ARTERY-VEIN TWO: CPT

## 2023-07-13 PROCEDURE — 33508 ENDOSCOPIC VEIN HARVEST: CPT | Mod: 59

## 2023-07-13 PROCEDURE — 94010 BREATHING CAPACITY TEST: CPT | Mod: 26

## 2023-07-13 DEVICE — LIGATING CLIPS WECK HORIZON MEDIUM (BLUE) 24: Type: IMPLANTABLE DEVICE | Status: FUNCTIONAL

## 2023-07-13 DEVICE — ATRICLIP LAA EXCLUSION DEVICE 40MM FLEX-V: Type: IMPLANTABLE DEVICE | Status: FUNCTIONAL

## 2023-07-13 DEVICE — OCCLUDER INTERNAL VESSEL FLO-RESTER 1 X 12MM: Type: IMPLANTABLE DEVICE | Status: FUNCTIONAL

## 2023-07-13 DEVICE — KIT CVC 2LUM MAC 9FR CHG: Type: IMPLANTABLE DEVICE | Status: FUNCTIONAL

## 2023-07-13 DEVICE — CANNULA VENOUS 2 STAGE LIGHTHOUSE TIP 32/40FR X 1/2": Type: IMPLANTABLE DEVICE | Status: FUNCTIONAL

## 2023-07-13 DEVICE — LIGATING CLIPS WECK HORIZON SMALL-WIDE (RED) 24: Type: IMPLANTABLE DEVICE | Status: FUNCTIONAL

## 2023-07-13 DEVICE — CATH VENT VENTRICULAR PVC 18FR X 4.25" TIP PERFORATION: Type: IMPLANTABLE DEVICE | Status: FUNCTIONAL

## 2023-07-13 DEVICE — MEDIASTINAL CATH DRAIN 9MM: Type: IMPLANTABLE DEVICE | Status: FUNCTIONAL

## 2023-07-13 DEVICE — CANNULA RCSP 15FR X 12.5" AUTO-INFLATE CUFF WITH SOLID STYLET: Type: IMPLANTABLE DEVICE | Status: FUNCTIONAL

## 2023-07-13 DEVICE — CANNULA AORTIC ROOT WITH VENT LINE 9G X 13.3CM FLANGED PRESSURE MONITORING: Type: IMPLANTABLE DEVICE | Status: FUNCTIONAL

## 2023-07-13 DEVICE — KIT A-LINE 1LUM 20G X 12CM SAFE KIT: Type: IMPLANTABLE DEVICE | Status: FUNCTIONAL

## 2023-07-13 DEVICE — SHUNT FLO-THRU INTRALUMINAL 1MM X 18MM: Type: IMPLANTABLE DEVICE | Status: FUNCTIONAL

## 2023-07-13 DEVICE — CHEST DRAIN THORACIC ARGYLE PVC 32FR RIGHT ANGLE: Type: IMPLANTABLE DEVICE | Status: FUNCTIONAL

## 2023-07-13 DEVICE — CANNULA AORTIC PERFUSION EZ GLIDE STRAIGHT 21FR X 35CM NON-VENTED: Type: IMPLANTABLE DEVICE | Status: FUNCTIONAL

## 2023-07-13 RX ORDER — ACETAMINOPHEN 500 MG
650 TABLET ORAL EVERY 6 HOURS
Refills: 0 | Status: COMPLETED | OUTPATIENT
Start: 2023-07-13 | End: 2023-07-16

## 2023-07-13 RX ORDER — DEXTROSE 50 % IN WATER 50 %
50 SYRINGE (ML) INTRAVENOUS
Refills: 0 | Status: DISCONTINUED | OUTPATIENT
Start: 2023-07-13 | End: 2023-07-21

## 2023-07-13 RX ORDER — DEXTROSE 50 % IN WATER 50 %
25 SYRINGE (ML) INTRAVENOUS
Refills: 0 | Status: DISCONTINUED | OUTPATIENT
Start: 2023-07-13 | End: 2023-07-21

## 2023-07-13 RX ORDER — OXYCODONE HYDROCHLORIDE 5 MG/1
10 TABLET ORAL EVERY 4 HOURS
Refills: 0 | Status: DISCONTINUED | OUTPATIENT
Start: 2023-07-13 | End: 2023-07-17

## 2023-07-13 RX ORDER — ASCORBIC ACID 60 MG
500 TABLET,CHEWABLE ORAL
Refills: 0 | Status: COMPLETED | OUTPATIENT
Start: 2023-07-13 | End: 2023-07-18

## 2023-07-13 RX ORDER — POTASSIUM CHLORIDE 20 MEQ
10 PACKET (EA) ORAL
Refills: 0 | Status: DISCONTINUED | OUTPATIENT
Start: 2023-07-13 | End: 2023-07-13

## 2023-07-13 RX ORDER — GABAPENTIN 400 MG/1
100 CAPSULE ORAL EVERY 8 HOURS
Refills: 0 | Status: DISCONTINUED | OUTPATIENT
Start: 2023-07-13 | End: 2023-07-13

## 2023-07-13 RX ORDER — HYDROMORPHONE HYDROCHLORIDE 2 MG/ML
0.5 INJECTION INTRAMUSCULAR; INTRAVENOUS; SUBCUTANEOUS EVERY 6 HOURS
Refills: 0 | Status: DISCONTINUED | OUTPATIENT
Start: 2023-07-13 | End: 2023-07-15

## 2023-07-13 RX ORDER — ACETAMINOPHEN 500 MG
1000 TABLET ORAL ONCE
Refills: 0 | Status: COMPLETED | OUTPATIENT
Start: 2023-07-13 | End: 2023-07-13

## 2023-07-13 RX ORDER — CHLORHEXIDINE GLUCONATE 213 G/1000ML
1 SOLUTION TOPICAL DAILY
Refills: 0 | Status: DISCONTINUED | OUTPATIENT
Start: 2023-07-13 | End: 2023-07-25

## 2023-07-13 RX ORDER — DOBUTAMINE HCL 250MG/20ML
1.25 VIAL (ML) INTRAVENOUS
Qty: 500 | Refills: 0 | Status: DISCONTINUED | OUTPATIENT
Start: 2023-07-13 | End: 2023-07-13

## 2023-07-13 RX ORDER — SODIUM CHLORIDE 9 MG/ML
1000 INJECTION INTRAMUSCULAR; INTRAVENOUS; SUBCUTANEOUS
Refills: 0 | Status: DISCONTINUED | OUTPATIENT
Start: 2023-07-13 | End: 2023-07-17

## 2023-07-13 RX ORDER — INSULIN HUMAN 100 [IU]/ML
3 INJECTION, SOLUTION SUBCUTANEOUS
Qty: 100 | Refills: 0 | Status: DISCONTINUED | OUTPATIENT
Start: 2023-07-13 | End: 2023-07-14

## 2023-07-13 RX ORDER — SUGAMMADEX 100 MG/ML
200 INJECTION, SOLUTION INTRAVENOUS ONCE
Refills: 0 | Status: COMPLETED | OUTPATIENT
Start: 2023-07-13 | End: 2023-07-13

## 2023-07-13 RX ORDER — ASPIRIN/CALCIUM CARB/MAGNESIUM 324 MG
81 TABLET ORAL DAILY
Refills: 0 | Status: DISCONTINUED | OUTPATIENT
Start: 2023-07-13 | End: 2023-07-25

## 2023-07-13 RX ORDER — POLYETHYLENE GLYCOL 3350 17 G/17G
17 POWDER, FOR SOLUTION ORAL DAILY
Refills: 0 | Status: DISCONTINUED | OUTPATIENT
Start: 2023-07-14 | End: 2023-07-25

## 2023-07-13 RX ORDER — OXYCODONE HYDROCHLORIDE 5 MG/1
5 TABLET ORAL EVERY 4 HOURS
Refills: 0 | Status: DISCONTINUED | OUTPATIENT
Start: 2023-07-13 | End: 2023-07-18

## 2023-07-13 RX ORDER — ACETAMINOPHEN 500 MG
650 TABLET ORAL EVERY 6 HOURS
Refills: 0 | Status: COMPLETED | OUTPATIENT
Start: 2023-07-16 | End: 2024-06-13

## 2023-07-13 RX ORDER — ASPIRIN/CALCIUM CARB/MAGNESIUM 324 MG
300 TABLET ORAL ONCE
Refills: 0 | Status: DISCONTINUED | OUTPATIENT
Start: 2023-07-13 | End: 2023-07-13

## 2023-07-13 RX ORDER — CHLORHEXIDINE GLUCONATE 213 G/1000ML
15 SOLUTION TOPICAL EVERY 12 HOURS
Refills: 0 | Status: DISCONTINUED | OUTPATIENT
Start: 2023-07-13 | End: 2023-07-13

## 2023-07-13 RX ORDER — PANTOPRAZOLE SODIUM 20 MG/1
40 TABLET, DELAYED RELEASE ORAL DAILY
Refills: 0 | Status: DISCONTINUED | OUTPATIENT
Start: 2023-07-13 | End: 2023-07-15

## 2023-07-13 RX ORDER — CEFUROXIME AXETIL 250 MG
1500 TABLET ORAL EVERY 24 HOURS
Refills: 0 | Status: COMPLETED | OUTPATIENT
Start: 2023-07-14 | End: 2023-07-15

## 2023-07-13 RX ORDER — NICARDIPINE HYDROCHLORIDE 30 MG/1
2.5 CAPSULE, EXTENDED RELEASE ORAL
Qty: 40 | Refills: 0 | Status: DISCONTINUED | OUTPATIENT
Start: 2023-07-13 | End: 2023-07-15

## 2023-07-13 RX ORDER — DEXMEDETOMIDINE HYDROCHLORIDE IN 0.9% SODIUM CHLORIDE 4 UG/ML
0.3 INJECTION INTRAVENOUS
Qty: 200 | Refills: 0 | Status: DISCONTINUED | OUTPATIENT
Start: 2023-07-13 | End: 2023-07-13

## 2023-07-13 RX ORDER — DEXAMETHASONE 0.5 MG/5ML
6 ELIXIR ORAL ONCE
Refills: 0 | Status: COMPLETED | OUTPATIENT
Start: 2023-07-13 | End: 2023-07-13

## 2023-07-13 RX ORDER — GABAPENTIN 400 MG/1
100 CAPSULE ORAL EVERY 12 HOURS
Refills: 0 | Status: COMPLETED | OUTPATIENT
Start: 2023-07-13 | End: 2023-07-18

## 2023-07-13 RX ORDER — DEXMEDETOMIDINE HYDROCHLORIDE IN 0.9% SODIUM CHLORIDE 4 UG/ML
0.5 INJECTION INTRAVENOUS
Qty: 200 | Refills: 0 | Status: DISCONTINUED | OUTPATIENT
Start: 2023-07-13 | End: 2023-07-13

## 2023-07-13 RX ORDER — SENNA PLUS 8.6 MG/1
2 TABLET ORAL AT BEDTIME
Refills: 0 | Status: DISCONTINUED | OUTPATIENT
Start: 2023-07-14 | End: 2023-07-25

## 2023-07-13 RX ORDER — AMIODARONE HYDROCHLORIDE 400 MG/1
400 TABLET ORAL
Refills: 0 | Status: DISCONTINUED | OUTPATIENT
Start: 2023-07-13 | End: 2023-07-15

## 2023-07-13 RX ADMIN — INSULIN HUMAN 3 UNIT(S)/HR: 100 INJECTION, SOLUTION SUBCUTANEOUS at 18:44

## 2023-07-13 RX ADMIN — SUGAMMADEX 200 MILLIGRAM(S): 100 INJECTION, SOLUTION INTRAVENOUS at 18:47

## 2023-07-13 RX ADMIN — SODIUM CHLORIDE 3 MILLILITER(S): 9 INJECTION INTRAMUSCULAR; INTRAVENOUS; SUBCUTANEOUS at 06:30

## 2023-07-13 RX ADMIN — HYDROMORPHONE HYDROCHLORIDE 0.5 MILLIGRAM(S): 2 INJECTION INTRAMUSCULAR; INTRAVENOUS; SUBCUTANEOUS at 19:00

## 2023-07-13 RX ADMIN — CHLORHEXIDINE GLUCONATE 15 MILLILITER(S): 213 SOLUTION TOPICAL at 06:24

## 2023-07-13 RX ADMIN — Medication 400 MILLIGRAM(S): at 21:14

## 2023-07-13 RX ADMIN — Medication 25 MILLIGRAM(S): at 06:24

## 2023-07-13 RX ADMIN — Medication 1000 MILLIGRAM(S): at 21:29

## 2023-07-13 RX ADMIN — Medication 81 MILLIGRAM(S): at 23:22

## 2023-07-13 RX ADMIN — NICARDIPINE HYDROCHLORIDE 12.5 MG/HR: 30 CAPSULE, EXTENDED RELEASE ORAL at 18:46

## 2023-07-13 RX ADMIN — CHLORHEXIDINE GLUCONATE 1 APPLICATION(S): 213 SOLUTION TOPICAL at 21:46

## 2023-07-13 RX ADMIN — HYDROMORPHONE HYDROCHLORIDE 0.5 MILLIGRAM(S): 2 INJECTION INTRAMUSCULAR; INTRAVENOUS; SUBCUTANEOUS at 18:45

## 2023-07-13 RX ADMIN — PANTOPRAZOLE SODIUM 40 MILLIGRAM(S): 20 TABLET, DELAYED RELEASE ORAL at 18:47

## 2023-07-13 RX ADMIN — Medication 6 MILLIGRAM(S): at 21:41

## 2023-07-13 RX ADMIN — CHLORHEXIDINE GLUCONATE 15 MILLILITER(S): 213 SOLUTION TOPICAL at 18:46

## 2023-07-13 NOTE — AIRWAY REMOVAL NOTE  ADULT & PEDS - ARTIFICAL AIRWAY REMOVAL COMMENTS
Written order for extubation verified. The patient was identified by full name and birth date compared to the identification band.  Present during the procedure was LADONNA Jenkins.

## 2023-07-13 NOTE — BRIEF OPERATIVE NOTE - COMMENTS
AoXC Time: 149"  Greater saphenous vein harvested endoscopically from L thigh with [employer] system  Post op CXR was negative for any retained objects as per attending.

## 2023-07-13 NOTE — PROGRESS NOTE ADULT - SUBJECTIVE AND OBJECTIVE BOX
Patient seen and examined at the bedside.    Remained critically ill on continuous ICU monitoring.    OBJECTIVE:  Vital Signs Last 24 Hrs  T(C): 37 (13 Jul 2023 19:00), Max: 37 (13 Jul 2023 19:00)  T(F): 98.6 (13 Jul 2023 19:00), Max: 98.6 (13 Jul 2023 19:00)  HR: 83 (13 Jul 2023 19:30) (59 - 91)  BP: 112/64 (13 Jul 2023 06:37) (93/58 - 124/57)  BP(mean): 70 (13 Jul 2023 06:37) (70 - 70)  RR: 15 (13 Jul 2023 19:30) (9 - 27)  SpO2: 100% (13 Jul 2023 19:30) (97% - 100%)    Parameters below as of 13 Jul 2023 20:00  Patient On (Oxygen Delivery Method): ventilator  O2 Flow (L/min): 50    Physical Exam:   General: Intubated  Neurology: Sedated   Eyes: bilateral pupils equal and reactive   ENT/Neck: +ETT midline, Neck supple, trachea midline, No JVD   Respiratory: Clear bilaterally   CV: S1S2, no murmurs        [x] Sternal dressing, [x] Mediastinal CT x2, [x] L Pleural CT        [x] Sinus rhythm, [x] Temporary pacing - BOX OFF  Abdominal: Soft, NT, ND +BS   Extremities: 1-2+ pedal edema noted, + peripheral pulses   Skin: No Rashes, Hematoma, Ecchymosis         ============================I/O===========================   I&O's Detail    12 Jul 2023 07:01  -  13 Jul 2023 07:00  --------------------------------------------------------  IN:    Oral Fluid: 770 mL  Total IN: 770 mL    OUT:    Other (mL): 500 mL    Voided (mL): 0 mL  Total OUT: 500 mL    Total NET: 270 mL    13 Jul 2023 07:01  -  13 Jul 2023 19:36  --------------------------------------------------------  IN:    DOBUTamine: 2.6 mL    NiCARdipine: 27.5 mL    sodium chloride 0.9%: 10 mL  Total IN: 40.1 mL    OUT:    Chest Tube (mL): 45 mL    Chest Tube (mL): 25 mL    Chest Tube (mL): 30 mL    Dexmedetomidine: 0 mL    Insulin: 0 mL  Total OUT: 100 mL    Total NET: -59.9 mL  ============================ LABS =========================             10.8   9.23  )-----------( 113      ( 13 Jul 2023 17:15 )             32.0     07-13    140  |  99  |  30<H>  ----------------------------<  114<H>  4.0   |  19<L>  |  5.41<H>    Ca    9.5      13 Jul 2023 17:15  Phos  4.9     07-13  Mg     2.0     07-13    TPro  5.4<L>  /  Alb  3.6  /  TBili  0.5  /  DBili  x   /  AST  46<H>  /  ALT  23  /  AlkPhos  49  07-13    LIVER FUNCTIONS - ( 13 Jul 2023 17:15 )  Alb: 3.6 g/dL / Pro: 5.4 g/dL / ALK PHOS: 49 U/L / ALT: 23 U/L / AST: 46 U/L / GGT: x           PT/INR - ( 13 Jul 2023 17:15 )   PT: 16.2 sec;   INR: 1.39 ratio         PTT - ( 13 Jul 2023 17:15 )  PTT:32.5 sec  ABG - ( 13 Jul 2023 19:20 )  pH, Arterial: 7.32  pH, Blood: x     /  pCO2: 43    /  pO2: 131   / HCO3: 22    / Base Excess: -3.8  /  SaO2: 97.5      Urinalysis Basic - ( 13 Jul 2023 17:15 )    Color: x / Appearance: x / SG: x / pH: x  Gluc: 114 mg/dL / Ketone: x  / Bili: x / Urobili: x   Blood: x / Protein: x / Nitrite: x   Leuk Esterase: x / RBC: x / WBC x   Sq Epi: x / Non Sq Epi: x / Bacteria: x  ======================Micro/Rad/Cardio=================  Culture: Reviewed   CXR: Reviewed  Echo: Reviewed  ======================================================  PAST MEDICAL & SURGICAL HISTORY:  Heart failure    HLD (hyperlipidemia)    Glaucoma    Cataract    ESRD (end stage renal disease) on dialysis    PAD (peripheral artery disease)    Blind right eye    Acute osteomyelitis of toe of right foot  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     CAD s/p CABG on 07/13/2023  Hemodynamic instability  Hypovolemia  Post op respiratory insufficiency  Acute blood loss anemia  Thrombocytopenia  ======================================================  Plan:   ***Neuro***   [x] Sedated with [x] Precedex   Post operative neuro assessment   Pain management with Oxycodone, Dilaudid, Gabapentin, and Tylenol    ***Cardiovascular***  Invasive hemodynamic monitoring, assess perfusion indices   SR / CVP 9 / MAP 64 / PAP 20 / CI 2.7 / Hct 32.0% / Lactate 1.1   [x] Cardene - 2.5 mg  Volume: 3 PRBC, 1 platelet  Reassessment of hemodynamics post resuscitation  Amiodarone for afib prophylaxis  Monitor chest tube outputs  [x]  ASA  Serial EKG and cardiac enzymes     ***Pulmonary***  Encounter ventilator management  Encourage incentive spirometry, continue pulse ox monitoring, follow ABGs     Mode: CPAP with PS  FiO2: 50  PEEP: 5  PS: 10  ITime: 1  MAP: 8  PIP: 16          ***GI***  [x] NPO  [x] Protonix    ***Renal***  [x] ESRD on HD   Continue to monitor I/Os, BUN/Creatinine.   Replete lytes PRN    ***ID***  No active antibiotic coverage      ***Endocrine***  [x]  DM2 : HbA1c 4.9%                - [x] Insulin gtt             - Need tight glycemic control to prevent wound infection.    Patient requires continuous monitoring with:  bedside rhythm monitoring, arterial line, pulse oximetry, ventilator management and monitoring; intermittent blood gas analysis.  Care plan discussed with ICU care team.  patient remain critical; required more than usual post op care; I have spent 40 minutes providing non routine post op care, revaluated multiple times during the day .     Care Plan discussed with the ICU care team. Patient remained critical, at risk for life threatening decompensation.     By signing my name below, I, Wale Gregg, attest that this documentation has been prepared under the direction and in the presence of Graham Baca MD.  Electronically signed: Paul Aguirre, 07-13-23 @ 19:36    I, Phuong Stevenson, personally performed the services described in this documentation. all medical record entries made by the paul were at my direction and in my presence. I have reviewed the chart and agree that the record reflects my personal performance and is accurate and complete  Electronically signed: Graham Baca MD. Patient seen and examined at the bedside.    Remained critically ill on continuous ICU monitoring.    OBJECTIVE:  Vital Signs Last 24 Hrs  T(C): 37 (13 Jul 2023 19:00), Max: 37 (13 Jul 2023 19:00)  T(F): 98.6 (13 Jul 2023 19:00), Max: 98.6 (13 Jul 2023 19:00)  HR: 83 (13 Jul 2023 19:30) (59 - 91)  BP: 112/64 (13 Jul 2023 06:37) (93/58 - 124/57)  BP(mean): 70 (13 Jul 2023 06:37) (70 - 70)  RR: 15 (13 Jul 2023 19:30) (9 - 27)  SpO2: 100% (13 Jul 2023 19:30) (97% - 100%)    Parameters below as of 13 Jul 2023 20:00  Patient On (Oxygen Delivery Method): ventilator  O2 Flow (L/min): 50    Physical Exam:   General: Intubated  Neurology: Sedated   Eyes: R eye blindness, fixed pupil  ENT/Neck: +ETT midline, Neck supple, trachea midline, No JVD   Respiratory: Clear bilaterally   CV: S1S2, no murmurs        [x] Sternal dressing, [x] Mediastinal CT x2, [x] L Pleural CT        [x] Sinus rhythm, [x] Temporary pacing - BOX OFF  Abdominal: Soft, NT, ND +BS   Extremities: 1-2+ pedal edema noted, + peripheral pulses   Skin: No Rashes, Hematoma, Ecchymosis         ============================I/O===========================   I&O's Detail    12 Jul 2023 07:01  -  13 Jul 2023 07:00  --------------------------------------------------------  IN:    Oral Fluid: 770 mL  Total IN: 770 mL    OUT:    Other (mL): 500 mL    Voided (mL): 0 mL  Total OUT: 500 mL    Total NET: 270 mL    13 Jul 2023 07:01  -  13 Jul 2023 19:36  --------------------------------------------------------  IN:    DOBUTamine: 2.6 mL    NiCARdipine: 27.5 mL    sodium chloride 0.9%: 10 mL  Total IN: 40.1 mL    OUT:    Chest Tube (mL): 45 mL    Chest Tube (mL): 25 mL    Chest Tube (mL): 30 mL    Dexmedetomidine: 0 mL    Insulin: 0 mL  Total OUT: 100 mL    Total NET: -59.9 mL  ============================ LABS =========================             10.8   9.23  )-----------( 113      ( 13 Jul 2023 17:15 )             32.0     07-13    140  |  99  |  30<H>  ----------------------------<  114<H>  4.0   |  19<L>  |  5.41<H>    Ca    9.5      13 Jul 2023 17:15  Phos  4.9     07-13  Mg     2.0     07-13    TPro  5.4<L>  /  Alb  3.6  /  TBili  0.5  /  DBili  x   /  AST  46<H>  /  ALT  23  /  AlkPhos  49  07-13    LIVER FUNCTIONS - ( 13 Jul 2023 17:15 )  Alb: 3.6 g/dL / Pro: 5.4 g/dL / ALK PHOS: 49 U/L / ALT: 23 U/L / AST: 46 U/L / GGT: x           PT/INR - ( 13 Jul 2023 17:15 )   PT: 16.2 sec;   INR: 1.39 ratio         PTT - ( 13 Jul 2023 17:15 )  PTT:32.5 sec  ABG - ( 13 Jul 2023 19:20 )  pH, Arterial: 7.32  pH, Blood: x     /  pCO2: 43    /  pO2: 131   / HCO3: 22    / Base Excess: -3.8  /  SaO2: 97.5      Urinalysis Basic - ( 13 Jul 2023 17:15 )    Color: x / Appearance: x / SG: x / pH: x  Gluc: 114 mg/dL / Ketone: x  / Bili: x / Urobili: x   Blood: x / Protein: x / Nitrite: x   Leuk Esterase: x / RBC: x / WBC x   Sq Epi: x / Non Sq Epi: x / Bacteria: x  ======================Micro/Rad/Cardio=================  Culture: Reviewed   CXR: Reviewed  Echo: Reviewed  ======================================================  PAST MEDICAL & SURGICAL HISTORY:  Heart failure    HLD (hyperlipidemia)    Glaucoma    Cataract    ESRD (end stage renal disease) on dialysis    PAD (peripheral artery disease)    Blind right eye    Acute osteomyelitis of toe of right foot  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    S/P arteriovenous (AV) fistula creation    Hemodialysis access, AV graft  ======================================================  Assessment:  63 y/o F with PMH of ESRD(on HD M/W/F thru right groin AVF), HTN, HLD, DM type II, bilateral BKA, right eye blindness presented initially to [facility] on 07/03 from rehab center for acute SOB and fluid overload and now transferred to [facility] for LHC due to elevated troponin.     CAD s/p CABG on 07/13/2023  Hemodynamic instability  ESRD/HD  Post op respiratory insufficiency  weaning from ventilator  ======================================================  Plan:   ***Neuro***   [x] Sedated with [x] Precedex   Post operative neuro assessment   Pain management with Oxycodone, Dilaudid, Gabapentin, and Tylenol    ***Cardiovascular***  Invasive hemodynamic monitoring, assess perfusion indices   SR / CVP 9 / MAP 64 / PAP 20 / CI 2.7 / Hct 32.0% / Lactate 1.1   [x] Cardene - 2.5 mg  Volume: 3 PRBC, 1 platelet  Reassessment of hemodynamics post resuscitation  Amiodarone for afib prophylaxis  Monitor chest tube outputs  [x]  ASA      ***Pulmonary***  Encounter ventilator management, weaning ventilator  Encourage incentive spirometry, continue pulse ox monitoring, follow ABGs     Mode: CPAP with PS  FiO2: 50  PEEP: 5  PS: 10  ITime: 1  MAP: 8  PIP: 16          ***GI***  [x] NPO  [x] Protonix    ***Renal***  [x] ESRD on HD   Continue to monitor I/Os, BUN/Creatinine.   Replete lytes PRN    ***ID***  No active antibiotic coverage      ***Endocrine***  [x]  DM2 : HbA1c 4.9%                - [x] Insulin gtt             - Need tight glycemic control to prevent wound infection.    Patient requires continuous monitoring with:  bedside rhythm monitoring, arterial line, pulse oximetry, ventilator management and monitoring; intermittent blood gas analysis.  Care plan discussed with ICU care team.  patient remain critical; required more than usual post op care; I have spent 40 minutes providing non routine post op care, revaluated multiple times during the day .     Care Plan discussed with the ICU care team. Patient remained critical, at risk for life threatening decompensation.     By signing my name below, I, Wale Gregg, attest that this documentation has been prepared under the direction and in the presence of Graham Baca MD.  Electronically signed: Carol Aguirre, 07-13-23 @ 19:36    I, Phuong Stevenson, personally performed the services described in this documentation. all medical record entries made by the [email or web address] were at my direction and in my presence. I have reviewed the chart and agree that the record reflects my personal performance and is accurate and complete  Electronically signed: Graham Baca MD.

## 2023-07-13 NOTE — BRIEF OPERATIVE NOTE - NSICDXBRIEFPROCEDURE_GEN_ALL_CORE_FT
PROCEDURES:  CABG, with SHILPA 13-Jul-2023 17:16:12 C3L,[place]- LIMA to LAD, SVG to OM, SVG to RCA Suma Nieves

## 2023-07-14 LAB
ALBUMIN SERPL ELPH-MCNC: 3.9 G/DL — SIGNIFICANT CHANGE UP (ref 3.3–5)
ALP SERPL-CCNC: 50 U/L — SIGNIFICANT CHANGE UP (ref 40–120)
ALT FLD-CCNC: 23 U/L — SIGNIFICANT CHANGE UP (ref 10–45)
ANION GAP SERPL CALC-SCNC: 20 MMOL/L — HIGH (ref 5–17)
APTT BLD: 64 SEC — HIGH (ref 27.5–35.5)
AST SERPL-CCNC: 51 U/L — HIGH (ref 10–40)
BASE EXCESS BLDMV CALC-SCNC: -1.3 MMOL/L — SIGNIFICANT CHANGE UP (ref -3–3)
BILIRUB SERPL-MCNC: 0.2 MG/DL — SIGNIFICANT CHANGE UP (ref 0.2–1.2)
BUN SERPL-MCNC: 32 MG/DL — HIGH (ref 7–23)
CALCIUM SERPL-MCNC: 9.2 MG/DL — SIGNIFICANT CHANGE UP (ref 8.4–10.5)
CHLORIDE SERPL-SCNC: 98 MMOL/L — SIGNIFICANT CHANGE UP (ref 96–108)
CO2 BLDMV-SCNC: 26 MMOL/L — SIGNIFICANT CHANGE UP (ref 21–29)
CO2 SERPL-SCNC: 21 MMOL/L — LOW (ref 22–31)
CREAT SERPL-MCNC: 5.88 MG/DL — HIGH (ref 0.5–1.3)
EGFR: 8 ML/MIN/1.73M2 — LOW
GAS PNL BLDA: SIGNIFICANT CHANGE UP
GAS PNL BLDA: SIGNIFICANT CHANGE UP
GAS PNL BLDMV: SIGNIFICANT CHANGE UP
GLUCOSE BLDC GLUCOMTR-MCNC: 105 MG/DL — HIGH (ref 70–99)
GLUCOSE BLDC GLUCOMTR-MCNC: 106 MG/DL — HIGH (ref 70–99)
GLUCOSE BLDC GLUCOMTR-MCNC: 107 MG/DL — HIGH (ref 70–99)
GLUCOSE BLDC GLUCOMTR-MCNC: 110 MG/DL — HIGH (ref 70–99)
GLUCOSE BLDC GLUCOMTR-MCNC: 116 MG/DL — HIGH (ref 70–99)
GLUCOSE BLDC GLUCOMTR-MCNC: 120 MG/DL — HIGH (ref 70–99)
GLUCOSE BLDC GLUCOMTR-MCNC: 129 MG/DL — HIGH (ref 70–99)
GLUCOSE BLDC GLUCOMTR-MCNC: 132 MG/DL — HIGH (ref 70–99)
GLUCOSE BLDC GLUCOMTR-MCNC: 134 MG/DL — HIGH (ref 70–99)
GLUCOSE BLDC GLUCOMTR-MCNC: 146 MG/DL — HIGH (ref 70–99)
GLUCOSE BLDC GLUCOMTR-MCNC: 151 MG/DL — HIGH (ref 70–99)
GLUCOSE BLDC GLUCOMTR-MCNC: 158 MG/DL — HIGH (ref 70–99)
GLUCOSE BLDC GLUCOMTR-MCNC: 79 MG/DL — SIGNIFICANT CHANGE UP (ref 70–99)
GLUCOSE BLDC GLUCOMTR-MCNC: 90 MG/DL — SIGNIFICANT CHANGE UP (ref 70–99)
GLUCOSE SERPL-MCNC: 96 MG/DL — SIGNIFICANT CHANGE UP (ref 70–99)
HCO3 BLDMV-SCNC: 25 MMOL/L — SIGNIFICANT CHANGE UP (ref 20–28)
HCT VFR BLD CALC: 31.3 % — LOW (ref 34.5–45)
HGB BLD-MCNC: 10.5 G/DL — LOW (ref 11.5–15.5)
HOROWITZ INDEX BLDMV+IHG-RTO: 44 — SIGNIFICANT CHANGE UP
INR BLD: 1.22 RATIO — HIGH (ref 0.88–1.16)
MAGNESIUM SERPL-MCNC: 2.1 MG/DL — SIGNIFICANT CHANGE UP (ref 1.6–2.6)
MCHC RBC-ENTMCNC: 29.2 PG — SIGNIFICANT CHANGE UP (ref 27–34)
MCHC RBC-ENTMCNC: 33.5 GM/DL — SIGNIFICANT CHANGE UP (ref 32–36)
MCV RBC AUTO: 87.2 FL — SIGNIFICANT CHANGE UP (ref 80–100)
NRBC # BLD: 0 /100 WBCS — SIGNIFICANT CHANGE UP (ref 0–0)
O2 CT VFR BLD CALC: 63 MMHG — SIGNIFICANT CHANGE UP (ref 30–65)
PCO2 BLDMV: 46 MMHG — SIGNIFICANT CHANGE UP (ref 30–65)
PH BLDMV: 7.34 — SIGNIFICANT CHANGE UP (ref 7.32–7.45)
PHOSPHATE SERPL-MCNC: 5.8 MG/DL — HIGH (ref 2.5–4.5)
PLATELET # BLD AUTO: 178 K/UL — SIGNIFICANT CHANGE UP (ref 150–400)
POTASSIUM SERPL-MCNC: 4.5 MMOL/L — SIGNIFICANT CHANGE UP (ref 3.5–5.3)
POTASSIUM SERPL-SCNC: 4.5 MMOL/L — SIGNIFICANT CHANGE UP (ref 3.5–5.3)
PROT SERPL-MCNC: 5.9 G/DL — LOW (ref 6–8.3)
PROTHROM AB SERPL-ACNC: 14.2 SEC — HIGH (ref 10.5–13.4)
RBC # BLD: 3.59 M/UL — LOW (ref 3.8–5.2)
RBC # FLD: 16.1 % — HIGH (ref 10.3–14.5)
SAO2 % BLDMV: 90.7 — HIGH (ref 60–90)
SODIUM SERPL-SCNC: 139 MMOL/L — SIGNIFICANT CHANGE UP (ref 135–145)
WBC # BLD: 14.74 K/UL — HIGH (ref 3.8–10.5)
WBC # FLD AUTO: 14.74 K/UL — HIGH (ref 3.8–10.5)

## 2023-07-14 PROCEDURE — 93010 ELECTROCARDIOGRAM REPORT: CPT

## 2023-07-14 PROCEDURE — 99291 CRITICAL CARE FIRST HOUR: CPT

## 2023-07-14 PROCEDURE — 71045 X-RAY EXAM CHEST 1 VIEW: CPT | Mod: 26

## 2023-07-14 RX ORDER — INSULIN LISPRO 100/ML
VIAL (ML) SUBCUTANEOUS
Refills: 0 | Status: DISCONTINUED | OUTPATIENT
Start: 2023-07-14 | End: 2023-07-21

## 2023-07-14 RX ORDER — ALBUMIN HUMAN 25 %
250 VIAL (ML) INTRAVENOUS ONCE
Refills: 0 | Status: COMPLETED | OUTPATIENT
Start: 2023-07-14 | End: 2023-07-14

## 2023-07-14 RX ORDER — DOBUTAMINE HCL 250MG/20ML
1.5 VIAL (ML) INTRAVENOUS
Qty: 500 | Refills: 0 | Status: DISCONTINUED | OUTPATIENT
Start: 2023-07-14 | End: 2023-07-15

## 2023-07-14 RX ORDER — FENTANYL CITRATE 50 UG/ML
25 INJECTION INTRAVENOUS ONCE
Refills: 0 | Status: DISCONTINUED | OUTPATIENT
Start: 2023-07-14 | End: 2023-07-14

## 2023-07-14 RX ORDER — ALBUMIN HUMAN 25 %
100 VIAL (ML) INTRAVENOUS ONCE
Refills: 0 | Status: COMPLETED | OUTPATIENT
Start: 2023-07-14 | End: 2023-07-14

## 2023-07-14 RX ORDER — ATORVASTATIN CALCIUM 80 MG/1
80 TABLET, FILM COATED ORAL AT BEDTIME
Refills: 0 | Status: DISCONTINUED | OUTPATIENT
Start: 2023-07-14 | End: 2023-07-25

## 2023-07-14 RX ORDER — HEPARIN SODIUM 5000 [USP'U]/ML
5000 INJECTION INTRAVENOUS; SUBCUTANEOUS EVERY 8 HOURS
Refills: 0 | Status: DISCONTINUED | OUTPATIENT
Start: 2023-07-14 | End: 2023-07-25

## 2023-07-14 RX ADMIN — HYDROMORPHONE HYDROCHLORIDE 0.5 MILLIGRAM(S): 2 INJECTION INTRAMUSCULAR; INTRAVENOUS; SUBCUTANEOUS at 16:33

## 2023-07-14 RX ADMIN — SENNA PLUS 2 TABLET(S): 8.6 TABLET ORAL at 21:08

## 2023-07-14 RX ADMIN — AMIODARONE HYDROCHLORIDE 400 MILLIGRAM(S): 400 TABLET ORAL at 17:41

## 2023-07-14 RX ADMIN — HYDROMORPHONE HYDROCHLORIDE 0.5 MILLIGRAM(S): 2 INJECTION INTRAMUSCULAR; INTRAVENOUS; SUBCUTANEOUS at 16:18

## 2023-07-14 RX ADMIN — FENTANYL CITRATE 25 MICROGRAM(S): 50 INJECTION INTRAVENOUS at 05:30

## 2023-07-14 RX ADMIN — Medication 81 MILLIGRAM(S): at 12:53

## 2023-07-14 RX ADMIN — PANTOPRAZOLE SODIUM 40 MILLIGRAM(S): 20 TABLET, DELAYED RELEASE ORAL at 12:53

## 2023-07-14 RX ADMIN — ATORVASTATIN CALCIUM 80 MILLIGRAM(S): 80 TABLET, FILM COATED ORAL at 21:08

## 2023-07-14 RX ADMIN — Medication 5.25 MICROGRAM(S)/KG/MIN: at 08:20

## 2023-07-14 RX ADMIN — HEPARIN SODIUM 5000 UNIT(S): 5000 INJECTION INTRAVENOUS; SUBCUTANEOUS at 21:09

## 2023-07-14 RX ADMIN — AMIODARONE HYDROCHLORIDE 400 MILLIGRAM(S): 400 TABLET ORAL at 05:16

## 2023-07-14 RX ADMIN — Medication 500 MILLIGRAM(S): at 05:17

## 2023-07-14 RX ADMIN — HYDROMORPHONE HYDROCHLORIDE 0.5 MILLIGRAM(S): 2 INJECTION INTRAMUSCULAR; INTRAVENOUS; SUBCUTANEOUS at 09:18

## 2023-07-14 RX ADMIN — Medication 5.25 MICROGRAM(S)/KG/MIN: at 05:15

## 2023-07-14 RX ADMIN — SODIUM CHLORIDE 10 MILLILITER(S): 9 INJECTION INTRAMUSCULAR; INTRAVENOUS; SUBCUTANEOUS at 08:21

## 2023-07-14 RX ADMIN — Medication 100 MILLIGRAM(S): at 21:07

## 2023-07-14 RX ADMIN — FENTANYL CITRATE 25 MICROGRAM(S): 50 INJECTION INTRAVENOUS at 05:15

## 2023-07-14 RX ADMIN — Medication 650 MILLIGRAM(S): at 13:23

## 2023-07-14 RX ADMIN — INSULIN HUMAN 3 UNIT(S)/HR: 100 INJECTION, SOLUTION SUBCUTANEOUS at 08:21

## 2023-07-14 RX ADMIN — CHLORHEXIDINE GLUCONATE 1 APPLICATION(S): 213 SOLUTION TOPICAL at 13:03

## 2023-07-14 RX ADMIN — Medication 500 MILLIGRAM(S): at 17:41

## 2023-07-14 RX ADMIN — Medication 125 MILLILITER(S): at 05:16

## 2023-07-14 RX ADMIN — Medication 650 MILLIGRAM(S): at 05:17

## 2023-07-14 RX ADMIN — Medication 650 MILLIGRAM(S): at 05:37

## 2023-07-14 RX ADMIN — GABAPENTIN 100 MILLIGRAM(S): 400 CAPSULE ORAL at 05:17

## 2023-07-14 RX ADMIN — Medication 5.25 MICROGRAM(S)/KG/MIN: at 21:08

## 2023-07-14 RX ADMIN — HYDROMORPHONE HYDROCHLORIDE 0.5 MILLIGRAM(S): 2 INJECTION INTRAMUSCULAR; INTRAVENOUS; SUBCUTANEOUS at 09:33

## 2023-07-14 RX ADMIN — GABAPENTIN 100 MILLIGRAM(S): 400 CAPSULE ORAL at 17:41

## 2023-07-14 RX ADMIN — Medication 650 MILLIGRAM(S): at 18:11

## 2023-07-14 RX ADMIN — Medication 650 MILLIGRAM(S): at 17:41

## 2023-07-14 RX ADMIN — Medication 650 MILLIGRAM(S): at 12:53

## 2023-07-14 RX ADMIN — Medication 50 MILLILITER(S): at 21:09

## 2023-07-14 RX ADMIN — HEPARIN SODIUM 5000 UNIT(S): 5000 INJECTION INTRAVENOUS; SUBCUTANEOUS at 14:32

## 2023-07-14 NOTE — PHYSICAL THERAPY INITIAL EVALUATION ADULT - CRITERIA FOR SKILLED THERAPEUTIC INTERVENTIONS
impairments found
impairments found/functional limitations in following categories/risk reduction/prevention/rehab potential/anticipated discharge recommendation

## 2023-07-14 NOTE — PHYSICAL THERAPY INITIAL EVALUATION ADULT - LEVEL OF INDEPENDENCE: SIT/STAND, REHAB EVAL
moderate assist (50% patients effort)/maximum assist (25% patients effort)
patient prosthesis not in hospital/unable to perform

## 2023-07-14 NOTE — PHYSICAL THERAPY INITIAL EVALUATION ADULT - ADDITIONAL COMMENTS
per prior PT evaluation, patient came from rehab (has not had PT since Feb 2023 2/2 waiting for prosthetics to be delivered). Reports she is able to perform ADLs, bed mobility, bed<>WC<>toilet transfers independent by lateral scooting. Reports she has not stood with prosthetics yet 2/2 second prosthetic only being delivered on Friday (6/30/2023).
per pt, she came from rehab (has not had PT since Feb 2023 2/2 waiting for prosthetics to be delivered). Reports she is able to perform ADLs, bed mobility, bed<>WC<>toilet transfers independent by lateral scooting. Reports she has not stood with prosthetics yet 2/2 second prosthetic only being delivered on Friday (6/30/2023).

## 2023-07-14 NOTE — PHYSICAL THERAPY INITIAL EVALUATION ADULT - ASR WT BEARING STATUS EVAL
no weight-bearing restrictions
pt with BLE BKA (prosthetics at bedside)/no weight-bearing restrictions

## 2023-07-14 NOTE — PHYSICAL THERAPY INITIAL EVALUATION ADULT - PERTINENT HX OF CURRENT PROBLEM, REHAB EVAL
63 y/o F with PMH of ESRD(on HD M/W/F thru right groin AVF), HTN, HLD, DM type II, bilateral BKA, right eye blindness presented initially to [facility] on 07/03 from rehab center for acute SOB and fluid overload and now transferred to [facility] for LHC due to elevated troponin. Pt underwent LHC 7/5: LM okay, prox LAD of 80-90%, mid Lcx of 80-90%, OM of 90%, ostial RCA of 90%. LFA access site. LVEDP: 28. On Aspirin 81mg. Plan to be transferred to [facility] for CT surgery consult with Dr. Alma Mabry.  7/5/23: Cardiac cath: Severe multi-vessel CAD in DM pt with preserved LV systolic function.  7/12/23: Chest CT: Mild calcification of the thoracic aorta.
63 y/o F with PMH of ESRD(on HD M/W/F thru right groin AVF), HTN, HLD, DM type II, bilateral BKA, right eye blindness presented initially to [facility] on 07/03 from rehab center for acute SOB and fluid overload and now transferred to [facility] for LHC due to elevated troponin. Pt underwent LHC 7/5: LM okay, prox LAD of 80-90%, mid Lcx of 80-90%, OM of 90%, ostial RCA of 90%. LFA access site. LVEDP: 28. On Aspirin 81mg. Plan to be transferred to [facility] for CT surgery consult with Dr. Alma Mabry.

## 2023-07-14 NOTE — PROGRESS NOTE ADULT - SUBJECTIVE AND OBJECTIVE BOX
Patient seen and examined at the bedside.    Remained critically ill on continuous ICU monitoring.      Brief Summary:  63 yo F with CAD s/p CABG on 07/13/2023    24 Hour events:      OBJECTIVE:  Vital Signs Last 24 Hrs  T(C): 37.5 (14 Jul 2023 04:00), Max: 37.5 (14 Jul 2023 04:00)  T(F): 99.5 (14 Jul 2023 04:00), Max: 99.5 (14 Jul 2023 04:00)  HR: 90 (14 Jul 2023 05:00) (66 - 97)  BP: 112/64 (13 Jul 2023 06:37) (112/64 - 112/64)  BP(mean): 70 (13 Jul 2023 06:37) (70 - 70)  RR: 22 (14 Jul 2023 05:00) (9 - 27)  SpO2: 98% (14 Jul 2023 05:00) (94% - 100%)    Parameters below as of 14 Jul 2023 04:00  Patient On (Oxygen Delivery Method): nasal cannula  O2 Flow (L/min): 2      Mode: off               Physical Exam:   General: NAD   Neurology: nonfocal   Eyes: bilateral pupils equal and reactive   ENT/Neck: Neck supple, trachea midline, No JVD   Respiratory: Clear bilaterally   CV: Sinus rhythm   Abdominal: Soft, NT, ND +BS   Extremities: 1-2+ pedal edema noted, + peripheral pulses   Skin: No Rashes, Hematoma, Ecchymosis         -------------------------------------------------------------------------------------------------------------------------------    Labs:                        10.5   14.74 )-----------( 178      ( 14 Jul 2023 00:31 )             31.3     07-14    139  |  98  |  32<H>  ----------------------------<  96  4.5   |  21<L>  |  5.88<H>    Ca    9.2      14 Jul 2023 00:30  Phos  5.8     07-14  Mg     2.1     07-14    TPro  5.9<L>  /  Alb  3.9  /  TBili  0.2  /  DBili  x   /  AST  51<H>  /  ALT  23  /  AlkPhos  50  07-14    LIVER FUNCTIONS - ( 14 Jul 2023 00:30 )  Alb: 3.9 g/dL / Pro: 5.9 g/dL / ALK PHOS: 50 U/L / ALT: 23 U/L / AST: 51 U/L / GGT: x           PT/INR - ( 14 Jul 2023 00:32 )   PT: 14.2 sec;   INR: 1.22 ratio         PTT - ( 14 Jul 2023 00:32 )  PTT:64.0 sec  ABG - ( 14 Jul 2023 00:00 )  pH, Arterial: 7.37  pH, Blood: x     /  pCO2: 42    /  pO2: 208   / HCO3: 24    / Base Excess: -1.0  /  SaO2: 97.6              ------------------------------------------------------------------------------------------------------------------------------  Assessment:  61 y/o F with PMH of ESRD(on HD M/W/F thru right groin AVF), HTN, HLD, DM type II, bilateral BKA, right eye blindness presented initially to [facility] on 07/03 from rehab center for acute SOB and fluid overload and now transferred to [facility] for LHC due to elevated troponin.     CAD s/p CABG on 07/13/2023  Hemodynamic instability  Hypovolemia  Post op acute respiratory insufficiency  Leukocytosis   Lactic acidosis   Acute blood loss anemia        Plan:   ***Neuro***  Postoperative acute pain control with Tylenol, Gabapentin, PRN Oxycodone, and PRN Dilaudid for pain management.     ***Cardiovascular***  Invasive hemodynamic monitoring, assess perfusion indices   At high risk for hemodynamic instability and cardiac arrhythmias.  IVF for perioperative hypovolemia.  Lactate 2.1, continue trending   Continue Dobutamine.  Amiodarone for afib prophylaxis   Monitor chest tube output.  ASA daily.      ***Pulmonary***  Postoperative acute respiratory insufficiency   Deep breathing and coughing exercises.  Wean oxygen as able.      ***GI***  Tolerating diet.  Protonix for stress ulcer prophylaxis   Bowel regimen.    ***Renal***  ESRD on HD   Trend creatinine   Replete electrolytes as indicated.      ***ID***  Cefuroxime for perioperative antibiotic coverage   WBC 14.74, continue to trend leukocytosis    ***Endocrine***  T2DM  Insulin as needed to maintain euglycemia.     ***Hematology***  Acute blood loss anemia.- no current transfusion indication               Patient requires continuous monitoring with bedside rhythm monitoring, pulse oximetry monitoring, and continuous central venous and arterial pressure monitoring; and intermittent blood gas analysis. Care plan discussed with the ICU care team.   Patient remained critical, at risk for life threatening decompensation.    I have spent 30 minutes providing critical care management to this patient.    By signing my name below, I, Daniel Delgado, attest that this documentation has been prepared under the direction and in the presence of Venus Celis MD  Electronically signed: Daniel Delgado, 07-14-23 @ 05:26    I, Venus Celis MD, personally performed the services described in this documentation. all medical record entries made by the scribe were at my direction and in my presence. I have reviewed the chart and agree that the record reflects my personal performance and is accurate and complete  Electronically signed: Venus Celis MD Patient seen and examined at the bedside.    Remained critically ill on continuous ICU monitoring.      Brief Summary:  61 yo F with CAD s/p CABG on 2023    24 Hour events:  - Start on DVT prophylaxis   - iHD today, leave  on for dialysis  - Plan to wean  post dialysis and turn off at night        OBJECTIVE:  Vital Signs Last 24 Hrs  T(C): 37.5 (2023 04:00), Max: 37.5 (2023 04:00)  T(F): 99.5 (2023 04:00), Max: 99.5 (2023 04:00)  HR: 90 (2023 05:00) (66 - 97)  BP: 112/64 (2023 06:37) (112/64 - 112/64)  BP(mean): 70 (2023 06:37) (70 - 70)  RR: 22 (2023 05:00) (9 - 27)  SpO2: 98% (2023 05:00) (94% - 100%)    Parameters below as of 2023 04:00  Patient On (Oxygen Delivery Method): nasal cannula  O2 Flow (L/min): 2      Mode: off               Physical Exam:   General: NAD   Neurology: nonfocal   Eyes: bilateral pupils equal and reactive   ENT/Neck: Neck supple, trachea midline, No JVD   Respiratory: Clear bilaterally   CV: Sinus rhythm   Abdominal: Soft, NT, ND +BS   Extremities: 1-2+ pedal edema noted, + peripheral pulses   Skin: No Rashes, Hematoma, Ecchymosis         -------------------------------------------------------------------------------------------------------------------------------    Labs:                        10.5   14.74 )-----------( 178      ( 2023 00:31 )             31.3     07-14    139  |  98  |  32<H>  ----------------------------<  96  4.5   |  21<L>  |  5.88<H>    Ca    9.2      2023 00:30  Phos  5.8       Mg     2.1         TPro  5.9<L>  /  Alb  3.9  /  TBili  0.2  /  DBili  x   /  AST  51<H>  /  ALT  23  /  AlkPhos  50  14    LIVER FUNCTIONS - ( 2023 00:30 )  Alb: 3.9 g/dL / Pro: 5.9 g/dL / ALK PHOS: 50 U/L / ALT: 23 U/L / AST: 51 U/L / GGT: x           PT/INR - ( 2023 00:32 )   PT: 14.2 sec;   INR: 1.22 ratio         PTT - ( 2023 00:32 )  PTT:64.0 sec  ABG - ( 2023 00:00 )  pH, Arterial: 7.37  pH, Blood: x     /  pCO2: 42    /  pO2: 208   / HCO3: 24    / Base Excess: -1.0  /  SaO2: 97.6              ------------------------------------------------------------------------------------------------------------------------------  Assessment:  61 y/o F with PMH of ESRD(on HD M/W/F thru right groin AVF), HTN, HLD, DM type II, bilateral BKA, right eye blindness presented initially to [facility] on  from rehab center for acute SOB and fluid overload and now transferred to [facility] for LHC due to elevated troponin.     CAD s/p CABG on 2023  Hemodynamic instability  Hypovolemia  Post op acute respiratory insufficiency  Leukocytosis   Lactic acidosis   Acute blood loss anemia        Plan:   ***Neuro***  Postoperative acute pain control with Tylenol, Gabapentin, PRN Oxycodone, and PRN Dilaudid for pain management.     ***Cardiovascular***  Invasive hemodynamic monitoring, assess perfusion indices   At high risk for hemodynamic instability and cardiac arrhythmias.  IVF for perioperative hypovolemia.  Lactate 2.1, continue trending   Continue Dobutamine.  Amiodarone for afib prophylaxis   Monitor chest tube output.  ASA daily.      ***Pulmonary***  Postoperative acute respiratory insufficiency   Deep breathing and coughing exercises.  Wean oxygen as able.      ***GI***  Tolerating diet.  Protonix for stress ulcer prophylaxis   Bowel regimen.    ***Renal***  ESRD on HD  Trend creatinine   Replete electrolytes as indicated.  Plan for dialysis today     ***ID***  Cefuroxime for perioperative antibiotic coverage   WBC 14.74, continue to trend leukocytosis    ***Endocrine***  T2DM  Insulin as needed to maintain euglycemia.     ***Hematology***  Acute blood loss anemia.- no current transfusion indication    Start on DVT prophylaxis           Patient requires continuous monitoring with bedside rhythm monitoring, pulse oximetry monitoring, and continuous central venous and arterial pressure monitoring; and intermittent blood gas analysis. Care plan discussed with the ICU care team.   Patient remained critical, at risk for life threatening decompensation.    I have spent 30 minutes providing critical care management to this patient.    By signing my name below, I, Daniel Danny, attest that this documentation has been prepared under the direction and in the presence of Venus Celis MD  Electronically signed: Daniel Delgado, 23 @ 05:26    I, Venus Celis MD, personally performed the services described in this documentation. all medical record entries made by the scribe were at my direction and in my presence. I have reviewed the chart and agree that the record reflects my personal performance and is accurate and complete  Electronically signed: Venus Celis MD Patient seen and examined at the bedside.    Remains critically ill on continuous ICU monitoring.      Brief Summary:  61 yo F with CAD s/p CABG on 07/13/2023    24 Hour events:  Patient extubated postoperatively.  No acute events overnight.  Remains on low dose Dobutamine.  Tolerated HD today.      Objective:  Vital Signs Last 24 Hrs  T(C): 37.5 (14 Jul 2023 04:00), Max: 37.5 (14 Jul 2023 04:00)  T(F): 99.5 (14 Jul 2023 04:00), Max: 99.5 (14 Jul 2023 04:00)  HR: 90 (14 Jul 2023 05:00) (66 - 97)  BP: 112/64 (13 Jul 2023 06:37) (112/64 - 112/64)  BP(mean): 70 (13 Jul 2023 06:37) (70 - 70)  RR: 22 (14 Jul 2023 05:00) (9 - 27)  SpO2: 98% (14 Jul 2023 05:00) (94% - 100%)    Parameters below as of 14 Jul 2023 04:00  Patient On (Oxygen Delivery Method): nasal cannula  O2 Flow (L/min): 2             Physical Exam:   General: Interactive   Neurology: Follows commands.   Respiratory: Decreased at bases  CV: Sinus rhythm   Abdominal: Soft, Nontender  Extremities: Groin fistula with thrill  s/p bilateral BKA.        -------------------------------------------------------------------------------------------------------------------------------    Labs:                        10.5   14.74 )-----------( 178      ( 14 Jul 2023 00:31 )             31.3     07-14    139  |  98  |  32<H>  ----------------------------<  96  4.5   |  21<L>  |  5.88<H>    Ca    9.2      14 Jul 2023 00:30  Phos  5.8     07-14  Mg     2.1     07-14    TPro  5.9<L>  /  Alb  3.9  /  TBili  0.2  /  DBili  x   /  AST  51<H>  /  ALT  23  /  AlkPhos  50  07-14    LIVER FUNCTIONS - ( 14 Jul 2023 00:30 )  Alb: 3.9 g/dL / Pro: 5.9 g/dL / ALK PHOS: 50 U/L / ALT: 23 U/L / AST: 51 U/L / GGT: x           PT/INR - ( 14 Jul 2023 00:32 )   PT: 14.2 sec;   INR: 1.22 ratio         PTT - ( 14 Jul 2023 00:32 )  PTT:64.0 sec  ABG - ( 14 Jul 2023 00:00 )  pH, Arterial: 7.37  pH, Blood: x     /  pCO2: 42    /  pO2: 208   / HCO3: 24    / Base Excess: -1.0  /  SaO2: 97.6        ------------------------------------------------------------------------------------------------------------------------------  Assessment:  61 yo F s/p CABG on 07/13/2023    Hemodynamic instability  Post op acute respiratory insufficiency  Acute blood loss anemia  Hyperglycemia  ESRD      Plan:   ***Neuro***  Postoperative acute pain control with Tylenol, Gabapentin, and prns.    ***Cardiovascular***  At high risk for ongoing hemodynamic instability and cardiac arrhythmias.  Continue Dobutamine for now - plan to wean after HD today.  Amiodarone for afib prophylaxis   Monitor chest tube output.  ASA/statin daily.    ***Pulmonary***  Postoperative acute respiratory insufficiency   Deep breathing and coughing exercises.  Wean oxygen as able.    ***GI***  Tolerating diet.  Protonix for stress ulcer prophylaxis   Bowel regimen.    ***Renal***  ESRD on HD  Trend creatinine   Replete electrolytes as indicated.  Plan for dialysis today     ***ID***  Cefuroxime for perioperative antibiotic coverage     ***Endocrine***  T2DM  Insulin as needed for hyperglycemia    ***Hematology***  Acute blood loss anemia - no current transfusion indication    SQ Heparin for DVT prophylaxis         Patient requires continuous monitoring with bedside rhythm monitoring, pulse oximetry monitoring, and continuous central venous and arterial pressure monitoring; and intermittent blood gas analysis. Care plan discussed with the ICU care team.   Patient remains critical, at risk for life threatening decompensation.    I have spent 35 minutes providing critical care management to this patient.    By signing my name below, I, Daniel Danny, attest that this documentation has been prepared under the direction and in the presence of Venus Celis MD  Electronically signed: Daniel Delgado, 07-14-23 @ 05:26    I, Venus Celis MD, personally performed the services described in this documentation. all medical record entries made by the scribe were at my direction and in my presence. I have reviewed the chart and agree that the record reflects my personal performance and is accurate and complete  Electronically signed: Venus Celis MD

## 2023-07-14 NOTE — PROGRESS NOTE ADULT - SUBJECTIVE AND OBJECTIVE BOX
[facility] : Ans Serv [phone], Office [phone]  Dr Vanegas/Dr Bazzi  /Dr Kingsley patricia /Dr SHERRI Mcfarlane/Dr Jarrell Madden/Dr Andrés Perkins /Dr SUNI Potts  _______________________________________________________________________________________________    seen and examined today for End Stage Renal Disease on Dialysis   Interval : s/p surgery,   VITALS:  T(F): 99.3 (07-14 @ 08:00), Max: 99.5 (07-14 @ 04:00)  HR: 78 (07-14 @ 10:00)  BP: 112/64 (07-13 @ 06:37)  ABP: 116/30 (07-14 @ 10:00)  RR: 14 (07-14 @ 10:00)  SpO2: 100% (07-14 @ 10:00)    07-13 @ 07:01  -  07-14 @ 07:00  --------------------------------------------------------  IN: 511.1 mL / OUT: 470 mL / NET: 41.1 mL    07-14 @ 07:01  -  07-14 @ 10:28  --------------------------------------------------------  IN: 30.9 mL / OUT: 40 mL / NET: -9.1 mL      Physical Exam :-  Constitutional: NAD  Respiratory: Bilateral equal breath sounds, few Crackles present.  Cardiovascular: S1, S2 normal, positive Murmur  Gastrointestinal: Bowel Sounds present, soft  Extremities: no Edema Feet  right groin Arterio-Venous Graft - thrill present  Neurological: Alert and Oriented x 3  Psychiatric: Normal mood, normal affect    Data:-  Allergies :   No Known Allergies    Hospital Medications:   MEDICATIONS  (STANDING):  acetaminophen     Tablet .. 650 milliGRAM(s) Oral every 6 hours  aMIOdarone    Tablet 400 milliGRAM(s) Oral two times a day  ascorbic acid 500 milliGRAM(s) Oral two times a day  aspirin enteric coated 81 milliGRAM(s) Oral daily  cefuroxime  IVPB 1500 milliGRAM(s) IV Intermittent every 24 hours  chlorhexidine 2% Cloths 1 Application(s) Topical daily  dextrose 50% Injectable 25 milliLiter(s) IV Push every 15 minutes  dextrose 50% Injectable 50 milliLiter(s) IV Push every 15 minutes  DOBUTamine Infusion 2.5 MICROgram(s)/kG/Min (5.25 mL/Hr) IV Continuous <Continuous>  gabapentin 100 milliGRAM(s) Oral every 12 hours  insulin regular Infusion 3 Unit(s)/Hr (3 mL/Hr) IV Continuous <Continuous>  niCARdipine Infusion 2.5 mG/Hr (12.5 mL/Hr) IV Continuous <Continuous>  pantoprazole  Injectable 40 milliGRAM(s) IV Push daily  polyethylene glycol 3350 17 Gram(s) Oral daily  senna 2 Tablet(s) Oral at bedtime  sodium chloride 0.9%. 1000 milliLiter(s) (10 mL/Hr) IV Continuous <Continuous>    07-14    139  |  98  |  32<H>  ----------------------------<  96  4.5   |  21<L>  |  5.88<H>    Ca    9.2      14 Jul 2023 00:30  Phos  5.8     07-14  Mg     2.1     07-14    TPro  5.9<L>  /  Alb  3.9  /  TBili  0.2  /  DBili      /  AST  51<H>  /  ALT  23  /  AlkPhos  50  07-14    Creatinine Trend: 5.88 <--, 5.41 <--, 9.14 <--, 7.31 <--, 10.24 <--, 8.17 <--  egfr trend : 8 <--, 8 <--, 4 <--, 6 <--, 4 <--, 5 <--, 7 <--                        10.5   14.74 )-----------( 178      ( 14 Jul 2023 00:31 )             31.3

## 2023-07-14 NOTE — PROGRESS NOTE ADULT - ASSESSMENT
63 y/o F with PMH of ESRD(on HD M/W/F thru right groin AVF), HTN, HLD, DM type II, bilateral BKA, right eye blindness presented initially to [facility] on 07/03 from rehab center for acute SOB and fluid overload and now transferred to [facility] for LHC due to elevated troponin. Pt underwent LHC 7/5: LM okay, prox LAD of 80-90%, mid Lcx of 80-90%, OM of 90%, ostial RCA of 90%. LFA access site. LVEDP: 28. On Aspirin 81mg. awaiting CABG.        Problem/Recommendation - 1:  ·  Problem: CAD, multiple vessel.   ·  Recommendation: S/P  CABG  and management per CT Surgery    No Cp etc,   On ASA,BB and Statin.     Problem/Recommendation - 2:  ·  Problem: ESRD on hemodialysis.   ·  Recommendation: HD per renal .     Problem/Recommendation - 3:  ·  Problem: DM (diabetes mellitus).   ·  Recommendation: Sugars in good range.     Problem/Recommendation - 4:  ·  Problem: HTN (hypertension).   ·  Recommendation: BP readings better.     Problem/Recommendation - 5:  ·  Problem: Anemia secondary to renal failure.   ·  Recommendation: HH stable. Epogen per renal.     Problem/Recommendation - 6:  ·  Problem: HLD (hyperlipidemia).   ·  Recommendation: Statin.     Problem/Recommendation - 7:  ·  Problem: PVD (peripheral vascular disease).   ·  Recommendation: S/P BKA . Has Prosthesis .     Problem/Recommendation - 8:  ·  Problem: Legally blind.   ·  Recommendation: Supportive care.    over all management per CTICU.

## 2023-07-14 NOTE — PROGRESS NOTE ADULT - SUBJECTIVE AND OBJECTIVE BOX
Cardiovascular Disease Progress Note  Date of service: 23 @ 07:57    Overnight events: No acute events overnight.  Pt is in mild distress 2/ surgery.   Otherwise review of systems negative    Objective Findings:  T(C): 37.5 (23 @ 04:00), Max: 37.5 (23 @ 04:00)  HR: 83 (23 @ 07:00) (80 - 97)  BP: --  RR: 19 (23 @ 07:00) (9 - 27)  SpO2: 99% (23 @ 07:00) (94% - 100%)  Wt(kg): --  Daily Height in cm: 163 (2023 16:35)    Daily Weight in k.4 (2023 00:00)      Physical Exam:  Gen: NAD; Patient resting comfortably  HEENT: EOMI, Normocephalic/ atraumatic  CV: RRR, normal S1 + S2, no m/r/g  Lungs:  Normal respiratory effort; clear to auscultation bilaterally  Abd: soft, non-tender; bowel sounds present  Ext: No edema; warm and well perfused    Telemetry: Sinus     Laboratory Data:                        10.5   14.74 )-----------( 178      ( 2023 00:31 )             31.3     -    139  |  98  |  32<H>  ----------------------------<  96  4.5   |  21<L>  |  5.88<H>    Ca    9.2      2023 00:30  Phos  5.8     -  Mg     2.1     -14    TPro  5.9<L>  /  Alb  3.9  /  TBili  0.2  /  DBili  x   /  AST  51<H>  /  ALT  23  /  AlkPhos  50  -14    PT/INR - ( 2023 00:32 )   PT: 14.2 sec;   INR: 1.22 ratio         PTT - ( 2023 00:32 )  PTT:64.0 sec  CARDIAC MARKERS ( 2023 17:15 )  x     / x     / 624 U/L / x     / 47.8 ng/mL          Inpatient Medications:  MEDICATIONS  (STANDING):  acetaminophen     Tablet .. 650 milliGRAM(s) Oral every 6 hours  aMIOdarone    Tablet 400 milliGRAM(s) Oral two times a day  ascorbic acid 500 milliGRAM(s) Oral two times a day  aspirin enteric coated 81 milliGRAM(s) Oral daily  cefuroxime  IVPB 1500 milliGRAM(s) IV Intermittent every 24 hours  chlorhexidine 2% Cloths 1 Application(s) Topical daily  dextrose 50% Injectable 25 milliLiter(s) IV Push every 15 minutes  dextrose 50% Injectable 50 milliLiter(s) IV Push every 15 minutes  DOBUTamine Infusion 2.5 MICROgram(s)/kG/Min (5.25 mL/Hr) IV Continuous <Continuous>  gabapentin 100 milliGRAM(s) Oral every 12 hours  insulin regular Infusion 3 Unit(s)/Hr (3 mL/Hr) IV Continuous <Continuous>  niCARdipine Infusion 2.5 mG/Hr (12.5 mL/Hr) IV Continuous <Continuous>  pantoprazole  Injectable 40 milliGRAM(s) IV Push daily  polyethylene glycol 3350 17 Gram(s) Oral daily  senna 2 Tablet(s) Oral at bedtime  sodium chloride 0.9%. 1000 milliLiter(s) (10 mL/Hr) IV Continuous <Continuous>      Assessment:  61-year-old female with ESRD on HD, HLD and peripheral vascular disease s/p lower extremity resection presents with chest pain found to have multivessel disease.      Plan of Care:      #Multivessel CAD-  Noted on cardiac cath .  Echo  with normal LV systolic function. Apical Hypokinesis and moderate MS.   s/p CABG x3 (LIMA to LAD, SVG to OM, SVG to RCA)  2023.   Pt recovering in CTICU  Amiodarone for post op afib ppx  ASA    #Cardiogenic shock  - Post op CABG  - Currently on Dobutamine, wean as tolerated.      #Peripheral vascular disease-  s/p L BKA well.   Continue current cardiac management.   Antiplatelet therapy with ASA.       #Compensated diastolic CHF-  Euvolemic on exam.  Fluid removal with HD as per the renal team.     #ESRD-  - HD, as per renal.            Over 25 minutes spent on total encounter; more than 50% of the visit was spent counseling and/or coordinating care by the attending physician.      Wisam Adams,  [facility]  Cardiovascular Disease  [phone]

## 2023-07-14 NOTE — PROGRESS NOTE ADULT - SUBJECTIVE AND OBJECTIVE BOX
Date of Service  : 07-14-23     INTERVAL HPI/OVERNIGHT EVENTS: I feel ok. Getting HD.   Vital Signs Last 24 Hrs  T(C): 37 (14 Jul 2023 17:05), Max: 37.5 (14 Jul 2023 04:00)  T(F): 98.6 (14 Jul 2023 17:05), Max: 99.5 (14 Jul 2023 04:00)  HR: 77 (14 Jul 2023 18:00) (77 - 97)  BP: --  BP(mean): --  RR: 15 (14 Jul 2023 18:00) (11 - 28)  SpO2: 100% (14 Jul 2023 18:00) (93% - 100%)    Parameters below as of 14 Jul 2023 17:05  Patient On (Oxygen Delivery Method): nasal cannula  O2 Flow (L/min): 2    I&O's Summary    13 Jul 2023 07:01  -  14 Jul 2023 07:00  --------------------------------------------------------  IN: 511.1 mL / OUT: 470 mL / NET: 41.1 mL    14 Jul 2023 07:01  -  14 Jul 2023 18:52  --------------------------------------------------------  IN: 350.3 mL / OUT: 380 mL / NET: -29.7 mL      MEDICATIONS  (STANDING):  acetaminophen     Tablet .. 650 milliGRAM(s) Oral every 6 hours  aMIOdarone    Tablet 400 milliGRAM(s) Oral two times a day  ascorbic acid 500 milliGRAM(s) Oral two times a day  aspirin enteric coated 81 milliGRAM(s) Oral daily  atorvastatin 80 milliGRAM(s) Oral at bedtime  cefuroxime  IVPB 1500 milliGRAM(s) IV Intermittent every 24 hours  chlorhexidine 2% Cloths 1 Application(s) Topical daily  dextrose 50% Injectable 25 milliLiter(s) IV Push every 15 minutes  dextrose 50% Injectable 50 milliLiter(s) IV Push every 15 minutes  DOBUTamine Infusion 2.5 MICROgram(s)/kG/Min (5.25 mL/Hr) IV Continuous <Continuous>  gabapentin 100 milliGRAM(s) Oral every 12 hours  heparin   Injectable 5000 Unit(s) SubCutaneous every 8 hours  insulin lispro (ADMELOG) corrective regimen sliding scale   SubCutaneous Before meals and at bedtime  niCARdipine Infusion 2.5 mG/Hr (12.5 mL/Hr) IV Continuous <Continuous>  pantoprazole  Injectable 40 milliGRAM(s) IV Push daily  polyethylene glycol 3350 17 Gram(s) Oral daily  senna 2 Tablet(s) Oral at bedtime  sodium chloride 0.9%. 1000 milliLiter(s) (10 mL/Hr) IV Continuous <Continuous>    MEDICATIONS  (PRN):  HYDROmorphone  Injectable 0.5 milliGRAM(s) IV Push every 6 hours PRN Breakthrough Pain  oxyCODONE    IR 5 milliGRAM(s) Oral every 4 hours PRN Moderate Pain (4 - 6)  oxyCODONE    IR 10 milliGRAM(s) Oral every 4 hours PRN Severe Pain (7 - 10)    LABS:                        10.5   14.74 )-----------( 178      ( 14 Jul 2023 00:31 )             31.3     07-14    139  |  98  |  32<H>  ----------------------------<  96  4.5   |  21<L>  |  5.88<H>    Ca    9.2      14 Jul 2023 00:30  Phos  5.8     07-14  Mg     2.1     07-14    TPro  5.9<L>  /  Alb  3.9  /  TBili  0.2  /  DBili  x   /  AST  51<H>  /  ALT  23  /  AlkPhos  50  07-14    PT/INR - ( 14 Jul 2023 00:32 )   PT: 14.2 sec;   INR: 1.22 ratio         PTT - ( 14 Jul 2023 00:32 )  PTT:64.0 sec  Urinalysis Basic - ( 14 Jul 2023 00:30 )    Color: x / Appearance: x / SG: x / pH: x  Gluc: 96 mg/dL / Ketone: x  / Bili: x / Urobili: x   Blood: x / Protein: x / Nitrite: x   Leuk Esterase: x / RBC: x / WBC x   Sq Epi: x / Non Sq Epi: x / Bacteria: x      CAPILLARY BLOOD GLUCOSE  107 (14 Jul 2023 18:00)  106 (14 Jul 2023 15:00)  120 (14 Jul 2023 12:00)  110 (14 Jul 2023 10:00)  90 (14 Jul 2023 09:00)  79 (14 Jul 2023 08:00)  105 (14 Jul 2023 07:00)  116 (14 Jul 2023 06:00)  134 (14 Jul 2023 05:00)  146 (14 Jul 2023 04:00)  151 (14 Jul 2023 03:00)  158 (14 Jul 2023 02:00)  132 (14 Jul 2023 00:30)  95 (14 Jul 2023 00:00)  111 (13 Jul 2023 23:00)  129 (13 Jul 2023 22:00)  138 (13 Jul 2023 21:00)  153 (13 Jul 2023 20:15)  167 (13 Jul 2023 19:00)      POCT Blood Glucose.: 107 mg/dL (14 Jul 2023 17:38)  POCT Blood Glucose.: 106 mg/dL (14 Jul 2023 14:29)  POCT Blood Glucose.: 120 mg/dL (14 Jul 2023 12:40)  POCT Blood Glucose.: 110 mg/dL (14 Jul 2023 10:10)  POCT Blood Glucose.: 90 mg/dL (14 Jul 2023 08:54)  POCT Blood Glucose.: 79 mg/dL (14 Jul 2023 08:19)  POCT Blood Glucose.: 105 mg/dL (14 Jul 2023 06:44)  POCT Blood Glucose.: 116 mg/dL (14 Jul 2023 06:02)  POCT Blood Glucose.: 134 mg/dL (14 Jul 2023 04:46)  POCT Blood Glucose.: 146 mg/dL (14 Jul 2023 03:45)  POCT Blood Glucose.: 151 mg/dL (14 Jul 2023 02:53)  POCT Blood Glucose.: 158 mg/dL (14 Jul 2023 02:17)  POCT Blood Glucose.: 132 mg/dL (14 Jul 2023 00:36)  POCT Blood Glucose.: 95 mg/dL (13 Jul 2023 23:50)  POCT Blood Glucose.: 111 mg/dL (13 Jul 2023 22:52)  POCT Blood Glucose.: 129 mg/dL (13 Jul 2023 21:59)  POCT Blood Glucose.: 138 mg/dL (13 Jul 2023 20:45)  POCT Blood Glucose.: 154 mg/dL (13 Jul 2023 20:09)  POCT Blood Glucose.: 167 mg/dL (13 Jul 2023 18:53)      ABG - ( 14 Jul 2023 12:45 )  pH, Arterial: 7.34  pH, Blood: x     /  pCO2: 42    /  pO2: 90    / HCO3: 23    / Base Excess: -2.9  /  SaO2: 96.0              Urinalysis Basic - ( 14 Jul 2023 00:30 )    Color: x / Appearance: x / SG: x / pH: x  Gluc: 96 mg/dL / Ketone: x  / Bili: x / Urobili: x   Blood: x / Protein: x / Nitrite: x   Leuk Esterase: x / RBC: x / WBC x   Sq Epi: x / Non Sq Epi: x / Bacteria: x        Consultant(s) Notes Reviewed:  [x ] YES  [ ] NO    PHYSICAL EXAM:  GENERAL: NAD, well-groomed, well-developed,not in any distress ,  HEAD:  Atraumatic, Normocephalic  NECK: Supple, No JVD, Normal thyroid  NERVOUS SYSTEM:  Alert & Oriented X3, No focal deficit   CHEST/LUNG: Good air entry bilateral with CT  HEART: Regular rate and rhythm; No murmurs, rubs, or gallops  ABDOMEN: Soft, Nontender, Nondistended; Bowel sounds present  EXTREMITIES:  B/L BKA      Care Discussed with Consultants/Other Providers [ x] YES  [ ] NO

## 2023-07-14 NOTE — PHYSICAL THERAPY INITIAL EVALUATION ADULT - TRANSFER TRAINING, PT EVAL
GOAL: Patient will transfer independently with rolling walker in 2 weeks
Goal: Patient will be able to complete bed<>wheelchair transfers independently in 3 weeks.

## 2023-07-14 NOTE — PHYSICAL THERAPY INITIAL EVALUATION ADULT - GENERAL OBSERVATIONS, REHAB EVAL
Patient received semi reclined in bed, NAD, VSS, +Right IJ Cordis (Dobutamine gtt), +Right IJ swan, +Right radial johnie,  +2L O2 via NC, +Left UE PIV infusing (insulin gtt), +CTs x 3 to LWS, +Pacing wires, +sternal wound vac, +ICU monitors,
Pt rec'd semisupine in bed, in NAD, +IVL, +tele. Agreeable to PT. RN cleared pt to be seen.

## 2023-07-14 NOTE — PROGRESS NOTE ADULT - ASSESSMENT
61 y/o F with PMH of ESRD(on HD M/W/F thru right groin AVF), HTN, HLD, DM type II, bilateral BKA, right eye blindness presented initially to [facility] on 07/03 from rehab center for acute SOB and fluid overload and now transferred to [facility] for LHC due to elevated troponin. Pt underwent LHC 7/5: LM okay, prox LAD of 80-90%, mid Lcx of 80-90%, OM of 90%, ostial RCA of 90%. LFA access site. LVEDP: 28. On Aspirin 81mg. Plan to be transferred to [facility] for CT surgery consult with Dr. Alma Mabry.     End Stage Renal Disease on Dialysis   Monday, Wednesday and Friday   access: right upper thigh avg  consent in chart  Volume Status : stable    Plan  Plan for HD today, 2 k bath, blood flow 300cc/min, Ultrafiltration on Dialysis as tolerated with blood pressure - goal 800 cc uf-  trend basic metabolic panel and blood pressure   may require iv pressure support during dialysis,     --::: Anemia-->  Anemia Labs   Hemoglobin : 10.5 <--, 10.8 <--, 10.4 <--, 10.0 <--, 9.8 <--  Platelets : 178 <--, 113 <--, 227 <--    plan to add Erythropoietin Stimulating Agents   - plan to titrate medication as per response of hemoglobin  - if Hb less than 7gm/dl consider blood transfusion    discussed with icu team

## 2023-07-14 NOTE — PHYSICAL THERAPY INITIAL EVALUATION ADULT - MODALITIES TREATMENT COMMENTS
pt devendra VAC application well. pt rec'd in chair, b/l prosthesis, +prevena, NC, tele/pulse ox, NAD, agreed to dressing change. PT WC requested for midsternal incision. pt seen with attending. sterile technique applied. VAC applied to midsternum and 2 proximal chest tube sites with adaptic layer and black foam with good collapsed seal at 125mmHg continuos at attending request. see assessment & intervention flow sheet for further treatment information. Pt left in chair as found, LADONNA Acosta/RODRIGUE Garcia aware, RN aware, NAD, all lines intact, cb in reach, all needs met/5Ps

## 2023-07-14 NOTE — PHYSICAL THERAPY INITIAL EVALUATION ADULT - BED MOBILITY TRAINING, PT EVAL
GOAL: Patient will perform bed mobility independently in 2 weeks
Goal: Patient will be able to complete all bed mobility independently in 3 weeks.

## 2023-07-15 LAB
ALBUMIN SERPL ELPH-MCNC: 4.1 G/DL — SIGNIFICANT CHANGE UP (ref 3.3–5)
ALP SERPL-CCNC: 42 U/L — SIGNIFICANT CHANGE UP (ref 40–120)
ALT FLD-CCNC: 16 U/L — SIGNIFICANT CHANGE UP (ref 10–45)
ANION GAP SERPL CALC-SCNC: 21 MMOL/L — HIGH (ref 5–17)
AST SERPL-CCNC: 36 U/L — SIGNIFICANT CHANGE UP (ref 10–40)
BASE EXCESS BLDV CALC-SCNC: -0.7 MMOL/L — SIGNIFICANT CHANGE UP (ref -2–3)
BASE EXCESS BLDV CALC-SCNC: 1 MMOL/L — SIGNIFICANT CHANGE UP (ref -2–3)
BASE EXCESS BLDV CALC-SCNC: 1.7 MMOL/L — SIGNIFICANT CHANGE UP (ref -2–3)
BASE EXCESS BLDV CALC-SCNC: 2.6 MMOL/L — SIGNIFICANT CHANGE UP (ref -2–3)
BASOPHILS # BLD AUTO: 0.02 K/UL — SIGNIFICANT CHANGE UP (ref 0–0.2)
BASOPHILS NFR BLD AUTO: 0.2 % — SIGNIFICANT CHANGE UP (ref 0–2)
BILIRUB SERPL-MCNC: 0.2 MG/DL — SIGNIFICANT CHANGE UP (ref 0.2–1.2)
BUN SERPL-MCNC: 21 MG/DL — SIGNIFICANT CHANGE UP (ref 7–23)
CA-I SERPL-SCNC: 1.19 MMOL/L — SIGNIFICANT CHANGE UP (ref 1.15–1.33)
CALCIUM SERPL-MCNC: 9 MG/DL — SIGNIFICANT CHANGE UP (ref 8.4–10.5)
CHLORIDE BLDV-SCNC: 94 MMOL/L — LOW (ref 96–108)
CHLORIDE SERPL-SCNC: 97 MMOL/L — SIGNIFICANT CHANGE UP (ref 96–108)
CK MB BLD-MCNC: 2.8 % — SIGNIFICANT CHANGE UP (ref 0–3.5)
CK MB CFR SERPL CALC: 15.7 NG/ML — HIGH (ref 0–3.8)
CK SERPL-CCNC: 557 U/L — HIGH (ref 25–170)
CO2 BLDV-SCNC: 28 MMOL/L — HIGH (ref 22–26)
CO2 BLDV-SCNC: 30 MMOL/L — HIGH (ref 22–26)
CO2 BLDV-SCNC: 30 MMOL/L — HIGH (ref 22–26)
CO2 BLDV-SCNC: 31 MMOL/L — HIGH (ref 22–26)
CO2 SERPL-SCNC: 22 MMOL/L — SIGNIFICANT CHANGE UP (ref 22–31)
CREAT SERPL-MCNC: 4.39 MG/DL — HIGH (ref 0.5–1.3)
EGFR: 11 ML/MIN/1.73M2 — LOW
EOSINOPHIL # BLD AUTO: 0.01 K/UL — SIGNIFICANT CHANGE UP (ref 0–0.5)
EOSINOPHIL NFR BLD AUTO: 0.1 % — SIGNIFICANT CHANGE UP (ref 0–6)
GAS PNL BLDA: SIGNIFICANT CHANGE UP
GAS PNL BLDV: 133 MMOL/L — LOW (ref 136–145)
GAS PNL BLDV: SIGNIFICANT CHANGE UP
GLUCOSE BLDC GLUCOMTR-MCNC: 120 MG/DL — HIGH (ref 70–99)
GLUCOSE BLDC GLUCOMTR-MCNC: 127 MG/DL — HIGH (ref 70–99)
GLUCOSE BLDC GLUCOMTR-MCNC: 157 MG/DL — HIGH (ref 70–99)
GLUCOSE BLDC GLUCOMTR-MCNC: 182 MG/DL — HIGH (ref 70–99)
GLUCOSE BLDV-MCNC: 131 MG/DL — HIGH (ref 70–99)
GLUCOSE SERPL-MCNC: 134 MG/DL — HIGH (ref 70–99)
HCO3 BLDV-SCNC: 27 MMOL/L — SIGNIFICANT CHANGE UP (ref 22–29)
HCO3 BLDV-SCNC: 28 MMOL/L — SIGNIFICANT CHANGE UP (ref 22–29)
HCO3 BLDV-SCNC: 28 MMOL/L — SIGNIFICANT CHANGE UP (ref 22–29)
HCO3 BLDV-SCNC: 30 MMOL/L — HIGH (ref 22–29)
HCT VFR BLD CALC: 23.6 % — LOW (ref 34.5–45)
HCT VFR BLD CALC: 24.8 % — LOW (ref 34.5–45)
HCT VFR BLDA CALC: 35 % — SIGNIFICANT CHANGE UP (ref 34.5–46.5)
HGB BLD CALC-MCNC: 11.8 G/DL — SIGNIFICANT CHANGE UP (ref 11.7–16.1)
HGB BLD-MCNC: 7.6 G/DL — LOW (ref 11.5–15.5)
HGB BLD-MCNC: 8 G/DL — LOW (ref 11.5–15.5)
HOROWITZ INDEX BLDV+IHG-RTO: 24 — SIGNIFICANT CHANGE UP
IMM GRANULOCYTES NFR BLD AUTO: 1 % — HIGH (ref 0–0.9)
LACTATE BLDV-MCNC: 1.8 MMOL/L — SIGNIFICANT CHANGE UP (ref 0.5–2)
LYMPHOCYTES # BLD AUTO: 0.56 K/UL — LOW (ref 1–3.3)
LYMPHOCYTES # BLD AUTO: 5.4 % — LOW (ref 13–44)
MAGNESIUM SERPL-MCNC: 2 MG/DL — SIGNIFICANT CHANGE UP (ref 1.6–2.6)
MCHC RBC-ENTMCNC: 28.8 PG — SIGNIFICANT CHANGE UP (ref 27–34)
MCHC RBC-ENTMCNC: 29.4 PG — SIGNIFICANT CHANGE UP (ref 27–34)
MCHC RBC-ENTMCNC: 32.2 GM/DL — SIGNIFICANT CHANGE UP (ref 32–36)
MCHC RBC-ENTMCNC: 32.3 GM/DL — SIGNIFICANT CHANGE UP (ref 32–36)
MCV RBC AUTO: 89.4 FL — SIGNIFICANT CHANGE UP (ref 80–100)
MCV RBC AUTO: 91.2 FL — SIGNIFICANT CHANGE UP (ref 80–100)
MONOCYTES # BLD AUTO: 1.14 K/UL — HIGH (ref 0–0.9)
MONOCYTES NFR BLD AUTO: 11 % — SIGNIFICANT CHANGE UP (ref 2–14)
NEUTROPHILS # BLD AUTO: 8.57 K/UL — HIGH (ref 1.8–7.4)
NEUTROPHILS NFR BLD AUTO: 82.3 % — HIGH (ref 43–77)
NRBC # BLD: 0 /100 WBCS — SIGNIFICANT CHANGE UP (ref 0–0)
NRBC # BLD: 0 /100 WBCS — SIGNIFICANT CHANGE UP (ref 0–0)
PCO2 BLDV: 53 MMHG — HIGH (ref 39–42)
PCO2 BLDV: 54 MMHG — HIGH (ref 39–42)
PCO2 BLDV: 54 MMHG — HIGH (ref 39–42)
PCO2 BLDV: 55 MMHG — HIGH (ref 39–42)
PH BLDV: 7.3 — LOW (ref 7.32–7.43)
PH BLDV: 7.33 — SIGNIFICANT CHANGE UP (ref 7.32–7.43)
PH BLDV: 7.33 — SIGNIFICANT CHANGE UP (ref 7.32–7.43)
PH BLDV: 7.34 — SIGNIFICANT CHANGE UP (ref 7.32–7.43)
PHOSPHATE SERPL-MCNC: 5.4 MG/DL — HIGH (ref 2.5–4.5)
PLATELET # BLD AUTO: 107 K/UL — LOW (ref 150–400)
PLATELET # BLD AUTO: 110 K/UL — LOW (ref 150–400)
PO2 BLDV: 32 MMHG — SIGNIFICANT CHANGE UP (ref 25–45)
PO2 BLDV: 33 MMHG — SIGNIFICANT CHANGE UP (ref 25–45)
PO2 BLDV: 36 MMHG — SIGNIFICANT CHANGE UP (ref 25–45)
PO2 BLDV: 38 MMHG — SIGNIFICANT CHANGE UP (ref 25–45)
POTASSIUM BLDV-SCNC: 3.8 MMOL/L — SIGNIFICANT CHANGE UP (ref 3.5–5.1)
POTASSIUM SERPL-MCNC: 4.2 MMOL/L — SIGNIFICANT CHANGE UP (ref 3.5–5.3)
POTASSIUM SERPL-SCNC: 4.2 MMOL/L — SIGNIFICANT CHANGE UP (ref 3.5–5.3)
PROT SERPL-MCNC: 6 G/DL — SIGNIFICANT CHANGE UP (ref 6–8.3)
RBC # BLD: 2.64 M/UL — LOW (ref 3.8–5.2)
RBC # BLD: 2.72 M/UL — LOW (ref 3.8–5.2)
RBC # FLD: 16.7 % — HIGH (ref 10.3–14.5)
RBC # FLD: 16.7 % — HIGH (ref 10.3–14.5)
SAO2 % BLDV: 49.6 % — LOW (ref 67–88)
SAO2 % BLDV: 55.3 % — LOW (ref 67–88)
SAO2 % BLDV: 62.4 % — LOW (ref 67–88)
SAO2 % BLDV: 62.6 % — LOW (ref 67–88)
SODIUM SERPL-SCNC: 140 MMOL/L — SIGNIFICANT CHANGE UP (ref 135–145)
TROPONIN T, HIGH SENSITIVITY RESULT: 1907 NG/L — HIGH (ref 0–51)
WBC # BLD: 10.4 K/UL — SIGNIFICANT CHANGE UP (ref 3.8–10.5)
WBC # BLD: 8.37 K/UL — SIGNIFICANT CHANGE UP (ref 3.8–10.5)
WBC # FLD AUTO: 10.4 K/UL — SIGNIFICANT CHANGE UP (ref 3.8–10.5)
WBC # FLD AUTO: 8.37 K/UL — SIGNIFICANT CHANGE UP (ref 3.8–10.5)

## 2023-07-15 PROCEDURE — 93010 ELECTROCARDIOGRAM REPORT: CPT

## 2023-07-15 PROCEDURE — 71045 X-RAY EXAM CHEST 1 VIEW: CPT | Mod: 26

## 2023-07-15 PROCEDURE — 99291 CRITICAL CARE FIRST HOUR: CPT

## 2023-07-15 RX ORDER — CLOPIDOGREL BISULFATE 75 MG/1
75 TABLET, FILM COATED ORAL DAILY
Refills: 0 | Status: DISCONTINUED | OUTPATIENT
Start: 2023-07-15 | End: 2023-07-25

## 2023-07-15 RX ORDER — CALCIUM ACETATE 667 MG
667 TABLET ORAL
Refills: 0 | Status: DISCONTINUED | OUTPATIENT
Start: 2023-07-15 | End: 2023-07-25

## 2023-07-15 RX ORDER — ACETAMINOPHEN 500 MG
1000 TABLET ORAL ONCE
Refills: 0 | Status: COMPLETED | OUTPATIENT
Start: 2023-07-15 | End: 2023-07-15

## 2023-07-15 RX ORDER — ONDANSETRON 8 MG/1
4 TABLET, FILM COATED ORAL ONCE
Refills: 0 | Status: COMPLETED | OUTPATIENT
Start: 2023-07-15 | End: 2023-07-15

## 2023-07-15 RX ORDER — PANTOPRAZOLE SODIUM 20 MG/1
40 TABLET, DELAYED RELEASE ORAL
Refills: 0 | Status: DISCONTINUED | OUTPATIENT
Start: 2023-07-15 | End: 2023-07-25

## 2023-07-15 RX ORDER — ERYTHROPOIETIN 10000 [IU]/ML
10000 INJECTION, SOLUTION INTRAVENOUS; SUBCUTANEOUS
Refills: 0 | Status: DISCONTINUED | OUTPATIENT
Start: 2023-07-15 | End: 2023-07-25

## 2023-07-15 RX ORDER — MAGNESIUM SULFATE 500 MG/ML
2 VIAL (ML) INJECTION ONCE
Refills: 0 | Status: COMPLETED | OUTPATIENT
Start: 2023-07-15 | End: 2023-07-15

## 2023-07-15 RX ADMIN — Medication 2: at 11:32

## 2023-07-15 RX ADMIN — HYDROMORPHONE HYDROCHLORIDE 0.5 MILLIGRAM(S): 2 INJECTION INTRAMUSCULAR; INTRAVENOUS; SUBCUTANEOUS at 15:33

## 2023-07-15 RX ADMIN — Medication 25 GRAM(S): at 01:49

## 2023-07-15 RX ADMIN — HEPARIN SODIUM 5000 UNIT(S): 5000 INJECTION INTRAVENOUS; SUBCUTANEOUS at 06:00

## 2023-07-15 RX ADMIN — Medication 650 MILLIGRAM(S): at 00:00

## 2023-07-15 RX ADMIN — HEPARIN SODIUM 5000 UNIT(S): 5000 INJECTION INTRAVENOUS; SUBCUTANEOUS at 21:20

## 2023-07-15 RX ADMIN — Medication 650 MILLIGRAM(S): at 18:17

## 2023-07-15 RX ADMIN — CHLORHEXIDINE GLUCONATE 1 APPLICATION(S): 213 SOLUTION TOPICAL at 11:25

## 2023-07-15 RX ADMIN — Medication 650 MILLIGRAM(S): at 11:32

## 2023-07-15 RX ADMIN — HYDROMORPHONE HYDROCHLORIDE 0.5 MILLIGRAM(S): 2 INJECTION INTRAMUSCULAR; INTRAVENOUS; SUBCUTANEOUS at 04:05

## 2023-07-15 RX ADMIN — SENNA PLUS 2 TABLET(S): 8.6 TABLET ORAL at 21:21

## 2023-07-15 RX ADMIN — Medication 650 MILLIGRAM(S): at 17:47

## 2023-07-15 RX ADMIN — Medication 100 MILLIGRAM(S): at 21:19

## 2023-07-15 RX ADMIN — Medication 81 MILLIGRAM(S): at 11:32

## 2023-07-15 RX ADMIN — CLOPIDOGREL BISULFATE 75 MILLIGRAM(S): 75 TABLET, FILM COATED ORAL at 11:37

## 2023-07-15 RX ADMIN — Medication 3.15 MICROGRAM(S)/KG/MIN: at 05:01

## 2023-07-15 RX ADMIN — Medication 667 MILLIGRAM(S): at 13:13

## 2023-07-15 RX ADMIN — Medication 650 MILLIGRAM(S): at 01:42

## 2023-07-15 RX ADMIN — Medication 400 MILLIGRAM(S): at 06:00

## 2023-07-15 RX ADMIN — OXYCODONE HYDROCHLORIDE 5 MILLIGRAM(S): 5 TABLET ORAL at 15:33

## 2023-07-15 RX ADMIN — Medication 1000 MILLIGRAM(S): at 06:15

## 2023-07-15 RX ADMIN — GABAPENTIN 100 MILLIGRAM(S): 400 CAPSULE ORAL at 17:47

## 2023-07-15 RX ADMIN — ONDANSETRON 4 MILLIGRAM(S): 8 TABLET, FILM COATED ORAL at 05:01

## 2023-07-15 RX ADMIN — ATORVASTATIN CALCIUM 80 MILLIGRAM(S): 80 TABLET, FILM COATED ORAL at 21:20

## 2023-07-15 RX ADMIN — ERYTHROPOIETIN 10000 UNIT(S): 10000 INJECTION, SOLUTION INTRAVENOUS; SUBCUTANEOUS at 15:10

## 2023-07-15 RX ADMIN — OXYCODONE HYDROCHLORIDE 5 MILLIGRAM(S): 5 TABLET ORAL at 14:52

## 2023-07-15 RX ADMIN — HYDROMORPHONE HYDROCHLORIDE 0.5 MILLIGRAM(S): 2 INJECTION INTRAMUSCULAR; INTRAVENOUS; SUBCUTANEOUS at 04:20

## 2023-07-15 RX ADMIN — HYDROMORPHONE HYDROCHLORIDE 0.5 MILLIGRAM(S): 2 INJECTION INTRAMUSCULAR; INTRAVENOUS; SUBCUTANEOUS at 15:03

## 2023-07-15 RX ADMIN — HEPARIN SODIUM 5000 UNIT(S): 5000 INJECTION INTRAVENOUS; SUBCUTANEOUS at 13:13

## 2023-07-15 RX ADMIN — Medication 667 MILLIGRAM(S): at 17:47

## 2023-07-15 RX ADMIN — Medication 500 MILLIGRAM(S): at 17:47

## 2023-07-15 RX ADMIN — Medication 2: at 06:30

## 2023-07-15 NOTE — PROGRESS NOTE ADULT - SUBJECTIVE AND OBJECTIVE BOX
Cardiovascular Disease Progress Note  Date of service: 07-15-23 @ 10:08    Overnight events: No acute events overnight.  Pt i s in no distress.   Otherwise review of systems negative    Objective Findings:  T(C): 36.1 (07-15-23 @ 08:00), Max: 37.5 (23 @ 14:05)  HR: 69 (07-15-23 @ 10:00) (61 - 86)  BP: --  RR: 14 (07-15-23 @ 10:00) (13 - 28)  SpO2: 100% (07-15-23 @ 10:00) (93% - 100%)  Wt(kg): --  Daily     Daily Weight in k (15 Jul 2023 00:00)      Physical Exam:  Gen: NAD; Patient resting comfortably  HEENT: EOMI, Normocephalic/ atraumatic  CV: RRR, normal S1 + S2, no m/r/g  Lungs:  Normal respiratory effort; clear to auscultation bilaterally  Abd: soft, non-tender; bowel sounds present  Ext: No edema; warm and well perfused    Telemetry: Sinus     Laboratory Data:                        8.0    10.40 )-----------( 110      ( 15 Jul 2023 07:07 )             24.8     07-15    140  |  97  |  21  ----------------------------<  134<H>  4.2   |  22  |  4.39<H>    Ca    9.0      15 Jul 2023 00:17  Phos  5.4     07-15  Mg     2.0     07-15    TPro  6.0  /  Alb  4.1  /  TBili  0.2  /  DBili  x   /  AST  36  /  ALT  16  /  AlkPhos  42  07-15    PT/INR - ( 2023 00:32 )   PT: 14.2 sec;   INR: 1.22 ratio         PTT - ( 2023 00:32 )  PTT:64.0 sec  CARDIAC MARKERS ( 15 Jul 2023 06:37 )  x     / x     / 557 U/L / x     / 15.7 ng/mL  CARDIAC MARKERS ( 2023 17:15 )  x     / x     / 624 U/L / x     / 47.8 ng/mL          Inpatient Medications:  MEDICATIONS  (STANDING):  acetaminophen     Tablet .. 650 milliGRAM(s) Oral every 6 hours  ascorbic acid 500 milliGRAM(s) Oral two times a day  aspirin enteric coated 81 milliGRAM(s) Oral daily  atorvastatin 80 milliGRAM(s) Oral at bedtime  bisacodyl Suppository 10 milliGRAM(s) Rectal once  cefuroxime  IVPB 1500 milliGRAM(s) IV Intermittent every 24 hours  chlorhexidine 2% Cloths 1 Application(s) Topical daily  dextrose 50% Injectable 25 milliLiter(s) IV Push every 15 minutes  dextrose 50% Injectable 50 milliLiter(s) IV Push every 15 minutes  DOBUTamine Infusion 1.5 MICROgram(s)/kG/Min (3.15 mL/Hr) IV Continuous <Continuous>  gabapentin 100 milliGRAM(s) Oral every 12 hours  heparin   Injectable 5000 Unit(s) SubCutaneous every 8 hours  insulin lispro (ADMELOG) corrective regimen sliding scale   SubCutaneous Before meals and at bedtime  niCARdipine Infusion 2.5 mG/Hr (12.5 mL/Hr) IV Continuous <Continuous>  pantoprazole  Injectable 40 milliGRAM(s) IV Push daily  polyethylene glycol 3350 17 Gram(s) Oral daily  senna 2 Tablet(s) Oral at bedtime  sodium chloride 0.9%. 1000 milliLiter(s) (10 mL/Hr) IV Continuous <Continuous>      Assessment:  61-year-old female with ESRD on HD, HLD and peripheral vascular disease s/p lower extremity resection presents with chest pain found to have multivessel disease.      Plan of Care:      #Multivessel CAD-  Noted on cardiac cath .  Echo  with normal LV systolic function. Apical Hypokinesis and moderate MS.   s/p CABG x3 (LIMA to LAD, SVG to OM, SVG to RCA)  2023.   Pt recovering in CTICU  Amiodarone for post op afib ppx  ASA    #Cardiogenic shock  Post op CABG  Currently on Dobutamine, wean as tolerated.      #Peripheral vascular disease-  s/p L BKA well.   Continue current cardiac management.   Antiplatelet therapy with ASA.       #Compensated diastolic CHF-  Euvolemic on exam.  Fluid removal with HD as per the renal team.     #ESRD-  - HD, as per renal.            Over 25 minutes spent on total encounter; more than 50% of the visit was spent counseling and/or coordinating care by the attending physician.      DO LARY SchneiderC  Cardiovascular Disease  [phone]

## 2023-07-15 NOTE — PROGRESS NOTE ADULT - SUBJECTIVE AND OBJECTIVE BOX
Date of Service  : 07-15-23     INTERVAL HPI/OVERNIGHT EVENTS: I feel fine.   Vital Signs Last 24 Hrs  T(C): 36.2 (15 Jul 2023 14:20), Max: 37.3 (15 Jul 2023 00:00)  T(F): 97.2 (15 Jul 2023 14:20), Max: 99.1 (15 Jul 2023 00:00)  HR: 57 (15 Jul 2023 14:20) (57 - 86)  BP: 104/51 (15 Jul 2023 14:00) (104/51 - 104/51)  BP(mean): 74 (15 Jul 2023 14:00) (74 - 74)  RR: 18 (15 Jul 2023 14:20) (13 - 24)  SpO2: 100% (15 Jul 2023 14:20) (97% - 100%)    Parameters below as of 15 Jul 2023 14:20  Patient On (Oxygen Delivery Method): nasal cannula  O2 Flow (L/min): 1    I&O's Summary    14 Jul 2023 07:01  -  15 Jul 2023 07:00  --------------------------------------------------------  IN: 861.5 mL / OUT: 570 mL / NET: 291.5 mL    15 Jul 2023 07:01  -  15 Jul 2023 14:55  --------------------------------------------------------  IN: 677.8 mL / OUT: 80 mL / NET: 597.8 mL      MEDICATIONS  (STANDING):  acetaminophen     Tablet .. 650 milliGRAM(s) Oral every 6 hours  ascorbic acid 500 milliGRAM(s) Oral two times a day  aspirin enteric coated 81 milliGRAM(s) Oral daily  atorvastatin 80 milliGRAM(s) Oral at bedtime  bisacodyl Suppository 10 milliGRAM(s) Rectal once  calcium acetate 667 milliGRAM(s) Oral three times a day with meals  cefuroxime  IVPB 1500 milliGRAM(s) IV Intermittent every 24 hours  chlorhexidine 2% Cloths 1 Application(s) Topical daily  clopidogrel Tablet 75 milliGRAM(s) Oral daily  dextrose 50% Injectable 25 milliLiter(s) IV Push every 15 minutes  dextrose 50% Injectable 50 milliLiter(s) IV Push every 15 minutes  epoetin niesha-epbx (RETACRIT) Injectable 04264 Unit(s) IV Push <User Schedule>  gabapentin 100 milliGRAM(s) Oral every 12 hours  heparin   Injectable 5000 Unit(s) SubCutaneous every 8 hours  insulin lispro (ADMELOG) corrective regimen sliding scale   SubCutaneous Before meals and at bedtime  pantoprazole    Tablet 40 milliGRAM(s) Oral before breakfast  polyethylene glycol 3350 17 Gram(s) Oral daily  senna 2 Tablet(s) Oral at bedtime  sodium chloride 0.9%. 1000 milliLiter(s) (10 mL/Hr) IV Continuous <Continuous>    MEDICATIONS  (PRN):  HYDROmorphone  Injectable 0.5 milliGRAM(s) IV Push every 6 hours PRN Breakthrough Pain  oxyCODONE    IR 5 milliGRAM(s) Oral every 4 hours PRN Moderate Pain (4 - 6)  oxyCODONE    IR 10 milliGRAM(s) Oral every 4 hours PRN Severe Pain (7 - 10)    LABS:                        8.0    10.40 )-----------( 110      ( 15 Jul 2023 07:07 )             24.8     07-15    140  |  97  |  21  ----------------------------<  134<H>  4.2   |  22  |  4.39<H>    Ca    9.0      15 Jul 2023 00:17  Phos  5.4     07-15  Mg     2.0     07-15    TPro  6.0  /  Alb  4.1  /  TBili  0.2  /  DBili  x   /  AST  36  /  ALT  16  /  AlkPhos  42  07-15    PT/INR - ( 14 Jul 2023 00:32 )   PT: 14.2 sec;   INR: 1.22 ratio         PTT - ( 14 Jul 2023 00:32 )  PTT:64.0 sec  Urinalysis Basic - ( 15 Jul 2023 00:17 )    Color: x / Appearance: x / SG: x / pH: x  Gluc: 134 mg/dL / Ketone: x  / Bili: x / Urobili: x   Blood: x / Protein: x / Nitrite: x   Leuk Esterase: x / RBC: x / WBC x   Sq Epi: x / Non Sq Epi: x / Bacteria: x      CAPILLARY BLOOD GLUCOSE  183 (15 Jul 2023 06:00)  129 (14 Jul 2023 21:00)  107 (14 Jul 2023 18:00)  106 (14 Jul 2023 15:00)      POCT Blood Glucose.: 157 mg/dL (15 Jul 2023 11:23)  POCT Blood Glucose.: 182 mg/dL (15 Jul 2023 06:03)  POCT Blood Glucose.: 129 mg/dL (14 Jul 2023 21:03)  POCT Blood Glucose.: 107 mg/dL (14 Jul 2023 17:38)      ABG - ( 15 Jul 2023 11:23 )  pH, Arterial: 7.35  pH, Blood: x     /  pCO2: 45    /  pO2: 75    / HCO3: 25    / Base Excess: -0.9  /  SaO2: 95.4              Urinalysis Basic - ( 15 Jul 2023 00:17 )    Color: x / Appearance: x / SG: x / pH: x  Gluc: 134 mg/dL / Ketone: x  / Bili: x / Urobili: x   Blood: x / Protein: x / Nitrite: x   Leuk Esterase: x / RBC: x / WBC x   Sq Epi: x / Non Sq Epi: x / Bacteria: x      REVIEW OF SYSTEMS:  CONSTITUTIONAL: No fever, weight loss, or fatigue  EYES: No eye pain, visual disturbances, or discharge  ENMT:  No difficulty hearing, tinnitus, vertigo; No sinus or throat pain  NECK: No pain or stiffness  RESPIRATORY: No cough, wheezing, chills or hemoptysis; No shortness of breath  CARDIOVASCULAR: No chest pain, palpitations, dizziness, or leg swelling      Consultant(s) Notes Reviewed:  [x ] YES  [ ] NO    PHYSICAL EXAM:  GENERAL: NAD, well-groomed, well-developed,not in any distress ,  HEAD:  Atraumatic, Normocephalic  EYES: EOMI, PERRLA, conjunctiva and sclera clear  ENMT: No tonsillar erythema, exudates, or enlargement; Moist mucous membranes, Good dentition, No lesions  NECK: Supple, No JVD, Normal thyroid  NERVOUS SYSTEM:  Alert & Oriented X3, No focal deficit   CHEST/LUNG: Good air entry bilateral with CT   HEART: Regular rate and rhythm; No murmurs, rubs, or gallops  ABDOMEN: Soft, Nontender, Nondistended; Bowel sounds present  EXTREMITIES: B/L BKA     Care Discussed with Consultants/Other Providers [ x] YES  [ ] NO

## 2023-07-15 NOTE — PROGRESS NOTE ADULT - ASSESSMENT
61 y/o F with PMH of ESRD(on HD M/W/F thru right groin AVF), HTN, HLD, DM type II, bilateral BKA, right eye blindness presented initially to [facility] on 07/03 from rehab center for acute SOB and fluid overload and now transferred to [facility] for LHC due to elevated troponin. Pt underwent LHC 7/5: LM okay, prox LAD of 80-90%, mid Lcx of 80-90%, OM of 90%, ostial RCA of 90%. LFA access site. LVEDP: 28. On Aspirin 81mg. awaiting CABG.        Problem/Recommendation - 1:  ·  Problem: CAD, multiple vessel.   ·  Recommendation: S/P  CABG  and management per CT Surgery    No Cp etc,   On ASA,BB and Statin.     Problem/Recommendation - 2:  ·  Problem: ESRD on hemodialysis.   ·  Recommendation: HD per renal .     Problem/Recommendation - 3:  ·  Problem: DM (diabetes mellitus).   ·  Recommendation: Sugars in good range.     Problem/Recommendation - 4:  ·  Problem: HTN (hypertension).   ·  Recommendation: BP readings better.     Problem/Recommendation - 5:  ·  Problem: Anemia secondary to renal failure.   ·  Recommendation: HH stable. Epogen per renal.     Problem/Recommendation - 6:  ·  Problem: HLD (hyperlipidemia).   ·  Recommendation: Statin.     Problem/Recommendation - 7:  ·  Problem: PVD (peripheral vascular disease).   ·  Recommendation: S/P BKA . Has Prosthesis .     Problem/Recommendation - 8:  ·  Problem: Legally blind.   ·  Recommendation: Supportive care.    over all management per CTICU.

## 2023-07-15 NOTE — PROGRESS NOTE ADULT - SUBJECTIVE AND OBJECTIVE BOX
Patient seen and examined at the bedside.    Remained critically ill on continuous ICU monitoring.      Brief Summary:  61 yo F with CAD s/p CABG on 07/13/2023      24 Hour events:      OBJECTIVE:  Vital Signs Last 24 Hrs  T(C): 36.7 (15 Jul 2023 04:00), Max: 37.5 (14 Jul 2023 14:05)  T(F): 98.1 (15 Jul 2023 04:00), Max: 99.5 (14 Jul 2023 14:05)  HR: 75 (15 Jul 2023 04:00) (75 - 86)  BP: --  BP(mean): --  RR: 16 (15 Jul 2023 04:00) (13 - 28)  SpO2: 100% (15 Jul 2023 04:00) (93% - 100%)    Parameters below as of 15 Jul 2023 04:00  Patient On (Oxygen Delivery Method): nasal cannula  O2 Flow (L/min): 2                  Physical Exam:   General: Interactive   Neurology: Follows commands.   Respiratory: Decreased at bases  CV: Sinus rhythm   Abdominal: Soft, Nontender  Extremities: Groin fistula with thrill  s/p bilateral BKA.  -------------------------------------------------------------------------------------------------------------------------------    Labs:                        7.6    8.37  )-----------( 107      ( 15 Jul 2023 00:17 )             23.6     07-15    140  |  97  |  21  ----------------------------<  134<H>  4.2   |  22  |  4.39<H>    Ca    9.0      15 Jul 2023 00:17  Phos  5.4     07-15  Mg     2.0     07-15    TPro  6.0  /  Alb  4.1  /  TBili  0.2  /  DBili  x   /  AST  36  /  ALT  16  /  AlkPhos  42  07-15    LIVER FUNCTIONS - ( 15 Jul 2023 00:17 )  Alb: 4.1 g/dL / Pro: 6.0 g/dL / ALK PHOS: 42 U/L / ALT: 16 U/L / AST: 36 U/L / GGT: x           PT/INR - ( 14 Jul 2023 00:32 )   PT: 14.2 sec;   INR: 1.22 ratio         PTT - ( 14 Jul 2023 00:32 )  PTT:64.0 sec  ABG - ( 15 Jul 2023 05:00 )  pH, Arterial: 7.37  pH, Blood: x     /  pCO2: 45    /  pO2: 91    / HCO3: 26    / Base Excess: 0.6   /  SaO2: 96.6              ------------------------------------------------------------------------------------------------------------------------------  Assessment:  61 yo F s/p CABG on 07/13/2023    Hemodynamic instability  Post op acute respiratory insufficiency  Acute blood loss anemia / Thrombocytopenia  Hyperglycemia  ESRD      Plan:   ***Neuro***  Postoperative acute pain control with Tylenol, Gabapentin, and prns.    ***Cardiovascular***  At high risk for ongoing hemodynamic instability and cardiac arrhythmias.  Continue Dobutamine for now - began to wean after HD yesterday.  Continue Cardene for BP management  Amiodarone for afib prophylaxis   Monitor chest tube output.  ASA/statin daily.    ***Pulmonary***  On Nasal cannula 2L  Postoperative acute respiratory insufficiency   Deep breathing and coughing exercises.  Wean oxygen as able.    ***GI***  Tolerating Regular diet.  Protonix for stress ulcer prophylaxis   Bowel regimen.    ***Renal***  ESRD on HD  Trend creatinine   Replete electrolytes as indicated.    ***ID***  Cefuroxime for perioperative antibiotic coverage     ***Endocrine***  T2DM  Insulin Sliding Scale as needed for hyperglycemia    ***Hematology***  Acute blood loss anemia and Thrombocytopenia - no current transfusion indication    SQ Heparin for DVT prophylaxis         Patient requires continuous monitoring with bedside rhythm monitoring, pulse oximetry monitoring, and continuous central venous and arterial pressure monitoring; and intermittent blood gas analysis. Care plan discussed with the ICU care team.   Patient remained critical, at risk for life threatening decompensation.    I have spent 30 minutes providing critical care management to this patient.    By signing my name below, I, Angela Rodrigo, attest that this documentation has been prepared under the direction and in the presence of Venus Celis MD  Electronically signed: Angela Wallace, 07-15-23 @ 06:41    I, Venus Celis MD, personally performed the services described in this documentation. all medical record entries made by the scribe were at my direction and in my presence. I have reviewed the chart and agree that the record reflects my personal performance and is accurate and complete  Electronically signed: Venus Celis MD Patient seen and examined at the bedside.    Remains critically ill on continuous ICU monitoring.      Brief Summary:  63 yo F with CAD s/p CABG on 07/13/2023      24 Hour events:  Tolerated HD with 200 UF yesterday  Transfused 1 unit prbcs for Hgb 7.6  Remained on low dose Dobutamine overnight - weaned to off this  morning.      Objective:  Vital Signs Last 24 Hrs  T(C): 36.7 (15 Jul 2023 04:00), Max: 37.5 (14 Jul 2023 14:05)  T(F): 98.1 (15 Jul 2023 04:00), Max: 99.5 (14 Jul 2023 14:05)  HR: 75 (15 Jul 2023 04:00) (75 - 86)  BP: --  BP(mean): --  RR: 16 (15 Jul 2023 04:00) (13 - 28)  SpO2: 100% (15 Jul 2023 04:00) (93% - 100%)    Parameters below as of 15 Jul 2023 04:00  Patient On (Oxygen Delivery Method): nasal cannula  O2 Flow (L/min): 2               Physical Exam:   General: Interactive   Neurology: Follows commands.   Respiratory: Decreased at bases  CV: Sinus rhythm   Abdominal: Soft, Nontender  Extremities: Groin fistula with thrill  s/p bilateral BKA.    -------------------------------------------------------------------------------------------------------------------------------    Labs:                        7.6    8.37  )-----------( 107      ( 15 Jul 2023 00:17 )             23.6     07-15    140  |  97  |  21  ----------------------------<  134<H>  4.2   |  22  |  4.39<H>    Ca    9.0      15 Jul 2023 00:17  Phos  5.4     07-15  Mg     2.0     07-15    TPro  6.0  /  Alb  4.1  /  TBili  0.2  /  DBili  x   /  AST  36  /  ALT  16  /  AlkPhos  42  07-15    LIVER FUNCTIONS - ( 15 Jul 2023 00:17 )  Alb: 4.1 g/dL / Pro: 6.0 g/dL / ALK PHOS: 42 U/L / ALT: 16 U/L / AST: 36 U/L / GGT: x           PT/INR - ( 14 Jul 2023 00:32 )   PT: 14.2 sec;   INR: 1.22 ratio         PTT - ( 14 Jul 2023 00:32 )  PTT:64.0 sec  ABG - ( 15 Jul 2023 05:00 )  pH, Arterial: 7.37  pH, Blood: x     /  pCO2: 45    /  pO2: 91    / HCO3: 26    / Base Excess: 0.6   /  SaO2: 96.6          ------------------------------------------------------------------------------------------------------------------------------  Assessment:  63 yo F s/p CABG on 07/13/2023    Hemodynamic instability  Post op acute respiratory insufficiency  Acute blood loss anemia / Thrombocytopenia  Hyperglycemia  ESRD      Plan:   ***Neuro***  Postoperative acute pain control with Tylenol, Gabapentin, and prns.    ***Cardiovascular***  At high risk for ongoing hemodynamic instability and cardiac arrhythmias.  Dobutamine weaned to off.  Beta blocker and ERP Amiodarone on hold in setting of bradycardia  Monitor chest tube output.  ASA/statin daily and will add Plavix.    ***Pulmonary***  Postoperative acute respiratory insufficiency   Deep breathing and coughing exercises.  Wean oxygen as able.    ***GI***  Tolerating diet.  Protonix for stress ulcer prophylaxis   Bowel regimen.    ***Renal***  ESRD on HD  Next HD session today.   Replete electrolytes as indicated.    ***ID***  No acute issues.    ***Endocrine***  T2DM  Insulin Sliding Scale for hyperglycemia    ***Hematology***  Acute blood loss anemia and Thrombocytopenia   Transfuse 1 unit prbcs with HD today.  Trend CBC and monitor for bleeding.  SQ Heparin for DVT prophylaxis         Patient requires continuous monitoring with bedside rhythm monitoring, pulse oximetry monitoring, and continuous central venous and arterial pressure monitoring; and intermittent blood gas analysis. Care plan discussed with the ICU care team.   Patient remains critical, at risk for life threatening decompensation.    I have spent 35 minutes providing critical care management to this patient.    By signing my name below, I, Angela Rodrigo, attest that this documentation has been prepared under the direction and in the presence of Venus Celis MD  Electronically signed: Angela Wallace, 07-15-23 @ 06:41    I, Venus Celis MD, personally performed the services described in this documentation. all medical record entries made by the scribe were at my direction and in my presence. I have reviewed the chart and agree that the record reflects my personal performance and is accurate and complete  Electronically signed: Venus Celis MD

## 2023-07-15 NOTE — PROGRESS NOTE ADULT - SUBJECTIVE AND OBJECTIVE BOX
[facility] : [facility] [phone], Office [phone]  Dr Vanegas/Dr Bazzi  /Dr Kingsley patricia /Dr SHERRI Mcfarlane/Dr Jarrell Madden/Dr Andrés Perkins /Dr SUNI Potts  _______________________________________________________________________________________________    seen and examined today for End Stage Renal Disease on Dialysis   Interval : s/p hemodialysis yesterday   VITALS:  T(F): 97 (07-15 @ 08:00), Max: 99.5 (07-14 @ 04:00)  HR: 69 (07-15 @ 10:00)  ABP: 151/37 (07-15 @ 10:00)  RR: 14 (07-15 @ 10:00)  SpO2: 100% (07-15 @ 10:00)    07-14 @ 07:01  -  07-15 @ 07:00  --------------------------------------------------------  IN: 861.5 mL / OUT: 570 mL / NET: 291.5 mL    07-15 @ 07:01  -  07-15 @ 10:57  --------------------------------------------------------  IN: 504.6 mL / OUT: 30 mL / NET: 474.6 mL      Physical Exam :-  Constitutional: NAD in icu   Respiratory: Bilateral equal breath sounds, few Crackles present.  Cardiovascular: S1, S2 normal, positive Murmur  Gastrointestinal: Bowel Sounds present, soft  Extremities: +trace Edema Feet  Neurological: Alert and Oriented x 3  Psychiatric: Normal mood, normal affect    Data:-  Allergies :   No Known Allergies    Hospital Medications:   MEDICATIONS  (STANDING):  acetaminophen     Tablet .. 650 milliGRAM(s) Oral every 6 hours  ascorbic acid 500 milliGRAM(s) Oral two times a day  aspirin enteric coated 81 milliGRAM(s) Oral daily  atorvastatin 80 milliGRAM(s) Oral at bedtime  bisacodyl Suppository 10 milliGRAM(s) Rectal once  cefuroxime  IVPB 1500 milliGRAM(s) IV Intermittent every 24 hours  chlorhexidine 2% Cloths 1 Application(s) Topical daily  dextrose 50% Injectable 50 milliLiter(s) IV Push every 15 minutes  dextrose 50% Injectable 25 milliLiter(s) IV Push every 15 minutes  DOBUTamine Infusion 1.5 MICROgram(s)/kG/Min (3.15 mL/Hr) IV Continuous <Continuous>  gabapentin 100 milliGRAM(s) Oral every 12 hours  heparin   Injectable 5000 Unit(s) SubCutaneous every 8 hours  insulin lispro (ADMELOG) corrective regimen sliding scale   SubCutaneous Before meals and at bedtime  niCARdipine Infusion 2.5 mG/Hr (12.5 mL/Hr) IV Continuous <Continuous>  pantoprazole  Injectable 40 milliGRAM(s) IV Push daily  polyethylene glycol 3350 17 Gram(s) Oral daily  senna 2 Tablet(s) Oral at bedtime  sodium chloride 0.9%. 1000 milliLiter(s) (10 mL/Hr) IV Continuous <Continuous>    07-15    140  |  97  |  21  ----------------------------<  134<H>  4.2   |  22  |  4.39<H>    Ca    9.0      15 Jul 2023 00:17  Phos  5.4     07-15  Mg     2.0     07-15    TPro  6.0  /  Alb  4.1  /  TBili  0.2  /  DBili      /  AST  36  /  ALT  16  /  AlkPhos  42  07-15    Creatinine Trend: 4.39 <--, 5.88 <--, 5.41 <--, 9.14 <--, 7.31 <--, 10.24 <--  egfr trend : 11 <--, 8 <--, 8 <--, 4 <--, 6 <--, 4 <--, 5 <--                        8.0    10.40 )-----------( 110      ( 15 Jul 2023 07:07 )             24.8

## 2023-07-15 NOTE — PROGRESS NOTE ADULT - ASSESSMENT
63 y/o F with PMH of ESRD(on HD M/W/F thru right groin AVF), HTN, HLD, DM type II, bilateral BKA, right eye blindness presented initially to [facility] on 07/03 from rehab center for acute SOB and fluid overload and now transferred to [facility] for LHC due to elevated troponin. Pt underwent LHC 7/5: LM okay, prox LAD of 80-90%, mid Lcx of 80-90%, OM of 90%, ostial RCA of 90%. LFA access site. LVEDP: 28. On Aspirin 81mg. Plan to be transferred to [facility] for CT surgery consult with Dr. Alma Mabry.     End Stage Renal Disease on Dialysis   Monday, Wednesday and Friday   access: right upper thigh avg  consent in chart  Volume Status : stable    last hemodialysis yesterday, uf-1 kg tolerated with blood pressure    Plan  Plan for HD today, 2 k bath, blood flow 300cc/min, Ultrafiltration on Dialysis as tolerated with blood pressure - goal 800 cc uf- keep sbp more than 110 mm of hg  trend basic metabolic panel and blood pressure   - add renvela 800 mg three times per day with meals if not npo as phosphate binder    --::: Anemia-->  --::: Anemia-->  Anemia Labs   Hemoglobin : 8.0 <--, 7.6 <--, 10.5 <--, 10.8 <--, 10.4 <--  Platelets : 110 <--, 107 <--, 178 <--    Current orders :  - retacrit 10,000 units iv three times per week   - plan to titrate medication as per response of hemoglobin  - if Hb less than 7gm/dl consider blood transfusion

## 2023-07-16 LAB
ALBUMIN SERPL ELPH-MCNC: 3.9 G/DL — SIGNIFICANT CHANGE UP (ref 3.3–5)
ALP SERPL-CCNC: 60 U/L — SIGNIFICANT CHANGE UP (ref 40–120)
ALT FLD-CCNC: 28 U/L — SIGNIFICANT CHANGE UP (ref 10–45)
ANION GAP SERPL CALC-SCNC: 19 MMOL/L — HIGH (ref 5–17)
AST SERPL-CCNC: 40 U/L — SIGNIFICANT CHANGE UP (ref 10–40)
BILIRUB SERPL-MCNC: 0.3 MG/DL — SIGNIFICANT CHANGE UP (ref 0.2–1.2)
BLD GP AB SCN SERPL QL: NEGATIVE — SIGNIFICANT CHANGE UP
BUN SERPL-MCNC: 20 MG/DL — SIGNIFICANT CHANGE UP (ref 7–23)
CALCIUM SERPL-MCNC: 9.5 MG/DL — SIGNIFICANT CHANGE UP (ref 8.4–10.5)
CHLORIDE SERPL-SCNC: 94 MMOL/L — LOW (ref 96–108)
CO2 SERPL-SCNC: 23 MMOL/L — SIGNIFICANT CHANGE UP (ref 22–31)
CREAT SERPL-MCNC: 3.96 MG/DL — HIGH (ref 0.5–1.3)
EGFR: 12 ML/MIN/1.73M2 — LOW
GAS PNL BLDA: SIGNIFICANT CHANGE UP
GLUCOSE BLDC GLUCOMTR-MCNC: 100 MG/DL — HIGH (ref 70–99)
GLUCOSE BLDC GLUCOMTR-MCNC: 75 MG/DL — SIGNIFICANT CHANGE UP (ref 70–99)
GLUCOSE BLDC GLUCOMTR-MCNC: 83 MG/DL — SIGNIFICANT CHANGE UP (ref 70–99)
GLUCOSE BLDC GLUCOMTR-MCNC: 96 MG/DL — SIGNIFICANT CHANGE UP (ref 70–99)
GLUCOSE SERPL-MCNC: 101 MG/DL — HIGH (ref 70–99)
HCT VFR BLD CALC: 33.2 % — LOW (ref 34.5–45)
HGB BLD-MCNC: 10.9 G/DL — LOW (ref 11.5–15.5)
MAGNESIUM SERPL-MCNC: 2.3 MG/DL — SIGNIFICANT CHANGE UP (ref 1.6–2.6)
MCHC RBC-ENTMCNC: 29.1 PG — SIGNIFICANT CHANGE UP (ref 27–34)
MCHC RBC-ENTMCNC: 32.8 GM/DL — SIGNIFICANT CHANGE UP (ref 32–36)
MCV RBC AUTO: 88.5 FL — SIGNIFICANT CHANGE UP (ref 80–100)
NRBC # BLD: 0 /100 WBCS — SIGNIFICANT CHANGE UP (ref 0–0)
PHOSPHATE SERPL-MCNC: 5.8 MG/DL — HIGH (ref 2.5–4.5)
PLATELET # BLD AUTO: 114 K/UL — LOW (ref 150–400)
POTASSIUM SERPL-MCNC: 4.1 MMOL/L — SIGNIFICANT CHANGE UP (ref 3.5–5.3)
POTASSIUM SERPL-SCNC: 4.1 MMOL/L — SIGNIFICANT CHANGE UP (ref 3.5–5.3)
PROT SERPL-MCNC: 6.3 G/DL — SIGNIFICANT CHANGE UP (ref 6–8.3)
RBC # BLD: 3.75 M/UL — LOW (ref 3.8–5.2)
RBC # FLD: 15.9 % — HIGH (ref 10.3–14.5)
RH IG SCN BLD-IMP: POSITIVE — SIGNIFICANT CHANGE UP
SODIUM SERPL-SCNC: 136 MMOL/L — SIGNIFICANT CHANGE UP (ref 135–145)
WBC # BLD: 11.83 K/UL — HIGH (ref 3.8–10.5)
WBC # FLD AUTO: 11.83 K/UL — HIGH (ref 3.8–10.5)

## 2023-07-16 PROCEDURE — 99291 CRITICAL CARE FIRST HOUR: CPT

## 2023-07-16 PROCEDURE — 71045 X-RAY EXAM CHEST 1 VIEW: CPT | Mod: 26

## 2023-07-16 RX ADMIN — Medication 650 MILLIGRAM(S): at 06:16

## 2023-07-16 RX ADMIN — OXYCODONE HYDROCHLORIDE 5 MILLIGRAM(S): 5 TABLET ORAL at 17:13

## 2023-07-16 RX ADMIN — Medication 667 MILLIGRAM(S): at 17:08

## 2023-07-16 RX ADMIN — HEPARIN SODIUM 5000 UNIT(S): 5000 INJECTION INTRAVENOUS; SUBCUTANEOUS at 13:53

## 2023-07-16 RX ADMIN — Medication 500 MILLIGRAM(S): at 17:08

## 2023-07-16 RX ADMIN — PANTOPRAZOLE SODIUM 40 MILLIGRAM(S): 20 TABLET, DELAYED RELEASE ORAL at 06:16

## 2023-07-16 RX ADMIN — OXYCODONE HYDROCHLORIDE 5 MILLIGRAM(S): 5 TABLET ORAL at 17:43

## 2023-07-16 RX ADMIN — SENNA PLUS 2 TABLET(S): 8.6 TABLET ORAL at 22:27

## 2023-07-16 RX ADMIN — CLOPIDOGREL BISULFATE 75 MILLIGRAM(S): 75 TABLET, FILM COATED ORAL at 12:07

## 2023-07-16 RX ADMIN — Medication 500 MILLIGRAM(S): at 06:16

## 2023-07-16 RX ADMIN — CHLORHEXIDINE GLUCONATE 1 APPLICATION(S): 213 SOLUTION TOPICAL at 22:48

## 2023-07-16 RX ADMIN — GABAPENTIN 100 MILLIGRAM(S): 400 CAPSULE ORAL at 17:08

## 2023-07-16 RX ADMIN — GABAPENTIN 100 MILLIGRAM(S): 400 CAPSULE ORAL at 06:15

## 2023-07-16 RX ADMIN — Medication 81 MILLIGRAM(S): at 12:08

## 2023-07-16 RX ADMIN — HEPARIN SODIUM 5000 UNIT(S): 5000 INJECTION INTRAVENOUS; SUBCUTANEOUS at 06:16

## 2023-07-16 RX ADMIN — HEPARIN SODIUM 5000 UNIT(S): 5000 INJECTION INTRAVENOUS; SUBCUTANEOUS at 22:28

## 2023-07-16 RX ADMIN — Medication 650 MILLIGRAM(S): at 06:40

## 2023-07-16 RX ADMIN — Medication 650 MILLIGRAM(S): at 12:07

## 2023-07-16 RX ADMIN — ATORVASTATIN CALCIUM 80 MILLIGRAM(S): 80 TABLET, FILM COATED ORAL at 22:27

## 2023-07-16 RX ADMIN — POLYETHYLENE GLYCOL 3350 17 GRAM(S): 17 POWDER, FOR SOLUTION ORAL at 12:08

## 2023-07-16 RX ADMIN — Medication 667 MILLIGRAM(S): at 08:52

## 2023-07-16 RX ADMIN — Medication 650 MILLIGRAM(S): at 12:37

## 2023-07-16 RX ADMIN — Medication 667 MILLIGRAM(S): at 12:07

## 2023-07-16 NOTE — PROGRESS NOTE ADULT - SUBJECTIVE AND OBJECTIVE BOX
Cardiovascular Disease Progress Note  Date of service: 23 @ 11:49    Overnight events: No acute events overnight.  Pt is in no distress.   Otherwise review of systems negative    Objective Findings:  T(C): 36.8 (23 @ 08:00), Max: 37.1 (07-15-23 @ 20:00)  HR: 61 (23 @ 10:00) (53 - 62)  BP: 136/64 (07-15-23 @ 17:00) (100/54 - 136/64)  RR: 18 (23 @ 10:00) (13 - 26)  SpO2: 98% (23 @ 10:00) (94% - 100%)  Wt(kg): --  Daily     Daily Weight in k.4 (2023 00:00)      Physical Exam:  Gen: NAD; Patient resting comfortably  HEENT: EOMI, Normocephalic/ atraumatic  CV: RRR, normal S1 + S2, no m/r/g  Lungs:  Normal respiratory effort; clear to auscultation bilaterally  Abd: soft, non-tender; bowel sounds present  Ext: No edema; warm and well perfused    Telemetry: Sinus     Laboratory Data:                        10.9   11.83 )-----------( 114      ( 2023 00:44 )             33.2         136  |  94<L>  |  20  ----------------------------<  101<H>  4.1   |  23  |  3.96<H>    Ca    9.5      2023 00:38  Phos  5.8       Mg     2.3         TPro  6.3  /  Alb  3.9  /  TBili  0.3  /  DBili  x   /  AST  40  /  ALT  28  /  AlkPhos  60        CARDIAC MARKERS ( 15 Jul 2023 06:37 )  x     / x     / 557 U/L / x     / 15.7 ng/mL          Inpatient Medications:  MEDICATIONS  (STANDING):  acetaminophen     Tablet .. 650 milliGRAM(s) Oral every 6 hours  ascorbic acid 500 milliGRAM(s) Oral two times a day  aspirin enteric coated 81 milliGRAM(s) Oral daily  atorvastatin 80 milliGRAM(s) Oral at bedtime  bisacodyl Suppository 10 milliGRAM(s) Rectal once  calcium acetate 667 milliGRAM(s) Oral three times a day with meals  chlorhexidine 2% Cloths 1 Application(s) Topical daily  clopidogrel Tablet 75 milliGRAM(s) Oral daily  dextrose 50% Injectable 25 milliLiter(s) IV Push every 15 minutes  dextrose 50% Injectable 50 milliLiter(s) IV Push every 15 minutes  epoetin niesha-epbx (RETACRIT) Injectable 89309 Unit(s) IV Push <User Schedule>  gabapentin 100 milliGRAM(s) Oral every 12 hours  heparin   Injectable 5000 Unit(s) SubCutaneous every 8 hours  insulin lispro (ADMELOG) corrective regimen sliding scale   SubCutaneous Before meals and at bedtime  pantoprazole    Tablet 40 milliGRAM(s) Oral before breakfast  polyethylene glycol 3350 17 Gram(s) Oral daily  senna 2 Tablet(s) Oral at bedtime  sodium chloride 0.9%. 1000 milliLiter(s) (5 mL/Hr) IV Continuous <Continuous>      Assessment:  61-year-old female with ESRD on HD, HLD and peripheral vascular disease s/p lower extremity resection presents with chest pain found to have multivessel disease.      Plan of Care:      #Multivessel CAD-  Noted on cardiac cath .  Echo  with normal LV systolic function. Apical Hypokinesis and moderate MS.   s/p CABG x3 (LIMA to LAD, SVG to OM, SVG to RCA)  2023.   Pt recovering in CTICU  ASA and Plavix    #Peripheral vascular disease-  s/p L BKA well.   Continue current cardiac management.   Antiplatelet therapy with ASA.       #Compensated diastolic CHF-  Euvolemic on exam.  Fluid removal with HD as per the renal team.     #ESRD-  - HD, as per renal.        Plan of Care:          Over 25 minutes spent on total encounter; more than 50% of the visit was spent counseling and/or coordinating care by the attending physician.      Wisam Adams,  [facility]  Cardiovascular Disease  [phone]

## 2023-07-16 NOTE — PROGRESS NOTE ADULT - SUBJECTIVE AND OBJECTIVE BOX
[facility] : [facility] [phone], Office [phone]  Dr Vanegas/Dr Bazzi  /Dr Kingsley patricia /Dr SHERRI Mcfarlane/Dr Jarrell Madden/Dr Andrés Perkins /Dr SUNI Potts  _______________________________________________________________________________________________    seen and examined today for End Stage Renal Disease on Dialysis   Interval : s/p hemodialysis yesterday   VITALS:  T(F): 98.2 (07-16 @ 08:00), Max: 99.5 (07-14 @ 14:05)  HR: 62 (07-16 @ 09:00)  BP: 136/64 (07-15 @ 17:00)  ABP: 135/40 (07-16 @ 09:00)  RR: 26 (07-16 @ 09:00)  SpO2: 95% (07-16 @ 09:00)    07-15 @ 07:01  -  07-16 @ 07:00  --------------------------------------------------------  IN: 997.8 mL / OUT: 1000 mL / NET: -2.2 mL    07-16 @ 07:01  -  07-16 @ 10:13  --------------------------------------------------------  IN: 10 mL / OUT: 40 mL / NET: -30 mL      Physical Exam :-  Constitutional: NAD  Respiratory: Bilateral equal breath sounds, bilat basal Crackles present.  Cardiovascular: S1, S2 normal, positive Murmur  Gastrointestinal: Bowel Sounds present, soft  Extremities: + Edema Feet  Neurological: Alert and Oriented x 3  Psychiatric: Normal mood, normal affect    Data:-  Allergies :   No Known Allergies    Hospital Medications:   MEDICATIONS  (STANDING):  acetaminophen     Tablet .. 650 milliGRAM(s) Oral every 6 hours  ascorbic acid 500 milliGRAM(s) Oral two times a day  aspirin enteric coated 81 milliGRAM(s) Oral daily  atorvastatin 80 milliGRAM(s) Oral at bedtime  bisacodyl Suppository 10 milliGRAM(s) Rectal once  calcium acetate 667 milliGRAM(s) Oral three times a day with meals  chlorhexidine 2% Cloths 1 Application(s) Topical daily  clopidogrel Tablet 75 milliGRAM(s) Oral daily  dextrose 50% Injectable 25 milliLiter(s) IV Push every 15 minutes  dextrose 50% Injectable 50 milliLiter(s) IV Push every 15 minutes  epoetin niesha-epbx (RETACRIT) Injectable 25211 Unit(s) IV Push <User Schedule>  gabapentin 100 milliGRAM(s) Oral every 12 hours  heparin   Injectable 5000 Unit(s) SubCutaneous every 8 hours  insulin lispro (ADMELOG) corrective regimen sliding scale   SubCutaneous Before meals and at bedtime  pantoprazole    Tablet 40 milliGRAM(s) Oral before breakfast  polyethylene glycol 3350 17 Gram(s) Oral daily  senna 2 Tablet(s) Oral at bedtime  sodium chloride 0.9%. 1000 milliLiter(s) (5 mL/Hr) IV Continuous <Continuous>    07-16    136  |  94<L>  |  20  ----------------------------<  101<H>  4.1   |  23  |  3.96<H>    Ca    9.5      16 Jul 2023 00:38  Phos  5.8     07-16  Mg     2.3     07-16    TPro  6.3  /  Alb  3.9  /  TBili  0.3  /  DBili      /  AST  40  /  ALT  28  /  AlkPhos  60  07-16    Creatinine Trend: 3.96 <--, 4.39 <--, 5.88 <--, 5.41 <--, 9.14 <--, 7.31 <--  egfr trend : 12 <--, 11 <--, 8 <--, 8 <--, 4 <--, 6 <--, 4 <--                        10.9   11.83 )-----------( 114      ( 16 Jul 2023 00:44 )             33.2

## 2023-07-16 NOTE — PROGRESS NOTE ADULT - SUBJECTIVE AND OBJECTIVE BOX
Patient seen and examined at the bedside.    Remained critically ill on continuous ICU monitoring.      Brief Summary:  63 yo F with CAD s/p CABG on 07/13/2023        24 Hour events:      OBJECTIVE:  Vital Signs Last 24 Hrs  T(C): 36.8 (16 Jul 2023 00:00), Max: 37.1 (15 Jul 2023 20:00)  T(F): 98.2 (16 Jul 2023 00:00), Max: 98.7 (15 Jul 2023 20:00)  HR: 60 (16 Jul 2023 04:00) (53 - 71)  BP: 136/64 (15 Jul 2023 17:00) (100/54 - 136/64)  BP(mean): 92 (15 Jul 2023 17:00) (74 - 92)  RR: 15 (16 Jul 2023 04:00) (13 - 24)  SpO2: 100% (16 Jul 2023 04:00) (97% - 100%)    Parameters below as of 16 Jul 2023 00:00  Patient On (Oxygen Delivery Method): nasal cannula  O2 Flow (L/min): 1                  Physical Exam:   General: Interactive   Neurology: Follows commands.   Respiratory: Decreased at bases  CV: Sinus rhythm   Abdominal: Soft, Nontender  Extremities: Groin fistula with thrill  s/p bilateral BKA.    -------------------------------------------------------------------------------------------------------------------------------    Labs:                        10.9   11.83 )-----------( 114      ( 16 Jul 2023 00:44 )             33.2     07-16    136  |  94<L>  |  20  ----------------------------<  101<H>  4.1   |  23  |  3.96<H>    Ca    9.5      16 Jul 2023 00:38  Phos  5.8     07-16  Mg     2.3     07-16    TPro  6.3  /  Alb  3.9  /  TBili  0.3  /  DBili  x   /  AST  40  /  ALT  28  /  AlkPhos  60  07-16    LIVER FUNCTIONS - ( 16 Jul 2023 00:38 )  Alb: 3.9 g/dL / Pro: 6.3 g/dL / ALK PHOS: 60 U/L / ALT: 28 U/L / AST: 40 U/L / GGT: x             ABG - ( 16 Jul 2023 00:10 )  pH, Arterial: 7.42  pH, Blood: x     /  pCO2: 43    /  pO2: 66    / HCO3: 28    / Base Excess: 3.0   /  SaO2: 93.8              ------------------------------------------------------------------------------------------------------------------------------  Assessment:  63 yo F s/p CABG on 07/13/2023    Hemodynamic instability  Post op acute respiratory insufficiency  Acute blood loss anemia / Thrombocytopenia  Hyperglycemia  ESRD      Plan:   ***Neuro***  Postoperative acute pain control with Tylenol, Gabapentin, and prns.    ***Cardiovascular***  At high risk for ongoing hemodynamic instability and cardiac arrhythmias.  Beta blocker and ERP Amiodarone on hold in setting of bradycardia  Monitor chest tube output.  ASA/Plavix/Statin daily.    ***Pulmonary***  Postoperative acute respiratory insufficiency   Deep breathing and coughing exercises.  Wean oxygen as able.    ***GI***  Tolerating diet.  Protonix for stress ulcer prophylaxis   Bowel regimen.    ***Renal***  ESRD on HD  HD session yesterday.   Replete electrolytes as indicated.    ***ID***  No acute issues.    ***Endocrine***  T2DM  Insulin Sliding Scale for hyperglycemia    ***Hematology***  Acute blood loss anemia and Thrombocytopenia   Trend CBC and monitor for bleeding.  SQ Heparin for DVT prophylaxis           Patient requires continuous monitoring with bedside rhythm monitoring, pulse oximetry monitoring, and continuous central venous and arterial pressure monitoring; and intermittent blood gas analysis. Care plan discussed with the ICU care team.   Patient remained critical, at risk for life threatening decompensation.    I have spent 30 minutes providing critical care management to this patient.    By signing my name below, I, Daniel Mccrarypal, attest that this documentation has been prepared under the direction and in the presence of Venus Celis MD  Electronically signed: Daniel Danny, 07-16-23 @ 05:29    I, Venus Celis MD, personally performed the services described in this documentation. all medical record entries made by the scribe were at my direction and in my presence. I have reviewed the chart and agree that the record reflects my personal performance and is accurate and complete  Electronically signed: Venus Celis MD Patient seen and examined at the bedside.    Remained critically ill on continuous ICU monitoring.      Brief Summary:  61 yo F with CAD s/p CABG on 07/13/2023        24 Hour events:  - Needs PT/OT daily  - iHD session planned for tomorrow        OBJECTIVE:  Vital Signs Last 24 Hrs  T(C): 36.8 (16 Jul 2023 00:00), Max: 37.1 (15 Jul 2023 20:00)  T(F): 98.2 (16 Jul 2023 00:00), Max: 98.7 (15 Jul 2023 20:00)  HR: 60 (16 Jul 2023 04:00) (53 - 71)  BP: 136/64 (15 Jul 2023 17:00) (100/54 - 136/64)  BP(mean): 92 (15 Jul 2023 17:00) (74 - 92)  RR: 15 (16 Jul 2023 04:00) (13 - 24)  SpO2: 100% (16 Jul 2023 04:00) (97% - 100%)    Parameters below as of 16 Jul 2023 00:00  Patient On (Oxygen Delivery Method): nasal cannula  O2 Flow (L/min): 1                  Physical Exam:   General: Interactive   Neurology: Follows commands.   Respiratory: Decreased at bases  CV: Sinus rhythm   Abdominal: Soft, Nontender  Extremities: Groin fistula with thrill  s/p bilateral BKA.    -------------------------------------------------------------------------------------------------------------------------------    Labs:                        10.9   11.83 )-----------( 114      ( 16 Jul 2023 00:44 )             33.2     07-16    136  |  94<L>  |  20  ----------------------------<  101<H>  4.1   |  23  |  3.96<H>    Ca    9.5      16 Jul 2023 00:38  Phos  5.8     07-16  Mg     2.3     07-16    TPro  6.3  /  Alb  3.9  /  TBili  0.3  /  DBili  x   /  AST  40  /  ALT  28  /  AlkPhos  60  07-16    LIVER FUNCTIONS - ( 16 Jul 2023 00:38 )  Alb: 3.9 g/dL / Pro: 6.3 g/dL / ALK PHOS: 60 U/L / ALT: 28 U/L / AST: 40 U/L / GGT: x             ABG - ( 16 Jul 2023 00:10 )  pH, Arterial: 7.42  pH, Blood: x     /  pCO2: 43    /  pO2: 66    / HCO3: 28    / Base Excess: 3.0   /  SaO2: 93.8              ------------------------------------------------------------------------------------------------------------------------------  Assessment:  61 yo F s/p CABG on 07/13/2023    Hemodynamic instability  Post op acute respiratory insufficiency  Acute blood loss anemia / Thrombocytopenia  Hyperglycemia  ESRD      Plan:   ***Neuro***  Postoperative acute pain control with Tylenol, Gabapentin, and prns.  PT/OT    ***Cardiovascular***  At high risk for ongoing hemodynamic instability and cardiac arrhythmias.  Beta blocker and ERP Amiodarone on hold in setting of bradycardia  Monitor chest tube output.  ASA/Plavix/Statin daily.    ***Pulmonary***  Postoperative acute respiratory insufficiency   Deep breathing and coughing exercises.  Wean oxygen as able.    ***GI***  Tolerating diet.  Protonix for stress ulcer prophylaxis   Bowel regimen.    ***Renal***  ESRD on HD  HD session planned for tomorrow.   Replete electrolytes as indicated.    ***ID***  No acute issues.    ***Endocrine***  T2DM  Insulin Sliding Scale for hyperglycemia    ***Hematology***  Acute blood loss anemia and Thrombocytopenia   Trend CBC and monitor for bleeding.  SQ Heparin for DVT prophylaxis           Patient requires continuous monitoring with bedside rhythm monitoring, pulse oximetry monitoring, and continuous central venous and arterial pressure monitoring; and intermittent blood gas analysis. Care plan discussed with the ICU care team.   Patient remained critical, at risk for life threatening decompensation.    I have spent 30 minutes providing critical care management to this patient.    By signing my name below, I, Daniel Delgado, attest that this documentation has been prepared under the direction and in the presence of Venus Celis MD  Electronically signed: Daniel Delgado, 07-16-23 @ 05:29    I, Venus Celis MD, personally performed the services described in this documentation. all medical record entries made by the scribe were at my direction and in my presence. I have reviewed the chart and agree that the record reflects my personal performance and is accurate and complete  Electronically signed: Venus Celis MD Patient seen and examined at the bedside.    Remains critically ill on continuous ICU monitoring.      Brief Summary:  61 yo F with CAD s/p CABG on 07/13/2023      24 Hour events:  No acute events overnight.  Off inotropes.      Objective:  Vital Signs Last 24 Hrs  T(C): 36.8 (16 Jul 2023 00:00), Max: 37.1 (15 Jul 2023 20:00)  T(F): 98.2 (16 Jul 2023 00:00), Max: 98.7 (15 Jul 2023 20:00)  HR: 60 (16 Jul 2023 04:00) (53 - 71)  BP: 136/64 (15 Jul 2023 17:00) (100/54 - 136/64)  BP(mean): 92 (15 Jul 2023 17:00) (74 - 92)  RR: 15 (16 Jul 2023 04:00) (13 - 24)  SpO2: 100% (16 Jul 2023 04:00) (97% - 100%)    Parameters below as of 16 Jul 2023 00:00  Patient On (Oxygen Delivery Method): nasal cannula  O2 Flow (L/min): 1             Physical Exam:   General: Interactive   Neurology: Follows commands.   Respiratory: Decreased at bases  CV: Sinus rhythm   Abdominal: Soft, Nontender  Extremities: Groin fistula with thrill  s/p bilateral BKA.    -------------------------------------------------------------------------------------------------------------------------------    Labs:                        10.9   11.83 )-----------( 114      ( 16 Jul 2023 00:44 )             33.2     07-16    136  |  94<L>  |  20  ----------------------------<  101<H>  4.1   |  23  |  3.96<H>    Ca    9.5      16 Jul 2023 00:38  Phos  5.8     07-16  Mg     2.3     07-16    TPro  6.3  /  Alb  3.9  /  TBili  0.3  /  DBili  x   /  AST  40  /  ALT  28  /  AlkPhos  60  07-16    LIVER FUNCTIONS - ( 16 Jul 2023 00:38 )  Alb: 3.9 g/dL / Pro: 6.3 g/dL / ALK PHOS: 60 U/L / ALT: 28 U/L / AST: 40 U/L / GGT: x             ABG - ( 16 Jul 2023 00:10 )  pH, Arterial: 7.42  pH, Blood: x     /  pCO2: 43    /  pO2: 66    / HCO3: 28    / Base Excess: 3.0   /  SaO2: 93.8          ------------------------------------------------------------------------------------------------------------------------------  Assessment:  61 yo F s/p CABG on 07/13/2023    At high risk for hemodynamic instability and cardiac arrhythmias  Post op acute respiratory insufficiency  Thrombocytopenia  Hyperglycemia  ESRD      Plan:   ***Neuro***  Postoperative acute pain control with Tylenol, Gabapentin, and prns.  PT/OT    ***Cardiovascular***  At high risk for hemodynamic instability and cardiac arrhythmias.  Beta blocker and ERP Amiodarone on hold in setting of bradycardia  Monitor chest tube output.  ASA/Plavix/Statin daily.    ***Pulmonary***  Postoperative acute respiratory insufficiency   Deep breathing and coughing exercises.  Wean oxygen as able.    ***GI***  Tolerating diet.  Protonix for stress ulcer prophylaxis   Bowel regimen.    ***Renal***  ESRD on HD  Next HD session planned for tomorrow.   Replete electrolytes as indicated.    ***ID***  No acute issues.    ***Endocrine***  T2DM  Insulin Sliding Scale for hyperglycemia    ***Hematology***  Acute blood loss anemia and Thrombocytopenia   Trend CBC and monitor for bleeding.  SQ Heparin for DVT prophylaxis     ***Musculoskeletal***  s/p bilateral BKAs.  Awaiting PT assessment of prosthetic fit.      Patient requires continuous monitoring with bedside rhythm monitoring, pulse oximetry monitoring, and continuous central venous and arterial pressure monitoring; and intermittent blood gas analysis. Care plan discussed with the ICU care team.   Patient remains critical, at risk for life threatening decompensation.    I have spent 30 minutes providing critical care management to this patient.    By signing my name below, I, Daniel Delgado, attest that this documentation has been prepared under the direction and in the presence of Venus Celis MD  Electronically signed: Daniel Delgado, 07-16-23 @ 05:29    I, Venus Celis MD, personally performed the services described in this documentation. all medical record entries made by the scribe were at my direction and in my presence. I have reviewed the chart and agree that the record reflects my personal performance and is accurate and complete  Electronically signed: Venus Celis MD

## 2023-07-16 NOTE — PROGRESS NOTE ADULT - ASSESSMENT
63 y/o F with PMH of ESRD(on HD M/W/F thru right groin AVF), HTN, HLD, DM type II, bilateral BKA, right eye blindness presented initially to [facility] on 07/03 from rehab center for acute SOB and fluid overload and now transferred to [facility] for LHC due to elevated troponin. Pt underwent LHC 7/5: LM okay, prox LAD of 80-90%, mid Lcx of 80-90%, OM of 90%, ostial RCA of 90%. LFA access site. LVEDP: 28. On Aspirin 81mg. awaiting CABG.        Problem/Recommendation - 1:  ·  Problem: CAD, multiple vessel.   ·  Recommendation: S/P  CABG  and management per CT Surgery    No Cp etc,   On ASA,BB and Statin.     Problem/Recommendation - 2:  ·  Problem: ESRD on hemodialysis.   ·  Recommendation: HD per renal .     Problem/Recommendation - 3:  ·  Problem: DM (diabetes mellitus).   ·  Recommendation: Sugars in good range.     Problem/Recommendation - 4:  ·  Problem: HTN (hypertension).   ·  Recommendation: BP readings better.     Problem/Recommendation - 5:  ·  Problem: Anemia secondary to renal failure.   ·  Recommendation: HH stable. Epogen per renal.     Problem/Recommendation - 6:  ·  Problem: HLD (hyperlipidemia).   ·  Recommendation: Statin.     Problem/Recommendation - 7:  ·  Problem: PVD (peripheral vascular disease).   ·  Recommendation: S/P BKA . Has Prosthesis .     Problem/Recommendation - 8:  ·  Problem: Legally blind.   ·  Recommendation: Supportive care.    over all management per CTICU.

## 2023-07-16 NOTE — PROGRESS NOTE ADULT - NSPROGADDITIONALINFOA_GEN_ALL_CORE
Dr Vanegas  Nephrology Attending  [facility]  office [phone]  ans serv- [phone]  ms Team-Sammy
Dr Vanegas  Nephrology Attending  [facility]  office [phone]  ans serv- [phone]  ms Team-Sammy
Dr Vanegas  Nephrology Attending  [facility]  office [phone]  ans serv- [phone]  ms Team-Sammy

## 2023-07-16 NOTE — PROGRESS NOTE ADULT - SUBJECTIVE AND OBJECTIVE BOX
Date of Service  : 07-16-23     INTERVAL HPI/OVERNIGHT EVENTS: I feel fine.   Vital Signs Last 24 Hrs  T(C): 36.7 (16 Jul 2023 12:00), Max: 37.1 (15 Jul 2023 20:00)  T(F): 98.1 (16 Jul 2023 12:00), Max: 98.7 (15 Jul 2023 20:00)  HR: 63 (16 Jul 2023 15:00) (60 - 63)  BP: 136/64 (15 Jul 2023 17:00) (136/64 - 136/64)  BP(mean): 92 (15 Jul 2023 17:00) (92 - 92)  RR: 33 (16 Jul 2023 15:00) (13 - 33)  SpO2: 96% (16 Jul 2023 15:00) (94% - 100%)    Parameters below as of 16 Jul 2023 12:00  Patient On (Oxygen Delivery Method): nasal cannula  O2 Flow (L/min): 1    I&O's Summary    15 Jul 2023 07:01  -  16 Jul 2023 07:00  --------------------------------------------------------  IN: 997.8 mL / OUT: 1000 mL / NET: -2.2 mL    16 Jul 2023 07:01  -  16 Jul 2023 16:39  --------------------------------------------------------  IN: 40 mL / OUT: 60 mL / NET: -20 mL      MEDICATIONS  (STANDING):  ascorbic acid 500 milliGRAM(s) Oral two times a day  aspirin enteric coated 81 milliGRAM(s) Oral daily  atorvastatin 80 milliGRAM(s) Oral at bedtime  bisacodyl Suppository 10 milliGRAM(s) Rectal once  calcium acetate 667 milliGRAM(s) Oral three times a day with meals  chlorhexidine 2% Cloths 1 Application(s) Topical daily  clopidogrel Tablet 75 milliGRAM(s) Oral daily  dextrose 50% Injectable 50 milliLiter(s) IV Push every 15 minutes  dextrose 50% Injectable 25 milliLiter(s) IV Push every 15 minutes  epoetin niesha-epbx (RETACRIT) Injectable 21098 Unit(s) IV Push <User Schedule>  gabapentin 100 milliGRAM(s) Oral every 12 hours  heparin   Injectable 5000 Unit(s) SubCutaneous every 8 hours  insulin lispro (ADMELOG) corrective regimen sliding scale   SubCutaneous Before meals and at bedtime  pantoprazole    Tablet 40 milliGRAM(s) Oral before breakfast  polyethylene glycol 3350 17 Gram(s) Oral daily  senna 2 Tablet(s) Oral at bedtime  sodium chloride 0.9%. 1000 milliLiter(s) (5 mL/Hr) IV Continuous <Continuous>    MEDICATIONS  (PRN):  acetaminophen     Tablet .. 650 milliGRAM(s) Oral every 6 hours PRN Mild Pain (1 - 3)  oxyCODONE    IR 5 milliGRAM(s) Oral every 4 hours PRN Moderate Pain (4 - 6)  oxyCODONE    IR 10 milliGRAM(s) Oral every 4 hours PRN Severe Pain (7 - 10)    LABS:                        10.9   11.83 )-----------( 114      ( 16 Jul 2023 00:44 )             33.2     07-16    136  |  94<L>  |  20  ----------------------------<  101<H>  4.1   |  23  |  3.96<H>    Ca    9.5      16 Jul 2023 00:38  Phos  5.8     07-16  Mg     2.3     07-16    TPro  6.3  /  Alb  3.9  /  TBili  0.3  /  DBili  x   /  AST  40  /  ALT  28  /  AlkPhos  60  07-16      Urinalysis Basic - ( 16 Jul 2023 00:38 )    Color: x / Appearance: x / SG: x / pH: x  Gluc: 101 mg/dL / Ketone: x  / Bili: x / Urobili: x   Blood: x / Protein: x / Nitrite: x   Leuk Esterase: x / RBC: x / WBC x   Sq Epi: x / Non Sq Epi: x / Bacteria: x      CAPILLARY BLOOD GLUCOSE      POCT Blood Glucose.: 100 mg/dL (16 Jul 2023 12:06)  POCT Blood Glucose.: 96 mg/dL (16 Jul 2023 06:17)  POCT Blood Glucose.: 120 mg/dL (15 Jul 2023 21:24)  POCT Blood Glucose.: 127 mg/dL (15 Jul 2023 17:43)      ABG - ( 16 Jul 2023 00:10 )  pH, Arterial: 7.42  pH, Blood: x     /  pCO2: 43    /  pO2: 66    / HCO3: 28    / Base Excess: 3.0   /  SaO2: 93.8              Urinalysis Basic - ( 16 Jul 2023 00:38 )    Color: x / Appearance: x / SG: x / pH: x  Gluc: 101 mg/dL / Ketone: x  / Bili: x / Urobili: x   Blood: x / Protein: x / Nitrite: x   Leuk Esterase: x / RBC: x / WBC x   Sq Epi: x / Non Sq Epi: x / Bacteria: x      REVIEW OF SYSTEMS:  CONSTITUTIONAL: No fever, weight loss, or fatigue  EYES: No eye pain, visual disturbances, or discharge  ENMT:  No difficulty hearing, tinnitus, vertigo; No sinus or throat pain  NECK: No pain or stiffness  RESPIRATORY: No cough, wheezing, chills or hemoptysis; No shortness of breath  CARDIOVASCULAR: No chest pain, palpitations, dizziness, or leg swelling  GASTROINTESTINAL: No abdominal or epigastric pain. No nausea, vomiting, or hematemesis; No diarrhea or constipation. No melena or hematochezia.      RADIOLOGY & ADDITIONAL TESTS:    Consultant(s) Notes Reviewed:  [x ] YES  [ ] NO    PHYSICAL EXAM:  GENERAL: NAD, well-groomed, well-developed,not in any distress ,  HEAD:  Atraumatic, Normocephalic    NECK: Supple, No JVD, Normal thyroid  NERVOUS SYSTEM:  Alert & Oriented X3, No focal deficit   CHEST/LUNG: Good air entry bilateral with CT  HEART: Regular rate and rhythm; No murmurs, rubs, or gallops  ABDOMEN: Soft, Nontender, Nondistended; Bowel sounds present  EXTREMITIES: B/L BKA     Care Discussed with Consultants/Other Providers [ x] YES  [ ] NO

## 2023-07-16 NOTE — PROGRESS NOTE ADULT - ASSESSMENT
61 y/o F with PMH of ESRD(on HD M/W/F thru right groin AVF), HTN, HLD, DM type II, bilateral BKA, right eye blindness presented initially to [facility] on 07/03 from rehab center for acute SOB and fluid overload and now transferred to [facility] for LHC due to elevated troponin. Pt underwent LHC 7/5: LM okay, prox LAD of 80-90%, mid Lcx of 80-90%, OM of 90%, ostial RCA of 90%. LFA access site. LVEDP: 28. On Aspirin 81mg. Plan to be transferred to [facility] for CT surgery consult with Dr. Alma Mabry.     End Stage Renal Disease on Dialysis   Monday, Wednesday and Friday   access: right upper thigh avg  consent in chart  Volume Status : stable    last hemodialysis yesterday, uf-1 kg tolerated with blood pressure    Plan  Plan for HD tomorrow, 2 k bath, blood flow 300cc/min, Ultrafiltration on Dialysis as tolerated with blood pressure - goal 800 cc uf- keep sbp more than 110 mm of hg  trend basic metabolic panel and blood pressure   - add renvela 800 mg three times per day with meals if not npo as phosphate binder    --::: Anemia-->  hb at goal     Current orders :  - retacrit 10,000 units iv three times per week   - plan to titrate medication as per response of hemoglobin  - if Hb less than 7gm/dl consider blood transfusion

## 2023-07-17 DIAGNOSIS — Z95.1 PRESENCE OF AORTOCORONARY BYPASS GRAFT: ICD-10-CM

## 2023-07-17 LAB
ALBUMIN SERPL ELPH-MCNC: 3.5 G/DL — SIGNIFICANT CHANGE UP (ref 3.3–5)
ALP SERPL-CCNC: 66 U/L — SIGNIFICANT CHANGE UP (ref 40–120)
ALT FLD-CCNC: 31 U/L — SIGNIFICANT CHANGE UP (ref 10–45)
ANION GAP SERPL CALC-SCNC: 22 MMOL/L — HIGH (ref 5–17)
AST SERPL-CCNC: 30 U/L — SIGNIFICANT CHANGE UP (ref 10–40)
BILIRUB SERPL-MCNC: 0.2 MG/DL — SIGNIFICANT CHANGE UP (ref 0.2–1.2)
BUN SERPL-MCNC: 36 MG/DL — HIGH (ref 7–23)
CALCIUM SERPL-MCNC: 9.1 MG/DL — SIGNIFICANT CHANGE UP (ref 8.4–10.5)
CHLORIDE SERPL-SCNC: 91 MMOL/L — LOW (ref 96–108)
CO2 SERPL-SCNC: 21 MMOL/L — LOW (ref 22–31)
CREAT SERPL-MCNC: 5.34 MG/DL — HIGH (ref 0.5–1.3)
EGFR: 8 ML/MIN/1.73M2 — LOW
GAS PNL BLDA: SIGNIFICANT CHANGE UP
GLUCOSE BLDC GLUCOMTR-MCNC: 103 MG/DL — HIGH (ref 70–99)
GLUCOSE BLDC GLUCOMTR-MCNC: 152 MG/DL — HIGH (ref 70–99)
GLUCOSE BLDC GLUCOMTR-MCNC: 63 MG/DL — LOW (ref 70–99)
GLUCOSE BLDC GLUCOMTR-MCNC: 69 MG/DL — LOW (ref 70–99)
GLUCOSE BLDC GLUCOMTR-MCNC: 71 MG/DL — SIGNIFICANT CHANGE UP (ref 70–99)
GLUCOSE BLDC GLUCOMTR-MCNC: 82 MG/DL — SIGNIFICANT CHANGE UP (ref 70–99)
GLUCOSE BLDC GLUCOMTR-MCNC: 93 MG/DL — SIGNIFICANT CHANGE UP (ref 70–99)
GLUCOSE SERPL-MCNC: 75 MG/DL — SIGNIFICANT CHANGE UP (ref 70–99)
HCT VFR BLD CALC: 32.5 % — LOW (ref 34.5–45)
HGB BLD-MCNC: 10.6 G/DL — LOW (ref 11.5–15.5)
MAGNESIUM SERPL-MCNC: 2.4 MG/DL — SIGNIFICANT CHANGE UP (ref 1.6–2.6)
MCHC RBC-ENTMCNC: 29.4 PG — SIGNIFICANT CHANGE UP (ref 27–34)
MCHC RBC-ENTMCNC: 32.6 GM/DL — SIGNIFICANT CHANGE UP (ref 32–36)
MCV RBC AUTO: 90.3 FL — SIGNIFICANT CHANGE UP (ref 80–100)
NRBC # BLD: 0 /100 WBCS — SIGNIFICANT CHANGE UP (ref 0–0)
PHOSPHATE SERPL-MCNC: 6.1 MG/DL — HIGH (ref 2.5–4.5)
PLATELET # BLD AUTO: 137 K/UL — LOW (ref 150–400)
POTASSIUM SERPL-MCNC: 4.4 MMOL/L — SIGNIFICANT CHANGE UP (ref 3.5–5.3)
POTASSIUM SERPL-SCNC: 4.4 MMOL/L — SIGNIFICANT CHANGE UP (ref 3.5–5.3)
PROT SERPL-MCNC: 6.1 G/DL — SIGNIFICANT CHANGE UP (ref 6–8.3)
RBC # BLD: 3.6 M/UL — LOW (ref 3.8–5.2)
RBC # FLD: 15.5 % — HIGH (ref 10.3–14.5)
SODIUM SERPL-SCNC: 134 MMOL/L — LOW (ref 135–145)
WBC # BLD: 13.62 K/UL — HIGH (ref 3.8–10.5)
WBC # FLD AUTO: 13.62 K/UL — HIGH (ref 3.8–10.5)

## 2023-07-17 PROCEDURE — 99233 SBSQ HOSP IP/OBS HIGH 50: CPT

## 2023-07-17 PROCEDURE — 71045 X-RAY EXAM CHEST 1 VIEW: CPT | Mod: 26

## 2023-07-17 RX ADMIN — Medication 667 MILLIGRAM(S): at 17:35

## 2023-07-17 RX ADMIN — ATORVASTATIN CALCIUM 80 MILLIGRAM(S): 80 TABLET, FILM COATED ORAL at 21:02

## 2023-07-17 RX ADMIN — POLYETHYLENE GLYCOL 3350 17 GRAM(S): 17 POWDER, FOR SOLUTION ORAL at 11:38

## 2023-07-17 RX ADMIN — Medication 667 MILLIGRAM(S): at 11:38

## 2023-07-17 RX ADMIN — Medication 10 MILLIGRAM(S): at 17:36

## 2023-07-17 RX ADMIN — OXYCODONE HYDROCHLORIDE 10 MILLIGRAM(S): 5 TABLET ORAL at 06:55

## 2023-07-17 RX ADMIN — GABAPENTIN 100 MILLIGRAM(S): 400 CAPSULE ORAL at 05:11

## 2023-07-17 RX ADMIN — GABAPENTIN 100 MILLIGRAM(S): 400 CAPSULE ORAL at 17:35

## 2023-07-17 RX ADMIN — HEPARIN SODIUM 5000 UNIT(S): 5000 INJECTION INTRAVENOUS; SUBCUTANEOUS at 21:03

## 2023-07-17 RX ADMIN — HEPARIN SODIUM 5000 UNIT(S): 5000 INJECTION INTRAVENOUS; SUBCUTANEOUS at 05:11

## 2023-07-17 RX ADMIN — Medication 81 MILLIGRAM(S): at 11:38

## 2023-07-17 RX ADMIN — CLOPIDOGREL BISULFATE 75 MILLIGRAM(S): 75 TABLET, FILM COATED ORAL at 11:38

## 2023-07-17 RX ADMIN — Medication 500 MILLIGRAM(S): at 17:36

## 2023-07-17 RX ADMIN — HEPARIN SODIUM 5000 UNIT(S): 5000 INJECTION INTRAVENOUS; SUBCUTANEOUS at 13:45

## 2023-07-17 RX ADMIN — PANTOPRAZOLE SODIUM 40 MILLIGRAM(S): 20 TABLET, DELAYED RELEASE ORAL at 07:00

## 2023-07-17 RX ADMIN — SODIUM CHLORIDE 5 MILLILITER(S): 9 INJECTION INTRAMUSCULAR; INTRAVENOUS; SUBCUTANEOUS at 07:00

## 2023-07-17 RX ADMIN — Medication 667 MILLIGRAM(S): at 08:06

## 2023-07-17 RX ADMIN — Medication 500 MILLIGRAM(S): at 05:11

## 2023-07-17 RX ADMIN — OXYCODONE HYDROCHLORIDE 10 MILLIGRAM(S): 5 TABLET ORAL at 05:11

## 2023-07-17 RX ADMIN — SENNA PLUS 2 TABLET(S): 8.6 TABLET ORAL at 21:02

## 2023-07-17 NOTE — PROGRESS NOTE ADULT - SUBJECTIVE AND OBJECTIVE BOX
Patient seen and examined  no complaints      No Known Allergies    Hospital Medications:   MEDICATIONS  (STANDING):  ascorbic acid 500 milliGRAM(s) Oral two times a day  aspirin enteric coated 81 milliGRAM(s) Oral daily  atorvastatin 80 milliGRAM(s) Oral at bedtime  bisacodyl Suppository 10 milliGRAM(s) Rectal once  calcium acetate 667 milliGRAM(s) Oral three times a day with meals  chlorhexidine 2% Cloths 1 Application(s) Topical daily  clopidogrel Tablet 75 milliGRAM(s) Oral daily  dextrose 50% Injectable 25 milliLiter(s) IV Push every 15 minutes  dextrose 50% Injectable 50 milliLiter(s) IV Push every 15 minutes  epoetin niesha-epbx (RETACRIT) Injectable 14297 Unit(s) IV Push <User Schedule>  gabapentin 100 milliGRAM(s) Oral every 12 hours  heparin   Injectable 5000 Unit(s) SubCutaneous every 8 hours  insulin lispro (ADMELOG) corrective regimen sliding scale   SubCutaneous Before meals and at bedtime  pantoprazole    Tablet 40 milliGRAM(s) Oral before breakfast  polyethylene glycol 3350 17 Gram(s) Oral daily  senna 2 Tablet(s) Oral at bedtime  sodium chloride 0.9%. 1000 milliLiter(s) (5 mL/Hr) IV Continuous <Continuous>        VITALS:  T(F): 97.9 (07-17-23 @ 08:00), Max: 98.4 (07-17-23 @ 00:00)  HR: 65 (07-17-23 @ 08:00)  BP: --  RR: 37 (07-17-23 @ 08:00)  SpO2: 96% (07-17-23 @ 08:00)  Wt(kg): --    07-16 @ 07:01  -  07-17 @ 07:00  --------------------------------------------------------  IN: 540 mL / OUT: 250 mL / NET: 290 mL    07-17 @ 07:01  -  07-17 @ 08:31  --------------------------------------------------------  IN: 155 mL / OUT: 0 mL / NET: 155 mL        Physical Exam :-  Constitutional: NAD  Respiratory: Bilateral equal breath sounds, bilat basal Crackles present.  Cardiovascular: S1, S2 normal, positive Murmur  Gastrointestinal: Bowel Sounds present, soft  Extremities: + Edema Feet  Neurological: Alert and Oriented x 3  Psychiatric: Normal mood, normal affect    LABS:  07-17    134<L>  |  91<L>  |  36<H>  ----------------------------<  75  4.4   |  21<L>  |  5.34<H>    Ca    9.1      17 Jul 2023 01:15  Phos  6.1     07-17  Mg     2.4     07-17    TPro  6.1  /  Alb  3.5  /  TBili  0.2  /  DBili      /  AST  30  /  ALT  31  /  AlkPhos  66  07-17    Creatinine Trend: 5.34 <--, 3.96 <--, 4.39 <--, 5.88 <--, 5.41 <--, 9.14 <--, 7.31 <--                        10.6   13.62 )-----------( 137      ( 17 Jul 2023 01:48 )             32.5     Urine Studies:  Urinalysis Basic - ( 17 Jul 2023 01:15 )    Color:  / Appearance:  / SG:  / pH:   Gluc: 75 mg/dL / Ketone:   / Bili:  / Urobili:    Blood:  / Protein:  / Nitrite:    Leuk Esterase:  / RBC:  / WBC    Sq Epi:  / Non Sq Epi:  / Bacteria:         RADIOLOGY & ADDITIONAL STUDIES:

## 2023-07-17 NOTE — PROGRESS NOTE ADULT - ASSESSMENT
63 y/o F with PMH of ESRD(on HD M/W/F thru right groin AVF), HTN, HLD, DM type II, bilateral BKA, right eye blindness presented initially to [facility] on 07/03 from rehab center for acute SOB and fluid overload and now transferred to [facility] for LHC due to elevated troponin. Pt underwent LHC 7/5: LM okay, prox LAD of 80-90%, mid Lcx of 80-90%, OM of 90%, ostial RCA of 90%. LFA access site. LVEDP: 28. On Aspirin 81mg. Plan to be transferred to [facility] for CT surgery consult with Dr. Alma Mabry.     End Stage Renal Disease on Dialysis   Monday, Wednesday and Friday   access: right upper thigh avg  consent in chart  Volume Status : stable    last hemodialysis yesterday, uf-1 kg tolerated with blood pressure    Plan  Plan for HD today, 2 k bath, blood flow 300cc/min, Ultrafiltration on Dialysis as tolerated with blood pressure - goal 800 cc uf- keep sbp more than 110 mm of hg  trend basic metabolic panel and blood pressure   - add renvela 800 mg three times per day with meals if not npo as phosphate binder    --::: Anemia-->  hb at goal     Current orders :  - retacrit 10,000 units iv three times per week   - plan to titrate medication as per response of hemoglobin  - if Hb less than 7gm/dl consider blood transfusion    Dr Mcfarlane  [phone]

## 2023-07-17 NOTE — DIETITIAN INITIAL EVALUATION ADULT - NS FNS DIET ORDER
Diet, Regular:   Consistent Carbohydrate {No Snacks} (CSTCHO)  DASH/TLC {Sodium & Cholesterol Restricted} (DASH)  For patients receiving Renal Replacement - No Protein Restr, No Conc K, No Conc Phos, Low Sodium (RENAL) (07-14-23 @ 03:20)

## 2023-07-17 NOTE — DIETITIAN INITIAL EVALUATION ADULT - ORAL INTAKE PTA/DIET HISTORY
PTA pt reports good appetite and PO intake, states she has been in rehab facility s/p claudia. BKA, was on renal diet at outside facility. Reports use of multivitamin PTA. NKFA or intolerances reported. Denies any difficulty chewing/swallowing.

## 2023-07-17 NOTE — DIETITIAN INITIAL EVALUATION ADULT - ENERGY INTAKE
Pt with good intake pre-op, reports decreased appetite post-op which has improved today - noted >50% of breakfast tray consumed. Fair (50-75%)

## 2023-07-17 NOTE — DIETITIAN INITIAL EVALUATION ADULT - NSFNSGIIOFT_GEN_A_CORE
Pt endorses constipation with last BM prior to surgery, on bowel regimen.    07-16-23 @ 07:01  -  07-17-23 @ 07:00  --------------------------------------------------------  OUT:    Chest Tube (mL): 250 mL  Total OUT: 250 mL    Total NET: -250 mL      07-17-23 @ 07:01  -  07-17-23 @ 09:07  --------------------------------------------------------  OUT:    Chest Tube (mL): 0 mL  Total OUT: 0 mL    Total NET: 0 mL

## 2023-07-17 NOTE — PROGRESS NOTE ADULT - ASSESSMENT
61 y/o F with PMH of ESRD(on HD M/W/F thru right groin AVF), HTN, HLD, DM type II, bilateral BKA, right eye blindness presented initially to [facility] on 07/03 from rehab center for acute SOB and fluid overload and now transferred to [facility] for LHC due to elevated troponin. Pt underwent LHC 7/5: LM okay, prox LAD of 80-90%, mid Lcx of 80-90%, OM of 90%, ostial RCA of 90%. LFA access site. LVEDP: 28. On Aspirin 81mg. awaiting CABG.        Problem/Recommendation - 1:  ·  Problem: CAD, multiple vessel.   ·  Recommendation: S/P  CABG  and management per CT Surgery    No Cp etc,   On ASA,BB and Statin.     Problem/Recommendation - 2:  ·  Problem: ESRD on hemodialysis.   ·  Recommendation: HD per renal .     Problem/Recommendation - 3:  ·  Problem: DM (diabetes mellitus).   ·  Recommendation: Sugars in good range.     Problem/Recommendation - 4:  ·  Problem: HTN (hypertension).   ·  Recommendation: BP readings better.     Problem/Recommendation - 5:  ·  Problem: Anemia secondary to renal failure.   ·  Recommendation: HH stable. Epogen per renal.     Problem/Recommendation - 6:  ·  Problem: HLD (hyperlipidemia).   ·  Recommendation: Statin.     Problem/Recommendation - 7:  ·  Problem: PVD (peripheral vascular disease).   ·  Recommendation: S/P BKA . Has Prosthesis .     Problem/Recommendation - 8:  ·  Problem: Legally blind.   ·  Recommendation: Supportive care.    over all management per CTICU.

## 2023-07-17 NOTE — DIETITIAN INITIAL EVALUATION ADULT - OTHER INFO
- HbA1c 4.9%, pt on sliding scale insulin for post-op hyperglycemia.     Weight Hx:  - Pt reports last known wt PTA 137lbs at rehab. Lowest wt in house thus far 144.1lbs on 7/13. Weight fluctuations noted in house likely secondary to fluid shifts - current wt 167.7lbs (7/17) - will continue to monitor/trend.

## 2023-07-17 NOTE — PROGRESS NOTE ADULT - PROBLEM SELECTOR PLAN 1
Transfer from CTU to SDU,   + LP CT x1, + PW isolated,   needs BM got dulcolax in CTU,    PT/OT to work with patient [employer] [facility] prosthetics  Cough and deep breathe, Incentive Spirometry Q1h, Chest PT, Pulm Toilet  C/W GI prophylaxis  DVT prophylaxis Transfer from CTU to SDU,   + LP CT x1, + PW isolated,   needs BM got dulcolax in CTU,    C/W asa 81, ATOR 80, NO BB yet per CTU  PT/OT to work with patient BL LE prosthetics  Cough and deep breathe, Incentive Spirometry Q1h, Chest PT, Pulm Toilet  C/W GI prophylaxis  DVT prophylaxis

## 2023-07-17 NOTE — PROGRESS NOTE ADULT - SUBJECTIVE AND OBJECTIVE BOX
Subjective: Patient states "Hello"    VITAL SIGNS    Telemetry:  SR 70's  Overnight Events: no acute events    Vital Signs Last 24 Hrs  T(C): 36.7 (23 @ 19:22), Max: 36.9 (23 @ 00:00)  T(F): 98 (23 @ 19:22), Max: 98.4 (23 @ 00:00)  HR: 73 (23 @ 19:22) (61 - 78)  BP: 128/60 (23 @ 19:22) (128/60 - 150/66)  RR: 19 (23 @ 19:22) (9 - 50)  SpO2: 94% (23 @ 19:22) (91% - 100%)             @ 07:01  -   @ 07:00  --------------------------------------------------------  IN: 540 mL / OUT: 250 mL / NET: 290 mL     @ 07:01  -   @ 20:36  --------------------------------------------------------  IN: 345 mL / OUT: 1420 mL / NET: -1075 mL       Daily     Daily Weight in k.9 (2023 14:15)  Admit Wt: Drug Dosing Weight  Height (cm): 163 (2023 16:35)  Weight (kg): 70 (2023 16:39)  BMI (kg/m2): 26.3 (2023 16:39)  BSA (m2): 1.76 (2023 16:39)    Bilirubin Total: 0.2 mg/dL ( @ 01:15)    CAPILLARY BLOOD GLUCOSE  96 (2023 16:00)  152 (2023 10:00)  82 (2023 09:00)  71 (2023 08:30)  69 (2023 08:00)      POCT Blood Glucose.: 93 mg/dL (2023 16:02)  POCT Blood Glucose.: 152 mg/dL (2023 10:11)  POCT Blood Glucose.: 82 mg/dL (2023 09:07)  POCT Blood Glucose.: 71 mg/dL (2023 08:43)  POCT Blood Glucose.: 69 mg/dL (2023 08:05)  POCT Blood Glucose.: 63 mg/dL (2023 08:03)  POCT Blood Glucose.: 75 mg/dL (2023 22:42)          acetaminophen     Tablet .. 650 milliGRAM(s) Oral every 6 hours PRN  ascorbic acid 500 milliGRAM(s) Oral two times a day  aspirin enteric coated 81 milliGRAM(s) Oral daily  atorvastatin 80 milliGRAM(s) Oral at bedtime  calcium acetate 667 milliGRAM(s) Oral three times a day with meals  chlorhexidine 2% Cloths 1 Application(s) Topical daily  clopidogrel Tablet 75 milliGRAM(s) Oral daily  dextrose 50% Injectable 50 milliLiter(s) IV Push every 15 minutes  dextrose 50% Injectable 25 milliLiter(s) IV Push every 15 minutes  epoetin niesha-epbx (RETACRIT) Injectable 56484 Unit(s) IV Push <User Schedule>  gabapentin 100 milliGRAM(s) Oral every 12 hours  heparin   Injectable 5000 Unit(s) SubCutaneous every 8 hours  insulin lispro (ADMELOG) corrective regimen sliding scale   SubCutaneous Before meals and at bedtime  oxyCODONE    IR 5 milliGRAM(s) Oral every 4 hours PRN  oxyCODONE    IR 10 milliGRAM(s) Oral every 4 hours PRN  pantoprazole    Tablet 40 milliGRAM(s) Oral before breakfast  polyethylene glycol 3350 17 Gram(s) Oral daily  senna 2 Tablet(s) Oral at bedtime                            10.6   13.62 )-----------( 137      ( 2023 01:48 )             32.5     07-17    134<L>  |  91<L>  |  36<H>  ----------------------------<  75  4.4   |  21<L>  |  5.34<H>    Ca    9.1      2023 01:15  Phos  6.1     -  Mg     2.4         TPro  6.1  /  Alb  3.5  /  TBili  0.2  /  DBili  x   /  AST  30  /  ALT  31  /  AlkPhos  66      PHYSICAL EXAM    Neurology: A&Ox3, NAD  CV : RRR+S1S2  + PW  Sternal Wound: MSI + Provena CDI , Stable  + LP chest tube x1  Lungs: Respirations non-labored, B/L BS clear  Abdomen: Soft, NT/ND, +BSx4Q, needs BM (-)N/V/D  : + HD  Extremities: B/L LE BKA, L thigh SVG incision CDI          Disposition:  Home [   ]     Rehab [   ]

## 2023-07-17 NOTE — PROGRESS NOTE ADULT - SUBJECTIVE AND OBJECTIVE BOX
Date of Service  : 07-17-23     INTERVAL HPI/OVERNIGHT EVENTS: I feel fine. Getting HD.   Vital Signs Last 24 Hrs  T(C): 36.5 (17 Jul 2023 12:00), Max: 36.9 (17 Jul 2023 00:00)  T(F): 97.7 (17 Jul 2023 12:00), Max: 98.4 (17 Jul 2023 00:00)  HR: 72 (17 Jul 2023 14:00) (61 - 72)  BP: --  BP(mean): --  RR: 9 (17 Jul 2023 14:00) (9 - 37)  SpO2: 97% (17 Jul 2023 14:00) (91% - 100%)    Parameters below as of 17 Jul 2023 12:00  Patient On (Oxygen Delivery Method): nasal cannula  O2 Flow (L/min): 1    I&O's Summary    16 Jul 2023 07:01  -  17 Jul 2023 07:00  --------------------------------------------------------  IN: 540 mL / OUT: 250 mL / NET: 290 mL    17 Jul 2023 07:01  -  17 Jul 2023 15:05  --------------------------------------------------------  IN: 190 mL / OUT: 180 mL / NET: 10 mL      MEDICATIONS  (STANDING):  ascorbic acid 500 milliGRAM(s) Oral two times a day  aspirin enteric coated 81 milliGRAM(s) Oral daily  atorvastatin 80 milliGRAM(s) Oral at bedtime  bisacodyl Suppository 10 milliGRAM(s) Rectal once  calcium acetate 667 milliGRAM(s) Oral three times a day with meals  chlorhexidine 2% Cloths 1 Application(s) Topical daily  clopidogrel Tablet 75 milliGRAM(s) Oral daily  dextrose 50% Injectable 50 milliLiter(s) IV Push every 15 minutes  dextrose 50% Injectable 25 milliLiter(s) IV Push every 15 minutes  epoetin niesha-epbx (RETACRIT) Injectable 47060 Unit(s) IV Push <User Schedule>  gabapentin 100 milliGRAM(s) Oral every 12 hours  heparin   Injectable 5000 Unit(s) SubCutaneous every 8 hours  insulin lispro (ADMELOG) corrective regimen sliding scale   SubCutaneous Before meals and at bedtime  pantoprazole    Tablet 40 milliGRAM(s) Oral before breakfast  polyethylene glycol 3350 17 Gram(s) Oral daily  senna 2 Tablet(s) Oral at bedtime  sodium chloride 0.9%. 1000 milliLiter(s) (5 mL/Hr) IV Continuous <Continuous>    MEDICATIONS  (PRN):  acetaminophen     Tablet .. 650 milliGRAM(s) Oral every 6 hours PRN Mild Pain (1 - 3)  oxyCODONE    IR 5 milliGRAM(s) Oral every 4 hours PRN Moderate Pain (4 - 6)  oxyCODONE    IR 10 milliGRAM(s) Oral every 4 hours PRN Severe Pain (7 - 10)    LABS:                        10.6   13.62 )-----------( 137      ( 17 Jul 2023 01:48 )             32.5     07-17    134<L>  |  91<L>  |  36<H>  ----------------------------<  75  4.4   |  21<L>  |  5.34<H>    Ca    9.1      17 Jul 2023 01:15  Phos  6.1     07-17  Mg     2.4     07-17    TPro  6.1  /  Alb  3.5  /  TBili  0.2  /  DBili  x   /  AST  30  /  ALT  31  /  AlkPhos  66  07-17      Urinalysis Basic - ( 17 Jul 2023 01:15 )    Color: x / Appearance: x / SG: x / pH: x  Gluc: 75 mg/dL / Ketone: x  / Bili: x / Urobili: x   Blood: x / Protein: x / Nitrite: x   Leuk Esterase: x / RBC: x / WBC x   Sq Epi: x / Non Sq Epi: x / Bacteria: x      CAPILLARY BLOOD GLUCOSE  152 (17 Jul 2023 10:00)  82 (17 Jul 2023 09:00)  71 (17 Jul 2023 08:30)  69 (17 Jul 2023 08:00)      POCT Blood Glucose.: 152 mg/dL (17 Jul 2023 10:11)  POCT Blood Glucose.: 82 mg/dL (17 Jul 2023 09:07)  POCT Blood Glucose.: 71 mg/dL (17 Jul 2023 08:43)  POCT Blood Glucose.: 69 mg/dL (17 Jul 2023 08:05)  POCT Blood Glucose.: 63 mg/dL (17 Jul 2023 08:03)  POCT Blood Glucose.: 75 mg/dL (16 Jul 2023 22:42)  POCT Blood Glucose.: 83 mg/dL (16 Jul 2023 17:07)      ABG - ( 17 Jul 2023 00:52 )  pH, Arterial: 7.37  pH, Blood: x     /  pCO2: 41    /  pO2: 63    / HCO3: 24    / Base Excess: -1.5  /  SaO2: 88.8              Urinalysis Basic - ( 17 Jul 2023 01:15 )    Color: x / Appearance: x / SG: x / pH: x  Gluc: 75 mg/dL / Ketone: x  / Bili: x / Urobili: x   Blood: x / Protein: x / Nitrite: x   Leuk Esterase: x / RBC: x / WBC x   Sq Epi: x / Non Sq Epi: x / Bacteria: x      REVIEW OF SYSTEMS:  CONSTITUTIONAL: No fever, weight loss, or fatigue  EYES: No eye pain, visual disturbances, or discharge  ENMT:  No difficulty hearing, tinnitus, vertigo; No sinus or throat pain  NECK: No pain or stiffness  RESPIRATORY: No cough, wheezing, chills or hemoptysis; No shortness of breath  CARDIOVASCULAR: No chest pain, palpitations, dizziness, or leg swelling  GASTROINTESTINAL: No abdominal or epigastric pain. No nausea, vomiting, or hematemesis; No diarrhea or constipation. No melena or hematochezia.  GENITOURINARY: No dysuria, frequency, hematuria, or incontinence    RADIOLOGY & ADDITIONAL TESTS:    Consultant(s) Notes Reviewed:  [x ] YES  [ ] NO    PHYSICAL EXAM:  GENERAL: NAD, well-groomed, well-developed,not in any distress ,  HEAD:  Atraumatic, Normocephalic  NECK: Supple, No JVD, Normal thyroid  NERVOUS SYSTEM:  Alert & Oriented X3, No focal deficit   CHEST/LUNG: Good air entry bilateral with CT scan   HEART: Regular rate and rhythm; No murmurs, rubs, or gallops  ABDOMEN: Soft, Nontender, Nondistended; Bowel sounds present  EXTREMITIES: BKA B/L     Care Discussed with Consultants/Other Providers [ x] YES  [ ] NO

## 2023-07-17 NOTE — DIETITIAN INITIAL EVALUATION ADULT - PERTINENT LABORATORY DATA
07-17    134<L>  |  91<L>  |  36<H>  ----------------------------<  75  4.4   |  21<L>  |  5.34<H>    Ca    9.1      17 Jul 2023 01:15  Phos  6.1     07-17  Mg     2.4     07-17    TPro  6.1  /  Alb  3.5  /  TBili  0.2  /  DBili  x   /  AST  30  /  ALT  31  /  AlkPhos  66  07-17  POCT Blood Glucose.: 71 mg/dL (07-17-23 @ 08:43)  A1C with Estimated Average Glucose Result: 4.9 % (07-06-23 @ 06:37)

## 2023-07-17 NOTE — PROGRESS NOTE ADULT - SUBJECTIVE AND OBJECTIVE BOX
Cardiovascular Disease Progress Note  Date of service: 23 @ 10:49    Overnight events: No acute events overnight.  Ms. Boogie is in no distress.   Otherwise review of systems negative    Objective Findings:  T(C): 36.6 (23 @ 08:00), Max: 36.9 (23 @ 00:00)  HR: 63 (23 @ 10:00) (61 - 67)  BP: --  RR: 16 (23 @ 10:00) (16 - 37)  SpO2: 98% (23 @ 10:00) (91% - 100%)  Wt(kg): --  Daily     Daily Weight in k.1 (2023 00:00)      Physical Exam:  Gen: NAD; Patient resting comfortably  HEENT: EOMI, Normocephalic/ atraumatic  CV: RRR, normal S1 + S2, no m/r/g  Lungs:  Normal respiratory effort; clear to auscultation bilaterally  Abd: soft, non-tender; bowel sounds present  Ext: No edema; warm and well perfused    Telemetry: Sinus     Laboratory Data:                        10.6   13.62 )-----------( 137      ( 2023 01:48 )             32.5         134<L>  |  91<L>  |  36<H>  ----------------------------<  75  4.4   |  21<L>  |  5.34<H>    Ca    9.1      2023 01:15  Phos  6.1       Mg     2.4         TPro  6.1  /  Alb  3.5  /  TBili  0.2  /  DBili  x   /  AST  30  /  ALT  31  /  AlkPhos  66                Inpatient Medications:  MEDICATIONS  (STANDING):  ascorbic acid 500 milliGRAM(s) Oral two times a day  aspirin enteric coated 81 milliGRAM(s) Oral daily  atorvastatin 80 milliGRAM(s) Oral at bedtime  bisacodyl Suppository 10 milliGRAM(s) Rectal once  calcium acetate 667 milliGRAM(s) Oral three times a day with meals  chlorhexidine 2% Cloths 1 Application(s) Topical daily  clopidogrel Tablet 75 milliGRAM(s) Oral daily  dextrose 50% Injectable 50 milliLiter(s) IV Push every 15 minutes  dextrose 50% Injectable 25 milliLiter(s) IV Push every 15 minutes  epoetin niesha-epbx (RETACRIT) Injectable 11175 Unit(s) IV Push <User Schedule>  gabapentin 100 milliGRAM(s) Oral every 12 hours  heparin   Injectable 5000 Unit(s) SubCutaneous every 8 hours  insulin lispro (ADMELOG) corrective regimen sliding scale   SubCutaneous Before meals and at bedtime  pantoprazole    Tablet 40 milliGRAM(s) Oral before breakfast  polyethylene glycol 3350 17 Gram(s) Oral daily  senna 2 Tablet(s) Oral at bedtime  sodium chloride 0.9%. 1000 milliLiter(s) (5 mL/Hr) IV Continuous <Continuous>      Assessment:  61-year-old female with ESRD on HD, HLD and peripheral vascular disease s/p lower extremity resection presents with chest pain found to have multivessel disease.      Plan of Care:      #Multivessel CAD-  Noted on cardiac cath .  Echo  with normal LV systolic function. Apical Hypokinesis and moderate MS.   s/p CABG x3 (LIMA to LAD, SVG to OM, SVG to RCA)  2023.   Pt recovering in CTICU  ASA and Plavix    #Peripheral vascular disease-  s/p L BKA well.   Continue current cardiac management.   Antiplatelet therapy with ASA.       #Compensated diastolic CHF-  Euvolemic on exam.  Fluid removal with HD as per the renal team.     #ESRD-  - HD, as per renal.      Plan of Care:          Over 25 minutes spent on total encounter; more than 50% of the visit was spent counseling and/or coordinating care by the attending physician.      Wisam Adams,  [facility]  Cardiovascular Disease  [phone]

## 2023-07-17 NOTE — DIETITIAN INITIAL EVALUATION ADULT - ADD RECOMMEND
Consider liberalizing diet by removing DASH/TLC restriction. Add Nephro-Nia daily. Provide encouragement with PO intake, menu selections, and assistance with meals as needed. Reinforce nutrition education as needed - RD remains available. Continue to monitor nutritional intake, labs, weights, BM, skin, clinical course.

## 2023-07-17 NOTE — DIETITIAN INITIAL EVALUATION ADULT - PERTINENT MEDS FT
MEDICATIONS  (STANDING):  ascorbic acid 500 milliGRAM(s) Oral two times a day  aspirin enteric coated 81 milliGRAM(s) Oral daily  atorvastatin 80 milliGRAM(s) Oral at bedtime  bisacodyl Suppository 10 milliGRAM(s) Rectal once  calcium acetate 667 milliGRAM(s) Oral three times a day with meals  chlorhexidine 2% Cloths 1 Application(s) Topical daily  clopidogrel Tablet 75 milliGRAM(s) Oral daily  dextrose 50% Injectable 25 milliLiter(s) IV Push every 15 minutes  dextrose 50% Injectable 50 milliLiter(s) IV Push every 15 minutes  epoetin niesha-epbx (RETACRIT) Injectable 67353 Unit(s) IV Push <User Schedule>  gabapentin 100 milliGRAM(s) Oral every 12 hours  heparin   Injectable 5000 Unit(s) SubCutaneous every 8 hours  insulin lispro (ADMELOG) corrective regimen sliding scale   SubCutaneous Before meals and at bedtime  pantoprazole    Tablet 40 milliGRAM(s) Oral before breakfast  polyethylene glycol 3350 17 Gram(s) Oral daily  senna 2 Tablet(s) Oral at bedtime  sodium chloride 0.9%. 1000 milliLiter(s) (5 mL/Hr) IV Continuous <Continuous>    MEDICATIONS  (PRN):  acetaminophen     Tablet .. 650 milliGRAM(s) Oral every 6 hours PRN Mild Pain (1 - 3)  oxyCODONE    IR 5 milliGRAM(s) Oral every 4 hours PRN Moderate Pain (4 - 6)  oxyCODONE    IR 10 milliGRAM(s) Oral every 4 hours PRN Severe Pain (7 - 10)

## 2023-07-17 NOTE — PROGRESS NOTE ADULT - ASSESSMENT
61-year-old female with ESRD on HD, HLD and peripheral vascular disease s/p lower extremity resection presents with chest pain found to have multivessel disease.       7/13 C3L, OMEGA CLIP,   7/17 Transfer from CTU to SDU, + LP CT x1, + PW isolated, needs BM got dulcolax in CTU, HD for 1.2 L today, PT/OT to work with patient [employer] prosthetics

## 2023-07-17 NOTE — PROGRESS NOTE ADULT - SUBJECTIVE AND OBJECTIVE BOX
Patient seen and examined at the bedside.    Remained critically ill on continuous ICU monitoring.      Brief Summary:  61 yo F with CAD s/p CABG on 07/13/2023        24 Hour events:      OBJECTIVE:  Vital Signs Last 24 Hrs  T(C): 36.8 (16 Jul 2023 20:00), Max: 36.8 (16 Jul 2023 08:00)  T(F): 98.2 (16 Jul 2023 20:00), Max: 98.2 (16 Jul 2023 08:00)  HR: 63 (17 Jul 2023 05:00) (60 - 67)  BP: --  BP(mean): --  RR: 23 (17 Jul 2023 05:00) (16 - 33)  SpO2: 94% (17 Jul 2023 05:00) (91% - 100%)    Parameters below as of 17 Jul 2023 05:00  Patient On (Oxygen Delivery Method): nasal cannula  O2 Flow (L/min): 1                  Physical Exam:   General: Interactive   Neurology: Follows commands.   Respiratory: Decreased at bases  CV: Sinus rhythm   Abdominal: Soft, Nontender  Extremities: Groin fistula with thrill  s/p bilateral BKA.     -------------------------------------------------------------------------------------------------------------------------------    Labs:                        10.6   13.62 )-----------( 137      ( 17 Jul 2023 01:48 )             32.5     07-17    134<L>  |  91<L>  |  36<H>  ----------------------------<  75  4.4   |  21<L>  |  5.34<H>    Ca    9.1      17 Jul 2023 01:15  Phos  6.1     07-17  Mg     2.4     07-17    TPro  6.1  /  Alb  3.5  /  TBili  0.2  /  DBili  x   /  AST  30  /  ALT  31  /  AlkPhos  66  07-17    LIVER FUNCTIONS - ( 17 Jul 2023 01:15 )  Alb: 3.5 g/dL / Pro: 6.1 g/dL / ALK PHOS: 66 U/L / ALT: 31 U/L / AST: 30 U/L / GGT: x             ABG - ( 17 Jul 2023 00:52 )  pH, Arterial: 7.37  pH, Blood: x     /  pCO2: 41    /  pO2: 63    / HCO3: 24    / Base Excess: -1.5  /  SaO2: 88.8              ------------------------------------------------------------------------------------------------------------------------------  Assessment:  61 yo F s/p CABG on 07/13/2023    At high risk for hemodynamic instability and cardiac arrhythmias  Post op acute respiratory insufficiency  Thrombocytopenia  Hyperglycemia  ESRD      Plan:   ***Neuro***  Postoperative acute pain control with Tylenol, Gabapentin, and prns.  PT/OT    ***Cardiovascular***  At high risk for hemodynamic instability and cardiac arrhythmias.  Beta blocker and ERP Amiodarone on hold in setting of bradycardia  Monitor chest tube output.  ASA/Plavix/Statin daily.    ***Pulmonary***  Postoperative acute respiratory insufficiency   Deep breathing and coughing exercises.  Wean oxygen as able.    ***GI***  Tolerating diet.  Protonix for stress ulcer prophylaxis   Bowel regimen.    ***Renal***  ESRD on HD  HD session planned for today.   Replete electrolytes as indicated.    ***ID***  No acute issues.    ***Endocrine***  T2DM  Insulin Sliding Scale for hyperglycemia    ***Hematology***  Acute blood loss anemia and Thrombocytopenia   Trend CBC and monitor for bleeding.  SQ Heparin for DVT prophylaxis     ***Musculoskeletal***  s/p bilateral BKAs.  Awaiting PT assessment of prosthetic fit.          Patient requires continuous monitoring with bedside rhythm monitoring, pulse oximetry monitoring, and continuous central venous and arterial pressure monitoring; and intermittent blood gas analysis. Care plan discussed with the ICU care team.   Patient remained critical, at risk for life threatening decompensation.    I have spent 30 minutes providing critical care management to this patient.    By signing my name below, I, Daniel Mccrarypal, attest that this documentation has been prepared under the direction and in the presence of Venus Celis MD  Electronically signed: Daniel Delgado, 07-17-23 @ 05:26    I, Venus Celis MD, personally performed the services described in this documentation. all medical record entries made by the scribe were at my direction and in my presence. I have reviewed the chart and agree that the record reflects my personal performance and is accurate and complete  Electronically signed: Venus Celis MD Patient seen and examined at the bedside.    Remained critically ill on continuous ICU monitoring.      Brief Summary:  61 yo F with CAD s/p CABG on 07/13/2023        24 Hour events:  - No acute events   - PT/OT as tolerated          OBJECTIVE:  Vital Signs Last 24 Hrs  T(C): 36.8 (16 Jul 2023 20:00), Max: 36.8 (16 Jul 2023 08:00)  T(F): 98.2 (16 Jul 2023 20:00), Max: 98.2 (16 Jul 2023 08:00)  HR: 63 (17 Jul 2023 05:00) (60 - 67)  BP: --  BP(mean): --  RR: 23 (17 Jul 2023 05:00) (16 - 33)  SpO2: 94% (17 Jul 2023 05:00) (91% - 100%)    Parameters below as of 17 Jul 2023 05:00  Patient On (Oxygen Delivery Method): nasal cannula  O2 Flow (L/min): 1                  Physical Exam:   General: Interactive   Neurology: Follows commands.   Respiratory: Decreased at bases  CV: Sinus rhythm   Abdominal: Soft, Nontender  Extremities: Groin fistula with thrill  s/p bilateral BKA.     -------------------------------------------------------------------------------------------------------------------------------    Labs:                        10.6   13.62 )-----------( 137      ( 17 Jul 2023 01:48 )             32.5     07-17    134<L>  |  91<L>  |  36<H>  ----------------------------<  75  4.4   |  21<L>  |  5.34<H>    Ca    9.1      17 Jul 2023 01:15  Phos  6.1     07-17  Mg     2.4     07-17    TPro  6.1  /  Alb  3.5  /  TBili  0.2  /  DBili  x   /  AST  30  /  ALT  31  /  AlkPhos  66  07-17    LIVER FUNCTIONS - ( 17 Jul 2023 01:15 )  Alb: 3.5 g/dL / Pro: 6.1 g/dL / ALK PHOS: 66 U/L / ALT: 31 U/L / AST: 30 U/L / GGT: x             ABG - ( 17 Jul 2023 00:52 )  pH, Arterial: 7.37  pH, Blood: x     /  pCO2: 41    /  pO2: 63    / HCO3: 24    / Base Excess: -1.5  /  SaO2: 88.8              ------------------------------------------------------------------------------------------------------------------------------  Assessment:  61 yo F s/p CABG on 07/13/2023    At high risk for hemodynamic instability and cardiac arrhythmias  Post op acute respiratory insufficiency  Thrombocytopenia  Hyperglycemia  ESRD      Plan:   ***Neuro***  Postoperative acute pain control with Tylenol, Gabapentin, and prns.  PT/OT    ***Cardiovascular***  At high risk for hemodynamic instability and cardiac arrhythmias.  Beta blocker and ERP Amiodarone on hold in setting of bradycardia  Monitor chest tube output.  ASA/Plavix/Statin daily.    ***Pulmonary***  Postoperative acute respiratory insufficiency   Deep breathing and coughing exercises.  Wean oxygen as able.    ***GI***  Tolerating diet.  Protonix for stress ulcer prophylaxis   Bowel regimen.    ***Renal***  ESRD on HD  HD session planned for today.   Replete electrolytes as indicated.    ***ID***  No acute issues.    ***Endocrine***  T2DM  Insulin Sliding Scale for hyperglycemia    ***Hematology***  Acute blood loss anemia and Thrombocytopenia   Trend CBC and monitor for bleeding.  SQ Heparin for DVT prophylaxis     ***Musculoskeletal***  s/p bilateral BKAs.  Awaiting PT assessment of prosthetic fit.          Patient requires continuous monitoring with bedside rhythm monitoring, pulse oximetry monitoring, and continuous central venous and arterial pressure monitoring; and intermittent blood gas analysis. Care plan discussed with the ICU care team.   Patient remained critical, at risk for life threatening decompensation.    I have spent 30 minutes providing critical care management to this patient.    By signing my name below, I, Daniel Danny, attest that this documentation has been prepared under the direction and in the presence of Venus Celis MD  Electronically signed: Daniel Delgado, 07-17-23 @ 05:26    I, Venus Celis MD, personally performed the services described in this documentation. all medical record entries made by the scribe were at my direction and in my presence. I have reviewed the chart and agree that the record reflects my personal performance and is accurate and complete  Electronically signed: Venus Celis MD Patient seen and examined at the bedside.        Brief Summary:  61 yo F with CAD s/p CABG on 07/13/2023      24 Hour events:  No acute events overnight.  OOB to chair this morning.  HD in process.      Objective:  Vital Signs Last 24 Hrs  T(C): 36.8 (16 Jul 2023 20:00), Max: 36.8 (16 Jul 2023 08:00)  T(F): 98.2 (16 Jul 2023 20:00), Max: 98.2 (16 Jul 2023 08:00)  HR: 63 (17 Jul 2023 05:00) (60 - 67)  BP: --  BP(mean): --  RR: 23 (17 Jul 2023 05:00) (16 - 33)  SpO2: 94% (17 Jul 2023 05:00) (91% - 100%)    Parameters below as of 17 Jul 2023 05:00  Patient On (Oxygen Delivery Method): nasal cannula  O2 Flow (L/min): 1        Physical Exam:   General: Interactive   Neurology: Follows commands.   Respiratory: Decreased at bases  CV: Sinus rhythm   Abdominal: Soft, Nontender  Extremities: Groin fistula with thrill  s/p bilateral BKA.     -------------------------------------------------------------------------------------------------------------------------------    Labs:                        10.6   13.62 )-----------( 137      ( 17 Jul 2023 01:48 )             32.5     07-17    134<L>  |  91<L>  |  36<H>  ----------------------------<  75  4.4   |  21<L>  |  5.34<H>    Ca    9.1      17 Jul 2023 01:15  Phos  6.1     07-17  Mg     2.4     07-17    TPro  6.1  /  Alb  3.5  /  TBili  0.2  /  DBili  x   /  AST  30  /  ALT  31  /  AlkPhos  66  07-17    LIVER FUNCTIONS - ( 17 Jul 2023 01:15 )  Alb: 3.5 g/dL / Pro: 6.1 g/dL / ALK PHOS: 66 U/L / ALT: 31 U/L / AST: 30 U/L / GGT: x             ABG - ( 17 Jul 2023 00:52 )  pH, Arterial: 7.37  pH, Blood: x     /  pCO2: 41    /  pO2: 63    / HCO3: 24    / Base Excess: -1.5  /  SaO2: 88.8          ------------------------------------------------------------------------------------------------------------------------------  Assessment:  61 yo F s/p CABG on 07/13/2023    At risk for hemodynamic instability and cardiac arrhythmias  Post op acute respiratory insufficiency  Thrombocytopenia  Hyperglycemia  ESRD      Plan:   ***Neuro***  Postoperative acute pain control with Tylenol, Gabapentin, and prns.    ***Cardiovascular***  At risk for hemodynamic instability and cardiac arrhythmias.  Beta blocker and ERP Amiodarone on hold in setting of bradycardia  Monitor chest tube output.  ASA/Plavix/Statin daily.    ***Pulmonary***  Postoperative acute respiratory insufficiency   Deep breathing and coughing exercises.  Wean oxygen as able.    ***GI***  Tolerating diet.  Protonix for stress ulcer prophylaxis   Bowel regimen.    ***Renal***  ESRD on HD  HD session planned for today.   Replete electrolytes as indicated.    ***ID***  No acute issues.    ***Endocrine***  T2DM  Insulin Sliding Scale for hyperglycemia    ***Hematology***  Acute blood loss anemia and Thrombocytopenia   Trend CBC and monitor for bleeding.  SQ Heparin for DVT prophylaxis     ***Musculoskeletal***  s/p bilateral BKAs.  Awaiting PT assessment of prosthetic fit.      Care plan discussed with the ICU care team.       By signing my name below, I, Daniel Danny, attest that this documentation has been prepared under the direction and in the presence of Venus Celis MD  Electronically signed: Daniel Danny, 07-17-23 @ 05:26    I, Venus Celis MD, personally performed the services described in this documentation. all medical record entries made by the scribe were at my direction and in my presence. I have reviewed the chart and agree that the record reflects my personal performance and is accurate and complete  Electronically signed: Venus Celis MD

## 2023-07-18 LAB
ALBUMIN SERPL ELPH-MCNC: 3.4 G/DL — SIGNIFICANT CHANGE UP (ref 3.3–5)
ALP SERPL-CCNC: 63 U/L — SIGNIFICANT CHANGE UP (ref 40–120)
ALT FLD-CCNC: 37 U/L — SIGNIFICANT CHANGE UP (ref 10–45)
ANION GAP SERPL CALC-SCNC: 18 MMOL/L — HIGH (ref 5–17)
AST SERPL-CCNC: 27 U/L — SIGNIFICANT CHANGE UP (ref 10–40)
BILIRUB SERPL-MCNC: 0.3 MG/DL — SIGNIFICANT CHANGE UP (ref 0.2–1.2)
BUN SERPL-MCNC: 27 MG/DL — HIGH (ref 7–23)
CALCIUM SERPL-MCNC: 8.9 MG/DL — SIGNIFICANT CHANGE UP (ref 8.4–10.5)
CHLORIDE SERPL-SCNC: 92 MMOL/L — LOW (ref 96–108)
CO2 SERPL-SCNC: 25 MMOL/L — SIGNIFICANT CHANGE UP (ref 22–31)
CREAT SERPL-MCNC: 4.66 MG/DL — HIGH (ref 0.5–1.3)
EGFR: 10 ML/MIN/1.73M2 — LOW
GLUCOSE BLDC GLUCOMTR-MCNC: 120 MG/DL — HIGH (ref 70–99)
GLUCOSE BLDC GLUCOMTR-MCNC: 121 MG/DL — HIGH (ref 70–99)
GLUCOSE BLDC GLUCOMTR-MCNC: 124 MG/DL — HIGH (ref 70–99)
GLUCOSE BLDC GLUCOMTR-MCNC: 88 MG/DL — SIGNIFICANT CHANGE UP (ref 70–99)
GLUCOSE SERPL-MCNC: 91 MG/DL — SIGNIFICANT CHANGE UP (ref 70–99)
HCT VFR BLD CALC: 32.9 % — LOW (ref 34.5–45)
HGB BLD-MCNC: 10.6 G/DL — LOW (ref 11.5–15.5)
MAGNESIUM SERPL-MCNC: 2.3 MG/DL — SIGNIFICANT CHANGE UP (ref 1.6–2.6)
MCHC RBC-ENTMCNC: 29.4 PG — SIGNIFICANT CHANGE UP (ref 27–34)
MCHC RBC-ENTMCNC: 32.2 GM/DL — SIGNIFICANT CHANGE UP (ref 32–36)
MCV RBC AUTO: 91.4 FL — SIGNIFICANT CHANGE UP (ref 80–100)
NRBC # BLD: 0 /100 WBCS — SIGNIFICANT CHANGE UP (ref 0–0)
PHOSPHATE SERPL-MCNC: 3.8 MG/DL — SIGNIFICANT CHANGE UP (ref 2.5–4.5)
PLATELET # BLD AUTO: 161 K/UL — SIGNIFICANT CHANGE UP (ref 150–400)
POTASSIUM SERPL-MCNC: 3.9 MMOL/L — SIGNIFICANT CHANGE UP (ref 3.5–5.3)
POTASSIUM SERPL-SCNC: 3.9 MMOL/L — SIGNIFICANT CHANGE UP (ref 3.5–5.3)
PROT SERPL-MCNC: 6.2 G/DL — SIGNIFICANT CHANGE UP (ref 6–8.3)
RBC # BLD: 3.6 M/UL — LOW (ref 3.8–5.2)
RBC # FLD: 15.3 % — HIGH (ref 10.3–14.5)
SODIUM SERPL-SCNC: 135 MMOL/L — SIGNIFICANT CHANGE UP (ref 135–145)
WBC # BLD: 10.11 K/UL — SIGNIFICANT CHANGE UP (ref 3.8–10.5)
WBC # FLD AUTO: 10.11 K/UL — SIGNIFICANT CHANGE UP (ref 3.8–10.5)

## 2023-07-18 PROCEDURE — 71045 X-RAY EXAM CHEST 1 VIEW: CPT | Mod: 26,76

## 2023-07-18 RX ORDER — METOPROLOL TARTRATE 50 MG
25 TABLET ORAL EVERY 12 HOURS
Refills: 0 | Status: DISCONTINUED | OUTPATIENT
Start: 2023-07-18 | End: 2023-07-19

## 2023-07-18 RX ADMIN — POLYETHYLENE GLYCOL 3350 17 GRAM(S): 17 POWDER, FOR SOLUTION ORAL at 11:14

## 2023-07-18 RX ADMIN — HEPARIN SODIUM 5000 UNIT(S): 5000 INJECTION INTRAVENOUS; SUBCUTANEOUS at 21:23

## 2023-07-18 RX ADMIN — ATORVASTATIN CALCIUM 80 MILLIGRAM(S): 80 TABLET, FILM COATED ORAL at 21:22

## 2023-07-18 RX ADMIN — PANTOPRAZOLE SODIUM 40 MILLIGRAM(S): 20 TABLET, DELAYED RELEASE ORAL at 05:13

## 2023-07-18 RX ADMIN — CLOPIDOGREL BISULFATE 75 MILLIGRAM(S): 75 TABLET, FILM COATED ORAL at 11:14

## 2023-07-18 RX ADMIN — OXYCODONE HYDROCHLORIDE 5 MILLIGRAM(S): 5 TABLET ORAL at 22:00

## 2023-07-18 RX ADMIN — Medication 667 MILLIGRAM(S): at 16:36

## 2023-07-18 RX ADMIN — CHLORHEXIDINE GLUCONATE 1 APPLICATION(S): 213 SOLUTION TOPICAL at 11:47

## 2023-07-18 RX ADMIN — OXYCODONE HYDROCHLORIDE 5 MILLIGRAM(S): 5 TABLET ORAL at 21:22

## 2023-07-18 RX ADMIN — Medication 500 MILLIGRAM(S): at 05:13

## 2023-07-18 RX ADMIN — SENNA PLUS 2 TABLET(S): 8.6 TABLET ORAL at 21:22

## 2023-07-18 RX ADMIN — GABAPENTIN 100 MILLIGRAM(S): 400 CAPSULE ORAL at 05:13

## 2023-07-18 RX ADMIN — Medication 25 MILLIGRAM(S): at 16:37

## 2023-07-18 RX ADMIN — HEPARIN SODIUM 5000 UNIT(S): 5000 INJECTION INTRAVENOUS; SUBCUTANEOUS at 05:13

## 2023-07-18 RX ADMIN — Medication 667 MILLIGRAM(S): at 11:47

## 2023-07-18 RX ADMIN — HEPARIN SODIUM 5000 UNIT(S): 5000 INJECTION INTRAVENOUS; SUBCUTANEOUS at 13:18

## 2023-07-18 RX ADMIN — Medication 667 MILLIGRAM(S): at 07:49

## 2023-07-18 RX ADMIN — Medication 81 MILLIGRAM(S): at 11:14

## 2023-07-18 NOTE — PROGRESS NOTE ADULT - SUBJECTIVE AND OBJECTIVE BOX
Date of Service  : 07-18-23     INTERVAL HPI/OVERNIGHT EVENTS: I feel fine. Couldn't walk as stump swollen so not fitting prosthesis .  Vital Signs Last 24 Hrs  T(C): 36.7 (18 Jul 2023 14:50), Max: 36.8 (18 Jul 2023 07:15)  T(F): 98.1 (18 Jul 2023 14:50), Max: 98.2 (18 Jul 2023 07:15)  HR: 72 (18 Jul 2023 14:50) (65 - 73)  BP: 132/62 (18 Jul 2023 14:50) (104/51 - 139/62)  BP(mean): 89 (18 Jul 2023 14:50) (74 - 89)  RR: 18 (18 Jul 2023 14:50) (18 - 20)  SpO2: 89% (18 Jul 2023 14:50) (89% - 100%)    Parameters below as of 18 Jul 2023 14:50  Patient On (Oxygen Delivery Method): room air      I&O's Summary    17 Jul 2023 07:01  -  18 Jul 2023 07:00  --------------------------------------------------------  IN: 695 mL / OUT: 1440 mL / NET: -745 mL    18 Jul 2023 07:01  -  18 Jul 2023 18:34  --------------------------------------------------------  IN: 240 mL / OUT: 0 mL / NET: 240 mL      MEDICATIONS  (STANDING):  aspirin enteric coated 81 milliGRAM(s) Oral daily  atorvastatin 80 milliGRAM(s) Oral at bedtime  calcium acetate 667 milliGRAM(s) Oral three times a day with meals  chlorhexidine 2% Cloths 1 Application(s) Topical daily  clopidogrel Tablet 75 milliGRAM(s) Oral daily  dextrose 50% Injectable 50 milliLiter(s) IV Push every 15 minutes  dextrose 50% Injectable 25 milliLiter(s) IV Push every 15 minutes  epoetin niesha-epbx (RETACRIT) Injectable 08186 Unit(s) IV Push <User Schedule>  heparin   Injectable 5000 Unit(s) SubCutaneous every 8 hours  insulin lispro (ADMELOG) corrective regimen sliding scale   SubCutaneous Before meals and at bedtime  metoprolol tartrate 25 milliGRAM(s) Oral every 12 hours  pantoprazole    Tablet 40 milliGRAM(s) Oral before breakfast  polyethylene glycol 3350 17 Gram(s) Oral daily  senna 2 Tablet(s) Oral at bedtime    MEDICATIONS  (PRN):  acetaminophen     Tablet .. 650 milliGRAM(s) Oral every 6 hours PRN Mild Pain (1 - 3)  oxyCODONE    IR 5 milliGRAM(s) Oral every 4 hours PRN Moderate Pain (4 - 6)  oxyCODONE    IR 10 milliGRAM(s) Oral every 4 hours PRN Severe Pain (7 - 10)    LABS:                        10.6   10.11 )-----------( 161      ( 18 Jul 2023 06:18 )             32.9     07-18    135  |  92<L>  |  27<H>  ----------------------------<  91  3.9   |  25  |  4.66<H>    Ca    8.9      18 Jul 2023 06:18  Phos  3.8     07-18  Mg     2.3     07-18    TPro  6.2  /  Alb  3.4  /  TBili  0.3  /  DBili  x   /  AST  27  /  ALT  37  /  AlkPhos  63  07-18      Urinalysis Basic - ( 18 Jul 2023 06:18 )    Color: x / Appearance: x / SG: x / pH: x  Gluc: 91 mg/dL / Ketone: x  / Bili: x / Urobili: x   Blood: x / Protein: x / Nitrite: x   Leuk Esterase: x / RBC: x / WBC x   Sq Epi: x / Non Sq Epi: x / Bacteria: x      CAPILLARY BLOOD GLUCOSE      POCT Blood Glucose.: 120 mg/dL (18 Jul 2023 16:44)  POCT Blood Glucose.: 124 mg/dL (18 Jul 2023 11:40)  POCT Blood Glucose.: 88 mg/dL (18 Jul 2023 07:25)  POCT Blood Glucose.: 103 mg/dL (17 Jul 2023 20:46)      ABG - ( 17 Jul 2023 00:52 )  pH, Arterial: 7.37  pH, Blood: x     /  pCO2: 41    /  pO2: 63    / HCO3: 24    / Base Excess: -1.5  /  SaO2: 88.8              Urinalysis Basic - ( 18 Jul 2023 06:18 )    Color: x / Appearance: x / SG: x / pH: x  Gluc: 91 mg/dL / Ketone: x  / Bili: x / Urobili: x   Blood: x / Protein: x / Nitrite: x   Leuk Esterase: x / RBC: x / WBC x   Sq Epi: x / Non Sq Epi: x / Bacteria: x      REVIEW OF SYSTEMS:  CONSTITUTIONAL: No fever, weight loss, or fatigue  EYES: No eye pain, visual disturbances, or discharge  ENMT:  No difficulty hearing, tinnitus, vertigo; No sinus or throat pain  NECK: No pain or stiffness  RESPIRATORY: No cough, wheezing, chills or hemoptysis; No shortness of breath  CARDIOVASCULAR: No chest pain, palpitations, dizziness, or leg swelling  GASTROINTESTINAL: No abdominal or epigastric pain. No nausea, vomiting, or hematemesis; No diarrhea or constipation. No melena or hematochezia.  GENITOURINARY: No dysuria, frequency, hematuria, or incontinence      RADIOLOGY & ADDITIONAL TESTS:    Consultant(s) Notes Reviewed:  [x ] YES  [ ] NO    PHYSICAL EXAM:  GENERAL: NAD, well-groomed, well-developed,not in any distress ,  HEAD:  Atraumatic, Normocephalic  EYES: EOMI, PERRLA, conjunctiva and sclera clear  ENMT: No tonsillar erythema, exudates, or enlargement; Moist mucous membranes, Good dentition, No lesions  NECK: Supple, No JVD, Normal thyroid  NERVOUS SYSTEM:  Alert & Oriented X3, No focal deficit   CHEST/LUNG: Good air entry bilateral with no  rales, rhonchi, wheezing, or rubs  HEART: Regular rate and rhythm; No murmurs, rubs, or gallops  ABDOMEN: Soft, Nontender, Nondistended; Bowel sounds present  EXTREMITIES: B/L BKA   Care Discussed with Consultants/Other Providers [ x] YES  [ ] NO

## 2023-07-18 NOTE — PROGRESS NOTE ADULT - SUBJECTIVE AND OBJECTIVE BOX
Patient seen and examined  no complaints    No Known Allergies    Hospital Medications:   MEDICATIONS  (STANDING):  aspirin enteric coated 81 milliGRAM(s) Oral daily  atorvastatin 80 milliGRAM(s) Oral at bedtime  calcium acetate 667 milliGRAM(s) Oral three times a day with meals  chlorhexidine 2% Cloths 1 Application(s) Topical daily  clopidogrel Tablet 75 milliGRAM(s) Oral daily  dextrose 50% Injectable 25 milliLiter(s) IV Push every 15 minutes  dextrose 50% Injectable 50 milliLiter(s) IV Push every 15 minutes  epoetin niesha-epbx (RETACRIT) Injectable 01964 Unit(s) IV Push <User Schedule>  heparin   Injectable 5000 Unit(s) SubCutaneous every 8 hours  insulin lispro (ADMELOG) corrective regimen sliding scale   SubCutaneous Before meals and at bedtime  pantoprazole    Tablet 40 milliGRAM(s) Oral before breakfast  polyethylene glycol 3350 17 Gram(s) Oral daily  senna 2 Tablet(s) Oral at bedtime        VITALS:  T(F): 98 (07-18-23 @ 11:38), Max: 98.2 (07-17-23 @ 18:12)  HR: 69 (07-18-23 @ 11:38)  BP: 113/53 (07-18-23 @ 11:38)  RR: 18 (07-18-23 @ 11:38)  SpO2: 99% (07-18-23 @ 11:38)  Wt(kg): --    07-17 @ 07:01  -  07-18 @ 07:00  --------------------------------------------------------  IN: 695 mL / OUT: 1440 mL / NET: -745 mL    07-18 @ 07:01  -  07-18 @ 14:12  --------------------------------------------------------  IN: 240 mL / OUT: 0 mL / NET: 240 mL          Physical Exam :-  Constitutional: NAD  Respiratory: Bilateral equal breath sounds, bilat basal Crackles present.  Cardiovascular: S1, S2 normal, positive Murmur  Gastrointestinal: Bowel Sounds present, soft  Extremities: + Edema Feet  Neurological: Alert and Oriented x 3  Psychiatric: Normal mood, normal affect    LABS:  07-18    135  |  92<L>  |  27<H>  ----------------------------<  91  3.9   |  25  |  4.66<H>    Ca    8.9      18 Jul 2023 06:18  Phos  3.8     07-18  Mg     2.3     07-18    TPro  6.2  /  Alb  3.4  /  TBili  0.3  /  DBili      /  AST  27  /  ALT  37  /  AlkPhos  63  07-18    Creatinine Trend: 4.66 <--, 5.34 <--, 3.96 <--, 4.39 <--, 5.88 <--, 5.41 <--, 9.14 <--                        10.6   10.11 )-----------( 161      ( 18 Jul 2023 06:18 )             32.9     Urine Studies:  Urinalysis Basic - ( 18 Jul 2023 06:18 )    Color:  / Appearance:  / SG:  / pH:   Gluc: 91 mg/dL / Ketone:   / Bili:  / Urobili:    Blood:  / Protein:  / Nitrite:    Leuk Esterase:  / RBC:  / WBC    Sq Epi:  / Non Sq Epi:  / Bacteria:         RADIOLOGY & ADDITIONAL STUDIES:

## 2023-07-18 NOTE — OCCUPATIONAL THERAPY INITIAL EVALUATION ADULT - ADDITIONAL COMMENTS
**pt in rehab since November since L BKA (R BKA 6/2021), reports being fairly independent with use of wheelchair, scooting from surfaces, pt just got L prosthetic 4 weeks ago, difficulty ambulating prior with the R prosthetic 2* pain in R stump

## 2023-07-18 NOTE — PROGRESS NOTE ADULT - ASSESSMENT
63 y/o F with PMH of ESRD(on HD M/W/F thru right groin AVF), HTN, HLD, DM type II, bilateral BKA, right eye blindness presented initially to [facility] on 07/03 from rehab center for acute SOB and fluid overload and now transferred to [facility] for LHC due to elevated troponin. Pt underwent LHC 7/5: LM okay, prox LAD of 80-90%, mid Lcx of 80-90%, OM of 90%, ostial RCA of 90%. LFA access site. LVEDP: 28. On Aspirin 81mg. awaiting CABG.        Problem/Recommendation - 1:  ·  Problem: CAD, multiple vessel.   ·  Recommendation: S/P  CABG  and management per CT Surgery    No Cp etc,   On ASA,BB and Statin.     Problem/Recommendation - 2:  ·  Problem: ESRD on hemodialysis.   ·  Recommendation: HD per renal .     Problem/Recommendation - 3:  ·  Problem: DM (diabetes mellitus).   ·  Recommendation: Sugars in good range.     Problem/Recommendation - 4:  ·  Problem: HTN (hypertension).   ·  Recommendation: BP readings better.     Problem/Recommendation - 5:  ·  Problem: Anemia secondary to renal failure.   ·  Recommendation: HH stable. Epogen per renal.     Problem/Recommendation - 6:  ·  Problem: HLD (hyperlipidemia).   ·  Recommendation: Statin.     Problem/Recommendation - 7:  ·  Problem: PVD (peripheral vascular disease).   ·  Recommendation: S/P BKA . Has Prosthesis .     Problem/Recommendation - 8:  ·  Problem: Legally blind.   ·  Recommendation: Supportive care.    Pt working with her.

## 2023-07-18 NOTE — PROGRESS NOTE ADULT - PROBLEM SELECTOR PLAN 1
Transfer from CTU to SDU,   + LP CT x1, + PW isolated,   needs BM got dulcolax in CTU,    C/W asa 81, ATOR 80, NO BB yet per CTU  PT/OT to work with patient BL LE prosthetics  Cough and deep breathe, Incentive Spirometry Q1h, Chest PT, Pulm Toilet  C/W GI prophylaxis  DVT prophylaxis

## 2023-07-18 NOTE — PROGRESS NOTE ADULT - ASSESSMENT
61-year-old female with ESRD on HD, HLD and peripheral vascular disease s/p lower extremity resection presents with chest pain found to have multivessel disease.       7/13 C3L, OMEGA CLIP,   7/17 Transfer from CTU to SDU, + LP CT x1, + PW isolated, needs BM got dulcolax in CTU, HD for 1.2 L today, PT/OT to work with patient SONIA LE prosthetics  7/18

## 2023-07-18 NOTE — PROGRESS NOTE ADULT - SUBJECTIVE AND OBJECTIVE BOX
Cardiovascular Disease Progress Note  Date of service: 23 @ 09:53    Overnight events: No acute events overnight.  Ms. Boogie denies chest pain or SOB.   Otherwise review of systems negative    Objective Findings:  T(C): 36.8 (23 @ 07:15), Max: 36.8 (23 @ 18:12)  HR: 65 (23 @ 07:15) (63 - 78)  BP: 104/51 (23 @ 07:15) (104/51 - 150/66)  RR: 18 (23 @ 07:15) (9 - 50)  SpO2: 99% (23 @ 07:15) (94% - 100%)  Wt(kg): --  Daily     Daily Weight in k.2 (2023 06:40)      Physical Exam:  Gen: NAD; Patient resting comfortably  HEENT: EOMI, Normocephalic/ atraumatic  CV: RRR, normal S1 + S2, no m/r/g  Lungs:  Normal respiratory effort; clear to auscultation bilaterally  Abd: soft, non-tender; bowel sounds present  Ext: BKA     Telemetry: Sinus     Laboratory Data:                        10.6   10.11 )-----------( 161      ( 2023 06:18 )             32.9         135  |  92<L>  |  27<H>  ----------------------------<  91  3.9   |  25  |  4.66<H>    Ca    8.9      2023 06:18  Phos  3.8       Mg     2.3         TPro  6.2  /  Alb  3.4  /  TBili  0.3  /  DBili  x   /  AST  27  /  ALT  37  /  AlkPhos  63                Inpatient Medications:  MEDICATIONS  (STANDING):  aspirin enteric coated 81 milliGRAM(s) Oral daily  atorvastatin 80 milliGRAM(s) Oral at bedtime  calcium acetate 667 milliGRAM(s) Oral three times a day with meals  chlorhexidine 2% Cloths 1 Application(s) Topical daily  clopidogrel Tablet 75 milliGRAM(s) Oral daily  dextrose 50% Injectable 50 milliLiter(s) IV Push every 15 minutes  dextrose 50% Injectable 25 milliLiter(s) IV Push every 15 minutes  epoetin niesha-epbx (RETACRIT) Injectable 88217 Unit(s) IV Push <User Schedule>  heparin   Injectable 5000 Unit(s) SubCutaneous every 8 hours  insulin lispro (ADMELOG) corrective regimen sliding scale   SubCutaneous Before meals and at bedtime  pantoprazole    Tablet 40 milliGRAM(s) Oral before breakfast  polyethylene glycol 3350 17 Gram(s) Oral daily  senna 2 Tablet(s) Oral at bedtime      Assessment:  61-year-old female with ESRD on HD, HLD and peripheral vascular disease s/p lower extremity resection presents with chest pain found to have multivessel disease.      Plan of Care:      #Multivessel CAD-  Noted on cardiac cath .  Echo  with normal LV systolic function. Apical Hypokinesis and moderate MS.   s/p CABG x3 (LIMA to LAD, SVG to OM, SVG to RCA)  2023.   ASA and Plavix  Statin therapy     #Peripheral vascular disease-  s/p  BKA   Continue current cardiac management.   Antiplatelet therapy with ASA.       #Compensated diastolic CHF-  Euvolemic on exam.  Fluid removal with HD as per the renal team.     #ESRD-  - HD, as per renal.    #ACP (advance care planning)-  Advanced care planning was discussed with the patient.  Risks, benefits and alternatives of medical treatment and procedures were discussed in detail and all questions were answered. 30 additional minutes spent addressing advance care plans.          Over 25 minutes spent on total encounter; more than 50% of the visit was spent counseling and/or coordinating care by the attending physician.      Wisam Adams DO [facility]  Cardiovascular Disease  [phone]

## 2023-07-18 NOTE — PROGRESS NOTE ADULT - SUBJECTIVE AND OBJECTIVE BOX
VITAL SIGNS    Telemetry:      Vital Signs Last 24 Hrs  T(C): 36.7 (23 @ 11:38), Max: 36.8 (23 @ 18:12)  T(F): 98 (23 @ 11:38), Max: 98.2 (23 @ 18:12)  HR: 69 (23 @ 11:38) (65 - 78)  BP: 113/53 (23 @ 11:38) (104/51 - 150/66)  RR: 18 (23 @ 11:38) (9 - 50)  SpO2: 99% (23 @ 11:38) (94% - 100%)                    @ 07:01  -   @ 07:00  --------------------------------------------------------  IN: 695 mL / OUT: 1440 mL / NET: -745 mL     @ 07:01  -   @ 13:17  --------------------------------------------------------  IN: 240 mL / OUT: 0 mL / NET: 240 mL          Daily     Daily Weight in k.2 (2023 06:40)            CAPILLARY BLOOD GLUCOSE  96 (2023 16:00)      POCT Blood Glucose.: 124 mg/dL (2023 11:40)  POCT Blood Glucose.: 88 mg/dL (2023 07:25)  POCT Blood Glucose.: 103 mg/dL (2023 20:46)  POCT Blood Glucose.: 93 mg/dL (2023 16:02)            Drains:     MS         [  ] Drainage:                 L Pleural  [  ]  Drainage:                R Pleural  [  ]  Drainage:    Pacing Wires        [  ]   Settings:                                  Isolated  [  ]    Coumadin    [ ] YES          [  ]      NO                                   PHYSICAL EXAM        Neurology: alert and oriented x 3, nonfocal, no gross deficits  CV : s1 s2 RRR  Sternal Wound :  CDI , Stable  Lungs: cta  Abdomen: soft, nontender, nondistended, positive bowel sounds, last bowel movement                       chest tubes  :    voiding / salcido - sbd         Extremities:      edema   /  -   calve tenderness ,    L leg  /  R leg  incisions cdi          acetaminophen     Tablet .. 650 milliGRAM(s) Oral every 6 hours PRN  aspirin enteric coated 81 milliGRAM(s) Oral daily  atorvastatin 80 milliGRAM(s) Oral at bedtime  calcium acetate 667 milliGRAM(s) Oral three times a day with meals  chlorhexidine 2% Cloths 1 Application(s) Topical daily  clopidogrel Tablet 75 milliGRAM(s) Oral daily  dextrose 50% Injectable 50 milliLiter(s) IV Push every 15 minutes  dextrose 50% Injectable 25 milliLiter(s) IV Push every 15 minutes  epoetin niesha-epbx (RETACRIT) Injectable 18787 Unit(s) IV Push <User Schedule>  heparin   Injectable 5000 Unit(s) SubCutaneous every 8 hours  insulin lispro (ADMELOG) corrective regimen sliding scale   SubCutaneous Before meals and at bedtime  oxyCODONE    IR 5 milliGRAM(s) Oral every 4 hours PRN  oxyCODONE    IR 10 milliGRAM(s) Oral every 4 hours PRN  pantoprazole    Tablet 40 milliGRAM(s) Oral before breakfast  polyethylene glycol 3350 17 Gram(s) Oral daily  senna 2 Tablet(s) Oral at bedtime                    Physical Therapy Rec:   Home  [  ]   Home w/ PT  [  ]  Rehab  [  ]  Discussed with Cardiothoracic Team at AM rounds.     VITAL SIGNS    Telemetry:  nsr 60    Vital Signs Last 24 Hrs  T(C): 36.7 (23 @ 11:38), Max: 36.8 (23 @ 18:12)  T(F): 98 (23 @ 11:38), Max: 98.2 (23 @ 18:12)  HR: 69 (23 @ 11:38) (65 - 78)  BP: 113/53 (23 @ 11:38) (104/51 - 150/66)  RR: 18 (23 @ 11:38) (9 - 50)  SpO2: 99% (23 @ 11:38) (94% - 100%)                    @ 07:01  -   @ 07:00  --------------------------------------------------------  IN: 695 mL / OUT: 1440 mL / NET: -745 mL     @ 07:01  -   @ 13:17  --------------------------------------------------------  IN: 240 mL / OUT: 0 mL / NET: 240 mL          Daily     Daily Weight in k.2 (2023 06:40)            CAPILLARY BLOOD GLUCOSE  96 (2023 16:00)      POCT Blood Glucose.: 124 mg/dL (2023 11:40)  POCT Blood Glucose.: 88 mg/dL (2023 07:25)  POCT Blood Glucose.: 103 mg/dL (2023 20:46)  POCT Blood Glucose.: 93 mg/dL (2023 16:02)            Drains:     MS         [  ] Drainage:                 L Pleural  [ x ]  Drainage:                R Pleural  [  ]  Drainage:    Pacing Wires        [  ]   Settings:                                  Isolated  x[  ]    Coumadin    [ ] YES          [  x]      NO                                   PHYSICAL EXAM        Neurology: alert and oriented x 3, nonfocal, no gross deficits  CV : s1 s2 RRR  Sternal Wound :  CDI , Stable  Lungs: diminished  Abdomen: soft, nontender, nondistended, positive bowel sounds                      chest tubes x 1  :    voiding        Extremities:   trace    edema   /  -   calve tenderness ,    bilat bka, ace wraps bilat  L svg cdi          acetaminophen     Tablet .. 650 milliGRAM(s) Oral every 6 hours PRN  aspirin enteric coated 81 milliGRAM(s) Oral daily  atorvastatin 80 milliGRAM(s) Oral at bedtime  calcium acetate 667 milliGRAM(s) Oral three times a day with meals  chlorhexidine 2% Cloths 1 Application(s) Topical daily  clopidogrel Tablet 75 milliGRAM(s) Oral daily  dextrose 50% Injectable 50 milliLiter(s) IV Push every 15 minutes  dextrose 50% Injectable 25 milliLiter(s) IV Push every 15 minutes  epoetin niesha-epbx (RETACRIT) Injectable 87324 Unit(s) IV Push <User Schedule>  heparin   Injectable 5000 Unit(s) SubCutaneous every 8 hours  insulin lispro (ADMELOG) corrective regimen sliding scale   SubCutaneous Before meals and at bedtime  oxyCODONE    IR 5 milliGRAM(s) Oral every 4 hours PRN  oxyCODONE    IR 10 milliGRAM(s) Oral every 4 hours PRN  pantoprazole    Tablet 40 milliGRAM(s) Oral before breakfast  polyethylene glycol 3350 17 Gram(s) Oral daily  senna 2 Tablet(s) Oral at bedtime                    Physical Therapy Rec:   Home  [  ]   Home w/ PT  [  ]  Rehab  [  ]  Discussed with Cardiothoracic Team at AM rounds.

## 2023-07-18 NOTE — OCCUPATIONAL THERAPY INITIAL EVALUATION ADULT - BALANCE TRAINING, PT EVAL
GOAL: Patient will increase static/dynamic sitting balance by 1/2 grade to facilitate increased ability to perform ADLs and functional mobility

## 2023-07-18 NOTE — PROGRESS NOTE ADULT - ASSESSMENT
63 y/o F with PMH of ESRD(on HD M/W/F thru right groin AVF), HTN, HLD, DM type II, bilateral BKA, right eye blindness presented initially to [facility] on 07/03 from rehab center for acute SOB and fluid overload and now transferred to [facility] for LHC due to elevated troponin. Pt underwent LHC 7/5: LM okay, prox LAD of 80-90%, mid Lcx of 80-90%, OM of 90%, ostial RCA of 90%. LFA access site. LVEDP: 28. On Aspirin 81mg. Plan to be transferred to [facility] for CT surgery consult with Dr. Alma Mabry.     End Stage Renal Disease on Dialysis   Monday, Wednesday and Friday   access: right upper thigh avg  consent in chart  Volume Status : stable        Plan  s/p Hd yesterday- tolerated hd well--. flowsheet reviewed  Plan for next HD tomm  trend basic metabolic panel and blood pressure   - add renvela 800 mg three times per day with meals if not npo as phosphate binder    --::: Anemia-->  hb at goal     Current orders :  - retacrit 10,000 units iv three times per week   - plan to titrate medication as per response of hemoglobin  - if Hb less than 7gm/dl consider blood transfusion    Dr Mcfarlane  [phone]

## 2023-07-18 NOTE — OCCUPATIONAL THERAPY INITIAL EVALUATION ADULT - PERTINENT HX OF CURRENT PROBLEM, REHAB EVAL
61 y/o F with PMH of ESRD(on HD M/W/F thru right groin AVF), HTN, HLD, DM type II, bilateral BKA, right eye blindness presented initially to [facility] on 07/03 from rehab center for acute SOB and fluid overload and now transferred to [facility] for LHC due to elevated troponin. Pt underwent LHC 7/5: LM okay, prox LAD of 80-90%, mid Lcx of 80-90%, OM of 90%, ostial RCA of 90%. LFA access site. LVEDP: 28. On Aspirin 81mg. Plan to be transferred to [facility] for CT surgery consult with Dr. Alma Mabry.  7/5/23: Cardiac cath: Severe multi-vessel CAD in DM pt with preserved LV systolic function.  7/12/23: Chest CT: Mild calcification of the thoracic aorta.  7/13 a/p CABG x3

## 2023-07-19 LAB
ALBUMIN SERPL ELPH-MCNC: 3.5 G/DL — SIGNIFICANT CHANGE UP (ref 3.3–5)
ALP SERPL-CCNC: 59 U/L — SIGNIFICANT CHANGE UP (ref 40–120)
ALT FLD-CCNC: 34 U/L — SIGNIFICANT CHANGE UP (ref 10–45)
ANION GAP SERPL CALC-SCNC: 17 MMOL/L — SIGNIFICANT CHANGE UP (ref 5–17)
AST SERPL-CCNC: 21 U/L — SIGNIFICANT CHANGE UP (ref 10–40)
BILIRUB SERPL-MCNC: 0.3 MG/DL — SIGNIFICANT CHANGE UP (ref 0.2–1.2)
BUN SERPL-MCNC: 37 MG/DL — HIGH (ref 7–23)
CALCIUM SERPL-MCNC: 8.9 MG/DL — SIGNIFICANT CHANGE UP (ref 8.4–10.5)
CHLORIDE SERPL-SCNC: 92 MMOL/L — LOW (ref 96–108)
CO2 SERPL-SCNC: 26 MMOL/L — SIGNIFICANT CHANGE UP (ref 22–31)
CREAT SERPL-MCNC: 6.33 MG/DL — HIGH (ref 0.5–1.3)
EGFR: 7 ML/MIN/1.73M2 — LOW
GLUCOSE BLDC GLUCOMTR-MCNC: 110 MG/DL — HIGH (ref 70–99)
GLUCOSE BLDC GLUCOMTR-MCNC: 114 MG/DL — HIGH (ref 70–99)
GLUCOSE BLDC GLUCOMTR-MCNC: 130 MG/DL — HIGH (ref 70–99)
GLUCOSE BLDC GLUCOMTR-MCNC: 167 MG/DL — HIGH (ref 70–99)
GLUCOSE SERPL-MCNC: 101 MG/DL — HIGH (ref 70–99)
HCT VFR BLD CALC: 31.7 % — LOW (ref 34.5–45)
HGB BLD-MCNC: 10 G/DL — LOW (ref 11.5–15.5)
MAGNESIUM SERPL-MCNC: 2.4 MG/DL — SIGNIFICANT CHANGE UP (ref 1.6–2.6)
MCHC RBC-ENTMCNC: 29 PG — SIGNIFICANT CHANGE UP (ref 27–34)
MCHC RBC-ENTMCNC: 31.5 GM/DL — LOW (ref 32–36)
MCV RBC AUTO: 91.9 FL — SIGNIFICANT CHANGE UP (ref 80–100)
NRBC # BLD: 0 /100 WBCS — SIGNIFICANT CHANGE UP (ref 0–0)
PHOSPHATE SERPL-MCNC: 4.3 MG/DL — SIGNIFICANT CHANGE UP (ref 2.5–4.5)
PLATELET # BLD AUTO: 172 K/UL — SIGNIFICANT CHANGE UP (ref 150–400)
POTASSIUM SERPL-MCNC: 3.9 MMOL/L — SIGNIFICANT CHANGE UP (ref 3.5–5.3)
POTASSIUM SERPL-SCNC: 3.9 MMOL/L — SIGNIFICANT CHANGE UP (ref 3.5–5.3)
PROT SERPL-MCNC: 6.2 G/DL — SIGNIFICANT CHANGE UP (ref 6–8.3)
RBC # BLD: 3.45 M/UL — LOW (ref 3.8–5.2)
RBC # FLD: 15.2 % — HIGH (ref 10.3–14.5)
SODIUM SERPL-SCNC: 135 MMOL/L — SIGNIFICANT CHANGE UP (ref 135–145)
WBC # BLD: 8.34 K/UL — SIGNIFICANT CHANGE UP (ref 3.8–10.5)
WBC # FLD AUTO: 8.34 K/UL — SIGNIFICANT CHANGE UP (ref 3.8–10.5)

## 2023-07-19 PROCEDURE — 93306 TTE W/DOPPLER COMPLETE: CPT | Mod: 26

## 2023-07-19 RX ORDER — METOPROLOL TARTRATE 50 MG
12.5 TABLET ORAL
Refills: 0 | Status: DISCONTINUED | OUTPATIENT
Start: 2023-07-19 | End: 2023-07-19

## 2023-07-19 RX ADMIN — CHLORHEXIDINE GLUCONATE 1 APPLICATION(S): 213 SOLUTION TOPICAL at 11:19

## 2023-07-19 RX ADMIN — POLYETHYLENE GLYCOL 3350 17 GRAM(S): 17 POWDER, FOR SOLUTION ORAL at 11:22

## 2023-07-19 RX ADMIN — Medication 81 MILLIGRAM(S): at 11:23

## 2023-07-19 RX ADMIN — PANTOPRAZOLE SODIUM 40 MILLIGRAM(S): 20 TABLET, DELAYED RELEASE ORAL at 05:02

## 2023-07-19 RX ADMIN — Medication 667 MILLIGRAM(S): at 14:12

## 2023-07-19 RX ADMIN — CLOPIDOGREL BISULFATE 75 MILLIGRAM(S): 75 TABLET, FILM COATED ORAL at 11:23

## 2023-07-19 RX ADMIN — Medication 5 MILLIGRAM(S): at 10:06

## 2023-07-19 RX ADMIN — SENNA PLUS 2 TABLET(S): 8.6 TABLET ORAL at 21:13

## 2023-07-19 RX ADMIN — HEPARIN SODIUM 5000 UNIT(S): 5000 INJECTION INTRAVENOUS; SUBCUTANEOUS at 21:13

## 2023-07-19 RX ADMIN — ATORVASTATIN CALCIUM 80 MILLIGRAM(S): 80 TABLET, FILM COATED ORAL at 21:13

## 2023-07-19 RX ADMIN — Medication 2: at 11:25

## 2023-07-19 RX ADMIN — HEPARIN SODIUM 5000 UNIT(S): 5000 INJECTION INTRAVENOUS; SUBCUTANEOUS at 05:02

## 2023-07-19 RX ADMIN — Medication 667 MILLIGRAM(S): at 08:36

## 2023-07-19 RX ADMIN — Medication 667 MILLIGRAM(S): at 21:21

## 2023-07-19 RX ADMIN — ERYTHROPOIETIN 10000 UNIT(S): 10000 INJECTION, SOLUTION INTRAVENOUS; SUBCUTANEOUS at 16:41

## 2023-07-19 RX ADMIN — Medication 25 MILLIGRAM(S): at 05:02

## 2023-07-19 RX ADMIN — HEPARIN SODIUM 5000 UNIT(S): 5000 INJECTION INTRAVENOUS; SUBCUTANEOUS at 14:13

## 2023-07-19 NOTE — PROGRESS NOTE ADULT - SUBJECTIVE AND OBJECTIVE BOX
Patient seen and examined  no complaints    No Known Allergies    Hospital Medications:   MEDICATIONS  (STANDING):  aspirin enteric coated 81 milliGRAM(s) Oral daily  atorvastatin 80 milliGRAM(s) Oral at bedtime  bisacodyl 5 milliGRAM(s) Oral daily  calcium acetate 667 milliGRAM(s) Oral three times a day with meals  chlorhexidine 2% Cloths 1 Application(s) Topical daily  clopidogrel Tablet 75 milliGRAM(s) Oral daily  dextrose 50% Injectable 25 milliLiter(s) IV Push every 15 minutes  dextrose 50% Injectable 50 milliLiter(s) IV Push every 15 minutes  epoetin niesha-epbx (RETACRIT) Injectable 57260 Unit(s) IV Push <User Schedule>  heparin   Injectable 5000 Unit(s) SubCutaneous every 8 hours  insulin lispro (ADMELOG) corrective regimen sliding scale   SubCutaneous Before meals and at bedtime  metoprolol tartrate 12.5 milliGRAM(s) Oral two times a day  pantoprazole    Tablet 40 milliGRAM(s) Oral before breakfast  polyethylene glycol 3350 17 Gram(s) Oral daily  senna 2 Tablet(s) Oral at bedtime      VITALS:  T(F): 97.8 (07-19-23 @ 10:56), Max: 98.2 (07-18-23 @ 19:11)  HR: 58 (07-19-23 @ 10:56)  BP: 103/52 (07-19-23 @ 10:56)  RR: 18 (07-19-23 @ 10:56)  SpO2: 93% (07-19-23 @ 10:56)  Wt(kg): --    07-18 @ 07:01  -  07-19 @ 07:00  --------------------------------------------------------  IN: 510 mL / OUT: 0 mL / NET: 510 mL    07-19 @ 07:01  -  07-19 @ 12:53  --------------------------------------------------------  IN: 440 mL / OUT: 0 mL / NET: 440 mL      Physical Exam :-  Constitutional: NAD  Respiratory: Bilateral equal breath sounds, bilat basal Crackles present.  Cardiovascular: S1, S2 normal, positive Murmur  Gastrointestinal: Bowel Sounds present, soft  Extremities: + Edema Feet  Neurological: Alert and Oriented x 3  Psychiatric: Normal mood, normal affect    LABS:  07-19    135  |  92<L>  |  37<H>  ----------------------------<  101<H>  3.9   |  26  |  6.33<H>    Ca    8.9      19 Jul 2023 07:17  Phos  4.3     07-19  Mg     2.4     07-19    TPro  6.2  /  Alb  3.5  /  TBili  0.3  /  DBili      /  AST  21  /  ALT  34  /  AlkPhos  59  07-19    Creatinine Trend: 6.33 <--, 4.66 <--, 5.34 <--, 3.96 <--, 4.39 <--, 5.88 <--, 5.41 <--                        10.0   8.34  )-----------( 172      ( 19 Jul 2023 07:17 )             31.7     Urine Studies:  Urinalysis Basic - ( 19 Jul 2023 07:17 )    Color:  / Appearance:  / SG:  / pH:   Gluc: 101 mg/dL / Ketone:   / Bili:  / Urobili:    Blood:  / Protein:  / Nitrite:    Leuk Esterase:  / RBC:  / WBC    Sq Epi:  / Non Sq Epi:  / Bacteria:         RADIOLOGY & ADDITIONAL STUDIES:

## 2023-07-19 NOTE — PROGRESS NOTE ADULT - PROBLEM SELECTOR PLAN 1
Transfer from CTU to SDU,    + PW isolated,   needs BM got dulcolax in CTU,    C/W asa 81, ATOR 80 metoprolol 12.s BID  PT/OT to work with patient BL LE prosthetics  Cough and deep breathe, Incentive Spirometry Q1h, Chest PT, Pulm Toilet  C/W GI prophylaxis  DVT prophylaxis

## 2023-07-19 NOTE — PROGRESS NOTE ADULT - ASSESSMENT
61 y/o F with PMH of ESRD(on HD M/W/F thru right groin AVF), HTN, HLD, DM type II, bilateral BKA, right eye blindness presented initially to [facility] on 07/03 from rehab center for acute SOB and fluid overload and now transferred to [facility] for LHC due to elevated troponin. Pt underwent LHC 7/5: LM okay, prox LAD of 80-90%, mid Lcx of 80-90%, OM of 90%, ostial RCA of 90%. LFA access site. LVEDP: 28. On Aspirin 81mg. awaiting CABG.        Problem/Recommendation - 1:  ·  Problem: CAD, multiple vessel.   ·  Recommendation: S/P  CABG  and management per CT Surgery    No Cp etc,   On ASA,BB and Statin.     Problem/Recommendation - 2:  ·  Problem: ESRD on hemodialysis.   ·  Recommendation: HD per renal .     Problem/Recommendation - 3:  ·  Problem: DM (diabetes mellitus).   ·  Recommendation: Sugars in good range.     Problem/Recommendation - 4:  ·  Problem: HTN (hypertension).   ·  Recommendation: BP readings better.     Problem/Recommendation - 5:  ·  Problem: Anemia secondary to renal failure.   ·  Recommendation: HH stable. Epogen per renal.     Problem/Recommendation - 6:  ·  Problem: HLD (hyperlipidemia).   ·  Recommendation: Statin.     Problem/Recommendation - 7:  ·  Problem: PVD (peripheral vascular disease).   ·  Recommendation: S/P BKA . Has Prosthesis .     Problem/Recommendation - 8:  ·  Problem: Legally blind.   ·  Recommendation: Supportive care.    Pt working with her.

## 2023-07-19 NOTE — PROGRESS NOTE ADULT - SUBJECTIVE AND OBJECTIVE BOX
Subjective : hello I am ok:    VITAL SIGNS    Telemetry:  NSR/SB     Vital Signs Last 24 Hrs  T(C): 36.7 (23 @ 06:47), Max: 36.8 (23 @ 19:11)  T(F): 98 (23 @ 06:47), Max: 98.2 (23 @ 19:11)  HR: 59 (23 @ 06:47) (59 - 72)  BP: 109/50 (23 @ 06:47) (103/51 - 132/62)  RR: 18 (23 @ 06:47) (18 - 18)  SpO2: 100% (23 @ 06:47) (89% - 100%)            @ 07:01  -   @ 07:00  --------------------------------------------------------  IN: 510 mL / OUT: 0 mL / NET: 510 mL    Daily Weight in k.1 (2023 06:47)                            10.0   8.34  )-----------( 172      ( 2023 07:17 )             31.7     0719    135  |  92<L>  |  37<H>  ----------------------------<  101<H>  3.9   |  26  |  6.33<H>    Ca    8.9      2023 07:17  Phos  4.3     19  Mg     2.4         TPro  6.2  /  Alb  3.5  /  TBili  0.3  /  DBili  x   /  AST  21  /  ALT  34  /  AlkPhos  59      MEDICATIONS  (STANDING):  aspirin enteric coated 81 milliGRAM(s) Oral daily  atorvastatin 80 milliGRAM(s) Oral at bedtime  calcium acetate 667 milliGRAM(s) Oral three times a day with meals  chlorhexidine 2% Cloths 1 Application(s) Topical daily  clopidogrel Tablet 75 milliGRAM(s) Oral daily  dextrose 50% Injectable 50 milliLiter(s) IV Push every 15 minutes  dextrose 50% Injectable 25 milliLiter(s) IV Push every 15 minutes  epoetin niesha-epbx (RETACRIT) Injectable 23282 Unit(s) IV Push <User Schedule>  heparin   Injectable 5000 Unit(s) SubCutaneous every 8 hours  insulin lispro (ADMELOG) corrective regimen sliding scale   SubCutaneous Before meals and at bedtime  metoprolol tartrate 12.5 milliGRAM(s) Oral two times a day  pantoprazole    Tablet 40 milliGRAM(s) Oral before breakfast  polyethylene glycol 3350 17 Gram(s) Oral daily  senna 2 Tablet(s) Oral at bedtime    MEDICATIONS  (PRN):  acetaminophen     Tablet .. 650 milliGRAM(s) Oral every 6 hours PRN Mild Pain (1 - 3)  oxyCODONE    IR 5 milliGRAM(s) Oral every 4 hours PRN Moderate Pain (4 - 6)  oxyCODONE    IR 10 milliGRAM(s) Oral every 4 hours PRN Severe Pain (7 - 10)        CAPILLARY BLOOD GLUCOSE      POCT Blood Glucose.: 114 mg/dL (2023 07:17)  POCT Blood Glucose.: 121 mg/dL (2023 20:46)  POCT Blood Glucose.: 120 mg/dL (2023 16:44)  POCT Blood Glucose.: 124 mg/dL (2023 11:40)            Pacing Wires        [  ]   Settings:                                  Isolated  [  x]                               PHYSICAL EXAM        Neurology: alert and oriented x 3, nonfocal, no gross deficits    CV :S1S2RR    Sternal Wound :Prevena   CDI , Stable    Lungs: B/l breath sounds 2k nc    Abdomen: soft, nontender, nondistended, positive bowel sounds, last bowel movement preop    :   HD via rt le avf          Extremities:   warm well perfused equal strength    B/lle BKA   warm healed able toplace prosthetics                                      Physical Therapy Rec:   Home  [  ]   Home w/ PT  [  ]  Rehab  [  ]x    Discussed with Cardiothoracic Team at AM rounds.

## 2023-07-19 NOTE — PROGRESS NOTE ADULT - SUBJECTIVE AND OBJECTIVE BOX
Date of Service  : 07-19-23     INTERVAL HPI/OVERNIGHT EVENTS: I feel fine.   Vital Signs Last 24 Hrs  T(C): 36.8 (19 Jul 2023 16:00), Max: 36.8 (19 Jul 2023 16:00)  T(F): 98.3 (19 Jul 2023 16:00), Max: 98.3 (19 Jul 2023 16:00)  HR: 62 (19 Jul 2023 16:00) (58 - 64)  BP: 106/64 (19 Jul 2023 16:00) (103/51 - 134/60)  BP(mean): 86 (19 Jul 2023 14:33) (72 - 86)  RR: 18 (19 Jul 2023 16:00) (18 - 18)  SpO2: 100% (19 Jul 2023 16:00) (93% - 100%)    Parameters below as of 19 Jul 2023 16:00  Patient On (Oxygen Delivery Method): nasal cannula      I&O's Summary    18 Jul 2023 07:01  -  19 Jul 2023 07:00  --------------------------------------------------------  IN: 510 mL / OUT: 0 mL / NET: 510 mL    19 Jul 2023 07:01  -  19 Jul 2023 21:16  --------------------------------------------------------  IN: 690 mL / OUT: 0 mL / NET: 690 mL      MEDICATIONS  (STANDING):  aspirin enteric coated 81 milliGRAM(s) Oral daily  atorvastatin 80 milliGRAM(s) Oral at bedtime  bisacodyl 5 milliGRAM(s) Oral daily  calcium acetate 667 milliGRAM(s) Oral three times a day with meals  chlorhexidine 2% Cloths 1 Application(s) Topical daily  clopidogrel Tablet 75 milliGRAM(s) Oral daily  dextrose 50% Injectable 25 milliLiter(s) IV Push every 15 minutes  dextrose 50% Injectable 50 milliLiter(s) IV Push every 15 minutes  epoetin niesha-epbx (RETACRIT) Injectable 24417 Unit(s) IV Push <User Schedule>  heparin   Injectable 5000 Unit(s) SubCutaneous every 8 hours  insulin lispro (ADMELOG) corrective regimen sliding scale   SubCutaneous Before meals and at bedtime  pantoprazole    Tablet 40 milliGRAM(s) Oral before breakfast  polyethylene glycol 3350 17 Gram(s) Oral daily  senna 2 Tablet(s) Oral at bedtime    MEDICATIONS  (PRN):  acetaminophen     Tablet .. 650 milliGRAM(s) Oral every 6 hours PRN Mild Pain (1 - 3)  oxyCODONE    IR 5 milliGRAM(s) Oral every 4 hours PRN Moderate Pain (4 - 6)  oxyCODONE    IR 10 milliGRAM(s) Oral every 4 hours PRN Severe Pain (7 - 10)    LABS:                        10.0   8.34  )-----------( 172      ( 19 Jul 2023 07:17 )             31.7     07-19    135  |  92<L>  |  37<H>  ----------------------------<  101<H>  3.9   |  26  |  6.33<H>    Ca    8.9      19 Jul 2023 07:17  Phos  4.3     07-19  Mg     2.4     07-19    TPro  6.2  /  Alb  3.5  /  TBili  0.3  /  DBili  x   /  AST  21  /  ALT  34  /  AlkPhos  59  07-19      Urinalysis Basic - ( 19 Jul 2023 07:17 )    Color: x / Appearance: x / SG: x / pH: x  Gluc: 101 mg/dL / Ketone: x  / Bili: x / Urobili: x   Blood: x / Protein: x / Nitrite: x   Leuk Esterase: x / RBC: x / WBC x   Sq Epi: x / Non Sq Epi: x / Bacteria: x      CAPILLARY BLOOD GLUCOSE      POCT Blood Glucose.: 130 mg/dL (19 Jul 2023 20:42)  POCT Blood Glucose.: 110 mg/dL (19 Jul 2023 18:01)  POCT Blood Glucose.: 167 mg/dL (19 Jul 2023 11:16)  POCT Blood Glucose.: 114 mg/dL (19 Jul 2023 07:17)        Urinalysis Basic - ( 19 Jul 2023 07:17 )    Color: x / Appearance: x / SG: x / pH: x  Gluc: 101 mg/dL / Ketone: x  / Bili: x / Urobili: x   Blood: x / Protein: x / Nitrite: x   Leuk Esterase: x / RBC: x / WBC x   Sq Epi: x / Non Sq Epi: x / Bacteria: x      REVIEW OF SYSTEMS:  CONSTITUTIONAL: No fever, weight loss, or fatigue  EYES: No eye pain, visual disturbances, or discharge  ENMT:  No difficulty hearing, tinnitus, vertigo; No sinus or throat pain  NECK: No pain or stiffness  RESPIRATORY: No cough, wheezing, chills or hemoptysis; No shortness of breath  CARDIOVASCULAR: No chest pain, palpitations, dizziness, or leg swelling  GASTROINTESTINAL: No abdominal or epigastric pain. No nausea, vomiting, or hematemesis; No diarrhea or constipation. No melena or hematochezia.  GENITOURINARY: No dysuria, frequency, hematuria, or incontinence      Consultant(s) Notes Reviewed:  [x ] YES  [ ] NO    PHYSICAL EXAM:  GENERAL: NAD, well-groomed, well-developed,not in any distress ,  HEAD:  Atraumatic, Normocephalic  NECK: Supple, No JVD, Normal thyroid  NERVOUS SYSTEM:  Alert & Oriented X3, No focal deficit   CHEST/LUNG: Good air entry bilateral with no  rales, rhonchi, wheezing, or rubs  HEART: Regular rate and rhythm; No murmurs, rubs, or gallops  ABDOMEN: Soft, Nontender, Nondistended; Bowel sounds present  EXTREMITIES: BKA+    Care Discussed with Consultants/Other Providers [ x] YES  [ ] NO

## 2023-07-19 NOTE — PROGRESS NOTE ADULT - SUBJECTIVE AND OBJECTIVE BOX
Cardiovascular Disease Progress Note  Date of service: 07-19-23 @ 07:32    Overnight events: No acute events overnight.  Ms. Boogie is in no distress.   Otherwise review of systems negative    Objective Findings:  T(C): 36.6 (07-19-23 @ 04:02), Max: 36.8 (07-18-23 @ 19:11)  HR: 61 (07-19-23 @ 04:02) (61 - 72)  BP: 120/56 (07-19-23 @ 04:02) (103/51 - 132/62)  RR: 18 (07-19-23 @ 04:02) (18 - 18)  SpO2: 98% (07-19-23 @ 04:02) (89% - 100%)  Wt(kg): --  Daily     Daily       Physical Exam:  Gen: NAD; Patient resting comfortably  HEENT: EOMI, Normocephalic/ atraumatic  CV: RRR, normal S1 + S2, no m/r/g  Lungs:  Normal respiratory effort; clear to auscultation bilaterally  Abd: soft, non-tender; bowel sounds present  Ext: No edema; warm and well perfused    Telemetry: Sinus     Laboratory Data:                        10.6   10.11 )-----------( 161      ( 18 Jul 2023 06:18 )             32.9     07-18    135  |  92<L>  |  27<H>  ----------------------------<  91  3.9   |  25  |  4.66<H>    Ca    8.9      18 Jul 2023 06:18  Phos  3.8     07-18  Mg     2.3     07-18    TPro  6.2  /  Alb  3.4  /  TBili  0.3  /  DBili  x   /  AST  27  /  ALT  37  /  AlkPhos  63  07-18              Inpatient Medications:  MEDICATIONS  (STANDING):  aspirin enteric coated 81 milliGRAM(s) Oral daily  atorvastatin 80 milliGRAM(s) Oral at bedtime  calcium acetate 667 milliGRAM(s) Oral three times a day with meals  chlorhexidine 2% Cloths 1 Application(s) Topical daily  clopidogrel Tablet 75 milliGRAM(s) Oral daily  dextrose 50% Injectable 25 milliLiter(s) IV Push every 15 minutes  dextrose 50% Injectable 50 milliLiter(s) IV Push every 15 minutes  epoetin niesha-epbx (RETACRIT) Injectable 67645 Unit(s) IV Push <User Schedule>  heparin   Injectable 5000 Unit(s) SubCutaneous every 8 hours  insulin lispro (ADMELOG) corrective regimen sliding scale   SubCutaneous Before meals and at bedtime  metoprolol tartrate 25 milliGRAM(s) Oral every 12 hours  pantoprazole    Tablet 40 milliGRAM(s) Oral before breakfast  polyethylene glycol 3350 17 Gram(s) Oral daily  senna 2 Tablet(s) Oral at bedtime      Assessment:  61-year-old female with ESRD on HD, HLD and peripheral vascular disease s/p lower extremity resection presents with chest pain found to have multivessel disease.      Plan of Care:      #Multivessel CAD-  Noted on cardiac cath 7/5.  Echo 7/6 with normal LV systolic function. Apical Hypokinesis and moderate MS.   s/p CABG x3 (LIMA to LAD, SVG to OM, SVG to RCA)  7/13/2023.   ASA and Plavix  Statin therapy     #Peripheral vascular disease-  s/p  BKA   Continue current cardiac management.   Antiplatelet therapy with ASA.       #Compensated diastolic CHF-  Euvolemic on exam.  Fluid removal with HD as per the renal team.     #ESRD-  - HD, as per renal.        Over 25 minutes spent on total encounter; more than 50% of the visit was spent counseling and/or coordinating care by the attending physician.      Wisam Adams DO [facility]  Cardiovascular Disease  [phone]

## 2023-07-19 NOTE — PROGRESS NOTE ADULT - ASSESSMENT
61 y/o F with PMH of ESRD(on HD M/W/F thru right groin AVF), HTN, HLD, DM type II, bilateral BKA, right eye blindness presented initially to [facility] on 07/03 from rehab center for acute SOB and fluid overload and now transferred to [facility] for LHC due to elevated troponin. Pt underwent LHC 7/5: LM okay, prox LAD of 80-90%, mid Lcx of 80-90%, OM of 90%, ostial RCA of 90%. LFA access site. LVEDP: 28. On Aspirin 81mg. Plan to be transferred to [facility] for CT surgery consult with Dr. Alma Mabry.     End Stage Renal Disease on Dialysis   Monday, Wednesday and Friday   access: right upper thigh avg  consent in chart  Volume Status : stable        Plan  Plan for HD today--> no uf as pt with low bp  trend basic metabolic panel and blood pressure   - add renvela 800 mg three times per day with meals if not npo as phosphate binder    --::: Anemia-->  hb at goal     Current orders :  - retacrit 10,000 units iv three times per week   - plan to titrate medication as per response of hemoglobin  - if Hb less than 7gm/dl consider blood transfusion    Dr Mcfarlane  [phone]

## 2023-07-20 LAB
ALBUMIN SERPL ELPH-MCNC: 3.5 G/DL — SIGNIFICANT CHANGE UP (ref 3.3–5)
ALP SERPL-CCNC: 59 U/L — SIGNIFICANT CHANGE UP (ref 40–120)
ALT FLD-CCNC: 33 U/L — SIGNIFICANT CHANGE UP (ref 10–45)
ANION GAP SERPL CALC-SCNC: 13 MMOL/L — SIGNIFICANT CHANGE UP (ref 5–17)
AST SERPL-CCNC: 20 U/L — SIGNIFICANT CHANGE UP (ref 10–40)
BILIRUB SERPL-MCNC: 0.3 MG/DL — SIGNIFICANT CHANGE UP (ref 0.2–1.2)
BUN SERPL-MCNC: 22 MG/DL — SIGNIFICANT CHANGE UP (ref 7–23)
CALCIUM SERPL-MCNC: 8.8 MG/DL — SIGNIFICANT CHANGE UP (ref 8.4–10.5)
CHLORIDE SERPL-SCNC: 92 MMOL/L — LOW (ref 96–108)
CO2 SERPL-SCNC: 29 MMOL/L — SIGNIFICANT CHANGE UP (ref 22–31)
CREAT SERPL-MCNC: 4.64 MG/DL — HIGH (ref 0.5–1.3)
EGFR: 10 ML/MIN/1.73M2 — LOW
GLUCOSE BLDC GLUCOMTR-MCNC: 113 MG/DL — HIGH (ref 70–99)
GLUCOSE BLDC GLUCOMTR-MCNC: 113 MG/DL — HIGH (ref 70–99)
GLUCOSE BLDC GLUCOMTR-MCNC: 114 MG/DL — HIGH (ref 70–99)
GLUCOSE BLDC GLUCOMTR-MCNC: 121 MG/DL — HIGH (ref 70–99)
GLUCOSE BLDC GLUCOMTR-MCNC: 124 MG/DL — HIGH (ref 70–99)
GLUCOSE SERPL-MCNC: 106 MG/DL — HIGH (ref 70–99)
HCT VFR BLD CALC: 33.4 % — LOW (ref 34.5–45)
HGB BLD-MCNC: 10.5 G/DL — LOW (ref 11.5–15.5)
MCHC RBC-ENTMCNC: 29.2 PG — SIGNIFICANT CHANGE UP (ref 27–34)
MCHC RBC-ENTMCNC: 31.4 GM/DL — LOW (ref 32–36)
MCV RBC AUTO: 93 FL — SIGNIFICANT CHANGE UP (ref 80–100)
NRBC # BLD: 0 /100 WBCS — SIGNIFICANT CHANGE UP (ref 0–0)
PLATELET # BLD AUTO: 181 K/UL — SIGNIFICANT CHANGE UP (ref 150–400)
POTASSIUM SERPL-MCNC: 3.8 MMOL/L — SIGNIFICANT CHANGE UP (ref 3.5–5.3)
POTASSIUM SERPL-SCNC: 3.8 MMOL/L — SIGNIFICANT CHANGE UP (ref 3.5–5.3)
PROT SERPL-MCNC: 6.4 G/DL — SIGNIFICANT CHANGE UP (ref 6–8.3)
RBC # BLD: 3.59 M/UL — LOW (ref 3.8–5.2)
RBC # FLD: 15.4 % — HIGH (ref 10.3–14.5)
SODIUM SERPL-SCNC: 134 MMOL/L — LOW (ref 135–145)
WBC # BLD: 8.51 K/UL — SIGNIFICANT CHANGE UP (ref 3.8–10.5)
WBC # FLD AUTO: 8.51 K/UL — SIGNIFICANT CHANGE UP (ref 3.8–10.5)

## 2023-07-20 RX ADMIN — HEPARIN SODIUM 5000 UNIT(S): 5000 INJECTION INTRAVENOUS; SUBCUTANEOUS at 21:11

## 2023-07-20 RX ADMIN — HEPARIN SODIUM 5000 UNIT(S): 5000 INJECTION INTRAVENOUS; SUBCUTANEOUS at 05:17

## 2023-07-20 RX ADMIN — Medication 5 MILLIGRAM(S): at 11:56

## 2023-07-20 RX ADMIN — CLOPIDOGREL BISULFATE 75 MILLIGRAM(S): 75 TABLET, FILM COATED ORAL at 11:56

## 2023-07-20 RX ADMIN — POLYETHYLENE GLYCOL 3350 17 GRAM(S): 17 POWDER, FOR SOLUTION ORAL at 11:56

## 2023-07-20 RX ADMIN — Medication 667 MILLIGRAM(S): at 08:24

## 2023-07-20 RX ADMIN — HEPARIN SODIUM 5000 UNIT(S): 5000 INJECTION INTRAVENOUS; SUBCUTANEOUS at 13:16

## 2023-07-20 RX ADMIN — SENNA PLUS 2 TABLET(S): 8.6 TABLET ORAL at 21:11

## 2023-07-20 RX ADMIN — ATORVASTATIN CALCIUM 80 MILLIGRAM(S): 80 TABLET, FILM COATED ORAL at 21:11

## 2023-07-20 RX ADMIN — PANTOPRAZOLE SODIUM 40 MILLIGRAM(S): 20 TABLET, DELAYED RELEASE ORAL at 05:17

## 2023-07-20 RX ADMIN — Medication 667 MILLIGRAM(S): at 17:07

## 2023-07-20 RX ADMIN — Medication 667 MILLIGRAM(S): at 11:58

## 2023-07-20 RX ADMIN — CHLORHEXIDINE GLUCONATE 1 APPLICATION(S): 213 SOLUTION TOPICAL at 11:08

## 2023-07-20 RX ADMIN — Medication 81 MILLIGRAM(S): at 11:56

## 2023-07-20 NOTE — PROGRESS NOTE ADULT - ASSESSMENT
61 y/o F with PMH of ESRD(on HD M/W/F thru right groin AVF), HTN, HLD, DM type II, bilateral BKA, right eye blindness presented initially to [facility] on 07/03 from rehab center for acute SOB and fluid overload and now transferred to [facility] for LHC due to elevated troponin. Pt underwent LHC 7/5: LM okay, prox LAD of 80-90%, mid Lcx of 80-90%, OM of 90%, ostial RCA of 90%. LFA access site. LVEDP: 28. On Aspirin 81mg. Plan to be transferred to [facility] for CT surgery consult with Dr. Alma Mabry.     End Stage Renal Disease on Dialysis   Monday, Wednesday and Friday   access: right upper thigh avg  consent in chart  Volume Status : stable        Plan  s/p HD yesterday- flowsheet reviewed--> bp stable--> no uf  Plan for next HD tomm  trend basic metabolic panel and blood pressure   - add renvela 800 mg three times per day with meals if not npo as phosphate binder    --::: Anemia-->  hb at goal     Current orders :  - retacrit 10,000 units iv three times per week   - plan to titrate medication as per response of hemoglobin  - if Hb less than 7gm/dl consider blood transfusion    Dr Mcfarlane  [phone]

## 2023-07-20 NOTE — PROGRESS NOTE ADULT - SUBJECTIVE AND OBJECTIVE BOX
Date of Service  : 07-20-23    INTERVAL HPI/OVERNIGHT EVENTS: I feel fine.   Vital Signs Last 24 Hrs  T(C): 36.7 (20 Jul 2023 11:00), Max: 37.1 (19 Jul 2023 23:12)  T(F): 98.1 (20 Jul 2023 11:00), Max: 98.8 (19 Jul 2023 23:12)  HR: 64 (20 Jul 2023 11:00) (62 - 64)  BP: 120/58 (20 Jul 2023 11:00) (101/49 - 134/60)  BP(mean): 84 (20 Jul 2023 11:00) (70 - 86)  RR: 18 (20 Jul 2023 11:00) (18 - 18)  SpO2: 100% (20 Jul 2023 11:00) (98% - 100%)    Parameters below as of 20 Jul 2023 11:00  Patient On (Oxygen Delivery Method): nasal cannula      I&O's Summary    19 Jul 2023 07:01  -  20 Jul 2023 07:00  --------------------------------------------------------  IN: 1050 mL / OUT: 0 mL / NET: 1050 mL    20 Jul 2023 07:01  -  20 Jul 2023 11:49  --------------------------------------------------------  IN: 200 mL / OUT: 0 mL / NET: 200 mL      MEDICATIONS  (STANDING):  aspirin enteric coated 81 milliGRAM(s) Oral daily  atorvastatin 80 milliGRAM(s) Oral at bedtime  bisacodyl 5 milliGRAM(s) Oral daily  calcium acetate 667 milliGRAM(s) Oral three times a day with meals  chlorhexidine 2% Cloths 1 Application(s) Topical daily  clopidogrel Tablet 75 milliGRAM(s) Oral daily  dextrose 50% Injectable 25 milliLiter(s) IV Push every 15 minutes  dextrose 50% Injectable 50 milliLiter(s) IV Push every 15 minutes  epoetin niesha-epbx (RETACRIT) Injectable 13673 Unit(s) IV Push <User Schedule>  heparin   Injectable 5000 Unit(s) SubCutaneous every 8 hours  insulin lispro (ADMELOG) corrective regimen sliding scale   SubCutaneous Before meals and at bedtime  pantoprazole    Tablet 40 milliGRAM(s) Oral before breakfast  polyethylene glycol 3350 17 Gram(s) Oral daily  senna 2 Tablet(s) Oral at bedtime    MEDICATIONS  (PRN):  acetaminophen     Tablet .. 650 milliGRAM(s) Oral every 6 hours PRN Mild Pain (1 - 3)  oxyCODONE    IR 5 milliGRAM(s) Oral every 4 hours PRN Moderate Pain (4 - 6)  oxyCODONE    IR 10 milliGRAM(s) Oral every 4 hours PRN Severe Pain (7 - 10)    LABS:                        10.5   8.51  )-----------( 181      ( 20 Jul 2023 07:16 )             33.4     07-20    134<L>  |  92<L>  |  22  ----------------------------<  106<H>  3.8   |  29  |  4.64<H>    Ca    8.8      20 Jul 2023 07:16  Phos  4.3     07-19  Mg     2.4     07-19    TPro  6.4  /  Alb  3.5  /  TBili  0.3  /  DBili  x   /  AST  20  /  ALT  33  /  AlkPhos  59  07-20      Urinalysis Basic - ( 20 Jul 2023 07:16 )    Color: x / Appearance: x / SG: x / pH: x  Gluc: 106 mg/dL / Ketone: x  / Bili: x / Urobili: x   Blood: x / Protein: x / Nitrite: x   Leuk Esterase: x / RBC: x / WBC x   Sq Epi: x / Non Sq Epi: x / Bacteria: x      CAPILLARY BLOOD GLUCOSE      POCT Blood Glucose.: 113 mg/dL (20 Jul 2023 11:38)  POCT Blood Glucose.: 124 mg/dL (20 Jul 2023 11:14)  POCT Blood Glucose.: 121 mg/dL (20 Jul 2023 08:04)  POCT Blood Glucose.: 130 mg/dL (19 Jul 2023 20:42)  POCT Blood Glucose.: 110 mg/dL (19 Jul 2023 18:01)        Urinalysis Basic - ( 20 Jul 2023 07:16 )    Color: x / Appearance: x / SG: x / pH: x  Gluc: 106 mg/dL / Ketone: x  / Bili: x / Urobili: x   Blood: x / Protein: x / Nitrite: x   Leuk Esterase: x / RBC: x / WBC x   Sq Epi: x / Non Sq Epi: x / Bacteria: x      REVIEW OF SYSTEMS:  CONSTITUTIONAL: No fever, weight loss, or fatigue  EYES: No eye pain, visual disturbances, or discharge  ENMT:  No difficulty hearing, tinnitus, vertigo; No sinus or throat pain  NECK: No pain or stiffness  RESPIRATORY: No cough, wheezing, chills or hemoptysis; No shortness of breath  CARDIOVASCULAR: No chest pain, palpitations, dizziness, or leg swelling  GASTROINTESTINAL: No abdominal or epigastric pain. No nausea, vomiting, or hematemesis; No diarrhea or constipation. No melena or hematochezia.  GENITOURINARY: No dysuria, frequency, hematuria, or incontinence  NEUROLOGICAL: No headaches, memory loss, loss of strength, numbness, or tremors      Consultant(s) Notes Reviewed:  [x ] YES  [ ] NO    PHYSICAL EXAM:  GENERAL: NAD, well-groomed, well-developed,not in any distress ,  HEAD:  Atraumatic, Normocephalic  NECK: Supple, No JVD, Normal thyroid  NERVOUS SYSTEM:  Alert & Oriented X3, No focal deficit   CHEST/LUNG: Good air entry bilateral with no  rales, rhonchi, wheezing, or rubs  HEART: Regular rate and rhythm; No murmurs, rubs, or gallops  ABDOMEN: Soft, Nontender, Nondistended; Bowel sounds present  EXTREMITIES:  B/L BKA       Care Discussed with Consultants/Other Providers [ x] YES  [ ] NO

## 2023-07-20 NOTE — PROGRESS NOTE ADULT - SUBJECTIVE AND OBJECTIVE BOX
Cardiovascular Disease Progress Note  Date of service: 23 @ 08:56    Overnight events: No acute events overnight.  Pt is in no distress.   Otherwise review of systems negative    Objective Findings:  T(C): 36.7 (23 @ 06:56), Max: 37.1 (23 @ 23:12)  HR: 63 (23 @ 06:56) (58 - 64)  BP: 104/51 (23 @ 06:56) (101/49 - 134/60)  RR: 18 (23 @ 06:56) (18 - 18)  SpO2: 98% (23 @ 06:56) (93% - 100%)  Wt(kg): --  Daily     Daily Weight in k (2023 06:56)      Physical Exam:  Gen: NAD; Patient resting comfortably  HEENT: EOMI, Normocephalic/ atraumatic  CV: RRR, normal S1 + S2, no m/r/g  Lungs:  Normal respiratory effort; clear to auscultation bilaterally  Abd: soft, non-tender; bowel sounds present  Ext: No edema; warm and well perfused    Tele: Sinus     Laboratory Data:                        10.5   8.51  )-----------( 181      ( 2023 07:16 )             33.4     07    134<L>  |  92<L>  |  22  ----------------------------<  106<H>  3.8   |  29  |  4.64<H>    Ca    8.8      2023 07:16  Phos  4.3     07-  Mg     2.4         TPro  6.4  /  Alb  3.5  /  TBili  0.3  /  DBili  x   /  AST  20  /  ALT  33  /  AlkPhos  59  07-20              Inpatient Medications:  MEDICATIONS  (STANDING):  aspirin enteric coated 81 milliGRAM(s) Oral daily  atorvastatin 80 milliGRAM(s) Oral at bedtime  bisacodyl 5 milliGRAM(s) Oral daily  calcium acetate 667 milliGRAM(s) Oral three times a day with meals  chlorhexidine 2% Cloths 1 Application(s) Topical daily  clopidogrel Tablet 75 milliGRAM(s) Oral daily  dextrose 50% Injectable 25 milliLiter(s) IV Push every 15 minutes  dextrose 50% Injectable 50 milliLiter(s) IV Push every 15 minutes  epoetin niesha-epbx (RETACRIT) Injectable 48985 Unit(s) IV Push <User Schedule>  heparin   Injectable 5000 Unit(s) SubCutaneous every 8 hours  insulin lispro (ADMELOG) corrective regimen sliding scale   SubCutaneous Before meals and at bedtime  pantoprazole    Tablet 40 milliGRAM(s) Oral before breakfast  polyethylene glycol 3350 17 Gram(s) Oral daily  senna 2 Tablet(s) Oral at bedtime      Assessment:  61-year-old female with ESRD on HD, HLD and peripheral vascular disease s/p lower extremity resection presents with chest pain found to have multivessel disease.      Plan of Care:      #Multivessel CAD-  Noted on cardiac cath .  Echo  with normal LV systolic function. Apical Hypokinesis and moderate MS.   s/p CABG x3 (LIMA to LAD, SVG to OM, SVG to RCA)  2023.   ASA and Plavix  Statin therapy      #Compensated diastolic CHF-  Euvolemic on exam.  Fluid removal with HD as per the renal team.     #ESRD-  - HD, as per renal.    #ACP (advance care planning)-  Advanced care planning was discussed with the patient.  Risks, benefits and alternatives of medical treatment and procedures were discussed in detail and all questions were answered. 30 additional minutes spent addressing advance care plans.        Over 25 minutes spent on total encounter; more than 50% of the visit was spent counseling and/or coordinating care by the attending physician.      Wisam Adams DO [facility]  Cardiovascular Disease  [phone]

## 2023-07-20 NOTE — PROGRESS NOTE ADULT - SUBJECTIVE AND OBJECTIVE BOX
VITAL SIGNS    Telemetry:  sr 60    Vital Signs Last 24 Hrs  T(C): 36.7 (23 @ 06:56), Max: 37.1 (23 @ 23:12)  T(F): 98.1 (23 @ 06:56), Max: 98.8 (23 @ 23:12)  HR: 63 (23 @ 06:56) (62 - 64)  BP: 104/51 (23 @ 06:56) (101/49 - 134/60)  RR: 18 (23 @ 06:56) (18 - 18)  SpO2: 98% (23 @ 06:56) (98% - 100%)                    @ 07:01  -   @ 07:00  --------------------------------------------------------  IN: 1050 mL / OUT: 0 mL / NET: 1050 mL     @ 07:01  -   @ 11:03  --------------------------------------------------------  IN: 200 mL / OUT: 0 mL / NET: 200 mL          Daily     Daily Weight in k (2023 06:56)            CAPILLARY BLOOD GLUCOSE      POCT Blood Glucose.: 121 mg/dL (2023 08:04)  POCT Blood Glucose.: 130 mg/dL (2023 20:42)  POCT Blood Glucose.: 110 mg/dL (2023 18:01)  POCT Blood Glucose.: 167 mg/dL (2023 11:16)            Drains:         Pacing Wires        [  ]   Settings:                                  Isolated  [ x ]    Coumadin    [ ] YES          [ x ]      NO                                   PHYSICAL EXAM        Neurology: alert and oriented x 3, nonfocal, no gross deficits  CV : s1 s2 RRR  Sternal Wound :  CDI , Stable  Lungs: cta  Abdomen: soft, nontender, nondistended, positive bowel sounds, last bowel movement neg                       :    voiding        Extremities:     - edema   /  -   calve tenderness ,    L leg   incisions cdi          acetaminophen     Tablet .. 650 milliGRAM(s) Oral every 6 hours PRN  aspirin enteric coated 81 milliGRAM(s) Oral daily  atorvastatin 80 milliGRAM(s) Oral at bedtime  bisacodyl 5 milliGRAM(s) Oral daily  calcium acetate 667 milliGRAM(s) Oral three times a day with meals  chlorhexidine 2% Cloths 1 Application(s) Topical daily  clopidogrel Tablet 75 milliGRAM(s) Oral daily  dextrose 50% Injectable 50 milliLiter(s) IV Push every 15 minutes  dextrose 50% Injectable 25 milliLiter(s) IV Push every 15 minutes  epoetin niesha-epbx (RETACRIT) Injectable 90224 Unit(s) IV Push <User Schedule>  heparin   Injectable 5000 Unit(s) SubCutaneous every 8 hours  insulin lispro (ADMELOG) corrective regimen sliding scale   SubCutaneous Before meals and at bedtime  oxyCODONE    IR 5 milliGRAM(s) Oral every 4 hours PRN  oxyCODONE    IR 10 milliGRAM(s) Oral every 4 hours PRN  pantoprazole    Tablet 40 milliGRAM(s) Oral before breakfast  polyethylene glycol 3350 17 Gram(s) Oral daily  senna 2 Tablet(s) Oral at bedtime                    Physical Therapy Rec:   Home  [  ]   Home w/ PT  [  ]  Rehab  [  ]  Discussed with Cardiothoracic Team at AM rounds.     VITAL SIGNS    Telemetry:  sr 60    Vital Signs Last 24 Hrs  T(C): 36.7 (23 @ 06:56), Max: 37.1 (23 @ 23:12)  T(F): 98.1 (23 @ 06:56), Max: 98.8 (23 @ 23:12)  HR: 63 (23 @ 06:56) (62 - 64)  BP: 104/51 (23 @ 06:56) (101/49 - 134/60)  RR: 18 (23 @ 06:56) (18 - 18)  SpO2: 98% (23 @ 06:56) (98% - 100%)                    @ 07:01  -   @ 07:00  --------------------------------------------------------  IN: 1050 mL / OUT: 0 mL / NET: 1050 mL     @ 07:01  -   @ 11:03  --------------------------------------------------------  IN: 200 mL / OUT: 0 mL / NET: 200 mL          Daily     Daily Weight in k (2023 06:56)            CAPILLARY BLOOD GLUCOSE      POCT Blood Glucose.: 121 mg/dL (2023 08:04)  POCT Blood Glucose.: 130 mg/dL (2023 20:42)  POCT Blood Glucose.: 110 mg/dL (2023 18:01)  POCT Blood Glucose.: 167 mg/dL (2023 11:16)            Drains:         Pacing Wires        [  ]   Settings:                                  Isolated  [ x ]    Coumadin    [ ] YES          [ x ]      NO                                   PHYSICAL EXAM        Neurology: alert and oriented x 3, nonfocal, no gross deficits  CV : s1 s2 RRR  Sternal Wound :  CDI , Stable  Lungs: cta  Abdomen: soft, nontender, nondistended, positive bowel sounds, last bowel movement neg                       :    voiding        Extremities:     - edema   /  -   ,    L leg   incisions cdi, bilat stumps, no edema  R thigh avf + brui           acetaminophen     Tablet .. 650 milliGRAM(s) Oral every 6 hours PRN  aspirin enteric coated 81 milliGRAM(s) Oral daily  atorvastatin 80 milliGRAM(s) Oral at bedtime  bisacodyl 5 milliGRAM(s) Oral daily  calcium acetate 667 milliGRAM(s) Oral three times a day with meals  chlorhexidine 2% Cloths 1 Application(s) Topical daily  clopidogrel Tablet 75 milliGRAM(s) Oral daily  dextrose 50% Injectable 50 milliLiter(s) IV Push every 15 minutes  dextrose 50% Injectable 25 milliLiter(s) IV Push every 15 minutes  epoetin niesha-epbx (RETACRIT) Injectable 64225 Unit(s) IV Push <User Schedule>  heparin   Injectable 5000 Unit(s) SubCutaneous every 8 hours  insulin lispro (ADMELOG) corrective regimen sliding scale   SubCutaneous Before meals and at bedtime  oxyCODONE    IR 5 milliGRAM(s) Oral every 4 hours PRN  oxyCODONE    IR 10 milliGRAM(s) Oral every 4 hours PRN  pantoprazole    Tablet 40 milliGRAM(s) Oral before breakfast  polyethylene glycol 3350 17 Gram(s) Oral daily  senna 2 Tablet(s) Oral at bedtime                    Physical Therapy Rec:   Home  [  ]   Home w/ PT  [  ]  Rehab  [  ]  Discussed with Cardiothoracic Team at AM rounds.

## 2023-07-20 NOTE — PROGRESS NOTE ADULT - SUBJECTIVE AND OBJECTIVE BOX
Patient seen and examined  no complaints    No Known Allergies    Hospital Medications:   MEDICATIONS  (STANDING):  aspirin enteric coated 81 milliGRAM(s) Oral daily  atorvastatin 80 milliGRAM(s) Oral at bedtime  bisacodyl 5 milliGRAM(s) Oral daily  calcium acetate 667 milliGRAM(s) Oral three times a day with meals  chlorhexidine 2% Cloths 1 Application(s) Topical daily  clopidogrel Tablet 75 milliGRAM(s) Oral daily  dextrose 50% Injectable 25 milliLiter(s) IV Push every 15 minutes  dextrose 50% Injectable 50 milliLiter(s) IV Push every 15 minutes  epoetin niesha-epbx (RETACRIT) Injectable 62058 Unit(s) IV Push <User Schedule>  heparin   Injectable 5000 Unit(s) SubCutaneous every 8 hours  insulin lispro (ADMELOG) corrective regimen sliding scale   SubCutaneous Before meals and at bedtime  pantoprazole    Tablet 40 milliGRAM(s) Oral before breakfast  polyethylene glycol 3350 17 Gram(s) Oral daily  senna 2 Tablet(s) Oral at bedtime        VITALS:  T(F): 98.1 (07-20-23 @ 11:00), Max: 98.8 (07-19-23 @ 23:12)  HR: 64 (07-20-23 @ 11:00)  BP: 120/58 (07-20-23 @ 11:00)  RR: 18 (07-20-23 @ 11:00)  SpO2: 100% (07-20-23 @ 11:00)  Wt(kg): --    07-19 @ 07:01  -  07-20 @ 07:00  --------------------------------------------------------  IN: 1050 mL / OUT: 0 mL / NET: 1050 mL    07-20 @ 07:01  -  07-20 @ 14:25  --------------------------------------------------------  IN: 200 mL / OUT: 0 mL / NET: 200 mL        Physical Exam :-  Constitutional: NAD  Respiratory: Bilateral equal breath sounds, bilat basal Crackles present.  Cardiovascular: S1, S2 normal, positive Murmur  Gastrointestinal: Bowel Sounds present, soft  Extremities: + Edema Feet  Neurological: Alert and Oriented x 3  Psychiatric: Normal mood, normal affect    LABS:  07-20    134<L>  |  92<L>  |  22  ----------------------------<  106<H>  3.8   |  29  |  4.64<H>    Ca    8.8      20 Jul 2023 07:16  Phos  4.3     07-19  Mg     2.4     07-19    TPro  6.4  /  Alb  3.5  /  TBili  0.3  /  DBili      /  AST  20  /  ALT  33  /  AlkPhos  59  07-20    Creatinine Trend: 4.64 <--, 6.33 <--, 4.66 <--, 5.34 <--, 3.96 <--, 4.39 <--, 5.88 <--, 5.41 <--                        10.5   8.51  )-----------( 181      ( 20 Jul 2023 07:16 )             33.4     Urine Studies:  Urinalysis Basic - ( 20 Jul 2023 07:16 )    Color:  / Appearance:  / SG:  / pH:   Gluc: 106 mg/dL / Ketone:   / Bili:  / Urobili:    Blood:  / Protein:  / Nitrite:    Leuk Esterase:  / RBC:  / WBC    Sq Epi:  / Non Sq Epi:  / Bacteria:         RADIOLOGY & ADDITIONAL STUDIES:

## 2023-07-20 NOTE — PROGRESS NOTE ADULT - ASSESSMENT
63 y/o F with PMH of ESRD(on HD M/W/F thru right groin AVF), HTN, HLD, DM type II, bilateral BKA, right eye blindness presented initially to [facility] on 07/03 from rehab center for acute SOB and fluid overload and now transferred to [facility] for LHC due to elevated troponin. Pt underwent LHC 7/5: LM okay, prox LAD of 80-90%, mid Lcx of 80-90%, OM of 90%, ostial RCA of 90%. LFA access site. LVEDP: 28. On Aspirin 81mg. awaiting CABG.        Problem/Recommendation - 1:  ·  Problem: CAD, multiple vessel.   ·  Recommendation: S/P  CABG  and management per CT Surgery    No Cp etc,   On ASA,BB and Statin.     Problem/Recommendation - 2:  ·  Problem: ESRD on hemodialysis.   ·  Recommendation: HD per renal .     Problem/Recommendation - 3:  ·  Problem: DM (diabetes mellitus).   ·  Recommendation: Sugars in good range.     Problem/Recommendation - 4:  ·  Problem: HTN (hypertension).   ·  Recommendation: BP readings better.     Problem/Recommendation - 5:  ·  Problem: Anemia secondary to renal failure.   ·  Recommendation: HH stable. Epogen per renal.     Problem/Recommendation - 6:  ·  Problem: HLD (hyperlipidemia).   ·  Recommendation: Statin.     Problem/Recommendation - 7:  ·  Problem: PVD (peripheral vascular disease).   ·  Recommendation: S/P BKA . Has Prosthesis .     Problem/Recommendation - 8:  ·  Problem: Legally blind.   ·  Recommendation: Supportive care.    Pt working with her.

## 2023-07-20 NOTE — PROGRESS NOTE ADULT - ASSESSMENT
61-year-old female with ESRD on HD, HLD and peripheral vascular disease s/p lower extremity resection presents with chest pain found to have multivessel disease.       7/13 C3L, OMEGA CLIP,   7/17 Transfer from CTU to SDU, + LP CT x1, + PW isolated, needs BM got dulcolax in CTU, HD for 1.2 L today, PT/OT to work with patient BL LE prosthetics  7/18 vss sdu  7/19 VSS  metoprolol decreased to 12.5 BID - HD UF today  needs BM disposition to rehab   7/20 LOPRESSOR D/C , JON, BP 'S  uLTRAFILTRATION YEsterday

## 2023-07-21 LAB
ALBUMIN SERPL ELPH-MCNC: 3.4 G/DL — SIGNIFICANT CHANGE UP (ref 3.3–5)
ALP SERPL-CCNC: 57 U/L — SIGNIFICANT CHANGE UP (ref 40–120)
ALT FLD-CCNC: 25 U/L — SIGNIFICANT CHANGE UP (ref 10–45)
ANION GAP SERPL CALC-SCNC: 15 MMOL/L — SIGNIFICANT CHANGE UP (ref 5–17)
AST SERPL-CCNC: 17 U/L — SIGNIFICANT CHANGE UP (ref 10–40)
BILIRUB SERPL-MCNC: 0.3 MG/DL — SIGNIFICANT CHANGE UP (ref 0.2–1.2)
BUN SERPL-MCNC: 31 MG/DL — HIGH (ref 7–23)
CALCIUM SERPL-MCNC: 9 MG/DL — SIGNIFICANT CHANGE UP (ref 8.4–10.5)
CHLORIDE SERPL-SCNC: 91 MMOL/L — LOW (ref 96–108)
CO2 SERPL-SCNC: 28 MMOL/L — SIGNIFICANT CHANGE UP (ref 22–31)
CREAT SERPL-MCNC: 5.9 MG/DL — HIGH (ref 0.5–1.3)
EGFR: 7 ML/MIN/1.73M2 — LOW
GLUCOSE BLDC GLUCOMTR-MCNC: 91 MG/DL — SIGNIFICANT CHANGE UP (ref 70–99)
GLUCOSE SERPL-MCNC: 91 MG/DL — SIGNIFICANT CHANGE UP (ref 70–99)
HCT VFR BLD CALC: 34.3 % — LOW (ref 34.5–45)
HGB BLD-MCNC: 10.9 G/DL — LOW (ref 11.5–15.5)
MCHC RBC-ENTMCNC: 29.4 PG — SIGNIFICANT CHANGE UP (ref 27–34)
MCHC RBC-ENTMCNC: 31.8 GM/DL — LOW (ref 32–36)
MCV RBC AUTO: 92.5 FL — SIGNIFICANT CHANGE UP (ref 80–100)
NRBC # BLD: 0 /100 WBCS — SIGNIFICANT CHANGE UP (ref 0–0)
PLATELET # BLD AUTO: 208 K/UL — SIGNIFICANT CHANGE UP (ref 150–400)
POTASSIUM SERPL-MCNC: 4.1 MMOL/L — SIGNIFICANT CHANGE UP (ref 3.5–5.3)
POTASSIUM SERPL-SCNC: 4.1 MMOL/L — SIGNIFICANT CHANGE UP (ref 3.5–5.3)
PROT SERPL-MCNC: 6.3 G/DL — SIGNIFICANT CHANGE UP (ref 6–8.3)
RBC # BLD: 3.71 M/UL — LOW (ref 3.8–5.2)
RBC # FLD: 15.7 % — HIGH (ref 10.3–14.5)
SODIUM SERPL-SCNC: 134 MMOL/L — LOW (ref 135–145)
WBC # BLD: 8.43 K/UL — SIGNIFICANT CHANGE UP (ref 3.8–10.5)
WBC # FLD AUTO: 8.43 K/UL — SIGNIFICANT CHANGE UP (ref 3.8–10.5)

## 2023-07-21 RX ORDER — ACETAMINOPHEN 500 MG
650 TABLET ORAL EVERY 6 HOURS
Refills: 0 | Status: DISCONTINUED | OUTPATIENT
Start: 2023-07-21 | End: 2023-07-25

## 2023-07-21 RX ORDER — PANTOPRAZOLE SODIUM 20 MG/1
40 TABLET, DELAYED RELEASE ORAL
Refills: 0 | Status: DISCONTINUED | OUTPATIENT
Start: 2023-07-21 | End: 2023-07-25

## 2023-07-21 RX ORDER — METOPROLOL TARTRATE 50 MG
12.5 TABLET ORAL
Refills: 0 | Status: DISCONTINUED | OUTPATIENT
Start: 2023-07-21 | End: 2023-07-24

## 2023-07-21 RX ADMIN — Medication 5 MILLIGRAM(S): at 17:24

## 2023-07-21 RX ADMIN — CLOPIDOGREL BISULFATE 75 MILLIGRAM(S): 75 TABLET, FILM COATED ORAL at 17:25

## 2023-07-21 RX ADMIN — Medication 667 MILLIGRAM(S): at 08:16

## 2023-07-21 RX ADMIN — SENNA PLUS 2 TABLET(S): 8.6 TABLET ORAL at 21:14

## 2023-07-21 RX ADMIN — Medication 12.5 MILLIGRAM(S): at 17:23

## 2023-07-21 RX ADMIN — Medication 12.5 MILLIGRAM(S): at 08:17

## 2023-07-21 RX ADMIN — PANTOPRAZOLE SODIUM 40 MILLIGRAM(S): 20 TABLET, DELAYED RELEASE ORAL at 05:24

## 2023-07-21 RX ADMIN — Medication 10 MILLIGRAM(S): at 17:24

## 2023-07-21 RX ADMIN — HEPARIN SODIUM 5000 UNIT(S): 5000 INJECTION INTRAVENOUS; SUBCUTANEOUS at 21:14

## 2023-07-21 RX ADMIN — POLYETHYLENE GLYCOL 3350 17 GRAM(S): 17 POWDER, FOR SOLUTION ORAL at 17:24

## 2023-07-21 RX ADMIN — Medication 81 MILLIGRAM(S): at 17:25

## 2023-07-21 RX ADMIN — Medication 667 MILLIGRAM(S): at 17:24

## 2023-07-21 RX ADMIN — PANTOPRAZOLE SODIUM 40 MILLIGRAM(S): 20 TABLET, DELAYED RELEASE ORAL at 17:24

## 2023-07-21 RX ADMIN — ATORVASTATIN CALCIUM 80 MILLIGRAM(S): 80 TABLET, FILM COATED ORAL at 21:14

## 2023-07-21 RX ADMIN — HEPARIN SODIUM 5000 UNIT(S): 5000 INJECTION INTRAVENOUS; SUBCUTANEOUS at 05:24

## 2023-07-21 RX ADMIN — HEPARIN SODIUM 5000 UNIT(S): 5000 INJECTION INTRAVENOUS; SUBCUTANEOUS at 14:05

## 2023-07-21 RX ADMIN — CHLORHEXIDINE GLUCONATE 1 APPLICATION(S): 213 SOLUTION TOPICAL at 10:02

## 2023-07-21 NOTE — PROGRESS NOTE ADULT - SUBJECTIVE AND OBJECTIVE BOX
VITAL SIGNS    Telemetry:  nsr 60-90    Vital Signs Last 24 Hrs  T(C): 36.8 (23 @ 07:03), Max: 36.9 (23 @ 19:20)  T(F): 98.3 (23 @ 07:03), Max: 98.4 (23 @ 19:20)  HR: 64 (23 @ 07:03) (62 - 66)  BP: 129/62 (23 @ 07:03) (108/54 - 129/62)  RR: 18 (23 @ 07:03) (18 - 18)  SpO2: 93% (23 @ 07:03) (91% - 97%)                    @ 07:01  -   @ 07:00  --------------------------------------------------------  IN: 540 mL / OUT: 0 mL / NET: 540 mL     @ 07:01  -   @ 11:55  --------------------------------------------------------  IN: 200 mL / OUT: 0 mL / NET: 200 mL          Daily     Daily Weight in k.8 (2023 07:03)            CAPILLARY BLOOD GLUCOSE      POCT Blood Glucose.: 91 mg/dL (2023 07:53)  POCT Blood Glucose.: 114 mg/dL (2023 20:46)  POCT Blood Glucose.: 113 mg/dL (2023 16:42)            Drains:       Pacing Wires        [  ]   Settings:                                  Isolated  x[  ]    Coumadin    [ ] YES          [x  ]      NO                                   PHYSICAL EXAM      Neurology: alert and oriented x 3, nonfocal, no gross deficits  CV : s1 s2 RRR  Sternal Wound :  CDI , Stable  Lungs: cta  Abdomen: soft, nontender, nondistended, positive bowel sounds, last bowel movement neg                       :    voiding        Extremities:     - edema   /  -   ,    L leg   incisions cdi, bilat stumps, no edema  R thigh avf + brui             acetaminophen     Tablet .. 650 milliGRAM(s) Oral every 6 hours PRN  aspirin enteric coated 81 milliGRAM(s) Oral daily  atorvastatin 80 milliGRAM(s) Oral at bedtime  bisacodyl 5 milliGRAM(s) Oral daily  calcium acetate 667 milliGRAM(s) Oral three times a day with meals  chlorhexidine 2% Cloths 1 Application(s) Topical daily  clopidogrel Tablet 75 milliGRAM(s) Oral daily  dextrose 50% Injectable 50 milliLiter(s) IV Push every 15 minutes  dextrose 50% Injectable 25 milliLiter(s) IV Push every 15 minutes  epoetin niesha-epbx (RETACRIT) Injectable 39576 Unit(s) IV Push <User Schedule>  heparin   Injectable 5000 Unit(s) SubCutaneous every 8 hours  insulin lispro (ADMELOG) corrective regimen sliding scale   SubCutaneous Before meals and at bedtime  metoprolol tartrate 12.5 milliGRAM(s) Oral two times a day  pantoprazole    Tablet 40 milliGRAM(s) Oral before breakfast  polyethylene glycol 3350 17 Gram(s) Oral daily  senna 2 Tablet(s) Oral at bedtime                    Physical Therapy Rec:   Home  [  ]   Home w/ PT  [  ]  Rehab  [  ]  Discussed with Cardiothoracic Team at AM rounds.

## 2023-07-21 NOTE — PROGRESS NOTE ADULT - ASSESSMENT
61 y/o F with PMH of ESRD(on HD M/W/F thru right groin AVF), HTN, HLD, DM type II, bilateral BKA, right eye blindness presented initially to [facility] on 07/03 from rehab center for acute SOB and fluid overload and now transferred to [facility] for LHC due to elevated troponin. Pt underwent LHC 7/5: LM okay, prox LAD of 80-90%, mid Lcx of 80-90%, OM of 90%, ostial RCA of 90%. LFA access site. LVEDP: 28. On Aspirin 81mg. awaiting CABG.        Problem/Recommendation - 1:  ·  Problem: CAD, multiple vessel.   ·  Recommendation: S/P  CABG  and management per CT Surgery    No Cp etc,   On ASA,BB and Statin.     Problem/Recommendation - 2:  ·  Problem: ESRD on hemodialysis.   ·  Recommendation: HD per renal .     Problem/Recommendation - 3:  ·  Problem: DM (diabetes mellitus).   ·  Recommendation: Sugars in good range.     Problem/Recommendation - 4:  ·  Problem: HTN (hypertension).   ·  Recommendation: BP readings better.     Problem/Recommendation - 5:  ·  Problem: Anemia secondary to renal failure.   ·  Recommendation: HH stable. Epogen per renal.     Problem/Recommendation - 6:  ·  Problem: HLD (hyperlipidemia).   ·  Recommendation: Statin.     Problem/Recommendation - 7:  ·  Problem: PVD (peripheral vascular disease).   ·  Recommendation: S/P BKA . Has Prosthesis .     Problem/Recommendation - 8:  ·  Problem: Legally blind.   ·  Recommendation: Supportive care.    Dispo : DC per CTS.

## 2023-07-21 NOTE — PROGRESS NOTE ADULT - SUBJECTIVE AND OBJECTIVE BOX
Patient seen and examined  no complaints    No Known Allergies    Hospital Medications:   MEDICATIONS  (STANDING):  aspirin enteric coated 81 milliGRAM(s) Oral daily  atorvastatin 80 milliGRAM(s) Oral at bedtime  bisacodyl 5 milliGRAM(s) Oral daily  calcium acetate 667 milliGRAM(s) Oral three times a day with meals  chlorhexidine 2% Cloths 1 Application(s) Topical daily  clopidogrel Tablet 75 milliGRAM(s) Oral daily  dextrose 50% Injectable 25 milliLiter(s) IV Push every 15 minutes  dextrose 50% Injectable 50 milliLiter(s) IV Push every 15 minutes  epoetin niesha-epbx (RETACRIT) Injectable 48169 Unit(s) IV Push <User Schedule>  heparin   Injectable 5000 Unit(s) SubCutaneous every 8 hours  insulin lispro (ADMELOG) corrective regimen sliding scale   SubCutaneous Before meals and at bedtime  metoprolol tartrate 12.5 milliGRAM(s) Oral two times a day  pantoprazole    Tablet 40 milliGRAM(s) Oral before breakfast  polyethylene glycol 3350 17 Gram(s) Oral daily  senna 2 Tablet(s) Oral at bedtime        VITALS:  T(F): 97.5 (07-21-23 @ 12:21), Max: 98.4 (07-20-23 @ 19:20)  HR: 65 (07-21-23 @ 12:21)  BP: 117/64 (07-21-23 @ 12:21)  RR: 18 (07-21-23 @ 12:21)  SpO2: 94% (07-21-23 @ 12:21)  Wt(kg): --    07-20 @ 07:01  -  07-21 @ 07:00  --------------------------------------------------------  IN: 540 mL / OUT: 0 mL / NET: 540 mL    07-21 @ 07:01  -  07-21 @ 14:35  --------------------------------------------------------  IN: 200 mL / OUT: 0 mL / NET: 200 mL        Physical Exam :-  Constitutional: NAD  Respiratory: Bilateral equal breath sounds, bilat basal Crackles present.  Cardiovascular: S1, S2 normal, positive Murmur  Gastrointestinal: Bowel Sounds present, soft  Extremities: + Edema Feet  Neurological: Alert and Oriented x 3  Psychiatric: Normal mood, normal affect  LABS:  07-21    134<L>  |  91<L>  |  31<H>  ----------------------------<  91  4.1   |  28  |  5.90<H>    Ca    9.0      21 Jul 2023 06:33    TPro  6.3  /  Alb  3.4  /  TBili  0.3  /  DBili      /  AST  17  /  ALT  25  /  AlkPhos  57  07-21    Creatinine Trend: 5.90 <--, 4.64 <--, 6.33 <--, 4.66 <--, 5.34 <--, 3.96 <--, 4.39 <--                        10.9   8.43  )-----------( 208      ( 21 Jul 2023 06:33 )             34.3     Urine Studies:  Urinalysis Basic - ( 21 Jul 2023 06:33 )    Color:  / Appearance:  / SG:  / pH:   Gluc: 91 mg/dL / Ketone:   / Bili:  / Urobili:    Blood:  / Protein:  / Nitrite:    Leuk Esterase:  / RBC:  / WBC    Sq Epi:  / Non Sq Epi:  / Bacteria:         RADIOLOGY & ADDITIONAL STUDIES:

## 2023-07-21 NOTE — PROGRESS NOTE ADULT - SUBJECTIVE AND OBJECTIVE BOX
Date of Service  : 07-21-23     INTERVAL HPI/OVERNIGHT EVENTS: I feel fine.   Vital Signs Last 24 Hrs  T(C): 36.6 (21 Jul 2023 15:22), Max: 36.9 (20 Jul 2023 19:20)  T(F): 97.8 (21 Jul 2023 15:22), Max: 98.4 (20 Jul 2023 19:20)  HR: 70 (21 Jul 2023 15:22) (62 - 70)  BP: 149/59 (21 Jul 2023 15:22) (108/54 - 149/59)  BP(mean): 89 (21 Jul 2023 07:03) (72 - 89)  RR: 18 (21 Jul 2023 15:22) (18 - 18)  SpO2: 94% (21 Jul 2023 15:22) (91% - 97%)    Parameters below as of 21 Jul 2023 15:22  Patient On (Oxygen Delivery Method): room air      I&O's Summary    20 Jul 2023 07:01  -  21 Jul 2023 07:00  --------------------------------------------------------  IN: 540 mL / OUT: 0 mL / NET: 540 mL    21 Jul 2023 07:01  -  21 Jul 2023 17:08  --------------------------------------------------------  IN: 200 mL / OUT: 1500 mL / NET: -1300 mL      MEDICATIONS  (STANDING):  aspirin enteric coated 81 milliGRAM(s) Oral daily  atorvastatin 80 milliGRAM(s) Oral at bedtime  bisacodyl 5 milliGRAM(s) Oral daily  calcium acetate 667 milliGRAM(s) Oral three times a day with meals  chlorhexidine 2% Cloths 1 Application(s) Topical daily  clopidogrel Tablet 75 milliGRAM(s) Oral daily  epoetin niesha-epbx (RETACRIT) Injectable 65928 Unit(s) IV Push <User Schedule>  heparin   Injectable 5000 Unit(s) SubCutaneous every 8 hours  metoprolol tartrate 12.5 milliGRAM(s) Oral two times a day  pantoprazole    Tablet 40 milliGRAM(s) Oral before breakfast  polyethylene glycol 3350 17 Gram(s) Oral daily  senna 2 Tablet(s) Oral at bedtime    MEDICATIONS  (PRN):  acetaminophen     Tablet .. 650 milliGRAM(s) Oral every 6 hours PRN Mild Pain (1 - 3)    LABS:                        10.9   8.43  )-----------( 208      ( 21 Jul 2023 06:33 )             34.3     07-21    134<L>  |  91<L>  |  31<H>  ----------------------------<  91  4.1   |  28  |  5.90<H>    Ca    9.0      21 Jul 2023 06:33    TPro  6.3  /  Alb  3.4  /  TBili  0.3  /  DBili  x   /  AST  17  /  ALT  25  /  AlkPhos  57  07-21      Urinalysis Basic - ( 21 Jul 2023 06:33 )    Color: x / Appearance: x / SG: x / pH: x  Gluc: 91 mg/dL / Ketone: x  / Bili: x / Urobili: x   Blood: x / Protein: x / Nitrite: x   Leuk Esterase: x / RBC: x / WBC x   Sq Epi: x / Non Sq Epi: x / Bacteria: x      CAPILLARY BLOOD GLUCOSE      POCT Blood Glucose.: 91 mg/dL (21 Jul 2023 07:53)  POCT Blood Glucose.: 114 mg/dL (20 Jul 2023 20:46)        Urinalysis Basic - ( 21 Jul 2023 06:33 )    Color: x / Appearance: x / SG: x / pH: x  Gluc: 91 mg/dL / Ketone: x  / Bili: x / Urobili: x   Blood: x / Protein: x / Nitrite: x   Leuk Esterase: x / RBC: x / WBC x   Sq Epi: x / Non Sq Epi: x / Bacteria: x      REVIEW OF SYSTEMS:  CONSTITUTIONAL: No fever, weight loss, or fatigue  EYES: No eye pain, visual disturbances, or discharge  ENMT:  No difficulty hearing, tinnitus, vertigo; No sinus or throat pain  NECK: No pain or stiffness  RESPIRATORY: No cough, wheezing, chills or hemoptysis; No shortness of breath  CARDIOVASCULAR: No chest pain, palpitations, dizziness, or leg swelling  GASTROINTESTINAL: No abdominal or epigastric pain. No nausea, vomiting, or hematemesis; No diarrhea or constipation. No melena or hematochezia.      RADIOLOGY & ADDITIONAL TESTS:    Consultant(s) Notes Reviewed:  [x ] YES  [ ] NO    PHYSICAL EXAM:  GENERAL: NAD, well-groomed, well-developed,not in any distress ,  HEAD:  Atraumatic, Normocephalic  EYES: EOMI, PERRLA, conjunctiva and sclera clear  ENMT: No tonsillar erythema, exudates, or enlargement; Moist mucous membranes, Good dentition, No lesions  NECK: Supple, No JVD, Normal thyroid  NERVOUS SYSTEM:  Alert & Oriented X3, No focal deficit   CHEST/LUNG: Good air entry bilateral with no  rales, rhonchi, wheezing, or rubs  HEART: Regular rate and rhythm; No murmurs, rubs, or gallops  ABDOMEN: Soft, Nontender, Nondistended; Bowel sounds present  EXTREMITIES: B/L BKA     Care Discussed with Consultants/Other Providers [ x] YES  [ ] NO

## 2023-07-21 NOTE — PROGRESS NOTE ADULT - ASSESSMENT
61-year-old female with ESRD on HD, HLD and peripheral vascular disease s/p lower extremity resection presents with chest pain found to have multivessel disease.       7/13 C3L, OMEGA CLIP,   7/17 Transfer from CTU to SDU, + LP CT x1, + PW isolated, needs BM got dulcolax in CTU, HD for 1.2 L today, PT/OT to work with patient BL LE prosthetics  7/18 vss sdu  7/19 VSS  metoprolol decreased to 12.5 BID - HD UF today  needs BM disposition to rehab   7/20 LOPRESSOR D/C , JON, BP 'S  uLTRAFILTRATION YEsterday  7/21 Ambulated with prosthetics yesterday.  Hr 80's, lopresor 12.5 bid started  Transfer to floor. HD today

## 2023-07-21 NOTE — PROGRESS NOTE ADULT - ASSESSMENT
63 y/o F with PMH of ESRD(on HD M/W/F thru right groin AVF), HTN, HLD, DM type II, bilateral BKA, right eye blindness presented initially to [facility] on 07/03 from rehab center for acute SOB and fluid overload and now transferred to [facility] for LHC due to elevated troponin. Pt underwent LHC 7/5: LM okay, prox LAD of 80-90%, mid Lcx of 80-90%, OM of 90%, ostial RCA of 90%. LFA access site. LVEDP: 28. On Aspirin 81mg. Plan to be transferred to [facility] for CT surgery consult with Dr. Alma Mabry.     End Stage Renal Disease on Dialysis   Monday, Wednesday and Friday   access: right upper thigh avg  consent in chart  Volume Status : stable        Plan  Plan for HD today  trend basic metabolic panel and blood pressure   - add renvela 800 mg three times per day with meals if not npo as phosphate binder    --::: Anemia-->  hb at goal     Current orders :  - retacrit 10,000 units iv three times per week   - plan to titrate medication as per response of hemoglobin  - if Hb less than 7gm/dl consider blood transfusion    Dr Mcfarlane  [phone]

## 2023-07-21 NOTE — PROGRESS NOTE ADULT - SUBJECTIVE AND OBJECTIVE BOX
Cardiovascular Disease Progress Note  Date of service: 23 @ 07:52    Overnight events: No acute events overnight.  Pt is in no distress.   Otherwise review of systems negative    Objective Findings:  T(C): 36.8 (23 @ 07:03), Max: 36.9 (23 @ 19:20)  HR: 64 (23 @ 07:03) (62 - 66)  BP: 129/62 (23 @ 07:03) (108/54 - 129/62)  RR: 18 (23 @ 07:03) (18 - 18)  SpO2: 93% (23 @ 07:03) (91% - 100%)  Wt(kg): --  Daily     Daily Weight in k.8 (2023 07:03)      Physical Exam:  Gen: NAD; Patient resting comfortably  HEENT: EOMI, Normocephalic/ atraumatic  CV: RRR, normal S1 + S2, no m/r/g  Lungs:  Normal respiratory effort; clear to auscultation bilaterally  Abd: soft, non-tender; bowel sounds present  Ext: No edema; warm and well perfused    Telemetry:  Sinus     Laboratory Data:                        10.9   8.43  )-----------( 208      ( 2023 06:33 )             34.3         134<L>  |  91<L>  |  31<H>  ----------------------------<  91  4.1   |  28  |  5.90<H>    Ca    9.0      2023 06:33    TPro  6.3  /  Alb  3.4  /  TBili  0.3  /  DBili  x   /  AST  17  /  ALT  25  /  AlkPhos  57  07-              Inpatient Medications:  MEDICATIONS  (STANDING):  aspirin enteric coated 81 milliGRAM(s) Oral daily  atorvastatin 80 milliGRAM(s) Oral at bedtime  bisacodyl 5 milliGRAM(s) Oral daily  calcium acetate 667 milliGRAM(s) Oral three times a day with meals  chlorhexidine 2% Cloths 1 Application(s) Topical daily  clopidogrel Tablet 75 milliGRAM(s) Oral daily  dextrose 50% Injectable 50 milliLiter(s) IV Push every 15 minutes  dextrose 50% Injectable 25 milliLiter(s) IV Push every 15 minutes  epoetin niesha-epbx (RETACRIT) Injectable 63242 Unit(s) IV Push <User Schedule>  heparin   Injectable 5000 Unit(s) SubCutaneous every 8 hours  insulin lispro (ADMELOG) corrective regimen sliding scale   SubCutaneous Before meals and at bedtime  metoprolol tartrate 12.5 milliGRAM(s) Oral two times a day  pantoprazole    Tablet 40 milliGRAM(s) Oral before breakfast  polyethylene glycol 3350 17 Gram(s) Oral daily  senna 2 Tablet(s) Oral at bedtime      Assessment:  61-year-old female with ESRD on HD, HLD and peripheral vascular disease s/p lower extremity resection presents with chest pain found to have multivessel disease.      Plan of Care:      #Multivessel CAD-  Noted on cardiac cath 2023  Echo  with normal LV systolic function. Apical Hypokinesis and moderate MS.   s/p CABG x3 (LIMA to LAD, SVG to OM, SVG to RCA)  2023.   ASA and Plavix and BB  Statin therapy      #Compensated diastolic CHF-  Euvolemic on exam.  Fluid removal with HD as per the renal team.     #ESRD-  - HD, as per renal.      #ACP (advance care planning)-  Advanced care planning was discussed with the patient.  Risks, benefits and alternatives of medical treatment and procedures were discussed in detail and all questions were answered. 30 additional minutes spent addressing advance care plans.          Over 25 minutes spent on total encounter; more than 50% of the visit was spent counseling and/or coordinating care by the attending physician.      Wisam Adams DO [facility]  Cardiovascular Disease  [phone]

## 2023-07-22 LAB
ALBUMIN SERPL ELPH-MCNC: 3.4 G/DL — SIGNIFICANT CHANGE UP (ref 3.3–5)
ALP SERPL-CCNC: 57 U/L — SIGNIFICANT CHANGE UP (ref 40–120)
ALT FLD-CCNC: 20 U/L — SIGNIFICANT CHANGE UP (ref 10–45)
ANION GAP SERPL CALC-SCNC: 15 MMOL/L — SIGNIFICANT CHANGE UP (ref 5–17)
AST SERPL-CCNC: 15 U/L — SIGNIFICANT CHANGE UP (ref 10–40)
BILIRUB SERPL-MCNC: 0.3 MG/DL — SIGNIFICANT CHANGE UP (ref 0.2–1.2)
BUN SERPL-MCNC: 22 MG/DL — SIGNIFICANT CHANGE UP (ref 7–23)
CALCIUM SERPL-MCNC: 9 MG/DL — SIGNIFICANT CHANGE UP (ref 8.4–10.5)
CHLORIDE SERPL-SCNC: 94 MMOL/L — LOW (ref 96–108)
CO2 SERPL-SCNC: 27 MMOL/L — SIGNIFICANT CHANGE UP (ref 22–31)
CREAT SERPL-MCNC: 4.95 MG/DL — HIGH (ref 0.5–1.3)
EGFR: 9 ML/MIN/1.73M2 — LOW
GLUCOSE BLDC GLUCOMTR-MCNC: 76 MG/DL — SIGNIFICANT CHANGE UP (ref 70–99)
GLUCOSE BLDC GLUCOMTR-MCNC: 87 MG/DL — SIGNIFICANT CHANGE UP (ref 70–99)
GLUCOSE BLDC GLUCOMTR-MCNC: 98 MG/DL — SIGNIFICANT CHANGE UP (ref 70–99)
GLUCOSE SERPL-MCNC: 82 MG/DL — SIGNIFICANT CHANGE UP (ref 70–99)
HCT VFR BLD CALC: 33.4 % — LOW (ref 34.5–45)
HGB BLD-MCNC: 10.5 G/DL — LOW (ref 11.5–15.5)
MAGNESIUM SERPL-MCNC: 2.1 MG/DL — SIGNIFICANT CHANGE UP (ref 1.6–2.6)
MCHC RBC-ENTMCNC: 29.6 PG — SIGNIFICANT CHANGE UP (ref 27–34)
MCHC RBC-ENTMCNC: 31.4 GM/DL — LOW (ref 32–36)
MCV RBC AUTO: 94.1 FL — SIGNIFICANT CHANGE UP (ref 80–100)
NRBC # BLD: 0 /100 WBCS — SIGNIFICANT CHANGE UP (ref 0–0)
PHOSPHATE SERPL-MCNC: 2.8 MG/DL — SIGNIFICANT CHANGE UP (ref 2.5–4.5)
PLATELET # BLD AUTO: 235 K/UL — SIGNIFICANT CHANGE UP (ref 150–400)
POTASSIUM SERPL-MCNC: 4.1 MMOL/L — SIGNIFICANT CHANGE UP (ref 3.5–5.3)
POTASSIUM SERPL-SCNC: 4.1 MMOL/L — SIGNIFICANT CHANGE UP (ref 3.5–5.3)
PROT SERPL-MCNC: 6.2 G/DL — SIGNIFICANT CHANGE UP (ref 6–8.3)
RBC # BLD: 3.55 M/UL — LOW (ref 3.8–5.2)
RBC # FLD: 15.9 % — HIGH (ref 10.3–14.5)
SODIUM SERPL-SCNC: 136 MMOL/L — SIGNIFICANT CHANGE UP (ref 135–145)
WBC # BLD: 9.07 K/UL — SIGNIFICANT CHANGE UP (ref 3.8–10.5)
WBC # FLD AUTO: 9.07 K/UL — SIGNIFICANT CHANGE UP (ref 3.8–10.5)

## 2023-07-22 RX ORDER — SORBITOL SOLUTION 70 %
15 SOLUTION, ORAL MISCELLANEOUS ONCE
Refills: 0 | Status: COMPLETED | OUTPATIENT
Start: 2023-07-22 | End: 2023-07-22

## 2023-07-22 RX ADMIN — Medication 650 MILLIGRAM(S): at 17:39

## 2023-07-22 RX ADMIN — Medication 12.5 MILLIGRAM(S): at 17:39

## 2023-07-22 RX ADMIN — HEPARIN SODIUM 5000 UNIT(S): 5000 INJECTION INTRAVENOUS; SUBCUTANEOUS at 21:28

## 2023-07-22 RX ADMIN — Medication 667 MILLIGRAM(S): at 11:38

## 2023-07-22 RX ADMIN — Medication 667 MILLIGRAM(S): at 08:17

## 2023-07-22 RX ADMIN — Medication 650 MILLIGRAM(S): at 18:09

## 2023-07-22 RX ADMIN — Medication 667 MILLIGRAM(S): at 17:39

## 2023-07-22 RX ADMIN — CLOPIDOGREL BISULFATE 75 MILLIGRAM(S): 75 TABLET, FILM COATED ORAL at 11:38

## 2023-07-22 RX ADMIN — Medication 15 MILLILITER(S): at 14:50

## 2023-07-22 RX ADMIN — Medication 12.5 MILLIGRAM(S): at 05:32

## 2023-07-22 RX ADMIN — ATORVASTATIN CALCIUM 80 MILLIGRAM(S): 80 TABLET, FILM COATED ORAL at 21:28

## 2023-07-22 RX ADMIN — Medication 5 MILLIGRAM(S): at 11:38

## 2023-07-22 RX ADMIN — HEPARIN SODIUM 5000 UNIT(S): 5000 INJECTION INTRAVENOUS; SUBCUTANEOUS at 14:49

## 2023-07-22 RX ADMIN — POLYETHYLENE GLYCOL 3350 17 GRAM(S): 17 POWDER, FOR SOLUTION ORAL at 11:38

## 2023-07-22 RX ADMIN — PANTOPRAZOLE SODIUM 40 MILLIGRAM(S): 20 TABLET, DELAYED RELEASE ORAL at 05:31

## 2023-07-22 RX ADMIN — CHLORHEXIDINE GLUCONATE 1 APPLICATION(S): 213 SOLUTION TOPICAL at 11:38

## 2023-07-22 RX ADMIN — Medication 81 MILLIGRAM(S): at 11:38

## 2023-07-22 RX ADMIN — SENNA PLUS 2 TABLET(S): 8.6 TABLET ORAL at 21:28

## 2023-07-22 RX ADMIN — HEPARIN SODIUM 5000 UNIT(S): 5000 INJECTION INTRAVENOUS; SUBCUTANEOUS at 05:31

## 2023-07-22 NOTE — PROGRESS NOTE ADULT - SUBJECTIVE AND OBJECTIVE BOX
Cardiovascular Disease Progress Note  Date of service: 23 @ 09:04    Overnight events: No acute events overnight.  Pt is in no distress.   Otherwise review of systems negative    Objective Findings:  T(C): 36.6 (23 @ 05:00), Max: 37.1 (23 @ 19:49)  HR: 67 (23 @ 05:00) (65 - 73)  BP: 103/57 (23 @ 05:00) (103/57 - 155/68)  RR: 18 (23 @ 05:00) (18 - 18)  SpO2: 92% (23 @ 05:00) (92% - 95%)  Wt(kg): --  Daily     Daily Weight in k.6 (2023 15:22)      Physical Exam:  Gen: NAD; Patient resting comfortably  HEENT: EOMI, Normocephalic/ atraumatic  CV: RRR, normal S1 + S2, no m/r/g  Lungs:  Normal respiratory effort; clear to auscultation bilaterally  Abd: soft, non-tender; bowel sounds present  Ext: No edema; warm and well perfused    Telemetry: Sinus     Laboratory Data:                        10.5   9.07  )-----------( 235      ( 2023 05:50 )             33.4         136  |  94<L>  |  22  ----------------------------<  82  4.1   |  27  |  4.95<H>    Ca    9.0      2023 05:50  Phos  2.8       Mg     2.1         TPro  6.2  /  Alb  3.4  /  TBili  0.3  /  DBili  x   /  AST  15  /  ALT  20  /  AlkPhos  57                Inpatient Medications:  MEDICATIONS  (STANDING):  aspirin enteric coated 81 milliGRAM(s) Oral daily  atorvastatin 80 milliGRAM(s) Oral at bedtime  bisacodyl 5 milliGRAM(s) Oral daily  calcium acetate 667 milliGRAM(s) Oral three times a day with meals  chlorhexidine 2% Cloths 1 Application(s) Topical daily  clopidogrel Tablet 75 milliGRAM(s) Oral daily  epoetin niesha-epbx (RETACRIT) Injectable 60832 Unit(s) IV Push <User Schedule>  heparin   Injectable 5000 Unit(s) SubCutaneous every 8 hours  metoprolol tartrate 12.5 milliGRAM(s) Oral two times a day  pantoprazole    Tablet 40 milliGRAM(s) Oral before breakfast  pantoprazole    Tablet 40 milliGRAM(s) Oral before breakfast  polyethylene glycol 3350 17 Gram(s) Oral daily  senna 2 Tablet(s) Oral at bedtime      Assessment:  61-year-old female with ESRD on HD, HLD and peripheral vascular disease s/p lower extremity resection presents with chest pain found to have multivessel disease.      Plan of Care:      #Multivessel CAD-  Noted on cardiac cath 2023  Echo  with normal LV systolic function. Apical Hypokinesis and moderate MS.   s/p CABG x3 (LIMA to LAD, SVG to OM, SVG to RCA)  2023.   ASA and Plavix and BB  Statin therapy    #Compensated diastolic CHF-  Euvolemic on exam.  Fluid removal with HD as per the renal team.     #ESRD-  - HD, as per renal.  :          Over 25 minutes spent on total encounter; more than 50% of the visit was spent counseling and/or coordinating care by the attending physician.      Wisam Adams DO [facility]  Cardiovascular Disease  [phone]

## 2023-07-22 NOTE — PROGRESS NOTE ADULT - SUBJECTIVE AND OBJECTIVE BOX
Date of Service  : 07-22-23     INTERVAL HPI/OVERNIGHT EVENTS: I feel fine. Sitting in chair. Left prosthesis could not be fitted.   Vital Signs Last 24 Hrs  T(C): 37.1 (22 Jul 2023 13:14), Max: 37.1 (21 Jul 2023 19:49)  T(F): 98.8 (22 Jul 2023 13:14), Max: 98.8 (21 Jul 2023 19:49)  HR: 60 (22 Jul 2023 13:14) (60 - 73)  BP: 127/62 (22 Jul 2023 13:14) (103/57 - 155/68)  BP(mean): --  RR: 18 (22 Jul 2023 13:14) (18 - 18)  SpO2: 94% (22 Jul 2023 13:14) (92% - 95%)    Parameters below as of 22 Jul 2023 13:14  Patient On (Oxygen Delivery Method): room air      I&O's Summary    21 Jul 2023 07:01  -  22 Jul 2023 07:00  --------------------------------------------------------  IN: 480 mL / OUT: 1500 mL / NET: -1020 mL    22 Jul 2023 07:01  -  22 Jul 2023 16:13  --------------------------------------------------------  IN: 240 mL / OUT: 0 mL / NET: 240 mL      MEDICATIONS  (STANDING):  aspirin enteric coated 81 milliGRAM(s) Oral daily  atorvastatin 80 milliGRAM(s) Oral at bedtime  bisacodyl 5 milliGRAM(s) Oral daily  calcium acetate 667 milliGRAM(s) Oral three times a day with meals  chlorhexidine 2% Cloths 1 Application(s) Topical daily  clopidogrel Tablet 75 milliGRAM(s) Oral daily  epoetin niesha-epbx (RETACRIT) Injectable 23599 Unit(s) IV Push <User Schedule>  heparin   Injectable 5000 Unit(s) SubCutaneous every 8 hours  metoprolol tartrate 12.5 milliGRAM(s) Oral two times a day  pantoprazole    Tablet 40 milliGRAM(s) Oral before breakfast  pantoprazole    Tablet 40 milliGRAM(s) Oral before breakfast  polyethylene glycol 3350 17 Gram(s) Oral daily  senna 2 Tablet(s) Oral at bedtime    MEDICATIONS  (PRN):  acetaminophen     Tablet .. 650 milliGRAM(s) Oral every 6 hours PRN Mild Pain (1 - 3)  bisacodyl Suppository 10 milliGRAM(s) Rectal daily PRN Constipation    LABS:                        10.5   9.07  )-----------( 235      ( 22 Jul 2023 05:50 )             33.4     07-22    136  |  94<L>  |  22  ----------------------------<  82  4.1   |  27  |  4.95<H>    Ca    9.0      22 Jul 2023 05:50  Phos  2.8     07-22  Mg     2.1     07-22    TPro  6.2  /  Alb  3.4  /  TBili  0.3  /  DBili  x   /  AST  15  /  ALT  20  /  AlkPhos  57  07-22      Urinalysis Basic - ( 22 Jul 2023 05:50 )    Color: x / Appearance: x / SG: x / pH: x  Gluc: 82 mg/dL / Ketone: x  / Bili: x / Urobili: x   Blood: x / Protein: x / Nitrite: x   Leuk Esterase: x / RBC: x / WBC x   Sq Epi: x / Non Sq Epi: x / Bacteria: x      CAPILLARY BLOOD GLUCOSE      POCT Blood Glucose.: 87 mg/dL (22 Jul 2023 11:29)  POCT Blood Glucose.: 76 mg/dL (22 Jul 2023 08:07)        Urinalysis Basic - ( 22 Jul 2023 05:50 )    Color: x / Appearance: x / SG: x / pH: x  Gluc: 82 mg/dL / Ketone: x  / Bili: x / Urobili: x   Blood: x / Protein: x / Nitrite: x   Leuk Esterase: x / RBC: x / WBC x   Sq Epi: x / Non Sq Epi: x / Bacteria: x      REVIEW OF SYSTEMS:  CONSTITUTIONAL: No fever, weight loss, or fatigue  EYES: No eye pain, visual disturbances, or discharge  ENMT:  No difficulty hearing, tinnitus, vertigo; No sinus or throat pain  NECK: No pain or stiffness  RESPIRATORY: No cough, wheezing, chills or hemoptysis; No shortness of breath  CARDIOVASCULAR: No chest pain, palpitations, dizziness, or leg swelling  GASTROINTESTINAL: No abdominal or epigastric pain. No nausea, vomiting, or hematemesis; No diarrhea or constipation. No melena or hematochezia.  GENITOURINARY: No dysuria, frequency, hematuria, or incontinence  NEUROLOGICAL: No headaches, memory loss, loss of strength, numbness, or tremors      Consultant(s) Notes Reviewed:  [x ] YES  [ ] NO    PHYSICAL EXAM:  GENERAL: NAD, well-groomed, well-developed,not in any distress ,  HEAD:  Atraumatic, Normocephalic  NECK: Supple, No JVD, Normal thyroid  NERVOUS SYSTEM:  Alert & Oriented X3, No focal deficit   CHEST/LUNG: Good air entry bilateral with no  rales, rhonchi, wheezing, or rubs  HEART: Regular rate and rhythm; No murmurs, rubs, or gallops  ABDOMEN: Soft, Nontender, Nondistended; Bowel sounds present  EXTREMITIES:  BKA B/L       Care Discussed with Consultants/Other Providers [ x] YES  [ ] NO

## 2023-07-22 NOTE — PROGRESS NOTE ADULT - ASSESSMENT
63 y/o F with PMH of ESRD(on HD M/W/F thru right groin AVF), HTN, HLD, DM type II, bilateral BKA, right eye blindness presented initially to [facility] on 07/03 from rehab center for acute SOB and fluid overload and now transferred to [facility] for LHC due to elevated troponin. Pt underwent LHC 7/5: LM okay, prox LAD of 80-90%, mid Lcx of 80-90%, OM of 90%, ostial RCA of 90%. LFA access site. LVEDP: 28. On Aspirin 81mg. awaiting CABG.        Problem/Recommendation - 1:  ·  Problem: CAD, multiple vessel.   ·  Recommendation: S/P  CABG  and management per CT Surgery    No Cp etc,   On ASA,BB and Statin.     Problem/Recommendation - 2:  ·  Problem: ESRD on hemodialysis.   ·  Recommendation: HD per renal .     Problem/Recommendation - 3:  ·  Problem: DM (diabetes mellitus).   ·  Recommendation: Sugars in good range.     Problem/Recommendation - 4:  ·  Problem: HTN (hypertension).   ·  Recommendation: BP readings better.     Problem/Recommendation - 5:  ·  Problem: Anemia secondary to renal failure.   ·  Recommendation: HH stable. Epogen per renal.     Problem/Recommendation - 6:  ·  Problem: HLD (hyperlipidemia).   ·  Recommendation: Statin.     Problem/Recommendation - 7:  ·  Problem: PVD (peripheral vascular disease).   ·  Recommendation: S/P BKA . Has Prosthesis .     Problem/Recommendation - 8:  ·  Problem: Legally blind.   ·  Recommendation: Supportive care.    Dispo : DC per CTS.

## 2023-07-22 NOTE — PROGRESS NOTE ADULT - ASSESSMENT
63 y/o F with PMH of ESRD(on HD M/W/F thru right groin AVF), HTN, HLD, DM type II, bilateral BKA, right eye blindness presented initially to [facility] on 07/03 from rehab center for acute SOB and fluid overload and now transferred to [facility] for LHC due to elevated troponin. Pt underwent LHC 7/5: LM okay, prox LAD of 80-90%, mid Lcx of 80-90%, OM of 90%, ostial RCA of 90%. LFA access site. LVEDP: 28. On Aspirin 81mg. Plan to be transferred to [facility] for CT surgery consult with Dr. Alma Mabry.     End Stage Renal Disease on Dialysis   Monday, Wednesday and Friday   access: right upper thigh avg  consent in chart  Volume Status : stable        Plan  Plan for HD monday  trend basic metabolic panel and blood pressure   pn phoslo . phos at goal    --::: Anemia-->  hb at goal     Current orders :  - retacrit 10,000 units iv three times per week   - plan to titrate medication as per response of hemoglobin  - if Hb less than 7gm/dl consider blood transfusion    Dr Potts  [phone]

## 2023-07-22 NOTE — PROGRESS NOTE ADULT - SUBJECTIVE AND OBJECTIVE BOX
[facility] : Progress Note :: [phone], (office [phone]),   Dr Potts / Dr Perkins / Dr Madden / Dr Shea / Dr Denys POLANCO / Dr Vanegas / Dr Bazzi / Dr Quentin patricia  _____________________________________________________________________________________________  no complains  s/p HD yesterday     No Known Allergies    Hospital Medications:   MEDICATIONS  (STANDING):  aspirin enteric coated 81 milliGRAM(s) Oral daily  atorvastatin 80 milliGRAM(s) Oral at bedtime  bisacodyl 5 milliGRAM(s) Oral daily  calcium acetate 667 milliGRAM(s) Oral three times a day with meals  chlorhexidine 2% Cloths 1 Application(s) Topical daily  clopidogrel Tablet 75 milliGRAM(s) Oral daily  epoetin niesha-epbx (RETACRIT) Injectable 55645 Unit(s) IV Push <User Schedule>  heparin   Injectable 5000 Unit(s) SubCutaneous every 8 hours  metoprolol tartrate 12.5 milliGRAM(s) Oral two times a day  pantoprazole    Tablet 40 milliGRAM(s) Oral before breakfast  pantoprazole    Tablet 40 milliGRAM(s) Oral before breakfast  polyethylene glycol 3350 17 Gram(s) Oral daily  senna 2 Tablet(s) Oral at bedtime        VITALS:  T(F): 98.8 (07-22-23 @ 13:14), Max: 98.8 (07-21-23 @ 19:49)  HR: 67 (07-22-23 @ 17:40)  BP: 118/61 (07-22-23 @ 17:40)  RR: 18 (07-22-23 @ 13:14)  SpO2: 94% (07-22-23 @ 13:14)  Wt(kg): --    07-21 @ 07:01  -  07-22 @ 07:00  --------------------------------------------------------  IN: 480 mL / OUT: 1500 mL / NET: -1020 mL    07-22 @ 07:01  -  07-22 @ 18:09  --------------------------------------------------------  IN: 480 mL / OUT: 0 mL / NET: 480 mL        PHYSICAL EXAM:  Constitutional: NAD  HEENT: anicteric sclera, oropharynx clear, MMM  Neck: No JVD  Respiratory: CTAB, no wheezes, rales or rhonchi  Cardiovascular: S1, S2, RRR  Gastrointestinal: BS+, soft, NT/ND  Neurological: A/O x 3, no focal deficits  Vascular Access: thigh AVG     LABS:  07-22    136  |  94<L>  |  22  ----------------------------<  82  4.1   |  27  |  4.95<H>    Ca    9.0      22 Jul 2023 05:50  Phos  2.8     07-22  Mg     2.1     07-22    TPro  6.2  /  Alb  3.4  /  TBili  0.3  /  DBili      /  AST  15  /  ALT  20  /  AlkPhos  57  07-22    Creatinine Trend: 4.95 <--, 5.90 <--, 4.64 <--, 6.33 <--, 4.66 <--, 5.34 <--, 3.96 <--                        10.5   9.07  )-----------( 235      ( 22 Jul 2023 05:50 )             33.4     Urine Studies:  Urinalysis Basic - ( 22 Jul 2023 05:50 )    Color:  / Appearance:  / SG:  / pH:   Gluc: 82 mg/dL / Ketone:   / Bili:  / Urobili:    Blood:  / Protein:  / Nitrite:    Leuk Esterase:  / RBC:  / WBC    Sq Epi:  / Non Sq Epi:  / Bacteria:         RADIOLOGY & ADDITIONAL STUDIES:

## 2023-07-22 NOTE — PROGRESS NOTE ADULT - ASSESSMENT
61-year-old female with ESRD on HD, HLD and peripheral vascular disease s/p lower extremity resection presents with chest pain found to have multivessel disease.       7/13 C3L, OMEGA CLIP,   7/17 Transfer from CTU to SDU, + LP CT x1, + PW isolated, needs BM got dulcolax in CTU, HD for 1.2 L today, PT/OT to work with patient BL LE prosthetics  7/18 vss sdu  7/19 VSS  metoprolol decreased to 12.5 BID - HD UF today  needs BM disposition to rehab   7/20 LOPRESSOR D/C , JON, BP 'S  uLTRAFILTRATION YEsterday  7/21 Ambulated with prosthetics yesterday.  Hr 80's, lopresor 12.5 bid started  Transfer to floor. HD   7/22    VSS   BB      lop 12.5 q12    OOB to chair

## 2023-07-22 NOTE — PROGRESS NOTE ADULT - SUBJECTIVE AND OBJECTIVE BOX
VITAL SIGNS    Telemetry:      Vital Signs Last 24 Hrs  T(C): 37.1 (23 @ 13:14), Max: 37.1 (23 @ 19:49)  T(F): 98.8 (23 @ 13:14), Max: 98.8 (23 @ 19:49)  HR: 60 (23 @ 13:14) (60 - 73)  BP: 127/62 (23 @ 13:14) (103/57 - 155/68)  RR: 18 (23 @ 13:14) (18 - 18)  SpO2: 94% (23 @ 13:14) (92% - 95%)                   Daily     Daily Weight in k.6 (2023 15:22)      Bilirubin Total: 0.3 mg/dL ( @ 05:50)    CAPILLARY BLOOD GLUCOSE      POCT Blood Glucose.: 87 mg/dL (2023 11:29)  POCT Blood Glucose.: 76 mg/dL (2023 08:07)                       PHYSICAL EXAM    Neurology: alert and oriented x 3, moves all extremities with no defecits  CV :  RRR  Sternal Wound :  CDI , Stable  + VAC  Lungs:   CTA B/L  Abdomen: soft, nontender, nondistended, positive bowel sounds,   Extremities:       BL  BKA

## 2023-07-23 LAB
ANION GAP SERPL CALC-SCNC: 14 MMOL/L — SIGNIFICANT CHANGE UP (ref 5–17)
BUN SERPL-MCNC: 32 MG/DL — HIGH (ref 7–23)
CALCIUM SERPL-MCNC: 9.2 MG/DL — SIGNIFICANT CHANGE UP (ref 8.4–10.5)
CHLORIDE SERPL-SCNC: 95 MMOL/L — LOW (ref 96–108)
CO2 SERPL-SCNC: 28 MMOL/L — SIGNIFICANT CHANGE UP (ref 22–31)
CREAT SERPL-MCNC: 6.63 MG/DL — HIGH (ref 0.5–1.3)
EGFR: 6 ML/MIN/1.73M2 — LOW
GLUCOSE BLDC GLUCOMTR-MCNC: 87 MG/DL — SIGNIFICANT CHANGE UP (ref 70–99)
GLUCOSE BLDC GLUCOMTR-MCNC: 94 MG/DL — SIGNIFICANT CHANGE UP (ref 70–99)
GLUCOSE SERPL-MCNC: 77 MG/DL — SIGNIFICANT CHANGE UP (ref 70–99)
HCT VFR BLD CALC: 31.9 % — LOW (ref 34.5–45)
HGB BLD-MCNC: 10 G/DL — LOW (ref 11.5–15.5)
MCHC RBC-ENTMCNC: 29 PG — SIGNIFICANT CHANGE UP (ref 27–34)
MCHC RBC-ENTMCNC: 31.3 GM/DL — LOW (ref 32–36)
MCV RBC AUTO: 92.5 FL — SIGNIFICANT CHANGE UP (ref 80–100)
NRBC # BLD: 0 /100 WBCS — SIGNIFICANT CHANGE UP (ref 0–0)
PLATELET # BLD AUTO: 235 K/UL — SIGNIFICANT CHANGE UP (ref 150–400)
POTASSIUM SERPL-MCNC: 4.1 MMOL/L — SIGNIFICANT CHANGE UP (ref 3.5–5.3)
POTASSIUM SERPL-SCNC: 4.1 MMOL/L — SIGNIFICANT CHANGE UP (ref 3.5–5.3)
RBC # BLD: 3.45 M/UL — LOW (ref 3.8–5.2)
RBC # FLD: 15.8 % — HIGH (ref 10.3–14.5)
SODIUM SERPL-SCNC: 137 MMOL/L — SIGNIFICANT CHANGE UP (ref 135–145)
WBC # BLD: 8.99 K/UL — SIGNIFICANT CHANGE UP (ref 3.8–10.5)
WBC # FLD AUTO: 8.99 K/UL — SIGNIFICANT CHANGE UP (ref 3.8–10.5)

## 2023-07-23 RX ORDER — SORBITOL SOLUTION 70 %
30 SOLUTION, ORAL MISCELLANEOUS ONCE
Refills: 0 | Status: COMPLETED | OUTPATIENT
Start: 2023-07-23 | End: 2023-07-23

## 2023-07-23 RX ADMIN — ATORVASTATIN CALCIUM 80 MILLIGRAM(S): 80 TABLET, FILM COATED ORAL at 22:06

## 2023-07-23 RX ADMIN — Medication 5 MILLIGRAM(S): at 13:59

## 2023-07-23 RX ADMIN — Medication 30 MILLILITER(S): at 13:58

## 2023-07-23 RX ADMIN — CLOPIDOGREL BISULFATE 75 MILLIGRAM(S): 75 TABLET, FILM COATED ORAL at 13:58

## 2023-07-23 RX ADMIN — CHLORHEXIDINE GLUCONATE 1 APPLICATION(S): 213 SOLUTION TOPICAL at 08:30

## 2023-07-23 RX ADMIN — HEPARIN SODIUM 5000 UNIT(S): 5000 INJECTION INTRAVENOUS; SUBCUTANEOUS at 22:06

## 2023-07-23 RX ADMIN — Medication 667 MILLIGRAM(S): at 17:31

## 2023-07-23 RX ADMIN — SENNA PLUS 2 TABLET(S): 8.6 TABLET ORAL at 13:58

## 2023-07-23 RX ADMIN — PANTOPRAZOLE SODIUM 40 MILLIGRAM(S): 20 TABLET, DELAYED RELEASE ORAL at 05:32

## 2023-07-23 RX ADMIN — HEPARIN SODIUM 5000 UNIT(S): 5000 INJECTION INTRAVENOUS; SUBCUTANEOUS at 13:58

## 2023-07-23 RX ADMIN — Medication 667 MILLIGRAM(S): at 13:59

## 2023-07-23 RX ADMIN — Medication 10 MILLIGRAM(S): at 13:59

## 2023-07-23 RX ADMIN — Medication 12.5 MILLIGRAM(S): at 05:32

## 2023-07-23 RX ADMIN — Medication 667 MILLIGRAM(S): at 09:29

## 2023-07-23 RX ADMIN — Medication 81 MILLIGRAM(S): at 13:59

## 2023-07-23 RX ADMIN — HEPARIN SODIUM 5000 UNIT(S): 5000 INJECTION INTRAVENOUS; SUBCUTANEOUS at 05:32

## 2023-07-23 RX ADMIN — POLYETHYLENE GLYCOL 3350 17 GRAM(S): 17 POWDER, FOR SOLUTION ORAL at 13:58

## 2023-07-23 RX ADMIN — Medication 12.5 MILLIGRAM(S): at 17:31

## 2023-07-23 NOTE — PROGRESS NOTE ADULT - SUBJECTIVE AND OBJECTIVE BOX
Cardiovascular Disease Progress Note  Date of service: 07-23-23 @ 11:26    Overnight events: No acute events overnight.  Ms. Boogie is in no distress.   Otherwise review of systems negative    Objective Findings:  T(C): 36.6 (07-23-23 @ 05:00), Max: 37.1 (07-22-23 @ 13:14)  HR: 69 (07-23-23 @ 07:00) (60 - 69)  BP: 129/68 (07-23-23 @ 05:00) (101/51 - 129/68)  RR: 16 (07-23-23 @ 07:00) (16 - 18)  SpO2: 96% (07-23-23 @ 07:00) (94% - 96%)  Wt(kg): --  Daily     Daily       Physical Exam:  Gen: NAD; Patient resting comfortably  HEENT: EOMI, Normocephalic/ atraumatic  CV: RRR, normal S1 + S2, no m/r/g  Lungs:  Normal respiratory effort; clear to auscultation bilaterally  Abd: soft, non-tender; bowel sounds present  Ext: No edema; warm and well perfused    Telemetry:  Sinus     Laboratory Data:                        10.0   8.99  )-----------( 235      ( 23 Jul 2023 05:48 )             31.9     07-23    137  |  95<L>  |  32<H>  ----------------------------<  77  4.1   |  28  |  6.63<H>    Ca    9.2      23 Jul 2023 05:50  Phos  2.8     07-22  Mg     2.1     07-22    TPro  6.2  /  Alb  3.4  /  TBili  0.3  /  DBili  x   /  AST  15  /  ALT  20  /  AlkPhos  57  07-22              Inpatient Medications:  MEDICATIONS  (STANDING):  aspirin enteric coated 81 milliGRAM(s) Oral daily  atorvastatin 80 milliGRAM(s) Oral at bedtime  bisacodyl 5 milliGRAM(s) Oral daily  bisacodyl Suppository 10 milliGRAM(s) Rectal once  calcium acetate 667 milliGRAM(s) Oral three times a day with meals  chlorhexidine 2% Cloths 1 Application(s) Topical daily  clopidogrel Tablet 75 milliGRAM(s) Oral daily  epoetin niesha-epbx (RETACRIT) Injectable 32836 Unit(s) IV Push <User Schedule>  heparin   Injectable 5000 Unit(s) SubCutaneous every 8 hours  metoprolol tartrate 12.5 milliGRAM(s) Oral two times a day  pantoprazole    Tablet 40 milliGRAM(s) Oral before breakfast  pantoprazole    Tablet 40 milliGRAM(s) Oral before breakfast  polyethylene glycol 3350 17 Gram(s) Oral daily  senna 2 Tablet(s) Oral at bedtime  sorbitol 70% Solution 30 milliLiter(s) Oral once      Assessment:  61-year-old female with ESRD on HD, HLD and peripheral vascular disease s/p lower extremity resection presents with chest pain found to have multivessel disease.      Plan of Care:      #Multivessel CAD-  Noted on cardiac cath 7/5/2023  Echo 7/6 with normal LV systolic function. Apical Hypokinesis and moderate MS.   s/p CABG x3 (LIMA to LAD, SVG to OM, SVG to RCA)  7/13/2023.   ASA and Plavix and BB  Statin therapy    #Compensated diastolic CHF-  Euvolemic on exam.  Fluid removal with HD as per the renal team.     #ESRD-  - HD, as per renal.      Over 25 minutes spent on total encounter; more than 50% of the visit was spent counseling and/or coordinating care by the attending physician.      Wisam Adams DO [facility]  Cardiovascular Disease  [phone]

## 2023-07-23 NOTE — PROGRESS NOTE ADULT - PROBLEM SELECTOR PLAN 1
needs BM   C/W asa 81, ATOR 80 metoprolol 12.s BID  PT/OT to work with patient BL LE prosthetics    VAC change in AM  rehab hopeful  if auth and bed available monday tuesday

## 2023-07-23 NOTE — PROGRESS NOTE ADULT - ASSESSMENT
61 y/o F with PMH of ESRD(on HD M/W/F thru right groin AVF), HTN, HLD, DM type II, bilateral BKA, right eye blindness presented initially to [facility] on 07/03 from rehab center for acute SOB and fluid overload and now transferred to [facility] for LHC due to elevated troponin. Pt underwent LHC 7/5: LM okay, prox LAD of 80-90%, mid Lcx of 80-90%, OM of 90%, ostial RCA of 90%. LFA access site. LVEDP: 28. On Aspirin 81mg. awaiting CABG.        Problem/Recommendation - 1:  ·  Problem: CAD, multiple vessel.   ·  Recommendation: S/P  CABG  and management per CT Surgery    No Cp etc,   On ASA,BB and Statin.     Problem/Recommendation - 2:  ·  Problem: ESRD on hemodialysis.   ·  Recommendation: HD per renal .     Problem/Recommendation - 3:  ·  Problem: DM (diabetes mellitus).   ·  Recommendation: Sugars in good range.     Problem/Recommendation - 4:  ·  Problem: HTN (hypertension).   ·  Recommendation: BP readings better.     Problem/Recommendation - 5:  ·  Problem: Anemia secondary to renal failure.   ·  Recommendation: HH stable. Epogen per renal.     Problem/Recommendation - 6:  ·  Problem: HLD (hyperlipidemia).   ·  Recommendation: Statin.     Problem/Recommendation - 7:  ·  Problem: PVD (peripheral vascular disease).   ·  Recommendation: S/P BKA . Has Prosthesis .     Problem/Recommendation - 8:  ·  Problem: Dysphagia .   ·  Recommendation: D/W CTS staff. May need esophagogram or GI evaluation.     Dispo : DC per CTS.

## 2023-07-23 NOTE — PROGRESS NOTE ADULT - SUBJECTIVE AND OBJECTIVE BOX
VITAL SIGNS    Telemetry:      Vital Signs Last 24 Hrs  T(C): 36.6 (07-23-23 @ 05:00), Max: 37.1 (07-22-23 @ 13:14)  T(F): 97.9 (07-23-23 @ 05:00), Max: 98.8 (07-22-23 @ 13:14)  HR: 69 (07-23-23 @ 07:00) (60 - 69)  BP: 129/68 (07-23-23 @ 05:00) (101/51 - 129/68)  RR: 16 (07-23-23 @ 07:00) (16 - 18)  SpO2: 96% (07-23-23 @ 07:00) (94% - 96%)                   Daily     Daily         CAPILLARY BLOOD GLUCOSE      POCT Blood Glucose.: 87 mg/dL (23 Jul 2023 07:59)  POCT Blood Glucose.: 98 mg/dL (22 Jul 2023 16:48)  POCT Blood Glucose.: 87 mg/dL (22 Jul 2023 11:29)          Drains:     MS         [  ] Drainage:                 L Pleural  [  ]  Drainage:                R Pleural  [  ]  Drainage:    Pacing Wires        [  ]   Settings:                                  Isolated  [  ]    Coumadin    [ ] YES          [  ]      NO         Reason:                         PHYSICAL EXAM    Neurology: alert and oriented x 3, moves all extremities with no defecits  CV :  RRR  Sternal Wound :  CDI , Stable  Lungs:   CTA B/L  Abdomen: soft, nontender, nondistended, positive bowel sounds, last bowel movement   Extremities:

## 2023-07-23 NOTE — PROGRESS NOTE ADULT - SUBJECTIVE AND OBJECTIVE BOX
VITAL SIGNS    Telemetry:     sr 62    Vital Signs Last 24 Hrs  T(C): 36.6 (07-23-23 @ 05:00), Max: 37.1 (07-22-23 @ 13:14)  T(F): 97.9 (07-23-23 @ 05:00), Max: 98.8 (07-22-23 @ 13:14)  HR: 69 (07-23-23 @ 07:00) (60 - 69)  BP: 129/68 (07-23-23 @ 05:00) (101/51 - 129/68)  RR: 16 (07-23-23 @ 07:00) (16 - 18)  SpO2: 96% (07-23-23 @ 07:00) (94% - 96%)                   Daily     Daily         CAPILLARY BLOOD GLUCOSE      POCT Blood Glucose.: 94 mg/dL (23 Jul 2023 11:01)  POCT Blood Glucose.: 87 mg/dL (23 Jul 2023 07:59)  POCT Blood Glucose.: 98 mg/dL (22 Jul 2023 16:48)          Pacing Wires                                     Isolated  [  ]                      PHYSICAL EXAM    Neurology: alert and oriented x 3, moves all extremities with no defecits  CV :  RRR  Sternal Wound :  CDI , Stable  + VAC  Lungs:   CTA B/L  Abdomen: soft, nontender, nondistended, positive bowel sounds,  Extremities:     bl  aka

## 2023-07-23 NOTE — PROGRESS NOTE ADULT - SUBJECTIVE AND OBJECTIVE BOX
Date of Service  : 07-23-23     INTERVAL HPI/OVERNIGHT EVENTS: I feel fine. I have pain swallowing.   Vital Signs Last 24 Hrs  T(C): 37.1 (23 Jul 2023 19:41), Max: 37.1 (23 Jul 2023 19:41)  T(F): 98.8 (23 Jul 2023 19:41), Max: 98.8 (23 Jul 2023 19:41)  HR: 67 (23 Jul 2023 19:41) (63 - 69)  BP: 130/60 (23 Jul 2023 19:41) (125/65 - 130/60)  BP(mean): --  RR: 18 (23 Jul 2023 19:41) (16 - 18)  SpO2: 91% (23 Jul 2023 19:41) (91% - 96%)    Parameters below as of 23 Jul 2023 19:41  Patient On (Oxygen Delivery Method): room air      I&O's Summary    22 Jul 2023 07:01  -  23 Jul 2023 07:00  --------------------------------------------------------  IN: 630 mL / OUT: 0 mL / NET: 630 mL    23 Jul 2023 07:01  -  23 Jul 2023 20:16  --------------------------------------------------------  IN: 860 mL / OUT: 0 mL / NET: 860 mL      MEDICATIONS  (STANDING):  aspirin enteric coated 81 milliGRAM(s) Oral daily  atorvastatin 80 milliGRAM(s) Oral at bedtime  bisacodyl 5 milliGRAM(s) Oral daily  calcium acetate 667 milliGRAM(s) Oral three times a day with meals  chlorhexidine 2% Cloths 1 Application(s) Topical daily  clopidogrel Tablet 75 milliGRAM(s) Oral daily  epoetin niesha-epbx (RETACRIT) Injectable 31973 Unit(s) IV Push <User Schedule>  heparin   Injectable 5000 Unit(s) SubCutaneous every 8 hours  metoprolol tartrate 12.5 milliGRAM(s) Oral two times a day  pantoprazole    Tablet 40 milliGRAM(s) Oral before breakfast  pantoprazole    Tablet 40 milliGRAM(s) Oral before breakfast  polyethylene glycol 3350 17 Gram(s) Oral daily  senna 2 Tablet(s) Oral at bedtime    MEDICATIONS  (PRN):  acetaminophen     Tablet .. 650 milliGRAM(s) Oral every 6 hours PRN Mild Pain (1 - 3)  bisacodyl Suppository 10 milliGRAM(s) Rectal daily PRN Constipation    LABS:                        10.0   8.99  )-----------( 235      ( 23 Jul 2023 05:48 )             31.9     07-23    137  |  95<L>  |  32<H>  ----------------------------<  77  4.1   |  28  |  6.63<H>    Ca    9.2      23 Jul 2023 05:50  Phos  2.8     07-22  Mg     2.1     07-22    TPro  6.2  /  Alb  3.4  /  TBili  0.3  /  DBili  x   /  AST  15  /  ALT  20  /  AlkPhos  57  07-22      Urinalysis Basic - ( 23 Jul 2023 05:50 )    Color: x / Appearance: x / SG: x / pH: x  Gluc: 77 mg/dL / Ketone: x  / Bili: x / Urobili: x   Blood: x / Protein: x / Nitrite: x   Leuk Esterase: x / RBC: x / WBC x   Sq Epi: x / Non Sq Epi: x / Bacteria: x      CAPILLARY BLOOD GLUCOSE      POCT Blood Glucose.: 94 mg/dL (23 Jul 2023 11:01)  POCT Blood Glucose.: 87 mg/dL (23 Jul 2023 07:59)        Urinalysis Basic - ( 23 Jul 2023 05:50 )    Color: x / Appearance: x / SG: x / pH: x  Gluc: 77 mg/dL / Ketone: x  / Bili: x / Urobili: x   Blood: x / Protein: x / Nitrite: x   Leuk Esterase: x / RBC: x / WBC x   Sq Epi: x / Non Sq Epi: x / Bacteria: x      REVIEW OF SYSTEMS:  CONSTITUTIONAL: No fever, weight loss, or fatigue  EYES: No eye pain, visual disturbances, or discharge  ENMT:  No difficulty hearing, tinnitus, vertigo; No sinus or throat pain  NECK: No pain or stiffness  RESPIRATORY: No cough, wheezing, chills or hemoptysis; No shortness of breath  CARDIOVASCULAR: No chest pain, palpitations, dizziness, or leg swelling  GASTROINTESTINAL: No abdominal or epigastric pain. No nausea, vomiting, or hematemesis; No diarrhea or constipation. No melena or hematochezia.  GENITOURINARY: No dysuria, frequency, hematuria, or incontinence  NEUROLOGICAL: No headaches, memory loss, loss of strength, numbness, or tremors      RADIOLOGY & ADDITIONAL TESTS:    Consultant(s) Notes Reviewed:  [x ] YES  [ ] NO    PHYSICAL EXAM:  GENERAL: NAD, well-groomed, well-developed,not in any distress ,  HEAD:  Atraumatic, Normocephalic  NECK: Supple, No JVD, Normal thyroid  NERVOUS SYSTEM:  Alert & Oriented X3, No focal deficit   CHEST/LUNG: Good air entry bilateral with no  rales, rhonchi, wheezing, or rubs  HEART: Regular rate and rhythm; No murmurs, rubs, or gallops  ABDOMEN: Soft, Nontender, Nondistended; Bowel sounds present  EXTREMITIES:  BKA ++    Care Discussed with Consultants/Other Providers [ x] YES  [ ] NO

## 2023-07-24 LAB
ANION GAP SERPL CALC-SCNC: 16 MMOL/L — SIGNIFICANT CHANGE UP (ref 5–17)
BUN SERPL-MCNC: 40 MG/DL — HIGH (ref 7–23)
CALCIUM SERPL-MCNC: 9.6 MG/DL — SIGNIFICANT CHANGE UP (ref 8.4–10.5)
CHLORIDE SERPL-SCNC: 93 MMOL/L — LOW (ref 96–108)
CO2 SERPL-SCNC: 26 MMOL/L — SIGNIFICANT CHANGE UP (ref 22–31)
CREAT SERPL-MCNC: 7.85 MG/DL — HIGH (ref 0.5–1.3)
EGFR: 5 ML/MIN/1.73M2 — LOW
GLUCOSE SERPL-MCNC: 64 MG/DL — LOW (ref 70–99)
HCT VFR BLD CALC: 33.4 % — LOW (ref 34.5–45)
HGB BLD-MCNC: 10.4 G/DL — LOW (ref 11.5–15.5)
MCHC RBC-ENTMCNC: 29.2 PG — SIGNIFICANT CHANGE UP (ref 27–34)
MCHC RBC-ENTMCNC: 31.1 GM/DL — LOW (ref 32–36)
MCV RBC AUTO: 93.8 FL — SIGNIFICANT CHANGE UP (ref 80–100)
NRBC # BLD: 0 /100 WBCS — SIGNIFICANT CHANGE UP (ref 0–0)
PLATELET # BLD AUTO: 218 K/UL — SIGNIFICANT CHANGE UP (ref 150–400)
POTASSIUM SERPL-MCNC: 4.7 MMOL/L — SIGNIFICANT CHANGE UP (ref 3.5–5.3)
POTASSIUM SERPL-SCNC: 4.7 MMOL/L — SIGNIFICANT CHANGE UP (ref 3.5–5.3)
RBC # BLD: 3.56 M/UL — LOW (ref 3.8–5.2)
RBC # FLD: 15.7 % — HIGH (ref 10.3–14.5)
SODIUM SERPL-SCNC: 135 MMOL/L — SIGNIFICANT CHANGE UP (ref 135–145)
WBC # BLD: 9.01 K/UL — SIGNIFICANT CHANGE UP (ref 3.8–10.5)
WBC # FLD AUTO: 9.01 K/UL — SIGNIFICANT CHANGE UP (ref 3.8–10.5)

## 2023-07-24 PROCEDURE — 71045 X-RAY EXAM CHEST 1 VIEW: CPT | Mod: 26

## 2023-07-24 RX ORDER — METOPROLOL TARTRATE 50 MG
25 TABLET ORAL DAILY
Refills: 0 | Status: DISCONTINUED | OUTPATIENT
Start: 2023-07-25 | End: 2023-07-25

## 2023-07-24 RX ORDER — METOPROLOL TARTRATE 50 MG
12.5 TABLET ORAL ONCE
Refills: 0 | Status: COMPLETED | OUTPATIENT
Start: 2023-07-24 | End: 2023-07-24

## 2023-07-24 RX ADMIN — CLOPIDOGREL BISULFATE 75 MILLIGRAM(S): 75 TABLET, FILM COATED ORAL at 15:25

## 2023-07-24 RX ADMIN — Medication 12.5 MILLIGRAM(S): at 18:27

## 2023-07-24 RX ADMIN — HEPARIN SODIUM 5000 UNIT(S): 5000 INJECTION INTRAVENOUS; SUBCUTANEOUS at 21:34

## 2023-07-24 RX ADMIN — HEPARIN SODIUM 5000 UNIT(S): 5000 INJECTION INTRAVENOUS; SUBCUTANEOUS at 05:32

## 2023-07-24 RX ADMIN — ATORVASTATIN CALCIUM 80 MILLIGRAM(S): 80 TABLET, FILM COATED ORAL at 21:34

## 2023-07-24 RX ADMIN — Medication 667 MILLIGRAM(S): at 09:01

## 2023-07-24 RX ADMIN — Medication 667 MILLIGRAM(S): at 18:17

## 2023-07-24 RX ADMIN — Medication 81 MILLIGRAM(S): at 15:26

## 2023-07-24 RX ADMIN — HEPARIN SODIUM 5000 UNIT(S): 5000 INJECTION INTRAVENOUS; SUBCUTANEOUS at 15:25

## 2023-07-24 RX ADMIN — Medication 12.5 MILLIGRAM(S): at 05:32

## 2023-07-24 RX ADMIN — ERYTHROPOIETIN 10000 UNIT(S): 10000 INJECTION, SOLUTION INTRAVENOUS; SUBCUTANEOUS at 11:27

## 2023-07-24 RX ADMIN — PANTOPRAZOLE SODIUM 40 MILLIGRAM(S): 20 TABLET, DELAYED RELEASE ORAL at 05:32

## 2023-07-24 RX ADMIN — CHLORHEXIDINE GLUCONATE 1 APPLICATION(S): 213 SOLUTION TOPICAL at 10:12

## 2023-07-24 NOTE — PROGRESS NOTE ADULT - PROBLEM SELECTOR PLAN 2
HD M/W/F  Will need HD monday early   if DC day  Retacrit 10k units TIW /w HD   Renal following Patient unable to complete

## 2023-07-24 NOTE — PROGRESS NOTE ADULT - SUBJECTIVE AND OBJECTIVE BOX
Cardiovascular Disease Progress Note  Date of service: 23 @ 07:56    Overnight events: No acute events overnight.  Pt denies chest pain or SOB.   Otherwise review of systems negative    Objective Findings:  T(C): 36.8 (23 @ 04:10), Max: 37.1 (23 @ 19:41)  HR: 64 (23 @ 04:10) (63 - 67)  BP: 128/60 (23 @ 04:10) (125/65 - 130/60)  RR: 18 (23 @ 04:10) (18 - 18)  SpO2: 95% (23 @ 04:10) (91% - 95%)  Wt(kg): --  Daily     Daily Weight in k.2 (2023 14:11)      Physical Exam:  Gen: NAD; Patient resting comfortably  HEENT: EOMI, Normocephalic/ atraumatic  CV: RRR, normal S1 + S2, no m/r/g  Lungs:  Normal respiratory effort; clear to auscultation bilaterally  Abd: soft, non-tender; bowel sounds present  Ext: No edema; warm and well perfused    Telemetry: Sinus     Laboratory Data:                        10.4   9.01  )-----------( 218      ( 2023 07:09 )             33.4         137  |  95<L>  |  32<H>  ----------------------------<  77  4.1   |  28  |  6.63<H>    Ca    9.2      2023 05:50                Inpatient Medications:  MEDICATIONS  (STANDING):  aspirin enteric coated 81 milliGRAM(s) Oral daily  atorvastatin 80 milliGRAM(s) Oral at bedtime  bisacodyl 5 milliGRAM(s) Oral daily  calcium acetate 667 milliGRAM(s) Oral three times a day with meals  chlorhexidine 2% Cloths 1 Application(s) Topical daily  clopidogrel Tablet 75 milliGRAM(s) Oral daily  epoetin niesha-epbx (RETACRIT) Injectable 59262 Unit(s) IV Push <User Schedule>  heparin   Injectable 5000 Unit(s) SubCutaneous every 8 hours  metoprolol tartrate 12.5 milliGRAM(s) Oral two times a day  pantoprazole    Tablet 40 milliGRAM(s) Oral before breakfast  pantoprazole    Tablet 40 milliGRAM(s) Oral before breakfast  polyethylene glycol 3350 17 Gram(s) Oral daily  senna 2 Tablet(s) Oral at bedtime      Assessment: 61-year-old female with ESRD on HD, HLD and peripheral vascular disease s/p lower extremity resection presents with chest pain found to have multivessel disease.      Plan of Care:      #Multivessel CAD-  Noted on cardiac cath 2023  Echo  with normal LV systolic function. Apical Hypokinesis and moderate MS.   s/p CABG x3 (LIMA to LAD, SVG to OM, SVG to RCA)  2023.   ASA and Plavix and BB  Statin therapy    #Compensated diastolic CHF-  Euvolemic on exam.  Fluid removal with HD as per the renal team.     #ESRD-  - HD, as per renal.            Over 25 minutes spent on total encounter; more than 50% of the visit was spent counseling and/or coordinating care by the attending physician.      Wisam Adams DO [facility]  Cardiovascular Disease  [phone]

## 2023-07-24 NOTE — SWALLOW BEDSIDE ASSESSMENT ADULT - SLP PERTINENT HISTORY OF CURRENT PROBLEM
62y F with PMHx of ESRD (on HD M/W/F thru right groin AVF), HTN, HLD, DM type II, bilateral BKA, and right eye blindness initially presented to [facility] on 7/3 from rehab center for acute SOB and fluid overload. Transferred to [facility] for LHC due to elevated troponin. LHC performed 7/5. Pt found to have severe multi-vessel CAD w/ preserved LV systolic function. Transferred to [facility] on 7/6 for CT surgery consult. S/p CABG x3 and OMEGA clip on 7/13 (intubated and extubated same day). Pt transferred to step-down unit 7/17. S/p HD UF 7/19. Pt reports odynophagia 7/23; swallow evaluation ordered. Per CTSx, " States that occasionally she feels food gets stuck when swallowing sometimes.  Also states she has had this issue pre-operatively and that it has improved."

## 2023-07-24 NOTE — PROGRESS NOTE ADULT - SUBJECTIVE AND OBJECTIVE BOX
Date of Service  : 07-24-23     INTERVAL HPI/OVERNIGHT EVENTS: I feel fine. No problem with eating.   Vital Signs Last 24 Hrs  T(C): 36.8 (24 Jul 2023 08:30), Max: 37.1 (23 Jul 2023 19:41)  T(F): 98.2 (24 Jul 2023 08:30), Max: 98.8 (23 Jul 2023 19:41)  HR: 63 (24 Jul 2023 08:30) (63 - 67)  BP: 123/72 (24 Jul 2023 08:30) (123/72 - 130/60)  BP(mean): 83 (24 Jul 2023 04:10) (83 - 83)  RR: 18 (24 Jul 2023 08:30) (18 - 18)  SpO2: 94% (24 Jul 2023 08:30) (91% - 95%)    Parameters below as of 24 Jul 2023 08:30  Patient On (Oxygen Delivery Method): room air      I&O's Summary    23 Jul 2023 07:01  -  24 Jul 2023 07:00  --------------------------------------------------------  IN: 1110 mL / OUT: 0 mL / NET: 1110 mL    24 Jul 2023 07:01  -  24 Jul 2023 11:01  --------------------------------------------------------  IN: 360 mL / OUT: 0 mL / NET: 360 mL      MEDICATIONS  (STANDING):  aspirin enteric coated 81 milliGRAM(s) Oral daily  atorvastatin 80 milliGRAM(s) Oral at bedtime  bisacodyl 5 milliGRAM(s) Oral daily  calcium acetate 667 milliGRAM(s) Oral three times a day with meals  chlorhexidine 2% Cloths 1 Application(s) Topical daily  clopidogrel Tablet 75 milliGRAM(s) Oral daily  epoetin niesha-epbx (RETACRIT) Injectable 95461 Unit(s) IV Push <User Schedule>  heparin   Injectable 5000 Unit(s) SubCutaneous every 8 hours  metoprolol tartrate 12.5 milliGRAM(s) Oral once  pantoprazole    Tablet 40 milliGRAM(s) Oral before breakfast  pantoprazole    Tablet 40 milliGRAM(s) Oral before breakfast  polyethylene glycol 3350 17 Gram(s) Oral daily  senna 2 Tablet(s) Oral at bedtime    MEDICATIONS  (PRN):  acetaminophen     Tablet .. 650 milliGRAM(s) Oral every 6 hours PRN Mild Pain (1 - 3)  bisacodyl Suppository 10 milliGRAM(s) Rectal daily PRN Constipation    LABS:                        10.4   9.01  )-----------( 218      ( 24 Jul 2023 07:09 )             33.4     07-24    135  |  93<L>  |  40<H>  ----------------------------<  64<L>  4.7   |  26  |  7.85<H>    Ca    9.6      24 Jul 2023 07:13        Urinalysis Basic - ( 24 Jul 2023 07:13 )    Color: x / Appearance: x / SG: x / pH: x  Gluc: 64 mg/dL / Ketone: x  / Bili: x / Urobili: x   Blood: x / Protein: x / Nitrite: x   Leuk Esterase: x / RBC: x / WBC x   Sq Epi: x / Non Sq Epi: x / Bacteria: x      CAPILLARY BLOOD GLUCOSE            Urinalysis Basic - ( 24 Jul 2023 07:13 )    Color: x / Appearance: x / SG: x / pH: x  Gluc: 64 mg/dL / Ketone: x  / Bili: x / Urobili: x   Blood: x / Protein: x / Nitrite: x   Leuk Esterase: x / RBC: x / WBC x   Sq Epi: x / Non Sq Epi: x / Bacteria: x      REVIEW OF SYSTEMS:  CONSTITUTIONAL: No fever, weight loss, or fatigue  EYES: No eye pain, visual disturbances, or discharge  ENMT:  No difficulty hearing, tinnitus, vertigo; No sinus or throat pain  NECK: No pain or stiffness  RESPIRATORY: No cough, wheezing, chills or hemoptysis; No shortness of breath  CARDIOVASCULAR: No chest pain, palpitations, dizziness, or leg swelling  GASTROINTESTINAL: No abdominal or epigastric pain. No nausea, vomiting, or hematemesis; No diarrhea or constipation. No melena or hematochezia.  GENITOURINARY: No dysuria, frequency, hematuria, or incontinence  NEUROLOGICAL: No headaches, memory loss, loss of strength, numbness, or tremors      RADIOLOGY & ADDITIONAL TESTS:    Consultant(s) Notes Reviewed:  [x ] YES  [ ] NO    PHYSICAL EXAM:  GENERAL: NAD, well-groomed, well-developed,not in any distress ,  HEAD:  Atraumatic, Normocephalic  NECK: Supple, No JVD, Normal thyroid  NERVOUS SYSTEM:  Alert & Oriented X3, No focal deficit   CHEST/LUNG: Good air entry bilateral with no  rales, rhonchi, wheezing, or rubs  HEART: Regular rate and rhythm; No murmurs, rubs, or gallops  ABDOMEN: Soft, Nontender, Nondistended; Bowel sounds present  EXTREMITIES:  B/L BKA     Care Discussed with Consultants/Other Providers [ x] YES  [ ] NO

## 2023-07-24 NOTE — PROGRESS NOTE ADULT - PROBLEM SELECTOR PROBLEM 1
S/P CABG x 3
CAD (coronary artery disease)
CAD (coronary artery disease)
S/P CABG x 3
CAD (coronary artery disease)
S/P CABG x 3
CAD (coronary artery disease)
CAD (coronary artery disease)
S/P CABG x 3
S/P CABG x 3
CAD (coronary artery disease)
S/P CABG x 3

## 2023-07-24 NOTE — PROGRESS NOTE ADULT - PROBLEM SELECTOR PLAN 3
ISS  monitor finger sticks QHS and TID w/ meals

## 2023-07-24 NOTE — SWALLOW BEDSIDE ASSESSMENT ADULT - PHARYNGEAL PHASE
No overt s/s of laryngeal penetration/aspiration/Decreased laryngeal elevation Pt c/o pharyngeal stasis requiring effortful swallow; No overt s/s of laryngeal penetration/aspiration/Decreased laryngeal elevation

## 2023-07-24 NOTE — PROGRESS NOTE ADULT - PROBLEM SELECTOR PLAN 1
Continue /w asa 81, plavix 75mg qd and atorvastatin 80mg  -NSR 60s on metoprolol 12.s BID possible conversion to Toprol   PT/OT to work with patient [facility] prosthetics  -Vac change today  -Pending rehab if bed available monday/tuesday

## 2023-07-24 NOTE — PROGRESS NOTE ADULT - SUBJECTIVE AND OBJECTIVE BOX
Patient  seen and examined  no complaints      No Known Allergies    Hospital Medications:   MEDICATIONS  (STANDING):  aspirin enteric coated 81 milliGRAM(s) Oral daily  atorvastatin 80 milliGRAM(s) Oral at bedtime  bisacodyl 5 milliGRAM(s) Oral daily  calcium acetate 667 milliGRAM(s) Oral three times a day with meals  chlorhexidine 2% Cloths 1 Application(s) Topical daily  clopidogrel Tablet 75 milliGRAM(s) Oral daily  epoetin niesha-epbx (RETACRIT) Injectable 38088 Unit(s) IV Push <User Schedule>  heparin   Injectable 5000 Unit(s) SubCutaneous every 8 hours  metoprolol tartrate 12.5 milliGRAM(s) Oral once  pantoprazole    Tablet 40 milliGRAM(s) Oral before breakfast  pantoprazole    Tablet 40 milliGRAM(s) Oral before breakfast  polyethylene glycol 3350 17 Gram(s) Oral daily  senna 2 Tablet(s) Oral at bedtime      VITALS:  T(F): 97.7 (07-24-23 @ 10:54), Max: 98.8 (07-23-23 @ 19:41)  HR: 64 (07-24-23 @ 10:54)  BP: 136/58 (07-24-23 @ 10:54)  RR: 18 (07-24-23 @ 10:54)  SpO2: 95% (07-24-23 @ 10:54)  Wt(kg): --    07-23 @ 07:01  -  07-24 @ 07:00  --------------------------------------------------------  IN: 1110 mL / OUT: 0 mL / NET: 1110 mL    07-24 @ 07:01  -  07-24 @ 13:53  --------------------------------------------------------  IN: 720 mL / OUT: 0 mL / NET: 720 mL        PHYSICAL EXAM:  Constitutional: NAD  HEENT: anicteric sclera, oropharynx clear, MMM  Neck: No JVD  Respiratory: CTAB, no wheezes, rales or rhonchi  Cardiovascular: S1, S2, RRR  Gastrointestinal: BS+, soft, NT/ND  Neurological: A/O x 3, no focal deficits  Vascular Access: thigh AVG     LABS:  07-24    135  |  93<L>  |  40<H>  ----------------------------<  64<L>  4.7   |  26  |  7.85<H>    Ca    9.6      24 Jul 2023 07:13      Creatinine Trend: 7.85 <--, 6.63 <--, 4.95 <--, 5.90 <--, 4.64 <--, 6.33 <--, 4.66 <--                        10.4   9.01  )-----------( 218      ( 24 Jul 2023 07:09 )             33.4     Urine Studies:  Urinalysis Basic - ( 24 Jul 2023 07:13 )    Color:  / Appearance:  / SG:  / pH:   Gluc: 64 mg/dL / Ketone:   / Bili:  / Urobili:    Blood:  / Protein:  / Nitrite:    Leuk Esterase:  / RBC:  / WBC    Sq Epi:  / Non Sq Epi:  / Bacteria:         RADIOLOGY & ADDITIONAL STUDIES:

## 2023-07-24 NOTE — SWALLOW BEDSIDE ASSESSMENT ADULT - ASR SWALLOW RECOMMEND DIAG
To objectively assess the swallow mechanism and r/o aspiration/VFSS/MBS To objectively assess the swallow mechanism and r/o aspiration; Pt declining MBS at this time despite encouragement and rationale/VFSS/MBS

## 2023-07-24 NOTE — PROGRESS NOTE ADULT - ASSESSMENT
61-year-old female with ESRD on HD, HLD and peripheral vascular disease s/p lower extremity resection presents with chest pain found to have multivessel disease.       7/13 C3L, OMEGA CLIP,   7/17 Transfer from CTU to SDU, + LP CT x1, + PW isolated, needs BM got dulcolax in CTU, HD for 1.2 L today, PT/OT to work with patient [facility] prosthetics  7/18 vss sdu  7/19 VSS  metoprolol decreased to 12.5 BID - HD UF today  needs BM disposition to rehab   7/20 LOPRESSOR D/C , JON, BP 'S  uLTRAFILTRATION YEsterday  7/21 Ambulated with prosthetics yesterday.  Hr 80's, lopresor 12.5 bid started  Transfer to floor. HD   7/22    VSS   BB      lop 12.5 q12    OOB to chair  7/23 VSS overnight.  Vac dressing intact minimal drainage noted in canister.  Positive 1L in last 24hrs.  Plan for HD today

## 2023-07-24 NOTE — SWALLOW BEDSIDE ASSESSMENT ADULT - COMMENTS
IMAGIN/18 CXR: IMPRESSION: Partial clearing, right lung. Smaller right pleural effusion. No pneumothorax.    ***Pt is previously known to this service and had an MBS performed 3/2022. Recommendations at that time included regular/thin liquids and course of dysphagia tx at [facility].

## 2023-07-24 NOTE — SWALLOW BEDSIDE ASSESSMENT ADULT - SWALLOW EVAL: DIAGNOSIS
62y F with PMHx of ESRD (on HD M/W/F thru right groin AVF), HTN, HLD, DM type II, bilateral BKA, and right eye blindness initially presented to [facility] on 7/3 from rehab center for acute SOB and fluid overload. Pt now s/p CABG x3 and OMEGA clip 7/13. Pt presents w/ a suspected pharyngeal dysphagia characterized by reduced hyolaryngeal excursion and c/o intermittent pharyngeal stasis w/ hard solids.  No overt s/s of laryngeal penetration/aspiration noted across PO trials. Given hx of dysphagia and c/o pharyngeal stasis, objective testing is recommended to assess the swallow mechanism and r/o dysphagia. Pt does not want to pursue MBS at this time and wishes to continue a PO diet. This service will follow up to monitor diet tolerance. 62y F with PMHx of ESRD (on HD M/W/F thru right groin AVF), HTN, HLD, DM type II, bilateral BKA, and right eye blindness initially presented to [facility] on 7/3 from rehab center for acute SOB and fluid overload. Pt now s/p CABG x3 and OMEGA clip 7/13. Pt presents w/ a suspected pharyngeal dysphagia characterized by reduced hyolaryngeal excursion and c/o intermittent pharyngeal stasis w/ hard solids.  No overt s/s of laryngeal penetration/aspiration noted across PO trials. Given hx of dysphagia and c/o pharyngeal stasis, objective testing is recommended to assess the swallow mechanism and r/o dysphagia. Pt does not want to pursue MBS at this time despite encouragement and rationale. This service will follow up to monitor diet tolerance.

## 2023-07-24 NOTE — SWALLOW BEDSIDE ASSESSMENT ADULT - SLP PRECAUTIONS/LIMITATIONS: VISION
Severely impaired: cannot locate objects without hearing or touching them or patient nonresponsive.; blind R eye/impaired

## 2023-07-24 NOTE — PROGRESS NOTE ADULT - SUBJECTIVE AND OBJECTIVE BOX
Subjective: "I feel ok "  Patient states she has no chest pain or shortness of breath. No abdominal pain.  Having bowel movements.  States that occasionally she feels food gets stuck when swallowing sometimes.  Also states she has had this issue pre-operatively and that it has improved.    TELEMETRY: NSR 60s    VITAL SIGNS    Vital Signs Last 24 Hrs  T(C): 36.8 (23 @ 04:10), Max: 37.1 (23 @ 19:41)  T(F): 98.2 (23 @ 04:10), Max: 98.8 (23 @ 19:41)  HR: 64 (23 @ 04:10) (63 - 69)  BP: 128/60 (23 @ 04:10) (125/65 - 130/60)  RR: 18 (23 @ 04:10) (16 - 18)  SpO2: 95% (23 @ 04:10) (91% - 96%)             @ 07:01  -   @ 07:00  --------------------------------------------------------  IN: 630 mL / OUT: 0 mL / NET: 630 mL     @ 07:01  -   @ 06:46  --------------------------------------------------------  IN: 1110 mL / OUT: 0 mL / NET: 1110 mL       Daily     Daily Weight in k.2 (2023 14:11)  Admit Wt: Drug Dosing Weight  Height (cm): 163 (2023 16:35)  Weight (kg): 70 (2023 16:39)  BMI (kg/m2): 26.3 (2023 16:39)  BSA (m2): 1.76 (2023 16:39)      CAPILLARY BLOOD GLUCOSE      POCT Blood Glucose.: 94 mg/dL (2023 11:01)  POCT Blood Glucose.: 87 mg/dL (2023 07:59)              PHYSICAL EXAM    Neurology: alert and oriented x 3, nonfocal, no gross deficits  CV: RRR +S1/S2  Sternal Wound :  black foam wound vac in place with good suction canister appears dry  , Stable  Lungs: CTA BL . RR easy, unlabored   Abdomen: soft, nontender, nondistended, positive bowel sounds, bowel movement   Neg N/V/D   :  patient on HD via right femoral AVF site without bleeding/hematoma/erythema.  +B/T  Extremities: Right femoral AVF +T/B  BL below knee amputations +popliteal pulses bilaterally. Able to SMITH; no peripheral edema, neg calf tenderness.   PPP bilaterally              acetaminophen     Tablet .. 650 milliGRAM(s) Oral every 6 hours PRN  aspirin enteric coated 81 milliGRAM(s) Oral daily  atorvastatin 80 milliGRAM(s) Oral at bedtime  bisacodyl 5 milliGRAM(s) Oral daily  bisacodyl Suppository 10 milliGRAM(s) Rectal daily PRN  calcium acetate 667 milliGRAM(s) Oral three times a day with meals  chlorhexidine 2% Cloths 1 Application(s) Topical daily  clopidogrel Tablet 75 milliGRAM(s) Oral daily  epoetin niesha-epbx (RETACRIT) Injectable 25484 Unit(s) IV Push <User Schedule>  heparin   Injectable 5000 Unit(s) SubCutaneous every 8 hours  metoprolol tartrate 12.5 milliGRAM(s) Oral two times a day  pantoprazole    Tablet 40 milliGRAM(s) Oral before breakfast  pantoprazole    Tablet 40 milliGRAM(s) Oral before breakfast  polyethylene glycol 3350 17 Gram(s) Oral daily  senna 2 Tablet(s) Oral at bedtime

## 2023-07-24 NOTE — PROGRESS NOTE ADULT - ASSESSMENT
63 y/o F with PMH of ESRD(on HD M/W/F thru right groin AVF), HTN, HLD, DM type II, bilateral BKA, right eye blindness presented initially to [facility] on 07/03 from rehab center for acute SOB and fluid overload and now transferred to [facility] for LHC due to elevated troponin. Pt underwent LHC 7/5: LM okay, prox LAD of 80-90%, mid Lcx of 80-90%, OM of 90%, ostial RCA of 90%. LFA access site. LVEDP: 28. On Aspirin 81mg. awaiting CABG.        Problem/Recommendation - 1:  ·  Problem: CAD, multiple vessel.   ·  Recommendation: S/P  CABG  and management per CT Surgery    No Cp etc,   On ASA,BB and Statin.     Problem/Recommendation - 2:  ·  Problem: ESRD on hemodialysis.   ·  Recommendation: HD per renal .     Problem/Recommendation - 3:  ·  Problem: DM (diabetes mellitus).   ·  Recommendation: Sugars in good range.     Problem/Recommendation - 4:  ·  Problem: HTN (hypertension).   ·  Recommendation: BP readings better.     Problem/Recommendation - 5:  ·  Problem: Anemia secondary to renal failure.   ·  Recommendation: HH stable. Epogen per renal.     Problem/Recommendation - 6:  ·  Problem: HLD (hyperlipidemia).   ·  Recommendation: Statin.     Problem/Recommendation - 7:  ·  Problem: PVD (peripheral vascular disease).   ·  Recommendation: S/P BKA . Has Prosthesis .     Problem/Recommendation - 8:  ·  Problem: Dysphagia .   ·  Recommendation: Resolved.     Dispo : DC per CTS.

## 2023-07-24 NOTE — PROGRESS NOTE ADULT - ASSESSMENT
63 y/o F with PMH of ESRD(on HD M/W/F thru right groin AVF), HTN, HLD, DM type II, bilateral BKA, right eye blindness presented initially to [facility] on 07/03 from rehab center for acute SOB and fluid overload and now transferred to [facility] for LHC due to elevated troponin. Pt underwent LHC 7/5: LM okay, prox LAD of 80-90%, mid Lcx of 80-90%, OM of 90%, ostial RCA of 90%. LFA access site. LVEDP: 28. On Aspirin 81mg. Plan to be transferred to [facility] for CT surgery consult with Dr. Alma Mabry.     End Stage Renal Disease on Dialysis   Monday, Wednesday and Friday   access: right upper thigh avg  consent in chart  Volume Status : stable        Plan  Plan for HD Today  trend basic metabolic panel and blood pressure   prn phoslo . phos at goal    --::: Anemia-->  hb at goal     Current orders :  - retacrit 10,000 units iv three times per week   - plan to titrate medication as per response of hemoglobin  - if Hb less than 7gm/dl consider blood transfusion    Dr Mcfarlane  [phone]

## 2023-07-25 ENCOUNTER — TRANSCRIPTION ENCOUNTER (OUTPATIENT)
Age: 63
End: 2023-07-25

## 2023-07-25 VITALS
DIASTOLIC BLOOD PRESSURE: 59 MMHG | HEART RATE: 63 BPM | SYSTOLIC BLOOD PRESSURE: 121 MMHG | RESPIRATION RATE: 18 BRPM | TEMPERATURE: 98 F | OXYGEN SATURATION: 94 %

## 2023-07-25 LAB
ALBUMIN SERPL ELPH-MCNC: 3.3 G/DL — SIGNIFICANT CHANGE UP (ref 3.3–5)
ALP SERPL-CCNC: 61 U/L — SIGNIFICANT CHANGE UP (ref 40–120)
ALT FLD-CCNC: 13 U/L — SIGNIFICANT CHANGE UP (ref 10–45)
ANION GAP SERPL CALC-SCNC: 14 MMOL/L — SIGNIFICANT CHANGE UP (ref 5–17)
AST SERPL-CCNC: 11 U/L — SIGNIFICANT CHANGE UP (ref 10–40)
BILIRUB SERPL-MCNC: 0.3 MG/DL — SIGNIFICANT CHANGE UP (ref 0.2–1.2)
BUN SERPL-MCNC: 24 MG/DL — HIGH (ref 7–23)
CALCIUM SERPL-MCNC: 9 MG/DL — SIGNIFICANT CHANGE UP (ref 8.4–10.5)
CHLORIDE SERPL-SCNC: 93 MMOL/L — LOW (ref 96–108)
CO2 SERPL-SCNC: 26 MMOL/L — SIGNIFICANT CHANGE UP (ref 22–31)
CREAT SERPL-MCNC: 5.71 MG/DL — HIGH (ref 0.5–1.3)
EGFR: 8 ML/MIN/1.73M2 — LOW
FLUAV AG NPH QL: SIGNIFICANT CHANGE UP
FLUBV AG NPH QL: SIGNIFICANT CHANGE UP
GLUCOSE SERPL-MCNC: 74 MG/DL — SIGNIFICANT CHANGE UP (ref 70–99)
HCT VFR BLD CALC: 32.7 % — LOW (ref 34.5–45)
HGB BLD-MCNC: 10.3 G/DL — LOW (ref 11.5–15.5)
MAGNESIUM SERPL-MCNC: 2.2 MG/DL — SIGNIFICANT CHANGE UP (ref 1.6–2.6)
MCHC RBC-ENTMCNC: 28.8 PG — SIGNIFICANT CHANGE UP (ref 27–34)
MCHC RBC-ENTMCNC: 31.5 GM/DL — LOW (ref 32–36)
MCV RBC AUTO: 91.3 FL — SIGNIFICANT CHANGE UP (ref 80–100)
NRBC # BLD: 0 /100 WBCS — SIGNIFICANT CHANGE UP (ref 0–0)
PHOSPHATE SERPL-MCNC: 2.8 MG/DL — SIGNIFICANT CHANGE UP (ref 2.5–4.5)
PLATELET # BLD AUTO: 191 K/UL — SIGNIFICANT CHANGE UP (ref 150–400)
POTASSIUM SERPL-MCNC: 3.7 MMOL/L — SIGNIFICANT CHANGE UP (ref 3.5–5.3)
POTASSIUM SERPL-SCNC: 3.7 MMOL/L — SIGNIFICANT CHANGE UP (ref 3.5–5.3)
PROT SERPL-MCNC: 6.3 G/DL — SIGNIFICANT CHANGE UP (ref 6–8.3)
RBC # BLD: 3.58 M/UL — LOW (ref 3.8–5.2)
RBC # FLD: 15.3 % — HIGH (ref 10.3–14.5)
RSV RNA NPH QL NAA+NON-PROBE: SIGNIFICANT CHANGE UP
SARS-COV-2 RNA SPEC QL NAA+PROBE: SIGNIFICANT CHANGE UP
SODIUM SERPL-SCNC: 133 MMOL/L — LOW (ref 135–145)
WBC # BLD: 9.18 K/UL — SIGNIFICANT CHANGE UP (ref 3.8–10.5)
WBC # FLD AUTO: 9.18 K/UL — SIGNIFICANT CHANGE UP (ref 3.8–10.5)

## 2023-07-25 PROCEDURE — 80048 BASIC METABOLIC PNL TOTAL CA: CPT

## 2023-07-25 PROCEDURE — 83605 ASSAY OF LACTIC ACID: CPT

## 2023-07-25 PROCEDURE — C9399: CPT

## 2023-07-25 PROCEDURE — 97110 THERAPEUTIC EXERCISES: CPT

## 2023-07-25 PROCEDURE — 85014 HEMATOCRIT: CPT

## 2023-07-25 PROCEDURE — 87641 MR-STAPH DNA AMP PROBE: CPT

## 2023-07-25 PROCEDURE — P9045: CPT

## 2023-07-25 PROCEDURE — 92610 EVALUATE SWALLOWING FUNCTION: CPT

## 2023-07-25 PROCEDURE — 97535 SELF CARE MNGMENT TRAINING: CPT

## 2023-07-25 PROCEDURE — 85576 BLOOD PLATELET AGGREGATION: CPT

## 2023-07-25 PROCEDURE — 94010 BREATHING CAPACITY TEST: CPT

## 2023-07-25 PROCEDURE — 86891 AUTOLOGOUS BLOOD OP SALVAGE: CPT

## 2023-07-25 PROCEDURE — 82962 GLUCOSE BLOOD TEST: CPT

## 2023-07-25 PROCEDURE — 97116 GAIT TRAINING THERAPY: CPT

## 2023-07-25 PROCEDURE — 93880 EXTRACRANIAL BILAT STUDY: CPT

## 2023-07-25 PROCEDURE — 82947 ASSAY GLUCOSE BLOOD QUANT: CPT

## 2023-07-25 PROCEDURE — 82330 ASSAY OF CALCIUM: CPT

## 2023-07-25 PROCEDURE — 82550 ASSAY OF CK (CPK): CPT

## 2023-07-25 PROCEDURE — 82565 ASSAY OF CREATININE: CPT

## 2023-07-25 PROCEDURE — 82803 BLOOD GASES ANY COMBINATION: CPT

## 2023-07-25 PROCEDURE — 82553 CREATINE MB FRACTION: CPT

## 2023-07-25 PROCEDURE — 82435 ASSAY OF BLOOD CHLORIDE: CPT

## 2023-07-25 PROCEDURE — 97164 PT RE-EVAL EST PLAN CARE: CPT

## 2023-07-25 PROCEDURE — 84132 ASSAY OF SERUM POTASSIUM: CPT

## 2023-07-25 PROCEDURE — 71250 CT THORAX DX C-: CPT

## 2023-07-25 PROCEDURE — 86901 BLOOD TYPING SEROLOGIC RH(D): CPT

## 2023-07-25 PROCEDURE — 85027 COMPLETE CBC AUTOMATED: CPT

## 2023-07-25 PROCEDURE — P9037: CPT

## 2023-07-25 PROCEDURE — C8929: CPT

## 2023-07-25 PROCEDURE — 85730 THROMBOPLASTIN TIME PARTIAL: CPT

## 2023-07-25 PROCEDURE — 85384 FIBRINOGEN ACTIVITY: CPT

## 2023-07-25 PROCEDURE — 85520 HEPARIN ASSAY: CPT

## 2023-07-25 PROCEDURE — C1769: CPT

## 2023-07-25 PROCEDURE — 86850 RBC ANTIBODY SCREEN: CPT

## 2023-07-25 PROCEDURE — 97161 PT EVAL LOW COMPLEX 20 MIN: CPT

## 2023-07-25 PROCEDURE — 97166 OT EVAL MOD COMPLEX 45 MIN: CPT

## 2023-07-25 PROCEDURE — 80076 HEPATIC FUNCTION PANEL: CPT

## 2023-07-25 PROCEDURE — 85025 COMPLETE CBC W/AUTO DIFF WBC: CPT

## 2023-07-25 PROCEDURE — 93930 UPPER EXTREMITY STUDY: CPT

## 2023-07-25 PROCEDURE — 84443 ASSAY THYROID STIM HORMONE: CPT

## 2023-07-25 PROCEDURE — P9100: CPT

## 2023-07-25 PROCEDURE — 85018 HEMOGLOBIN: CPT

## 2023-07-25 PROCEDURE — C1889: CPT

## 2023-07-25 PROCEDURE — 85610 PROTHROMBIN TIME: CPT

## 2023-07-25 PROCEDURE — 99261: CPT

## 2023-07-25 PROCEDURE — 87640 STAPH A DNA AMP PROBE: CPT

## 2023-07-25 PROCEDURE — 93306 TTE W/DOPPLER COMPLETE: CPT

## 2023-07-25 PROCEDURE — 97605 NEG PRS WND THER DME<=50SQCM: CPT

## 2023-07-25 PROCEDURE — 36430 TRANSFUSION BLD/BLD COMPNT: CPT

## 2023-07-25 PROCEDURE — C1751: CPT

## 2023-07-25 PROCEDURE — 84295 ASSAY OF SERUM SODIUM: CPT

## 2023-07-25 PROCEDURE — 84100 ASSAY OF PHOSPHORUS: CPT

## 2023-07-25 PROCEDURE — 83735 ASSAY OF MAGNESIUM: CPT

## 2023-07-25 PROCEDURE — 93005 ELECTROCARDIOGRAM TRACING: CPT

## 2023-07-25 PROCEDURE — P9016: CPT

## 2023-07-25 PROCEDURE — 80053 COMPREHEN METABOLIC PANEL: CPT

## 2023-07-25 PROCEDURE — 84484 ASSAY OF TROPONIN QUANT: CPT

## 2023-07-25 PROCEDURE — 87637 SARSCOV2&INF A&B&RSV AMP PRB: CPT

## 2023-07-25 PROCEDURE — 86923 COMPATIBILITY TEST ELECTRIC: CPT

## 2023-07-25 PROCEDURE — 97530 THERAPEUTIC ACTIVITIES: CPT

## 2023-07-25 PROCEDURE — 71045 X-RAY EXAM CHEST 1 VIEW: CPT

## 2023-07-25 PROCEDURE — 86900 BLOOD TYPING SEROLOGIC ABO: CPT

## 2023-07-25 PROCEDURE — 36415 COLL VENOUS BLD VENIPUNCTURE: CPT

## 2023-07-25 PROCEDURE — P9047: CPT

## 2023-07-25 RX ORDER — HEPARIN SODIUM 5000 [USP'U]/ML
5000 INJECTION INTRAVENOUS; SUBCUTANEOUS
Qty: 0 | Refills: 0 | DISCHARGE
Start: 2023-07-25

## 2023-07-25 RX ORDER — CLOPIDOGREL BISULFATE 75 MG/1
1 TABLET, FILM COATED ORAL
Qty: 0 | Refills: 0 | DISCHARGE
Start: 2023-07-25

## 2023-07-25 RX ORDER — POLYETHYLENE GLYCOL 3350 17 G/17G
17 POWDER, FOR SOLUTION ORAL
Qty: 0 | Refills: 0 | DISCHARGE
Start: 2023-07-25

## 2023-07-25 RX ORDER — PANTOPRAZOLE SODIUM 20 MG/1
1 TABLET, DELAYED RELEASE ORAL
Qty: 0 | Refills: 0 | DISCHARGE
Start: 2023-07-25

## 2023-07-25 RX ORDER — ERYTHROPOIETIN 10000 [IU]/ML
10000 INJECTION, SOLUTION INTRAVENOUS; SUBCUTANEOUS
Qty: 0 | Refills: 0 | DISCHARGE
Start: 2023-07-25

## 2023-07-25 RX ORDER — ASPIRIN/CALCIUM CARB/MAGNESIUM 324 MG
1 TABLET ORAL
Qty: 0 | Refills: 0 | DISCHARGE
Start: 2023-07-25

## 2023-07-25 RX ORDER — ACETAMINOPHEN 500 MG
2 TABLET ORAL
Qty: 0 | Refills: 0 | DISCHARGE
Start: 2023-07-25

## 2023-07-25 RX ORDER — METOPROLOL TARTRATE 50 MG
1 TABLET ORAL
Qty: 0 | Refills: 0 | DISCHARGE
Start: 2023-07-25

## 2023-07-25 RX ORDER — SENNA PLUS 8.6 MG/1
2 TABLET ORAL
Qty: 0 | Refills: 0 | DISCHARGE
Start: 2023-07-25

## 2023-07-25 RX ORDER — ATORVASTATIN CALCIUM 80 MG/1
1 TABLET, FILM COATED ORAL
Qty: 0 | Refills: 0 | DISCHARGE
Start: 2023-07-25

## 2023-07-25 RX ADMIN — Medication 667 MILLIGRAM(S): at 08:33

## 2023-07-25 RX ADMIN — Medication 667 MILLIGRAM(S): at 16:38

## 2023-07-25 RX ADMIN — HEPARIN SODIUM 5000 UNIT(S): 5000 INJECTION INTRAVENOUS; SUBCUTANEOUS at 14:47

## 2023-07-25 RX ADMIN — HEPARIN SODIUM 5000 UNIT(S): 5000 INJECTION INTRAVENOUS; SUBCUTANEOUS at 05:12

## 2023-07-25 RX ADMIN — CLOPIDOGREL BISULFATE 75 MILLIGRAM(S): 75 TABLET, FILM COATED ORAL at 12:23

## 2023-07-25 RX ADMIN — Medication 81 MILLIGRAM(S): at 12:23

## 2023-07-25 RX ADMIN — CHLORHEXIDINE GLUCONATE 1 APPLICATION(S): 213 SOLUTION TOPICAL at 11:14

## 2023-07-25 RX ADMIN — PANTOPRAZOLE SODIUM 40 MILLIGRAM(S): 20 TABLET, DELAYED RELEASE ORAL at 05:12

## 2023-07-25 RX ADMIN — Medication 667 MILLIGRAM(S): at 12:22

## 2023-07-25 RX ADMIN — Medication 25 MILLIGRAM(S): at 05:12

## 2023-07-25 NOTE — DISCHARGE NOTE PROVIDER - PROVIDER TOKENS
PROVIDER:[TOKEN:[[id number]]],PROVIDER:[TOKEN:[[id number]]],PROVIDER:[TOKEN:[[id number]]]

## 2023-07-25 NOTE — DISCHARGE NOTE NURSING/CASE MANAGEMENT/SOCIAL WORK - NSFLUVACAGEDISCH_IMM_ALL_CORE
RT education provided to the pt.  Reviewed and discussed L CW tx and general RT expectations.  Answered questions to pt's satisfaction, pt verbalized understanding.   Adult Other Instructions: Pt advised to RTC if flare up occurs.  May continue TAC (prescribed by another provider) as directed. Detail Level: Zone Action 3: Continue

## 2023-07-25 NOTE — DISCHARGE NOTE NURSING/CASE MANAGEMENT/SOCIAL WORK - PATIENT PORTAL LINK FT
You can access the FollowMyHealth Patient Portal offered by [facility] by registering at the following website: [email or web address]. By joining [facility]’s FollowMyHealth portal, you will also be able to view your health information using other applications (apps) compatible with our system.

## 2023-07-25 NOTE — DISCHARGE NOTE PROVIDER - CARE PROVIDER_API CALL
Alma Mabry  Thoracic and Cardiac Surgery  [address] [place] [address]  Phone: [phone]  Fax: [phone]  Follow Up Time:     Wisam Adams  Cardiology  [address] [place] [address]  Phone: [phone]  Follow Up Time:     Thony Mcfarlane  Nephrology  [address] [place] [address]  Phone: [phone]  Fax: [phone]  Follow Up Time:

## 2023-07-25 NOTE — PROGRESS NOTE ADULT - ASSESSMENT
61 y/o F with PMH of ESRD(on HD M/W/F thru right groin AVF), HTN, HLD, DM type II, bilateral BKA, right eye blindness presented initially to [facility] on 07/03 from rehab center for acute SOB and fluid overload and now transferred to [facility] for LHC due to elevated troponin. Pt underwent LHC 7/5: LM okay, prox LAD of 80-90%, mid Lcx of 80-90%, OM of 90%, ostial RCA of 90%. LFA access site. LVEDP: 28. On Aspirin 81mg. awaiting CABG.        Problem/Recommendation - 1:  ·  Problem: CAD, multiple vessel.   ·  Recommendation: S/P  CABG  and management per CT Surgery    No Cp etc,   On Plavix , ASA ,BB and Statin.     Problem/Recommendation - 2:  ·  Problem: ESRD on hemodialysis.   ·  Recommendation: HD per renal .     Problem/Recommendation - 3:  ·  Problem: DM (diabetes mellitus).   ·  Recommendation: Sugars in good range.     Problem/Recommendation - 4:  ·  Problem: HTN (hypertension).   ·  Recommendation: BP readings better.     Problem/Recommendation - 5:  ·  Problem: Anemia secondary to renal failure.   ·  Recommendation: HH stable. Epogen per renal.     Problem/Recommendation - 6:  ·  Problem: HLD (hyperlipidemia).   ·  Recommendation: Statin.     Problem/Recommendation - 7:  ·  Problem: PVD (peripheral vascular disease).   ·  Recommendation: S/P BKA . Has Prosthesis .     Problem/Recommendation - 8:  ·  Problem: Dysphagia .   ·  Recommendation: Resolved.     Dispo : DC per CTS.

## 2023-07-25 NOTE — PROGRESS NOTE ADULT - SUBJECTIVE AND OBJECTIVE BOX
Patient seen and examined  no complaints    No Known Allergies    Hospital Medications:   MEDICATIONS  (STANDING):  aspirin enteric coated 81 milliGRAM(s) Oral daily  atorvastatin 80 milliGRAM(s) Oral at bedtime  bisacodyl 5 milliGRAM(s) Oral daily  calcium acetate 667 milliGRAM(s) Oral three times a day with meals  chlorhexidine 2% Cloths 1 Application(s) Topical daily  clopidogrel Tablet 75 milliGRAM(s) Oral daily  epoetin niesha-epbx (RETACRIT) Injectable 31618 Unit(s) IV Push <User Schedule>  heparin   Injectable 5000 Unit(s) SubCutaneous every 8 hours  metoprolol succinate ER 25 milliGRAM(s) Oral daily  pantoprazole    Tablet 40 milliGRAM(s) Oral before breakfast  polyethylene glycol 3350 17 Gram(s) Oral daily  senna 2 Tablet(s) Oral at bedtime        VITALS:  T(F): 97.5 (07-25-23 @ 12:05), Max: 99.1 (07-24-23 @ 18:15)  HR: 63 (07-25-23 @ 12:05)  BP: 121/59 (07-25-23 @ 12:05)  RR: 18 (07-25-23 @ 12:05)  SpO2: 94% (07-25-23 @ 12:05)  Wt(kg): --    07-24 @ 07:01  -  07-25 @ 07:00  --------------------------------------------------------  IN: 840 mL / OUT: 1500 mL / NET: -660 mL    07-25 @ 07:01  -  07-25 @ 13:29  --------------------------------------------------------  IN: 240 mL / OUT: 0 mL / NET: 240 mL    PHYSICAL EXAM:  Constitutional: NAD  HEENT: anicteric sclera, oropharynx clear, MMM  Neck: No JVD  Respiratory: CTAB, no wheezes, rales or rhonchi  Cardiovascular: S1, S2, RRR  Gastrointestinal: BS+, soft, NT/ND  Neurological: A/O x 3, no focal deficits  Vascular Access: thigh AVG     LABS:  07-25    133<L>  |  93<L>  |  24<H>  ----------------------------<  74  3.7   |  26  |  5.71<H>    Ca    9.0      25 Jul 2023 05:30  Phos  2.8     07-25  Mg     2.2     07-25    TPro  6.3  /  Alb  3.3  /  TBili  0.3  /  DBili      /  AST  11  /  ALT  13  /  AlkPhos  61  07-25    Creatinine Trend: 5.71 <--, 7.85 <--, 6.63 <--, 4.95 <--, 5.90 <--, 4.64 <--, 6.33 <--                        10.3   9.18  )-----------( 191      ( 25 Jul 2023 11:49 )             32.7     Urine Studies:  Urinalysis Basic - ( 25 Jul 2023 05:30 )    Color:  / Appearance:  / SG:  / pH:   Gluc: 74 mg/dL / Ketone:   / Bili:  / Urobili:    Blood:  / Protein:  / Nitrite:    Leuk Esterase:  / RBC:  / WBC    Sq Epi:  / Non Sq Epi:  / Bacteria:         RADIOLOGY & ADDITIONAL STUDIES:

## 2023-07-25 NOTE — PROGRESS NOTE ADULT - SUBJECTIVE AND OBJECTIVE BOX
Cardiovascular Disease Progress Note  Date of service: 23 @ 08:01    Overnight events: No acute events overnight.  Pt denies chest pain   Otherwise review of systems negative    Objective Findings:  T(C): 37.1 (23 @ 05:14), Max: 37.3 (23 @ 18:15)  HR: 65 (23 @ 05:14) (63 - 69)  BP: 120/57 (23 @ 05:14) (120/57 - 142/53)  RR: 18 (23 @ 05:14) (18 - 18)  SpO2: 94% (23 @ 05:14) (94% - 95%)  Wt(kg): --  Daily     Daily Weight in k.9 (2023 13:56)      Physical Exam:  Gen: NAD; Patient resting comfortably  HEENT: EOMI, Normocephalic/ atraumatic  CV: RRR, normal S1 + S2, no m/r/g  Lungs:  Normal respiratory effort; clear to auscultation bilaterally  Abd: soft, non-tender; bowel sounds present  Ext: No edema; warm and well perfused    Telemetry:  Sinus     Laboratory Data:                        10.4   9.01  )-----------( 218      ( 2023 07:09 )             33.4         133<L>  |  93<L>  |  24<H>  ----------------------------<  74  3.7   |  26  |  5.71<H>    Ca    9.0      2023 05:30  Phos  2.8       Mg     2.2         TPro  6.3  /  Alb  3.3  /  TBili  0.3  /  DBili  x   /  AST  11  /  ALT  13  /  AlkPhos  61                Inpatient Medications:  MEDICATIONS  (STANDING):  aspirin enteric coated 81 milliGRAM(s) Oral daily  atorvastatin 80 milliGRAM(s) Oral at bedtime  bisacodyl 5 milliGRAM(s) Oral daily  calcium acetate 667 milliGRAM(s) Oral three times a day with meals  chlorhexidine 2% Cloths 1 Application(s) Topical daily  clopidogrel Tablet 75 milliGRAM(s) Oral daily  epoetin niesha-epbx (RETACRIT) Injectable 46795 Unit(s) IV Push <User Schedule>  heparin   Injectable 5000 Unit(s) SubCutaneous every 8 hours  metoprolol succinate ER 25 milliGRAM(s) Oral daily  pantoprazole    Tablet 40 milliGRAM(s) Oral before breakfast  polyethylene glycol 3350 17 Gram(s) Oral daily  senna 2 Tablet(s) Oral at bedtime      Assessment:  61-year-old female with ESRD on HD, HLD and peripheral vascular disease s/p lower extremity resection presents with chest pain found to have multivessel disease.      Plan of Care:      #Multivessel CAD-  Noted on cardiac cath 2023  Echo  with normal LV systolic function. Apical Hypokinesis and moderate MS.   s/p CABG x3 (LIMA to LAD, SVG to OM, SVG to RCA)  2023.   ASA and Plavix and BB  Statin therapy    #Compensated diastolic CHF-  Euvolemic on exam.  Fluid removal with HD as per the renal team.     #ESRD-  - HD, as per renal.        #ACP (advance care planning)-  Advanced care planning was discussed with the patient.  Risks, benefits and alternatives of medical treatment and procedures were discussed in detail and all questions were answered. 30 additional minutes spent addressing advance care plans.          Over 25 minutes spent on total encounter; more than 50% of the visit was spent counseling and/or coordinating care by the attending physician.      Wisam Adams DO [facility]  Cardiovascular Disease  [phone]

## 2023-07-25 NOTE — PROGRESS NOTE ADULT - ASSESSMENT
61 y/o F with PMH of ESRD(on HD M/W/F thru right groin AVF), HTN, HLD, DM type II, bilateral BKA, right eye blindness presented initially to [facility] on 07/03 from rehab center for acute SOB and fluid overload and now transferred to [facility] for LHC due to elevated troponin. Pt underwent LHC 7/5: LM okay, prox LAD of 80-90%, mid Lcx of 80-90%, OM of 90%, ostial RCA of 90%. LFA access site. LVEDP: 28. On Aspirin 81mg. Plan to be transferred to [facility] for CT surgery consult with Dr. Alma Mabry.     End Stage Renal Disease on Dialysis   Monday, Wednesday and Friday   access: right upper thigh avg  consent in chart  Volume Status : stable        Plan  s/p HD yesterday- flowsheet reviewed- tolerated HD well  Plan for next HD tomm  trend basic metabolic panel and blood pressure   prn phoslo . phos at goal    --::: Anemia-->  hb at goal     Current orders :  - retacrit 10,000 units iv three times per week   - plan to titrate medication as per response of hemoglobin  - if Hb less than 7gm/dl consider blood transfusion    Dr Mcfarlane  [phone]

## 2023-07-25 NOTE — PROGRESS NOTE ADULT - SUBJECTIVE AND OBJECTIVE BOX
Date of Service  : 07-25-23     INTERVAL HPI/OVERNIGHT EVENTS: I am feeling fine.   Vital Signs Last 24 Hrs  T(C): 36.4 (25 Jul 2023 12:05), Max: 37.3 (24 Jul 2023 18:15)  T(F): 97.5 (25 Jul 2023 12:05), Max: 99.1 (24 Jul 2023 18:15)  HR: 63 (25 Jul 2023 12:05) (63 - 69)  BP: 121/59 (25 Jul 2023 12:05) (120/57 - 130/55)  BP(mean): 78 (25 Jul 2023 05:14) (78 - 78)  RR: 18 (25 Jul 2023 12:05) (18 - 18)  SpO2: 94% (25 Jul 2023 12:05) (94% - 95%)    Parameters below as of 25 Jul 2023 12:05  Patient On (Oxygen Delivery Method): room air      I&O's Summary    24 Jul 2023 07:01  -  25 Jul 2023 07:00  --------------------------------------------------------  IN: 840 mL / OUT: 1500 mL / NET: -660 mL    25 Jul 2023 07:01  -  25 Jul 2023 16:58  --------------------------------------------------------  IN: 480 mL / OUT: 0 mL / NET: 480 mL      MEDICATIONS  (STANDING):  aspirin enteric coated 81 milliGRAM(s) Oral daily  atorvastatin 80 milliGRAM(s) Oral at bedtime  bisacodyl 5 milliGRAM(s) Oral daily  calcium acetate 667 milliGRAM(s) Oral three times a day with meals  chlorhexidine 2% Cloths 1 Application(s) Topical daily  clopidogrel Tablet 75 milliGRAM(s) Oral daily  epoetin niesha-epbx (RETACRIT) Injectable 30593 Unit(s) IV Push <User Schedule>  heparin   Injectable 5000 Unit(s) SubCutaneous every 8 hours  metoprolol succinate ER 25 milliGRAM(s) Oral daily  pantoprazole    Tablet 40 milliGRAM(s) Oral before breakfast  polyethylene glycol 3350 17 Gram(s) Oral daily  senna 2 Tablet(s) Oral at bedtime    MEDICATIONS  (PRN):  acetaminophen     Tablet .. 650 milliGRAM(s) Oral every 6 hours PRN Mild Pain (1 - 3)  bisacodyl Suppository 10 milliGRAM(s) Rectal daily PRN Constipation    LABS:                        10.3   9.18  )-----------( 191      ( 25 Jul 2023 11:49 )             32.7     07-25    133<L>  |  93<L>  |  24<H>  ----------------------------<  74  3.7   |  26  |  5.71<H>    Ca    9.0      25 Jul 2023 05:30  Phos  2.8     07-25  Mg     2.2     07-25    TPro  6.3  /  Alb  3.3  /  TBili  0.3  /  DBili  x   /  AST  11  /  ALT  13  /  AlkPhos  61  07-25      Urinalysis Basic - ( 25 Jul 2023 05:30 )    Color: x / Appearance: x / SG: x / pH: x  Gluc: 74 mg/dL / Ketone: x  / Bili: x / Urobili: x   Blood: x / Protein: x / Nitrite: x   Leuk Esterase: x / RBC: x / WBC x   Sq Epi: x / Non Sq Epi: x / Bacteria: x      CAPILLARY BLOOD GLUCOSE            Urinalysis Basic - ( 25 Jul 2023 05:30 )    Color: x / Appearance: x / SG: x / pH: x  Gluc: 74 mg/dL / Ketone: x  / Bili: x / Urobili: x   Blood: x / Protein: x / Nitrite: x   Leuk Esterase: x / RBC: x / WBC x   Sq Epi: x / Non Sq Epi: x / Bacteria: x      REVIEW OF SYSTEMS:  CONSTITUTIONAL: No fever, weight loss, or fatigue  EYES: No eye pain, visual disturbances, or discharge  ENMT:  No difficulty hearing, tinnitus, vertigo; No sinus or throat pain  NECK: No pain or stiffness  RESPIRATORY: No cough, wheezing, chills or hemoptysis; No shortness of breath  CARDIOVASCULAR: No chest pain, palpitations, dizziness, or leg swelling  GASTROINTESTINAL: No abdominal or epigastric pain. No nausea, vomiting, or hematemesis; No diarrhea or constipation. No melena or hematochezia.  GENITOURINARY: No dysuria, frequency, hematuria, or incontinence  NEUROLOGICAL: No headaches, memory loss, loss of strength, numbness, or tremors      RADIOLOGY & ADDITIONAL TESTS:    Consultant(s) Notes Reviewed:  [x ] YES  [ ] NO    PHYSICAL EXAM:  GENERAL: NAD, well-groomed, well-developed,not in any distress ,  HEAD:  Atraumatic, Normocephalic  NECK: Supple, No JVD, Normal thyroid  NERVOUS SYSTEM:  Alert & Oriented X3, No focal deficit   CHEST/LUNG: Good air entry bilateral with no  rales, rhonchi, wheezing, or rubs  HEART: Regular rate and rhythm; No murmurs, rubs, or gallops, Sternal wound with wound vac+  ABDOMEN: Soft, Nontender, Nondistended; Bowel sounds present  EXTREMITIES: B/L BKA   Care Discussed with Consultants/Other Providers [ x] YES  [ ] NO

## 2023-07-25 NOTE — DISCHARGE NOTE PROVIDER - NSDCCPCAREPLAN_GEN_ALL_CORE_FT
PRINCIPAL DISCHARGE DIAGNOSIS  Diagnosis: S/P CABG x 3  Assessment and Plan of Treatment: shower daily/ wash incisions daily  weigh yourself daily; I & O's daily   physical therapy daily   cbc and bmp every monday and friday   continue current prescriptions as ordered  increase activity as tolerated   no added salt; low fat; low cholesterol, low salt diabetic diet  check blood sugar levels before meals and at bedtime -cover with low dose insulin sliding scale   follow up with Cardiologist in 1-2 weeks. Call to schedule appointment.  follow up with cardiac surgeon, DR. Mabry within 1 week of discharge from rehab; call to schedule appointment. [phone]  dialysis every monday, wednesday and fridays as per nephrologist  follow up with endocrinologist for diabetic management within 1-2 weeks of discharge from rehab; call to schedule appointment.

## 2023-07-25 NOTE — DISCHARGE NOTE PROVIDER - CARE PROVIDERS DIRECT ADDRESSES
,[email or web address],DirectAddress_Unknown,DirectAddress_Unknown

## 2023-07-25 NOTE — DISCHARGE NOTE PROVIDER - NSDCMRMEDTOKEN_GEN_ALL_CORE_FT
acetaminophen 325 mg oral tablet: 2 tab(s) orally every 6 hours As needed Mild Pain (1 - 3)  aspirin 81 mg oral delayed release tablet: 1 tab(s) orally once a day  atorvastatin 80 mg oral tablet: 1 tab(s) orally once a day (at bedtime)  calcium acetate 667 mg oral tablet: 1 tab(s) orally 3 times a day  clopidogrel 75 mg oral tablet: 1 tab(s) orally once a day  epoetin niesha: 10,000 unit(s) intravenous 3 times a week give with dialysis every mon, wed and friday  heparin: 5,000 unit(s) subcutaneous 2 times a day  metoprolol succinate 25 mg oral tablet, extended release: 1 tab(s) orally once a day  Multiple Vitamins oral tablet: 1 tab(s) orally once a day  pantoprazole 40 mg oral delayed release tablet: 1 tab(s) orally once a day (before a meal)  polyethylene glycol 3350 oral powder for reconstitution: 17 gram(s) orally once a day  senna leaf extract oral tablet: 2 tab(s) orally once a day (at bedtime)

## 2023-07-25 NOTE — DISCHARGE NOTE PROVIDER - NSDCFUADDINST_GEN_ALL_CORE_FT
call Dr. Mabry for fever > 101  no driving until cleared by Dr. Mabry  refer to cardiac surgery do and don't checklist  dialysis every mon. wed and friday  check blood sugar levels before meals and at bedtime- record levels

## 2023-07-25 NOTE — DISCHARGE NOTE PROVIDER - NSDCPNSUBOBJ_GEN_ALL_CORE
VSS  tele: RSR 60-70  neuro: alert and oriented-3; speech clear; no focal deficits   midsternal incision cdi - vac d/c this afternoon  lungs clear; RR easy unlabored  + bs nt nd + bm; obese abdomen; neg n/v/d  LE: rt thigh AVF; left svg site cdi fanta; bilateral BKA's-bilateral prosthetics    Discharge pt to rehab today 7/25 as per Dr. Mabry

## 2023-07-25 NOTE — PROGRESS NOTE ADULT - PROVIDER SPECIALTY LIST ADULT
CT Surgery
Cardiology
Internal Medicine
Nephrology
Nephrology
Cardiology
Critical Care
Critical Care
Internal Medicine
Internal Medicine
Nephrology
Cardiology
Critical Care
Internal Medicine
Nephrology
CT Surgery
Internal Medicine
Nephrology
CT Surgery

## 2023-07-25 NOTE — DISCHARGE NOTE PROVIDER - HOSPITAL COURSE
61-year-old female with ESRD on HD, HLD and peripheral vascular disease s/p lower extremity resection presents with chest pain found to have multivessel disease.       7/13 C3L, OMEGA CLIP,   7/17 Transfer from CTU to SDU, + LP CT x1, + PW isolated, needs BM got dulcolax in CTU, HD for 1.2 L today, PT/OT to work with patient BL LE prosthetics  7/18 vss sdu  7/19 VSS  metoprolol decreased to 12.5 BID - HD UF today  needs BM disposition to rehab   7/20 LOPRESSOR D/C , JON, BP 'S  uLTRAFILTRATION YEsterday  7/21 Ambulated with prosthetics yesterday.  Hr 80's, lopresor 12.5 bid started  Transfer to floor. HD   7/22    VSS   BB      lop 12.5 q12    OOB to chair  7/23 VSS overnight.  Vac dressing intact minimal drainage noted in canister.  Positive 1L in last 24hrs.  Plan for HD today    7/24 VSS;  RSR 60-70; HD as per renal every MWF; vac d/c; discharge pt to rehab today as per Dr. Mabry

## 2023-08-03 PROBLEM — Z95.1 S/P CABG X 3: Status: ACTIVE | Noted: 2023-08-03

## 2023-08-03 PROBLEM — Z09 POSTOPERATIVE FOLLOW-UP: Status: ACTIVE | Noted: 2023-08-03

## 2023-08-03 RX ORDER — ERYTHROPOIETIN 10000 [IU]/ML
10000 INJECTION, SOLUTION INTRAVENOUS; SUBCUTANEOUS
Refills: 0 | Status: ACTIVE | COMMUNITY
Start: 2023-08-03

## 2023-08-03 RX ORDER — METOPROLOL SUCCINATE 25 MG/1
25 TABLET, EXTENDED RELEASE ORAL DAILY
Qty: 30 | Refills: 0 | Status: ACTIVE | COMMUNITY
Start: 2023-08-03

## 2023-08-03 RX ORDER — ISOSORBIDE DINITRATE 10 MG/1
10 TABLET ORAL
Qty: 270 | Refills: 2 | Status: COMPLETED | COMMUNITY
Start: 2019-06-11 | End: 2023-08-03

## 2023-08-03 RX ORDER — ATORVASTATIN CALCIUM 80 MG/1
80 TABLET, FILM COATED ORAL
Qty: 30 | Refills: 0 | Status: ACTIVE | COMMUNITY

## 2023-08-03 RX ORDER — ACETAMINOPHEN 325 MG/1
325 TABLET ORAL EVERY 6 HOURS
Qty: 240 | Refills: 0 | Status: ACTIVE | COMMUNITY
Start: 2023-08-03 | End: 1900-01-01

## 2023-08-03 RX ORDER — PANTOPRAZOLE 40 MG/1
40 TABLET, DELAYED RELEASE ORAL DAILY
Qty: 30 | Refills: 0 | Status: ACTIVE | COMMUNITY
Start: 2023-08-03

## 2023-08-03 RX ORDER — LORATADINE 10 MG
17 TABLET,DISINTEGRATING ORAL DAILY
Refills: 1 | Status: ACTIVE | COMMUNITY
Start: 2023-08-03

## 2023-08-03 RX ORDER — HYDRALAZINE HYDROCHLORIDE 50 MG/1
50 TABLET ORAL EVERY 8 HOURS
Qty: 90 | Refills: 3 | Status: COMPLETED | COMMUNITY
End: 2023-08-03

## 2023-08-03 RX ORDER — COLLAGENASE SANTYL 250 [ARB'U]/G
250 OINTMENT TOPICAL DAILY
Qty: 1 | Refills: 2 | Status: COMPLETED | COMMUNITY
Start: 2022-02-11 | End: 2023-08-03

## 2023-08-03 RX ORDER — SENNOSIDES 8.6 MG TABLETS 8.6 MG/1
8.6 TABLET ORAL
Qty: 60 | Refills: 0 | Status: ACTIVE | COMMUNITY
Start: 2023-08-03

## 2023-08-03 RX ORDER — HYDRALAZINE HYDROCHLORIDE 25 MG/1
25 TABLET ORAL 3 TIMES DAILY
Qty: 270 | Refills: 3 | Status: COMPLETED | COMMUNITY
Start: 2019-01-08 | End: 2023-08-03

## 2023-08-03 RX ORDER — CARVEDILOL 25 MG/1
25 TABLET, FILM COATED ORAL TWICE DAILY
Qty: 90 | Refills: 3 | Status: COMPLETED | COMMUNITY
End: 2023-08-03

## 2023-08-03 RX ORDER — AMLODIPINE BESYLATE 10 MG/1
10 TABLET ORAL DAILY
Qty: 90 | Refills: 3 | Status: COMPLETED | COMMUNITY
Start: 2019-11-29 | End: 2023-08-03

## 2023-08-03 RX ORDER — CHLORHEXIDINE GLUCONATE 4 %
325 (65 FE) LIQUID (ML) TOPICAL DAILY
Refills: 0 | Status: COMPLETED | COMMUNITY
End: 2023-08-03

## 2023-08-03 RX ORDER — ELECTROLYTES/DEXTROSE
SOLUTION, ORAL ORAL
Qty: 90 | Refills: 0 | Status: ACTIVE | COMMUNITY
Start: 2023-08-03

## 2023-08-03 RX ORDER — ASPIRIN ENTERIC COATED TABLETS 81 MG 81 MG/1
81 TABLET, DELAYED RELEASE ORAL DAILY
Qty: 60 | Refills: 0 | Status: ACTIVE | COMMUNITY
Start: 2023-08-03 | End: 1900-01-01

## 2023-08-03 RX ORDER — CALCIUM ACETATE 667 MG
667 TABLET ORAL 3 TIMES DAILY
Refills: 0 | Status: ACTIVE | COMMUNITY
Start: 2023-08-03

## 2023-08-03 RX ORDER — FUROSEMIDE 40 MG/1
40 TABLET ORAL
Qty: 30 | Refills: 0 | Status: COMPLETED | COMMUNITY
End: 2023-08-03

## 2023-08-07 ENCOUNTER — APPOINTMENT (OUTPATIENT)
Dept: CARDIOTHORACIC SURGERY | Facility: CLINIC | Age: 63
End: 2023-08-07
Payer: MEDICARE

## 2023-08-07 VITALS
TEMPERATURE: 98 F | HEIGHT: 62 IN | SYSTOLIC BLOOD PRESSURE: 129 MMHG | WEIGHT: 156 LBS | BODY MASS INDEX: 28.71 KG/M2 | RESPIRATION RATE: 16 BRPM | OXYGEN SATURATION: 100 % | HEART RATE: 76 BPM | DIASTOLIC BLOOD PRESSURE: 67 MMHG

## 2023-08-07 DIAGNOSIS — Z95.1 PRESENCE OF AORTOCORONARY BYPASS GRAFT: ICD-10-CM

## 2023-08-07 DIAGNOSIS — Z09 ENCOUNTER FOR FOLLOW-UP EXAMINATION AFTER COMPLETED TREATMENT FOR CONDITIONS OTHER THAN MALIGNANT NEOPLASM: ICD-10-CM

## 2023-08-07 PROCEDURE — 99024 POSTOP FOLLOW-UP VISIT: CPT

## 2023-08-07 RX ORDER — SIMVASTATIN 40 MG/1
40 TABLET, FILM COATED ORAL
Qty: 90 | Refills: 1 | Status: ACTIVE | COMMUNITY
Start: 2023-08-07

## 2023-08-07 NOTE — ASSESSMENT
[FreeTextEntry1] : 61-year-old female with ESRD on HD, HLD and peripheral vascular disease s/p lower extremity resection presents with chest pain found to have multivessel disease.   She is S/P Triple coronary artery bypass grafting utilizing the left internal mammary artery in an extended end-to-side fashion to the left anterior descending, Separate reverse saphenous vein grafts to the obtuse marginal and mid right coronary artery, Exclusion of left atrial appendage with an AtriClip on 7/13/23.  Post op course significant with PT/OT to work with patient BL LE prosthetics. Discharged to Rehab. He is here for post op visit.   Today she presents and reports that she is doing well. She is still in [facility] rehab. Denies any chest pain, shortness of breath, palpitations or pedal edema. She wears B/L prosthetics below knee.   Today on exam patient's lungs clear bilaterally, normal sinus rhythm, sternum stable, incision clean, dry and intact. LT SVG site is clean, dry and intact.  No peripheral edema noted.    Instructed patient on importance of optimal glycemic control, daily showering, daily weights, any signs of fever (temperature greater than 101F, chills,  incentive spirometer use, and increase ambulation as tolerated. Instructed to call office with any signs or symptoms of infection or weight gain of 2 or more pounds in 1 day or 3 or more pounds in 1 week.    Discussed intake of plant based foods, including vegetables, fruits, and whole grain foods: legumes, nuts and seeds, fish or seafood, lean meats, and non-fat or low-fat diary foods. Plant based oils (non-tropical) in place of solid fats. Instructed patient to limit intake of high fat meats and processed meats, high-fat diary foods, dietary cholesterol and sodium, foods and beverages with added sugars.   Plan: 1) Continue current medication regimen 2) Follow up with cardiologist (Dr. Jose Madden) and PCP  3) May return on as needed basis  4) Ambulate as tolerated  5)  HADS score 11, informed Rehab, cardiologist and patient.  6) Continue to increase activity and walk daily as tolerated. Continue to use incentive spirometer.  7) Keep legs elevated above heart when resting/sitting/sleeping.  8) Call MD if you experience fever, fatigue, dizziness, confusion, syncope, shortness of breath, chest pain not relieved with analgesics, increased redness/drainage from the surgical  incision site 9) Virtual Cardiac Rehab referral

## 2023-08-07 NOTE — PHYSICAL EXAM
[] : no respiratory distress [Respiration, Rhythm And Depth] : normal respiratory rhythm and effort [Auscultation Breath Sounds / Voice Sounds] : lungs were clear to auscultation bilaterally [Apical Impulse] : the apical impulse was normal [Heart Rate And Rhythm] : heart rate was normal and rhythm regular [Heart Sounds] : normal S1 and S2 [Murmurs] : no murmurs [Clean] : clean [Dry] : dry [Healing Well] : healing well [FreeTextEntry5] : LT G site  [FreeTextEntry3] : B/L BKA

## 2023-08-07 NOTE — REASON FOR VISIT
[de-identified] : Triple coronary artery bypass grafting utilizing the left internal mammary artery in an extended end-to-side fashion to the left anterior descending, Separate reverse saphenous vein grafts to the obtuse marginal and mid right coronary artery, Exclusion of left atrial appendage with an AtriClip  [de-identified] : 7/13/23

## 2023-08-07 NOTE — END OF VISIT
[FreeTextEntry3] :  I,    , personally performed the evaluation and management (E/M) services for this established  patient. That E/M includes conducting the initial examination, assessing all conditions, and establishing the plan of care. Today, MARC HUSAIN  was here to observe my evaluation and management services for this patient.

## 2023-08-07 NOTE — CONSULT LETTER
[Dear  ___] : Dear  [unfilled], [Courtesy Letter:] : I had the pleasure of seeing your patient, [unfilled], in my office today. [Please see my note below.] : Please see my note below. [Consult Closing:] : Thank you very much for allowing me to participate in the care of this patient.  If you have any questions, please do not hesitate to contact me. [Sincerely,] : Sincerely, [FreeTextEntry2] : DR. Jose Madden [FreeTextEntry3] : Alma Mabry MD Attending Surgeon  [facility]   Cardiovascular & Thoracic Surgery [facility]

## 2023-08-18 NOTE — PATIENT PROFILE ADULT - PATIENT'S SEXUAL ORIENTATION
Your current Orthopaedic problem we are working together to treat is:  Osteoarthritis, right knee.    INJECTION  You have received an injection of Monovisc to your Right knee. Common symptoms of mild swelling and pain may accompany the injection for 24-48 hours. Ice and rest will typically reduce the symptoms. Redness, fever, or intense swelling are not typical. If this occurs, you should call the office for further advice.     It is recommended you schedule a follow-up appointment with Benny Lucas MD  as needed.      Office hours are 8:00 am to 5:00 pm Monday through Friday. If it is urgent that you speak with someone outside of these hours, our [facility] Call Center will be able to assist you. You can reach the office by calling the:    [address] [place] [address]  [address] [phone]    We do highly recommend [facility] with [facility], if you do not already have this. You can request access via the internet or by simply talking with a  at any of the clinics.   [email or web address]    Thank you for choosing [facility] as your Orthopedic provider!     Heterosexual

## 2023-10-24 ENCOUNTER — NON-APPOINTMENT (OUTPATIENT)
Age: 63
End: 2023-10-24

## 2023-12-05 NOTE — PHYSICAL THERAPY INITIAL EVALUATION ADULT - GAIT DEVIATIONS NOTED, PT EVAL
decreased dandy/increased time in double stance/decreased step length/decreased stride length/decreased weight-shifting ability

## 2024-03-05 NOTE — DIETITIAN INITIAL EVALUATION ADULT. - CONTINUE CURRENT NUTRITION CARE PLAN
yes
82yoF PMHx dvt pe and A-Fib on Coumadin, HTN, HLD, gout presents for worsening shortness of breath, fatigue and bilateral lower leg swelling for the past week. Patient is mostly home bound and gets around the house with difficulty but notices and increased work of breathing and breathlessness with activity. Patient reports she stopped taking HCTZ 2 weeks ago because she was urinating too frequently. Patient also states that she has been having palpitations lately and was told to take an extra dose of metoprolol but she did not think it helped. Patient denies chest pain shortness of breath nausea vomiting.

## 2024-03-18 ENCOUNTER — INPATIENT (INPATIENT)
Facility: HOSPITAL | Age: 64
LOS: 13 days | Discharge: NURSING, REHAB OR LONG-TERM CARE | End: 2024-04-01
Attending: INTERNAL MEDICINE | Admitting: INTERNAL MEDICINE
Payer: MEDICARE

## 2024-03-18 VITALS
HEART RATE: 75 BPM | OXYGEN SATURATION: 99 % | DIASTOLIC BLOOD PRESSURE: 43 MMHG | RESPIRATION RATE: 20 BRPM | SYSTOLIC BLOOD PRESSURE: 62 MMHG | TEMPERATURE: 98 F

## 2024-03-18 DIAGNOSIS — Z98.890 OTHER SPECIFIED POSTPROCEDURAL STATES: Chronic | ICD-10-CM

## 2024-03-18 DIAGNOSIS — I77.0 ARTERIOVENOUS FISTULA, ACQUIRED: ICD-10-CM

## 2024-03-18 DIAGNOSIS — M86.171 OTHER ACUTE OSTEOMYELITIS, RIGHT ANKLE AND FOOT: Chronic | ICD-10-CM

## 2024-03-18 DIAGNOSIS — Z99.2 DEPENDENCE ON RENAL DIALYSIS: Chronic | ICD-10-CM

## 2024-03-18 LAB
ALBUMIN SERPL ELPH-MCNC: 4.4 G/DL — SIGNIFICANT CHANGE UP (ref 3.3–5)
ALP SERPL-CCNC: 94 U/L — SIGNIFICANT CHANGE UP (ref 40–120)
ALT FLD-CCNC: 5 U/L — SIGNIFICANT CHANGE UP (ref 4–33)
ANION GAP SERPL CALC-SCNC: 19 MMOL/L — HIGH (ref 7–14)
APTT BLD: 29.1 SEC — SIGNIFICANT CHANGE UP (ref 24.5–35.6)
AST SERPL-CCNC: 10 U/L — SIGNIFICANT CHANGE UP (ref 4–32)
BASE EXCESS BLDV CALC-SCNC: 6.5 MMOL/L — HIGH (ref -2–3)
BASE EXCESS BLDV CALC-SCNC: 7.7 MMOL/L — HIGH (ref -2–3)
BASOPHILS # BLD AUTO: 0.05 K/UL — SIGNIFICANT CHANGE UP (ref 0–0.2)
BASOPHILS NFR BLD AUTO: 0.7 % — SIGNIFICANT CHANGE UP (ref 0–2)
BILIRUB SERPL-MCNC: 0.3 MG/DL — SIGNIFICANT CHANGE UP (ref 0.2–1.2)
BLD GP AB SCN SERPL QL: NEGATIVE — SIGNIFICANT CHANGE UP
BLOOD GAS VENOUS COMPREHENSIVE RESULT: SIGNIFICANT CHANGE UP
BUN SERPL-MCNC: 30 MG/DL — HIGH (ref 7–23)
CA-I SERPL-SCNC: 1.17 MMOL/L — SIGNIFICANT CHANGE UP (ref 1.15–1.33)
CALCIUM SERPL-MCNC: 9.8 MG/DL — SIGNIFICANT CHANGE UP (ref 8.4–10.5)
CHLORIDE BLDV-SCNC: 97 MMOL/L — SIGNIFICANT CHANGE UP (ref 96–108)
CHLORIDE BLDV-SCNC: 98 MMOL/L — SIGNIFICANT CHANGE UP (ref 96–108)
CHLORIDE SERPL-SCNC: 98 MMOL/L — SIGNIFICANT CHANGE UP (ref 98–107)
CO2 BLDV-SCNC: 33.9 MMOL/L — HIGH (ref 22–26)
CO2 BLDV-SCNC: 35.8 MMOL/L — HIGH (ref 22–26)
CO2 SERPL-SCNC: 25 MMOL/L — SIGNIFICANT CHANGE UP (ref 22–31)
CREAT SERPL-MCNC: 5.22 MG/DL — HIGH (ref 0.5–1.3)
EGFR: 9 ML/MIN/1.73M2 — LOW
EOSINOPHIL # BLD AUTO: 0.2 K/UL — SIGNIFICANT CHANGE UP (ref 0–0.5)
EOSINOPHIL NFR BLD AUTO: 3 % — SIGNIFICANT CHANGE UP (ref 0–6)
GAS PNL BLDV: 136 MMOL/L — SIGNIFICANT CHANGE UP (ref 136–145)
GAS PNL BLDV: 138 MMOL/L — SIGNIFICANT CHANGE UP (ref 136–145)
GAS PNL BLDV: SIGNIFICANT CHANGE UP
GLUCOSE BLDC GLUCOMTR-MCNC: 177 MG/DL — HIGH (ref 70–99)
GLUCOSE BLDV-MCNC: 138 MG/DL — HIGH (ref 70–99)
GLUCOSE BLDV-MCNC: 99 MG/DL — SIGNIFICANT CHANGE UP (ref 70–99)
GLUCOSE SERPL-MCNC: 134 MG/DL — HIGH (ref 70–99)
HCO3 BLDV-SCNC: 32 MMOL/L — HIGH (ref 22–29)
HCO3 BLDV-SCNC: 34 MMOL/L — HIGH (ref 22–29)
HCT VFR BLD CALC: 36.4 % — SIGNIFICANT CHANGE UP (ref 34.5–45)
HCT VFR BLDA CALC: 35 % — SIGNIFICANT CHANGE UP (ref 34.5–46.5)
HCT VFR BLDA CALC: 37 % — SIGNIFICANT CHANGE UP (ref 34.5–46.5)
HGB BLD CALC-MCNC: 11.7 G/DL — SIGNIFICANT CHANGE UP (ref 11.7–16.1)
HGB BLD CALC-MCNC: 12.2 G/DL — SIGNIFICANT CHANGE UP (ref 11.7–16.1)
HGB BLD-MCNC: 11.2 G/DL — LOW (ref 11.5–15.5)
IANC: 3.6 K/UL — SIGNIFICANT CHANGE UP (ref 1.8–7.4)
IMM GRANULOCYTES NFR BLD AUTO: 0.3 % — SIGNIFICANT CHANGE UP (ref 0–0.9)
INR BLD: 1 RATIO — SIGNIFICANT CHANGE UP (ref 0.85–1.18)
LACTATE BLDV-MCNC: 1.1 MMOL/L — SIGNIFICANT CHANGE UP (ref 0.5–2)
LACTATE BLDV-MCNC: 2.2 MMOL/L — HIGH (ref 0.5–2)
LYMPHOCYTES # BLD AUTO: 2.15 K/UL — SIGNIFICANT CHANGE UP (ref 1–3.3)
LYMPHOCYTES # BLD AUTO: 32.2 % — SIGNIFICANT CHANGE UP (ref 13–44)
MCHC RBC-ENTMCNC: 26.8 PG — LOW (ref 27–34)
MCHC RBC-ENTMCNC: 30.8 GM/DL — LOW (ref 32–36)
MCV RBC AUTO: 87.1 FL — SIGNIFICANT CHANGE UP (ref 80–100)
MONOCYTES # BLD AUTO: 0.66 K/UL — SIGNIFICANT CHANGE UP (ref 0–0.9)
MONOCYTES NFR BLD AUTO: 9.9 % — SIGNIFICANT CHANGE UP (ref 2–14)
NEUTROPHILS # BLD AUTO: 3.6 K/UL — SIGNIFICANT CHANGE UP (ref 1.8–7.4)
NEUTROPHILS NFR BLD AUTO: 53.9 % — SIGNIFICANT CHANGE UP (ref 43–77)
NRBC # BLD: 0 /100 WBCS — SIGNIFICANT CHANGE UP (ref 0–0)
NRBC # FLD: 0 K/UL — SIGNIFICANT CHANGE UP (ref 0–0)
PCO2 BLDV: 51 MMHG — SIGNIFICANT CHANGE UP (ref 39–52)
PCO2 BLDV: 55 MMHG — HIGH (ref 39–52)
PH BLDV: 7.4 — SIGNIFICANT CHANGE UP (ref 7.32–7.43)
PH BLDV: 7.41 — SIGNIFICANT CHANGE UP (ref 7.32–7.43)
PLATELET # BLD AUTO: 186 K/UL — SIGNIFICANT CHANGE UP (ref 150–400)
PO2 BLDV: <20 MMHG — LOW (ref 25–45)
PO2 BLDV: <20 MMHG — LOW (ref 25–45)
POTASSIUM BLDV-SCNC: 4 MMOL/L — SIGNIFICANT CHANGE UP (ref 3.5–5.1)
POTASSIUM BLDV-SCNC: 4.7 MMOL/L — SIGNIFICANT CHANGE UP (ref 3.5–5.1)
POTASSIUM SERPL-MCNC: 3.9 MMOL/L — SIGNIFICANT CHANGE UP (ref 3.5–5.3)
POTASSIUM SERPL-SCNC: 3.9 MMOL/L — SIGNIFICANT CHANGE UP (ref 3.5–5.3)
PROT SERPL-MCNC: 8 G/DL — SIGNIFICANT CHANGE UP (ref 6–8.3)
PROTHROM AB SERPL-ACNC: 11.2 SEC — SIGNIFICANT CHANGE UP (ref 9.5–13)
RBC # BLD: 4.18 M/UL — SIGNIFICANT CHANGE UP (ref 3.8–5.2)
RBC # FLD: 15.4 % — HIGH (ref 10.3–14.5)
RH IG SCN BLD-IMP: POSITIVE — SIGNIFICANT CHANGE UP
SAO2 % BLDV: 18.3 % — LOW (ref 67–88)
SAO2 % BLDV: 25.7 % — LOW (ref 67–88)
SODIUM SERPL-SCNC: 142 MMOL/L — SIGNIFICANT CHANGE UP (ref 135–145)
WBC # BLD: 6.68 K/UL — SIGNIFICANT CHANGE UP (ref 3.8–10.5)
WBC # FLD AUTO: 6.68 K/UL — SIGNIFICANT CHANGE UP (ref 3.8–10.5)

## 2024-03-18 PROCEDURE — 93010 ELECTROCARDIOGRAM REPORT: CPT

## 2024-03-18 PROCEDURE — 75635 CT ANGIO ABDOMINAL ARTERIES: CPT | Mod: 26,MC

## 2024-03-18 PROCEDURE — 99291 CRITICAL CARE FIRST HOUR: CPT

## 2024-03-18 PROCEDURE — 99222 1ST HOSP IP/OBS MODERATE 55: CPT

## 2024-03-18 PROCEDURE — 99285 EMERGENCY DEPT VISIT HI MDM: CPT

## 2024-03-18 PROCEDURE — 99223 1ST HOSP IP/OBS HIGH 75: CPT

## 2024-03-18 RX ORDER — DEXTROSE 50 % IN WATER 50 %
25 SYRINGE (ML) INTRAVENOUS ONCE
Refills: 0 | Status: DISCONTINUED | OUTPATIENT
Start: 2024-03-18 | End: 2024-04-01

## 2024-03-18 RX ORDER — SODIUM CHLORIDE 9 MG/ML
1000 INJECTION, SOLUTION INTRAVENOUS
Refills: 0 | Status: DISCONTINUED | OUTPATIENT
Start: 2024-03-18 | End: 2024-04-01

## 2024-03-18 RX ORDER — INSULIN LISPRO 100/ML
VIAL (ML) SUBCUTANEOUS
Refills: 0 | Status: DISCONTINUED | OUTPATIENT
Start: 2024-03-18 | End: 2024-04-01

## 2024-03-18 RX ORDER — DEXTROSE 50 % IN WATER 50 %
15 SYRINGE (ML) INTRAVENOUS ONCE
Refills: 0 | Status: DISCONTINUED | OUTPATIENT
Start: 2024-03-18 | End: 2024-04-01

## 2024-03-18 RX ORDER — GLUCAGON INJECTION, SOLUTION 0.5 MG/.1ML
1 INJECTION, SOLUTION SUBCUTANEOUS ONCE
Refills: 0 | Status: DISCONTINUED | OUTPATIENT
Start: 2024-03-18 | End: 2024-04-01

## 2024-03-18 RX ORDER — INSULIN LISPRO 100/ML
VIAL (ML) SUBCUTANEOUS AT BEDTIME
Refills: 0 | Status: DISCONTINUED | OUTPATIENT
Start: 2024-03-18 | End: 2024-04-01

## 2024-03-18 RX ORDER — DEXTROSE 50 % IN WATER 50 %
12.5 SYRINGE (ML) INTRAVENOUS ONCE
Refills: 0 | Status: DISCONTINUED | OUTPATIENT
Start: 2024-03-18 | End: 2024-04-01

## 2024-03-18 NOTE — H&P ADULT - PROBLEM SELECTOR PLAN 7
Pt on metoprolol succinate 25 mg daily at home. BP initially 62/43, now improved s/p 2 u pRBC transfusion.  - Hold metoprolol succinate 25 mg daily for now given recent profuse bleeding  - Monitor VS q4hrs

## 2024-03-18 NOTE — ED PROVIDER NOTE - CLINICAL SUMMARY MEDICAL DECISION MAKING FREE TEXT BOX
63-year-old female history of ESRD on dialysis Monday Wednesday Friday right lower extremity fistula, hypertension, hyperlipidemia, diabetes presenting to the ED for bleeding right lower extremity fistula that occurred after patient completed her dialysis session today.  Patient denying any chest pain, shortness of breath.  Felt some nausea but no vomiting.  Bleeding controlled and patient.  Will get CTAs, labs, give emergent blood and reassess.

## 2024-03-18 NOTE — CONSULT NOTE ADULT - ATTENDING COMMENTS
pt with right thigh AVG  pseudo in the mid graft  pt will need right AVG revision after medical optimization

## 2024-03-18 NOTE — H&P ADULT - PROBLEM SELECTOR PLAN 5
S/p 3v CABG in 7/2023. Per pt, admits to not taking either ASA or Plavix since last year after not getting refills of these medications.  - Hold ASA, Plavix, any other antiplatelet meds for now given recent profuse bleeding  - Also hold metoprolol succinate 25 mg daily for now  - C/w simvastatin 40 mg daily S/p 3v CABG in 7/2023. Per pt, admits to not taking either ASA or Plavix since last year after not getting refills of these medications, though Surescripts shows that pt filled a prescription for Plavix in Feb 2024.   - Continue to hold ASA, Plavix, any other antiplatelet meds for now given recent profuse bleeding  - Resume ASA and/or Plavix once bleeding has completely resolved and if no plan for surgical intervention by vascular surgery during admission. F/u with pt's cardiologist as to which antiplatelet pt should be on at home.  - Also hold metoprolol succinate 25 mg daily for now  - C/w simvastatin 40 mg daily

## 2024-03-18 NOTE — CONSULT NOTE ADULT - SUBJECTIVE AND OBJECTIVE BOX
Surgery Consult Note  Attending: Eryn  Service: Vascular Surgery  [id number]/ [id number]      HPI: 63 year old female with PMH HTN, PAD, ESRD on HD through right groin fistula done at OSH, DMT2, CAD s/p CABG   (w/ left saphenous vein graft) in 2023, B/L BKA presents to ED from dialysis center after her fistula continued to bleed after HD today. Patient presented hypotensive to systolic of 60s, a rapid response was called in the ED. Vascular team arrived at [facility] and held pressure on fistula with adequate hemostasis. Patient received 1u prbc with improvement of BP to >100 systolic. Taken for stat CTA aorta with b/l runoff. PAtient is previously known to Dr. Gilliland who completed her B/L LE BKAs. She currently takes asa/plavix and a statin at home.     Currently pending CTA aorta w/ LE runoff, h/h 11/36. Scr 5.22, lac 2.2, receiving second unit of prbc. Patient reports after prbc admin she feels much improved.       PAST MEDICAL HISTORY:  PAST MEDICAL & SURGICAL HISTORY:  Heart failure      HLD (hyperlipidemia)      Glaucoma      Cataract      ESRD (end stage renal disease) on dialysis      PAD (peripheral artery disease)      Blind right eye      Acute osteomyelitis of toe of right foot  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          ALLERGIES:  Allergies    No Known Allergies    Intolerances        SOCIAL HISTORY: Negative for tobacco, etoh, or drug use    FAMILY HISTORY:  FAMILY HISTORY:  No pertinent family history in first degree relatives        PHYSICAL EXAM:  General: NAD, resting comfortably,   Pulmonary: normal resp effort, on 2LNC  Cardiovascular: NSR, no murmurs, previous well healed midline sternotomy scar  Abdominal: soft, ND/NT, no organomegaly, no guarding or rebound tenderness  Extremities: WWP, s/p B/L BKA, stumps well healed  Neuro: A/O x 3, CNs II-XII grossly intact, normal sensation, no focal deficits    Vascular Pulse Exam:  Right Femoral:  Palpable       Left Femoral:  Palpable    VITAL SIGNS:  Vital Signs Last 24 Hrs  T(C): 36.7 (18 Mar 2024 16:29), Max: 36.7 (18 Mar 2024 16:29)  T(F): 98.1 (18 Mar 2024 16:29), Max: 98.1 (18 Mar 2024 16:29)  HR: 75 (18 Mar 2024 16:29) (75 - 75)  BP: 62/43 (18 Mar 2024 16:29) (62/43 - 62/43)  BP(mean): --  RR: 20 (18 Mar 2024 16:29) (20 - 20)  SpO2: 99% (18 Mar 2024 16:29) (99% - 99%)        I&O's Summary      LABS:                        11.2   6.68  )-----------( 186      ( 18 Mar 2024 17:05 )             36.4     03-18    142  |  98  |  30<H>  ----------------------------<  134<H>  3.9   |  25  |  5.22<H>    Ca    9.8      18 Mar 2024 17:05    TPro  8.0  /  Alb  4.4  /  TBili  0.3  /  DBili  x   /  AST  10  /  ALT  5   /  AlkPhos  94  03-18    PT/INR - ( 18 Mar 2024 17:05 )   PT: 11.2 sec;   INR: 1.00 ratio         PTT - ( 18 Mar 2024 17:05 )  PTT:29.1 sec  Urinalysis Basic - ( 18 Mar 2024 17:05 )    Color: x / Appearance: x / SG: x / pH: x  Gluc: 134 mg/dL / Ketone: x  / Bili: x / Urobili: x   Blood: x / Protein: x / Nitrite: x   Leuk Esterase: x / RBC: x / WBC x   Sq Epi: x / Non Sq Epi: x / Bacteria: x      CAPILLARY BLOOD GLUCOSE        LIVER FUNCTIONS - ( 18 Mar 2024 17:05 )  Alb: 4.4 g/dL / Pro: 8.0 g/dL / ALK PHOS: 94 U/L / ALT: 5 U/L / AST: 10 U/L / GGT: x             CULTURES:      RADIOLOGY & ADDITIONAL STUDIES:  Pending read of CTA        ASSESSMENT:  63 year old female with PMH HTN, PAD, ESRD on HD through right groin fistula done at OSH, DMT2, CAD s/p CABG  (w/ left saphenous vein graft) in 2023, B/L NIRMAL presents to ED from dialysis center after her fistula continued to bleed after HD today. Initially hypotensive but improved after 1u prbc, then another unit given after CTA aorta w/ runoff. Patient pending CTA read. Vascular surgery consulted for bleeding fistula, now hemostatic after pressure held in ED.      Plan:  - f/u CTA aorta w/ B/L runoff  - labs stable, hemostasis achieved with pressure held and pressure dressing in place  - final plan pending imaging results      Plan Discussed with Vascular Surgery Fellow on behalf of Dr. Cerna    Vascular Surgery   [id number]/ [id number]             Surgery Consult Note  Attending: Eryn  Service: Vascular Surgery  [id number]/ [id number]      HPI: 63 year old female with PMH HTN, PAD, ESRD on HD through right groin fistula done at OSH, DMT2, CAD s/p CABG   (w/ left saphenous vein graft) in 2023, B/L BKA presents to ED from dialysis center after her fistula continued to bleed after HD today. Patient presented hypotensive to systolic of 60s, a rapid response was called in the ED. Vascular team arrived at [facility] and held pressure on fistula with adequate hemostasis. Patient received 1u prbc with improvement of BP to >100 systolic. Taken for stat CTA aorta with b/l runoff. PAtient is previously known to Dr. Gilliland who completed her B/L LE BKAs. She currently takes asa/plavix and a statin at home.     Currently pending CTA aorta w/ LE runoff, h/h 11/36. Scr 5.22, lac 2.2, receiving second unit of prbc. Patient reports after prbc admin she feels much improved.       PAST MEDICAL HISTORY:  PAST MEDICAL & SURGICAL HISTORY:  Heart failure      HLD (hyperlipidemia)      Glaucoma      Cataract      ESRD (end stage renal disease) on dialysis      PAD (peripheral artery disease)      Blind right eye      Acute osteomyelitis of toe of right foot  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          ALLERGIES:  Allergies    No Known Allergies    Intolerances        SOCIAL HISTORY: Negative for tobacco, etoh, or drug use    FAMILY HISTORY:  FAMILY HISTORY:  No pertinent family history in first degree relatives        PHYSICAL EXAM:  General: NAD, resting comfortably,   Pulmonary: normal resp effort, on 2LNC  Cardiovascular: NSR, no murmurs, previous well healed midline sternotomy scar  Abdominal: soft, ND/NT, no organomegaly, no guarding or rebound tenderness  Extremities: WWP, s/p B/L BKA, stumps well healed, right groin AVG with palpable thrill, small skin tear where previous needles entered.  Neuro: A/O x 3, CNs II-XII grossly intact, normal sensation, no focal deficits    Vascular Pulse Exam:  Right Femoral:  Palpable       Left Femoral:  Palpable    VITAL SIGNS:  Vital Signs Last 24 Hrs  T(C): 36.7 (18 Mar 2024 16:29), Max: 36.7 (18 Mar 2024 16:29)  T(F): 98.1 (18 Mar 2024 16:29), Max: 98.1 (18 Mar 2024 16:29)  HR: 75 (18 Mar 2024 16:29) (75 - 75)  BP: 62/43 (18 Mar 2024 16:29) (62/43 - 62/43)  BP(mean): --  RR: 20 (18 Mar 2024 16:29) (20 - 20)  SpO2: 99% (18 Mar 2024 16:29) (99% - 99%)        I&O's Summary      LABS:                        11.2   6.68  )-----------( 186      ( 18 Mar 2024 17:05 )             36.4     03-18    142  |  98  |  30<H>  ----------------------------<  134<H>  3.9   |  25  |  5.22<H>    Ca    9.8      18 Mar 2024 17:05    TPro  8.0  /  Alb  4.4  /  TBili  0.3  /  DBili  x   /  AST  10  /  ALT  5   /  AlkPhos  94  03-18    PT/INR - ( 18 Mar 2024 17:05 )   PT: 11.2 sec;   INR: 1.00 ratio         PTT - ( 18 Mar 2024 17:05 )  PTT:29.1 sec  Urinalysis Basic - ( 18 Mar 2024 17:05 )    Color: x / Appearance: x / SG: x / pH: x  Gluc: 134 mg/dL / Ketone: x  / Bili: x / Urobili: x   Blood: x / Protein: x / Nitrite: x   Leuk Esterase: x / RBC: x / WBC x   Sq Epi: x / Non Sq Epi: x / Bacteria: x      CAPILLARY BLOOD GLUCOSE        LIVER FUNCTIONS - ( 18 Mar 2024 17:05 )  Alb: 4.4 g/dL / Pro: 8.0 g/dL / ALK PHOS: 94 U/L / ALT: 5 U/L / AST: 10 U/L / GGT: x             CULTURES:      RADIOLOGY & ADDITIONAL STUDIES:  Pending read of CTA        ASSESSMENT:  63 year old female with PMH HTN, PAD, ESRD on HD through right groin fistula done at OSH, DMT2, CAD s/p CABG  (w/ left saphenous vein graft) in 2023, B/L BKA presents to ED from dialysis center after her HD session finished, after her fistula continued to bleed after HD today. Initially hypotensive but improved after 1u prbc, then another unit given after CTA aorta w/ runoff. Patient pending CTA read. Vascular surgery consulted for bleeding fistula, now hemostatic after pressure held in ED.      Plan:  - f/u CTA aorta w/ B/L runoff  - will need Hemodialysis duplex overnight if possible or in AM  - labs stable, hemostasis achieved with pressure held and pressure dressing in place  - preliminary read of CTA discussed with radiology, no active extrav, no ulceration or sign of infection, small outpuching concerning for possible pseudoaneurysm  - would recommend medicine admission for monitoring overnight  - patient may need revision of right AVG at some point this admission, will f/u status in am and imaging results  - vascular surgery will follow, please call with any acute changes in HDS status or recurrent bleeding      Plan Discussed with Vascular Surgery Fellow on behalf of Dr. Cerna    Vascular Surgery   [id number]/ [id number]

## 2024-03-18 NOTE — H&P ADULT - PROBLEM SELECTOR PLAN 4
Pt with history of HFpEF, with EF 58% and grade 2 diastolic dysfunction on TTE 7/2023. Pt currently euvolemic despite 2 u pRBC transfusion in the ED.  - C/w HD as per nephrology recs  - Check TTE as part of preop cardiac eval, as pt might require AVF (?AVG) revision during this admission  - Monitor volume status, I&Os, daily weights  - Fluid and salt restriction

## 2024-03-18 NOTE — H&P ADULT - PROBLEM SELECTOR PLAN 1
Pt with profuse bleeding from right groin AVF after completing full session of HD on Monday. S/p 2 u pRBCs, with bleeding now resolved and pt HD stable. CTA abdominal aorta with run-off showing right groin AVF with multiple outpouchings raising concern for pseudoaneurysms.   - Vascular surgery following, appreciate recs  - Obtain RLE AVF duplex study  - Monitor for any recurrence of bleeding  - Monitor H/H, transfuse pRBCs as needed  - Keep active type and screen  - Coags on admission wnl  - C/w pressure dressing  - Per vascular surgery, pt might need revision for her right groin AVF (?AVG), which was originally created at [facility] in 2017/2018 Pt with profuse bleeding from right groin AVF after completing full session of HD on Monday. S/p 2 u pRBCs, with bleeding now resolved and pt HD stable. CTA abdominal aorta with run-off showing right groin AVF with multiple outpouchings raising concern for pseudoaneurysms.   - Vascular surgery following, appreciate recs  - Obtain RLE AVF duplex study  - Monitor for any recurrence of bleeding  - Monitor H/H, transfuse pRBCs as needed  - Keep active type and screen  - Coags on admission wnl  - C/w pressure dressing  - Per vascular surgery, pt might need revision for her right groin AVF (?AVG), which was originally created at [facility] in 2017/2018  - If pt unable to use right groin AVF for HD, will need IR consult for possible Bry or viry. F/u vascular surgery regarding AVF use.

## 2024-03-18 NOTE — H&P ADULT - PROBLEM SELECTOR PLAN 9
Pt not on any meds for DM at home.  - Start low-dose ISS and FS checks qAC TID and qHS for now  - Check A1c

## 2024-03-18 NOTE — ED PROVIDER NOTE - PROGRESS NOTE DETAILS
pt stable, no bleeding. HR 64 /50   EKG hr 65 sr no del qt/qtc 456/474 Patient stable.  No further bleeding.  CT angio right groin AV fistula with multiple outpouchings raising concern for pseudoaneurysms.  Recommend duplex ultrasound.  No active bleeding.  Surgical note appreciated recommends medical admit.  Patient updated willing to stay.  Patient has been up , plan to Dr. ARGUETA

## 2024-03-18 NOTE — ED ADULT TRIAGE NOTE - CHIEF COMPLAINT QUOTE
post dialysis today on her way home pt started bleeding to right thigh Av graft. pt c/o dizziness, with grossly actively bleeding.

## 2024-03-18 NOTE — H&P ADULT - PROBLEM SELECTOR PLAN 6
Pt with multiple amputations, including b/l BKA.   - Hold ASA, Plavix, any other antiplatelet meds for now given recent profuse bleeding  - C/w simvastatin 40 mg daily

## 2024-03-18 NOTE — ED PROVIDER NOTE - ATTENDING CONTRIBUTION TO CARE
Attending Statement: I have personally seen and examined this patient. I have fully participated in the care of this patient. I have reviewed all pertinent clinical information, including history physical exam, plan and the Resident's note and agree except as noted  63yoF PMH of ESRD(on HD M/W/F thru right groin AVF), HTN, HLD, DM type II, bilateral BKA, right eye blindness Presents with bleeding from her right thigh AV graft.  Patient states she finished her hemodialysis had a full session was on her way home when it started to bleed.  Patient "it was shooting everywhere" EMS was called leg was wrapped with abdominal pad transported to the ER.  On arrival blood pressure 62/43 reps 20 patient mentating.  ANO x 3.  Noted to have bleeding through the abdominal pad.  Multiple pads were saturated  with blood. patient became lightheaded, nauseous vomited once systolic blood pressure 51.  Pressure held to control bleeding, blood bank was called for emergency release of blood.  Patient consented to emergent blood.  Surgery was called immediately.  Surgical rapid response was called.  Applied Surgicel to the leg with abdominal pads on top continue to hold pressure.  When surgery arrived bleeding was controlled.  Packed red blood cell 1 unit infusing with pressure bag, systolic blood pressure is 108 patient feeling better.  No longer nauseous.  Surgery recommends urgent CT angio, bilateral lower extremities to rule out embolism.  Once packed red blood cells.  Patient was taken emergently to CAT scan.  Patient aware and consented.  Signed to emergency department signed off patient stable for CAT scan.  Patient tolerated CT well in the room now has no active bleeding.  Blood pressure 111/56, heart rate 56.  Surgery at bedside.  Patient ANO x 3 no respiratory distress.  Nontender abdomen.  Has bilateral below the knee amputations stumps well-healed.  Patient is pending labs, CTA to be read and admission to surgery or vascular surgery.  Plan discussed with patient.

## 2024-03-18 NOTE — ED ADULT NURSE NOTE - OBJECTIVE STATEMENT
post dialysis today on her way home pt started bleeding to right thigh Av graft. pt c/o dizziness, with grossly actively bleeding. Patient brought straight to Tr B. Patient noted bleeding from Right thigh AV graft. Turnicate placed to right thigh. Patient A&OX4, mentating well. Breathing non-labored. Patient placed in cardiac monitor. Patient noted with B/L BKA with prothesis. Patient endorses right eye blindness. Left 20G IVL and Right 20G IVL in place and tolerating well. Labs sent. Emergent PRBC transfusion ordered by MD. First unit given and tolerated well. 2nd PRBC unit delayed due to CT scan with contrast as per MD order. OK to give blood as per blood bank. 2nd unit given and tolerated well. Breathing non-labored. Patient denies any pain/ discomfort at this time. Surgery placed dressing and stopped the bleeding to right thigh. Plan of care in progress.

## 2024-03-18 NOTE — H&P ADULT - NSHPPHYSICALEXAM_GEN_ALL_CORE
Vital Signs Last 24 Hrs  T(C): 36.3 (19 Mar 2024 06:02), Max: 36.7 (18 Mar 2024 16:29)  T(F): 97.3 (19 Mar 2024 06:02), Max: 98.1 (18 Mar 2024 16:29)  HR: 64 (19 Mar 2024 06:02) (64 - 75)  BP: 104/49 (19 Mar 2024 06:02) (62/43 - 135/45)  BP(mean): --  RR: 16 (19 Mar 2024 06:02) (16 - 20)  SpO2: 100% (19 Mar 2024 06:02) (99% - 100%)    PHYSICAL EXAM:  General: Awake and alert.  No acute distress.  Head: Normocephalic, atraumatic.    Eyes: PERRL.  EOMI.  No scleral icterus.  No conjunctival pallor.  Mouth: Moist MM.  No oropharyngeal exudates.    Neck: Supple.  Full range of motion.  No JVD.  No LAD.  No thyromegaly.  Trachea midline.    Heart: RRR.  Normal S1 and S2.  No murmurs, rubs, or gallops.  No LE edema b/l.   Lungs: Nonlabored breathing.  Good inspiratory effort.  CTAB.  No wheezes, crackles, or rhonchi.    Abdomen: BS+, soft, nontender with no rebound or guarding, nondistended.  No hepatomegaly.   Skin: Warm and dry.  No rashes.  Dressing overlying AVF site without any blood.   Extremities: No cyanosis.  BKA b/l with stumps clean, dry, and intact, with no evidence of infection.  Musculoskeletal: No spinal or paraspinal tenderness.  Neuro: A&Ox3.  CN II-XII intact.  5/5 motor strength in UE and LE b/l.  Tactile sensation intact in UE and LE b/l.  No focal deficits.

## 2024-03-18 NOTE — H&P ADULT - PROBLEM SELECTOR PLAN 2
Hgb 11.2 on admission. Baseline appears to be 10-11. Likely from recent bleeding from R groin AVF, anemia of chronic disease, and ESRD.  - Monitor H/H, transfuse pRBCs as needed  - Keep active type and screen  - Check iron studies, folate, and vitamin B12

## 2024-03-18 NOTE — H&P ADULT - NSHPREVIEWOFSYSTEMS_GEN_ALL_CORE
Constitutional: No generalized weakness, fever, chills, or weight loss  Eyes: +L eye vision with cloudiness (now resolved). +R eye blindness at baseline. No eye pain.  Ears, Nose, Mouth, Throat: +B/l tinnitus (now resolved). No runny nose, sinus pain, ear pain, sore throat, dysphagia, or odynophagia  Cardiovascular: No chest pain, palpitations, or LE edema  Respiratory: No cough, wheezing, hemoptysis, or shortness of breath  Gastrointestinal: +Nausea. No abdominal pain, vomiting, diarrhea/constipation, hematemesis, melena, or BRBPR.  Genitourinary: No dysuria, frequency, urgency, or hematuria  Musculoskeletal: No neck pain or back pain. No joint pain, swelling, or decreased ROM.  Skin: No pruritus, rashes, lesions, or wounds  Neurologic: +Lightheadedness. No syncope, seizures, headaches, paresthesias, numbness, or limb weakness.  Psychiatric: No depression, anxiety, difficulty concentrating, anhedonia, or lack of energy  Endocrine: No heat/cold intolerance, mood swings, sweats, polydipsia, or polyuria  Hematologic/lymphatic: No purpura, petechia, or prolonged or excessive bleeding after dental extraction / injury  Allergic/Immunologic: No anaphylaxis or allergic response to materials, foods, animals    Positives and pertinent negatives noted and all other systems negative.

## 2024-03-18 NOTE — ED PROVIDER NOTE - OBJECTIVE STATEMENT
63-year-old female history of ESRD on dialysis Monday Wednesday Friday right lower extremity fistula, hypertension, hyperlipidemia, diabetes presenting to the ED for bleeding right lower extremity fistula that occurred after patient completed her dialysis session today.  Patient denying any chest pain, shortness of breath.  Felt some nausea but no vomiting.

## 2024-03-18 NOTE — ED ADULT NURSE REASSESSMENT NOTE - NSFALLHARMRISKINTERV_ED_ALL_ED
Assistance OOB with selected safe patient handling equipment if applicable/Communicate risk of Fall with Harm to all staff, patient, and family/Monitor gait and stability/Provide patient with walking aids/Provide visual cue: red socks, yellow wristband, yellow gown, etc/Reinforce activity limits and safety measures with patient and family/Bed in lowest position, wheels locked, appropriate side rails in place/Call bell, personal items and telephone in reach/Instruct patient to call for assistance before getting out of bed/chair/stretcher/Non-slip footwear applied when patient is off stretcher/[place] to call system/Physically safe environment - no spills, clutter or unnecessary equipment/Purposeful Proactive Rounding/Room/bathroom lighting operational, light cord in reach

## 2024-03-18 NOTE — H&P ADULT - ASSESSMENT
64 yo woman with history of ESRD on HD (MWF via right groin AVF), PAD c/b right foot OM s/p right BKA (6/2022) and left foot dry gangrene s/p left BKA (11/2022), CAD s/p 3v CABG (7/2023), HFpEF (EF 58%, grade 2 diastolic dysfunction, TTE 7/2023), HTN, HLD, type 2 DM (no longer on any meds), and right eye blindness presents with profuse bleeding from AVF site on right groin, admitted with concern for multiple pseudoaneuryms, admitted for further management.  64 yo woman with history of ESRD on HD (MWF via right groin AVF), PAD c/b right foot OM s/p right BKA (6/2022) and left foot dry gangrene s/p left BKA (11/2022), CAD s/p 3v CABG (7/2023, noncompliant with ASA and/or Plavix), HFpEF (EF 58%, grade 2 diastolic dysfunction, TTE 7/2023), HTN, HLD, type 2 DM (no longer on any meds), and right eye blindness presents with profuse bleeding from AVF site on right groin, admitted with concern for multiple pseudoaneuryms, admitted for further management.

## 2024-03-18 NOTE — ED ADULT NURSE REASSESSMENT NOTE - NS ED NURSE REASSESS COMMENT FT1
Pt received from FLOAT RN on stretcher, A/Ox4 answers all questions appropriately. Pt received two units of uncrossed match blood upon arrival to Trauma B. Right femoral bleeding under controlled at this time, site not actively bleeding. Pt denies chest pain and SOB during reassessment, breathing is even and unlabored on room air. Abdomen is soft and non-distended, non-tender to touch. Bilateral above the knee amputations, Pt ambulates with her prosthetic legs w/ her rollator, skin is intact otherwise. Pt resting comfortably at the bedside, no apparent acute visible distress noted on reassessment. Vital sign stable, NKA to medications. Prosthetic legs at the bedside, located behind pt at the head of the bed, labelled w/ Pt's information. Pending further provider orders.

## 2024-03-18 NOTE — H&P ADULT - PROBLEM SELECTOR PLAN 3
Pt on HD MWF. Last HD was on Monday, 3/18/24. No indication for urgent HD at this time.  - Nephrology consult to be called in AM. Pt followed by [facility] during previous admissions.  - Monitor volume status  - Monitor electrolytes, including serum K, HCO3, Ca, and Phos  - Dose meds for ESRD Pt on HD MWF. Last HD was on Monday, 3/18/24. No indication for urgent HD at this time.  - Nephrology consult to be called in AM. Pt followed by [facility] during previous admissions.  - Monitor volume status  - Monitor electrolytes, including serum K, HCO3, Ca, and Phos  - Ferric citrate not available through pharmacy. F/u serum Ca and Phos and c/w either Sevelamer or calcium acetate.   - Dose meds for ESRD Pt on HD MWF. Last HD was on Monday, 3/18/24. No indication for urgent HD at this time.  - If pt unable to use right groin AVF for HD, will need IR consult for possible Shiley or permacath. F/u vascular surgery regarding AVF use.  - Nephrology consult to be called in AM. Pt followed by [facility] during previous admissions.  - Monitor volume status  - Monitor electrolytes, including serum K, HCO3, Ca, and Phos  - Ferric citrate not available through pharmacy. F/u serum Ca and Phos and c/w either Sevelamer or calcium acetate.   - Dose meds for ESRD

## 2024-03-18 NOTE — ED ADULT NURSE NOTE - NSFALLHARMRISKINTERV_ED_ALL_ED

## 2024-03-18 NOTE — ED PROVIDER NOTE - PHYSICAL EXAMINATION
Const:  appearing older than stated age  Eyes: no conjunctival injection, and symmetrical lids.  HEENT: Head NCAT, no lesions. Atraumatic external nose and ears. Moist MM.  Neck: Symmetric, trachea midline.   RESP: Unlabored respiratory effort  GI: Nondistended,  MSK:  right lower leg amputation with bleeding right thigh fistula.  Skin: Warm, dry and intact.   Neuro: CNs II-XII grossly intact. Motor & Sensation grossly intact.  Psych: Awake, Alert, & Oriented (AAO) x3. Appropriate mood and affect.

## 2024-03-18 NOTE — H&P ADULT - NSHPLABSRESULTS_GEN_ALL_CORE
EKG personally reviewed.  NSR at 65 bpm, no acute ischemic changes, QTc 474 ms and 480 ms.    Labs personally reviewed.                        11.2   6.68  )-----------( 186      ( 18 Mar 2024 17:05 )             36.4     03-18    142  |  98  |  30<H>  ----------------------------<  134<H>  3.9   |  25  |  5.22<H>    Ca    9.8      18 Mar 2024 17:05    TPro  8.0  /  Alb  4.4  /  TBili  0.3  /  DBili  x   /  AST  10  /  ALT  5   /  AlkPhos  94  03-18    Imaging personally reviewed.  ACC: [id number] EXAM:  CT ANGIO ABD AOR W RUN(W)AW IC   ORDERED BY: IJEOMA CLEMENT   PROCEDURE DATE:  03/18/2024    CT ANGIOGRAM ABDOMEN, PELVIS, AND LOWER EXTREMITIES:  PROCEDURE:  Initially, nonenhanced CT was obtained through the calves. Then,   following the rapid administration of intravenous contrast, CT   angiography was performed through the abdomen, pelvis, and lower   extremities down to the toes.  Delayed images were also obtained.   Sagittal and coronal reformats as well as 3D reconstructions were   performed.    FINDINGS:    CENTRAL ARTERIAL SYSTEM:    Atherosclerotic calcifications of the aortoiliac tree. The celiac axis,   SMA, bilateral renal arteries, and CARRIE are patent.  A stent is noted in the proximal common iliac vein.    RIGHT LOWER EXTREMITY:    Status post right below the knee amputation.    There is a right groin AV fistula between the right femoral artery and   vein, with multiple outpouchings along the proximal and distal segments   which are new since 12/2/2020, raising concern for pseudoaneurysms.    There is delayed perfusion of the right femoral artery, with occlusion   near the junction with the popliteal artery. There is short segment   reconstitution of the posterior tibial and peroneal branches with distal   occlusion.      LEFT LOWER EXTREMITY:    Status post left below-the-knee amputation with prosthesis.    There is delayed perfusion of the left femoral artery with occlusion of   the distal segment. There is reconstitution of a posterior tibial branch.    ADDITIONAL FINDINGS: Fibroid uterus.    IMPRESSION:  Right groin AV fistula with multiple outpouchings raising concern for   pseudoaneurysms. Recommend duplex ultrasound for further evaluation.    Occlusion of the bilateral femoral arteries with partial distal   reconstitution as described.    Findings were discussed with Dr. Willard by Dr. Hurd on 3/18/2024   6:10 PM with readback confirmation.

## 2024-03-18 NOTE — H&P ADULT - NSICDXPASTMEDICALHX_GEN_ALL_CORE_FT
PAST MEDICAL HISTORY:  Blind right eye     CAD (coronary artery disease)     Cataract     ESRD (end stage renal disease) on dialysis     Glaucoma     Heart failure     HLD (hyperlipidemia)     Hypertension     PAD (peripheral artery disease)     Type 2 diabetes mellitus

## 2024-03-18 NOTE — H&P ADULT - HISTORY OF PRESENT ILLNESS
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 Pt completed a full session of HD on Monday via her right groin AVF without any issues. However, as pt was heading home, she noticed profuse bleeding from her AVF site, with the blood completely soaking the clean dressing that the HD nurse applied just prior to pt leaving the dialysis center. Pt, therefore, quickly returned to her dialysis center, where the staff applied pressure to the AVF site. The bleeding, though, became worse, with the blood gushing everywhere, so pt was transported to the ED via ambulance. On arrival to the ED, pt's BP was noted to be 62/43. Pt notes that during the ambulance ride, her vision in her left eye became cloudy, and she started having ringing in both ears. Then while in the ED, pt also felt lightheaded and nauseous, though without any vomiting. The ED applied pressure, then Surgicel and multiple abdominal pads. Blood bank was called for emergency release of blood, with pt transfused 1 u pRBCs emergently. Surgical rapid response was also called, though the bleeding was finally under control when surgery arrived to pt's bedside. Pt was taken urgently for CTA abdominal aorta with run-off, which showed the right groin AVF with multiple outpouchings raising concern for   pseudoaneurysms. Pt was given an additional 1 u pRBC transfusion after the CTA study.     Pt now with no acute complaint, stating that her left eye vision, b/l ear ringing, lightheadedness, and nausea all resolved after pt received 1 u pRBCs. Pt denies any chest pain, shortness of breath, palpitations, lightheadedness, dizziness, headaches, nausea, vomiting, or pain at AVF site. Pt also denies any fever, chills, cough, abdominal pain, diarrhea, constipation, or urinary symptoms.       64 yo woman with history of ESRD on HD (MWF via right groin AVF), PAD c/b right foot OM s/p right BKA (6/2022) and left foot dry gangrene s/p left BKA (11/2022), CAD s/p 3v CABG (7/2023), HFpEF (EF 58%, grade 2 diastolic dysfunction, TTE 7/2023), HTN, HLD, type 2 DM (no longer on any meds), and right eye blindness presents with profuse bleeding from AVF site on right groin. Pt completed a full session of HD on Monday via her right groin AVF without any issues. However, as pt was heading home, she noticed profuse bleeding from her AVF site, with the blood completely soaking the clean dressing that the HD nurse applied just prior to pt leaving the dialysis center. Pt, therefore, quickly returned to her dialysis center, where the staff applied pressure to the AVF site. The bleeding, though, became worse, with the blood gushing everywhere, so pt was transported to the ED via ambulance. On arrival to the ED, pt's BP was noted to be 62/43. Pt notes that during the ambulance ride, her vision in her left eye became cloudy, and she started having ringing in both ears. Then while in the ED, pt also felt lightheaded and nauseous, though without any vomiting. The ED applied pressure, then Surgicel and multiple abdominal pads. Blood bank was called for emergency release of blood, with pt transfused 1 u pRBCs emergently. Surgical rapid response was also called, though the bleeding was finally under control when surgery arrived to pt's bedside. Pt was taken urgently for CTA abdominal aorta with run-off, which showed the right groin AVF with multiple outpouchings raising concern for  pseudoaneurysms. Pt was given an additional 1 u pRBC transfusion after the CTA study.     Pt now with no acute complaint, stating that her left eye vision, b/l ear ringing, lightheadedness, and nausea all resolved after pt received 1 u pRBCs. Pt denies any chest pain, shortness of breath, palpitations, lightheadedness, dizziness, headaches, nausea, vomiting, or pain at AVF site. Pt also denies any fever, chills, cough, abdominal pain, diarrhea, constipation, or urinary symptoms.       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 Pt completed a full session of HD on Monday via her right groin AVF without any issues. However, as pt was heading home, she noticed profuse bleeding from her AVF site, with the blood completely soaking the clean dressing that the HD nurse applied just prior to pt leaving the dialysis center. Pt, therefore, quickly returned to her dialysis center, where the staff applied pressure to the AVF site. The bleeding, though, became worse, with the blood gushing everywhere, so pt was transported to the ED via ambulance. On arrival to the ED, pt's BP was noted to be 62/43. Pt notes that during the ambulance ride, her vision in her left eye became cloudy, and she started having ringing in both ears. Then while in the ED, pt also felt lightheaded and nauseous, though without any vomiting. The ED applied pressure, then Surgicel and multiple abdominal pads. Blood bank was called for emergency release of blood, with pt transfused 1 u pRBCs emergently. Surgical rapid response was also called, though the bleeding was finally under control when surgery arrived to pt's bedside. Pt was taken urgently for CTA abdominal aorta with run-off, which showed the right groin AVF with multiple outpouchings raising concern for  pseudoaneurysms. Pt was given an additional 1 u pRBC transfusion after the CTA study.     Pt now with no acute complaint, stating that her left eye vision, b/l ear ringing, lightheadedness, and nausea all resolved after pt received 1 u pRBCs. Pt denies any chest pain, shortness of breath, palpitations, lightheadedness, dizziness, headaches, nausea, vomiting, or pain at AVF site. Pt also denies any fever, chills, cough, abdominal pain, diarrhea, constipation, or urinary symptoms.

## 2024-03-19 ENCOUNTER — RESULT REVIEW (OUTPATIENT)
Age: 64
End: 2024-03-19

## 2024-03-19 DIAGNOSIS — E11.9 TYPE 2 DIABETES MELLITUS WITHOUT COMPLICATIONS: ICD-10-CM

## 2024-03-19 DIAGNOSIS — N18.6 END STAGE RENAL DISEASE: ICD-10-CM

## 2024-03-19 DIAGNOSIS — E78.5 HYPERLIPIDEMIA, UNSPECIFIED: ICD-10-CM

## 2024-03-19 DIAGNOSIS — I50.32 CHRONIC DIASTOLIC (CONGESTIVE) HEART FAILURE: ICD-10-CM

## 2024-03-19 DIAGNOSIS — Z29.9 ENCOUNTER FOR PROPHYLACTIC MEASURES, UNSPECIFIED: ICD-10-CM

## 2024-03-19 DIAGNOSIS — I25.10 ATHEROSCLEROTIC HEART DISEASE OF NATIVE CORONARY ARTERY WITHOUT ANGINA PECTORIS: ICD-10-CM

## 2024-03-19 DIAGNOSIS — I10 ESSENTIAL (PRIMARY) HYPERTENSION: ICD-10-CM

## 2024-03-19 DIAGNOSIS — T82.838A HEMORRHAGE DUE TO VASCULAR PROSTHETIC DEVICES, IMPLANTS AND GRAFTS, INITIAL ENCOUNTER: ICD-10-CM

## 2024-03-19 DIAGNOSIS — I73.9 PERIPHERAL VASCULAR DISEASE, UNSPECIFIED: ICD-10-CM

## 2024-03-19 DIAGNOSIS — D64.9 ANEMIA, UNSPECIFIED: ICD-10-CM

## 2024-03-19 LAB
A1C WITH ESTIMATED AVERAGE GLUCOSE RESULT: 5 % — SIGNIFICANT CHANGE UP (ref 4–5.6)
ADD ON TEST-SPECIMEN IN LAB: SIGNIFICANT CHANGE UP
ANION GAP SERPL CALC-SCNC: 18 MMOL/L — HIGH (ref 7–14)
BASOPHILS # BLD AUTO: 0.05 K/UL — SIGNIFICANT CHANGE UP (ref 0–0.2)
BASOPHILS NFR BLD AUTO: 0.8 % — SIGNIFICANT CHANGE UP (ref 0–2)
BUN SERPL-MCNC: 40 MG/DL — HIGH (ref 7–23)
CALCIUM SERPL-MCNC: 9.4 MG/DL — SIGNIFICANT CHANGE UP (ref 8.4–10.5)
CHLORIDE SERPL-SCNC: 96 MMOL/L — LOW (ref 98–107)
CO2 SERPL-SCNC: 24 MMOL/L — SIGNIFICANT CHANGE UP (ref 22–31)
CREAT SERPL-MCNC: 6.16 MG/DL — HIGH (ref 0.5–1.3)
EGFR: 7 ML/MIN/1.73M2 — LOW
EOSINOPHIL # BLD AUTO: 0.11 K/UL — SIGNIFICANT CHANGE UP (ref 0–0.5)
EOSINOPHIL NFR BLD AUTO: 1.8 % — SIGNIFICANT CHANGE UP (ref 0–6)
ESTIMATED AVERAGE GLUCOSE: 97 — SIGNIFICANT CHANGE UP
FERRITIN SERPL-MCNC: 2412 NG/ML — HIGH (ref 13–330)
FOLATE SERPL-MCNC: 7 NG/ML — SIGNIFICANT CHANGE UP (ref 3.1–17.5)
GLUCOSE BLDC GLUCOMTR-MCNC: 108 MG/DL — HIGH (ref 70–99)
GLUCOSE BLDC GLUCOMTR-MCNC: 121 MG/DL — HIGH (ref 70–99)
GLUCOSE BLDC GLUCOMTR-MCNC: 155 MG/DL — HIGH (ref 70–99)
GLUCOSE BLDC GLUCOMTR-MCNC: 163 MG/DL — HIGH (ref 70–99)
GLUCOSE BLDC GLUCOMTR-MCNC: 94 MG/DL — SIGNIFICANT CHANGE UP (ref 70–99)
GLUCOSE SERPL-MCNC: 101 MG/DL — HIGH (ref 70–99)
HBV SURFACE AG SER-ACNC: SIGNIFICANT CHANGE UP
HCT VFR BLD CALC: 34.5 % — SIGNIFICANT CHANGE UP (ref 34.5–45)
HGB BLD-MCNC: 11.2 G/DL — LOW (ref 11.5–15.5)
IANC: 3.51 K/UL — SIGNIFICANT CHANGE UP (ref 1.8–7.4)
IMM GRANULOCYTES NFR BLD AUTO: 0.3 % — SIGNIFICANT CHANGE UP (ref 0–0.9)
IRON SATN MFR SERPL: 56 % — HIGH (ref 14–50)
IRON SATN MFR SERPL: 98 UG/DL — SIGNIFICANT CHANGE UP (ref 30–160)
LYMPHOCYTES # BLD AUTO: 1.72 K/UL — SIGNIFICANT CHANGE UP (ref 1–3.3)
LYMPHOCYTES # BLD AUTO: 28.5 % — SIGNIFICANT CHANGE UP (ref 13–44)
MAGNESIUM SERPL-MCNC: 2.3 MG/DL — SIGNIFICANT CHANGE UP (ref 1.6–2.6)
MCHC RBC-ENTMCNC: 27.3 PG — SIGNIFICANT CHANGE UP (ref 27–34)
MCHC RBC-ENTMCNC: 32.5 GM/DL — SIGNIFICANT CHANGE UP (ref 32–36)
MCV RBC AUTO: 83.9 FL — SIGNIFICANT CHANGE UP (ref 80–100)
MONOCYTES # BLD AUTO: 0.63 K/UL — SIGNIFICANT CHANGE UP (ref 0–0.9)
MONOCYTES NFR BLD AUTO: 10.4 % — SIGNIFICANT CHANGE UP (ref 2–14)
NEUTROPHILS # BLD AUTO: 3.51 K/UL — SIGNIFICANT CHANGE UP (ref 1.8–7.4)
NEUTROPHILS NFR BLD AUTO: 58.2 % — SIGNIFICANT CHANGE UP (ref 43–77)
NRBC # BLD: 0 /100 WBCS — SIGNIFICANT CHANGE UP (ref 0–0)
NRBC # FLD: 0 K/UL — SIGNIFICANT CHANGE UP (ref 0–0)
PHOSPHATE SERPL-MCNC: 5.8 MG/DL — HIGH (ref 2.5–4.5)
PLATELET # BLD AUTO: 163 K/UL — SIGNIFICANT CHANGE UP (ref 150–400)
POTASSIUM SERPL-MCNC: 4.4 MMOL/L — SIGNIFICANT CHANGE UP (ref 3.5–5.3)
POTASSIUM SERPL-SCNC: 4.4 MMOL/L — SIGNIFICANT CHANGE UP (ref 3.5–5.3)
PTH-INTACT FLD-MCNC: 889 PG/ML — HIGH (ref 15–65)
RBC # BLD: 4.11 M/UL — SIGNIFICANT CHANGE UP (ref 3.8–5.2)
RBC # FLD: 16.1 % — HIGH (ref 10.3–14.5)
SODIUM SERPL-SCNC: 138 MMOL/L — SIGNIFICANT CHANGE UP (ref 135–145)
TIBC SERPL-MCNC: 174 UG/DL — LOW (ref 220–430)
UIBC SERPL-MCNC: 76 UG/DL — LOW (ref 110–370)
VIT B12 SERPL-MCNC: 521 PG/ML — SIGNIFICANT CHANGE UP (ref 200–900)
WBC # BLD: 6.04 K/UL — SIGNIFICANT CHANGE UP (ref 3.8–10.5)
WBC # FLD AUTO: 6.04 K/UL — SIGNIFICANT CHANGE UP (ref 3.8–10.5)

## 2024-03-19 PROCEDURE — 93926 LOWER EXTREMITY STUDY: CPT | Mod: 26,50,LT

## 2024-03-19 RX ORDER — SIMVASTATIN 20 MG/1
40 TABLET, FILM COATED ORAL AT BEDTIME
Refills: 0 | Status: DISCONTINUED | OUTPATIENT
Start: 2024-03-19 | End: 2024-04-01

## 2024-03-19 RX ORDER — SIMVASTATIN 20 MG/1
1 TABLET, FILM COATED ORAL
Refills: 0 | DISCHARGE

## 2024-03-19 RX ADMIN — Medication 1: at 13:57

## 2024-03-19 RX ADMIN — SIMVASTATIN 40 MILLIGRAM(S): 20 TABLET, FILM COATED ORAL at 22:12

## 2024-03-19 NOTE — PROGRESS NOTE ADULT - SUBJECTIVE AND OBJECTIVE BOX
Date of Service  : 03-19-24     INTERVAL HPI/OVERNIGHT EVENTS: I feel better.   Vital Signs Last 24 Hrs  T(C): 37.1 (19 Mar 2024 16:17), Max: 37.1 (19 Mar 2024 16:17)  T(F): 98.7 (19 Mar 2024 16:17), Max: 98.7 (19 Mar 2024 16:17)  HR: 69 (19 Mar 2024 16:17) (64 - 70)  BP: 102/44 (19 Mar 2024 16:17) (101/44 - 109/47)  BP(mean): --  RR: 18 (19 Mar 2024 16:17) (16 - 18)  SpO2: 100% (19 Mar 2024 16:17) (100% - 100%)    Parameters below as of 19 Mar 2024 16:17  Patient On (Oxygen Delivery Method): room air      I&O's Summary    MEDICATIONS  (STANDING):  dextrose 5%. 1000 milliLiter(s) (50 mL/Hr) IV Continuous <Continuous>  dextrose 5%. 1000 milliLiter(s) (100 mL/Hr) IV Continuous <Continuous>  dextrose 50% Injectable 25 Gram(s) IV Push once  dextrose 50% Injectable 12.5 Gram(s) IV Push once  dextrose 50% Injectable 25 Gram(s) IV Push once  glucagon  Injectable 1 milliGRAM(s) IntraMuscular once  insulin lispro (ADMELOG) corrective regimen sliding scale   SubCutaneous three times a day before meals  insulin lispro (ADMELOG) corrective regimen sliding scale   SubCutaneous at bedtime  simvastatin 40 milliGRAM(s) Oral at bedtime    MEDICATIONS  (PRN):  dextrose Oral Gel 15 Gram(s) Oral once PRN Blood Glucose LESS THAN 70 milliGRAM(s)/deciliter    LABS:                        11.2   6.04  )-----------( 163      ( 19 Mar 2024 05:49 )             34.5     03-19    138  |  96<L>  |  40<H>  ----------------------------<  101<H>  4.4   |  24  |  6.16<H>    Ca    9.4      19 Mar 2024 05:49  Phos  5.8     03-19  Mg     2.30     03-19    TPro  8.0  /  Alb  4.4  /  TBili  0.3  /  DBili  x   /  AST  10  /  ALT  5   /  AlkPhos  94  03-18    PT/INR - ( 18 Mar 2024 17:05 )   PT: 11.2 sec;   INR: 1.00 ratio         PTT - ( 18 Mar 2024 17:05 )  PTT:29.1 sec  Urinalysis Basic - ( 19 Mar 2024 05:49 )    Color: x / Appearance: x / SG: x / pH: x  Gluc: 101 mg/dL / Ketone: x  / Bili: x / Urobili: x   Blood: x / Protein: x / Nitrite: x   Leuk Esterase: x / RBC: x / WBC x   Sq Epi: x / Non Sq Epi: x / Bacteria: x      CAPILLARY BLOOD GLUCOSE      POCT Blood Glucose.: 121 mg/dL (19 Mar 2024 17:04)  POCT Blood Glucose.: 163 mg/dL (19 Mar 2024 13:11)  POCT Blood Glucose.: 94 mg/dL (19 Mar 2024 08:45)  POCT Blood Glucose.: 177 mg/dL (18 Mar 2024 23:26)        Urinalysis Basic - ( 19 Mar 2024 05:49 )    Color: x / Appearance: x / SG: x / pH: x  Gluc: 101 mg/dL / Ketone: x  / Bili: x / Urobili: x   Blood: x / Protein: x / Nitrite: x   Leuk Esterase: x / RBC: x / WBC x   Sq Epi: x / Non Sq Epi: x / Bacteria: x      REVIEW OF SYSTEMS:  CONSTITUTIONAL: No fever, weight loss, or fatigue  EYES: No eye pain, visual disturbances, or discharge  ENMT:  No difficulty hearing, tinnitus, vertigo; No sinus or throat pain  NECK: No pain or stiffness  RESPIRATORY: No cough, wheezing, chills or hemoptysis; No shortness of breath  CARDIOVASCULAR: No chest pain, palpitations, dizziness, or leg swelling  GASTROINTESTINAL: No abdominal or epigastric pain. No nausea, vomiting, or hematemesis; No diarrhea or constipation. No melena or hematochezia.  GENITOURINARY: No dysuria, frequency, hematuria, or incontinence  NEUROLOGICAL: No headaches, memory loss, loss of strength, numbness, or tremors      Consultant(s) Notes Reviewed:  [x ] YES  [ ] NO    PHYSICAL EXAM:  GENERAL: NAD, well-groomed, well-developed,not in any distress ,  HEAD:  Atraumatic, Normocephalic  NECK: Supple, No JVD, Normal thyroid  NERVOUS SYSTEM:  Alert & Oriented X3, No focal deficit   CHEST/LUNG: Good air entry bilateral with no  rales, rhonchi, wheezing, or rubs  HEART: Regular rate and rhythm; No murmurs, rubs, or gallops  ABDOMEN: Soft, Nontender, Nondistended; Bowel sounds present  EXTREMITIES:  Rt thigh ulcer+    Care Discussed with Consultants/Other Providers [ x] YES  [ ] NO

## 2024-03-19 NOTE — PROGRESS NOTE ADULT - ASSESSMENT
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        Problem/Plan - 1:  ·  Problem: Right thigh hemodialysis AV fistula bleed.   ·  Plan: Pt with profuse bleeding from right groin AVF after completing full session of HD on Monday. S/p 2 u pRBCs, with bleeding now resolved and pt HD stable. CTA abdominal aorta with run-off showing right groin AVF with multiple outpouchings raising concern for pseudoaneurysms.   - Vascular surgery following, appreciate recs  - Obtain RLE AVF duplex study  - Monitor for any recurrence of bleeding  - Monitor H/H, transfuse pRBCs as needed  - Keep active type and screen  - Coags on admission wnl  - C/w pressure dressing  - Per vascular surgery, pt might need revision for her right groin AVF (?AVG), which was originally created at [facility] in 2017/2018  - If pt unable to use right groin AVF for HD, will need IR consult for possible Shiley or permacath. F/u vascular surgery regarding AVF use.    < from: CT Angio Abd Aorta w/run-off w/ IV Cont (03.18.24 @ 17:23) >  IMPRESSION:  Right groin AV fistula withmultiple outpouchings raising concern for   pseudoaneurysms. Recommend duplex ultrasound for further evaluation.    Occlusion of the bilateral femoral arteries with partial distal   reconstitution as described.    < end of copied text >  < from: VA Duplex Arterial Lower Ext Ltd, Right (03.19.24 @ 06:38) >  CONCLUSIONS:      1.Right common femoral vein to common femoral artery arteriovenous graft noted in the right proximal thigh.   2. Small pseudoaneurysm measuring 0.8 x 0.4 cm is noted in the proxmal segment of the graft.   3. A second graft wall defect is noted immediately proximal to this pseudoaneurysm, on the posterior wall of the graft.     Problem/Plan - 2:  ·  Problem: Anemia.   ·  Plan: Hgb 11.2 on admission. Baseline appears to be 10-11. Likely from recent bleeding from R groin AVF, anemia of chronic disease, and ESRD.  - Monitor H/H, transfuse pRBCs as needed  - Keep active type and screen  - Check iron studies, folate, and vitamin B12.     Problem/Plan - 3:  ·  Problem: ESRD on hemodialysis.   ·  Plan: Pt on HD MWF. Last HD was on Monday, 3/18/24. No indication for urgent HD at this time.  - If pt unable to use right groin AVF for HD, will need IR consult for possible Shiley or permacath. F/u vascular surgery regarding AVF use.  - Nephrology consult to be called in AM. Pt followed by [facility] during previous admissions.  - Monitor volume status  - Monitor electrolytes, including serum K, HCO3, Ca, and Phos  - Ferric citrate not available through pharmacy. F/u serum Ca and Phos and c/w either Sevelamer or calcium acetate.   - Dose meds for ESRD.     Problem/Plan - 4:  ·  Problem: Chronic heart failure with preserved ejection fraction (HFpEF).   ·  Plan: Pt with history of HFpEF, with EF 58% and grade 2 diastolic dysfunction on TTE 7/2023. Pt currently euvolemic despite 2 u pRBC transfusion in the ED.  - C/w HD as per nephrology recs  - Check TTE as part of preop cardiac eval, as pt might require AVF (?AVG) revision during this admission  - Monitor volume status, I&Os, daily weights  - Fluid and salt restriction.     Problem/Plan - 5:  ·  Problem: CAD (coronary artery disease).   ·  Plan: S/p 3v CABG in 7/2023. Per pt, admits to not taking either ASA or Plavix since last year after not getting refills of these medications, though Surescripts shows that pt filled a prescription for Plavix in Feb 2024.   - Continue to hold ASA, Plavix, any other antiplatelet meds for now given recent profuse bleeding  - Resume ASA and/or Plavix once bleeding has completely resolved and if no plan for surgical intervention by vascular surgery during admission. F/u with pt's cardiologist as to which antiplatelet pt should be on at home.  - Also hold metoprolol succinate 25 mg daily for now  - C/w simvastatin 40 mg daily.     Problem/Plan - 6:  ·  Problem: PAD (peripheral artery disease).   ·  Plan: Pt with multiple amputations, including b/l BKA.   - Hold ASA, Plavix, any other antiplatelet meds for now given recent profuse bleeding  - C/w simvastatin 40 mg daily.     Problem/Plan - 7:  ·  Problem: Hypertension.   ·  Plan: Pt on metoprolol succinate 25 mg daily at home. BP initially 62/43, now improved s/p 2 u pRBC transfusion.  - Hold metoprolol succinate 25 mg daily for now given recent profuse bleeding  - Monitor VS q4hrs.     Problem/Plan - 8:  ·  Problem: Hyperlipidemia.   ·  Plan: - C/w simvastatin 40 mg daily.     Problem/Plan - 9:  ·  Problem: Type 2 diabetes mellitus.   ·  Plan: Pt not on any meds for DM at home.  - Start low-dose ISS and FS checks qAC TID and qHS for now  - Check A1c.     Problem/Plan - 10:  ·  Problem: Need for prophylactic measure.   ·  Plan; DVT ppx: Hold pharmacologic ppx given recent profuse bleeding  Diet: Renal restrictions/DASH/TLC/CC.

## 2024-03-19 NOTE — CONSULT NOTE ADULT - SUBJECTIVE AND OBJECTIVE BOX
[facility] - S Jluis / Shabbir S /D Romina/ SHERRI Mcfarlane/ SHERRI Madden/ Andrés Perkins / SUNI Potts/ O Gladis  service -[phone], office [phone]  ---------------------------------------------------------------------------------------------------------------    Patient is a 63y Female whom presented to the hospital with   Denied recent NSAID use/Abx use/iv contrast studies.    Patient seen and examined    PAST MEDICAL & SURGICAL HISTORY:  Heart failure      HLD (hyperlipidemia)      Glaucoma      Cataract      ESRD (end stage renal disease) on dialysis      PAD (peripheral artery disease)      Blind right eye      CAD (coronary artery disease)      Hypertension      Type 2 diabetes mellitus      Acute osteomyelitis of toe of right foot  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          Allergies: No Known Allergies    Home Medications Reviewed  Hospital Medications:   MEDICATIONS  (STANDING):  dextrose 5%. 1000 milliLiter(s) (50 mL/Hr) IV Continuous <Continuous>  dextrose 5%. 1000 milliLiter(s) (100 mL/Hr) IV Continuous <Continuous>  dextrose 50% Injectable 25 Gram(s) IV Push once  dextrose 50% Injectable 12.5 Gram(s) IV Push once  dextrose 50% Injectable 25 Gram(s) IV Push once  glucagon  Injectable 1 milliGRAM(s) IntraMuscular once  insulin lispro (ADMELOG) corrective regimen sliding scale   SubCutaneous three times a day before meals  insulin lispro (ADMELOG) corrective regimen sliding scale   SubCutaneous at bedtime  simvastatin 40 milliGRAM(s) Oral at bedtime    SOCIAL HISTORY:  Denies ETOh,Smoking, illicit drug use  FAMILY HISTORY:  No pertinent family history in first degree relatives        REVIEW OF SYSTEMS:  CONSTITUTIONAL: No weakness, fevers or chills  EYES/ENT: No visual changes;  No vertigo or throat pain   NECK: No pain or stiffness  RESPIRATORY: No cough, wheezing, hemoptysis; No shortness of breath  CARDIOVASCULAR: No chest pain or palpitations.  GASTROINTESTINAL: No abdominal or epigastric pain. No nausea, vomiting, or hematemesis; No diarrhea or constipation. No melena or hematochezia.  GENITOURINARY: No dysuria, frequency, foamy urine, urinary urgency, incontinence or hematuria  NEUROLOGICAL: No numbness or weakness  SKIN: No itching, burning, rashes, or lesions   VASCULAR: No bilateral lower extremity edema.   All other review of systems is negative unless indicated above.    VITALS:  T(F): 97.5 (03-19-24 @ 09:42), Max: 98.1 (03-18-24 @ 16:29)  HR: 67 (03-19-24 @ 09:42)  BP: 101/44 (03-19-24 @ 09:42)  RR: 16 (03-19-24 @ 09:42)  SpO2: 100% (03-19-24 @ 09:42)  Wt(kg): --        PHYSICAL EXAM:  Constitutional: NAD  HEENT: anicteric sclera, oropharynx clear, MMM  Neck: No JVD  Respiratory: CTAB, no wheezes, rales or rhonchi  Cardiovascular: S1, S2, RRR  Gastrointestinal: BS+, soft, NT/ND  Extremities: No cyanosis or clubbing. No peripheral edema  Neurological: A/O x 3, no focal deficits  Psychiatric: Normal mood, normal affect  : No CVA tenderness. No salcido.   Skin: No rashes  Vascular Access:    LABS:  03-19    138  |  96<L>  |  40<H>  ----------------------------<  101<H>  4.4   |  24  |  6.16<H>    Ca    9.4      19 Mar 2024 05:49  Phos  5.8     03-19  Mg     2.30     03-19    TPro  8.0  /  Alb  4.4  /  TBili  0.3  /  DBili      /  AST  10  /  ALT  5   /  AlkPhos  94  03-18    Creatinine Trend: 6.16 <--, 5.22 <--                        11.2   6.04  )-----------( 163      ( 19 Mar 2024 05:49 )             34.5     Urine Studies:  Urinalysis Basic - ( 19 Mar 2024 05:49 )    Color:  / Appearance:  / SG:  / pH:   Gluc: 101 mg/dL / Ketone:   / Bili:  / Urobili:    Blood:  / Protein:  / Nitrite:    Leuk Esterase:  / RBC:  / WBC    Sq Epi:  / Non Sq Epi:  / Bacteria:           RADIOLOGY & ADDITIONAL STUDIES:                 [facility] - S Jluis / Shabbir S /D Romina/ S Denys/ SHERRI Madden/ Andrés Perkins / SUNI Potts/ O Gladis  service -[phone], office [phone]  ---------------------------------------------------------------------------------------------------------------  Patient seen and examined in ER    62 yo woman with history of ESRD on HD (MWF via right groin AVF), PAD c/b right foot OM s/p right BKA (6/2022) and left foot dry gangrene s/p left BKA (11/2022), CAD s/p 3v CABG (7/2023, noncompliant with ASA and/or Plavix), HFpEF (EF 58%, grade 2 diastolic dysfunction, TTE 7/2023), HTN, HLD, type 2 DM (no longer on any meds), and right eye blindness presents with profuse bleeding from AVF site on right groin. Pt completed a full session of HD on Monday via her right groin AVF without any issues. However, as pt was heading home, she noticed profuse bleeding from her AVF site, with the blood completely soaking the clean dressing that the HD nurse applied just prior to pt leaving the dialysis center. Pt, therefore, quickly returned to her dialysis center, where the staff applied pressure to the AVF site. The bleeding, though, became worse, with the blood gushing everywhere, so pt was transported to the ED via ambulance. On arrival to the ED, pt's BP was noted to be 62/43. Pt notes that during the ambulance ride, her vision in her left eye became cloudy, and she started having ringing in both ears. Then while in the ED, pt also felt lightheaded and nauseous, though without any vomiting. The ED applied pressure, then Surgicel and multiple abdominal pads. Blood bank was called for emergency release of blood, with pt transfused 1 u pRBCs emergently. Surgical rapid response was also called, though the bleeding was finally under control when surgery arrived to pt's bedside. Pt was taken urgently for CTA abdominal aorta with run-off, which showed the right groin AVF with multiple outpouchings raising concern for  pseudoaneurysms. Pt was given an additional 1 u pRBC transfusion after the CTA study.     Renal consulted for ESRD Mx. Pt well known to me from DC/outpt hd unit. last hd was 3/18/24 outpt uneventful, due for hd tomorrow. Pt denies any chest pain, shortness of breath, palpitations, lightheadedness, dizziness, headaches, nausea, vomiting, or pain at AVF site. Pt also denies any fever, chills. Pt now with no acute complaint, stated that lightheadedness, and nausea all resolved after pt received 1 u pRBCs.    PAST MEDICAL & SURGICAL HISTORY:  Heart failure      HLD (hyperlipidemia)      Glaucoma      Cataract      ESRD (end stage renal disease) on dialysis      PAD (peripheral artery disease)      Blind right eye      CAD (coronary artery disease)      Hypertension      Type 2 diabetes mellitus      Acute osteomyelitis of toe of right foot  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    S/P arteriovenous (AV) fistula creation    Hemodialysis access, AV graft    Allergies: No Known Allergies    Home Medications Reviewed  Hospital Medications:   MEDICATIONS  (STANDING):  dextrose 5%. 1000 milliLiter(s) (50 mL/Hr) IV Continuous <Continuous>  dextrose 5%. 1000 milliLiter(s) (100 mL/Hr) IV Continuous <Continuous>  dextrose 50% Injectable 25 Gram(s) IV Push once  dextrose 50% Injectable 12.5 Gram(s) IV Push once  dextrose 50% Injectable 25 Gram(s) IV Push once  glucagon  Injectable 1 milliGRAM(s) IntraMuscular once  insulin lispro (ADMELOG) corrective regimen sliding scale   SubCutaneous three times a day before meals  insulin lispro (ADMELOG) corrective regimen sliding scale   SubCutaneous at bedtime  simvastatin 40 milliGRAM(s) Oral at bedtime    SOCIAL HISTORY:  Denies ETOh,Smoking, illicit drug use  FAMILY HISTORY:  No pertinent family history in first degree relatives    REVIEW OF SYSTEMS:  CONSTITUTIONAL: No weakness, fevers or chills  EYES/ENT: No visual changes;  No vertigo or throat pain   NECK: No pain or stiffness  RESPIRATORY: No cough, wheezing, hemoptysis; No shortness of breath  CARDIOVASCULAR: No chest pain or palpitations.  GASTROINTESTINAL: No abdominal or epigastric pain. + nausea yesterday, now resolved. no vomiting, or hematemesis; No diarrhea or constipation. No melena or hematochezia.  NEUROLOGICAL: No numbness or weakness  SKIN: No itching, burning, rashes, or lesions   VASCULAR: No bilateral lower extremity edema.   All other review of systems is negative unless indicated above.    VITALS:  T(F): 97.5 (03-19-24 @ 09:42), Max: 98.1 (03-18-24 @ 16:29)  HR: 67 (03-19-24 @ 09:42)  BP: 101/44 (03-19-24 @ 09:42)  RR: 16 (03-19-24 @ 09:42)  SpO2: 100% (03-19-24 @ 09:42)  Wt(kg): --      PHYSICAL EXAM:  Constitutional: NAD  HEENT: anicteric sclera  Neck: No JVD  Respiratory: CTAB, no wheezes, rales or rhonchi  Cardiovascular: S1, S2, RRR  Gastrointestinal: BS+, soft, NT  Extremities:  BKA b/l with stumps  Neurological: A/O x 3  Psychiatric: Normal mood, normal affect  : No salcido.   Vascular Access: rt thigh AVG -no active bleeding noted, +bruit noted    LABS:  03-19    138  |  96<L>  |  40<H>  ----------------------------<  101<H>  4.4   |  24  |  6.16<H>    Ca    9.4      19 Mar 2024 05:49  Phos  5.8     03-19  Mg     2.30     03-19    TPro  8.0  /  Alb  4.4  /  TBili  0.3  /  DBili      /  AST  10  /  ALT  5   /  AlkPhos  94  03-18    Creatinine Trend: 6.16 <--, 5.22 <--                        11.2   6.04  )-----------( 163      ( 19 Mar 2024 05:49 )             34.5     Urine Studies:  Urinalysis Basic - ( 19 Mar 2024 05:49 )    Color:  / Appearance:  / SG:  / pH:   Gluc: 101 mg/dL / Ketone:   / Bili:  / Urobili:    Blood:  / Protein:  / Nitrite:    Leuk Esterase:  / RBC:  / WBC    Sq Epi:  / Non Sq Epi:  / Bacteria:       RADIOLOGY & ADDITIONAL STUDIES:    < from: CT Angio Abd Aorta w/run-off w/ IV Cont (03.18.24 @ 17:23) >  IMPRESSION:  Right groin AV fistula withmultiple outpouchings raising concern for   pseudoaneurysms. Recommend duplex ultrasound for further evaluation.    Occlusion of the bilateral femoral arteries with partial distal   reconstitution as described.    Findings were discussed with Dr. Willard by Dr. Hurd on 3/18/2024   6:10 PM with readback confirmation.    --- End of Report ---    < end of copied text >

## 2024-03-20 LAB
ANION GAP SERPL CALC-SCNC: 18 MMOL/L — HIGH (ref 7–14)
BUN SERPL-MCNC: 65 MG/DL — HIGH (ref 7–23)
CALCIUM SERPL-MCNC: 9.3 MG/DL — SIGNIFICANT CHANGE UP (ref 8.4–10.5)
CHLORIDE SERPL-SCNC: 96 MMOL/L — LOW (ref 98–107)
CO2 SERPL-SCNC: 24 MMOL/L — SIGNIFICANT CHANGE UP (ref 22–31)
CREAT SERPL-MCNC: 8.05 MG/DL — HIGH (ref 0.5–1.3)
EGFR: 5 ML/MIN/1.73M2 — LOW
GLUCOSE BLDC GLUCOMTR-MCNC: 129 MG/DL — HIGH (ref 70–99)
GLUCOSE BLDC GLUCOMTR-MCNC: 131 MG/DL — HIGH (ref 70–99)
GLUCOSE BLDC GLUCOMTR-MCNC: 97 MG/DL — SIGNIFICANT CHANGE UP (ref 70–99)
GLUCOSE BLDC GLUCOMTR-MCNC: 98 MG/DL — SIGNIFICANT CHANGE UP (ref 70–99)
GLUCOSE SERPL-MCNC: 79 MG/DL — SIGNIFICANT CHANGE UP (ref 70–99)
HCT VFR BLD CALC: 32.8 % — LOW (ref 34.5–45)
HGB BLD-MCNC: 10.5 G/DL — LOW (ref 11.5–15.5)
MAGNESIUM SERPL-MCNC: 2.4 MG/DL — SIGNIFICANT CHANGE UP (ref 1.6–2.6)
MCHC RBC-ENTMCNC: 26.9 PG — LOW (ref 27–34)
MCHC RBC-ENTMCNC: 32 GM/DL — SIGNIFICANT CHANGE UP (ref 32–36)
MCV RBC AUTO: 84.1 FL — SIGNIFICANT CHANGE UP (ref 80–100)
NRBC # BLD: 0 /100 WBCS — SIGNIFICANT CHANGE UP (ref 0–0)
NRBC # FLD: 0 K/UL — SIGNIFICANT CHANGE UP (ref 0–0)
PHOSPHATE SERPL-MCNC: 7.3 MG/DL — HIGH (ref 2.5–4.5)
PLATELET # BLD AUTO: 141 K/UL — LOW (ref 150–400)
POTASSIUM SERPL-MCNC: 4.9 MMOL/L — SIGNIFICANT CHANGE UP (ref 3.5–5.3)
POTASSIUM SERPL-SCNC: 4.9 MMOL/L — SIGNIFICANT CHANGE UP (ref 3.5–5.3)
RBC # BLD: 3.9 M/UL — SIGNIFICANT CHANGE UP (ref 3.8–5.2)
RBC # FLD: 16.1 % — HIGH (ref 10.3–14.5)
SODIUM SERPL-SCNC: 138 MMOL/L — SIGNIFICANT CHANGE UP (ref 135–145)
WBC # BLD: 6.31 K/UL — SIGNIFICANT CHANGE UP (ref 3.8–10.5)
WBC # FLD AUTO: 6.31 K/UL — SIGNIFICANT CHANGE UP (ref 3.8–10.5)

## 2024-03-20 RX ORDER — CHLORHEXIDINE GLUCONATE 213 G/1000ML
1 SOLUTION TOPICAL
Refills: 0 | Status: DISCONTINUED | OUTPATIENT
Start: 2024-03-20 | End: 2024-04-01

## 2024-03-20 RX ADMIN — SIMVASTATIN 40 MILLIGRAM(S): 20 TABLET, FILM COATED ORAL at 22:01

## 2024-03-20 NOTE — CONSULT NOTE ADULT - SUBJECTIVE AND OBJECTIVE BOX
[facility], Division of Infectious Diseases  DANIEL Montelongo S. Shah, Y. Patel, G. Prosper  [phone]  ([phone] - weekdays after 5pm and weekends)    JAYCEE WALTERS  63y, Female  [id number]    HPI--  HPI:  62 y/o F PMhx ESRD on HD (MWF via right groin AVF), PAD c/b right foot OM s/p right BKA (6/2022) and left foot dry gangrene s/p left BKA (11/2022), CAD s/p 3v CABG (7/2023), HFpEF, HTN, R eye blindness who presented w/ profuse bleeding from R groin AVG site following dialysis. Pt completed a full session of HD on Monday via her right groin AVF without any issues, however, as pt was heading home, she noticed profuse bleeding from her AVG site, with the blood completely soaking the dressing. She returned to her dialysis center, where the staff applied pressure, however, they were unable to stop the bleeding therefore EMS was called to transport her to ED. On arrival to the ED, pt's BP was noted to be 62/43. She reports felt lightheaded and nauseous, without any vomiting at that time. In ED bleeding was stopped. Was taken urgently for CTA abdominal aorta with run-off, which showed the right groin AVG with multiple outpouchings raising concern for pseudoaneurysms.  She denies fever, chills, chest pain, SOB, vomiting, diarrhea,  symptoms.     ROS: 10 point review of systems completed, pertinent positives and negatives as per HPI.    Allergies: No Known Allergies    PMH -- Heart failure    HLD (hyperlipidemia)    Glaucoma    Cataract    CKD (chronic kidney disease), stage IV    ESRD (end stage renal disease) on dialysis    PAD (peripheral artery disease)    Blind right eye    CAD (coronary artery disease)    Hypertension    Type 2 diabetes mellitus treated without insulin    Type 2 diabetes mellitus      PSH -- Acute osteomyelitis of toe of right foot    S/P arteriovenous (AV) fistula creation    Hemodialysis access, AV graft      FH -- No pertinent family history in first degree relatives    No pertinent family history in first degree relatives      Social History -- denies tobacco, alcohol or illicit drug use    Physical Exam--  Vital Signs Last 24 Hrs  T(F): 98.2 (20 Mar 2024 13:27), Max: 98.7 (19 Mar 2024 16:17)  HR: 72 (20 Mar 2024 13:27) (68 - 74)  BP: 118/55 (20 Mar 2024 13:27) (95/55 - 119/64)  RR: 17 (20 Mar 2024 13:27) (17 - 18)  SpO2: 99% (20 Mar 2024 13:27) (96% - 100%)  General: nontoxic-appearing, no acute distress  HEENT: anicteric, no teeth  Neck: Not rigid. No LAD  Lungs: Clear bilaterally without rales  Heart: S1, S2, normal rate  Abdomen: Soft. Nondistended. Nontender. no suprapubic tenderness  Neuro: AAOx3, no obvious focal deficits   Back: No costovertebral angle tenderness.  Extremities: s/p b/l LE BKA, R thigh AVG- superior aspect w/ scabbed superficial wound, no erythema, swelling, warmth or tenderness  Skin: Warm. Dry. No rash.  Psychiatric: Appropriate affect and mood for situation.     Laboratory & Imaging Data--  CBC:                       10.5   6.31  )-----------( 141      ( 20 Mar 2024 05:50 )             32.8     WBC Count: 6.31 K/uL (03-20-24 @ 05:50)  WBC Count: 6.04 K/uL (03-19-24 @ 05:49)  WBC Count: 6.68 K/uL (03-18-24 @ 17:05)    CMP: 03-20    138  |  96<L>  |  65<H>  ----------------------------<  79  4.9   |  24  |  8.05<H>    Ca    9.3      20 Mar 2024 05:50  Phos  7.3     03-20  Mg     2.40     03-20    TPro  8.0  /  Alb  4.4  /  TBili  0.3  /  DBili  x   /  AST  10  /  ALT  5   /  AlkPhos  94  03-18    LIVER FUNCTIONS - ( 18 Mar 2024 17:05 )  Alb: 4.4 g/dL / Pro: 8.0 g/dL / ALK PHOS: 94 U/L / ALT: 5 U/L / AST: 10 U/L / GGT: x           Urinalysis Basic - ( 20 Mar 2024 05:50 )    Color: x / Appearance: x / SG: x / pH: x  Gluc: 79 mg/dL / Ketone: x  / Bili: x / Urobili: x   Blood: x / Protein: x / Nitrite: x   Leuk Esterase: x / RBC: x / WBC x   Sq Epi: x / Non Sq Epi: x / Bacteria: x      Microbiology:       Radiology--  ***  Active Medications--  dextrose 5%. 1000 milliLiter(s) IV Continuous <Continuous>  dextrose 5%. 1000 milliLiter(s) IV Continuous <Continuous>  dextrose 50% Injectable 25 Gram(s) IV Push once  dextrose 50% Injectable 12.5 Gram(s) IV Push once  dextrose 50% Injectable 25 Gram(s) IV Push once  dextrose Oral Gel 15 Gram(s) Oral once PRN  glucagon  Injectable 1 milliGRAM(s) IntraMuscular once  insulin lispro (ADMELOG) corrective regimen sliding scale   SubCutaneous three times a day before meals  insulin lispro (ADMELOG) corrective regimen sliding scale   SubCutaneous at bedtime  simvastatin 40 milliGRAM(s) Oral at bedtime    Antimicrobials:     Immunologic:

## 2024-03-20 NOTE — PROGRESS NOTE ADULT - ASSESSMENT
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        Problem/Plan - 1:  ·  Problem: Right thigh hemodialysis AV fistula bleed.   ·  Plan: Pt with profuse bleeding from right groin AVF after completing full session of HD on Monday. S/p 2 u pRBCs, with bleeding now resolved and pt HD stable. CTA abdominal aorta with run-off showing right groin AVF with multiple outpouchings raising concern for pseudoaneurysms.   - Vascular surgery following, appreciate recs  - Obtain RLE AVF duplex study  - Monitor for any recurrence of bleeding  - Monitor H/H, transfuse pRBCs as needed  - Keep active type and screen  - Coags on admission wnl  - C/w pressure dressing  - Per vascular surgery, pt planned  revision for her right groin AVF (?AVG),     < from: CT Angio Abd Aorta w/run-off w/ IV Cont (03.18.24 @ 17:23) >  IMPRESSION:  Right groin AV fistula withmultiple outpouchings raising concern for   pseudoaneurysms. Recommend duplex ultrasound for further evaluation.    Occlusion of the bilateral femoral arteries with partial distal   reconstitution as described.    < end of copied text >  < from: VA Duplex Arterial Lower Ext Ltd, Right (03.19.24 @ 06:38) >  CONCLUSIONS:      1.Right common femoral vein to common femoral artery arteriovenous graft noted in the right proximal thigh.   2. Small pseudoaneurysm measuring 0.8 x 0.4 cm is noted in the proxmal segment of the graft.   3. A second graft wall defect is noted immediately proximal to this pseudoaneurysm, on the posterior wall of the graft.     Problem/Plan - 2:  ·  Problem: Anemia.   ·  Plan: Hgb 11.2 on admission. Baseline appears to be 10-11. Likely from recent bleeding from R groin AVF, anemia of chronic disease, and ESRD.  - Monitor H/H, transfuse pRBCs as needed  - Keep active type and screen  - Check iron studies, folate, and vitamin B12.     Problem/Plan - 3:  ·  Problem: ESRD on hemodialysis.   ·  Plan: Pt on HD MWF. Last HD was on Monday, 3/18/24. No indication for urgent HD at this time.  - Nephrology help appreciated.   - Monitor volume status  - Monitor electrolytes, including serum K, HCO3, Ca, and Phos  - Ferric citrate not available through pharmacy. F/u serum Ca and Phos and c/w either Sevelamer or calcium acetate.   - Dose meds for ESRD.     Problem/Plan - 4:  ·  Problem: Chronic heart failure with preserved ejection fraction (HFpEF).   ·  Plan: Pt with history of HFpEF, with EF 58% and grade 2 diastolic dysfunction on TTE 7/2023. Pt currently euvolemic despite 2 u pRBC transfusion in the ED.  - C/w HD as per nephrology recs  - Check TTE as part of preop cardiac eval, as pt might require AVF (?AVG) revision during this admission  - Monitor volume status, I&Os, daily weights  - Fluid and salt restriction.     Problem/Plan - 5:  ·  Problem: CAD (coronary artery disease).   ·  Plan: S/p 3v CABG in 7/2023. Per pt, admits to not taking either ASA or Plavix since last year after not getting refills of these medications, though Surescripts shows that pt filled a prescription for Plavix in Feb 2024.   - Continue to hold ASA, Plavix, any other antiplatelet meds for now given recent profuse bleeding  - Resume ASA and/or Plavix once bleeding has completely resolved and if no plan for surgical intervention by vascular surgery during admission. F/u with pt's cardiologist as to which antiplatelet pt should be on at home.  - Also hold metoprolol succinate 25 mg daily for now  - C/w simvastatin 40 mg daily.     Problem/Plan - 6:  ·  Problem: PAD (peripheral artery disease).   ·  Plan: Pt with multiple amputations, including b/l BKA.   - Hold ASA, Plavix, any other antiplatelet meds for now given recent profuse bleeding  - C/w simvastatin 40 mg daily.     Problem/Plan - 7:  ·  Problem: Hypertension.   ·  Plan: Pt on metoprolol succinate 25 mg daily at home. BP initially 62/43, now improved s/p 2 u pRBC transfusion.  - Hold metoprolol succinate 25 mg daily for now given recent profuse bleeding  - Monitor VS q4hrs.     Problem/Plan - 8:  ·  Problem: Hyperlipidemia.   ·  Plan: - C/w simvastatin 40 mg daily.     Problem/Plan - 9:  ·  Problem: Type 2 diabetes mellitus.   ·  Plan: Pt not on any meds for DM at home.  - Start low-dose ISS and FS checks qAC TID and qHS for now  - Check A1c.     Problem/Plan - 10:  ·  Problem: Need for prophylactic measure.   ·  Plan; DVT ppx: Hold pharmacologic ppx given recent profuse bleeding  Diet: Renal restrictions/DASH/TLC/CC.    Medically optimized for Vascular procedure.

## 2024-03-20 NOTE — PHYSICAL THERAPY INITIAL EVALUATION ADULT - ADDITIONAL COMMENTS
Pt lives with her brother in a house with 5 steps to enter and 11 inside to second floor. Pt uses a rollator and was independent with ADLs. Pt reports no falls in the past 6 months.  Pt left sitting at edge of the bed in NAD, call bell in reach and LADONNA Jovel made aware.

## 2024-03-20 NOTE — CONSULT NOTE ADULT - ASSESSMENT
62 y/o y/o F PMhx ESRD on HD (MWF via right groin AVF), PAD c/b right foot OM s/p right BKA (6/2022) and left foot dry gangrene s/p left BKA (11/2022), CAD s/p 3v CABG (7/2023), HFpEF, HTN, R eye blindness who presented w/ profuse bleeding from R groin AVG site following dialysis    AVG pseudoaneurysm  afebrile, no leukocytosis  no erythema, swelling, warmth or tenderness  no SSTI on exam  CTA- Right groin AV fistula with multiple outpouchings raising concern for pseudoaneurysms.   US-  Small pseudoaneurysm (0.8 x 0.4 cm) in the proxmal segment of the graft. second graft wall defect is noted immediately proximal to this pseudoaneurysm, on the posterior wall of the graft.    Recommendations  monitor off antibiotics  f/u pending blood cultures  f/u vascular recs- tentative plan for revision next week    Channing Cheng M.D.  [facility], Division of Infectious Diseases  [phone]  After 5pm on weekdays and all day on weekends - please call [phone] 
64 yo woman with history of ESRD on HD (MWF via right groin AVF), PAD c/b right foot OM s/p right BKA (6/2022) and left foot dry gangrene s/p left BKA (11/2022), CAD s/p 3v CABG (7/2023, noncompliant with ASA and/or Plavix), HFpEF (EF 58%, grade 2 diastolic dysfunction, TTE 7/2023), HTN, HLD, type 2 DM (no longer on any meds), and right eye blindness presents with profuse bleeding from AVF site on right groin, admitted with concern for multiple pseudoaneuryms, admitted for further management. Renal consulted for ESRD Mx.     ESRD on HD  schedule hemodialysis - Monday, Wednesday and Friday   access- thigh AVG  HD unit SQDC  K, vol ok    Informed consent for hemodialysis obtained  from pt. Consent in chart  arranged for routine hemodialysis tomorrow w/ 2 k bath , Ultrafiltration on Dialysis as tolerated with blood pressure   dose all meds for ESRD  renal restrictions in diet when not NPO    Anemia of CKD+s/p acute bleed- Hb at goal now. no DION for now  Hgb 11.2 on admission. Hb goal 10-11. s/p sig bleed from R groin AVG S/p 2 u pRBCs  - Monitor H/H, transfuse pRBCs as needed    HTN, controlled. s/p hypotension last night 2/2 acute bleed. bp now better and stable.     Right thigh hemodialysis AV graft s/p profuse bleed.   S/p 2 u pRBCs, with bleeding now resolved and pt HD stable. CTA abdominal aorta with run-off showing right groin AVF with multiple outpouchings raising concern for pseudoaneurysms.   - Vascular surgery following, appreciate recs  - RLE AVF duplex study- reviewed- small pseudoaneurysm noted  - Monitor for any recurrence of bleeding  - Monitor H/H, transfuse pRBCs as needed  - Per vascular surgery, pt might need revision for her right groin AVG (originally created at [facility] in 2017/2018)  - If pt unable to use right groin AVF for HD, will need IR consult for possible Shiley or permacath. F/u vascular surgery regarding AVF use.    Thanks for consulting. will closely follow up.   poc d/w pt, ER RN, HD RN  labs, rad, chart reviewed  For any question, pl call:  Nephrology  Cell -[phone]  Office [phone]  Ans Serv [phone]

## 2024-03-20 NOTE — PROGRESS NOTE ADULT - SUBJECTIVE AND OBJECTIVE BOX
Date of Service  : 03-20-24     INTERVAL HPI/OVERNIGHT EVENTS: I feel fine.   Vital Signs Last 24 Hrs  T(C): 36.4 (20 Mar 2024 16:25), Max: 36.8 (20 Mar 2024 06:32)  T(F): 97.6 (20 Mar 2024 16:25), Max: 98.2 (20 Mar 2024 06:32)  HR: 72 (20 Mar 2024 16:25) (68 - 74)  BP: 111/71 (20 Mar 2024 16:25) (95/55 - 119/64)  BP(mean): --  RR: 17 (20 Mar 2024 16:25) (17 - 18)  SpO2: 99% (20 Mar 2024 13:27) (96% - 100%)    Parameters below as of 20 Mar 2024 16:25  Patient On (Oxygen Delivery Method): room air      I&O's Summary    20 Mar 2024 07:01  -  20 Mar 2024 17:57  --------------------------------------------------------  IN: 500 mL / OUT: 0 mL / NET: 500 mL      MEDICATIONS  (STANDING):  dextrose 5%. 1000 milliLiter(s) (50 mL/Hr) IV Continuous <Continuous>  dextrose 5%. 1000 milliLiter(s) (100 mL/Hr) IV Continuous <Continuous>  dextrose 50% Injectable 25 Gram(s) IV Push once  dextrose 50% Injectable 12.5 Gram(s) IV Push once  dextrose 50% Injectable 25 Gram(s) IV Push once  glucagon  Injectable 1 milliGRAM(s) IntraMuscular once  insulin lispro (ADMELOG) corrective regimen sliding scale   SubCutaneous three times a day before meals  insulin lispro (ADMELOG) corrective regimen sliding scale   SubCutaneous at bedtime  simvastatin 40 milliGRAM(s) Oral at bedtime    MEDICATIONS  (PRN):  dextrose Oral Gel 15 Gram(s) Oral once PRN Blood Glucose LESS THAN 70 milliGRAM(s)/deciliter    LABS:                        10.5   6.31  )-----------( 141      ( 20 Mar 2024 05:50 )             32.8     03-20    138  |  96<L>  |  65<H>  ----------------------------<  79  4.9   |  24  |  8.05<H>    Ca    9.3      20 Mar 2024 05:50  Phos  7.3     03-20  Mg     2.40     03-20        Urinalysis Basic - ( 20 Mar 2024 05:50 )    Color: x / Appearance: x / SG: x / pH: x  Gluc: 79 mg/dL / Ketone: x  / Bili: x / Urobili: x   Blood: x / Protein: x / Nitrite: x   Leuk Esterase: x / RBC: x / WBC x   Sq Epi: x / Non Sq Epi: x / Bacteria: x      CAPILLARY BLOOD GLUCOSE      POCT Blood Glucose.: 129 mg/dL (20 Mar 2024 13:14)  POCT Blood Glucose.: 98 mg/dL (20 Mar 2024 09:21)  POCT Blood Glucose.: 108 mg/dL (19 Mar 2024 22:07)  POCT Blood Glucose.: 155 mg/dL (19 Mar 2024 19:54)        Urinalysis Basic - ( 20 Mar 2024 05:50 )    Color: x / Appearance: x / SG: x / pH: x  Gluc: 79 mg/dL / Ketone: x  / Bili: x / Urobili: x   Blood: x / Protein: x / Nitrite: x   Leuk Esterase: x / RBC: x / WBC x   Sq Epi: x / Non Sq Epi: x / Bacteria: x      REVIEW OF SYSTEMS:  CONSTITUTIONAL: No fever, weight loss, or fatigue  EYES: No eye pain, visual disturbances, or discharge  ENMT:  No difficulty hearing, tinnitus, vertigo; No sinus or throat pain  NECK: No pain or stiffness  RESPIRATORY: No cough, wheezing, chills or hemoptysis; No shortness of breath  CARDIOVASCULAR: No chest pain, palpitations, dizziness, or leg swelling  GASTROINTESTINAL: No abdominal or epigastric pain. No nausea, vomiting, or hematemesis; No diarrhea or constipation. No melena or hematochezia.  GENITOURINARY: No dysuria, frequency, hematuria, or incontinence  NEUROLOGICAL: No headaches, memory loss, loss of strength, numbness, or tremors      RADIOLOGY & ADDITIONAL TESTS:    Consultant(s) Notes Reviewed:  [x ] YES  [ ] NO    PHYSICAL EXAM:  GENERAL: NAD, well-groomed, well-developed,not in any distress ,  HEAD:  Atraumatic, Normocephalic  NECK: Supple, No JVD, Normal thyroid  NERVOUS SYSTEM:  Alert & Oriented X3, No focal deficit   CHEST/LUNG: Good air entry bilateral with no  rales, rhonchi, wheezing, or rubs  HEART: Regular rate and rhythm; No murmurs, rubs, or gallops  ABDOMEN: Soft, Nontender, Nondistended; Bowel sounds present  EXTREMITIES:  B/L BKA     Care Discussed with Consultants/Other Providers [ x] YES  [ ] NO

## 2024-03-20 NOTE — PROGRESS NOTE ADULT - SUBJECTIVE AND OBJECTIVE BOX
Overnight events:   - No acute events    SUBJECTIVE:  Patient was seen and examined on AM rounds. Denies new complaints.    OBJECTIVE:  Vital Signs Last 24 Hrs  T(C): 36.6 (20 Mar 2024 11:46), Max: 37.1 (19 Mar 2024 16:17)  T(F): 97.9 (20 Mar 2024 11:46), Max: 98.7 (19 Mar 2024 16:17)  HR: 74 (20 Mar 2024 11:46) (68 - 74)  BP: 116/57 (20 Mar 2024 11:46) (95/55 - 119/64)  BP(mean): --  RR: 18 (20 Mar 2024 11:46) (17 - 18)  SpO2: 99% (20 Mar 2024 11:46) (96% - 100%)    Parameters below as of 20 Mar 2024 11:46  Patient On (Oxygen Delivery Method): room air      03-20-24 @ 07:01  -  03-20-24 @ 12:00  --------------------------------------------------------  IN: 250 mL / OUT: 0 mL / NET: 250 mL      Physical Examination:  General: NAD, resting comfortably,   Pulmonary: normal resp effort, on 2LNC  Cardiovascular: NSR, no murmurs, previous well healed midline sternotomy scar  Abdominal: soft, ND/NT, no organomegaly, no guarding or rebound tenderness  Extremities: WWP, s/p B/L BKA, stumps well healed, right groin AVG with palpable thrill, small skin tear where previous needles entered.  Neuro: A/O x 3, CNs II-XII grossly intact, normal sensation, no focal deficits    Vascular Pulse Exam:  Right Femoral:  Palpable       Left Femoral:  Palpable        LABS:                        10.5   6.31  )-----------( 141      ( 20 Mar 2024 05:50 )             32.8       03-20    138  |  96<L>  |  65<H>  ----------------------------<  79  4.9   |  24  |  8.05<H>    Ca    9.3      20 Mar 2024 05:50  Phos  7.3     03-20  Mg     2.40     03-20    TPro  8.0  /  Alb  4.4  /  TBili  0.3  /  DBili  x   /  AST  10  /  ALT  5   /  AlkPhos  94  03-18      IMAGING:    < from: CT Angio Abd Aorta w/run-off w/ IV Cont (03.18.24 @ 17:23) >  FINDINGS:    CENTRAL ARTERIAL SYSTEM:    Atherosclerotic calcifications of the aortoiliac tree. The celiac axis,   SMA, bilateral renal arteries, and CARRIE are patent.  A stent is noted in the proximal common iliac vein.    RIGHT LOWER EXTREMITY:    Status post right below the knee amputation.    There is a right groin AV fistula between the right femoral artery and   vein, with multiple outpouchings along the proximal and distal segments   which are new since 12/2/2020, raising concern for pseudoaneurysms.    There is delayed perfusion of the right femoral artery, with occlusion   near the junction with the popliteal artery. There is short segment   reconstitution of the posterior tibial and peroneal branches with distal   occlusion.      LEFT LOWER EXTREMITY:    Status post left below-the-knee amputation with prosthesis.    There is delayed perfusion of the left femoral artery with occlusion of   the distal segment. There is reconstitution of a posterior tibial branch.    ADDITIONAL FINDINGS: Fibroid uterus.    IMPRESSION:  Right groin AV fistula withmultiple outpouchings raising concern for   pseudoaneurysms. Recommend duplex ultrasound for further evaluation.    Occlusion of the bilateral femoral arteries with partial distal   reconstitution as described.

## 2024-03-20 NOTE — PHYSICAL THERAPY INITIAL EVALUATION ADULT - GENERAL OBSERVATIONS, REHAB EVAL
Chart reviewed and cleared for PT by LADONNA Jovel. Pt received semi supine in bed in NAD, bilateral prosthetics at bedside, HR 70s.

## 2024-03-20 NOTE — PROGRESS NOTE ADULT - ASSESSMENT
63 year old female with PMH HTN, PAD, ESRD on HD through right groin fistula done at OSH, DMT2, CAD s/p CABG  (w/ left saphenous vein graft) in 2023, B/L NIRMAL presents to ED from dialysis center after her HD session finished, after her fistula continued to bleed after HD today. Initially hypotensive but improved after 1u prbc, then another unit given after CTA aorta w/ runoff. Patient pending CTA read. Vascular surgery consulted for bleeding fistula, now hemostatic after pressure held in ED.      Plan:  - Duplex reviewed and AVFistula has a small PSA  - Can c/w HD via right groin fistula, just don't cannulate the same area of the bleeding  - Will plan for AVF revision next week (likely Tuesday/Wednesday)  - Please obtain medical and cardiac clearance  - Please call with any acute changes in HDS status or recurrent bleeding    Vascular (C-Team) Surgery   [id number]

## 2024-03-20 NOTE — PROGRESS NOTE ADULT - ASSESSMENT
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 Renal consulted for ESRD Mx.     ESRD on HD  schedule hemodialysis - Monday, Wednesday and Friday   access- thigh AVG  HD unit SQDC  K, vol ok    Informed consent for hemodialysis obtained  from pt. Consent in chart  arranged for routine hemodialysis today w/ 2 k bath , Ultrafiltration on Dialysis as tolerated with blood pressure   dose all meds for ESRD  renal restrictions in diet when not NPO    Anemia of CKD+s/p acute bleed- Hb at goal now. no DION for now  Hgb 11.2 on admission. Hb goal 10-11. s/p sig bleed from R groin AVG S/p 2 u pRBCs  - Monitor H/H, transfuse pRBCs as needed    HTN, controlled. s/p hypotension last night 2/2 acute bleed. bp now better and stable.     Right thigh hemodialysis AV graft s/p profuse bleed.   S/p 2 u pRBCs, with bleeding now resolved and pt HD stable. CTA abdominal aorta with run-off showing right groin AVF with multiple outpouchings raising concern for pseudoaneurysms.   - Vascular surgery following, appreciate recs  - RLE AVF duplex study- reviewed- small pseudoaneurysm noted  - Monitor for any recurrence of bleeding  - Monitor H/H, transfuse pRBCs as needed  - Per vascular surgery, pt might need revision for her right groin AVG (originally created at [facility] in 2017/2018)  - Per vascular can use thigh graft for HD today avoiding bleeding site      For any question, call:  Cell # [phone]  Pager # [phone]  Callback # [phone]

## 2024-03-20 NOTE — PHYSICAL THERAPY INITIAL EVALUATION ADULT - PERTINENT HX OF CURRENT PROBLEM, REHAB EVAL
pt is a 63 year old woman with history of ESRD on HD (MWF via right groin AVF), PAD c/b right foot OM s/p right BKA (6/2022) and left foot dry gangrene s/p left BKA (11/2022), CAD s/p 3v CABG (7/2023, noncompliant with ASA and/or Plavix), HFpEF (EF 58%, grade 2 diastolic dysfunction, TTE 7/2023), HTN, HLD, type 2 DM (no longer on any meds), and right eye blindness presents with profuse bleeding from AVF site on right groin, admitted with concern for multiple pseudoaneuryms, admitted for further management.

## 2024-03-21 LAB
ANION GAP SERPL CALC-SCNC: 14 MMOL/L — SIGNIFICANT CHANGE UP (ref 7–14)
BLD GP AB SCN SERPL QL: NEGATIVE — SIGNIFICANT CHANGE UP
BUN SERPL-MCNC: 32 MG/DL — HIGH (ref 7–23)
CALCIUM SERPL-MCNC: 9.4 MG/DL — SIGNIFICANT CHANGE UP (ref 8.4–10.5)
CHLORIDE SERPL-SCNC: 98 MMOL/L — SIGNIFICANT CHANGE UP (ref 98–107)
CO2 SERPL-SCNC: 26 MMOL/L — SIGNIFICANT CHANGE UP (ref 22–31)
CREAT SERPL-MCNC: 5.43 MG/DL — HIGH (ref 0.5–1.3)
EGFR: 8 ML/MIN/1.73M2 — LOW
GLUCOSE BLDC GLUCOMTR-MCNC: 101 MG/DL — HIGH (ref 70–99)
GLUCOSE BLDC GLUCOMTR-MCNC: 102 MG/DL — HIGH (ref 70–99)
GLUCOSE BLDC GLUCOMTR-MCNC: 102 MG/DL — HIGH (ref 70–99)
GLUCOSE BLDC GLUCOMTR-MCNC: 161 MG/DL — HIGH (ref 70–99)
GLUCOSE SERPL-MCNC: 108 MG/DL — HIGH (ref 70–99)
HBV CORE AB SER-ACNC: SIGNIFICANT CHANGE UP
HBV CORE IGM SER-ACNC: SIGNIFICANT CHANGE UP
HBV SURFACE AB SER-ACNC: 294.6 MIU/ML — SIGNIFICANT CHANGE UP
HCT VFR BLD CALC: 30.6 % — LOW (ref 34.5–45)
HCT VFR BLD CALC: 32.9 % — LOW (ref 34.5–45)
HCV AB S/CO SERPL IA: 0.11 S/CO — SIGNIFICANT CHANGE UP (ref 0–0.99)
HCV AB SERPL-IMP: SIGNIFICANT CHANGE UP
HGB BLD-MCNC: 10.6 G/DL — LOW (ref 11.5–15.5)
HGB BLD-MCNC: 9.9 G/DL — LOW (ref 11.5–15.5)
MAGNESIUM SERPL-MCNC: 2.4 MG/DL — SIGNIFICANT CHANGE UP (ref 1.6–2.6)
MCHC RBC-ENTMCNC: 27.4 PG — SIGNIFICANT CHANGE UP (ref 27–34)
MCHC RBC-ENTMCNC: 27.6 PG — SIGNIFICANT CHANGE UP (ref 27–34)
MCHC RBC-ENTMCNC: 32.2 GM/DL — SIGNIFICANT CHANGE UP (ref 32–36)
MCHC RBC-ENTMCNC: 32.4 GM/DL — SIGNIFICANT CHANGE UP (ref 32–36)
MCV RBC AUTO: 84.8 FL — SIGNIFICANT CHANGE UP (ref 80–100)
MCV RBC AUTO: 85.7 FL — SIGNIFICANT CHANGE UP (ref 80–100)
MRSA PCR RESULT.: SIGNIFICANT CHANGE UP
NRBC # BLD: 0 /100 WBCS — SIGNIFICANT CHANGE UP (ref 0–0)
NRBC # BLD: 0 /100 WBCS — SIGNIFICANT CHANGE UP (ref 0–0)
NRBC # FLD: 0 K/UL — SIGNIFICANT CHANGE UP (ref 0–0)
NRBC # FLD: 0 K/UL — SIGNIFICANT CHANGE UP (ref 0–0)
PHOSPHATE SERPL-MCNC: 5.4 MG/DL — HIGH (ref 2.5–4.5)
PLATELET # BLD AUTO: 130 K/UL — LOW (ref 150–400)
PLATELET # BLD AUTO: 150 K/UL — SIGNIFICANT CHANGE UP (ref 150–400)
POTASSIUM SERPL-MCNC: 4.2 MMOL/L — SIGNIFICANT CHANGE UP (ref 3.5–5.3)
POTASSIUM SERPL-SCNC: 4.2 MMOL/L — SIGNIFICANT CHANGE UP (ref 3.5–5.3)
RBC # BLD: 3.61 M/UL — LOW (ref 3.8–5.2)
RBC # BLD: 3.84 M/UL — SIGNIFICANT CHANGE UP (ref 3.8–5.2)
RBC # FLD: 15.2 % — HIGH (ref 10.3–14.5)
RBC # FLD: 15.4 % — HIGH (ref 10.3–14.5)
RH IG SCN BLD-IMP: POSITIVE — SIGNIFICANT CHANGE UP
S AUREUS DNA NOSE QL NAA+PROBE: SIGNIFICANT CHANGE UP
SODIUM SERPL-SCNC: 138 MMOL/L — SIGNIFICANT CHANGE UP (ref 135–145)
WBC # BLD: 5.09 K/UL — SIGNIFICANT CHANGE UP (ref 3.8–10.5)
WBC # BLD: 6.24 K/UL — SIGNIFICANT CHANGE UP (ref 3.8–10.5)
WBC # FLD AUTO: 5.09 K/UL — SIGNIFICANT CHANGE UP (ref 3.8–10.5)
WBC # FLD AUTO: 6.24 K/UL — SIGNIFICANT CHANGE UP (ref 3.8–10.5)

## 2024-03-21 RX ADMIN — CHLORHEXIDINE GLUCONATE 1 APPLICATION(S): 213 SOLUTION TOPICAL at 05:14

## 2024-03-21 RX ADMIN — SIMVASTATIN 40 MILLIGRAM(S): 20 TABLET, FILM COATED ORAL at 22:32

## 2024-03-21 NOTE — PROGRESS NOTE ADULT - ASSESSMENT
64 yo woman with history of ESRD on HD (MWF via right groin AVF), PAD c/b right foot OM s/p right BKA (6/2022) and left foot dry gangrene s/p left BKA (11/2022), CAD s/p 3v CABG (7/2023, noncompliant with ASA and/or Plavix), HFpEF (EF 58%, grade 2 diastolic dysfunction, TTE 7/2023), HTN, HLD, type 2 DM (no longer on any meds), and right eye blindness presents with profuse bleeding from AVF site on right groin, admitted with concern for multiple pseudoaneuryms, admitted for further management.        Problem/Plan - 1:  ·  Problem: Right thigh hemodialysis AV fistula bleed.   ·  Plan: Pt with profuse bleeding from right groin AVF after completing full session of HD on Monday. S/p 2 u pRBCs, with bleeding now resolved and pt HD stable. CTA abdominal aorta with run-off showing right groin AVF with multiple outpouchings raising concern for pseudoaneurysms.   - Vascular surgery following, appreciate recs  - Obtain RLE AVF duplex study  - Monitor for any recurrence of bleeding  - Monitor H/H, transfuse pRBCs as needed  - Keep active type and screen  - Coags on admission wnl  - C/w pressure dressing  - Per vascular surgery, pt planned  revision for her right groin AVF (?AVG),     < from: CT Angio Abd Aorta w/run-off w/ IV Cont (03.18.24 @ 17:23) >  IMPRESSION:  Right groin AV fistula withmultiple outpouchings raising concern for   pseudoaneurysms. Recommend duplex ultrasound for further evaluation.    Occlusion of the bilateral femoral arteries with partial distal   reconstitution as described.    < end of copied text >  < from: VA Duplex Arterial Lower Ext Ltd, Right (03.19.24 @ 06:38) >  CONCLUSIONS:      1.Right common femoral vein to common femoral artery arteriovenous graft noted in the right proximal thigh.   2. Small pseudoaneurysm measuring 0.8 x 0.4 cm is noted in the proxmal segment of the graft.   3. A second graft wall defect is noted immediately proximal to this pseudoaneurysm, on the posterior wall of the graft.     Problem/Plan - 2:  ·  Problem: Anemia.   ·  Plan: Hgb 11.2 on admission. Baseline appears to be 10-11. Likely from recent bleeding from R groin AVF, anemia of chronic disease, and ESRD.  - Monitor H/H, transfuse pRBCs as needed  - Keep active type and screen  - Check iron studies, folate, and vitamin B12.     Problem/Plan - 3:  ·  Problem: ESRD on hemodialysis.   ·  Plan: Pt on HD MWF. Last HD was on Monday, 3/18/24. No indication for urgent HD at this time.  - Nephrology help appreciated.   - Monitor volume status  - Monitor electrolytes, including serum K, HCO3, Ca, and Phos  - Ferric citrate not available through pharmacy. F/u serum Ca and Phos and c/w either Sevelamer or calcium acetate.   - Dose meds for ESRD.     Problem/Plan - 4:  ·  Problem: Chronic heart failure with preserved ejection fraction (HFpEF).   ·  Plan: Pt with history of HFpEF, with EF 58% and grade 2 diastolic dysfunction on TTE 7/2023. Pt currently euvolemic despite 2 u pRBC transfusion in the ED.  - C/w HD as per nephrology recs  - Check TTE as part of preop cardiac eval, as pt might require AVF (?AVG) revision during this admission  - Monitor volume status, I&Os, daily weights  - Fluid and salt restriction.     Problem/Plan - 5:  ·  Problem: CAD (coronary artery disease).   ·  Plan: S/p 3v CABG in 7/2023. Per pt, admits to not taking either ASA or Plavix since last year after not getting refills of these medications, though Surescripts shows that pt filled a prescription for Plavix in Feb 2024.   - Continue to hold ASA, Plavix, any other antiplatelet meds for now given recent profuse bleeding  - Resume ASA and/or Plavix once bleeding has completely resolved and if no plan for surgical intervention by vascular surgery during admission. F/u with pt's cardiologist as to which antiplatelet pt should be on at home.  - Also hold metoprolol succinate 25 mg daily for now  - C/w simvastatin 40 mg daily.     Problem/Plan - 6:  ·  Problem: PAD (peripheral artery disease).   ·  Plan: Pt with multiple amputations, including b/l BKA.   - Hold ASA, Plavix, any other antiplatelet meds for now given recent profuse bleeding  - C/w simvastatin 40 mg daily.     Problem/Plan - 7:  ·  Problem: Hypertension.   ·  Plan: Pt on metoprolol succinate 25 mg daily at home. BP initially 62/43, now improved s/p 2 u pRBC transfusion.  - Hold metoprolol succinate 25 mg daily for now given recent profuse bleeding  - Monitor VS q4hrs.     Problem/Plan - 8:  ·  Problem: Hyperlipidemia.   ·  Plan: - C/w simvastatin 40 mg daily.     Problem/Plan - 9:  ·  Problem: Type 2 diabetes mellitus.   ·  Plan: Pt not on any meds for DM at home.  - Start low-dose ISS and FS checks qAC TID and qHS for now  - Check A1c.     Problem/Plan - 10:  ·  Problem: Need for prophylactic measure.   ·  Plan; DVT ppx: Hold pharmacologic ppx given recent profuse bleeding  Diet: Renal restrictions/DASH/TLC/CC.    Medically optimized for Vascular procedure.

## 2024-03-21 NOTE — CONSULT NOTE ADULT - SUBJECTIVE AND OBJECTIVE BOX
Cardiovascular Disease Initial Evaluation  Date of service: 03-21-24 @ 14:59    CHIEF COMPLAINT:  AVF malfunction.     HISTORY OF PRESENT ILLNESS:  62 yo woman with history of ESRD on HD (MWF via right groin AVF), PAD c/b right foot OM s/p right BKA (6/2022) and left foot dry gangrene s/p left BKA (11/2022), CAD s/p 3v CABG (7/2023, noncompliant with ASA and/or Plavix), HFpEF (EF 58%, grade 2 diastolic dysfunction, TTE 7/2023), HTN, HLD, type 2 DM (no longer on any meds), and right eye blindness presents with profuse bleeding from AVF site on right groin. Pt completed a full session of HD on Monday via her right groin AVF without any issues. However, as pt was heading home, she noticed profuse bleeding from her AVF site, with the blood completely soaking the clean dressing that the HD nurse applied just prior to pt leaving the dialysis center. Pt, therefore, quickly returned to her dialysis center, where the staff applied pressure to the AVF site. The bleeding, though, became worse, so pt was transported to the ED via ambulance. On arrival to the ED, pt's BP was noted to be 62/43. Pt notes that during the ambulance ride, her vision in her left eye became cloudy, and she started having ringing in both ears. Upon arrival to ED blood bank was called for emergency release of blood, with pt transfused 1 u pRBCs emergently. Surgical rapid response was also called, though the bleeding was finally under control when surgery arrived to pt's bedside. Pt was taken urgently for CTA abdominal aorta with run-off, which showed the right groin AVF with multiple outpouchings raising concern for  pseudoaneurysms. Plan for AVF revision next week. Cardiology consulted for pre-op risk stratification. Patient denies chest pain or SOB. She admits to not being compliant with her medications.       Allergies    No Known Allergies    Intolerances    	    MEDICATIONS:            dextrose 50% Injectable 25 Gram(s) IV Push once  dextrose 50% Injectable 12.5 Gram(s) IV Push once  dextrose 50% Injectable 25 Gram(s) IV Push once  dextrose Oral Gel 15 Gram(s) Oral once PRN  glucagon  Injectable 1 milliGRAM(s) IntraMuscular once  insulin lispro (ADMELOG) corrective regimen sliding scale   SubCutaneous three times a day before meals  insulin lispro (ADMELOG) corrective regimen sliding scale   SubCutaneous at bedtime  simvastatin 40 milliGRAM(s) Oral at bedtime    chlorhexidine 2% Cloths 1 Application(s) Topical <User Schedule>  dextrose 5%. 1000 milliLiter(s) IV Continuous <Continuous>  dextrose 5%. 1000 milliLiter(s) IV Continuous <Continuous>      PAST MEDICAL & SURGICAL HISTORY:  Heart failure      HLD (hyperlipidemia)      Glaucoma      Cataract      ESRD (end stage renal disease) on dialysis      PAD (peripheral artery disease)      Blind right eye      CAD (coronary artery disease)      Hypertension      Type 2 diabetes mellitus      Acute osteomyelitis of toe of right foot  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          FAMILY HISTORY:  No pertinent family history in first degree relatives        SOCIAL HISTORY:    The patient is a nonsmoker       REVIEW OF SYSTEMS:  See HPI, otherwise complete 14 point review of systems negative    [x ] All others negative	  [ ] Unable to obtain    PHYSICAL EXAM:  T(C): 36.9 (03-21-24 @ 13:13), Max: 36.9 (03-21-24 @ 13:13)  HR: 70 (03-21-24 @ 13:13) (69 - 80)  BP: 102/40 (03-21-24 @ 13:13) (92/51 - 119/49)  RR: 17 (03-21-24 @ 13:13) (17 - 18)  SpO2: 100% (03-21-24 @ 13:13) (96% - 100%)  Wt(kg): --  I&O's Summary    20 Mar 2024 07:01  -  21 Mar 2024 07:00  --------------------------------------------------------  IN: 900 mL / OUT: 1400 mL / NET: -500 mL        Appearance: No Acute Distress; resting comfortably  HEENT:  Normal oral mucosa, PERRL, EOMI	  Cardiovascular: Normal S1 S2, No JVD, No murmurs/rubs/gallops  Respiratory: Normal respiratory effort; Lungs clear to auscultation bilaterally  Gastrointestinal:  Soft, Non-tender, + BS	  Skin: No rashes, No ecchymoses, No cyanosis	  Neurologic: Non-focal; no weakness  Extremities: No clubbing, cyanosis or edema  Vascular: Bilateral BKA  Psychiatry: A & O x 3, Mood & affect appropriate    Laboratory Data:	 	    CBC Full  -  ( 21 Mar 2024 06:13 )  WBC Count : 5.09 K/uL  Hemoglobin : 9.9 g/dL  Hematocrit : 30.6 %  Platelet Count - Automated : 130 K/uL  Mean Cell Volume : 84.8 fL  Mean Cell Hemoglobin : 27.4 pg  Mean Cell Hemoglobin Concentration : 32.4 gm/dL  Auto Neutrophil # : x  Auto Lymphocyte # : x  Auto Monocyte # : x  Auto Eosinophil # : x  Auto Basophil # : x  Auto Neutrophil % : x  Auto Lymphocyte % : x  Auto Monocyte % : x  Auto Eosinophil % : x  Auto Basophil % : x    03-21    138  |  98  |  32<H>  ----------------------------<  108<H>  4.2   |  26  |  5.43<H>  03-20    138  |  96<L>  |  65<H>  ----------------------------<  79  4.9   |  24  |  8.05<H>    Ca    9.4      21 Mar 2024 06:13  Ca    9.3      20 Mar 2024 05:50  Phos  5.4     03-21  Phos  7.3     03-20  Mg     2.40     03-21  Mg     2.40     03-20        proBNP:   Lipid Profile:   HgA1c:   TSH:       CARDIAC MARKERS:            Interpretation of Telemetry: N/a	    ECG: Sinus rhythm with nonspecific changes.   RADIOLOGY:  OTHER: 	    PREVIOUS DIAGNOSTIC TESTING:    [x ] Echocardiogram: 7/2023   1. The left ventricular systolic function is normal with an ejection fraction of 58 % by Obrien's method of disks.   2. Normal right ventricular cavity size, normal right ventricular wall thickness and probably normal right ventricular systolic function.   3. A bioprosthetic valve replacement present in the aortic position.   4. Fibrocalcific aortic valve sclerosis without stenosis.    [ ] Catheterization:  [ ] Stress Test:  	    Assessment:  62 yo woman with history of ESRD on HD, PAD c/b right foot OM s/p right BKA (6/2022) and left foot dry gangrene s/p left BKA (11/2022), CAD s/p 3v CABG, HFpEF, HTN, HLD, type 2 DM, and right eye blindness presents with profuse bleeding from AVF site on right groin requiring revision.     Plan of Care:    #Cardiac risk stratification  - Plan for AVF revision next week  - RCRI: 4 which portends to a 15% risk of MACE within 30 days of procedure  - EKG on admission shows normal sinus rhythm with nonspecific ST changes. QT interval within normal limits (miscalculated on EKG)  - Echo 7/2023 shows normal LV systolic function with well seated bio-AVR  - Patient displays no signs of coronary ischemia or heart failure  - From a cardiac standpoint, patient is optimized to proceed with AVF revision  - Patient is elevated but not prohibitive risk for procedure    #CAD  - S/pt CABG 7/2023  - Patient noncompliant with Antiplatelet therapy  - If no objection by surgical team, would resume ASA 81mg  - Continue Statin  - Would not start BB pre-operatively given low BP readings    #ESRD  - HD as per renal team    #HLD  - Statin therapy              77 minutes spent on total encounter; more than 50% of the visit was spent counseling and/or coordinating care by the attending physician.   	  Wisam Adams,  [facility]  Cardiovascular Diseases  [phone]     Cardiovascular Disease Initial Evaluation  Date of service: 03-21-24 @ 14:59    CHIEF COMPLAINT:  AVF malfunction.     HISTORY OF PRESENT ILLNESS:  62 yo woman with history of ESRD on HD (MWF via right groin AVF), PAD c/b right foot OM s/p right BKA (6/2022) and left foot dry gangrene s/p left BKA (11/2022), CAD s/p 3v CABG (7/2023, noncompliant with ASA and/or Plavix), HFpEF (EF 58%, grade 2 diastolic dysfunction, TTE 7/2023), HTN, HLD, type 2 DM (no longer on any meds), and right eye blindness presents with profuse bleeding from AVF site on right groin. Pt completed a full session of HD on Monday via her right groin AVF without any issues. However, as pt was heading home, she noticed profuse bleeding from her AVF site, with the blood completely soaking the clean dressing that the HD nurse applied just prior to pt leaving the dialysis center. Pt, therefore, quickly returned to her dialysis center, where the staff applied pressure to the AVF site. The bleeding, though, became worse, so pt was transported to the ED via ambulance. On arrival to the ED, pt's BP was noted to be 62/43. Pt notes that during the ambulance ride, her vision in her left eye became cloudy, and she started having ringing in both ears. Upon arrival to ED blood bank was called for emergency release of blood, with pt transfused 1 u pRBCs emergently. Surgical rapid response was also called, though the bleeding was finally under control when surgery arrived to pt's bedside. Pt was taken urgently for CTA abdominal aorta with run-off, which showed the right groin AVF with multiple outpouchings raising concern for  pseudoaneurysms. Plan for AVF revision next week. Cardiology consulted for pre-op risk stratification. Patient denies chest pain or SOB. She admits to not being compliant with her medications.       Allergies    No Known Allergies    Intolerances    	    MEDICATIONS:            dextrose 50% Injectable 25 Gram(s) IV Push once  dextrose 50% Injectable 12.5 Gram(s) IV Push once  dextrose 50% Injectable 25 Gram(s) IV Push once  dextrose Oral Gel 15 Gram(s) Oral once PRN  glucagon  Injectable 1 milliGRAM(s) IntraMuscular once  insulin lispro (ADMELOG) corrective regimen sliding scale   SubCutaneous three times a day before meals  insulin lispro (ADMELOG) corrective regimen sliding scale   SubCutaneous at bedtime  simvastatin 40 milliGRAM(s) Oral at bedtime    chlorhexidine 2% Cloths 1 Application(s) Topical <User Schedule>  dextrose 5%. 1000 milliLiter(s) IV Continuous <Continuous>  dextrose 5%. 1000 milliLiter(s) IV Continuous <Continuous>      PAST MEDICAL & SURGICAL HISTORY:  Heart failure      HLD (hyperlipidemia)      Glaucoma      Cataract      ESRD (end stage renal disease) on dialysis      PAD (peripheral artery disease)      Blind right eye      CAD (coronary artery disease)      Hypertension      Type 2 diabetes mellitus      Acute osteomyelitis of toe of right foot  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          FAMILY HISTORY:  No pertinent family history in first degree relatives        SOCIAL HISTORY:    The patient is a nonsmoker       REVIEW OF SYSTEMS:  See HPI, otherwise complete 14 point review of systems negative    [x ] All others negative	  [ ] Unable to obtain    PHYSICAL EXAM:  T(C): 36.9 (03-21-24 @ 13:13), Max: 36.9 (03-21-24 @ 13:13)  HR: 70 (03-21-24 @ 13:13) (69 - 80)  BP: 102/40 (03-21-24 @ 13:13) (92/51 - 119/49)  RR: 17 (03-21-24 @ 13:13) (17 - 18)  SpO2: 100% (03-21-24 @ 13:13) (96% - 100%)  Wt(kg): --  I&O's Summary    20 Mar 2024 07:01  -  21 Mar 2024 07:00  --------------------------------------------------------  IN: 900 mL / OUT: 1400 mL / NET: -500 mL        Appearance: No Acute Distress; resting comfortably  HEENT:  Normal oral mucosa, PERRL, EOMI	  Cardiovascular: Normal S1 S2, No JVD, No murmurs/rubs/gallops  Respiratory: Normal respiratory effort; Lungs clear to auscultation bilaterally  Gastrointestinal:  Soft, Non-tender, + BS	  Skin: No rashes, No ecchymoses, No cyanosis	  Neurologic: Non-focal; no weakness  Extremities: No clubbing, cyanosis or edema  Vascular: Bilateral BKA  Psychiatry: A & O x 3, Mood & affect appropriate    Laboratory Data:	 	    CBC Full  -  ( 21 Mar 2024 06:13 )  WBC Count : 5.09 K/uL  Hemoglobin : 9.9 g/dL  Hematocrit : 30.6 %  Platelet Count - Automated : 130 K/uL  Mean Cell Volume : 84.8 fL  Mean Cell Hemoglobin : 27.4 pg  Mean Cell Hemoglobin Concentration : 32.4 gm/dL  Auto Neutrophil # : x  Auto Lymphocyte # : x  Auto Monocyte # : x  Auto Eosinophil # : x  Auto Basophil # : x  Auto Neutrophil % : x  Auto Lymphocyte % : x  Auto Monocyte % : x  Auto Eosinophil % : x  Auto Basophil % : x    03-21    138  |  98  |  32<H>  ----------------------------<  108<H>  4.2   |  26  |  5.43<H>  03-20    138  |  96<L>  |  65<H>  ----------------------------<  79  4.9   |  24  |  8.05<H>    Ca    9.4      21 Mar 2024 06:13  Ca    9.3      20 Mar 2024 05:50  Phos  5.4     03-21  Phos  7.3     03-20  Mg     2.40     03-21  Mg     2.40     03-20        proBNP:   Lipid Profile:   HgA1c:   TSH:       CARDIAC MARKERS:            Interpretation of Telemetry: N/a	    ECG: Sinus rhythm with nonspecific changes.   RADIOLOGY:  OTHER: 	    PREVIOUS DIAGNOSTIC TESTING:    [x ] Echocardiogram: 7/2023   1. The left ventricular systolic function is normal with an ejection fraction of 58 % by Obrien's method of disks.   2. Normal right ventricular cavity size, normal right ventricular wall thickness and probably normal right ventricular systolic function.   3. A bioprosthetic valve replacement present in the aortic position.   4. Fibrocalcific aortic valve sclerosis without stenosis.    [ ] Catheterization:  [ ] Stress Test:  	    Assessment:  62 yo woman with history of ESRD on HD, PAD c/b right foot OM s/p right BKA (6/2022) and left foot dry gangrene s/p left BKA (11/2022), CAD s/p 3v CABG, HFpEF, HTN, HLD, type 2 DM, and right eye blindness presents with profuse bleeding from AVF site on right groin requiring revision.     Plan of Care:    #Cardiac risk stratification  - Plan for AVF revision next week  - RCRI: 4 which portends to a 15% risk of MACE within 30 days of procedure  - EKG on admission shows normal sinus rhythm with nonspecific ST changes. QT interval within normal limits (miscalculated on EKG)  - Echo 7/2023 shows normal LV systolic function with well seated bio-AVR  - Patient displays no signs of coronary ischemia or heart failure  - From a cardiac standpoint, patient is optimized to proceed with AVF revision  - Patient is elevated but not prohibitive risk for procedure    #CAD  - S/pt CABG 7/2023  - Patient noncompliant with Antiplatelet therapy  - If no objection by surgical team, would resume ASA 81mg  - Continue Statin  - Would not start BB pre-operatively given low BP readings    #ESRD  - HD as per renal team    #HLD  - Statin therapy        Complex medical decision making.      97 minutes spent on total encounter; more than 50% of the visit was spent counseling and/or coordinating care by the attending physician.   	  Wisam Adams DO [facility]  Cardiovascular Diseases  [phone]

## 2024-03-21 NOTE — PROGRESS NOTE ADULT - ASSESSMENT
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   US-  Small pseudoaneurysm (0.8 x 0.4 cm) in the proxmal segment of the graft. second graft wall defect is noted immediately proximal to this pseudoaneurysm, on the posterior wall of the graft.    Recommendations  monitor off antibiotics  f/u blood cultures  f/u vascular recs- tentative plan for revision next week    Channing Cheng M.D.  [facility], Division of Infectious Diseases  [phone]  After 5pm on weekdays and all day on weekends - please call [phone]

## 2024-03-21 NOTE — PROGRESS NOTE ADULT - SUBJECTIVE AND OBJECTIVE BOX
[facility] - TIFFANY Bazzi / TIFFANY England / APARNA Vanegas/ TIFFANY Mcfarlane/ TIFFANY Madden/ MASSIEL Perkins / RAINER Potts / ESTEPHANIE Griffith / DEVEN Smith  ---------------------------------------------------------------------------------------------------------------  seen and examined today for ESRD  Interval : NAD  VITALS:  T(F): 98.3 (03-21-24 @ 09:30), Max: 98.3 (03-21-24 @ 09:30)  HR: 75 (03-21-24 @ 09:30)  BP: 119/49 (03-21-24 @ 09:30)  RR: 18 (03-21-24 @ 09:30)  SpO2: 98% (03-21-24 @ 09:30)  Wt(kg): --    03-20 @ 07:01  -  03-21 @ 07:00  --------------------------------------------------------  IN: 900 mL / OUT: 1400 mL / NET: -500 mL      Physical Exam :-  Constitutional: NAD  Neck: Supple.  Respiratory: Bilateral equal breath sounds,  Cardiovascular: S1, S2 normal,  Gastrointestinal: Bowel Sounds present, soft, non tender.  Extremities: B/L BKA, no edema  Neurological: Alert and Oriented x 3, no focal deficits  Psychiatric: Normal mood, normal affect  Data:-  Allergies :   No Known Allergies    Hospital Medications:   MEDICATIONS  (STANDING):  chlorhexidine 2% Cloths 1 Application(s) Topical <User Schedule>  dextrose 5%. 1000 milliLiter(s) (50 mL/Hr) IV Continuous <Continuous>  dextrose 5%. 1000 milliLiter(s) (100 mL/Hr) IV Continuous <Continuous>  dextrose 50% Injectable 25 Gram(s) IV Push once  dextrose 50% Injectable 12.5 Gram(s) IV Push once  dextrose 50% Injectable 25 Gram(s) IV Push once  glucagon  Injectable 1 milliGRAM(s) IntraMuscular once  insulin lispro (ADMELOG) corrective regimen sliding scale   SubCutaneous three times a day before meals  insulin lispro (ADMELOG) corrective regimen sliding scale   SubCutaneous at bedtime  simvastatin 40 milliGRAM(s) Oral at bedtime    03-21    138  |  98  |  32<H>  ----------------------------<  108<H>  4.2   |  26  |  5.43<H>    Ca    9.4      21 Mar 2024 06:13  Phos  5.4     03-21  Mg     2.40     03-21      Creatinine Trend: 5.43 <--, 8.05 <--, 6.16 <--, 5.22 <--                        9.9    5.09  )-----------( 130      ( 21 Mar 2024 06:13 )             30.6

## 2024-03-21 NOTE — PROGRESS NOTE ADULT - ASSESSMENT
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 Renal consulted for ESRD Mx.     ESRD on HD  schedule hemodialysis - Monday, Wednesday and Friday   access- thigh AVG  HD unit SQDC  K, vol ok    Informed consent for hemodialysis obtained  from pt. Consent in chart  tolerated HD well yesterdya  arranged for routine hemodialysis tomorrow w/ 2 k bath , Ultrafiltration on Dialysis as tolerated with blood pressure   dose all meds for ESRD  renal restrictions in diet when not NPO    Anemia of CKD+s/p acute bleed- Hb at goal now. no DION for now  Hgb 11.2 on admission. Hb goal 10-11. s/p sig bleed from R groin AVG S/p 2 u pRBCs  - Monitor H/H, transfuse pRBCs as needed    HTN, controlled. s/p hypotension last night 2/2 acute bleed. bp now better and stable.     Right thigh hemodialysis AV graft s/p profuse bleed.   S/p 2 u pRBCs, with bleeding now resolved and pt HD stable. CTA abdominal aorta with run-off showing right groin AVF with multiple outpouchings raising concern for pseudoaneurysms.   - Vascular surgery following,  - RLE AVF duplex study- reviewed- small pseudoaneurysm noted  - Monitor for any recurrence of bleeding  - Monitor H/H, transfuse pRBCs as needed  - Per vascular surgery, pt might need revision for her right groin AVG (originally created at [facility] in 2017/2018) tentatively this Tuesday or Wednesday  - Per vascular can use thigh graft for HD today avoiding bleeding site      For any question, call:  Cell # [phone]  Pager # [phone]  Callback # [phone]

## 2024-03-21 NOTE — PROGRESS NOTE ADULT - SUBJECTIVE AND OBJECTIVE BOX
Date of Service  : 03-21-24     INTERVAL HPI/OVERNIGHT EVENTS: I feel fine.   Vital Signs Last 24 Hrs  T(C): 36.8 (21 Mar 2024 09:30), Max: 36.8 (20 Mar 2024 13:27)  T(F): 98.3 (21 Mar 2024 09:30), Max: 98.3 (21 Mar 2024 09:30)  HR: 75 (21 Mar 2024 09:30) (69 - 80)  BP: 119/49 (21 Mar 2024 09:30) (92/51 - 119/49)  BP(mean): --  RR: 18 (21 Mar 2024 09:30) (17 - 18)  SpO2: 98% (21 Mar 2024 09:30) (96% - 99%)    Parameters below as of 21 Mar 2024 09:30  Patient On (Oxygen Delivery Method): room air      I&O's Summary    20 Mar 2024 07:01  -  21 Mar 2024 07:00  --------------------------------------------------------  IN: 900 mL / OUT: 1400 mL / NET: -500 mL      MEDICATIONS  (STANDING):  chlorhexidine 2% Cloths 1 Application(s) Topical <User Schedule>  dextrose 5%. 1000 milliLiter(s) (50 mL/Hr) IV Continuous <Continuous>  dextrose 5%. 1000 milliLiter(s) (100 mL/Hr) IV Continuous <Continuous>  dextrose 50% Injectable 25 Gram(s) IV Push once  dextrose 50% Injectable 12.5 Gram(s) IV Push once  dextrose 50% Injectable 25 Gram(s) IV Push once  glucagon  Injectable 1 milliGRAM(s) IntraMuscular once  insulin lispro (ADMELOG) corrective regimen sliding scale   SubCutaneous three times a day before meals  insulin lispro (ADMELOG) corrective regimen sliding scale   SubCutaneous at bedtime  simvastatin 40 milliGRAM(s) Oral at bedtime    MEDICATIONS  (PRN):  dextrose Oral Gel 15 Gram(s) Oral once PRN Blood Glucose LESS THAN 70 milliGRAM(s)/deciliter    LABS:                        9.9    5.09  )-----------( 130      ( 21 Mar 2024 06:13 )             30.6     03-21    138  |  98  |  32<H>  ----------------------------<  108<H>  4.2   |  26  |  5.43<H>    Ca    9.4      21 Mar 2024 06:13  Phos  5.4     03-21  Mg     2.40     03-21        Urinalysis Basic - ( 21 Mar 2024 06:13 )    Color: x / Appearance: x / SG: x / pH: x  Gluc: 108 mg/dL / Ketone: x  / Bili: x / Urobili: x   Blood: x / Protein: x / Nitrite: x   Leuk Esterase: x / RBC: x / WBC x   Sq Epi: x / Non Sq Epi: x / Bacteria: x      CAPILLARY BLOOD GLUCOSE      POCT Blood Glucose.: 101 mg/dL (21 Mar 2024 09:07)  POCT Blood Glucose.: 131 mg/dL (20 Mar 2024 22:14)  POCT Blood Glucose.: 97 mg/dL (20 Mar 2024 18:57)  POCT Blood Glucose.: 129 mg/dL (20 Mar 2024 13:14)        Urinalysis Basic - ( 21 Mar 2024 06:13 )    Color: x / Appearance: x / SG: x / pH: x  Gluc: 108 mg/dL / Ketone: x  / Bili: x / Urobili: x   Blood: x / Protein: x / Nitrite: x   Leuk Esterase: x / RBC: x / WBC x   Sq Epi: x / Non Sq Epi: x / Bacteria: x      REVIEW OF SYSTEMS:  CONSTITUTIONAL: No fever, weight loss, or fatigue  EYES: No eye pain, visual disturbances, or discharge  ENMT:  No difficulty hearing, tinnitus, vertigo; No sinus or throat pain  NECK: No pain or stiffness  RESPIRATORY: No cough, wheezing, chills or hemoptysis; No shortness of breath  CARDIOVASCULAR: No chest pain, palpitations, dizziness, or leg swelling  GASTROINTESTINAL: No abdominal or epigastric pain. No nausea, vomiting, or hematemesis; No diarrhea or constipation. No melena or hematochezia.  GENITOURINARY: No dysuria, frequency, hematuria, or incontinence  NEUROLOGICAL: No headaches, memory loss, loss of strength, numbness, or tremors      Consultant(s) Notes Reviewed:  [x ] YES  [ ] NO    PHYSICAL EXAM:  GENERAL: NAD, well-groomed, well-developed,not in any distress ,  HEAD:  Atraumatic, Normocephaliclesions  NECK: Supple, No JVD, Normal thyroid  NERVOUS SYSTEM:  Alert & Oriented X3, No focal deficit   CHEST/LUNG: Good air entry bilateral with no  rales, rhonchi, wheezing, or rubs  HEART: Regular rate and rhythm; No murmurs, rubs, or gallops  ABDOMEN: Soft, Nontender, Nondistended; Bowel sounds present  EXTREMITIES:  B/L BKA     Care Discussed with Consultants/Other Providers [ x] YES  [ ] NO

## 2024-03-21 NOTE — PROGRESS NOTE ADULT - SUBJECTIVE AND OBJECTIVE BOX
[facility], Division of Infectious Diseases  DANIEL Montelongo Y. Patel, S. Shah, G. Prosper  [phone]  ([phone] - weekdays after 5pm and weekends)    Name: JAYCEE WALTERS  Age/Gender: 63y Female  MRN: [id number]    Interval History:  Notes reviewed.   No concerning overnight events.  Afebrile.   feels good  denies any complaints    Allergies: No Known Allergies      Objective:  Vitals:   T(F): 98.3 (03-21-24 @ 09:30), Max: 98.3 (03-21-24 @ 09:30)  HR: 75 (03-21-24 @ 09:30) (69 - 80)  BP: 119/49 (03-21-24 @ 09:30) (92/51 - 119/49)  RR: 18 (03-21-24 @ 09:30) (17 - 18)  SpO2: 98% (03-21-24 @ 09:30) (96% - 99%)  Physical Examination:  General: no acute distress  HEENT: anicteric  Cardio: S1, S2, normal rate  Resp: clear to auscultation  Abd: soft, NT, ND  Ext: s/p b/l LE BKAs, no LE edema; R leg graft w/o   Skin: warm, dry    Laboratory Studies:  CBC:                       9.9    5.09  )-----------( 130      ( 21 Mar 2024 06:13 )             30.6     WBC Trend:  5.09 03-21-24 @ 06:13  6.31 03-20-24 @ 05:50  6.04 03-19-24 @ 05:49  6.68 03-18-24 @ 17:05    CMP: 03-21    138  |  98  |  32<H>  ----------------------------<  108<H>  4.2   |  26  |  5.43<H>    Ca    9.4      21 Mar 2024 06:13  Phos  5.4     03-21  Mg     2.40     03-21            Urinalysis Basic - ( 21 Mar 2024 06:13 )    Color: x / Appearance: x / SG: x / pH: x  Gluc: 108 mg/dL / Ketone: x  / Bili: x / Urobili: x   Blood: x / Protein: x / Nitrite: x   Leuk Esterase: x / RBC: x / WBC x   Sq Epi: x / Non Sq Epi: x / Bacteria: x      Microbiology: reviewed       Radiology: reviewed     Medications:  chlorhexidine 2% Cloths 1 Application(s) Topical <User Schedule>  dextrose 5%. 1000 milliLiter(s) IV Continuous <Continuous>  dextrose 5%. 1000 milliLiter(s) IV Continuous <Continuous>  dextrose 50% Injectable 25 Gram(s) IV Push once  dextrose 50% Injectable 12.5 Gram(s) IV Push once  dextrose 50% Injectable 25 Gram(s) IV Push once  dextrose Oral Gel 15 Gram(s) Oral once PRN  glucagon  Injectable 1 milliGRAM(s) IntraMuscular once  insulin lispro (ADMELOG) corrective regimen sliding scale   SubCutaneous three times a day before meals  insulin lispro (ADMELOG) corrective regimen sliding scale   SubCutaneous at bedtime  simvastatin 40 milliGRAM(s) Oral at bedtime    Antimicrobials:

## 2024-03-22 LAB
ANION GAP SERPL CALC-SCNC: 18 MMOL/L — HIGH (ref 7–14)
BUN SERPL-MCNC: 53 MG/DL — HIGH (ref 7–23)
CALCIUM SERPL-MCNC: 9.3 MG/DL — SIGNIFICANT CHANGE UP (ref 8.4–10.5)
CHLORIDE SERPL-SCNC: 97 MMOL/L — LOW (ref 98–107)
CO2 SERPL-SCNC: 25 MMOL/L — SIGNIFICANT CHANGE UP (ref 22–31)
CREAT SERPL-MCNC: 7.58 MG/DL — HIGH (ref 0.5–1.3)
EGFR: 6 ML/MIN/1.73M2 — LOW
FERRITIN SERPL-MCNC: 2600 NG/ML — HIGH (ref 13–330)
GLUCOSE BLDC GLUCOMTR-MCNC: 112 MG/DL — HIGH (ref 70–99)
GLUCOSE BLDC GLUCOMTR-MCNC: 131 MG/DL — HIGH (ref 70–99)
GLUCOSE BLDC GLUCOMTR-MCNC: 179 MG/DL — HIGH (ref 70–99)
GLUCOSE BLDC GLUCOMTR-MCNC: 81 MG/DL — SIGNIFICANT CHANGE UP (ref 70–99)
GLUCOSE BLDC GLUCOMTR-MCNC: 86 MG/DL — SIGNIFICANT CHANGE UP (ref 70–99)
GLUCOSE SERPL-MCNC: 80 MG/DL — SIGNIFICANT CHANGE UP (ref 70–99)
HCT VFR BLD CALC: 30.8 % — LOW (ref 34.5–45)
HGB BLD-MCNC: 9.7 G/DL — LOW (ref 11.5–15.5)
IRON SATN MFR SERPL: 30 % — SIGNIFICANT CHANGE UP (ref 14–50)
IRON SATN MFR SERPL: 59 UG/DL — SIGNIFICANT CHANGE UP (ref 30–160)
MAGNESIUM SERPL-MCNC: 2.3 MG/DL — SIGNIFICANT CHANGE UP (ref 1.6–2.6)
MCHC RBC-ENTMCNC: 27 PG — SIGNIFICANT CHANGE UP (ref 27–34)
MCHC RBC-ENTMCNC: 31.5 GM/DL — LOW (ref 32–36)
MCV RBC AUTO: 85.8 FL — SIGNIFICANT CHANGE UP (ref 80–100)
NRBC # BLD: 0 /100 WBCS — SIGNIFICANT CHANGE UP (ref 0–0)
NRBC # FLD: 0 K/UL — SIGNIFICANT CHANGE UP (ref 0–0)
PHOSPHATE SERPL-MCNC: 7.2 MG/DL — HIGH (ref 2.5–4.5)
PLATELET # BLD AUTO: 136 K/UL — LOW (ref 150–400)
POTASSIUM SERPL-MCNC: 4.8 MMOL/L — SIGNIFICANT CHANGE UP (ref 3.5–5.3)
POTASSIUM SERPL-SCNC: 4.8 MMOL/L — SIGNIFICANT CHANGE UP (ref 3.5–5.3)
RBC # BLD: 3.59 M/UL — LOW (ref 3.8–5.2)
RBC # FLD: 15.1 % — HIGH (ref 10.3–14.5)
SODIUM SERPL-SCNC: 140 MMOL/L — SIGNIFICANT CHANGE UP (ref 135–145)
TIBC SERPL-MCNC: 197 UG/DL — LOW (ref 220–430)
UIBC SERPL-MCNC: 138 UG/DL — SIGNIFICANT CHANGE UP (ref 110–370)
WBC # BLD: 5.61 K/UL — SIGNIFICANT CHANGE UP (ref 3.8–10.5)
WBC # FLD AUTO: 5.61 K/UL — SIGNIFICANT CHANGE UP (ref 3.8–10.5)

## 2024-03-22 RX ORDER — ACETAMINOPHEN 500 MG
650 TABLET ORAL ONCE
Refills: 0 | Status: COMPLETED | OUTPATIENT
Start: 2024-03-22 | End: 2024-03-22

## 2024-03-22 RX ADMIN — CHLORHEXIDINE GLUCONATE 1 APPLICATION(S): 213 SOLUTION TOPICAL at 05:19

## 2024-03-22 RX ADMIN — SIMVASTATIN 40 MILLIGRAM(S): 20 TABLET, FILM COATED ORAL at 22:27

## 2024-03-22 NOTE — PROGRESS NOTE ADULT - ASSESSMENT
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   US-  Small pseudoaneurysm (0.8 x 0.4 cm) in the proxmal segment of the graft. second graft wall defect is noted immediately proximal to this pseudoaneurysm, on the posterior wall of the graft.  blood cultures- NGTD    Recommendations  monitor off antibiotics  f/u vascular recs- tentative plan for revision next week    Dr. Trisha Esteves will be covering 3/23-3/24. I will resume care on 3/25     Channing Cheng M.D.  [facility], Division of Infectious Diseases  [phone]  After 5pm on weekdays and all day on weekends - please call [phone]

## 2024-03-22 NOTE — PROGRESS NOTE ADULT - SUBJECTIVE AND OBJECTIVE BOX
[facility] - TIFFANY Bazzi / TIFFANY England / APARNA Vanegas/ TIFFANY Mcfarlane/ TIFFANY Madden/ MASSIEL Perkins / RAINER Potts / ESTEPHANIE Griffith / DEVEN Smith  ---------------------------------------------------------------------------------------------------------------  seen and examined today for ESRD  Interval : NAD  VITALS:  T(F): 98.4 (03-22-24 @ 05:15), Max: 99.1 (03-21-24 @ 21:06)  HR: 72 (03-22-24 @ 05:15)  BP: 107/52 (03-22-24 @ 05:15)  RR: 18 (03-22-24 @ 05:15)  SpO2: 100% (03-22-24 @ 05:15)  Wt(kg): --    Physical Exam :-  Constitutional: NAD  Neck: Supple.  Respiratory: Bilateral equal breath sounds,  Cardiovascular: S1, S2 normal,  Gastrointestinal: Bowel Sounds present, soft, non tender.  Extremities: No edema, B/L BKA  Neurological: Alert and Oriented x 3, no focal deficits  Psychiatric: Normal mood, normal affect  Data:-  Allergies :   No Known Allergies    Hospital Medications:   MEDICATIONS  (STANDING):  chlorhexidine 2% Cloths 1 Application(s) Topical <User Schedule>  dextrose 5%. 1000 milliLiter(s) (50 mL/Hr) IV Continuous <Continuous>  dextrose 5%. 1000 milliLiter(s) (100 mL/Hr) IV Continuous <Continuous>  dextrose 50% Injectable 25 Gram(s) IV Push once  dextrose 50% Injectable 12.5 Gram(s) IV Push once  dextrose 50% Injectable 25 Gram(s) IV Push once  glucagon  Injectable 1 milliGRAM(s) IntraMuscular once  insulin lispro (ADMELOG) corrective regimen sliding scale   SubCutaneous three times a day before meals  insulin lispro (ADMELOG) corrective regimen sliding scale   SubCutaneous at bedtime  simvastatin 40 milliGRAM(s) Oral at bedtime    03-22    140  |  97<L>  |  53<H>  ----------------------------<  80  4.8   |  25  |  7.58<H>    Ca    9.3      22 Mar 2024 06:25  Phos  7.2     03-22  Mg     2.30     03-22      Creatinine Trend: 7.58 <--, 5.43 <--, 8.05 <--, 6.16 <--, 5.22 <--                        9.7    5.61  )-----------( 136      ( 22 Mar 2024 06:25 )             30.8

## 2024-03-22 NOTE — PROGRESS NOTE ADULT - SUBJECTIVE AND OBJECTIVE BOX
Cardiovascular Disease Progress Note  Date of service: 03-22-24 @ 12:40    Overnight events: No acute events overnight.  Patient is in no distress.  Otherwise review of systems negative    Objective Findings:  T(C): 36.4 (03-22-24 @ 09:30), Max: 37.3 (03-21-24 @ 21:06)  HR: 70 (03-22-24 @ 09:30) (70 - 93)  BP: 105/44 (03-22-24 @ 09:30) (102/40 - 157/100)  RR: 18 (03-22-24 @ 09:30) (16 - 18)  SpO2: 100% (03-22-24 @ 09:30) (92% - 100%)  Wt(kg): --  Daily Height in cm: 165.1 (21 Mar 2024 18:57)    Daily       Physical Exam:  Gen: NAD; Patient resting comfortably  HEENT: EOMI, Normocephalic/ atraumatic  CV: RRR, normal S1 + S2, no m/r/g  Lungs:  Normal respiratory effort; clear to auscultation bilaterally  Abd: soft, non-tender; bowel sounds present  Ext: No edema; warm and well perfused    Telemetry: n/a    Laboratory Data:                        9.7    5.61  )-----------( 136      ( 22 Mar 2024 06:25 )             30.8     03-22    140  |  97<L>  |  53<H>  ----------------------------<  80  4.8   |  25  |  7.58<H>    Ca    9.3      22 Mar 2024 06:25  Phos  7.2     03-22  Mg     2.30     03-22                Inpatient Medications:  MEDICATIONS  (STANDING):  chlorhexidine 2% Cloths 1 Application(s) Topical <User Schedule>  dextrose 5%. 1000 milliLiter(s) (50 mL/Hr) IV Continuous <Continuous>  dextrose 5%. 1000 milliLiter(s) (100 mL/Hr) IV Continuous <Continuous>  dextrose 50% Injectable 25 Gram(s) IV Push once  dextrose 50% Injectable 12.5 Gram(s) IV Push once  dextrose 50% Injectable 25 Gram(s) IV Push once  glucagon  Injectable 1 milliGRAM(s) IntraMuscular once  insulin lispro (ADMELOG) corrective regimen sliding scale   SubCutaneous three times a day before meals  insulin lispro (ADMELOG) corrective regimen sliding scale   SubCutaneous at bedtime  simvastatin 40 milliGRAM(s) Oral at bedtime      Assessment: 64 yo woman with history of ESRD on HD, PAD c/b right foot OM s/p right BKA (6/2022) and left foot dry gangrene s/p left BKA (11/2022), CAD s/p 3v CABG, HFpEF, HTN, HLD, type 2 DM, and right eye blindness presents with profuse bleeding from AVF site on right groin requiring revision.     Plan of Care:    #Cardiac risk stratification  - Plan for AVF revision next week  - RCRI: 4 which portends to a 15% risk of MACE within 30 days of procedure  - EKG on admission shows normal sinus rhythm with nonspecific ST changes. QT interval within normal limits (miscalculated on EKG)  - Echo 7/2023 shows normal LV systolic function with well seated bio-AVR  - Patient displays no signs of coronary ischemia or heart failure  - From a cardiac standpoint, patient is optimized to proceed with AVF revision  - Patient is elevated but not prohibitive risk for procedure    #CAD  - S/pt CABG 7/2023  - Patient states she is taking Plavix, Metoprolol and Statin at home. Med list clarified with patient at bedside.   - Continue Statin  - Please resume home medication Metoprolol  - Antiplatelet held given bleeding and upcoming surgery.     #ESRD  - HD as per renal team    #HLD  - Statin therapy      #ACP (advance care planning)-  Advanced care planning was discussed with the patient.  Risks, benefits and alternatives of medical treatment and procedures were discussed in detail and all questions were answered. 30 additional minutes spent addressing advance care plans.          Over 55 minutes spent on total encounter; more than 50% of the visit was spent counseling and/or coordinating care by the attending physician.      Wisam Adams,  [facility]  Cardiovascular Disease  [phone] Cardiovascular Disease Progress Note  Date of service: 03-22-24 @ 12:40    Overnight events: No acute events overnight.  Patient is in no distress.  Otherwise review of systems negative    Objective Findings:  T(C): 36.4 (03-22-24 @ 09:30), Max: 37.3 (03-21-24 @ 21:06)  HR: 70 (03-22-24 @ 09:30) (70 - 93)  BP: 105/44 (03-22-24 @ 09:30) (102/40 - 157/100)  RR: 18 (03-22-24 @ 09:30) (16 - 18)  SpO2: 100% (03-22-24 @ 09:30) (92% - 100%)  Wt(kg): --  Daily Height in cm: 165.1 (21 Mar 2024 18:57)    Daily       Physical Exam:  Gen: NAD; Patient resting comfortably  HEENT: EOMI, Normocephalic/ atraumatic  CV: RRR, normal S1 + S2, no m/r/g  Lungs:  Normal respiratory effort; clear to auscultation bilaterally  Abd: soft, non-tender; bowel sounds present  Ext: No edema; warm and well perfused    Telemetry: n/a    Laboratory Data:                        9.7    5.61  )-----------( 136      ( 22 Mar 2024 06:25 )             30.8     03-22    140  |  97<L>  |  53<H>  ----------------------------<  80  4.8   |  25  |  7.58<H>    Ca    9.3      22 Mar 2024 06:25  Phos  7.2     03-22  Mg     2.30     03-22                Inpatient Medications:  MEDICATIONS  (STANDING):  chlorhexidine 2% Cloths 1 Application(s) Topical <User Schedule>  dextrose 5%. 1000 milliLiter(s) (50 mL/Hr) IV Continuous <Continuous>  dextrose 5%. 1000 milliLiter(s) (100 mL/Hr) IV Continuous <Continuous>  dextrose 50% Injectable 25 Gram(s) IV Push once  dextrose 50% Injectable 12.5 Gram(s) IV Push once  dextrose 50% Injectable 25 Gram(s) IV Push once  glucagon  Injectable 1 milliGRAM(s) IntraMuscular once  insulin lispro (ADMELOG) corrective regimen sliding scale   SubCutaneous three times a day before meals  insulin lispro (ADMELOG) corrective regimen sliding scale   SubCutaneous at bedtime  simvastatin 40 milliGRAM(s) Oral at bedtime      Assessment: 64 yo woman with history of ESRD on HD, PAD c/b right foot OM s/p right BKA (6/2022) and left foot dry gangrene s/p left BKA (11/2022), CAD s/p 3v CABG, HFpEF, HTN, HLD, type 2 DM, and right eye blindness presents with profuse bleeding from AVF site on right groin requiring revision.     Plan of Care:    #Cardiac risk stratification  - Plan for AVF revision next week  - RCRI: 4 which portends to a 15% risk of MACE within 30 days of procedure  - EKG on admission shows normal sinus rhythm with nonspecific ST changes. QT interval within normal limits (miscalculated on EKG)  - Echo 7/2023 shows normal LV systolic function with well seated bio-AVR  - Patient displays no signs of coronary ischemia or heart failure  - From a cardiac standpoint, patient is optimized to proceed with AVF revision  - Patient is elevated but not prohibitive risk for procedure    #CAD  - S/pt CABG 7/2023  - Patient states she is taking Plavix, Metoprolol and Statin at home. Med list clarified with patient at bedside.   - Continue Statin  - Please resume home medication Metoprolol  - Antiplatelet held given bleeding and upcoming surgery.     #ESRD  - HD as per renal team    #HLD  - Statin therapy      #ACP (advance care planning)-  Advanced care planning was discussed with the patient.  Risks, benefits and alternatives of medical treatment and procedures were discussed in detail and all questions were answered.   30 additional minutes spent addressing advance care plans.      Complex medical decision making.     Over 85 minutes spent on total encounter; more than 50% of the visit was spent counseling and/or coordinating care by the attending physician.      Wisam Adams DO [facility]  Cardiovascular Disease  [phone]

## 2024-03-22 NOTE — PROGRESS NOTE ADULT - SUBJECTIVE AND OBJECTIVE BOX
[facility], Division of Infectious Diseases  DANIEL Montelongo Y. Patel, S. Shah, G. Prosper  [phone]  ([phone] - weekdays after 5pm and weekends)    Name: JAYECE WALTERS  Age/Gender: 63y Female  MRN: [id number]    Interval History:  Notes reviewed.   No concerning overnight events.  Afebrile.   denies further bleeding from graft  denies pain     Allergies: No Known Allergies      Objective:  Vitals:   T(F): 97.6 (03-22-24 @ 09:30), Max: 99.1 (03-21-24 @ 21:06)  HR: 70 (03-22-24 @ 09:30) (70 - 93)  BP: 105/44 (03-22-24 @ 09:30) (104/63 - 157/100)  RR: 18 (03-22-24 @ 09:30) (16 - 18)  SpO2: 100% (03-22-24 @ 09:30) (92% - 100%)  Physical Examination:  General: no acute distress  HEENT: anicteric  Cardio: S1, S2, normal rate  Resp: clear to auscultation  Abd: soft, NT, ND  Ext: s/p b/l LE BKAs, no LE edema; R leg graft w/o erythema or tenderness  Skin: warm, dry    Laboratory Studies:  CBC:                       9.7    5.61  )-----------( 136      ( 22 Mar 2024 06:25 )             30.8     WBC Trend:  5.61 03-22-24 @ 06:25  6.24 03-21-24 @ 19:19  5.09 03-21-24 @ 06:13  6.31 03-20-24 @ 05:50  6.04 03-19-24 @ 05:49  6.68 03-18-24 @ 17:05    CMP: 03-22    140  |  97<L>  |  53<H>  ----------------------------<  80  4.8   |  25  |  7.58<H>    Ca    9.3      22 Mar 2024 06:25  Phos  7.2     03-22  Mg     2.30     03-22            Urinalysis Basic - ( 22 Mar 2024 06:25 )    Color: x / Appearance: x / SG: x / pH: x  Gluc: 80 mg/dL / Ketone: x  / Bili: x / Urobili: x   Blood: x / Protein: x / Nitrite: x   Leuk Esterase: x / RBC: x / WBC x   Sq Epi: x / Non Sq Epi: x / Bacteria: x      Microbiology: reviewed     Culture - Blood (collected 03-20-24 @ 15:29)  Source: .Blood Blood-Peripheral  Preliminary Report (03-21-24 @ 18:02):    No growth at 24 hours    Culture - Blood (collected 03-20-24 @ 15:29)  Source: .Blood Blood  Preliminary Report (03-21-24 @ 18:02):    No growth at 24 hours        Radiology: reviewed     Medications:  chlorhexidine 2% Cloths 1 Application(s) Topical <User Schedule>  dextrose 5%. 1000 milliLiter(s) IV Continuous <Continuous>  dextrose 5%. 1000 milliLiter(s) IV Continuous <Continuous>  dextrose 50% Injectable 25 Gram(s) IV Push once  dextrose 50% Injectable 12.5 Gram(s) IV Push once  dextrose 50% Injectable 25 Gram(s) IV Push once  dextrose Oral Gel 15 Gram(s) Oral once PRN  glucagon  Injectable 1 milliGRAM(s) IntraMuscular once  insulin lispro (ADMELOG) corrective regimen sliding scale   SubCutaneous three times a day before meals  insulin lispro (ADMELOG) corrective regimen sliding scale   SubCutaneous at bedtime  simvastatin 40 milliGRAM(s) Oral at bedtime    Antimicrobials:

## 2024-03-22 NOTE — PROGRESS NOTE ADULT - ASSESSMENT
64 yo woman with history of ESRD on HD (MWF via right groin AVF), PAD c/b right foot OM s/p right BKA (6/2022) and left foot dry gangrene s/p left BKA (11/2022), CAD s/p 3v CABG (7/2023, noncompliant with ASA and/or Plavix), HFpEF (EF 58%, grade 2 diastolic dysfunction, TTE 7/2023), HTN, HLD, type 2 DM (no longer on any meds), and right eye blindness presents with profuse bleeding from AVF site on right groin, admitted with concern for multiple pseudoaneuryms, admitted for further management. Renal consulted for ESRD Mx.     ESRD on HD  schedule hemodialysis - Monday, Wednesday and Friday   access- thigh AVG  [place] [facility] [place]  Informed consent for hemodialysis obtained  from pt. Consent in chart  K, vol ok  plan:  due for HD today  dose all meds for ESRD  renal restrictions in diet when not NPO    Anemia of CKD+s/p acute bleed- Hb at goal now. no DION for now  Hgb 11.2 on admission. Hb goal 10-11. s/p sig bleed from R groin AVG S/p 2 u pRBCs  - Monitor H/H, transfuse pRBCs as needed    HTN, controlled. s/p hypotension last night 2/2 acute bleed. bp now better and stable.     Right thigh hemodialysis AV graft s/p profuse bleed.   S/p 2 u pRBCs, with bleeding now resolved and pt HD stable. CTA abdominal aorta with run-off showing right groin AVF with multiple outpouchings raising concern for pseudoaneurysms.   - Vascular surgery following,  - RLE AVF duplex study- reviewed- small pseudoaneurysm noted  - Monitor for any recurrence of bleeding  - Monitor H/H, transfuse pRBCs as needed  - Per vascular surgery, pt might need revision for her right groin AVG (originally created at [facility] in 2017/2018) tentatively this Tuesday or Wednesday  - Per vascular can use thigh graft for HD today avoiding bleeding site      For any question, call:  Cell # [phone]  Pager # [phone]  Callback # [phone]

## 2024-03-22 NOTE — PROGRESS NOTE ADULT - SUBJECTIVE AND OBJECTIVE BOX
Date of Service  : 03-22-24     INTERVAL HPI/OVERNIGHT EVENTS: I feel fine.   Vital Signs Last 24 Hrs  T(C): 36.4 (22 Mar 2024 09:30), Max: 37.3 (21 Mar 2024 21:06)  T(F): 97.6 (22 Mar 2024 09:30), Max: 99.1 (21 Mar 2024 21:06)  HR: 70 (22 Mar 2024 09:30) (70 - 93)  BP: 105/44 (22 Mar 2024 09:30) (102/40 - 157/100)  BP(mean): --  RR: 18 (22 Mar 2024 09:30) (16 - 18)  SpO2: 100% (22 Mar 2024 09:30) (92% - 100%)    Parameters below as of 22 Mar 2024 09:30  Patient On (Oxygen Delivery Method): room air      I&O's Summary    MEDICATIONS  (STANDING):  chlorhexidine 2% Cloths 1 Application(s) Topical <User Schedule>  dextrose 5%. 1000 milliLiter(s) (50 mL/Hr) IV Continuous <Continuous>  dextrose 5%. 1000 milliLiter(s) (100 mL/Hr) IV Continuous <Continuous>  dextrose 50% Injectable 25 Gram(s) IV Push once  dextrose 50% Injectable 12.5 Gram(s) IV Push once  dextrose 50% Injectable 25 Gram(s) IV Push once  glucagon  Injectable 1 milliGRAM(s) IntraMuscular once  insulin lispro (ADMELOG) corrective regimen sliding scale   SubCutaneous three times a day before meals  insulin lispro (ADMELOG) corrective regimen sliding scale   SubCutaneous at bedtime  simvastatin 40 milliGRAM(s) Oral at bedtime    MEDICATIONS  (PRN):  dextrose Oral Gel 15 Gram(s) Oral once PRN Blood Glucose LESS THAN 70 milliGRAM(s)/deciliter    LABS:                        9.7    5.61  )-----------( 136      ( 22 Mar 2024 06:25 )             30.8     03-22    140  |  97<L>  |  53<H>  ----------------------------<  80  4.8   |  25  |  7.58<H>    Ca    9.3      22 Mar 2024 06:25  Phos  7.2     03-22  Mg     2.30     03-22        Urinalysis Basic - ( 22 Mar 2024 06:25 )    Color: x / Appearance: x / SG: x / pH: x  Gluc: 80 mg/dL / Ketone: x  / Bili: x / Urobili: x   Blood: x / Protein: x / Nitrite: x   Leuk Esterase: x / RBC: x / WBC x   Sq Epi: x / Non Sq Epi: x / Bacteria: x      CAPILLARY BLOOD GLUCOSE      POCT Blood Glucose.: 86 mg/dL (22 Mar 2024 08:46)  POCT Blood Glucose.: 161 mg/dL (21 Mar 2024 22:10)  POCT Blood Glucose.: 102 mg/dL (21 Mar 2024 18:18)  POCT Blood Glucose.: 102 mg/dL (21 Mar 2024 12:57)        Urinalysis Basic - ( 22 Mar 2024 06:25 )    Color: x / Appearance: x / SG: x / pH: x  Gluc: 80 mg/dL / Ketone: x  / Bili: x / Urobili: x   Blood: x / Protein: x / Nitrite: x   Leuk Esterase: x / RBC: x / WBC x   Sq Epi: x / Non Sq Epi: x / Bacteria: x      REVIEW OF SYSTEMS:  CONSTITUTIONAL: No fever, weight loss, or fatigue  EYES: No eye pain, visual disturbances, or discharge  ENMT:  No difficulty hearing, tinnitus, vertigo; No sinus or throat pain  NECK: No pain or stiffness  RESPIRATORY: No cough, wheezing, chills or hemoptysis; No shortness of breath  CARDIOVASCULAR: No chest pain, palpitations, dizziness, or leg swelling  GASTROINTESTINAL: No abdominal or epigastric pain. No nausea, vomiting, or hematemesis; No diarrhea or constipation. No melena or hematochezia.  GENITOURINARY: No dysuria, frequency, hematuria, or incontinence  NEUROLOGICAL: No headaches, memory loss, loss of strength, numbness, or tremors      Consultant(s) Notes Reviewed:  [x ] YES  [ ] NO    PHYSICAL EXAM:  GENERAL: NAD, well-groomed, well-developed,not in any distress ,  HEAD:  Atraumatic, Normocephalic  EYES: EOMI, PERRLA, conjunctiva and sclera clear  ENMT: No tonsillar erythema, exudates, or enlargement; Moist mucous membranes, Good dentition, No lesions  NECK: Supple, No JVD, Normal thyroid  NERVOUS SYSTEM:  Alert & Oriented X3, No focal deficit   CHEST/LUNG: Good air entry bilateral with no  rales, rhonchi, wheezing, or rubs  HEART: Regular rate and rhythm; No murmurs, rubs, or gallops  ABDOMEN: Soft, Nontender, Nondistended; Bowel sounds present  EXTREMITIES:  B/L BKA     Care Discussed with Consultants/Other Providers [ x] YES  [ ] NO

## 2024-03-23 LAB
ANION GAP SERPL CALC-SCNC: 14 MMOL/L — SIGNIFICANT CHANGE UP (ref 7–14)
BUN SERPL-MCNC: 30 MG/DL — HIGH (ref 7–23)
CALCIUM SERPL-MCNC: 9.3 MG/DL — SIGNIFICANT CHANGE UP (ref 8.4–10.5)
CHLORIDE SERPL-SCNC: 99 MMOL/L — SIGNIFICANT CHANGE UP (ref 98–107)
CO2 SERPL-SCNC: 26 MMOL/L — SIGNIFICANT CHANGE UP (ref 22–31)
CREAT SERPL-MCNC: 5.1 MG/DL — HIGH (ref 0.5–1.3)
EGFR: 9 ML/MIN/1.73M2 — LOW
GLUCOSE BLDC GLUCOMTR-MCNC: 118 MG/DL — HIGH (ref 70–99)
GLUCOSE BLDC GLUCOMTR-MCNC: 137 MG/DL — HIGH (ref 70–99)
GLUCOSE BLDC GLUCOMTR-MCNC: 142 MG/DL — HIGH (ref 70–99)
GLUCOSE BLDC GLUCOMTR-MCNC: 89 MG/DL — SIGNIFICANT CHANGE UP (ref 70–99)
GLUCOSE SERPL-MCNC: 81 MG/DL — SIGNIFICANT CHANGE UP (ref 70–99)
HCT VFR BLD CALC: 31.8 % — LOW (ref 34.5–45)
HGB BLD-MCNC: 9.9 G/DL — LOW (ref 11.5–15.5)
MAGNESIUM SERPL-MCNC: 2.2 MG/DL — SIGNIFICANT CHANGE UP (ref 1.6–2.6)
MCHC RBC-ENTMCNC: 26.8 PG — LOW (ref 27–34)
MCHC RBC-ENTMCNC: 31.1 GM/DL — LOW (ref 32–36)
MCV RBC AUTO: 85.9 FL — SIGNIFICANT CHANGE UP (ref 80–100)
NRBC # BLD: 0 /100 WBCS — SIGNIFICANT CHANGE UP (ref 0–0)
NRBC # FLD: 0 K/UL — SIGNIFICANT CHANGE UP (ref 0–0)
PHOSPHATE SERPL-MCNC: 5.7 MG/DL — HIGH (ref 2.5–4.5)
PLATELET # BLD AUTO: 140 K/UL — LOW (ref 150–400)
POTASSIUM SERPL-MCNC: 4.3 MMOL/L — SIGNIFICANT CHANGE UP (ref 3.5–5.3)
POTASSIUM SERPL-SCNC: 4.3 MMOL/L — SIGNIFICANT CHANGE UP (ref 3.5–5.3)
RBC # BLD: 3.7 M/UL — LOW (ref 3.8–5.2)
RBC # FLD: 15.1 % — HIGH (ref 10.3–14.5)
SODIUM SERPL-SCNC: 139 MMOL/L — SIGNIFICANT CHANGE UP (ref 135–145)
WBC # BLD: 5.06 K/UL — SIGNIFICANT CHANGE UP (ref 3.8–10.5)
WBC # FLD AUTO: 5.06 K/UL — SIGNIFICANT CHANGE UP (ref 3.8–10.5)

## 2024-03-23 RX ADMIN — Medication 650 MILLIGRAM(S): at 00:30

## 2024-03-23 RX ADMIN — SIMVASTATIN 40 MILLIGRAM(S): 20 TABLET, FILM COATED ORAL at 21:49

## 2024-03-23 RX ADMIN — CHLORHEXIDINE GLUCONATE 1 APPLICATION(S): 213 SOLUTION TOPICAL at 05:16

## 2024-03-23 RX ADMIN — Medication 650 MILLIGRAM(S): at 01:30

## 2024-03-23 NOTE — PROGRESS NOTE ADULT - SUBJECTIVE AND OBJECTIVE BOX
[facility] : Ans Serv [phone], Office [phone]  Dr. England/Dr Vanegas/Dr Bazzi  /Dr Kingsley patricia /Dr SHERRI Mcfarlane/Dr Jarrell Madden/Dr Andrés Perkins /Dr SUNI Potts  _______________________________________________________________________________________________    Pt seen and examined earlier today. Denied any acute complaints. Denied chest pain, shortness of breath, nausea, vomiting, or abdominal pain. No acute issues noted overnight    VITALS:  T(F): 98.5 (03-23 @ 05:40), Max: 99.1 (03-21 @ 21:06)  HR: 69 (03-23 @ 05:40)  BP: 111/53 (03-23 @ 05:40)  ABP: --  RR: 18 (03-23 @ 05:40)  SpO2: 95% (03-23 @ 05:40)    03-22 @ 07:01  -  03-23 @ 07:00  --------------------------------------------------------  IN: 400 mL / OUT: 1400 mL / NET: -1000 mL    03-23 @ 07:01  -  03-23 @ 09:47  --------------------------------------------------------  IN: 300 mL / OUT: 0 mL / NET: 300 mL      Physical Exam :-  Constitutional: NAD  Respiratory: Bilateral equal breath sounds, no Crackles present.  Cardiovascular: S1, S2 normal  Gastrointestinal: Bowel Sounds present, soft  Extremities: no Edema Feet  Neurological: Alert and Oriented x 3  Psychiatric: Normal mood, normal affect    Data:-  Allergies :   No Known Allergies    Hospital Medications:   MEDICATIONS  (STANDING):  chlorhexidine 2% Cloths 1 Application(s) Topical <User Schedule>  dextrose 5%. 1000 milliLiter(s) (50 mL/Hr) IV Continuous <Continuous>  dextrose 5%. 1000 milliLiter(s) (100 mL/Hr) IV Continuous <Continuous>  dextrose 50% Injectable 25 Gram(s) IV Push once  dextrose 50% Injectable 12.5 Gram(s) IV Push once  dextrose 50% Injectable 25 Gram(s) IV Push once  glucagon  Injectable 1 milliGRAM(s) IntraMuscular once  insulin lispro (ADMELOG) corrective regimen sliding scale   SubCutaneous three times a day before meals  insulin lispro (ADMELOG) corrective regimen sliding scale   SubCutaneous at bedtime  simvastatin 40 milliGRAM(s) Oral at bedtime    03-23    139  |  99  |  30<H>  ----------------------------<  81  4.3   |  26  |  5.10<H>    Ca    9.3      23 Mar 2024 05:30  Phos  5.7     03-23  Mg     2.20     03-23      Creatinine Trend: 5.10 <--, 7.58 <--, 5.43 <--, 8.05 <--, 6.16 <--, 5.22 <--  egfr trend : 9 <--, 6 <--, 8 <--, 5 <--, 7 <--, 9 <--                        9.9    5.06  )-----------( 140      ( 23 Mar 2024 05:30 )             31.8

## 2024-03-23 NOTE — PROVIDER CONTACT NOTE (OTHER) - SITUATION
Pt /43 electronic. 95/55 manually. All other VS stable.
Blood Pressure 103/44
Pt BP 92/51 electronic, 100/68 manual. All other VS stable.
Pt BP is 118/49 electronic & 110/58 manually

## 2024-03-23 NOTE — PROGRESS NOTE ADULT - SUBJECTIVE AND OBJECTIVE BOX
Cardiovascular Disease Progress Note  Date of service: 24 @ 09:26    Overnight events: No acute events overnight.  Patient is in no distress.   Otherwise review of systems negative    Objective Findings:  T(C): 36.9 (24 @ 05:40), Max: 36.9 (24 @ 05:40)  HR: 69 (24 @ 05:40) (69 - 76)  BP: 111/53 (24 @ 05:40) (105/44 - 122/53)  RR: 18 (24 @ 05:40) (15 - 18)  SpO2: 95% (24 @ 05:40) (95% - 100%)  Wt(kg): --  Daily     Daily Weight in k.2 (22 Mar 2024 19:30)      Physical Exam:  Gen: NAD; Patient resting comfortably  HEENT: EOMI, Normocephalic/ atraumatic  CV: RRR, normal S1 + S2, no m/r/g  Lungs:  Normal respiratory effort; clear to auscultation bilaterally  Abd: soft, non-tender; bowel sounds present  Ext: No edema; warm and well perfused    Telemetry: N/a    Laboratory Data:                        9.9    5.06  )-----------( 140      ( 23 Mar 2024 05:30 )             31.8         139  |  99  |  30<H>  ----------------------------<  81  4.3   |  26  |  5.10<H>    Ca    9.3      23 Mar 2024 05:30  Phos  5.7       Mg     2.20                     Inpatient Medications:  MEDICATIONS  (STANDING):  chlorhexidine 2% Cloths 1 Application(s) Topical <User Schedule>  dextrose 5%. 1000 milliLiter(s) (50 mL/Hr) IV Continuous <Continuous>  dextrose 5%. 1000 milliLiter(s) (100 mL/Hr) IV Continuous <Continuous>  dextrose 50% Injectable 25 Gram(s) IV Push once  dextrose 50% Injectable 12.5 Gram(s) IV Push once  dextrose 50% Injectable 25 Gram(s) IV Push once  glucagon  Injectable 1 milliGRAM(s) IntraMuscular once  insulin lispro (ADMELOG) corrective regimen sliding scale   SubCutaneous three times a day before meals  insulin lispro (ADMELOG) corrective regimen sliding scale   SubCutaneous at bedtime  simvastatin 40 milliGRAM(s) Oral at bedtime      Assessment:  64 yo woman with history of ESRD on HD, PAD c/b right foot OM s/p right BKA (2022) and 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     Plan of Care:    #Cardiac risk stratification  - Plan for AVF revision next week  - RCRI: 4 which portends to a 15% risk of MACE within 30 days of procedure  - EKG on admission shows normal sinus rhythm with nonspecific ST changes. QT interval within normal limits (miscalculated on EKG)  - Echo 2023 shows normal LV systolic function with well seated bio-AVR  - Patient displays no signs of coronary ischemia or heart failure  - From a cardiac standpoint, patient is optimized to proceed with AVF revision  - Patient is elevated but not prohibitive risk for procedure    #CAD  - S/pt CABG 2023  - Patient states she is taking Plavix, Metoprolol and Statin at home. Med list clarified with patient at bedside.   - Continue Statin  - Please resume home medication Metoprolol  - Antiplatelet held given bleeding and upcoming surgery please resume once deemed safe.     #ESRD  - HD as per renal team    #HLD  - Statin therapy          Over 55 minutes spent on total encounter; more than 50% of the visit was spent counseling and/or coordinating care by the attending physician.      Wisam Adams DO [facility]  Cardiovascular Disease  [phone]

## 2024-03-23 NOTE — PROVIDER CONTACT NOTE (OTHER) - BACKGROUND
PMH ESRD on HD here for bleeding from AVF site
PMH ESRD on HD here for bleeding from AVF site
dialysis patient
PMH ESRD on HD presenting w/ profuse bleeding from AVF site

## 2024-03-23 NOTE — PROGRESS NOTE ADULT - ASSESSMENT
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 Renal consulted for ESRD Mx.     ESRD on HD  schedule hemodialysis - Monday, Wednesday and Friday   access- thigh AVG  HD unit [facility] [place]  Informed consent for hemodialysis obtained  from pt. Consent in chart  K, vol ok  plan:  s/p HD yesterday; next HD on 3/25  dose all meds for ESRD  renal restrictions in diet when not NPO    Anemia of CKD+s/p acute bleed- Hb at goal now. no DION for now  Hgb 11.2 on admission. Hb goal 10-11. s/p sig bleed from R groin AVG S/p 2 u pRBCs  - Monitor H/H, transfuse pRBCs as needed    HTN, controlled. s/p hypotension last night 2/2 acute bleed. bp now better and stable.     Right thigh hemodialysis AV graft s/p profuse bleed.   S/p 2 u pRBCs, with bleeding now resolved and pt HD stable. CTA abdominal aorta with run-off showing right groin AVF with multiple outpouchings raising concern for pseudoaneurysms.   - Vascular surgery following,  - RLE AVF duplex study- reviewed- small pseudoaneurysm noted  - Monitor for any recurrence of bleeding  - Monitor H/H, transfuse pRBCs as needed  - Per vascular surgery, pt might need revision for her right groin AVG (originally created at [facility] in 2017/2018) tentatively this Tuesday or Wednesday  - Per vascular can use thigh graft for HD, avoiding bleeding site    If any questions, please feel free to contact me     Tan England  Nephrology Attending  Cell #[phone]

## 2024-03-23 NOTE — PROVIDER CONTACT NOTE (OTHER) - ACTION/TREATMENT ORDERED:
FLORENCIA.P. made aware
OK recheck BP in 1hr.
BP OK. Recheck BP in AM.
BP OK. No RN interventions ordered at this time.

## 2024-03-23 NOTE — PROVIDER CONTACT NOTE (OTHER) - ASSESSMENT
no distress noted
Pt alert & resting in bed. Pt asymptomatic. No s/s of distress at this time.
Pt alert & resting in bed. All other VS stable. Pt asymptomatic
Pt alert & resting in bed. Pt asymptomatic. No s/s of distress.

## 2024-03-24 LAB
GLUCOSE BLDC GLUCOMTR-MCNC: 107 MG/DL — HIGH (ref 70–99)
GLUCOSE BLDC GLUCOMTR-MCNC: 113 MG/DL — HIGH (ref 70–99)
GLUCOSE BLDC GLUCOMTR-MCNC: 87 MG/DL — SIGNIFICANT CHANGE UP (ref 70–99)
GLUCOSE BLDC GLUCOMTR-MCNC: 91 MG/DL — SIGNIFICANT CHANGE UP (ref 70–99)

## 2024-03-24 RX ADMIN — SIMVASTATIN 40 MILLIGRAM(S): 20 TABLET, FILM COATED ORAL at 22:15

## 2024-03-24 RX ADMIN — CHLORHEXIDINE GLUCONATE 1 APPLICATION(S): 213 SOLUTION TOPICAL at 06:16

## 2024-03-24 NOTE — PROGRESS NOTE ADULT - ASSESSMENT
64 yo woman with history of ESRD on HD (MWF via right groin AVF), PAD c/b right foot OM s/p right BKA (6/2022) and left foot dry gangrene s/p left BKA (11/2022), CAD s/p 3v CABG (7/2023, noncompliant with ASA and/or Plavix), HFpEF (EF 58%, grade 2 diastolic dysfunction, TTE 7/2023), HTN, HLD, type 2 DM (no longer on any meds), and right eye blindness presents with profuse bleeding from AVF site on right groin, admitted with concern for multiple pseudoaneuryms, admitted for further management. Renal consulted for ESRD Mx.     ESRD on HD  schedule hemodialysis - Monday, Wednesday and Friday   access- thigh AVG  HD unit [facility] [place]  Informed consent for hemodialysis obtained  from pt. Consent in chart  K, vol ok  plan:  s/p HD 3/22; next HD on 3/25  dose all meds for ESRD  renal restrictions in diet when not NPO    Anemia of CKD+s/p acute bleed- Hb at goal now. no DION for now  Hgb 11.2 on admission. Hb goal 10-11. s/p sig bleed from R groin AVG S/p 2 u pRBCs  - Monitor H/H, transfuse pRBCs as needed    HTN, controlled. s/p hypotension last night 2/2 acute bleed. bp now better and stable.     Right thigh hemodialysis AV graft s/p profuse bleed.   S/p 2 u pRBCs, with bleeding now resolved and pt HD stable. CTA abdominal aorta with run-off showing right groin AVF with multiple outpouchings raising concern for pseudoaneurysms.   - Vascular surgery following,  - RLE AVF duplex study- reviewed- small pseudoaneurysm noted  - Monitor for any recurrence of bleeding  - Monitor H/H, transfuse pRBCs as needed  - Per vascular surgery, pt might need revision for her right groin AVG (originally created at [facility] in 2017/2018) tentatively this Tuesday or Wednesday  - Per vascular can use thigh graft for HD, avoiding bleeding site    If any questions, please feel free to contact me     Tan England  Nephrology Attending  Cell #[phone]

## 2024-03-24 NOTE — PROGRESS NOTE ADULT - SUBJECTIVE AND OBJECTIVE BOX
[facility] : Ans Serv [phone], Office [phone]  Dr. England/Dr Vanegas/Dr Bazzi  /Dr Kingsley patricia /Dr SHERRI Mcfarlane/Dr Jarrell Madden/Dr Andrés Perkins /Dr SUNI Potts  _______________________________________________________________________________________________    Pt seen and examined earlier today.  No acute issues noted overnight    VITALS:  T(F): 98.2 (03-24 @ 05:38), Max: 98.7 (03-23 @ 09:30)  HR: 71 (03-24 @ 05:38)  BP: 117/47 (03-24 @ 05:38)  ABP: --  RR: 16 (03-24 @ 05:38)  SpO2: 99% (03-24 @ 05:38)    03-23 @ 07:01  -  03-24 @ 07:00  --------------------------------------------------------  IN: 900 mL / OUT: 0 mL / NET: 900 mL      Physical Exam :-  Constitutional: NAD  Respiratory: Bilateral equal breath sounds, no Crackles present.  Cardiovascular: S1, S2 normal  Gastrointestinal: Bowel Sounds present, soft  Extremities: no Edema Feet  Neurological: Alert and Oriented x 3  Psychiatric: Normal mood, normal affect    Data:-  Allergies :   No Known Allergies    Hospital Medications:   MEDICATIONS  (STANDING):  chlorhexidine 2% Cloths 1 Application(s) Topical <User Schedule>  dextrose 5%. 1000 milliLiter(s) (50 mL/Hr) IV Continuous <Continuous>  dextrose 5%. 1000 milliLiter(s) (100 mL/Hr) IV Continuous <Continuous>  dextrose 50% Injectable 25 Gram(s) IV Push once  dextrose 50% Injectable 12.5 Gram(s) IV Push once  dextrose 50% Injectable 25 Gram(s) IV Push once  glucagon  Injectable 1 milliGRAM(s) IntraMuscular once  insulin lispro (ADMELOG) corrective regimen sliding scale   SubCutaneous three times a day before meals  insulin lispro (ADMELOG) corrective regimen sliding scale   SubCutaneous at bedtime  simvastatin 40 milliGRAM(s) Oral at bedtime    03-23    139  |  99  |  30<H>  ----------------------------<  81  4.3   |  26  |  5.10<H>    Ca    9.3      23 Mar 2024 05:30  Phos  5.7     03-23  Mg     2.20     03-23      Creatinine Trend: 5.10 <--, 7.58 <--, 5.43 <--, 8.05 <--, 6.16 <--, 5.22 <--  egfr trend : 9 <--, 6 <--, 8 <--, 5 <--, 7 <--, 9 <--                        9.9    5.06  )-----------( 140      ( 23 Mar 2024 05:30 )             31.8

## 2024-03-24 NOTE — PROGRESS NOTE ADULT - SUBJECTIVE AND OBJECTIVE BOX
Cardiovascular Disease Progress Note  Date of service: 03-24-24 @ 09:46    Overnight events: No acute events overnight.  Patient denies chest pain or SOB.   Otherwise review of systems negative    Objective Findings:  T(C): 36.8 (03-24-24 @ 05:38), Max: 36.8 (03-23-24 @ 16:45)  HR: 71 (03-24-24 @ 05:38) (69 - 79)  BP: 117/47 (03-24-24 @ 05:38) (103/44 - 134/48)  RR: 16 (03-24-24 @ 05:38) (16 - 17)  SpO2: 99% (03-24-24 @ 05:38) (97% - 100%)  Wt(kg): --  Daily     Daily       Physical Exam:  Gen: NAD; Patient resting comfortably  HEENT: EOMI, Normocephalic/ atraumatic  CV: RRR, normal S1 + S2, no m/r/g  Lungs:  Normal respiratory effort; clear to auscultation bilaterally  Abd: soft, non-tender; bowel sounds present  Ext: No edema; warm and well perfused    Telemetry:  N/a    Laboratory Data:                        9.9    5.06  )-----------( 140      ( 23 Mar 2024 05:30 )             31.8     03-23    139  |  99  |  30<H>  ----------------------------<  81  4.3   |  26  |  5.10<H>    Ca    9.3      23 Mar 2024 05:30  Phos  5.7     03-23  Mg     2.20     03-23                Inpatient Medications:  MEDICATIONS  (STANDING):  chlorhexidine 2% Cloths 1 Application(s) Topical <User Schedule>  dextrose 5%. 1000 milliLiter(s) (50 mL/Hr) IV Continuous <Continuous>  dextrose 5%. 1000 milliLiter(s) (100 mL/Hr) IV Continuous <Continuous>  dextrose 50% Injectable 25 Gram(s) IV Push once  dextrose 50% Injectable 12.5 Gram(s) IV Push once  dextrose 50% Injectable 25 Gram(s) IV Push once  glucagon  Injectable 1 milliGRAM(s) IntraMuscular once  insulin lispro (ADMELOG) corrective regimen sliding scale   SubCutaneous three times a day before meals  insulin lispro (ADMELOG) corrective regimen sliding scale   SubCutaneous at bedtime  simvastatin 40 milliGRAM(s) Oral at bedtime    Assessment:  62 yo woman with history of ESRD on HD, PAD c/b right foot OM s/p right BKA (6/2022) and left foot dry gangrene s/p left BKA (11/2022), CAD s/p 3v CABG, HFpEF, HTN, HLD, type 2 DM, and right eye blindness presents with profuse bleeding from AVF site on right groin requiring revision.     Plan of Care:    #Cardiac risk stratification  - Plan for AVF revision next week  - RCRI: 4 which portends to a 15% risk of MACE within 30 days of procedure  - EKG on admission shows normal sinus rhythm with nonspecific ST changes. QT interval within normal limits (miscalculated on EKG)  - Echo 7/2023 shows normal LV systolic function with well seated bio-AVR  - Patient displays no signs of coronary ischemia or heart failure  - From a cardiac standpoint, patient is optimized to proceed with AVF revision  - Patient is elevated but not prohibitive risk for procedure    #CAD  - S/pt CABG 7/2023  - Patient states she is taking Plavix, Metoprolol and Statin at home. Med list clarified with patient at bedside.   - Continue Statin  - BB on hold in setting of Labile BP.   - Antiplatelet held given bleeding and upcoming surgery please resume once deemed safe.     #ESRD  - HD as per renal team    #HLD  - Statin therapy      Over 55 minutes spent on total encounter; more than 50% of the visit was spent counseling and/or coordinating care by the attending physician.      Wisam Adams DO [facility]  Cardiovascular Disease  [phone]

## 2024-03-24 NOTE — PROGRESS NOTE ADULT - SUBJECTIVE AND OBJECTIVE BOX
Date of Service  : 03-24-24     INTERVAL HPI/OVERNIGHT EVENTS: Feels fine with no new concerns.   Vital Signs Last 24 Hrs  T(C): 36.3 (24 Mar 2024 13:28), Max: 36.8 (23 Mar 2024 16:45)  T(F): 97.3 (24 Mar 2024 13:28), Max: 98.2 (23 Mar 2024 16:45)  HR: 77 (24 Mar 2024 13:28) (69 - 79)  BP: 117/50 (24 Mar 2024 13:28) (103/44 - 118/59)  BP(mean): --  RR: 20 (24 Mar 2024 13:28) (16 - 20)  SpO2: 99% (24 Mar 2024 13:28) (97% - 100%)    Parameters below as of 24 Mar 2024 13:28  Patient On (Oxygen Delivery Method): room air      I&O's Summary    23 Mar 2024 07:01  -  24 Mar 2024 07:00  --------------------------------------------------------  IN: 900 mL / OUT: 0 mL / NET: 900 mL      MEDICATIONS  (STANDING):  chlorhexidine 2% Cloths 1 Application(s) Topical <User Schedule>  dextrose 5%. 1000 milliLiter(s) (50 mL/Hr) IV Continuous <Continuous>  dextrose 5%. 1000 milliLiter(s) (100 mL/Hr) IV Continuous <Continuous>  dextrose 50% Injectable 25 Gram(s) IV Push once  dextrose 50% Injectable 12.5 Gram(s) IV Push once  dextrose 50% Injectable 25 Gram(s) IV Push once  glucagon  Injectable 1 milliGRAM(s) IntraMuscular once  insulin lispro (ADMELOG) corrective regimen sliding scale   SubCutaneous three times a day before meals  insulin lispro (ADMELOG) corrective regimen sliding scale   SubCutaneous at bedtime  simvastatin 40 milliGRAM(s) Oral at bedtime    MEDICATIONS  (PRN):  dextrose Oral Gel 15 Gram(s) Oral once PRN Blood Glucose LESS THAN 70 milliGRAM(s)/deciliter    LABS:                        9.9    5.06  )-----------( 140      ( 23 Mar 2024 05:30 )             31.8     03-23    139  |  99  |  30<H>  ----------------------------<  81  4.3   |  26  |  5.10<H>    Ca    9.3      23 Mar 2024 05:30  Phos  5.7     03-23  Mg     2.20     03-23        Urinalysis Basic - ( 23 Mar 2024 05:30 )    Color: x / Appearance: x / SG: x / pH: x  Gluc: 81 mg/dL / Ketone: x  / Bili: x / Urobili: x   Blood: x / Protein: x / Nitrite: x   Leuk Esterase: x / RBC: x / WBC x   Sq Epi: x / Non Sq Epi: x / Bacteria: x      CAPILLARY BLOOD GLUCOSE      POCT Blood Glucose.: 107 mg/dL (24 Mar 2024 12:35)  POCT Blood Glucose.: 87 mg/dL (24 Mar 2024 08:12)  POCT Blood Glucose.: 142 mg/dL (23 Mar 2024 22:11)  POCT Blood Glucose.: 118 mg/dL (23 Mar 2024 18:02)        Urinalysis Basic - ( 23 Mar 2024 05:30 )    Color: x / Appearance: x / SG: x / pH: x  Gluc: 81 mg/dL / Ketone: x  / Bili: x / Urobili: x   Blood: x / Protein: x / Nitrite: x   Leuk Esterase: x / RBC: x / WBC x   Sq Epi: x / Non Sq Epi: x / Bacteria: x      REVIEW OF SYSTEMS:  CONSTITUTIONAL: No fever, weight loss, or fatigue  EYES: No eye pain, visual disturbances, or discharge  ENMT:  No difficulty hearing, tinnitus, vertigo; No sinus or throat pain  NECK: No pain or stiffness  RESPIRATORY: No cough, wheezing, chills or hemoptysis; No shortness of breath  CARDIOVASCULAR: No chest pain, palpitations, dizziness, or leg swelling  GASTROINTESTINAL: No abdominal or epigastric pain. No nausea, vomiting, or hematemesis; No diarrhea or constipation. No melena or hematochezia.  GENITOURINARY: No dysuria, frequency, hematuria, or incontinence  NEUROLOGICAL: No headaches, memory loss, loss of strength, numbness, or tremors      Consultant(s) Notes Reviewed:  [x ] YES  [ ] NO    PHYSICAL EXAM:  GENERAL: NAD, well-groomed, well-developed,not in any distress ,  HEAD:  Atraumatic, Normocephalic  EYES: EOMI, PERRLA, conjunctiva and sclera clear  ENMT: No tonsillar erythema, exudates, or enlargement; Moist mucous membranes, Good dentition, No lesions  NECK: Supple, No JVD, Normal thyroid  NERVOUS SYSTEM:  Alert & Oriented X3, No focal deficit   CHEST/LUNG: Good air entry bilateral with no  rales, rhonchi, wheezing, or rubs  HEART: Regular rate and rhythm; No murmurs, rubs, or gallops  ABDOMEN: Soft, Nontender, Nondistended; Bowel sounds present  EXTREMITIES:  B/L BKA     Care Discussed with Consultants/Other Providers [ x] YES  [ ] NO

## 2024-03-25 ENCOUNTER — TRANSCRIPTION ENCOUNTER (OUTPATIENT)
Age: 64
End: 2024-03-25

## 2024-03-25 LAB
ANION GAP SERPL CALC-SCNC: 19 MMOL/L — HIGH (ref 7–14)
BUN SERPL-MCNC: 78 MG/DL — HIGH (ref 7–23)
CALCIUM SERPL-MCNC: 9.3 MG/DL — SIGNIFICANT CHANGE UP (ref 8.4–10.5)
CHLORIDE SERPL-SCNC: 95 MMOL/L — LOW (ref 98–107)
CO2 SERPL-SCNC: 23 MMOL/L — SIGNIFICANT CHANGE UP (ref 22–31)
CREAT SERPL-MCNC: 9.46 MG/DL — HIGH (ref 0.5–1.3)
CULTURE RESULTS: SIGNIFICANT CHANGE UP
CULTURE RESULTS: SIGNIFICANT CHANGE UP
EGFR: 4 ML/MIN/1.73M2 — LOW
GLUCOSE BLDC GLUCOMTR-MCNC: 112 MG/DL — HIGH (ref 70–99)
GLUCOSE BLDC GLUCOMTR-MCNC: 145 MG/DL — HIGH (ref 70–99)
GLUCOSE BLDC GLUCOMTR-MCNC: 157 MG/DL — HIGH (ref 70–99)
GLUCOSE BLDC GLUCOMTR-MCNC: 79 MG/DL — SIGNIFICANT CHANGE UP (ref 70–99)
GLUCOSE BLDC GLUCOMTR-MCNC: 87 MG/DL — SIGNIFICANT CHANGE UP (ref 70–99)
GLUCOSE SERPL-MCNC: 73 MG/DL — SIGNIFICANT CHANGE UP (ref 70–99)
HCT VFR BLD CALC: 28.3 % — LOW (ref 34.5–45)
HGB BLD-MCNC: 9.2 G/DL — LOW (ref 11.5–15.5)
MAGNESIUM SERPL-MCNC: 2.4 MG/DL — SIGNIFICANT CHANGE UP (ref 1.6–2.6)
MCHC RBC-ENTMCNC: 27.5 PG — SIGNIFICANT CHANGE UP (ref 27–34)
MCHC RBC-ENTMCNC: 32.5 GM/DL — SIGNIFICANT CHANGE UP (ref 32–36)
MCV RBC AUTO: 84.5 FL — SIGNIFICANT CHANGE UP (ref 80–100)
NRBC # BLD: 0 /100 WBCS — SIGNIFICANT CHANGE UP (ref 0–0)
NRBC # FLD: 0 K/UL — SIGNIFICANT CHANGE UP (ref 0–0)
PHOSPHATE SERPL-MCNC: 9.8 MG/DL — HIGH (ref 2.5–4.5)
PLATELET # BLD AUTO: 151 K/UL — SIGNIFICANT CHANGE UP (ref 150–400)
POTASSIUM SERPL-MCNC: 5.4 MMOL/L — HIGH (ref 3.5–5.3)
POTASSIUM SERPL-SCNC: 5.4 MMOL/L — HIGH (ref 3.5–5.3)
RBC # BLD: 3.35 M/UL — LOW (ref 3.8–5.2)
RBC # FLD: 14.8 % — HIGH (ref 10.3–14.5)
SODIUM SERPL-SCNC: 137 MMOL/L — SIGNIFICANT CHANGE UP (ref 135–145)
SPECIMEN SOURCE: SIGNIFICANT CHANGE UP
SPECIMEN SOURCE: SIGNIFICANT CHANGE UP
WBC # BLD: 6.08 K/UL — SIGNIFICANT CHANGE UP (ref 3.8–10.5)
WBC # FLD AUTO: 6.08 K/UL — SIGNIFICANT CHANGE UP (ref 3.8–10.5)

## 2024-03-25 RX ORDER — SEVELAMER CARBONATE 2400 MG/1
2400 POWDER, FOR SUSPENSION ORAL
Refills: 0 | Status: DISCONTINUED | OUTPATIENT
Start: 2024-03-25 | End: 2024-04-01

## 2024-03-25 RX ADMIN — SEVELAMER CARBONATE 2400 MILLIGRAM(S): 2400 POWDER, FOR SUSPENSION ORAL at 18:09

## 2024-03-25 RX ADMIN — SIMVASTATIN 40 MILLIGRAM(S): 20 TABLET, FILM COATED ORAL at 22:28

## 2024-03-25 RX ADMIN — Medication 1: at 18:11

## 2024-03-25 RX ADMIN — CHLORHEXIDINE GLUCONATE 1 APPLICATION(S): 213 SOLUTION TOPICAL at 12:03

## 2024-03-25 RX ADMIN — SEVELAMER CARBONATE 2400 MILLIGRAM(S): 2400 POWDER, FOR SUSPENSION ORAL at 12:05

## 2024-03-25 NOTE — PROGRESS NOTE ADULT - ASSESSMENT
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 Renal consulted for ESRD Mx.     ESRD on HD  schedule hemodialysis - Monday, Wednesday and Friday   access- thigh AVG  HD unit [facility] [place]  Informed consent for hemodialysis obtained  from pt. Consent in chart  K, vol ok  plan:  tolerating HD well today with 1k UF  dose all meds for ESRD  renal restrictions in diet when not NPO    Anemia of CKD+s/p acute bleed- Hb at goal now. no DION for now  Hgb 11.2 on admission. Hb goal 10-11. s/p sig bleed from R groin AVG S/p 2 u pRBCs  - Monitor H/H, transfuse pRBCs as needed    HTN, controlled. s/p hypotension last night 2/2 acute bleed. bp now better and stable.     Right thigh hemodialysis AV graft s/p profuse bleed.   S/p 2 u pRBCs, with bleeding now resolved and pt HD stable. CTA abdominal aorta with run-off showing right groin AVF with multiple outpouchings raising concern for pseudoaneurysms.   - Vascular surgery following,  - RLE AVF duplex study- reviewed- small pseudoaneurysm noted  - Monitor for any recurrence of bleeding  - Monitor H/H, transfuse pRBCs as needed  - Per vascular surgery, pt might need revision for her right groin AVG (originally created at [facility] in 2017/2018) tentatively Tomorrow  - Per vascular can use thigh graft for HD, avoiding bleeding site    From renal standpoint, optimized for surgery tomorrow      For any question, call:  Cell # [phone]  Pager # [phone]  Callback # [phone]

## 2024-03-25 NOTE — PROGRESS NOTE ADULT - SUBJECTIVE AND OBJECTIVE BOX
[facility], Division of Infectious Diseases  DANIEL Montelongo Y. Patel, S. Shah, G. Casimir  [phone]  ([phone] - weekdays after 5pm and weekends)    Name: JAYCEE WALTERS  Age/Gender: 63y Female  MRN: [id number]    Interval History:  Notes reviewed.   No concerning overnight events.  Afebrile.   feels good    Allergies: No Known Allergies      Objective:  Vitals:   T(F): 97.6 (03-25-24 @ 11:06), Max: 99 (03-25-24 @ 06:26)  HR: 72 (03-25-24 @ 11:06) (69 - 80)  BP: 111/35 (03-25-24 @ 11:06) (111/35 - 138/53)  RR: 18 (03-25-24 @ 11:06) (16 - 20)  SpO2: 99% (03-25-24 @ 11:06) (99% - 100%)  Physical Examination:  General: no acute distress  HEENT: anicteric  Cardio: S1, S2, normal rate  Resp: clear to auscultation  Abd: soft, NT, ND  Ext: s/p b/l LE BKAs  Skin: warm, dry    Laboratory Studies:  CBC:                       9.2    6.08  )-----------( 151      ( 25 Mar 2024 06:55 )             28.3     WBC Trend:  6.08 03-25-24 @ 06:55  5.06 03-23-24 @ 05:30  5.61 03-22-24 @ 06:25  6.24 03-21-24 @ 19:19  5.09 03-21-24 @ 06:13  6.31 03-20-24 @ 05:50  6.04 03-19-24 @ 05:49  6.68 03-18-24 @ 17:05    CMP: 03-25    137  |  95<L>  |  78<H>  ----------------------------<  73  5.4<H>   |  23  |  9.46<H>    Ca    9.3      25 Mar 2024 06:55  Phos  9.8     03-25  Mg     2.40     03-25            Urinalysis Basic - ( 25 Mar 2024 06:55 )    Color: x / Appearance: x / SG: x / pH: x  Gluc: 73 mg/dL / Ketone: x  / Bili: x / Urobili: x   Blood: x / Protein: x / Nitrite: x   Leuk Esterase: x / RBC: x / WBC x   Sq Epi: x / Non Sq Epi: x / Bacteria: x      Microbiology: reviewed     Culture - Blood (collected 03-20-24 @ 15:29)  Source: .Blood Blood-Peripheral  Preliminary Report (03-24-24 @ 18:01):    No growth at 4 days    Culture - Blood (collected 03-20-24 @ 15:29)  Source: .Blood Blood  Preliminary Report (03-24-24 @ 18:01):    No growth at 4 days        Radiology: reviewed     Medications:  chlorhexidine 2% Cloths 1 Application(s) Topical <User Schedule>  dextrose 5%. 1000 milliLiter(s) IV Continuous <Continuous>  dextrose 5%. 1000 milliLiter(s) IV Continuous <Continuous>  dextrose 50% Injectable 25 Gram(s) IV Push once  dextrose 50% Injectable 12.5 Gram(s) IV Push once  dextrose 50% Injectable 25 Gram(s) IV Push once  dextrose Oral Gel 15 Gram(s) Oral once PRN  glucagon  Injectable 1 milliGRAM(s) IntraMuscular once  insulin lispro (ADMELOG) corrective regimen sliding scale   SubCutaneous three times a day before meals  insulin lispro (ADMELOG) corrective regimen sliding scale   SubCutaneous at bedtime  sevelamer carbonate 2400 milliGRAM(s) Oral three times a day with meals  simvastatin 40 milliGRAM(s) Oral at bedtime    Antimicrobials:

## 2024-03-25 NOTE — PROGRESS NOTE ADULT - ASSESSMENT
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   US-  Small pseudoaneurysm (0.8 x 0.4 cm) in the proxmal segment of the graft. second graft wall defect is noted immediately proximal to this pseudoaneurysm, on the posterior wall of the graft.  blood cultures- NGTD    Recommendations  monitor off antibiotics  f/u vascular recs- tentative plan for revision this week    Channing Cheng M.D.  [facility], Division of Infectious Diseases  [phone]  After 5pm on weekdays and all day on weekends - please call [phone]

## 2024-03-25 NOTE — PROGRESS NOTE ADULT - SUBJECTIVE AND OBJECTIVE BOX
VASCULAR Surgery Daily Progress Note  =====================================================    SUBJECTIVE: No acute events overnight. Patient has no new concerns at this time.     --------------------------------------------------------------------------------------  VITAL SIGNS:  T(C): 36.4 (03-25-24 @ 11:06), Max: 37.2 (03-25-24 @ 06:26)  HR: 72 (03-25-24 @ 11:06) (69 - 80)  BP: 123/67 (03-25-24 @ 12:26) (111/50 - 138/53)  RR: 18 (03-25-24 @ 11:06) (16 - 20)  SpO2: 99% (03-25-24 @ 11:06) (99% - 100%)  --------------------------------------------------------------------------------------    EXAM    General: NAD, resting comfortably,   Pulmonary: normal resp effort, on 2LNC  Cardiovascular: NSR, no murmurs, previous well healed midline sternotomy scar  Abdominal: soft, ND/NT, no organomegaly, no guarding or rebound tenderness  Extremities: WWP, s/p B/L BKA, stumps well healed, right groin AVG with palpable thrill, small skin tear where previous needles entered.  Neuro: A/O x 3, CNs II-XII grossly intact    --------------------------------------------------------------------------------------

## 2024-03-25 NOTE — PROGRESS NOTE ADULT - SUBJECTIVE AND OBJECTIVE BOX
[facility] - TIFFANY Bazzi / TIFFANY England / APARNA Vanegas/ TIFFANY Mcfarlane/ TIFFANY Madden/ MASSIEL Perkins / RAINER Potts / ESTEPHANIE Griffith / DEVEN Smith  ---------------------------------------------------------------------------------------------------------------  seen and examined today for ESRD  Interval : NAD, toelrating HD well today  VITALS:  T(F): 97.6 (03-25-24 @ 11:06), Max: 99 (03-25-24 @ 06:26)  HR: 72 (03-25-24 @ 11:06)  BP: 111/35 (03-25-24 @ 11:06)  RR: 18 (03-25-24 @ 11:06)  SpO2: 99% (03-25-24 @ 11:06)  Wt(kg): --    03-25 @ 07:01  -  03-25 @ 11:19  --------------------------------------------------------  IN: 400 mL / OUT: 0 mL / NET: 400 mL      Physical Exam :-  Constitutional: NAD  Neck: Supple.  Respiratory: Bilateral equal breath sounds,  Cardiovascular: S1, S2 normal,  Gastrointestinal: Bowel Sounds present, soft, non tender.  Extremities: No edema, B/L BKA  Neurological: Alert and Oriented x 3, no focal deficits  Psychiatric: Normal mood, normal affect  Data:-  Allergies :   No Known Allergies    Hospital Medications:   MEDICATIONS  (STANDING):  chlorhexidine 2% Cloths 1 Application(s) Topical <User Schedule>  dextrose 5%. 1000 milliLiter(s) (50 mL/Hr) IV Continuous <Continuous>  dextrose 5%. 1000 milliLiter(s) (100 mL/Hr) IV Continuous <Continuous>  dextrose 50% Injectable 25 Gram(s) IV Push once  dextrose 50% Injectable 12.5 Gram(s) IV Push once  dextrose 50% Injectable 25 Gram(s) IV Push once  glucagon  Injectable 1 milliGRAM(s) IntraMuscular once  insulin lispro (ADMELOG) corrective regimen sliding scale   SubCutaneous three times a day before meals  insulin lispro (ADMELOG) corrective regimen sliding scale   SubCutaneous at bedtime  simvastatin 40 milliGRAM(s) Oral at bedtime    03-25    137  |  95<L>  |  78<H>  ----------------------------<  73  5.4<H>   |  23  |  9.46<H>    Ca    9.3      25 Mar 2024 06:55  Phos  9.8     03-25  Mg     2.40     03-25      Creatinine Trend: 9.46 <--, 5.10 <--, 7.58 <--, 5.43 <--, 8.05 <--, 6.16 <--, 5.22 <--                        9.2    6.08  )-----------( 151      ( 25 Mar 2024 06:55 )             28.3

## 2024-03-25 NOTE — PROGRESS NOTE ADULT - SUBJECTIVE AND OBJECTIVE BOX
Date of Service  : 03-25-24     INTERVAL HPI/OVERNIGHT EVENTS: I feel fine.   Vital Signs Last 24 Hrs  T(C): 36.4 (25 Mar 2024 11:06), Max: 37.2 (25 Mar 2024 06:26)  T(F): 97.6 (25 Mar 2024 11:06), Max: 99 (25 Mar 2024 06:26)  HR: 72 (25 Mar 2024 11:06) (69 - 80)  BP: 123/67 (25 Mar 2024 12:26) (111/50 - 123/67)  BP(mean): --  RR: 18 (25 Mar 2024 11:06) (16 - 18)  SpO2: 99% (25 Mar 2024 11:06) (99% - 100%)    Parameters below as of 25 Mar 2024 11:06  Patient On (Oxygen Delivery Method): room air      I&O's Summary    25 Mar 2024 07:01  -  25 Mar 2024 17:18  --------------------------------------------------------  IN: 400 mL / OUT: 1400 mL / NET: -1000 mL      MEDICATIONS  (STANDING):  chlorhexidine 2% Cloths 1 Application(s) Topical <User Schedule>  dextrose 5%. 1000 milliLiter(s) (50 mL/Hr) IV Continuous <Continuous>  dextrose 5%. 1000 milliLiter(s) (100 mL/Hr) IV Continuous <Continuous>  dextrose 50% Injectable 25 Gram(s) IV Push once  dextrose 50% Injectable 12.5 Gram(s) IV Push once  dextrose 50% Injectable 25 Gram(s) IV Push once  glucagon  Injectable 1 milliGRAM(s) IntraMuscular once  insulin lispro (ADMELOG) corrective regimen sliding scale   SubCutaneous three times a day before meals  insulin lispro (ADMELOG) corrective regimen sliding scale   SubCutaneous at bedtime  sevelamer carbonate 2400 milliGRAM(s) Oral three times a day with meals  simvastatin 40 milliGRAM(s) Oral at bedtime    MEDICATIONS  (PRN):  dextrose Oral Gel 15 Gram(s) Oral once PRN Blood Glucose LESS THAN 70 milliGRAM(s)/deciliter    LABS:                        9.2    6.08  )-----------( 151      ( 25 Mar 2024 06:55 )             28.3     03-25    137  |  95<L>  |  78<H>  ----------------------------<  73  5.4<H>   |  23  |  9.46<H>    Ca    9.3      25 Mar 2024 06:55  Phos  9.8     03-25  Mg     2.40     03-25        Urinalysis Basic - ( 25 Mar 2024 06:55 )    Color: x / Appearance: x / SG: x / pH: x  Gluc: 73 mg/dL / Ketone: x  / Bili: x / Urobili: x   Blood: x / Protein: x / Nitrite: x   Leuk Esterase: x / RBC: x / WBC x   Sq Epi: x / Non Sq Epi: x / Bacteria: x      CAPILLARY BLOOD GLUCOSE      POCT Blood Glucose.: 145 mg/dL (25 Mar 2024 12:25)  POCT Blood Glucose.: 79 mg/dL (25 Mar 2024 09:16)  POCT Blood Glucose.: 87 mg/dL (25 Mar 2024 06:12)  POCT Blood Glucose.: 113 mg/dL (24 Mar 2024 22:33)  POCT Blood Glucose.: 91 mg/dL (24 Mar 2024 17:47)        Urinalysis Basic - ( 25 Mar 2024 06:55 )    Color: x / Appearance: x / SG: x / pH: x  Gluc: 73 mg/dL / Ketone: x  / Bili: x / Urobili: x   Blood: x / Protein: x / Nitrite: x   Leuk Esterase: x / RBC: x / WBC x   Sq Epi: x / Non Sq Epi: x / Bacteria: x      REVIEW OF SYSTEMS:  CONSTITUTIONAL: No fever, weight loss, or fatigue  EYES: No eye pain, visual disturbances, or discharge  ENMT:  No difficulty hearing, tinnitus, vertigo; No sinus or throat pain  NECK: No pain or stiffness  RESPIRATORY: No cough, wheezing, chills or hemoptysis; No shortness of breath  CARDIOVASCULAR: No chest pain, palpitations, dizziness, or leg swelling  GASTROINTESTINAL: No abdominal or epigastric pain. No nausea, vomiting, or hematemesis; No diarrhea or constipation. No melena or hematochezia.  GENITOURINARY: No dysuria, frequency, hematuria, or incontinence  NEUROLOGICAL: No headaches, memory loss, loss of strength, numbness, or tremors      Consultant(s) Notes Reviewed:  [x ] YES  [ ] NO    PHYSICAL EXAM:  GENERAL: NAD, well-groomed, well-developed,not in any distress ,  HEAD:  Atraumatic, Normocephalic  NECK: Supple, No JVD, Normal thyroid  NERVOUS SYSTEM:  Alert & Oriented X3, No focal deficit   CHEST/LUNG: Good air entry bilateral with no  rales, rhonchi, wheezing, or rubs  HEART: Regular rate and rhythm; No murmurs, rubs, or gallops  ABDOMEN: Soft, Nontender, Nondistended; Bowel sounds present  EXTREMITIES:  B/L BKA     Care Discussed with Consultants/Other Providers [ x] YES  [ ] NO

## 2024-03-25 NOTE — PROGRESS NOTE ADULT - ASSESSMENT
63 year old female with PMH HTN, PAD, ESRD on HD through right groin fistula done at OSH, DMT2, CAD s/p CABG  (w/ left saphenous vein graft) in 2023, B/L NIRMAL presents to ED from dialysis center after her HD session finished, after her fistula continued to bleed after HD today. Initially hypotensive but improved after 1u prbc, then another unit given after CTA aorta w/ runoff. Patient pending CTA read. Vascular surgery consulted for bleeding fistula, now hemostatic after pressure held in ED.      Plan:  - RLE AVF graft revision tomorrow  - NPO at midnight   - Plan for 2 AM labs  -  Dialysis performed today  - Patient pending consent  - Can c/w HD via right groin fistula, just don't cannulate the same area of the bleeding  - Cardiac risk stratification documented - patient optimized from cardiac perspective  - Please call with any acute changes in HDS status or recurrent bleeding    Vascular (C-Team) Surgery   [id number]   63 year old female with PMH HTN, PAD, ESRD on HD through right groin fistula done at OSH, DMT2, CAD s/p CABG  (w/ left saphenous vein graft) in 2023, B/L NIRMAL presents to ED from dialysis center after her HD session finished, after her fistula continued to bleed after HD today. Initially hypotensive but improved after 1u prbc, then another unit given after CTA aorta w/ runoff. Patient pending CTA read. Vascular surgery consulted for bleeding fistula, now hemostatic after pressure held in ED.      Plan:  - RLE AVF graft revision tomorrow  - NPO at midnight   - Plan for 4 AM labs  -  Dialysis performed today  - Patient pending consent  - Can c/w HD via right groin fistula, just don't cannulate the same area of the bleeding  - Cardiac risk stratification documented - patient optimized from cardiac perspective  - Please call with any acute changes in HDS status or recurrent bleeding    Vascular (C-Team) Surgery   [id number]

## 2024-03-25 NOTE — PROGRESS NOTE ADULT - SUBJECTIVE AND OBJECTIVE BOX
Cardiovascular Disease Progress Note  Date of service: 24 @ 08:09    Overnight events: No acute events overnight.  Patient denies chest pain or SOB. Undergoing HD   Otherwise review of systems negative    Objective Findings:  T(C): 37.2 (24 @ 06:26), Max: 37.2 (24 @ 06:26)  HR: 69 (24 @ 06:26) (69 - 80)  BP: 120/90 (24 @ 06:26) (116/41 - 138/53)  RR: 18 (24 @ 06:26) (18 - 20)  SpO2: 99% (24 @ 02:30) (99% - 100%)  Wt(kg): --  Daily     Daily Weight in k.5 (25 Mar 2024 06:26)      Physical Exam:  Gen: NAD; Patient resting comfortably  HEENT: EOMI, Normocephalic/ atraumatic  CV: RRR, normal S1 + S2, no m/r/g  Lungs:  Normal respiratory effort; clear to auscultation bilaterally  Abd: soft, non-tender; bowel sounds present  Ext: No edema; warm and well perfused    Telemetry: N/a    Laboratory Data:                        9.2    6.08  )-----------( 151      ( 25 Mar 2024 06:55 )             28.3                     Inpatient Medications:  MEDICATIONS  (STANDING):  chlorhexidine 2% Cloths 1 Application(s) Topical <User Schedule>  dextrose 5%. 1000 milliLiter(s) (50 mL/Hr) IV Continuous <Continuous>  dextrose 5%. 1000 milliLiter(s) (100 mL/Hr) IV Continuous <Continuous>  dextrose 50% Injectable 25 Gram(s) IV Push once  dextrose 50% Injectable 12.5 Gram(s) IV Push once  dextrose 50% Injectable 25 Gram(s) IV Push once  glucagon  Injectable 1 milliGRAM(s) IntraMuscular once  insulin lispro (ADMELOG) corrective regimen sliding scale   SubCutaneous three times a day before meals  insulin lispro (ADMELOG) corrective regimen sliding scale   SubCutaneous at bedtime  simvastatin 40 milliGRAM(s) Oral at bedtime      Assessment:  62 yo woman with history of ESRD on HD, PAD c/b right foot OM s/p right BKA (2022) and 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     Plan of Care:    #Cardiac risk stratification  - Plan for AVF revision for possible pseudoaneurysms seen on CTA, Date TBD  - RCRI: 4 which portends to a 15% risk of MACE within 30 days of procedure  - EKG on admission shows normal sinus rhythm with nonspecific ST changes. QT interval within normal limits (miscalculated on EKG)  - Echo 2023 shows normal LV systolic function with well seated bio-AVR  - Patient displays no signs of coronary ischemia or heart failure  - From a cardiac standpoint, patient is optimized to proceed with AVF revision  - Patient is elevated but not prohibitive risk for procedure    #CAD  - S/pt CABG 2023  - Patient states she is taking Plavix, Metoprolol and Statin at home. Med list clarified with patient at bedside.   - Continue Statin  - BB on hold in setting of Labile BP.   - Antiplatelet held given bleeding and upcoming surgery. Please resume once deemed safe.     #ESRD  - HD as per renal team    #HLD  - Statin therapy         #ACP (advance care planning)-  Advanced care planning was discussed with the patient.  Risks, benefits and alternatives of medical treatment and procedures were discussed in detail and all questions were answered. 30 additional minutes spent addressing advance care plans.          Over 55 minutes spent on total encounter; more than 50% of the visit was spent counseling and/or coordinating care by the attending physician.      Wisam Adams DO [facility]  Cardiovascular Disease  [phone]

## 2024-03-26 LAB
ANION GAP SERPL CALC-SCNC: 16 MMOL/L — HIGH (ref 7–14)
BLD GP AB SCN SERPL QL: NEGATIVE — SIGNIFICANT CHANGE UP
BUN SERPL-MCNC: 42 MG/DL — HIGH (ref 7–23)
CALCIUM SERPL-MCNC: 9.5 MG/DL — SIGNIFICANT CHANGE UP (ref 8.4–10.5)
CHLORIDE SERPL-SCNC: 98 MMOL/L — SIGNIFICANT CHANGE UP (ref 98–107)
CO2 SERPL-SCNC: 26 MMOL/L — SIGNIFICANT CHANGE UP (ref 22–31)
CREAT SERPL-MCNC: 6.01 MG/DL — HIGH (ref 0.5–1.3)
EGFR: 7 ML/MIN/1.73M2 — LOW
GLUCOSE BLDC GLUCOMTR-MCNC: 129 MG/DL — HIGH (ref 70–99)
GLUCOSE BLDC GLUCOMTR-MCNC: 129 MG/DL — HIGH (ref 70–99)
GLUCOSE BLDC GLUCOMTR-MCNC: 156 MG/DL — HIGH (ref 70–99)
GLUCOSE BLDC GLUCOMTR-MCNC: 79 MG/DL — SIGNIFICANT CHANGE UP (ref 70–99)
GLUCOSE BLDC GLUCOMTR-MCNC: 86 MG/DL — SIGNIFICANT CHANGE UP (ref 70–99)
GLUCOSE BLDC GLUCOMTR-MCNC: 89 MG/DL — SIGNIFICANT CHANGE UP (ref 70–99)
GLUCOSE SERPL-MCNC: 81 MG/DL — SIGNIFICANT CHANGE UP (ref 70–99)
HCT VFR BLD CALC: 29 % — LOW (ref 34.5–45)
HGB BLD-MCNC: 9.3 G/DL — LOW (ref 11.5–15.5)
INR BLD: 0.99 RATIO — SIGNIFICANT CHANGE UP (ref 0.85–1.18)
MAGNESIUM SERPL-MCNC: 2.3 MG/DL — SIGNIFICANT CHANGE UP (ref 1.6–2.6)
MCHC RBC-ENTMCNC: 27.3 PG — SIGNIFICANT CHANGE UP (ref 27–34)
MCHC RBC-ENTMCNC: 32.1 GM/DL — SIGNIFICANT CHANGE UP (ref 32–36)
MCV RBC AUTO: 85 FL — SIGNIFICANT CHANGE UP (ref 80–100)
NRBC # BLD: 0 /100 WBCS — SIGNIFICANT CHANGE UP (ref 0–0)
NRBC # FLD: 0 K/UL — SIGNIFICANT CHANGE UP (ref 0–0)
PHOSPHATE SERPL-MCNC: 6.3 MG/DL — HIGH (ref 2.5–4.5)
PLATELET # BLD AUTO: 152 K/UL — SIGNIFICANT CHANGE UP (ref 150–400)
POTASSIUM SERPL-MCNC: 5.1 MMOL/L — SIGNIFICANT CHANGE UP (ref 3.5–5.3)
POTASSIUM SERPL-SCNC: 5.1 MMOL/L — SIGNIFICANT CHANGE UP (ref 3.5–5.3)
PROTHROM AB SERPL-ACNC: 11.1 SEC — SIGNIFICANT CHANGE UP (ref 9.5–13)
RBC # BLD: 3.41 M/UL — LOW (ref 3.8–5.2)
RBC # FLD: 14.7 % — HIGH (ref 10.3–14.5)
RH IG SCN BLD-IMP: POSITIVE — SIGNIFICANT CHANGE UP
SODIUM SERPL-SCNC: 140 MMOL/L — SIGNIFICANT CHANGE UP (ref 135–145)
WBC # BLD: 5.65 K/UL — SIGNIFICANT CHANGE UP (ref 3.8–10.5)
WBC # FLD AUTO: 5.65 K/UL — SIGNIFICANT CHANGE UP (ref 3.8–10.5)

## 2024-03-26 PROCEDURE — 36832 AV FISTULA REVISION OPEN: CPT

## 2024-03-26 PROCEDURE — 35903 EXCISION GRAFT EXTREMITY: CPT | Mod: 59

## 2024-03-26 DEVICE — KIT A-LINE 1LUM 20G X 12CM SAFE KIT: Type: IMPLANTABLE DEVICE | Site: RIGHT | Status: FUNCTIONAL

## 2024-03-26 DEVICE — CLIP APPLIER COVIDIEN SURGICLIP III 9" SM: Type: IMPLANTABLE DEVICE | Site: RIGHT | Status: FUNCTIONAL

## 2024-03-26 DEVICE — IMPLANTABLE DEVICE: Type: IMPLANTABLE DEVICE | Site: RIGHT | Status: FUNCTIONAL

## 2024-03-26 RX ORDER — ACETAMINOPHEN 500 MG
1000 TABLET ORAL ONCE
Refills: 0 | Status: DISCONTINUED | OUTPATIENT
Start: 2024-03-26 | End: 2024-03-27

## 2024-03-26 RX ORDER — ONDANSETRON 8 MG/1
4 TABLET, FILM COATED ORAL ONCE
Refills: 0 | Status: DISCONTINUED | OUTPATIENT
Start: 2024-03-26 | End: 2024-03-27

## 2024-03-26 RX ORDER — HYDROMORPHONE HYDROCHLORIDE 2 MG/ML
0.5 INJECTION INTRAMUSCULAR; INTRAVENOUS; SUBCUTANEOUS
Refills: 0 | Status: DISCONTINUED | OUTPATIENT
Start: 2024-03-26 | End: 2024-03-27

## 2024-03-26 RX ORDER — HYDROMORPHONE HYDROCHLORIDE 2 MG/ML
1 INJECTION INTRAMUSCULAR; INTRAVENOUS; SUBCUTANEOUS
Refills: 0 | Status: DISCONTINUED | OUTPATIENT
Start: 2024-03-26 | End: 2024-03-27

## 2024-03-26 RX ADMIN — CHLORHEXIDINE GLUCONATE 1 APPLICATION(S): 213 SOLUTION TOPICAL at 05:42

## 2024-03-26 RX ADMIN — SEVELAMER CARBONATE 2400 MILLIGRAM(S): 2400 POWDER, FOR SUSPENSION ORAL at 18:08

## 2024-03-26 RX ADMIN — SIMVASTATIN 40 MILLIGRAM(S): 20 TABLET, FILM COATED ORAL at 21:45

## 2024-03-26 NOTE — PROGRESS NOTE ADULT - SUBJECTIVE AND OBJECTIVE BOX
[facility], Division of Infectious Diseases  DANIEL Montelongo Y. Patel, S. Shah, G. Prosper  [phone]  ([phone] - weekdays after 5pm and weekends)    Name: JAYCEE WALTERS  Age/Gender: 63y Female  MRN: [id number]    Interval History:  Notes reviewed.   No concerning overnight events.  Afebrile.   planned for graft revision today    Allergies: No Known Allergies      Objective:  Vitals:   T(F): 97.9 (03-26-24 @ 12:03), Max: 98.5 (03-26-24 @ 09:00)  HR: 77 (03-26-24 @ 12:03) (72 - 77)  BP: 133/51 (03-26-24 @ 12:03) (103/41 - 133/51)  RR: 16 (03-26-24 @ 12:03) (16 - 18)  SpO2: 100% (03-26-24 @ 12:03) (96% - 100%)  Physical Examination:  General: no acute distress  HEENT: anicteric  Cardio: S1, S2, normal rate  Resp: clear to auscultation  Abd: soft, NT, ND  Ext: s/p b/l LE BKAs, graft site without erythema, edema or tenderness  Skin: warm, dry    Laboratory Studies:  CBC:                       9.3    5.65  )-----------( 152      ( 26 Mar 2024 04:42 )             29.0     WBC Trend:  5.65 03-26-24 @ 04:42  6.08 03-25-24 @ 06:55  5.06 03-23-24 @ 05:30  5.61 03-22-24 @ 06:25  6.24 03-21-24 @ 19:19  5.09 03-21-24 @ 06:13  6.31 03-20-24 @ 05:50    CMP: 03-26    140  |  98  |  42<H>  ----------------------------<  81  5.1   |  26  |  6.01<H>    Ca    9.5      26 Mar 2024 04:42  Phos  6.3     03-26  Mg     2.30     03-26            Urinalysis Basic - ( 26 Mar 2024 04:42 )    Color: x / Appearance: x / SG: x / pH: x  Gluc: 81 mg/dL / Ketone: x  / Bili: x / Urobili: x   Blood: x / Protein: x / Nitrite: x   Leuk Esterase: x / RBC: x / WBC x   Sq Epi: x / Non Sq Epi: x / Bacteria: x      Microbiology: reviewed     Culture - Blood (collected 03-20-24 @ 15:29)  Source: .Blood Blood-Peripheral  Final Report (03-25-24 @ 18:01):    No growth at 5 days    Culture - Blood (collected 03-20-24 @ 15:29)  Source: .Blood Blood  Final Report (03-25-24 @ 18:01):    No growth at 5 days        Radiology: reviewed     Medications:  chlorhexidine 2% Cloths 1 Application(s) Topical <User Schedule>  dextrose 5%. 1000 milliLiter(s) IV Continuous <Continuous>  dextrose 5%. 1000 milliLiter(s) IV Continuous <Continuous>  dextrose 50% Injectable 25 Gram(s) IV Push once  dextrose 50% Injectable 12.5 Gram(s) IV Push once  dextrose 50% Injectable 25 Gram(s) IV Push once  dextrose Oral Gel 15 Gram(s) Oral once PRN  glucagon  Injectable 1 milliGRAM(s) IntraMuscular once  insulin lispro (ADMELOG) corrective regimen sliding scale   SubCutaneous three times a day before meals  insulin lispro (ADMELOG) corrective regimen sliding scale   SubCutaneous at bedtime  sevelamer carbonate 2400 milliGRAM(s) Oral three times a day with meals  simvastatin 40 milliGRAM(s) Oral at bedtime    Antimicrobials:

## 2024-03-26 NOTE — BRIEF OPERATIVE NOTE - NSICDXBRIEFPROCEDURE_GEN_ALL_CORE_FT
PROCEDURES:  Revision or surgical removal of dialysis arteriovenous fistula or arteriovenous access graft of lower extremity 26-Mar-2024 16:47:01  Godfrey Atwood

## 2024-03-26 NOTE — DIETITIAN INITIAL EVALUATION ADULT - PERTINENT LABORATORY DATA
03-26    140  |  98  |  42<H>  ----------------------------<  81  5.1   |  26  |  6.01<H>    Ca    9.5      26 Mar 2024 04:42  Phos  6.3     03-26  Mg     2.30     03-26    POCT Blood Glucose.: 129 mg/dL (03-26-24 @ 16:25)  A1C with Estimated Average Glucose Result: 5.0 % (03-19-24 @ 05:49)  A1C with Estimated Average Glucose Result: 4.9 % (07-06-23 @ 06:37)

## 2024-03-26 NOTE — PROGRESS NOTE ADULT - SUBJECTIVE AND OBJECTIVE BOX
Overnight events:   - No acute events    SUBJECTIVE:  Patient was seen and examined on AM rounds. Denies new complaints. Eager for surgery today. Tolerated HD session yesterady    OBJECTIVE:  Vital Signs Last 24 Hrs  T(C): 36.7 (26 Mar 2024 05:09), Max: 36.7 (26 Mar 2024 05:09)  T(F): 98.1 (26 Mar 2024 05:09), Max: 98.1 (26 Mar 2024 05:09)  HR: 72 (26 Mar 2024 05:09) (70 - 75)  BP: 103/41 (26 Mar 2024 05:09) (103/41 - 123/67)  BP(mean): --  RR: 17 (26 Mar 2024 05:09) (16 - 18)  SpO2: 98% (26 Mar 2024 05:09) (96% - 99%)    Parameters below as of 26 Mar 2024 05:09  Patient On (Oxygen Delivery Method): room air        03-25-24 @ 07:01  -  03-26-24 @ 07:00  --------------------------------------------------------  IN: 400 mL / OUT: 1400 mL / NET: -1000 mL      Physical Examination:  General: NAD, resting comfortably,   Pulmonary: normal resp effort, on 2LNC  Cardiovascular: NSR, previous well healed midline sternotomy scar  Abdominal: soft, ND/NT  Extremities: WWP, s/p B/L BKA, stumps well healed, right groin AVG with palpable thrill, small skin tear where previous needles entered.  Neuro: A/O x 3, CNs II-XII grossly intact        LABS:                        9.3    5.65  )-----------( 152      ( 26 Mar 2024 04:42 )             29.0       03-26    140  |  98  |  42<H>  ----------------------------<  81  5.1   |  26  |  6.01<H>    Ca    9.5      26 Mar 2024 04:42  Phos  6.3     03-26  Mg     2.30     03-26

## 2024-03-26 NOTE — DIETITIAN INITIAL EVALUATION ADULT - PROBLEM SELECTOR PLAN 5
S/p 3v CABG in 7/2023. Per pt, admits to not taking either ASA or Plavix since last year after not getting refills of these medications, though Surescripts shows that pt filled a prescription for Plavix in Feb 2024.   - Continue to hold ASA, Plavix, any other antiplatelet meds for now given recent profuse bleeding  - Resume ASA and/or Plavix once bleeding has completely resolved and if no plan for surgical intervention by vascular surgery during admission. F/u with pt's cardiologist as to which antiplatelet pt should be on at home.  - Also hold metoprolol succinate 25 mg daily for now  - C/w simvastatin 40 mg daily

## 2024-03-26 NOTE — PROGRESS NOTE ADULT - SUBJECTIVE AND OBJECTIVE BOX
Date of Service  : 03-26-24     INTERVAL HPI/OVERNIGHT EVENTS: No new concerns.   Vital Signs Last 24 Hrs    T(C): 36.9 (03-26-24 @ 09:00), Max: 36.9 (03-26-24 @ 09:00)  HR: 73 (03-26-24 @ 09:00) (72 - 75)  BP: 117/59 (03-26-24 @ 09:00) (103/41 - 123/67)  RR: 18 (03-26-24 @ 09:00) (17 - 18)  SpO2: 97% (03-26-24 @ 09:00) (96% - 99%)    MEDICATIONS  (STANDING):  chlorhexidine 2% Cloths 1 Application(s) Topical <User Schedule>  dextrose 5%. 1000 milliLiter(s) (50 mL/Hr) IV Continuous <Continuous>  dextrose 5%. 1000 milliLiter(s) (100 mL/Hr) IV Continuous <Continuous>  dextrose 50% Injectable 12.5 Gram(s) IV Push once  dextrose 50% Injectable 25 Gram(s) IV Push once  dextrose 50% Injectable 25 Gram(s) IV Push once  glucagon  Injectable 1 milliGRAM(s) IntraMuscular once  insulin lispro (ADMELOG) corrective regimen sliding scale   SubCutaneous three times a day before meals  insulin lispro (ADMELOG) corrective regimen sliding scale   SubCutaneous at bedtime  sevelamer carbonate 2400 milliGRAM(s) Oral three times a day with meals  simvastatin 40 milliGRAM(s) Oral at bedtime    MEDICATIONS  (PRN):  dextrose Oral Gel 15 Gram(s) Oral once PRN Blood Glucose LESS THAN 70 milliGRAM(s)/deciliter          REVIEW OF SYSTEMS:  CONSTITUTIONAL: No fever, weight loss, or fatigue  EYES: No eye pain, visual disturbances, or discharge  ENMT:  No difficulty hearing, tinnitus, vertigo; No sinus or throat pain  NECK: No pain or stiffness  RESPIRATORY: No cough, wheezing, chills or hemoptysis; No shortness of breath  CARDIOVASCULAR: No chest pain, palpitations, dizziness, or leg swelling  GASTROINTESTINAL: No abdominal or epigastric pain. No nausea, vomiting, or hematemesis; No diarrhea or constipation. No melena or hematochezia.  GENITOURINARY: No dysuria, frequency, hematuria, or incontinence  NEUROLOGICAL: No headaches, memory loss, loss of strength, numbness, or tremors      RADIOLOGY & ADDITIONAL TESTS:    Consultant(s) Notes Reviewed:  [x ] YES  [ ] NO    PHYSICAL EXAM:  GENERAL: NAD, well-groomed, well-developed,not in any distress ,  HEAD:  Atraumatic, Normocephalic  EYES: EOMI, PERRLA, conjunctiva and sclera clear  ENMT: No tonsillar erythema, exudates, or enlargement; Moist mucous membranes, Good dentition, No lesions  NECK: Supple, No JVD, Normal thyroid  NERVOUS SYSTEM:  Alert & Oriented X3, No focal deficit   CHEST/LUNG: Good air entry bilateral with no  rales, rhonchi, wheezing, or rubs  HEART: Regular rate and rhythm; No murmurs, rubs, or gallops  ABDOMEN: Soft, Nontender, Nondistended; Bowel sounds present  EXTREMITIES:  BKA B/L     Care Discussed with Consultants/Other Providers [ x] YES  [ ] NO

## 2024-03-26 NOTE — DIETITIAN INITIAL EVALUATION ADULT - OTHER CALCULATIONS
IBW(120lbs) calculated based on b/l BKA. was used to calculate nutritional needs. Fluid needs defer to MD.

## 2024-03-26 NOTE — PROGRESS NOTE ADULT - SUBJECTIVE AND OBJECTIVE BOX
Cardiovascular Disease Progress Note  Date of Service: 03-26-24 @ 10:58    Overnight events: No acute events overnight.    The patient denies chest pain or SOB.   Otherwise review of systems negative    Objective Findings:  T(C): 36.9 (03-26-24 @ 09:00), Max: 36.9 (03-26-24 @ 09:00)  HR: 73 (03-26-24 @ 09:00) (72 - 75)  BP: 117/59 (03-26-24 @ 09:00) (103/41 - 123/67)  RR: 18 (03-26-24 @ 09:00) (17 - 18)  SpO2: 97% (03-26-24 @ 09:00) (96% - 99%)  Wt(kg): --  Daily     Daily       Physical Exam:  Gen: NAD; Patient resting comfortably  HEENT: EOMI, Normocephalic/ atraumatic  CV: RRR, normal S1 + S2, no m/r/g  Lungs:  Normal respiratory effort; clear to auscultation bilaterally  Abd: soft, non-tender; bowel sounds present  Ext: No edema; warm and well perfused    Telemetry: n/a    Laboratory Data:                        9.3    5.65  )-----------( 152      ( 26 Mar 2024 04:42 )             29.0     03-26    140  |  98  |  42<H>  ----------------------------<  81  5.1   |  26  |  6.01<H>    Ca    9.5      26 Mar 2024 04:42  Phos  6.3     03-26  Mg     2.30     03-26      PT/INR - ( 26 Mar 2024 04:42 )   PT: 11.1 sec;   INR: 0.99 ratio                   Inpatient Medications:  MEDICATIONS  (STANDING):  chlorhexidine 2% Cloths 1 Application(s) Topical <User Schedule>  dextrose 5%. 1000 milliLiter(s) (50 mL/Hr) IV Continuous <Continuous>  dextrose 5%. 1000 milliLiter(s) (100 mL/Hr) IV Continuous <Continuous>  dextrose 50% Injectable 25 Gram(s) IV Push once  dextrose 50% Injectable 12.5 Gram(s) IV Push once  dextrose 50% Injectable 25 Gram(s) IV Push once  glucagon  Injectable 1 milliGRAM(s) IntraMuscular once  insulin lispro (ADMELOG) corrective regimen sliding scale   SubCutaneous three times a day before meals  insulin lispro (ADMELOG) corrective regimen sliding scale   SubCutaneous at bedtime  sevelamer carbonate 2400 milliGRAM(s) Oral three times a day with meals  simvastatin 40 milliGRAM(s) Oral at bedtime      Assessment: 63 year old woman with ESRD on HD, PADs/p right BKA (6/2022) and 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     Plan of Care:    #Cardiac risk stratification  - Plan for AVF revision for possible pseudoaneurysms seen on CTA   - EKG on admission shows normal sinus rhythm with nonspecific ST changes. QT interval within normal limits (miscalculated on EKG)  - Echo 7/2023 shows normal LV systolic function with well seated bio-AVR  - Patient displays no signs of coronary ischemia or heart failure  - From a cardiac standpoint, patient is optimized to proceed with AVF revision    #CAD  - S/pt CABG 7/2023  - Patient states she is taking Plavix, Metoprolol and Statin at home. Med list clarified with patient at bedside.   - Continue Statin  - BB on hold in setting of Labile BP.   - Antiplatelet held given bleeding and upcoming surgery.      #ESRD  - HD as per renal team    #HLD  - Statin therapy           Over 55 minutes spent on total encounter; more than 50% of the visit was spent counseling and/or coordinating care by the attending physician.      Cristino Adams MD [facility]  Cardiovascular Disease  [phone]

## 2024-03-26 NOTE — DIETITIAN INITIAL EVALUATION ADULT - OTHER INFO
Per chart review, 63 year old female with PMH HTN, PAD, ESRD on HD through right groin fistula done at OSH, DMT2, CAD s/p CABG  (w/ left saphenous vein graft) in 2023, B/L NIRMAL presents to ED from dialysis center after her HD session finished, after her fistula continued to bleed after HD today. Initially hypotensive but improved after 1u prbc, then another unit given after CTA aorta w/ runoff. Patient pending CTA read. Vascular surgery consulted for bleeding fistula, now hemostatic after pressure held in ED.    Attempted to visit patient three times at different time interval, pt off the unit in OR as per RN. Unable to obtain nutrition information from pt. Comprehensive chart review completed.  Pt noted with various PO intake from % in hospital as per RN flow sheet. Pt currently on Renal, Dash/TLC diet and 1L fluid restriction. Pt's labs notable with elevated Phos on phos binder. Recommend continue monitor Phos and provide phos binder as per medical discretion.   As per previous RD note dated 144lbs (7/13/23), most recent adm weight 129lbs (3/21). Weight trend reflects weight loss of 15lbs/10% weight loss x 8months. Pt noted on HD which can cause weight fluctuations.

## 2024-03-26 NOTE — DIETITIAN INITIAL EVALUATION ADULT - NS FNS DIET ORDER
Diet, Renal Restrictions:   For patients receiving Renal Replacement - No Protein Restr, No Conc K, No Conc Phos, Low Sodium  Consistent Carbohydrate {No Snacks} (CSTCHO)  DASH/TLC {Sodium & Cholesterol Restricted} (DASH)  1000mL Fluid Restriction ([id number]) (03-18-24 @ 23:23) [Active]

## 2024-03-26 NOTE — PROGRESS NOTE ADULT - ASSESSMENT
63 year old female with PMH HTN, PAD, ESRD on HD through right groin fistula done at OSH, DMT2, CAD s/p CABG  (w/ left saphenous vein graft) in 2023, B/L NIRMAL presents to ED from dialysis center after her HD session finished, after her fistula continued to bleed after HD today. Initially hypotensive but improved after 1u prbc, then another unit given after CTA aorta w/ runoff. Patient pending CTA read. Vascular surgery consulted for bleeding fistula, now hemostatic after pressure held in ED.      Plan:  - RLE AVF graft revision today  - Medical clearance documented  - NPO until after procedure  - Cardiac risk stratification documented - patient optimized from cardiac perspective  - Please call with any acute changes in HDS status or recurrent bleeding    Vascular (C-Team) Surgery   [id number]

## 2024-03-26 NOTE — DIETITIAN INITIAL EVALUATION ADULT - PROBLEM SELECTOR PLAN 3
Pt on HD MWF. Last HD was on Monday, 3/18/24. No indication for urgent HD at this time.  - If pt unable to use right groin AVF for HD, will need IR consult for possible Shiley or permacath. F/u vascular surgery regarding AVF use.  - Nephrology consult to be called in AM. Pt followed by [facility] during previous admissions.  - Monitor volume status  - Monitor electrolytes, including serum K, HCO3, Ca, and Phos  - Ferric citrate not available through pharmacy. F/u serum Ca and Phos and c/w either Sevelamer or calcium acetate.   - Dose meds for ESRD

## 2024-03-26 NOTE — BRIEF OPERATIVE NOTE - OPERATION/FINDINGS
Right thigh arteriovenous graft revision  Interposition graft with PTFE  Partial excision of arteriovenous graft with pseudoaneurysm

## 2024-03-26 NOTE — DIETITIAN INITIAL EVALUATION ADULT - ADD RECOMMEND
normal appearance , without tenderness upon palpation , no deformities , trachea midline , Thyroid normal size , no masses , thyroid nontender
1) Recommend continue with current diet, which remains appropriate at this time.   2) Encourage PO intake and honor food preferences as able.   3) Monitor PO intake, Labs, weights, BMs, and skin integrity.   4) Unable to provide nutrition education as pt not available. Will reattempt another time. RD to remain available for further nutritional interventions as indicated.

## 2024-03-26 NOTE — PRE-OP CHECKLIST - 2.
pt is blind in her right eye, pt has a graft in two places on the right thigh, right scab to right upper thigh and a portion to the right lower thigh

## 2024-03-26 NOTE — DIETITIAN INITIAL EVALUATION ADULT - PROBLEM SELECTOR PROBLEM 2
Telephone Encounter by Bailee Baires NCMA at 08/02/18 10:58 AM     Author:  Bailee Baires NCMA Service:  (none) Author Type:  Certified Medical Assistant     Filed:  08/02/18 11:00 AM Encounter Date:  8/2/2018 Status:  Signed     :  Bailee Baires NCMA (Certified Medical Assistant)            Left message on answering machine to call back.[KS1.1T]  Patient overdue for blood work and appointment[KS1.1M]      Revision History        User Key Date/Time User Provider Type Action    > KS1.1 08/02/18 11:00 AM Bailee Baires NCMA Certified Medical Assistant Sign    M - Manual, T - Template             Anemia

## 2024-03-26 NOTE — PROGRESS NOTE ADULT - ASSESSMENT
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   US-  Small pseudoaneurysm (0.8 x 0.4 cm) in the proxmal segment of the graft. second graft wall defect is noted immediately proximal to this pseudoaneurysm, on the posterior wall of the graft.  blood cultures- NGTD    Recommendations  monitor off antibiotics  tentative plan for graft revision today    Channing Cheng M.D.  [facility], Division of Infectious Diseases  [phone]  After 5pm on weekdays and all day on weekends - please call [phone]

## 2024-03-26 NOTE — DIETITIAN INITIAL EVALUATION ADULT - PERTINENT MEDS FT
MEDICATIONS  (STANDING):  acetaminophen   IVPB .. 1000 milliGRAM(s) IV Intermittent once  chlorhexidine 2% Cloths 1 Application(s) Topical <User Schedule>  dextrose 5%. 1000 milliLiter(s) (50 mL/Hr) IV Continuous <Continuous>  dextrose 5%. 1000 milliLiter(s) (100 mL/Hr) IV Continuous <Continuous>  dextrose 50% Injectable 25 Gram(s) IV Push once  dextrose 50% Injectable 12.5 Gram(s) IV Push once  dextrose 50% Injectable 25 Gram(s) IV Push once  glucagon  Injectable 1 milliGRAM(s) IntraMuscular once  insulin lispro (ADMELOG) corrective regimen sliding scale   SubCutaneous three times a day before meals  insulin lispro (ADMELOG) corrective regimen sliding scale   SubCutaneous at bedtime  sevelamer carbonate 2400 milliGRAM(s) Oral three times a day with meals  simvastatin 40 milliGRAM(s) Oral at bedtime    MEDICATIONS  (PRN):  dextrose Oral Gel 15 Gram(s) Oral once PRN Blood Glucose LESS THAN 70 milliGRAM(s)/deciliter  HYDROmorphone  Injectable 0.5 milliGRAM(s) IV Push every 10 minutes PRN Moderate Pain (4 - 6)  HYDROmorphone  Injectable 1 milliGRAM(s) IV Push every 10 minutes PRN Severe Pain (7 - 10)  ondansetron Injectable 4 milliGRAM(s) IV Push once PRN Nausea and/or Vomiting

## 2024-03-26 NOTE — DIETITIAN INITIAL EVALUATION ADULT - PROBLEM SELECTOR PLAN 1
Pt with profuse bleeding from right groin AVF after completing full session of HD on Monday. S/p 2 u pRBCs, with bleeding now resolved and pt HD stable. CTA abdominal aorta with run-off showing right groin AVF with multiple outpouchings raising concern for pseudoaneurysms.   - Vascular surgery following, appreciate recs  - Obtain RLE AVF duplex study  - Monitor for any recurrence of bleeding  - Monitor H/H, transfuse pRBCs as needed  - Keep active type and screen  - Coags on admission wnl  - C/w pressure dressing  - Per vascular surgery, pt might need revision for her right groin AVF (?AVG), which was originally created at [facility] in 2017/2018  - If pt unable to use right groin AVF for HD, will need IR consult for possible Bry or viry. F/u vascular surgery regarding AVF use. regular rate and rhythm

## 2024-03-27 LAB
ANION GAP SERPL CALC-SCNC: 18 MMOL/L — HIGH (ref 7–14)
ANION GAP SERPL CALC-SCNC: 19 MMOL/L — HIGH (ref 7–14)
BUN SERPL-MCNC: 53 MG/DL — HIGH (ref 7–23)
BUN SERPL-MCNC: 57 MG/DL — HIGH (ref 7–23)
CALCIUM SERPL-MCNC: 9.2 MG/DL — SIGNIFICANT CHANGE UP (ref 8.4–10.5)
CALCIUM SERPL-MCNC: 9.5 MG/DL — SIGNIFICANT CHANGE UP (ref 8.4–10.5)
CHLORIDE SERPL-SCNC: 96 MMOL/L — LOW (ref 98–107)
CHLORIDE SERPL-SCNC: 97 MMOL/L — LOW (ref 98–107)
CO2 SERPL-SCNC: 22 MMOL/L — SIGNIFICANT CHANGE UP (ref 22–31)
CO2 SERPL-SCNC: 24 MMOL/L — SIGNIFICANT CHANGE UP (ref 22–31)
CREAT SERPL-MCNC: 7.73 MG/DL — HIGH (ref 0.5–1.3)
CREAT SERPL-MCNC: 7.93 MG/DL — HIGH (ref 0.5–1.3)
EGFR: 5 ML/MIN/1.73M2 — LOW
EGFR: 5 ML/MIN/1.73M2 — LOW
GLUCOSE BLDC GLUCOMTR-MCNC: 103 MG/DL — HIGH (ref 70–99)
GLUCOSE BLDC GLUCOMTR-MCNC: 106 MG/DL — HIGH (ref 70–99)
GLUCOSE BLDC GLUCOMTR-MCNC: 121 MG/DL — HIGH (ref 70–99)
GLUCOSE BLDC GLUCOMTR-MCNC: 145 MG/DL — HIGH (ref 70–99)
GLUCOSE BLDC GLUCOMTR-MCNC: 178 MG/DL — HIGH (ref 70–99)
GLUCOSE BLDC GLUCOMTR-MCNC: 84 MG/DL — SIGNIFICANT CHANGE UP (ref 70–99)
GLUCOSE SERPL-MCNC: 130 MG/DL — HIGH (ref 70–99)
GLUCOSE SERPL-MCNC: 99 MG/DL — SIGNIFICANT CHANGE UP (ref 70–99)
GRAM STN FLD: SIGNIFICANT CHANGE UP
HCT VFR BLD CALC: 26.7 % — LOW (ref 34.5–45)
HCT VFR BLD CALC: 26.9 % — LOW (ref 34.5–45)
HCT VFR BLD CALC: 28.2 % — LOW (ref 34.5–45)
HGB BLD-MCNC: 8.6 G/DL — LOW (ref 11.5–15.5)
HGB BLD-MCNC: 8.8 G/DL — LOW (ref 11.5–15.5)
HGB BLD-MCNC: 8.9 G/DL — LOW (ref 11.5–15.5)
MAGNESIUM SERPL-MCNC: 2.3 MG/DL — SIGNIFICANT CHANGE UP (ref 1.6–2.6)
MAGNESIUM SERPL-MCNC: 2.3 MG/DL — SIGNIFICANT CHANGE UP (ref 1.6–2.6)
MCHC RBC-ENTMCNC: 26.7 PG — LOW (ref 27–34)
MCHC RBC-ENTMCNC: 27.8 PG — SIGNIFICANT CHANGE UP (ref 27–34)
MCHC RBC-ENTMCNC: 27.9 PG — SIGNIFICANT CHANGE UP (ref 27–34)
MCHC RBC-ENTMCNC: 31.2 GM/DL — LOW (ref 32–36)
MCHC RBC-ENTMCNC: 32.2 GM/DL — SIGNIFICANT CHANGE UP (ref 32–36)
MCHC RBC-ENTMCNC: 33.1 GM/DL — SIGNIFICANT CHANGE UP (ref 32–36)
MCV RBC AUTO: 84.3 FL — SIGNIFICANT CHANGE UP (ref 80–100)
MCV RBC AUTO: 85.5 FL — SIGNIFICANT CHANGE UP (ref 80–100)
MCV RBC AUTO: 86.4 FL — SIGNIFICANT CHANGE UP (ref 80–100)
NIGHT BLUE STAIN TISS: SIGNIFICANT CHANGE UP
NRBC # BLD: 0 /100 WBCS — SIGNIFICANT CHANGE UP (ref 0–0)
NRBC # FLD: 0 K/UL — SIGNIFICANT CHANGE UP (ref 0–0)
PHOSPHATE SERPL-MCNC: 6.8 MG/DL — HIGH (ref 2.5–4.5)
PHOSPHATE SERPL-MCNC: 7.3 MG/DL — HIGH (ref 2.5–4.5)
PLATELET # BLD AUTO: 149 K/UL — LOW (ref 150–400)
PLATELET # BLD AUTO: 150 K/UL — SIGNIFICANT CHANGE UP (ref 150–400)
PLATELET # BLD AUTO: 159 K/UL — SIGNIFICANT CHANGE UP (ref 150–400)
POTASSIUM SERPL-MCNC: 5.1 MMOL/L — SIGNIFICANT CHANGE UP (ref 3.5–5.3)
POTASSIUM SERPL-MCNC: 6 MMOL/L — HIGH (ref 3.5–5.3)
POTASSIUM SERPL-SCNC: 5.1 MMOL/L — SIGNIFICANT CHANGE UP (ref 3.5–5.3)
POTASSIUM SERPL-SCNC: 6 MMOL/L — HIGH (ref 3.5–5.3)
RBC # BLD: 3.09 M/UL — LOW (ref 3.8–5.2)
RBC # BLD: 3.19 M/UL — LOW (ref 3.8–5.2)
RBC # BLD: 3.3 M/UL — LOW (ref 3.8–5.2)
RBC # FLD: 14.9 % — HIGH (ref 10.3–14.5)
RBC # FLD: 15 % — HIGH (ref 10.3–14.5)
RBC # FLD: 15.1 % — HIGH (ref 10.3–14.5)
SODIUM SERPL-SCNC: 137 MMOL/L — SIGNIFICANT CHANGE UP (ref 135–145)
SODIUM SERPL-SCNC: 139 MMOL/L — SIGNIFICANT CHANGE UP (ref 135–145)
SPECIMEN SOURCE: SIGNIFICANT CHANGE UP
SPECIMEN SOURCE: SIGNIFICANT CHANGE UP
WBC # BLD: 5.74 K/UL — SIGNIFICANT CHANGE UP (ref 3.8–10.5)
WBC # BLD: 6.12 K/UL — SIGNIFICANT CHANGE UP (ref 3.8–10.5)
WBC # BLD: 6.48 K/UL — SIGNIFICANT CHANGE UP (ref 3.8–10.5)
WBC # FLD AUTO: 5.74 K/UL — SIGNIFICANT CHANGE UP (ref 3.8–10.5)
WBC # FLD AUTO: 6.12 K/UL — SIGNIFICANT CHANGE UP (ref 3.8–10.5)
WBC # FLD AUTO: 6.48 K/UL — SIGNIFICANT CHANGE UP (ref 3.8–10.5)

## 2024-03-27 RX ORDER — INSULIN HUMAN 100 [IU]/ML
5 INJECTION, SOLUTION SUBCUTANEOUS ONCE
Refills: 0 | Status: COMPLETED | OUTPATIENT
Start: 2024-03-27 | End: 2024-03-27

## 2024-03-27 RX ORDER — DEXTROSE 50 % IN WATER 50 %
50 SYRINGE (ML) INTRAVENOUS ONCE
Refills: 0 | Status: COMPLETED | OUTPATIENT
Start: 2024-03-27 | End: 2024-03-27

## 2024-03-27 RX ORDER — SODIUM ZIRCONIUM CYCLOSILICATE 10 G/10G
10 POWDER, FOR SUSPENSION ORAL ONCE
Refills: 0 | Status: COMPLETED | OUTPATIENT
Start: 2024-03-27 | End: 2024-03-27

## 2024-03-27 RX ADMIN — Medication 1: at 11:49

## 2024-03-27 RX ADMIN — INSULIN HUMAN 5 UNIT(S): 100 INJECTION, SOLUTION SUBCUTANEOUS at 10:31

## 2024-03-27 RX ADMIN — SEVELAMER CARBONATE 2400 MILLIGRAM(S): 2400 POWDER, FOR SUSPENSION ORAL at 21:21

## 2024-03-27 RX ADMIN — SODIUM ZIRCONIUM CYCLOSILICATE 10 GRAM(S): 10 POWDER, FOR SUSPENSION ORAL at 10:26

## 2024-03-27 RX ADMIN — Medication 50 MILLILITER(S): at 10:28

## 2024-03-27 RX ADMIN — CHLORHEXIDINE GLUCONATE 1 APPLICATION(S): 213 SOLUTION TOPICAL at 05:30

## 2024-03-27 RX ADMIN — SEVELAMER CARBONATE 2400 MILLIGRAM(S): 2400 POWDER, FOR SUSPENSION ORAL at 09:02

## 2024-03-27 RX ADMIN — SEVELAMER CARBONATE 2400 MILLIGRAM(S): 2400 POWDER, FOR SUSPENSION ORAL at 11:50

## 2024-03-27 RX ADMIN — SIMVASTATIN 40 MILLIGRAM(S): 20 TABLET, FILM COATED ORAL at 21:21

## 2024-03-27 NOTE — PROGRESS NOTE ADULT - SUBJECTIVE AND OBJECTIVE BOX
63y Female s/p right thigh AVF creation    T(C): 36.9 (03-27-24 @ 05:35), Max: 36.9 (03-27-24 @ 05:35)  HR: 76 (03-27-24 @ 05:35) (76 - 82)  BP: 100/50 (03-27-24 @ 05:35) (100/50 - 171/72)  RR: 17 (03-27-24 @ 05:35) (12 - 18)  SpO2: 96% (03-27-24 @ 05:35) (96% - 100%)  Wt(kg): --    Pt seen, doing well, no anesthesia complications or complaints noted or reported.   No Nausea  Pain well controlled

## 2024-03-27 NOTE — PROGRESS NOTE ADULT - SUBJECTIVE AND OBJECTIVE BOX
[facility] - TIFFANY Bazzi / TIFFANY England / APARNA Vanegas/ TIFFANY Mcfarlane/ TIFFAYN Madden/ MASSIEL Perkins / RAINER Potts / ESTEPHANIE Griffith / DEVEN Smith  ---------------------------------------------------------------------------------------------------------------  seen and examined today for ESRD  Interval : S/P AVGraft revision yesterday  VITALS:  T(F): 98.4 (03-27-24 @ 05:35), Max: 98.4 (03-27-24 @ 05:35)  HR: 76 (03-27-24 @ 05:35)  BP: 100/50 (03-27-24 @ 05:35)  RR: 17 (03-27-24 @ 05:35)  SpO2: 96% (03-27-24 @ 05:35)  Wt(kg): --    Physical Exam :-  Constitutional: NAD  Neck: Supple.  Respiratory: Bilateral equal breath sounds,  Cardiovascular: S1, S2 normal,  Gastrointestinal: Bowel Sounds present, soft, non tender.  Extremities: No edema, B/L BKA, clean dressing over AVG  Neurological: Alert and Oriented x 3, no focal deficits  Psychiatric: Normal mood, normal affect  Data:-  Allergies :   No Known Allergies    Hospital Medications:   MEDICATIONS  (STANDING):  chlorhexidine 2% Cloths 1 Application(s) Topical <User Schedule>  dextrose 5%. 1000 milliLiter(s) (50 mL/Hr) IV Continuous <Continuous>  dextrose 5%. 1000 milliLiter(s) (100 mL/Hr) IV Continuous <Continuous>  dextrose 50% Injectable 12.5 Gram(s) IV Push once  dextrose 50% Injectable 25 Gram(s) IV Push once  dextrose 50% Injectable 50 milliLiter(s) IV Push once  dextrose 50% Injectable 25 Gram(s) IV Push once  glucagon  Injectable 1 milliGRAM(s) IntraMuscular once  insulin lispro (ADMELOG) corrective regimen sliding scale   SubCutaneous three times a day before meals  insulin lispro (ADMELOG) corrective regimen sliding scale   SubCutaneous at bedtime  insulin regular  human recombinant 5 Unit(s) IV Push once  sevelamer carbonate 2400 milliGRAM(s) Oral three times a day with meals  simvastatin 40 milliGRAM(s) Oral at bedtime  sodium zirconium cyclosilicate 10 Gram(s) Oral once    03-27    137  |  97<L>  |  53<H>  ----------------------------<  99  6.0<H>   |  22  |  7.73<H>    Ca    9.2      27 Mar 2024 05:32  Phos  7.3     03-27  Mg     2.30     03-27      Creatinine Trend: 7.73 <--, 6.01 <--, 9.46 <--, 5.10 <--, 7.58 <--, 5.43 <--                        8.8    6.12  )-----------( 159      ( 27 Mar 2024 05:32 )             28.2

## 2024-03-27 NOTE — PROGRESS NOTE ADULT - SUBJECTIVE AND OBJECTIVE BOX
Overnight events:   - No acute events    SUBJECTIVE:  Patient was seen and examiend on AM rounds. Denies pain, fever/chills, sensation/motor deficits.    OBJECTIVE:  Vital Signs Last 24 Hrs  T(C): 36.9 (27 Mar 2024 05:35), Max: 36.9 (27 Mar 2024 05:35)  T(F): 98.4 (27 Mar 2024 05:35), Max: 98.4 (27 Mar 2024 05:35)  HR: 76 (27 Mar 2024 05:35) (76 - 82)  BP: 100/50 (27 Mar 2024 05:35) (100/50 - 171/72)  BP(mean): 73 (26 Mar 2024 17:15) (68 - 92)  RR: 17 (27 Mar 2024 05:35) (12 - 18)  SpO2: 96% (27 Mar 2024 05:35) (96% - 100%)    Parameters below as of 27 Mar 2024 05:35  Patient On (Oxygen Delivery Method): room air        Physical Examination:  General: NAD, resting comfortably  Neuro: A/O x 3, no focal deficits, sensation normal  Pulmonary: normal resp effort  Cardiovascular: NSR  Abdominal: soft, NT/ND  Extremities: RLE BKA, LLE BKA. RLE with 1 proximal, 1 middle and 1 distal dressing in the right upper thigh.   Pulses:   Right:                                                                  FEM [x]2+ [ ]1+ [ ]doppler  POP [x]2+ [ ]1+ [ ]doppler  Palpable thrill        LABS:                        8.8    6.12  )-----------( 159      ( 27 Mar 2024 05:32 )             28.2       03-27    137  |  97<L>  |  53<H>  ----------------------------<  99  6.0<H>   |  22  |  7.73<H>    Ca    9.2      27 Mar 2024 05:32  Phos  7.3     03-27  Mg     2.30     03-27

## 2024-03-27 NOTE — PROGRESS NOTE ADULT - ASSESSMENT
63 year old female with PMH HTN, PAD, ESRD on HD through right groin fistula done at OSH, DMT2, CAD s/p CABG  (w/ left saphenous vein graft) in 2023, B/L NIRMAL presents to ED from dialysis center after her HD session finished, after her fistula continued to bleed after HD today. Initially hypotensive but improved after 1u prbc, then another unit given after CTA aorta w/ runoff. Vascular surgery consulted for AVG bleeding and possible revision during this admission. Patient now s/p Right thigh arteriovenous graft revision, Interposition graft with PTFE and Partial excision of arteriovenous graft with pseudoaneurysm.    Recommendations:  - C/W AVGraft for dialysis  - Monitor dressing, do not remove proximal and distal dressings  - Middle dressing and packing material changes daily  - Please call with any acute changes in HDS status or recurrent bleeding    Vascular (C-Team) Surgery   [id number]

## 2024-03-27 NOTE — PROGRESS NOTE ADULT - SUBJECTIVE AND OBJECTIVE BOX
Date of Service  : 03-27-24     INTERVAL HPI/OVERNIGHT EVENTS:  Vital Signs Last 24 Hrs    T(C): 36.9 (27 Mar 2024 05:35), Max: 36.9 (27 Mar 2024 05:35)  T(F): 98.4 (27 Mar 2024 05:35), Max: 98.4 (27 Mar 2024 05:35)  HR: 76 (27 Mar 2024 05:35) (76 - 82)  BP: 100/50 (27 Mar 2024 05:35) (100/50 - 171/72)  BP(mean): 73 (26 Mar 2024 17:15) (68 - 92)  RR: 17 (27 Mar 2024 05:35) (12 - 18)  SpO2: 96% (27 Mar 2024 05:35) (96% - 100%)    Parameters below as of 27 Mar 2024 05:35  Patient On (Oxygen Delivery Method): room air    MEDICATIONS  (STANDING):  chlorhexidine 2% Cloths 1 Application(s) Topical <User Schedule>  dextrose 5%. 1000 milliLiter(s) (50 mL/Hr) IV Continuous <Continuous>  dextrose 5%. 1000 milliLiter(s) (100 mL/Hr) IV Continuous <Continuous>  dextrose 50% Injectable 12.5 Gram(s) IV Push once  dextrose 50% Injectable 25 Gram(s) IV Push once  dextrose 50% Injectable 25 Gram(s) IV Push once  glucagon  Injectable 1 milliGRAM(s) IntraMuscular once  insulin lispro (ADMELOG) corrective regimen sliding scale   SubCutaneous three times a day before meals  insulin lispro (ADMELOG) corrective regimen sliding scale   SubCutaneous at bedtime  sevelamer carbonate 2400 milliGRAM(s) Oral three times a day with meals  simvastatin 40 milliGRAM(s) Oral at bedtime    MEDICATIONS  (PRN):  dextrose Oral Gel 15 Gram(s) Oral once PRN Blood Glucose LESS THAN 70 milliGRAM(s)/deciliter  CBC:                       8.8    6.12  )-----------( 159      ( 27 Mar 2024 05:32 )             28.2     WBC Trend:  6.12 03-27-24 @ 05:32  5.65 03-26-24 @ 04:42  6.08 03-25-24 @ 06:55  5.06 03-23-24 @ 05:30  5.61 03-22-24 @ 06:25  6.24 03-21-24 @ 19:19  5.09 03-21-24 @ 06:13    CMP: 03-27    137  |  97<L>  |  53<H>  ----------------------------<  99  6.0<H>   |  22  |  7.73<H>    Ca    9.2      27 Mar 2024 05:32  Phos  7.3     03-27  Mg     2.30     03-27            Urinalysis Basic - ( 27 Mar 2024 05:32 )    Color: x / Appearance: x / SG: x / pH: x  Gluc: 99 mg/dL / Ketone: x  / Bili: x / Urobili: x   Blood: x / Protein: x / Nitrite: x   Leuk Esterase: x / RBC: x / WBC x   Sq Epi: x / Non Sq Epi: x / Bacteria: x          REVIEW OF SYSTEMS:  CONSTITUTIONAL: No fever, weight loss, or fatigue  EYES: No eye pain, visual disturbances, or discharge  ENMT:  No difficulty hearing, tinnitus, vertigo; No sinus or throat pain  NECK: No pain or stiffness  RESPIRATORY: No cough, wheezing, chills or hemoptysis; No shortness of breath  CARDIOVASCULAR: No chest pain, palpitations, dizziness, or leg swelling  GASTROINTESTINAL: No abdominal or epigastric pain. No nausea, vomiting, or hematemesis; No diarrhea or constipation. No melena or hematochezia.  GENITOURINARY: No dysuria, frequency, hematuria, or incontinence  NEUROLOGICAL: No headaches, memory loss, loss of strength, numbness, or tremors      RADIOLOGY & ADDITIONAL TESTS:    Consultant(s) Notes Reviewed:  [x ] YES  [ ] NO    PHYSICAL EXAM:  GENERAL: NAD, well-groomed, well-developed,not in any distress ,  HEAD:  Atraumatic, Normocephalic  EYES: EOMI, PERRLA, conjunctiva and sclera clear  ENMT: No tonsillar erythema, exudates, or enlargement; Moist mucous membranes, Good dentition, No lesions  NECK: Supple, No JVD, Normal thyroid  NERVOUS SYSTEM:  Alert & Oriented X3, No focal deficit   CHEST/LUNG: Good air entry bilateral with no  rales, rhonchi, wheezing, or rubs  HEART: Regular rate and rhythm; No murmurs, rubs, or gallops  ABDOMEN: Soft, Nontender, Nondistended; Bowel sounds present  EXTREMITIES:B/L BKA     Care Discussed with Consultants/Other Providers [ x] YES  [ ] NO

## 2024-03-27 NOTE — PROGRESS NOTE ADULT - SUBJECTIVE AND OBJECTIVE BOX
Cardiovascular Disease Progress Note  Date of Service: 24 @ 09:08    Overnight events: No acute events overnight.    Otherwise review of systems negative    Objective Findings:  T(C): 37.1 (24 @ 05:05), Max: 37.1 (24 @ 01:06)  HR: 83 (24 @ 05:05) (77 - 85)  BP: 110/68 (24 @ 05:05) (101/54 - 120/60)  RR: 18 (24 @ 05:05) (15 - 18)  SpO2: 96% (24 @ 05:05) (95% - 99%)  Wt(kg): --  Daily     Daily Weight in k.8 (27 Mar 2024 20:30)      Physical Exam:  Gen: NAD; Patient resting comfortably  HEENT: EOMI, Normocephalic/ atraumatic  CV: RRR, normal S1 + S2, no m/r/g  Lungs:  Normal respiratory effort; clear to auscultation bilaterally  Abd: soft, non-tender; bowel sounds present  Ext: No edema; warm and well perfused    Telemetry: n/a    Laboratory Data:                        8.7    5.20  )-----------( 141      ( 28 Mar 2024 05:20 )             27.4         140  |  99  |  26<H>  ----------------------------<  108<H>  4.3   |  28  |  5.13<H>    Ca    9.5      28 Mar 2024 05:20  Phos  4.3       Mg     2.10                     Inpatient Medications:  MEDICATIONS  (STANDING):  chlorhexidine 2% Cloths 1 Application(s) Topical <User Schedule>  dextrose 5%. 1000 milliLiter(s) (50 mL/Hr) IV Continuous <Continuous>  dextrose 5%. 1000 milliLiter(s) (100 mL/Hr) IV Continuous <Continuous>  dextrose 50% Injectable 12.5 Gram(s) IV Push once  dextrose 50% Injectable 25 Gram(s) IV Push once  dextrose 50% Injectable 25 Gram(s) IV Push once  glucagon  Injectable 1 milliGRAM(s) IntraMuscular once  insulin lispro (ADMELOG) corrective regimen sliding scale   SubCutaneous at bedtime  insulin lispro (ADMELOG) corrective regimen sliding scale   SubCutaneous three times a day before meals  sevelamer carbonate 2400 milliGRAM(s) Oral three times a day with meals  simvastatin 40 milliGRAM(s) Oral at bedtime      Assessment: 63 year old woman with ESRD on HD, PADs/p right BKA (2022) and 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     Plan of Care:    #Cardiac risk stratification  - Plan for AVF revision for possible pseudoaneurysms seen on CTA   - EKG on admission shows normal sinus rhythm with nonspecific ST changes. QT interval within normal limits (miscalculated on EKG)  - Echo 2023 shows normal LV systolic function with well seated bio-AVR  - Patient displays no signs of coronary ischemia or heart failure  - From a cardiac standpoint, patient is optimized to proceed with AVF revision    #CAD  - S/pt CABG 2023  - Patient states she is taking Plavix, Metoprolol and Statin at home. Med list clarified with patient at bedside.   - Continue Statin  - BB on hold in setting of Labile BP.   - Antiplatelet held given bleeding and upcoming surgery.      #ESRD  - HD as per renal team    #HLD  - Statin therapy              Over 55 minutes spent on total encounter; more than 50% of the visit was spent counseling and/or coordinating care by the attending physician.      Cristino Adams MD [facility]  Cardiovascular Disease  [phone]

## 2024-03-27 NOTE — PROGRESS NOTE ADULT - ASSESSMENT
62 y/o y/o F PMhx ESRD on HD (MWF via right groin AVF), PAD c/b right foot OM s/p right BKA (6/2022) and left foot dry gangrene s/p left BKA (11/2022), CAD s/p 3v CABG (7/2023), HFpEF, HTN, R eye blindness who presented w/ profuse bleeding from R groin AVG site following dialysis    AVG pseudoaneurysm, ESRD  no erythema, swelling, warmth or tenderness  CTA- Right groin AV fistula with multiple outpouchings raising concern for pseudoaneurysms.   US-  Small pseudoaneurysm (0.8 x 0.4 cm) in the proxmal segment of the graft. second graft wall defect is noted immediately proximal to this pseudoaneurysm, on the posterior wall of the graft.  blood cultures- NGTD  s/p graft revision and partial resection 3/26    Recommendations  monitor off antibiotics  f/u OR cultures    Channing Cheng M.D.  [facility], Division of Infectious Diseases  [phone]  After 5pm on weekdays and all day on weekends - please call [phone]

## 2024-03-27 NOTE — PROGRESS NOTE ADULT - SUBJECTIVE AND OBJECTIVE BOX
[facility], Division of Infectious Diseases  DANIEL Montelongo Y. Patel, S. Shah, G. Prosper  [phone]  ([phone] - weekdays after 5pm and weekends)    Name: JAYCEE WALTERS  Age/Gender: 63y Female  MRN: [id number]    Interval History:  Notes reviewed.   No concerning overnight events.  Afebrile.   s/p OR yesterday  reports some soreness at graft site    Allergies: No Known Allergies      Objective:  Vitals:   T(F): 98.4 (03-27-24 @ 05:35), Max: 98.4 (03-27-24 @ 05:35)  HR: 76 (03-27-24 @ 05:35) (76 - 82)  BP: 100/50 (03-27-24 @ 05:35) (100/50 - 171/72)  RR: 17 (03-27-24 @ 05:35) (12 - 18)  SpO2: 96% (03-27-24 @ 05:35) (96% - 100%)  Physical Examination:  General: no acute distress  HEENT: anicteric  Cardio: S1, S2, normal rate  Resp: clear to auscultation  Abd: soft, NT, ND  Ext: s/p b/l LE BKAs, proximal R thigh w/ dressing, graft site without erythema  Skin: warm, dry    Laboratory Studies:  CBC:                       8.8    6.12  )-----------( 159      ( 27 Mar 2024 05:32 )             28.2     WBC Trend:  6.12 03-27-24 @ 05:32  5.65 03-26-24 @ 04:42  6.08 03-25-24 @ 06:55  5.06 03-23-24 @ 05:30  5.61 03-22-24 @ 06:25  6.24 03-21-24 @ 19:19  5.09 03-21-24 @ 06:13    CMP: 03-27    137  |  97<L>  |  53<H>  ----------------------------<  99  6.0<H>   |  22  |  7.73<H>    Ca    9.2      27 Mar 2024 05:32  Phos  7.3     03-27  Mg     2.30     03-27            Urinalysis Basic - ( 27 Mar 2024 05:32 )    Color: x / Appearance: x / SG: x / pH: x  Gluc: 99 mg/dL / Ketone: x  / Bili: x / Urobili: x   Blood: x / Protein: x / Nitrite: x   Leuk Esterase: x / RBC: x / WBC x   Sq Epi: x / Non Sq Epi: x / Bacteria: x      Microbiology: reviewed     Culture - Fungal, Other (collected 03-26-24 @ 17:42)  Source: .Other 2. ARTERIAL GRAFT CULTURE  Preliminary Report (03-27-24 @ 06:50):    Testing in progress    Culture - Fungal, Other (collected 03-26-24 @ 17:42)  Source: .Other 1. VENOUS GRAFT CULTURE  Preliminary Report (03-27-24 @ 06:50):    Testing in progress    Culture - Fungal, Tissue (collected 03-26-24 @ 17:42)  Source: .Tissue 4. AV FISTULA GRAFT CULTURE  Preliminary Report (03-27-24 @ 06:19):    Testing in progress    Culture - Tissue with Gram Stain (collected 03-26-24 @ 17:42)  Source: .Tissue 4. AV FISTULA GRAFT CULTURE  Gram Stain (03-27-24 @ 02:52):    Rare polymorphonuclear leukocytes seen per low power field    No organisms seen per oil power field    Culture - Blood (collected 03-20-24 @ 15:29)  Source: .Blood Blood-Peripheral  Final Report (03-25-24 @ 18:01):    No growth at 5 days    Culture - Blood (collected 03-20-24 @ 15:29)  Source: .Blood Blood  Final Report (03-25-24 @ 18:01):    No growth at 5 days        Radiology: reviewed     Medications:  chlorhexidine 2% Cloths 1 Application(s) Topical <User Schedule>  dextrose 5%. 1000 milliLiter(s) IV Continuous <Continuous>  dextrose 5%. 1000 milliLiter(s) IV Continuous <Continuous>  dextrose 50% Injectable 12.5 Gram(s) IV Push once  dextrose 50% Injectable 25 Gram(s) IV Push once  dextrose 50% Injectable 25 Gram(s) IV Push once  dextrose Oral Gel 15 Gram(s) Oral once PRN  glucagon  Injectable 1 milliGRAM(s) IntraMuscular once  insulin lispro (ADMELOG) corrective regimen sliding scale   SubCutaneous at bedtime  insulin lispro (ADMELOG) corrective regimen sliding scale   SubCutaneous three times a day before meals  sevelamer carbonate 2400 milliGRAM(s) Oral three times a day with meals  simvastatin 40 milliGRAM(s) Oral at bedtime    Antimicrobials:

## 2024-03-27 NOTE — PROGRESS NOTE ADULT - ASSESSMENT
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 Renal consulted for ESRD Mx.     ESRD on HD  schedule hemodialysis - Monday, Wednesday and Friday   access- thigh AVG  [place] unit [facility] [place]  Informed consent for hemodialysis obtained  from pt. Consent in chart  K, vol ok  plan:  tolerating HD well today with 1k UF  dose all meds for ESRD  renal restrictions in diet when not NPO    Anemia of CKD+s/p acute bleed- Hb at goal now. no DION for now  Hgb 11.2 on admission. Hb goal 10-11. s/p sig bleed from R groin AVG S/p 2 u pRBCs  - Monitor H/H, transfuse pRBCs as needed    HTN, controlled. s/p hypotension last night 2/2 acute bleed. bp now better and stable.     Right thigh hemodialysis AV graft s/p profuse bleed.   S/p 2 u pRBCs, with bleeding now resolved and pt HD stable. CTA abdominal aorta with run-off showing right groin AVF with multiple outpouchings raising concern for pseudoaneurysms.   - Vascular surgery following,  - S/P removal of pseudoanemurismal portion of graft on 3/26/24  - Per vascular can use thigh graft for HD, avoiding surgical site      For any question, call:  Cell # [phone]  Pager # [phone]  Callback # [phone]

## 2024-03-27 NOTE — PROGRESS NOTE ADULT - ASSESSMENT
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        Problem/Plan - 1:  ·  Problem: Right thigh hemodialysis AV fistula bleed.   ·  Plan: s/p Right thigh arteriovenous graft revision, Interposition graft with PTFE and Partial excision of arteriovenous graft with pseudoaneurysm.  Pt with profuse bleeding from right groin AVF after completing full session of HD on Monday. S/p 2 u pRBCs, with bleeding now resolved and pt HD stable. CTA abdominal aorta with run-off showing right groin AVF with multiple outpouchings raising concern for pseudoaneurysms.   - Vascular surgery following, appreciate recs    < from: CT Angio Abd Aorta w/run-off w/ IV Cont (03.18.24 @ 17:23) >  IMPRESSION:  Right groin AV fistula withmultiple outpouchings raising concern for   pseudoaneurysms. Recommend duplex ultrasound for further evaluation.    Occlusion of the bilateral femoral arteries with partial distal   reconstitution as described.    < end of copied text >  < from: VA Duplex Arterial Lower Ext Ltd, Right (03.19.24 @ 06:38) >  CONCLUSIONS:      1.Right common femoral vein to common femoral artery arteriovenous graft noted in the right proximal thigh.   2. Small pseudoaneurysm measuring 0.8 x 0.4 cm is noted in the proxmal segment of the graft.   3. A second graft wall defect is noted immediately proximal to this pseudoaneurysm, on the posterior wall of the graft.     Problem/Plan - 2:  ·  Problem: Anemia.   ·  Plan: Hgb 11.2 on admission. Baseline appears to be 10-11. Likely from recent bleeding from R groin AVF, anemia of chronic disease, and ESRD.  - Monitor H/H, transfuse pRBCs as needed  - Keep active type and screen  - Check iron studies, folate, and vitamin B12.     Problem/Plan - 3:  ·  Problem: ESRD on hemodialysis.   ·  Plan: Pt on HD MWF. Last HD was on Monday, 3/18/24. No indication for urgent HD at this time.  - Nephrology help appreciated.   - Monitor volume status  - Monitor electrolytes, including serum K, HCO3, Ca, and Phos  - Ferric citrate not available through pharmacy. F/u serum Ca and Phos and c/w either Sevelamer or calcium acetate.   - Dose meds for ESRD.     Problem/Plan - 4:  ·  Problem: Chronic heart failure with preserved ejection fraction (HFpEF).   ·  Plan: Pt with history of HFpEF, with EF 58% and grade 2 diastolic dysfunction on TTE 7/2023. Pt currently euvolemic despite 2 u pRBC transfusion in the ED.  - C/w HD as per nephrology recs  - Check TTE as part of preop cardiac eval, as pt might require AVF (?AVG) revision during this admission  - Monitor volume status, I&Os, daily weights  - Fluid and salt restriction.     Problem/Plan - 5:  ·  Problem: CAD (coronary artery disease).   ·  Plan: S/p 3v CABG in 7/2023. Per pt, admits to not taking either ASA or Plavix since last year after not getting refills of these medications, though Surescripts shows that pt filled a prescription for Plavix in Feb 2024.   - Continue to hold ASA, Plavix, any other antiplatelet meds for now given recent profuse bleeding  - Resume ASA and/or Plavix once bleeding has completely resolved and if no plan for surgical intervention by vascular surgery during admission. F/u with pt's cardiologist as to which antiplatelet pt should be on at home.  - Also hold metoprolol succinate 25 mg daily for now  - C/w simvastatin 40 mg daily.     Problem/Plan - 6:  ·  Problem: PAD (peripheral artery disease).   ·  Plan: Pt with multiple amputations, including b/l BKA.   - Hold ASA, Plavix, any other antiplatelet meds for now given recent profuse bleeding  - C/w simvastatin 40 mg daily.     Problem/Plan - 7:  ·  Problem: Hypertension.   ·  Plan: Pt on metoprolol succinate 25 mg daily at home. BP initially 62/43, now improved s/p 2 u pRBC transfusion.  - Hold metoprolol succinate 25 mg daily for now given recent profuse bleeding  - Monitor VS q4hrs.     Problem/Plan - 8:  ·  Problem: Hyperlipidemia.   ·  Plan: - C/w simvastatin 40 mg daily.     Problem/Plan - 9:  ·  Problem: Type 2 diabetes mellitus.   ·  Plan: Pt not on any meds for DM at home.  - Start low-dose ISS and FS checks qAC TID and qHS for now  - Check A1c.     Problem/Plan - 10:  ·  Problem: Need for prophylactic measure.   ·  Plan; DVT ppx: Hold pharmacologic ppx given recent profuse bleeding  Diet: Renal restrictions/DASH/TLC/CC.        Dispo : DC planning to [facility] as unsafe discharge home.

## 2024-03-28 LAB
ANION GAP SERPL CALC-SCNC: 13 MMOL/L — SIGNIFICANT CHANGE UP (ref 7–14)
BUN SERPL-MCNC: 26 MG/DL — HIGH (ref 7–23)
CALCIUM SERPL-MCNC: 9.5 MG/DL — SIGNIFICANT CHANGE UP (ref 8.4–10.5)
CHLORIDE SERPL-SCNC: 99 MMOL/L — SIGNIFICANT CHANGE UP (ref 98–107)
CO2 SERPL-SCNC: 28 MMOL/L — SIGNIFICANT CHANGE UP (ref 22–31)
CREAT SERPL-MCNC: 5.13 MG/DL — HIGH (ref 0.5–1.3)
EGFR: 9 ML/MIN/1.73M2 — LOW
GLUCOSE BLDC GLUCOMTR-MCNC: 100 MG/DL — HIGH (ref 70–99)
GLUCOSE BLDC GLUCOMTR-MCNC: 108 MG/DL — HIGH (ref 70–99)
GLUCOSE BLDC GLUCOMTR-MCNC: 113 MG/DL — HIGH (ref 70–99)
GLUCOSE BLDC GLUCOMTR-MCNC: 114 MG/DL — HIGH (ref 70–99)
GLUCOSE SERPL-MCNC: 108 MG/DL — HIGH (ref 70–99)
HCT VFR BLD CALC: 27.4 % — LOW (ref 34.5–45)
HGB BLD-MCNC: 8.7 G/DL — LOW (ref 11.5–15.5)
MAGNESIUM SERPL-MCNC: 2.1 MG/DL — SIGNIFICANT CHANGE UP (ref 1.6–2.6)
MCHC RBC-ENTMCNC: 27.6 PG — SIGNIFICANT CHANGE UP (ref 27–34)
MCHC RBC-ENTMCNC: 31.8 GM/DL — LOW (ref 32–36)
MCV RBC AUTO: 87 FL — SIGNIFICANT CHANGE UP (ref 80–100)
NRBC # BLD: 0 /100 WBCS — SIGNIFICANT CHANGE UP (ref 0–0)
NRBC # FLD: 0 K/UL — SIGNIFICANT CHANGE UP (ref 0–0)
PHOSPHATE SERPL-MCNC: 4.3 MG/DL — SIGNIFICANT CHANGE UP (ref 2.5–4.5)
PLATELET # BLD AUTO: 141 K/UL — LOW (ref 150–400)
POTASSIUM SERPL-MCNC: 4.3 MMOL/L — SIGNIFICANT CHANGE UP (ref 3.5–5.3)
POTASSIUM SERPL-SCNC: 4.3 MMOL/L — SIGNIFICANT CHANGE UP (ref 3.5–5.3)
RBC # BLD: 3.15 M/UL — LOW (ref 3.8–5.2)
RBC # FLD: 14.9 % — HIGH (ref 10.3–14.5)
SODIUM SERPL-SCNC: 140 MMOL/L — SIGNIFICANT CHANGE UP (ref 135–145)
WBC # BLD: 5.2 K/UL — SIGNIFICANT CHANGE UP (ref 3.8–10.5)
WBC # FLD AUTO: 5.2 K/UL — SIGNIFICANT CHANGE UP (ref 3.8–10.5)

## 2024-03-28 PROCEDURE — 71045 X-RAY EXAM CHEST 1 VIEW: CPT | Mod: 26

## 2024-03-28 RX ADMIN — SIMVASTATIN 40 MILLIGRAM(S): 20 TABLET, FILM COATED ORAL at 21:10

## 2024-03-28 RX ADMIN — SEVELAMER CARBONATE 2400 MILLIGRAM(S): 2400 POWDER, FOR SUSPENSION ORAL at 13:27

## 2024-03-28 RX ADMIN — SEVELAMER CARBONATE 2400 MILLIGRAM(S): 2400 POWDER, FOR SUSPENSION ORAL at 09:14

## 2024-03-28 RX ADMIN — CHLORHEXIDINE GLUCONATE 1 APPLICATION(S): 213 SOLUTION TOPICAL at 05:09

## 2024-03-28 RX ADMIN — SEVELAMER CARBONATE 2400 MILLIGRAM(S): 2400 POWDER, FOR SUSPENSION ORAL at 18:32

## 2024-03-28 NOTE — PROGRESS NOTE ADULT - ASSESSMENT
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 Renal consulted for ESRD Mx.     ESRD on HD  schedule hemodialysis - Monday, Wednesday and Friday   access- thigh AVG  HD unit [facility] [place]  Informed consent for hemodialysis obtained  from pt. Consent in chart  K, vol ok  plan:  Due for HD tomorrow  dose all meds for ESRD  renal restrictions in diet when not NPO    Anemia of CKD+s/p acute bleed- Hb at goal now. no DION for now  Hgb 11.2 on admission. Hb goal 10-11. s/p sig bleed from R groin AVG S/p 2 u pRBCs  - Monitor H/H, transfuse pRBCs as needed    HTN, controlled. s/p hypotension last night 2/2 acute bleed. bp now better and stable.     Right thigh hemodialysis AV graft s/p profuse bleed.   S/p 2 u pRBCs, with bleeding now resolved and pt HD stable. CTA abdominal aorta with run-off showing right groin AVF with multiple outpouchings raising concern for pseudoaneurysms.   - Vascular surgery following,  - S/P removal of pseudoanemurismal portion of graft on 3/26/24  - Per vascular can use thigh graft for HD, avoiding surgical site  - Clots drawn prior to HD, expected post HD, will monitor. Continual clotting might require further intervention      For any question, call:  Cell # [phone]  Pager # [phone]  Callback # [phone]

## 2024-03-28 NOTE — PROGRESS NOTE ADULT - ASSESSMENT
62 y/o y/o F PMhx ESRD on HD (MWF via right groin AVF), PAD c/b right foot OM s/p right BKA (6/2022) and left foot dry gangrene s/p left BKA (11/2022), CAD s/p 3v CABG (7/2023), HFpEF, HTN, R eye blindness who presented w/ profuse bleeding from R groin AVG site following dialysis    AVG pseudoaneurysm, ESRD  no erythema, swelling, warmth or tenderness  CTA- Right groin AV fistula with multiple outpouchings raising concern for pseudoaneurysms.   US-  Small pseudoaneurysm (0.8 x 0.4 cm) in the proxmal segment of the graft. second graft wall defect is noted immediately proximal to this pseudoaneurysm, on the posterior wall of the graft.  blood cultures- NGTD  s/p graft revision and partial resection 3/26    Recommendations  monitor off antibiotics  f/u OR cultures- NGTD    Channing Cheng M.D.  [facility], Division of Infectious Diseases  [phone]  After 5pm on weekdays and all day on weekends - please call [phone]

## 2024-03-28 NOTE — PROGRESS NOTE ADULT - SUBJECTIVE AND OBJECTIVE BOX
Cardiovascular Disease Progress Note  Date of Service: 24 @ 09:10    Overnight events: No acute events overnight.    The patient denies chest pain or SOB.   Otherwise review of systems negative    Objective Findings:  T(C): 37.1 (24 @ 05:05), Max: 37.1 (24 @ 01:06)  HR: 83 (24 @ 05:05) (77 - 85)  BP: 110/68 (24 @ 05:05) (101/54 - 120/60)  RR: 18 (24 @ 05:05) (15 - 18)  SpO2: 96% (24 @ 05:05) (95% - 99%)  Wt(kg): --  Daily     Daily Weight in k.8 (27 Mar 2024 20:30)      Physical Exam:  Gen: NAD; Patient resting comfortably  HEENT: EOMI, Normocephalic/ atraumatic  CV: RRR, normal S1 + S2, no m/r/g  Lungs:  Normal respiratory effort; clear to auscultation bilaterally  Abd: soft, non-tender; bowel sounds present  Ext: No edema; warm and well perfused    Telemetry: n/a    Laboratory Data:                        8.7    5.20  )-----------( 141      ( 28 Mar 2024 05:20 )             27.4         140  |  99  |  26<H>  ----------------------------<  108<H>  4.3   |  28  |  5.13<H>    Ca    9.5      28 Mar 2024 05:20  Phos  4.3       Mg     2.10                     Inpatient Medications:  MEDICATIONS  (STANDING):  chlorhexidine 2% Cloths 1 Application(s) Topical <User Schedule>  dextrose 5%. 1000 milliLiter(s) (50 mL/Hr) IV Continuous <Continuous>  dextrose 5%. 1000 milliLiter(s) (100 mL/Hr) IV Continuous <Continuous>  dextrose 50% Injectable 12.5 Gram(s) IV Push once  dextrose 50% Injectable 25 Gram(s) IV Push once  dextrose 50% Injectable 25 Gram(s) IV Push once  glucagon  Injectable 1 milliGRAM(s) IntraMuscular once  insulin lispro (ADMELOG) corrective regimen sliding scale   SubCutaneous three times a day before meals  insulin lispro (ADMELOG) corrective regimen sliding scale   SubCutaneous at bedtime  sevelamer carbonate 2400 milliGRAM(s) Oral three times a day with meals  simvastatin 40 milliGRAM(s) Oral at bedtime      Assessment: 63 year old woman with ESRD on HD, PADs/p right BKA (2022) and 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     Plan of Care:    #CAD  - S/p CABG 2023  Resume Plavix if no objection from the surgical team.        #ESRD  - HD as per renal team    #HLD  - Statin therapy      #HTN-  BP acceptable.      #ACP (advance care planning)-  Advanced care planning was discussed with the patient.  Advance care planning forms were discussed. Risks, benefits and alternatives of medical treatment and procedures were discussed in detail and all questions were answered.   30 additional minutes spent addressing advance care plans.        Over 55 minutes spent on total encounter; more than 50% of the visit was spent counseling and/or coordinating care by the attending physician.      Cristino Adams MD [facility]  Cardiovascular Disease  [phone]

## 2024-03-28 NOTE — PROGRESS NOTE ADULT - SUBJECTIVE AND OBJECTIVE BOX
[facility] - TIFFANY Bazzi / TIFFANY England / APARNA Vanegas/ TIFFANY Mcfarlane/ TIFFANY Madden/ MASSIEL Perkins / RAINER Potts / ESTEPHANIE Griffith / DEVEN Smith  ---------------------------------------------------------------------------------------------------------------  seen and examined today for ESRD  Interval : NAD  VITALS:  T(F): 98.7 (03-28-24 @ 05:05), Max: 98.8 (03-28-24 @ 01:06)  HR: 83 (03-28-24 @ 05:05)  BP: 110/68 (03-28-24 @ 05:05)  RR: 18 (03-28-24 @ 05:05)  SpO2: 96% (03-28-24 @ 05:05)  Wt(kg): --    03-27 @ 07:01  -  03-28 @ 07:00  --------------------------------------------------------  IN: 1250 mL / OUT: 1400 mL / NET: -150 mL      Physical Exam :-  Constitutional: NAD  Neck: Supple.  Respiratory: Bilateral equal breath sounds,  Cardiovascular: S1, S2 normal,  Gastrointestinal: Bowel Sounds present, soft, non tender.  Extremities: B/L BKA, clean dressing over AVG surgical site  Neurological: Alert and Oriented x 3, no focal deficits  Psychiatric: Normal mood, normal affect  Data:-  Allergies :   No Known Allergies    Hospital Medications:   MEDICATIONS  (STANDING):  chlorhexidine 2% Cloths 1 Application(s) Topical <User Schedule>  dextrose 5%. 1000 milliLiter(s) (50 mL/Hr) IV Continuous <Continuous>  dextrose 5%. 1000 milliLiter(s) (100 mL/Hr) IV Continuous <Continuous>  dextrose 50% Injectable 12.5 Gram(s) IV Push once  dextrose 50% Injectable 25 Gram(s) IV Push once  dextrose 50% Injectable 25 Gram(s) IV Push once  glucagon  Injectable 1 milliGRAM(s) IntraMuscular once  insulin lispro (ADMELOG) corrective regimen sliding scale   SubCutaneous three times a day before meals  insulin lispro (ADMELOG) corrective regimen sliding scale   SubCutaneous at bedtime  sevelamer carbonate 2400 milliGRAM(s) Oral three times a day with meals  simvastatin 40 milliGRAM(s) Oral at bedtime    03-28    140  |  99  |  26<H>  ----------------------------<  108<H>  4.3   |  28  |  5.13<H>    Ca    9.5      28 Mar 2024 05:20  Phos  4.3     03-28  Mg     2.10     03-28      Creatinine Trend: 5.13 <--, 7.93 <--, 7.73 <--, 6.01 <--, 9.46 <--, 5.10 <--, 7.58 <--                        8.7    5.20  )-----------( 141      ( 28 Mar 2024 05:20 )             27.4

## 2024-03-28 NOTE — PROGRESS NOTE ADULT - ASSESSMENT
63 year old female with PMH HTN, PAD, ESRD on HD through right groin fistula done at OSH, DMT2, CAD s/p CABG  (w/ left saphenous vein graft) in 2023, B/L NIRMAL presents to ED from dialysis center after her HD session finished, after her fistula continued to bleed after HD today. Initially hypotensive but improved after 1u prbc, then another unit given after CTA aorta w/ runoff. Vascular surgery consulted for AVG bleeding and possible revision during this admission. Patient now s/p Right thigh arteriovenous graft revision, Interposition graft with PTFE and Partial excision of arteriovenous graft with pseudoaneurysm.    Recommendations:  - C/W AVGraft for dialysis  - Monitor dressing, do not remove proximal and distal dressings  - Middle dressing and packing material changes daily  - rest of care per primary team    Vascular (C-Team) Surgery   [id number]

## 2024-03-28 NOTE — PROGRESS NOTE ADULT - ASSESSMENT
64 yo woman with history of ESRD on HD (MWF via right groin AVF), PAD c/b right foot OM s/p right BKA (6/2022) and left foot dry gangrene s/p left BKA (11/2022), CAD s/p 3v CABG (7/2023, noncompliant with ASA and/or Plavix), HFpEF (EF 58%, grade 2 diastolic dysfunction, TTE 7/2023), HTN, HLD, type 2 DM (no longer on any meds), and right eye blindness presents with profuse bleeding from AVF site on right groin, admitted with concern for multiple pseudoaneuryms, admitted for further management.        Problem/Plan - 1:  ·  Problem: Right thigh hemodialysis AV fistula bleed.   ·  Plan: s/p Right thigh arteriovenous graft revision, Interposition graft with PTFE and Partial excision of arteriovenous graft with pseudoaneurysm.  Pt with profuse bleeding from right groin AVF after completing full session of HD on Monday. S/p 2 u pRBCs, with bleeding now resolved and pt HD stable. CTA abdominal aorta with run-off showing right groin AVF with multiple outpouchings raising concern for pseudoaneurysms.   - Vascular surgery following, appreciate recs    < from: CT Angio Abd Aorta w/run-off w/ IV Cont (03.18.24 @ 17:23) >  IMPRESSION:  Right groin AV fistula withmultiple outpouchings raising concern for   pseudoaneurysms. Recommend duplex ultrasound for further evaluation.    Occlusion of the bilateral femoral arteries with partial distal   reconstitution as described.    < end of copied text >  < from: VA Duplex Arterial Lower Ext Ltd, Right (03.19.24 @ 06:38) >  CONCLUSIONS:      1.Right common femoral vein to common femoral artery arteriovenous graft noted in the right proximal thigh.   2. Small pseudoaneurysm measuring 0.8 x 0.4 cm is noted in the proxmal segment of the graft.   3. A second graft wall defect is noted immediately proximal to this pseudoaneurysm, on the posterior wall of the graft.     Problem/Plan - 2:  ·  Problem: Anemia.   ·  Plan: Hgb 11.2 on admission. Baseline appears to be 10-11. Likely from recent bleeding from R groin AVF, anemia of chronic disease, and ESRD.  - Monitor H/H, transfuse pRBCs as needed  - Keep active type and screen  - Check iron studies, folate, and vitamin B12.     Problem/Plan - 3:  ·  Problem: ESRD on hemodialysis.   ·  Plan: Pt on HD MWF. Last HD was on Monday, 3/18/24. No indication for urgent HD at this time.  - Nephrology help appreciated.   - Monitor volume status  - Monitor electrolytes, including serum K, HCO3, Ca, and Phos  - Ferric citrate not available through pharmacy. F/u serum Ca and Phos and c/w either Sevelamer or calcium acetate.   - Dose meds for ESRD.     Problem/Plan - 4:  ·  Problem: Chronic heart failure with preserved ejection fraction (HFpEF).   ·  Plan: Pt with history of HFpEF, with EF 58% and grade 2 diastolic dysfunction on TTE 7/2023. Pt currently euvolemic despite 2 u pRBC transfusion in the ED.  - C/w HD as per nephrology recs  - Check TTE as part of preop cardiac eval, as pt might require AVF (?AVG) revision during this admission  - Monitor volume status, I&Os, daily weights  - Fluid and salt restriction.     Problem/Plan - 5:  ·  Problem: CAD (coronary artery disease).   ·  Plan: S/p 3v CABG in 7/2023. Per pt, admits to not taking either ASA or Plavix since last year after not getting refills of these medications, though Surescripts shows that pt filled a prescription for Plavix in Feb 2024.   - Continue to hold ASA, Plavix, any other antiplatelet meds for now given recent profuse bleeding  - Resume ASA and/or Plavix once bleeding has completely resolved and if no plan for surgical intervention by vascular surgery during admission. F/u with pt's cardiologist as to which antiplatelet pt should be on at home.  - Also hold metoprolol succinate 25 mg daily for now  - C/w simvastatin 40 mg daily.     Problem/Plan - 6:  ·  Problem: PAD (peripheral artery disease).   ·  Plan: Pt with multiple amputations, including b/l BKA.   - Hold ASA, Plavix, any other antiplatelet meds for now given recent profuse bleeding  - C/w simvastatin 40 mg daily.     Problem/Plan - 7:  ·  Problem: Hypertension.   ·  Plan: Pt on metoprolol succinate 25 mg daily at home. BP initially 62/43, now improved s/p 2 u pRBC transfusion.  - Hold metoprolol succinate 25 mg daily for now given recent profuse bleeding  - Monitor VS q4hrs.     Problem/Plan - 8:  ·  Problem: Hyperlipidemia.   ·  Plan: - C/w simvastatin 40 mg daily.     Problem/Plan - 9:  ·  Problem: Type 2 diabetes mellitus.   ·  Plan: Pt not on any meds for DM at home.  - Start low-dose ISS and FS checks qAC TID and qHS for now  - Check A1c.     Problem/Plan - 10:  ·  Problem: Need for prophylactic measure.   ·  Plan; DVT ppx: Hold pharmacologic ppx given recent profuse bleeding  Diet: Renal restrictions/DASH/TLC/CC.        Dispo : DC planning to [facility] as unsafe discharge home.

## 2024-03-28 NOTE — PROGRESS NOTE ADULT - SUBJECTIVE AND OBJECTIVE BOX
[facility], Division of Infectious Diseases  DANIEL Montelongo Y. Patel, S. Shah, G. Prosper  [phone]  ([phone] - weekdays after 5pm and weekends)    Name: JAYCEE WALTERS  Age/Gender: 63y Female  MRN: [id number]    Interval History:  Notes reviewed.   No concerning overnight events.  Afebrile.   denies pain at graft site, reports some soreness    Allergies: No Known Allergies      Objective:  Vitals:   T(F): 98.7 (03-28-24 @ 05:05), Max: 98.8 (03-28-24 @ 01:06)  HR: 83 (03-28-24 @ 05:05) (77 - 85)  BP: 110/68 (03-28-24 @ 05:05) (101/54 - 120/60)  RR: 18 (03-28-24 @ 05:05) (15 - 18)  SpO2: 96% (03-28-24 @ 05:05) (96% - 99%)  Physical Examination:  General: no acute distress  HEENT: anicteric  Cardio: normal rate  Resp: breathing comfortably on RA   Abd: non-distended  Ext: s/p b/l LE BKAs, proximal R thigh w/ wound, no purulence or erythema  Skin: warm, dry    Laboratory Studies:  CBC:                       8.7    5.20  )-----------( 141      ( 28 Mar 2024 05:20 )             27.4     WBC Trend:  5.20 03-28-24 @ 05:20  5.74 03-27-24 @ 19:54  6.48 03-27-24 @ 12:01  6.12 03-27-24 @ 05:32  5.65 03-26-24 @ 04:42  6.08 03-25-24 @ 06:55  5.06 03-23-24 @ 05:30  5.61 03-22-24 @ 06:25  6.24 03-21-24 @ 19:19    CMP: 03-28    140  |  99  |  26<H>  ----------------------------<  108<H>  4.3   |  28  |  5.13<H>    Ca    9.5      28 Mar 2024 05:20  Phos  4.3     03-28  Mg     2.10     03-28            Urinalysis Basic - ( 28 Mar 2024 05:20 )    Color: x / Appearance: x / SG: x / pH: x  Gluc: 108 mg/dL / Ketone: x  / Bili: x / Urobili: x   Blood: x / Protein: x / Nitrite: x   Leuk Esterase: x / RBC: x / WBC x   Sq Epi: x / Non Sq Epi: x / Bacteria: x      Microbiology: reviewed     Culture - Fungal, Other (collected 03-26-24 @ 17:42)  Source: .Other 2. ARTERIAL GRAFT CULTURE  Preliminary Report (03-27-24 @ 06:50):    Testing in progress    Culture - Surgical Swab (collected 03-26-24 @ 17:42)  Source: .Surgical Swab 2. ARTERIAL GRAFT CULTURE  Preliminary Report (03-27-24 @ 15:21):    No growth    Culture - Fungal, Other (collected 03-26-24 @ 17:42)  Source: .Other 1. VENOUS GRAFT CULTURE  Preliminary Report (03-27-24 @ 06:50):    Testing in progress    Culture - Surgical Swab (collected 03-26-24 @ 17:42)  Source: .Surgical Swab 1. VENOUS GRAFT CULTURE  Preliminary Report (03-27-24 @ 15:20):    No growth    Culture - Acid Fast - Tissue w/Smear (collected 03-26-24 @ 17:42)  Source: .Tissue 4. AV FISTULA GRAFT CULTURE    Culture - Fungal, Tissue (collected 03-26-24 @ 17:42)  Source: .Tissue 4. AV FISTULA GRAFT CULTURE  Preliminary Report (03-27-24 @ 06:19):    Testing in progress    Culture - Tissue with Gram Stain (collected 03-26-24 @ 17:42)  Source: .Tissue 4. AV FISTULA GRAFT CULTURE  Gram Stain (03-27-24 @ 02:52):    Rare polymorphonuclear leukocytes seen per low power field    No organisms seen per oil power field  Preliminary Report (03-27-24 @ 19:40):    No growth    Culture - Fungal, Other (collected 03-26-24 @ 17:39)  Source: .Other 3. MIDSEGMENT AV GRAFT CULTURE  Preliminary Report (03-28-24 @ 09:54):    Testing in progress    Culture - Surgical Swab (collected 03-26-24 @ 17:39)  Source: .Surgical Swab 3. MIDSEGMENT AV GRAFT CULTURE  Preliminary Report (03-28-24 @ 08:19):    No growth to date.    Culture - Blood (collected 03-20-24 @ 15:29)  Source: .Blood Blood-Peripheral  Final Report (03-25-24 @ 18:01):    No growth at 5 days    Culture - Blood (collected 03-20-24 @ 15:29)  Source: .Blood Blood  Final Report (03-25-24 @ 18:01):    No growth at 5 days        Radiology: reviewed     Medications:  chlorhexidine 2% Cloths 1 Application(s) Topical <User Schedule>  dextrose 5%. 1000 milliLiter(s) IV Continuous <Continuous>  dextrose 5%. 1000 milliLiter(s) IV Continuous <Continuous>  dextrose 50% Injectable 12.5 Gram(s) IV Push once  dextrose 50% Injectable 25 Gram(s) IV Push once  dextrose 50% Injectable 25 Gram(s) IV Push once  dextrose Oral Gel 15 Gram(s) Oral once PRN  glucagon  Injectable 1 milliGRAM(s) IntraMuscular once  insulin lispro (ADMELOG) corrective regimen sliding scale   SubCutaneous at bedtime  insulin lispro (ADMELOG) corrective regimen sliding scale   SubCutaneous three times a day before meals  sevelamer carbonate 2400 milliGRAM(s) Oral three times a day with meals  simvastatin 40 milliGRAM(s) Oral at bedtime    Antimicrobials:   English

## 2024-03-28 NOTE — PROGRESS NOTE ADULT - SUBJECTIVE AND OBJECTIVE BOX
Date of Service  : 03-28-24     INTERVAL HPI/OVERNIGHT EVENTS: I feel okay .  Vital Signs Last 24 Hrs  T(C): 37.1 (28 Mar 2024 05:05), Max: 37.1 (28 Mar 2024 01:06)  T(F): 98.7 (28 Mar 2024 05:05), Max: 98.8 (28 Mar 2024 01:06)  HR: 83 (28 Mar 2024 05:05) (77 - 85)  BP: 110/68 (28 Mar 2024 05:05) (101/54 - 120/60)  BP(mean): --  RR: 18 (28 Mar 2024 05:05) (15 - 18)  SpO2: 96% (28 Mar 2024 05:05) (95% - 99%)    Parameters below as of 28 Mar 2024 05:05  Patient On (Oxygen Delivery Method): room air      I&O's Summary    27 Mar 2024 07:01  -  28 Mar 2024 07:00  --------------------------------------------------------  IN: 1250 mL / OUT: 1400 mL / NET: -150 mL      MEDICATIONS  (STANDING):  chlorhexidine 2% Cloths 1 Application(s) Topical <User Schedule>  dextrose 5%. 1000 milliLiter(s) (50 mL/Hr) IV Continuous <Continuous>  dextrose 5%. 1000 milliLiter(s) (100 mL/Hr) IV Continuous <Continuous>  dextrose 50% Injectable 12.5 Gram(s) IV Push once  dextrose 50% Injectable 25 Gram(s) IV Push once  dextrose 50% Injectable 25 Gram(s) IV Push once  glucagon  Injectable 1 milliGRAM(s) IntraMuscular once  insulin lispro (ADMELOG) corrective regimen sliding scale   SubCutaneous at bedtime  insulin lispro (ADMELOG) corrective regimen sliding scale   SubCutaneous three times a day before meals  sevelamer carbonate 2400 milliGRAM(s) Oral three times a day with meals  simvastatin 40 milliGRAM(s) Oral at bedtime    MEDICATIONS  (PRN):  dextrose Oral Gel 15 Gram(s) Oral once PRN Blood Glucose LESS THAN 70 milliGRAM(s)/deciliter    LABS:                        8.7    5.20  )-----------( 141      ( 28 Mar 2024 05:20 )             27.4     03-28    140  |  99  |  26<H>  ----------------------------<  108<H>  4.3   |  28  |  5.13<H>    Ca    9.5      28 Mar 2024 05:20  Phos  4.3     03-28  Mg     2.10     03-28        Urinalysis Basic - ( 28 Mar 2024 05:20 )    Color: x / Appearance: x / SG: x / pH: x  Gluc: 108 mg/dL / Ketone: x  / Bili: x / Urobili: x   Blood: x / Protein: x / Nitrite: x   Leuk Esterase: x / RBC: x / WBC x   Sq Epi: x / Non Sq Epi: x / Bacteria: x      CAPILLARY BLOOD GLUCOSE      POCT Blood Glucose.: 113 mg/dL (28 Mar 2024 08:29)  POCT Blood Glucose.: 121 mg/dL (27 Mar 2024 22:37)  POCT Blood Glucose.: 103 mg/dL (27 Mar 2024 19:38)  POCT Blood Glucose.: 84 mg/dL (27 Mar 2024 14:43)  POCT Blood Glucose.: 178 mg/dL (27 Mar 2024 11:48)  POCT Blood Glucose.: 145 mg/dL (27 Mar 2024 10:19)        Urinalysis Basic - ( 28 Mar 2024 05:20 )    Color: x / Appearance: x / SG: x / pH: x  Gluc: 108 mg/dL / Ketone: x  / Bili: x / Urobili: x   Blood: x / Protein: x / Nitrite: x   Leuk Esterase: x / RBC: x / WBC x   Sq Epi: x / Non Sq Epi: x / Bacteria: x      REVIEW OF SYSTEMS:  CONSTITUTIONAL: No fever, weight loss, or fatigue  EYES: No eye pain, visual disturbances, or discharge  ENMT:  No difficulty hearing, tinnitus, vertigo; No sinus or throat pain  NECK: No pain or stiffness  RESPIRATORY: No cough, wheezing, chills or hemoptysis; No shortness of breath  CARDIOVASCULAR: No chest pain, palpitations, dizziness, or leg swelling  GASTROINTESTINAL: No abdominal or epigastric pain. No nausea, vomiting, or hematemesis; No diarrhea or constipation. No melena or hematochezia.      RADIOLOGY & ADDITIONAL TESTS:    Consultant(s) Notes Reviewed:  [x ] YES  [ ] NO    PHYSICAL EXAM:  GENERAL: NAD, well-groomed, well-developed,not in any distress ,  HEAD:  Atraumatic, Normocephalic  EYES: EOMI, PERRLA, conjunctiva and sclera clear  ENMT: No tonsillar erythema, exudates, or enlargement; Moist mucous membranes, Good dentition, No lesions  NECK: Supple, No JVD, Normal thyroid  NERVOUS SYSTEM:  Alert & Oriented X3, No focal deficit   CHEST/LUNG: Good air entry bilateral with no  rales, rhonchi, wheezing, or rubs  HEART: Regular rate and rhythm; No murmurs, rubs, or gallops  ABDOMEN: Soft, Nontender, Nondistended; Bowel sounds present  EXTREMITIES: B/L BKA     Care Discussed with Consultants/Other Providers [ x] YES  [ ] NO

## 2024-03-28 NOTE — PROGRESS NOTE ADULT - SUBJECTIVE AND OBJECTIVE BOX
Overnight events:   - No acute events    SUBJECTIVE:  Patient was seen and examined on AM rounds. Denies new complaints.    OBJECTIVE:  Vital Signs Last 24 Hrs  T(C): 37.1 (28 Mar 2024 05:05), Max: 37.1 (28 Mar 2024 01:06)  T(F): 98.7 (28 Mar 2024 05:05), Max: 98.8 (28 Mar 2024 01:06)  HR: 83 (28 Mar 2024 05:05) (77 - 85)  BP: 110/68 (28 Mar 2024 05:05) (101/54 - 120/60)  BP(mean): --  RR: 18 (28 Mar 2024 05:05) (15 - 18)  SpO2: 96% (28 Mar 2024 05:05) (95% - 99%)    Parameters below as of 28 Mar 2024 05:05  Patient On (Oxygen Delivery Method): room air        03-27-24 @ 07:01  -  03-28-24 @ 07:00  --------------------------------------------------------  IN: 1250 mL / OUT: 1400 mL / NET: -150 mL      Physical Examination:  General: NAD, resting comfortably  Neuro: A/O x 3, no focal deficits, sensation normal  Pulmonary: normal resp effort  Cardiovascular: NSR  Abdominal: soft, NT/ND  Extremities: RLE BKA, LLE BKA. RLE with 1 proximal, 1 middle and 1 distal dressing in the right upper thigh.   Pulses:   Right:                                                                  FEM [x]2+ [ ]1+ [ ]doppler  POP [x]2+ [ ]1+ [ ]doppler  Palpable thrill        LABS:                        8.7    5.20  )-----------( 141      ( 28 Mar 2024 05:20 )             27.4       03-28    140  |  99  |  26<H>  ----------------------------<  108<H>  4.3   |  28  |  5.13<H>    Ca    9.5      28 Mar 2024 05:20  Phos  4.3     03-28  Mg     2.10     03-28

## 2024-03-29 LAB
ANION GAP SERPL CALC-SCNC: 15 MMOL/L — HIGH (ref 7–14)
BUN SERPL-MCNC: 38 MG/DL — HIGH (ref 7–23)
CALCIUM SERPL-MCNC: 9.5 MG/DL — SIGNIFICANT CHANGE UP (ref 8.4–10.5)
CHLORIDE SERPL-SCNC: 99 MMOL/L — SIGNIFICANT CHANGE UP (ref 98–107)
CO2 SERPL-SCNC: 24 MMOL/L — SIGNIFICANT CHANGE UP (ref 22–31)
CREAT SERPL-MCNC: 7.08 MG/DL — HIGH (ref 0.5–1.3)
EGFR: 6 ML/MIN/1.73M2 — LOW
GLUCOSE BLDC GLUCOMTR-MCNC: 132 MG/DL — HIGH (ref 70–99)
GLUCOSE BLDC GLUCOMTR-MCNC: 132 MG/DL — HIGH (ref 70–99)
GLUCOSE BLDC GLUCOMTR-MCNC: 80 MG/DL — SIGNIFICANT CHANGE UP (ref 70–99)
GLUCOSE BLDC GLUCOMTR-MCNC: 85 MG/DL — SIGNIFICANT CHANGE UP (ref 70–99)
GLUCOSE BLDC GLUCOMTR-MCNC: 93 MG/DL — SIGNIFICANT CHANGE UP (ref 70–99)
GLUCOSE SERPL-MCNC: 73 MG/DL — SIGNIFICANT CHANGE UP (ref 70–99)
HCT VFR BLD CALC: 26.3 % — LOW (ref 34.5–45)
HGB BLD-MCNC: 8.2 G/DL — LOW (ref 11.5–15.5)
MAGNESIUM SERPL-MCNC: 2.3 MG/DL — SIGNIFICANT CHANGE UP (ref 1.6–2.6)
MCHC RBC-ENTMCNC: 27.2 PG — SIGNIFICANT CHANGE UP (ref 27–34)
MCHC RBC-ENTMCNC: 31.2 GM/DL — LOW (ref 32–36)
MCV RBC AUTO: 87.4 FL — SIGNIFICANT CHANGE UP (ref 80–100)
NRBC # BLD: 0 /100 WBCS — SIGNIFICANT CHANGE UP (ref 0–0)
NRBC # FLD: 0 K/UL — SIGNIFICANT CHANGE UP (ref 0–0)
PHOSPHATE SERPL-MCNC: 5.6 MG/DL — HIGH (ref 2.5–4.5)
PLATELET # BLD AUTO: 137 K/UL — LOW (ref 150–400)
POTASSIUM SERPL-MCNC: 4.7 MMOL/L — SIGNIFICANT CHANGE UP (ref 3.5–5.3)
POTASSIUM SERPL-SCNC: 4.7 MMOL/L — SIGNIFICANT CHANGE UP (ref 3.5–5.3)
RBC # BLD: 3.01 M/UL — LOW (ref 3.8–5.2)
RBC # FLD: 14.7 % — HIGH (ref 10.3–14.5)
SODIUM SERPL-SCNC: 138 MMOL/L — SIGNIFICANT CHANGE UP (ref 135–145)
WBC # BLD: 5.5 K/UL — SIGNIFICANT CHANGE UP (ref 3.8–10.5)
WBC # FLD AUTO: 5.5 K/UL — SIGNIFICANT CHANGE UP (ref 3.8–10.5)

## 2024-03-29 RX ORDER — CLOPIDOGREL BISULFATE 75 MG/1
75 TABLET, FILM COATED ORAL DAILY
Refills: 0 | Status: DISCONTINUED | OUTPATIENT
Start: 2024-03-29 | End: 2024-04-01

## 2024-03-29 RX ADMIN — SIMVASTATIN 40 MILLIGRAM(S): 20 TABLET, FILM COATED ORAL at 21:58

## 2024-03-29 RX ADMIN — CHLORHEXIDINE GLUCONATE 1 APPLICATION(S): 213 SOLUTION TOPICAL at 05:19

## 2024-03-29 RX ADMIN — SEVELAMER CARBONATE 2400 MILLIGRAM(S): 2400 POWDER, FOR SUSPENSION ORAL at 13:17

## 2024-03-29 RX ADMIN — SEVELAMER CARBONATE 2400 MILLIGRAM(S): 2400 POWDER, FOR SUSPENSION ORAL at 18:11

## 2024-03-29 NOTE — PROGRESS NOTE ADULT - SUBJECTIVE AND OBJECTIVE BOX
Cardiovascular Disease Progress Note  Date of service: 24 @ 14:53    Overnight events: No acute events overnight.  Patient is in no distress.   Otherwise review of systems negative    Objective Findings:  T(C): 36.6 (24 @ 14:15), Max: 37.2 (24 @ 21:19)  HR: 74 (24 @ 13:00) (73 - 83)  BP: 104/50 (24 @ 13:00) (100/55 - 122/48)  RR: 18 (24 @ 13:00) (16 - 18)  SpO2: 100% (24 @ 13:00) (95% - 100%)  Wt(kg): --  Daily     Daily Weight in k.5 (29 Mar 2024 11:00)      Physical Exam:  Gen: NAD; Patient resting comfortably  HEENT: EOMI, Normocephalic/ atraumatic  CV: RRR, normal S1 + S2, no m/r/g  Lungs:  Normal respiratory effort; clear to auscultation bilaterally  Abd: soft, non-tender; bowel sounds present  Ext: No edema; warm and well perfused    Telemetry: N/a    Laboratory Data:                        8.2    5.50  )-----------( 137      ( 29 Mar 2024 05:23 )             26.3         138  |  99  |  38<H>  ----------------------------<  73  4.7   |  24  |  7.08<H>    Ca    9.5      29 Mar 2024 05:23  Phos  5.6       Mg     2.30                     Inpatient Medications:  MEDICATIONS  (STANDING):  chlorhexidine 2% Cloths 1 Application(s) Topical <User Schedule>  dextrose 5%. 1000 milliLiter(s) (50 mL/Hr) IV Continuous <Continuous>  dextrose 5%. 1000 milliLiter(s) (100 mL/Hr) IV Continuous <Continuous>  dextrose 50% Injectable 12.5 Gram(s) IV Push once  dextrose 50% Injectable 25 Gram(s) IV Push once  dextrose 50% Injectable 25 Gram(s) IV Push once  glucagon  Injectable 1 milliGRAM(s) IntraMuscular once  insulin lispro (ADMELOG) corrective regimen sliding scale   SubCutaneous at bedtime  insulin lispro (ADMELOG) corrective regimen sliding scale   SubCutaneous three times a day before meals  sevelamer carbonate 2400 milliGRAM(s) Oral three times a day with meals  simvastatin 40 milliGRAM(s) Oral at bedtime      Assessment:  63 year old woman with ESRD on HD, PADs/p right BKA (2022) and 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     Plan of Care:    #CAD  - S/p CABG 2023  Resume Plavix if no objection from the surgical team.        #ESRD  - HD as per renal team    #HLD  - Statin therapy      #HTN-  BP acceptable.        Over 55 minutes spent on total encounter; more than 50% of the visit was spent counseling and/or coordinating care by the attending physician.      Wisam Adams DO [facility]  Cardiovascular Disease  [phone]

## 2024-03-29 NOTE — PROGRESS NOTE ADULT - ASSESSMENT
63 year old female with PMH HTN, PAD, ESRD on HD through right groin fistula done at OSH, DMT2, CAD s/p CABG  (w/ left saphenous vein graft) in 2023, B/L NIRMAL presents to ED from dialysis center after her HD session finished, after her fistula continued to bleed after HD today. Initially hypotensive but improved after 1u prbc, then another unit given after CTA aorta w/ runoff. Vascular surgery consulted for AVG bleeding and possible revision during this admission. Patient now s/p Right thigh arteriovenous graft revision, Interposition graft with PTFE and Partial excision of arteriovenous graft with pseudoaneurysm.    Recommendations:  - C/W AVGraft for dialysis  - Appreciate ID recs (monitor off abx, OR cultures negative)  - Middle dressing and packing material changes daily  - rest of care per primary team    Vascular (C-Team) Surgery   [id number]

## 2024-03-29 NOTE — PROGRESS NOTE ADULT - ASSESSMENT
62 y/o y/o F PMhx ESRD on HD (MWF via right groin AVF), PAD c/b right foot OM s/p right BKA (6/2022) and left foot dry gangrene s/p left BKA (11/2022), CAD s/p 3v CABG (7/2023), HFpEF, HTN, R eye blindness who presented w/ profuse bleeding from R groin AVG site following dialysis    AVG pseudoaneurysm, ESRD  no erythema, swelling, warmth or tenderness  CTA- Right groin AV fistula with multiple outpouchings raising concern for pseudoaneurysms.   US-  Small pseudoaneurysm (0.8 x 0.4 cm) in the proxmal segment of the graft. second graft wall defect is noted immediately proximal to this pseudoaneurysm, on the posterior wall of the graft.  blood cultures- NGTD  s/p graft revision and partial resection 3/26  OR cultures- NGTD    Recommendations  continue off antibiotics  wound care    Dr. Roz Mary will be covering 3/30 & 3/31. I will resume care on 4/1     Channing Cheng M.D.  [facility], Division of Infectious Diseases  [phone]  After 5pm on weekdays and all day on weekends - please call [phone]

## 2024-03-29 NOTE — PROGRESS NOTE ADULT - SUBJECTIVE AND OBJECTIVE BOX
[facility], Division of Infectious Diseases  DANIEL Montelongo Y. Patel, S. Shah, G. Prosper  [phone]  ([phone] - weekdays after 5pm and weekends)    Name: JAYCEE WALTERS  Age/Gender: 63y Female  MRN: [id number]    Interval History:  Notes reviewed.   No concerning overnight events.  Afebrile.   has some R thigh soreness  seen in dialysis    Allergies: No Known Allergies      Objective:  Vitals:   T(F): 96.9 (03-29-24 @ 07:20), Max: 99 (03-28-24 @ 21:19)  HR: 74 (03-29-24 @ 07:20) (74 - 83)  BP: 107/51 (03-29-24 @ 07:20) (100/55 - 122/48)  RR: 17 (03-29-24 @ 07:20) (17 - 18)  SpO2: 98% (03-29-24 @ 05:15) (95% - 98%)  Physical Examination:  General: no acute distress  HEENT: anicteric  Cardio: normal rate  Resp: breathing comfortably on RA   Abd: soft, NT, ND  Ext: s/p b/l LE BKAs, proximal R thigh w/ dressings  Skin: warm, dry    Laboratory Studies:  CBC:                       8.2    5.50  )-----------( 137      ( 29 Mar 2024 05:23 )             26.3     WBC Trend:  5.50 03-29-24 @ 05:23  5.20 03-28-24 @ 05:20  5.74 03-27-24 @ 19:54  6.48 03-27-24 @ 12:01  6.12 03-27-24 @ 05:32  5.65 03-26-24 @ 04:42  6.08 03-25-24 @ 06:55  5.06 03-23-24 @ 05:30    CMP: 03-29    138  |  99  |  38<H>  ----------------------------<  73  4.7   |  24  |  7.08<H>    Ca    9.5      29 Mar 2024 05:23  Phos  5.6     03-29  Mg     2.30     03-29            Urinalysis Basic - ( 29 Mar 2024 05:23 )    Color: x / Appearance: x / SG: x / pH: x  Gluc: 73 mg/dL / Ketone: x  / Bili: x / Urobili: x   Blood: x / Protein: x / Nitrite: x   Leuk Esterase: x / RBC: x / WBC x   Sq Epi: x / Non Sq Epi: x / Bacteria: x      Microbiology: reviewed     Culture - Fungal, Other (collected 03-26-24 @ 17:42)  Source: .Other 2. ARTERIAL GRAFT CULTURE  Preliminary Report (03-27-24 @ 06:50):    Testing in progress    Culture - Surgical Swab (collected 03-26-24 @ 17:42)  Source: .Surgical Swab 2. ARTERIAL GRAFT CULTURE  Preliminary Report (03-27-24 @ 15:21):    No growth    Culture - Fungal, Other (collected 03-26-24 @ 17:42)  Source: .Other 1. VENOUS GRAFT CULTURE  Preliminary Report (03-27-24 @ 06:50):    Testing in progress    Culture - Surgical Swab (collected 03-26-24 @ 17:42)  Source: .Surgical Swab 1. VENOUS GRAFT CULTURE  Preliminary Report (03-27-24 @ 15:20):    No growth    Culture - Acid Fast - Tissue w/Smear (collected 03-26-24 @ 17:42)  Source: .Tissue 4. AV FISTULA GRAFT CULTURE    Culture - Fungal, Tissue (collected 03-26-24 @ 17:42)  Source: .Tissue 4. AV FISTULA GRAFT CULTURE  Preliminary Report (03-27-24 @ 06:19):    Testing in progress    Culture - Tissue with Gram Stain (collected 03-26-24 @ 17:42)  Source: .Tissue 4. AV FISTULA GRAFT CULTURE  Gram Stain (03-27-24 @ 02:52):    Rare polymorphonuclear leukocytes seen per low power field    No organisms seen per oil power field  Preliminary Report (03-27-24 @ 19:40):    No growth    Culture - Fungal, Other (collected 03-26-24 @ 17:39)  Source: .Other 3. MIDSEGMENT AV GRAFT CULTURE  Preliminary Report (03-28-24 @ 09:54):    Testing in progress    Culture - Surgical Swab (collected 03-26-24 @ 17:39)  Source: .Surgical Swab 3. MIDSEGMENT AV GRAFT CULTURE  Preliminary Report (03-28-24 @ 08:19):    No growth to date.    Culture - Blood (collected 03-20-24 @ 15:29)  Source: .Blood Blood-Peripheral  Final Report (03-25-24 @ 18:01):    No growth at 5 days    Culture - Blood (collected 03-20-24 @ 15:29)  Source: .Blood Blood  Final Report (03-25-24 @ 18:01):    No growth at 5 days        Radiology: reviewed     Medications:  chlorhexidine 2% Cloths 1 Application(s) Topical <User Schedule>  dextrose 5%. 1000 milliLiter(s) IV Continuous <Continuous>  dextrose 5%. 1000 milliLiter(s) IV Continuous <Continuous>  dextrose 50% Injectable 12.5 Gram(s) IV Push once  dextrose 50% Injectable 25 Gram(s) IV Push once  dextrose 50% Injectable 25 Gram(s) IV Push once  dextrose Oral Gel 15 Gram(s) Oral once PRN  glucagon  Injectable 1 milliGRAM(s) IntraMuscular once  insulin lispro (ADMELOG) corrective regimen sliding scale   SubCutaneous at bedtime  insulin lispro (ADMELOG) corrective regimen sliding scale   SubCutaneous three times a day before meals  sevelamer carbonate 2400 milliGRAM(s) Oral three times a day with meals  simvastatin 40 milliGRAM(s) Oral at bedtime    Antimicrobials:

## 2024-03-29 NOTE — PROGRESS NOTE ADULT - SUBJECTIVE AND OBJECTIVE BOX
Date of Service  : 03-29-24     INTERVAL HPI/OVERNIGHT EVENTS: I feel fine.   Vital Signs Last 24 Hrs  T(C): 36.9 (29 Mar 2024 17:00), Max: 37.1 (29 Mar 2024 00:54)  T(F): 98.4 (29 Mar 2024 17:00), Max: 98.8 (29 Mar 2024 00:54)  HR: 79 (29 Mar 2024 17:00) (73 - 80)  BP: 106/52 (29 Mar 2024 17:00) (100/55 - 122/44)  BP(mean): --  RR: 16 (29 Mar 2024 17:00) (16 - 18)  SpO2: 99% (29 Mar 2024 17:00) (95% - 100%)    Parameters below as of 29 Mar 2024 17:00  Patient On (Oxygen Delivery Method): room air      I&O's Summary    28 Mar 2024 07:01  -  29 Mar 2024 07:00  --------------------------------------------------------  IN: 900 mL / OUT: 0 mL / NET: 900 mL    29 Mar 2024 07:01  -  29 Mar 2024 21:21  --------------------------------------------------------  IN: 500 mL / OUT: 1500 mL / NET: -1000 mL      MEDICATIONS  (STANDING):  chlorhexidine 2% Cloths 1 Application(s) Topical <User Schedule>  clopidogrel Tablet 75 milliGRAM(s) Oral daily  dextrose 5%. 1000 milliLiter(s) (50 mL/Hr) IV Continuous <Continuous>  dextrose 5%. 1000 milliLiter(s) (100 mL/Hr) IV Continuous <Continuous>  dextrose 50% Injectable 12.5 Gram(s) IV Push once  dextrose 50% Injectable 25 Gram(s) IV Push once  dextrose 50% Injectable 25 Gram(s) IV Push once  glucagon  Injectable 1 milliGRAM(s) IntraMuscular once  insulin lispro (ADMELOG) corrective regimen sliding scale   SubCutaneous at bedtime  insulin lispro (ADMELOG) corrective regimen sliding scale   SubCutaneous three times a day before meals  sevelamer carbonate 2400 milliGRAM(s) Oral three times a day with meals  simvastatin 40 milliGRAM(s) Oral at bedtime    MEDICATIONS  (PRN):  dextrose Oral Gel 15 Gram(s) Oral once PRN Blood Glucose LESS THAN 70 milliGRAM(s)/deciliter    LABS:                        8.2    5.50  )-----------( 137      ( 29 Mar 2024 05:23 )             26.3     03-29    138  |  99  |  38<H>  ----------------------------<  73  4.7   |  24  |  7.08<H>    Ca    9.5      29 Mar 2024 05:23  Phos  5.6     03-29  Mg     2.30     03-29        Urinalysis Basic - ( 29 Mar 2024 05:23 )    Color: x / Appearance: x / SG: x / pH: x  Gluc: 73 mg/dL / Ketone: x  / Bili: x / Urobili: x   Blood: x / Protein: x / Nitrite: x   Leuk Esterase: x / RBC: x / WBC x   Sq Epi: x / Non Sq Epi: x / Bacteria: x      CAPILLARY BLOOD GLUCOSE      POCT Blood Glucose.: 132 mg/dL (29 Mar 2024 18:02)  POCT Blood Glucose.: 93 mg/dL (29 Mar 2024 13:09)  POCT Blood Glucose.: 80 mg/dL (29 Mar 2024 10:58)  POCT Blood Glucose.: 85 mg/dL (29 Mar 2024 06:01)  POCT Blood Glucose.: 114 mg/dL (28 Mar 2024 22:13)        Urinalysis Basic - ( 29 Mar 2024 05:23 )    Color: x / Appearance: x / SG: x / pH: x  Gluc: 73 mg/dL / Ketone: x  / Bili: x / Urobili: x   Blood: x / Protein: x / Nitrite: x   Leuk Esterase: x / RBC: x / WBC x   Sq Epi: x / Non Sq Epi: x / Bacteria: x      REVIEW OF SYSTEMS:  CONSTITUTIONAL: No fever, weight loss, or fatigue  EYES: No eye pain, visual disturbances, or discharge  ENMT:  No difficulty hearing, tinnitus, vertigo; No sinus or throat pain  NECK: No pain or stiffness  RESPIRATORY: No cough, wheezing, chills or hemoptysis; No shortness of breath  CARDIOVASCULAR: No chest pain, palpitations, dizziness, or leg swelling  GASTROINTESTINAL: No abdominal or epigastric pain. No nausea, vomiting, or hematemesis; No diarrhea or constipation. No melena or hematochezia.  GENITOURINARY: No dysuria, frequency, hematuria, or incontinence    Consultant(s) Notes Reviewed:  [x ] YES  [ ] NO    PHYSICAL EXAM:  GENERAL: NAD, well-groomed, well-developed,not in any distress ,  HEAD:  Atraumatic, Normocephalic  EYES: EOMI, PERRLA, conjunctiva and sclera clear  ENMT: No tonsillar erythema, exudates, or enlargement; Moist mucous membranes, Good dentition, No lesions  NECK: Supple, No JVD, Normal thyroid  NERVOUS SYSTEM:  Alert & Oriented X3, No focal deficit   CHEST/LUNG: Good air entry bilateral with no  rales, rhonchi, wheezing, or rubs  HEART: Regular rate and rhythm; No murmurs, rubs, or gallops  ABDOMEN: Soft, Nontender, Nondistended; Bowel sounds present  EXTREMITIES:  B/L BKA  Care Discussed with Consultants/Other Providers [ x] YES  [ ] NO

## 2024-03-29 NOTE — PROGRESS NOTE ADULT - SUBJECTIVE AND OBJECTIVE BOX
Overnight events:   - No acute events    SUBJECTIVE:  Patient was seen and examined bedside and dressing/packing material exchanged.    OBJECTIVE:  Vital Signs Last 24 Hrs  T(C): 36.1 (29 Mar 2024 07:20), Max: 37.2 (28 Mar 2024 21:19)  T(F): 96.9 (29 Mar 2024 07:20), Max: 99 (28 Mar 2024 21:19)  HR: 74 (29 Mar 2024 07:20) (74 - 83)  BP: 107/51 (29 Mar 2024 07:20) (100/55 - 122/48)  BP(mean): --  RR: 17 (29 Mar 2024 07:20) (17 - 18)  SpO2: 98% (29 Mar 2024 05:15) (95% - 98%)    Parameters below as of 29 Mar 2024 07:20  Patient On (Oxygen Delivery Method): room air      03-28-24 @ 07:01  -  03-29-24 @ 07:00  --------------------------------------------------------  IN: 900 mL / OUT: 0 mL / NET: 900 mL      Physical Examination:  General: NAD, resting comfortably  Neuro: A/O x 3, no focal deficits, sensation normal  Pulmonary: normal resp effort  Cardiovascular: NSR  Abdominal: soft, NT/ND  Extremities: RLE BKA, LLE BKA. RLE with 1 proximal, 1 middle and 1 distal dressing in the right upper thigh.   Pulses:   Right:                                                                  FEM [x]2+ [ ]1+ [ ]doppler  POP [x]2+ [ ]1+ [ ]doppler  Palpable thrill        LABS:                        8.2    5.50  )-----------( 137      ( 29 Mar 2024 05:23 )             26.3       03-29    138  |  99  |  38<H>  ----------------------------<  73  4.7   |  24  |  7.08<H>    Ca    9.5      29 Mar 2024 05:23  Phos  5.6     03-29  Mg     2.30     03-29

## 2024-03-29 NOTE — DISCHARGE NOTE PROVIDER - NSDCMRMEDTOKEN_GEN_ALL_CORE_FT
clopidogrel 75 mg oral tablet: 1 tab(s) orally once a day ***Pt states has not been taking since late last year***  ferric citrate 210 mg oral tablet: 420 milligram(s) orally 3 times a day (with meals) and 210 mg BID with snacks  metoprolol succinate 25 mg oral tablet, extended release: 1 tab(s) orally once a day  simvastatin 40 mg oral tablet: 1 tab(s) orally once a day (at bedtime)   clopidogrel 75 mg oral tablet: 1 tab(s) orally once a day  sevelamer carbonate 800 mg oral tablet: 3 tab(s) orally 3 times a day (with meals)  simvastatin 40 mg oral tablet: 1 tab(s) orally once a day (at bedtime)

## 2024-03-29 NOTE — PROGRESS NOTE ADULT - ASSESSMENT
64 yo woman with history of ESRD on HD (MWF via right groin AVF), PAD c/b right foot OM s/p right BKA (6/2022) and left foot dry gangrene s/p left BKA (11/2022), CAD s/p 3v CABG (7/2023, noncompliant with ASA and/or Plavix), HFpEF (EF 58%, grade 2 diastolic dysfunction, TTE 7/2023), HTN, HLD, type 2 DM (no longer on any meds), and right eye blindness presents with profuse bleeding from AVF site on right groin, admitted with concern for multiple pseudoaneuryms, admitted for further management. Renal following for ESRD Mx.     ESRD on HD  schedule hemodialysis - Monday, Wednesday and Friday   access- thigh AVG  HD unit [facility] [place]  Informed consent for hemodialysis obtained  from pt. Consent in chart  K, vol ok  plan:  s/p HD in am, Rx sheet reviewed, net UF 1kg removed, tolerated well. uneventful.   plan for next HD Monday  dose all meds for ESRD  renal restrictions in diet when not NPO    Anemia of CKD+s/p acute bleed- Hb <goal now. will add DION w/next hd  Hb goal 10-11. s/p sig bleed from R groin AVG S/p 2 u pRBCs  - Monitor H/H, transfuse pRBCs as needed to keep >7    HTN, controlled. s/p hypotension 2/2 acute bleed. bp now better and stable.     Right thigh hemodialysis AV graft s/p profuse bleed.   S/p 2 u pRBCs, with bleeding now resolved and pt HD stable. CTA abdominal aorta with run-off showing right groin AVF with multiple outpouchings raising concern for pseudoaneurysms.   - Vascular surgery following,  - S/P removal of pseudoanemurismal portion of graft on 3/26/24  - Per vascular can use thigh graft for HD, avoiding surgical site  - Clots drawn prior to HD, expected post HD, will monitor. Continual clotting might require further intervention    will closely follow up.   poc d/w pt  labs, chart reviewed  For any question, pl call:  Nephrology  Cell -[phone]  Office [phone]  Ans Serv [phone]

## 2024-03-29 NOTE — DISCHARGE NOTE PROVIDER - HOSPITAL COURSE
63F PMH of ESRD on HD (M/W/F via right groin AVF), PAD c/b right foot OM s/p right BKA (6/2022) and left foot dry gangrene s/p left BKA (11/2022), CAD s/p 3v CABG (7/2023, noncompliant with ASA and/or Plavix), HFpEF (EF 58%, grade 2 diastolic dysfunction, TTE 7/2023), HTN, HLD, type 2 DM (no longer on any meds), and right eye blindness presents with profuse bleeding from AVF site on right groin, admitted with concern for multiple pseudoaneuryms, admitted for further management.    Right thigh hemodialysis AV fistula bleed  - profuse bleeding from R groin AVF after completing full session of HD; s/p 2U PRBCs in ED, with bleeding now resolved, hemodynamically stable  - CTA abdominal aorta with run-off showing R groin AVF with multiple outpouchings raising concern for pseudoaneurysms  - duplex w/ AV fistula w/ small pseudoaneurysm in proximal segment of graft  - vascular surgery consulted: s/p R thigh AV graft revision and partial excision of AV graft with pseudoaneurysm 3/26     Anemia  - Hgb 11.2 on admission; baseline appears to be 10-11; likely from recent bleeding from R groin AVF, anemia of chronic disease, and ESRD  - monitor CBC; keep active T&S; transfuse for Hgb <8    ESRD on hemodialysis  - on HD M/W/F  - nephrology consulted: c/w HD per M/W/F schedule    Chronic heart failure with preserved ejection fraction (HFpEF)  - history of HFpEF, w/ EF 58% and grade 2 diastolic dysfunction on TTE 7/2023; currently euvolemic despite 2U PRBCs in ED  - monitor volume status, I&Os, daily weights  - c/w fluid and salt restriction    CAD (coronary artery disease)  - s/p 3 vessel CABG in 7/2023; per pt, admits to not taking either ASA or plavix since last year after not getting refills of these medications, though Surescripts shows that pt filled a prescription for Plavix in February 2024  - c/w plavix, simvastatin; holding ASA    PAD (peripheral artery disease)  - multiple amputations, including b/l BKA  - holding ASA  - c/w plavix, simvastatin    Hypertension  - on metoprolol succinate at home  - BP initially 62/43, improved s/p 2U PRBCs  - holding metoprolol given recent profuse bleeding  - monitor BP    Hyperlipidemia  - c/w simvastatin    Type 2 diabetes mellitus  - A1c 5%  - not on any meds for DM at home  - c/w CC diet, ISS, FSG monitoring  - A1c 5%    Case discussed with Dr. Cutler on 4/1. Patient is medically stable and optimized for discharge to [facility] as per attending. Discharge plan reviewed with patient. 63F PMH of ESRD on HD (M/W/F via right groin AVF), PAD c/b right foot OM s/p right BKA (6/2022) and left foot dry gangrene s/p left BKA (11/2022), CAD s/p 3v CABG (7/2023, noncompliant with ASA and/or Plavix), HFpEF (EF 58%, grade 2 diastolic dysfunction, TTE 7/2023), HTN, HLD, type 2 DM (no longer on any meds), and right eye blindness presents with profuse bleeding from AVF site on right groin, admitted with concern for multiple pseudoaneuryms, admitted for further management.    Right thigh hemodialysis AV fistula bleed  - profuse bleeding from R groin AVF after completing full session of HD; s/p 2U PRBCs in ED, with bleeding now resolved, hemodynamically stable  - CTA abdominal aorta with run-off showing R groin AVF with multiple outpouchings raising concern for pseudoaneurysms  - duplex w/ AV fistula w/ small pseudoaneurysm in proximal segment of graft  - vascular surgery consulted: s/p R thigh AV graft revision and partial excision of AV graft with pseudoaneurysm 3/26     Anemia  - Hgb 11.2 on admission; baseline appears to be 10-11; likely from recent bleeding from R groin AVF, anemia of chronic disease, and ESRD  - monitor CBC; keep active T&S; transfuse for Hgb <8    ESRD on hemodialysis  - on HD M/W/F  - nephrology consulted: c/w HD per M/W/F schedule    Chronic heart failure with preserved ejection fraction (HFpEF)  - history of HFpEF, w/ EF 58% and grade 2 diastolic dysfunction on TTE 7/2023; currently euvolemic despite 2U PRBCs in ED  - monitor volume status, I&Os, daily weights  - c/w fluid and salt restriction    CAD (coronary artery disease)  - s/p 3 vessel CABG in 7/2023; per pt, admits to not taking either ASA or plavix since last year after not getting refills of these medications, though Surescripts shows that pt filled a prescription for Plavix in February 2024  - c/w plavix, simvastatin; holding ASA    PAD (peripheral artery disease)  - multiple amputations, including b/l BKA  - holding ASA  - c/w plavix, simvastatin    Hypertension  - on metoprolol succinate at home  - BP initially 62/43, improved s/p 2U PRBCs  - holding metoprolol given recent profuse bleeding  - monitor BP    Hyperlipidemia  - c/w simvastatin    Type 2 diabetes mellitus  - A1c 5%  - not on any meds for DM at home  - c/w CC diet, FSG monitoring    Case discussed with Dr. Cutler on 4/1. Patient is medically stable and optimized for discharge to [facility] as per attending. Discharge plan reviewed with patient.

## 2024-03-29 NOTE — PROGRESS NOTE ADULT - SUBJECTIVE AND OBJECTIVE BOX
[facility] - S Jluis / Shabbir S /D Romina/ S Denys/ S Chano/ Andrés Perkins / SUNI Lubinu/ O Gladis  service -[phone], office [phone]  ---------------------------------------------------------------------------------------------------------------    Patient seen and examined bedside    Subjective and Objective: No overnight events, sob resolved. No complaints today. feeling better    Allergies: No Known Allergies      Hospital Medications:   MEDICATIONS  (STANDING):  chlorhexidine 2% Cloths 1 Application(s) Topical <User Schedule>  clopidogrel Tablet 75 milliGRAM(s) Oral daily  dextrose 5%. 1000 milliLiter(s) (50 mL/Hr) IV Continuous <Continuous>  dextrose 5%. 1000 milliLiter(s) (100 mL/Hr) IV Continuous <Continuous>  dextrose 50% Injectable 12.5 Gram(s) IV Push once  dextrose 50% Injectable 25 Gram(s) IV Push once  dextrose 50% Injectable 25 Gram(s) IV Push once  glucagon  Injectable 1 milliGRAM(s) IntraMuscular once  insulin lispro (ADMELOG) corrective regimen sliding scale   SubCutaneous three times a day before meals  insulin lispro (ADMELOG) corrective regimen sliding scale   SubCutaneous at bedtime  sevelamer carbonate 2400 milliGRAM(s) Oral three times a day with meals  simvastatin 40 milliGRAM(s) Oral at bedtime      REVIEW OF SYSTEMS:  CONSTITUTIONAL: No weakness, fevers or chills  EYES/ENT: No visual changes;  No vertigo or throat pain   NECK: No pain or stiffness  RESPIRATORY: No cough, wheezing, hemoptysis; No shortness of breath  CARDIOVASCULAR: No chest pain or palpitations.  GASTROINTESTINAL: No abdominal or epigastric pain. No nausea, vomiting, or hematemesis; No diarrhea or constipation. No melena or hematochezia.  GENITOURINARY: No dysuria, frequency, foamy urine, urinary urgency, incontinence or hematuria  NEUROLOGICAL: No numbness or weakness  SKIN: No itching, burning, rashes, or lesions   VASCULAR: No bilateral lower extremity edema.   All other review of systems is negative unless indicated above.    VITALS:  T(F): 97.8 (03-29-24 @ 14:15), Max: 99 (03-28-24 @ 21:19)  HR: 74 (03-29-24 @ 13:00)  BP: 104/50 (03-29-24 @ 13:00)  RR: 18 (03-29-24 @ 13:00)  SpO2: 100% (03-29-24 @ 13:00)  Wt(kg): --    03-28 @ 07:01  -  03-29 @ 07:00  --------------------------------------------------------  IN: 900 mL / OUT: 0 mL / NET: 900 mL    03-29 @ 07:01  -  03-29 @ 17:11  --------------------------------------------------------  IN: 500 mL / OUT: 1500 mL / NET: -1000 mL          PHYSICAL EXAM:  Constitutional: NAD  HEENT: anicteric sclera, oropharynx clear  Neck: No JVD  Respiratory: CTAB, no wheezes, rales or rhonchi  Cardiovascular: S1, S2, RRR  Gastrointestinal: BS+, soft, NT/ND  Extremities: No cyanosis or clubbing. No peripheral edema  Neurological: A/O x 3, no focal deficits  Psychiatric: Normal mood, normal affect  : No CVA tenderness. No salcido.   Skin: No rashes  Vascular Access:    LABS:  03-29    138  |  99  |  38<H>  ----------------------------<  73  4.7   |  24  |  7.08<H>    Ca    9.5      29 Mar 2024 05:23  Phos  5.6     03-29  Mg     2.30     03-29      Creatinine Trend: 7.08 <--, 5.13 <--, 7.93 <--, 7.73 <--, 6.01 <--, 9.46 <--, 5.10 <--                        8.2    5.50  )-----------( 137      ( 29 Mar 2024 05:23 )             26.3     Urine Studies:  Urinalysis Basic - ( 29 Mar 2024 05:23 )    Color:  / Appearance:  / SG:  / pH:   Gluc: 73 mg/dL / Ketone:   / Bili:  / Urobili:    Blood:  / Protein:  / Nitrite:    Leuk Esterase:  / RBC:  / WBC    Sq Epi:  / Non Sq Epi:  / Bacteria:           RADIOLOGY & ADDITIONAL STUDIES:   [facility] - S Jluis / Shabbir S /D Romina/ S Denys/ S Chano/ Andrés Perkins / SUNI Potts/ O Gladis  service [phone], office [phone]  ---------------------------------------------------------------------------------------------------------------    Patient seen and examined bedside    Subjective and Objective: No overnight events, denied sob. No complaints today. feeling better    Allergies: No Known Allergies      Hospital Medications:   MEDICATIONS  (STANDING):  chlorhexidine 2% Cloths 1 Application(s) Topical <User Schedule>  clopidogrel Tablet 75 milliGRAM(s) Oral daily  dextrose 5%. 1000 milliLiter(s) (50 mL/Hr) IV Continuous <Continuous>  dextrose 5%. 1000 milliLiter(s) (100 mL/Hr) IV Continuous <Continuous>  dextrose 50% Injectable 12.5 Gram(s) IV Push once  dextrose 50% Injectable 25 Gram(s) IV Push once  dextrose 50% Injectable 25 Gram(s) IV Push once  glucagon  Injectable 1 milliGRAM(s) IntraMuscular once  insulin lispro (ADMELOG) corrective regimen sliding scale   SubCutaneous three times a day before meals  insulin lispro (ADMELOG) corrective regimen sliding scale   SubCutaneous at bedtime  sevelamer carbonate 2400 milliGRAM(s) Oral three times a day with meals  simvastatin 40 milliGRAM(s) Oral at bedtime    VITALS:  T(F): 97.8 (03-29-24 @ 14:15), Max: 99 (03-28-24 @ 21:19)  HR: 74 (03-29-24 @ 13:00)  BP: 104/50 (03-29-24 @ 13:00)  RR: 18 (03-29-24 @ 13:00)  SpO2: 100% (03-29-24 @ 13:00)  Wt(kg): --    03-28 @ 07:01 - 03-29 @ 07:00  --------------------------------------------------------  IN: 900 mL / OUT: 0 mL / NET: 900 mL    03-29 @ 07:01 - 03-29 @ 17:11  --------------------------------------------------------  IN: 500 mL / OUT: 1500 mL / NET: -1000 mL      PHYSICAL EXAM:  Constitutional: NAD  HEENT: anicteric sclera  Neck: No JVD  Respiratory: CTAB, no wheezes, rales or rhonchi  Cardiovascular: S1, S2, RRR  Gastrointestinal: BS+, soft, NT  Extremities: b/l BKAs  Neurological: A/O x 3  Psychiatric: Normal mood, normal affect  : No salcido.   Vascular Access: Rt femoral avg+thrill    LABS:  03-29    138  |  99  |  38<H>  ----------------------------<  73  4.7   |  24  |  7.08<H>    Ca    9.5      29 Mar 2024 05:23  Phos  5.6     03-29  Mg     2.30     03-29      Creatinine Trend: 7.08 <--, 5.13 <--, 7.93 <--, 7.73 <--, 6.01 <--, 9.46 <--, 5.10 <--                        8.2    5.50  )-----------( 137      ( 29 Mar 2024 05:23 )             26.3     Urine Studies:  Urinalysis Basic - ( 29 Mar 2024 05:23 )    Color:  / Appearance:  / SG:  / pH:   Gluc: 73 mg/dL / Ketone:   / Bili:  / Urobili:    Blood:  / Protein:  / Nitrite:    Leuk Esterase:  / RBC:  / WBC    Sq Epi:  / Non Sq Epi:  / Bacteria:           RADIOLOGY & ADDITIONAL STUDIES:

## 2024-03-29 NOTE — DISCHARGE NOTE PROVIDER - NSDCCPCAREPLAN_GEN_ALL_CORE_FT
PRINCIPAL DISCHARGE DIAGNOSIS  Diagnosis: Right thigh hemodialysis AV fistula bleed  Assessment and Plan of Treatment:       SECONDARY DISCHARGE DIAGNOSES  Diagnosis: Anemia  Assessment and Plan of Treatment:     Diagnosis: CAD (coronary artery disease)  Assessment and Plan of Treatment:     Diagnosis: Hypertension  Assessment and Plan of Treatment:     Diagnosis: Hyperlipidemia  Assessment and Plan of Treatment:     Diagnosis: ESRD on hemodialysis  Assessment and Plan of Treatment:      PRINCIPAL DISCHARGE DIAGNOSIS  Diagnosis: Right thigh hemodialysis AV fistula bleed  Assessment and Plan of Treatment: You were seen by the vascular team and a revision of your AV graft was done 3/26. Monitor for signs of bleeding. Change middle dressing and packing material daily.  Follow up with your primary care provider in 1-2 weeks of discharge.      SECONDARY DISCHARGE DIAGNOSES  Diagnosis: ESRD on hemodialysis  Assessment and Plan of Treatment: Please continue to follow your dialysis schedule and refer to your primary provider/nephrologist for further care and monitoring of kidney function and electrolytes. Continue a renal restricted diet, avoiding foods high in potassium and phosphorus. Continue your prescribed medications and supplementations as directed.    Diagnosis: Anemia  Assessment and Plan of Treatment: You received 2 units of blood in the Emergency Department. Your hemoglobin (blood count) has been stable. Follow up with your primary care provider in 1-2 weeks of discharge for further monitoring of your blood counts.    Diagnosis: CAD (coronary artery disease)  Assessment and Plan of Treatment: Continue taking your Plavix, do not stop unless instructed by your physician. Continue a low salt, fat, cholesterol, and carbohydrate diet. Follow up with your cardiologist and primary care provider on discharge.    Diagnosis: Chronic heart failure with preserved ejection fraction (HFpEF)  Assessment and Plan of Treatment: Follow a low salt diet, restrict fluid intake to 1000mL daily, monitor your fluid intake and weight daily, exercise as tolerated for 30 minutes daily, and follow up with your physician within 1-2 weeks of discharge.    Diagnosis: Hypertension  Assessment and Plan of Treatment: Follow a low sodium/low fat diet and follow up with your primary care provider.    Diagnosis: PAD (peripheral artery disease)  Assessment and Plan of Treatment: Continue Plavix.    Diagnosis: Type 2 diabetes mellitus  Assessment and Plan of Treatment: Your hemoglobin A1C is 5%. Target goal for hemoglobin A1C is <7%. Monitor blood glucose levels throughout the day, before meals and at bedtime. Record blood sugars and bring to outpatient provider appointments in order to be reviewed by your doctor for management modifications. If your sugars are more than 400 or less than 70 you should contact your primary care provider immediately. Monitor for signs/symptoms of low blood glucose, such as dizziness, altered mental status, or cool/clammy skin. In addition, monitor for signs/symptoms of high blood glucose, such as feeling hot, dry, fatigued, or with increased thirst/urination. Make regular podiatry appointments in order to have feet checked for wounds and uncontrolled toe nail growth to prevent infections. Make regular ophthalmology appointments to monitor your vision.    Diagnosis: Hyperlipidemia  Assessment and Plan of Treatment: Follow a low fat diet, exercise daily, and continue your current medications as prescribed. Follow up with your primary care provider/cardiologist for further management.

## 2024-03-30 LAB
ANION GAP SERPL CALC-SCNC: 12 MMOL/L — SIGNIFICANT CHANGE UP (ref 7–14)
BUN SERPL-MCNC: 26 MG/DL — HIGH (ref 7–23)
CALCIUM SERPL-MCNC: 9.2 MG/DL — SIGNIFICANT CHANGE UP (ref 8.4–10.5)
CHLORIDE SERPL-SCNC: 99 MMOL/L — SIGNIFICANT CHANGE UP (ref 98–107)
CO2 SERPL-SCNC: 29 MMOL/L — SIGNIFICANT CHANGE UP (ref 22–31)
CREAT SERPL-MCNC: 5.14 MG/DL — HIGH (ref 0.5–1.3)
EGFR: 9 ML/MIN/1.73M2 — LOW
GLUCOSE BLDC GLUCOMTR-MCNC: 109 MG/DL — HIGH (ref 70–99)
GLUCOSE BLDC GLUCOMTR-MCNC: 116 MG/DL — HIGH (ref 70–99)
GLUCOSE BLDC GLUCOMTR-MCNC: 98 MG/DL — SIGNIFICANT CHANGE UP (ref 70–99)
GLUCOSE SERPL-MCNC: 96 MG/DL — SIGNIFICANT CHANGE UP (ref 70–99)
HCT VFR BLD CALC: 27.5 % — LOW (ref 34.5–45)
HGB BLD-MCNC: 8.6 G/DL — LOW (ref 11.5–15.5)
MAGNESIUM SERPL-MCNC: 2.3 MG/DL — SIGNIFICANT CHANGE UP (ref 1.6–2.6)
MCHC RBC-ENTMCNC: 27.6 PG — SIGNIFICANT CHANGE UP (ref 27–34)
MCHC RBC-ENTMCNC: 31.3 GM/DL — LOW (ref 32–36)
MCV RBC AUTO: 88.1 FL — SIGNIFICANT CHANGE UP (ref 80–100)
NRBC # BLD: 0 /100 WBCS — SIGNIFICANT CHANGE UP (ref 0–0)
NRBC # FLD: 0 K/UL — SIGNIFICANT CHANGE UP (ref 0–0)
PHOSPHATE SERPL-MCNC: 4.4 MG/DL — SIGNIFICANT CHANGE UP (ref 2.5–4.5)
PLATELET # BLD AUTO: 151 K/UL — SIGNIFICANT CHANGE UP (ref 150–400)
POTASSIUM SERPL-MCNC: 4.1 MMOL/L — SIGNIFICANT CHANGE UP (ref 3.5–5.3)
POTASSIUM SERPL-SCNC: 4.1 MMOL/L — SIGNIFICANT CHANGE UP (ref 3.5–5.3)
RBC # BLD: 3.12 M/UL — LOW (ref 3.8–5.2)
RBC # FLD: 14.4 % — SIGNIFICANT CHANGE UP (ref 10.3–14.5)
SODIUM SERPL-SCNC: 140 MMOL/L — SIGNIFICANT CHANGE UP (ref 135–145)
WBC # BLD: 5.3 K/UL — SIGNIFICANT CHANGE UP (ref 3.8–10.5)
WBC # FLD AUTO: 5.3 K/UL — SIGNIFICANT CHANGE UP (ref 3.8–10.5)

## 2024-03-30 RX ORDER — ERYTHROPOIETIN 10000 [IU]/ML
10000 INJECTION, SOLUTION INTRAVENOUS; SUBCUTANEOUS
Refills: 0 | Status: DISCONTINUED | OUTPATIENT
Start: 2024-03-30 | End: 2024-04-01

## 2024-03-30 RX ADMIN — SEVELAMER CARBONATE 2400 MILLIGRAM(S): 2400 POWDER, FOR SUSPENSION ORAL at 09:24

## 2024-03-30 RX ADMIN — CHLORHEXIDINE GLUCONATE 1 APPLICATION(S): 213 SOLUTION TOPICAL at 05:34

## 2024-03-30 RX ADMIN — CLOPIDOGREL BISULFATE 75 MILLIGRAM(S): 75 TABLET, FILM COATED ORAL at 12:16

## 2024-03-30 RX ADMIN — SEVELAMER CARBONATE 2400 MILLIGRAM(S): 2400 POWDER, FOR SUSPENSION ORAL at 17:41

## 2024-03-30 RX ADMIN — SEVELAMER CARBONATE 2400 MILLIGRAM(S): 2400 POWDER, FOR SUSPENSION ORAL at 12:16

## 2024-03-30 RX ADMIN — SIMVASTATIN 40 MILLIGRAM(S): 20 TABLET, FILM COATED ORAL at 21:22

## 2024-03-30 NOTE — PROGRESS NOTE ADULT - SUBJECTIVE AND OBJECTIVE BOX
Date of Service  : 03-30-24     INTERVAL HPI/OVERNIGHT EVENTS: I feel fine.   Vital Signs Last 24 Hrs  T(C): 37 (30 Mar 2024 05:47), Max: 37 (30 Mar 2024 05:47)  T(F): 98.6 (30 Mar 2024 05:47), Max: 98.6 (30 Mar 2024 05:47)  HR: 73 (30 Mar 2024 05:47) (73 - 79)  BP: 108/46 (30 Mar 2024 05:47) (103/51 - 112/49)  BP(mean): --  RR: 18 (30 Mar 2024 05:47) (16 - 18)  SpO2: 99% (30 Mar 2024 05:47) (99% - 100%)    Parameters below as of 30 Mar 2024 05:47  Patient On (Oxygen Delivery Method): room air      I&O's Summary    29 Mar 2024 07:01  -  30 Mar 2024 07:00  --------------------------------------------------------  IN: 500 mL / OUT: 1500 mL / NET: -1000 mL      MEDICATIONS  (STANDING):  chlorhexidine 2% Cloths 1 Application(s) Topical <User Schedule>  clopidogrel Tablet 75 milliGRAM(s) Oral daily  dextrose 5%. 1000 milliLiter(s) (50 mL/Hr) IV Continuous <Continuous>  dextrose 5%. 1000 milliLiter(s) (100 mL/Hr) IV Continuous <Continuous>  dextrose 50% Injectable 12.5 Gram(s) IV Push once  dextrose 50% Injectable 25 Gram(s) IV Push once  dextrose 50% Injectable 25 Gram(s) IV Push once  glucagon  Injectable 1 milliGRAM(s) IntraMuscular once  insulin lispro (ADMELOG) corrective regimen sliding scale   SubCutaneous three times a day before meals  insulin lispro (ADMELOG) corrective regimen sliding scale   SubCutaneous at bedtime  sevelamer carbonate 2400 milliGRAM(s) Oral three times a day with meals  simvastatin 40 milliGRAM(s) Oral at bedtime    MEDICATIONS  (PRN):  dextrose Oral Gel 15 Gram(s) Oral once PRN Blood Glucose LESS THAN 70 milliGRAM(s)/deciliter    LABS:                        8.6    5.30  )-----------( 151      ( 30 Mar 2024 03:30 )             27.5     03-30    140  |  99  |  26<H>  ----------------------------<  96  4.1   |  29  |  5.14<H>    Ca    9.2      30 Mar 2024 03:30  Phos  4.4     03-30  Mg     2.30     03-30        Urinalysis Basic - ( 30 Mar 2024 03:30 )    Color: x / Appearance: x / SG: x / pH: x  Gluc: 96 mg/dL / Ketone: x  / Bili: x / Urobili: x   Blood: x / Protein: x / Nitrite: x   Leuk Esterase: x / RBC: x / WBC x   Sq Epi: x / Non Sq Epi: x / Bacteria: x      CAPILLARY BLOOD GLUCOSE      POCT Blood Glucose.: 98 mg/dL (30 Mar 2024 08:30)  POCT Blood Glucose.: 132 mg/dL (29 Mar 2024 22:08)  POCT Blood Glucose.: 132 mg/dL (29 Mar 2024 18:02)  POCT Blood Glucose.: 93 mg/dL (29 Mar 2024 13:09)  POCT Blood Glucose.: 80 mg/dL (29 Mar 2024 10:58)        Urinalysis Basic - ( 30 Mar 2024 03:30 )    Color: x / Appearance: x / SG: x / pH: x  Gluc: 96 mg/dL / Ketone: x  / Bili: x / Urobili: x   Blood: x / Protein: x / Nitrite: x   Leuk Esterase: x / RBC: x / WBC x   Sq Epi: x / Non Sq Epi: x / Bacteria: x      REVIEW OF SYSTEMS:  CONSTITUTIONAL: No fever, weight loss, or fatigue  EYES: No eye pain, visual disturbances, or discharge  ENMT:  No difficulty hearing, tinnitus, vertigo; No sinus or throat pain  NECK: No pain or stiffness  RESPIRATORY: No cough, wheezing, chills or hemoptysis; No shortness of breath  CARDIOVASCULAR: No chest pain, palpitations, dizziness, or leg swelling  GASTROINTESTINAL: No abdominal or epigastric pain. No nausea, vomiting, or hematemesis; No diarrhea or constipation. No melena or hematochezia.  GENITOURINARY: No dysuria, frequency, hematuria, or incontinence  NEUROLOGICAL: No headaches, memory loss, loss of strength, numbness, or tremors    Consultant(s) Notes Reviewed:  [x ] YES  [ ] NO    PHYSICAL EXAM:  GENERAL: NAD, well-groomed, well-developed,not in any distress ,  HEAD:  Atraumatic, Normocephalic  NECK: Supple, No JVD, Normal thyroid  NERVOUS SYSTEM:  Alert & Oriented X3, No focal deficit   CHEST/LUNG: Good air entry bilateral with no  rales, rhonchi, wheezing, or rubs  HEART: Regular rate and rhythm; No murmurs, rubs, or gallops  ABDOMEN: Soft, Nontender, Nondistended; Bowel sounds present  EXTREMITIES:  B/L BKA     Care Discussed with Consultants/Other Providers [ x] YES  [ ] NO

## 2024-03-30 NOTE — PROGRESS NOTE ADULT - ASSESSMENT
63 year old female with PMH HTN, PAD, ESRD on HD through right groin fistula done at OSH, DMT2, CAD s/p CABG  (w/ left saphenous vein graft) in 2023, B/L NIRMAL presents to ED from dialysis center after her HD session finished, after her fistula continued to bleed after HD today. Initially hypotensive but improved after 1u prbc, then another unit given after CTA aorta w/ runoff. Vascular surgery consulted for AVG bleeding and possible revision during this admission. Patient now s/p Right thigh arteriovenous graft revision, Interposition graft with PTFE and Partial excision of arteriovenous graft with pseudoaneurysm.    Recommendations:  - C/W AVGraft for dialysis  - Appreciate ID recs (monitor off abx, OR cultures negative)  - Wound with no active bleeding or pus. Middle dressing can be provider to RN dressing change QD > gauze and tape dressing    - rest of care per primary team    Vascular (C-Team) Surgery   [id number]

## 2024-03-30 NOTE — PROGRESS NOTE ADULT - SUBJECTIVE AND OBJECTIVE BOX
Cardiovascular Disease Progress Note  Date of Service: 24 @ 10:07    Overnight events: No acute events overnight.   The patient is eating breakfast in no distress.    Otherwise review of systems negative    Objective Findings:  T(C): 37 (24 @ 05:47), Max: 37 (24 @ 05:47)  HR: 73 (24 @ 05:47) (73 - 79)  BP: 108/46 (24 @ 05:47) (103/51 - 112/49)  RR: 18 (24 @ 05:47) (16 - 18)  SpO2: 99% (24 @ 05:47) (99% - 100%)  Wt(kg): --  Daily     Daily Weight in k.5 (29 Mar 2024 11:00)      Physical Exam:  Gen: NAD; Patient resting comfortably  HEENT: EOMI, Normocephalic/ atraumatic  CV: RRR, normal S1 + S2, no m/r/g  Lungs:  Normal respiratory effort; clear to auscultation bilaterally  Abd: soft, non-tender; bowel sounds present  Ext: No edema; warm and well perfused    Telemetry: n/a    Laboratory Data:                        8.6    5.30  )-----------( 151      ( 30 Mar 2024 03:30 )             27.5         140  |  99  |  26<H>  ----------------------------<  96  4.1   |  29  |  5.14<H>    Ca    9.2      30 Mar 2024 03:30  Phos  4.4     -30  Mg     2.30                     Inpatient Medications:  MEDICATIONS  (STANDING):  chlorhexidine 2% Cloths 1 Application(s) Topical <User Schedule>  clopidogrel Tablet 75 milliGRAM(s) Oral daily  dextrose 5%. 1000 milliLiter(s) (50 mL/Hr) IV Continuous <Continuous>  dextrose 5%. 1000 milliLiter(s) (100 mL/Hr) IV Continuous <Continuous>  dextrose 50% Injectable 12.5 Gram(s) IV Push once  dextrose 50% Injectable 25 Gram(s) IV Push once  dextrose 50% Injectable 25 Gram(s) IV Push once  glucagon  Injectable 1 milliGRAM(s) IntraMuscular once  insulin lispro (ADMELOG) corrective regimen sliding scale   SubCutaneous at bedtime  insulin lispro (ADMELOG) corrective regimen sliding scale   SubCutaneous three times a day before meals  sevelamer carbonate 2400 milliGRAM(s) Oral three times a day with meals  simvastatin 40 milliGRAM(s) Oral at bedtime      Assessment: 63 year old woman with ESRD on HD, PADs/p right BKA (2022) and 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     Plan of Care:    #CAD  - S/p CABG 2023  No signs of active ischemia.  Continue Plavix and statin.        #ESRD  - HD as per renal team    #HLD  - Statin therapy      #HTN-  BP acceptable.           Over 55 minutes spent on total encounter; more than 50% of the visit was spent counseling and/or coordinating care by the attending physician.      Cristino Adams MD [facility]  Cardiovascular Disease  [phone]

## 2024-03-30 NOTE — PROGRESS NOTE ADULT - ASSESSMENT
64 yo woman with history of ESRD on HD (MWF via right groin AVF), PAD c/b right foot OM s/p right BKA (6/2022) and left foot dry gangrene s/p left BKA (11/2022), CAD s/p 3v CABG (7/2023, noncompliant with ASA and/or Plavix), HFpEF (EF 58%, grade 2 diastolic dysfunction, TTE 7/2023), HTN, HLD, type 2 DM (no longer on any meds), and right eye blindness presents with profuse bleeding from AVF site on right groin, admitted with concern for multiple pseudoaneuryms, admitted for further management. Renal following for ESRD Mx.     ESRD on HD  schedule hemodialysis - Monday, Wednesday and Friday   access- thigh AVG  HD unit [facility]  Informed consent for hemodialysis obtained  from pt. Consent in chart  K, vol ok  plan:  s/p HD 3/29, Rx sheet reviewed, net UF 1kg removed, tolerated well. uneventful.   plan for next HD Monday  dose all meds for ESRD  renal restrictions in diet when not NPO    Anemia of CKD+s/p acute bleed- Hb <goal now. will add DION w/next hd  Hb goal 10-11. s/p sig bleed from R groin AVG S/p 2 u pRBCs  - Monitor H/H, transfuse pRBCs as needed to keep >7    HTN, controlled. s/p hypotension 2/2 acute bleed. bp now better and stable.     Right thigh hemodialysis AV graft s/p profuse bleed.   S/p 2 u pRBCs, with bleeding now resolved and pt HD stable. CTA abdominal aorta with run-off showing right groin AVF with multiple outpouchings raising concern for pseudoaneurysms.   - Vascular surgery following,  - S/P removal of pseudoanemurismal portion of graft on 3/26/24  - Per vascular can use thigh graft for HD, avoiding surgical site  - Clots drawn prior to HD, expected post HD, will monitor. Continual clotting might require further intervention    will closely follow up.   poc d/w pt  labs, chart reviewed  For any question, pl call:  Nephrology  Cell -[phone]  Office [phone]  Ans Serv [phone]

## 2024-03-30 NOTE — CHART NOTE - NSCHARTNOTEFT_GEN_A_CORE
Provider called HD suite at #[phone] and requested that patient be placed on first shift for next HD session 4/1 for possible discharge to [facility] 4/1.
K 6.0 this morning. Discussed with nephrology; will treat with loklema, insulin and dextrose.  Discussed above with vascular to determine when AVF can be used (and if unable to be used that patient will need shiley placement in interim for HD). Awaiting vascular input.
Post Operative Check - Vascular Team    Procedure: Right thigh arteriovenous graft revision, Interposition graft with PTFE and Partial excision of arteriovenous graft with pseudoaneurysm    Subjective: Patient seen and examined bedside. Denies nausea/vomit, fever/chills, sob/chest pain. Feels tired from surgery, however reports feeling overall well.     MEDICATIONS  (STANDING):  acetaminophen   IVPB .. 1000 milliGRAM(s) IV Intermittent once  chlorhexidine 2% Cloths 1 Application(s) Topical <User Schedule>  dextrose 5%. 1000 milliLiter(s) (50 mL/Hr) IV Continuous <Continuous>  dextrose 5%. 1000 milliLiter(s) (100 mL/Hr) IV Continuous <Continuous>  dextrose 50% Injectable 25 Gram(s) IV Push once  dextrose 50% Injectable 12.5 Gram(s) IV Push once  dextrose 50% Injectable 25 Gram(s) IV Push once  glucagon  Injectable 1 milliGRAM(s) IntraMuscular once  insulin lispro (ADMELOG) corrective regimen sliding scale   SubCutaneous three times a day before meals  insulin lispro (ADMELOG) corrective regimen sliding scale   SubCutaneous at bedtime  sevelamer carbonate 2400 milliGRAM(s) Oral three times a day with meals  simvastatin 40 milliGRAM(s) Oral at bedtime    MEDICATIONS  (PRN):  dextrose Oral Gel 15 Gram(s) Oral once PRN Blood Glucose LESS THAN 70 milliGRAM(s)/deciliter  HYDROmorphone  Injectable 0.5 milliGRAM(s) IV Push every 10 minutes PRN Moderate Pain (4 - 6)  HYDROmorphone  Injectable 1 milliGRAM(s) IV Push every 10 minutes PRN Severe Pain (7 - 10)  ondansetron Injectable 4 milliGRAM(s) IV Push once PRN Nausea and/or Vomiting    acetaminophen   IVPB .. 1000 milliGRAM(s) IV Intermittent once  chlorhexidine 2% Cloths 1 Application(s) Topical <User Schedule>  dextrose 5%. 1000 milliLiter(s) IV Continuous <Continuous>  dextrose 5%. 1000 milliLiter(s) IV Continuous <Continuous>  dextrose 50% Injectable 25 Gram(s) IV Push once  dextrose 50% Injectable 12.5 Gram(s) IV Push once  dextrose 50% Injectable 25 Gram(s) IV Push once  dextrose Oral Gel 15 Gram(s) Oral once PRN  glucagon  Injectable 1 milliGRAM(s) IntraMuscular once  HYDROmorphone  Injectable 0.5 milliGRAM(s) IV Push every 10 minutes PRN  HYDROmorphone  Injectable 1 milliGRAM(s) IV Push every 10 minutes PRN  insulin lispro (ADMELOG) corrective regimen sliding scale   SubCutaneous three times a day before meals  insulin lispro (ADMELOG) corrective regimen sliding scale   SubCutaneous at bedtime  ondansetron Injectable 4 milliGRAM(s) IV Push once PRN  sevelamer carbonate 2400 milliGRAM(s) Oral three times a day with meals  simvastatin 40 milliGRAM(s) Oral at bedtime    Allergies    No Known Allergies    Intolerances      Vital Signs Last 24 Hrs  T(C): 36.6 (26 Mar 2024 20:39), Max: 36.9 (26 Mar 2024 09:00)  T(F): 97.8 (26 Mar 2024 20:39), Max: 98.5 (26 Mar 2024 09:00)  HR: 82 (26 Mar 2024 20:39) (72 - 82)  BP: 113/58 (26 Mar 2024 20:39) (103/41 - 171/72)  BP(mean): 73 (26 Mar 2024 17:15) (68 - 92)  RR: 16 (26 Mar 2024 20:39) (12 - 18)  SpO2: 98% (26 Mar 2024 20:39) (97% - 100%)    Parameters below as of 26 Mar 2024 20:39  Patient On (Oxygen Delivery Method): room air      I&O's Summary    25 Mar 2024 07:01  -  26 Mar 2024 07:00  --------------------------------------------------------  IN: 400 mL / OUT: 1400 mL / NET: -1000 mL        Physical Exam:  General: NAD, resting comfortably  Neuro: A/O x 3, no focal deficits, sensation normal  Pulmonary: normal resp effort  Cardiovascular: NSR  Abdominal: soft, NT/ND  Extremities: RLE BKA, LLE BKA. RLE with 1 proximal, 1 middle (moderate saturated but dry) and 1 distal dressing in the right upper thigh.   Pulses:   Right:                                                                  FEM [x]2+ [ ]1+ [ ]doppler  POP [x]2+ [ ]1+ [ ]doppler    Lines/drains/tubes:    LABS:                        9.3    5.65  )-----------( 152      ( 26 Mar 2024 04:42 )             29.0     03-26    140  |  98  |  42<H>  ----------------------------<  81  5.1   |  26  |  6.01<H>    Ca    9.5      26 Mar 2024 04:42  Phos  6.3     03-26  Mg     2.30     03-26      PT/INR - ( 26 Mar 2024 04:42 )   PT: 11.1 sec;   INR: 0.99 ratio           Urinalysis Basic - ( 26 Mar 2024 04:42 )    Color: x / Appearance: x / SG: x / pH: x  Gluc: 81 mg/dL / Ketone: x  / Bili: x / Urobili: x   Blood: x / Protein: x / Nitrite: x   Leuk Esterase: x / RBC: x / WBC x   Sq Epi: x / Non Sq Epi: x / Bacteria: x        CAPILLARY BLOOD GLUCOSE      POCT Blood Glucose.: 156 mg/dL (26 Mar 2024 22:02)  POCT Blood Glucose.: 129 mg/dL (26 Mar 2024 17:51)  POCT Blood Glucose.: 129 mg/dL (26 Mar 2024 16:25)  POCT Blood Glucose.: 89 mg/dL (26 Mar 2024 11:39)  POCT Blood Glucose.: 79 mg/dL (26 Mar 2024 08:38)  POCT Blood Glucose.: 86 mg/dL (26 Mar 2024 04:40)    Assessment:  63 year old female with PMH HTN, PAD, ESRD on HD through right groin fistula done at OSH, DMT2, CAD s/p CABG  (w/ left saphenous vein graft) in 2023, B/L NIRMAL presents to ED from dialysis center after her HD session finished, after her fistula continued to bleed after HD today. Initially hypotensive but improved after 1u prbc, then another unit given after CTA aorta w/ runoff. Vascular surgery consulted for AVG bleeding and possible revision during this admission. Patient now s/p Right thigh arteriovenous graft revision, Interposition graft with PTFE and Partial excision of arteriovenous graft with pseudoaneurysm. Patient recovering appropriately, with adequate mentation, and vascular exam on RLE. Patient to continue care on medical floor.      Plan:  - Diet  - Pain management optimization  - f/u AM labs  - Monitor dressing, do not remove proximal and distal dressings  - Middle dressing changes daily  - Please call with any acute changes in HDS status or recurrent bleeding    Vascular (C-Team) Surgery   [id number]
Spoke with Vascular surg team B about restarting Plavix per cards request. Per vascular, OK to resume Plavix. Plavix ordered. Will continue to monitor pt closely

## 2024-03-30 NOTE — CHART NOTE - NSCHARTNOTESELECT_GEN_ALL_CORE
ACP/Event Note
ACP/Event Note
Postop check
Event Note
Bexarotene Counseling:  I discussed with the patient the risks of bexarotene including but not limited to hair loss, dry lips/skin/eyes, liver abnormalities, hyperlipidemia, pancreatitis, depression/suicidal ideation, photosensitivity, drug rash/allergic reactions, hypothyroidism, anemia, leukopenia, infection, cataracts, and teratogenicity.  Patient understands that they will need regular blood tests to check lipid profile, liver function tests, white blood cell count, thyroid function tests and pregnancy test if applicable.

## 2024-03-30 NOTE — PROGRESS NOTE ADULT - SUBJECTIVE AND OBJECTIVE BOX
[facility] - S Jluis / Shabbir S /D Romina/ S Denys/ S Chano/ Andrés Perkins / SUNI Lubinu/ O Gladis  service -[phone], office [phone]  ---------------------------------------------------------------------------------------------------------------    Patient seen and examined bedside    Subjective and Objective: No overnight events, sob resolved. No complaints today. feeling better    Allergies: No Known Allergies      Hospital Medications:   MEDICATIONS  (STANDING):  chlorhexidine 2% Cloths 1 Application(s) Topical <User Schedule>  clopidogrel Tablet 75 milliGRAM(s) Oral daily  dextrose 5%. 1000 milliLiter(s) (50 mL/Hr) IV Continuous <Continuous>  dextrose 5%. 1000 milliLiter(s) (100 mL/Hr) IV Continuous <Continuous>  dextrose 50% Injectable 12.5 Gram(s) IV Push once  dextrose 50% Injectable 25 Gram(s) IV Push once  dextrose 50% Injectable 25 Gram(s) IV Push once  glucagon  Injectable 1 milliGRAM(s) IntraMuscular once  insulin lispro (ADMELOG) corrective regimen sliding scale   SubCutaneous three times a day before meals  insulin lispro (ADMELOG) corrective regimen sliding scale   SubCutaneous at bedtime  sevelamer carbonate 2400 milliGRAM(s) Oral three times a day with meals  simvastatin 40 milliGRAM(s) Oral at bedtime      REVIEW OF SYSTEMS:  CONSTITUTIONAL: No weakness, fevers or chills  EYES/ENT: No visual changes;  No vertigo or throat pain   NECK: No pain or stiffness  RESPIRATORY: No cough, wheezing, hemoptysis; No shortness of breath  CARDIOVASCULAR: No chest pain or palpitations.  GASTROINTESTINAL: No abdominal or epigastric pain. No nausea, vomiting, or hematemesis; No diarrhea or constipation. No melena or hematochezia.  GENITOURINARY: No dysuria, frequency, foamy urine, urinary urgency, incontinence or hematuria  NEUROLOGICAL: No numbness or weakness  SKIN: No itching, burning, rashes, or lesions   VASCULAR: No bilateral lower extremity edema.   All other review of systems is negative unless indicated above.    VITALS:  T(F): 98.5 (03-30-24 @ 13:54), Max: 98.6 (03-30-24 @ 05:47)  HR: 75 (03-30-24 @ 13:54)  BP: 120/48 (03-30-24 @ 13:54)  RR: 18 (03-30-24 @ 13:54)  SpO2: 98% (03-30-24 @ 13:54)  Wt(kg): --    03-29 @ 07:01  -  03-30 @ 07:00  --------------------------------------------------------  IN: 500 mL / OUT: 1500 mL / NET: -1000 mL          PHYSICAL EXAM:  Constitutional: NAD  HEENT: anicteric sclera, oropharynx clear  Neck: No JVD  Respiratory: CTAB, no wheezes, rales or rhonchi  Cardiovascular: S1, S2, RRR  Gastrointestinal: BS+, soft, NT/ND  Extremities: No cyanosis or clubbing. No peripheral edema  Neurological: A/O x 3, no focal deficits  Psychiatric: Normal mood, normal affect  : No CVA tenderness. No salcido.   Skin: No rashes  Vascular Access:    LABS:  03-30    140  |  99  |  26<H>  ----------------------------<  96  4.1   |  29  |  5.14<H>    Ca    9.2      30 Mar 2024 03:30  Phos  4.4     03-30  Mg     2.30     03-30      Creatinine Trend: 5.14 <--, 7.08 <--, 5.13 <--, 7.93 <--, 7.73 <--, 6.01 <--, 9.46 <--                        8.6    5.30  )-----------( 151      ( 30 Mar 2024 03:30 )             27.5     Urine Studies:  Urinalysis Basic - ( 30 Mar 2024 03:30 )    Color:  / Appearance:  / SG:  / pH:   Gluc: 96 mg/dL / Ketone:   / Bili:  / Urobili:    Blood:  / Protein:  / Nitrite:    Leuk Esterase:  / RBC:  / WBC    Sq Epi:  / Non Sq Epi:  / Bacteria:           RADIOLOGY & ADDITIONAL STUDIES:   [facility] - S Jluis / Shabbir S /D Romina/ S Denys/ S Chano/ Andrés Perkins / SUNI Potts/ O Gladis  service -[phone], office [phone]  ---------------------------------------------------------------------------------------------------------------    Patient seen and examined bedside    Subjective and Objective: No overnight events, denied sob. No complaints today. feeling better    Allergies: No Known Allergies      Hospital Medications:   MEDICATIONS  (STANDING):  chlorhexidine 2% Cloths 1 Application(s) Topical <User Schedule>  clopidogrel Tablet 75 milliGRAM(s) Oral daily  dextrose 5%. 1000 milliLiter(s) (50 mL/Hr) IV Continuous <Continuous>  dextrose 5%. 1000 milliLiter(s) (100 mL/Hr) IV Continuous <Continuous>  dextrose 50% Injectable 12.5 Gram(s) IV Push once  dextrose 50% Injectable 25 Gram(s) IV Push once  dextrose 50% Injectable 25 Gram(s) IV Push once  glucagon  Injectable 1 milliGRAM(s) IntraMuscular once  insulin lispro (ADMELOG) corrective regimen sliding scale   SubCutaneous three times a day before meals  insulin lispro (ADMELOG) corrective regimen sliding scale   SubCutaneous at bedtime  sevelamer carbonate 2400 milliGRAM(s) Oral three times a day with meals  simvastatin 40 milliGRAM(s) Oral at bedtime    VITALS:  T(F): 98.5 (03-30-24 @ 13:54), Max: 98.6 (03-30-24 @ 05:47)  HR: 75 (03-30-24 @ 13:54)  BP: 120/48 (03-30-24 @ 13:54)  RR: 18 (03-30-24 @ 13:54)  SpO2: 98% (03-30-24 @ 13:54)  Wt(kg): --    03-29 @ 07:01  -  03-30 @ 07:00  --------------------------------------------------------  IN: 500 mL / OUT: 1500 mL / NET: -1000 mL      PHYSICAL EXAM:  Constitutional: NAD  HEENT: anicteric sclera  Neck: No JVD  Respiratory: CTAB, no wheezes, rales or rhonchi  Cardiovascular: S1, S2, RRR  Gastrointestinal: BS+, soft, NT  Extremities: b/l BKAs  Neurological: A/O x 3  Psychiatric: Normal mood, normal affect  : No salcido.   Vascular Access: Rt femoral avg+thrill    LABS:  03-30    140  |  99  |  26<H>  ----------------------------<  96  4.1   |  29  |  5.14<H>    Ca    9.2      30 Mar 2024 03:30  Phos  4.4     03-30  Mg     2.30     03-30      Creatinine Trend: 5.14 <--, 7.08 <--, 5.13 <--, 7.93 <--, 7.73 <--, 6.01 <--, 9.46 <--                        8.6    5.30  )-----------( 151      ( 30 Mar 2024 03:30 )             27.5     Urine Studies:  Urinalysis Basic - ( 30 Mar 2024 03:30 )    Color:  / Appearance:  / SG:  / pH:   Gluc: 96 mg/dL / Ketone:   / Bili:  / Urobili:    Blood:  / Protein:  / Nitrite:    Leuk Esterase:  / RBC:  / WBC    Sq Epi:  / Non Sq Epi:  / Bacteria:           RADIOLOGY & ADDITIONAL STUDIES:

## 2024-03-30 NOTE — PROGRESS NOTE ADULT - SUBJECTIVE AND OBJECTIVE BOX
General Surgery Progress Note    Subjective: Patient seen and examined this pm on rounds. Pain controlled and patient denies subjective fever/chills.     Objective:  Vitals:  T(C): 36.9 (03-30-24 @ 13:54), Max: 37 (03-30-24 @ 05:47)  HR: 75 (03-30-24 @ 13:54) (70 - 77)  BP: 120/48 (03-30-24 @ 13:54) (103/45 - 120/48)  RR: 18 (03-30-24 @ 13:54) (17 - 18)  SpO2: 98% (03-30-24 @ 13:54) (98% - 100%)  Wt(kg): --    03-29 @ 07:01  -  03-30 @ 07:00  --------------------------------------------------------  IN:    Other (mL): 500 mL  Total IN: 500 mL    OUT:    Other (mL): 1500 mL  Total OUT: 1500 mL    Total NET: -1000 mL          Physical Exam:  General: NAD, resting comfortably  Neuro: A/O x 3, no focal deficits, sensation normal  Pulmonary: normal resp effort  Cardiovascular: NSR  Abdominal: soft, NT/ND  Extremities: RLE BKA, LLE BKA. RLE with 1 proximal, 1 middle and 1 distal dressing in the right upper thigh.   Pulses:   Right:                                                                  FEM [x]2+ [ ]1+ [ ]doppler  POP [x]2+ [ ]1+ [ ]doppler  Palpable thrill      Labs:                        8.6    5.30  )-----------( 151      ( 30 Mar 2024 03:30 )             27.5     03-30    140  |  99  |  26<H>  ----------------------------<  96  4.1   |  29  |  5.14<H>    Ca    9.2      30 Mar 2024 03:30  Phos  4.4     03-30  Mg     2.30     03-30          Urinalysis Basic - ( 30 Mar 2024 03:30 )    Color: x / Appearance: x / SG: x / pH: x  Gluc: 96 mg/dL / Ketone: x  / Bili: x / Urobili: x   Blood: x / Protein: x / Nitrite: x   Leuk Esterase: x / RBC: x / WBC x   Sq Epi: x / Non Sq Epi: x / Bacteria: x

## 2024-03-31 LAB
ANION GAP SERPL CALC-SCNC: 12 MMOL/L — SIGNIFICANT CHANGE UP (ref 7–14)
BUN SERPL-MCNC: 36 MG/DL — HIGH (ref 7–23)
CALCIUM SERPL-MCNC: 9.3 MG/DL — SIGNIFICANT CHANGE UP (ref 8.4–10.5)
CHLORIDE SERPL-SCNC: 95 MMOL/L — LOW (ref 98–107)
CO2 SERPL-SCNC: 28 MMOL/L — SIGNIFICANT CHANGE UP (ref 22–31)
CREAT SERPL-MCNC: 6.7 MG/DL — HIGH (ref 0.5–1.3)
CULTURE RESULTS: SIGNIFICANT CHANGE UP
EGFR: 6 ML/MIN/1.73M2 — LOW
GLUCOSE BLDC GLUCOMTR-MCNC: 100 MG/DL — HIGH (ref 70–99)
GLUCOSE BLDC GLUCOMTR-MCNC: 102 MG/DL — HIGH (ref 70–99)
GLUCOSE BLDC GLUCOMTR-MCNC: 120 MG/DL — HIGH (ref 70–99)
GLUCOSE BLDC GLUCOMTR-MCNC: 94 MG/DL — SIGNIFICANT CHANGE UP (ref 70–99)
GLUCOSE SERPL-MCNC: 85 MG/DL — SIGNIFICANT CHANGE UP (ref 70–99)
HCT VFR BLD CALC: 26.5 % — LOW (ref 34.5–45)
HGB BLD-MCNC: 8.5 G/DL — LOW (ref 11.5–15.5)
MAGNESIUM SERPL-MCNC: 2.4 MG/DL — SIGNIFICANT CHANGE UP (ref 1.6–2.6)
MCHC RBC-ENTMCNC: 27.7 PG — SIGNIFICANT CHANGE UP (ref 27–34)
MCHC RBC-ENTMCNC: 32.1 GM/DL — SIGNIFICANT CHANGE UP (ref 32–36)
MCV RBC AUTO: 86.3 FL — SIGNIFICANT CHANGE UP (ref 80–100)
NRBC # BLD: 0 /100 WBCS — SIGNIFICANT CHANGE UP (ref 0–0)
NRBC # FLD: 0 K/UL — SIGNIFICANT CHANGE UP (ref 0–0)
PHOSPHATE SERPL-MCNC: 4.7 MG/DL — HIGH (ref 2.5–4.5)
PLATELET # BLD AUTO: 161 K/UL — SIGNIFICANT CHANGE UP (ref 150–400)
POTASSIUM SERPL-MCNC: 4.3 MMOL/L — SIGNIFICANT CHANGE UP (ref 3.5–5.3)
POTASSIUM SERPL-SCNC: 4.3 MMOL/L — SIGNIFICANT CHANGE UP (ref 3.5–5.3)
RBC # BLD: 3.07 M/UL — LOW (ref 3.8–5.2)
RBC # FLD: 14.4 % — SIGNIFICANT CHANGE UP (ref 10.3–14.5)
SODIUM SERPL-SCNC: 135 MMOL/L — SIGNIFICANT CHANGE UP (ref 135–145)
SPECIMEN SOURCE: SIGNIFICANT CHANGE UP
WBC # BLD: 5.37 K/UL — SIGNIFICANT CHANGE UP (ref 3.8–10.5)
WBC # FLD AUTO: 5.37 K/UL — SIGNIFICANT CHANGE UP (ref 3.8–10.5)

## 2024-03-31 RX ORDER — ACETAMINOPHEN 500 MG
1000 TABLET ORAL ONCE
Refills: 0 | Status: COMPLETED | OUTPATIENT
Start: 2024-03-31 | End: 2024-03-31

## 2024-03-31 RX ADMIN — SEVELAMER CARBONATE 2400 MILLIGRAM(S): 2400 POWDER, FOR SUSPENSION ORAL at 11:57

## 2024-03-31 RX ADMIN — SEVELAMER CARBONATE 2400 MILLIGRAM(S): 2400 POWDER, FOR SUSPENSION ORAL at 18:04

## 2024-03-31 RX ADMIN — SEVELAMER CARBONATE 2400 MILLIGRAM(S): 2400 POWDER, FOR SUSPENSION ORAL at 09:08

## 2024-03-31 RX ADMIN — CLOPIDOGREL BISULFATE 75 MILLIGRAM(S): 75 TABLET, FILM COATED ORAL at 11:57

## 2024-03-31 RX ADMIN — CHLORHEXIDINE GLUCONATE 1 APPLICATION(S): 213 SOLUTION TOPICAL at 05:36

## 2024-03-31 RX ADMIN — SIMVASTATIN 40 MILLIGRAM(S): 20 TABLET, FILM COATED ORAL at 20:49

## 2024-03-31 NOTE — PROGRESS NOTE ADULT - SUBJECTIVE AND OBJECTIVE BOX
Date of Service  : 03-31-24     INTERVAL HPI/OVERNIGHT EVENTS: I feel fine.   Vital Signs Last 24 Hrs  T(C): 36.9 (31 Mar 2024 14:40), Max: 36.9 (31 Mar 2024 10:30)  T(F): 98.5 (31 Mar 2024 14:40), Max: 98.5 (31 Mar 2024 14:40)  HR: 76 (31 Mar 2024 14:40) (71 - 79)  BP: 113/68 (31 Mar 2024 14:40) (110/46 - 126/51)  BP(mean): --  RR: 18 (31 Mar 2024 14:40) (17 - 18)  SpO2: 97% (31 Mar 2024 14:40) (97% - 100%)    Parameters below as of 31 Mar 2024 14:40  Patient On (Oxygen Delivery Method): room air      I&O's Summary    MEDICATIONS  (STANDING):  chlorhexidine 2% Cloths 1 Application(s) Topical <User Schedule>  clopidogrel Tablet 75 milliGRAM(s) Oral daily  dextrose 5%. 1000 milliLiter(s) (50 mL/Hr) IV Continuous <Continuous>  dextrose 5%. 1000 milliLiter(s) (100 mL/Hr) IV Continuous <Continuous>  dextrose 50% Injectable 12.5 Gram(s) IV Push once  dextrose 50% Injectable 25 Gram(s) IV Push once  dextrose 50% Injectable 25 Gram(s) IV Push once  epoetin niesha (EPOGEN) Injectable 40456 Unit(s) IV Push <User Schedule>  glucagon  Injectable 1 milliGRAM(s) IntraMuscular once  insulin lispro (ADMELOG) corrective regimen sliding scale   SubCutaneous three times a day before meals  insulin lispro (ADMELOG) corrective regimen sliding scale   SubCutaneous at bedtime  sevelamer carbonate 2400 milliGRAM(s) Oral three times a day with meals  simvastatin 40 milliGRAM(s) Oral at bedtime    MEDICATIONS  (PRN):  dextrose Oral Gel 15 Gram(s) Oral once PRN Blood Glucose LESS THAN 70 milliGRAM(s)/deciliter    LABS:                        8.5    5.37  )-----------( 161      ( 31 Mar 2024 05:32 )             26.5     03-31    135  |  95<L>  |  36<H>  ----------------------------<  85  4.3   |  28  |  6.70<H>    Ca    9.3      31 Mar 2024 05:32  Phos  4.7     03-31  Mg     2.40     03-31        Urinalysis Basic - ( 31 Mar 2024 05:32 )    Color: x / Appearance: x / SG: x / pH: x  Gluc: 85 mg/dL / Ketone: x  / Bili: x / Urobili: x   Blood: x / Protein: x / Nitrite: x   Leuk Esterase: x / RBC: x / WBC x   Sq Epi: x / Non Sq Epi: x / Bacteria: x      CAPILLARY BLOOD GLUCOSE      POCT Blood Glucose.: 100 mg/dL (31 Mar 2024 17:50)  POCT Blood Glucose.: 102 mg/dL (31 Mar 2024 12:19)  POCT Blood Glucose.: 94 mg/dL (31 Mar 2024 08:20)        Urinalysis Basic - ( 31 Mar 2024 05:32 )    Color: x / Appearance: x / SG: x / pH: x  Gluc: 85 mg/dL / Ketone: x  / Bili: x / Urobili: x   Blood: x / Protein: x / Nitrite: x   Leuk Esterase: x / RBC: x / WBC x   Sq Epi: x / Non Sq Epi: x / Bacteria: x      REVIEW OF SYSTEMS:  CONSTITUTIONAL: No fever, weight loss, or fatigue  EYES: No eye pain, visual disturbances, or discharge  ENMT:  No difficulty hearing, tinnitus, vertigo; No sinus or throat pain  NECK: No pain or stiffness  RESPIRATORY: No cough, wheezing, chills or hemoptysis; No shortness of breath  CARDIOVASCULAR: No chest pain, palpitations, dizziness, or leg swelling  GASTROINTESTINAL: No abdominal or epigastric pain. No nausea, vomiting, or hematemesis; No diarrhea or constipation. No melena or hematochezia.  GENITOURINARY: No dysuria, frequency, hematuria, or incontinence  NEUROLOGICAL: No headaches, memory loss, loss of strength, numbness, or tremors    Consultant(s) Notes Reviewed:  [x ] YES  [ ] NO    PHYSICAL EXAM:  GENERAL: NAD, well-groomed, well-developed,not in any distress ,  HEAD:  Atraumatic, Normocephalic  EYES: EOMI, PERRLA, conjunctiva and sclera clear  ENMT: No tonsillar erythema, exudates, or enlargement; Moist mucous membranes, Good dentition, No lesions  NECK: Supple, No JVD, Normal thyroid  NERVOUS SYSTEM:  Alert & Oriented X3, No focal deficit   CHEST/LUNG: Good air entry bilateral with no  rales, rhonchi, wheezing, or rubs  HEART: Regular rate and rhythm; No murmurs, rubs, or gallops  ABDOMEN: Soft, Nontender, Nondistended; Bowel sounds present  EXTREMITIES:  BKA BL    Care Discussed with Consultants/Other Providers [ x] YES  [ ] NO

## 2024-03-31 NOTE — PROGRESS NOTE ADULT - SUBJECTIVE AND OBJECTIVE BOX
Cardiovascular Disease Progress Note  Date of Service: 03-31-24 @ 08:43    Overnight events: No acute events overnight.    The patient denies chest pain or SOB.   Otherwise review of systems negative    Objective Findings:  T(C): 36.7 (03-31-24 @ 05:31), Max: 36.9 (03-30-24 @ 13:54)  HR: 71 (03-31-24 @ 05:31) (70 - 79)  BP: 114/48 (03-31-24 @ 05:31) (103/45 - 120/48)  RR: 18 (03-31-24 @ 05:31) (17 - 18)  SpO2: 100% (03-31-24 @ 05:31) (98% - 100%)  Wt(kg): --  Daily     Daily       Physical Exam:  Gen: NAD; Patient resting comfortably  HEENT: EOMI, Normocephalic/ atraumatic  CV: RRR, normal S1 + S2, no m/r/g  Lungs:  Normal respiratory effort; clear to auscultation bilaterally  Abd: soft, non-tender; bowel sounds present  Ext: No edema; warm and well perfused    Telemetry: n/a    Laboratory Data:                        8.5    5.37  )-----------( 161      ( 31 Mar 2024 05:32 )             26.5     03-31    135  |  95<L>  |  36<H>  ----------------------------<  85  4.3   |  28  |  6.70<H>    Ca    9.3      31 Mar 2024 05:32  Phos  4.7     03-31  Mg     2.40     03-31                Inpatient Medications:  MEDICATIONS  (STANDING):  chlorhexidine 2% Cloths 1 Application(s) Topical <User Schedule>  clopidogrel Tablet 75 milliGRAM(s) Oral daily  dextrose 5%. 1000 milliLiter(s) (50 mL/Hr) IV Continuous <Continuous>  dextrose 5%. 1000 milliLiter(s) (100 mL/Hr) IV Continuous <Continuous>  dextrose 50% Injectable 12.5 Gram(s) IV Push once  dextrose 50% Injectable 25 Gram(s) IV Push once  dextrose 50% Injectable 25 Gram(s) IV Push once  epoetin niesha (EPOGEN) Injectable 46196 Unit(s) IV Push <User Schedule>  glucagon  Injectable 1 milliGRAM(s) IntraMuscular once  insulin lispro (ADMELOG) corrective regimen sliding scale   SubCutaneous at bedtime  insulin lispro (ADMELOG) corrective regimen sliding scale   SubCutaneous three times a day before meals  sevelamer carbonate 2400 milliGRAM(s) Oral three times a day with meals  simvastatin 40 milliGRAM(s) Oral at bedtime      Assessment: 63 year old woman with ESRD on HD, PADs/p right BKA (6/2022) and 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     Plan of Care:    #CAD  - S/p CABG 7/2023  No signs of active ischemia.  Continue Plavix and statin.        #ESRD  - HD as per renal team    #HLD  - Statin therapy      #HTN-  BP acceptable.      #ACP (advance care planning)-  Advanced care planning was discussed with the patient.  Advance care planning forms were discussed. Risks, benefits and alternatives of medical treatment and procedures were discussed in detail and all questions were answered. 30 additional minutes spent addressing advance care plans.      Over 55 minutes spent on total encounter; more than 50% of the visit was spent counseling and/or coordinating care by the attending physician.      Cristino Adams MD [facility]  Cardiovascular Disease  [phone]

## 2024-04-01 ENCOUNTER — TRANSCRIPTION ENCOUNTER (OUTPATIENT)
Age: 64
End: 2024-04-01

## 2024-04-01 VITALS — DIASTOLIC BLOOD PRESSURE: 56 MMHG | SYSTOLIC BLOOD PRESSURE: 118 MMHG

## 2024-04-01 LAB
ANION GAP SERPL CALC-SCNC: 15 MMOL/L — HIGH (ref 7–14)
BUN SERPL-MCNC: 50 MG/DL — HIGH (ref 7–23)
CALCIUM SERPL-MCNC: 9.4 MG/DL — SIGNIFICANT CHANGE UP (ref 8.4–10.5)
CHLORIDE SERPL-SCNC: 95 MMOL/L — LOW (ref 98–107)
CO2 SERPL-SCNC: 25 MMOL/L — SIGNIFICANT CHANGE UP (ref 22–31)
CREAT SERPL-MCNC: 8.67 MG/DL — HIGH (ref 0.5–1.3)
CULTURE RESULTS: SIGNIFICANT CHANGE UP
EGFR: 5 ML/MIN/1.73M2 — LOW
GLUCOSE BLDC GLUCOMTR-MCNC: 80 MG/DL — SIGNIFICANT CHANGE UP (ref 70–99)
GLUCOSE BLDC GLUCOMTR-MCNC: 81 MG/DL — SIGNIFICANT CHANGE UP (ref 70–99)
GLUCOSE BLDC GLUCOMTR-MCNC: 82 MG/DL — SIGNIFICANT CHANGE UP (ref 70–99)
GLUCOSE BLDC GLUCOMTR-MCNC: 92 MG/DL — SIGNIFICANT CHANGE UP (ref 70–99)
GLUCOSE SERPL-MCNC: 82 MG/DL — SIGNIFICANT CHANGE UP (ref 70–99)
HCT VFR BLD CALC: 24.9 % — LOW (ref 34.5–45)
HGB BLD-MCNC: 8.1 G/DL — LOW (ref 11.5–15.5)
MAGNESIUM SERPL-MCNC: 2.5 MG/DL — SIGNIFICANT CHANGE UP (ref 1.6–2.6)
MCHC RBC-ENTMCNC: 27.9 PG — SIGNIFICANT CHANGE UP (ref 27–34)
MCHC RBC-ENTMCNC: 32.5 GM/DL — SIGNIFICANT CHANGE UP (ref 32–36)
MCV RBC AUTO: 85.9 FL — SIGNIFICANT CHANGE UP (ref 80–100)
NRBC # BLD: 0 /100 WBCS — SIGNIFICANT CHANGE UP (ref 0–0)
NRBC # FLD: 0 K/UL — SIGNIFICANT CHANGE UP (ref 0–0)
PHOSPHATE SERPL-MCNC: 4.9 MG/DL — HIGH (ref 2.5–4.5)
PLATELET # BLD AUTO: 166 K/UL — SIGNIFICANT CHANGE UP (ref 150–400)
POTASSIUM SERPL-MCNC: 4.6 MMOL/L — SIGNIFICANT CHANGE UP (ref 3.5–5.3)
POTASSIUM SERPL-SCNC: 4.6 MMOL/L — SIGNIFICANT CHANGE UP (ref 3.5–5.3)
RBC # BLD: 2.9 M/UL — LOW (ref 3.8–5.2)
RBC # FLD: 14.3 % — SIGNIFICANT CHANGE UP (ref 10.3–14.5)
SARS-COV-2 RNA SPEC QL NAA+PROBE: SIGNIFICANT CHANGE UP
SODIUM SERPL-SCNC: 135 MMOL/L — SIGNIFICANT CHANGE UP (ref 135–145)
SPECIMEN SOURCE: SIGNIFICANT CHANGE UP
WBC # BLD: 4.81 K/UL — SIGNIFICANT CHANGE UP (ref 3.8–10.5)
WBC # FLD AUTO: 4.81 K/UL — SIGNIFICANT CHANGE UP (ref 3.8–10.5)

## 2024-04-01 RX ORDER — CLOPIDOGREL BISULFATE 75 MG/1
1 TABLET, FILM COATED ORAL
Refills: 0 | DISCHARGE

## 2024-04-01 RX ORDER — METOPROLOL TARTRATE 50 MG
1 TABLET ORAL
Refills: 0 | DISCHARGE

## 2024-04-01 RX ORDER — FERRIC CITRATE 210 MG/1
420 TABLET, COATED ORAL
Refills: 0 | DISCHARGE

## 2024-04-01 RX ORDER — SEVELAMER CARBONATE 2400 MG/1
3 POWDER, FOR SUSPENSION ORAL
Qty: 0 | Refills: 0 | DISCHARGE
Start: 2024-04-01

## 2024-04-01 RX ORDER — CLOPIDOGREL BISULFATE 75 MG/1
1 TABLET, FILM COATED ORAL
Qty: 0 | Refills: 0 | DISCHARGE
Start: 2024-04-01

## 2024-04-01 RX ADMIN — ERYTHROPOIETIN 10000 UNIT(S): 10000 INJECTION, SOLUTION INTRAVENOUS; SUBCUTANEOUS at 09:22

## 2024-04-01 RX ADMIN — CLOPIDOGREL BISULFATE 75 MILLIGRAM(S): 75 TABLET, FILM COATED ORAL at 13:08

## 2024-04-01 RX ADMIN — SEVELAMER CARBONATE 2400 MILLIGRAM(S): 2400 POWDER, FOR SUSPENSION ORAL at 13:07

## 2024-04-01 RX ADMIN — CHLORHEXIDINE GLUCONATE 1 APPLICATION(S): 213 SOLUTION TOPICAL at 05:21

## 2024-04-01 NOTE — PROGRESS NOTE ADULT - SUBJECTIVE AND OBJECTIVE BOX
Cardiovascular Disease Progress Note  Date of Service: 24 @ 09:35    Overnight events: No acute events overnight.   The patient is undergoing HD in no distress.    Otherwise review of systems negative    Objective Findings:  T(C): 36.3 (24 @ 06:50), Max: 37.1 (24 @ 21:28)  HR: 68 (24 @ 06:50) (68 - 76)  BP: 109/48 (24 @ 06:50) (109/48 - 126/51)  RR: 16 (24 @ 06:50) (16 - 18)  SpO2: 96% (24 @ 05:33) (95% - 99%)  Wt(kg): --  Daily     Daily Weight in k.9 (2024 06:50)      Physical Exam:  Gen: NAD; Patient resting comfortably  HEENT: EOMI, Normocephalic/ atraumatic  CV: RRR, normal S1 + S2, no m/r/g  Lungs:  Normal respiratory effort; clear to auscultation bilaterally  Abd: soft, non-tender; bowel sounds present  Ext: No edema; warm and well perfused    Telemetry: n/a    Laboratory Data:                        8.1    4.81  )-----------( 166      ( 2024 06:50 )             24.9         135  |  95<L>  |  50<H>  ----------------------------<  82  4.6   |  25  |  8.67<H>    Ca    9.4      2024 06:50  Phos  4.9     04  Mg     2.50     04                Inpatient Medications:  MEDICATIONS  (STANDING):  chlorhexidine 2% Cloths 1 Application(s) Topical <User Schedule>  clopidogrel Tablet 75 milliGRAM(s) Oral daily  dextrose 5%. 1000 milliLiter(s) (50 mL/Hr) IV Continuous <Continuous>  dextrose 5%. 1000 milliLiter(s) (100 mL/Hr) IV Continuous <Continuous>  dextrose 50% Injectable 25 Gram(s) IV Push once  dextrose 50% Injectable 12.5 Gram(s) IV Push once  dextrose 50% Injectable 25 Gram(s) IV Push once  epoetin niesha (EPOGEN) Injectable 85659 Unit(s) IV Push <User Schedule>  glucagon  Injectable 1 milliGRAM(s) IntraMuscular once  insulin lispro (ADMELOG) corrective regimen sliding scale   SubCutaneous at bedtime  insulin lispro (ADMELOG) corrective regimen sliding scale   SubCutaneous three times a day before meals  sevelamer carbonate 2400 milliGRAM(s) Oral three times a day with meals  simvastatin 40 milliGRAM(s) Oral at bedtime      Assessment: 63 year old woman with ESRD on HD, PADs/p right BKA (2022) and 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     Plan of Care:    #CAD  - S/p CABG 2023  No signs of active ischemia.  Continue Plavix and statin.        #ESRD  - HD as per renal team    #HLD  - Statin therapy      #HTN-  BP acceptable.             Over 55 minutes spent on total encounter; more than 50% of the visit was spent counseling and/or coordinating care by the attending physician.      Cristino Adams MD [facility]  Cardiovascular Disease  [phone]

## 2024-04-01 NOTE — PROGRESS NOTE ADULT - REASON FOR ADMISSION
Bleeding from AVF site

## 2024-04-01 NOTE — DISCHARGE NOTE NURSING/CASE MANAGEMENT/SOCIAL WORK - PATIENT PORTAL LINK FT
You can access the FollowMyHealth Patient Portal offered by [facility] by registering at the following website: [email or web address]. By joining [employer]’s FollowMyHealth portal, you will also be able to view your health information using other applications (apps) compatible with our system.

## 2024-04-01 NOTE — PROGRESS NOTE ADULT - ASSESSMENT
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 Renal following for ESRD Mx.     ESRD on HD  schedule hemodialysis - Monday, Wednesday and Friday   access- thigh AVG  HD unit SQDC  Informed consent for hemodialysis obtained  from pt. Consent in chart  K, vol ok  plan:  tolerated HD well today, due for DC to [facility]  dose all meds for ESRD  renal restrictions in diet when not NPO    Anemia of CKD+s/p acute bleed- Hb <goal now. will add DION w/next hd  Hb goal 10-11. s/p sig bleed from R groin AVG S/p 2 u pRBCs  - Monitor H/H, transfuse pRBCs as needed to keep >7    HTN, controlled. s/p hypotension 2/2 acute bleed. bp now better and stable.     Right thigh hemodialysis AV graft s/p profuse bleed.   S/p 2 u pRBCs, with bleeding now resolved and pt HD stable. CTA abdominal aorta with run-off showing right groin AVF with multiple outpouchings raising concern for pseudoaneurysms.   - Vascular surgery following,  - S/P removal of pseudoanemurismal portion of graft on 3/26/24  - Per vascular can use thigh graft for HD, avoiding surgical site  - Clots drawn prior to HD, expected post HD, will monitor. Continual clotting might require further intervention      For any question, call:  Cell # [phone]  Pager # [phone]  Callback # [phone]

## 2024-04-01 NOTE — PROGRESS NOTE ADULT - ASSESSMENT
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   US-  Small pseudoaneurysm (0.8 x 0.4 cm) in the proxmal segment of the graft. second graft wall defect is noted immediately proximal to this pseudoaneurysm, on the posterior wall of the graft.    Recommendations  OR cultures all NGTD  continue off antibiotics  local wound care    We will sign off. Thank you for allowing us to participate in the care of Ms. Boogie. Please feel free to call with any questions or concerns.     Channing Cheng M.D.  [facility], Division of Infectious Diseases  [phone]  After 5pm on weekdays and all day on weekends - please call [phone]

## 2024-04-01 NOTE — PROGRESS NOTE ADULT - SUBJECTIVE AND OBJECTIVE BOX
[facility], Division of Infectious Diseases  DANIEL Montelongo Y. Patel, S. Shah, G. Prosper  [phone]  ([phone] - weekdays after 5pm and weekends)    Name: JAYCEE WALTERS  Age/Gender: 63y Female  MRN: [id number]    Interval History:  Notes reviewed.   No concerning overnight events.  Afebrile.   denies any complaints today    Allergies: No Known Allergies      Objective:  Vitals:   T(F): 97.3 (04-01-24 @ 06:50), Max: 98.8 (03-31-24 @ 21:28)  HR: 68 (04-01-24 @ 06:50) (68 - 76)  BP: 109/48 (04-01-24 @ 06:50) (109/48 - 117/68)  RR: 16 (04-01-24 @ 06:50) (16 - 18)  SpO2: 96% (04-01-24 @ 05:33) (95% - 97%)  Physical Examination:  General: no acute distress  HEENT: anicteric  Cardio: normal rate  Resp: breathing comfortably on RA   Abd: soft, NT, ND  Ext: s/p b/l LE BKAs  Skin: warm, dry    Laboratory Studies:  CBC:                       8.1    4.81  )-----------( 166      ( 01 Apr 2024 06:50 )             24.9     WBC Trend:  4.81 04-01-24 @ 06:50  5.37 03-31-24 @ 05:32  5.30 03-30-24 @ 03:30  5.50 03-29-24 @ 05:23  5.20 03-28-24 @ 05:20  5.74 03-27-24 @ 19:54  6.48 03-27-24 @ 12:01  6.12 03-27-24 @ 05:32  5.65 03-26-24 @ 04:42    CMP: 04-01    135  |  95<L>  |  50<H>  ----------------------------<  82  4.6   |  25  |  8.67<H>    Ca    9.4      01 Apr 2024 06:50  Phos  4.9     04-01  Mg     2.50     04-01            Urinalysis Basic - ( 01 Apr 2024 06:50 )    Color: x / Appearance: x / SG: x / pH: x  Gluc: 82 mg/dL / Ketone: x  / Bili: x / Urobili: x   Blood: x / Protein: x / Nitrite: x   Leuk Esterase: x / RBC: x / WBC x   Sq Epi: x / Non Sq Epi: x / Bacteria: x      Microbiology: reviewed     Culture - Fungal, Other (collected 03-26-24 @ 17:42)  Source: .Other 2. ARTERIAL GRAFT CULTURE  Preliminary Report (03-30-24 @ 15:03):    Culture is being performed. Fungal cultures are held for 4 weeks.    Culture - Surgical Swab (collected 03-26-24 @ 17:42)  Source: .Surgical Swab 2. ARTERIAL GRAFT CULTURE  Final Report (03-31-24 @ 18:42):    No growth at 5 days    Culture - Fungal, Other (collected 03-26-24 @ 17:42)  Source: .Other 1. VENOUS GRAFT CULTURE  Preliminary Report (03-30-24 @ 15:03):    Culture is being performed. Fungal cultures are held for 4 weeks.    Culture - Surgical Swab (collected 03-26-24 @ 17:42)  Source: .Surgical Swab 1. VENOUS GRAFT CULTURE  Final Report (03-31-24 @ 18:43):    No growth at 5 days    Culture - Acid Fast - Tissue w/Smear (collected 03-26-24 @ 17:42)  Source: .Tissue 4. AV FISTULA GRAFT CULTURE  Preliminary Report (03-30-24 @ 15:09):    Culture is being performed.    Culture - Fungal, Tissue (collected 03-26-24 @ 17:42)  Source: .Tissue 4. AV FISTULA GRAFT CULTURE  Preliminary Report (03-30-24 @ 15:03):    Culture is being performed. Fungal cultures are held for 4 weeks.    Culture - Tissue with Gram Stain (collected 03-26-24 @ 17:42)  Source: .Tissue 4. AV FISTULA GRAFT CULTURE  Gram Stain (03-27-24 @ 02:52):    Rare polymorphonuclear leukocytes seen per low power field    No organisms seen per oil power field  Final Report (03-31-24 @ 18:37):    No growth at 5 days    Culture - Fungal, Other (collected 03-26-24 @ 17:39)  Source: .Other 3. MIDSEGMENT AV GRAFT CULTURE  Preliminary Report (03-30-24 @ 15:03):    Culture is being performed. Fungal cultures are held for 4 weeks.    Culture - Surgical Swab (collected 03-26-24 @ 17:39)  Source: .Surgical Swab 3. MIDSEGMENT AV GRAFT CULTURE  Final Report (04-01-24 @ 07:34):    No growth at 5 days    Culture - Blood (collected 03-20-24 @ 15:29)  Source: .Blood Blood-Peripheral  Final Report (03-25-24 @ 18:01):    No growth at 5 days    Culture - Blood (collected 03-20-24 @ 15:29)  Source: .Blood Blood  Final Report (03-25-24 @ 18:01):    No growth at 5 days        Radiology: reviewed     Medications:  chlorhexidine 2% Cloths 1 Application(s) Topical <User Schedule>  clopidogrel Tablet 75 milliGRAM(s) Oral daily  dextrose 5%. 1000 milliLiter(s) IV Continuous <Continuous>  dextrose 5%. 1000 milliLiter(s) IV Continuous <Continuous>  dextrose 50% Injectable 25 Gram(s) IV Push once  dextrose 50% Injectable 25 Gram(s) IV Push once  dextrose 50% Injectable 12.5 Gram(s) IV Push once  dextrose Oral Gel 15 Gram(s) Oral once PRN  epoetin niesha (EPOGEN) Injectable 59517 Unit(s) IV Push <User Schedule>  glucagon  Injectable 1 milliGRAM(s) IntraMuscular once  insulin lispro (ADMELOG) corrective regimen sliding scale   SubCutaneous at bedtime  insulin lispro (ADMELOG) corrective regimen sliding scale   SubCutaneous three times a day before meals  sevelamer carbonate 2400 milliGRAM(s) Oral three times a day with meals  simvastatin 40 milliGRAM(s) Oral at bedtime    Antimicrobials:

## 2024-04-01 NOTE — PROGRESS NOTE ADULT - PROVIDER SPECIALTY LIST ADULT
Anesthesia
Cardiology
Infectious Disease
Infectious Disease
Internal Medicine
Nephrology
Vascular Surgery
Cardiology
Infectious Disease
Infectious Disease
Internal Medicine
Nephrology
Vascular Surgery
Cardiology
Infectious Disease
Internal Medicine
Nephrology
Nephrology
Vascular Surgery
Vascular Surgery
Infectious Disease
Internal Medicine
Nephrology
Vascular Surgery

## 2024-04-01 NOTE — PROGRESS NOTE ADULT - SUBJECTIVE AND OBJECTIVE BOX
[facility] - TIFFANY Bazzi / TIFFANY England / APARNA Vanegas/ TIFFANY Mcfarlane/ TIFFANY Madden/ MASSIEL Perkins / RAINER Potts / ESTEPHANIE Griffith / DEVEN Smith  ---------------------------------------------------------------------------------------------------------------  seen and examined today for ESRD  Interval : NAD  VITALS:  T(F): 98.2 (04-01-24 @ 10:30), Max: 98.8 (03-31-24 @ 21:28)  HR: 70 (04-01-24 @ 10:30)  BP: 116/59 (04-01-24 @ 10:30)  RR: 16 (04-01-24 @ 10:30)  SpO2: 96% (04-01-24 @ 05:33)  Wt(kg): --    04-01 @ 07:01  -  04-01 @ 12:14  --------------------------------------------------------  IN: 400 mL / OUT: 1100 mL / NET: -700 mL      Physical Exam :-  Constitutional: NAD  Neck: Supple.  Respiratory: Bilateral equal breath sounds,  Cardiovascular: S1, S2 normal,  Gastrointestinal: Bowel Sounds present, soft, non tender.  Extremities: No edema, B/L BKA, clean dressing over AVG surgical site  Neurological: Alert and Oriented x 3, no focal deficits  Psychiatric: Normal mood, normal affect  Data:-  Allergies :   No Known Allergies    Hospital Medications:   MEDICATIONS  (STANDING):  chlorhexidine 2% Cloths 1 Application(s) Topical <User Schedule>  clopidogrel Tablet 75 milliGRAM(s) Oral daily  dextrose 5%. 1000 milliLiter(s) (50 mL/Hr) IV Continuous <Continuous>  dextrose 5%. 1000 milliLiter(s) (100 mL/Hr) IV Continuous <Continuous>  dextrose 50% Injectable 12.5 Gram(s) IV Push once  dextrose 50% Injectable 25 Gram(s) IV Push once  dextrose 50% Injectable 25 Gram(s) IV Push once  epoetin niesha (EPOGEN) Injectable 56151 Unit(s) IV Push <User Schedule>  glucagon  Injectable 1 milliGRAM(s) IntraMuscular once  insulin lispro (ADMELOG) corrective regimen sliding scale   SubCutaneous at bedtime  insulin lispro (ADMELOG) corrective regimen sliding scale   SubCutaneous three times a day before meals  sevelamer carbonate 2400 milliGRAM(s) Oral three times a day with meals  simvastatin 40 milliGRAM(s) Oral at bedtime    04-01    135  |  95<L>  |  50<H>  ----------------------------<  82  4.6   |  25  |  8.67<H>    Ca    9.4      01 Apr 2024 06:50  Phos  4.9     04-01  Mg     2.50     04-01      Creatinine Trend: 8.67 <--, 6.70 <--, 5.14 <--, 7.08 <--, 5.13 <--, 7.93 <--, 7.73 <--, 6.01 <--                        8.1    4.81  )-----------( 166      ( 01 Apr 2024 06:50 )             24.9

## 2024-04-01 NOTE — DISCHARGE NOTE NURSING/CASE MANAGEMENT/SOCIAL WORK - NSDCPEFALRISK_GEN_ALL_CORE
For information on Fall & Injury Prevention, visit: [email or web address] OR  [email or web address] OR  https://www.cdc.gov/steadi/patient.html

## 2024-04-24 LAB
CULTURE RESULTS: SIGNIFICANT CHANGE UP
SPECIMEN SOURCE: SIGNIFICANT CHANGE UP

## 2024-05-11 LAB
CULTURE RESULTS: SIGNIFICANT CHANGE UP
SPECIMEN SOURCE: SIGNIFICANT CHANGE UP

## 2024-05-29 NOTE — CONSULT NOTE ADULT - RESPIRATORY AND THORAX
How Severe Is Your Skin Lesion?: mild Has Your Skin Lesion Been Treated?: not been treated Is This A New Presentation, Or A Follow-Up?: Skin Lesion negative

## 2024-06-07 NOTE — DIETITIAN INITIAL EVALUATION ADULT - NSICDXPASTSURGICALHX_GEN_ALL_CORE_FT
PAST SURGICAL HISTORY:  Acute osteomyelitis of toe of right foot 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    Hemodialysis access, AV graft     S/P arteriovenous (AV) fistula creation     
never

## 2024-06-13 NOTE — ED ADULT TRIAGE NOTE - PAIN RATING/NUMBER SCALE (0-10): ACTIVITY
Quality 226: Preventive Care And Screening: Tobacco Use: Screening And Cessation Intervention: Patient screened for tobacco use and is an ex/non-smoker Detail Level: Detailed Quality 110: Preventive Care And Screening: Influenza Immunization: Influenza Immunization Administered during Influenza season 6

## 2024-08-28 NOTE — PROGRESS NOTE ADULT - SUBJECTIVE AND OBJECTIVE BOX
Date of Service  : 03-13-22     INTERVAL HPI/OVERNIGHT EVENTS: Seen and examined earlier . No new concerns.   Vital Signs Last 24 Hrs  T(C): 36.7 (13 Mar 2022 16:00), Max: 36.8 (13 Mar 2022 13:46)  T(F): 98 (13 Mar 2022 16:00), Max: 98.2 (13 Mar 2022 13:46)  HR: 74 (13 Mar 2022 16:00) (70 - 81)  BP: 118/60 (13 Mar 2022 16:00) (118/60 - 142/56)  BP(mean): --  RR: 18 (13 Mar 2022 16:00) (18 - 18)  SpO2: 100% (13 Mar 2022 16:00) (96% - 100%)  I&O's Summary    MEDICATIONS  (STANDING):  aspirin  chewable 81 milliGRAM(s) Oral daily  atorvastatin 40 milliGRAM(s) Oral at bedtime  cadexomer iodine 0.9% Gel 1 Application(s) Topical daily  cefepime   IVPB 1000 milliGRAM(s) IV Intermittent every 24 hours  chlorhexidine 2% Cloths 1 Application(s) Topical daily  clopidogrel Tablet 75 milliGRAM(s) Oral daily  epoetin niesha-epbx (RETACRIT) Injectable 51705 Unit(s) IV Push <User Schedule>  heparin   Injectable 5000 Unit(s) SubCutaneous every 12 hours  Nephro-george 1 Tablet(s) Oral daily  Velphoro (sucroferric oxyhydroxide) 3 Tablet(s) 3 Tablet(s) Oral three times a day    MEDICATIONS  (PRN):  acetaminophen     Tablet .. 650 milliGRAM(s) Oral every 6 hours PRN Mild Pain (1 - 3), Moderate Pain (4 - 6)  HYDROmorphone  Injectable 0.25 milliGRAM(s) IV Push every 6 hours PRN Severe Pain (7 - 10)    LABS:                        8.7    12.18 )-----------( 330      ( 13 Mar 2022 06:24 )             28.7     03-13    138  |  95<L>  |  42<H>  ----------------------------<  105<H>  4.1   |  25  |  6.97<H>    Ca    9.4      13 Mar 2022 06:24  Phos  4.3     03-13  Mg     2.30     03-13          CAPILLARY BLOOD GLUCOSE              REVIEW OF SYSTEMS:  CONSTITUTIONAL: No fever, weight loss, or fatigue  EYES: No eye pain, visual disturbances, or discharge  ENMT:  No difficulty hearing, tinnitus, vertigo; No sinus or throat pain  NECK: No pain or stiffness  RESPIRATORY: No cough, wheezing, chills or hemoptysis; No shortness of breath  CARDIOVASCULAR: No chest pain, palpitations, dizziness, or leg swelling  GASTROINTESTINAL: No abdominal or epigastric pain. No nausea, vomiting, or hematemesis; No diarrhea or constipation. No melena or hematochezia.  GENITOURINARY: No dysuria, frequency, hematuria, or incontinence  NEUROLOGICAL: No headaches, memory loss, loss of strength, numbness, or tremors      Consultant(s) Notes Reviewed:  [x ] YES  [ ] NO    PHYSICAL EXAM:  GENERAL: NAD, well-groomed, well-developed,not in any distress ,  HEAD:  Atraumatic, Normocephalic  NECK: Supple, No JVD, Normal thyroid  NERVOUS SYSTEM:  Alert & Oriented X3, No focal deficit   CHEST/LUNG: Good air entry bilateral with no  rales, rhonchi, wheezing, or rubs  HEART: Regular rate and rhythm; No murmurs, rubs, or gallops  ABDOMEN: Soft, Nontender, Nondistended; Bowel sounds present  EXTREMITIES:  Feet dressed.     Care Discussed with Consultants/Other Providers [ x] YES  [ ] NO Detail Level: Detailed

## 2024-09-15 NOTE — ASU PATIENT PROFILE, ADULT - FALL HARM RISK - RISK INTERVENTIONS
I have reviewed and confirmed nurses' notes for patient's medications, allergies, medical history, and surgical history. Assistance OOB with selected safe patient handling equipment/Communicate Fall Risk and Risk Factors to all staff, patient, and family/Discuss with provider need for PT consult/Monitor gait and stability/Provide patient with walking aids - walker, cane, crutches/Reinforce activity limits and safety measures with patient and family/Visual Cue: Yellow wristband/Bed in lowest position, wheels locked, appropriate side rails in place/Call bell, personal items and telephone in reach/Instruct patient to call for assistance before getting out of bed or chair/Non-slip footwear when patient is out of bed/[place] to call system/Physically safe environment - no spills, clutter or unnecessary equipment/Purposeful Proactive Rounding/Room/bathroom lighting operational, light cord in reach

## 2024-11-13 NOTE — PHYSICAL THERAPY INITIAL EVALUATION ADULT - ASR WT BEARING STATUS EVAL
Per PT re-evaluation order, patient bilateral LE NWB. Podiatry team consulted; stated patient is allowed to WBAT through left LE with darco shoe donned for limited mobility (eg. ambulate to restroom)
Right LE
[place]

## 2024-12-10 NOTE — H&P CARDIOLOGY - --DESCRIBE SURGICAL SITE
[FreeTextEntry1] :  By signing my name below, I, Carey Andrews, attest that this document has been prepared under the direction and in the presence of Dr. Santos.  I, Pamela Santos MD, personally performed the services described in this documentation. All medical record entries made by the scribe were at my discretion and in my presence. I have reviewed the chart and discharge instructions (if applicable) and agree that the record reflects my personal permanence and is accurate and complete.  Right groin AVF Right thigh AVF

## 2025-01-21 NOTE — PHYSICAL THERAPY INITIAL EVALUATION ADULT - REHAB POTENTIAL, PT EVAL
Care Due:                  Date            Visit Type   Department     Provider  --------------------------------------------------------------------------------                                EP -                              PRIMARY      [facility]  Last Visit: 04-      CARE ([facility])   CARE           Ayden Flores  Next Visit: None Scheduled  None         None Found                                                            Last  Test          Frequency    Reason                     Performed    Due Date  --------------------------------------------------------------------------------    CMP.........  12 months..  losartan-hydrochlorothiaz  Not Found    Overdue                             emanuel......................    [employer] Embedded Care Due Messages. Reference number: [id number].   1/21/2025 12:09:31 AM CST   good, to achieve stated therapy goals

## 2025-05-20 ENCOUNTER — APPOINTMENT (OUTPATIENT)
Dept: VASCULAR SURGERY | Facility: CLINIC | Age: 65
End: 2025-05-20

## 2025-05-30 NOTE — ASU DISCHARGE PLAN (ADULT/PEDIATRIC) - NS MD DC FALL RISK RISK
For information on Fall & Injury Prevention, visit: [email or web address] OR  [email or web address] OR  https://www.cdc.gov/steadi/patient.html Improved.

## 2025-06-03 NOTE — ED ADULT NURSE NOTE - CHIEF COMPLAINT
>> CONTINUE   Bisoprolol 5 mg daily   Losartan 50 mg daily  Spironolactone 50 mg daily  Jardiance 10 mg daily  Digoxin 0.125 mg daily  Xarelto 20 mg daily  Torsemide 80 mg daily  Aspirin 81 mg daily  >> Complete liver MRI. To schedule this:   Call [phone] to schedule at [facility] in [place]  Call [facility] scheduling  in [place]    Call Emilia [phone] to give the exact date and location of the test  Please allow at least 3 weeks for insurance verification to avoid cancellation or rescheduling of the test  >> Follow up in 6 months    December 2, 2025 10:30 am     >> Call  option 1 and ask for Minerva (Admin), Sagrario RN, Christy RN, Dulce RN or Annie RN  Message Dr Simpson and the Adult Congenital Cardiology team in the [address] evan     > Calls and messages are triaged based on urgency Monday-Friday 8:00am-4:30pm.   > If you call outside of business hours or on weekends, please ask for the On-Call [facility] Provider    Fax: [phone] Attn: Dr Simpson / [facility]    For any requests for paperwork to be completed by our office, please allow 7-10 business days for completion.  For all medical records requests: [email or web address]  Clinic notes can be printed from the [employer] portal: [email or web address]   
The patient is a 58y Female complaining of abnormal lab result.

## (undated) DEVICE — SYR LUER LOK 5CC

## (undated) DEVICE — BRACE KNEE IMMOBILIZER SPLINT SUPER MEDIUM 20"

## (undated) DEVICE — STRYKER INTERPULSE HANDPIECE W IRR SUCTION TUBE

## (undated) DEVICE — DRAPE 3/4 SHEET W REINFORCEMENT 56X77"

## (undated) DEVICE — VENODYNE/SCD SLEEVE CALF MEDIUM

## (undated) DEVICE — GLV 8 PROTEXIS (CREAM) MICRO

## (undated) DEVICE — SAW BLADE MICROAIRE STERNUM 1.1X50X42MM

## (undated) DEVICE — PREP CHLORAPREP HI-LITE ORANGE 26ML

## (undated) DEVICE — NDL COUNTER FOAM AND MAGNET 40-70

## (undated) DEVICE — TUBING KIT FAST START ATF 40

## (undated) DEVICE — STAPLER SKIN MULTI DIRECTION W35

## (undated) DEVICE — LABELS BLANK W PEN

## (undated) DEVICE — TOURNIQUET CUFF 18" DUAL PORT DUAL BLADDER W PLC (BLACK)

## (undated) DEVICE — LIJ/LIA-TOURNIQUET #1 4014EABH: Type: DURABLE MEDICAL EQUIPMENT

## (undated) DEVICE — SUT SILK 3-0 18" TIES

## (undated) DEVICE — GLV 7 PROTEXIS (WHITE)

## (undated) DEVICE — NDL BLUNT 18G LOOP VESSEL MAXI WHITE

## (undated) DEVICE — POSITIONER STRAP ARMBOARD VELCRO TS-30

## (undated) DEVICE — GLV 7.5 PROTEXIS (CREAM) MICRO

## (undated) DEVICE — DRAPE 3/4 SHEET 52X76"

## (undated) DEVICE — POSITIONER CARDIAC BUMP

## (undated) DEVICE — PREP DURAPREP 26CC

## (undated) DEVICE — SUT VICRYL 4-0 18" PS-2 UNDYED

## (undated) DEVICE — TUBING IV SET MICROCLAVE ADAPTER

## (undated) DEVICE — SUT PROLENE 3-0 36" SH

## (undated) DEVICE — FRAZIER SUCTION TIP 8FR

## (undated) DEVICE — SUT VICRYL 3-0 18" PS-2 UNDYED

## (undated) DEVICE — SAW BLADE MICROAIRE STERNUM 1X34X9.4MM

## (undated) DEVICE — DRSG STOCKINETTE IMPERVIOUS XL

## (undated) DEVICE — SUT SURGICAL STEEL 6 30" BP-1

## (undated) DEVICE — SYR LUER LOK 10CC

## (undated) DEVICE — DRAPE HAND 77" X 146"

## (undated) DEVICE — URETERAL CATH RED RUBBER 8FR

## (undated) DEVICE — Device

## (undated) DEVICE — GOWN LG

## (undated) DEVICE — BLADE SURGICAL #15 CARBON

## (undated) DEVICE — SUT SILK 4-0 18" TIES

## (undated) DEVICE — TUBING HIGH POWER CONTRAST INJ

## (undated) DEVICE — LAP PAD 4 X 18"

## (undated) DEVICE — SOL NORMOSOL-R PH7.4 1000ML

## (undated) DEVICE — DRSG STERISTRIPS 0.25 X 3"

## (undated) DEVICE — BRACE KNEE IMMOBILIZER SPLINT SUPER MEDIUM 24"

## (undated) DEVICE — PACING CABLE (BROWN) A/V TEMP SCREW DOWN 12FT

## (undated) DEVICE — SOL IRR BAG NS 0.9% 3000ML

## (undated) DEVICE — GLV 7.5 PROTEXIS (WHITE)

## (undated) DEVICE — PACK LIJ BASIC ORTHO

## (undated) DEVICE — DRSG CURITY GAUZE SPONGE 4 X 4" 12-PLY

## (undated) DEVICE — WARMING BLANKET FULL ADULT

## (undated) DEVICE — DRAPE ULTRASOUND PROBE COVER W ULTRA GEL 18X120CM

## (undated) DEVICE — SUT PROLENE 6-0 30" C-1

## (undated) DEVICE — SUT PROLENE 4-0 36" SH

## (undated) DEVICE — DRSG ACE BANDAGE 4"

## (undated) DEVICE — VESSEL LOOP MAXI-BLUE 0.120" X 16"

## (undated) DEVICE — DRAPE IOBAN 23" X 23"

## (undated) DEVICE — VISITEC 4X4

## (undated) DEVICE — SENSOR MYOCARDIAL TEMP 15MM

## (undated) DEVICE — BLADE 32 X 64

## (undated) DEVICE — PACK UNIVERSAL CARDIAC SUPPLEMNTAL B

## (undated) DEVICE — TOURNIQUET SET 12FR (1 RED, 1 BLUE, 1 SNARE) 7"

## (undated) DEVICE — DRAPE FEMORAL ANGIOGRAPHY W TROUGH

## (undated) DEVICE — AORTIC PUNCH 4.0MM STANDARD HANDLE

## (undated) DEVICE — GEL AQUSNC PACKET 20GR

## (undated) DEVICE — CONNECTOR STRAIGHT 3/8 X 1/2"

## (undated) DEVICE — SUT SILK 2-0 18" TIES

## (undated) DEVICE — AORTIC PUNCH 4.0MM LONG LENGTH HANDLE

## (undated) DEVICE — CHEST DRAIN PLEUR-EVAC WET/WET ADULT-PEDS SINGLE (QUICK)

## (undated) DEVICE — SAW BLADE STRYKER SAGITTAL 25X1.27X90

## (undated) DEVICE — SUT VICRYL 2-0 27" CT-1 UNDYED

## (undated) DEVICE — DRSG OPSITE 13.75 X 4"

## (undated) DEVICE — SUT STAINLESS STEEL 5 18" SCC

## (undated) DEVICE — DRSG ACE BANDAGE 6"

## (undated) DEVICE — PACING CABLE A/V TEMP SCREW DOWN 6FT

## (undated) DEVICE — DRAPE TOWEL BLUE 17" X 24"

## (undated) DEVICE — DRAPE MAYO STAND 30"

## (undated) DEVICE — SWITCH ARISS TABLE MOUNT EQUAL LEGS 13"

## (undated) DEVICE — SUCTION YANKAUER NO CONTROL VENT

## (undated) DEVICE — SYR LUER LOK 30CC

## (undated) DEVICE — SUMP PERICARDIAL 20FR 1/4" ADULT

## (undated) DEVICE — LAP PAD 18 X 18"

## (undated) DEVICE — CANISTER DISPOSABLE THIN WALL 3000CC

## (undated) DEVICE — SUT POLYSORB 2-0 30" GS-21 UNDYED

## (undated) DEVICE — SUCTION CATH ARGYLE WHISTLE TIP 14FR STRAIGHT PACKED

## (undated) DEVICE — BLADE SCALPEL SAFETYLOCK #11

## (undated) DEVICE — SUT PROLENE 8-0 24" BV175-6

## (undated) DEVICE — ELCTR GROUNDING PAD ADULT COVIDIEN

## (undated) DEVICE — DRSG PREVENA PEEL & PLACE KIT 20CM

## (undated) DEVICE — NDL HYPO SAFE 18G X 1.5" (PINK)

## (undated) DEVICE — DRSG KERLIX ROLL 4.5"

## (undated) DEVICE — FOLEY TRAY 16FR 5CC LF LUBRISIL ADVANCE TEMP CLOSED

## (undated) DEVICE — TUBING SUCTION 20FT

## (undated) DEVICE — SWITCH ARISS TABLE MOUNT UNEQUAL LEGS 13"

## (undated) DEVICE — GOWN XL

## (undated) DEVICE — GOWN TRIMAX LG

## (undated) DEVICE — BLOWER MISTER AXIUS WITH IV SET

## (undated) DEVICE — SUT SOFSILK 4-0 24" CV-15

## (undated) DEVICE — DRSG KLING 4"

## (undated) DEVICE — SYR ASEPTO

## (undated) DEVICE — FEEDING TUBE NG ARGYLE PVC 8FR 41CM ENFIT

## (undated) DEVICE — PACK AV FISTULA

## (undated) DEVICE — SOL IRR POUR NS 0.9% 500ML

## (undated) DEVICE — DRAIN RESERVOIR FOR JACKSON PRATT 100CC CARDINAL

## (undated) DEVICE — SUT PROLENE 7-0 24" BV175-8

## (undated) DEVICE — TAPE SILK 3"

## (undated) DEVICE — DRSG TEGADERM 6"X8"

## (undated) DEVICE — AORTIC PUNCH 5MM STANDARD HANDLE

## (undated) DEVICE — DRSG ACE BANDAGE 4" NS

## (undated) DEVICE — BLADE SCALPEL SAFETYLOCK #15

## (undated) DEVICE — DRAIN CHANNEL 32FR ROUND HUBLESS FULL FLUTED

## (undated) DEVICE — STAPLER SKIN VISI-STAT 35 WIDE

## (undated) DEVICE — SUT PROLENE 4-0 36" BB

## (undated) DEVICE — FILTER REINFUSION FOR SALVAGED BLOOD DISP

## (undated) DEVICE — PACK UNIVERSAL CARDIAC

## (undated) DEVICE — SUT SILK 0 30" TIES

## (undated) DEVICE — SYS VEIN HARVESTING VIRTUOSAPH PLUS W/ RADIAL

## (undated) DEVICE — TUBING TRUWAVE PRESSURE MALE/FEMALE 72"

## (undated) DEVICE — DRSG COBAN 4"

## (undated) DEVICE — FLOW SWITCH

## (undated) DEVICE — SUT ETHILON 4-0 18" PS-2

## (undated) DEVICE — GUIDE SELECTION F/ATRICLIP LAA SYS

## (undated) DEVICE — FEEDING TUBE NG ARGYLE PVC 8FR 107CM ENFIT

## (undated) DEVICE — TOURNIQUET ESMARK 6"

## (undated) DEVICE — DRSG XEROFORM 1 X 8"

## (undated) DEVICE — DRSG DERMABOND PRINEO 60CM

## (undated) DEVICE — DRSG STOCKINETTE TUBULAR 6"

## (undated) DEVICE — NDL HYPO REGULAR BEVEL 25G X 1.5" (BLUE)

## (undated) DEVICE — SUT PROLENE 6-0 4-18" BV-1

## (undated) DEVICE — POSITIONER FOAM EGG CRATE ULNAR 2PCS (PINK)

## (undated) DEVICE — SYNOVIS VASCULAR PROBE 1.5MM 15CM

## (undated) DEVICE — SOL IRR BAG NS 0.9% 1000ML

## (undated) DEVICE — PREP BETADINE SPONGE STICKS

## (undated) DEVICE — APPLICATOR Q TIP 6" WOOD STEM

## (undated) DEVICE — DRSG TEGADERM 4X4.75"

## (undated) DEVICE — SUCTION TUBE CARDIAC SOFT TIP 6FR SHAFT 10FR TIP 6"

## (undated) DEVICE — SUT PROLENE 5-0 30" RB-2

## (undated) DEVICE — SUT VICRYL 3-0 27" SH UNDYED

## (undated) DEVICE — CONN DUAL HOSE

## (undated) DEVICE — SUT POLYSORB 0 36" GS-25 UNDYED

## (undated) DEVICE — DRAPE SLUSH / WARMER 44 X 66"

## (undated) DEVICE — DRAPE COVER SNAP 36X30"

## (undated) DEVICE — SUT DOUBLE 6 WIRE STERNAL

## (undated) DEVICE — HANDPIECE IRRIGATION W/ BATTERY PACKAND HIGH FLOW TIP

## (undated) DEVICE — SUT PROLENE 7-0 24" BV175-6

## (undated) DEVICE — STOPCOCK 4-WAY 2 GANG W SWIVEL MALE LUER LOCK

## (undated) DEVICE — DRAPE 1/2 SHEET 40X57"

## (undated) DEVICE — DRSG TEGADERM 4X11

## (undated) DEVICE — SUT MONOCRYL 4-0 27" PS-2 UNDYED

## (undated) DEVICE — TORQUE DEVICE FOR GUIDEWIRE 0.0100.038"

## (undated) DEVICE — CONNECTOR STRAIGHT 1/2 X 1/2"

## (undated) DEVICE — ANESTHESIA CIRCUIT ADULT HMEF

## (undated) DEVICE — SUT POLYSORB 3-0 30" V-20 UNDYED

## (undated) DEVICE — SYR LUER LOK 50CC

## (undated) DEVICE — PACKING GAUZE PLAIN 2"

## (undated) DEVICE — DRAIN PENROSE .5" X 18" LATEX

## (undated) DEVICE — TOURNIQUET CUFF 24" DUAL PORT SINGLE BLADDER W PLC (BLACK)

## (undated) DEVICE — BLADE ACCESS RAIL DEEP

## (undated) DEVICE — DRAPE ISOLATION BAG 20X20"

## (undated) DEVICE — SUT SOFSILK 0 30" V-20

## (undated) DEVICE — DEFIBRILLATOR PAD PRE-CONNECT ADULT/CHILD

## (undated) DEVICE — KIT POST MORTEM PEDS

## (undated) DEVICE — INFLATOR ENCORE 26

## (undated) DEVICE — SUT TICRON 4-0 36" CV-331 DA

## (undated) DEVICE — PACKING GAUZE IODOFORM 0.5"

## (undated) DEVICE — DRAPE SURGICAL #1010

## (undated) DEVICE — WARMING BLANKET LOWER ADULT

## (undated) DEVICE — MEDTRONIC URCHIN EVO HEART POSITIONER & CANISTER TUBING SET

## (undated) DEVICE — SUT BLUNT SZ 5

## (undated) DEVICE — SUT PROLENE 5-0 36" RB-1

## (undated) DEVICE — SUT PROLENE 7-0 4-24" BV-1

## (undated) DEVICE — SUT PLEDGET PRE PUNCH 4.8 X 9.5 X 1.5 MM

## (undated) DEVICE — SUT PROLENE 6-0 4-30" C-1

## (undated) DEVICE — BULLDOG SPRING CLIP 6MM SOFT/SOFT

## (undated) DEVICE — DRSG COMBINE 5X9"

## (undated) DEVICE — SUT BIOSYN 4-0 18" P-12

## (undated) DEVICE — BLADE SURGICAL #11 CARBON

## (undated) DEVICE — PACK MINOR WITH LAP

## (undated) DEVICE — ELCTR BOVIE TIP BLADE INSULATED 2.8" EDGE WITH SAFETY

## (undated) DEVICE — SYR LUER LOK 20CC

## (undated) DEVICE — VESSEL LOOP MAXI-RED  0.120" X 16"

## (undated) DEVICE — SYR LUER LOK 1CC

## (undated) DEVICE — GLV 8 PROTEXIS (WHITE)

## (undated) DEVICE — SPECIMEN CONTAINER 100ML

## (undated) DEVICE — DRSG OPSITE 2.5 X 2"

## (undated) DEVICE — ACROBAT SUV VACUUM OFF PUMP SYSTEM

## (undated) DEVICE — WARMING BLANKET UPPER ADULT

## (undated) DEVICE — CONNECTOR INTERSEPT "Y" 1/4 X 1/4 X 1/4"

## (undated) DEVICE — SUT SILK 4-0 17-18"